# Patient Record
Sex: MALE | Race: WHITE | NOT HISPANIC OR LATINO | Employment: FULL TIME | URBAN - METROPOLITAN AREA
[De-identification: names, ages, dates, MRNs, and addresses within clinical notes are randomized per-mention and may not be internally consistent; named-entity substitution may affect disease eponyms.]

---

## 2017-01-04 ENCOUNTER — LAB VISIT (OUTPATIENT)
Dept: LAB | Facility: HOSPITAL | Age: 27
End: 2017-01-04
Attending: INTERNAL MEDICINE
Payer: COMMERCIAL

## 2017-01-04 ENCOUNTER — OFFICE VISIT (OUTPATIENT)
Dept: TRANSPLANT | Facility: CLINIC | Age: 27
End: 2017-01-04
Payer: COMMERCIAL

## 2017-01-04 VITALS
DIASTOLIC BLOOD PRESSURE: 68 MMHG | BODY MASS INDEX: 23.22 KG/M2 | SYSTOLIC BLOOD PRESSURE: 96 MMHG | HEIGHT: 68 IN | HEART RATE: 67 BPM | WEIGHT: 153.25 LBS

## 2017-01-04 DIAGNOSIS — I42.8 NONISCHEMIC CARDIOMYOPATHY: ICD-10-CM

## 2017-01-04 DIAGNOSIS — I10 ESSENTIAL HYPERTENSION: ICD-10-CM

## 2017-01-04 DIAGNOSIS — F17.210 CIGARETTE SMOKER: ICD-10-CM

## 2017-01-04 DIAGNOSIS — I50.42 CHRONIC COMBINED SYSTOLIC AND DIASTOLIC HEART FAILURE: ICD-10-CM

## 2017-01-04 DIAGNOSIS — I42.9 CARDIOMYOPATHY: Primary | ICD-10-CM

## 2017-01-04 DIAGNOSIS — I50.22 CHRONIC SYSTOLIC CONGESTIVE HEART FAILURE: Primary | ICD-10-CM

## 2017-01-04 LAB
ALBUMIN SERPL BCP-MCNC: 4 G/DL
ALP SERPL-CCNC: 70 U/L
ALT SERPL W/O P-5'-P-CCNC: 39 U/L
ANION GAP SERPL CALC-SCNC: 9 MMOL/L
AST SERPL-CCNC: 25 U/L
BILIRUB SERPL-MCNC: 1.3 MG/DL
BNP SERPL-MCNC: 809 PG/ML
BUN SERPL-MCNC: 22 MG/DL
CALCIUM SERPL-MCNC: 9 MG/DL
CHLORIDE SERPL-SCNC: 106 MMOL/L
CO2 SERPL-SCNC: 25 MMOL/L
CREAT SERPL-MCNC: 1.3 MG/DL
EST. GFR  (AFRICAN AMERICAN): >60 ML/MIN/1.73 M^2
EST. GFR  (NON AFRICAN AMERICAN): >60 ML/MIN/1.73 M^2
GLUCOSE SERPL-MCNC: 96 MG/DL
POTASSIUM SERPL-SCNC: 4 MMOL/L
PROT SERPL-MCNC: 6.5 G/DL
SODIUM SERPL-SCNC: 140 MMOL/L

## 2017-01-04 PROCEDURE — 3078F DIAST BP <80 MM HG: CPT | Mod: S$GLB,,, | Performed by: NURSE PRACTITIONER

## 2017-01-04 PROCEDURE — 1159F MED LIST DOCD IN RCRD: CPT | Mod: S$GLB,,, | Performed by: NURSE PRACTITIONER

## 2017-01-04 PROCEDURE — 99213 OFFICE O/P EST LOW 20 MIN: CPT | Mod: S$GLB,,, | Performed by: NURSE PRACTITIONER

## 2017-01-04 PROCEDURE — 99999 PR PBB SHADOW E&M-EST. PATIENT-LVL III: CPT | Mod: PBBFAC,,, | Performed by: NURSE PRACTITIONER

## 2017-01-04 PROCEDURE — 3074F SYST BP LT 130 MM HG: CPT | Mod: S$GLB,,, | Performed by: NURSE PRACTITIONER

## 2017-01-04 NOTE — PROGRESS NOTES
Subjective:    Patient ID:  Mert Samayoa is a 26 y.o. male who presents for follow-up of Congestive Heart Failure      HPI 25 yo WM with familial DCM, 2 brothers with OHTx, is here to f/u clinic visit 2 weeks ago when he describes worsening activity intolerance, was noted to have a decrease in PkVO2 from 42.0 to 23.9 (53% of predicted) and a gradually climbing BNP. TTE done in August showed decline in LVEF to 10-15% from > 30%. Labs today are pending, but on 12/27/16, his BNP was 835 and creatinine had bumped to 1.4. He experiences varying degrees of AMES, but sometimes just walking a block. Feels tired more often. Has noticed abdominal bloating, although no nausea and appetite is fine. Continues to work as a , and occasionally lifts 55#. Continues to take an occasional puff of a cigarette.    Review of Systems   Constitution: Positive for malaise/fatigue.   HENT: Negative.    Eyes: Negative.    Cardiovascular: Positive for dyspnea on exertion.   Respiratory: Negative.    Endocrine: Negative.    Hematologic/Lymphatic: Negative.    Skin: Negative.    Musculoskeletal: Negative.    Gastrointestinal: Positive for bloating.   Genitourinary: Negative.    Neurological: Negative.    Psychiatric/Behavioral: Negative.    Allergic/Immunologic: Negative.         Objective:    Physical Exam   Constitutional: He is oriented to person, place, and time. He appears well-developed and well-nourished.   HENT:   Head: Normocephalic and atraumatic.   Eyes: Conjunctivae and EOM are normal. Pupils are equal, round, and reactive to light.   Neck: Normal range of motion. Neck supple.   Cardiovascular: Normal rate, regular rhythm, normal heart sounds and intact distal pulses.    Pulmonary/Chest: Effort normal and breath sounds normal.   Abdominal: Soft. Bowel sounds are normal.   Musculoskeletal: Normal range of motion. He exhibits no edema.   Neurological: He is alert and oriented to person, place,  and time.   Skin: Skin is warm and dry.   Psychiatric: He has a normal mood and affect. His behavior is normal. Judgment and thought content normal.     Lab Results   Component Value Date     (H) 12/27/2016     12/27/2016    K 5.0 12/27/2016    MG 2.1 08/11/2016     12/27/2016    CO2 24 12/27/2016    BUN 17 12/27/2016    CREATININE 1.4 12/27/2016    GLU 95 12/27/2016    AST 20 12/20/2016    ALT 25 12/20/2016    ALBUMIN 4.2 12/20/2016    PROT 6.8 12/20/2016    BILITOT 0.7 12/20/2016    CHOL 116 (L) 05/09/2012    HDL 35 (L) 05/09/2012    LDLCALC 68.0 05/09/2012    TRIG 63 05/09/2012       Magnesium   Date Value Ref Range Status   08/11/2016 2.1 1.6 - 2.6 mg/dL Final       Lab Results   Component Value Date    WBC 8.20 12/20/2016    HGB 14.0 12/20/2016    HCT 42.4 12/20/2016    MCV 92 12/20/2016     12/20/2016       Lab Results   Component Value Date    INR 1.1 12/30/2009       BNP   Date Value Ref Range Status   12/27/2016 835 (H) 0 - 99 pg/mL Final     Comment:     Values of less than 100 pg/ml are consistent with non-CHF populations.   12/20/2016 874 (H) 0 - 99 pg/mL Final     Comment:     Values of less than 100 pg/ml are consistent with non-CHF populations.   08/18/2016 230 (H) 0 - 99 pg/mL Final     Comment:     Values of less than 100 pg/ml are consistent with non-CHF populations.       No results found for: LDH            Assessment:       1. Familial Cardiomyopathy    2. Chronic combined systolic and diastolic heart failure    3. Essential hypertension    4. Cigarette smoker         Plan:       Schedule RHC for next week to assess flows and pressures given worsening symptoms, climbing BNP and climbing creatinine.

## 2017-01-04 NOTE — MR AVS SNAPSHOT
[facility]  [address]  [facility] [address]  Phone: [phone]                  Mert Samayoa   2017 1:30 PM   Office Visit    Description:  Male : 1990   Provider:  Diane Saleh NP   Department:  [facility]           Reason for Visit     Congestive Heart Failure           Diagnoses this Visit        Comments    Cardiomyopathy    -  Primary     Chronic combined systolic and diastolic heart failure         Essential hypertension         Cigarette smoker                To Do List           Goals (5 Years of Data)     None      Follow-Up and Disposition     Return in about 1 month (around 2017).    Follow-up and Disposition History      [facility]     [facility] -  Assistance  Registered nurses in the [facility] provide clinical advisement, health education, appointment booking, and other advisory services.  Call for this free service at [phone].             Medications           Message regarding Medications     Verify the changes and/or additions to your medication regime listed below are the same as discussed with your clinician today.  If any of these changes or additions are incorrect, please notify your healthcare provider.             Verify that the below list of medications is an accurate representation of the medications you are currently taking.  If none reported, the list may be blank. If incorrect, please contact your healthcare provider. Carry this list with you in case of emergency.           Current Medications     carvedilol (COREG) 6.25 MG tablet Take 1 tablet (6.25 mg total) by mouth 2 (two) times daily with meals. Take 1 1/2 in the morning and 1 1/2 in the evening    co-enzyme Q-10 (COQ-10) 30 mg capsule Take 30 mg by mouth once daily.    lisinopril 10 MG tablet TAKE ONE TABLET BY MOUTH ONCE DAILY IN THE EVENING           Clinical Reference Information           Vital Signs - Last  "Recorded  Most recent update: 1/4/2017  1:39 PM by Minna Raymundo    BP Pulse Ht Wt BMI    96/68 67 5' 7.5" (1.715 m) 69.5 kg (153 lb 3.5 oz) 23.64 kg/m2      Blood Pressure          Most Recent Value    BP  96/68      Allergies as of 1/4/2017     No Known Allergies      Immunizations Administered on Date of Encounter - 1/4/2017     None      Orders Placed During Today's Visit     Future Labs/Procedures Expected by Expires    Brain natriuretic peptide  1/11/2017 (Approximate) 1/4/2018    Case Request-Cath Lab: HEART CATH-RIGHT  1/11/2017 (Approximate) 1/4/2018    CBC auto differential  1/11/2017 (Approximate) 1/4/2018    Comprehensive metabolic panel  1/11/2017 (Approximate) 1/4/2018    Magnesium  1/11/2017 (Approximate) 1/4/2018    Protime-INR  1/11/2017 (Approximate) 1/4/2018    Brain natriuretic peptide  2/4/2017 (Approximate) 1/4/2018    Comprehensive metabolic panel  2/4/2017 (Approximate) 1/4/2018    Magnesium  2/4/2017 (Approximate) 1/4/2018    T4, FREE  2/4/2017 (Approximate) 1/4/2018    TSH  2/4/2017 (Approximate) 1/4/2018      Smoking Cessation     If you would like to quit smoking:   You may be eligible for free services if you are a [place] resident and started smoking cigarettes before September 1, 1988.  Call the [employer]ess[employer] ([employer]) toll free at ([phone] or ([phone].   Call 6-800-QUIT-NOW if you do not meet the above criteria.            "

## 2017-01-16 ENCOUNTER — HOSPITAL ENCOUNTER (OUTPATIENT)
Facility: HOSPITAL | Age: 27
Discharge: STILL ADMITTED OR ADMINISTRATIVE | End: 2017-01-16
Attending: INTERNAL MEDICINE | Admitting: INTERNAL MEDICINE
Payer: COMMERCIAL

## 2017-01-16 ENCOUNTER — HOSPITAL ENCOUNTER (INPATIENT)
Facility: HOSPITAL | Age: 27
LOS: 7 days | Discharge: HOME | DRG: 291 | End: 2017-01-23
Attending: EMERGENCY MEDICINE | Admitting: INTERNAL MEDICINE
Payer: COMMERCIAL

## 2017-01-16 DIAGNOSIS — I50.42 CHRONIC COMBINED SYSTOLIC AND DIASTOLIC HEART FAILURE: Primary | ICD-10-CM

## 2017-01-16 DIAGNOSIS — R57.0 CARDIOGENIC SHOCK: ICD-10-CM

## 2017-01-16 DIAGNOSIS — Z76.82 ORGAN TRANSPLANT CANDIDATE: ICD-10-CM

## 2017-01-16 DIAGNOSIS — I42.0 DILATED CARDIOMYOPATHY: ICD-10-CM

## 2017-01-16 DIAGNOSIS — I42.9 CARDIOMYOPATHY: ICD-10-CM

## 2017-01-16 DIAGNOSIS — I50.9 HEART FAILURE: ICD-10-CM

## 2017-01-16 DIAGNOSIS — I50.43 ACUTE ON CHRONIC COMBINED SYSTOLIC AND DIASTOLIC HEART FAILURE: ICD-10-CM

## 2017-01-16 LAB
ALLENS TEST: ABNORMAL
DELSYS: ABNORMAL
HCO3 UR-SCNC: 28.4 MMOL/L (ref 24–28)
MODE: ABNORMAL
PCO2 BLDA: 40.9 MMHG (ref 35–45)
PH SMN: 7.45 [PH] (ref 7.35–7.45)
PO2 BLDA: 26 MMHG (ref 40–60)
POC BE: 4 MMOL/L
POC SATURATED O2: 52 % (ref 95–100)
POC TCO2: 30 MMOL/L (ref 24–29)
SAMPLE: ABNORMAL
SITE: ABNORMAL
SP02: 98

## 2017-01-16 PROCEDURE — 20000000 HC ICU ROOM

## 2017-01-16 PROCEDURE — 99223 1ST HOSP IP/OBS HIGH 75: CPT | Mod: ,,, | Performed by: INTERNAL MEDICINE

## 2017-01-16 PROCEDURE — G0379 DIRECT REFER HOSPITAL OBSERV: HCPCS

## 2017-01-16 PROCEDURE — 63600175 PHARM REV CODE 636 W HCPCS: Performed by: NURSE PRACTITIONER

## 2017-01-16 PROCEDURE — G0378 HOSPITAL OBSERVATION PER HR: HCPCS

## 2017-01-16 PROCEDURE — 99285 EMERGENCY DEPT VISIT HI MDM: CPT | Mod: 25

## 2017-01-16 PROCEDURE — 82803 BLOOD GASES ANY COMBINATION: CPT

## 2017-01-16 PROCEDURE — 96365 THER/PROPH/DIAG IV INF INIT: CPT

## 2017-01-16 PROCEDURE — 93010 ELECTROCARDIOGRAM REPORT: CPT | Mod: ,,, | Performed by: INTERNAL MEDICINE

## 2017-01-16 PROCEDURE — 93005 ELECTROCARDIOGRAM TRACING: CPT

## 2017-01-16 PROCEDURE — 96366 THER/PROPH/DIAG IV INF ADDON: CPT

## 2017-01-16 PROCEDURE — 99283 EMERGENCY DEPT VISIT LOW MDM: CPT | Mod: ,,, | Performed by: EMERGENCY MEDICINE

## 2017-01-16 PROCEDURE — 63600175 PHARM REV CODE 636 W HCPCS: Performed by: EMERGENCY MEDICINE

## 2017-01-16 PROCEDURE — 36591 DRAW BLOOD OFF VENOUS DEVICE: CPT

## 2017-01-16 PROCEDURE — 96375 TX/PRO/DX INJ NEW DRUG ADDON: CPT

## 2017-01-16 PROCEDURE — 25000003 PHARM REV CODE 250: Performed by: NURSE PRACTITIONER

## 2017-01-16 PROCEDURE — 25000003 PHARM REV CODE 250: Performed by: HOSPITALIST

## 2017-01-16 RX ORDER — FUROSEMIDE 10 MG/ML
40 INJECTION INTRAMUSCULAR; INTRAVENOUS ONCE
Status: CANCELLED | OUTPATIENT
Start: 2017-01-16 | End: 2017-01-16

## 2017-01-16 RX ORDER — FUROSEMIDE 10 MG/ML
40 INJECTION INTRAMUSCULAR; INTRAVENOUS ONCE
Status: COMPLETED | OUTPATIENT
Start: 2017-01-16 | End: 2017-01-16

## 2017-01-16 RX ORDER — DOBUTAMINE HYDROCHLORIDE 200 MG/100ML
5 INJECTION INTRAVENOUS CONTINUOUS
Status: DISCONTINUED | OUTPATIENT
Start: 2017-01-16 | End: 2017-01-17

## 2017-01-16 RX ORDER — HEPARIN SODIUM 5000 [USP'U]/ML
5000 INJECTION, SOLUTION INTRAVENOUS; SUBCUTANEOUS EVERY 8 HOURS
Status: CANCELLED | OUTPATIENT
Start: 2017-01-16

## 2017-01-16 RX ORDER — ACETAMINOPHEN 325 MG/1
650 TABLET ORAL EVERY 6 HOURS PRN
Status: DISCONTINUED | OUTPATIENT
Start: 2017-01-16 | End: 2017-01-23 | Stop reason: HOSPADM

## 2017-01-16 RX ORDER — SODIUM CHLORIDE 0.9 % (FLUSH) 0.9 %
3 SYRINGE (ML) INJECTION EVERY 8 HOURS
Status: CANCELLED | OUTPATIENT
Start: 2017-01-16

## 2017-01-16 RX ORDER — ONDANSETRON 2 MG/ML
4 INJECTION INTRAMUSCULAR; INTRAVENOUS EVERY 8 HOURS PRN
Status: CANCELLED | OUTPATIENT
Start: 2017-01-16

## 2017-01-16 RX ORDER — LISINOPRIL 10 MG/1
10 TABLET ORAL DAILY
Status: CANCELLED | OUTPATIENT
Start: 2017-01-17

## 2017-01-16 RX ORDER — DOBUTAMINE HYDROCHLORIDE 400 MG/100ML
5 INJECTION, SOLUTION INTRAVENOUS CONTINUOUS
Status: CANCELLED | OUTPATIENT
Start: 2017-01-16

## 2017-01-16 RX ORDER — LISINOPRIL 10 MG/1
10 TABLET ORAL DAILY
Status: DISCONTINUED | OUTPATIENT
Start: 2017-01-16 | End: 2017-01-23 | Stop reason: HOSPADM

## 2017-01-16 RX ADMIN — ACETAMINOPHEN 650 MG: 325 TABLET ORAL at 07:01

## 2017-01-16 RX ADMIN — LISINOPRIL 10 MG: 10 TABLET ORAL at 04:01

## 2017-01-16 RX ADMIN — FUROSEMIDE 10 MG/HR: 10 INJECTION, SOLUTION INTRAMUSCULAR; INTRAVENOUS at 03:01

## 2017-01-16 RX ADMIN — FUROSEMIDE 40 MG: 10 INJECTION, SOLUTION INTRAMUSCULAR; INTRAVENOUS at 03:01

## 2017-01-16 RX ADMIN — DOBUTAMINE IN DEXTROSE 5 MCG/KG/MIN: 200 INJECTION, SOLUTION INTRAVENOUS at 01:01

## 2017-01-16 NOTE — CONSULTS
Cardiology Consult Note:    25 yo M with familial NIDCM (LVEDD 6.9 CM, EF 10%) who presented for outpatient RHC, found to have low CI at 1 and high filling pressures with RAP 18 and PCWP 30. He was sent directly to the ED for admission to CCU for inotrope and diuresis. His main complaints are fatigue, AMES, orthopnea/PND, and nausea. Symptoms have progressed substantially over the past week. Family history notable for NIDCM in both older brothers who are both s/p OHT.    Last Echo 8/11/16:   1 - Severe left ventricular enlargement.     2 - Severely depressed left ventricular systolic function (EF 10-15%).     3 - Eccentric hypertrophy.     4 - Left ventricular diastolic dysfunction.     5 - Mild left atrial enlargement.     6 - Right ventricle is upper limit of normal in size with normal systolic function.     7 - Mild to moderate mitral regurgitation.     8 - Mild tricuspid regurgitation.     9 - Trivial pulmonic regurgitation.     10 - The estimated PA systolic pressure is 35 mmHg.     AF, 86/52, otherwise VSS. Exam remarkable for JVD to earlobe, RRR, no murmur, bibasilar rales, no edema, cool peripherally, 2+ pulses.  Labs notable for Cr 1.4, Tb 1.6, ASt 34, ALT 54, BNP 1514  CXR: pending    Assessment  ADHF/cardiogenic shock  Hepatic congestion    Recs:  Admit to CCU under HTS   at 5 mcg/kg/min  Lasix gtt at 10 mg/hr  Advanced options work-up

## 2017-01-16 NOTE — PROCEDURES
Central Line (Trialysis) Placement Procedure Note:   Number of lumens: 3  Indication: Dialysis  Post procedure diagnosis: same   Estimated blood loss: 5ml   Consent:   Prior to starting the procedure, informed consent was obtained with risks outlined including, but not limited to: pneumothorax, hemothorax, infection, bleeding, and thrombosis.   Prior to starting, all those in the room washed their hands and donned caps and masks, which were worn throughout the procedure. A time-out was performed. The insertion site and surrounding areas were generously scrubbed with chlorhexadine. Using sterile technique, the central line kit was opened and a whole body sterile drape was applied to the patient. The insertion site was anesthetized with lidocaine 1% without epinephrine. The Right Internal Jugular Vein was cannulated and dark red, non-pulsatile blood was aspirated into the syringe. Then, using sterile Seldinger technique, a J-type guidewire was advanced through the needle into the vein without difficulty or resistance. The needle was removed and a small incision was made at the insertion site to allow for advancement of a dilator. After the track was successfully dilated, the catheter was advanced over the guidewire to the skin and the guidewire was removed intact. Dark red blood was aspirated from each of the lumens and sterile saline was injected into each of the ports. The catheter was sewn into place and the site was again cleaned with chlorhexadine. A biopatch was applied to the insertion site and a sterile central line dressing was applied. A chest XRAY was done to confirm catheter placement and absense of pneumothorax/hemothorax. The patient tolerated the procedure well.     Signed:  JANUSZ Daniels MD  Advanced Heart Failure/LVAD/Transplant Cardiology Fellow  1/16/2017 5:37 PM

## 2017-01-16 NOTE — INTERVAL H&P NOTE
RHC today showed elevated right and left sided filling pressures and low cardiac output c/w cardiogenic shock. Patient transported to ED for initiation of IV  and Lasix infusion while awaiting critical care bed.            Active Hospital Problems    Diagnosis  POA    *Cardiogenic shock [R57.0]  Yes      Resolved Hospital Problems    Diagnosis Date Resolved POA   No resolved problems to display.

## 2017-01-16 NOTE — IP AVS SNAPSHOT
[facility]  [address]  [facility] [address]  Phone: [phone]           Patient Discharge Instructions     Our goal is to set you up for success. This packet includes information on your condition, medications, and your home care. It will help you to care for yourself so you don't get sicker and need to go back to the hospital.     Please ask your nurse if you have any questions.        There are many details to remember when preparing to leave the hospital. Here is what you will need to do:    1. Take your medicine. If you are prescribed medications, review your Medication List in the following pages. You may have new medications to  at the pharmacy and others that you'll need to stop taking. Review the instructions for how and when to take your medications. Talk with your doctor or nurses if you are unsure of what to do.     2. Go to your follow-up appointments. Specific follow-up information is listed in the following pages. Your may be contacted by a transition nurse or clinical provider about future appointments. Be sure we have all of the phone numbers to reach you, if needed. Please contact your provider's office if you are unable to make an appointment.     3. Watch for warning signs. Your doctor or nurse will give you detailed warning signs to watch for and when to call for assistance. These instructions may also include educational information about your condition. If you experience any of warning signs to your health, call your doctor.               [facility]  Unless otherwise directed by your provider, please contact [facility], our nurse care line that is available for 24/7 assistance.     [phone] (toll-free)    Registered nurses in the [facility] provide clinical advisement, health education, appointment booking, and other advisory services.                    ** Verify the list of medication(s) below is accurate and up  to date. Carry this with you in case of emergency. If your medications have changed, please notify your healthcare provider.             Medication List      START taking these medications        Additional Info    Begin Date AM Noon PM Bedtime    furosemide 20 MG tablet   Commonly known as:  LASIX   Quantity:  30 tablet   Refills:  11   Dose:  20 mg    Last time this was given:  20 mg on 1/23/2017  8:16 AM   Instructions:  Take 1 tablet (20 mg total) by mouth once daily.     Tomorrow 1/24                            CONTINUE taking these medications        Additional Info    Begin Date AM Noon PM Bedtime    lisinopril 10 MG tablet   Quantity:  30 tablet   Refills:  11    Last time this was given:  10 mg on 1/23/2017  8:16 AM   Instructions:  TAKE ONE TABLET BY MOUTH ONCE DAILY IN THE EVENING     Tomorrow 1/24                            STOP taking these medications     carvedilol 6.25 MG tablet   Commonly known as:  COREG       COQ-10 30 mg capsule   Generic drug:  co-enzyme Q-10            Where to Get Your Medications      These medications were sent to [id number] - [place] - [address] [facility]  [address] [facility] [place] [address]     Phone:  [phone]     furosemide 20 MG tablet                  Please bring to all follow up appointments:    1. A copy of your discharge instructions.  2. All medicines you are currently taking in their original bottles.  3. Identification and insurance card.    Please arrive 15 minutes ahead of scheduled appointment time.    Please call 24 hours in advance if you must reschedule your appointment and/or time.        Your Scheduled Appointments     Jan 27, 2017 10:05 AM CST   Non-Fasting Lab with LAB, APPOINTMENT [place] [facility] ([facility])    [address] [facility] [address]   [phone]            Jan 27, 2017  1:00 PM CST   Established Patient Visit with HAKEEM Sosa   [facility]  "([facility] )    [address]  [place] [address]   [phone]            Feb 10, 2017 12:30 PM CST   Non-Fasting Lab with LAB, APPOINTMENT [place] [facility])    [address] [place] [address]   [phone]            Feb 10, 2017  2:30 PM CST   Established Patient Visit with Lashon Hawkins MD   [facility] [address] [place] [address]   [phone]              Your Future Surgeries/Procedures     Jan 31, 2017   Surgery with Lex Macedo MD   [facility] ([facility]) [address] [place] [address]   [phone]            Feb 01, 2017   Surgery with Lex Macedo MD   [facility]) [address] [place] [id number] [phone]                  Primary Diagnosis     Your primary diagnosis was:  Heart Failure That Is Caused By Inadequate Blood Supply      Admission Information     Date & Time Provider Department CSN    1/16/2017 11:15 AM Lashon Hawkins MD [facility] [id number]      Care Providers     Provider Role Specialty Primary office phone    Lashon Hawkins MD Attending Provider Cardiology [phone]    Lashon Hawkins MD Team Attending  Cardiology [phone]    Sammi Tapia MD Consulting Physician  Cardiology [phone]    Marisel Lundberg MD Team Attending  Cardiology [phone]    Mario oCnner MD Team Attending  Cardiology [phone]    Roque Matos MD Team Attending  Cardiology [phone]      Your Vitals Were     BP Pulse Temp Resp Height Weight    108/58 (BP Location: Left arm, Patient Position: Lying, BP Method: Automatic) 93 97.4 °F (36.3 °C) (Oral) 18 5' 7" (1.702 m) 63 kg (138 lb 14.2 oz)    SpO2 BMI             96% 21.75 kg/m2         Recent Lab Values        1/17/2017                           4:19 PM           " A1C 5.4           Comment for A1C at  4:19 PM on 1/17/2017:  According to ADA guidelines, hemoglobin A1C <7.0% represents  optimal control in non-pregnant diabetic patients.  Different  metrics may apply to specific populations.   Standards of Medical Care in Diabetes - 2016.  For the purpose of screening for the presence of diabetes:  <5.7%     Consistent with the absence of diabetes  5.7-6.4%  Consistent with increasing risk for diabetes   (prediabetes)  >or=6.5%  Consistent with diabetes  Currently no consensus exists for use of hemoglobin A1C  for diagnosis of diabetes for children.        Pending Labs     Order Current Status    DRVVT In process    HLA SSO DNA Typing - Class I & II In process    Hexagonal Phospholipid Neutralization In process    Mumps, IgG Screen In process    Rubeola antibody IgG In process    Von Willebrand multimeric In process    HLA Solid Organ Recipient Workup Preliminary result      Allergies as of 1/23/2017     No Known Allergies      Advance Directives     An advance directive is a document which, in the event you are no longer able to make decisions for yourself, tells your healthcare team what kind of treatment you do or do not want to receive, or who you would like to make those decisions for you.  If you do not currently have an advance directive, [facility] encourages you to create one.  For more information call:  [phone]), [phone] ([phone]),  or log on to [email or web address].        Smoking Cessation     If you would like to quit smoking:   You may be eligible for free services if you are a [place] resident and started smoking cigarettes before September 1, 1988.  Call the Smoking Cessation Trust (SCT) toll free at (781) 149-4974 or (422) 969-8509.   Call 8-774-QUIT-NOW if you do not meet the above criteria.            Language Assistance Services     ATTENTION: Language assistance services are available, free of charge. Please call  [phone].      ATENCIÓN: Si hala keisha, tiene a hinton disposición servicios gratuitos de asistencia lingüística. Angelo al [phone].     [facility] Ý: N?u b?n nói Ti?ng Vi?t, có các d?ch v? h? tr? ngôn ng? mi?n phí dành cho b?n. G?i s? [phone].        Heart Failure Education       Heart Failure: Being Active  You have a condition called heart failure. Being active doesnt mean that you have to wear yourself out. Even a little movement each day helps to strengthen your heart. If you cant get out to exercise, you can do simple stretching and strengthening exercises at home. These are good ways to keep you well-conditioned and prevent you and your heart from becoming excessively weak.    Ideas to get you started  · Add a little movement to things you do now. Walk to mail letters. Park your car at the far end of the parking lot and walk to the store. Walk up a flight of stairs instead of taking the elevator.  · Choose activities you enjoy. You might walk, swim, or ride an exercise bike. Things like gardening and washing the car count, too. Other possibilities include: washing dishes, walking the dog, walking around the mall, and doing aerobic activities with friends.  · Join a group exercise program at a [facility] or [facility], a senior center, or a community center. Or look into a hospital cardiac rehabilitation program. Ask your doctor if you qualify.  Tips to keep you going  · Get up and get dressed each day. Go to a coffee shop and read a newspaper or go somewhere that you'll be in the presence of other active people. Youll feel more like being active.  · Make a plan. Choose one or more activities that you enjoy and that you can easily do. Then plan to do at least one each day. You might write your plan on a calendar.  · Go with a friend or a group if you like company. This can help you feel supported and stay motivated, too.  · Plan social events that you enjoy. This will keep you mentally engaged as well as  physically motivated to do things you find pleasure in.  For your safety  · Talk with your healthcare provider before starting an exercise program.  · Exercise indoors when its too hot or too cold outside, or when the air quality is poor. Try walking at a shopping mall.  · Wear socks and sturdy shoes to maintain your balance and prevent falls.  · Start slowly. Do a few minutes several times a day at first. Increase your time and speed little by little.  · Stop and rest whenever you feel tired or get short of breath.  · Dont push yourself on days when you dont feel well.  © 6020-8680 [employer]. [address] [place] [address], [place] [address]. All rights reserved. This information is not intended as a substitute for professional medical care. Always follow your healthcare professional's instructions.              Heart Failure: Evaluating Your Heart  You have a condition called heart failure. To evaluate your condition, your doctor will examine you, ask questions, and do some tests. Along with looking for signs of heart failure, the doctor looks for any other health problems that may have led to heart failure. The results of your evaluation will help your doctor form a treatment plan.  Health history and physical exam  Your visit will start with a health history. Tell the doctor about any symptoms youve noticed and about all medicines you take. Then youll have a physical exam. This includes listening to your heartbeat and breathing. Youll also be checked for swelling (edema) in your legs and neck. When you have fluid buildup or fluid in the lungs, it may be called congestive heart failure.  Diagnosing heart failure     During an echocardiogram, sound waves bounce off the heart. These are converted into a picture on the screen.   The following may be done to help your doctor form a diagnosis:  · X-rays show the size and shape of your heart. These pictures can also show fluid in your lungs.  · An  electrocardiogram (ECG or EKG) shows the pattern of your heartbeat. Small pads (electrodes) are placed on your chest, arms, and legs. Wires connect the pads to the ECG machine, which records your hearts electrical signals. This can give the doctor information about heart function.  · An echocardiogram uses ultrasound waves to show the structure and movement of your heart muscle. This shows how well the heart pumps. It also shows the thickness of the heart walls, and if the heart is enlarged. It is one of the most useful, non-invasive tests as it provides information about the heart's general function. This helps your doctor make treatment decisions.  · Lab tests evaluate small amounts of blood or urine for signs of problems. A BNP lab test can help diagnose and evaluate heart failure. BNP stands for B-type natriuretic peptide. The ventricles secrete more BNP when heart failure worsens. Lab tests can also provide information about metabolic dysfunction or heart dysfunction.  Your treatment plan  Based on the results of your evaluation and tests, your doctor will develop a treatment plan. This plan is designed to relieve some of your heart failure symptoms and help make you more comfortable. Your treatment plan may include:  · Medicine to help your heart work better and improve your quality of life  · Changes in what you eat and drink to help prevent fluid from backing up in your body  · Daily monitoring of your weight and heart failure symptoms to see how well your treatment plan is working  · Exercise to help you stay healthy  · Help with quitting smoking  · Emotional and psychological support to help adjust to the changes  · Referrals to other specialists to make sure you are being treated comprehensively  © 9187-8954 [employer]. [address] [place] [address], [place] [address]. All rights reserved. This information is not intended as a substitute for professional medical care. Always follow your  healthcare professional's instructions.              Heart Failure: Making Changes to Your Diet  You have a condition called heart failure. When you have heart failure, excess fluid is more likely to build up in your body because your heart isn't working well. This makes the heart work harder to pump blood. Fluid buildup causes symptoms such as shortness of breath and swelling (edema). This is often referred to as congestive heart failure or CHF. Controlling the amount of salt (sodium) you eat may help stop fluid from building up. Your doctor may also tell you to reduce the amount of fluid you drink.  Reading food labels    Your healthcare provider will tell you how much sodium you can eat each day. Read food labels to keep track. Keep in mind that certain foods are high in salt. These include canned, frozen, and processed foods. Check the amount of sodium in each serving. Watch out for high-sodium ingredients. These include MSG (monosodium glutamate), baking soda, and sodium phosphate.   Eating less salt  Give yourself time to get used to eating less salt. It may take a little while. Here are some tips to help:  · Take the saltshaker off the table. Replace it with salt-free herb mixes and spices.  · Eat fresh or plain frozen vegetables. These have much less salt than canned vegetables.  · Choose low-sodium snacks like sodium-free pretzels, crackers, or air-popped popcorn.  · Dont add salt to your food when youre cooking. Instead, season your foods with pepper, lemon, garlic, or onion.  · When you eat out, ask that your food be cooked without added salt.  · Avoid eating fried foods as these often have a great deal of salt.  If youre told to limit fluids  You may need to limit how much fluid you have to help prevent swelling. This includes anything that is liquid at room temperature, such as ice cream and soup. If your doctor tells you to limit fluid, try these tips:  · Measure drinks in a measuring cup before you  drink them. This will help you meet daily goals.  · Chill drinks to make them more refreshing.  · Suck on frozen lemon wedges to quench thirst.  · Only drink when youre thirsty.  · Chew sugarless gum or suck on hard candy to keep your mouth moist.  · Weigh yourself daily to know if your body's fluid content is rising.  My sodium goal  Your healthcare provider may give you a sodium goal to meet each day. This includes sodium found in food as well as salt that you add. My goal is to eat no more than ___________ mg of sodium per day.     When to call your doctor  Call your doctor right away if you have any symptoms of worsening heart failure. These can include:  · Sudden weight gain  · Increased swelling of your legs or ankles  · Trouble breathing when youre resting or at night  · Increase in the number of pillows you have to sleep on  · Chest pain, pressure, discomfort, or pain in the jaw, neck, or back   © 5529-7567 [employer]. [address] [place] [address]. All rights reserved. This information is not intended as a substitute for professional medical care. Always follow your healthcare professional's instructions.              Heart Failure: Medicines to Help Your Heart    You have a condition called heart failure (also known as congestive heart failure, or CHF). Your doctor will likely prescribe medicines for heart failure and any underlying health problems you have. Most heart failure patients take one or more types of medicinen. Your healthcare provider will work to find the combination of medicines that works best for you.  Heart failure medicines  Here are the most common heart failure medicines:  · ACE inhibitors lower blood pressure and decrease strain on the heart. This makes it easier for the heart to pump. Angiotensin receptor blockers have similar effects. These are prescribed for some patients instead of ACE inhibitors.  · Beta-blockers relieve stress on the heart. They also  improve symptoms. They may also improve the heart's pumping action over time.  · Diuretics (also called water pills) help rid your body of excess water. This can help rid your body of swelling (edema). Having less fluid to pump means your heart doesnt have to work as hard. Some diuretics make your body lose a mineral called potassium. Your doctor will tell you if you need to take supplements or eat more foods high in potassium.  · Digoxin helps your heart pump with more strength. This helps your heart pump more blood with each beat. So, more oxygen-rich blood travels to the rest of the body.  · Aldosterone antagonists help alter hormones and decrease strain on the heart.  · Hydralazine and nitrates are two separate medicines used together to treat heart failure. They may come in one combination pill. They lower blood pressure and decrease how hard the heart has to pump.  Medicines for related conditions  Controlling other heart problems helps keep heart failure under control, too. Depending on other heart problems you have, medicines may be prescribed to:  · Lower blood pressure (antihypertensives).  · Lower cholesterol levels (statins).  · Prevent blood clots (anticoagulants or aspirin).  · Keep the heartbeat steady (antiarrhythmics).  © 3947-6055 [employer]. [address] [place] [address], [place] [address]. All rights reserved. This information is not intended as a substitute for professional medical care. Always follow your healthcare professional's instructions.              Heart Failure: Procedures That May Help    The heart is a muscle that pumps oxygen-rich blood to all parts of the body. When you have heart failure, the heart is not able to pump as well as it should. Blood and fluid may back up into the lungs (congestive heart failure), and some parts of the body dont get enough oxygen-rich blood to work normally. These problems lead to the symptoms of heart failure.     Certain procedures may  help the heart pump better in some cases of heart failure. Some procedures are done to treat health problems that may have caused the heart failure such as coronary artery disease or heart rhythm problems. For more serious heart failure, other options are available.  Treating artery and valve problems  If you have coronary artery disease or valve disease, procedures may be done to improve blood flow. This helps the heart pump better, which can improve heart failure symptoms. First, your doctor may do a cardiac catheterization to help detect clogged blood vessels or valve damage. During this procedure, a  thin tube (catheter) in inserted into a blood vessel and guided to the heart. There a dye is injected and a special type of X-ray (angiogram) is taken of the blood vessels. Procedures to open a blocked artery or fix damaged valves can also be done using catheterization.  · Angioplasty uses a balloon-tipped instrument at the end of the catheter. The balloon is inflated to widen the narrowed artery. In many cases, a stent is expanded to further support the narrowed artery. A stent is a metal mesh tube.  · Valve surgery repairs or replacement of faulty valves can also be done during catheterization so blood can flow properly through the chambers of the heart.  Bypass surgery is another option to help treat blocked arteries. It uses a healthy blood vessel from elsewhere in the body. The healthy blood vessel is attached above and below the blocked area so that blood can flow around the blocked artery.  Treating heart rhythm problems  A device may be placed in the chest to help a weak heart maintain a healthy, heartbeat so the heart can pump more effectively:  · Pacemaker. A pacemaker is an implanted device that regulates your heartbeat electronically. It monitors your heart's rhythm and generates a painless electric impulse that helps the heart beat in a regular rhythm. A pacemaker is programmed to meet your specific  heart rhythm needs.  · Biventricular pacing/cardiac resynchronization therapy. A type of pacemaker that paces both pumping chambers of the heart at the same time to coordinate contractions and to improve the heart's function. Some people with heart failure are candidates for this therapy.  · Implantable cardioverter defibrillator. A device similar to a pacemaker that senses when the heart is beating too fast and delivers an electrical shock to convert the fast rhythm to a normal rhythm. This can be a life saving device.  In severe cases  In more serious cases of heart failure when other treatments no longer work, other options may include:  · Ventricular assist devices (VADs). These are mechanical devices used to take over the pumping function for one or both of the heart's ventricles, or pumping chambers. A VAD may be necessary when heart failure progresses to the point that medicines and other treatments no longer help. In some cases, a VAD may be used as a bridge to transplant.  · Heart transplant. This is replacing the diseased heart with a healthy one from a donor. This is an option for a few people who are very sick. A heart transplant is very serious and not an option for all patients. Your doctor can tell you more.  © 9508-4465 [employer]. [address] [place] [address]. All rights reserved. This information is not intended as a substitute for professional medical care. Always follow your healthcare professional's instructions.              Heart Failure: Tracking Your Weight  You have a condition called heart failure. When you have heart failure, a sudden weight gain or a steady rise in weight is a warning sign that your body is retaining too much water and salt. This could mean your heart failure is getting worse. If left untreated, it can cause problems for your lungs and result in shortness of breath. Weighing yourself each day is the best way to know if youre retaining water.  If your weight goes up quickly, call your doctor. You will be given instructions on how to get rid of the excess water. You will likely need medicines and to avoid salt. This will help your heart work better.  Call your doctor if you gain more than 2 pounds in 1 day, more than 5 pounds in 1 week, or whatever weight gain you were told to report by your doctor. This is often a sign of worsening heart failure and needs to be evaluated and treated. Your doctor will tell you what to do next.   Tips for weighing yourself    · Weigh yourself at the same time each morning, wearing the same clothes. Weigh yourself after urinating and before eating.  · Use the same scale each day. Make sure the numbers are easy to read. Put the scale on a flat, hard surface -- not on a rug or carpet.  · Do not stop weighing yourself. If you forget one day, weigh again the next morning.  How to use your weight chart  · Keep your weight chart near the scale. Write your weight on the chart as soon as you get off the scale.  · Fill in the month and the start date on the chart. Then write down your weight each day. Your chart will look like this:    · If you miss a day, leave the space blank. Weigh yourself the next day and write your weight in the next space.  · Take your weight chart with you when you go to see your doctor.  © 6123-3718 [employer]. [address] [place] [address], [place] [address]. All rights reserved. This information is not intended as a substitute for professional medical care. Always follow your healthcare professional's instructions.              Heart Failure: Warning Signs of a Flare-Up  You have a condition called heart failure. Once you have heart failure, flare-ups can happen. Below are signs that can mean your heart failure is getting worse. If you notice any of these warning signs, call your healthcare provider.  Swelling    · Your feet, ankles, or lower legs get puffier.  · You notice skin changes on your  lower legs.  · Your shoes feel too tight.  · Your clothes are tighter in the waist.  · You have trouble getting rings on or off your fingers.  Shortness of breath  · You have to breathe harder even when youre doing your normal activities or when youre resting.  · You are short of breath walking up stairs or even short distances.  · You wake up at night short of breath or coughing.  · You need to use more pillows or sit up to sleep.  · You wake up tired or restless.  Other warning signs  · You feel weaker, dizzy, or more tired.  · You have chest pain or changes in your heartbeat.  · You have a cough that wont go away.  · You cant remember things or dont feel like eating.  Tracking your weight  Gaining weight is often the first warning sign that heart failure is getting worse. Gaining even a few pounds can be a sign that your body is retaining excess water and salt. Weighing yourself each day in the morning after you urinate and before you eat, is the best way to know if you're retaining water. Get a scale that is easy to read and make sure you wear the same clothes and use the same scale every time you weigh. Your healthcare provider will show you how to track your weight. Call your doctor if you gain more than 2 pounds in 1 day, 5 pounds in 1 week, or whatever weight gain you were told to report by your doctor. This is often a sign of worsening heart failure and needs to be evaluated and treated before it compromises your breathing. Your doctor will tell you what to do next.    © 7598-4490 [employer]. [address] [place] [address], [place] [address]. All rights reserved. This information is not intended as a substitute for professional medical care. Always follow your healthcare professional's instructions.               [facility] complies with applicable Federal civil rights laws and does not discriminate on the basis of race, color, national origin, age, disability, or sex.

## 2017-01-16 NOTE — ED NOTES
Patient identifiers verified and correct for Mert Samayoa.    LOC: The patient is awake, alert and aware of environment with an appropriate affect, the patient is oriented x 3 and speaking appropriately.  APPEARANCE: Patient resting comfortably and in no acute distress, patient is clean and well groomed, patient's clothing is properly fastened.  SKIN: The skin is cool and dry, color consistent with ethnicity, patient has normal skin turgor and moist mucus membranes, skin intact, no breakdown or bruising noted.  MUSCULOSKELETAL: Patient moving all extremities spontaneously, no obvious swelling or deformities noted.  RESPIRATORY: Airway is open and patent, respirations are spontaneous, patient has a normal effort and rate, no accessory muscle use noted, clear bilateral breath sounds noted through out the chest.  CARDIAC: Patient has a normal rate and regular rhythm, no periphreal edema noted, capillary refill < 3 seconds.  ABDOMEN: Soft and non tender to palpation, no distention noted, normoactive bowel sounds present in all four quadrants.  NEUROLOGIC:  eyes open spontaneously, behavior appropriate to situation, follows commands, facial expression symmetrical, bilateral hand grasp equal and even, purposeful motor response noted, normal sensation in all extremities when touched with a finger.

## 2017-01-16 NOTE — ED PROVIDER NOTES
Encounter Date: 1/16/2017    SCRIBE #1 NOTE: I, Martita Sheffield, am scribing for, and in the presence of,  Dr. Robert. I have scribed the following portions of the note - Other sections scribed: Attending Notes .       History     Chief Complaint   Patient presents with    Shortness of Breath     sent from 3rd floor after having RHC, 'admit will be quicker via ER, needing dobutamine drip, denies chest pain and  sob sometimes     Review of patient's allergies indicates:  No Known Allergies  HPI Comments: 27 yo female with h/o hereditary cardiomyopathy presents from [facility] clinic after hypotensive event with presyncopal symptoms.  Pt had had cath procedure performed.  As he was being d/c'ed he suffered hypotensive episode with lightheadedness, diaphoresis, and nausea.      The history is provided by the patient.     Past Medical History   Diagnosis Date    Cardiogenic shock 1/16/2017    Cardiomyopathy      Familial cardiomyopathy    CHF (congestive heart failure)     Essential hypertension 12/21/2016    Familial Cardiomyopathy      Familial cardiomyopathy      Past Medical History Pertinent Negatives   Diagnosis Date Noted    Allergy 8/31/2012    Anemia 8/31/2012    Anxiety 8/31/2012    Arthritis 8/31/2012    Asthma 8/31/2012    Cancer 8/31/2012    Chronic kidney disease 8/31/2012    Depression 8/31/2012    Diabetes mellitus 8/31/2012    Heart murmur 8/31/2012    Hyperlipidemia 8/31/2012    Seizures 8/31/2012    Stroke 8/31/2012    Systemic lupus erythematosus 8/31/2012    Thyroid disease 8/31/2012    Ulcer 8/31/2012     History reviewed. No pertinent past surgical history.  Family History   Problem Relation Age of Onset    Arthritis Mother     Heart disease Brother      cardiomyopathy and heart transplant    No Known Problems Father     Heart disease Brother      cardiomyopathy and heart transplanted twice    Birth defects Paternal Uncle      Social History   Substance Use Topics     Smoking status: Current Some Day Smoker     Packs/day: 1.00    Smokeless tobacco: None    Alcohol use 2.4 oz/week     4 Shots of liquor per week     Review of Systems   Constitutional: Positive for diaphoresis. Negative for chills and fever.   HENT: Negative for sore throat and trouble swallowing.    Eyes: Negative for pain and visual disturbance.   Respiratory: Positive for shortness of breath (chronic; worse with exertion ). Negative for cough.    Cardiovascular: Negative for chest pain and leg swelling.   Gastrointestinal: Positive for nausea. Negative for abdominal pain.   Genitourinary: Negative for dysuria and flank pain.   Musculoskeletal: Negative for back pain and myalgias.   Skin: Negative for rash and wound.   Neurological: Positive for light-headedness. Negative for weakness.       Physical Exam   Initial Vitals   BP Pulse Resp Temp SpO2   01/16/17 1103 01/16/17 1103 01/16/17 1103 01/16/17 1103 01/16/17 1103   86/52 78 18 97.5 °F (36.4 °C) 99 %     Physical Exam    Nursing note and vitals reviewed.  Constitutional: He appears well-developed and well-nourished. He is not diaphoretic. No distress.   HENT:   Head: Normocephalic and atraumatic.   Mouth/Throat: Oropharynx is clear and moist.   Eyes: Conjunctivae are normal. Pupils are equal, round, and reactive to light. No scleral icterus.   Neck: Normal range of motion. Neck supple. No JVD present.   Cardiovascular: Normal rate, regular rhythm and normal heart sounds.   No murmur heard.  Pulmonary/Chest: Breath sounds normal. No respiratory distress. He has no rhonchi. He has no rales.   Abdominal: Soft. Bowel sounds are normal. There is no tenderness.   Musculoskeletal: Normal range of motion. He exhibits no edema or tenderness.   Neurological: He is alert and oriented to person, place, and time.   Skin: Skin is dry. No rash noted. No erythema.         ED Course   Procedures  Labs Reviewed                   APC / Resident Notes:   HOII MDM      A/P:  Pt  is a 25 yo male with hypotension and pre-syncopal symptoms sent from cards clinic for admit.  Pt had labs done this morning as he had a cath performed.  Currently pt is stable with SBP>90 and HR in the 80's.  Cardiology has been called and are coming to eval pt .  He will likely require admit to the ICU for dobutamine gtt.  Awaiting [facility] recs  Case Discussed with Dr. HAZEL LAW  01/16/2017 11:25 AM             Scribe Attestation:   Scribe #1: I performed the above scribed service and the documentation accurately describes the services I performed. I attest to the accuracy of the note.    Attending Attestation:           Physician Attestation for Scribe:  Physician Attestation Statement for Scribe #1: I, Dr. Robert , reviewed documentation, as scribed by Martita Sheffield in my presence, and it is both accurate and complete.         Attending ED Notes:   11:27 AM  Cardiology called and will see patient.     Cardiology evaluated the patient and feel that the patient will need a dobutamine drip and will be admitted to the CCU for further care and treatment          ED Course     Clinical Impression:   The primary encounter diagnosis was Chronic combined systolic and diastolic heart failure. Diagnoses of Cardiogenic shock, Heart failure, Organ transplant candidate, Acute on chronic combined systolic and diastolic heart failure, Cardiomyopathy, and Dilated cardiomyopathy were also pertinent to this visit.    Disposition:   Disposition: Admitted  Condition: Serious       Rosa Isela Dahl MD  Resident  01/16/17 1431       Kt Robert MD  01/31/17 1004

## 2017-01-16 NOTE — H&P (VIEW-ONLY)
Subjective:    Patient ID:  Mert Samayoa is a 26 y.o. male who presents for follow-up of Congestive Heart Failure      HPI 27 yo WM with familial DCM, 2 brothers with OHTx, is here to f/u clinic visit 2 weeks ago when he describes worsening activity intolerance, was noted to have a decrease in PkVO2 from 42.0 to 23.9 (53% of predicted) and a gradually climbing BNP. TTE done in August showed decline in LVEF to 10-15% from > 30%. Labs today are pending, but on 12/27/16, his BNP was 835 and creatinine had bumped to 1.4. He experiences varying degrees of AMES, but sometimes just walking a block. Feels tired more often. Has noticed abdominal bloating, although no nausea and appetite is fine. Continues to work as a , and occasionally lifts 55#. Continues to take an occasional puff of a cigarette.    Review of Systems   Constitution: Positive for malaise/fatigue.   HENT: Negative.    Eyes: Negative.    Cardiovascular: Positive for dyspnea on exertion.   Respiratory: Negative.    Endocrine: Negative.    Hematologic/Lymphatic: Negative.    Skin: Negative.    Musculoskeletal: Negative.    Gastrointestinal: Positive for bloating.   Genitourinary: Negative.    Neurological: Negative.    Psychiatric/Behavioral: Negative.    Allergic/Immunologic: Negative.         Objective:    Physical Exam   Constitutional: He is oriented to person, place, and time. He appears well-developed and well-nourished.   HENT:   Head: Normocephalic and atraumatic.   Eyes: Conjunctivae and EOM are normal. Pupils are equal, round, and reactive to light.   Neck: Normal range of motion. Neck supple.   Cardiovascular: Normal rate, regular rhythm, normal heart sounds and intact distal pulses.    Pulmonary/Chest: Effort normal and breath sounds normal.   Abdominal: Soft. Bowel sounds are normal.   Musculoskeletal: Normal range of motion. He exhibits no edema.   Neurological: He is alert and oriented to person, place,  and time.   Skin: Skin is warm and dry.   Psychiatric: He has a normal mood and affect. His behavior is normal. Judgment and thought content normal.     Lab Results   Component Value Date     (H) 12/27/2016     12/27/2016    K 5.0 12/27/2016    MG 2.1 08/11/2016     12/27/2016    CO2 24 12/27/2016    BUN 17 12/27/2016    CREATININE 1.4 12/27/2016    GLU 95 12/27/2016    AST 20 12/20/2016    ALT 25 12/20/2016    ALBUMIN 4.2 12/20/2016    PROT 6.8 12/20/2016    BILITOT 0.7 12/20/2016    CHOL 116 (L) 05/09/2012    HDL 35 (L) 05/09/2012    LDLCALC 68.0 05/09/2012    TRIG 63 05/09/2012       Magnesium   Date Value Ref Range Status   08/11/2016 2.1 1.6 - 2.6 mg/dL Final       Lab Results   Component Value Date    WBC 8.20 12/20/2016    HGB 14.0 12/20/2016    HCT 42.4 12/20/2016    MCV 92 12/20/2016     12/20/2016       Lab Results   Component Value Date    INR 1.1 12/30/2009       BNP   Date Value Ref Range Status   12/27/2016 835 (H) 0 - 99 pg/mL Final     Comment:     Values of less than 100 pg/ml are consistent with non-CHF populations.   12/20/2016 874 (H) 0 - 99 pg/mL Final     Comment:     Values of less than 100 pg/ml are consistent with non-CHF populations.   08/18/2016 230 (H) 0 - 99 pg/mL Final     Comment:     Values of less than 100 pg/ml are consistent with non-CHF populations.       No results found for: LDH            Assessment:       1. Familial Cardiomyopathy    2. Chronic combined systolic and diastolic heart failure    3. Essential hypertension    4. Cigarette smoker         Plan:       Schedule RHC for next week to assess flows and pressures given worsening symptoms, climbing BNP and climbing creatinine.

## 2017-01-17 ENCOUNTER — EDUCATION (OUTPATIENT)
Dept: TRANSPLANT | Facility: CLINIC | Age: 27
End: 2017-01-17

## 2017-01-17 ENCOUNTER — DOCUMENTATION ONLY (OUTPATIENT)
Dept: TRANSPLANT | Facility: CLINIC | Age: 27
End: 2017-01-17

## 2017-01-17 ENCOUNTER — TELEPHONE (OUTPATIENT)
Dept: ADMINISTRATIVE | Facility: HOSPITAL | Age: 27
End: 2017-01-17

## 2017-01-17 ENCOUNTER — SOCIAL WORK (OUTPATIENT)
Dept: TRANSPLANT | Facility: HOSPITAL | Age: 27
End: 2017-01-17

## 2017-01-17 ENCOUNTER — TELEPHONE (OUTPATIENT)
Dept: TRANSPLANT | Facility: CLINIC | Age: 27
End: 2017-01-17

## 2017-01-17 PROBLEM — R57.0 CARDIOGENIC SHOCK: Status: ACTIVE | Noted: 2017-01-17

## 2017-01-17 LAB
ABO + RH BLD: NORMAL
ALBUMIN SERPL BCP-MCNC: 5 G/DL
ALLENS TEST: ABNORMAL
ALP SERPL-CCNC: 80 U/L
ALT SERPL W/O P-5'-P-CCNC: 64 U/L
ANION GAP SERPL CALC-SCNC: 16 MMOL/L
AST SERPL-CCNC: 39 U/L
BASOPHILS # BLD AUTO: 0.01 K/UL
BASOPHILS NFR BLD: 0.1 %
BILIRUB DIRECT SERPL-MCNC: 0.5 MG/DL
BILIRUB SERPL-MCNC: 1.5 MG/DL
BILIRUB UR QL STRIP: NEGATIVE
BUN SERPL-MCNC: 19 MG/DL
CALCIUM SERPL-MCNC: 10.2 MG/DL
CHLORIDE SERPL-SCNC: 95 MMOL/L
CHOLEST/HDLC SERPL: 5.6 {RATIO}
CLARITY UR REFRACT.AUTO: ABNORMAL
CO2 SERPL-SCNC: 29 MMOL/L
COLOR UR AUTO: YELLOW
CREAT SERPL-MCNC: 1.3 MG/DL
DELSYS: ABNORMAL
DIFFERENTIAL METHOD: ABNORMAL
EOSINOPHIL # BLD AUTO: 0.2 K/UL
EOSINOPHIL NFR BLD: 1.5 %
ERYTHROCYTE [DISTWIDTH] IN BLOOD BY AUTOMATED COUNT: 13.8 %
EST. GFR  (AFRICAN AMERICAN): >60 ML/MIN/1.73 M^2
EST. GFR  (NON AFRICAN AMERICAN): >60 ML/MIN/1.73 M^2
GLUCOSE SERPL-MCNC: 89 MG/DL
GLUCOSE UR QL STRIP: NEGATIVE
HCO3 UR-SCNC: 29.7 MMOL/L (ref 24–28)
HCT VFR BLD AUTO: 48.2 %
HDL/CHOLESTEROL RATIO: 17.7 %
HDLC SERPL-MCNC: 141 MG/DL
HDLC SERPL-MCNC: 25 MG/DL
HGB BLD-MCNC: 16.6 G/DL
HGB UR QL STRIP: NEGATIVE
KETONES UR QL STRIP: NEGATIVE
LDH SERPL L TO P-CCNC: 299 U/L
LDLC SERPL CALC-MCNC: 80 MG/DL
LEUKOCYTE ESTERASE UR QL STRIP: NEGATIVE
LYMPHOCYTES # BLD AUTO: 1.5 K/UL
LYMPHOCYTES NFR BLD: 13.7 %
MAGNESIUM SERPL-MCNC: 2.4 MG/DL
MCH RBC QN AUTO: 30.1 PG
MCHC RBC AUTO-ENTMCNC: 34.4 %
MCV RBC AUTO: 88 FL
MODE: ABNORMAL
MONOCYTES # BLD AUTO: 0.9 K/UL
MONOCYTES NFR BLD: 8.5 %
NEUTROPHILS # BLD AUTO: 8.2 K/UL
NEUTROPHILS NFR BLD: 75.9 %
NITRITE UR QL STRIP: NEGATIVE
NONHDLC SERPL-MCNC: 116 MG/DL
PCO2 BLDA: 43.4 MMHG (ref 35–45)
PH SMN: 7.44 [PH] (ref 7.35–7.45)
PH UR STRIP: 5 [PH] (ref 5–8)
PHOSPHATE SERPL-MCNC: 5.4 MG/DL
PLATELET # BLD AUTO: 250 K/UL
PMV BLD AUTO: 10.5 FL
PO2 BLDA: 30 MMHG (ref 40–60)
POC BE: 6 MMOL/L
POC SATURATED O2: 59 % (ref 95–100)
POC TCO2: 31 MMOL/L (ref 24–29)
POTASSIUM SERPL-SCNC: 3.2 MMOL/L
POTASSIUM SERPL-SCNC: 4.3 MMOL/L
PREALB SERPL-MCNC: 26 MG/DL
PROT SERPL-MCNC: 8.4 G/DL
PROT UR QL STRIP: NEGATIVE
RBC # BLD AUTO: 5.51 M/UL
SAMPLE: ABNORMAL
SITE: ABNORMAL
SODIUM SERPL-SCNC: 140 MMOL/L
SP GR UR STRIP: 1.01 (ref 1–1.03)
SP02: 96
T4 FREE SERPL-MCNC: 1.34 NG/DL
TRIGL SERPL-MCNC: 180 MG/DL
TSH SERPL DL<=0.005 MIU/L-ACNC: 1.73 UIU/ML
URN SPEC COLLECT METH UR: ABNORMAL
UROBILINOGEN UR STRIP-ACNC: NEGATIVE EU/DL
WBC # BLD AUTO: 10.78 K/UL

## 2017-01-17 PROCEDURE — 80323 ALKALOIDS NOS: CPT

## 2017-01-17 PROCEDURE — 83615 LACTATE (LD) (LDH) ENZYME: CPT

## 2017-01-17 PROCEDURE — 81003 URINALYSIS AUTO W/O SCOPE: CPT

## 2017-01-17 PROCEDURE — 86706 HEP B SURFACE ANTIBODY: CPT

## 2017-01-17 PROCEDURE — 86787 VARICELLA-ZOSTER ANTIBODY: CPT

## 2017-01-17 PROCEDURE — 99255 IP/OBS CONSLTJ NEW/EST HI 80: CPT | Mod: ,,, | Performed by: THORACIC SURGERY (CARDIOTHORACIC VASCULAR SURGERY)

## 2017-01-17 PROCEDURE — 86682 HELMINTH ANTIBODY: CPT

## 2017-01-17 PROCEDURE — 86803 HEPATITIS C AB TEST: CPT

## 2017-01-17 PROCEDURE — 86901 BLOOD TYPING SEROLOGIC RH(D): CPT

## 2017-01-17 PROCEDURE — 86832 HLA CLASS I HIGH DEFIN QUAL: CPT

## 2017-01-17 PROCEDURE — 86665 EPSTEIN-BARR CAPSID VCA: CPT

## 2017-01-17 PROCEDURE — 80053 COMPREHEN METABOLIC PANEL: CPT

## 2017-01-17 PROCEDURE — 83036 HEMOGLOBIN GLYCOSYLATED A1C: CPT

## 2017-01-17 PROCEDURE — 86703 HIV-1/HIV-2 1 RESULT ANTBDY: CPT

## 2017-01-17 PROCEDURE — 84100 ASSAY OF PHOSPHORUS: CPT

## 2017-01-17 PROCEDURE — 36591 DRAW BLOOD OFF VENOUS DEVICE: CPT

## 2017-01-17 PROCEDURE — 20000000 HC ICU ROOM

## 2017-01-17 PROCEDURE — 81376 HLA II TYPING 1 LOCUS LR: CPT | Mod: 91

## 2017-01-17 PROCEDURE — 85025 COMPLETE CBC W/AUTO DIFF WBC: CPT

## 2017-01-17 PROCEDURE — 87340 HEPATITIS B SURFACE AG IA: CPT

## 2017-01-17 PROCEDURE — 84443 ASSAY THYROID STIM HORMONE: CPT

## 2017-01-17 PROCEDURE — 63600175 PHARM REV CODE 636 W HCPCS: Performed by: PHYSICIAN ASSISTANT

## 2017-01-17 PROCEDURE — 86825 HLA X-MATH NON-CYTOTOXIC: CPT | Mod: 91

## 2017-01-17 PROCEDURE — 86828 HLA CLASS I&II ANTIBODY QUAL: CPT

## 2017-01-17 PROCEDURE — 84132 ASSAY OF SERUM POTASSIUM: CPT

## 2017-01-17 PROCEDURE — 86777 TOXOPLASMA ANTIBODY: CPT

## 2017-01-17 PROCEDURE — 83735 ASSAY OF MAGNESIUM: CPT

## 2017-01-17 PROCEDURE — 63600175 PHARM REV CODE 636 W HCPCS: Performed by: INTERNAL MEDICINE

## 2017-01-17 PROCEDURE — 25000003 PHARM REV CODE 250: Performed by: HOSPITALIST

## 2017-01-17 PROCEDURE — 86977 RBC SERUM PRETX INCUBJ/INHIB: CPT | Mod: 91

## 2017-01-17 PROCEDURE — 99223 1ST HOSP IP/OBS HIGH 75: CPT | Mod: ,,, | Performed by: INTERNAL MEDICINE

## 2017-01-17 PROCEDURE — 86695 HERPES SIMPLEX TYPE 1 TEST: CPT

## 2017-01-17 PROCEDURE — 86790 VIRUS ANTIBODY NOS: CPT

## 2017-01-17 PROCEDURE — 81373 HLA I TYPING 1 LOCUS LR: CPT | Mod: 91

## 2017-01-17 PROCEDURE — 84134 ASSAY OF PREALBUMIN: CPT

## 2017-01-17 PROCEDURE — 80307 DRUG TEST PRSMV CHEM ANLYZR: CPT

## 2017-01-17 PROCEDURE — 86825 HLA X-MATH NON-CYTOTOXIC: CPT

## 2017-01-17 PROCEDURE — 82248 BILIRUBIN DIRECT: CPT

## 2017-01-17 PROCEDURE — 86833 HLA CLASS II HIGH DEFIN QUAL: CPT

## 2017-01-17 PROCEDURE — 86900 BLOOD TYPING SEROLOGIC ABO: CPT

## 2017-01-17 PROCEDURE — 82803 BLOOD GASES ANY COMBINATION: CPT

## 2017-01-17 PROCEDURE — 84439 ASSAY OF FREE THYROXINE: CPT

## 2017-01-17 PROCEDURE — 36415 COLL VENOUS BLD VENIPUNCTURE: CPT

## 2017-01-17 PROCEDURE — 80061 LIPID PANEL: CPT

## 2017-01-17 PROCEDURE — 86704 HEP B CORE ANTIBODY TOTAL: CPT

## 2017-01-17 PROCEDURE — 86644 CMV ANTIBODY: CPT

## 2017-01-17 PROCEDURE — 81376 HLA II TYPING 1 LOCUS LR: CPT

## 2017-01-17 PROCEDURE — 25000003 PHARM REV CODE 250: Performed by: NURSE PRACTITIONER

## 2017-01-17 PROCEDURE — 86829 HLA CLASS I/II ANTIBODY QUAL: CPT | Mod: 91

## 2017-01-17 PROCEDURE — 86592 SYPHILIS TEST NON-TREP QUAL: CPT

## 2017-01-17 PROCEDURE — 81373 HLA I TYPING 1 LOCUS LR: CPT

## 2017-01-17 RX ORDER — ENOXAPARIN SODIUM 100 MG/ML
40 INJECTION SUBCUTANEOUS EVERY 24 HOURS
Status: DISCONTINUED | OUTPATIENT
Start: 2017-01-17 | End: 2017-01-23 | Stop reason: HOSPADM

## 2017-01-17 RX ORDER — FUROSEMIDE 10 MG/ML
40 INJECTION INTRAMUSCULAR; INTRAVENOUS 2 TIMES DAILY
Status: DISCONTINUED | OUTPATIENT
Start: 2017-01-17 | End: 2017-01-18

## 2017-01-17 RX ORDER — POTASSIUM CHLORIDE 20 MEQ/1
60 TABLET, EXTENDED RELEASE ORAL ONCE
Status: COMPLETED | OUTPATIENT
Start: 2017-01-17 | End: 2017-01-17

## 2017-01-17 RX ORDER — DOBUTAMINE HYDROCHLORIDE 400 MG/100ML
5 INJECTION, SOLUTION INTRAVENOUS CONTINUOUS
Status: DISCONTINUED | OUTPATIENT
Start: 2017-01-17 | End: 2017-01-23 | Stop reason: HOSPADM

## 2017-01-17 RX ADMIN — POTASSIUM CHLORIDE 60 MEQ: 1500 TABLET, EXTENDED RELEASE ORAL at 06:01

## 2017-01-17 RX ADMIN — LISINOPRIL 10 MG: 10 TABLET ORAL at 08:01

## 2017-01-17 RX ADMIN — FUROSEMIDE 40 MG: 10 INJECTION, SOLUTION INTRAVENOUS at 05:01

## 2017-01-17 RX ADMIN — DOBUTAMINE IN DEXTROSE 5 MCG/KG/MIN: 200 INJECTION, SOLUTION INTRAVENOUS at 03:01

## 2017-01-17 RX ADMIN — ENOXAPARIN SODIUM 40 MG: 100 INJECTION SUBCUTANEOUS at 11:01

## 2017-01-17 NOTE — PROGRESS NOTES
PRE-EDUCATION BOOKLET NOTE:    Met with Mert Samayoa and had a brief discussion on the heart transplant evaluation process.    Heart Transplant Educational Booklet given to patient, which included the following handouts:  · Cardiomyopathy and Heart Transplantation  · Pre-transplant Evaluation Process  · Ventricular Assist Devices  · Wellness Contract  · Heart Transplant Information Outline  · Recipient Informed Consent  · Discharge Instructions for Patients with Heart Failure  · Advanced Directives  · Suggested web sites  · Multiple listing protocol and [facility] toll free numbers    Contact information provided.  All questions answered to patient's satisfaction.  Educational session to be arranged once financial clearance has been cleared to proceed.  Patient instructed awaiting financial clearance and then will complete education session and with his permission, sign consent to start the evaluation for OHT . Voiced understanding.

## 2017-01-17 NOTE — PROGRESS NOTES
EDUCATION NOTE:    Mert Samayoa was seen today for pre-heart transplant education.  Patient signed wellness contract and informed consent to undergo heart transplant evaluation work-up.  Thorough pre-transplant education conducted.      Information presented included:  · Evaluation process  · Members of the transplant team  · Selection committee members and role of the committee  · Listing process for transplant  · Different listing designations, including status 7  · 1-year graft survival statistics  · LVAD as bridge to transplant or DT  · Need to reach patient within 15 minutes of donor offer  · CDC high risk donors  · Blood transfusions  · Process for matching donor with recipient  · Need for weight loss and how it relates to the wait time  · Post-transplant immunosuppression for life with need to be able to afford post-transplant medications  · Need for a caregiver to be with them at all times beginning with discharge from ICU, through at least the first 6 weeks post-transplant  · Need to find local housing for the first 6 weeks post-transplant  · How to reach team members at any time  · [employer] website with written instructions regarding how to look up information [facility]    Patient was AAO X4, laying in bed. Wife unavailable, went back to [facility] for the night. Patient denies ETOH consumption or illicit drug use, admits to smoking but quit 2 weeks ago when he was feeing ill. States 2 brothers have received OHT - in 2005 and 2011. States he is aware of the process as he has seen his brothers go through the OHT. Patient asked pertinent questions, which were answered to his satisfaction.  Patient was also given a copy of the wellness contract and informed consent to undergo evaluation work-up. Encouraged patient to call for any questions or concerns, voiced understanding. Will continue to follow.     HAKEEM Palacios notified of completion of step 2 of advanced heart  failure pathway.

## 2017-01-17 NOTE — PLAN OF CARE
Problem: Patient Care Overview  Goal: Plan of Care Review  Outcome: Ongoing (interventions implemented as appropriate)  VSS. Central line placed for SvO2 monitoring. IV dobutamine and lasix infusing. Continuing diuresis. POC discussed with pt and family. Questions answered. Will continue to monitor

## 2017-01-17 NOTE — PROGRESS NOTES
HTS notified of pt c/o pain & cough, pt has tenderness to right TLC site. MD Floyd aware. States will review recent xray results and place further orders for pain.

## 2017-01-17 NOTE — CONSULTS
[facility]  History & Physical  General Surgery    SUBJECTIVE:     Chief Complaint/Reason for Admission: Heart failure    History of Present Illness:  Patient is a 26 y.o. male with familial dilated cardiomyopathy who presented for a right heart cath yesterday to evaluate his hemodynamics and was admitted from the ED for diuresis and inotropic support. Currently he states he feels well aside from a sinus infection.     PTA Medications   Medication Sig    carvedilol (COREG) 6.25 MG tablet Take 1 tablet (6.25 mg total) by mouth 2 (two) times daily with meals. Take 1 1/2 in the morning and 1 1/2 in the evening    co-enzyme Q-10 (COQ-10) 30 mg capsule Take 30 mg by mouth 2 (two) times daily.     lisinopril 10 MG tablet TAKE ONE TABLET BY MOUTH ONCE DAILY IN THE EVENING       Review of patient's allergies indicates:  No Known Allergies    Past Medical History   Diagnosis Date    Cardiogenic shock 1/16/2017    Cardiomyopathy      Familial cardiomyopathy    CHF (congestive heart failure)     Essential hypertension 12/21/2016    Familial Cardiomyopathy      Familial cardiomyopathy      History reviewed. No pertinent past surgical history.  Family History   Problem Relation Age of Onset    Arthritis Mother     Heart disease Brother      cardiomyopathy and heart transplant    No Known Problems Father     Heart disease Brother      cardiomyopathy and heart transplanted twice    Birth defects Paternal Uncle      Social History   Substance Use Topics    Smoking status: Current Some Day Smoker     Packs/day: 1.00    Smokeless tobacco: None    Alcohol use 2.4 oz/week     4 Shots of liquor per week        Review of Systems:  Constitutional: no fever or chills  Respiratory: no cough or shortness of breath  Cardiovascular: no chest pain or palpitations  Gastrointestinal: no nausea or vomiting, tolerating diet  Genitourinary: no hematuria or dysuria  Musculoskeletal: no arthralgias or  myalgias    OBJECTIVE:     Vital Signs (Most Recent):  Temp: 98 °F (36.7 °C) (01/17/17 1100)  Pulse: 82 (01/17/17 1400)  Resp: (!) 21 (01/17/17 1400)  BP: (!) 96/56 (01/17/17 1400)  SpO2: 98 % (01/17/17 1400)    Physical Exam:  General: well developed, well nourished, no distress  Head: normocephalic, atraumatic  Lungs:  clear to auscultation bilaterally and normal respiratory effort  Heart: regular rate and rhythm, S1, S2 normal, no murmur, rub or gallop  Abdomen: soft, non-tender non-distented; bowel sounds normal; no masses,  no organomegaly  Extremities: no cyanosis or edema, or clubbing    Laboratory:  CBC:   Recent Labs  Lab 01/17/17  0401   WBC 10.78   RBC 5.51   HGB 16.6   HCT 48.2      MCV 88   MCH 30.1   MCHC 34.4     CMP:   Recent Labs  Lab 01/17/17  0401 01/17/17  1125   GLU 89  --    CALCIUM 10.2  --    ALBUMIN 5.0  --    PROT 8.4  --      --    K 3.2* 4.3   CO2 29  --    CL 95  --    BUN 19  --    CREATININE 1.3  --    ALKPHOS 80  --    ALT 64*  --    AST 39  --    BILITOT 1.5*  --      Cardiac markers: No results for input(s): CKMB, CPKMB, TROPONINT, TROPONINI, MYOGLOBIN in the last 168 hours.    Diagnostic Results:  Labs: Reviewed    ASSESSMENT/PLAN:     25 yo male admitted with heart failure due to congenital dilated cardiomyopathy for diuresis and inotropic support. Cardiothoracic surgery consulted for advanced options.     Plan:   - Continue work up for VAD/transplant and patient will be presented at selection committee  CT scan of the chest does not preclude him from txp and VAD  Concerns:  Need 2D Echo to evaluate for RV function

## 2017-01-17 NOTE — PLAN OF CARE
At the request of Dr. Hawkins, I have been asked to meet patient and provide VAD education. Introduced self and reason for visit. Pt AAAO.  Provided written VAD education (red folder). Included in folder is the following: VAD education, wellness contract, acknowledgement of being evaluated for VAD, support group flier, ICU visitation hours, VAD newsletter, [facility] information, picture of VAD  In body, [employer] pamphlet, Living with HM II DVD, There is hope pamphlet, Tomorrow depends on the decision we make today patient education pack (HM II), [employer] information, and business cards for all VAD coordinators. Explained that we use 3 different types of pumps here and information on both pumps is in the red folder. I explained the work up process as well.     Explained to look over the entire contents and read the wellness contract, caregiver agreement and acknowledgement form.  Also explained they should bring this folder with them to all clinic visits and if they are admitted to the hospital so we can continue education as needed. Should there be any questions, please write them down and bring with you or feel free to call and we can talk on the phone. All questions answered to satisfaction as evidence by verbal acknowledgement.

## 2017-01-17 NOTE — PROGRESS NOTES
"Hospital Day: 2    Subjective:  Interval History: Feeling better this am. Able to talk without getting SOB    Scheduled Meds:   lisinopril  10 mg Oral Daily     Continuous Infusions:   DOBUTamine 5 mcg/kg/min (01/17/17 0600)    furosemide (LASIX) 1 mg/mL infusion (non-titrating) 10 mg/hr (01/17/17 0600)     PRN Meds:acetaminophen    Objective:  Vital signs in last 24 hours:   Temp:  [97.5 °F (36.4 °C)-98.6 °F (37 °C)] 98.3 °F (36.8 °C)  Pulse:  [61-88] 69  Resp:  [11-41] 16  SpO2:  [93 %-100 %] 96 %  BP: ()/(52-84) 107/69    Intake/Output last 3 shifts:  I/O last 3 completed shifts:  In: 528.5 [P.O.:220; I.V.:308.5]  Out: 6200 [Urine:6200]  Intake/Output this shift:       Physical Exam:  Visit Vitals    /64 (BP Location: Right arm, Patient Position: Lying, BP Method: Automatic)    Pulse 77    Temp 98 °F (36.7 °C) (Oral)    Resp 19    Ht 5' 7" (1.702 m)    Wt 65.7 kg (144 lb 13.5 oz)    SpO2 97%    BMI 22.69 kg/m2     General appearance: alert, appears stated age and cooperative  Neck: +JVD, supple, symmetrical, trachea midline, R IJ in plac  Lungs: clear to auscultation bilaterally  Heart: regular rate and rhythm, S1, S2 normal, no murmur, click, rub or gallop  Abdomen: soft, non-tender; bowel sounds normal; no masses,  no organomegaly  Extremities: extremities normal, atraumatic, no cyanosis or edema, LEs cool on exam  Neurologic: Grossly normal    2DE 8/11/16:  CONCLUSIONS     1 - Severe left ventricular enlargement.     2 - Severely depressed left ventricular systolic function (EF 10-15%).     3 - Eccentric hypertrophy.     4 - Left ventricular diastolic dysfunction.     5 - Mild left atrial enlargement.     6 - Right ventricle is upper limit of normal in size with normal systolic function.     7 - Mild to moderate mitral regurgitation.     8 - Mild tricuspid regurgitation.     9 - Trivial pulmonic regurgitation.     10 - The estimated PA systolic pressure is 35 mmHg.     RHC " 1/16/17:  Hemodynamic Results  AOPRES: 94/64 (74)  AOSAT: 98  FICKCI: 1.01  FICKCO: 1.81  PAPRES: 60/31 (41)  PASAT: 36  PVR: 4.99  PWPRES: 37/40 (32)  RAPRES: 22/21 (17)  RVPRES: 60/20, 20  SVR: 31.58/2528  PWSAT: 92    Lab Review  Lab Results   Component Value Date     01/17/2017    K 3.2 (L) 01/17/2017    CL 95 01/17/2017    CO2 29 01/17/2017    BUN 19 01/17/2017    CREATININE 1.3 01/17/2017    CALCIUM 10.2 01/17/2017     Lab Results   Component Value Date    WBC 10.78 01/17/2017    HGB 16.6 01/17/2017    HCT 48.2 01/17/2017    MCV 88 01/17/2017     01/17/2017            Assessment/Plan:  27 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    Cardiogenic Shock due to Acute on Chronic Systolic HF, DCM, NYHA FC IV  -RHC 1/16 showed low CO/CI and elevated filling pressures- results above. Central line placed overnight, Lasix drip, and  started. Pt net neg 5.6 overnight, CVP 8 this am, SVO2 59 this am. Pt has symptomatically improved.  -GDMT: Cont ACE. Holding BB due to starting   -Hypokalemia- due to diuresis, replace K and recheck  -Does not have ICD- will need to discuss timing of this vs Lifevest  -Last Echo 8/2016. Will get repeat Echo  -Start work up for advanced options        Active Hospital Problems    Diagnosis  POA    *Cardiogenic shock [R57.0]  Yes      Resolved Hospital Problems    Diagnosis Date Resolved POA   No resolved problems to display.

## 2017-01-18 ENCOUNTER — APPOINTMENT (OUTPATIENT)
Dept: CARDIOLOGY | Facility: CLINIC | Age: 27
DRG: 291 | End: 2017-01-18
Payer: COMMERCIAL

## 2017-01-18 PROBLEM — I50.43 ACUTE ON CHRONIC COMBINED SYSTOLIC AND DIASTOLIC HEART FAILURE: Status: ACTIVE | Noted: 2017-01-18

## 2017-01-18 LAB
ALBUMIN SERPL BCP-MCNC: 4.5 G/DL
ALLENS TEST: ABNORMAL
ALP SERPL-CCNC: 77 U/L
ALT SERPL W/O P-5'-P-CCNC: 47 U/L
ANION GAP SERPL CALC-SCNC: 15 MMOL/L
APTT BLDCRRT: 24.9 SEC
AST SERPL-CCNC: 28 U/L
BASOPHILS # BLD AUTO: 0.01 K/UL
BASOPHILS NFR BLD: 0.1 %
BILIRUB SERPL-MCNC: 1.2 MG/DL
BNP SERPL-MCNC: 101 PG/ML
BUN SERPL-MCNC: 25 MG/DL
CALCIUM SERPL-MCNC: 9.8 MG/DL
CHLORIDE SERPL-SCNC: 96 MMOL/L
CO2 SERPL-SCNC: 27 MMOL/L
CREAT SERPL-MCNC: 1.1 MG/DL
CRP SERPL-MCNC: 9 MG/L
DELSYS: ABNORMAL
DIASTOLIC DYSFUNCTION: YES
DIFFERENTIAL METHOD: ABNORMAL
EOSINOPHIL # BLD AUTO: 0.2 K/UL
EOSINOPHIL NFR BLD: 1.8 %
ERYTHROCYTE [DISTWIDTH] IN BLOOD BY AUTOMATED COUNT: 13.6 %
EST. GFR  (AFRICAN AMERICAN): >60 ML/MIN/1.73 M^2
EST. GFR  (NON AFRICAN AMERICAN): >60 ML/MIN/1.73 M^2
ESTIMATED AVG GLUCOSE: 108 MG/DL
FACT VIII ACT/NOR PPP: 163 %
FIBRINOGEN PPP-MCNC: 304 MG/DL
GLUCOSE SERPL-MCNC: 91 MG/DL
HBA1C MFR BLD HPLC: 5.4 %
HBV CORE AB SERPL QL IA: NEGATIVE
HBV SURFACE AB SER-ACNC: POSITIVE M[IU]/ML
HBV SURFACE AG SERPL QL IA: NEGATIVE
HCO3 UR-SCNC: 29.9 MMOL/L (ref 24–28)
HCT VFR BLD AUTO: 47.4 %
HCV AB SERPL QL IA: NEGATIVE
HCYS SERPL-SCNC: 11.3 UMOL/L
HEPATITIS A ANTIBODY, IGG: POSITIVE
HGB BLD-MCNC: 16.7 G/DL
INR PPP: 1.2
LYMPHOCYTES # BLD AUTO: 1.5 K/UL
LYMPHOCYTES NFR BLD: 17.1 %
MAGNESIUM SERPL-MCNC: 2.3 MG/DL
MCH RBC QN AUTO: 30.5 PG
MCHC RBC AUTO-ENTMCNC: 35.2 %
MCV RBC AUTO: 87 FL
MONOCYTES # BLD AUTO: 0.8 K/UL
MONOCYTES NFR BLD: 9 %
NEUTROPHILS # BLD AUTO: 6.5 K/UL
NEUTROPHILS NFR BLD: 71.8 %
PCO2 BLDA: 46.1 MMHG (ref 35–45)
PH SMN: 7.42 [PH] (ref 7.35–7.45)
PLATELET # BLD AUTO: 244 K/UL
PLATELET FUNCTION ASSAY - EPINEPHRINE: 78 SECS
PMV BLD AUTO: 10.4 FL
PO2 BLDA: 38 MMHG (ref 40–60)
POC BE: 5 MMOL/L
POC SATURATED O2: 73 % (ref 95–100)
POC TCO2: 31 MMOL/L (ref 24–29)
POTASSIUM SERPL-SCNC: 3.4 MMOL/L
PROT SERPL-MCNC: 7.6 G/DL
PROTHROMBIN TIME: 12.1 SEC
RBC # BLD AUTO: 5.48 M/UL
RETIRED EF AND QEF - SEE NOTES: 10 (ref 55–65)
RPR SER QL: NORMAL
SAMPLE: ABNORMAL
SITE: ABNORMAL
SODIUM SERPL-SCNC: 138 MMOL/L
T GONDII IGG SER QL IA: NORMAL
T GONDII IGG SERPL IA-ACNC: <5 IU/ML
VARICELLA INTERPRETATION: POSITIVE
VARICELLA ZOSTER IGG: 3.84 ISR
WBC # BLD AUTO: 8.99 K/UL

## 2017-01-18 PROCEDURE — 85730 THROMBOPLASTIN TIME PARTIAL: CPT

## 2017-01-18 PROCEDURE — 93306 TTE W/DOPPLER COMPLETE: CPT | Mod: 26,,, | Performed by: INTERNAL MEDICINE

## 2017-01-18 PROCEDURE — 63600175 PHARM REV CODE 636 W HCPCS: Performed by: INTERNAL MEDICINE

## 2017-01-18 PROCEDURE — 93882 EXTRACRANIAL UNI/LTD STUDY: CPT

## 2017-01-18 PROCEDURE — 25000003 PHARM REV CODE 250: Performed by: HOSPITALIST

## 2017-01-18 PROCEDURE — 86140 C-REACTIVE PROTEIN: CPT

## 2017-01-18 PROCEDURE — 25000003 PHARM REV CODE 250: Performed by: PHYSICIAN ASSISTANT

## 2017-01-18 PROCEDURE — 85397 CLOTTING FUNCT ACTIVITY: CPT

## 2017-01-18 PROCEDURE — 85306 CLOT INHIBIT PROT S FREE: CPT

## 2017-01-18 PROCEDURE — 81240 F2 GENE: CPT

## 2017-01-18 PROCEDURE — 83880 ASSAY OF NATRIURETIC PEPTIDE: CPT

## 2017-01-18 PROCEDURE — 85025 COMPLETE CBC W/AUTO DIFF WBC: CPT

## 2017-01-18 PROCEDURE — 36591 DRAW BLOOD OFF VENOUS DEVICE: CPT

## 2017-01-18 PROCEDURE — 99232 SBSQ HOSP IP/OBS MODERATE 35: CPT | Mod: ,,, | Performed by: INTERNAL MEDICINE

## 2017-01-18 PROCEDURE — 85613 RUSSELL VIPER VENOM DILUTED: CPT

## 2017-01-18 PROCEDURE — 85305 CLOT INHIBIT PROT S TOTAL: CPT

## 2017-01-18 PROCEDURE — 85384 FIBRINOGEN ACTIVITY: CPT

## 2017-01-18 PROCEDURE — 20600001 HC STEP DOWN PRIVATE ROOM

## 2017-01-18 PROCEDURE — 85247 CLOT FACTOR VIII MULTIMETRIC: CPT

## 2017-01-18 PROCEDURE — 81241 F5 GENE: CPT

## 2017-01-18 PROCEDURE — 85301 ANTITHROMBIN III ANTIGEN: CPT

## 2017-01-18 PROCEDURE — 85240 CLOT FACTOR VIII AHG 1 STAGE: CPT

## 2017-01-18 PROCEDURE — 25000003 PHARM REV CODE 250: Performed by: NURSE PRACTITIONER

## 2017-01-18 PROCEDURE — 86480 TB TEST CELL IMMUN MEASURE: CPT

## 2017-01-18 PROCEDURE — 82803 BLOOD GASES ANY COMBINATION: CPT

## 2017-01-18 PROCEDURE — 85246 CLOT FACTOR VIII VW ANTIGEN: CPT

## 2017-01-18 PROCEDURE — 93922 UPR/L XTREMITY ART 2 LEVELS: CPT | Mod: 26,,, | Performed by: INTERNAL MEDICINE

## 2017-01-18 PROCEDURE — 85576 BLOOD PLATELET AGGREGATION: CPT

## 2017-01-18 PROCEDURE — 80053 COMPREHEN METABOLIC PANEL: CPT

## 2017-01-18 PROCEDURE — 93922 UPR/L XTREMITY ART 2 LEVELS: CPT

## 2017-01-18 PROCEDURE — 97802 MEDICAL NUTRITION INDIV IN: CPT

## 2017-01-18 PROCEDURE — 93306 TTE W/DOPPLER COMPLETE: CPT

## 2017-01-18 PROCEDURE — 83090 ASSAY OF HOMOCYSTEINE: CPT

## 2017-01-18 PROCEDURE — 85598 HEXAGNAL PHOSPH PLTLT NEUTRL: CPT

## 2017-01-18 PROCEDURE — 63600175 PHARM REV CODE 636 W HCPCS: Performed by: PHYSICIAN ASSISTANT

## 2017-01-18 PROCEDURE — 86147 CARDIOLIPIN ANTIBODY EA IG: CPT | Mod: 59

## 2017-01-18 PROCEDURE — 93882 EXTRACRANIAL UNI/LTD STUDY: CPT | Mod: 26,,, | Performed by: INTERNAL MEDICINE

## 2017-01-18 PROCEDURE — 83735 ASSAY OF MAGNESIUM: CPT

## 2017-01-18 PROCEDURE — 85302 CLOT INHIBIT PROT C ANTIGEN: CPT

## 2017-01-18 PROCEDURE — 82955 ASSAY OF G6PD ENZYME: CPT

## 2017-01-18 PROCEDURE — 85610 PROTHROMBIN TIME: CPT

## 2017-01-18 RX ORDER — POTASSIUM CHLORIDE 20 MEQ/1
40 TABLET, EXTENDED RELEASE ORAL ONCE
Status: COMPLETED | OUTPATIENT
Start: 2017-01-18 | End: 2017-01-18

## 2017-01-18 RX ORDER — SODIUM CHLORIDE 0.9 % (FLUSH) 0.9 %
3 SYRINGE (ML) INJECTION EVERY 8 HOURS
Status: DISCONTINUED | OUTPATIENT
Start: 2017-01-18 | End: 2017-01-23 | Stop reason: HOSPADM

## 2017-01-18 RX ORDER — FUROSEMIDE 10 MG/ML
40 INJECTION INTRAMUSCULAR; INTRAVENOUS DAILY
Status: DISCONTINUED | OUTPATIENT
Start: 2017-01-19 | End: 2017-01-19

## 2017-01-18 RX ORDER — POTASSIUM CHLORIDE 750 MG/1
20 CAPSULE, EXTENDED RELEASE ORAL ONCE
Status: COMPLETED | OUTPATIENT
Start: 2017-01-18 | End: 2017-01-18

## 2017-01-18 RX ORDER — POTASSIUM CHLORIDE 750 MG/1
60 CAPSULE, EXTENDED RELEASE ORAL ONCE
Status: DISCONTINUED | OUTPATIENT
Start: 2017-01-18 | End: 2017-01-18

## 2017-01-18 RX ADMIN — POTASSIUM CHLORIDE 40 MEQ: 1500 TABLET, EXTENDED RELEASE ORAL at 06:01

## 2017-01-18 RX ADMIN — LISINOPRIL 10 MG: 10 TABLET ORAL at 09:01

## 2017-01-18 RX ADMIN — FUROSEMIDE 40 MG: 10 INJECTION, SOLUTION INTRAVENOUS at 09:01

## 2017-01-18 RX ADMIN — SODIUM CHLORIDE, PRESERVATIVE FREE 3 ML: 5 INJECTION INTRAVENOUS at 03:01

## 2017-01-18 RX ADMIN — ENOXAPARIN SODIUM 40 MG: 100 INJECTION SUBCUTANEOUS at 12:01

## 2017-01-18 RX ADMIN — POTASSIUM CHLORIDE 20 MEQ: 750 CAPSULE, EXTENDED RELEASE ORAL at 09:01

## 2017-01-18 NOTE — PROGRESS NOTES
Left Ventricular Assist Device (LVAD) and Transplant Recipient Adult Psychosocial Assessment    Mert Samayoa  [address] [facility] [address]    Telephone Information:   Mobile [phone]   Home  [phone] (home)  Work  There is no work phone number on file.  E-mail  [email or web address] ([email or web address]: [email or web address])    Sex: male  YOB: 1990  Age: 26 y.o.    Encounter Date: 1/17/2017  [place] Citizen: yes  Primary Language: English   Needed: no    Emergency Contact:  Name: Abigail Samayoa  Relationship: wife  Address: same as pt  Working? Yes, full time  Drives? yes  Phone Numbers:  [phone] (mobile)    Family/Social Support:   Number of dependents/: n/a  Marital history:  3 months. Pt and wife dated 8-9 years prior to marriage  Other family dynamics: Pt lives on the same block as parents and 2 brothers. Pt's 2 brothers are both heart transplant recipients.    Household Composition:  Pt lives with wife Abigail    Do you and your caregivers have access to reliable transportation? yes  PRIMARY CAREGIVER: Abigail will be primary caregiver, phone number [phone].      provided in-depth information to Patient and Caregiver regarding  regarding pre- and post-LVAD and pre- and post-transplant caregiver role.   strongly encourages Patient and Caregiver to have concrete plan regarding post-transplant care giving, including back-up caregiver(s) to ensure care giving needs are met as needed.    Patient and Caregiver states understanding all aspects of caregiver role/commitment. .      Patient and Caregiver verbalizes understanding of the education provided today.       remains available. Patient and Caregiver agree to contact  in a timely manner if concerns arise.      Able to take time off work without financial concerns: yes, pt's wife confirms she is looking into time off/FMLA options.     Additional Significant Others who will Assist with  LVAD/Transplant:  Name: Margot Samayoa  City: [place], [place]  Relationship: mother  Does person drive? yes   Phone: [phone]    Living Will: no  Healthcare Power of : no  Advance Directives on file: <<no information> per medical record.  Verbally reviewed LW/HCPA information.   provided patient with copy of LW/HCPA documents and provided education on completion of forms    Highest Education Level: High School (9-12) or GED  Reading Ability: 12th grade  Reports difficulty with: N/A  Learns Best By:  Hands on     Status: no  VA Benefits: no     Working for Income: yes  If yes, working activity level: Working Full Time  Spouse/Significant Other Employment: working full time    Disabled: no    Monthly Income:  Salary/Wages: $1800  Other household members total: $1000  Able to afford all costs now and if transplanted or receives LVAD, including medications: yes  Pt reports secure power source? yes  Pt reports ability to afford monthly electric bill? yes  Pt reports ability to afford LVAD dressing supplies? yes  Patient and Caregiver verbalizes understanding of personal responsibilities related to LVAD and transplant costs and the importance of having a financial plan to ensure that patients LVAD and transplant costs are fully covered.       provided fundraising information/education.  Patient and Caregiver verbalizes understanding.   remains available.    Insurance:   Payor/Plan Subscr  Sex Relation Sub. Ins. ID Effective Group Num   1. JIM VASQUEZ* MAMIE SAMAYOA * 1990 Male  [id number]                                    [address]   2. [facility]* MAMIE SAMAYOA * 1990 Male  [id number]                                    [address] [address]     Primary Insurance (for UNOS reporting): Private Insurance  Secondary Insurance (for UNOS reporting): None  Patient and Caregiver verbalizes clear understanding that patient may  experience difficulty obtaining and/or be denied insurance coverage post-surgery. This includes and is not limited to disability insurance, life insurance, health insurance, burial insurance, long term care insurance, and other insurances.      Patient and Caregiver also reports understanding that future health concerns   related to or unrelated to LVAD or transplantation may not be covered by patient's insurance.  Resources and information provided and reviewed.      Patient and Caregiver provides verbal permission to release any necessary information to outside resources for patient care and discharge planning.  Resources and information provided are reviewed.      Infusion Service: patient utilizing? no  Home Health: patient utilizing? no  DME: no  Pulmonary/Cardiac Rehab: no  ADLS:  Prior to admission, pt reports he was independent with ADLs and driving    Adherence:   Pt reports a high level of adherence.  Adherence education and counseling provided.     Per History Section:  Past Medical History   Diagnosis Date    Cardiogenic shock 1/16/2017    Cardiomyopathy      Familial cardiomyopathy    CHF (congestive heart failure)     Essential hypertension 12/21/2016    Familial Cardiomyopathy      Familial cardiomyopathy      Social History   Substance Use Topics    Smoking status: Current Some Day Smoker     Packs/day: 1.00    Smokeless tobacco: Not on file    Alcohol use 2.4 oz/week     4 Shots of liquor per week     History   Drug Use No     History   Sexual Activity    Sexual activity: Not on file       Per Today's Psychosocial:  Tobacco: Pt reports quit smoking end of December 2016. Pt reports highest level of use was 1 ppd.  Alcohol: Pt reports 1 or 2 beers on the weekend.  Illicit Drugs/Non-prescribed Medications: pt denies.    Patient and Caregiver states clear understanding of the potential impact of substance use as it relates to LVAD and transplant candidacy and is aware of possible random  substance screening.  Substance abstinence/cessation counseling, education and resources provided and reviewed.     Arrests/DWI/Treatment/Rehab: yes, pt reports was arrested for burglary in 2011 and spent 6 weeks in longterm. Pt reports he has no pending charges.    Psychiatric History:    Mental Health: pt denies  Psychiatrist/Counselor: pt denies  Medications:  Pt denies  Suicide/Homicide Issues: pt denies   Safety at home: pt confirms    Knowledge: Patient and Caregiver states having clear understanding and realistic expectations regarding the potential risks and potential benefits LVAD implantation and organ transplantation and organ donation and agrees to discuss with health care team members and support system members, as well as to utilize available resources and express questions and/or concerns in order to further facilitate the pt informed decision-making.  Resources and information provided and reviewed.     Understanding: Patient and Caregiver reports having a clear understanding of the many lifetime commitments involved with being an LVAD and transplant recipient, including costs, compliance, medications, lab work, procedures, appointments, concrete and financial planning, preparedness, timely and appropriate communication of concerns, abstinence (ETOH, tobacco, illicit non-prescribed drugs), adherence to all health care team recommendations, support system and caregiver involvement, appropriate and timely resource utilization and follow-through, mental health counseling as needed/recommended, and patient and caregiver responsibilities.  Social Service Handbook, resources and detailed educational information provided and reviewed.  Educational information provided.    Patient and Caregiver also reports current and expected compliance with health care regimen.       Patient and Caregiver reports a clear understanding that risks and benefits may be involved with LVAD heart failure treatment and organ  "transplantation and with organ donation.     Patient and Caregiver also reports clear understanding that psychosocial risk factors may affect patient, and include but are not limited to feelings of depression, generalized anxiety, anxiety regarding dependence on others, post traumatic stress disorder, feelings of guilt and other emotional and/or mental concerns, and/or exacerbation of existing mental health concerns.  Detailed resources provided and discussed.      Patient and Caregiver agrees to access appropriate resources in a timely manner as needed and/or as recommended, and to communicate concerns appropriately.     Patient and Caregiver also reports a clear understanding of treatment options available.      Feelings or Concerns: Pt reports he is hopeful he will not need an LVAd, but is willing to move forward with what team recommends.     Coping: Pt reports coping adequately, however feels disappointed that his heart failure has worsened to this degree. Pt states "I've been doing so well, I really thought I had it beat."    Interview Behavior: Patient presents as alert and oriented x 4, pleasant, calm and asking and answering questions appropriately.  Pt's wife at bedside and presents as engaged and supportive of pt.         Transplant Social Work - Candidacy  Assessment/Plan:     Psychosocial Suitability: Patient presents as a suitable candidate for LVAD at this time. Based on psychosocial risk factors, patient presents as low risk. Pt has been working full time and expresses some concern regarding transition from full time income to disability. SW provided education to pt and wife regarding fundraising options, as well as applying for employer disability and SSD. Pt presents as a suitable candidate for transplant, pending teams decision regarding pt's tobacco use.         Helen Villa LMSW       "

## 2017-01-18 NOTE — NURSING TRANSFER
Nursing Transfer Note      1/18/2017     Transfer From: [facility]    Transfer via wheelchair    Transfer with cardiac monitoring    Transported by RN     Medicines sent: none    Chart send with patient: Yes    Notified: mother at bedside    Patient reassessed at: 1327 1/18/2017    Upon arrival to floor: cardiac monitor applied, patient oriented to room, call bell in reach and bed in lowest position. Pt continued on dobutamine gtt, central line and PIV X 2 patient. Pt VSS, in no distress. Will continue to monitor.

## 2017-01-18 NOTE — PLAN OF CARE
Problem: Patient Care Overview  Goal: Plan of Care Review  Pt AAOx4. VSS CVP 2-5. No complaints of SOB or chest pain. Pt states he feels sore. SVO2 73. LVAD/HTS labs drawn. Quantiferon Gold TB and Plt Function Assay to be collected at 0800. Dobutamine infusing. POC updated with pt. Questions/concerns addressed.

## 2017-01-18 NOTE — PROGRESS NOTES
Pt AAAO,  No family at bedside.  He verbalized his wife will be here later today and they will go over everything.  No questions at this time.

## 2017-01-18 NOTE — PROGRESS NOTES
"Hospital Day: 3    Subjective:  Interval History: Pt feeling better this am. Up in chair    Scheduled Meds:   enoxaparin  40 mg Subcutaneous Daily    furosemide  40 mg Intravenous BID    lisinopril  10 mg Oral Daily    potassium chloride  60 mEq Oral Once     Continuous Infusions:   DOBUTamine 5 mcg/kg/min (01/18/17 0701)     PRN Meds:acetaminophen    Objective:  Vital signs in last 24 hours:   Temp:  [97.9 °F (36.6 °C)-98.3 °F (36.8 °C)] 98.3 °F (36.8 °C)  Pulse:  [77-94] 93  Resp:  [3-42] 16  SpO2:  [94 %-100 %] 96 %  BP: ()/(53-81) 103/65    Intake/Output last 3 shifts:  I/O last 3 completed shifts:  In: 2077 [P.O.:1470; I.V.:607]  Out: 4725 [Urine:4725]  Intake/Output this shift:  I/O this shift:  In: -   Out: 300 [Urine:300]    Physical Exam:  Visit Vitals    /65    Pulse 93    Temp 98.3 °F (36.8 °C) (Oral)    Resp 16    Ht 5' 7" (1.702 m)    Wt 65.7 kg (144 lb 13.5 oz)    SpO2 96%    BMI 22.69 kg/m2     General appearance: alert, appears stated age and cooperative  Neck: No JVD, supple, symmetrical, trachea midline, R IJ in place  Lungs: clear to auscultation bilaterally  Heart: regular rate and rhythm, S1, S2 normal, no murmur, click, rub or gallop  Abdomen: soft, non-tender; bowel sounds normal; no masses,  no organomegaly  Extremities: extremities normal, atraumatic, no cyanosis or edema  Neurologic: Grossly normal    2DE 8/11/16:  CONCLUSIONS     1 - Severe left ventricular enlargement.     2 - Severely depressed left ventricular systolic function (EF 10-15%).     3 - Eccentric hypertrophy.     4 - Left ventricular diastolic dysfunction.     5 - Mild left atrial enlargement.     6 - Right ventricle is upper limit of normal in size with normal systolic function.     7 - Mild to moderate mitral regurgitation.     8 - Mild tricuspid regurgitation.     9 - Trivial pulmonic regurgitation.     10 - The estimated PA systolic pressure is 35 mmHg.     [facility] 1/16/17:  Hemodynamic " Results  AOPRES: 94/64 (74)  AOSAT: 98  FICKCI: 1.01  FICKCO: 1.81  PAPRES: 60/31 (41)  PASAT: 36  PVR: 4.99  PWPRES: 37/40 (32)  RAPRES: 22/21 (17)  RVPRES: 60/20, 20  SVR: 31.58/2528  PWSAT: 92    Lab Review  Lab Results   Component Value Date     01/18/2017    K 3.4 (L) 01/18/2017    CL 96 01/18/2017    CO2 27 01/18/2017    BUN 25 (H) 01/18/2017    CREATININE 1.1 01/18/2017    CALCIUM 9.8 01/18/2017     Lab Results   Component Value Date    WBC 8.99 01/18/2017    HGB 16.7 01/18/2017    HCT 47.4 01/18/2017    MCV 87 01/18/2017     01/18/2017            Assessment/Plan:  27 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    Cardiogenic Shock due to Acute on Chronic Systolic HF, DCM, NYHA FC IV  -RHC 1/16 showed low CO/CI and elevated filling pressures- results above. Cont  5 mcg/kg/min.  SVO2 73 this am. CVP 3-5 overnight. Transitioned from Lasix drip to IVP yesterday- decrease to Lasix 40 IVP daily today  -GDMT: Cont ACE. Holding BB due to starting   -Does not have ICD- will need to discuss timing of this vs Lifevest  -Last Echo 8/2016. Will get repeat Echo  -Start work up for advanced options. May need LVAD this admission        Active Hospital Problems    Diagnosis  POA    *Cardiogenic shock [R57.0]  Yes      Resolved Hospital Problems    Diagnosis Date Resolved POA   No resolved problems to display.

## 2017-01-18 NOTE — CONSULTS
[facility]  Adult Nutrition  Consult Note    SUMMARY     Recommendations    Recommendation/Intervention: 1. Cont current diet order; pt had been on 1500ml FR (no longer part of diet order)  2. HF diet educ completed  3. Enc'd pt to focus on adequate protein intake should appetite decrease  4. RD to monitor  Goals: Maintain meal intake >/=75%  Nutrition Goal Status: new       Continuum of Care Plan    Referral to Outpatient Services: other (see comments) (Nutr D/c Plan: d/c home on 2gm Na diet; educ completed)    Reason for Assessment    Reason for Assessment: other (see comments) (PA-C consult for OHTx/LVAD w/u)  Diagnosis: cardiac disease, other (see comments) (dilated CMP)  Relevent Medical History: HTN, CHF   Interdisciplinary Rounds: did not attend     General Information Comments: Visited w/ pt who reports he is very familiar w/ Low Na diets as both his older brothers were on it; both brothers had OHTx's per pt's report. Pt denies any n/v/c/d; krystyna'ing meals well w/ good appetite    Nutrition Prescription Ordered    Current Diet Order: 2gm Na         Nutrition Risk Screen     Nutrition Risk Screen: no indicators present    Nutrition/Diet History    Patient Reported Diet/Restrictions/Preferences: general  Typical Food/Fluid Intake: adequate  Food Preferences: No Rastafari/cultural prefs reported        Factors Affecting Nutritional Intake: other (see comments) (none)        Labs/Tests/Procedures/Meds       Pertinent Labs Reviewed: reviewed  Pertinent Labs Comments: prealb 26, CRP 9.0, tbili 1.2, , K 3.4, BUN 25, A1C 5.4  Pertinent Medications Reviewed: reviewed  Pertinent Medications Comments:  drip, furosemide, KCl    Physical Findings    Overall Physical Appearance: other (see comments) (nourished)     Oral/Mouth Cavity: WDL  Skin: intact, other (see comments) (Tito 20)    Anthropometrics       Height (inches): 67.01 in  Weight Method: Bed Scale  Weight (kg): 65.7 kg  Ideal Body  Weight (IBW), Male: 148.06 lb     % Ideal Body Weight, Male (lb): 97.83 lb     BMI (kg/m2): 22.68  BMI Grade: 18.5-24.9 - normal  Usual Body Weight (UBW), k kg  % Usual Body Weight: 99.55     Estimated/Assessed Needs    Weight Used For Calorie Calculations: 65.7 kg (144 lb 13.5 oz)   Height (cm): 170.2 cm     Energy Need Method: [place]-St Jeor (x 1.3-1.4 = 7751-5850 cals daily)     RMR ([place]-St. Jeor Equation): 1597.17        Weight Used For Protein Calculations: 65.7 kg (144 lb 13.5 oz)  Protein Requirements: 66-79 gms (1-1.2 gms/kg)    Fluid Need Method: RDA Method (1ml/kcal or per MD)        Malnutrition (Undernutrition) Diagnosis    % Intake of Estimated Energy Needs: 75 - 100%  % Meal Intake: 75%  Malnutrition Reason: other (see comments) (no indicators of malnutrition present)        Nutrition Diagnosis    Nutrition Problem: Other (see comments) (No nutr dx @ this time)             Monitor and Evaluation    Food and Nutrient Intake: food and beverage intake, energy intake  Food and Nutrient Adminstration: diet order     Physical Activity and Function: nutrition-related ADLs and IADLs  Anthropometric Measurements: weight, weight change  Biochemical Data, Medical Tests and Procedures: other (specify) (all labs)  Nutrition-Focused Physical Findings: overall appearance    Nutrition Risk    Level of Risk: low    Nutrition Follow-Up    RD Follow-up?: Yes

## 2017-01-18 NOTE — PLAN OF CARE
Problem: Patient Care Overview  Goal: Plan of Care Review  Recommendations     Recommendation/Intervention: 1. Cont current diet order; pt had been on 1500ml FR (no longer part of diet order) 2. HF diet educ completed 3. Enc'd pt to focus on adequate protein intake should appetite decrease 4. RD to monitor  Goals: Maintain meal intake >/=75%  Nutrition Goal Status: new

## 2017-01-18 NOTE — PROGRESS NOTES
Patient Consulted by CTTS:     The following was discussed by the Tobacco Treatment Specialist:  ? Relevance of Quitting  ? Risk to Health  ? Long Term Risk  ? Risk for Others  ? Rewards of Quitting  ? Motivation Intervention to Quit      Pt states that he quit smoking two weeks ago and that he does not feel that he needs the nicotine patch.

## 2017-01-19 DIAGNOSIS — I50.9 HEART FAILURE, UNSPECIFIED HEART FAILURE CHRONICITY, UNSPECIFIED HEART FAILURE TYPE: Primary | ICD-10-CM

## 2017-01-19 LAB
ALBUMIN SERPL BCP-MCNC: 4.4 G/DL
ALBUMIN SERPL BCP-MCNC: 4.4 G/DL
ALP SERPL-CCNC: 72 U/L
ALP SERPL-CCNC: 72 U/L
ALT SERPL W/O P-5'-P-CCNC: 39 U/L
ALT SERPL W/O P-5'-P-CCNC: 39 U/L
AMPHETAMINES SERPL QL: NEGATIVE
ANION GAP SERPL CALC-SCNC: 11 MMOL/L
ANION GAP SERPL CALC-SCNC: 11 MMOL/L
AST SERPL-CCNC: 27 U/L
AST SERPL-CCNC: 27 U/L
AT III AG ACT/NOR PPP IA: 94 %
BARBITURATES SERPL QL SCN: NEGATIVE
BASOPHILS # BLD AUTO: 0.02 K/UL
BASOPHILS # BLD AUTO: 0.02 K/UL
BASOPHILS NFR BLD: 0.3 %
BASOPHILS NFR BLD: 0.3 %
BENZODIAZ SERPL QL: NEGATIVE
BILIRUB SERPL-MCNC: 1.3 MG/DL
BILIRUB SERPL-MCNC: 1.3 MG/DL
BUN SERPL-MCNC: 23 MG/DL
BUN SERPL-MCNC: 23 MG/DL
CALCIUM SERPL-MCNC: 9.7 MG/DL
CALCIUM SERPL-MCNC: 9.7 MG/DL
CANNABINOIDS SERPL QL: NEGATIVE
CHLORIDE SERPL-SCNC: 98 MMOL/L
CHLORIDE SERPL-SCNC: 98 MMOL/L
CLASS I ANTIBODIES - LUMINEX: NEGATIVE
CLASS II ANTIBODIES - LUMINEX: NEGATIVE
CO2 SERPL-SCNC: 27 MMOL/L
CO2 SERPL-SCNC: 27 MMOL/L
COCAINE SERPL QL: NEGATIVE
COTININE SERPL-MCNC: <3 NG/ML
CPRA %: 0
CREAT SERPL-MCNC: 1 MG/DL
CREAT SERPL-MCNC: 1 MG/DL
DIFFERENTIAL METHOD: NORMAL
DIFFERENTIAL METHOD: NORMAL
EOSINOPHIL # BLD AUTO: 0.2 K/UL
EOSINOPHIL # BLD AUTO: 0.2 K/UL
EOSINOPHIL NFR BLD: 2.5 %
EOSINOPHIL NFR BLD: 2.5 %
ERYTHROCYTE [DISTWIDTH] IN BLOOD BY AUTOMATED COUNT: 13.4 %
ERYTHROCYTE [DISTWIDTH] IN BLOOD BY AUTOMATED COUNT: 13.4 %
EST. GFR  (AFRICAN AMERICAN): >60 ML/MIN/1.73 M^2
EST. GFR  (AFRICAN AMERICAN): >60 ML/MIN/1.73 M^2
EST. GFR  (NON AFRICAN AMERICAN): >60 ML/MIN/1.73 M^2
EST. GFR  (NON AFRICAN AMERICAN): >60 ML/MIN/1.73 M^2
ETHANOL SERPL-MCNC: NEGATIVE MG/DL
GLUCOSE SERPL-MCNC: 87 MG/DL
GLUCOSE SERPL-MCNC: 87 MG/DL
HCT VFR BLD AUTO: 46.4 %
HCT VFR BLD AUTO: 46.4 %
HGB BLD-MCNC: 16.1 G/DL
HGB BLD-MCNC: 16.1 G/DL
INTERNAL CAROTID STENOSIS: NORMAL
LYMPHOCYTES # BLD AUTO: 1.7 K/UL
LYMPHOCYTES # BLD AUTO: 1.7 K/UL
LYMPHOCYTES NFR BLD: 21.7 %
LYMPHOCYTES NFR BLD: 21.7 %
MAGNESIUM SERPL-MCNC: 2.3 MG/DL
MAGNESIUM SERPL-MCNC: 2.3 MG/DL
MCH RBC QN AUTO: 30.6 PG
MCH RBC QN AUTO: 30.6 PG
MCHC RBC AUTO-ENTMCNC: 34.7 %
MCHC RBC AUTO-ENTMCNC: 34.7 %
MCV RBC AUTO: 88 FL
MCV RBC AUTO: 88 FL
METHADONE SERPL QL SCN: NEGATIVE
MITOGEN NIL: 3.42 IU/ML
MONOCYTES # BLD AUTO: 0.7 K/UL
MONOCYTES # BLD AUTO: 0.7 K/UL
MONOCYTES NFR BLD: 9.5 %
MONOCYTES NFR BLD: 9.5 %
NEUTROPHILS # BLD AUTO: 5 K/UL
NEUTROPHILS # BLD AUTO: 5 K/UL
NEUTROPHILS NFR BLD: 65.9 %
NEUTROPHILS NFR BLD: 65.9 %
NICOTINE SERPL-MCNC: <3 NG/ML
NIL: 0.02 IU/ML
OPIATES SERPL QL SCN: NEGATIVE
PCP SERPL QL SCN: NEGATIVE
PLATELET # BLD AUTO: 234 K/UL
PLATELET # BLD AUTO: 234 K/UL
PMV BLD AUTO: 10 FL
PMV BLD AUTO: 10 FL
POTASSIUM SERPL-SCNC: 4 MMOL/L
POTASSIUM SERPL-SCNC: 4 MMOL/L
PRE FEV1 FVC: 77
PRE FEV1: 3.06
PRE FVC: 3.97
PREDICTED FEV1 FVC: 85
PREDICTED FEV1: 4.05
PREDICTED FVC: 4.8
PROPOXYPH SERPL QL: NEGATIVE
PROT S AG ACT/NOR PPP IA: 105 %
PROT SERPL-MCNC: 7.2 G/DL
PROT SERPL-MCNC: 7.2 G/DL
RBC # BLD AUTO: 5.26 M/UL
RBC # BLD AUTO: 5.26 M/UL
SERUM COLLECTION DT - LUMINEX CLASS I: NORMAL
SERUM COLLECTION DT - LUMINEX CLASS II: NORMAL
SODIUM SERPL-SCNC: 136 MMOL/L
SODIUM SERPL-SCNC: 136 MMOL/L
SPCL1 TESTING DATE: NORMAL
SPCL2 TESTING DATE: NORMAL
TB ANTIGEN NIL: 0.01 IU/ML
TB ANTIGEN: 0.03 IU/ML
TB GOLD: NEGATIVE
VASCULAR ANKLE BRACHIAL INDEX (ABI) RIGHT: 0.86 (ref 0.9–1.2)
VWF AG ACT/NOR PPP IA: 132 %
VWF:AC ACT/NOR PPP IA: 106 %
WBC # BLD AUTO: 7.65 K/UL
WBC # BLD AUTO: 7.65 K/UL

## 2017-01-19 PROCEDURE — 20600001 HC STEP DOWN PRIVATE ROOM

## 2017-01-19 PROCEDURE — 25000003 PHARM REV CODE 250: Performed by: PHYSICIAN ASSISTANT

## 2017-01-19 PROCEDURE — 94729 DIFFUSING CAPACITY: CPT | Performed by: INTERNAL MEDICINE

## 2017-01-19 PROCEDURE — 85025 COMPLETE CBC W/AUTO DIFF WBC: CPT

## 2017-01-19 PROCEDURE — 25000003 PHARM REV CODE 250: Performed by: NURSE PRACTITIONER

## 2017-01-19 PROCEDURE — 63600175 PHARM REV CODE 636 W HCPCS: Performed by: PHYSICIAN ASSISTANT

## 2017-01-19 PROCEDURE — 63600175 PHARM REV CODE 636 W HCPCS: Performed by: INTERNAL MEDICINE

## 2017-01-19 PROCEDURE — 99232 SBSQ HOSP IP/OBS MODERATE 35: CPT | Mod: ,,, | Performed by: INTERNAL MEDICINE

## 2017-01-19 PROCEDURE — 83735 ASSAY OF MAGNESIUM: CPT

## 2017-01-19 PROCEDURE — 94010 BREATHING CAPACITY TEST: CPT | Performed by: INTERNAL MEDICINE

## 2017-01-19 PROCEDURE — 36415 COLL VENOUS BLD VENIPUNCTURE: CPT

## 2017-01-19 PROCEDURE — 80053 COMPREHEN METABOLIC PANEL: CPT

## 2017-01-19 RX ORDER — FUROSEMIDE 40 MG/1
40 TABLET ORAL DAILY
Status: DISCONTINUED | OUTPATIENT
Start: 2017-01-20 | End: 2017-01-20

## 2017-01-19 RX ADMIN — FUROSEMIDE 40 MG: 10 INJECTION, SOLUTION INTRAVENOUS at 08:01

## 2017-01-19 RX ADMIN — SODIUM CHLORIDE, PRESERVATIVE FREE 3 ML: 5 INJECTION INTRAVENOUS at 12:01

## 2017-01-19 RX ADMIN — SODIUM CHLORIDE, PRESERVATIVE FREE 3 ML: 5 INJECTION INTRAVENOUS at 02:01

## 2017-01-19 RX ADMIN — ENOXAPARIN SODIUM 40 MG: 100 INJECTION SUBCUTANEOUS at 02:01

## 2017-01-19 RX ADMIN — SODIUM CHLORIDE, PRESERVATIVE FREE 3 ML: 5 INJECTION INTRAVENOUS at 10:01

## 2017-01-19 RX ADMIN — DOBUTAMINE IN DEXTROSE 5 MCG/KG/MIN: 200 INJECTION, SOLUTION INTRAVENOUS at 12:01

## 2017-01-19 RX ADMIN — LISINOPRIL 10 MG: 10 TABLET ORAL at 08:01

## 2017-01-19 NOTE — PROGRESS NOTES
"Hospital Day: 4    Subjective:  Interval History:   No new complaints. Feeling OK    Scheduled Meds:   enoxaparin  40 mg Subcutaneous Daily    furosemide  40 mg Intravenous Daily    lisinopril  10 mg Oral Daily    sodium chloride 0.9%  3 mL Intravenous Q8H     Continuous Infusions:   DOBUTamine 5 mcg/kg/min (01/19/17 0032)     PRN Meds:acetaminophen    Objective:  Vital signs in last 24 hours:   Temp:  [97 °F (36.1 °C)-98.1 °F (36.7 °C)] 98.1 °F (36.7 °C)  Pulse:  [] 97  Resp:  [12-18] 18  SpO2:  [97 %-99 %] 98 %  BP: ()/(57-69) 104/59    Intake/Output last 3 shifts:  I/O last 3 completed shifts:  In: 1667.2 [P.O.:1440; I.V.:227.2]  Out: 1150 [Urine:1150]  Intake/Output this shift:       Physical Exam:  Visit Vitals    BP (!) 104/59 (BP Location: Right arm, Patient Position: Lying, BP Method: Automatic)    Pulse 97    Temp 98.1 °F (36.7 °C) (Oral)    Resp 18    Ht 5' 7" (1.702 m)    Wt 62.9 kg (138 lb 10.7 oz)    SpO2 98%    BMI 21.72 kg/m2     General appearance: alert, appears stated age and cooperative  Neck: No JVD, supple, symmetrical, trachea midline, R IJ in place  Lungs: clear to auscultation bilaterally  Heart: regular rate and rhythm, S1, S2 normal, no murmur, click, rub or gallop  Abdomen: soft, non-tender; bowel sounds normal; no masses,  no organomegaly  Extremities: extremities normal, atraumatic, no cyanosis or edema  Neurologic: Grossly normal    2DE 8/11/16:  CONCLUSIONS     1 - Severe left ventricular enlargement.     2 - Severely depressed left ventricular systolic function (EF 10-15%).     3 - Eccentric hypertrophy.     4 - Left ventricular diastolic dysfunction.     5 - Mild left atrial enlargement.     6 - Right ventricle is upper limit of normal in size with normal systolic function.     7 - Mild to moderate mitral regurgitation.     8 - Mild tricuspid regurgitation.     9 - Trivial pulmonic regurgitation.     10 - The estimated PA systolic pressure is 35 mmHg.     RHC " 1/16/17:  Hemodynamic Results  AOPRES: 94/64 (74)  AOSAT: 98  FICKCI: 1.01  FICKCO: 1.81  PAPRES: 60/31 (41)  PASAT: 36  PVR: 4.99  PWPRES: 37/40 (32)  RAPRES: 22/21 (17)  RVPRES: 60/20, 20  SVR: 31.58/2528  PWSAT: 92    Lab Review  Lab Results   Component Value Date     01/19/2017     01/19/2017    K 4.0 01/19/2017    K 4.0 01/19/2017    CL 98 01/19/2017    CL 98 01/19/2017    CO2 27 01/19/2017    CO2 27 01/19/2017    BUN 23 (H) 01/19/2017    BUN 23 (H) 01/19/2017    CREATININE 1.0 01/19/2017    CREATININE 1.0 01/19/2017    CALCIUM 9.7 01/19/2017    CALCIUM 9.7 01/19/2017     Lab Results   Component Value Date    WBC 7.65 01/19/2017    WBC 7.65 01/19/2017    HGB 16.1 01/19/2017    HGB 16.1 01/19/2017    HCT 46.4 01/19/2017    HCT 46.4 01/19/2017    MCV 88 01/19/2017    MCV 88 01/19/2017     01/19/2017     01/19/2017            Assessment/Plan:  27 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    Cardiogenic Shock due to Acute on Chronic Systolic HF, DCM, NYHA FC IV  -RHC 1/16 showed low CO/CI and elevated filling pressures- results above. Cont  5 mcg/kg/min. Transitioned from Lasix drip to IVP- change to PO Lasix today. Get CVP this am  -GDMT: Cont ACE. Holding BB due to starting   -Does not have ICD- plan for home with [employer] on . Then will get ICD after LVAD  -Last Echo 8/2016. Will get repeat Echo  -Start work up for advanced options. Plan to present for OHTx/LVAD tomorrow.         Active Hospital Problems    Diagnosis  POA    *Cardiogenic shock [R57.0]  Yes    Acute on chronic combined systolic and diastolic heart failure [I50.43]  Yes    Familial Cardiomyopathy [I42.9]  Yes     Familial  cardiomyopathy        Resolved Hospital Problems    Diagnosis Date Resolved POA   No resolved problems to display.

## 2017-01-19 NOTE — PROGRESS NOTES
Patient returned to unit. Patient AAO x 4. Denies pain. Left forearm and right IJ peripheral IV intact. Dobutamine infusing at 5 mcg/kg/min. No bleeding or hematoma noted. Telemetry monitoring continued. Bed in lowest position. Call system within reach. Side rails up x2. Will continue to monitor.

## 2017-01-19 NOTE — PLAN OF CARE
Problem: Patient Care Overview  Goal: Plan of Care Review  Outcome: Ongoing (interventions implemented as appropriate)  Patient remains free from falls and injuries through out shift. Patient AAO and VSS. Patient denies chest pain and SOB. Patient's family at bedside. Dobutamine infusion continued per MD order. Plan of care reviewed with patient. Patient verbalizes understanding of plan.  Will continue to monitor.

## 2017-01-19 NOTE — PROGRESS NOTES
Patient escorted off floor via wheelchair for PFT. Patient AAO x 4. Denies pain. Left forearm and right IJ peripheral IV intact. Dobutamine infusing at 5 mcg/kg/min. No bleeding or hematoma noted. Patient transported on telemetry. Awaiting patient's return.

## 2017-01-19 NOTE — PROGRESS NOTES
"Pt aaox3 while sitting up in bed with no family at bedside.  Spent about 45 minutes with pt talking about LVAD education and life with an LVAD.  Pt verbalizes being very emotional about "everything".  Pt verbalizes he has not completed education yet and is waiting for his wife to read it with him.  Pt verbalizes he would like to wait to do "big education meeting" until his wife is present.  PT wife to be in hospital all day tomorrow.  Pt asking a lot of question about dressing changes, so pt given "care of the driveline" and "discharge packet".  Education packets numbered according to priority to read when his wife comes tonight.  Pt had several very good questions about education.  Will follow up with pt soon.   "

## 2017-01-19 NOTE — PLAN OF CARE
Problem: Patient Care Overview  Goal: Plan of Care Review  Outcome: Ongoing (interventions implemented as appropriate)  Pt free of falls/traumas/injuries. Skin remains clean, dry, and intact. Pt continued on dobutamine gtt; pt post LVAD and HTP work up.   Pt re-educated on importance of measuring accurate intake and out put; pt verbalized and demonstrates understanding. Reviewed plan of care with pt; and pt verbalized understanding.  Pt VSS in no distress will continue to monitor.

## 2017-01-20 ENCOUNTER — DOCUMENTATION ONLY (OUTPATIENT)
Dept: TRANSPLANT | Facility: CLINIC | Age: 27
End: 2017-01-20

## 2017-01-20 ENCOUNTER — TELEPHONE (OUTPATIENT)
Dept: TRANSPLANT | Facility: CLINIC | Age: 27
End: 2017-01-20

## 2017-01-20 ENCOUNTER — COMMITTEE REVIEW (OUTPATIENT)
Dept: TRANSPLANT | Facility: CLINIC | Age: 27
End: 2017-01-20

## 2017-01-20 PROBLEM — I50.9 HEART FAILURE: Status: ACTIVE | Noted: 2017-01-20

## 2017-01-20 LAB
ABO + RH BLD: NORMAL
ALBUMIN SERPL BCP-MCNC: 4.2 G/DL
ALP SERPL-CCNC: 76 U/L
ALT SERPL W/O P-5'-P-CCNC: 39 U/L
ANION GAP SERPL CALC-SCNC: 10 MMOL/L
AST SERPL-CCNC: 28 U/L
BASOPHILS # BLD AUTO: 0.02 K/UL
BASOPHILS NFR BLD: 0.3 %
BILIRUB SERPL-MCNC: 0.8 MG/DL
BLD GP AB SCN CELLS X3 SERPL QL: NORMAL
BNP SERPL-MCNC: 101 PG/ML
BUN SERPL-MCNC: 24 MG/DL
CALCIUM SERPL-MCNC: 9.2 MG/DL
CARDIOLIPIN IGG SER IA-ACNC: <9.4 GPL
CARDIOLIPIN IGM SER IA-ACNC: <9.4 MPL
CHLORIDE SERPL-SCNC: 98 MMOL/L
CMV IGG SERPL QL IA: NORMAL
CO2 SERPL-SCNC: 25 MMOL/L
CREAT SERPL-MCNC: 1 MG/DL
DIFFERENTIAL METHOD: NORMAL
EBV VCA IGG SER QL IA: POSITIVE
EOSINOPHIL # BLD AUTO: 0.2 K/UL
EOSINOPHIL NFR BLD: 2.6 %
ERYTHROCYTE [DISTWIDTH] IN BLOOD BY AUTOMATED COUNT: 13.3 %
EST. GFR  (AFRICAN AMERICAN): >60 ML/MIN/1.73 M^2
EST. GFR  (NON AFRICAN AMERICAN): >60 ML/MIN/1.73 M^2
F2 GENE MUT ANL BLD/T: NORMAL
F5 P.R506Q BLD/T QL: NORMAL
G6PD RBC-CCNT: 13.5 U/G HGB (ref 7–20.5)
GLUCOSE SERPL-MCNC: 94 MG/DL
HCT VFR BLD AUTO: 45.3 %
HGB BLD-MCNC: 15.6 G/DL
HIV 1+2 AB+HIV1 P24 AG SERPL QL IA: NEGATIVE
HSV1 IGG SERPL QL IA: POSITIVE
HSV2 IGG SERPL QL IA: NEGATIVE
LYMPHOCYTES # BLD AUTO: 1.9 K/UL
LYMPHOCYTES NFR BLD: 26 %
MAGNESIUM SERPL-MCNC: 2.4 MG/DL
MCH RBC QN AUTO: 30.3 PG
MCHC RBC AUTO-ENTMCNC: 34.4 %
MCV RBC AUTO: 88 FL
MONOCYTES # BLD AUTO: 0.7 K/UL
MONOCYTES NFR BLD: 9.1 %
NEUTROPHILS # BLD AUTO: 4.5 K/UL
NEUTROPHILS NFR BLD: 61.7 %
PLATELET # BLD AUTO: 226 K/UL
PMV BLD AUTO: 9.8 FL
POTASSIUM SERPL-SCNC: 4.3 MMOL/L
PROT C AG ACT/NOR PPP IA: 81 % (ref 70–150)
PROT S FREE AG ACT/NOR PPP IA: 110 %
PROT SERPL-MCNC: 7.1 G/DL
RBC # BLD AUTO: 5.15 M/UL
SODIUM SERPL-SCNC: 133 MMOL/L
STRONGYLOIDES ANTIBODY IGG: NEGATIVE
WBC # BLD AUTO: 7.26 K/UL

## 2017-01-20 PROCEDURE — 20600001 HC STEP DOWN PRIVATE ROOM

## 2017-01-20 PROCEDURE — 63600175 PHARM REV CODE 636 W HCPCS: Performed by: INTERNAL MEDICINE

## 2017-01-20 PROCEDURE — 86762 RUBELLA ANTIBODY: CPT

## 2017-01-20 PROCEDURE — 93005 ELECTROCARDIOGRAM TRACING: CPT

## 2017-01-20 PROCEDURE — 3E0234Z INTRODUCTION OF SERUM, TOXOID AND VACCINE INTO MUSCLE, PERCUTANEOUS APPROACH: ICD-10-PCS | Performed by: INTERNAL MEDICINE

## 2017-01-20 PROCEDURE — 63600175 PHARM REV CODE 636 W HCPCS: Performed by: PHYSICIAN ASSISTANT

## 2017-01-20 PROCEDURE — 83735 ASSAY OF MAGNESIUM: CPT

## 2017-01-20 PROCEDURE — 86900 BLOOD TYPING SEROLOGIC ABO: CPT

## 2017-01-20 PROCEDURE — 99223 1ST HOSP IP/OBS HIGH 75: CPT | Mod: ,,, | Performed by: PHYSICIAN ASSISTANT

## 2017-01-20 PROCEDURE — 99232 SBSQ HOSP IP/OBS MODERATE 35: CPT | Mod: ,,, | Performed by: INTERNAL MEDICINE

## 2017-01-20 PROCEDURE — 36569 INSJ PICC 5 YR+ W/O IMAGING: CPT

## 2017-01-20 PROCEDURE — 80053 COMPREHEN METABOLIC PANEL: CPT

## 2017-01-20 PROCEDURE — 85025 COMPLETE CBC W/AUTO DIFF WBC: CPT

## 2017-01-20 PROCEDURE — 25000003 PHARM REV CODE 250: Performed by: PHYSICIAN ASSISTANT

## 2017-01-20 PROCEDURE — C1751 CATH, INF, PER/CENT/MIDLINE: HCPCS

## 2017-01-20 PROCEDURE — 93010 ELECTROCARDIOGRAM REPORT: CPT | Mod: ,,, | Performed by: INTERNAL MEDICINE

## 2017-01-20 PROCEDURE — 76937 US GUIDE VASCULAR ACCESS: CPT

## 2017-01-20 PROCEDURE — 86765 RUBEOLA ANTIBODY: CPT

## 2017-01-20 PROCEDURE — 86735 MUMPS ANTIBODY: CPT

## 2017-01-20 PROCEDURE — 25000003 PHARM REV CODE 250: Performed by: NURSE PRACTITIONER

## 2017-01-20 PROCEDURE — 02HV33Z INSERTION OF INFUSION DEVICE INTO SUPERIOR VENA CAVA, PERCUTANEOUS APPROACH: ICD-10-PCS | Performed by: INTERNAL MEDICINE

## 2017-01-20 PROCEDURE — 86850 RBC ANTIBODY SCREEN: CPT

## 2017-01-20 PROCEDURE — 90471 IMMUNIZATION ADMIN: CPT | Performed by: PHYSICIAN ASSISTANT

## 2017-01-20 PROCEDURE — 94620 *HC PUL STRESS; SIMPLE (6MIN WALK): CPT | Performed by: INTERNAL MEDICINE

## 2017-01-20 PROCEDURE — 90670 PCV13 VACCINE IM: CPT | Performed by: PHYSICIAN ASSISTANT

## 2017-01-20 PROCEDURE — 83880 ASSAY OF NATRIURETIC PEPTIDE: CPT

## 2017-01-20 PROCEDURE — 36415 COLL VENOUS BLD VENIPUNCTURE: CPT

## 2017-01-20 RX ORDER — FUROSEMIDE 20 MG/1
20 TABLET ORAL DAILY
Status: DISCONTINUED | OUTPATIENT
Start: 2017-01-20 | End: 2017-01-23 | Stop reason: HOSPADM

## 2017-01-20 RX ORDER — SODIUM CHLORIDE 0.9 % (FLUSH) 0.9 %
10 SYRINGE (ML) INJECTION EVERY 6 HOURS
Status: DISCONTINUED | OUTPATIENT
Start: 2017-01-20 | End: 2017-01-23 | Stop reason: HOSPADM

## 2017-01-20 RX ORDER — SODIUM CHLORIDE 0.9 % (FLUSH) 0.9 %
10 SYRINGE (ML) INJECTION
Status: DISCONTINUED | OUTPATIENT
Start: 2017-01-20 | End: 2017-01-23 | Stop reason: HOSPADM

## 2017-01-20 RX ADMIN — ENOXAPARIN SODIUM 40 MG: 100 INJECTION SUBCUTANEOUS at 12:01

## 2017-01-20 RX ADMIN — SODIUM CHLORIDE, PRESERVATIVE FREE 3 ML: 5 INJECTION INTRAVENOUS at 09:01

## 2017-01-20 RX ADMIN — SODIUM CHLORIDE, PRESERVATIVE FREE 3 ML: 5 INJECTION INTRAVENOUS at 03:01

## 2017-01-20 RX ADMIN — SODIUM CHLORIDE, PRESERVATIVE FREE 3 ML: 5 INJECTION INTRAVENOUS at 10:01

## 2017-01-20 RX ADMIN — LISINOPRIL 10 MG: 10 TABLET ORAL at 09:01

## 2017-01-20 RX ADMIN — PNEUMOCOCCAL 13-VALENT CONJUGATE VACCINE 0.5 ML: 2.2; 2.2; 2.2; 2.2; 2.2; 4.4; 2.2; 2.2; 2.2; 2.2; 2.2; 2.2; 2.2 INJECTION, SUSPENSION INTRAMUSCULAR at 11:01

## 2017-01-20 RX ADMIN — DOBUTAMINE IN DEXTROSE 5 MCG/KG/MIN: 200 INJECTION, SOLUTION INTRAVENOUS at 06:01

## 2017-01-20 RX ADMIN — FUROSEMIDE 20 MG: 20 TABLET ORAL at 09:01

## 2017-01-20 NOTE — PROGRESS NOTES
"Hospital Day: 5    Subjective:  Interval History: Pt has episodes of feeling his heart beating hard. But overall breathing is much better    Scheduled Meds:   enoxaparin  40 mg Subcutaneous Daily    furosemide  40 mg Oral Daily    lisinopril  10 mg Oral Daily    pneumoc 13-everette conj-dip cr(PF)  0.5 mL Intramuscular Once    sodium chloride 0.9%  3 mL Intravenous Q8H     Continuous Infusions:   DOBUTamine 5 mcg/kg/min (01/20/17 0610)     PRN Meds:acetaminophen, flu vac [id number] 36mos up(PF)    Objective:  Vital signs in last 24 hours:   Temp:  [97.7 °F (36.5 °C)-99.5 °F (37.5 °C)] 99.5 °F (37.5 °C)  Pulse:  [] 95  Resp:  [18] 18  SpO2:  [96 %-99 %] 96 %  BP: ()/(54-65) 101/65    Intake/Output last 3 shifts:  I/O last 3 completed shifts:  In: 1280.7 [P.O.:960; I.V.:320.7]  Out: 1151 [Urine:1150; Stool:1]  Intake/Output this shift:       Physical Exam:  Visit Vitals    /65 (BP Location: Right arm, Patient Position: Lying, BP Method: Automatic)    Pulse 95    Temp 99.5 °F (37.5 °C) (Oral)    Resp 18    Ht 5' 7" (1.702 m)    Wt 63.4 kg (139 lb 12.4 oz)    SpO2 96%    BMI 21.89 kg/m2     General appearance: alert, appears stated age and cooperative  Neck: No JVD, supple, symmetrical, trachea midline, R IJ in place  Lungs: clear to auscultation bilaterally  Heart: regular rate and rhythm, S1, S2 normal, no murmur, click, rub or gallop  Abdomen: soft, non-tender; bowel sounds normal; no masses,  no organomegaly  Extremities: extremities normal, atraumatic, no cyanosis or edema  Neurologic: Grossly normal    2DE 8/11/16:  CONCLUSIONS     1 - Severe left ventricular enlargement.     2 - Severely depressed left ventricular systolic function (EF 10-15%).     3 - Eccentric hypertrophy.     4 - Left ventricular diastolic dysfunction.     5 - Mild left atrial enlargement.     6 - Right ventricle is upper limit of normal in size with normal systolic function.     7 - Mild to moderate mitral " regurgitation.     8 - Mild tricuspid regurgitation.     9 - Trivial pulmonic regurgitation.     10 - The estimated PA systolic pressure is 35 mmHg.     RHC 1/16/17:  Hemodynamic Results  AOPRES: 94/64 (74)  AOSAT: 98  FICKCI: 1.01  FICKCO: 1.81  PAPRES: 60/31 (41)  PASAT: 36  PVR: 4.99  PWPRES: 37/40 (32)  RAPRES: 22/21 (17)  RVPRES: 60/20, 20  SVR: 31.58/2528  PWSAT: 92    Lab Review  Lab Results   Component Value Date     (L) 01/20/2017    K 4.3 01/20/2017    CL 98 01/20/2017    CO2 25 01/20/2017    BUN 24 (H) 01/20/2017    CREATININE 1.0 01/20/2017    CALCIUM 9.2 01/20/2017     Lab Results   Component Value Date    WBC 7.26 01/20/2017    HGB 15.6 01/20/2017    HCT 45.3 01/20/2017    MCV 88 01/20/2017     01/20/2017            Assessment/Plan:  27 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    Cardiogenic Shock due to Acute on Chronic Systolic HF, DCM, NYHA FC IV  -RHC 1/16 showed low CO/CI and elevated filling pressures- results above. Cont  5 mcg/kg/min. Transition PO Lasix today. CVP this am 2  -GDMT: Cont ACE. Holding BB due to starting   -Does not have ICD- plan for home with [employer] on . Then will get ICD after LVAD  -Echo 1/18/17 showed LVEF 10-15%, LVEDD 6.8, normal RV  -Start work up for advanced options. Presented for OHTx/LVAD and approved. Need to get insurance approval for listing and LVAD date.   -Plan for pt to go home Monday with [employer], on  then come back in the next 1-2 weeks for LVAD        Active Hospital Problems    Diagnosis  POA    *Cardiogenic shock [R57.0]  Yes    Heart failure [I50.9]  Unknown    Organ transplant candidate [Z76.82]  Not Applicable    Acute on chronic  combined systolic and diastolic heart failure [I50.43]  Yes    Familial Cardiomyopathy [I42.9]  Yes     Familial cardiomyopathy        Resolved Hospital Problems    Diagnosis Date Resolved POA   No resolved problems to display.

## 2017-01-20 NOTE — PLAN OF CARE
Problem: Patient Care Overview  Goal: Plan of Care Review  Outcome: Ongoing (interventions implemented as appropriate)  Patient remains free of injury. Denies pain. Denies shortness of breath. CVP 4. Patient continued on dobutamine drip. Tolerating well. PFTs done today. Plan of care reviewed with patient. Verbalizes understanding.

## 2017-01-20 NOTE — PROGRESS NOTES
Patient and wife notified of selection committee decision at bedside, approved OHT as status 7, BTT for VAD pending financial clearance. Also, notified of need to go home with life vest upon DC, rep from [facility] will likely come Monday for education and fitting. Voiced understanding.

## 2017-01-20 NOTE — PLAN OF CARE
Problem: Patient Care Overview  Goal: Plan of Care Review  Outcome: Ongoing (interventions implemented as appropriate)  Pt remained free of injuries. Denies pain. Pt transitioned to PO lasix 20 mg. CVP 2 and . Pt received Pneumonia vaccine. MIKHAIL PICC placed. Plan of care reviewed with patient and wife. Verbalizes understanding.

## 2017-01-20 NOTE — PROGRESS NOTES
Spent about 45 minutes with patient review VAD material. Did not finish full VAD education due to patient needing PICC line placed at bedside. Will follow up this afternoon.

## 2017-01-20 NOTE — TELEPHONE ENCOUNTER
Financial approval requested for OHT. Letter of medical necessity and clinicals provided to Abdoulaye, financial coord. Per WOODY Andrade, intake/referral coord.

## 2017-01-20 NOTE — COMMITTEE REVIEW
Native Organ Dx:  Familial Dilated Cardiomyopathy    Approved  Mert Samayoa's case was presented to the heart transplant selection committee on  .  Patient has been accepted as a candidate for heart transplantation due to Familial Dilated Cardiomyopathybwith an NYHA Functional Class IV  and a Karnofsky score of 20. Very sick, hospitalization necessary; active treatment necessary.  Patient to be listed through [employer] pending financial clearance as a Status 7.    Approved for VAD as bridge to transplant.     Note was written by Vee Khan.    ==========================================================    I was present at the meeting and agree to the decision of the committee.

## 2017-01-20 NOTE — PROGRESS NOTES
[employer] Medical Form FAXED, [phone], as ordered, signed per Dr Hawkins. Transmission verification report received.

## 2017-01-20 NOTE — PLAN OF CARE
Problem: Patient Care Overview  Goal: Plan of Care Review  Outcome: Ongoing (interventions implemented as appropriate)   gttx maintained throughout shift. VSS. Denies any CP, SOB, palpitations, or dizziness. Fall precautions maintained. Free of fall/trauma/injury. General skin remains intact with no breakdown. Dressing to R.IJ remains CDI, due to be changed on 01/23/17. Plan is to continue with LVAD w/o. Plan of care reviewed and updated with patient and family, verbalizes understanding. Patient in no acute distress. Will continue to monitor.

## 2017-01-20 NOTE — CONSULTS
Pre Transplant Infectious Diseases Consult  Heart Transplant Recipient Evaluation    Requesting Physician: Lashon Hawkins MD    Reason for Visit:      Chief Complaint   Patient presents with    Shortness of Breath     sent from 3rd floor after having RHC, 'admit will be quicker via ER, needing dobutamine drip, denies chest pain and  sob sometimes     History of Present Illness  Mert Samayoa is a 26 y.o. year old  male with advanced Heart disease currently being evaluated for Heart transplant.  Patient denies any recent fever, chills, or infective illnesses.      1) Do you have a history of:   YES NO   Diabetes      [] [x]     Bone/ Foot I/Bone Infection    []        [x]     Surgical Removal of Spleen   []        [x]       2) Have you had recurrent infections involving:         Organs:   YES NO  Sinus infections  []         [x]   Sore Throat   []         [x]                 Prostate Infections  []         [x]              Bladder Infections  []         [x]                     Kidney Infections  []         [x]                               Intestinal Infections  []         [x]      Skin Infections   []         [x]       Reproductive Infections []         [x]        Periodontal Disease  []         [x]        3)Have you ever had: YES     NO     Chicken Pox   [x]         []    Shingles   []         [x]    Orolabial Herpes  [x]         []    Genital Herpes  []         [x]    Cytomegalovirus  []         [x]    Dyan-Barr Virus  []         [x]    Genital Warts   []         [x]    Hepatitis A   []         [x]    Hepatitis B   []         [x]     Hepatitis C   []         [x]    Syphilis   []         [x]    Gonorrhea   []         [x]   Pelvic Inflammatory   Disease   []         [x]    Chlamydia Infection  []         [x]    Intestinal parasites/worms [x]         [] Pinworms as child, resolved, no recurrence   Fungal Infections  []         [x]    Blood Infections  []         [x]   AT age 6 yo he had bacterial  infection that was treated as inpt x 2 weeks - unkown cause    Comment:      4) Have you ever been exposed   YES NO  To someone with tuberculosis?  []   [x]   If yes, what treatment did you receive:     5) What states have you lived in? [place]    6) What countries have you visited for more than 2 weeks?  [place] x 1 wwk                       YES NO  7) Did you have any associated travel infections? []  [x]       8) Are you planning to travel outside the    []  [x]   [place] after your transplant?    9) Household                  YES NO  Do you have pets living in your house/pet contact? [x]         []   If yes, describe: Dog/CAt    Do you spend time or live on a farm or    []         [x]   have livestock or other farm animals?  If yes, which ones:    Do you have a fish tank?          [x]   []       Do you have a litter box? He will no longer handle [x]         []     Do you fish or hunt?  Fish    [x]         []     Do you clean or skin fish or animals?   [x]         []     Do you consume raw or undercooked   [x]         []   meat, fish, or shellfish? Sushi      10) What occupations have you had? 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                                             YES NO  12)Do you garden or otherwise   work in the soil?      []         [x]     13)Do you hike, camp, or spend   time in wooded areas?     []         [x]        14) The patient's immunization history was reviewed.    Have you ever received:  YES NO UNKNOWN DATES   Routine Childhood vaccines  [x]         []  []      Influenza vaccine   []  [x]  []    Pneumovax vaccine   []  [x]  []     Tetanus-diptheria vaccine  [x]         []  [] 2012/13   Hepatitis A vaccine series       [x]  []  []    Hepatitis B vaccine series         [x]  []  []    Meningitis vaccine   [x]         []  []    Varicella vaccine   []         [x]  []        Based on the patients immunization  history and serologies, immunizations were ordered:         Ordered  Not Ordered  Influenza vaccine     [x]    []   Hepatitis A vaccine series at 0 and 6 months []    [x]   Hepatitis B at 0, 1, and 6 months   []    [x]   Hepatitis B High Dose 0, 1, and 6 months  []    [x]   Prevnar vaccine     [x]    []   Pneumovax vaccine     []    [x]    TDap vaccine      []    [x]    Varicella vaccine     []    [x]   Menactra (meningitis) vaccine   []    [x]            The patient was encouraged to contact us about any problems that may develop after immunization and possible side effects were reviewed.      Previous Transplant: no    Etiology of Heart Disease: CHF    Allergies: Review of patient's allergies indicates no known allergies.    There is no immunization history on file for this patient.  Past Medical History   Diagnosis Date    Cardiogenic shock 1/16/2017    Cardiomyopathy      Familial cardiomyopathy    CHF (congestive heart failure)     Essential hypertension 12/21/2016    Familial Cardiomyopathy      Familial cardiomyopathy      History reviewed. No pertinent past surgical history.   Social History     Social History    Marital status:      Spouse name: N/A    Number of children: N/A    Years of education: N/A     Occupational History    Not on file.     Social History Main Topics    Smoking status: Current Some Day Smoker     Packs/day: 1.00    Smokeless tobacco: Not on file    Alcohol use 2.4 oz/week     4 Shots of liquor per week    Drug use: No    Sexual activity: Not on file     Other Topics Concern    Not on file     Social History Narrative    Works        Review of Systems   Constitution: Positive for decreased appetite, weakness and malaise/fatigue. Negative for chills, fever, night sweats, weight gain and weight loss.   HENT: Negative for congestion, ear pain, headaches, hearing loss, hoarse voice, sore throat and tinnitus.    Eyes: Negative for blurred  vision, redness and visual disturbance.   Cardiovascular: Positive for chest pain. Negative for leg swelling and palpitations.   Respiratory: Positive for cough, shortness of breath and sputum production. Negative for hemoptysis and wheezing.    Endocrine: Negative for cold intolerance and heat intolerance.   Hematologic/Lymphatic: Negative for adenopathy. Does not bruise/bleed easily.   Skin: Negative for dry skin, itching, rash and suspicious lesions.   Musculoskeletal: Positive for back pain. Negative for joint pain, myalgias and neck pain.   Gastrointestinal: Positive for abdominal pain. Negative for constipation, diarrhea, heartburn, nausea and vomiting.   Genitourinary: Negative for dysuria, flank pain, frequency, hematuria, hesitancy and urgency.   Neurological: Negative for dizziness, numbness and paresthesias.   Psychiatric/Behavioral: Negative for depression and memory loss. The patient is nervous/anxious. The patient does not have insomnia.    Allergic/Immunologic: Negative for environmental allergies, HIV exposure, hives and persistent infections.     Physical Exam   Constitutional: He is oriented to person, place, and time. He appears well-developed and well-nourished.   HENT:   Head: Normocephalic and atraumatic.   Mouth/Throat: Uvula is midline, oropharynx is clear and moist and mucous membranes are normal. He does not have dentures. No oral lesions. Normal dentition. Dental caries (some fillings) present. No dental abscesses or lacerations.   Eyes: Conjunctivae and lids are normal. Pupils are equal, round, and reactive to light. No scleral icterus.   Neck: Neck supple.   Cardiovascular: Normal rate and regular rhythm.  Exam reveals no gallop and no friction rub.    No murmur heard.  Pulmonary/Chest: Effort normal and breath sounds normal. No respiratory distress. He has no decreased breath sounds. He has no wheezes. He has no rhonchi. He has no rales.   Abdominal: Soft. Normal appearance, normal  aorta and bowel sounds are normal. He exhibits no distension and no mass. There is no hepatosplenomegaly. There is no tenderness. There is no rebound and no guarding.   Musculoskeletal: He exhibits no edema.   Lymphadenopathy:        Head (right side): No submental, no submandibular, no tonsillar, no preauricular, no posterior auricular and no occipital adenopathy present.        Head (left side): No submental, no submandibular, no tonsillar, no preauricular, no posterior auricular and no occipital adenopathy present.     He has no cervical adenopathy.     He has no axillary adenopathy.        Right: No inguinal, no supraclavicular and no epitrochlear adenopathy present.        Left: No inguinal, no supraclavicular and no epitrochlear adenopathy present.   Neurological: He is alert and oriented to person, place, and time. No cranial nerve deficit.   Skin: Skin is warm, dry and intact. No lesion and no rash noted. He is not diaphoretic. No erythema. No pallor.   Psychiatric: He has a normal mood and affect. His behavior is normal.     Diagnostics:      Ref. Range 1/17/2017 16:19 1/18/2017 07:53   Hep B Core Total Ab Unknown Negative    Hep B S Ab Unknown Positive (A)    Hepatitis B Surface Ag Unknown Negative    Hepatitis C Ab Unknown Negative    Mitogen - Nil Latest Ref Range: See text IU/mL  3.42   NIL Latest Ref Range: See text IU/mL  0.02   TB Antigen Latest Ref Range: See text IU/mL  0.03   TB Antigen - Nil Latest Ref Range: See text IU/mL  0.01   TB Gold Unknown  Negative   RPR Latest Ref Range: Non-reactive  Non-reactive    Varicella IgG Latest Ref Range: 0.00 - 0.90 ISR 3.84 (H)    Varicella Interpretation Latest Ref Range: Negative  Positive (A)       Ref. Range 1/17/2017 16:19   Hepatitis A Antibody IgG Unknown Positive (A)      Ref. Range 1/17/2017 16:19   Toxoplasma gondii IGG Latest Ref Range: 0.0 - 6.4 IU/mL <5.0      Ref. Range 1/17/2017 16:19   Toxoplasma IGG Interpretation Unknown Non-reactive      RPR   Date Value Ref Range Status   01/17/2017 Non-reactive Non-reactive Final     No results found for: CMVANTIBODIE  No results found for: HIV1X2  No results found for: HTLVIIIANTIB  Hepatitis B Surface Ag   Date Value Ref Range Status   01/17/2017 Negative  Final     Hep B Core Total Ab   Date Value Ref Range Status   01/17/2017 Negative  Final     Hepatitis C Ab   Date Value Ref Range Status   01/17/2017 Negative  Final     Toxoplasma gondii IGG   Date Value Ref Range Status   01/17/2017 <5.0 0.0 - 6.4 IU/mL Final     No components found for: TOXOIGGINTER  No results found for: OWN3JCK  No results found for: PIS4OLU  Varicella IgG   Date Value Ref Range Status   01/17/2017 3.84 (H) 0.00 - 0.90 ISR Final     Varicella Interpretation   Date Value Ref Range Status   01/17/2017 Positive (A) Negative Final     Comment:     <or=0.90     Negative        No detectable IgG antibody to Varicella zoster  by the HERIBERTO test. Such individuals are presumed to be   uninfected with Varicella zoster and to be susceptible to   primary infection.  0.91-1.09    Equivocal  >or=1.10     Positive        Indicates presence of detectable IgG antibody to Varicella   zoster by the HERIBERTO test. Indicative of previous or current   infection.       No results found for: STRONGANTIGG  No results found for: EPSTEINBARRV  Hep B S Ab   Date Value Ref Range Status   01/17/2017 Positive (A)  Final     No results found for: QUANTIFERON  No results found for: HEPAIGM  No results found for: PPD  No results found for this or any previous visit.    Radiology:   X-Ray Chest PA And Lateral [[id number]] Resulted: 01/18/17 1937       Order Status: Completed Updated: 01/18/17 1938       Narrative:         EXAM:  2 VIEW CHEST RADIOGRAPH.     CLINICAL INDICATION:  Heart Transplant/LVAD workup.    TECHNIQUE:  AP and lateral views of the chest was obtained.     COMPARISON: Prior from 1/16/17    FINDINGS: Right-sided central venous catheter is present with tip  projecting over the SVC/RA junction.  The mediastinal structures are midline.  The cardiac silhouette is mildly enlarged and stable.  There is no evidence of acute pulmonary disease, pleural disease, lymph node enlargement, or cardiac decompensation.    No osseous abnormalities are identified.       Impression:         Clear lungs.      Electronically signed by: COREEN FOY MD  Date: 01/18/17  Time: 19:37        X-Ray Chest AP Portable [[id number]] Resulted: 01/16/17 1830       Order Status: Completed Updated: 01/16/17 1830       Narrative:         Chest AP portable    Indication:central line.    Comparison:January 16, 2017.    Findings:     RIGHT IJ catheter tip overlies the upper RIGHT atrium. Small LEFT pleural effusion, unchanged. No pneumothorax.    Mild cardiomegaly with enlarged LEFT atrium. Heart and lungs unchanged when allowing for differences in technique and positioning.       Impression:             RIGHT IJ catheter tip overlies the upper RIGHT atrium.     Small LEFT pleural effusion, unchanged. No pneumothorax.    Mild cardiomegaly. LEFT atrial enlargement with splaying of the vasyl.        Electronically signed by: RANJANA WADSWORTH MD  Date: 01/16/17  Time: 18:30        X-Ray Chest 1 View [[id number]] Resulted: 01/16/17 1512       Order Status: Completed Updated: 01/16/17 1512       Narrative:         Mild cardiomegaly.  The lungs are clear.  No pleural effusion       Impression:          See above      Electronically signed by: Guillermo Jordan MD  Date: 01/16/17  Time: 15:12        CT Abdomen Pelvis Without Contrast [[id number]] Resulted: 01/16/17 1508       Order Status: Completed Updated: 01/16/17 1508       Narrative:         Procedure comments: The patient was surveyed from the lung apices through the pelvis without the administration of intravenous or oral contrast and data was reconstructed for coronal, sagittal, and axial images.    Comparison: None.    Findings:    Examination of the vascular  and soft tissue structures at the base of the neck is unremarkable.    A left sided aortic arch with 3 branch vessels is identified.  The thoracic aorta maintains normal caliber, contour, and course.  The heart is mildly enlarged. There is trace pericardial fluid.    The trachea is midline, the proximal airways are patent, and the lungs are symmetrically expanded.  There is interlobular septal thickening noted within the lungs bilaterally suggestive for mild pulmonary edema. There is dependent density within the lung bases. There are small bilateral pleural effusions.    There is no axillary, mediastinal, or hilar lymphadenopathy.    The esophagus maintains a normal course and caliber.     The liver is mildly enlarged without evidence of focal abnormality on this noncontrast enhanced examination.  The gallbladder shows no evidence of stones or pericholecystic fluid.  There is no intra-or extrahepatic biliary ductal dilatation.    The stomach, spleen, pancreas, and adrenal glands are unremarkable.    The kidneys are normal in size and location.  There is no evidence of hydronephrosis.  The ureters appear normal in course and caliber without evidence of ureteral dilatation. The urinary bladder demonstrates no significant abnormality. Prostate is unremarkable    The visualized loops of small and large bowel show no evidence of obstruction or inflammation.  There is no ascites, free fluid, or intraperitoneal free air. There is no evidence of lymph node enlargement in the abdomen or pelvis.    The abdominal aorta is normal in course and caliber without significant atherosclerotic calcifications.    The osseus structures demonstrate age-appropriate degenerative change without evidence of acute fracture or osseus destructive process.      The extraperitoneal soft tissues are unremarkable.       Impression:             Cardiomegaly, small bilateral pleural effusions, and mild pulmonary edema. This constellation of findings  is suggestive for decompensated heart failure.  ______________________________________     Electronically signed by resident: COREEN GONZALES MD  Date: 01/16/17  Time: 14:53            As the supervising and teaching physician, I personally reviewed the images and resident's interpretation and I agree with the findings.            Electronically signed by: Dr. Anshul Blount MD  Date: 01/16/17  Time: 15:08        CT Head Without Contrast [[id number]] Resulted: 01/16/17 1449       Order Status: Completed Updated: 01/16/17 1449       Narrative:         CT brain without contrast.    Comparison: None     Technique: Multiple 5 mm axial images of the head were obtained without intravenous contrast.    Findings: No evidence for acute intracranial hemorrhage or sulcal effacement. The ventricles are normal in size and configuration without evidence for hydrocephalus.  There is no midline shift or mass effect. The visualized paranasal sinuses and mastoid air cells are clear..       Impression:          Unremarkable noncontrast CT head specifically without evidence for acute intracranial hemorrhage. Further evaluation as warranted clinically.      Electronically signed by: DANNY ELLSWORTH DO  Date: 01/16/17  Time: 14:49        CT Chest Without Contrast [[id number]] Resulted: 01/16/17 1427       Order Status: Sent Updated: 01/16/17 1436           2D echo with color flow doppler   Order# [id number]    Ordering physician: Brenda Antonio PA-C   Study date: 1/18/17         Patient Information      Name MRN Description     Mert Samayoa [id number] 26 y.o. Male       Result Image Hyperlink      Show images for 2D echo with color flow doppler            2D echo with color flow doppler   Status:  Final result   Visible to patient:  Yes (Patient Portal) Next appt:  02/10/2017 at 12:30 PM in Lab (LAB, APPOINTMENT [facility]) Dx:  Chronic combined systolic and diastol... Order: [id number]           Ref Range & Units 2d ago   1mo  ago   5mo ago   11mo ago      EF 55 - 65 10 (A)  13 (A) 15 (A)    Diastolic Dysfunction  Yes (A) No Yes (A) No   Resulting Agency  [facility]   Narrative      Date of Procedure: 01/18/2017        TEST DESCRIPTION   Technical Quality: This is a technically adequate study.     General: A catheter is present in the right-sided cardiac chambers.     Aorta: The aortic root is normal in size, measuring 2.4 cm at sinotubular junction and 2.9 cm at Sinuses of Valsalva. The proximal ascending aorta is normal in size, measuring 2.5 cm across.     Left Atrium: The left atrial volume index is mildly enlarged, measuring 34.63 cc/m2.     Left Ventricle: The left ventricle is severely enlarged, with an end-diastolic diameter of 6.8 cm, and an end-systolic diameter of 6.5 cm. LV wall thickness is normal, with the septum measuring 0.7 cm and the posterior wall measuring 0.9 cm across.   Relative wall thickness was normal at 0.26, and the LV mass index was increased at 154.9 g/m2 consistent with severe eccentric left ventricular hypertrophy. Global left ventricular systolic function appears severely depressed. Visually estimated ejection   fraction is 10-15%. The LV Doppler derived stroke volume equals 21.0 ccs. There is global hypokinesis.   The E/e'(lat) is 13, consistent with significant diastolic dysfunction.     Right Atrium: The right atrium is normal in size, measuring 3.8 cm in length and 3.4 cm in width in the apical view.     Right Ventricle: The right ventricle is normal in size measuring 2.3 cm at the base in the apical right ventricle-focused view. Global right ventricular systolic function appears normal. Tricuspid annular plane systolic excursion (TAPSE) is 2.3 cm.     Aortic Valve:  The aortic valve is normal in structure.     Mitral Valve:  The mitral valve is normal in structure.     Tricuspid Valve:  The tricuspid valve is normal in structure.     Pulmonary Valve:  The pulmonic valve is normal in  structure.     IVC: IVC is normal in size and collapses > 50% with a sniff, suggesting normal right atrial pressure of 3 mmHg.     Intracavitary: There is no evidence of pericardial effusion, intracavity mass, thrombi, or vegetation.         CONCLUSIONS     1 - Eccentric LVH with severely depressed left ventricular systolic function (EF 10-15%).     2 - Left ventricular diastolic dysfunction.     3 - Normal right ventricular systolic function .     4 - Mild left atrial enlargement.             This document has been electronically    SIGNED BY: Clarice Shankar MD On: 01/18/2017 12:18                   Transplant Infectious Diseases - Candidacy    Assessment/Plan:     Transplant Candidacy: Based on available information, there are no absolute contraindications  To LVAD or transplantation from an infectious disease standpoint pending a negative Strongyloides IgG and HIV testing.  Reconsult if positive. 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 This is deemed a significant risk for him.  Final determination of transplant candidacy will be made once evaluation is complete and reviewed by the Transplant Selection Committee.    HAKEEM Cole  Vaccine and Serology Needs:  1. MMR titers  2. Influenza Vaccine  3. Prevnar Vaccine  4. Pneumovax -23 in 2 months       Counseling: Patient must strongly consider career position change especially after transplant as he has exposure to human waste at his job. This is deemed a significant risk for him.    I discussed with Mert Cantu the risk for increased susceptibility to infections following transplantation including increased risk for infection right after transplant and if rejection should occur.  The patients has been counseled on the importance of vaccinations including but not limited to a yearly flu vaccine.  Specific guidance has been provided to the patient regarding the patients  occupation, hobbies and activities to avoid future infectious complications including but not limited to avoiding undercooked meats and seafood, proper hygiene, and contact with animals.

## 2017-01-20 NOTE — CONSULTS
Double lumen PICC placed in right brachial  vein. 36 cm in length with 0 cm exposed. Arm Circumference 29cm.LOT#[id number]

## 2017-01-20 NOTE — NURSING
PHARM.D. PRE-TRANSPLANT NOTE:    This patient's medication therapy was evaluated and any issues identified were discussed with the Selection Committee at the time the patient was presented as a candidate for Heart transplantation.  Mr. Samayoa is currently at moderate risk for rejection based on  ethnicity, cPRA of 0% and age <40years.  Reassessment of rejection risk should occur at the time of transplant based on latest cPRA, HLA mismatch, and crossmatch results.    I am available for consultation and can be contacted, as needed by the other members of the HeartTransplant team.

## 2017-01-20 NOTE — PLAN OF CARE
Patient presented in Selection committee this am.  Patient still needs to complete a 6 min walk  Order placed and Pulmonary Lab is aware of the order and will perform the test this afternoon per [place] in Pulmonary Lab

## 2017-01-21 LAB
ALBUMIN SERPL BCP-MCNC: 4.1 G/DL
ALP SERPL-CCNC: 67 U/L
ALT SERPL W/O P-5'-P-CCNC: 41 U/L
ANION GAP SERPL CALC-SCNC: 10 MMOL/L
AST SERPL-CCNC: 34 U/L
BASOPHILS # BLD AUTO: 0.02 K/UL
BASOPHILS NFR BLD: 0.2 %
BILIRUB SERPL-MCNC: 1.2 MG/DL
BUN SERPL-MCNC: 18 MG/DL
CALCIUM SERPL-MCNC: 9.1 MG/DL
CHLORIDE SERPL-SCNC: 101 MMOL/L
CO2 SERPL-SCNC: 24 MMOL/L
CREAT SERPL-MCNC: 1 MG/DL
DIFFERENTIAL METHOD: ABNORMAL
EOSINOPHIL # BLD AUTO: 0.2 K/UL
EOSINOPHIL NFR BLD: 2.1 %
ERYTHROCYTE [DISTWIDTH] IN BLOOD BY AUTOMATED COUNT: 13.6 %
EST. GFR  (AFRICAN AMERICAN): >60 ML/MIN/1.73 M^2
EST. GFR  (NON AFRICAN AMERICAN): >60 ML/MIN/1.73 M^2
GLUCOSE SERPL-MCNC: 70 MG/DL
HCT VFR BLD AUTO: 43.5 %
HGB BLD-MCNC: 14.8 G/DL
LYMPHOCYTES # BLD AUTO: 1.4 K/UL
LYMPHOCYTES NFR BLD: 15.3 %
MAGNESIUM SERPL-MCNC: 2.1 MG/DL
MCH RBC QN AUTO: 29.8 PG
MCHC RBC AUTO-ENTMCNC: 34 %
MCV RBC AUTO: 88 FL
MONOCYTES # BLD AUTO: 0.7 K/UL
MONOCYTES NFR BLD: 8 %
NEUTROPHILS # BLD AUTO: 6.6 K/UL
NEUTROPHILS NFR BLD: 74.2 %
PLATELET # BLD AUTO: 204 K/UL
PMV BLD AUTO: 10.3 FL
POTASSIUM SERPL-SCNC: 4.2 MMOL/L
PROT SERPL-MCNC: 6.8 G/DL
RBC # BLD AUTO: 4.96 M/UL
SODIUM SERPL-SCNC: 135 MMOL/L
WBC # BLD AUTO: 8.96 K/UL

## 2017-01-21 PROCEDURE — 25000003 PHARM REV CODE 250: Performed by: NURSE PRACTITIONER

## 2017-01-21 PROCEDURE — 63600175 PHARM REV CODE 636 W HCPCS: Performed by: PHYSICIAN ASSISTANT

## 2017-01-21 PROCEDURE — 25000003 PHARM REV CODE 250: Performed by: PHYSICIAN ASSISTANT

## 2017-01-21 PROCEDURE — 99232 SBSQ HOSP IP/OBS MODERATE 35: CPT | Mod: ,,, | Performed by: INTERNAL MEDICINE

## 2017-01-21 PROCEDURE — 25000003 PHARM REV CODE 250: Performed by: HOSPITALIST

## 2017-01-21 PROCEDURE — 80053 COMPREHEN METABOLIC PANEL: CPT

## 2017-01-21 PROCEDURE — 63600175 PHARM REV CODE 636 W HCPCS: Performed by: INTERNAL MEDICINE

## 2017-01-21 PROCEDURE — 25000003 PHARM REV CODE 250: Performed by: INTERNAL MEDICINE

## 2017-01-21 PROCEDURE — 85025 COMPLETE CBC W/AUTO DIFF WBC: CPT

## 2017-01-21 PROCEDURE — 20600001 HC STEP DOWN PRIVATE ROOM

## 2017-01-21 PROCEDURE — 83735 ASSAY OF MAGNESIUM: CPT

## 2017-01-21 RX ADMIN — LISINOPRIL 10 MG: 10 TABLET ORAL at 09:01

## 2017-01-21 RX ADMIN — Medication 10 ML: at 12:01

## 2017-01-21 RX ADMIN — Medication 10 ML: at 06:01

## 2017-01-21 RX ADMIN — ACETAMINOPHEN 650 MG: 325 TABLET ORAL at 11:01

## 2017-01-21 RX ADMIN — Medication 10 ML: at 11:01

## 2017-01-21 RX ADMIN — SODIUM CHLORIDE, PRESERVATIVE FREE 3 ML: 5 INJECTION INTRAVENOUS at 10:01

## 2017-01-21 RX ADMIN — DOBUTAMINE IN DEXTROSE 5 MCG/KG/MIN: 200 INJECTION, SOLUTION INTRAVENOUS at 05:01

## 2017-01-21 RX ADMIN — FUROSEMIDE 20 MG: 20 TABLET ORAL at 09:01

## 2017-01-21 RX ADMIN — Medication 10 ML: at 01:01

## 2017-01-21 RX ADMIN — SODIUM CHLORIDE, PRESERVATIVE FREE 3 ML: 5 INJECTION INTRAVENOUS at 05:01

## 2017-01-21 RX ADMIN — ENOXAPARIN SODIUM 40 MG: 100 INJECTION SUBCUTANEOUS at 11:01

## 2017-01-21 NOTE — NURSING
EKG was WNL and patient had no other complaints of chest pain. Removed RIJ TLC without difficult per facility protocol. 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 Will continue to monitor.

## 2017-01-21 NOTE — PLAN OF CARE
Problem: Patient Care Overview  Goal: Plan of Care Review  Outcome: Ongoing (interventions implemented as appropriate)  DOBUTAMINE MAINTAINED AT 5MCG/KG/MIN, TOLERATING WELL. STATES HE HAS MORE ENERGY SINCE STARTING . NO FALL RELATED INJURY, DIURESING WELL WITH ORAL LASIX. DENIES C/O PAIN. DISCHARGE HOME WITH DOBUTAMINE AND LIFE VEST ON Monday. PROCEED WITH L-VAD AT AT APPROPRIATE TIME

## 2017-01-21 NOTE — PLAN OF CARE
Problem: Patient Care Overview  Goal: Plan of Care Review  Outcome: Ongoing (interventions implemented as appropriate)  Spoke with the patient regarding the plan of care. He is in no distress at this time. Discharge planning ongoing for PICC line care. Home dobutamine ordered. Patient will continue with an LVAD workup. Will continue to provide resources to the patient and spouse to answer questions as they arise. Will continue to monitor.

## 2017-01-21 NOTE — NURSING
Patient has complaint of left sided under nipple chest pain/tightness. Ordered stat EKG per facility protocol. Patient rates the pain as 3. He has no other signs/symptoms at this time and appears to be in no apparent distress. Will continue to monitor and inform MD when EKG is completed.

## 2017-01-21 NOTE — PROGRESS NOTES
"Hospital Day: 6    Subjective:  Interval History: Pt has episodes of feeling his heart beating hard. But overall breathing is much better    Scheduled Meds:   enoxaparin  40 mg Subcutaneous Daily    furosemide  20 mg Oral Daily    lisinopril  10 mg Oral Daily    sodium chloride 0.9%  10 mL Intravenous Q6H    sodium chloride 0.9%  3 mL Intravenous Q8H     Continuous Infusions:   DOBUTamine 5 mcg/kg/min (01/21/17 0526)     PRN Meds:acetaminophen, flu vac [id number] 36mos up(PF), Flushing PICC Protocol **AND** sodium chloride 0.9% **AND** sodium chloride 0.9%    Objective:  Vital signs in last 24 hours:   Temp:  [97.5 °F (36.4 °C)-98.2 °F (36.8 °C)] 97.9 °F (36.6 °C)  Pulse:  [] 75  Resp:  [17-20] 17  SpO2:  [96 %-99 %] 97 %  BP: ()/(56-72) 102/57    Intake/Output last 3 shifts:  I/O last 3 completed shifts:  In: 2008.8 [P.O.:1496; I.V.:512.8]  Out: 1701 [Urine:1700; Stool:1]  Intake/Output this shift:       Physical Exam:  Visit Vitals    BP (!) 102/57    Pulse 75    Temp 97.9 °F (36.6 °C) (Oral)    Resp 17    Ht 5' 7" (1.702 m)    Wt 59.5 kg (131 lb 2.8 oz)    SpO2 97%    BMI 20.54 kg/m2     General appearance: alert, appears stated age and cooperative  Neck: No JVD, supple, symmetrical, trachea midline, R IJ in place  Lungs: clear to auscultation bilaterally  Heart: regular rate and rhythm, S1, S2 normal, no murmur, click, rub or gallop  Abdomen: soft, non-tender; bowel sounds normal; no masses,  no organomegaly  Extremities: extremities normal, atraumatic, no cyanosis or edema  Neurologic: Grossly normal    2DE 8/11/16:  CONCLUSIONS     1 - Severe left ventricular enlargement.     2 - Severely depressed left ventricular systolic function (EF 10-15%).     3 - Eccentric hypertrophy.     4 - Left ventricular diastolic dysfunction.     5 - Mild left atrial enlargement.     6 - Right ventricle is upper limit of normal in size with normal systolic function.     7 - Mild to moderate mitral " regurgitation.     8 - Mild tricuspid regurgitation.     9 - Trivial pulmonic regurgitation.     10 - The estimated PA systolic pressure is 35 mmHg.     RHC 1/16/17:  Hemodynamic Results  AOPRES: 94/64 (74)  AOSAT: 98  FICKCI: 1.01  FICKCO: 1.81  PAPRES: 60/31 (41)  PASAT: 36  PVR: 4.99  PWPRES: 37/40 (32)  RAPRES: 22/21 (17)  RVPRES: 60/20, 20  SVR: 31.58/2528  PWSAT: 92    Lab Review  Lab Results   Component Value Date     (L) 01/21/2017    K 4.2 01/21/2017     01/21/2017    CO2 24 01/21/2017    BUN 18 01/21/2017    CREATININE 1.0 01/21/2017    CALCIUM 9.1 01/21/2017     Lab Results   Component Value Date    WBC 8.96 01/21/2017    HGB 14.8 01/21/2017    HCT 43.5 01/21/2017    MCV 88 01/21/2017     01/21/2017            Assessment/Plan:  27 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    Cardiogenic Shock due to Acute on Chronic Systolic HF, DCM, NYHA FC IV  -RHC 1/16 showed low CO/CI and elevated filling pressures- results above. Cont  5 mcg/kg/min. Transitioned to PO Lasix   -GDMT: Cont ACE. Holding BB due to starting   -Does not have ICD- plan for home with [employer] on . Then will get ICD after LVAD  -Echo 1/18/17 showed LVEF 10-15%, LVEDD 6.8, normal RV  -Start work up for advanced options. Presented for OHTx/LVAD and approved. Need to get insurance approval for listing and LVAD date.   -Plan for pt to go home Monday with [employer], on  then come back for LVAD        Active Hospital Problems    Diagnosis  POA    *Cardiogenic shock [R57.0]  Yes    Heart failure [I50.9]  Yes    Organ transplant candidate [Z76.82]  Not Applicable    Acute on chronic combined systolic and diastolic heart failure  [I50.43]  Yes    Familial Cardiomyopathy [I42.9]  Yes     Familial cardiomyopathy        Resolved Hospital Problems    Diagnosis Date Resolved POA   No resolved problems to display.

## 2017-01-22 LAB
ALBUMIN SERPL BCP-MCNC: 4 G/DL
ALP SERPL-CCNC: 73 U/L
ALT SERPL W/O P-5'-P-CCNC: 44 U/L
ANION GAP SERPL CALC-SCNC: 7 MMOL/L
AST SERPL-CCNC: 31 U/L
BASOPHILS # BLD AUTO: 0.02 K/UL
BASOPHILS NFR BLD: 0.3 %
BILIRUB SERPL-MCNC: 0.9 MG/DL
BUN SERPL-MCNC: 19 MG/DL
CALCIUM SERPL-MCNC: 9.3 MG/DL
CHLORIDE SERPL-SCNC: 100 MMOL/L
CO2 SERPL-SCNC: 27 MMOL/L
CREAT SERPL-MCNC: 0.9 MG/DL
DIFFERENTIAL METHOD: ABNORMAL
EOSINOPHIL # BLD AUTO: 0.2 K/UL
EOSINOPHIL NFR BLD: 2.5 %
ERYTHROCYTE [DISTWIDTH] IN BLOOD BY AUTOMATED COUNT: 13.4 %
EST. GFR  (AFRICAN AMERICAN): >60 ML/MIN/1.73 M^2
EST. GFR  (NON AFRICAN AMERICAN): >60 ML/MIN/1.73 M^2
GLUCOSE SERPL-MCNC: 99 MG/DL
HCT VFR BLD AUTO: 42 %
HGB BLD-MCNC: 14.5 G/DL
LYMPHOCYTES # BLD AUTO: 1.3 K/UL
LYMPHOCYTES NFR BLD: 17.9 %
MAGNESIUM SERPL-MCNC: 2.2 MG/DL
MCH RBC QN AUTO: 30.2 PG
MCHC RBC AUTO-ENTMCNC: 34.5 %
MCV RBC AUTO: 88 FL
MONOCYTES # BLD AUTO: 0.7 K/UL
MONOCYTES NFR BLD: 9.4 %
NEUTROPHILS # BLD AUTO: 5 K/UL
NEUTROPHILS NFR BLD: 69.8 %
PLATELET # BLD AUTO: 175 K/UL
PMV BLD AUTO: 9.8 FL
POTASSIUM SERPL-SCNC: 4.1 MMOL/L
PROT SERPL-MCNC: 6.8 G/DL
RBC # BLD AUTO: 4.8 M/UL
SODIUM SERPL-SCNC: 134 MMOL/L
WBC # BLD AUTO: 7.2 K/UL

## 2017-01-22 PROCEDURE — 25000003 PHARM REV CODE 250: Performed by: INTERNAL MEDICINE

## 2017-01-22 PROCEDURE — 99232 SBSQ HOSP IP/OBS MODERATE 35: CPT | Mod: ,,, | Performed by: INTERNAL MEDICINE

## 2017-01-22 PROCEDURE — 83735 ASSAY OF MAGNESIUM: CPT

## 2017-01-22 PROCEDURE — 63600175 PHARM REV CODE 636 W HCPCS: Performed by: PHYSICIAN ASSISTANT

## 2017-01-22 PROCEDURE — 63600175 PHARM REV CODE 636 W HCPCS: Performed by: INTERNAL MEDICINE

## 2017-01-22 PROCEDURE — 20600001 HC STEP DOWN PRIVATE ROOM

## 2017-01-22 PROCEDURE — 85025 COMPLETE CBC W/AUTO DIFF WBC: CPT

## 2017-01-22 PROCEDURE — 25000003 PHARM REV CODE 250: Performed by: PHYSICIAN ASSISTANT

## 2017-01-22 PROCEDURE — 25000003 PHARM REV CODE 250: Performed by: NURSE PRACTITIONER

## 2017-01-22 PROCEDURE — 80053 COMPREHEN METABOLIC PANEL: CPT

## 2017-01-22 RX ADMIN — SODIUM CHLORIDE, PRESERVATIVE FREE 3 ML: 5 INJECTION INTRAVENOUS at 10:01

## 2017-01-22 RX ADMIN — SODIUM CHLORIDE, PRESERVATIVE FREE 3 ML: 5 INJECTION INTRAVENOUS at 06:01

## 2017-01-22 RX ADMIN — FUROSEMIDE 20 MG: 20 TABLET ORAL at 08:01

## 2017-01-22 RX ADMIN — LISINOPRIL 10 MG: 10 TABLET ORAL at 08:01

## 2017-01-22 RX ADMIN — ENOXAPARIN SODIUM 40 MG: 100 INJECTION SUBCUTANEOUS at 12:01

## 2017-01-22 RX ADMIN — Medication 10 ML: at 12:01

## 2017-01-22 RX ADMIN — DOBUTAMINE IN DEXTROSE 5 MCG/KG/MIN: 200 INJECTION, SOLUTION INTRAVENOUS at 05:01

## 2017-01-22 RX ADMIN — Medication 10 ML: at 07:01

## 2017-01-22 NOTE — PLAN OF CARE
Problem: Patient Care Overview  Goal: Plan of Care Review  Outcome: Ongoing (interventions implemented as appropriate)  Dobutamine drip maintained at 5mcg/kg/min, tolerating well. Diuresing well with daily oral lasix. Denies complaints of fatigue, vss, life vest intact.Discharge home tomorrow with dobutamine, with f/u visit for l-vad . Plan of care discussed with pt and spouse. Both verbalize understanding.

## 2017-01-22 NOTE — PROGRESS NOTES
"Hospital Day: 7    Subjective:  Interval History: Pt has no complaints, life vest placed   Scheduled Meds:   enoxaparin  40 mg Subcutaneous Daily    furosemide  20 mg Oral Daily    lisinopril  10 mg Oral Daily    sodium chloride 0.9%  10 mL Intravenous Q6H    sodium chloride 0.9%  3 mL Intravenous Q8H     Continuous Infusions:   DOBUTamine 5 mcg/kg/min (01/22/17 0515)     PRN Meds:acetaminophen, flu vac [id number] 36mos up(PF), Flushing PICC Protocol **AND** sodium chloride 0.9% **AND** sodium chloride 0.9%    Objective:  Vital signs in last 24 hours:   Temp:  [97.7 °F (36.5 °C)-99.1 °F (37.3 °C)] 97.7 °F (36.5 °C)  Pulse:  [] 74  Resp:  [17-18] 17  SpO2:  [96 %-98 %] 96 %  BP: ()/(53-60) 103/56    Intake/Output last 3 shifts:  I/O last 3 completed shifts:  In: 1115 [P.O.:1035; I.V.:80]  Out: 1501 [Urine:1500; Stool:1]  Intake/Output this shift:  I/O this shift:  In: -   Out: 575 [Urine:575]    Physical Exam:  Visit Vitals    BP (!) 103/56 (BP Location: Left arm, Patient Position: Lying, BP Method: Automatic)    Pulse 74    Temp 97.7 °F (36.5 °C) (Oral)    Resp 17    Ht 5' 7" (1.702 m)    Wt 59.5 kg (131 lb 2.8 oz)    SpO2 96%    BMI 20.54 kg/m2     General appearance: alert, appears stated age and cooperative  Neck: No JVD, supple, symmetrical, trachea midline, R IJ in place  Lungs: clear to auscultation bilaterally  Heart: regular rate and rhythm, S1, S2 normal, no murmur, click, rub or gallop  Abdomen: soft, non-tender; bowel sounds normal; no masses,  no organomegaly  Extremities: extremities normal, atraumatic, no cyanosis or edema  Neurologic: Grossly normal    2DE 8/11/16:  CONCLUSIONS     1 - Severe left ventricular enlargement.     2 - Severely depressed left ventricular systolic function (EF 10-15%).     3 - Eccentric hypertrophy.     4 - Left ventricular diastolic dysfunction.     5 - Mild left atrial enlargement.     6 - Right ventricle is upper limit of normal in size with " normal systolic function.     7 - Mild to moderate mitral regurgitation.     8 - Mild tricuspid regurgitation.     9 - Trivial pulmonic regurgitation.     10 - The estimated PA systolic pressure is 35 mmHg.     RHC 1/16/17:  Hemodynamic Results  AOPRES: 94/64 (74)  AOSAT: 98  FICKCI: 1.01  FICKCO: 1.81  PAPRES: 60/31 (41)  PASAT: 36  PVR: 4.99  PWPRES: 37/40 (32)  RAPRES: 22/21 (17)  RVPRES: 60/20, 20  SVR: 31.58/2528  PWSAT: 92    Lab Review  Lab Results   Component Value Date     (L) 01/22/2017    K 4.1 01/22/2017     01/22/2017    CO2 27 01/22/2017    BUN 19 01/22/2017    CREATININE 0.9 01/22/2017    CALCIUM 9.3 01/22/2017     Lab Results   Component Value Date    WBC 7.20 01/22/2017    HGB 14.5 01/22/2017    HCT 42.0 01/22/2017    MCV 88 01/22/2017     01/22/2017            Assessment/Plan:  27 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    Cardiogenic Shock due to Acute on Chronic Systolic HF, DCM, NYHA FC IV  -RHC 1/16 showed low CO/CI and elevated filling pressures- results above. Cont  5 mcg/kg/min. Transitioned to PO Lasix   -GDMT: Cont ACE. Holding BB due to starting   -Does not have ICD- plan for home with [employer] on . Then will get ICD after LVAD  -Echo 1/18/17 showed LVEF 10-15%, LVEDD 6.8, normal RV  -Start work up for advanced options. Presented for OHTx/LVAD and approved. Need to get insurance approval for listing and LVAD date.   -Plan for pt to go home Monday with [employer], on  then come back for LVAD        Active Hospital Problems    Diagnosis  POA    *Cardiogenic shock [R57.0]  Yes    Heart failure [I50.9]  Yes    Organ transplant candidate [Z76.82]  Not Applicable    Acute  on chronic combined systolic and diastolic heart failure [I50.43]  Yes    Familial Cardiomyopathy [I42.9]  Yes     Familial cardiomyopathy        Resolved Hospital Problems    Diagnosis Date Resolved POA   No resolved problems to display.

## 2017-01-23 ENCOUNTER — TELEPHONE (OUTPATIENT)
Dept: TRANSPLANT | Facility: CLINIC | Age: 27
End: 2017-01-23

## 2017-01-23 ENCOUNTER — DOCUMENTATION ONLY (OUTPATIENT)
Dept: TRANSPLANT | Facility: CLINIC | Age: 27
End: 2017-01-23

## 2017-01-23 VITALS
WEIGHT: 138.88 LBS | OXYGEN SATURATION: 97 % | HEIGHT: 67 IN | TEMPERATURE: 97 F | BODY MASS INDEX: 21.8 KG/M2 | RESPIRATION RATE: 18 BRPM | HEART RATE: 90 BPM | SYSTOLIC BLOOD PRESSURE: 84 MMHG | DIASTOLIC BLOOD PRESSURE: 54 MMHG

## 2017-01-23 LAB
ALBUMIN SERPL BCP-MCNC: 4 G/DL
ALP SERPL-CCNC: 70 U/L
ALT SERPL W/O P-5'-P-CCNC: 47 U/L
ANION GAP SERPL CALC-SCNC: 6 MMOL/L
APTT HEX PL PPP: NEGATIVE S
AST SERPL-CCNC: 36 U/L
BASOPHILS # BLD AUTO: 0.04 K/UL
BASOPHILS NFR BLD: 0.6 %
BILIRUB SERPL-MCNC: 0.9 MG/DL
BUN SERPL-MCNC: 20 MG/DL
CALCIUM SERPL-MCNC: 9.2 MG/DL
CHLORIDE SERPL-SCNC: 99 MMOL/L
CO2 SERPL-SCNC: 28 MMOL/L
CREAT SERPL-MCNC: 0.9 MG/DL
DIFFERENTIAL METHOD: NORMAL
EOSINOPHIL # BLD AUTO: 0.2 K/UL
EOSINOPHIL NFR BLD: 2.6 %
ERYTHROCYTE [DISTWIDTH] IN BLOOD BY AUTOMATED COUNT: 13.2 %
EST. GFR  (AFRICAN AMERICAN): >60 ML/MIN/1.73 M^2
EST. GFR  (NON AFRICAN AMERICAN): >60 ML/MIN/1.73 M^2
GLUCOSE SERPL-MCNC: 100 MG/DL
HCT VFR BLD AUTO: 43.4 %
HGB BLD-MCNC: 14.6 G/DL
LYMPHOCYTES # BLD AUTO: 1.5 K/UL
LYMPHOCYTES NFR BLD: 22.1 %
MAGNESIUM SERPL-MCNC: 2 MG/DL
MCH RBC QN AUTO: 30 PG
MCHC RBC AUTO-ENTMCNC: 33.6 %
MCV RBC AUTO: 89 FL
MONOCYTES # BLD AUTO: 0.5 K/UL
MONOCYTES NFR BLD: 7.5 %
MUMPS IGG INTERPRETATION: POSITIVE
MUMPS IGG SCREEN: 1.96 ISR
NEUTROPHILS # BLD AUTO: 4.4 K/UL
NEUTROPHILS NFR BLD: 66.9 %
PLATELET # BLD AUTO: 173 K/UL
PMV BLD AUTO: 9.4 FL
POTASSIUM SERPL-SCNC: 4.2 MMOL/L
PROT SERPL-MCNC: 6.9 G/DL
RBC # BLD AUTO: 4.86 M/UL
RUBEOLA IGG ANTIBODY: 2.32 ISR
RUBEOLA INTERPRETATION: POSITIVE
RUBV IGG SER-ACNC: 8.4 IU/ML
RUBV IGG SER-IMP: ABNORMAL
SODIUM SERPL-SCNC: 133 MMOL/L
WBC # BLD AUTO: 6.56 K/UL

## 2017-01-23 PROCEDURE — 99238 HOSP IP/OBS DSCHRG MGMT 30/<: CPT | Mod: ,,, | Performed by: INTERNAL MEDICINE

## 2017-01-23 PROCEDURE — 85025 COMPLETE CBC W/AUTO DIFF WBC: CPT

## 2017-01-23 PROCEDURE — 83735 ASSAY OF MAGNESIUM: CPT

## 2017-01-23 PROCEDURE — 80053 COMPREHEN METABOLIC PANEL: CPT

## 2017-01-23 PROCEDURE — 97110 THERAPEUTIC EXERCISES: CPT

## 2017-01-23 PROCEDURE — 25000003 PHARM REV CODE 250: Performed by: INTERNAL MEDICINE

## 2017-01-23 PROCEDURE — 63600175 PHARM REV CODE 636 W HCPCS: Performed by: PHYSICIAN ASSISTANT

## 2017-01-23 PROCEDURE — 25000003 PHARM REV CODE 250: Performed by: HOSPITALIST

## 2017-01-23 PROCEDURE — 97161 PT EVAL LOW COMPLEX 20 MIN: CPT

## 2017-01-23 PROCEDURE — 25000003 PHARM REV CODE 250: Performed by: NURSE PRACTITIONER

## 2017-01-23 PROCEDURE — 25000003 PHARM REV CODE 250: Performed by: PHYSICIAN ASSISTANT

## 2017-01-23 RX ORDER — FUROSEMIDE 20 MG/1
20 TABLET ORAL DAILY
Qty: 30 TABLET | Refills: 11 | Status: ON HOLD | OUTPATIENT
Start: 2017-01-23 | End: 2017-02-21 | Stop reason: DRUGHIGH

## 2017-01-23 RX ADMIN — LISINOPRIL 10 MG: 10 TABLET ORAL at 08:01

## 2017-01-23 RX ADMIN — Medication 10 ML: at 12:01

## 2017-01-23 RX ADMIN — Medication 10 ML: at 05:01

## 2017-01-23 RX ADMIN — SODIUM CHLORIDE, PRESERVATIVE FREE 3 ML: 5 INJECTION INTRAVENOUS at 05:01

## 2017-01-23 RX ADMIN — ACETAMINOPHEN 650 MG: 325 TABLET ORAL at 05:01

## 2017-01-23 RX ADMIN — DOBUTAMINE IN DEXTROSE 5 MCG/KG/MIN: 200 INJECTION, SOLUTION INTRAVENOUS at 05:01

## 2017-01-23 RX ADMIN — FUROSEMIDE 20 MG: 20 TABLET ORAL at 08:01

## 2017-01-23 NOTE — PROGRESS NOTES
Met with patient and wife today for 45 minutes. Discussed all education. Will follow up with patient when he returns for Admits. All paperwork will be signed and returned.       Why MCSD is Needed  Your heart failure is defined as a condition in which your heart is unable to pump enough blood to support the basic needs of your body. This can make you feel tired, have abnormal rhythms and shortness of breath, in addition to causing your other organs to fail (e.g. liver or kidneys). You are being offered this treatment option because you have a marked increase risk of irreversible end-organ damage or death. For this reason, you are being considered for placement of a Mechanical Circulatory Support Device (MCSD) at [facility]. The heart pump is designed to take over the pumping action of your heart but before you undergo this procedure, it is important that you and your family understand the options, benefits, risks, and expectations associated with having a MCSD. It is required that you and your proposed caregiver(s) understand and agree with the treatment plan and are willing to participate in the guidelines outlined in the following pages.      ______At this time, you are being considered for a MCSD or more commonly called a Ventricular Assist Device (VAD) for Bridge to Transplantation. Bridge to transplant (BTT) is when a VAD is used to help extend the life of someone waiting for a heart transplant. This is subject to change pending the results from your evaluation and your Physicians decision. This consent pertains only to VAD therapy; you will receive information regarding heart transplantation allocation, procedures, and risks from the transplant program at a different time. Although you are being considered for MCSD implantation for Bridge to Transplantation, it is possible that you will not be a transplant candidate after you receive the MCSD if your medical condition worsens.     OR    _____At  this time, you are being considered for a Ventricular Assist Device (VAD) for Destination Therapy. Destination therapy is when a VAD is used as a long-term treatment for patients who are not candidates for transplant, such as those with end-stage congestive heart failure. In these patients, the pumps are placed permanently to help the heart work better. This is subject to change pending the results from your evaluation and your Physicians decision.      Types of Mechanical Circulatory Support Devices:  A MCSD is a pump that assists or replaces a weakened heart and carries blood to the rest of your body. There are several different types of mechanical circulatory support devices:    -Left ventricular assist devices (LVAD) help the left side of the heart pump blood to the largest artery of the body, the aorta.   -Right ventricular assist devices (RVAD) help the right side of the heart pump blood to the lungs. -Bi-ventricular assist devices (BVAD) help both sides of the heart pump.  -Heartmate II and Heartware are two types of pumps we may consider.  -Total Artificial Heart (COURTNEY) that replaces the heart and pumps blood to the body.    MCSDs can also be categorized into short and long-term therapies. Short-term devices are considered when patients are in cardiogenic shock and need help to pump blood for a few short hours to a few days/ weeks. Long-term devices are used for patients for months to years. Some patients may receive a short-term device prior to implantation of a long-term device.            When Is A VAD Used?  A VAD is used to assist the pumping action of a severely weakened heart. It works with your heart to improve and  to increase blood flow; it does not replace your own heart. When medications can no longer help, and other surgical options have been exhausted, a physician may recommend a VAD. VADs are most often used for patients experiencing New York Heart Association (NYHA) Class III-IV heart failure  symptoms.    Alternative Options:  If you are not found to be a candidate for VAD implantation or if you decide that a VAD is not the best option for you, you will continue to receive customary standard of care. You may also continue optimal medical management alone including the use of inotrope therapy. An inotrope is an IV medication that helps the strength of the hearts contraction. However, the reason that you are being considered for VAD implantation is because optimal medical management has not been adequate and without a VAD, your condition is likely to deteriorate over time. While going straight to transplant may be a possibilition, death is a possibility as well.    You May Not Be Eligible To Receive A VAD If You Are Found To Have Any Of The Following:   Any irreversible non-cardiac disease state with less than 2 year survival rate   Inadequate social support to be successful at home after surgery   Irreversible pulmonary disease or fixed pulmonary hypertension   Irreversible renal disease   Irreversible liver disease   Unresolved stroke or uncorrected cerebral vascular disease   A history of chronic noncompliance   Using illicit drugs or alcohol   Uncorrected thyroid disease   Significant right ventricular failure   Obstructive or restrictive cardiomyopathy   Amyloidosis   Active pericardial disease   Untreated aortic aneurysm   Irreversible cognitive dysfunction   History of psychiatric disease, uncontrolled affective disorder, or any cognitive dysfunction that may prevent you     from managing self-care   Diabetes with severe retinopathy or peripheral neuropathy   Obesity with BMI (body mass index) greater than 35   Severe chronic malnutrition   Uncorrected blood disorders   Active uncontrolled infection   Pregnancy (positive pregnancy test)   HIV positive or immunosuppression that could result in device infection   Muscular dystrophy, MS or similar disease states    Active  systemic infection   Cancer   Insufficient funding      Possible Benefits:  The overall goal is improved health and quality of life. In most cases, because circulation has been restored as a result of the VAD, you can expect to have more energy and also experience less heart failure symptoms. Since VADs help deliver more oxygen rich blood, you may feel well enough to resume many of the usual activities and hobbies that you enjoy. In fact, many patients return to work and are no longer disabled, depending on the type of work they do. Be aware that you may lose your disability post VAD based on review of your records and disability benefits. It is important that you speak with a  so that you may plan for this should it occur.   The improved circulation may prolong life and may improve some organ damage caused by your heart failure. This is supported by some studies that have shown that VAD patients have a longer survival than patients treated with medications alone. Although the VAD can improve your chances for survival, the type and severity of your heart disease may outweigh any benefits from the device and you may still die.    Possible Risks:  As with any surgery or procedure, there are risks and the possibility of complications. There are also risks related to the operation itself and undergoing anesthesia, and risks related to the device itself. You will discuss the risks in detail with the Cardiac Surgeon who intends to perform your surgery. Below is a list of risks related to the surgery and the VAD.                                           KAYLEENSFLAKITA                                  INTERMACS   Bleeding                              13.2%             13.5%   Device Malfunction               4.3 3.7%   Infection                               11.1% 15.2%   Neurological Dysfunction     2.3% 3.7%   Rehospitalization                  49.3% 32.2%   Renal Dysfunction                2.9% 2.4%    Right Heart Failure                3.1% 3.0%       Anesthesia Risks:   During the surgery you will be put under general anesthesia, which means you will be given medications to put you to sleep, block pain, and paralyze parts of your body. You will also be placed on a machine to help you breathe. The anesthesiologist will talk with you in more detail about the risks of anesthesia. At the time of surgery you will be required to sign a consent form for anesthesia.    Operative Procedure Risks:  There are many risks with this operation including but not limited to: death, heart attack, stroke, nerve injury, blood clots, damage to arteries needing limb amputation, bleeding and hemorrhage, hemolysis, infection, development of new antibodies in your blood, mediastinitis, arrhythmia, right heart failure, heart block, or the need for pacemaker or ICD implantation. Pump exchange due to complications with the pump is a possibility as well. In addition, the need for re-operation for any cause, renal, hepatic or pulmonary failure resulting in death or long-term need of ventilation or dialysis, and blood transfusion with its risk of HIV, and hepatitis. Studies have also shown that patients may have problems with memory, attention, and speed of processing thoughts after a cardiac surgical procedure. Any of these complications will be explained to you in more detail if you desire. In addition to these potential complications, there may be other risks that are currently unknown. The longer you are on the device, the greater the chances that complications will develop. It is also possible that you may reach a point where your quality of life is so impaired, that the decision to terminate your VAD-support will need to be addressed.     VAD Therapy Risks (After The Surgery):  Include but not limited to: death, need for re-operation, device malfunction or device infection, renal failure requiring dialysis, blood clots, stroke, pain,  or bleeding. Pump exchange due to complications is a possibility. Patients may also experience a potential decrease in their quality of life including limitations of their normal activities. In addition, 24-hour caregivers may experience increased stress in their day-to-day life as a result of caring for a loved one with a VAD.            Evaluation Process:  There will be many people involved in the evaluation process to assure that this is the best choice for you. You will receive a number of tests and consultations. Some of the people that may help evaluate you include Heart Failure Physicians, Cardiac Surgeons, Social Workers and VAD Coordinators. During this process, you will be given education about the VAD and the care that you would require. After the evaluation, the group will decide if you meet the criteria to have a VAD implanted. You may require or have already been implanted with a short-term VAD prior to surgery for a long-term VAD. Additional people you well meet include a financial counselor to discuss insurance and dressing supplies, research coordinators, anesthesiologist, psychiatrist/psychologist, physical therapist or occupational therapist to discuss rehabilitation after VAD, and dietician to improve nutrition. Some patients may be referred to other services for consultation. These may include, but are not limited to: infectious disease doctor, gastroenterologist, nephrologist (kidney doctor), pulmonologist (lung doctor), hepatologist (liver doctor), or an ophthalmologist (eye doctor). It is recommended that you see your dentist to ensure no infection is present and all needed dental work is completed before surgery. Cavities and rotten teeth can cause an infection which can lead to death.    Testing is required to determine VAD candidacy. These include but are not limited to the following:   Laboratory studies of blood and urine, chest x-ray, abdominal ultrasound, CT scan, echocardiogram,  cardiopulmonary treadmill, right heart catheterization, electrocardiogram, pulmonary function tests, colonoscopy or endoscopy, vascular studies, and pulmonary studies. Additionally, women will need to have an up-to-date mammogram and PAP test.     Device Choice:  VADs are currently approved by the Food and Drug Administration (FDA) to be used as Bridge to Transplantation (BTT) or Destination Therapy (DT). A full list will be provided for you and your family to review if desired. [facility] also participates in clinical trials with devices that are considered investigational, and are not yet FDA approved for BTT/DT. Your Surgeon and Cardiologist will discuss with you which device is the best option for you. VADs have four main parts: the implantable heart pump, a tube that passes through the skin of your abdomen (driveline), a controller (small computer) that controls the pump operation, and an external power source (batteries or power device). In addition, there are other VADs that are used temporarily when patients are in cardiogenic shock.    You have the right to refuse surgery at any time. This consent will help you to make an informed decision. If you choose that this is not the best option for you at this time, you may choose to be re-evaluated at a later time and you may choose to receive the implant at a later time if you are still a candidate.    Pre-operative Care expectations:   Informed surgical and anesthesia consents will be completed.   You will be admitted to the hospital a few days before the day of surgery for optimization before surgery (unless      already hospitalized).   Intra-aortic balloon pump or impella will be placed before surgery,    Status change if waiting for a transplant and what this means.   Your ICD will be turned off prior to surgery, and then turned back on after surgery. It will NOT be removed.   ICU (intensive care unit) visiting hours FAMILY CAN NOT STAY AT  NIGHT (pamphlet given to them)    Names, addresses and phone numbers of local MD, EMS with courtney, [place], PD, Electrical company and pharmacy           will be required to be provided prior to the LVAD surgery.     Need for Coumadin and frequent blood work    Importance of medication compliance    Go over booklet/websites available    Learning the material for the device        Surgical Procedure:  The surgical procedure to implant the VAD will require open-heart surgery and can take on average between 6-12 hours. The surgeon will need to make an incision down the front of your chest to reach your heart. You will have a breathing tube (endotracheal or ET tube) and be under general anesthesia. The VAD is placed below the heart and the surgeon will connect the pump to your heart and secure it in place with sutures. Once the pump is in place, the VAD along with your natural heart will resume pumping blood through your body. After the surgery is completed, you will return to the ICU.    Post-Operative Care Expectations:  Upon arrival to the ICU, you will receive close monitoring and support from the following medical mechanisms:   Heart monitor (telemetry) to monitor heart rate and rhythm.   A breathing tube (endotracheal tube) to assist with breathing and maintain and open airway.   An oral-gastric tube will be utilized to keep the stomach empty when connected to suction, as well as to give the    nursing staff the capability to administer oral medications directly into the stomach.   A Castillo catheter to measure urinary output.   A Wann[place]-Ilir Catheter to measure pressures within the heart and lungs.   An arterial line catheter in order to measure arterial blood pressure.   Chest tubes to collect and measure drainage from surgery.   A VAD driveline that exits the skin in the abdominal area and is connected to the VAD power source.   Temporary pacemaker wires which may aid in the event of an arrhythmia  associated with heart surgery.    Your chest may be left open for 24 to 72 hours after implantation, after which you will be taken back to the operating room to close your chest.    You will receive medications for sedation and to control your pain in order to achieve a tolerable level of comfort. You will also be on IV medications until your blood pressure and fluid status are stable. Your home medications will be resumed as soon as possible if still medically relevant. In addition to your previous taken medications, patients with VADs are commonly prescribed medications for anticoagulation/anti-platelet, antibiotics, blood pressure, and vitamin/mineral supplements. Your length of stay in the ICU will depend on how fast you recover. Once you are more stable, breathing on your own with your lines and tubes out, you will be transferred to a general care unit where you can expect to stay for another 1-3 weeks. On average, your total length of stay will be about three or four weeks after your surgery. During this time, it is expected that you and your family will begin to learn to manage the device and learn how to manage your care at home. Most patients are able to return home after VAD implantation, but this cannot be guaranteed. Complications may require a prolonged period of hospitalization, long term acute care facility, or inpatient rehabilitation stay locally.  If you are unattended and the device fails, you may not be able to perform the emergency procedures yourself, which could result in death and/or blood clots in the device.    Education:  Verbal, written, and visual educational materials are provided throughout your hospitalization and are available to anyone involved in your care at home. You and your caregiver(s) will be trained by a VAD Coordinator on how to manage your care and device. Other staff such as your bedside Nurse, the Occupational and Physical Therapists will also provide training to you.  You and your caregiver(s) must show ability to manage the device, understand how it operates, troubleshoot problems, and care for your driveline exit site. It is expected that a caregiver(s) will be present and available while you are in the hospital to learn how to manage the device and how to care for you when you are at home. The education will be an ongoing process while you are here at the hospital. Near the time of your expected discharge, your family and/or caregivers will be required to show competency in the care and management of you and your VAD. Once it has been determined that you and your caregiver(s) are competent in your VAD care, you will participate in an excursion around the hospital with your caregiver for 6 hours. In addition, a VAD Coordinator will ensure that your local fire department, emergency personnel, and any other community members will be given education materials and training as necessary. Your home must have consistent electricity and phone services; the outlets must be three pronged and grounded. Any additional safety needs are arranged during this time. You will need to have your glasses and hearing aids at the hospital in order to be educated, as soon as possible.   Caregiver Requirements:  Both before and after VAD implantation, a caregiver/support person MUST be able to attend comprehensive education sessions conducted by the VAD coordinators. You MUST come at a specified time that is agreed upon by the caregiver/support person and the VAD coordinator. We respectfully request this to be between 9am and 4pm on Monday to Friday. These are not scheduled sessions, but rather constant education to the caregiver and patient. If there are multiple caregiver/support people that need to learn the device, they need to all be present at the SAME TIME for education sessions. There will be only ONE designated caregiver to learn the dressing changes. This caregiver MUST perform at least FIVE  dressing changes before they are considered competent to change a dressing by themselves. If patient unable to be checked off before discharge, they may need 24 hour care.    Discharge Process:  Your daily progress will be followed by a team of people involved in your care including your Surgeon, Cardiologist, VAD Coordinators, Staff Nurses, Nurse Practitioners, Physician Assistants, Physical/Occupational Therapy, Social Workers, and a Discharge Planner. They will monitor your recovery and help you to adjust to life with a VAD. You must have a thermometer, weight scale, flashlight, and a blood pressure cuff at your home. Soon after your surgery, it will be very important to begin preparing for your discharge. You will have to be both physically recovered and show competence in the management of your VAD to be discharged. Most patients return home after successful outings; however, some patients choose to live with a caregiver or need a rehabilitation facility for a short period before returning home. If insurance allows, a Home Health nurse will be recommended to come to your home for two weeks and assist you in your care when you return home. The length of time that the Visiting Nurse will come to your home will depend on your overall recovery. It is recommended after you return home that you enroll in a Cardiac Rehab program to continue to improve your physical health.    Follow up care:  After you are discharged, you will follow up with your Surgeon, your Heart Failure Cardiologist and your VAD coordinator. They will collaboratively care for you and make decisions about your treatment. Typically, your first visit will be 1 week after discharge. We will see you every week for 3-4 weeks, followed by every 2 weeks for 1 month, then monthly thereafter while you have the VAD. You may be required to stay close to our medical center depending on how things are going. Once you are considered a stable established patient,  your Physicians may decide that you can follow-up every 2-3 months. Along with seeing your Cardiologist and Surgeon, you will have laboratory testing, and other physiological testing done on a regular basis in order to monitor and maintain your progress and health. The types of testing that you may need and the frequency will be decided by your Physicians but can include blood tests, EKG, Echocardiogram, Right Heart Catheterization, V02 Treadmill Stress Test, and Implantable Cardioverter Defibrillator (ICD) device check. If you have received an investigational VAD, you will have other testing that will be required for the research study. Many VADs require patients to take anticoagulation medications, also known as blood thinning medications (coumadin, warfarin). You will be required to have frequent blood draws, up to 2-3 times a week, in order to monitor your blood count and blood thinning agents. You will also be in frequent contact with a VAD Coordinator who will make phone calls to assess how you are doing at home and assist you with any problems that may arise. A VAD Coordinator and a Heart Failure Cardiologist are also available 24 hours a day in the event that you have an emergency. On average, you can expect that within 12 weeks after surgery, you will be able to return to most activities, with the permission of your VAD Team.    Lifestyle Changes:  You will have few limitations and can resume most usual activities. Certain activities are hazardous or fatal after implant. Persons with implantable VADs must not allow their controller/computer and electrical equipment to submerge in water. Showering is possible with proper protective equipment. You may only resume showering once your driveline has healed and your VAD coordinator gives their permission. Swimming and baths are prohibited. You cannot sleep on your stomach or the side the driveline is exiting your abdomen. Contact sports, repetitive jumping, or  impact with an airbag are examples of activities that may cause trauma to the pump attachments and must be avoided.         You may be sexually active but must care for your driveline. Female patients cannot get pregnant. Medical care after implant includes lifetime follow up to monitor device function and health status. You may not have a magnetic resonance imaging (MRI) test because of the magnetic fields. You may not vacuum, touch television or computer screens, or take hot clothes out of the dryer due to the static electricity. VAD therapy requires significant self-care responsibility and a willingness to participate with you VAD team. Driveline exit site dressings must be performed daily using sterile technique or as directed by the VAD team. Maintenance care of the device components, batteries, and driveline is necessary to prevent pump failure, infections, or other serious complications. You will continue to take medications for your heart failure although the dose and or medication name may change. You may continue to take your diuretic such as lasix or demedex. You may also continue to be on a fluid restriction. You may drive once approved by your VAD team. You may be able to travel with your VAD, depending on the situation.      VAD Equipment:  Along with the device that is implanted inside your body, you will have a number of other external pieces of equipment that will require care and maintenance. You will have a driveline that exits your body through your abdomen that will power a controller, which is the computer component that tells the heart pump how to perform. The controller will also tell you about alarms, sounds, and words, on how your pump is operating and if there are any problems. In order to power the device and the controller, you will have batteries and a battery charger and/or power device. The batteries allow you to be mobile and move freely without being attached to outlet power. The   or power device allows you to be connected to power for long periods of time such as when you are sleeping. Different [facility]SDs have similar pieces of equipment, but will vary depending on the device you receive. You will receive education and teaching before you leave the hospital to make sure you understand clearly how to operate the equipment and troubleshoot problems that may occur.    Confidentiality:  Hospital personnel who are involved in the course of your care may review your medical record. Without your authorization communication between you and [facility] remains confidential. If you do become a candidate for transplant, data about your case, which will include your identity, will be sent to [employer] ([employer]) and may be sent to other places involved in the transplant process as permitted by law. Data about your VAD, which will include your identity, will be shared with the  of the device. Your information may also be entered into a registry for pump implants, known as INTERMACS. Your participation in this is voluntary, and will be discussed at greater length prior to implant.    End of Life:  If you are approaching the end of life, the VAD can be turned off. You may be in a hospital, home, or hospice setting. If you become very sick and do not have a chance to survive, we may talk about stopping the pump. The doctor would talk to you or your family about what is right for you. It is helpful to talk to your family about your goals before surgery so they know what your wishes are. A member of our Goals of Care team will visit you before surgery to help you learn your goals. You have the right to have the device turned off or to decline pump exchange if your pump fails or malfunctions.     Device Return:  At the time of either transplant or death, the surgeon may need to remove the device to return to the . You or a family member may need to sign a separate  consent for removal of the device.

## 2017-01-23 NOTE — PLAN OF CARE
Problem: Patient Care Overview  Goal: Plan of Care Review  Outcome: Ongoing (interventions implemented as appropriate)  Spoke with the patient regarding the plan of care. Planning for discharge within the next few days to home then return for LVAD workup. He is able to make his needs known. He has no complaints of pain or discomfort at this time. Monitoring blood pressures. Continue on Dobutamine drip. Continued heart failure and LVAD teaching as appropriate. Will continue to monitor.

## 2017-01-23 NOTE — PROGRESS NOTES
Patient is ready for discharge. Patient stable alert and oriented. PICC left in place for home . No complaints of pain. Discussed discharge plan. Reviewed medications and side effects, appointments, and answered questions with patient and family. Waiting on [facility] to deliver  and set up on home pump.

## 2017-01-23 NOTE — PROGRESS NOTES
DISCHARGE    SW spoke with pt and wife at bedside re: plans for d/c today.  Pt and wife both present as aao x4, calm, cooperative, and asking and answering questions appropriately.  Pt and wife verbalize understanding and agreement with plan for pt to d/c home today on dobutamine and with LifeVest (already delivered and pt wearing in bed).  Pt reports he is scheduled to return to hospital on Friday 1/27 for VAD implant on Tuesday 1/31.  Pt and wife each report they are coping well at this time.  Pt and wife report they have a vehicle at the hospital and will be driving themselves home today.    SW made referral to [employer] (ph: [phone]) for home .  Per Vitaly with [employer], they will process referral and deliver equipment to pt's room today.  SW will not arrange HH at this time due to pt returning to hospital in four days for scheduled VAD implant.  Patient and wife verbalize understanding and agreement with information reviewed,  availability, and how to access available resources as needed.  Patient and wife deny additional needs or concerns at this time.  SW providing ongoing psychosocial and counseling support, education, resources, assistance, and discharge planning as indicated.  SW remains available.

## 2017-01-23 NOTE — PT/OT/SLP EVAL
Physical Therapy  Evaluation/Discharge Note    Mert Samayoa   MRN: [id number]   Admitting Diagnosis: Cardiogenic shock    PT Received On: 17  PT Start Time: 0940     PT Stop Time: 1010    PT Total Time (min): 30 min       Billable Minutes:  Evaluation 10 and Therapeutic Exercise 20    Diagnosis: Cardiogenic shock      Past Medical History   Diagnosis Date    Cardiogenic shock 2017    Cardiomyopathy      Familial cardiomyopathy    CHF (congestive heart failure)     Essential hypertension 2016    Familial Cardiomyopathy      Familial cardiomyopathy       History reviewed. No pertinent past surgical history.    Referring physician: Lashon Hawkins MD  Date referred to PT:17    General Precautions: Standard,    Orthopedic Precautions:     Braces:         Do you have any cultural, spiritual, Lutheran conflicts, given your current situation?: none    Patient History:  Lives With: alone, friend(s)  Home Accessibility: other (see comments) (no DANNY)  Home Layout: Able to live on 1st floor  Stair Railings at Home: none  Transportation Available: car  Equipment Currently Used at Home: none  DME owned (not currently used): none    Previous Level of Function:  Ambulation Skills: independent  Transfer Skills: independent  ADL Skills: independent  Work/Leisure Activity: independent    Subjective:  Communicated with nursing prior to session.    Chief Complaint: Mid - back p!, 4/10, consulted with nsg prior to tx session  Patient goals: To go home.    Pain Ratin/10 (Back p! - consult with nsg)   Location - Side: Bilateral  Location - Orientation: medial  Location: back  Pain Addressed: Nurse notified, Pre-medicate for activity  Pain Rating Post-Intervention: 0/10    Objective:         Cognitive Exam:  Oriented to: Person, Place, Time and Situation    Follows Commands/attention: Follows multistep  commands  Communication: clear/fluent  Safety awareness/insight to disability: intact    Physical  Exam:  Postural examination/scapula alignment: No postural abnormalities identified    Skin integrity: Visible skin intact  Edema: None noted     Sensation:   Intact    Upper Extremity Range of Motion:  Right Upper Extremity: WNL  Left Upper Extremity: WNL    Upper Extremity Strength:  Right Upper Extremity: WNL  Left Upper Extremity: WNL    Lower Extremity Range of Motion:  Right Lower Extremity: WNL  Left Lower Extremity: WNL    Lower Extremity Strength:  Right Lower Extremity: WNL  Left Lower Extremity: WNL     Fine motor coordination:  Intact    Gross motor coordination: WFL    Functional Mobility:  Bed Mobility:  Rolling/Turning to Left: Independent  Rolling/Turning Right: Independent  Scooting/Bridging: Independent  Supine to Sit: Independent  Sit to Supine: Independent    Transfers:  Sit <> Stand Assistance: Independent  Sit <> Stand Assistive Device: No Assistive Device  Bed <> Chair Assistance: Independent  Bed <> Chair Assistive Device: No Assistive Device    Gait:   Gait Distance: 120'  Assistance 1: Independent  Gait Assistive Device: No device  Gait Pattern: swing-through gait      Balance:   Static Sit: NORMAL: No deviations seen in posture held statically  Dynamic Sit: NORMAL: No deviations seen in posture held dynamically  Static Stand: NORMAL: No deviations seen in posture held statically  Dynamic stand: NORMAL: No deviations seen in posture held dynamically    Therapeutic Activities and Exercises:  Pt performed the following there ex: 10 reps, with use of red t-band.  Pts friend present during tx session and ed on how to facilitate there ex:  chest expansion in sit   Scapular retraction in sit   B Hip abd in stance with wall for support   B Hip ext in stance with wall for support    Squats with min v/c for tech, 10 reps, with S.  Ed on sternal precautions.  All ex performed provided as part of HEP.    AM-PAC 6 CLICK MOBILITY  How much help from another person does this patient currently need?   1 =  Unable, Total/Dependent Assistance  2 = A lot, Maximum/Moderate Assistance  3 = A little, Minimum/Contact Guard/Supervision  4 = None, Modified [place]/Independent    Turning over in bed (including adjusting bedclothes, sheets and blankets)?: 4  Sitting down on and standing up from a chair with arms (e.g., wheelchair, bedside commode, etc.): 4  Moving from lying on back to sitting on the side of the bed?: 4  Moving to and from a bed to a chair (including a wheelchair)?: 4  Need to walk in hospital room?: 4  Climbing 3-5 steps with a railing?: 4  Total Score: 24     AM-PAC Raw Score CMS G-Code Modifier Level of Impairment Assistance   6 % Total / Unable   7 - 9 CM 80 - 100% Maximal Assist   10 - 14 CL 60 - 80% Moderate Assist   15 - 19 CK 40 - 60% Moderate Assist   20 - 22 CJ 20 - 40% Minimal Assist   23 CI 1-20% SBA / CGA   24 CH 0% Independent/ Mod I     Patient left up in chair with call button in reach.    Assessment:   Mert Samayoa is a 26 y.o. male with a medical diagnosis of Cardiogenic shock.  Pt is able to perform all functional mobility and ADLs I, safely, and without complaints of dizziness, fatigue, or pain.  Pt is to be scheduled for LVAD placement in the following week, and was therefore ed on sternal precautions and provided with a HEP to be performed daily prior to sx.  Pt is to be discharged at this time sec to demonstrating I with all transfers and upright mobility.        Rehab identified problem list/impairments:      Rehab potential is excellent.    Activity tolerance: Excellent    Discharge recommendations: Discharge Facility/Level Of Care Needs: home     Barriers to discharge: Barriers to Discharge: None    Equipment recommendations: Equipment Needed After Discharge: none     GOALS:   Physical Therapy Goals     Not on file      Multidisciplinary Problems (Resolved)        Problem: Physical Therapy Goal    Goal Priority Disciplines Outcome Goal Variances Interventions    Physical Therapy Goal   (Resolved)     PT/OT, PT Outcome(s) achieved               PLAN:    Patient to be discharged from inpatient Cardiac services.    Plan of Care reviewed with: patient, friend          Shawna Velázquez, PT  01/23/2017

## 2017-01-23 NOTE — PROGRESS NOTES
Hand delivered appt. letter for FU labs and [facility] appt scheduled for 1/27/17, patient and wife voiced understanding. [facility] lifevest on, awaiting [facility] for home .

## 2017-01-23 NOTE — TELEPHONE ENCOUNTER
Patient notified lab and clinic appt cancelled 1/27/17, pt being admitted in preparation for VAD. Voiced understanding.

## 2017-01-23 NOTE — PROGRESS NOTES
"Hospital Day: 8    Subjective:  Interval History: Pt has no complaints, life vest placed       Scheduled Meds:   enoxaparin  40 mg Subcutaneous Daily    furosemide  20 mg Oral Daily    lisinopril  10 mg Oral Daily    sodium chloride 0.9%  10 mL Intravenous Q6H    sodium chloride 0.9%  3 mL Intravenous Q8H     Continuous Infusions:   DOBUTamine 5 mcg/kg/min (01/23/17 0597)     PRN Meds:acetaminophen, flu vac [id number] 36mos up(PF), Flushing PICC Protocol **AND** sodium chloride 0.9% **AND** sodium chloride 0.9%    Objective:  Vital signs in last 24 hours:   Temp:  [97.4 °F (36.3 °C)-98.5 °F (36.9 °C)] 97.4 °F (36.3 °C)  Pulse:  [66-99] 93  Resp:  [17-18] 18  SpO2:  [95 %-99 %] 96 %  BP: ()/(51-63) 108/58    Intake/Output last 3 shifts:  I/O last 3 completed shifts:  In: 1635 [P.O.:1555; I.V.:80]  Out: 2775 [Urine:2775]  Intake/Output this shift:       Physical Exam:  Visit Vitals    BP (!) 108/58 (BP Location: Left arm, Patient Position: Lying, BP Method: Automatic)    Pulse 93    Temp 97.4 °F (36.3 °C) (Oral)    Resp 18    Ht 5' 7" (1.702 m)    Wt 63 kg (138 lb 14.2 oz)    SpO2 96%    BMI 21.75 kg/m2     General appearance: alert, appears stated age and cooperative  Neck: No JVD, supple, symmetrical, trachea midline, R IJ in place  Lungs: clear to auscultation bilaterally  Heart: regular rate and rhythm, S1, S2 normal, no murmur, click, rub or gallop  Abdomen: soft, non-tender; bowel sounds normal; no masses,  no organomegaly  Extremities: extremities normal, atraumatic, no cyanosis or edema  Neurologic: Grossly normal    2DE 8/11/16:  CONCLUSIONS     1 - Severe left ventricular enlargement.     2 - Severely depressed left ventricular systolic function (EF 10-15%).     3 - Eccentric hypertrophy.     4 - Left ventricular diastolic dysfunction.     5 - Mild left atrial enlargement.     6 - Right ventricle is upper limit of normal in size with normal systolic function.     7 - Mild to moderate " mitral regurgitation.     8 - Mild tricuspid regurgitation.     9 - Trivial pulmonic regurgitation.     10 - The estimated PA systolic pressure is 35 mmHg.     RHC 1/16/17:  Hemodynamic Results  AOPRES: 94/64 (74)  AOSAT: 98  FICKCI: 1.01  FICKCO: 1.81  PAPRES: 60/31 (41)  PASAT: 36  PVR: 4.99  PWPRES: 37/40 (32)  RAPRES: 22/21 (17)  RVPRES: 60/20, 20  SVR: 31.58/2528  PWSAT: 92    Lab Review  Lab Results   Component Value Date     (L) 01/23/2017    K 4.2 01/23/2017    CL 99 01/23/2017    CO2 28 01/23/2017    BUN 20 01/23/2017    CREATININE 0.9 01/23/2017    CALCIUM 9.2 01/23/2017     Lab Results   Component Value Date    WBC 6.56 01/23/2017    HGB 14.6 01/23/2017    HCT 43.4 01/23/2017    MCV 89 01/23/2017     01/23/2017            Assessment/Plan:  25 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    Cardiogenic Shock due to Acute on Chronic Systolic HF, DCM, NYHA FC IV  -RHC 1/16 showed low CO/CI and elevated filling pressures- results above. Cont  5 mcg/kg/min. Transitioned to PO Lasix   -GDMT: Cont ACE. Holding BB due to starting   -Does not have ICD- plan for home with Lifevest on . Then will get ICD after LVAD  -Echo 1/18/17 showed LVEF 10-15%, LVEDD 6.8, normal RV  -Start work up for advanced options. Presented for OHTx/LVAD and approved. Need to get insurance approval for listing and LVAD date.   -home today for readmit Friday         Active Hospital Problems    Diagnosis  POA    *Cardiogenic shock [R57.0]  Yes    Heart failure [I50.9]  Yes    Organ transplant candidate [Z76.82]  Not Applicable    Acute on chronic combined systolic and diastolic heart failure [I50.43]  Yes    Familial  Cardiomyopathy [I42.9]  Yes     Familial cardiomyopathy        Resolved Hospital Problems    Diagnosis Date Resolved POA   No resolved problems to display.

## 2017-01-23 NOTE — PROCEDURES
Mert Samayoa is a 26 y.o.  male patient, who presents for a 6 minute walk test ordered by Lashon Hawkins MD.  The diagnosis is Pre Heart Transplant; Cardiomyopathy.  The patient's BMI is 21.8 kg/m2.  Predicted distance (lower limit of normal) is 684.26 meters.      Test Results:    The test was completed without stopping.  The total time walked was 360 seconds.  During walking, the patient reported:  No complaints. The patient used no assistive devices during testing.     01/20/2017---------Distance: 243.84 meters (800 feet)     O2 Sat % Supplemental Oxygen Heart Rate Blood Pressure Nadira Scale   Pre-exercise  (Resting) 97 % Room Air 91 bpm 97/56 mmHg 0   During Exercise 99 % Room Air 96 bpm 112/63 mmHg 0.5   Post-exercise  (Recovery) 98 % Room Air  95 bpm       Recovery Time: 41 seconds    Performing nurse/tech: PARRIS Shelby      PREVIOUS STUDY:   The patient has not had a previous study.      CLINICAL INTERPRETATION:  Six minute walk distance is 243.84 meters (800 feet) with very, very light dyspnea.  During exercise, there was no desaturation while breathing room air.  Both blood pressure and heart rate remained stable with walking.  Hypotension was present prior to exercise.  The patient did not report non-pulmonary symptoms during exercise.  Significant exercise impairment is likely due to cardiovascular causes and subjective symptoms.  No previous study performed.  Based upon age and body mass index, exercise capacity is less than predicted.

## 2017-01-23 NOTE — PLAN OF CARE
[facility]   Heart Transplant Clinic  [address] [place] [address]   [phone] after hours        HOME  HEALTH ORDERS      Admit to Home Health    Diagnosis:   Patient Active Problem List   Diagnosis    Cigarette smoker    Familial Cardiomyopathy    Chronic combined systolic and diastolic heart failure    Essential hypertension    Cardiogenic shock    Acute on chronic combined systolic and diastolic heart failure    Heart failure    Organ transplant candidate       Patient is homebound due to:  CHF    Diet: normal    Acitivities:as tolerated     Nursing:   SN to complete comprehensive assessment including routine vital signs. Instruct on disease process and s/s of complications to report to MD. Review/verify medication list sent home with the patient at time of discharge  and instruct patient/caregiver as needed. Frequency may be adjusted depending on start of care date.    Notify MD if SBP > 160 or < 90; DBP > 90 or < 50; HR > 120 or < 50; Temp > 101; Weight gain >3lbs in 1 day or 5lbs in 1 week.   Other:             HOME INFUSION THERAPY:  SN to perform Infusion Therapy/Central Line Care.  Review Central Line Care & Central Line Flush with patient.    Administer (drug and dose): dobutamine 5 mcgkgmin IV dosing weight 60 kg      **For questions or concerns, please call [phone] and ask for Pre-Heart transplant clinic, M-F 8-5. After hours, weekends, call [phone] and ask for the Heart Transplant Cardiologist on call.**    Central line care:  Scrub the Hub: Prior to accessing the line, always perform a 30 second alcohol scrub  Each lumen of the central line is to be flushed at least daily with 10 mL Normal Saline and 3 mL Heparin flush (100 units/mL)  Skilled Nurse (SN) may draw blood from IV access  Blood Draw Procedure:   - Aspirate at least 5 mL of blood   - Discard   - Obtain specimen   - Change posiflow cap   - Flush with 20 mL Normal  Saline followed by a                 3-5 mL Heparin flush (100 units/mL)  Central :   - Sterile dressing changes are done weekly and as needed.   - Use chlor-hexadine scrub to cleanse site, apply Biopatch to insertion site,       apply securement device dressing   - Posi-flow caps are changed weekly and after EVERY lab draw.   - If sterile gauze is under dressing to control oozing,                 dressing change must be performed every 24 hours until gauze is not needed.            Send initial Home Health orders to [facility] attending physician on call.  Send follow up questions to [phone] or fax:                      Pre Transplant:   [phone]        Post Transplant: [phone]        Heart Failure:      [phone]

## 2017-01-24 NOTE — DISCHARGE SUMMARY
[facility]  Discharge Summary        Admit Date: 1/16/2017    Discharge Date and Time:  1/23/2017 200    Attending Physician: No att. providers found     Discharge Provider: Alyson Barksdale    Reason for Admission: cardiogenic shock      Hospital Course 27 yo WM with familial DCM, 2 brothers with OHTx, is here to f/u clinic visit 2 weeks ago when he describes worsening activity intolerance, was noted to have a decrease in PkVO2 from 42.0 to 23.9 (53% of predicted) and a gradually climbing BNP admitted from procedure room after RHC showed low CO. He was started on  and diuresed. Decision was made to proceed with VAD. He was trnasitioned to po lasix 20 mg daily and discharged to return in 4 days for admission for IABP before VAD.         Final Diagnoses:   Principal Problem: Cardiogenic shock      Discharged Condition: good    Disposition: Home or Self Care    Follow Up/Patient Instructions:     Medications:  Reconciled Home Medications:   Discharge Medication List as of 1/23/2017 11:45 AM      START taking these medications    Details   furosemide (LASIX) 20 MG tablet Take 1 tablet (20 mg total) by mouth once daily., Starting 1/23/2017, Until Tue 1/23/18, Normal         CONTINUE these medications which have NOT CHANGED    Details   lisinopril 10 MG tablet TAKE ONE TABLET BY MOUTH ONCE DAILY IN THE EVENING, Normal         STOP taking these medications       carvedilol (COREG) 6.25 MG tablet Comments:   Reason for Stopping:         co-enzyme Q-10 (COQ-10) 30 mg capsule Comments:   Reason for Stopping:             No discharge procedures on file.   Diet normal    Provisions none

## 2017-01-25 ENCOUNTER — PATIENT OUTREACH (OUTPATIENT)
Dept: ADMINISTRATIVE | Facility: CLINIC | Age: 27
End: 2017-01-25
Payer: COMMERCIAL

## 2017-01-25 LAB — LA PPP-IMP: NEGATIVE

## 2017-01-25 NOTE — PATIENT INSTRUCTIONS

## 2017-01-26 ENCOUNTER — DOCUMENTATION ONLY (OUTPATIENT)
Dept: TRANSPLANT | Facility: CLINIC | Age: 27
End: 2017-01-26

## 2017-01-26 ENCOUNTER — TELEPHONE (OUTPATIENT)
Dept: TRANSPLANT | Facility: CLINIC | Age: 27
End: 2017-01-26

## 2017-01-26 ENCOUNTER — TELEPHONE (OUTPATIENT)
Dept: ADMINISTRATIVE | Facility: HOSPITAL | Age: 27
End: 2017-01-26

## 2017-01-26 LAB
HLA DRB4 1: NORMAL
HLA-A 1 SERO. EQUIV: 2
HLA-A 1: NORMAL
HLA-A 2 SERO. EQUIV: 32
HLA-A 2: NORMAL
HLA-B 1 SERO. EQUIV: 71
HLA-B 1: NORMAL
HLA-B 2 SERO. EQUIV: 39
HLA-B 2: NORMAL
HLA-BW 1 SERO. EQUIV: 6
HLA-BW 2 SERO. EQUIV: NORMAL
HLA-C 1: NORMAL
HLA-C 2: NORMAL
HLA-CW 1 SERO. EQUIV: 7
HLA-CW 2 SERO. EQUIV: NORMAL
HLA-DQ 1 SERO. EQUIV: 8
HLA-DQ 2 SERO. EQUIV: 4
HLA-DQB1 1: NORMAL
HLA-DQB1 2: NORMAL
HLA-DRB1 1 SERO. EQUIV: 4
HLA-DRB1 1: NORMAL
HLA-DRB1 2 SERO. EQUIV: 8
HLA-DRB1 2: NORMAL
HLA-DRB3 1: NORMAL
HLA-DRB3 2: NORMAL
HLA-DRB345 1 SERO. EQUIV: 53
HLA-DRB345 2 SERO. EQUIV: NORMAL
HLA-DRB4 2: NORMAL
HLA-DRB5 1: NORMAL
HLA-DRB5 2: NORMAL
SSDQB TESTING DATE: NORMAL
SSDRB TESTING DATE: NORMAL
SSOA TESTING DATE: NORMAL
SSOB TESTING DATE: NORMAL
SSOC TESTING DATE: NORMAL
SSODR TESTING DATE: NORMAL
TYSSO TESTING DATE: NORMAL
VWF MULTIMERS PPP QL: NORMAL

## 2017-01-26 NOTE — TELEPHONE ENCOUNTER
"LISTING NOTE:    Mert Samayoa was presented to selection committee on   and the decision was made to list the patient as a Status 7 pending financial clearance.    Spoke to Ray Dimas, , and confirmed that patient was listed today as a Status 7.       Diagnosis: Familial Cardiomyopathy  NYHA Class: IV  and a Karnofsky score of 40. Disabled; requires special care and assistance  EF: 10-15%  PKV02: 23.9    ABO: O POS   CPRA: 0%, does not need prospective crossmatch    Ht Readings from Last 1 Encounters:   01/20/17 5' 7" (1.702 m)     Wt Readings from Last 1 Encounters:   01/23/17 63 kg (138 lb 14.2 oz)     BMI: Estimated body mass index is 21.75 kg/(m^2) as calculated from the following:    Height as of 1/20/17: 5' 7" (1.702 m).    Weight as of 1/23/17: 63 kg (138 lb 14.2 oz).    Donor weight: 20% down  RHC: 1/16/17  RA: 22/21  PA: 60/31  AMINTA: 41  PCW: 32  CO: 1.81  CI: 1.01    Inotropes: Dobutamine    Patient contacted and informed that he is being listed today.  Contact information and Social Security Number verified.  Patient reminded that he should not travel further away than his home and if he does, he must call to let someone know (on-call MD or on-call transplant coordinator).  Patient also reminded that he must notify someone if he is hospitalized somewhere other than here or if he becomes ill.  Patient informed that he must be able to be reached by telephone within 15 minutes of a donor offer.  Patient instructed to be sure to keep his cell phone on and charged.  Also instructed patient to ask the same of his back-up family members and friends regarding their phones.  Patient instructed to call during regular office hours if he has non-urgent/non-symptom based questions regarding any part of being listed.     Patient verbalized understanding of all points discussed.  Patient expressed his satisfaction and relief regarding being listed.    ID acceptance criteria in [employer] is " correct and verified.

## 2017-01-27 ENCOUNTER — HOSPITAL ENCOUNTER (INPATIENT)
Facility: HOSPITAL | Age: 27
LOS: 25 days | Discharge: HOME HEALTH | DRG: 001 | End: 2017-02-21
Attending: INTERNAL MEDICINE | Admitting: INTERNAL MEDICINE
Payer: COMMERCIAL

## 2017-01-27 DIAGNOSIS — R11.0 NAUSEA: ICD-10-CM

## 2017-01-27 DIAGNOSIS — Z51.5 PALLIATIVE CARE ENCOUNTER: ICD-10-CM

## 2017-01-27 DIAGNOSIS — I50.42 CHRONIC COMBINED SYSTOLIC AND DIASTOLIC HEART FAILURE: ICD-10-CM

## 2017-01-27 DIAGNOSIS — I50.22 CHRONIC SYSTOLIC CONGESTIVE HEART FAILURE: ICD-10-CM

## 2017-01-27 DIAGNOSIS — I42.0 COCM (CONGESTIVE CARDIOMYOPATHY): ICD-10-CM

## 2017-01-27 DIAGNOSIS — F17.210 CIGARETTE NICOTINE DEPENDENCE, UNCOMPLICATED: ICD-10-CM

## 2017-01-27 DIAGNOSIS — R73.9 HYPERGLYCEMIA: ICD-10-CM

## 2017-01-27 DIAGNOSIS — Z99.11 ENCOUNTER FOR WEANING FROM VENTILATOR: ICD-10-CM

## 2017-01-27 DIAGNOSIS — I42.9 CARDIOMYOPATHY: ICD-10-CM

## 2017-01-27 DIAGNOSIS — Z76.82 ORGAN TRANSPLANT CANDIDATE: Primary | ICD-10-CM

## 2017-01-27 DIAGNOSIS — I50.43 ACUTE ON CHRONIC COMBINED SYSTOLIC AND DIASTOLIC CONGESTIVE HEART FAILURE, NYHA CLASS 4: ICD-10-CM

## 2017-01-27 DIAGNOSIS — Z95.811 LVAD (LEFT VENTRICULAR ASSIST DEVICE) PRESENT: ICD-10-CM

## 2017-01-27 DIAGNOSIS — D72.829 LEUKOCYTOSIS, UNSPECIFIED TYPE: ICD-10-CM

## 2017-01-27 DIAGNOSIS — F17.210 CIGARETTE SMOKER: ICD-10-CM

## 2017-01-27 DIAGNOSIS — Z71.89 COUNSELING REGARDING ADVANCED DIRECTIVES AND GOALS OF CARE: ICD-10-CM

## 2017-01-27 LAB
ALBUMIN SERPL BCP-MCNC: 4.7 G/DL
ALP SERPL-CCNC: 76 U/L
ALT SERPL W/O P-5'-P-CCNC: 45 U/L
ANION GAP SERPL CALC-SCNC: 9 MMOL/L
ANISOCYTOSIS BLD QL SMEAR: SLIGHT
AST SERPL-CCNC: 34 U/L
BASOPHILS # BLD AUTO: 0.04 K/UL
BASOPHILS NFR BLD: 0.5 %
BILIRUB SERPL-MCNC: 0.6 MG/DL
BUN SERPL-MCNC: 18 MG/DL
CALCIUM SERPL-MCNC: 9.7 MG/DL
CHLORIDE SERPL-SCNC: 99 MMOL/L
CO2 SERPL-SCNC: 25 MMOL/L
CREAT SERPL-MCNC: 1 MG/DL
DIFFERENTIAL METHOD: NORMAL
EOSINOPHIL # BLD AUTO: 0.2 K/UL
EOSINOPHIL NFR BLD: 2.5 %
ERYTHROCYTE [DISTWIDTH] IN BLOOD BY AUTOMATED COUNT: 12.9 %
EST. GFR  (AFRICAN AMERICAN): >60 ML/MIN/1.73 M^2
EST. GFR  (NON AFRICAN AMERICAN): >60 ML/MIN/1.73 M^2
GLUCOSE SERPL-MCNC: 86 MG/DL
HCT VFR BLD AUTO: 44 %
HGB BLD-MCNC: 15.1 G/DL
HYPOCHROMIA BLD QL SMEAR: NORMAL
INR PPP: 1.1
LYMPHOCYTES # BLD AUTO: 2.1 K/UL
LYMPHOCYTES NFR BLD: 27.5 %
MAGNESIUM SERPL-MCNC: 2.2 MG/DL
MCH RBC QN AUTO: 30.3 PG
MCHC RBC AUTO-ENTMCNC: 34.3 %
MCV RBC AUTO: 88 FL
MONOCYTES # BLD AUTO: 0.4 K/UL
MONOCYTES NFR BLD: 5.4 %
NEUTROPHILS # BLD AUTO: 4.9 K/UL
NEUTROPHILS NFR BLD: 64.1 %
OVALOCYTES BLD QL SMEAR: NORMAL
PHOSPHATE SERPL-MCNC: 3.4 MG/DL
PLATELET # BLD AUTO: 232 K/UL
PMV BLD AUTO: 9.7 FL
POIKILOCYTOSIS BLD QL SMEAR: SLIGHT
POLYCHROMASIA BLD QL SMEAR: NORMAL
POTASSIUM SERPL-SCNC: 4.3 MMOL/L
PROT SERPL-MCNC: 7.8 G/DL
PROTHROMBIN TIME: 11.9 SEC
RBC # BLD AUTO: 4.98 M/UL
SODIUM SERPL-SCNC: 133 MMOL/L
WBC # BLD AUTO: 7.66 K/UL

## 2017-01-27 PROCEDURE — 84100 ASSAY OF PHOSPHORUS: CPT

## 2017-01-27 PROCEDURE — 63600175 PHARM REV CODE 636 W HCPCS: Performed by: INTERNAL MEDICINE

## 2017-01-27 PROCEDURE — 80053 COMPREHEN METABOLIC PANEL: CPT

## 2017-01-27 PROCEDURE — 20600001 HC STEP DOWN PRIVATE ROOM

## 2017-01-27 PROCEDURE — 85610 PROTHROMBIN TIME: CPT

## 2017-01-27 PROCEDURE — 93005 ELECTROCARDIOGRAM TRACING: CPT

## 2017-01-27 PROCEDURE — 85025 COMPLETE CBC W/AUTO DIFF WBC: CPT

## 2017-01-27 PROCEDURE — 87040 BLOOD CULTURE FOR BACTERIA: CPT | Mod: 59

## 2017-01-27 PROCEDURE — 93010 ELECTROCARDIOGRAM REPORT: CPT | Mod: ,,, | Performed by: INTERNAL MEDICINE

## 2017-01-27 PROCEDURE — 83735 ASSAY OF MAGNESIUM: CPT

## 2017-01-27 PROCEDURE — 25000003 PHARM REV CODE 250: Performed by: INTERNAL MEDICINE

## 2017-01-27 RX ORDER — GLUCAGON 1 MG
1 KIT INJECTION
Status: DISCONTINUED | OUTPATIENT
Start: 2017-01-27 | End: 2017-02-21 | Stop reason: HOSPADM

## 2017-01-27 RX ORDER — LISINOPRIL 10 MG/1
10 TABLET ORAL ONCE
Status: COMPLETED | OUTPATIENT
Start: 2017-01-27 | End: 2017-01-27

## 2017-01-27 RX ORDER — DOBUTAMINE HYDROCHLORIDE 400 MG/100ML
1 INJECTION, SOLUTION INTRAVENOUS CONTINUOUS
Status: DISCONTINUED | OUTPATIENT
Start: 2017-01-27 | End: 2017-02-11

## 2017-01-27 RX ORDER — FUROSEMIDE 20 MG/1
20 TABLET ORAL DAILY
Status: DISCONTINUED | OUTPATIENT
Start: 2017-01-28 | End: 2017-02-01

## 2017-01-27 RX ORDER — HEPARIN SODIUM 5000 [USP'U]/ML
5000 INJECTION, SOLUTION INTRAVENOUS; SUBCUTANEOUS EVERY 8 HOURS
Status: DISCONTINUED | OUTPATIENT
Start: 2017-01-27 | End: 2017-01-31

## 2017-01-27 RX ORDER — IBUPROFEN 200 MG
24 TABLET ORAL
Status: DISCONTINUED | OUTPATIENT
Start: 2017-01-27 | End: 2017-02-03

## 2017-01-27 RX ORDER — IBUPROFEN 200 MG
16 TABLET ORAL
Status: DISCONTINUED | OUTPATIENT
Start: 2017-01-27 | End: 2017-02-03

## 2017-01-27 RX ADMIN — DOBUTAMINE IN DEXTROSE 5 MCG/KG/MIN: 200 INJECTION, SOLUTION INTRAVENOUS at 09:01

## 2017-01-27 RX ADMIN — HEPARIN SODIUM 5000 UNITS: 5000 INJECTION, SOLUTION INTRAVENOUS; SUBCUTANEOUS at 10:01

## 2017-01-27 RX ADMIN — LISINOPRIL 10 MG: 10 TABLET ORAL at 10:01

## 2017-01-27 NOTE — H&P
Heart Transplant History & Physical  Attending Physician: Lashon Hawkins MD  Chief Complaint: Admission for IABP prior to VAD    HPI:   This is a 25 y/o man with PMHx of DCM with 2 brothers with history of OHTx who presents for a scheduled admission prior to VAD. Patient has no complaints. States his weight has been stable.     ROS:    Constitution: Negative for fever or chills. Negative for weight loss or gain.   HENT: Negative for sore throat or headaches. Negative for rhinorrhea.  Eyes: Negative for blurred or double vision.   Cardiovascular: See above  Pulmonary: Negative for SOB. Negative for cough.   Gastrointestinal: Negative for abdominal pain. Negative for nausea/ vomiting. Negative for diarrhea.   : Negative for dysuria.   Neurological: Negative for focal weakness or sensory changes.  PMH:     Past Medical History   Diagnosis Date    Cardiogenic shock 1/16/2017    Cardiomyopathy      Familial cardiomyopathy    CHF (congestive heart failure)     Essential hypertension 12/21/2016    Familial Cardiomyopathy      Familial cardiomyopathy      History reviewed. No pertinent past surgical history.  Allergies:   Review of patient's allergies indicates:  No Known Allergies    Medications:     No current facility-administered medications on file prior to encounter.      Current Outpatient Prescriptions on File Prior to Encounter   Medication Sig    furosemide (LASIX) 20 MG tablet Take 1 tablet (20 mg total) by mouth once daily.    lisinopril 10 MG tablet TAKE ONE TABLET BY MOUTH ONCE DAILY IN THE EVENING         Inpatient Medications   Continuous Infusions:  Scheduled Meds:  PRN Meds:     Social History:     Social History   Substance Use Topics    Smoking status: Current Some Day Smoker     Packs/day: 1.00    Smokeless tobacco: Not on file    Alcohol use 2.4 oz/week     4 Shots of liquor per week     Family History:     Family History   Problem Relation Age of Onset    Arthritis Mother      Heart disease Brother      cardiomyopathy and heart transplant    No Known Problems Father     Heart disease Brother      cardiomyopathy and heart transplanted twice    Birth defects Paternal Uncle      Physical Exam:     Vitals:  Pulse:  [84]   Resp:  [16]   I/O's:  No intake or output data in the 24 hours ending 01/27/17 1812     Constitutional: NAD, conversant  HEENT: Sclera anicteric, PERRLA, EOMI  Neck: No JVD, no carotid bruits  CV: RRR, no m/r/g, normal S1/S2  Pulm: CTAB, no wheezes, rales, or ronchi  GI: Abdomen soft, NTND, +BS  Extremities: No LE edema      Labs:       Recent Labs  Lab 01/21/17  0536 01/22/17  0524 01/23/17  0537   * 134* 133*   K 4.2 4.1 4.2    100 99   CO2 24 27 28   BUN 18 19 20   CREATININE 1.0 0.9 0.9   ANIONGAP 10 7* 6*       Recent Labs  Lab 01/21/17 0536 01/22/17  0524 01/23/17  0537   AST 34 31 36   ALT 41 44 47*   ALKPHOS 67 73 70   BILITOT 1.2* 0.9 0.9   ALBUMIN 4.1 4.0 4.0       Recent Labs  Lab 01/21/17  0536 01/22/17  0524 01/23/17  0537   CALCIUM 9.1 9.3 9.2   MG 2.1 2.2 2.0     No results for input(s): TROPONINI, BNP in the last 168 hours.  No results for input(s): NITRITE, LEUKOCYTESUR, WBCUA, BACTERIA in the last 168 hours.    Invalid input(s): EPITHELIALCE, COGRCA    Estimated Creatinine Clearance: 110.8 mL/min (based on Cr of 0.9).   Recent Labs  Lab 01/21/17  0536 01/22/17  0524 01/23/17  0537   WBC 8.96 7.20 6.56   HGB 14.8 14.5 14.6   HCT 43.5 42.0 43.4    175 173   GRAN 74.2*  6.6 69.8  5.0 66.9  4.4     No results for input(s): PTT, INR in the last 168 hours.  No results for input(s): LACTATE in the last 168 hours.  Lab Results   Component Value Date    CHOL 141 01/17/2017    HDL 25 (L) 01/17/2017    LDLCALC 80.0 01/17/2017    TRIG 180 (H) 01/17/2017     Lab Results   Component Value Date    HGBA1C 5.4 01/17/2017     Lab Results   Component Value Date    TSH 1.734 01/17/2017            Imaging:     EF   Date Value Ref Range Status    01/18/2017 10 (A) 55 - 65    08/11/2016 13 (A) 55 - 65    02/22/2016 15 (A) 55 - 65    06/18/2015 30 (A) 55 - 65    09/30/2014 35         EKG: Pending    Assessment and Plan:   #End Stage Heart Failure  - EKG  - Continue dobutamine 5  - Pull PICC line  - Hold Lisinopril tomorrow  - Blood cultures  - Plan for IABP on Monday in preparation for VAD    Patient discussed with attending physician, Dr. Lundberg.      Signed:    Shai Ortega MD  Cardiology Fellow, PGY-5  Pager: [phone]  1/27/2017 5:56 PM

## 2017-01-27 NOTE — IP AVS SNAPSHOT
[facility]  [address]  [facility] [address]  Phone: [phone]           Patient Discharge Instructions     Our goal is to set you up for success. This packet includes information on your condition, medications, and your home care. It will help you to care for yourself so you don't get sicker and need to go back to the hospital.     Please ask your nurse if you have any questions.        There are many details to remember when preparing to leave the hospital. Here is what you will need to do:    1. Take your medicine. If you are prescribed medications, review your Medication List in the following pages. You may have new medications to  at the pharmacy and others that you'll need to stop taking. Review the instructions for how and when to take your medications. Talk with your doctor or nurses if you are unsure of what to do.     2. Go to your follow-up appointments. Specific follow-up information is listed in the following pages. Your may be contacted by a transition nurse or clinical provider about future appointments. Be sure we have all of the phone numbers to reach you, if needed. Please contact your provider's office if you are unable to make an appointment.     3. Watch for warning signs. Your doctor or nurse will give you detailed warning signs to watch for and when to call for assistance. These instructions may also include educational information about your condition. If you experience any of warning signs to your health, call your doctor.               [facility]  Unless otherwise directed by your provider, please contact [facility], our nurse care line that is available for 24/7 assistance.     [phone] (toll-free)    Registered nurses in the [facility] provide clinical advisement, health education, appointment booking, and other advisory services.                    ** Verify the list of medication(s) below is accurate and up  to date. Carry this with you in case of emergency. If your medications have changed, please notify your healthcare provider.             Medication List      START taking these medications        Additional Info                      amiodarone 200 MG Tab   Commonly known as:  PACERONE   Quantity:  30 tablet   Refills:  11   Dose:  200 mg    Last time this was given:  200 mg on 2/21/2017  8:16 AM   Instructions:  Take 1 tablet (200 mg total) by mouth once daily.     Begin Date    AM    Noon    PM    Bedtime       aspirin 325 MG EC tablet   Commonly known as:  ECOTRIN   Quantity:  30 tablet   Refills:  11   Dose:  325 mg    Last time this was given:  325 mg on 2/21/2017  8:16 AM   Instructions:  Take 1 tablet (325 mg total) by mouth once daily.     Begin Date    AM    Noon    PM    Bedtime       famotidine 40 MG tablet   Commonly known as:  PEPCID   Quantity:  30 tablet   Refills:  11   Dose:  40 mg    Instructions:  Take 1 tablet (40 mg total) by mouth every evening.     Begin Date    AM    Noon    PM    Bedtime       ferrous gluconate 324 MG tablet   Commonly known as:  FERGON   Quantity:  30 tablet   Refills:  11   Dose:  324 mg    Last time this was given:  324 mg on 2/21/2017  8:17 AM   Instructions:  Take 1 tablet (324 mg total) by mouth daily with breakfast.     Begin Date    AM    Noon    PM    Bedtime       furosemide 40 MG tablet   Commonly known as:  LASIX   Quantity:  30 tablet   Refills:  11   Dose:  40 mg    Last time this was given:  40 mg on 2/21/2017  8:16 AM   Instructions:  Take 1 tablet (40 mg total) by mouth once daily.     Begin Date    AM    Noon    PM    Bedtime       lisinopril 5 MG tablet   Commonly known as:  PRINIVIL,ZESTRIL   Quantity:  60 tablet   Refills:  11   Dose:  5 mg    Last time this was given:  5 mg on 2/21/2017  8:16 AM   Instructions:  Take 1 tablet (5 mg total) by mouth 2 (two) times daily.     Begin Date    AM    Noon    PM    Bedtime       magnesium oxide 400 mg tablet    Commonly known as:  MAG-OX   Quantity:  60 tablet   Refills:  11   Dose:  400 mg    Last time this was given:  400 mg on 2/21/2017  1:05 PM   Instructions:  Take 1 tablet (400 mg total) by mouth 2 (two) times daily.     Begin Date    AM    Noon    PM    Bedtime       oxycodone-acetaminophen  mg per tablet   Commonly known as:  PERCOCET   Refills:  0   Dose:  1 tablet    Last time this was given:  1 tablet on 2/21/2017  4:55 AM   Instructions:  Take 1 tablet by mouth every 6 (six) hours as needed.     Begin Date    AM    Noon    PM    Bedtime       senna-docusate 8.6-50 mg 8.6-50 mg per tablet   Commonly known as:  SENNA WITH DOCUSATE SODIUM   Quantity:  60 tablet   Refills:  5   Dose:  2 tablet    Last time this was given:  1 tablet on 2/8/2017  8:55 AM   Instructions:  Take 2 tablets by mouth daily as needed for Constipation.     Begin Date    AM    Noon    PM    Bedtime       warfarin 5 MG tablet   Commonly known as:  COUMADIN   Quantity:  30 tablet   Refills:  3   Dose:  5 mg    Last time this was given:  2.5 mg on 2/20/2017  5:46 PM   Instructions:  Take 1 tablet (5 mg total) by mouth Daily.     Begin Date    AM    Noon    PM    Bedtime            Where to Get Your Medications      These medications were sent to [id number] - [place] - [address]  [address], [place] [address]     Phone:  [phone]     amiodarone 200 MG Tab    aspirin 325 MG EC tablet    famotidine 40 MG tablet    ferrous gluconate 324 MG tablet    furosemide 40 MG tablet    lisinopril 5 MG tablet    magnesium oxide 400 mg tablet    senna-docusate 8.6-50 mg 8.6-50 mg per tablet    warfarin 5 MG tablet         Information about where to get these medications is not yet available     ! Ask your nurse or doctor about these medications     oxycodone-acetaminophen  mg per tablet                  Please bring to all follow up appointments:    1. A copy of your discharge instructions.  2. All medicines you  are currently taking in their original bottles.  3. Identification and insurance card.    Please arrive 15 minutes ahead of scheduled appointment time.    Please call 24 hours in advance if you must reschedule your appointment and/or time.        Your Scheduled Appointments     Feb 22, 2017 12:30 PM CST   Non-Fasting Lab with LAB, APPOINTMENT [place] [facility]    [address] [facility] [address]   [phone]            Feb 22, 2017  3:30 PM CST   Nurse Visit with HEARTTRANSPLANT, LVADN   [facility]    [address]  [place] [address]   [phone]            Feb 22, 2017  4:00 PM CST   VAD with Mario Conner MD   [facility]    [address]  [place] [address]   [phone]            Mar 01, 2017  2:00 PM CST   VAD with Marisel Willams MD   [facility]    [address]  [place] [address]   [phone]            Mar 01, 2017  2:00 PM CST   Nurse Visit with HEARTTRANSPLANT, ADN   [facility]    [address]  [place] [address]   [phone]              Referrals     Future Orders    Ambulatory referral to Anticoagulation Monitoring     Process Instructions:    Responsible Group - See Below:  [place]: [facility] Coumadin Monitoring Enrollment Pool [[id number]]   [place]:  [facility] Coumadin Monitoring Enrollment Pool [[id number]]      Questions:    INR Goal:  2.0-3.0    Target End Date:      Date of First INR:  2/23/2017    Weekly Max Dose:      Please enter the name of the physician responsible for the patient's long term coumadin care. Note: This physician will be required to co-sign all coumadin-related EPIC orders.:  MARISEL WILLAMS    Responsible Group ([facility] Coumadin Monitoring Enrollment ([id number]), [place]-[facility] Coumadin Monitoring Enrollment ([id number]),  "BR-select [facility] or [facility] Coumadin Monitoring depending on preferred location,[facility] select [facility] Coumadin Monitoring, etc.):  [facility] COUMADIN MONITORING ENROLLMENT        Discharge Instructions     Future Orders    Activity as tolerated     Call MD for:  difficulty breathing or increased cough     Call MD for:  increased confusion or weakness     Call MD for:  persistent dizziness, light-headedness, or visual disturbances     Call MD for:  persistent nausea and vomiting or diarrhea     Call MD for:  redness, tenderness, or signs of infection (pain, swelling, redness, odor or green/yellow discharge around incision site)     Call MD for:  severe persistent headache     Call MD for:  severe uncontrolled pain     Call MD for:  temperature >100.4     Call MD for:  worsening rash     Diet general     Questions:    Total calories:      Fat restriction, if any:      Protein restriction, if any:      Na restriction, if any:      Fluid restriction:      Additional restrictions:          Primary Diagnosis     Your primary diagnosis was:  Heart Failure      Admission Information     Date & Time Provider Department CSN    1/27/2017  5:31 PM Mario Conner MD [facility] [id number]      Care Providers     Provider Role Specialty Primary office phone    Mario Conner MD Attending Provider Cardiology [phone]    Sammi Tapia MD Consulting Physician  Cardiology [phone]    Mario Conner MD Team Attending  Cardiology [phone]      Your Vitals Were     BP Pulse Temp Resp Height Weight    74/0 (BP Location: Right arm, Patient Position: Sitting, BP Method: Doppler) 84 98.6 °F (37 °C) (Oral) 18 5' 7" (1.702 m) 62.9 kg (138 lb 10.7 oz)    SpO2 BMI             99% 21.72 kg/m2         Recent Lab Values        1/17/2017                           4:19 PM           A1C 5.4           Comment for A1C at  4:19 PM on 1/17/2017:  According to ADA guidelines, hemoglobin A1C <7.0% represents  optimal control in " non-pregnant diabetic patients.  Different  metrics may apply to specific populations.   Standards of Medical Care in Diabetes - 2016.  For the purpose of screening for the presence of diabetes:  <5.7%     Consistent with the absence of diabetes  5.7-6.4%  Consistent with increasing risk for diabetes   (prediabetes)  >or=6.5%  Consistent with diabetes  Currently no consensus exists for use of hemoglobin A1C  for diagnosis of diabetes for children.        Pending Labs     Order Current Status    APTT In process    APTT In process    APTT In process    APTT In process    APTT In process    APTT In process    Basic metabolic panel In process    Basic metabolic panel In process    CBC auto differential In process    CBC auto differential In process    CBC with Automated Differential In process    Comprehensive Metabolic Panel (CMP) In process    Lactate dehydrogenase In process    Lactate dehydrogenase In process    Lactate dehydrogenase In process    Magnesium In process    Magnesium In process    Magnesium In process    Magnesium In process    Phosphorus In process    Phosphorus In process    Potassium In process    Potassium In process    Prepare Fresh Frozen Plasma 4 Units Preliminary result      Allergies as of 2/21/2017     No Known Allergies      Advance Directives     An advance directive is a document which, in the event you are no longer able to make decisions for yourself, tells your healthcare team what kind of treatment you do or do not want to receive, or who you would like to make those decisions for you.  If you do not currently have an advance directive, [facility] encourages you to create one.  For more information call:  [phone]), [phone] ([phone]),  or log on to [email or web address].        Smoking Cessation     If you would like to quit smoking:   You may be eligible for free services if you are a [place] resident and started smoking cigarettes before September 1,  1988.  Call the Smoking Cessation Trust (SCT) toll free at (981) 469-0853 or (599) 374-3797.   Call 1-800-QUIT-NOW if you do not meet the above criteria.            Language Assistance Services     ATTENTION: Language assistance services are available, free of charge. Please call [phone].      ATENCIÓN: Si habla keisha, tiene a hinton disposición servicios gratuitos de asistencia lingüística. Llame al [phone].     CHÚ Ý: N?u b?n nói Ti?ng Vi?t, có các d?ch v? h? tr? ngôn ng? mi?n phí dành cho b?n. G?i s? [phone].        Heart Failure Education       Heart Failure: Being Active  You have a condition called heart failure. Being active doesnt mean that you have to wear yourself out. Even a little movement each day helps to strengthen your heart. If you cant get out to exercise, you can do simple stretching and strengthening exercises at home. These are good ways to keep you well-conditioned and prevent you and your heart from becoming excessively weak.    Ideas to get you started  · Add a little movement to things you do now. Walk to mail letters. Park your car at the far end of the parking lot and walk to the store. Walk up a flight of stairs instead of taking the elevator.  · Choose activities you enjoy. You might walk, swim, or ride an exercise bike. Things like gardening and washing the car count, too. Other possibilities include: washing dishes, walking the dog, walking around the mall, and doing aerobic activities with friends.  · Join a group exercise program at a [facility] or [facility], a senior center, or a community center. Or look into a hospital cardiac rehabilitation program. Ask your doctor if you qualify.  Tips to keep you going  · Get up and get dressed each day. Go to a coffee shop and read a newspaper or go somewhere that you'll be in the presence of other active people. Youll feel more like being active.  · Make a plan. Choose one or more activities that you enjoy and that you can easily  do. Then plan to do at least one each day. You might write your plan on a calendar.  · Go with a friend or a group if you like company. This can help you feel supported and stay motivated, too.  · Plan social events that you enjoy. This will keep you mentally engaged as well as physically motivated to do things you find pleasure in.  For your safety  · Talk with your healthcare provider before starting an exercise program.  · Exercise indoors when its too hot or too cold outside, or when the air quality is poor. Try walking at a shopping mall.  · Wear socks and sturdy shoes to maintain your balance and prevent falls.  · Start slowly. Do a few minutes several times a day at first. Increase your time and speed little by little.  · Stop and rest whenever you feel tired or get short of breath.  · Dont push yourself on days when you dont feel well.  Date Last Reviewed: 3/20/2016  © 9004-4533 [employer]. [address] [place] [address], [place] [address]. All rights reserved. This information is not intended as a substitute for professional medical care. Always follow your healthcare professional's instructions.              Heart Failure: Evaluating Your Heart  You have a condition called heart failure. To evaluate your condition, your doctor will examine you, ask questions, and do some tests. Along with looking for signs of heart failure, the doctor looks for any other health problems that may have led to heart failure. The results of your evaluation will help your doctor form a treatment plan.  Health history and physical exam  Your visit will start with a health history. Tell the doctor about any symptoms youve noticed and about all medicines you take. Then youll have a physical exam. This includes listening to your heartbeat and breathing. Youll also be checked for swelling (edema) in your legs and neck. When you have fluid buildup or fluid in the lungs, it may be called congestive heart  failure.  Diagnosing heart failure     During an echocardiogram, sound waves bounce off the heart. These are converted into a picture on the screen.   The following may be done to help your doctor form a diagnosis:  · X-rays show the size and shape of your heart. These pictures can also show fluid in your lungs.  · An electrocardiogram (ECG or EKG) shows the pattern of your heartbeat. Small pads (electrodes) are placed on your chest, arms, and legs. Wires connect the pads to the ECG machine, which records your hearts electrical signals. This can give the doctor information about heart function.  · An echocardiogram uses ultrasound waves to show the structure and movement of your heart muscle. This shows how well the heart pumps. It also shows the thickness of the heart walls, and if the heart is enlarged. It is one of the most useful, non-invasive tests as it provides information about the heart's general function. This helps your doctor make treatment decisions.  · Lab tests evaluate small amounts of blood or urine for signs of problems. A BNP lab test can help diagnose and evaluate heart failure. BNP stands for B-type natriuretic peptide. The ventricles secrete more BNP when heart failure worsens. Lab tests can also provide information about metabolic dysfunction or heart dysfunction.  Your treatment plan  Based on the results of your evaluation and tests, your doctor will develop a treatment plan. This plan is designed to relieve some of your heart failure symptoms and help make you more comfortable. Your treatment plan may include:  · Medicine to help your heart work better and improve your quality of life  · Changes in what you eat and drink to help prevent fluid from backing up in your body  · Daily monitoring of your weight and heart failure symptoms to see how well your treatment plan is working  · Exercise to help you stay healthy  · Help with quitting smoking  · Emotional and psychological support to help  adjust to the changes  · Referrals to other specialists to make sure you are being treated comprehensively  Date Last Reviewed: 3/21/2016  © 0855-3103 [employer]. [address] [place] [address], [place] [address]. All rights reserved. This information is not intended as a substitute for professional medical care. Always follow your healthcare professional's instructions.              Heart Failure: Making Changes to Your Diet  You have a condition called heart failure. When you have heart failure, excess fluid is more likely to build up in your body because your heart isn't working well. This makes the heart work harder to pump blood. Fluid buildup causes symptoms such as shortness of breath and swelling (edema). This is often referred to as congestive heart failure or CHF. Controlling the amount of salt (sodium) you eat may help stop fluid from building up. Your doctor may also tell you to reduce the amount of fluid you drink.  Reading food labels    Your healthcare provider will tell you how much sodium you can eat each day. Read food labels to keep track. Keep in mind that certain foods are high in salt. These include canned, frozen, and processed foods. Check the amount of sodium in each serving. Watch out for high-sodium ingredients. These include MSG (monosodium glutamate), baking soda, and sodium phosphate.   Eating less salt  Give yourself time to get used to eating less salt. It may take a little while. Here are some tips to help:  · Take the saltshaker off the table. Replace it with salt-free herb mixes and spices.  · Eat fresh or plain frozen vegetables. These have much less salt than canned vegetables.  · Choose low-sodium snacks like sodium-free pretzels, crackers, or air-popped popcorn.  · Dont add salt to your food when youre cooking. Instead, season your foods with pepper, lemon, garlic, or onion.  · When you eat out, ask that your food be cooked without added salt.  · Avoid eating fried  foods as these often have a great deal of salt.  If youre told to limit fluids  You may need to limit how much fluid you have to help prevent swelling. This includes anything that is liquid at room temperature, such as ice cream and soup. If your doctor tells you to limit fluid, try these tips:  · Measure drinks in a measuring cup before you drink them. This will help you meet daily goals.  · Chill drinks to make them more refreshing.  · Suck on frozen lemon wedges to quench thirst.  · Only drink when youre thirsty.  · Chew sugarless gum or suck on hard candy to keep your mouth moist.  · Weigh yourself daily to know if your body's fluid content is rising.  My sodium goal  Your healthcare provider may give you a sodium goal to meet each day. This includes sodium found in food as well as salt that you add. My goal is to eat no more than ___________ mg of sodium per day.     When to call your doctor  Call your doctor right away if you have any symptoms of worsening heart failure. These can include:  · Sudden weight gain  · Increased swelling of your legs or ankles  · Trouble breathing when youre resting or at night  · Increase in the number of pillows you have to sleep on  · Chest pain, pressure, discomfort, or pain in the jaw, neck, or back   Date Last Reviewed: 3/21/2016  © 8265-0686 [employer]. [address] [place] [address]. All rights reserved. This information is not intended as a substitute for professional medical care. Always follow your healthcare professional's instructions.              Heart Failure: Medicines to Help Your Heart    You have a condition called heart failure (also known as congestive heart failure, or CHF). Your doctor will likely prescribe medicines for heart failure and any underlying health problems you have. Most heart failure patients take one or more types of medicinen. Your healthcare provider will work to find the combination of medicines that works best  for you.  Heart failure medicines  Here are the most common heart failure medicines:  · ACE inhibitors lower blood pressure and decrease strain on the heart. This makes it easier for the heart to pump. Angiotensin receptor blockers have similar effects. These are prescribed for some patients instead of ACE inhibitors.  · Beta-blockers relieve stress on the heart. They also improve symptoms. They may also improve the heart's pumping action over time.  · Diuretics (also called water pills) help rid your body of excess water. This can help rid your body of swelling (edema). Having less fluid to pump means your heart doesnt have to work as hard. Some diuretics make your body lose a mineral called potassium. Your doctor will tell you if you need to take supplements or eat more foods high in potassium.  · Digoxin helps your heart pump with more strength. This helps your heart pump more blood with each beat. So, more oxygen-rich blood travels to the rest of the body.  · Aldosterone antagonists help alter hormones and decrease strain on the heart.  · Hydralazine and nitrates are two separate medicines used together to treat heart failure. They may come in one combination pill. They lower blood pressure and decrease how hard the heart has to pump.  Medicines for related conditions  Controlling other heart problems helps keep heart failure under control, too. Depending on other heart problems you have, medicines may be prescribed to:  · Lower blood pressure (antihypertensives).  · Lower cholesterol levels (statins).  · Prevent blood clots (anticoagulants or aspirin).  · Keep the heartbeat steady (antiarrhythmics).  Date Last Reviewed: 3/5/2016  © 2965-0261 [employer]. [address] [place] [address], [place] [address]. All rights reserved. This information is not intended as a substitute for professional medical care. Always follow your healthcare professional's instructions.              Heart Failure: Procedures  That May Help    The heart is a muscle that pumps oxygen-rich blood to all parts of the body. When you have heart failure, the heart is not able to pump as well as it should. Blood and fluid may back up into the lungs (congestive heart failure), and some parts of the body dont get enough oxygen-rich blood to work normally. These problems lead to the symptoms of heart failure.     Certain procedures may help the heart pump better in some cases of heart failure. Some procedures are done to treat health problems that may have caused the heart failure such as coronary artery disease or heart rhythm problems. For more serious heart failure, other options are available.  Treating artery and valve problems  If you have coronary artery disease or valve disease, procedures may be done to improve blood flow. This helps the heart pump better, which can improve heart failure symptoms. First, your doctor may do a cardiac catheterization to help detect clogged blood vessels or valve damage. During this procedure, a  thin tube (catheter) in inserted into a blood vessel and guided to the heart. There a dye is injected and a special type of X-ray (angiogram) is taken of the blood vessels. Procedures to open a blocked artery or fix damaged valves can also be done using catheterization.  · Angioplasty uses a balloon-tipped instrument at the end of the catheter. The balloon is inflated to widen the narrowed artery. In many cases, a stent is expanded to further support the narrowed artery. A stent is a metal mesh tube.  · Valve surgery repairs or replacement of faulty valves can also be done during catheterization so blood can flow properly through the chambers of the heart.  Bypass surgery is another option to help treat blocked arteries. It uses a healthy blood vessel from elsewhere in the body. The healthy blood vessel is attached above and below the blocked area so that blood can flow around the blocked artery.  Treating heart  rhythm problems  A device may be placed in the chest to help a weak heart maintain a healthy, heartbeat so the heart can pump more effectively:  · Pacemaker. A pacemaker is an implanted device that regulates your heartbeat electronically. It monitors your heart's rhythm and generates a painless electric impulse that helps the heart beat in a regular rhythm. A pacemaker is programmed to meet your specific heart rhythm needs.  · Biventricular pacing/cardiac resynchronization therapy. A type of pacemaker that paces both pumping chambers of the heart at the same time to coordinate contractions and to improve the heart's function. Some people with heart failure are candidates for this therapy.  · Implantable cardioverter defibrillator. A device similar to a pacemaker that senses when the heart is beating too fast and delivers an electrical shock to convert the fast rhythm to a normal rhythm. This can be a life saving device.  In severe cases  In more serious cases of heart failure when other treatments no longer work, other options may include:  · Ventricular assist devices (VADs). These are mechanical devices used to take over the pumping function for one or both of the heart's ventricles, or pumping chambers. A VAD may be necessary when heart failure progresses to the point that medicines and other treatments no longer help. In some cases, a VAD may be used as a bridge to transplant.  · Heart transplant. This is replacing the diseased heart with a healthy one from a donor. This is an option for a few people who are very sick. A heart transplant is very serious and not an option for all patients. Your doctor can tell you more.  Date Last Reviewed: 3/20/2016  © 9166-8643 [employer]. [address] [place] [address], [place] [address]. All rights reserved. This information is not intended as a substitute for professional medical care. Always follow your healthcare professional's instructions.              Heart  Failure: Tracking Your Weight  You have a condition called heart failure. When you have heart failure, a sudden weight gain or a steady rise in weight is a warning sign that your body is retaining too much water and salt. This could mean your heart failure is getting worse. If left untreated, it can cause problems for your lungs and result in shortness of breath. Weighing yourself each day is the best way to know if youre retaining water. If your weight goes up quickly, call your doctor. You will be given instructions on how to get rid of the excess water. You will likely need medicines and to avoid salt. This will help your heart work better.  Call your doctor if you gain more than 2 pounds in 1 day, more than 5 pounds in 1 week, or whatever weight gain you were told to report by your doctor. This is often a sign of worsening heart failure and needs to be evaluated and treated. Your doctor will tell you what to do next.   Tips for weighing yourself    · Weigh yourself at the same time each morning, wearing the same clothes. Weigh yourself after urinating and before eating.  · Use the same scale each day. Make sure the numbers are easy to read. Put the scale on a flat, hard surface -- not on a rug or carpet.  · Do not stop weighing yourself. If you forget one day, weigh again the next morning.  How to use your weight chart  · Keep your weight chart near the scale. Write your weight on the chart as soon as you get off the scale.  · Fill in the month and the start date on the chart. Then write down your weight each day. Your chart will look like this:    · If you miss a day, leave the space blank. Weigh yourself the next day and write your weight in the next space.  · Take your weight chart with you when you go to see your doctor.  Date Last Reviewed: 3/20/2016  © 6419-3778 [employer]. [address] [place] [address], [place] [address]. All rights reserved. This information is not intended as a substitute for  professional medical care. Always follow your healthcare professional's instructions.              Heart Failure: Warning Signs of a Flare-Up  You have a condition called heart failure. Once you have heart failure, flare-ups can happen. Below are signs that can mean your heart failure is getting worse. If you notice any of these warning signs, call your healthcare provider.  Swelling    · Your feet, ankles, or lower legs get puffier.  · You notice skin changes on your lower legs.  · Your shoes feel too tight.  · Your clothes are tighter in the waist.  · You have trouble getting rings on or off your fingers.  Shortness of breath  · You have to breathe harder even when youre doing your normal activities or when youre resting.  · You are short of breath walking up stairs or even short distances.  · You wake up at night short of breath or coughing.  · You need to use more pillows or sit up to sleep.  · You wake up tired or restless.  Other warning signs  · You feel weaker, dizzy, or more tired.  · You have chest pain or changes in your heartbeat.  · You have a cough that wont go away.  · You cant remember things or dont feel like eating.  Tracking your weight  Gaining weight is often the first warning sign that heart failure is getting worse. Gaining even a few pounds can be a sign that your body is retaining excess water and salt. Weighing yourself each day in the morning after you urinate and before you eat, is the best way to know if you're retaining water. Get a scale that is easy to read and make sure you wear the same clothes and use the same scale every time you weigh. Your healthcare provider will show you how to track your weight. Call your doctor if you gain more than 2 pounds in 1 day, 5 pounds in 1 week, or whatever weight gain you were told to report by your doctor. This is often a sign of worsening heart failure and needs to be evaluated and treated before it compromises your breathing. Your doctor  will tell you what to do next.    Date Last Reviewed: 3/15/2016  © 2223-0350 [employer], [employer]. [address] [place] [address], [place] [address]. All rights reserved. This information is not intended as a substitute for professional medical care. Always follow your healthcare professional's instructions.              Coumadin Discharge Instructions                          [facility] complies with applicable Federal civil rights laws and does not discriminate on the basis of race, color, national origin, age, disability, or sex.

## 2017-01-28 LAB
ALBUMIN SERPL BCP-MCNC: 4.2 G/DL
ALP SERPL-CCNC: 73 U/L
ALT SERPL W/O P-5'-P-CCNC: 40 U/L
ANION GAP SERPL CALC-SCNC: 8 MMOL/L
AST SERPL-CCNC: 28 U/L
BASOPHILS # BLD AUTO: 0.03 K/UL
BASOPHILS NFR BLD: 0.4 %
BILIRUB SERPL-MCNC: 0.5 MG/DL
BUN SERPL-MCNC: 18 MG/DL
CALCIUM SERPL-MCNC: 9.2 MG/DL
CHLORIDE SERPL-SCNC: 101 MMOL/L
CO2 SERPL-SCNC: 26 MMOL/L
CREAT SERPL-MCNC: 1 MG/DL
DIFFERENTIAL METHOD: NORMAL
EOSINOPHIL # BLD AUTO: 0.2 K/UL
EOSINOPHIL NFR BLD: 3 %
ERYTHROCYTE [DISTWIDTH] IN BLOOD BY AUTOMATED COUNT: 13 %
EST. GFR  (AFRICAN AMERICAN): >60 ML/MIN/1.73 M^2
EST. GFR  (NON AFRICAN AMERICAN): >60 ML/MIN/1.73 M^2
GLUCOSE SERPL-MCNC: 81 MG/DL
HCT VFR BLD AUTO: 41 %
HGB BLD-MCNC: 14.3 G/DL
LYMPHOCYTES # BLD AUTO: 2.4 K/UL
LYMPHOCYTES NFR BLD: 34.3 %
MAGNESIUM SERPL-MCNC: 2 MG/DL
MCH RBC QN AUTO: 30.1 PG
MCHC RBC AUTO-ENTMCNC: 34.9 %
MCV RBC AUTO: 86 FL
MONOCYTES # BLD AUTO: 0.8 K/UL
MONOCYTES NFR BLD: 11 %
NEUTROPHILS # BLD AUTO: 3.5 K/UL
NEUTROPHILS NFR BLD: 51 %
PHOSPHATE SERPL-MCNC: 3.6 MG/DL
PLATELET # BLD AUTO: 214 K/UL
PMV BLD AUTO: 9.9 FL
POTASSIUM SERPL-SCNC: 4.5 MMOL/L
PROT SERPL-MCNC: 7 G/DL
RBC # BLD AUTO: 4.75 M/UL
SODIUM SERPL-SCNC: 135 MMOL/L
WBC # BLD AUTO: 6.94 K/UL

## 2017-01-28 PROCEDURE — 99223 1ST HOSP IP/OBS HIGH 75: CPT | Mod: ,,, | Performed by: INTERNAL MEDICINE

## 2017-01-28 PROCEDURE — 80053 COMPREHEN METABOLIC PANEL: CPT

## 2017-01-28 PROCEDURE — 84100 ASSAY OF PHOSPHORUS: CPT

## 2017-01-28 PROCEDURE — 85025 COMPLETE CBC W/AUTO DIFF WBC: CPT

## 2017-01-28 PROCEDURE — 20600001 HC STEP DOWN PRIVATE ROOM

## 2017-01-28 PROCEDURE — 83735 ASSAY OF MAGNESIUM: CPT

## 2017-01-28 PROCEDURE — 63600175 PHARM REV CODE 636 W HCPCS: Performed by: INTERNAL MEDICINE

## 2017-01-28 PROCEDURE — 99900031 HC PATIENT EDUCATION (STAT)

## 2017-01-28 PROCEDURE — 25000003 PHARM REV CODE 250: Performed by: INTERNAL MEDICINE

## 2017-01-28 PROCEDURE — 36415 COLL VENOUS BLD VENIPUNCTURE: CPT

## 2017-01-28 RX ADMIN — HEPARIN SODIUM 5000 UNITS: 5000 INJECTION, SOLUTION INTRAVENOUS; SUBCUTANEOUS at 10:01

## 2017-01-28 RX ADMIN — DOBUTAMINE IN DEXTROSE 5 MCG/KG/MIN: 200 INJECTION, SOLUTION INTRAVENOUS at 07:01

## 2017-01-28 RX ADMIN — FUROSEMIDE 20 MG: 20 TABLET ORAL at 08:01

## 2017-01-28 RX ADMIN — HEPARIN SODIUM 5000 UNITS: 5000 INJECTION, SOLUTION INTRAVENOUS; SUBCUTANEOUS at 05:01

## 2017-01-28 NOTE — PROGRESS NOTES
"Hospital Day: 2    Subjective:  Interval History: Pt doing well this am. No acute events overnight    Scheduled Meds:   furosemide  20 mg Oral Daily    heparin (porcine)  5,000 Units Subcutaneous Q8H     Continuous Infusions:   DOBUTamine 5 mcg/kg/min (01/27/17 2101)     PRN Meds:dextrose 50%, dextrose 50%, glucagon (human recombinant), glucose, glucose    Objective:  Vital signs in last 24 hours:   Temp:  [97.5 °F (36.4 °C)-98.5 °F (36.9 °C)] 97.6 °F (36.4 °C)  Pulse:  [59-84] 74  Resp:  [16-18] 16  SpO2:  [97 %-99 %] 99 %  BP: ()/(50-67) 98/50    Intake/Output last 3 shifts:  I/O last 3 completed shifts:  In: 0   Out: 700 [Urine:700]  Intake/Output this shift:       Physical Exam:  Visit Vitals    BP (!) 98/50 (BP Location: Left arm, Patient Position: Lying, BP Method: Automatic)    Pulse 74    Temp 97.6 °F (36.4 °C) (Oral)    Resp 16    Ht 5' 7" (1.702 m)    Wt 61.9 kg (136 lb 7.4 oz)    SpO2 99%    BMI 21.37 kg/m2     General appearance: alert, appears stated age and cooperative  Neck: No JVD, supple, symmetrical, trachea midline, R IJ in place  Lungs: clear to auscultation bilaterally  Heart: regular rate and rhythm, S1, S2 normal, no murmur, click, rub or gallop  Abdomen: soft, non-tender; bowel sounds normal; no masses,  no organomegaly  Extremities: extremities normal, atraumatic, no cyanosis or edema  Neurologic: Grossly normal    2DE 8/11/16:  CONCLUSIONS     1 - Severe left ventricular enlargement.     2 - Severely depressed left ventricular systolic function (EF 10-15%).     3 - Eccentric hypertrophy.     4 - Left ventricular diastolic dysfunction.     5 - Mild left atrial enlargement.     6 - Right ventricle is upper limit of normal in size with normal systolic function.     7 - Mild to moderate mitral regurgitation.     8 - Mild tricuspid regurgitation.     9 - Trivial pulmonic regurgitation.     10 - The estimated PA systolic pressure is 35 mmHg.     [facility] 1/16/17:  Hemodynamic " Results  AOPRES: 94/64 (74)  AOSAT: 98  FICKCI: 1.01  FICKCO: 1.81  PAPRES: 60/31 (41)  PASAT: 36  PVR: 4.99  PWPRES: 37/40 (32)  RAPRES: 22/21 (17)  RVPRES: 60/20, 20  SVR: 31.58/2528  PWSAT: 92    Lab Review  Lab Results   Component Value Date     (L) 01/28/2017    K 4.5 01/28/2017     01/28/2017    CO2 26 01/28/2017    BUN 18 01/28/2017    CREATININE 1.0 01/28/2017    CALCIUM 9.2 01/28/2017     Lab Results   Component Value Date    WBC 6.94 01/28/2017    HGB 14.3 01/28/2017    HCT 41.0 01/28/2017    MCV 86 01/28/2017     01/28/2017            Assessment/Plan:  25 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 He was admitted for PICC removal, IABP, and planned LVAD implantation.    Cardiogenic Shock due to Acute on Chronic Systolic HF, DCM, NYHA FC IV  -RHC 1/16 showed low CO/CI and elevated filling pressures- results above. Cont  5 mcg/kg/min.  SVO2 73 this am. CVP 3-5 overnight. Transitioned from Lasix drip to IVP yesterday- decrease to Lasix 40 IVP daily today  -GDMT: holding ACE and BB due to upcoming surgery and   -Does not have ICD- will need to discuss timing of this vs Lifevest  -f/u cultures  - plan to transfer to ICU tomorrow for planned IABP on Monday morning        Active Hospital Problems    Diagnosis  POA    Chronic systolic congestive heart failure [I50.22]  Yes      Resolved Hospital Problems    Diagnosis Date Resolved POA   No resolved problems to display.

## 2017-01-28 NOTE — PLAN OF CARE
Problem: Patient Care Overview  Goal: Plan of Care Review  Outcome: Ongoing (interventions implemented as appropriate)  Patient progressing toward all goals. Plan of care discussed with patient, no questions at this time. Patient ambulating independently, fall precautions in place. VS numbers WNL. Awaiting VAD on Tuesday;  infusing; PICC D/C; Will monitor.

## 2017-01-28 NOTE — PLAN OF CARE
Problem: Patient Care Overview  Goal: Plan of Care Review  Outcome: Ongoing (interventions implemented as appropriate)  Pt free of falls and injury this shift. POC reviewed with pt and wife at bedside, verbalized understanding. MIKHAIL PICC pulled, pt tolerated well.  infusing at 5 mcg x 63 kg @ 9.45 mL/hr. VSS, NAD noted at this time. Will continue to monitor.

## 2017-01-28 NOTE — PROGRESS NOTES
Patient Consulted by CTTS:     The following was discussed by the Tobacco Treatment Specialist:  ? Relevance of Quitting  ? Risk to Health  ? Long Term Risk  ? Risk for Others  ? Rewards of Quitting  ? Motivation Intervention to Quit          Pt stated he quit smoking a month ago. Pamphlets were given on the program if needed for future.

## 2017-01-29 LAB
ALBUMIN SERPL BCP-MCNC: 4.2 G/DL
ALP SERPL-CCNC: 70 U/L
ALT SERPL W/O P-5'-P-CCNC: 36 U/L
ANION GAP SERPL CALC-SCNC: 13 MMOL/L
AST SERPL-CCNC: 26 U/L
BASOPHILS # BLD AUTO: 0.03 K/UL
BASOPHILS NFR BLD: 0.4 %
BILIRUB SERPL-MCNC: 0.6 MG/DL
BUN SERPL-MCNC: 18 MG/DL
CALCIUM SERPL-MCNC: 9.1 MG/DL
CHLORIDE SERPL-SCNC: 100 MMOL/L
CO2 SERPL-SCNC: 19 MMOL/L
CREAT SERPL-MCNC: 0.9 MG/DL
DIFFERENTIAL METHOD: NORMAL
EOSINOPHIL # BLD AUTO: 0.3 K/UL
EOSINOPHIL NFR BLD: 3.9 %
ERYTHROCYTE [DISTWIDTH] IN BLOOD BY AUTOMATED COUNT: 12.7 %
EST. GFR  (AFRICAN AMERICAN): >60 ML/MIN/1.73 M^2
EST. GFR  (NON AFRICAN AMERICAN): >60 ML/MIN/1.73 M^2
GLUCOSE SERPL-MCNC: 83 MG/DL
HCT VFR BLD AUTO: 42.6 %
HGB BLD-MCNC: 14.4 G/DL
LYMPHOCYTES # BLD AUTO: 2 K/UL
LYMPHOCYTES NFR BLD: 29.8 %
MAGNESIUM SERPL-MCNC: 2 MG/DL
MCH RBC QN AUTO: 29.7 PG
MCHC RBC AUTO-ENTMCNC: 33.8 %
MCV RBC AUTO: 88 FL
MONOCYTES # BLD AUTO: 0.4 K/UL
MONOCYTES NFR BLD: 6.1 %
NEUTROPHILS # BLD AUTO: 4 K/UL
NEUTROPHILS NFR BLD: 59.5 %
PHOSPHATE SERPL-MCNC: 3.4 MG/DL
PLATELET # BLD AUTO: 212 K/UL
PMV BLD AUTO: 9.8 FL
POTASSIUM SERPL-SCNC: 4.1 MMOL/L
PROT SERPL-MCNC: 6.9 G/DL
RBC # BLD AUTO: 4.85 M/UL
SODIUM SERPL-SCNC: 132 MMOL/L
WBC # BLD AUTO: 6.71 K/UL

## 2017-01-29 PROCEDURE — 20000000 HC ICU ROOM

## 2017-01-29 PROCEDURE — 99232 SBSQ HOSP IP/OBS MODERATE 35: CPT | Mod: ,,, | Performed by: INTERNAL MEDICINE

## 2017-01-29 PROCEDURE — 83735 ASSAY OF MAGNESIUM: CPT

## 2017-01-29 PROCEDURE — 84100 ASSAY OF PHOSPHORUS: CPT

## 2017-01-29 PROCEDURE — 80053 COMPREHEN METABOLIC PANEL: CPT

## 2017-01-29 PROCEDURE — 97161 PT EVAL LOW COMPLEX 20 MIN: CPT | Performed by: PHYSICAL THERAPIST

## 2017-01-29 PROCEDURE — 25000003 PHARM REV CODE 250: Performed by: INTERNAL MEDICINE

## 2017-01-29 PROCEDURE — 36415 COLL VENOUS BLD VENIPUNCTURE: CPT

## 2017-01-29 PROCEDURE — 97165 OT EVAL LOW COMPLEX 30 MIN: CPT

## 2017-01-29 PROCEDURE — 85025 COMPLETE CBC W/AUTO DIFF WBC: CPT

## 2017-01-29 RX ADMIN — FUROSEMIDE 20 MG: 20 TABLET ORAL at 08:01

## 2017-01-29 NOTE — PLAN OF CARE
Problem: Occupational Therapy Goal  Goal: Occupational Therapy Goal  Wm and D/C OT 1/29/17.  Outcome: Outcome(s) achieved Date Met:  01/29/17  Wm and D/C OT 1/29/17

## 2017-01-29 NOTE — PROGRESS NOTES
"Hospital Day: 3    Subjective:  Interval History: Pt doing well this am. No acute events overnight    Scheduled Meds:   furosemide  20 mg Oral Daily    heparin (porcine)  5,000 Units Subcutaneous Q8H     Continuous Infusions:   DOBUTamine 5 mcg/kg/min (01/28/17 1925)     PRN Meds:dextrose 50%, dextrose 50%, glucagon (human recombinant), glucose, glucose    Objective:  Vital signs in last 24 hours:   Temp:  [97.5 °F (36.4 °C)-97.8 °F (36.6 °C)] 97.6 °F (36.4 °C)  Pulse:  [68-99] 98  Resp:  [16-18] 18  SpO2:  [97 %-99 %] 97 %  BP: ()/(50-75) 109/66    Intake/Output last 3 shifts:  I/O last 3 completed shifts:  In: 900 [P.O.:900]  Out: 3051 [Urine:3050; Stool:1]  Intake/Output this shift:       Physical Exam:  Visit Vitals    /66 (BP Location: Left arm, Patient Position: Lying, BP Method: Automatic)    Pulse 98    Temp 97.6 °F (36.4 °C) (Oral)    Resp 18    Ht 5' 7" (1.702 m)    Wt 62.1 kg (136 lb 12.7 oz)    SpO2 97%    BMI 21.43 kg/m2     General appearance: alert, appears stated age and cooperative  Neck: No JVD, supple, symmetrical, trachea midline, R IJ in place  Lungs: clear to auscultation bilaterally  Heart: regular rate and rhythm, S1, S2 normal, no murmur, click, rub or gallop  Abdomen: soft, non-tender; bowel sounds normal; no masses,  no organomegaly  Extremities: extremities normal, atraumatic, no cyanosis or edema  Neurologic: Grossly normal    2DE 8/11/16:  CONCLUSIONS     1 - Severe left ventricular enlargement.     2 - Severely depressed left ventricular systolic function (EF 10-15%).     3 - Eccentric hypertrophy.     4 - Left ventricular diastolic dysfunction.     5 - Mild left atrial enlargement.     6 - Right ventricle is upper limit of normal in size with normal systolic function.     7 - Mild to moderate mitral regurgitation.     8 - Mild tricuspid regurgitation.     9 - Trivial pulmonic regurgitation.     10 - The estimated PA systolic pressure is 35 mmHg.     RHC " 1/16/17:  Hemodynamic Results  AOPRES: 94/64 (74)  AOSAT: 98  FICKCI: 1.01  FICKCO: 1.81  PAPRES: 60/31 (41)  PASAT: 36  PVR: 4.99  PWPRES: 37/40 (32)  RAPRES: 22/21 (17)  RVPRES: 60/20, 20  SVR: 31.58/2528  PWSAT: 92    Lab Review  Lab Results   Component Value Date     (L) 01/28/2017    K 4.5 01/28/2017     01/28/2017    CO2 26 01/28/2017    BUN 18 01/28/2017    CREATININE 1.0 01/28/2017    CALCIUM 9.2 01/28/2017     Lab Results   Component Value Date    WBC 6.71 01/29/2017    HGB 14.4 01/29/2017    HCT 42.6 01/29/2017    MCV 88 01/29/2017     01/29/2017            Assessment/Plan:  27 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 He was admitted for PICC removal, IABP, and planned LVAD implantation.    Cardiogenic Shock due to Acute on Chronic Systolic HF, DCM, NYHA FC IV  -RHC 1/16 showed low CO/CI and elevated filling pressures- results above. Cont  5 mcg/kg/min.  SVO2 73 this am. CVP 3-5 overnight. Transitioned from Lasix drip to IVP yesterday- decrease to Lasix 40 IVP daily today  -GDMT: holding ACE and BB due to upcoming surgery and   -Does not have ICD- will need to discuss timing of this vs Lifevest  -f/u cultures: NGTD  - plan to transfer to ICU today for planned IABP in am. NPO @[facility]      Active Hospital Problems    Diagnosis  POA    Chronic systolic congestive heart failure [I50.22]  Yes      Resolved Hospital Problems    Diagnosis Date Resolved POA   No resolved problems to display.

## 2017-01-29 NOTE — PLAN OF CARE
Problem: Patient Care Overview  Goal: Plan of Care Review  Outcome: Ongoing (interventions implemented as appropriate)  Patient progressing toward all goals. Plan of care discussed with patient, no questions at this time. Patient ambulating independently, fall precautions in place. VS  numbers WNL. IABP to be placed tomorrow and pt scheduled for VAD on Tuesday; Will monitor.

## 2017-01-29 NOTE — PLAN OF CARE
Problem: Physical Therapy Goal  Goal: Physical Therapy Goal  Outcome: Outcome(s) achieved Date Met:  01/29/17  Pt has no current PT needs.  He will resume PT after his LVAD.     Steven Toledo, PT  1/29/2017

## 2017-01-29 NOTE — PT/OT/SLP EVAL
Physical Therapy  Evaluation    Mert Samayoa   MRN: [id number]   Admitting Diagnosis: heart failure with plan for LVAD    PT Received On: 17  PT Start Time: 901     PT Stop Time: 910    PT Total Time (min): 9 min       Billable Minutes:  Evaluation 9    Diagnosis: heart failure plan for LVAD on Tuesday      Past Medical History   Diagnosis Date    Cardiogenic shock 2017    Cardiomyopathy      Familial cardiomyopathy    CHF (congestive heart failure)     Essential hypertension 2016    Familial Cardiomyopathy      Familial cardiomyopathy       History reviewed. No pertinent past surgical history.    Referring physician: Ross  Date referred to PT: 2017    General Precautions: Standard,    Orthopedic Precautions: N/A   Braces:         Do you have any cultural, spiritual, Sikhism conflicts, given your current situation?: no    Patient History:  Lives With: alone  Transportation Available: family or friend will provide  Living Environment Comment: Patient lives alone but has support from friends and family as needed.    Equipment Currently Used at Home: none  DME owned (not currently used): none    Previous Level of Function:  Ambulation Skills: independent  Transfer Skills: independent  ADL Skills: independent  Work/Leisure Activity: independent    Subjective:  Communicated with RN prior to session.    Pain Ratin/10               Pain Rating Post-Intervention: 0/10    Objective:   Patient found with: telemetry     Cognitive Exam:  Oriented to: Person, Place, Time and Situation    Follows Commands/attention: Follows multistep  commands  Communication: clear/fluent  Safety awareness/insight to disability: intact    Physical Exam:  Postural examination/scapula alignment: Rounded shoulder    Skin integrity: Visible skin intact  Edema: None noted     Sensation:   Intact  Lower Extremity Range of Motion:  Right Lower Extremity: WNL  Left Lower Extremity: WNL    Lower Extremity  Strength:  Right Lower Extremity: WNL  Left Lower Extremity: WNL     Fine motor coordination:  Intact    Gross motor coordination: WFL    Functional Mobility:  Bed Mobility:  Supine to Sit: Independent  Sit to Supine: Independent    Transfers:  Sit <> Stand Assistance: Independent (10x sit to stand performed withotu UE support)  Sit <> Stand Assistive Device: No Assistive Device    Gait:   Gait Distance: 400ft.    Assistance 1: Independent  Gait Assistive Device: No device  Gait Pattern: reciprocal      Balance:   Static Sit: NORMAL: No deviations seen in posture held statically  Dynamic Sit: NORMAL: No deviations seen in posture held dynamically  Static Stand: NORMAL: No deviations seen in posture held statically  Dynamic stand: NORMAL: No deviations seen in posture held dynamically    Therapeutic Activities and Exercises:  pateint educated on importance of good participation with therapy after VAD.  PT also reviewed sternal precautions to be adhered to post op.    AM-PAC 6 CLICK MOBILITY  How much help from another person does this patient currently need?   1 = Unable, Total/Dependent Assistance  2 = A lot, Maximum/Moderate Assistance  3 = A little, Minimum/Contact Guard/Supervision  4 = None, Modified Ward/Independent    Turning over in bed (including adjusting bedclothes, sheets and blankets)?: 4  Sitting down on and standing up from a chair with arms (e.g., wheelchair, bedside commode, etc.): 4  Moving from lying on back to sitting on the side of the bed?: 4  Moving to and from a bed to a chair (including a wheelchair)?: 4  Need to walk in hospital room?: 4  Climbing 3-5 steps with a railing?: 4  Total Score: 24     AM-PAC Raw Score CMS G-Code Modifier Level of Impairment Assistance   6 % Total / Unable   7 - 9 CM 80 - 100% Maximal Assist   10 - 14 CL 60 - 80% Moderate Assist   15 - 19 CK 40 - 60% Moderate Assist   20 - 22 CJ 20 - 40% Minimal Assist   23 CI 1-20% SBA / CGA   24 CH 0%  Independent/ Mod I     Patient left up in chair with all lines intact and call button in reach.    Assessment:   Mert Samayoa is a 26 y.o. male with a medical diagnosis of heart failure.  He presents with medical plan for having LVAD placed on Tuesday.  He tolerated PT eval without difficulty and is currently independent.  He is able to transfer and perform bed mobility withotu UE support and will likely progress very well after LVAD..    Rehab identified problem list/impairments:      Rehab potential is excellent.    Activity tolerance: Excellent    Discharge recommendations: Discharge Facility/Level Of Care Needs:  (TBD after VAD placement)     Barriers to discharge: Barriers to Discharge: None    Equipment recommendations: Equipment Needed After Discharge:  (TBD after VAD placement)     PLAN:    Patient to be d/c from acute PT to resume therapy after LVAD    Plan of Care reviewed with: patient          Steven Toledo, PT  01/29/2017

## 2017-01-29 NOTE — PT/OT/SLP EVAL
Occupational Therapy  Evaluation/Discharge    Mert Samayoa   MRN: [id number]   Admitting Diagnosis: heart failure with plan for LVAD    OT Date of Treatment: 17   OT Start Time: 901  OT Stop Time: 910  OT Total Time (min): 9 min    Billable Minutes:  Evaluation 9 minutes    Diagnosis: heart failure with plan for LVAD     Past Medical History   Diagnosis Date    Cardiogenic shock 2017    Cardiomyopathy      Familial cardiomyopathy    CHF (congestive heart failure)     Essential hypertension 2016    Familial Cardiomyopathy      Familial cardiomyopathy       History reviewed. No pertinent past surgical history.    Referring physician: Dr. Daniels  Date referred to OT: 17    General Precautions: Standard,    Orthopedic Precautions: N/A  Braces: N/A    Do you have any cultural, spiritual, Gnosticism conflicts, given your current situation?: None     Patient History:  Living Environment  Lives With: alone  Living Environment Comment: Pt lives alone and will have family support after surgery.  Equipment Currently Used at Home: none    Prior level of function:   Bed Mobility/Transfers: independent  Grooming: independent  Bathing: independent  Upper Body Dressing: independent  Lower Body Dressing: independent  Toileting: independent  Home Management Skills: independent      Subjective:  Communicated with RN prior to session.    Chief Complaint: None stated  Patient/Family stated goals: None stated    Pain Ratin/10  Pain Rating Post-Intervention: 0/10    Objective:  Patient found with: peripheral IV, telemetry    Cognitive Exam:  Oriented to: Person, Place, Time and Situation  Follows Commands/attention: Follows multistep commands  Communication: clear/fluent  Memory:  No Deficits noted  Safety awareness/insight to disability: intact  Coping skills/emotional control: Appropriate to situation    Visual/perceptual:  Intact    Physical Exam:  Postural examination/scapula alignment: No  "postural abnormalities identified  Skin integrity: Visible skin intact  Edema: None noted     Sensation:   Intact    Upper Extremity Range of Motion:  Right Upper Extremity: WNL  Left Upper Extremity: WNL    Upper Extremity Strength:  Right Upper Extremity: WNL  Left Upper Extremity: WNL   Strength: WNL    Fine motor coordination:   Intact    Functional Mobility:  Bed Mobility:  Supine to Sit: Independent    Transfers:  Sit <> Stand Assistance: Independent (10 x sit to stand performed from EOB (I) without use of B UE in prep for sternal prec)  Sit <> Stand Assistive Device: No Assistive Device    Functional Ambulation: ~400 ft (I) no AD    Activities of Daily Living:  LE Dressing Level of Assistance: Independent    Balance:   Static Sit: NORMAL: No deviations seen in posture held statically  Dynamic Sit: NORMAL: No deviations seen in posture held dynamically  Static Stand: NORMAL: No deviations seen in posture held statically  Dynamic stand: NORMAL: No deviations seen in posture held dynamically    Therapeutic Activities and Exercises:  OT/PT eval; pateint educated on importance of good participation with therapy after VAD; also reviewed sternal precautions to be adhered to post op.    AM-PAC 6 CLICK ADL  How much help from another person does this patient currently need?  1 = Unable, Total/Dependent Assistance  2 = A lot, Maximum/Moderate Assistance  3 = A little, Minimum/Contact Guard/Supervision  4 = None, Modified [place]/Independent    Putting on and taking off regular lower body clothing? : 4  Bathing (including washing, rinsing, drying)?: 4  Toileting, which includes using toilet, bedpan, or urinal? : 4  Putting on and taking off regular upper body clothing?: 4  Taking care of personal grooming such as brushing teeth?: 4  Eating meals?: 4  Total Score: 24    AM-PAC Raw Score CMS "G-Code Modifier Level of Impairment Assistance   6 % Total / Unable   7 - 9 CM 80 - 100% Maximal Assist   10 - 14 " CL 60 - 80% Moderate Assist   15 - 19 CK 40 - 60% Moderate Assist   20 - 22 CJ 20 - 40% Minimal Assist   23 CI 1-20% SBA / CGA   24 CH 0% Independent/ Mod I       Patient left EOB with all lines intact and call button in reach    Assessment:  Mert Samayoa is a 26 y.o. male with a medical diagnosis of heart failure with LVAD work-up and presents with WNL strength and endurance needed for ADLs and functional mobility. OT to resume therapy post VAD operation and determine DME/rehab needs.      Rehab potential is good.    Activity tolerance: Good    Discharge recommendations: Discharge Facility/Level Of Care Needs:  (TBD post VAD)     Barriers to discharge: Barriers to Discharge: None    Equipment recommendations:  (TBD post VAD)     GOALS:   Occupational Therapy Goals     Not on file      Multidisciplinary Problems (Resolved)        Problem: Occupational Therapy Goal    Goal Priority Disciplines Outcome Interventions   Occupational Therapy Goal   (Resolved)     OT, PT/OT Outcome(s) achieved    Description:  Eval and D/C OT 1/29/17.                PLAN:    (D/C OT)   Plan of Care reviewed with: patient         Shawna ALISA Paez  01/29/2017

## 2017-01-30 ENCOUNTER — RESEARCH ENCOUNTER (OUTPATIENT)
Dept: RESEARCH | Facility: HOSPITAL | Age: 27
End: 2017-01-30

## 2017-01-30 ENCOUNTER — ANESTHESIA EVENT (OUTPATIENT)
Dept: SURGERY | Facility: HOSPITAL | Age: 27
DRG: 001 | End: 2017-01-30
Payer: COMMERCIAL

## 2017-01-30 LAB
ALBUMIN SERPL BCP-MCNC: 4.2 G/DL
ALP SERPL-CCNC: 74 U/L
ALT SERPL W/O P-5'-P-CCNC: 37 U/L
ANION GAP SERPL CALC-SCNC: 14 MMOL/L
AST SERPL-CCNC: 28 U/L
BASOPHILS # BLD AUTO: 0.02 K/UL
BASOPHILS NFR BLD: 0.3 %
BILIRUB SERPL-MCNC: 0.5 MG/DL
BUN SERPL-MCNC: 18 MG/DL
CALCIUM SERPL-MCNC: 9.2 MG/DL
CHLORIDE SERPL-SCNC: 102 MMOL/L
CO2 SERPL-SCNC: 19 MMOL/L
CREAT SERPL-MCNC: 1 MG/DL
DIFFERENTIAL METHOD: NORMAL
EOSINOPHIL # BLD AUTO: 0.3 K/UL
EOSINOPHIL NFR BLD: 3.8 %
ERYTHROCYTE [DISTWIDTH] IN BLOOD BY AUTOMATED COUNT: 12.9 %
EST. GFR  (AFRICAN AMERICAN): >60 ML/MIN/1.73 M^2
EST. GFR  (NON AFRICAN AMERICAN): >60 ML/MIN/1.73 M^2
GLUCOSE SERPL-MCNC: 84 MG/DL
HCT VFR BLD AUTO: 40 %
HGB BLD-MCNC: 14 G/DL
INR PPP: 1.1
LYMPHOCYTES # BLD AUTO: 1.9 K/UL
LYMPHOCYTES NFR BLD: 26.5 %
MAGNESIUM SERPL-MCNC: 2.1 MG/DL
MCH RBC QN AUTO: 29.9 PG
MCHC RBC AUTO-ENTMCNC: 35 %
MCV RBC AUTO: 86 FL
MONOCYTES # BLD AUTO: 0.6 K/UL
MONOCYTES NFR BLD: 7.7 %
NEUTROPHILS # BLD AUTO: 4.5 K/UL
NEUTROPHILS NFR BLD: 61.4 %
PHOSPHATE SERPL-MCNC: 4.2 MG/DL
PLATELET # BLD AUTO: 215 K/UL
PMV BLD AUTO: 9.8 FL
POTASSIUM SERPL-SCNC: 3.9 MMOL/L
PROT SERPL-MCNC: 7.1 G/DL
PROTHROMBIN TIME: 11.6 SEC
RBC # BLD AUTO: 4.68 M/UL
SODIUM SERPL-SCNC: 135 MMOL/L
WBC # BLD AUTO: 7.28 K/UL

## 2017-01-30 PROCEDURE — 5A02210 ASSISTANCE WITH CARDIAC OUTPUT USING BALLOON PUMP, CONTINUOUS: ICD-10-PCS | Performed by: INTERNAL MEDICINE

## 2017-01-30 PROCEDURE — 85025 COMPLETE CBC W/AUTO DIFF WBC: CPT

## 2017-01-30 PROCEDURE — 83735 ASSAY OF MAGNESIUM: CPT

## 2017-01-30 PROCEDURE — 94760 N-INVAS EAR/PLS OXIMETRY 1: CPT

## 2017-01-30 PROCEDURE — C1894 INTRO/SHEATH, NON-LASER: HCPCS

## 2017-01-30 PROCEDURE — 25000003 PHARM REV CODE 250: Performed by: INTERNAL MEDICINE

## 2017-01-30 PROCEDURE — 20000000 HC ICU ROOM

## 2017-01-30 PROCEDURE — 80053 COMPREHEN METABOLIC PANEL: CPT

## 2017-01-30 PROCEDURE — 25000003 PHARM REV CODE 250

## 2017-01-30 PROCEDURE — 27100094 HC IABP, SET-UP

## 2017-01-30 PROCEDURE — 33967 INSERT I-AORT PERCUT DEVICE: CPT | Mod: ,,, | Performed by: INTERNAL MEDICINE

## 2017-01-30 PROCEDURE — 99233 SBSQ HOSP IP/OBS HIGH 50: CPT | Mod: ,,, | Performed by: INTERNAL MEDICINE

## 2017-01-30 PROCEDURE — 85002 BLEEDING TIME TEST: CPT

## 2017-01-30 PROCEDURE — 25000003 PHARM REV CODE 250: Performed by: HOSPITALIST

## 2017-01-30 PROCEDURE — 85610 PROTHROMBIN TIME: CPT

## 2017-01-30 PROCEDURE — 84100 ASSAY OF PHOSPHORUS: CPT

## 2017-01-30 PROCEDURE — 63600175 PHARM REV CODE 636 W HCPCS

## 2017-01-30 PROCEDURE — 99223 1ST HOSP IP/OBS HIGH 75: CPT | Mod: ,,, | Performed by: CLINICAL NURSE SPECIALIST

## 2017-01-30 RX ORDER — RAMELTEON 8 MG/1
8 TABLET ORAL ONCE
Status: COMPLETED | OUTPATIENT
Start: 2017-01-30 | End: 2017-01-30

## 2017-01-30 RX ORDER — ALPRAZOLAM 0.5 MG/1
0.5 TABLET ORAL ONCE
Status: COMPLETED | OUTPATIENT
Start: 2017-01-30 | End: 2017-01-30

## 2017-01-30 RX ORDER — OXYCODONE AND ACETAMINOPHEN 5; 325 MG/1; MG/1
1 TABLET ORAL ONCE
Status: COMPLETED | OUTPATIENT
Start: 2017-01-30 | End: 2017-01-30

## 2017-01-30 RX ADMIN — ALPRAZOLAM 0.5 MG: 0.5 TABLET ORAL at 10:01

## 2017-01-30 RX ADMIN — OXYCODONE HYDROCHLORIDE AND ACETAMINOPHEN 1 TABLET: 5; 325 TABLET ORAL at 09:01

## 2017-01-30 RX ADMIN — HEPARIN SODIUM 5000 UNITS: 5000 INJECTION, SOLUTION INTRAVENOUS; SUBCUTANEOUS at 09:01

## 2017-01-30 RX ADMIN — RAMELTEON 8 MG: 8 TABLET, FILM COATED ORAL at 10:01

## 2017-01-30 RX ADMIN — FUROSEMIDE 20 MG: 20 TABLET ORAL at 09:01

## 2017-01-30 NOTE — PROGRESS NOTES
Met pt and wife in admit office.  Both AAAO.  Reviewed VAD education forms with them, all forms signed.  All questions answered to their satisfaction as evidence by verbal acknowledgement. Continue to follow up on pt Monday

## 2017-01-30 NOTE — PROGRESS NOTES
Pt and wife CORY.  Pt asked for a [employer] DVD.  He wanted to start learning the alarms. They had a few questions about timing , ect for tomorrow which were answered.  DVD brought back to their room a short while after.

## 2017-01-30 NOTE — PROGRESS NOTES
Progress Note  Heart Transplant Service    Admit Date: 1/27/2017   LOS: 3 days     SUBJECTIVE:     Follow up for: Acute on chronic combined systolic and diastolic heart failure    Interval History: Patient had no complaints this morning.  Reports he feels well and is anxious about his LVAD placement scheduled for tomorrow     Scheduled Meds:   furosemide  20 mg Oral Daily    heparin (porcine)  5,000 Units Subcutaneous Q8H     Continuous Infusions:   DOBUTamine 5 mcg/kg/min (01/30/17 1200)     PRN Meds:dextrose 50%, dextrose 50%, glucagon (human recombinant), glucose, glucose, vancomycin (VANCOCIN) IVPB    Review of patient's allergies indicates:  No Known Allergies    OBJECTIVE:     Vital Signs (Most Recent)  Temp: 98.3 °F (36.8 °C) (01/30/17 1100)  Pulse: 80 (01/30/17 1200)  Resp: 17 (01/30/17 1200)  BP: 117/79 (01/30/17 1200)  SpO2: 99 % (01/30/17 1200)    Vital Signs Range (Last 24H):  Temp:  [98 °F (36.7 °C)-98.5 °F (36.9 °C)]   Pulse:  []   Resp:  [11-25]   BP: (100-121)/(56-79)   SpO2:  [98 %-100 %]     I & O (Last 24H):    Intake/Output Summary (Last 24 hours) at 01/30/17 1256  Last data filed at 01/30/17 1200   Gross per 24 hour   Intake          1198.34 ml   Output             1075 ml   Net           123.34 ml            Telemetry: sinus  Constitutional: laying in bed NAD  Neck: No JVD present. No thyromegaly present.   Cardiovascular: Normal rate, regular rhythm and intact distal pulses.  No murmurs heard.   Pulmonary/Chest:CTA  Abdominal: + BS, exhibits no distension. There is no tenderness. There is no rebound.   Extremities: no cyanosis, clubbing or edema      Labs:       Recent Labs  Lab 01/28/17  0344 01/29/17  0532 01/30/17  0525   WBC 6.94 6.71 7.28   HGB 14.3 14.4 14.0   HCT 41.0 42.6 40.0    212 215   LYMPH 34.3  2.4 29.8  2.0 26.5  1.9   MONO 11.0  0.8 6.1  0.4 7.7  0.6   EOSINOPHIL 3.0 3.9 3.8         Recent Labs  Lab 01/27/17  1829 01/30/17  0525   INR 1.1 1.1           Recent Labs  Lab 01/28/17  0344 01/29/17  0532 01/30/17  0525   GLU 81 83 84   CALCIUM 9.2 9.1 9.2   ALBUMIN 4.2 4.2 4.2   PROT 7.0 6.9 7.1   * 132* 135*   K 4.5 4.1 3.9   CO2 26 19* 19*    100 102   BUN 18 18 18   CREATININE 1.0 0.9 1.0   ALKPHOS 73 70 74   ALT 40 36 37   AST 28 26 28   BILITOT 0.5 0.6 0.5   MG 2.0 2.0 2.1   PHOS 3.6 3.4 4.2     Estimated Creatinine Clearance: 98.3 mL/min (based on Cr of 1).    No results for input(s): BNP, BNPTRIAGEBLO in the last 168 hours.    No results for input(s): LDH in the last 168 hours.    Microbiology Results (last 7 days)     Procedure Component Value Units Date/Time    Blood culture (site 1) [[id number]] Collected:  01/27/17 1837    Order Status:  Completed Specimen:  Blood Updated:  01/29/17 2022     Blood Culture, Routine No Growth to date     Blood Culture, Routine No Growth to date     Blood Culture, Routine No Growth to date    Narrative:       Site # 1, aerobic and anaerobic (central line, if patient has  one)    Blood culture (site 2) [[id number]] Collected:  01/27/17 1828    Order Status:  Completed Specimen:  Blood Updated:  01/29/17 2022     Blood Culture, Routine No Growth to date     Blood Culture, Routine No Growth to date     Blood Culture, Routine No Growth to date    Narrative:       Site # 2, aerobic only            ASSESSMENT:     25 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 He was admitted for PICC removal, IABP, and planned LVAD implantation.    PLAN:     Cardiogenic Shock due to Acute on Chronic Systolic HF, DCM, NYHA FC IV  -RHC 1/16 showed low CO/CI and elevated filling pressures and patient on  5 mcg/kg/min at home.   -Diuresed with Lasix  infusion and transitioned to oral.  Net negative 1.4L  -GDMT: holding ACE and BB due to upcoming surgery and   -Does not have ICD- will need to discuss timing of this vs Lifevest  -f/u cultures: NGTD  -Plan for IABP placement today (Vanc ordered on call to cath lab) and LVAD tomorrow    HTN  -BP controlled    Prophylaxis   -heparin    Tamika Cannon PA-C  HTS spectralink: [phone]

## 2017-01-30 NOTE — OP NOTE
"    Post Cath Note  Referring Physician: Lashon Hawkins MD  Procedure: INSERTION-INTRAAORTIC BALLOON PUMP (IABP) (Left)       Access: Left CFA    See full report for further details    Intervention:   IABP placed to the L CFA. No complications occurred with procedure.    Post Cath Exam:     Visit Vitals    /79    Pulse 80    Temp 98.3 °F (36.8 °C) (Oral)    Resp 17    Ht 5' 7" (1.702 m)    Wt 62.1 kg (136 lb 12.7 oz)    SpO2 99%    BMI 21.43 kg/m2     No unusual pain, hematoma, thrill or bruit at vascular access site.  Distal pulse present without signs of ischemia.    Recommendations:   - Routine post-cath care  - Continue care as per primary team    Signed:  Navid Strickland MD, MPH  PGY-IV  Cardiovascular Disease Fellow      "

## 2017-01-30 NOTE — CONSULTS
Palliative Care Acknowledgement of Consult - 1/30/17 1121    Consult received.  Will touch base with team prior to seeing patient.  Full consult to follow.    Thank you for allowing us to be a part of the care of this patient.    Griselda Jj LCSW, [facility]

## 2017-01-30 NOTE — CONSULTS
Pre LVAD Goals of Care Consult Note  Palliative Care      Consult Requested By: Dr. Lundberg     Reason for Consult:  Pre LVAD Goals of Care    SUBJECTIVE:     History of Present Illness: Mert Samayoa is a 27 yo man with familial dilating cardiomyopathy. He presented to [facility] for routine follow up with complaints of worsening activity intolerance. Decreased PkVO2 from 42.0 to 23.9 (53% of predicted) and a increased BNP. TTE done in August showed decline in LVEF to 10-15% from > 30%.  Reports AMES has increased to the point he cannot walk 4 doors down to visit with mother. He continues to work but gets more tired recently.  Also reports more  nausea and appetite is fine. Admitted to [facility]  For planned LVAD   Past Medical History   Diagnosis Date    Cardiogenic shock 1/16/2017    Cardiomyopathy      Familial cardiomyopathy    CHF (congestive heart failure)     Essential hypertension 12/21/2016    Familial Cardiomyopathy      Familial cardiomyopathy      History reviewed. No pertinent past surgical history.  Family History   Problem Relation Age of Onset    Arthritis Mother     Heart disease Brother      cardiomyopathy and heart transplant    No Known Problems Father     Heart disease Brother      cardiomyopathy and heart transplanted twice    Birth defects Paternal Uncle      Social History   Substance Use Topics    Smoking status: Current Some Day Smoker     Packs/day: 1.00    Smokeless tobacco: None    Alcohol use 2.4 oz/week     4 Shots of liquor per week       OBJECTIVE:     Symptom Assessment:   Dyspnea on Exertion: patient may benefit from continuing   Dobutamine 500 mg in 250 cc D5W non- titrating continuous infusion     No other complaints of discomfort.        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 Most of his job is sedentary with occasional assisted lifting of approximately 40 lbs.  Has two brothers who have had heart transplants in the recent past.  Has a very good social support group of friends and family.      Spiritual:  Cristina and Belief -  Advent     I - Pat has a great cristina in God.   .  C - Community His community involvement has been through his work and has not been able lately to participate more     A - Address in Care Rabbi Sanchez notified and will see the patient       ASSESSMENT/Discussion:     Discussion conducted by JEFF Interiano  with the pt and  Wife Jaimie who reported their goals for health care for getting an LVAD are bridge to heart transplant and to continue to live his life. The chief concern/fear reported pertaining to receiving an LVAD and the impact on his/her life was going through all of this and not getting better.     The need for preparedness planning was discussed with special attention and in reference to:  a) device failure b) suboptimal QOL post LVAD procedure, c) impact on co- morbid conditions, and d) catastrophic complications such as stroke, renal failure/hemodialysis or other chronic critical illness. Mr. Samayoa has received the written education and states he has not spent much time reviewing it at this time  In particular, the following points should be noted in the event of:  1) Device failure: he understands this could be fatal and his goal is to treat  Everything that is treatable and if unable he wants to focus on spending the rest of his time with family without being in discomfort.   2) a) Post LVAD QOL goals are "I want to be able to walk down the street just four houses down to see my Mom.  To spend as much time with his nieces and nephews without being short of breath.  And to have a cheese burger again.    b) Chronic poor QOL is defined as being " "confined to his house and never being able to sit around the fire pit with his friends and family. To spend all of his time trying not to be sick instead of living his life        3)   Catastrophic Complications:  "do what you can do for me but don't let me suffer if I don't have to".              4)   Co-morbid conditions: hoping none of this will happen and his goal is to do what he has to do.  He is getting LVAD to live as long as              Possible. If this were to occur he wants to be treated in order to keep him comfortable.  Would not want to be on machines.         Impression:   Clinical Impression: During the discussion Mert and wife Jaimie  Demonstrated good insight/understanding concerning the risk vs benefits of obtaining an LVAD    Plan/ Recommendations:   1.  Rapport established  2.  Palliative care readiness assessment completed.  Palliative care will sign off.     Thank you for opportunity to participate in Mr. Samayoa's care     Signature: Carrie Franco   > 50% of_70  min visit spent in chart review, face to face discussion LVAD preparedness planning/ goals of care, symptom assessment, coordination of care and emotional support    "

## 2017-01-30 NOTE — CONSULTS
Interventional Cardiology Consult           Referring Physician:  Lashon Hawkins MD  Indication: Pre LVAD IABP insertion    HPI:   This is a 25 y/o man with PMHx of DCM with 2 brothers with history of OHTx who presented for a an elective scheduled admission prior to VAD.  He had a RHC on 1/17 and found to have low CO/CI. Initiated on  home infusion.  Intervention cardiology consulted for IABP insertion.      Past Medical History   Diagnosis Date    Cardiogenic shock 1/16/2017    Cardiomyopathy      Familial cardiomyopathy    CHF (congestive heart failure)     Essential hypertension 12/21/2016    Familial Cardiomyopathy      Familial cardiomyopathy      History reviewed. No pertinent past surgical history.  Family History   Problem Relation Age of Onset    Arthritis Mother     Heart disease Brother      cardiomyopathy and heart transplant    No Known Problems Father     Heart disease Brother      cardiomyopathy and heart transplanted twice    Birth defects Paternal Uncle      Social History   Substance Use Topics    Smoking status: Current Some Day Smoker     Packs/day: 1.00    Smokeless tobacco: None    Alcohol use 2.4 oz/week     4 Shots of liquor per week     Review of patient's allergies indicates:  No Known Allergies   furosemide  20 mg Oral Daily    heparin (porcine)  5,000 Units Subcutaneous Q8H       Review of Systems - History obtained from the patient  General ROS: positive for  - fatigue  Respiratory ROS: positive for - shortness of breath  Cardiovascular ROS: positive for - shortness of breath  Gastrointestinal ROS: no abdominal pain, change in bowel habits, or black or bloody stools  Neurological ROS: no TIA or stroke symptoms    OBJECTIVE:     Vitals:    01/30/17 0400 01/30/17 0700 01/30/17 0800 01/30/17 0900   BP: 107/72 102/63 100/63 112/74   Pulse: 67 67 67 92   Resp: 14 13 11 14   Temp:  98 °F (36.7 °C)     TempSrc:  Oral     SpO2: 100% 99% 100% 100%   Weight:       Height:            General appearance: alert, appears stated age, cooperative and no distress  Head: Normocephalic, without obvious abnormality, atraumatic  Neck: no adenopathy, no carotid bruit, no JVD, supple, symmetrical, trachea midline and thyroid not enlarged, symmetric, no tenderness/mass/nodules  Lungs:  clear to auscultation bilaterally  Heart: regular rate and rhythm, S1, S2 normal, no murmur, click, rub or gallop  Abdomen: soft, non-tender; bowel sounds normal; no masses,  no organomegaly  Extremities: extremities normal, atraumatic, no cyanosis or edema  Neurologic: Grossly normal    Vascular:   LEFT RIGHT   RADIAL 2+ 2+   ULNAR 2+ 2+   BRACHIAL 2+ 2+   FEMORAL 2+ 2+   DP 2+ 2+   TP 2+ 2+     LABS:     CBC with Diff:     Recent Labs  Lab 01/28/17  0344 01/29/17  0532 01/30/17  0525   WBC 6.94 6.71 7.28   HGB 14.3 14.4 14.0   HCT 41.0 42.6 40.0    212 215   LYMPH 34.3  2.4 29.8  2.0 26.5  1.9   MONO 11.0  0.8 6.1  0.4 7.7  0.6   EOSINOPHIL 3.0 3.9 3.8       COAG:    Recent Labs  Lab 01/27/17  1829 01/30/17  0525   INR 1.1 1.1       CMP:   Recent Labs  Lab 01/28/17  0344 01/29/17  0532 01/30/17  0525   GLU 81 83 84   CALCIUM 9.2 9.1 9.2   ALBUMIN 4.2 4.2 4.2   PROT 7.0 6.9 7.1   * 132* 135*   K 4.5 4.1 3.9   CO2 26 19* 19*    100 102   BUN 18 18 18   CREATININE 1.0 0.9 1.0   ALKPHOS 73 70 74   ALT 40 36 37   AST 28 26 28   BILITOT 0.5 0.6 0.5   MG 2.0 2.0 2.1   PHOS 3.6 3.4 4.2     Estimated Creatinine Clearance: 98.3 mL/min (based on Cr of 1).    .No results for input(s): CPK, TROPONINI, MB, BNP in the last 168 hours.    IMAGING:     EKGs: NSR    CXR: NAD    TTE:   CONCLUSIONS     1 - Eccentric LVH with severely depressed left ventricular systolic function (EF 10-15%).     2 - Left ventricular diastolic dysfunction.     3 - Normal right ventricular systolic function .     4 - Mild left atrial enlargement.     Prior Ischemic Evaluation: n/a    PROCEDURAL RISK STRATIFICATION:      Sedation   Prior Sedation: yes   History of Obstructive Sleep Apnea/SDB: yes    Pertinent Allergies:   Latex: no   ASA: no   Heparin-Induced Thrombocytopenia: no    IMPRESSION / PLAN:       26 year old male patient with DCM- end stage heart failure on transplant list admitted for VAD implant on 1/31/17. Hemodynamically stable an dwell compensated from HF at this point.  Will insert IABP( pre LVAD- 1/31/17) via left CFA later today. Procedure explained to patient and his wife at bedside. Questions answered. He agrees for the procedure to be done.  Consents signed and placed in chart.    Attending addendum to follow.    Kalyan Sanchez MD  [phone]    Kalyan Sanchez MD  [phone]

## 2017-01-30 NOTE — ANESTHESIA PREPROCEDURE EVALUATION
01/30/2017    Pre-operative evaluation for Procedure(s) (LRB):  INSERTION-LEFT VENTRICULAR ASSIST DEVICE (N/A)    Mert Samayoa is a 26 y.o. male with PMH of cardiogenic shock due to acute on chronic CHF, HTN and familial cardiomyopathy who is being evaluated for the above procedure. He went for IABP placement yesterday without any complications.    LDA: LFA 20G             RFA 20G     Prev airway: none on file     Drips: dobutamine 5 mcg/kg/min     Patient Active Problem List   Diagnosis    Cigarette smoker    Familial Cardiomyopathy    Chronic combined systolic and diastolic heart failure    Organ transplant candidate    Chronic systolic congestive heart failure       Review of patient's allergies indicates:  No Known Allergies     Current Facility-Administered Medications on File Prior to Encounter   Medication Dose Route Frequency Provider Last Rate Last Dose    dextrose 50% injection 12.5 g  12.5 g Intravenous PRN Shai Ortega MD        dextrose 50% injection 25 g  25 g Intravenous PRN Shai Ortega MD        DOBUTamine 500mg in D5W 250mL infusion (premix) (NON-TITRATING)  5 mcg/kg/min Intravenous Continuous Shai Ortega MD 9.5 mL/hr at 01/30/17 1200 5 mcg/kg/min at 01/30/17 1200    furosemide tablet 20 mg  20 mg Oral Daily Shai Ortega MD   20 mg at 01/30/17 0900    glucagon (human recombinant) injection 1 mg  1 mg Intramuscular PRN Shai Ortega MD        glucose chewable tablet 16 g  16 g Oral PRN Shai Ortega MD        glucose chewable tablet 24 g  24 g Oral PRN Shai Ortega MD        heparin (porcine) injection 5,000 Units  5,000 Units Subcutaneous Q8H Shai Ortega MD   Stopped at 01/29/17 2200    vancomycin 1 g in dextrose 5 % 250 mL IVPB (ready to mix system)  1,000 mg Intravenous On Call Procedure Lashon Hawkins MD          Current Outpatient Prescriptions on File Prior to Encounter   Medication Sig Dispense Refill    DOBUTAMINE HCL (DOBUTAMINE IV) Inject into the vein.      furosemide (LASIX) 20 MG tablet Take 1 tablet (20 mg total) by mouth once daily. 30 tablet 11    lisinopril 10 MG tablet TAKE ONE TABLET BY MOUTH ONCE DAILY IN THE EVENING 30 tablet 11       No past surgical history on file.    Social History     Social History    Marital status:      Spouse name: N/A    Number of children: N/A    Years of education: N/A     Occupational History    Not on file.     Social History Main Topics    Smoking status: Current Some Day Smoker     Packs/day: 1.00    Smokeless tobacco: Not on file    Alcohol use 2.4 oz/week     4 Shots of liquor per week    Drug use: No    Sexual activity: Not on file     Other Topics Concern    Not on file     Social History Narrative    Works          Vital Signs Range (Last 24H):  Temp:  [36.7 °C (98 °F)-36.9 °C (98.5 °F)]   Pulse:  []   Resp:  [11-25]   BP: (100-121)/(56-79)   SpO2:  [98 %-100 %]       CBC:   Recent Labs      01/29/17   0532  01/30/17   0525   WBC  6.71  7.28   RBC  4.85  4.68   HGB  14.4  14.0   HCT  42.6  40.0   PLT  212  215   MCV  88  86   MCH  29.7  29.9   MCHC  33.8  35.0       CMP:   Recent Labs      01/29/17   0532  01/30/17   0525   NA  132*  135*   K  4.1  3.9   CL  100  102   CO2  19*  19*   BUN  18  18   CREATININE  0.9  1.0   GLU  83  84   MG  2.0  2.1   PHOS  3.4  4.2   CALCIUM  9.1  9.2   ALBUMIN  4.2  4.2   PROT  6.9  7.1   ALKPHOS  70  74   ALT  36  37   AST  26  28   BILITOT  0.6  0.5       INR  Recent Labs      01/27/17   1829  01/30/17   0525   INR  1.1  1.1           Diagnostic Studies:      EKG:Vent. Rate : 080 BPM     Atrial Rate : 080 BPM     P-R Int : 158 ms          QRS Dur : 094 ms      QT Int : 406 ms       P-R-T Axes : 070 -20 092 degrees     QTc Int : 468 ms    Normal sinus rhythm  Possible Left atrial  enlargement  Nonspecific T wave abnormality  Prolonged QT  Abnormal ECG  When compared with ECG of 20-JAN-2017 21:47,  No significant change was found  Confirmed by ANGELINA CURTIS, HOMEYAR ([id number]) on 1/28/2017 11:22:50 PM        2D Echo:  CONCLUSIONS     1 - Eccentric LVH with severely depressed left ventricular systolic function (EF 10-15%).     2 - Left ventricular diastolic dysfunction.     3 - Normal right ventricular systolic function .     4 - Mild left atrial enlargement.       This document has been electronically    SIGNED BY: Clarice Shankar MD On: 01/18/2017 12:18      [facility] Anesthesia Evaluation    I have reviewed the Patient Summary Reports.     I have reviewed the Medications.     Review of Systems  Anesthesia Hx:  Denies Family Hx of Anesthesia complications.   Denies Personal Hx of Anesthesia complications.   Social:  Smoker    Cardiovascular:   CHF    Pulmonary:  Pulmonary Normal    Neurological:  Neurology Normal        Physical Exam  General:  Well nourished    Airway/Jaw/Neck:  Airway Findings: Mouth Opening: Normal Tongue: Normal  General Airway Assessment: Adult  Mallampati: I  TM Distance: Normal, at least 6 cm  Jaw/Neck Findings:  Micrognathia: Negative Mandibular Fracture: Negative    Neck ROM: Normal ROM  Neck Findings: Normal    Eyes/Ears/Nose:  EYES/EARS/NOSE FINDINGS: Normal   Dental:  Dental Findings: In tact   Chest/Lungs:  Chest/Lungs Findings: Clear to auscultation, Normal Respiratory Rate     Heart/Vascular:  Heart Findings: Rate: Normal  Rhythm: Regular Rhythm  Heart Murmur        Mental Status:  Mental Status Findings: Normal        Anesthesia Plan  Type of Anesthesia, risks & benefits discussed:  Anesthesia Type:  general  Patient's Preference:   Intra-op Monitoring Plan: arterial line, central line, standard ASA monitors and Godfrey-Ilir  Intra-op Monitoring Plan Comments:   Post Op Pain Control Plan:   Post Op Pain Control Plan Comments:   Induction:   IV  Beta Blocker:  Patient is not  currently on a Beta-Blocker (No further documentation required).       Informed Consent: Patient understands risks and agrees with Anesthesia plan.  Questions answered. Anesthesia consent signed with patient.  ASA Score: 4     Day of Surgery Review of History & Physical:    H&P update referred to the surgeon.         Ready For Surgery From Anesthesia Perspective.

## 2017-01-31 ENCOUNTER — ANESTHESIA (OUTPATIENT)
Dept: SURGERY | Facility: HOSPITAL | Age: 27
DRG: 001 | End: 2017-01-31
Payer: COMMERCIAL

## 2017-01-31 LAB
ABO + RH BLD: NORMAL
ALBUMIN SERPL BCP-MCNC: 4.1 G/DL
ALP SERPL-CCNC: 70 U/L
ALT SERPL W/O P-5'-P-CCNC: 40 U/L
ANION GAP SERPL CALC-SCNC: 12 MMOL/L
AST SERPL-CCNC: 32 U/L
BASOPHILS # BLD AUTO: 0.04 K/UL
BASOPHILS NFR BLD: 0.5 %
BILIRUB SERPL-MCNC: 0.5 MG/DL
BLD GP AB SCN CELLS X3 SERPL QL: NORMAL
BUN SERPL-MCNC: 17 MG/DL
CALCIUM SERPL-MCNC: 9.3 MG/DL
CHLORIDE SERPL-SCNC: 102 MMOL/L
CO2 SERPL-SCNC: 23 MMOL/L
CREAT SERPL-MCNC: 1 MG/DL
DIFFERENTIAL METHOD: NORMAL
EOSINOPHIL # BLD AUTO: 0.2 K/UL
EOSINOPHIL NFR BLD: 3 %
ERYTHROCYTE [DISTWIDTH] IN BLOOD BY AUTOMATED COUNT: 12.7 %
EST. GFR  (AFRICAN AMERICAN): >60 ML/MIN/1.73 M^2
EST. GFR  (NON AFRICAN AMERICAN): >60 ML/MIN/1.73 M^2
GLUCOSE SERPL-MCNC: 83 MG/DL
HCT VFR BLD AUTO: 40.5 %
HGB BLD-MCNC: 14.2 G/DL
LYMPHOCYTES # BLD AUTO: 1.8 K/UL
LYMPHOCYTES NFR BLD: 23.4 %
MAGNESIUM SERPL-MCNC: 1.8 MG/DL
MCH RBC QN AUTO: 30.5 PG
MCHC RBC AUTO-ENTMCNC: 35.1 %
MCV RBC AUTO: 87 FL
MONOCYTES # BLD AUTO: 0.6 K/UL
MONOCYTES NFR BLD: 8.2 %
NEUTROPHILS # BLD AUTO: 4.9 K/UL
NEUTROPHILS NFR BLD: 64.9 %
PHOSPHATE SERPL-MCNC: 4.5 MG/DL
PLATELET # BLD AUTO: 196 K/UL
PMV BLD AUTO: 10.4 FL
POTASSIUM SERPL-SCNC: 3.8 MMOL/L
PROT SERPL-MCNC: 6.9 G/DL
RBC # BLD AUTO: 4.66 M/UL
SODIUM SERPL-SCNC: 137 MMOL/L
WBC # BLD AUTO: 7.72 K/UL

## 2017-01-31 PROCEDURE — 25000003 PHARM REV CODE 250: Performed by: HOSPITALIST

## 2017-01-31 PROCEDURE — 63600175 PHARM REV CODE 636 W HCPCS: Performed by: INTERNAL MEDICINE

## 2017-01-31 PROCEDURE — 63600175 PHARM REV CODE 636 W HCPCS: Performed by: NURSE PRACTITIONER

## 2017-01-31 PROCEDURE — 86850 RBC ANTIBODY SCREEN: CPT

## 2017-01-31 PROCEDURE — 36415 COLL VENOUS BLD VENIPUNCTURE: CPT

## 2017-01-31 PROCEDURE — 25000003 PHARM REV CODE 250: Performed by: INTERNAL MEDICINE

## 2017-01-31 PROCEDURE — 86900 BLOOD TYPING SEROLOGIC ABO: CPT

## 2017-01-31 PROCEDURE — 85025 COMPLETE CBC W/AUTO DIFF WBC: CPT

## 2017-01-31 PROCEDURE — 20000000 HC ICU ROOM

## 2017-01-31 PROCEDURE — 83735 ASSAY OF MAGNESIUM: CPT

## 2017-01-31 PROCEDURE — 94760 N-INVAS EAR/PLS OXIMETRY 1: CPT

## 2017-01-31 PROCEDURE — 63600175 PHARM REV CODE 636 W HCPCS: Performed by: PHYSICIAN ASSISTANT

## 2017-01-31 PROCEDURE — 25000003 PHARM REV CODE 250: Performed by: NURSE PRACTITIONER

## 2017-01-31 PROCEDURE — 80053 COMPREHEN METABOLIC PANEL: CPT

## 2017-01-31 PROCEDURE — 27000202 HC IABP, ADD'L DAY

## 2017-01-31 PROCEDURE — 99233 SBSQ HOSP IP/OBS HIGH 50: CPT | Mod: ,,, | Performed by: INTERNAL MEDICINE

## 2017-01-31 PROCEDURE — 84100 ASSAY OF PHOSPHORUS: CPT

## 2017-01-31 PROCEDURE — 86920 COMPATIBILITY TEST SPIN: CPT

## 2017-01-31 RX ORDER — FLUCONAZOLE 2 MG/ML
400 INJECTION, SOLUTION INTRAVENOUS
Status: DISCONTINUED | OUTPATIENT
Start: 2017-02-01 | End: 2017-02-01 | Stop reason: HOSPADM

## 2017-01-31 RX ORDER — ALPRAZOLAM 0.5 MG/1
0.5 TABLET ORAL ONCE
Status: COMPLETED | OUTPATIENT
Start: 2017-01-31 | End: 2017-01-31

## 2017-01-31 RX ORDER — CIPROFLOXACIN 2 MG/ML
400 INJECTION, SOLUTION INTRAVENOUS
Status: DISCONTINUED | OUTPATIENT
Start: 2017-02-01 | End: 2017-02-01 | Stop reason: HOSPADM

## 2017-01-31 RX ORDER — MORPHINE SULFATE 2 MG/ML
2 INJECTION, SOLUTION INTRAMUSCULAR; INTRAVENOUS EVERY 4 HOURS PRN
Status: DISCONTINUED | OUTPATIENT
Start: 2017-01-31 | End: 2017-02-01

## 2017-01-31 RX ORDER — MORPHINE SULFATE 2 MG/ML
1 INJECTION, SOLUTION INTRAMUSCULAR; INTRAVENOUS ONCE
Status: COMPLETED | OUTPATIENT
Start: 2017-01-31 | End: 2017-01-31

## 2017-01-31 RX ORDER — OXYCODONE AND ACETAMINOPHEN 5; 325 MG/1; MG/1
1 TABLET ORAL ONCE
Status: COMPLETED | OUTPATIENT
Start: 2017-01-31 | End: 2017-01-31

## 2017-01-31 RX ADMIN — MORPHINE SULFATE 2 MG: 2 INJECTION, SOLUTION INTRAMUSCULAR; INTRAVENOUS at 06:01

## 2017-01-31 RX ADMIN — OXYCODONE HYDROCHLORIDE AND ACETAMINOPHEN 1 TABLET: 5; 325 TABLET ORAL at 04:01

## 2017-01-31 RX ADMIN — PHYTONADIONE 10 MG: 10 INJECTION, EMULSION INTRAMUSCULAR; INTRAVENOUS; SUBCUTANEOUS at 08:01

## 2017-01-31 RX ADMIN — HEPARIN SODIUM 5000 UNITS: 5000 INJECTION, SOLUTION INTRAVENOUS; SUBCUTANEOUS at 02:01

## 2017-01-31 RX ADMIN — DOBUTAMINE IN DEXTROSE 5 MCG/KG/MIN: 200 INJECTION, SOLUTION INTRAVENOUS at 12:01

## 2017-01-31 RX ADMIN — ALPRAZOLAM 0.5 MG: 0.5 TABLET ORAL at 10:01

## 2017-01-31 RX ADMIN — MORPHINE SULFATE 1 MG: 2 INJECTION, SOLUTION INTRAMUSCULAR; INTRAVENOUS at 08:01

## 2017-01-31 RX ADMIN — FUROSEMIDE 20 MG: 20 TABLET ORAL at 09:01

## 2017-01-31 RX ADMIN — MORPHINE SULFATE 2 MG: 2 INJECTION, SOLUTION INTRAMUSCULAR; INTRAVENOUS at 02:01

## 2017-01-31 RX ADMIN — HEPARIN SODIUM 5000 UNITS: 5000 INJECTION, SOLUTION INTRAVENOUS; SUBCUTANEOUS at 06:01

## 2017-01-31 RX ADMIN — MORPHINE SULFATE 2 MG: 2 INJECTION, SOLUTION INTRAMUSCULAR; INTRAVENOUS at 10:01

## 2017-01-31 NOTE — PROGRESS NOTES
Update Note:    SW to pt's room for update. Pt aaox4, calm, and pleasant. Pt reports wife is at home at this time and will return to hospital today. Pt reports LVAD surgery moved to tomorrow. Pt reports coping well at this time. SW explained Jak[facility] house room reservation changed to tomorrow night. Pt report no questions or concerns for SW at this time. SW providing ongoing psychosocial counseling and support, education, assistance, resources, and discharge planning as indicated. SW continuing to follow and remains available.

## 2017-01-31 NOTE — PROGRESS NOTES
Admit Note     Met with patient, wife and brother to assess needs. Patient is a 27 y.o.  male, admitted for for LVAD .      Patient admitted from home on 1/27/2017.  At this time, patient presents as alert and oriented x 4, pleasant, good eye contact, calm, communicative, cooperative and asking and answering questions appropriately.  At this time, patients caregiver presents as alert and oriented x 4, pleasant, good eye contact, well groomed, calm, communicative, cooperative and asking and answering questions appropriately.    Household/Family Systems     Patient resides with patient's spouse, at:     [address] [facility] [address]      Pt home [phone]  Pt cell [phone]  Wife cell [phone]    Support system includes wife, parents, and brothers.  Patient does not have dependents that are need of being cared for.     Patients primary caregiver is Abigail Samayoa, patients wife, phone number [phone].  Confirmed patients contact information is [phone] (home);   Telephone Information:   Mobile [phone]     During admission, patient's caregiver plans to stay in patient's room for the most part, and may return home to work at times. [facility] room will be provided through the [facility] for the night of LVAD surgery. Confirmed patient and patients caregivers do have access to reliable transportation.    Cognitive Status/Learning     Patient reports reading ability as 12th grade and states patient does not have difficulty with reading, writing, seeing, hearing, comprehension, learning and memory.  Patient reports patient learns best by hands on.   Needed: No.   Highest education level: High School (9-12) or GED    Vocation/Disability     Working for Income: yes  If yes, working activity level: Working Full Time  Patient reports job is not physically hard. Pt reports will speak with MD's about work limitations post LVAD surgery. Pt reports employer will  work with pt to find an appropriate job role once pt is able to return to work.     Adherence     Patient reports a high level of adherence to patients health care regimen.  Adherence counseling and education provided. Patient verbalizes understanding.    Substance Use    Patient reports the following substance usage.    Tobacco: Pt reports no current use. Pt reports stopped smoking dec 2016. Pt reprots highest use was 1 ppd.  Alcohol: Pt reports was a social drinker. Pt reports no use since last hospital admission..  Illicit Drugs/Non-prescribed Medications: none, patient denies any use.  Patient states clear understanding of the potential impact of substance use.  Substance abstinence/cessation counseling, education and resources provided and reviewed.     Services Utilizing/ADLS    Infusion Service: Prior to admission, patient utilizing? yes. Pt has home Dobutamine through [facility] [employer] ([phone]).  Home Health: Prior to admission, patient utilizing? no  DME: Prior to admission, no  Pulmonary/Cardiac Rehab: Prior to admission, no  Dialysis:  Prior to admission, no  Transplant Specialty Pharmacy:  Prior to admission, no.    Prior to admission, patient reports patient was independent with ADLS and was driving.  Patient reports patient is able to care for self at this time.  Patient indicates a willingness to care for self once medically cleared to do so.    Insurance/Medications    Insured by   Payor/Plan Subscr  Sex Relation Sub. Ins. ID Effective Group Num   1. [employer]* MAMIE US * 1990 Male  [id number]                                    [address]   2. [facility]* MAMIE US * 1990 Male  [id number]                                    [address] [address]      Primary Insurance (for UNOS reporting): Private Insurance  Secondary Insurance (for UNOS reporting): None    Patient reports patient is able to obtain and afford medications at this time and at time  of discharge.    Living Will/Healthcare Power of     Patient states patient does not have a LW and/or HCPA.   provided education regarding LW and HCPA and the completion of forms.    Coping/Mental Health    Patient is coping adequately with the aid of  family members.  Patient denies mental health difficulties.     Discharge Planning    At time of discharge, patient plans to return to patient's home under the care of wife, Abigail.  Patients wife will transport patient.  Per rounds today, expected discharge date has not been medically determined at this time. Patient and patients caregiver  verbalize understanding and are involved in treatment planning and discharge process.    Additional Concerns    Patient is being followed for needs, education, resources, information, emotional support, supportive counseling, and for supportive and skilled discharge plan of care.  providing ongoing psychosocial support, education, resources and d/c planning as needed.  SW remains available. Pt and patient's caregiver verbalize understanding and agreement with information reviewed,  availability and how to access available resources as needed. Patient and caregiver deny additional needs and/or concerns at this time.

## 2017-01-31 NOTE — PROGRESS NOTES
Progress Note  Heart Transplant Service    Admit Date: 1/27/2017   LOS: 4 days     SUBJECTIVE:     Follow up for: Chronic combined systolic and diastolic heart failure    Interval History: IABP placed yesterday without complications.  He is complaining of significant back pain.  Percocet is not improving the pain just making him lethargic.  Left leg is tender at IABP site when he adjust his position    Scheduled Meds:   furosemide  20 mg Oral Daily    heparin (porcine)  5,000 Units Subcutaneous Q8H     Continuous Infusions:   DOBUTamine 5 mcg/kg/min (01/31/17 0500)     PRN Meds:dextrose 50%, dextrose 50%, glucagon (human recombinant), glucose, glucose, vancomycin (VANCOCIN) IVPB    Review of patient's allergies indicates:  No Known Allergies    OBJECTIVE:     Vital Signs (Most Recent)  Temp: 98.3 °F (36.8 °C) (01/31/17 0300)  Pulse: 63 (01/31/17 0600)  Resp: 14 (01/31/17 0600)  BP: 99/68 (01/30/17 1500)  SpO2: 97 % (01/31/17 0600)    Vital Signs Range (Last 24H):  Temp:  [97.9 °F (36.6 °C)-98.4 °F (36.9 °C)]   Pulse:  [60-92]   Resp:  [11-26]   BP: ()/(63-79)   SpO2:  [97 %-100 %]     I & O (Last 24H):    Intake/Output Summary (Last 24 hours) at 01/31/17 0739  Last data filed at 01/31/17 0500   Gross per 24 hour   Intake              289 ml   Output             1025 ml   Net             -736 ml            Telemetry: sinus  Constitutional: laying in bed NAD  Neck: No JVD present. No thyromegaly present.   Cardiovascular: Normal rate, regular rhythm and intact distal pulses.  No murmurs heard. IABP sounds appreciated  Pulmonary/Chest:CTA  Abdominal: + BS, exhibits no distension. There is no tenderness. There is no rebound.   Extremities: no cyanosis, clubbing or edema.  Left groin site intact.  No bleeding, edema, erythema or hematoma    Labs:       Recent Labs  Lab 01/28/17  0344 01/29/17  0532 01/30/17  0525 01/31/17  0415   WBC 6.94 6.71 7.28  --    HGB 14.3 14.4 14.0 14.2   HCT 41.0 42.6 40.0 40.5   PLT  214 212 215 196   LYMPH 34.3  2.4 29.8  2.0 26.5  1.9  --    MONO 11.0  0.8 6.1  0.4 7.7  0.6  --    EOSINOPHIL 3.0 3.9 3.8  --          Recent Labs  Lab 01/27/17  1829 01/30/17  0525   INR 1.1 1.1          Recent Labs  Lab 01/29/17  0532 01/30/17  0525 01/31/17  0415   GLU 83 84 83   CALCIUM 9.1 9.2 9.3   ALBUMIN 4.2 4.2 4.1   PROT 6.9 7.1 6.9   * 135* 137   K 4.1 3.9 3.8   CO2 19* 19* 23    102 102   BUN 18 18 17   CREATININE 0.9 1.0 1.0   ALKPHOS 70 74 70   ALT 36 37 40   AST 26 28 32   BILITOT 0.6 0.5 0.5   MG 2.0 2.1 1.8   PHOS 3.4 4.2 4.5     Estimated Creatinine Clearance: 97.5 mL/min (based on Cr of 1).    No results for input(s): BNP, BNPTRIAGEBLO in the last 168 hours.    No results for input(s): LDH in the last 168 hours.    Microbiology Results (last 7 days)     Procedure Component Value Units Date/Time    Blood culture (site 1) [[id number]] Collected:  01/27/17 1837    Order Status:  Completed Specimen:  Blood Updated:  01/30/17 2022     Blood Culture, Routine No Growth to date     Blood Culture, Routine No Growth to date     Blood Culture, Routine No Growth to date     Blood Culture, Routine No Growth to date    Narrative:       Site # 1, aerobic and anaerobic (central line, if patient has  one)    Blood culture (site 2) [[id number]] Collected:  01/27/17 1828    Order Status:  Completed Specimen:  Blood Updated:  01/30/17 2022     Blood Culture, Routine No Growth to date     Blood Culture, Routine No Growth to date     Blood Culture, Routine No Growth to date     Blood Culture, Routine No Growth to date    Narrative:       Site # 2, aerobic only            ASSESSMENT:     25 yo WM with familial DCM, 2 brothers with OHTx, Chronic Systolic HF, with worsening activity intolerance (NYHA FC IV), noted to have a decrease in PkVO2 from 42.0 to 23.9 (53% of predicted) and a gradually climbing BNP, recent tobacco use, admitted 1/16/16 after RHC showed severely reduced CO/CI and elevated L and R  filling pressures. He was admitted for PICC removal, IABP, and planned LVAD implantation.    PLAN:     Cardiogenic Shock due to Acute on Chronic Systolic HF, DCM, NYHA FC IV  -RHC 1/16 showed low CO/CI and elevated filling pressures and patient on  5 mcg/kg/min at home.   -Diuresed with Lasix infusion and transitioned to oral.  Net negative 2.1L throughout hospital stay and appears compensated today   -GDMT: holding ACE and BB due to upcoming surgery and   -Does not have ICD- will need to discuss timing of this vs Lifevest  -cultures: NGTD  -S/P IABP placement (Vanc ordered on call to cath lab) and LVAD tomorrow    HTN  -BP controlled    Back Pain  -Changed Percocet to Morphine for pain     Prophylaxis   -heparin    Tamika Cannon PA-C  HTS spectralink: [id number]

## 2017-02-01 ENCOUNTER — ANESTHESIA EVENT (OUTPATIENT)
Dept: SURGERY | Facility: HOSPITAL | Age: 27
DRG: 001 | End: 2017-02-01
Payer: COMMERCIAL

## 2017-02-01 PROBLEM — Z95.811 LVAD (LEFT VENTRICULAR ASSIST DEVICE) PRESENT: Status: ACTIVE | Noted: 2017-02-01

## 2017-02-01 PROBLEM — R73.9 HYPERGLYCEMIA: Status: ACTIVE | Noted: 2017-02-01

## 2017-02-01 LAB
ALBUMIN SERPL BCP-MCNC: 4.1 G/DL
ALLENS TEST: ABNORMAL
ALP SERPL-CCNC: 77 U/L
ALT SERPL W/O P-5'-P-CCNC: 38 U/L
ANION GAP SERPL CALC-SCNC: 10 MMOL/L
ANION GAP SERPL CALC-SCNC: 11 MMOL/L
ANION GAP SERPL CALC-SCNC: 12 MMOL/L
ANION GAP SERPL CALC-SCNC: 15 MMOL/L
APTT BLDCRRT: 24.9 SEC
APTT BLDCRRT: 25.9 SEC
APTT BLDCRRT: 26.7 SEC
AST SERPL-CCNC: 29 U/L
BACTERIA BLD CULT: NORMAL
BACTERIA BLD CULT: NORMAL
BASOPHILS # BLD AUTO: 0 K/UL
BASOPHILS # BLD AUTO: 0.01 K/UL
BASOPHILS # BLD AUTO: 0.01 K/UL
BASOPHILS # BLD AUTO: 0.02 K/UL
BASOPHILS NFR BLD: 0 %
BASOPHILS NFR BLD: 0.1 %
BASOPHILS NFR BLD: 0.1 %
BASOPHILS NFR BLD: 0.3 %
BILIRUB SERPL-MCNC: 0.6 MG/DL
BUN SERPL-MCNC: 17 MG/DL
CALCIUM SERPL-MCNC: 8.6 MG/DL
CALCIUM SERPL-MCNC: 8.8 MG/DL
CALCIUM SERPL-MCNC: 9.1 MG/DL
CALCIUM SERPL-MCNC: 9.5 MG/DL
CHLORIDE SERPL-SCNC: 100 MMOL/L
CHLORIDE SERPL-SCNC: 102 MMOL/L
CHLORIDE SERPL-SCNC: 103 MMOL/L
CHLORIDE SERPL-SCNC: 103 MMOL/L
CO2 SERPL-SCNC: 21 MMOL/L
CO2 SERPL-SCNC: 23 MMOL/L
CO2 SERPL-SCNC: 24 MMOL/L
CO2 SERPL-SCNC: 26 MMOL/L
CREAT SERPL-MCNC: 0.9 MG/DL
CREAT SERPL-MCNC: 1.1 MG/DL
CREAT SERPL-MCNC: 1.2 MG/DL
CREAT SERPL-MCNC: 1.2 MG/DL
DELSYS: ABNORMAL
DIFFERENTIAL METHOD: ABNORMAL
EOSINOPHIL # BLD AUTO: 0 K/UL
EOSINOPHIL # BLD AUTO: 0 K/UL
EOSINOPHIL # BLD AUTO: 0.1 K/UL
EOSINOPHIL # BLD AUTO: 0.2 K/UL
EOSINOPHIL NFR BLD: 0.3 %
EOSINOPHIL NFR BLD: 0.4 %
EOSINOPHIL NFR BLD: 0.4 %
EOSINOPHIL NFR BLD: 3 %
ERYTHROCYTE [DISTWIDTH] IN BLOOD BY AUTOMATED COUNT: 12.6 %
ERYTHROCYTE [DISTWIDTH] IN BLOOD BY AUTOMATED COUNT: 12.7 %
ERYTHROCYTE [SEDIMENTATION RATE] IN BLOOD BY WESTERGREN METHOD: 14 MM/H
EST. GFR  (AFRICAN AMERICAN): >60 ML/MIN/1.73 M^2
EST. GFR  (NON AFRICAN AMERICAN): >60 ML/MIN/1.73 M^2
FIBRINOGEN PPP-MCNC: 285 MG/DL
FIO2: 50
FIO2: 80
FLOW: 60
FLOW: 60
GLUCOSE SERPL-MCNC: 106 MG/DL (ref 70–110)
GLUCOSE SERPL-MCNC: 123 MG/DL (ref 70–110)
GLUCOSE SERPL-MCNC: 128 MG/DL (ref 70–110)
GLUCOSE SERPL-MCNC: 148 MG/DL (ref 70–110)
GLUCOSE SERPL-MCNC: 174 MG/DL (ref 70–110)
GLUCOSE SERPL-MCNC: 190 MG/DL
GLUCOSE SERPL-MCNC: 195 MG/DL
GLUCOSE SERPL-MCNC: 205 MG/DL
GLUCOSE SERPL-MCNC: 94 MG/DL
HCO3 UR-SCNC: 19.6 MMOL/L (ref 24–28)
HCO3 UR-SCNC: 22.1 MMOL/L (ref 24–28)
HCO3 UR-SCNC: 23 MMOL/L (ref 24–28)
HCO3 UR-SCNC: 23.3 MMOL/L (ref 24–28)
HCO3 UR-SCNC: 24.2 MMOL/L (ref 24–28)
HCO3 UR-SCNC: 25.1 MMOL/L (ref 24–28)
HCO3 UR-SCNC: 27.7 MMOL/L (ref 24–28)
HCO3 UR-SCNC: 28.3 MMOL/L (ref 24–28)
HCO3 UR-SCNC: 28.3 MMOL/L (ref 24–28)
HCO3 UR-SCNC: 28.6 MMOL/L (ref 24–28)
HCO3 UR-SCNC: 29.8 MMOL/L (ref 24–28)
HCO3 UR-SCNC: 31.4 MMOL/L (ref 24–28)
HCT VFR BLD AUTO: 30.8 %
HCT VFR BLD AUTO: 34 %
HCT VFR BLD AUTO: 34.9 %
HCT VFR BLD AUTO: 40.6 %
HCT VFR BLD CALC: 27 %PCV (ref 36–54)
HCT VFR BLD CALC: 30 %PCV (ref 36–54)
HCT VFR BLD CALC: 31 %PCV (ref 36–54)
HCT VFR BLD CALC: 31 %PCV (ref 36–54)
HCT VFR BLD CALC: 34 %PCV (ref 36–54)
HCT VFR BLD CALC: 35 %PCV (ref 36–54)
HGB BLD-MCNC: 10.6 G/DL
HGB BLD-MCNC: 11.8 G/DL
HGB BLD-MCNC: 11.9 G/DL
HGB BLD-MCNC: 13.9 G/DL
INR PPP: 1.1
INR PPP: 1.2
INR PPP: 1.3
LDH SERPL L TO P-CCNC: 2.98 MMOL/L (ref 0.36–1.25)
LDH SERPL L TO P-CCNC: 3.8 MMOL/L (ref 0.36–1.25)
LDH SERPL L TO P-CCNC: 377 U/L
LDH SERPL L TO P-CCNC: 4.61 MMOL/L (ref 0.36–1.25)
LDH SERPL L TO P-CCNC: 4.96 MMOL/L (ref 0.36–1.25)
LDH SERPL L TO P-CCNC: 5.18 MMOL/L (ref 0.36–1.25)
LDH SERPL L TO P-CCNC: 5.72 MMOL/L (ref 0.36–1.25)
LYMPHOCYTES # BLD AUTO: 0.4 K/UL
LYMPHOCYTES # BLD AUTO: 0.6 K/UL
LYMPHOCYTES # BLD AUTO: 1.2 K/UL
LYMPHOCYTES # BLD AUTO: 1.5 K/UL
LYMPHOCYTES NFR BLD: 24 %
LYMPHOCYTES NFR BLD: 3.8 %
LYMPHOCYTES NFR BLD: 4.3 %
LYMPHOCYTES NFR BLD: 9.6 %
MAGNESIUM SERPL-MCNC: 1.5 MG/DL
MAGNESIUM SERPL-MCNC: 1.7 MG/DL
MAGNESIUM SERPL-MCNC: 1.9 MG/DL
MAGNESIUM SERPL-MCNC: 2.6 MG/DL
MCH RBC QN AUTO: 29.7 PG
MCH RBC QN AUTO: 29.8 PG
MCH RBC QN AUTO: 30.1 PG
MCH RBC QN AUTO: 30.2 PG
MCHC RBC AUTO-ENTMCNC: 34.1 %
MCHC RBC AUTO-ENTMCNC: 34.2 %
MCHC RBC AUTO-ENTMCNC: 34.4 %
MCHC RBC AUTO-ENTMCNC: 34.7 %
MCV RBC AUTO: 87 FL
MCV RBC AUTO: 88 FL
MODE: ABNORMAL
MONOCYTES # BLD AUTO: 0.6 K/UL
MONOCYTES # BLD AUTO: 0.6 K/UL
MONOCYTES # BLD AUTO: 0.9 K/UL
MONOCYTES # BLD AUTO: 1.2 K/UL
MONOCYTES NFR BLD: 4.9 %
MONOCYTES NFR BLD: 8.1 %
MONOCYTES NFR BLD: 8.8 %
MONOCYTES NFR BLD: 9.2 %
NEUTROPHILS # BLD AUTO: 10.5 K/UL
NEUTROPHILS # BLD AUTO: 11.8 K/UL
NEUTROPHILS # BLD AUTO: 4 K/UL
NEUTROPHILS # BLD AUTO: 9.9 K/UL
NEUTROPHILS NFR BLD: 63.3 %
NEUTROPHILS NFR BLD: 84.6 %
NEUTROPHILS NFR BLD: 86.2 %
NEUTROPHILS NFR BLD: 87.4 %
PCO2 BLDA: 33.1 MMHG (ref 35–45)
PCO2 BLDA: 36 MMHG (ref 35–45)
PCO2 BLDA: 36.4 MMHG (ref 35–45)
PCO2 BLDA: 37.7 MMHG (ref 35–45)
PCO2 BLDA: 38.5 MMHG (ref 35–45)
PCO2 BLDA: 40.4 MMHG (ref 35–45)
PCO2 BLDA: 40.7 MMHG (ref 35–45)
PCO2 BLDA: 41.6 MMHG (ref 35–45)
PCO2 BLDA: 41.8 MMHG (ref 35–45)
PCO2 BLDA: 43.8 MMHG (ref 35–45)
PCO2 BLDA: 43.9 MMHG (ref 35–45)
PCO2 BLDA: 45.2 MMHG (ref 35–45)
PEEP: 5
PH SMN: 7.3 [PH] (ref 7.35–7.45)
PH SMN: 7.33 [PH] (ref 7.35–7.45)
PH SMN: 7.34 [PH] (ref 7.35–7.45)
PH SMN: 7.42 [PH] (ref 7.35–7.45)
PH SMN: 7.43 [PH] (ref 7.35–7.45)
PH SMN: 7.45 [PH] (ref 7.35–7.45)
PH SMN: 7.47 [PH] (ref 7.35–7.45)
PH SMN: 7.54 [PH] (ref 7.35–7.45)
PHOSPHATE SERPL-MCNC: 1.9 MG/DL
PHOSPHATE SERPL-MCNC: 2.9 MG/DL
PHOSPHATE SERPL-MCNC: 3.8 MG/DL
PHOSPHATE SERPL-MCNC: 4.5 MG/DL
PIP: 19
PIP: 19
PLATELET # BLD AUTO: 123 K/UL
PLATELET # BLD AUTO: 135 K/UL
PLATELET # BLD AUTO: 154 K/UL
PLATELET # BLD AUTO: 164 K/UL
PMV BLD AUTO: 10 FL
PMV BLD AUTO: 9.7 FL
PMV BLD AUTO: 9.9 FL
PMV BLD AUTO: 9.9 FL
PO2 BLDA: 208 MMHG (ref 80–100)
PO2 BLDA: 214 MMHG (ref 80–100)
PO2 BLDA: 224 MMHG (ref 80–100)
PO2 BLDA: 227 MMHG (ref 80–100)
PO2 BLDA: 231 MMHG (ref 80–100)
PO2 BLDA: 362 MMHG (ref 80–100)
PO2 BLDA: 38 MMHG (ref 40–60)
PO2 BLDA: 390 MMHG (ref 80–100)
PO2 BLDA: 392 MMHG (ref 80–100)
PO2 BLDA: 43 MMHG (ref 40–60)
PO2 BLDA: 512 MMHG (ref 80–100)
PO2 BLDA: 634 MMHG (ref 80–100)
POC BE: -1 MMOL/L
POC BE: -3 MMOL/L
POC BE: -4 MMOL/L
POC BE: -6 MMOL/L
POC BE: 0 MMOL/L
POC BE: 1 MMOL/L
POC BE: 3 MMOL/L
POC BE: 4 MMOL/L
POC BE: 4 MMOL/L
POC BE: 5 MMOL/L
POC BE: 6 MMOL/L
POC BE: 9 MMOL/L
POC IONIZED CALCIUM: 0.95 MMOL/L (ref 1.06–1.42)
POC IONIZED CALCIUM: 1.02 MMOL/L (ref 1.06–1.42)
POC IONIZED CALCIUM: 1.04 MMOL/L (ref 1.06–1.42)
POC IONIZED CALCIUM: 1.11 MMOL/L (ref 1.06–1.42)
POC IONIZED CALCIUM: 1.17 MMOL/L (ref 1.06–1.42)
POC IONIZED CALCIUM: 1.19 MMOL/L (ref 1.06–1.42)
POC SATURATED O2: 100 % (ref 95–100)
POC SATURATED O2: 73 % (ref 95–100)
POC SATURATED O2: 74 % (ref 95–100)
POC TCO2: 21 MMOL/L (ref 23–27)
POC TCO2: 23 MMOL/L (ref 24–29)
POC TCO2: 24 MMOL/L (ref 23–27)
POC TCO2: 25 MMOL/L (ref 23–27)
POC TCO2: 25 MMOL/L (ref 23–27)
POC TCO2: 26 MMOL/L (ref 23–27)
POC TCO2: 29 MMOL/L (ref 23–27)
POC TCO2: 29 MMOL/L (ref 23–27)
POC TCO2: 30 MMOL/L (ref 23–27)
POC TCO2: 30 MMOL/L (ref 24–29)
POC TCO2: 31 MMOL/L (ref 23–27)
POC TCO2: 32 MMOL/L (ref 23–27)
POCT GLUCOSE: 149 MG/DL (ref 70–110)
POCT GLUCOSE: 161 MG/DL (ref 70–110)
POCT GLUCOSE: 170 MG/DL (ref 70–110)
POCT GLUCOSE: 176 MG/DL (ref 70–110)
POCT GLUCOSE: 178 MG/DL (ref 70–110)
POCT GLUCOSE: 180 MG/DL (ref 70–110)
POCT GLUCOSE: 181 MG/DL (ref 70–110)
POCT GLUCOSE: 197 MG/DL (ref 70–110)
POCT GLUCOSE: 199 MG/DL (ref 70–110)
POTASSIUM BLD-SCNC: 3.4 MMOL/L (ref 3.5–5.1)
POTASSIUM BLD-SCNC: 3.4 MMOL/L (ref 3.5–5.1)
POTASSIUM BLD-SCNC: 3.5 MMOL/L (ref 3.5–5.1)
POTASSIUM BLD-SCNC: 3.6 MMOL/L (ref 3.5–5.1)
POTASSIUM BLD-SCNC: 3.9 MMOL/L (ref 3.5–5.1)
POTASSIUM BLD-SCNC: 3.9 MMOL/L (ref 3.5–5.1)
POTASSIUM SERPL-SCNC: 3.7 MMOL/L
POTASSIUM SERPL-SCNC: 3.9 MMOL/L
POTASSIUM SERPL-SCNC: 3.9 MMOL/L
POTASSIUM SERPL-SCNC: 4.1 MMOL/L
PROT SERPL-MCNC: 7.1 G/DL
PROTHROMBIN TIME: 11.8 SEC
PROTHROMBIN TIME: 12.2 SEC
PROTHROMBIN TIME: 13 SEC
PS: 10
RBC # BLD AUTO: 3.51 M/UL
RBC # BLD AUTO: 3.92 M/UL
RBC # BLD AUTO: 4.01 M/UL
RBC # BLD AUTO: 4.67 M/UL
SAMPLE: ABNORMAL
SITE: ABNORMAL
SODIUM BLD-SCNC: 135 MMOL/L (ref 136–145)
SODIUM BLD-SCNC: 136 MMOL/L (ref 136–145)
SODIUM BLD-SCNC: 137 MMOL/L (ref 136–145)
SODIUM BLD-SCNC: 138 MMOL/L (ref 136–145)
SODIUM SERPL-SCNC: 136 MMOL/L
SODIUM SERPL-SCNC: 137 MMOL/L
SODIUM SERPL-SCNC: 138 MMOL/L
SODIUM SERPL-SCNC: 139 MMOL/L
SP02: 100
SP02: 100
VT: 500
WBC # BLD AUTO: 11.36 K/UL
WBC # BLD AUTO: 12.37 K/UL
WBC # BLD AUTO: 13.68 K/UL
WBC # BLD AUTO: 6.38 K/UL

## 2017-02-01 PROCEDURE — 36620 INSERTION CATHETER ARTERY: CPT | Mod: 59,,, | Performed by: ANESTHESIOLOGY

## 2017-02-01 PROCEDURE — 25000003 PHARM REV CODE 250: Performed by: THORACIC SURGERY (CARDIOTHORACIC VASCULAR SURGERY)

## 2017-02-01 PROCEDURE — 94640 AIRWAY INHALATION TREATMENT: CPT

## 2017-02-01 PROCEDURE — 02HA0QZ INSERTION OF IMPLANTABLE HEART ASSIST SYSTEM INTO HEART, OPEN APPROACH: ICD-10-PCS | Performed by: THORACIC SURGERY (CARDIOTHORACIC VASCULAR SURGERY)

## 2017-02-01 PROCEDURE — 84132 ASSAY OF SERUM POTASSIUM: CPT

## 2017-02-01 PROCEDURE — 84100 ASSAY OF PHOSPHORUS: CPT | Mod: 91

## 2017-02-01 PROCEDURE — 27000191 HC C-V MONITORING

## 2017-02-01 PROCEDURE — 93010 ELECTROCARDIOGRAM REPORT: CPT | Mod: ,,, | Performed by: INTERNAL MEDICINE

## 2017-02-01 PROCEDURE — 99232 SBSQ HOSP IP/OBS MODERATE 35: CPT | Mod: ,,, | Performed by: INTERNAL MEDICINE

## 2017-02-01 PROCEDURE — 63600175 PHARM REV CODE 636 W HCPCS: Performed by: NURSE PRACTITIONER

## 2017-02-01 PROCEDURE — 63600175 PHARM REV CODE 636 W HCPCS: Performed by: THORACIC SURGERY (CARDIOTHORACIC VASCULAR SURGERY)

## 2017-02-01 PROCEDURE — 37799 UNLISTED PX VASCULAR SURGERY: CPT

## 2017-02-01 PROCEDURE — 27201015 HC HEMO-CONCENTRATOR

## 2017-02-01 PROCEDURE — 84295 ASSAY OF SERUM SODIUM: CPT

## 2017-02-01 PROCEDURE — 27000202 HC IABP, ADD'L DAY

## 2017-02-01 PROCEDURE — 82803 BLOOD GASES ANY COMBINATION: CPT

## 2017-02-01 PROCEDURE — C1729 CATH, DRAINAGE: HCPCS | Performed by: THORACIC SURGERY (CARDIOTHORACIC VASCULAR SURGERY)

## 2017-02-01 PROCEDURE — 85384 FIBRINOGEN ACTIVITY: CPT

## 2017-02-01 PROCEDURE — 99223 1ST HOSP IP/OBS HIGH 75: CPT | Mod: ,,, | Performed by: SURGERY

## 2017-02-01 PROCEDURE — D9220A PRA ANESTHESIA: Mod: ,,, | Performed by: ANESTHESIOLOGY

## 2017-02-01 PROCEDURE — 37000008 HC ANESTHESIA 1ST 15 MINUTES: Performed by: THORACIC SURGERY (CARDIOTHORACIC VASCULAR SURGERY)

## 2017-02-01 PROCEDURE — 85520 HEPARIN ASSAY: CPT

## 2017-02-01 PROCEDURE — 99900026 HC AIRWAY MAINTENANCE (STAT)

## 2017-02-01 PROCEDURE — 27400114

## 2017-02-01 PROCEDURE — P9045 ALBUMIN (HUMAN), 5%, 250 ML: HCPCS | Performed by: THORACIC SURGERY (CARDIOTHORACIC VASCULAR SURGERY)

## 2017-02-01 PROCEDURE — 63600175 PHARM REV CODE 636 W HCPCS

## 2017-02-01 PROCEDURE — 85730 THROMBOPLASTIN TIME PARTIAL: CPT | Mod: 91

## 2017-02-01 PROCEDURE — 83735 ASSAY OF MAGNESIUM: CPT | Mod: 91

## 2017-02-01 PROCEDURE — 63600175 PHARM REV CODE 636 W HCPCS: Performed by: ANESTHESIOLOGY

## 2017-02-01 PROCEDURE — 85025 COMPLETE CBC W/AUTO DIFF WBC: CPT | Mod: 91

## 2017-02-01 PROCEDURE — C1751 CATH, INF, PER/CENT/MIDLINE: HCPCS | Performed by: ANESTHESIOLOGY

## 2017-02-01 PROCEDURE — 80053 COMPREHEN METABOLIC PANEL: CPT

## 2017-02-01 PROCEDURE — 63600367 HC NITRIC OXIDE PER HOUR

## 2017-02-01 PROCEDURE — 25000003 PHARM REV CODE 250: Performed by: ANESTHESIOLOGY

## 2017-02-01 PROCEDURE — 36000712 HC OR TIME LEV V 1ST 15 MIN: Performed by: THORACIC SURGERY (CARDIOTHORACIC VASCULAR SURGERY)

## 2017-02-01 PROCEDURE — P9045 ALBUMIN (HUMAN), 5%, 250 ML: HCPCS

## 2017-02-01 PROCEDURE — 25000003 PHARM REV CODE 250: Performed by: NURSE PRACTITIONER

## 2017-02-01 PROCEDURE — 63600175 PHARM REV CODE 636 W HCPCS: Performed by: INTERNAL MEDICINE

## 2017-02-01 PROCEDURE — 27400115

## 2017-02-01 PROCEDURE — 99222 1ST HOSP IP/OBS MODERATE 55: CPT | Mod: ,,, | Performed by: NURSE PRACTITIONER

## 2017-02-01 PROCEDURE — 27000221 HC OXYGEN, UP TO 24 HOURS

## 2017-02-01 PROCEDURE — 63600175 PHARM REV CODE 636 W HCPCS: Performed by: SURGERY

## 2017-02-01 PROCEDURE — 94002 VENT MGMT INPAT INIT DAY: CPT

## 2017-02-01 PROCEDURE — 82330 ASSAY OF CALCIUM: CPT

## 2017-02-01 PROCEDURE — 63600175 PHARM REV CODE 636 W HCPCS: Performed by: PHYSICIAN ASSISTANT

## 2017-02-01 PROCEDURE — 94761 N-INVAS EAR/PLS OXIMETRY MLT: CPT

## 2017-02-01 PROCEDURE — 27000175 HC ADULT BYPASS PUMP

## 2017-02-01 PROCEDURE — 33979 INSERT INTRACORPOREAL DEVICE: CPT | Mod: ,,, | Performed by: THORACIC SURGERY (CARDIOTHORACIC VASCULAR SURGERY)

## 2017-02-01 PROCEDURE — 27201037 HC PRESSURE MONITORING SET UP

## 2017-02-01 PROCEDURE — 36592 COLLECT BLOOD FROM PICC: CPT

## 2017-02-01 PROCEDURE — 85610 PROTHROMBIN TIME: CPT | Mod: 91

## 2017-02-01 PROCEDURE — 88305 TISSUE EXAM BY PATHOLOGIST: CPT | Mod: 26,,, | Performed by: PATHOLOGY

## 2017-02-01 PROCEDURE — 85730 THROMBOPLASTIN TIME PARTIAL: CPT

## 2017-02-01 PROCEDURE — 20000000 HC ICU ROOM

## 2017-02-01 PROCEDURE — 36000713 HC OR TIME LEV V EA ADD 15 MIN: Performed by: THORACIC SURGERY (CARDIOTHORACIC VASCULAR SURGERY)

## 2017-02-01 PROCEDURE — 93503 INSERT/PLACE HEART CATHETER: CPT | Mod: 59,,, | Performed by: ANESTHESIOLOGY

## 2017-02-01 PROCEDURE — 25000003 PHARM REV CODE 250: Performed by: SURGERY

## 2017-02-01 PROCEDURE — 37000009 HC ANESTHESIA EA ADD 15 MINS: Performed by: THORACIC SURGERY (CARDIOTHORACIC VASCULAR SURGERY)

## 2017-02-01 PROCEDURE — 76937 US GUIDE VASCULAR ACCESS: CPT | Mod: 26,,, | Performed by: ANESTHESIOLOGY

## 2017-02-01 PROCEDURE — 93005 ELECTROCARDIOGRAM TRACING: CPT

## 2017-02-01 PROCEDURE — 83615 LACTATE (LD) (LDH) ENZYME: CPT

## 2017-02-01 PROCEDURE — 25000242 PHARM REV CODE 250 ALT 637 W/ HCPCS: Performed by: NURSE PRACTITIONER

## 2017-02-01 PROCEDURE — 85014 HEMATOCRIT: CPT

## 2017-02-01 PROCEDURE — 83605 ASSAY OF LACTIC ACID: CPT

## 2017-02-01 PROCEDURE — 84100 ASSAY OF PHOSPHORUS: CPT

## 2017-02-01 PROCEDURE — 27100025 HC TUBING, SET FLUID WARMER: Performed by: ANESTHESIOLOGY

## 2017-02-01 PROCEDURE — 27000248 HC VAD-ADDITIONAL DAY

## 2017-02-01 PROCEDURE — 80048 BASIC METABOLIC PNL TOTAL CA: CPT | Mod: 91

## 2017-02-01 PROCEDURE — 27201423 OPTIME MED/SURG SUP & DEVICES STERILE SUPPLY: Performed by: THORACIC SURGERY (CARDIOTHORACIC VASCULAR SURGERY)

## 2017-02-01 PROCEDURE — 83735 ASSAY OF MAGNESIUM: CPT

## 2017-02-01 PROCEDURE — 93750 INTERROGATION VAD IN PERSON: CPT | Performed by: THORACIC SURGERY (CARDIOTHORACIC VASCULAR SURGERY)

## 2017-02-01 PROCEDURE — 27100088 HC CELL SAVER

## 2017-02-01 PROCEDURE — 27200678 HC TRANSDUCER MONITOR KIT TRIPLE: Performed by: ANESTHESIOLOGY

## 2017-02-01 PROCEDURE — C9113 INJ PANTOPRAZOLE SODIUM, VIA: HCPCS | Performed by: NURSE PRACTITIONER

## 2017-02-01 PROCEDURE — C1768 GRAFT, VASCULAR: HCPCS | Performed by: THORACIC SURGERY (CARDIOTHORACIC VASCULAR SURGERY)

## 2017-02-01 PROCEDURE — 88305 TISSUE EXAM BY PATHOLOGIST: CPT | Performed by: PATHOLOGY

## 2017-02-01 PROCEDURE — 80048 BASIC METABOLIC PNL TOTAL CA: CPT

## 2017-02-01 PROCEDURE — 27800597

## 2017-02-01 PROCEDURE — 99900035 HC TECH TIME PER 15 MIN (STAT)

## 2017-02-01 PROCEDURE — 85610 PROTHROMBIN TIME: CPT

## 2017-02-01 DEVICE — IMPLANTABLE DEVICE: Type: IMPLANTABLE DEVICE | Site: CHEST | Status: FUNCTIONAL

## 2017-02-01 DEVICE — FELT TEFLON 1INX6IN: Type: IMPLANTABLE DEVICE | Site: CHEST | Status: FUNCTIONAL

## 2017-02-01 RX ORDER — BISACODYL 10 MG
10 SUPPOSITORY, RECTAL RECTAL DAILY PRN
Status: DISCONTINUED | OUTPATIENT
Start: 2017-02-01 | End: 2017-02-21 | Stop reason: HOSPADM

## 2017-02-01 RX ORDER — DOXYCYCLINE 100 MG/10ML
INJECTION, POWDER, LYOPHILIZED, FOR SOLUTION INTRAVENOUS
Status: DISCONTINUED | OUTPATIENT
Start: 2017-02-01 | End: 2017-02-01 | Stop reason: HOSPADM

## 2017-02-01 RX ORDER — DIPHENHYDRAMINE HYDROCHLORIDE 50 MG/ML
INJECTION INTRAMUSCULAR; INTRAVENOUS
Status: DISCONTINUED | OUTPATIENT
Start: 2017-02-01 | End: 2017-02-01

## 2017-02-01 RX ORDER — FUROSEMIDE 10 MG/ML
INJECTION INTRAMUSCULAR; INTRAVENOUS
Status: DISCONTINUED | OUTPATIENT
Start: 2017-02-01 | End: 2017-02-01

## 2017-02-01 RX ORDER — FERROUS GLUCONATE 324(38)MG
324 TABLET ORAL
Status: DISCONTINUED | OUTPATIENT
Start: 2017-02-02 | End: 2017-02-21 | Stop reason: HOSPADM

## 2017-02-01 RX ORDER — ALBUTEROL SULFATE 2.5 MG/.5ML
2.5 SOLUTION RESPIRATORY (INHALATION) EVERY 4 HOURS PRN
Status: DISCONTINUED | OUTPATIENT
Start: 2017-02-01 | End: 2017-02-21 | Stop reason: HOSPADM

## 2017-02-01 RX ORDER — ALBUMIN HUMAN 50 G/1000ML
25 SOLUTION INTRAVENOUS ONCE
Status: COMPLETED | OUTPATIENT
Start: 2017-02-01 | End: 2017-02-01

## 2017-02-01 RX ORDER — CEFEPIME HYDROCHLORIDE 2 G/50ML
2 INJECTION, SOLUTION INTRAVENOUS
Status: DISCONTINUED | OUTPATIENT
Start: 2017-02-01 | End: 2017-02-02

## 2017-02-01 RX ORDER — MUPIROCIN 20 MG/G
OINTMENT TOPICAL 2 TIMES DAILY
Status: COMPLETED | OUTPATIENT
Start: 2017-02-01 | End: 2017-02-06

## 2017-02-01 RX ORDER — FUROSEMIDE 10 MG/ML
20 INJECTION INTRAMUSCULAR; INTRAVENOUS ONCE
Status: COMPLETED | OUTPATIENT
Start: 2017-02-01 | End: 2017-02-01

## 2017-02-01 RX ORDER — PROTAMINE SULFATE 10 MG/ML
INJECTION, SOLUTION INTRAVENOUS
Status: DISCONTINUED | OUTPATIENT
Start: 2017-02-01 | End: 2017-02-01

## 2017-02-01 RX ORDER — POTASSIUM CHLORIDE 14.9 MG/ML
40 INJECTION INTRAVENOUS
Status: DISCONTINUED | OUTPATIENT
Start: 2017-02-01 | End: 2017-02-05

## 2017-02-01 RX ORDER — MIDAZOLAM HYDROCHLORIDE 1 MG/ML
INJECTION, SOLUTION INTRAMUSCULAR; INTRAVENOUS
Status: DISCONTINUED | OUTPATIENT
Start: 2017-02-01 | End: 2017-02-01

## 2017-02-01 RX ORDER — NICARDIPINE HYDROCHLORIDE 0.2 MG/ML
INJECTION INTRAVENOUS CONTINUOUS PRN
Status: DISCONTINUED | OUTPATIENT
Start: 2017-02-01 | End: 2017-02-01

## 2017-02-01 RX ORDER — VECURONIUM BROMIDE FOR INJECTION 1 MG/ML
INJECTION, POWDER, LYOPHILIZED, FOR SOLUTION INTRAVENOUS
Status: DISCONTINUED | OUTPATIENT
Start: 2017-02-01 | End: 2017-02-01

## 2017-02-01 RX ORDER — PROPOFOL 10 MG/ML
VIAL (ML) INTRAVENOUS CONTINUOUS PRN
Status: DISCONTINUED | OUTPATIENT
Start: 2017-02-01 | End: 2017-02-01

## 2017-02-01 RX ORDER — FUROSEMIDE 10 MG/ML
10 INJECTION INTRAMUSCULAR; INTRAVENOUS ONCE
Status: COMPLETED | OUTPATIENT
Start: 2017-02-01 | End: 2017-02-01

## 2017-02-01 RX ORDER — INDOMETHACIN 25 MG/1
50 CAPSULE ORAL ONCE
Status: COMPLETED | OUTPATIENT
Start: 2017-02-01 | End: 2017-02-01

## 2017-02-01 RX ORDER — ADHESIVE BANDAGE
30 BANDAGE TOPICAL DAILY PRN
Status: DISCONTINUED | OUTPATIENT
Start: 2017-02-01 | End: 2017-02-07

## 2017-02-01 RX ORDER — EPINEPHRINE 0.1 MG/ML
INJECTION INTRAVENOUS
Status: DISCONTINUED | OUTPATIENT
Start: 2017-02-01 | End: 2017-02-01

## 2017-02-01 RX ORDER — OXYCODONE HYDROCHLORIDE 5 MG/1
10 TABLET ORAL EVERY 4 HOURS PRN
Status: DISCONTINUED | OUTPATIENT
Start: 2017-02-01 | End: 2017-02-08

## 2017-02-01 RX ORDER — CALCIUM CHLORIDE INJECTION 100 MG/ML
INJECTION, SOLUTION INTRAVENOUS
Status: DISCONTINUED | OUTPATIENT
Start: 2017-02-01 | End: 2017-02-01

## 2017-02-01 RX ORDER — ETOMIDATE 2 MG/ML
INJECTION INTRAVENOUS
Status: DISCONTINUED | OUTPATIENT
Start: 2017-02-01 | End: 2017-02-01

## 2017-02-01 RX ORDER — FLUCONAZOLE 2 MG/ML
INJECTION, SOLUTION INTRAVENOUS
Status: DISCONTINUED | OUTPATIENT
Start: 2017-02-01 | End: 2017-02-01

## 2017-02-01 RX ORDER — FLUCONAZOLE 2 MG/ML
400 INJECTION, SOLUTION INTRAVENOUS ONCE
Status: DISCONTINUED | OUTPATIENT
Start: 2017-02-01 | End: 2017-02-02

## 2017-02-01 RX ORDER — FLUCONAZOLE 2 MG/ML
400 INJECTION, SOLUTION INTRAVENOUS
Status: DISCONTINUED | OUTPATIENT
Start: 2017-02-01 | End: 2017-02-03

## 2017-02-01 RX ORDER — AMINOCAPROIC ACID 250 MG/ML
INJECTION, SOLUTION INTRAVENOUS
Status: DISCONTINUED | OUTPATIENT
Start: 2017-02-01 | End: 2017-02-01

## 2017-02-01 RX ORDER — NOREPINEPHRINE BITARTRATE 1 MG/ML
INJECTION, SOLUTION INTRAVENOUS
Status: DISCONTINUED | OUTPATIENT
Start: 2017-02-01 | End: 2017-02-01

## 2017-02-01 RX ORDER — ALBUMIN HUMAN 50 G/1000ML
SOLUTION INTRAVENOUS
Status: COMPLETED
Start: 2017-02-01 | End: 2017-02-01

## 2017-02-01 RX ORDER — CIPROFLOXACIN 2 MG/ML
INJECTION, SOLUTION INTRAVENOUS
Status: DISCONTINUED | OUTPATIENT
Start: 2017-02-01 | End: 2017-02-01

## 2017-02-01 RX ORDER — PANTOPRAZOLE SODIUM 40 MG/1
40 TABLET, DELAYED RELEASE ORAL DAILY
Status: DISCONTINUED | OUTPATIENT
Start: 2017-02-01 | End: 2017-02-21 | Stop reason: HOSPADM

## 2017-02-01 RX ORDER — BACITRACIN 50000 [IU]/1
INJECTION, POWDER, FOR SOLUTION INTRAMUSCULAR
Status: DISCONTINUED | OUTPATIENT
Start: 2017-02-01 | End: 2017-02-01 | Stop reason: HOSPADM

## 2017-02-01 RX ORDER — PANTOPRAZOLE SODIUM 40 MG/10ML
40 INJECTION, POWDER, LYOPHILIZED, FOR SOLUTION INTRAVENOUS DAILY
Status: DISCONTINUED | OUTPATIENT
Start: 2017-02-01 | End: 2017-02-02

## 2017-02-01 RX ORDER — FLUCONAZOLE 2 MG/ML
INJECTION, SOLUTION INTRAVENOUS CONTINUOUS PRN
Status: DISCONTINUED | OUTPATIENT
Start: 2017-02-01 | End: 2017-02-01 | Stop reason: HOSPADM

## 2017-02-01 RX ORDER — NITROGLYCERIN 5 MG/ML
INJECTION, SOLUTION INTRAVENOUS
Status: DISCONTINUED | OUTPATIENT
Start: 2017-02-01 | End: 2017-02-01

## 2017-02-01 RX ORDER — FENTANYL CITRATE 50 UG/ML
INJECTION, SOLUTION INTRAMUSCULAR; INTRAVENOUS
Status: DISCONTINUED | OUTPATIENT
Start: 2017-02-01 | End: 2017-02-01

## 2017-02-01 RX ORDER — NICARDIPINE HYDROCHLORIDE 2.5 MG/ML
INJECTION INTRAVENOUS
Status: DISCONTINUED | OUTPATIENT
Start: 2017-02-01 | End: 2017-02-01

## 2017-02-01 RX ORDER — ALBUMIN HUMAN 50 G/1000ML
500 SOLUTION INTRAVENOUS
Status: DISCONTINUED | OUTPATIENT
Start: 2017-02-01 | End: 2017-02-08

## 2017-02-01 RX ORDER — POTASSIUM CHLORIDE 29.8 MG/ML
40 INJECTION INTRAVENOUS ONCE
Status: COMPLETED | OUTPATIENT
Start: 2017-02-01 | End: 2017-02-01

## 2017-02-01 RX ORDER — ALBUTEROL SULFATE 2.5 MG/.5ML
2.5 SOLUTION RESPIRATORY (INHALATION) EVERY 4 HOURS
Status: DISCONTINUED | OUTPATIENT
Start: 2017-02-01 | End: 2017-02-07

## 2017-02-01 RX ORDER — ASPIRIN 325 MG
325 TABLET ORAL DAILY
Status: DISCONTINUED | OUTPATIENT
Start: 2017-02-01 | End: 2017-02-03

## 2017-02-01 RX ORDER — POTASSIUM CHLORIDE 14.9 MG/ML
INJECTION INTRAVENOUS CONTINUOUS PRN
Status: DISCONTINUED | OUTPATIENT
Start: 2017-02-01 | End: 2017-02-01

## 2017-02-01 RX ORDER — MAGNESIUM SULFATE HEPTAHYDRATE 40 MG/ML
2 INJECTION, SOLUTION INTRAVENOUS ONCE
Status: COMPLETED | OUTPATIENT
Start: 2017-02-01 | End: 2017-02-01

## 2017-02-01 RX ORDER — SODIUM CHLORIDE 0.9 % (FLUSH) 0.9 %
3 SYRINGE (ML) INJECTION EVERY 8 HOURS
Status: DISCONTINUED | OUTPATIENT
Start: 2017-02-01 | End: 2017-02-08

## 2017-02-01 RX ORDER — OXYCODONE HYDROCHLORIDE 5 MG/1
5 TABLET ORAL EVERY 4 HOURS PRN
Status: DISCONTINUED | OUTPATIENT
Start: 2017-02-01 | End: 2017-02-08

## 2017-02-01 RX ORDER — SODIUM CHLORIDE 9 MG/ML
INJECTION, SOLUTION INTRAVENOUS CONTINUOUS
Status: DISCONTINUED | OUTPATIENT
Start: 2017-02-01 | End: 2017-02-06

## 2017-02-01 RX ORDER — ACETAMINOPHEN 10 MG/ML
INJECTION, SOLUTION INTRAVENOUS
Status: DISCONTINUED | OUTPATIENT
Start: 2017-02-01 | End: 2017-02-01

## 2017-02-01 RX ORDER — HEPARIN SODIUM 5000 [USP'U]/ML
INJECTION, SOLUTION INTRAVENOUS; SUBCUTANEOUS
Status: DISCONTINUED | OUTPATIENT
Start: 2017-02-01 | End: 2017-02-01

## 2017-02-01 RX ORDER — FENTANYL CITRATE 50 UG/ML
25 INJECTION, SOLUTION INTRAMUSCULAR; INTRAVENOUS
Status: DISCONTINUED | OUTPATIENT
Start: 2017-02-01 | End: 2017-02-02

## 2017-02-01 RX ORDER — MAGNESIUM SULFATE HEPTAHYDRATE 40 MG/ML
2 INJECTION, SOLUTION INTRAVENOUS
Status: DISCONTINUED | OUTPATIENT
Start: 2017-02-01 | End: 2017-02-08

## 2017-02-01 RX ORDER — ASPIRIN 325 MG
325 TABLET, DELAYED RELEASE (ENTERIC COATED) ORAL DAILY
Status: DISCONTINUED | OUTPATIENT
Start: 2017-02-01 | End: 2017-02-21 | Stop reason: HOSPADM

## 2017-02-01 RX ORDER — NICARDIPINE HYDROCHLORIDE 0.2 MG/ML
5 INJECTION INTRAVENOUS CONTINUOUS
Status: DISCONTINUED | OUTPATIENT
Start: 2017-02-01 | End: 2017-02-08 | Stop reason: ALTCHOICE

## 2017-02-01 RX ORDER — DEXTROSE MONOHYDRATE, SODIUM CHLORIDE, AND POTASSIUM CHLORIDE 50; 2.98; 4.5 G/1000ML; G/1000ML; G/1000ML
INJECTION, SOLUTION INTRAVENOUS CONTINUOUS
Status: DISCONTINUED | OUTPATIENT
Start: 2017-02-01 | End: 2017-02-04

## 2017-02-01 RX ADMIN — EPINEPHRINE 0.01 MG: 0.1 INJECTION, SOLUTION ENDOTRACHEAL; INTRACARDIAC; INTRAVENOUS at 11:02

## 2017-02-01 RX ADMIN — MORPHINE SULFATE 2 MG: 2 INJECTION, SOLUTION INTRAMUSCULAR; INTRAVENOUS at 02:02

## 2017-02-01 RX ADMIN — ALBUMIN (HUMAN) 25 G: 12.5 SOLUTION INTRAVENOUS at 05:02

## 2017-02-01 RX ADMIN — MIDAZOLAM HYDROCHLORIDE 2 MG: 1 INJECTION, SOLUTION INTRAMUSCULAR; INTRAVENOUS at 12:02

## 2017-02-01 RX ADMIN — ETOMIDATE 16 MG: 2 INJECTION, SOLUTION INTRAVENOUS at 08:02

## 2017-02-01 RX ADMIN — PROPOFOL 40 MCG/KG/MIN: 10 INJECTION, EMULSION INTRAVENOUS at 11:02

## 2017-02-01 RX ADMIN — EPINEPHRINE 0.07 MCG/KG/MIN: 1 INJECTION PARENTERAL at 11:02

## 2017-02-01 RX ADMIN — VANCOMYCIN HYDROCHLORIDE 750 MG: 1 INJECTION, POWDER, LYOPHILIZED, FOR SOLUTION INTRAVENOUS at 08:02

## 2017-02-01 RX ADMIN — MAGNESIUM SULFATE IN WATER 2 G: 40 INJECTION, SOLUTION INTRAVENOUS at 06:02

## 2017-02-01 RX ADMIN — MORPHINE SULFATE 2 MG: 2 INJECTION, SOLUTION INTRAMUSCULAR; INTRAVENOUS at 06:02

## 2017-02-01 RX ADMIN — SODIUM PHOSPHATE, MONOBASIC, MONOHYDRATE 30 MMOL: 276; 142 INJECTION, SOLUTION INTRAVENOUS at 10:02

## 2017-02-01 RX ADMIN — FENTANYL CITRATE 500 MCG: 50 INJECTION, SOLUTION INTRAMUSCULAR; INTRAVENOUS at 08:02

## 2017-02-01 RX ADMIN — NICARDIPINE HYDROCHLORIDE 25 MCG: 2.5 INJECTION INTRAVENOUS at 11:02

## 2017-02-01 RX ADMIN — PHYTONADIONE 10 MG: 10 INJECTION, EMULSION INTRAMUSCULAR; INTRAVENOUS; SUBCUTANEOUS at 03:02

## 2017-02-01 RX ADMIN — MIDAZOLAM HYDROCHLORIDE 6 MG: 1 INJECTION, SOLUTION INTRAMUSCULAR; INTRAVENOUS at 08:02

## 2017-02-01 RX ADMIN — MAGNESIUM SULFATE IN WATER 2 G: 40 INJECTION, SOLUTION INTRAVENOUS at 04:02

## 2017-02-01 RX ADMIN — FENTANYL CITRATE 25 MCG: 50 INJECTION INTRAMUSCULAR; INTRAVENOUS at 07:02

## 2017-02-01 RX ADMIN — SODIUM CHLORIDE, SODIUM GLUCONATE, SODIUM ACETATE, POTASSIUM CHLORIDE, MAGNESIUM CHLORIDE, SODIUM PHOSPHATE, DIBASIC, AND POTASSIUM PHOSPHATE: .53; .5; .37; .037; .03; .012; .00082 INJECTION, SOLUTION INTRAVENOUS at 08:02

## 2017-02-01 RX ADMIN — NICARDIPINE HYDROCHLORIDE 5 MG/HR: 0.2 INJECTION, SOLUTION INTRAVENOUS at 11:02

## 2017-02-01 RX ADMIN — SODIUM BICARBONATE 50 MEQ: 84 INJECTION, SOLUTION INTRAVENOUS at 06:02

## 2017-02-01 RX ADMIN — FLUCONAZOLE 400 MG: 2 INJECTION INTRAVENOUS at 08:02

## 2017-02-01 RX ADMIN — FENTANYL CITRATE 250 MCG: 50 INJECTION, SOLUTION INTRAMUSCULAR; INTRAVENOUS at 11:02

## 2017-02-01 RX ADMIN — FENTANYL CITRATE 25 MCG: 50 INJECTION INTRAMUSCULAR; INTRAVENOUS at 04:02

## 2017-02-01 RX ADMIN — CALCIUM CHLORIDE 400 MG: 100 INJECTION, SOLUTION INTRAVENOUS at 11:02

## 2017-02-01 RX ADMIN — PROTAMINE SULFATE 200 MG: 10 INJECTION, SOLUTION INTRAVENOUS at 11:02

## 2017-02-01 RX ADMIN — DOBUTAMINE IN DEXTROSE 5 MCG/KG/MIN: 200 INJECTION, SOLUTION INTRAVENOUS at 06:02

## 2017-02-01 RX ADMIN — NOREPINEPHRINE BITARTRATE 0.02 MG: 1 INJECTION, SOLUTION, CONCENTRATE INTRAVENOUS at 11:02

## 2017-02-01 RX ADMIN — HEPARIN SODIUM 22300 UNITS: 5000 INJECTION, SOLUTION INTRAVENOUS; SUBCUTANEOUS at 09:02

## 2017-02-01 RX ADMIN — VANCOMYCIN HYDROCHLORIDE 750 MG: 1 INJECTION, POWDER, LYOPHILIZED, FOR SOLUTION INTRAVENOUS at 09:02

## 2017-02-01 RX ADMIN — SODIUM CHLORIDE 1 UNITS/HR: 9 INJECTION, SOLUTION INTRAVENOUS at 05:02

## 2017-02-01 RX ADMIN — FUROSEMIDE 10 MG: 10 INJECTION, SOLUTION INTRAMUSCULAR; INTRAVENOUS at 06:02

## 2017-02-01 RX ADMIN — NITROGLYCERIN 25 MCG: 5 INJECTION, SOLUTION INTRAVENOUS at 10:02

## 2017-02-01 RX ADMIN — ALBUTEROL SULFATE 2.5 MG: 2.5 SOLUTION RESPIRATORY (INHALATION) at 08:02

## 2017-02-01 RX ADMIN — Medication 3 ML: at 10:02

## 2017-02-01 RX ADMIN — NOREPINEPHRINE BITARTRATE 0.02 MCG/KG/MIN: 1 INJECTION, SOLUTION, CONCENTRATE INTRAVENOUS at 08:02

## 2017-02-01 RX ADMIN — FUROSEMIDE 20 MG: 10 INJECTION, SOLUTION INTRAMUSCULAR; INTRAVENOUS at 11:02

## 2017-02-01 RX ADMIN — VECURONIUM BROMIDE FOR INJECTION 10 MG: 1 INJECTION, POWDER, LYOPHILIZED, FOR SOLUTION INTRAVENOUS at 08:02

## 2017-02-01 RX ADMIN — ALBUTEROL SULFATE 2.5 MG: 2.5 SOLUTION RESPIRATORY (INHALATION) at 03:02

## 2017-02-01 RX ADMIN — POTASSIUM CHLORIDE: 200 INJECTION, SOLUTION INTRAVENOUS at 11:02

## 2017-02-01 RX ADMIN — ACETAMINOPHEN 1000 MG: 10 INJECTION, SOLUTION INTRAVENOUS at 11:02

## 2017-02-01 RX ADMIN — POTASSIUM CHLORIDE 40 MEQ: 400 INJECTION, SOLUTION INTRAVENOUS at 02:02

## 2017-02-01 RX ADMIN — VECURONIUM BROMIDE FOR INJECTION 10 MG: 1 INJECTION, POWDER, LYOPHILIZED, FOR SOLUTION INTRAVENOUS at 10:02

## 2017-02-01 RX ADMIN — ASPIRIN 325 MG ORAL TABLET 325 MG: 325 PILL ORAL at 08:02

## 2017-02-01 RX ADMIN — DEXTROSE MONOHYDRATE, SODIUM CHLORIDE, AND POTASSIUM CHLORIDE: 50; 4.5; 2.98 INJECTION, SOLUTION INTRAVENOUS at 02:02

## 2017-02-01 RX ADMIN — ALBUMIN HUMAN 25 G: 50 SOLUTION INTRAVENOUS at 01:02

## 2017-02-01 RX ADMIN — NICARDIPINE HYDROCHLORIDE 50 MCG: 2.5 INJECTION INTRAVENOUS at 11:02

## 2017-02-01 RX ADMIN — FENTANYL CITRATE 150 MCG: 50 INJECTION, SOLUTION INTRAMUSCULAR; INTRAVENOUS at 09:02

## 2017-02-01 RX ADMIN — NOREPINEPHRINE BITARTRATE 0.1 MCG/KG/MIN: 1 INJECTION, SOLUTION, CONCENTRATE INTRAVENOUS at 11:02

## 2017-02-01 RX ADMIN — MUPIROCIN: 20 OINTMENT TOPICAL at 09:02

## 2017-02-01 RX ADMIN — MIDAZOLAM HYDROCHLORIDE 2 MG: 1 INJECTION, SOLUTION INTRAMUSCULAR; INTRAVENOUS at 08:02

## 2017-02-01 RX ADMIN — Medication 3 ML: at 02:02

## 2017-02-01 RX ADMIN — AMINOCAPROIC ACID 10 G: 250 INJECTION, SOLUTION INTRAVENOUS at 08:02

## 2017-02-01 RX ADMIN — CEFEPIME HYDROCHLORIDE 2 G: 2 INJECTION, SOLUTION INTRAVENOUS at 03:02

## 2017-02-01 RX ADMIN — FUROSEMIDE 20 MG: 10 INJECTION, SOLUTION INTRAMUSCULAR; INTRAVENOUS at 10:02

## 2017-02-01 RX ADMIN — POTASSIUM CHLORIDE 40 MEQ: 200 INJECTION, SOLUTION INTRAVENOUS at 09:02

## 2017-02-01 RX ADMIN — AMINOCAPROIC ACID 1 G/HR: 250 INJECTION, SOLUTION INTRAVENOUS at 08:02

## 2017-02-01 RX ADMIN — ALBUMIN (HUMAN) 25 G: 12.5 SOLUTION INTRAVENOUS at 01:02

## 2017-02-01 RX ADMIN — ALBUTEROL SULFATE 2.5 MG: 2.5 SOLUTION RESPIRATORY (INHALATION) at 11:02

## 2017-02-01 RX ADMIN — NICARDIPINE HYDROCHLORIDE 2.5 MG/HR: 0.2 INJECTION, SOLUTION INTRAVENOUS at 11:02

## 2017-02-01 RX ADMIN — FENTANYL CITRATE 100 MCG: 50 INJECTION, SOLUTION INTRAMUSCULAR; INTRAVENOUS at 11:02

## 2017-02-01 RX ADMIN — FENTANYL CITRATE 25 MCG: 50 INJECTION INTRAMUSCULAR; INTRAVENOUS at 09:02

## 2017-02-01 RX ADMIN — ALBUMIN (HUMAN) 25 G: 12.5 SOLUTION INTRAVENOUS at 08:02

## 2017-02-01 RX ADMIN — DIPHENHYDRAMINE HYDROCHLORIDE 25 MG: 50 INJECTION INTRAMUSCULAR; INTRAVENOUS at 09:02

## 2017-02-01 RX ADMIN — PROPOFOL 25 MCG/KG/MIN: 10 INJECTION, EMULSION INTRAVENOUS at 11:02

## 2017-02-01 RX ADMIN — CALCIUM CHLORIDE 600 MG: 100 INJECTION, SOLUTION INTRAVENOUS at 11:02

## 2017-02-01 RX ADMIN — HEPARIN SODIUM 5000 UNITS: 5000 INJECTION, SOLUTION INTRAVENOUS; SUBCUTANEOUS at 10:02

## 2017-02-01 RX ADMIN — PANTOPRAZOLE SODIUM 40 MG: 40 INJECTION, POWDER, FOR SOLUTION INTRAVENOUS at 03:02

## 2017-02-01 RX ADMIN — CIPROFLOXACIN 400 MG: 2 INJECTION, SOLUTION INTRAVENOUS at 08:02

## 2017-02-01 NOTE — H&P
History & Physical  Surgical Intensive Care    SUBJECTIVE:     Chief Complaint/Reason for Admission: LVAD    History of Present Illness:  Patient is a 27 y.o. male with DCM that presents to the SICU intubated and sedated s/p LVAD and IABP placement.    PTA Medications   Medication Sig    DOBUTAMINE HCL (DOBUTAMINE IV) Inject into the vein.    furosemide (LASIX) 20 MG tablet Take 1 tablet (20 mg total) by mouth once daily.    lisinopril 10 MG tablet TAKE ONE TABLET BY MOUTH ONCE DAILY IN THE EVENING       Review of patient's allergies indicates:  No Known Allergies    Past Medical History   Diagnosis Date    Cardiogenic shock 1/16/2017    Cardiomyopathy      Familial cardiomyopathy    CHF (congestive heart failure)     Essential hypertension 12/21/2016    Familial Cardiomyopathy      Familial cardiomyopathy      History reviewed. No pertinent past surgical history.  Family History   Problem Relation Age of Onset    Arthritis Mother     Heart disease Brother      cardiomyopathy and heart transplant    No Known Problems Father     Heart disease Brother      cardiomyopathy and heart transplanted twice    Birth defects Paternal Uncle      Social History   Substance Use Topics    Smoking status: Current Some Day Smoker     Packs/day: 1.00    Smokeless tobacco: None    Alcohol use 2.4 oz/week     4 Shots of liquor per week        Review of Systems:  Review of systems not obtained due to patient factors intubated and sedated.    OBJECTIVE:     Vital Signs (Most Recent)  Temp: 97.4 °F (36.3 °C) (02/01/17 1400)  Pulse: (!) 111 (02/01/17 1415)  Resp: 13 (02/01/17 1300)  BP: 99/68 (01/30/17 1500)  SpO2: 100 % (02/01/17 1415)  Ventilator Data (Last 24H):     Vent Mode: SIMV  Oxygen Concentration (%):  [] 50  Resp Rate Total:  [15 br/min-19 br/min] 18 br/min  Vt Set:  [500 mL] 500 mL  PEEP/CPAP:  [5 cmH20] 5 cmH20  Pressure Support:  [10 cmH20] 10 cmH20  Mean Airway Pressure:  [8.9 cmH20] 8.9  cmH20    Hemodynamic Parameters (Last 24H):  PAP: (24-26)/(18-19) 24/18  PAP (Mean):  [22 mmHg-23 mmHg] 22 mmHg    Physical Exam:  General: well developed, well nourished  Lungs:  mechanical breath sounds bilaterally  Cardiovascular: Heart: regular rate and rhythm. Chest Wall: chest open. Extremities: no cyanosis or edema, or clubbing. Pulses: 2+ and symmetric.  Neurologic: Mental status: sedated, follows commands    Lines/Drains:       Pulmonary Artery Catheter Assessment  02/01/17 0835 (Active)   Number of days:0            Peripheral IV - Single Lumen Right Hand (Active)   Site Assessment Clean;Dry;Intact;No redness;No swelling 2/1/2017  7:01 AM   Line Status Blood return noted;Flushed;Saline locked 2/1/2017  7:01 AM   Dressing Status Clean;Dry;Intact 2/1/2017  7:01 AM   Site Change Due 02/02/17 1/31/2017  7:30 PM   Reason Not Rotated Not due 2/1/2017  7:01 AM   Number of days:            Peripheral IV - Single Lumen 02/01/17 0702 Right Antecubital (Active)   Site Assessment Clean;Dry;Intact;No redness;No swelling 2/1/2017  6:00 AM   Line Status Blood return noted;Saline locked 2/1/2017  6:00 AM   Dressing Status Clean;Dry;Intact 2/1/2017  6:00 AM   Dressing Intervention New dressing 2/1/2017  6:00 AM   Site Change Due 02/04/17 2/1/2017  6:00 AM   Reason Not Rotated Not due 2/1/2017  6:00 AM   Number of days:0            Peripheral IV - Single Lumen 02/01/17 0830 Left Hand (Active)   Number of days:0            Arterial Line 02/01/17 0815 Left Radial (Active)   Number of days:0            IABP 01/30/17 1430 (Active)   Site Assessment Clean;Dry;Intact 2/1/2017  1:00 PM   Helium Tubing Clear 2/1/2017  1:00 PM   Arterial Line Status Arterial fluids per protocol 2/1/2017  1:00 PM   Arterial Line Interventions Leveled 2/1/2017  1:00 PM   Positioning Head of bed flat 2/1/2017  1:00 PM   Dressing Occlusive 2/1/2017  1:00 PM   Dressing Status Clean;Dry;Intact 2/1/2017  1:00 PM   Dressing Intervention New dressing  1/31/2017  3:00 AM   Dressing Change Due 02/06/17 1/31/2017  3:00 AM   Color/Movement/Sensation Capillary refill less than 3 sec 2/1/2017  1:00 PM   Number of days:2            VAD 02/01/17 1042 Heartware (Active)   Equipment inventory at  Admission 2/1/2017 10:00 AM   Pump type Heartware 2/1/2017 10:00 AM   Battery 1 [id number] 2/1/2017 10:00 AM   Battery 2 [id number] 2/1/2017 10:00 AM   Battery 3 [id number] 2/1/2017 10:00 AM   Battery 4 [id number] 2/1/2017 10:00 AM   Battery 5 [id number] 2/1/2017 10:00 AM   Battery 6 [id number] 2/1/2017 10:00 AM   AC Adapter 1 [id number] 2/1/2017 10:00 AM   AC Adapter 2 [id number] 2/1/2017 10:00 AM   Battery Charger [id number] 2/1/2017 10:00 AM   Primary Controller [id number] 2/1/2017 10:00 AM   Extra Controller [id number] 2/1/2017 10:00 AM   Number of days:0            Chest Tube 02/01/17 1129 1 Mediastinal 32 Fr. (Active)   Output (mL) 0 mL 2/1/2017  2:00 PM   Number of days:0            Chest Tube 02/01/17 1130 2 Mediastinal 32 Fr. (Active)   Output (mL) 20 mL 2/1/2017  2:00 PM   Number of days:0            Urethral Catheter 02/01/17 0840 Non-latex;Straight-tip;Temperature probe 16 Fr. (Active)   Output (mL) 500 mL 2/1/2017  2:00 PM   Number of days:0       Laboratory  CBC:   Recent Labs  Lab 02/01/17  1235 02/01/17  1235   WBC 12.37  --    RBC 4.01*  --    HGB 11.9*  --    HCT 34.9* 34*   *  --    MCV 87  --    MCH 29.7  --    MCHC 34.1  --      BMP:   Recent Labs  Lab 02/01/17  1235   *      K 3.7      CO2 24   BUN 17   CREATININE 1.1   CALCIUM 9.1   MG 1.7     Coagulation:   Recent Labs  Lab 02/01/17  1235   INR 1.2   APTT 24.9         ASSESSMENT/PLAN:     28yo M s/p LVAD and IABP for DCM.      Plan:  Neuro:   - Propofol for sedation  - Morphine for pain    Pulmonary:   - Intubated  - Daily CXR  Vent Mode: SIMV  Oxygen Concentration (%):  [] 50  Resp Rate Total:  [15 br/min-19 br/min] 18 br/min  Vt Set:  [500 mL] 500 mL  PEEP/CPAP:  [5 cmH20] 5  cmH20  Pressure Support:  [10 cmH20] 10 cmH20  Mean Airway Pressure:  [8.9 cmH20] 8.9 cmH20    Cardiac:  - LVAD and IABP 2/1  - OR tomorrow 2/2 for chest closure  - Epi, dobutamine, cardene, amicar    Renal:   - Insulin gtt  - Castillo  - Strict I/O's    Infectious Disease:   - Abx: cefepime, diflucan, vanc  - Can de-escalate once chest is closed    Hematology:  - CBC Q4hrs    Endocrine:  - Insuline gtt    Fluids/Electrolytes/Nutrition/GI:   - mIVF @ 10mL  - Dietary consulted  - GI prophylaxis  - Replace lytes PRN    Dispo:  - OR tomorrow 2/2 for chest closure    Silvestre Kearney MD  Surgery Resident, PGY-1  Pager [phone]  02/01/2017

## 2017-02-01 NOTE — PROGRESS NOTES
Met with patient today. He explains he is ready for VAD but anxious.  All question answered with verbalization of understanding. Will continue to monitor patients education status.

## 2017-02-01 NOTE — ANESTHESIA PROCEDURE NOTES
[place]    Diagnosis: dilated cardiomyopathy  Patient location during procedure: done in OR  Procedure start time: 2/1/2017 8:35 AM  Timeout: 2/1/2017 8:35 AM  Procedure end time: 2/1/2017 8:43 AM  Staffing  Anesthesiologist: KAISER HAYDEN  Resident/CRNA: DICK RODRIGUEZ  Performed by: resident/CRNA   Anesthesiologist was present at the time of the procedure.  Preanesthetic Checklist  Completed: patient identified, site marked, surgical consent, pre-op evaluation, timeout performed, IV checked, risks and benefits discussed, monitors and equipment checked and anesthesia consent given  [place]  Skin Prep: chlorhexidine gluconate  Local Infiltration: none  Location: right,  internal jugular vein  Vessel Caliber: large, patent, compressibility normal  Vascular Doppler:  not done  Introducer: 9 Fr single lumen, manometry used.  Device: CCO/Oximetric Catheter  Catheter Size: 9 Fr  Catheter placement by yes. Heme positive aspiration all ports. PAC floated with balloon up until wedgedInsertion Attempts: 1  Indication: intravenous therapy, hemodynamic monitoring  Ultrasound Guidance  Needle advanced into vessel with real time Ultrasound guidance.  Image recorded and saved.  Guidewire confirmed in vessel.  Sterile sheath used.  Assessment  Central Line Bundle Protocol followed. Hand hygiene before procedure, surgical cap worn, surgical mask worn, sterile surgical gloves worn, large sterile drape used.  Verification: blood return  Dressing: secured with tape and tegaderm and sutured in place and taped  Patient: Tolerated Well

## 2017-02-01 NOTE — PROGRESS NOTES
Pt arrived from OR and placed on vent with settings charted. Pt tolerating well and resting comfortably at this time. Will continue to monitor.

## 2017-02-01 NOTE — ANESTHESIA PROCEDURE NOTES
Arterial    Diagnosis: dilated cardiomyopathy    Patient location during procedure: done in OR  Procedure start time: 2/1/2017 8:15 AM  Timeout: 2/1/2017 8:15 AM  Procedure end time: 2/1/2017 8:19 AM  Staffing  Anesthesiologist: KAISER HAYDEN  Resident/CRNA: DICK RODRIGUEZ  Performed by: resident/CRNA   Anesthesiologist was present at the time of the procedure.  Preanesthetic Checklist  Completed: patient identified, site marked, surgical consent, pre-op evaluation, timeout performed, IV checked, risks and benefits discussed, monitors and equipment checked and anesthesia consent given  Arterial Line  Skin Prep: chlorhexidine gluconate  Local Infiltration: lidocaine  Orientation: left  Location: radial  Catheter Size: 20 G{[facility] ANESTHESIA BLOCK ART PLACEMENTInsertion Attempts: 1  Assessment  Dressing: secured with tape and tegaderm  Patient: Tolerated well

## 2017-02-01 NOTE — PLAN OF CARE
Problem: Patient Care Overview  Goal: Plan of Care Review  Pt AAOx3. IABP in place; no alarms. Dobutamine infusing. 2 hibaclense baths given and patient shaved. NPO since midnight. VSS. Afebrile. POC updated with pt and wife.

## 2017-02-01 NOTE — TRANSFER OF CARE
"Anesthesia Transfer of Care Note    Patient: Mert Samayoa    Procedure(s) Performed: Procedure(s) (LRB):  INSERTION-LEFT VENTRICULAR ASSIST DEVICE (N/A)    Patient location: ICU    Anesthesia Type: general    Transport from OR: Transported from OR intubated on 100% O2 by AMBU with adequate controlled ventilation. Continuous ECG monitoring in transport. Continuous SpO2 monitoring in transport. Continuos invasive BP monitoring in transport. Continuous CVP monitoring in transport. Continuous PA pressure monitoring in transport    Post pain: adequate analgesia    Post assessment: no apparent anesthetic complications and tolerated procedure well    Post vital signs: stable    Level of consciousness: sedated and unresponsive    Nausea/Vomiting: no nausea/vomiting    Complications: none          Last vitals:   Visit Vitals    BP 99/68 (BP Location: Other (Comment))    Pulse 96    Temp 36.6 °C (97.8 °F) (Oral)    Resp 15    Ht 5' 7" (1.702 m)    Wt 62.1 kg (136 lb 12.7 oz)    SpO2 100%    BMI 21.43 kg/m2     "

## 2017-02-01 NOTE — ASSESSMENT & PLAN NOTE
bg goal 140-180- Start intensive insulin gtt protocol with q1h bg monitoring     Anticipate fonseca resolution

## 2017-02-01 NOTE — ANESTHESIA POSTPROCEDURE EVALUATION
"Anesthesia Post Evaluation    Patient: Mert Samayoa    Procedure(s) Performed: Procedure(s) (LRB):  INSERTION-LEFT VENTRICULAR ASSIST DEVICE (N/A)    Final Anesthesia Type: general  Patient location during evaluation: ICU  Patient participation: No - Unable to Participate, Intubation  Level of consciousness: sedated  Post-procedure vital signs: reviewed and stable  Pain management: adequate  Airway patency: patent  PONV status at discharge: No PONV  Anesthetic complications: no      Cardiovascular status: hemodynamically stable  Respiratory status: ETT and ventilator  Hydration status: euvolemic  Follow-up not needed.        Visit Vitals    BP 99/68 (BP Location: Other (Comment))    Pulse (!) 111    Temp 36.3 °C (97.4 °F) (Core)    Resp 13    Ht 5' 7" (1.702 m)    Wt 62.1 kg (136 lb 12.7 oz)    SpO2 100%    BMI 21.43 kg/m2       Pain/Carlos A Score: Pain Assessment Performed: Yes (2/1/2017  7:01 AM)  Presence of Pain: denies (2/1/2017  7:01 AM)  Pain Rating Prior to Med Admin: 7 (2/1/2017  2:10 PM)  Pain Rating Post Med Admin: 0 (2/1/2017  2:25 AM)      "

## 2017-02-01 NOTE — SUBJECTIVE & OBJECTIVE
"Interval HPI:    intubated and sedated from OR, no hypoglycemia or insulin infusing,   bg 144 on admit, NPO    PMH, PSH, FH, SH updated and reviewed     ROS:  Unable to obtain due to: Sedation,Intubation,Altered mental status,Critical illness,Reviewed ROS from note dated h&p     Current Medications and/or Treatments Impacting Glycemic Control  Immunotherapy:    Immunosuppressants     None        Steroids:   Hormones     None        Pressors:    Autonomic Drugs     Start     Stop Route Frequency Ordered    02/01/17 1500  EPINEPHrine (ADRENALIN) 4 mg in sodium chloride 0.9% 250 mL infusion     Question Answer Comment   Titrate by: (in mcg/kg/min) 0.01    Titrate interval: (in minutes) 5    Titrate to maintain: (SBP or MAP or Cardiac Index) MAP    Greater than: (in mmHg) 60    Maximum dose: (in mcg/kg/min) 2        -- IV Continuous 02/01/17 1405        Hyperglycemia/Diabetes Medications:   Antihyperglycemics     Start     Stop Route Frequency Ordered    02/01/17 1500  insulin regular (Humulin R) 100 Units in sodium chloride 0.9% 100 mL infusion      -- IV Continuous 02/01/17 1354          PHYSICAL EXAMINATION:  Visit Vitals    BP 99/68 (BP Location: Other (Comment))  Comment (BP Location): IABP    Pulse 108    Temp 97.4 °F (36.3 °C) (Core)    Resp 13    Ht 5' 7" (1.702 m)    Wt 62.1 kg (136 lb 12.7 oz)    SpO2 100%    BMI 21.43 kg/m2     Constitutional:  Well developed, well nourished, NAD.  ENT: External ears no masses with nose patent    Neck:  Supple; trachea midline;   Cardiovascular: Normal heart sounds, no LE edema.     Lungs:  Normal effort; lungs anterior bilaterally clear to auscultation.  Abdomen:  Soft, no masses,  no hernias.  MS: No clubbing or cyanosis of nails noted;  unable to assess gait.  Skin: No rashes,  Ioban dressing in place- sternum open   Psychiatric: KATARZYNA  Neurological: KATARZYNA  "

## 2017-02-01 NOTE — PROGRESS NOTES
Progress Note  Heart Transplant Service    Admit Date: 1/27/2017   LOS: 5 days     SUBJECTIVE:     Follow up for: Chronic combined systolic and diastolic heart failure    Interval History: IABP in place, family at bedside.  Patient reports back pain that is better with morphine.  Anxious about LVAD placement today     Scheduled Meds:   furosemide  20 mg Oral Daily     Continuous Infusions:   DOBUTamine 5 mcg/kg/min (02/01/17 0601)     PRN Meds:vancomycin (VANCOCIN) IVPB **AND** ciprofloxacin **AND** fluconazole (DIFLUCAN) IVPB, dextrose 50%, dextrose 50%, glucagon (human recombinant), glucose, glucose, morphine, vancomycin (VANCOCIN) IVPB    Review of patient's allergies indicates:  No Known Allergies    OBJECTIVE:     Vital Signs (Most Recent)  Temp: 98.2 °F (36.8 °C) (02/01/17 0300)  Pulse: (!) 59 (02/01/17 0430)  Resp: (!) 26 (02/01/17 0430)  BP: 99/68 (01/30/17 1500)  SpO2: 98 % (02/01/17 0430)    Vital Signs Range (Last 24H):  Temp:  [98.1 °F (36.7 °C)-98.4 °F (36.9 °C)]   Pulse:  [59-96]   Resp:  [11-33]   SpO2:  [97 %-100 %]     I & O (Last 24H):    Intake/Output Summary (Last 24 hours) at 02/01/17 0711  Last data filed at 02/01/17 0600   Gross per 24 hour   Intake            337.5 ml   Output             1250 ml   Net           -912.5 ml            Telemetry: sinus  Constitutional: laying in bed NAD  Neck: No JVD present. No thyromegaly present.   Cardiovascular: Normal rate, regular rhythm and intact distal pulses.  No murmurs heard. IABP sounds appreciated  Pulmonary/Chest:CTA  Abdominal: + BS, exhibits no distension. There is no tenderness. There is no rebound.   Extremities: no cyanosis, clubbing or edema.  Left groin site intact.  No bleeding, edema, erythema or hematoma    Labs:       Recent Labs  Lab 01/30/17  0525 01/31/17  0415 02/01/17  0437   WBC 7.28 7.72 6.38   HGB 14.0 14.2 13.9*   HCT 40.0 40.5 40.6    196 164   LYMPH 26.5  1.9 23.4  1.8 24.0  1.5   MONO 7.7  0.6 8.2  0.6 9.2   0.6   EOSINOPHIL 3.8 3.0 3.0         Recent Labs  Lab 01/27/17  1829 01/30/17  0525   INR 1.1 1.1          Recent Labs  Lab 01/30/17  0525 01/31/17  0415 02/01/17  0437   GLU 84 83 94   CALCIUM 9.2 9.3 9.5   ALBUMIN 4.2 4.1 4.1   PROT 7.1 6.9 7.1   * 137 138   K 3.9 3.8 3.9   CO2 19* 23 26    102 102   BUN 18 17 17   CREATININE 1.0 1.0 0.9   ALKPHOS 74 70 77   ALT 37 40 38   AST 28 32 29   BILITOT 0.5 0.5 0.6   MG 2.1 1.8 1.9   PHOS 4.2 4.5 4.5     Estimated Creatinine Clearance: 108.3 mL/min (based on Cr of 0.9).    No results for input(s): BNP, BNPTRIAGEBLO in the last 168 hours.    No results for input(s): LDH in the last 168 hours.    Microbiology Results (last 7 days)     Procedure Component Value Units Date/Time    Blood culture (site 1) [[id number]] Collected:  01/27/17 1837    Order Status:  Completed Specimen:  Blood Updated:  01/31/17 2022     Blood Culture, Routine No Growth to date     Blood Culture, Routine No Growth to date     Blood Culture, Routine No Growth to date     Blood Culture, Routine No Growth to date     Blood Culture, Routine No Growth to date    Narrative:       Site # 1, aerobic and anaerobic (central line, if patient has  one)    Blood culture (site 2) [[id number]] Collected:  01/27/17 1828    Order Status:  Completed Specimen:  Blood Updated:  01/31/17 2022     Blood Culture, Routine No Growth to date     Blood Culture, Routine No Growth to date     Blood Culture, Routine No Growth to date     Blood Culture, Routine No Growth to date     Blood Culture, Routine No Growth to date    Narrative:       Site # 2, aerobic only            ASSESSMENT:     25 yo WM with familial DCM, 2 brothers with OHTx, Chronic Systolic HF, with worsening activity intolerance (NYHA FC IV), noted to have a decrease in PkVO2 from 42.0 to 23.9 (53% of predicted) and a gradually climbing BNP, recent tobacco use, admitted 1/16/16 after RHC showed severely reduced CO/CI and elevated L and R filling  pressures. He was admitted for PICC removal, IABP, and planned LVAD implantation.    PLAN:     Cardiogenic Shock due to Acute on Chronic Systolic HF, DCM, NYHA FC IV  -RHC 1/16 showed low CO/CI and elevated filling pressures and patient on  5 mcg/kg/min at home.   -Diuresed with Lasix infusion and transitioned to oral.  Net negative 3.0L throughout hospital stay and appears compensated today   -GDMT: holding ACE and BB due to upcoming surgery and   -Does not have ICD- will need to discuss timing of this vs Lifevest  -cultures: NGTD  -S/P IABP placement (Vanc ordered on call to cath lab) and LVAD today    HTN  -BP controlled    Back Pain  -Changed Percocet to Morphine for pain     Prophylaxis   -heparin    Tamika Cannon PA-C  HTS spectralink: [id number]

## 2017-02-01 NOTE — CONSULTS
"[facility]  Endocrinology  Diabetes Consult Note    Consult Requested by: Lashon Hawkins MD   Reason for admit: Chronic combined systolic and diastolic heart failure    HISTORY OF PRESENT ILLNESS:  Reason for Consult: Management of  Hyperglycemia     Surgical Procedure and Date:  2/1/17 s/p LVAD    HPI: 28 y/o WM with familial DCM, 2 brothers with OHTx,  Now s/p LVAD. Now with stress induced hyperglycemia. No prior diagnosis of T2DM.       Interval HPI:    intubated and sedated from OR, no hypoglycemia or insulin infusing,   bg 144 on admit, NPO    PMH, PSH, FH, SH updated and reviewed     ROS:  Unable to obtain due to: Sedation,Intubation,Altered mental status,Critical illness,Reviewed ROS from note dated h&p     Current Medications and/or Treatments Impacting Glycemic Control  Immunotherapy:    Immunosuppressants     None        Steroids:   Hormones     None        Pressors:    Autonomic Drugs     Start     Stop Route Frequency Ordered    02/01/17 1500  EPINEPHrine (ADRENALIN) 4 mg in sodium chloride 0.9% 250 mL infusion     Question Answer Comment   Titrate by: (in mcg/kg/min) 0.01    Titrate interval: (in minutes) 5    Titrate to maintain: (SBP or MAP or Cardiac Index) MAP    Greater than: (in mmHg) 60    Maximum dose: (in mcg/kg/min) 2        -- IV Continuous 02/01/17 1405        Hyperglycemia/Diabetes Medications:   Antihyperglycemics     Start     Stop Route Frequency Ordered    02/01/17 1500  insulin regular (Humulin R) 100 Units in sodium chloride 0.9% 100 mL infusion      -- IV Continuous 02/01/17 1354          PHYSICAL EXAMINATION:  Visit Vitals    BP 99/68 (BP Location: Other (Comment))  Comment (BP Location): IABP    Pulse 108    Temp 97.4 °F (36.3 °C) (Core)    Resp 13    Ht 5' 7" (1.702 m)    Wt 62.1 kg (136 lb 12.7 oz)    SpO2 100%    BMI 21.43 kg/m2     Constitutional:  Well developed, well nourished, NAD.  ENT: External ears no masses with nose patent    Neck:  Supple; " trachea midline;   Cardiovascular: Normal heart sounds, no LE edema.     Lungs:  Normal effort; lungs anterior bilaterally clear to auscultation.  Abdomen:  Soft, no masses,  no hernias.  MS: No clubbing or cyanosis of nails noted;  unable to assess gait.  Skin: No rashes,  Ioban dressing in place- sternum open   Psychiatric: KATARZYNA  Neurological: KATARZYNA      Labs Reviewed and Include     Recent Labs  Lab 02/01/17  0437 02/01/17  1235   GLU 94 195*   CALCIUM 9.5 9.1   ALBUMIN 4.1  --    PROT 7.1  --     136   K 3.9 3.7   CO2 26 24    100   BUN 17 17   CREATININE 0.9 1.1   ALKPHOS 77  --    ALT 38  --    AST 29  --    BILITOT 0.6  --      Lab Results   Component Value Date    WBC 12.37 02/01/2017    HGB 11.9 (L) 02/01/2017    HCT 34 (L) 02/01/2017    MCV 87 02/01/2017     (L) 02/01/2017     No results for input(s): TSH, FREET4 in the last 168 hours.  Lab Results   Component Value Date    HGBA1C 5.4 01/17/2017       Nutritional status:   Body mass index is 21.43 kg/(m^2).  Lab Results   Component Value Date    ALBUMIN 4.1 02/01/2017    ALBUMIN 4.1 01/31/2017    ALBUMIN 4.2 01/30/2017     Lab Results   Component Value Date    PREALBUMIN 26 01/17/2017       Estimated Creatinine Clearance: 88.6 mL/min (based on Cr of 1.1).    Accu-Checks  No results for input(s): POCTGLUCOSE in the last 72 hours.     ASSESSMENT and PLAN    Hyperglycemia  bg goal 140-180- Start intensive insulin gtt protocol with q1h bg monitoring     Anticipate fonseca resolution       * Chronic combined systolic and diastolic heart failure  S/p LVAD      LVAD (left ventricular assist device) present  managed per CTS/HTS      Plan discussed with RN at bedside.     Eboni Taylor, SUHA, NP  Endocrinology  [facility]

## 2017-02-01 NOTE — ANESTHESIA PREPROCEDURE EVALUATION
02/01/2017    Pre-operative evaluation for Procedure(s) (LRB):  CLOSURE-STERNAL WOUND (N/A)  INSERTION-RIGHT VENTRICULAR ASSIST DEVICE (N/A)    Mert Samayoa is a 27 y.o. male with PMH of cardiogenic shock due to acute on chronic CHF, HTN and familial cardiomyopathy s/p LVAD placement on 2/1 going for the above procedure.        LDA:   - 20G PIV in Right AC  - Left Radial Arterial Line  - Pulmonary Artery Catheter  - ETT 8.0  - 14G PIV in Left Hand  - Castillo  - LVAD  - Right IJ Central Line    Prev airway: Present Prior to Hospital Arrival?: No; Placement Date: 02/01/17; Placement Time: 0824; Method of Intubation: Direct laryngoscopy; Inserted by: MD; Airway Device: Endotracheal Tube; Mask Ventilation: Easy; Intubated: Postinduction; Blade: Urena #2; Airway Device Size: 9.0; Style: Cuffed; Cuff Inflation: Minimal occlusive pressure; Inflation Amount: 8; Placement Verified By: Auscultation, Capnometry, ETT Condensation; Grade: Grade I; Complicating Factors: None; Intubation Findings: Positive EtCO2, Bilateral breath sounds, Atraumatic/Condition of teeth unchanged;  Depth of Insertion: 23; Securment: Lips; Complications: None; Breath Sounds: Equal Bilateral; Insertion Attempts: 1    Drips:     Patient Active Problem List   Diagnosis    Cigarette smoker    Familial Cardiomyopathy    Chronic combined systolic and diastolic heart failure    Organ transplant candidate    Chronic systolic congestive heart failure    Palliative care encounter    Counseling regarding advanced directives and goals of care       Review of patient's allergies indicates:  No Known Allergies     Current Facility-Administered Medications on File Prior to Encounter   Medication Dose Route Frequency Provider Last Rate Last Dose    ciprofloxacin (CIPRO)400mg/200ml D5W IVPB 400 mg  400 mg Intravenous On Call Procedure Toshia WASHINGTON  BARNEY Navarro        And    fluconazole (DIFLUCAN) IVPB 400 mg 200 mL  400 mg Intravenous On Call Procedure Toshia Navarro NP        dextrose 50% injection 12.5 g  12.5 g Intravenous PRN Shai Ortega MD        dextrose 50% injection 25 g  25 g Intravenous PRN Shai Ortega MD        DOBUTamine 500mg in D5W 250mL infusion (premix) (NON-TITRATING)  5 mcg/kg/min Intravenous Continuous Shai Ortega MD 9.5 mL/hr at 02/01/17 0800 5 mcg/kg/min at 02/01/17 0800    doxycycline injection    PRN Lex Macedo MD   100 mg at 02/01/17 1006    fluconazole (DIFLUCAN) IVPB 400 mg 200 mL  400 mg Intravenous Once Lex Macedo MD        fluconazole (DIFLUCAN) IVPB    Continuous PRN Lex Maceod MD   400 mg at 02/01/17 0947    furosemide tablet 20 mg  20 mg Oral Daily Shai Ortega MD   20 mg at 01/31/17 0900    gelatin adsorbable 100cm top sponge sponge    PRN Lex Macedo MD   1 applicator at 02/01/17 0934    glucagon (human recombinant) injection 1 mg  1 mg Intramuscular PRN Shai Ortega MD        glucose chewable tablet 16 g  16 g Oral PRN Shai Ortega MD        glucose chewable tablet 24 g  24 g Oral PRN Shai Ortega MD        morphine injection 2 mg  2 mg Intravenous Q4H PRN HAKEEM Arriola   2 mg at 02/01/17 0600    vancomycin 1 g in dextrose 5 % 250 mL IVPB (ready to mix system)  1,000 mg Intravenous On Call Procedure Lashon Hawkins MD         Current Outpatient Prescriptions on File Prior to Encounter   Medication Sig Dispense Refill    furosemide (LASIX) 20 MG tablet Take 1 tablet (20 mg total) by mouth once daily. 30 tablet 11    lisinopril 10 MG tablet TAKE ONE TABLET BY MOUTH ONCE DAILY IN THE EVENING 30 tablet 11       No past surgical history on file.    Social History     Social History    Marital status:      Spouse name: N/A    Number of children: N/A    Years of education: N/A     Occupational History    Not on file.     Social  History Main Topics    Smoking status: Current Some Day Smoker     Packs/day: 1.00    Smokeless tobacco: Not on file    Alcohol use 2.4 oz/week     4 Shots of liquor per week    Drug use: No    Sexual activity: Not on file     Other Topics Concern    Not on file     Social History Narrative    Works          Vital Signs Range (Last 24H):  Temp:  [36.6 °C (97.8 °F)-36.9 °C (98.4 °F)]   Pulse:  [59-91]   Resp:  [11-33]   SpO2:  [97 %-100 %]       CBC:   Recent Labs      01/31/17 0415  02/01/17   0437   WBC  7.72  6.38   RBC  4.66  4.67   HGB  14.2  13.9*   HCT  40.5  40.6   PLT  196  164   MCV  87  87   MCH  30.5  29.8   MCHC  35.1  34.2       CMP:   Recent Labs      01/31/17 0415  02/01/17   0437   NA  137  138   K  3.8  3.9   CL  102  102   CO2  23  26   BUN  17  17   CREATININE  1.0  0.9   GLU  83  94   MG  1.8  1.9   PHOS  4.5  4.5   CALCIUM  9.3  9.5   ALBUMIN  4.1  4.1   PROT  6.9  7.1   ALKPHOS  70  77   ALT  40  38   AST  32  29   BILITOT  0.5  0.6       INR  Recent Labs      01/30/17   0525   INR  1.1           Diagnostic Studies:    EKG:Vent. Rate : 080 BPM     Atrial Rate : 080 BPM     P-R Int : 158 ms          QRS Dur : 094 ms      QT Int : 406 ms       P-R-T Axes : 070 -20 092 degrees     QTc Int : 468 ms    Normal sinus rhythm  Possible Left atrial enlargement  Nonspecific T wave abnormality  Prolonged QT  Abnormal ECG  When compared with ECG of 20-JAN-2017 21:47,  No significant change was found  Confirmed by ANGELINA CURTIS, HOMEYAR ([id number]) on 1/28/2017 11:22:50 PM         2D Echo:  CONCLUSIONS     1 - Eccentric LVH with severely depressed left ventricular systolic function (EF 10-15%).     2 - Left ventricular diastolic dysfunction.     3 - Normal right ventricular systolic function .     4 - Mild left atrial enlargement.       This document has been electronically    SIGNED BY: Clarice Shankar MD On: 01/18/2017 12:18  [facility] Anesthesia Evaluation    I have reviewed the  Patient Summary Reports.     I have reviewed the Medications.     Review of Systems  Anesthesia Hx:  Denies Family Hx of Anesthesia complications.   Denies Personal Hx of Anesthesia complications.   Social:  Smoker    Cardiovascular:   CHF    Pulmonary:  Pulmonary Normal    Neurological:  Neurology Normal        Physical Exam  General:  Well nourished    Airway/Jaw/Neck:  Airway Findings: Mouth Opening: Normal Tongue: Normal  General Airway Assessment: Adult  Mallampati: I  TM Distance: Normal, at least 6 cm  Jaw/Neck Findings:  Micrognathia: Negative Mandibular Fracture: Negative    Neck ROM: Normal ROM  Neck Findings: Normal    Eyes/Ears/Nose:  EYES/EARS/NOSE FINDINGS: Normal   Dental:  Dental Findings: In tact   Chest/Lungs:  Chest/Lungs Findings: Clear to auscultation, Normal Respiratory Rate     Heart/Vascular:  Heart Findings: Rate: Normal  Rhythm: Regular Rhythm  Heart Murmur        Mental Status:  Mental Status Findings: Normal        Anesthesia Plan  Type of Anesthesia, risks & benefits discussed:  Anesthesia Type:  general  Patient's Preference: General  Intra-op Monitoring Plan: arterial line, central line, standard ASA monitors and Dairy-Ilir  Intra-op Monitoring Plan Comments:   Post Op Pain Control Plan:   Post Op Pain Control Plan Comments:   Induction:   IV  Beta Blocker:  Patient is not currently on a Beta-Blocker (No further documentation required).       Informed Consent: Patient understands risks and agrees with Anesthesia plan.  Questions answered. Anesthesia consent signed with patient.  ASA Score: 4     Day of Surgery Review of History & Physical: I have interviewed and examined the patient. I have reviewed the patient's H&P dated:  There are no significant changes.  H&P update referred to the surgeon.     Anesthesia Plan Notes: Discussed plan for general endotracheal anesthesia, pt understands and agrees with plan        Ready For Surgery From Anesthesia Perspective.

## 2017-02-01 NOTE — ANESTHESIA PROCEDURE NOTES
RICKY    Diagnosis: cardiomyopathy  Patient location during procedure: OR  Procedure start time: 2/1/2017 9:01 AM  Timeout: 2/1/2017 9:00 AM  Procedure end time: 2/1/2017 9:20 AM  Exam type: Baseline  Staffing  Anesthesiologist: KAISER HAYDEN  Other anesthesia staff: BRUNO MORRIS  Performed by: other anesthesia staff   Preanesthetic Checklist  Completed: patient identified, surgical consent, pre-op evaluation, timeout performed, risks and benefits discussed, monitors and equipment checked, anesthesia consent given, oxygen available, suction available, hand hygiene performed and patient being monitored  Setup & Induction  Patient preparation: bite block inserted  Probe Insertion: easy  Exam: complete  Exam     Left Heart      Left Ventricle: 7.9 cm, severely abnormal (men >/= 6.8; women>/= 6.1)  LV Wall Thickness (posterior wall):0.8 cm, normal (men 0.6-1.0 cm; women 0.6-0.9 cm)    LVAD:no  Estimated Ejection Fraction: < 25% severe  Regional Wall Abnormalities: RWMA present    Regional Wall Abnormalities    Four Chamber ME  Two Chamber ME   Longitudinal ME  TG Transversal MP  Inferoseptal: 4  Inferior: 3  Inferolateral: 3  Anteroseptal: 4  Anterior: 4  Anterolateral: 3  TG Transversal Basal    Left Ventricle Diastolic Function    E/A Ratio: 2.9, restrictive (>/=2)    Right Heart  Right Ventricle: normal  Right Ventricle Function: normal    Intra Atrial Septum  PFO no shunt by color flow dopplerIAS aneurysmlipomatous hypertrophyFINDINGS - Atrial Septal Defect (Asd)    Right Ventricle  Size: normal, Free Wall Thickness: normal    Aortic Valve:  Stenosis: none  Morphology: trileaflet  Regurgitation: trace     Mitral Valve:  Morphology:normal  Jet Description: mild and centrally-directed    Tricuspid Valve:  Morphology: normal  Regurgitation: none    Pulmonic Valve:  Morphology:normal  Regurgitation(color flow): none    Great Vessels  Ascending Aorta Atherosclerosis: 1=nl-min dz  Aortic Arch Atherosclerosis:  1=nl-min dz  IABP: yes  Cm from left subclavian: 4  Descending Aorta Atherosclerosis: 1=nl-min dz  Aorta    Descending aorta IABP: yes    Effusions  Effusions: none    Summary  Findings discussed with surgeon.    Other Findings   Severe left ventricular systolic dysfunction (EF 10-15%), with dilation of LV and global hypokinesis. RV systolic function preserved. Trace AI, mild central MR, trace TR. No other valvular dysfunction. No clot seen in LV/LA. No PFO seen. IABP in situ, ~4-5 cm from left subclavian.

## 2017-02-02 ENCOUNTER — ANESTHESIA (OUTPATIENT)
Dept: SURGERY | Facility: HOSPITAL | Age: 27
DRG: 001 | End: 2017-02-02
Payer: COMMERCIAL

## 2017-02-02 LAB
ALBUMIN SERPL BCP-MCNC: 4.3 G/DL
ALLENS TEST: ABNORMAL
ALP SERPL-CCNC: 49 U/L
ALT SERPL W/O P-5'-P-CCNC: 28 U/L
ANION GAP SERPL CALC-SCNC: 10 MMOL/L
ANION GAP SERPL CALC-SCNC: 11 MMOL/L
ANION GAP SERPL CALC-SCNC: 12 MMOL/L
ANION GAP SERPL CALC-SCNC: 12 MMOL/L
ANION GAP SERPL CALC-SCNC: 16 MMOL/L
ANION GAP SERPL CALC-SCNC: 6 MMOL/L
ANION GAP SERPL CALC-SCNC: 7 MMOL/L
APTT BLDCRRT: 27.1 SEC
APTT BLDCRRT: 27.1 SEC
APTT BLDCRRT: 28.5 SEC
APTT BLDCRRT: 29.2 SEC
APTT BLDCRRT: 29.5 SEC
APTT BLDCRRT: 29.6 SEC
AST SERPL-CCNC: 96 U/L
BASOPHILS # BLD AUTO: 0 K/UL
BASOPHILS # BLD AUTO: 0.01 K/UL
BASOPHILS NFR BLD: 0 %
BASOPHILS NFR BLD: 0.1 %
BASOPHILS NFR BLD: 0.1 %
BILIRUB SERPL-MCNC: 0.7 MG/DL
BUN SERPL-MCNC: 12 MG/DL
BUN SERPL-MCNC: 13 MG/DL
BUN SERPL-MCNC: 13 MG/DL
BUN SERPL-MCNC: 14 MG/DL
BUN SERPL-MCNC: 14 MG/DL
CALCIUM SERPL-MCNC: 8.6 MG/DL
CALCIUM SERPL-MCNC: 8.9 MG/DL
CALCIUM SERPL-MCNC: 8.9 MG/DL
CALCIUM SERPL-MCNC: 9 MG/DL
CALCIUM SERPL-MCNC: 9.9 MG/DL
CHLORIDE SERPL-SCNC: 101 MMOL/L
CHLORIDE SERPL-SCNC: 102 MMOL/L
CHLORIDE SERPL-SCNC: 102 MMOL/L
CHLORIDE SERPL-SCNC: 103 MMOL/L
CHLORIDE SERPL-SCNC: 104 MMOL/L
CHLORIDE SERPL-SCNC: 104 MMOL/L
CHLORIDE SERPL-SCNC: 105 MMOL/L
CO2 SERPL-SCNC: 19 MMOL/L
CO2 SERPL-SCNC: 21 MMOL/L
CO2 SERPL-SCNC: 21 MMOL/L
CO2 SERPL-SCNC: 25 MMOL/L
CO2 SERPL-SCNC: 26 MMOL/L
CO2 SERPL-SCNC: 26 MMOL/L
CO2 SERPL-SCNC: 28 MMOL/L
CREAT SERPL-MCNC: 0.9 MG/DL
CREAT SERPL-MCNC: 0.9 MG/DL
CREAT SERPL-MCNC: 1 MG/DL
CREAT SERPL-MCNC: 1 MG/DL
CREAT SERPL-MCNC: 1.1 MG/DL
DELSYS: ABNORMAL
DIFFERENTIAL METHOD: ABNORMAL
EOSINOPHIL # BLD AUTO: 0 K/UL
EOSINOPHIL # BLD AUTO: 0.1 K/UL
EOSINOPHIL # BLD AUTO: 0.1 K/UL
EOSINOPHIL NFR BLD: 0.1 %
EOSINOPHIL NFR BLD: 0.1 %
EOSINOPHIL NFR BLD: 0.2 %
EOSINOPHIL NFR BLD: 0.5 %
EOSINOPHIL NFR BLD: 0.5 %
ERYTHROCYTE [DISTWIDTH] IN BLOOD BY AUTOMATED COUNT: 12.6 %
ERYTHROCYTE [DISTWIDTH] IN BLOOD BY AUTOMATED COUNT: 12.8 %
ERYTHROCYTE [DISTWIDTH] IN BLOOD BY AUTOMATED COUNT: 12.9 %
ERYTHROCYTE [DISTWIDTH] IN BLOOD BY AUTOMATED COUNT: 13 %
ERYTHROCYTE [DISTWIDTH] IN BLOOD BY AUTOMATED COUNT: 13.1 %
ERYTHROCYTE [SEDIMENTATION RATE] IN BLOOD BY WESTERGREN METHOD: 14 MM/H
EST. GFR  (AFRICAN AMERICAN): >60 ML/MIN/1.73 M^2
EST. GFR  (NON AFRICAN AMERICAN): >60 ML/MIN/1.73 M^2
FIO2: 50
FLOW: 60
GLUCOSE SERPL-MCNC: 141 MG/DL (ref 70–110)
GLUCOSE SERPL-MCNC: 152 MG/DL
GLUCOSE SERPL-MCNC: 152 MG/DL
GLUCOSE SERPL-MCNC: 156 MG/DL
GLUCOSE SERPL-MCNC: 156 MG/DL
GLUCOSE SERPL-MCNC: 160 MG/DL
GLUCOSE SERPL-MCNC: 161 MG/DL
GLUCOSE SERPL-MCNC: 190 MG/DL
HCO3 UR-SCNC: 18.1 MMOL/L (ref 24–28)
HCO3 UR-SCNC: 22.9 MMOL/L (ref 24–28)
HCO3 UR-SCNC: 23.6 MMOL/L (ref 24–28)
HCO3 UR-SCNC: 23.7 MMOL/L (ref 24–28)
HCO3 UR-SCNC: 23.8 MMOL/L (ref 24–28)
HCO3 UR-SCNC: 23.9 MMOL/L (ref 24–28)
HCO3 UR-SCNC: 24 MMOL/L (ref 24–28)
HCO3 UR-SCNC: 25.3 MMOL/L (ref 24–28)
HCO3 UR-SCNC: 25.6 MMOL/L (ref 24–28)
HCO3 UR-SCNC: 27.1 MMOL/L (ref 24–28)
HCO3 UR-SCNC: 27.5 MMOL/L (ref 24–28)
HCO3 UR-SCNC: 28.1 MMOL/L (ref 24–28)
HCT VFR BLD AUTO: 30.2 %
HCT VFR BLD AUTO: 30.3 %
HCT VFR BLD AUTO: 30.5 %
HCT VFR BLD AUTO: 30.5 %
HCT VFR BLD AUTO: 31.5 %
HCT VFR BLD AUTO: 32.3 %
HCT VFR BLD AUTO: 32.5 %
HCT VFR BLD CALC: 24 %PCV (ref 36–54)
HGB BLD-MCNC: 10.4 G/DL
HGB BLD-MCNC: 10.4 G/DL
HGB BLD-MCNC: 10.5 G/DL
HGB BLD-MCNC: 10.5 G/DL
HGB BLD-MCNC: 10.8 G/DL
HGB BLD-MCNC: 10.9 G/DL
HGB BLD-MCNC: 11.1 G/DL
INR PPP: 1.1
INR PPP: 1.2
INR PPP: 1.5
INR PPP: 1.6
LDH SERPL L TO P-CCNC: 0.44 MMOL/L (ref 0.36–1.25)
LDH SERPL L TO P-CCNC: 0.52 MMOL/L (ref 0.36–1.25)
LDH SERPL L TO P-CCNC: 0.55 MMOL/L (ref 0.36–1.25)
LDH SERPL L TO P-CCNC: 1.11 MMOL/L (ref 0.36–1.25)
LDH SERPL L TO P-CCNC: 1.32 MMOL/L (ref 0.36–1.25)
LDH SERPL L TO P-CCNC: 1.42 MMOL/L (ref 0.36–1.25)
LDH SERPL L TO P-CCNC: 1.99 MMOL/L (ref 0.36–1.25)
LDH SERPL L TO P-CCNC: 3.06 MMOL/L (ref 0.36–1.25)
LDH SERPL L TO P-CCNC: 4.41 MMOL/L (ref 0.36–1.25)
LDH SERPL L TO P-CCNC: 449 U/L
LYMPHOCYTES # BLD AUTO: 0.5 K/UL
LYMPHOCYTES # BLD AUTO: 0.6 K/UL
LYMPHOCYTES # BLD AUTO: 0.6 K/UL
LYMPHOCYTES # BLD AUTO: 0.7 K/UL
LYMPHOCYTES # BLD AUTO: 0.8 K/UL
LYMPHOCYTES # BLD AUTO: 0.8 K/UL
LYMPHOCYTES # BLD AUTO: 0.9 K/UL
LYMPHOCYTES NFR BLD: 3.6 %
LYMPHOCYTES NFR BLD: 3.9 %
LYMPHOCYTES NFR BLD: 4.9 %
LYMPHOCYTES NFR BLD: 5.7 %
LYMPHOCYTES NFR BLD: 6 %
LYMPHOCYTES NFR BLD: 6 %
LYMPHOCYTES NFR BLD: 6.2 %
MAGNESIUM SERPL-MCNC: 1.8 MG/DL
MAGNESIUM SERPL-MCNC: 2.1 MG/DL
MAGNESIUM SERPL-MCNC: 2.2 MG/DL
MAGNESIUM SERPL-MCNC: 2.2 MG/DL
MAGNESIUM SERPL-MCNC: 2.4 MG/DL
MAGNESIUM SERPL-MCNC: 2.4 MG/DL
MAGNESIUM SERPL-MCNC: 3.2 MG/DL
MCH RBC QN AUTO: 29.3 PG
MCH RBC QN AUTO: 29.3 PG
MCH RBC QN AUTO: 29.5 PG
MCH RBC QN AUTO: 29.8 PG
MCH RBC QN AUTO: 29.8 PG
MCH RBC QN AUTO: 29.9 PG
MCH RBC QN AUTO: 30.1 PG
MCHC RBC AUTO-ENTMCNC: 33.7 %
MCHC RBC AUTO-ENTMCNC: 34.2 %
MCHC RBC AUTO-ENTMCNC: 34.3 %
MCHC RBC AUTO-ENTMCNC: 34.3 %
MCHC RBC AUTO-ENTMCNC: 34.4 %
MCV RBC AUTO: 85 FL
MCV RBC AUTO: 85 FL
MCV RBC AUTO: 86 FL
MCV RBC AUTO: 87 FL
MCV RBC AUTO: 89 FL
MODE: ABNORMAL
MONOCYTES # BLD AUTO: 0.7 K/UL
MONOCYTES # BLD AUTO: 0.8 K/UL
MONOCYTES # BLD AUTO: 0.8 K/UL
MONOCYTES # BLD AUTO: 1 K/UL
MONOCYTES # BLD AUTO: 1 K/UL
MONOCYTES # BLD AUTO: 1.1 K/UL
MONOCYTES # BLD AUTO: 1.4 K/UL
MONOCYTES NFR BLD: 5.7 %
MONOCYTES NFR BLD: 6.3 %
MONOCYTES NFR BLD: 7.1 %
MONOCYTES NFR BLD: 7.3 %
MONOCYTES NFR BLD: 7.9 %
NEUTROPHILS # BLD AUTO: 11.1 K/UL
NEUTROPHILS # BLD AUTO: 13.2 K/UL
NEUTROPHILS # BLD AUTO: 17.3 K/UL
NEUTROPHILS # BLD AUTO: 17.8 K/UL
NEUTROPHILS # BLD AUTO: 8.4 K/UL
NEUTROPHILS # BLD AUTO: 8.4 K/UL
NEUTROPHILS # BLD AUTO: 8.9 K/UL
NEUTROPHILS NFR BLD: 85.2 %
NEUTROPHILS NFR BLD: 85.5 %
NEUTROPHILS NFR BLD: 85.5 %
NEUTROPHILS NFR BLD: 87.2 %
NEUTROPHILS NFR BLD: 87.6 %
NEUTROPHILS NFR BLD: 88.9 %
NEUTROPHILS NFR BLD: 90 %
PCO2 BLDA: 26.1 MMHG (ref 35–45)
PCO2 BLDA: 27.1 MMHG (ref 35–45)
PCO2 BLDA: 29.8 MMHG (ref 35–45)
PCO2 BLDA: 30 MMHG (ref 35–45)
PCO2 BLDA: 32.4 MMHG (ref 35–45)
PCO2 BLDA: 33.1 MMHG (ref 35–45)
PCO2 BLDA: 34.7 MMHG (ref 35–45)
PCO2 BLDA: 35.3 MMHG (ref 35–45)
PCO2 BLDA: 35.6 MMHG (ref 35–45)
PCO2 BLDA: 36.1 MMHG (ref 35–45)
PCO2 BLDA: 36.5 MMHG (ref 35–45)
PCO2 BLDA: 38.3 MMHG (ref 35–45)
PEEP: 5
PH SMN: 7.43 [PH] (ref 7.35–7.45)
PH SMN: 7.44 [PH] (ref 7.35–7.45)
PH SMN: 7.44 [PH] (ref 7.35–7.45)
PH SMN: 7.45 [PH] (ref 7.35–7.45)
PH SMN: 7.47 [PH] (ref 7.35–7.45)
PH SMN: 7.48 [PH] (ref 7.35–7.45)
PH SMN: 7.48 [PH] (ref 7.35–7.45)
PH SMN: 7.49 [PH] (ref 7.35–7.45)
PH SMN: 7.49 [PH] (ref 7.35–7.45)
PH SMN: 7.5 [PH] (ref 7.35–7.45)
PH SMN: 7.54 [PH] (ref 7.35–7.45)
PH SMN: 7.55 [PH] (ref 7.35–7.45)
PHOSPHATE SERPL-MCNC: 2.5 MG/DL
PHOSPHATE SERPL-MCNC: 3.6 MG/DL
PHOSPHATE SERPL-MCNC: 3.6 MG/DL
PHOSPHATE SERPL-MCNC: 4.7 MG/DL
PHOSPHATE SERPL-MCNC: 4.7 MG/DL
PHOSPHATE SERPL-MCNC: 4.8 MG/DL
PHOSPHATE SERPL-MCNC: 4.8 MG/DL
PIP: 16
PIP: 16
PIP: 19
PLATELET # BLD AUTO: 115 K/UL
PLATELET # BLD AUTO: 125 K/UL
PLATELET # BLD AUTO: 125 K/UL
PLATELET # BLD AUTO: 129 K/UL
PLATELET # BLD AUTO: 129 K/UL
PLATELET # BLD AUTO: 132 K/UL
PLATELET # BLD AUTO: 138 K/UL
PMV BLD AUTO: 10 FL
PMV BLD AUTO: 9.5 FL
PMV BLD AUTO: 9.5 FL
PMV BLD AUTO: 9.8 FL
PMV BLD AUTO: 9.9 FL
PO2 BLDA: 202 MMHG (ref 80–100)
PO2 BLDA: 216 MMHG (ref 80–100)
PO2 BLDA: 217 MMHG (ref 80–100)
PO2 BLDA: 221 MMHG (ref 80–100)
PO2 BLDA: 226 MMHG (ref 80–100)
PO2 BLDA: 230 MMHG (ref 80–100)
PO2 BLDA: 234 MMHG (ref 80–100)
PO2 BLDA: 237 MMHG (ref 80–100)
PO2 BLDA: 242 MMHG (ref 80–100)
PO2 BLDA: 256 MMHG (ref 80–100)
PO2 BLDA: 259 MMHG (ref 80–100)
PO2 BLDA: 40 MMHG (ref 40–60)
POC BE: -6 MMOL/L
POC BE: 0 MMOL/L
POC BE: 1 MMOL/L
POC BE: 2 MMOL/L
POC BE: 3 MMOL/L
POC BE: 4 MMOL/L
POC IONIZED CALCIUM: 0.93 MMOL/L (ref 1.06–1.42)
POC SATURATED O2: 100 % (ref 95–100)
POC SATURATED O2: 80 % (ref 95–100)
POC TCO2: 19 MMOL/L (ref 23–27)
POC TCO2: 24 MMOL/L (ref 24–29)
POC TCO2: 25 MMOL/L (ref 23–27)
POC TCO2: 26 MMOL/L (ref 23–27)
POC TCO2: 27 MMOL/L (ref 23–27)
POC TCO2: 28 MMOL/L (ref 23–27)
POC TCO2: 29 MMOL/L (ref 23–27)
POC TCO2: 29 MMOL/L (ref 23–27)
POCT GLUCOSE: 133 MG/DL (ref 70–110)
POCT GLUCOSE: 134 MG/DL (ref 70–110)
POCT GLUCOSE: 138 MG/DL (ref 70–110)
POCT GLUCOSE: 145 MG/DL (ref 70–110)
POCT GLUCOSE: 148 MG/DL (ref 70–110)
POCT GLUCOSE: 153 MG/DL (ref 70–110)
POCT GLUCOSE: 155 MG/DL (ref 70–110)
POCT GLUCOSE: 157 MG/DL (ref 70–110)
POCT GLUCOSE: 159 MG/DL (ref 70–110)
POCT GLUCOSE: 162 MG/DL (ref 70–110)
POCT GLUCOSE: 182 MG/DL (ref 70–110)
POCT GLUCOSE: 186 MG/DL (ref 70–110)
POCT GLUCOSE: 192 MG/DL (ref 70–110)
POCT GLUCOSE: 220 MG/DL (ref 70–110)
POTASSIUM BLD-SCNC: 2.7 MMOL/L (ref 3.5–5.1)
POTASSIUM SERPL-SCNC: 3.8 MMOL/L
POTASSIUM SERPL-SCNC: 4.1 MMOL/L
POTASSIUM SERPL-SCNC: 4.1 MMOL/L
POTASSIUM SERPL-SCNC: 4.3 MMOL/L
POTASSIUM SERPL-SCNC: 4.3 MMOL/L
POTASSIUM SERPL-SCNC: 4.4 MMOL/L
POTASSIUM SERPL-SCNC: 4.4 MMOL/L
PREALB SERPL-MCNC: 17 MG/DL
PROT SERPL-MCNC: 6.4 G/DL
PROTHROMBIN TIME: 11.8 SEC
PROTHROMBIN TIME: 12.2 SEC
PROTHROMBIN TIME: 12.2 SEC
PROTHROMBIN TIME: 12.4 SEC
PROTHROMBIN TIME: 15 SEC
PROTHROMBIN TIME: 15.8 SEC
PS: 10
RBC # BLD AUTO: 3.46 M/UL
RBC # BLD AUTO: 3.52 M/UL
RBC # BLD AUTO: 3.58 M/UL
RBC # BLD AUTO: 3.58 M/UL
RBC # BLD AUTO: 3.62 M/UL
RBC # BLD AUTO: 3.65 M/UL
RBC # BLD AUTO: 3.73 M/UL
SAMPLE: ABNORMAL
SITE: ABNORMAL
SODIUM BLD-SCNC: 144 MMOL/L (ref 136–145)
SODIUM SERPL-SCNC: 137 MMOL/L
SODIUM SERPL-SCNC: 138 MMOL/L
SODIUM SERPL-SCNC: 138 MMOL/L
SP02: 100
VANCOMYCIN SERPL-MCNC: 5.7 UG/ML
VANCOMYCIN TROUGH SERPL-MCNC: 5.5 UG/ML
VT: 350
VT: 400
VT: 500
WBC # BLD AUTO: 10.19 K/UL
WBC # BLD AUTO: 13.01 K/UL
WBC # BLD AUTO: 15.15 K/UL
WBC # BLD AUTO: 19.25 K/UL
WBC # BLD AUTO: 20.05 K/UL
WBC # BLD AUTO: 9.86 K/UL
WBC # BLD AUTO: 9.86 K/UL

## 2017-02-02 PROCEDURE — 27000221 HC OXYGEN, UP TO 24 HOURS

## 2017-02-02 PROCEDURE — 25000003 PHARM REV CODE 250: Performed by: ANESTHESIOLOGY

## 2017-02-02 PROCEDURE — 83050 HGB METHEMOGLOBIN QUAN: CPT

## 2017-02-02 PROCEDURE — 97163 PT EVAL HIGH COMPLEX 45 MIN: CPT

## 2017-02-02 PROCEDURE — 83735 ASSAY OF MAGNESIUM: CPT | Mod: 91

## 2017-02-02 PROCEDURE — 85610 PROTHROMBIN TIME: CPT | Mod: 91

## 2017-02-02 PROCEDURE — C9113 INJ PANTOPRAZOLE SODIUM, VIA: HCPCS | Performed by: THORACIC SURGERY (CARDIOTHORACIC VASCULAR SURGERY)

## 2017-02-02 PROCEDURE — D9220A PRA ANESTHESIA: Mod: ,,, | Performed by: ANESTHESIOLOGY

## 2017-02-02 PROCEDURE — 63600175 PHARM REV CODE 636 W HCPCS: Performed by: THORACIC SURGERY (CARDIOTHORACIC VASCULAR SURGERY)

## 2017-02-02 PROCEDURE — 85610 PROTHROMBIN TIME: CPT

## 2017-02-02 PROCEDURE — 37799 UNLISTED PX VASCULAR SURGERY: CPT

## 2017-02-02 PROCEDURE — 63600175 PHARM REV CODE 636 W HCPCS: Performed by: SURGERY

## 2017-02-02 PROCEDURE — 25000003 PHARM REV CODE 250: Performed by: THORACIC SURGERY (CARDIOTHORACIC VASCULAR SURGERY)

## 2017-02-02 PROCEDURE — 36000712 HC OR TIME LEV V 1ST 15 MIN: Performed by: THORACIC SURGERY (CARDIOTHORACIC VASCULAR SURGERY)

## 2017-02-02 PROCEDURE — 83735 ASSAY OF MAGNESIUM: CPT

## 2017-02-02 PROCEDURE — 93750 INTERROGATION VAD IN PERSON: CPT | Mod: ,,, | Performed by: INTERNAL MEDICINE

## 2017-02-02 PROCEDURE — 93750 INTERROGATION VAD IN PERSON: CPT | Performed by: THORACIC SURGERY (CARDIOTHORACIC VASCULAR SURGERY)

## 2017-02-02 PROCEDURE — 83605 ASSAY OF LACTIC ACID: CPT

## 2017-02-02 PROCEDURE — 21750 REPAIR OF STERNUM SEPARATION: CPT | Mod: ,,, | Performed by: THORACIC SURGERY (CARDIOTHORACIC VASCULAR SURGERY)

## 2017-02-02 PROCEDURE — 0WQ80ZZ REPAIR CHEST WALL, OPEN APPROACH: ICD-10-PCS | Performed by: THORACIC SURGERY (CARDIOTHORACIC VASCULAR SURGERY)

## 2017-02-02 PROCEDURE — 63600367 HC NITRIC OXIDE PER HOUR

## 2017-02-02 PROCEDURE — 94761 N-INVAS EAR/PLS OXIMETRY MLT: CPT

## 2017-02-02 PROCEDURE — 85730 THROMBOPLASTIN TIME PARTIAL: CPT | Mod: 91

## 2017-02-02 PROCEDURE — C1729 CATH, DRAINAGE: HCPCS | Performed by: THORACIC SURGERY (CARDIOTHORACIC VASCULAR SURGERY)

## 2017-02-02 PROCEDURE — 02PYXDZ REMOVAL OF INTRALUMINAL DEVICE FROM GREAT VESSEL, EXTERNAL APPROACH: ICD-10-PCS | Performed by: THORACIC SURGERY (CARDIOTHORACIC VASCULAR SURGERY)

## 2017-02-02 PROCEDURE — 94150 VITAL CAPACITY TEST: CPT

## 2017-02-02 PROCEDURE — 25000003 PHARM REV CODE 250: Performed by: NURSE PRACTITIONER

## 2017-02-02 PROCEDURE — 27800903 OPTIME MED/SURG SUP & DEVICES OTHER IMPLANTS: Performed by: THORACIC SURGERY (CARDIOTHORACIC VASCULAR SURGERY)

## 2017-02-02 PROCEDURE — 63600175 PHARM REV CODE 636 W HCPCS: Performed by: NURSE PRACTITIONER

## 2017-02-02 PROCEDURE — 80048 BASIC METABOLIC PNL TOTAL CA: CPT | Mod: 91

## 2017-02-02 PROCEDURE — 36000713 HC OR TIME LEV V EA ADD 15 MIN: Performed by: THORACIC SURGERY (CARDIOTHORACIC VASCULAR SURGERY)

## 2017-02-02 PROCEDURE — 80202 ASSAY OF VANCOMYCIN: CPT | Mod: 91

## 2017-02-02 PROCEDURE — 85025 COMPLETE CBC W/AUTO DIFF WBC: CPT | Mod: 91

## 2017-02-02 PROCEDURE — 93312 ECHO TRANSESOPHAGEAL: CPT | Mod: 26,59,, | Performed by: ANESTHESIOLOGY

## 2017-02-02 PROCEDURE — 27000248 HC VAD-ADDITIONAL DAY

## 2017-02-02 PROCEDURE — 84134 ASSAY OF PREALBUMIN: CPT

## 2017-02-02 PROCEDURE — 37000008 HC ANESTHESIA 1ST 15 MINUTES: Performed by: THORACIC SURGERY (CARDIOTHORACIC VASCULAR SURGERY)

## 2017-02-02 PROCEDURE — 27000202 HC IABP, ADD'L DAY

## 2017-02-02 PROCEDURE — 63600175 PHARM REV CODE 636 W HCPCS: Performed by: STUDENT IN AN ORGANIZED HEALTH CARE EDUCATION/TRAINING PROGRAM

## 2017-02-02 PROCEDURE — 85730 THROMBOPLASTIN TIME PARTIAL: CPT

## 2017-02-02 PROCEDURE — 83615 LACTATE (LD) (LDH) ENZYME: CPT

## 2017-02-02 PROCEDURE — 84100 ASSAY OF PHOSPHORUS: CPT | Mod: 91

## 2017-02-02 PROCEDURE — 99900035 HC TECH TIME PER 15 MIN (STAT)

## 2017-02-02 PROCEDURE — 97802 MEDICAL NUTRITION INDIV IN: CPT

## 2017-02-02 PROCEDURE — 37000009 HC ANESTHESIA EA ADD 15 MINS: Performed by: THORACIC SURGERY (CARDIOTHORACIC VASCULAR SURGERY)

## 2017-02-02 PROCEDURE — 20000000 HC ICU ROOM

## 2017-02-02 PROCEDURE — 94640 AIRWAY INHALATION TREATMENT: CPT

## 2017-02-02 PROCEDURE — 27000239 HC STAND-BY BYPASS PUMP

## 2017-02-02 PROCEDURE — 80202 ASSAY OF VANCOMYCIN: CPT

## 2017-02-02 PROCEDURE — C9113 INJ PANTOPRAZOLE SODIUM, VIA: HCPCS | Performed by: NURSE PRACTITIONER

## 2017-02-02 PROCEDURE — 36591 DRAW BLOOD OFF VENOUS DEVICE: CPT

## 2017-02-02 PROCEDURE — 84100 ASSAY OF PHOSPHORUS: CPT

## 2017-02-02 PROCEDURE — 82803 BLOOD GASES ANY COMBINATION: CPT

## 2017-02-02 PROCEDURE — 63600175 PHARM REV CODE 636 W HCPCS: Performed by: ANESTHESIOLOGY

## 2017-02-02 PROCEDURE — 99233 SBSQ HOSP IP/OBS HIGH 50: CPT | Mod: ,,, | Performed by: SURGERY

## 2017-02-02 PROCEDURE — 80053 COMPREHEN METABOLIC PANEL: CPT

## 2017-02-02 PROCEDURE — 25000242 PHARM REV CODE 250 ALT 637 W/ HCPCS: Performed by: NURSE PRACTITIONER

## 2017-02-02 PROCEDURE — 99232 SBSQ HOSP IP/OBS MODERATE 35: CPT | Mod: ,,, | Performed by: INTERNAL MEDICINE

## 2017-02-02 PROCEDURE — 02UN0JZ SUPPLEMENT PERICARDIUM WITH SYNTHETIC SUBSTITUTE, OPEN APPROACH: ICD-10-PCS | Performed by: THORACIC SURGERY (CARDIOTHORACIC VASCULAR SURGERY)

## 2017-02-02 PROCEDURE — 3E1Y38Z IRRIGATION OF PERICARDIAL CAVITY USING IRRIGATING SUBSTANCE, PERCUTANEOUS APPROACH: ICD-10-PCS | Performed by: THORACIC SURGERY (CARDIOTHORACIC VASCULAR SURGERY)

## 2017-02-02 PROCEDURE — 63600175 PHARM REV CODE 636 W HCPCS: Performed by: INTERNAL MEDICINE

## 2017-02-02 PROCEDURE — 25000003 PHARM REV CODE 250: Performed by: SURGERY

## 2017-02-02 DEVICE — MEMBRANE PERICARDIAL 15X20CM: Type: IMPLANTABLE DEVICE | Site: HEART | Status: FUNCTIONAL

## 2017-02-02 RX ORDER — POTASSIUM CHLORIDE 14.9 MG/ML
INJECTION INTRAVENOUS CONTINUOUS PRN
Status: DISCONTINUED | OUTPATIENT
Start: 2017-02-02 | End: 2017-02-02

## 2017-02-02 RX ORDER — PANTOPRAZOLE SODIUM 40 MG/10ML
40 INJECTION, POWDER, LYOPHILIZED, FOR SOLUTION INTRAVENOUS 2 TIMES DAILY
Status: DISCONTINUED | OUTPATIENT
Start: 2017-02-02 | End: 2017-02-03

## 2017-02-02 RX ORDER — SODIUM BICARBONATE 1 MEQ/ML
SYRINGE (ML) INTRAVENOUS
Status: DISCONTINUED | OUTPATIENT
Start: 2017-02-02 | End: 2017-02-02

## 2017-02-02 RX ORDER — FUROSEMIDE 10 MG/ML
10 INJECTION INTRAMUSCULAR; INTRAVENOUS ONCE
Status: COMPLETED | OUTPATIENT
Start: 2017-02-02 | End: 2017-02-02

## 2017-02-02 RX ORDER — FENTANYL CITRATE 50 UG/ML
INJECTION, SOLUTION INTRAMUSCULAR; INTRAVENOUS
Status: DISCONTINUED | OUTPATIENT
Start: 2017-02-02 | End: 2017-02-02

## 2017-02-02 RX ORDER — SODIUM BICARBONATE 42 MG/ML
INJECTION, SOLUTION INTRAVENOUS
Status: DISCONTINUED | OUTPATIENT
Start: 2017-02-02 | End: 2017-02-02

## 2017-02-02 RX ORDER — HYDROMORPHONE HYDROCHLORIDE 1 MG/ML
0.5 INJECTION, SOLUTION INTRAMUSCULAR; INTRAVENOUS; SUBCUTANEOUS ONCE
Status: COMPLETED | OUTPATIENT
Start: 2017-02-02 | End: 2017-02-02

## 2017-02-02 RX ORDER — MIDAZOLAM HYDROCHLORIDE 1 MG/ML
INJECTION INTRAMUSCULAR; INTRAVENOUS
Status: DISCONTINUED | OUTPATIENT
Start: 2017-02-02 | End: 2017-02-02

## 2017-02-02 RX ORDER — HYDROMORPHONE HYDROCHLORIDE 1 MG/ML
0.5 INJECTION, SOLUTION INTRAMUSCULAR; INTRAVENOUS; SUBCUTANEOUS
Status: DISCONTINUED | OUTPATIENT
Start: 2017-02-02 | End: 2017-02-08

## 2017-02-02 RX ORDER — HEPARIN SODIUM 10000 [USP'U]/100ML
600 INJECTION, SOLUTION INTRAVENOUS CONTINUOUS
Status: DISCONTINUED | OUTPATIENT
Start: 2017-02-03 | End: 2017-02-03

## 2017-02-02 RX ORDER — FUROSEMIDE 10 MG/ML
20 INJECTION INTRAMUSCULAR; INTRAVENOUS ONCE
Status: COMPLETED | OUTPATIENT
Start: 2017-02-02 | End: 2017-02-02

## 2017-02-02 RX ORDER — DIPHENHYDRAMINE HYDROCHLORIDE 50 MG/ML
25 INJECTION INTRAMUSCULAR; INTRAVENOUS ONCE
Status: COMPLETED | OUTPATIENT
Start: 2017-02-02 | End: 2017-02-02

## 2017-02-02 RX ORDER — PROMETHAZINE HYDROCHLORIDE 25 MG/ML
INJECTION, SOLUTION INTRAMUSCULAR; INTRAVENOUS
Status: DISPENSED
Start: 2017-02-02 | End: 2017-02-03

## 2017-02-02 RX ORDER — BACITRACIN 50000 [IU]/1
INJECTION, POWDER, FOR SOLUTION INTRAMUSCULAR
Status: DISCONTINUED | OUTPATIENT
Start: 2017-02-02 | End: 2017-02-02 | Stop reason: HOSPADM

## 2017-02-02 RX ORDER — ONDANSETRON 2 MG/ML
INJECTION INTRAMUSCULAR; INTRAVENOUS
Status: DISCONTINUED | OUTPATIENT
Start: 2017-02-02 | End: 2017-02-02

## 2017-02-02 RX ORDER — ONDANSETRON 2 MG/ML
4 INJECTION INTRAMUSCULAR; INTRAVENOUS EVERY 8 HOURS PRN
Status: DISCONTINUED | OUTPATIENT
Start: 2017-02-02 | End: 2017-02-08

## 2017-02-02 RX ORDER — ROCURONIUM BROMIDE 10 MG/ML
INJECTION, SOLUTION INTRAVENOUS
Status: DISCONTINUED | OUTPATIENT
Start: 2017-02-02 | End: 2017-02-02

## 2017-02-02 RX ADMIN — CALCIUM CHLORIDE 0.3 G: 100 INJECTION, SOLUTION INTRAVENOUS at 11:02

## 2017-02-02 RX ADMIN — DIPHENHYDRAMINE HYDROCHLORIDE 25 MG: 50 INJECTION, SOLUTION INTRAMUSCULAR; INTRAVENOUS at 11:02

## 2017-02-02 RX ADMIN — MAGNESIUM SULFATE IN WATER 4 G: 40 INJECTION, SOLUTION INTRAVENOUS at 01:02

## 2017-02-02 RX ADMIN — PROPOFOL 40 MCG/KG/MIN: 10 INJECTION, EMULSION INTRAVENOUS at 04:02

## 2017-02-02 RX ADMIN — EPINEPHRINE 0.07 MCG/KG/MIN: 1 INJECTION PARENTERAL at 01:02

## 2017-02-02 RX ADMIN — EPINEPHRINE 0.06 MCG/KG/MIN: 1 INJECTION PARENTERAL at 06:02

## 2017-02-02 RX ADMIN — FENTANYL CITRATE 25 MCG: 50 INJECTION INTRAMUSCULAR; INTRAVENOUS at 01:02

## 2017-02-02 RX ADMIN — PANTOPRAZOLE SODIUM 40 MG: 40 INJECTION, POWDER, FOR SOLUTION INTRAVENOUS at 08:02

## 2017-02-02 RX ADMIN — FENTANYL CITRATE 25 MCG: 50 INJECTION INTRAMUSCULAR; INTRAVENOUS at 07:02

## 2017-02-02 RX ADMIN — DOCUSATE SODIUM 200 MG: 50 CAPSULE, LIQUID FILLED ORAL at 08:02

## 2017-02-02 RX ADMIN — ASPIRIN 325 MG ORAL TABLET 325 MG: 325 PILL ORAL at 09:02

## 2017-02-02 RX ADMIN — OXYCODONE HYDROCHLORIDE 10 MG: 5 TABLET ORAL at 02:02

## 2017-02-02 RX ADMIN — MIDAZOLAM HYDROCHLORIDE 2 MG: 1 INJECTION, SOLUTION INTRAMUSCULAR; INTRAVENOUS at 11:02

## 2017-02-02 RX ADMIN — ALBUTEROL SULFATE 2.5 MG: 2.5 SOLUTION RESPIRATORY (INHALATION) at 03:02

## 2017-02-02 RX ADMIN — ONDANSETRON 4 MG: 2 INJECTION INTRAMUSCULAR; INTRAVENOUS at 10:02

## 2017-02-02 RX ADMIN — FLUCONAZOLE IN SODIUM CHLORIDE 400 MG: 2 INJECTION, SOLUTION INTRAVENOUS at 09:02

## 2017-02-02 RX ADMIN — PANTOPRAZOLE SODIUM 40 MG: 40 INJECTION, POWDER, FOR SOLUTION INTRAVENOUS at 09:02

## 2017-02-02 RX ADMIN — Medication 3 ML: at 09:02

## 2017-02-02 RX ADMIN — ALBUTEROL SULFATE 2.5 MG: 2.5 SOLUTION RESPIRATORY (INHALATION) at 04:02

## 2017-02-02 RX ADMIN — OXYCODONE HYDROCHLORIDE 10 MG: 5 TABLET ORAL at 05:02

## 2017-02-02 RX ADMIN — NICARDIPINE HYDROCHLORIDE 2.5 MG/HR: 0.2 INJECTION, SOLUTION INTRAVENOUS at 06:02

## 2017-02-02 RX ADMIN — MUPIROCIN: 20 OINTMENT TOPICAL at 09:02

## 2017-02-02 RX ADMIN — DOBUTAMINE IN DEXTROSE 4 MCG/KG/MIN: 200 INJECTION, SOLUTION INTRAVENOUS at 01:02

## 2017-02-02 RX ADMIN — VANCOMYCIN HYDROCHLORIDE 750 MG: 1 INJECTION, POWDER, LYOPHILIZED, FOR SOLUTION INTRAVENOUS at 08:02

## 2017-02-02 RX ADMIN — FENTANYL CITRATE 150 MCG: 50 INJECTION, SOLUTION INTRAMUSCULAR; INTRAVENOUS at 10:02

## 2017-02-02 RX ADMIN — POTASSIUM CHLORIDE: 14.9 INJECTION, SOLUTION INTRAVENOUS at 11:02

## 2017-02-02 RX ADMIN — CALCIUM CHLORIDE 0.5 G: 100 INJECTION, SOLUTION INTRAVENOUS at 10:02

## 2017-02-02 RX ADMIN — ROCURONIUM BROMIDE 50 MG: 10 INJECTION, SOLUTION INTRAVENOUS at 10:02

## 2017-02-02 RX ADMIN — SODIUM BICARBONATE 10 MEQ: 42 INJECTION, SOLUTION INTRAVENOUS at 11:02

## 2017-02-02 RX ADMIN — HYDROMORPHONE HYDROCHLORIDE 0.5 MG: 1 INJECTION, SOLUTION INTRAMUSCULAR; INTRAVENOUS; SUBCUTANEOUS at 08:02

## 2017-02-02 RX ADMIN — ALBUTEROL SULFATE 2.5 MG: 2.5 SOLUTION RESPIRATORY (INHALATION) at 12:02

## 2017-02-02 RX ADMIN — CALCIUM CHLORIDE 0.2 G: 100 INJECTION, SOLUTION INTRAVENOUS at 11:02

## 2017-02-02 RX ADMIN — ALBUTEROL SULFATE 2.5 MG: 2.5 SOLUTION RESPIRATORY (INHALATION) at 09:02

## 2017-02-02 RX ADMIN — PROMETHAZINE HYDROCHLORIDE 12.5 MG: 25 INJECTION INTRAMUSCULAR; INTRAVENOUS at 10:02

## 2017-02-02 RX ADMIN — FUROSEMIDE 10 MG: 10 INJECTION, SOLUTION INTRAMUSCULAR; INTRAVENOUS at 01:02

## 2017-02-02 RX ADMIN — Medication 3 ML: at 06:02

## 2017-02-02 RX ADMIN — CEFEPIME HYDROCHLORIDE 2 G: 2 INJECTION, SOLUTION INTRAVENOUS at 03:02

## 2017-02-02 RX ADMIN — POTASSIUM CHLORIDE 40 MEQ: 200 INJECTION, SOLUTION INTRAVENOUS at 01:02

## 2017-02-02 RX ADMIN — MUPIROCIN: 20 OINTMENT TOPICAL at 08:02

## 2017-02-02 RX ADMIN — OXYCODONE HYDROCHLORIDE 10 MG: 5 TABLET ORAL at 07:02

## 2017-02-02 RX ADMIN — MIDAZOLAM HYDROCHLORIDE 2 MG: 1 INJECTION, SOLUTION INTRAMUSCULAR; INTRAVENOUS at 09:02

## 2017-02-02 RX ADMIN — SODIUM CHLORIDE, SODIUM GLUCONATE, SODIUM ACETATE, POTASSIUM CHLORIDE, MAGNESIUM CHLORIDE, SODIUM PHOSPHATE, DIBASIC, AND POTASSIUM PHOSPHATE: .53; .5; .37; .037; .03; .012; .00082 INJECTION, SOLUTION INTRAVENOUS at 10:02

## 2017-02-02 RX ADMIN — ROCURONIUM BROMIDE 50 MG: 10 INJECTION, SOLUTION INTRAVENOUS at 09:02

## 2017-02-02 RX ADMIN — FENTANYL CITRATE 25 MCG: 50 INJECTION INTRAMUSCULAR; INTRAVENOUS at 02:02

## 2017-02-02 RX ADMIN — NICARDIPINE HYDROCHLORIDE 7.5 MG/HR: 0.2 INJECTION, SOLUTION INTRAVENOUS at 09:02

## 2017-02-02 RX ADMIN — ALBUTEROL SULFATE 2.5 MG: 2.5 SOLUTION RESPIRATORY (INHALATION) at 07:02

## 2017-02-02 RX ADMIN — EPINEPHRINE 0.07 MCG/KG/MIN: 1 INJECTION INTRAMUSCULAR; INTRAVENOUS; SUBCUTANEOUS at 09:02

## 2017-02-02 RX ADMIN — Medication 3 ML: at 02:02

## 2017-02-02 RX ADMIN — FUROSEMIDE 20 MG: 10 INJECTION, SOLUTION INTRAMUSCULAR; INTRAVENOUS at 09:02

## 2017-02-02 RX ADMIN — VANCOMYCIN HYDROCHLORIDE 750 MG: 1 INJECTION, POWDER, LYOPHILIZED, FOR SOLUTION INTRAVENOUS at 07:02

## 2017-02-02 RX ADMIN — FUROSEMIDE 2.5 MG/HR: 10 INJECTION, SOLUTION INTRAMUSCULAR; INTRAVENOUS at 02:02

## 2017-02-02 RX ADMIN — SODIUM CHLORIDE 0.8 UNITS/HR: 9 INJECTION, SOLUTION INTRAVENOUS at 04:02

## 2017-02-02 NOTE — NURSING
Dr. Macedo at beside assessing pt. Aware of all current vitals, gtt rates uop and cvp. Received order to decrease epi 0.06mcg/kg/min, dobutamine 4mcg/kg/min. See chart for all other orders received.

## 2017-02-02 NOTE — NURSING
Pt returned to ICU per anesthesia.Pt connected to ICU monitor and vent. VAD speed 2900rpm, flows 5.8-6.  at bedside aware of pts arrival to ICU. See chart for all new orders received.

## 2017-02-02 NOTE — PLAN OF CARE
Problem: Patient Care Overview  Goal: Plan of Care Review  Outcome: Ongoing (interventions implemented as appropriate)  Plan of care reviewed with pt and family, Epi 0.07mcg/kg/min dobutaminine 5mcg/kg/min. IABP 1:2 ekg VAD speed 2800rpm flows 5,8-6.8. Plan for chest closure in am. 1L 5% albumin given, good uop.

## 2017-02-02 NOTE — PLAN OF CARE
Problem: Patient Care Overview  Goal: Plan of Care Review  Dx: Heartware LVAD 2/1/17  Hx: Cardiomyopathy, Two brothers have had heart transplant, occasional smoker     1/27/17: IABP placed  2/1/17: LVAD, admitted to SICU, 1.5L albumin, chest remains open   2/2/17: IABP d/ced       Nursing:   Goal MAP 60-80  NO TURNS WHILE CHEST OPEN  accuchecks q1  CBC, PTT, BMP, Mag, Phos q4   Heartware speed at 2800  Outcome: Ongoing (interventions implemented as appropriate)  Patient on IMV 50%, 5 peep, 15 ppm of Nitric Oxide, sats= 100%. LVAD speed at 2800, flows in the mid 5's to 6's. Remains on epi, dobutamine, and cardene gtts. cardene titrated to maintain MAP <80. On propofol, follows commands. Insulin and maint gtts. Lasix push for elevated CVP of 17. CVP now 14. SVO2 calibrated = 80%. Urinary output responded to lasix but now remains approx 60cc /hr. Chest tube output minimal. IABP discontinued. Plans to have chest closed in OR this am. Patient and wife (via telephone) updated on plan of care. See chart and doc flow sheets for more details.

## 2017-02-02 NOTE — NURSING
Dr. Macedo aware of all current vitals, gtt and labs, Also aware of ABG. Received order for 500cc 5 % albumin

## 2017-02-02 NOTE — NURSING
Dr. Macedo aware of 1800 ABG. Aware of all current vitals, gtt rates and uop. Received order for 10mg lasix, 1 amp bicarb and 500cc 5% albumin now.

## 2017-02-02 NOTE — TRANSFER OF CARE
"Anesthesia Transfer of Care Note    Patient: Mert Samayoa    Procedure(s) Performed: Procedure(s) (LRB):  CLOSURE-STERNAL WOUND (N/A)  PLACEMENT-NEOPERICARDIUM (N/A)    Patient location: ICU    Anesthesia Type: general    Transport from OR: Transported from OR intubated on 100% O2 by AMBU with assisted ventilation    Post pain: adequate analgesia    Post assessment: no apparent anesthetic complications and tolerated procedure well    Post vital signs: stable    Level of consciousness: sedated    Nausea/Vomiting: no nausea/vomiting    Complications: none          Last vitals:   Visit Vitals    BP 98/69 (BP Location: Left arm, Patient Position: Lying, BP Method: Automatic)    Pulse 103    Temp 37.3 °C (99.2 °F) (Core)    Resp 17    Ht 5' 7" (1.702 m)    Wt 69 kg (152 lb 1.9 oz)    SpO2 100%    BMI 23.82 kg/m2     "

## 2017-02-02 NOTE — PROGRESS NOTES
Daily E and M and VAD Interrogation Note    Reason for Visit:  Patient is seen in follow up for management of:  [] HeartMate II  [x] Heartware [] Total artificial heart       [] ECMO           [] Other     Interval History:  [x] Interval history unobtainable due to intubation.  The [] implant/[] explant date was  Events overnight   No acute events overnight. Follows commands when sedation weaned. Balloon pump d/c'd.     Review of Systems:  Unable to obtain as patient is intubated and sedated      Medications:  Current Facility-Administered Medications   Medication Dose Route Frequency Provider Last Rate Last Dose    0.9%  NaCl infusion   Intravenous Continuous Urszula Tuttle NP   Stopped at 02/01/17 1445    albumin human 5% bottle 500 mL  500 mL Intravenous PRN Urszula Tuttle NP        albuterol sulfate nebulizer solution 2.5 mg  2.5 mg Nebulization Q4H Urszula Tuttle NP   2.5 mg at 02/02/17 0317    albuterol sulfate nebulizer solution 2.5 mg  2.5 mg Nebulization Q4H PRN Urszula Tuttle NP        aspirin EC tablet 325 mg  325 mg Oral Daily Urszula Tuttle NP   Stopped at 02/01/17 1445    aspirin tablet 325 mg  325 mg Per NG tube Daily Urszula Tuttle NP   325 mg at 02/01/17 2024    bisacodyl suppository 10 mg  10 mg Rectal Daily PRN Urszula Tuttle NP        ceFEPIme in dextrose 5% 2 gram/50 mL IVPB 2 g  2 g Intravenous Q12H Urszula Tuttle  mL/hr at 02/02/17 0323 2 g at 02/02/17 0323    dextrose 5 % and 0.45 % NaCl with KCl 40 mEq infusion   Intravenous Continuous Urszula Tuttle NP 10 mL/hr at 02/02/17 0600      dextrose 50% injection 12.5 g  12.5 g Intravenous PRN Shai Ortega MD        dextrose 50% injection 12.5 g  12.5 g Intravenous PRN Urszula Tuttle NP        dextrose 50% injection 25 g  25 g Intravenous PRN Shai Ortega MD        dextrose 50% injection 25 g  25 g Intravenous PRN Urszula Tuttle NP        DOBUTamine 500mg in  D5W 250mL infusion (premix) (NON-TITRATING)  5 mcg/kg/min Intravenous Continuous Shai Ortega MD 9.5 mL/hr at 02/02/17 0600 5 mcg/kg/min at 02/02/17 0600    docusate sodium capsule 200 mg  200 mg Oral QHS Urszula Tuttle NP   200 mg at 02/01/17 2100    EPINEPHrine (ADRENALIN) 4 mg in sodium chloride 0.9% 250 mL infusion  0.08 mcg/kg/min Intravenous Continuous Urszula Tuttle NP 16.3 mL/hr at 02/02/17 0600 0.07 mcg/kg/min at 02/02/17 0600    fentaNYL 50 mcg/mL injection 25 mcg  25 mcg Intravenous Q2H PRN Urszula Tuttle NP   25 mcg at 02/02/17 0117    ferrous gluconate tablet 324 mg  324 mg Oral Daily with breakfast Urszula Tuttle NP        fluconazole (DIFLUCAN) IVPB 400 mg 200 mL  400 mg Intravenous Once Lex Macedo MD        fluconazole (DIFLUCAN) IVPB 400 mg 200 mL  400 mg Intravenous Q24H Urszula Tuttle NP   Stopped at 02/01/17 1500    glucagon (human recombinant) injection 1 mg  1 mg Intramuscular PRN Shai Ortega MD        glucose chewable tablet 16 g  16 g Oral PRN Shai Ortega MD        glucose chewable tablet 24 g  24 g Oral PRN Shai Ortega MD        insulin regular (Humulin R) 100 Units in sodium chloride 0.9% 100 mL infusion  1 Units/hr Intravenous Continuous Urszual Tuttle NP 0.4 mL/hr at 02/02/17 0600 0.4 Units/hr at 02/02/17 0600    magnesium hydroxide 400 mg/5 ml suspension 2,400 mg  30 mL Oral Daily PRN Urszula Tuttle NP        magnesium sulfate 2g in water 50mL IVPB (premix)  2 g Intravenous PRN Urszula Tuttle NP   2 g at 02/01/17 1835    mupirocin 2 % ointment   Nasal BID Urszula Tuttle NP        niCARdipine 40 mg/200 mL infusion  5 mg/hr Intravenous Continuous Urszula Tuttle NP 25 mL/hr at 02/02/17 0625 5 mg/hr at 02/02/17 0625    nitric oxide gas Gas 10 ppm  10 ppm Inhalation Continuous Lex Macedo MD   10 ppm at 02/01/17 1526    oxycodone immediate release tablet 10 mg  10 mg Oral Q4H PRN Urszula VALENCIA  BARNEY Tuttle   10 mg at 02/02/17 0534    oxycodone immediate release tablet 5 mg  5 mg Oral Q4H PRN Urszula Tuttle NP        pantoprazole EC tablet 40 mg  40 mg Oral Daily Urszula Tuttle NP   Stopped at 02/01/17 1445    pantoprazole injection 40 mg  40 mg Intravenous Daily Urszula Tuttle NP   40 mg at 02/01/17 1520    potassium chloride 20 mEq in 100 mL IVPB (FOR CENTRAL LINE ADMINISTRATION ONLY)  40 mEq Intravenous PRN Urszula Tuttle NP 50 mL/hr at 02/02/17 0118 40 mEq at 02/02/17 0118    potassium chloride 20 mEq in 100 mL IVPB (FOR CENTRAL LINE ADMINISTRATION ONLY)  40 mEq Intravenous PRN Urszula Tuttle NP        propofol (DIPRIVAN) 10 mg/mL infusion (NON-TITRATING)  20 mcg/kg/min Intravenous Continuous Urszula Tuttle NP 16.8 mL/hr at 02/02/17 0625 45 mcg/kg/min at 02/02/17 0625    sodium chloride 0.9% flush 3 mL  3 mL Intravenous Q8H Urszula Tuttle NP   3 mL at 02/02/17 0600    vancomycin (VANCOCIN) 750 mg in dextrose 5 % 250 mL IVPB  750 mg Intravenous Q12H Urszula Tuttle .7 mL/hr at 02/01/17 2026 750 mg at 02/01/17 2026       Physical Examination:  Vital Signs:   Vitals:    02/02/17 0630   BP:    Pulse: 100   Resp:    Temp:      Cardiovascular:  [x] Regular rate and rhythm [] Irregular []  None (COURTNEY) []  Other  []  No edema []  Edema present  []  Clear to auscultation  []  Rales to []  Coarse  []  No rales but   [] Pedal Pulses absent  []  Pulses  + throughout  Skin:  Incision is []  Clean, dry and intact.  []  Other   Sternum:  []  Stable []  Unstable  Driveline(s):   []  Clean, dry and intact. []  Other       Labs:  BMP  Lab Results   Component Value Date     02/02/2017     02/02/2017    K 4.4 02/02/2017    K 4.4 02/02/2017     02/02/2017     02/02/2017    CO2 21 (L) 02/02/2017    CO2 21 (L) 02/02/2017    BUN 13 02/02/2017    BUN 13 02/02/2017    CREATININE 1.1 02/02/2017    CREATININE 1.1 02/02/2017    CALCIUM 9.0 02/02/2017     CALCIUM 9.0 02/02/2017    ANIONGAP 12 02/02/2017    ANIONGAP 12 02/02/2017    ESTGFRAFRICA >60.0 02/02/2017    ESTGFRAFRICA >60.0 02/02/2017    EGFRNONAA >60.0 02/02/2017    EGFRNONAA >60.0 02/02/2017       Magnesium   Date Value Ref Range Status   02/02/2017 2.2 1.6 - 2.6 mg/dL Final   02/02/2017 2.2 1.6 - 2.6 mg/dL Final       Lab Results   Component Value Date    WBC 9.86 02/02/2017    WBC 9.86 02/02/2017    HGB 10.5 (L) 02/02/2017    HGB 10.5 (L) 02/02/2017    HCT 30.5 (L) 02/02/2017    HCT 30.5 (L) 02/02/2017    MCV 85 02/02/2017    MCV 85 02/02/2017     (L) 02/02/2017     (L) 02/02/2017       Lab Results   Component Value Date    INR 1.5 (H) 02/02/2017    INR 1.6 (H) 02/01/2017    INR 1.3 (H) 02/01/2017       BNP   Date Value Ref Range Status   01/20/2017 101 (H) 0 - 99 pg/mL Final     Comment:     Values of less than 100 pg/ml are consistent with non-CHF populations.   01/18/2017 101 (H) 0 - 99 pg/mL Final     Comment:     Values of less than 100 pg/ml are consistent with non-CHF populations.   01/16/2017 1514 (H) 0 - 99 pg/mL Final     Comment:     Values of less than 100 pg/ml are consistent with non-CHF populations.       LD   Date Value Ref Range Status   02/02/2017 449 (H) 110 - 260 U/L Final     Comment:     Results are increased in hemolyzed samples.   02/01/2017 377 (H) 110 - 260 U/L Final     Comment:     Results are increased in hemolyzed samples.   01/17/2017 299 (H) 110 - 260 U/L Final     Comment:     Results are increased in hemolyzed samples.       X-Rays:  [x]  I reviewed today's Chest x-ray    Procedure:  Device Interrogation including analysis of device parameters.  Current Settings   [x]  Ventricular Assist Device  []  Total Artificial Heart interrogated  Review of device function is []  Stable []  Other   TXP LVAD INTERROGATIONS 2/2/2017 2/2/2017 2/2/2017 2/2/2017 2/2/2017 2/2/2017 2/2/2017   Type Heartware Heartware Heartware Heartware Heartware Heartware Heartware   Flow  5.9 5.9 5.8 5.7 5.4 5.8 5.7   Speed 2800 2800 2800 2800 2800 2800 2800   Power ([place]) 4.8 4.8 4.7 4.8 4.7 4.7 4.7   Low Flow Alarm - - - 3.5 - - -   High Power Alarm - - - 7 - - -   Pulsatility - - - - - - -       Assessment:  [x]  Primary Cardiomyopathy []  Congestive Heart Failure   []  Atrial Fibrillation []  Ventricular Tachycardia   []  Aftercare cardiac device []  Long term (current) use of anticoagulants   []  Ventilator-associated pneumonia []  Pneumonia viral, unspecified   []  Pneumonia, bacterial, unspecified []  Pneumonia, organism unspecified   []  Hemorrhage of GI tract, unspecified    []  Nosebleed []  Driveline infection   []  Infection VAD device []  New onset of    []        Plan:  [x]  Interval history obtained from ICU attending team member during rounding today  []  VAD/COURTNEY teaching performed with patient  []  Mobilization / Physical Therapy ongoing  []  Anticoagulation []  Ongoing []  Held  []  Studies ordered  []      Plan:    Neuro:  - Continue propofol    Resp:  - Continue vent until chest closed  - Wean vent as tolerated, do not wean FiO2<50%  - Daily CXR    CVS:  - Continue dobutamine, epi and cardene  - To OR today for chest closure  - Daily ASA  - Will start anticoagulation once chest closed    FENGI:  - NPO  - OG to LIWS  - MIVF   - Replace lytes PRN  - q4h lactates with gases    Renal:  - Castillo for strict I/O  - Trend BUN/Cr    Endo:  - Insulin gtt per protocol    Heme/ID:  - Continue cefepime, diflucan and vanc for open chest, will de-escalate once chest is closed    Dispo:  - Continue ICU care      Total time spent was 30 minutes.  Of which more than 50 percent of the care dominated counseling and coordinating care with different team members. The VAD was interrogated and all parameters were WNL and no significant findings were found in the history. All these findings are documented in the note above.      Date of Service: 02/02/2017

## 2017-02-02 NOTE — PROGRESS NOTES
Progress Note  Heart Transplant Service    Admit Date: 1/27/2017   LOS: 6 days     SUBJECTIVE:     Follow up for: Chronic combined systolic and diastolic heart failure    Interval History: s/p VAD yesterday 2/1 and pending chest closure today    Scheduled Meds:   albuterol sulfate  2.5 mg Nebulization Q4H    aspirin  325 mg Oral Daily    aspirin  325 mg Per NG tube Daily    docusate sodium  200 mg Oral QHS    ferrous gluconate  324 mg Oral Daily with breakfast    fluconazole (DIFLUCAN) IVPB  400 mg Intravenous Q24H    mupirocin   Nasal BID    pantoprazole  40 mg Oral Daily    pantoprazole  40 mg Intravenous BID    sodium chloride 0.9%  3 mL Intravenous Q8H    vancomycin (VANCOCIN) IVPB  750 mg Intravenous Q12H     Continuous Infusions:   sodium chloride 0.9% Stopped (02/01/17 1445)    dextrose 5 % and 0.45 % NaCl with KCl 40 mEq 10 mL/hr at 02/02/17 1400    DOBUTamine 4 mcg/kg/min (02/02/17 1400)    epinephrine infusion 0.06 mcg/kg/min (02/02/17 1400)    furosemide (LASIX) 1 mg/mL infusion (non-titrating)      insulin (HUMAN R) infusion (adults) Stopped (02/02/17 1400)    nicardipine 5 mg/hr (02/02/17 1400)    nitric oxide gas      propofol 5 mcg/kg/min (02/02/17 1400)     PRN Meds:albumin human 5%, albuterol sulfate, bisacodyl, dextrose 50%, dextrose 50%, dextrose 50%, dextrose 50%, fentaNYL, glucagon (human recombinant), glucose, glucose, magnesium hydroxide 400 mg/5 ml, magnesium sulfate IVPB, oxycodone, oxycodone, potassium chloride, potassium chloride    Review of patient's allergies indicates:  No Known Allergies    OBJECTIVE:     Vital Signs (Most Recent)  Temp: 99.1 °F (37.3 °C) (02/02/17 1400)  Pulse: 103 (02/02/17 1400)  Resp: (!) 25 (02/02/17 1201)  BP: 98/69 (02/02/17 0800)  SpO2: 100 % (02/02/17 1400)    Vital Signs Range (Last 24H):  Temp:  [97.5 °F (36.4 °C)-100.1 °F (37.8 °C)]   Pulse:  []   Resp:  [14-25]   BP: ()/(0-81)   SpO2:  [99 %-100 %]   Arterial Line BP:  ()/(63-82)     I & O (Last 24H):    Intake/Output Summary (Last 24 hours) at 02/02/17 1418  Last data filed at 02/02/17 1400   Gross per 24 hour   Intake             4453 ml   Output             3840 ml   Net              613 ml       PAP: (24-38)/(13-25) 34/22  PAP (Mean):  [22 mmHg-30 mmHg] 28 mmHg    Telemetry: sinus  Constitutional: intubated and sedated  Neck: No JVD present. No thyromegaly present.   Cardiovascular: VAD hum, open chest covered  Pulmonary/Chest:good breath sounds bilaterally  Abdominal: + BS, exhibits no distension. There is no tenderness. There is no rebound.   Extremities: no cyanosis, clubbing or edema.  Left groin site intact.  No bleeding, edema, erythema or hematoma, doppler pulses in all distals    Labs:       Recent Labs  Lab 02/02/17  0305 02/02/17  0800 02/02/17  1155   WBC 9.86  9.86 13.01* 15.15*   HGB 10.5*  10.5* 10.8* 10.4*   HCT 30.5*  30.5* 31.5* 30.2*   *  125* 132* 115*   LYMPH 6.0*  6.0*  0.6*  0.6* 6.2*  0.8* 5.7*  0.9*   MONO 7.9  7.9  0.8  0.8 7.9  1.0 6.3  1.0   EOSINOPHIL 0.2  0.2 0.5 0.5         Recent Labs  Lab 02/02/17  0304 02/02/17  0800 02/02/17  1155   APTT 27.1 29.5 29.6   INR 1.5* 1.2 1.2          Recent Labs  Lab 01/31/17  0415 02/01/17  0437  02/02/17  0305 02/02/17  0800 02/02/17  1155   GLU 83 94  < > 156*  156* 152* 152*   CALCIUM 9.3 9.5  < > 9.0  9.0 8.6* 9.9   ALBUMIN 4.1 4.1  --  4.3  --   --    PROT 6.9 7.1  --  6.4  --   --     138  < > 137  137 137 138   K 3.8 3.9  < > 4.4  4.4 4.1 4.3   CO2 23 26  < > 21*  21* 26 25    102  < > 104  104 105 103   BUN 17 17  < > 13  13 14 12   CREATININE 1.0 0.9  < > 1.1  1.1 0.9 0.9   ALKPHOS 70 77  --  49*  --   --    ALT 40 38  --  28  --   --    AST 32 29  --  96*  --   --    BILITOT 0.5 0.6  --  0.7  --   --    MG 1.8 1.9  < > 2.2  2.2 2.1 1.8   PHOS 4.5 4.5  < > 3.6  3.6 4.7* 4.7*   < > = values in this interval not displayed.  Estimated Creatinine Clearance:  115.3 mL/min (based on Cr of 0.9).    No results for input(s): BNP, BNPTRIAGEBLO in the last 168 hours.      Recent Labs  Lab 02/01/17  1235 02/02/17  0304   * 449*       Microbiology Results (last 7 days)     Procedure Component Value Units Date/Time    Blood culture (site 1) [[id number]] Collected:  01/27/17 1837    Order Status:  Completed Specimen:  Blood Updated:  02/01/17 2022     Blood Culture, Routine No growth after 5 days.    Narrative:       Site # 1, aerobic and anaerobic (central line, if patient has  one)    Blood culture (site 2) [[id number]] Collected:  01/27/17 1828    Order Status:  Completed Specimen:  Blood Updated:  02/01/17 2022     Blood Culture, Routine No growth after 5 days.    Narrative:       Site # 2, aerobic only            ASSESSMENT:     25 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 He was admitted for PICC removal, IABP, and planned LVAD implantation. He is s/p HeartWare LVAD placement 2/1 and chest closure 2/2.     PLAN:     Cardiogenic Shock due to Acute on Chronic Systolic HF, DCM, NYHA FC IV s/p HW LVAD 2/1/2017  -s/p HW LVAD 2/1 and chest closure today, intubated and sedated  -CTS primary until patient on floor with therapeutic INR  -Currently on  5, cardene, epi  -Does not have ICD- will need to discuss timing of this vs Lifevest  -cultures: NGTD  -S/P IABP placement     HTN  -BP controlled    Back Pain  -Changed Percocet to Morphine for pain     Prophylaxis   -heparin    Zhang Spears DO  Cardiology Fellow  Pager [phone]

## 2017-02-02 NOTE — SUBJECTIVE & OBJECTIVE
"Interval HPI:   Extubated. NPO. BG at goal. Insulin infusion discontinued earlier. Afebrile. (+) nausea. No hypoglycemia.     Visit Vitals    BP 98/69 (BP Location: Left arm, Patient Position: Lying, BP Method: Automatic)    Pulse 99    Temp 98.4 °F (36.9 °C) (Core)    Resp 17    Ht 5' 7" (1.702 m)    Wt 69 kg (152 lb 1.9 oz)    SpO2 100%    BMI 23.82 kg/m2       Labs Reviewed and Include      Recent Labs  Lab 02/02/17  0305   *  156*   CALCIUM 9.0  9.0   ALBUMIN 4.3   PROT 6.4     137   K 4.4  4.4   CO2 21*  21*     104   BUN 13  13   CREATININE 1.1  1.1   ALKPHOS 49*   ALT 28   AST 96*   BILITOT 0.7     Lab Results   Component Value Date    WBC 13.01 (H) 02/02/2017    HGB 10.8 (L) 02/02/2017    HCT 31.5 (L) 02/02/2017    MCV 87 02/02/2017     (L) 02/02/2017     No results for input(s): TSH, FREET4 in the last 168 hours.  Lab Results   Component Value Date    HGBA1C 5.4 01/17/2017       Nutritional status:   Body mass index is 23.82 kg/(m^2).  Lab Results   Component Value Date    ALBUMIN 4.3 02/02/2017    ALBUMIN 4.1 02/01/2017    ALBUMIN 4.1 01/31/2017     Lab Results   Component Value Date    PREALBUMIN 17 (L) 02/02/2017    PREALBUMIN 26 01/17/2017       Estimated Creatinine Clearance: 94.3 mL/min (based on Cr of 1.1).    Accu-Checks  Recent Labs      02/01/17   2110  02/01/17   2200  02/01/17   2310  02/01/17   2357  02/02/17   0112  02/02/17   0158  02/02/17   0311  02/02/17   0404  02/02/17   0455  02/02/17   0600   POCTGLUCOSE  181*  220*  182*  186*  162*  159*  133*  145*  148*  138*       Current Medications and/or Treatments Impacting Glycemic Control  Immunotherapy:  Immunosuppressants     None        Steroids:   Hormones     None        Pressors:    Autonomic Drugs     Start     Stop Route Frequency Ordered    02/01/17 1500  EPINEPHrine (ADRENALIN) 4 mg in sodium chloride 0.9% 250 mL infusion     Question Answer Comment   Titrate by: (in mcg/kg/min) 0.01  "   Titrate interval: (in minutes) 5    Titrate to maintain: (SBP or MAP or Cardiac Index) MAP    Greater than: (in mmHg) 60    Maximum dose: (in mcg/kg/min) 2        -- IV Continuous 02/01/17 1405        Hyperglycemia/Diabetes Medications: Antihyperglycemics     Start     Stop Route Frequency Ordered    02/01/17 1500  insulin regular (Humulin R) 100 Units in sodium chloride 0.9% 100 mL infusion      -- IV Continuous 02/01/17 1352

## 2017-02-02 NOTE — PLAN OF CARE
Problem: Physical Therapy Goal  Goal: Physical Therapy Goal  Goals to be met by: 3/1/17     Patient will increase functional independence with mobility by performin. Supine to sit with [place]  2. Sit to stand transfer with [place]  3. Gait x 400 feet with Supervision.   4. Ascend/descend 2 stair with bilateral Handrails Supervision.   5. Lower extremity exercise program x15 reps per handout, with assistance as needed.  6. [place] with LVAD management.   Outcome: Ongoing (interventions implemented as appropriate)  Pt evaluated and goals appropriate.      José Manuel Campbell PT, DPT  [phone]

## 2017-02-02 NOTE — PT/OT/SLP PROGRESS
Occupational Therapy      Mert Samayoa  MRN: [id number]    Patient not seen today secondary to pt remains intubated with chest open. Pt to OR today. OT to check status of patient at later date.     ALISA Mejia  2/2/2017

## 2017-02-02 NOTE — NURSING
Pt transferred to OR per anesthesia. Pt connected to portable monitor, nitric 10ppm. All current vitals stable. Will resume care when pt returns to [id number]

## 2017-02-02 NOTE — ANESTHESIA PROCEDURE NOTES
RICKY    Diagnosis: cardiomyopathy s/p LVAD  Patient location during procedure: OR  Procedure start time: 2/2/2017 10:30 AM  Timeout: 2/2/2017 10:30 AM  Procedure end time: 2/2/2017 10:48 AM  Exam type: Initial  Staffing  Anesthesiologist: CHAVA CUELLAR  Other anesthesia staff: BRUNO MORRIS  Performed by: other anesthesia staff   Preanesthetic Checklist  Completed: patient identified, surgical consent, pre-op evaluation, timeout performed, risks and benefits discussed, monitors and equipment checked, anesthesia consent given, oxygen available, suction available, hand hygiene performed and patient being monitored  Setup & Induction  Patient preparation: bite block inserted  Probe Insertion: easy  Exam: complete    Exam  Estimated Ejection Fraction: < 25% severe  Regional Wall Abnormalities: RWMA present    Regional Wall Abnormalities    Four Chamber ME  Two Chamber ME   Longitudinal ME  TG Transversal MP  Inferoseptal: 3  Inferior: 4  Inferolateral: 3  Anteroseptal: 3  Anterior: 3  Anterolateral: 3  TG Transversal Basal    Right Heart  Right Ventricle dilated: no  Right Ventricle Function: mild      Aortic Valve:  Prosthesis: none  Regurgitation(color flow): 0-tr     Mitral Valve:  Prosthesis: none  Regurgitation(color flow): 1+     Tricuspid Valve:  Prosthesis: none  Regurgitation: 0-tr    Pulmonic Valve:  Prosthesis: none  Regurgitation(color flow): 1+      Aorta  Descending Aorta dissection: no  Descending aorta IABP: no  Aortic Arch Dissection: no  Ascending Aorta Dissection: no    LVAD:yes  Inflow Cannula and Direction: midline  Outflow Cannula:seen        Effusions    Summary    Other Findings  LVAD in situ, position midline towards mitral valve. LV significantly dilated, with severe systolic dysfunction. RV mild dysfunction. Min MR, trace TR/PI. No AI seen, aortic valve opening every beat.

## 2017-02-02 NOTE — CONSULTS
[facility]  Adult Nutrition  Consult Note    SUMMARY     Recommendations    Recommendation/Intervention:   1. Once medically able, initiate clear liquids and advance as tolerated to Cardiac diet.   2. If unable to start oral diet, recommend trickle feeds of Isosource 1.5 at 10mL/hr. Will monitor. Noted Alb 4.3 and PAB 17.   Goals: Pt to receive nutrition within 48hrs.   Nutrition Goal Status: new  Communication of RD Recs: reviewed with RN    Reason for Assessment    Reason for Assessment: nurse/nurse practitioner consult  Diagnosis: cardiac disease (s/p LVAD placement)  Relevent Medical History: HF, HTN   Nutrition Discharge Planning: Will provide Coumadin education prior to d/c.     Nutrition Prescription Ordered    Current Diet Order: NPO    Evaluation of Received Nutrients/Fluid Intake    Other Calories (kcal): 444 (propofol)    Nutrition Risk Screen     Nutrition Risk Screen: no indicators present    Nutrition/Diet History    Typical Food/Fluid Intake: Pt intubated at this time. Likely going for chest closure today.   Factors Affecting Nutritional Intake: NPO, on mechanical ventilation    Labs/Tests/Procedures/Meds    Pertinent Labs Reviewed: reviewed  Pertinent Medications Reviewed: reviewed  Pertinent Medications Comments: docusate, epi, insulin, propofol    Physical Findings    Overall Physical Appearance: on ventilator support  Tubes: orogastric tube (130mL output, chest tube - 490mL output)  Oral/Mouth Cavity: WDL  Skin: other (see comments) (incision in chest)    Anthropometrics    Height (inches): 67.01 in  Weight Method: Standard Scale  Weight (kg): 69 kg  Ideal Body Weight (IBW), Male: 148.06 lb  % Ideal Body Weight, Male (lb): 102.74   BMI (kg/m2): 23.82  BMI Grade: 18.5-24.9 - normal    Estimated/Assessed Needs    Weight Used For Calorie Calculations: 69 kg (152 lb 1.9 oz)   Height (cm): 170.2 cm  Energy Need Method: Thomas-St Jeor (1.3 PAL: 2112kcal)  RMR (Thomas-St. Jeor  Equation): 1625.22  Weight Used For Protein Calculations: 69 kg (152 lb 1.9 oz)  Protein Requirements: 82-96g (1.2-1.4g/kg)  Fluid Need Method: RDA Method (or per MD)    Malnutrition (Undernutrition) Diagnosis    % Intake of Estimated Energy Needs: 0 - 25%  % Meal Intake: NPO    Nutrition Diagnosis    Nutrition Problem: Increased nutrient needs (protein)  Etiology/Related To: physiological causes  Nutrition Diagnosis Signs/Symptoms As Evidenced By: HF, s/p LVAD placement  Nutrition Diagnosis Status: New    Monitor and Evaluation    Food and Nutrient Intake: energy intake, food and beverage intake  Food and Nutrient Adminstration: diet order  Anthropometric Measurements: weight, weight change  Biochemical Data, Medical Tests and Procedures: other (specify) (All labs)  Nutrition-Focused Physical Findings: overall appearance    Nutrition Risk    Level of Risk: high    Nutrition Follow-Up    RD Follow-up?: Yes

## 2017-02-02 NOTE — PROCEDURES
Patient Sedated and intubated with family at bedside. VAD interrogation completed this AM in the event changes needed to be made. Will continue to monitor for further issues.     Explained the binder to his wife and mother.     Pulsatile: Yes   VAD Sounds: Smooth  Problems / Issues / Alarms with VAD if any: None noted  HCT: 30  Waveforms: 4-8 with no negative deflections noted.       VAD Interrogation:  TXP LVAD INTERROGATIONS 2/2/2017 2/2/2017 2/2/2017 2/2/2017 2/2/2017 2/2/2017 2/2/2017   Type Heartware Heartware Heartware Heartware Heartware Heartware Heartware   Flow 5.7 5.8 6.1 6.2 6 5.9 5.9   Speed 2900 2900 2800 2800 2800 2800 2800   Power (Goldsmith) 5.1 5.2 4.8 4.9 4.8 4.8 4.8   Low Flow Alarm - 3.5 - - 3.5 - -   High Power Alarm - 7 - - 7 - -   Pulsatility - - - - - - -   }

## 2017-02-02 NOTE — PT/OT/SLP EVAL
Physical Therapy  Evaluation    Mert Samayoa   MRN: [id number]   Admitting Diagnosis: Chronic combined systolic and diastolic heart failure    PT Received On: 02/02/17  PT Start Time: 1423     PT Stop Time: 1433    PT Total Time (min): 10 min       Billable Minutes:  Evaluation 10    Diagnosis: Chronic combined systolic and diastolic heart failure  S/p LVAD 2/1/17      Past Medical History   Diagnosis Date    Cardiogenic shock 1/16/2017    Cardiomyopathy      Familial cardiomyopathy    CHF (congestive heart failure)     Essential hypertension 12/21/2016    Familial Cardiomyopathy      Familial cardiomyopathy       History reviewed. No pertinent past surgical history.    Referring physician: BARNEY Tuttle  Date referred to PT: 2/1/17    General Precautions: Standard, fall, LVAD, [place] catheter (PA cordis), sternal  Orthopedic Precautions: N/A   Braces: N/A       Do you have any cultural, spiritual, Latter day conflicts, given your current situation?: none     Patient History:  Lives With: spouse  Living Arrangements: house  Transportation Available: family or friend will provide  Living Environment Comment: Pt spouse present and reports they live in a [facility] with [address] [facility]. Pt spouse works and will be able to assist as needed. Pt was I with ambulation and ADLs PTA.   Equipment Currently Used at Home: none  DME owned (not currently used): Evaluation 10    Previous Level of Function:  Ambulation Skills: independent  Transfer Skills: independent  ADL Skills: independent  Work/Leisure Activity: independent    Subjective:  Communicated with RN prior to session.  Pt intubated and weaning off of sedation; pt responded through nodding. Pt spouse present to answer questions regarding pts prior level of function.   Chief Complaint: KATARZYNA  Patient goals: KATARZYNA    Pain Rating:  (KATARZYNA)    Pt demo increased pain through facial grimaces with shoulder flexion to ~60 degrees on BUE.   Objective:   Patient found with: ventilator,  telemetry, pulse ox (continuous), peripheral IV, central line, chest tube, kirk catheter, arterial line, blood pressure cuff (LVAD, swan larissa)   Pt appropriate for supine PROM only today 2/2 to intubated and sedated.   Cognitive Exam:  Oriented to: Pt intubated and weaning off of sedation and unable to formally assess.     Follows Commands/attention: intubated and unable to verbally communicate.   Communication: see above   Safety awareness/insight to disability: KATARZYNA    Physical Exam:  Postural examination/scapula alignment: No postural abnormalities identified    Skin integrity: Visible skin intact  Edema: None noted in extremities     Sensation:   KATARZYNA    Upper Extremity Range of Motion:  Right Upper Extremity: WFL  Left Upper Extremity: WFL    Upper Extremity Strength:  Right Upper Extremity: NT  Left Upper Extremity: NT    Lower Extremity Range of Motion:  Right Lower Extremity: WFL  Left Lower Extremity: WFL    Lower Extremity Strength:  Right Lower Extremity: NT  Left Lower Extremity: NT     Fine motor coordination:  KATARZYNA    Gross motor coordination: KATARZYNA    Therapeutic Activities and Exercises:  PT performed PROM to pt for skin/joint integrity. PT unable to perform OOB activities 2/2 to current status. Pt and spouse educated on PT POC and role of PT. Pt and spouse demo understanding of education provided.     AM-PAC 6 CLICK MOBILITY  How much help from another person does this patient currently need?   1 = Unable, Total/Dependent Assistance  2 = A lot, Maximum/Moderate Assistance  3 = A little, Minimum/Contact Guard/Supervision  4 = None, Modified [place]/Independent    Turning over in bed (including adjusting bedclothes, sheets and blankets)?: 2  Sitting down on and standing up from a chair with arms (e.g., wheelchair, bedside commode, etc.): 1  Moving from lying on back to sitting on the side of the bed?: 1  Moving to and from a bed to a chair (including a wheelchair)?: 1  Need to walk in hospital room?:  1  Climbing 3-5 steps with a railing?: 1  Total Score: 7     AM-PAC Raw Score CMS G-Code Modifier Level of Impairment Assistance   6 % Total / Unable   7 - 9 CM 80 - 100% Maximal Assist   10 - 14 CL 60 - 80% Moderate Assist   15 - 19 CK 40 - 60% Moderate Assist   20 - 22 CJ 20 - 40% Minimal Assist   23 CI 1-20% SBA / CGA   24 CH 0% Independent/ Mod I     Patient left supine with all lines intact, call button in reach and RN and spouse present.    Assessment:   Mert Samayoa is a 27 y.o. male with a medical diagnosis of Chronic combined systolic and diastolic heart failure , s/p LVAD 17 and presents with decreased functional mobility due to impaired endurance and weakness throughout.  Patient would benefit from skilled PT in the acute care setting to improve the limitations listed below. Discharge recommendations will be to be determined with further therapy evaluation.       Rehab identified problem list/impairments: Rehab identified problem list/impairments: weakness, impaired functional mobilty, gait instability, impaired balance, decreased ROM, decreased lower extremity function    Rehab potential is good.    Activity tolerance: Poor    Discharge recommendations: Discharge Facility/Level Of Care Needs:  (TBD with further evaluation)     Barriers to discharge: Barriers to Discharge: Inaccessible home environment (2 [place])    Equipment recommendations: Equipment Needed After Discharge:  (TBD)     GOALS:   Physical Therapy Goals        Problem: Physical Therapy Goal    Goal Priority Disciplines Outcome Goal Variances Interventions   Physical Therapy Goal     PT/OT, PT Ongoing (interventions implemented as appropriate)     Description:  Goals to be met by: 3/1/17     Patient will increase functional independence with mobility by performin. Supine to sit with [place]  2. Sit to stand transfer with [place]  3. Gait  x 400 feet with Supervision.   4. Ascend/descend 2 stair with  bilateral Handrails Supervision.   5. Lower extremity exercise program x15 reps per handout, with assistance as needed.  6. [place] with LVAD management.                 PLAN:    Patient to be seen 6 x/week to address the above listed problems via gait training, therapeutic activities, therapeutic exercises, neuromuscular re-education  Plan of Care expires: 03/04/17  Plan of Care reviewed with: patient, spouse          José Manuel Campbell, PT  02/02/2017

## 2017-02-02 NOTE — PROCEDURES
0830: Patient sedated and intubated with family at bedside. VAD interrogation completed this AM in the event changes needed to be made. Will continue to monitor for further issues.     1400: Patient intubated with family at bedside working with PT. VAD interrogation completed this AM in the event changes needed to be made. Will continue to monitor for further issues.    LFA changed:3.5  HPA Changed: 7.5    Pulsatile: Yes   VAD Sounds: HM3  Smooth  Problems / Issues / Alarms with VAD if any: None noted  HCT: 30   Waveform: 4-8 with no negative deflections noted.     VAD Interrogation:  TXP LVAD INTERROGATIONS 2/2/2017 2/2/2017 2/2/2017 2/2/2017 2/2/2017 2/2/2017 2/2/2017   Type Heartware Heartware Heartware Heartware Heartware Heartware Heartware   Flow 5.7 5.8 6.1 6.2 6 5.9 5.9   Speed 2900 2900 2800 2800 2800 2800 2800   Power (Goldsmith) 5.1 5.2 4.8 4.9 4.8 4.8 4.8   Low Flow Alarm - 3.5 - - 3.5 - -   High Power Alarm - 7 - - 7 - -   Pulsatility - - - - - - -   }

## 2017-02-02 NOTE — NURSING
Pt admitted to ICU per anesthesia. Pt on 20ppm Nitric, connected to ICU monitor and vent, All vitals stable. See chart for all new orders received and implemented.     Pt has open chest, unable to turn. Heels intact with no breakdown noted

## 2017-02-02 NOTE — PROGRESS NOTES
Balloon pump DCed at the bedside.  Pressure held for 30 minutes.    Triphasic DP and biphasic PT before and after.  No palpable hematoma or ecchymosis at groin.

## 2017-02-03 PROBLEM — R11.0 NAUSEA: Status: ACTIVE | Noted: 2017-02-03

## 2017-02-03 LAB
ALBUMIN SERPL BCP-MCNC: 4 G/DL
ALBUMIN SERPL BCP-MCNC: 4 G/DL
ALLENS TEST: ABNORMAL
ALP SERPL-CCNC: 61 U/L
ALP SERPL-CCNC: 61 U/L
ALT SERPL W/O P-5'-P-CCNC: 33 U/L
ALT SERPL W/O P-5'-P-CCNC: 33 U/L
ANION GAP SERPL CALC-SCNC: 11 MMOL/L
ANION GAP SERPL CALC-SCNC: 12 MMOL/L
ANION GAP SERPL CALC-SCNC: 13 MMOL/L
ANION GAP SERPL CALC-SCNC: 14 MMOL/L
ANION GAP SERPL CALC-SCNC: 9 MMOL/L
APTT BLDCRRT: 28.3 SEC
APTT BLDCRRT: 29.4 SEC
APTT BLDCRRT: 30.2 SEC
APTT BLDCRRT: 30.5 SEC
AST SERPL-CCNC: 110 U/L
AST SERPL-CCNC: 110 U/L
BASOPHILS # BLD AUTO: 0.01 K/UL
BASOPHILS # BLD AUTO: 0.02 K/UL
BASOPHILS NFR BLD: 0 %
BASOPHILS NFR BLD: 0.1 %
BILIRUB DIRECT SERPL-MCNC: 0.5 MG/DL
BILIRUB SERPL-MCNC: 0.9 MG/DL
BILIRUB SERPL-MCNC: 0.9 MG/DL
BLD PROD TYP BPU: NORMAL
BLOOD UNIT EXPIRATION DATE: NORMAL
BLOOD UNIT TYPE CODE: 5100
BLOOD UNIT TYPE CODE: 9500
BLOOD UNIT TYPE: NORMAL
BNP SERPL-MCNC: 709 PG/ML
BUN SERPL-MCNC: 14 MG/DL
BUN SERPL-MCNC: 17 MG/DL
BUN SERPL-MCNC: 18 MG/DL
CALCIUM SERPL-MCNC: 9 MG/DL
CALCIUM SERPL-MCNC: 9.1 MG/DL
CALCIUM SERPL-MCNC: 9.2 MG/DL
CALCIUM SERPL-MCNC: 9.3 MG/DL
CALCIUM SERPL-MCNC: 9.3 MG/DL
CHLORIDE SERPL-SCNC: 92 MMOL/L
CHLORIDE SERPL-SCNC: 95 MMOL/L
CHLORIDE SERPL-SCNC: 98 MMOL/L
CHLORIDE SERPL-SCNC: 99 MMOL/L
CHLORIDE SERPL-SCNC: 99 MMOL/L
CO2 SERPL-SCNC: 23 MMOL/L
CO2 SERPL-SCNC: 24 MMOL/L
CO2 SERPL-SCNC: 27 MMOL/L
CO2 SERPL-SCNC: 30 MMOL/L
CO2 SERPL-SCNC: 32 MMOL/L
CODING SYSTEM: NORMAL
CREAT SERPL-MCNC: 1.1 MG/DL
CREAT SERPL-MCNC: 1.2 MG/DL
CREAT SERPL-MCNC: 1.2 MG/DL
CRP SERPL-MCNC: 259.2 MG/L
DELSYS: ABNORMAL
DIFFERENTIAL METHOD: ABNORMAL
DIFFERENTIAL METHOD: ABNORMAL
DISPENSE STATUS: NORMAL
EOSINOPHIL # BLD AUTO: 0 K/UL
EOSINOPHIL # BLD AUTO: 0 K/UL
EOSINOPHIL NFR BLD: 0 %
EOSINOPHIL NFR BLD: 0 %
ERYTHROCYTE [DISTWIDTH] IN BLOOD BY AUTOMATED COUNT: 13.1 %
ERYTHROCYTE [DISTWIDTH] IN BLOOD BY AUTOMATED COUNT: 13.1 %
ERYTHROCYTE [SEDIMENTATION RATE] IN BLOOD BY WESTERGREN METHOD: 26 MM/H
EST. GFR  (AFRICAN AMERICAN): >60 ML/MIN/1.73 M^2
EST. GFR  (NON AFRICAN AMERICAN): >60 ML/MIN/1.73 M^2
FIO2: 40
FLOW: 5
GLUCOSE SERPL-MCNC: 141 MG/DL
GLUCOSE SERPL-MCNC: 143 MG/DL
GLUCOSE SERPL-MCNC: 168 MG/DL
GLUCOSE SERPL-MCNC: 181 MG/DL
GLUCOSE SERPL-MCNC: 184 MG/DL
HCO3 UR-SCNC: 30.2 MMOL/L (ref 24–28)
HCO3 UR-SCNC: 32.3 MMOL/L (ref 24–28)
HCO3 UR-SCNC: 33.3 MMOL/L (ref 24–28)
HCT VFR BLD AUTO: 31.2 %
HCT VFR BLD AUTO: 31.2 %
HGB BLD-MCNC: 10.5 G/DL
HGB BLD-MCNC: 10.6 G/DL
INR PPP: 1.2
LDH SERPL L TO P-CCNC: 568 U/L
LYMPHOCYTES # BLD AUTO: 0.7 K/UL
LYMPHOCYTES # BLD AUTO: 0.7 K/UL
LYMPHOCYTES NFR BLD: 3.2 %
LYMPHOCYTES NFR BLD: 3.3 %
MAGNESIUM SERPL-MCNC: 1.8 MG/DL
MAGNESIUM SERPL-MCNC: 2.2 MG/DL
MAGNESIUM SERPL-MCNC: 2.3 MG/DL
MAGNESIUM SERPL-MCNC: 2.3 MG/DL
MAGNESIUM SERPL-MCNC: 2.4 MG/DL
MCH RBC QN AUTO: 29.8 PG
MCH RBC QN AUTO: 29.9 PG
MCHC RBC AUTO-ENTMCNC: 33.7 %
MCHC RBC AUTO-ENTMCNC: 34 %
MCV RBC AUTO: 88 FL
MCV RBC AUTO: 89 FL
METHEMOGLOBIN: 0.8 % (ref 0–3)
MODE: ABNORMAL
MONOCYTES # BLD AUTO: 1.4 K/UL
MONOCYTES # BLD AUTO: 1.6 K/UL
MONOCYTES NFR BLD: 6.9 %
MONOCYTES NFR BLD: 7.4 %
NEUTROPHILS # BLD AUTO: 18 K/UL
NEUTROPHILS # BLD AUTO: 19.2 K/UL
NEUTROPHILS NFR BLD: 88.8 %
NEUTROPHILS NFR BLD: 89.6 %
NUM UNITS TRANS FFP: NORMAL
PCO2 BLDA: 40.7 MMHG (ref 35–45)
PCO2 BLDA: 44.5 MMHG (ref 35–45)
PCO2 BLDA: 46.4 MMHG (ref 35–45)
PH SMN: 7.44 [PH] (ref 7.35–7.45)
PH SMN: 7.46 [PH] (ref 7.35–7.45)
PH SMN: 7.51 [PH] (ref 7.35–7.45)
PHOSPHATE SERPL-MCNC: 4.3 MG/DL
PHOSPHATE SERPL-MCNC: 5.1 MG/DL
PLATELET # BLD AUTO: 125 K/UL
PLATELET # BLD AUTO: 146 K/UL
PMV BLD AUTO: 10.4 FL
PMV BLD AUTO: 10.6 FL
PO2 BLDA: 162 MMHG (ref 80–100)
PO2 BLDA: 191 MMHG (ref 80–100)
PO2 BLDA: 33 MMHG (ref 40–60)
POC BE: 10 MMOL/L
POC BE: 6 MMOL/L
POC BE: 9 MMOL/L
POC SATURATED O2: 100 % (ref 95–100)
POC SATURATED O2: 100 % (ref 95–100)
POC SATURATED O2: 67 % (ref 95–100)
POC TCO2: 32 MMOL/L (ref 23–27)
POC TCO2: 33 MMOL/L (ref 23–27)
POC TCO2: 35 MMOL/L (ref 24–29)
POCT GLUCOSE: 128 MG/DL (ref 70–110)
POCT GLUCOSE: 134 MG/DL (ref 70–110)
POCT GLUCOSE: 137 MG/DL (ref 70–110)
POCT GLUCOSE: 139 MG/DL (ref 70–110)
POCT GLUCOSE: 139 MG/DL (ref 70–110)
POCT GLUCOSE: 140 MG/DL (ref 70–110)
POCT GLUCOSE: 141 MG/DL (ref 70–110)
POCT GLUCOSE: 144 MG/DL (ref 70–110)
POCT GLUCOSE: 146 MG/DL (ref 70–110)
POCT GLUCOSE: 152 MG/DL (ref 70–110)
POCT GLUCOSE: 153 MG/DL (ref 70–110)
POCT GLUCOSE: 154 MG/DL (ref 70–110)
POCT GLUCOSE: 156 MG/DL (ref 70–110)
POCT GLUCOSE: 158 MG/DL (ref 70–110)
POCT GLUCOSE: 160 MG/DL (ref 70–110)
POCT GLUCOSE: 166 MG/DL (ref 70–110)
POCT GLUCOSE: 172 MG/DL (ref 70–110)
POCT GLUCOSE: 178 MG/DL (ref 70–110)
POCT GLUCOSE: 185 MG/DL (ref 70–110)
POCT GLUCOSE: 186 MG/DL (ref 70–110)
POCT GLUCOSE: 192 MG/DL (ref 70–110)
POCT GLUCOSE: 91 MG/DL (ref 70–110)
POTASSIUM SERPL-SCNC: 3.8 MMOL/L
POTASSIUM SERPL-SCNC: 3.9 MMOL/L
POTASSIUM SERPL-SCNC: 4.1 MMOL/L
PROT SERPL-MCNC: 6.8 G/DL
PROT SERPL-MCNC: 6.8 G/DL
PROTHROMBIN TIME: 12.2 SEC
RBC # BLD AUTO: 3.52 M/UL
RBC # BLD AUTO: 3.54 M/UL
SAMPLE: ABNORMAL
SITE: ABNORMAL
SODIUM SERPL-SCNC: 134 MMOL/L
SODIUM SERPL-SCNC: 135 MMOL/L
SODIUM SERPL-SCNC: 136 MMOL/L
SODIUM SERPL-SCNC: 136 MMOL/L
SODIUM SERPL-SCNC: 137 MMOL/L
SP02: 10
WBC # BLD AUTO: 20.06 K/UL
WBC # BLD AUTO: 21.67 K/UL

## 2017-02-03 PROCEDURE — 36415 COLL VENOUS BLD VENIPUNCTURE: CPT

## 2017-02-03 PROCEDURE — 85025 COMPLETE CBC W/AUTO DIFF WBC: CPT

## 2017-02-03 PROCEDURE — 93005 ELECTROCARDIOGRAM TRACING: CPT

## 2017-02-03 PROCEDURE — 37799 UNLISTED PX VASCULAR SURGERY: CPT

## 2017-02-03 PROCEDURE — 27000221 HC OXYGEN, UP TO 24 HOURS

## 2017-02-03 PROCEDURE — 25000003 PHARM REV CODE 250: Performed by: NURSE PRACTITIONER

## 2017-02-03 PROCEDURE — 86140 C-REACTIVE PROTEIN: CPT

## 2017-02-03 PROCEDURE — 63600175 PHARM REV CODE 636 W HCPCS: Performed by: INTERNAL MEDICINE

## 2017-02-03 PROCEDURE — 99233 SBSQ HOSP IP/OBS HIGH 50: CPT | Mod: ,,, | Performed by: SURGERY

## 2017-02-03 PROCEDURE — 93750 INTERROGATION VAD IN PERSON: CPT | Mod: ,,, | Performed by: INTERNAL MEDICINE

## 2017-02-03 PROCEDURE — 63600175 PHARM REV CODE 636 W HCPCS: Performed by: THORACIC SURGERY (CARDIOTHORACIC VASCULAR SURGERY)

## 2017-02-03 PROCEDURE — 99232 SBSQ HOSP IP/OBS MODERATE 35: CPT | Mod: ,,, | Performed by: INTERNAL MEDICINE

## 2017-02-03 PROCEDURE — 99232 SBSQ HOSP IP/OBS MODERATE 35: CPT | Mod: ,,, | Performed by: NURSE PRACTITIONER

## 2017-02-03 PROCEDURE — 99900035 HC TECH TIME PER 15 MIN (STAT)

## 2017-02-03 PROCEDURE — 94761 N-INVAS EAR/PLS OXIMETRY MLT: CPT

## 2017-02-03 PROCEDURE — 25000003 PHARM REV CODE 250: Performed by: THORACIC SURGERY (CARDIOTHORACIC VASCULAR SURGERY)

## 2017-02-03 PROCEDURE — 80053 COMPREHEN METABOLIC PANEL: CPT

## 2017-02-03 PROCEDURE — 25000003 PHARM REV CODE 250: Performed by: STUDENT IN AN ORGANIZED HEALTH CARE EDUCATION/TRAINING PROGRAM

## 2017-02-03 PROCEDURE — 63600175 PHARM REV CODE 636 W HCPCS: Performed by: STUDENT IN AN ORGANIZED HEALTH CARE EDUCATION/TRAINING PROGRAM

## 2017-02-03 PROCEDURE — 82803 BLOOD GASES ANY COMBINATION: CPT

## 2017-02-03 PROCEDURE — 83615 LACTATE (LD) (LDH) ENZYME: CPT

## 2017-02-03 PROCEDURE — 97530 THERAPEUTIC ACTIVITIES: CPT

## 2017-02-03 PROCEDURE — 83735 ASSAY OF MAGNESIUM: CPT | Mod: 91

## 2017-02-03 PROCEDURE — 80076 HEPATIC FUNCTION PANEL: CPT

## 2017-02-03 PROCEDURE — 80048 BASIC METABOLIC PNL TOTAL CA: CPT | Mod: 91

## 2017-02-03 PROCEDURE — 97166 OT EVAL MOD COMPLEX 45 MIN: CPT

## 2017-02-03 PROCEDURE — 93010 ELECTROCARDIOGRAM REPORT: CPT | Mod: ,,, | Performed by: INTERNAL MEDICINE

## 2017-02-03 PROCEDURE — 83735 ASSAY OF MAGNESIUM: CPT

## 2017-02-03 PROCEDURE — 20000000 HC ICU ROOM

## 2017-02-03 PROCEDURE — 63600175 PHARM REV CODE 636 W HCPCS: Performed by: SURGERY

## 2017-02-03 PROCEDURE — 63600367 HC NITRIC OXIDE PER HOUR

## 2017-02-03 PROCEDURE — 83880 ASSAY OF NATRIURETIC PEPTIDE: CPT

## 2017-02-03 PROCEDURE — 25000242 PHARM REV CODE 250 ALT 637 W/ HCPCS: Performed by: NURSE PRACTITIONER

## 2017-02-03 PROCEDURE — 93750 INTERROGATION VAD IN PERSON: CPT | Performed by: THORACIC SURGERY (CARDIOTHORACIC VASCULAR SURGERY)

## 2017-02-03 PROCEDURE — 84100 ASSAY OF PHOSPHORUS: CPT

## 2017-02-03 PROCEDURE — 83050 HGB METHEMOGLOBIN QUAN: CPT

## 2017-02-03 PROCEDURE — 94640 AIRWAY INHALATION TREATMENT: CPT

## 2017-02-03 PROCEDURE — 63600175 PHARM REV CODE 636 W HCPCS: Performed by: NURSE PRACTITIONER

## 2017-02-03 PROCEDURE — 85730 THROMBOPLASTIN TIME PARTIAL: CPT | Mod: 91

## 2017-02-03 PROCEDURE — 25000003 PHARM REV CODE 250: Performed by: SURGERY

## 2017-02-03 PROCEDURE — 27000248 HC VAD-ADDITIONAL DAY

## 2017-02-03 RX ORDER — ONDANSETRON 2 MG/ML
8 INJECTION INTRAMUSCULAR; INTRAVENOUS EVERY 8 HOURS
Status: DISCONTINUED | OUTPATIENT
Start: 2017-02-03 | End: 2017-02-08

## 2017-02-03 RX ORDER — ACETAMINOPHEN 10 MG/ML
1000 INJECTION, SOLUTION INTRAVENOUS ONCE
Status: COMPLETED | OUTPATIENT
Start: 2017-02-03 | End: 2017-02-03

## 2017-02-03 RX ORDER — INSULIN ASPART 100 [IU]/ML
0-5 INJECTION, SOLUTION INTRAVENOUS; SUBCUTANEOUS EVERY 6 HOURS PRN
Status: DISCONTINUED | OUTPATIENT
Start: 2017-02-03 | End: 2017-02-05

## 2017-02-03 RX ORDER — POTASSIUM CHLORIDE 29.8 MG/ML
40 INJECTION INTRAVENOUS ONCE
Status: COMPLETED | OUTPATIENT
Start: 2017-02-03 | End: 2017-02-03

## 2017-02-03 RX ORDER — METOCLOPRAMIDE HYDROCHLORIDE 5 MG/ML
10 INJECTION INTRAMUSCULAR; INTRAVENOUS ONCE
Status: COMPLETED | OUTPATIENT
Start: 2017-02-03 | End: 2017-02-03

## 2017-02-03 RX ORDER — GLUCAGON 1 MG
1 KIT INJECTION
Status: DISCONTINUED | OUTPATIENT
Start: 2017-02-03 | End: 2017-02-06

## 2017-02-03 RX ORDER — FUROSEMIDE 10 MG/ML
10 INJECTION INTRAMUSCULAR; INTRAVENOUS ONCE
Status: COMPLETED | OUTPATIENT
Start: 2017-02-03 | End: 2017-02-03

## 2017-02-03 RX ORDER — BISACODYL 10 MG
10 SUPPOSITORY, RECTAL RECTAL ONCE
Status: COMPLETED | OUTPATIENT
Start: 2017-02-03 | End: 2017-02-03

## 2017-02-03 RX ORDER — METOCLOPRAMIDE HYDROCHLORIDE 5 MG/ML
10 INJECTION INTRAMUSCULAR; INTRAVENOUS ONCE
Status: DISCONTINUED | OUTPATIENT
Start: 2017-02-03 | End: 2017-02-03

## 2017-02-03 RX ORDER — ONDANSETRON 2 MG/ML
8 INJECTION INTRAMUSCULAR; INTRAVENOUS EVERY 8 HOURS
Status: DISCONTINUED | OUTPATIENT
Start: 2017-02-03 | End: 2017-02-03

## 2017-02-03 RX ORDER — METOCLOPRAMIDE HYDROCHLORIDE 5 MG/ML
10 INJECTION INTRAMUSCULAR; INTRAVENOUS EVERY 6 HOURS
Status: DISCONTINUED | OUTPATIENT
Start: 2017-02-03 | End: 2017-02-07

## 2017-02-03 RX ORDER — ONDANSETRON 2 MG/ML
4 INJECTION INTRAMUSCULAR; INTRAVENOUS ONCE
Status: COMPLETED | OUTPATIENT
Start: 2017-02-03 | End: 2017-02-03

## 2017-02-03 RX ORDER — HEPARIN SODIUM 10000 [USP'U]/100ML
700 INJECTION, SOLUTION INTRAVENOUS CONTINUOUS
Status: DISCONTINUED | OUTPATIENT
Start: 2017-02-03 | End: 2017-02-04

## 2017-02-03 RX ORDER — WARFARIN 2 MG/1
2 TABLET ORAL ONCE
Status: COMPLETED | OUTPATIENT
Start: 2017-02-03 | End: 2017-02-03

## 2017-02-03 RX ADMIN — BISACODYL 10 MG: 10 SUPPOSITORY RECTAL at 10:02

## 2017-02-03 RX ADMIN — NICARDIPINE HYDROCHLORIDE 5 MG/HR: 0.2 INJECTION, SOLUTION INTRAVENOUS at 11:02

## 2017-02-03 RX ADMIN — METOCLOPRAMIDE 10 MG: 5 INJECTION, SOLUTION INTRAMUSCULAR; INTRAVENOUS at 11:02

## 2017-02-03 RX ADMIN — ONDANSETRON 4 MG: 2 INJECTION INTRAMUSCULAR; INTRAVENOUS at 05:02

## 2017-02-03 RX ADMIN — Medication 3 ML: at 10:02

## 2017-02-03 RX ADMIN — MAGNESIUM SULFATE IN WATER 2 G: 40 INJECTION, SOLUTION INTRAVENOUS at 01:02

## 2017-02-03 RX ADMIN — HEPARIN SODIUM AND DEXTROSE 400 UNITS/HR: 10000; 5 INJECTION INTRAVENOUS at 01:02

## 2017-02-03 RX ADMIN — METOCLOPRAMIDE 10 MG: 5 INJECTION, SOLUTION INTRAMUSCULAR; INTRAVENOUS at 12:02

## 2017-02-03 RX ADMIN — ACETAMINOPHEN 1000 MG: 10 INJECTION, SOLUTION INTRAVENOUS at 08:02

## 2017-02-03 RX ADMIN — PROMETHAZINE HYDROCHLORIDE 12.5 MG: 25 INJECTION, SOLUTION INTRAMUSCULAR; INTRAVENOUS at 09:02

## 2017-02-03 RX ADMIN — DEXTROSE MONOHYDRATE, SODIUM CHLORIDE, AND POTASSIUM CHLORIDE: 50; 4.5; 2.98 INJECTION, SOLUTION INTRAVENOUS at 02:02

## 2017-02-03 RX ADMIN — PROMETHAZINE HYDROCHLORIDE 12.5 MG: 25 INJECTION, SOLUTION INTRAMUSCULAR; INTRAVENOUS at 03:02

## 2017-02-03 RX ADMIN — Medication 3 ML: at 02:02

## 2017-02-03 RX ADMIN — PROMETHAZINE HYDROCHLORIDE 12.5 MG: 25 INJECTION INTRAMUSCULAR; INTRAVENOUS at 03:02

## 2017-02-03 RX ADMIN — EPINEPHRINE 0.03 MCG/KG/MIN: 1 INJECTION PARENTERAL at 02:02

## 2017-02-03 RX ADMIN — MUPIROCIN: 20 OINTMENT TOPICAL at 08:02

## 2017-02-03 RX ADMIN — WARFARIN SODIUM 2 MG: 2 TABLET ORAL at 05:02

## 2017-02-03 RX ADMIN — FUROSEMIDE 10 MG: 10 INJECTION, SOLUTION INTRAMUSCULAR; INTRAVENOUS at 09:02

## 2017-02-03 RX ADMIN — NICARDIPINE HYDROCHLORIDE 5 MG/HR: 0.2 INJECTION, SOLUTION INTRAVENOUS at 01:02

## 2017-02-03 RX ADMIN — NICARDIPINE HYDROCHLORIDE 5 MG/HR: 0.2 INJECTION, SOLUTION INTRAVENOUS at 03:02

## 2017-02-03 RX ADMIN — POTASSIUM CHLORIDE 40 MEQ: 400 INJECTION, SOLUTION INTRAVENOUS at 08:02

## 2017-02-03 RX ADMIN — VANCOMYCIN HYDROCHLORIDE 750 MG: 1 INJECTION, POWDER, LYOPHILIZED, FOR SOLUTION INTRAVENOUS at 08:02

## 2017-02-03 RX ADMIN — METOCLOPRAMIDE 10 MG: 5 INJECTION, SOLUTION INTRAMUSCULAR; INTRAVENOUS at 06:02

## 2017-02-03 RX ADMIN — ONDANSETRON 8 MG: 2 INJECTION INTRAMUSCULAR; INTRAVENOUS at 09:02

## 2017-02-03 RX ADMIN — DEXTROSE MONOHYDRATE, SODIUM CHLORIDE, AND POTASSIUM CHLORIDE: 50; 4.5; 2.98 INJECTION, SOLUTION INTRAVENOUS at 09:02

## 2017-02-03 RX ADMIN — ASPIRIN 325 MG: 325 TABLET, DELAYED RELEASE ORAL at 08:02

## 2017-02-03 RX ADMIN — POTASSIUM CHLORIDE 40 MEQ: 200 INJECTION, SOLUTION INTRAVENOUS at 01:02

## 2017-02-03 RX ADMIN — Medication 3 ML: at 06:02

## 2017-02-03 RX ADMIN — OXYCODONE HYDROCHLORIDE 5 MG: 5 TABLET ORAL at 10:02

## 2017-02-03 RX ADMIN — METOCLOPRAMIDE 10 MG: 5 INJECTION, SOLUTION INTRAMUSCULAR; INTRAVENOUS at 05:02

## 2017-02-03 RX ADMIN — ACETAMINOPHEN 1000 MG: 10 INJECTION, SOLUTION INTRAVENOUS at 06:02

## 2017-02-03 RX ADMIN — DOBUTAMINE IN DEXTROSE 4 MCG/KG/MIN: 200 INJECTION, SOLUTION INTRAVENOUS at 07:02

## 2017-02-03 RX ADMIN — ALBUTEROL SULFATE 2.5 MG: 2.5 SOLUTION RESPIRATORY (INHALATION) at 08:02

## 2017-02-03 RX ADMIN — HEPARIN SODIUM AND DEXTROSE 650 UNITS/HR: 10000; 5 INJECTION INTRAVENOUS at 07:02

## 2017-02-03 RX ADMIN — ONDANSETRON 8 MG: 2 INJECTION INTRAMUSCULAR; INTRAVENOUS at 12:02

## 2017-02-03 RX ADMIN — FUROSEMIDE 10 MG/HR: 10 INJECTION, SOLUTION INTRAMUSCULAR; INTRAVENOUS at 03:02

## 2017-02-03 RX ADMIN — PROMETHAZINE HYDROCHLORIDE 12.5 MG: 25 INJECTION INTRAMUSCULAR; INTRAVENOUS at 06:02

## 2017-02-03 RX ADMIN — PANTOPRAZOLE SODIUM 40 MG: 40 TABLET, DELAYED RELEASE ORAL at 08:02

## 2017-02-03 NOTE — PROGRESS NOTES
Dr. Dinero at bedside. MD notified regarding patient's unresolved N&V overnight. MD updated Dr. aMcedo. MD to consult will Oncology for other anti-emetic recommendations. MD aware of CVP of 16, gtts, VS, and assessment. Will give 1G of Tylenol for pain at this time. Awaiting other orders. Will continue to monitor patient.

## 2017-02-03 NOTE — PLAN OF CARE
Problem: Patient Care Overview  Goal: Plan of Care Review  Dx: Heartware LVAD 2/1/17  Hx: Cardiomyopathy, Two brothers have had heart transplant, occasional smoker     1/27/17: IABP placed  2/1/17: LVAD, admitted to SICU, 1.5L albumin, chest remains open   2/2/17: IABP d/ced, chest closed, extubated        Nursing:   Goal MAP 60-80  accuchecks q1  CBC, PTT, BMP, Mag, Phos q4   Heartware speed at 2900     Outcome: Ongoing (interventions implemented as appropriate)  Plan of care reviewed with pt and spouse. Chest closed, pt extubated to 5L NC, 10ppm nitric. Lasix gtt at 5mg/hr, epi 0.06, dobutamine 4mcg, cardene at 2.5mg/hr for map 60-80. VAD speed 2900rpm, flow 5.6-6. K, Mg, Ptt q6. Heparin gtt to start at 0200 at 400unit/hr

## 2017-02-03 NOTE — PROGRESS NOTES
History & Physical  Surgical Intensive Care    SUBJECTIVE:     Chief Complaint/Reason for Admission: LVAD    History of Present Illness:  Patient is a 27 y.o. male with DCM that presents to the SICU intubated and sedated s/p LVAD and IABP placement.    Interval: Balloon pump discontinued + chest closure + extubated 2/2/2017. Overnight patient experienced nausea refractory to zofran, phenergan, and benadryl with small volume of clear vomitus.     PTA Medications   Medication Sig    DOBUTAMINE HCL (DOBUTAMINE IV) Inject into the vein.    furosemide (LASIX) 20 MG tablet Take 1 tablet (20 mg total) by mouth once daily.    lisinopril 10 MG tablet TAKE ONE TABLET BY MOUTH ONCE DAILY IN THE EVENING       Review of patient's allergies indicates:  No Known Allergies    Past Medical History   Diagnosis Date    Cardiogenic shock 1/16/2017    Cardiomyopathy      Familial cardiomyopathy    CHF (congestive heart failure)     Essential hypertension 12/21/2016    Familial Cardiomyopathy      Familial cardiomyopathy      History reviewed. No pertinent past surgical history.  Family History   Problem Relation Age of Onset    Arthritis Mother     Heart disease Brother      cardiomyopathy and heart transplant    No Known Problems Father     Heart disease Brother      cardiomyopathy and heart transplanted twice    Birth defects Paternal Uncle      Social History   Substance Use Topics    Smoking status: Current Some Day Smoker     Packs/day: 1.00    Smokeless tobacco: None    Alcohol use 2.4 oz/week     4 Shots of liquor per week        Review of Systems:  Unable to obtain as patient is sedated.    OBJECTIVE:     Vital Signs (Most Recent)  Temp: 99.5 °F (37.5 °C) (02/03/17 0500)  Pulse: 102 (02/03/17 0645)  Resp: (!) 42 (02/03/17 0645)  BP: (!) 86/0 (02/02/17 1900)  SpO2: 100 % (02/03/17 0645)  Ventilator Data (Last 24H):     Vent Mode: Spont  Oxygen Concentration (%):  [] 40  Resp Rate Total:  [14 br/min-32  br/min] 23 br/min  Vt Set:  [350 mL] 350 mL  PEEP/CPAP:  [5 cmH20] 5 cmH20  Pressure Support:  [5 cmH20-10 cmH20] 5 cmH20  Mean Airway Pressure:  [7.2 cmH20-9.6 cmH20] 7.2 cmH20    Hemodynamic Parameters (Last 24H):  PAP: (26-43)/(13-25) 42/23  PAP (Mean):  [20 mmHg-32 mmHg] 31 mmHg    Physical Exam:  General: well developed, well nourished, resting comfortably in bed  Neurologic: sedated  Lungs: extubated  Cardiovascular: Heart: regular rate and rhythm.  Abd: ND  Extremities: no cyanosis or edema, or clubbing.       Lines/Drains:       Introducer 02/02/17 0553 (Active)   Number of days:0            Pulmonary Artery Catheter Assessment  02/02/17 0553 (Active)   Number of days:0            Pulmonary Artery Catheter Assessment  02/01/17 0835 (Active)   Dressing biopatch in place;dressing dry and intact 2/2/2017  3:00 AM   Securement secured w/ sutures 2/2/2017  3:00 AM   Current Insertion Depth (cm) 45 cm 2/2/2017  3:00 AM   Phlebitis 0-->no symptoms 2/2/2017  3:00 AM   Infiltration 0-->no symptoms 2/2/2017  3:00 AM   Waveform normal 2/2/2017  3:00 AM   Pressure Catheter Interventions line leveled/zeroed 2/2/2017  3:00 AM   Prox Injectate Status Infusing 2/2/2017  3:00 AM   Prox Infusate Status Infusing 2/2/2017  3:00 AM   Distal Status Infusing 2/2/2017  3:00 AM   Pressure Catheter Tubing Change Due 02/05/17 2/1/2017  7:00 PM   Daily Line Review Performed 2/2/2017  3:00 AM   Number of days:0            Percutaneous Central Line Insertion/Assessment - triple lumen  02/02/17 0553 (Active)   Number of days:0            Peripheral IV - Single Lumen Right Hand (Active)   Site Assessment Clean;Intact;Dry 2/2/2017  3:00 AM   Line Status Infusing 2/2/2017  3:00 AM   Dressing Status Clean;Dry;Intact 2/2/2017  3:00 AM   Dressing Change Due 02/02/17 2/2/2017  3:00 AM   Site Change Due 02/02/17 2/1/2017  7:00 PM   Reason Not Rotated Not due 2/2/2017  3:00 AM   Number of days:            Peripheral IV - Single Lumen 02/01/17 0702  Right Antecubital (Active)   Site Assessment Clean;Dry;Intact 2/2/2017  3:00 AM   Line Status Infusing 2/2/2017  3:00 AM   Dressing Status Clean;Dry;Intact 2/2/2017  3:00 AM   Dressing Intervention New dressing 2/1/2017  6:00 AM   Dressing Change Due 02/05/17 2/2/2017  3:00 AM   Site Change Due 02/05/17 2/1/2017  7:00 PM   Reason Not Rotated Not due 2/2/2017  3:00 AM   Number of days:1            Peripheral IV - Single Lumen 02/01/17 0830 Left Hand (Active)   Site Assessment Clean;Intact;Dry 2/2/2017  3:00 AM   Line Status Infusing 2/2/2017  3:00 AM   Dressing Status Clean;Dry;Intact 2/2/2017  3:00 AM   Dressing Intervention New dressing 2/2/2017  3:00 AM   Dressing Change Due 02/05/17 2/2/2017  3:00 AM   Site Change Due 02/05/17 2/1/2017  7:00 PM   Reason Not Rotated Not due 2/2/2017  3:00 AM   Number of days:0            Arterial Line 02/01/17 0815 Left Radial (Active)   Site Assessment Clean;Dry;Intact 2/2/2017  3:00 AM   Line Status Pulsatile blood flow 2/2/2017  3:00 AM   Art Line Waveform Appropriate 2/2/2017  3:00 AM   Arterial Line Interventions Zeroed and calibrated;Leveled;Flushed per protocol;Line pulled back;Connections checked and tightened 2/2/2017  3:00 AM   Color/Movement/Sensation Capillary refill less than 3 sec 2/2/2017  3:00 AM   Dressing Type Transparent 2/2/2017  3:00 AM   Dressing Status Clean;Dry;Intact 2/2/2017  3:00 AM   Dressing Intervention New dressing 2/2/2017  3:00 AM   Dressing Change Due 02/05/17 2/2/2017  3:00 AM   Number of days:0            VAD 02/01/17 1042 Heartware (Active)   Site Location Abdomen right 2/2/2017  3:00 AM   Site Assessment Clean;Dry;Intact 2/2/2017  3:00 AM   Dressing Status Clean;Dry;Intact 2/2/2017  3:00 AM   Dressing Intervention New dressing 2/2/2017  3:00 AM   Power Source External DC 2/2/2017  3:00 AM   Equipment inventory at  Admission 2/1/2017 10:00 AM   Pump type Heartware 2/1/2017  7:00 PM   Battery 1 [id number] 2/1/2017 10:00 AM   Battery 2 [id number]  2/1/2017 10:00 AM   Battery 3 [id number] 2/1/2017 10:00 AM   Battery 4 [id number] 2/1/2017 10:00 AM   Battery 5 [id number] 2/1/2017 10:00 AM   Battery 6 [id number] 2/1/2017 10:00 AM   AC Adapter 1 [id number] 2/1/2017 10:00 AM   AC Adapter 2 [id number] 2/1/2017 10:00 AM   Battery Charger [id number] 2/1/2017 10:00 AM   Primary Controller [id number] 2/1/2017 10:00 AM   Extra Controller [id number] 2/1/2017 10:00 AM   Number of days:0            Chest Tube 02/01/17 1129 1 Mediastinal 32 Fr. (Active)   Chest Tube WDL [facility] 2/1/2017  7:00 PM   Function -20 cm H2O 2/2/2017  3:00 AM   Air Leak/Fluctuation air leak not present;fluctuation not present;connections tightened;dependent drainage cleared 2/2/2017  3:00 AM   Safety all tubing connections taped;all connections secured;suction checked 2/2/2017  3:00 AM   Securement tubing anchored to body distal to insertion site w/ tape 2/2/2017  3:00 AM   Patency Intervention Tip/tilt 2/2/2017  3:00 AM   Drainage Description Dark red 2/2/2017  3:00 AM   Dressing Appearance occlusive gauze dressing intact;clean and dry;w/ dried drainage 2/2/2017  3:00 AM   Left Subcutaneous Emphysema none present 2/2/2017  3:00 AM   Right Subcutaneous Emphysema none present 2/2/2017  3:00 AM   Surrounding Skin Unable to view 2/2/2017  3:00 AM   Output (mL) 20 mL 2/2/2017  4:00 AM   Number of days:0            Chest Tube 02/01/17 1130 2 Mediastinal 32 Fr. (Active)   Chest Tube WDL WDL 2/1/2017  7:00 PM   Function -20 cm H2O 2/2/2017  3:00 AM   Air Leak/Fluctuation air leak not present;fluctuation not present;connections tightened;dependent drainage cleared 2/2/2017  3:00 AM   Safety all tubing connections taped;all connections secured;suction checked 2/2/2017  3:00 AM   Securement tubing anchored to body distal to insertion site w/ tape 2/2/2017  3:00 AM   Patency Intervention Tip/tilt 2/2/2017  3:00 AM   Drainage Description Dark red 2/2/2017  3:00 AM   Dressing Appearance occlusive gauze dressing intact;clean  and dry;w/ dried drainage 2/2/2017  3:00 AM   Left Subcutaneous Emphysema none present 2/2/2017  3:00 AM   Right Subcutaneous Emphysema none present 2/2/2017  3:00 AM   Surrounding Skin Unable to view 2/2/2017  3:00 AM   Output (mL) 10 mL 2/2/2017  4:00 AM   Number of days:0            NG/OG Tube 02/01/17 orogastric (Active)   Placement Check placement verified by x-ray 2/1/2017  7:00 PM   Tolerance no signs/symptoms of discomfort 2/1/2017  7:00 PM   Securement taped to commercial device 2/1/2017  7:00 PM   Clamp Status/Tolerance unclamped 2/1/2017  7:00 PM   Suction Setting/Drainage Method suction at;low;intermittent setting 2/1/2017  7:00 PM   Insertion Site Appearance no redness, warmth, tenderness, skin breakdown, drainage 2/1/2017  7:00 PM   Drainage Thin;Clear;Yellow 2/1/2017  7:00 PM   Flush/Irrigation flushed w/;water;no resistance met 2/1/2017  7:00 PM   Intake (mL) 45 mL 2/2/2017  5:30 AM   Tube Output(mL)(Include Discarded Residual) 130 mL 2/2/2017  5:50 AM   Number of days:1            Urethral Catheter 02/01/17 0840 Non-latex;Straight-tip;Temperature probe 16 Fr. (Active)   Site Assessment Clean;Intact 2/2/2017  3:00 AM   Collection Container Urimeter 2/2/2017  3:00 AM   Securement Method secured to upper leg w/ adhesive device 2/2/2017  3:00 AM   Catheter Care Performed yes 2/2/2017  3:00 AM   Reason for Continuing Urinary Catheterization Post operative;Critically ill in ICU requiring intensive monitoring 2/2/2017  3:00 AM   Output (mL) 45 mL 2/2/2017  6:00 AM   Number of days:0       Laboratory    Chest X-Ray 2/2/2017:Postoperative changes from an LVAD device placement remain without significant change.  Heart is at the upper limits of normal.  Lungs are clear.  The position of the endotracheal tube and the [place] catheter remain in satisfactory position. There is mediastinal drains in unchanged position. The aortic balloon pump catheter is no longer visualized on the current study.    Diagnostic  Results:      ASSESSMENT/PLAN:     Patient is a 27 y.o. male with DCM s/p LVAD and IABP placement 2/1/2017 and planned chest closure 2/2/2017.    Interval: Balloon pump discontinued + chest closure + extubated 2/2/2017. Overnight patient experienced nausea refractory to zofran, phenergan, and benadryl with small volume of clear vomitus.        Plan:  Neuro:    - sedated  - morphine for pain  - extubated    Pulmonary:   - 5L O2 NC  - nitric oxide 10 (from 15)  - CXR: costophrenic angels slightly more blunted than on prior day's CXR    Cardiac:  - dobutamine 4 (from 5)  - epi 0.06 (from 0.07)  - cardene 5 (unchanged)  - anticoag per primary now that chest is closed  - CVP 13  - EKG today    Renal:   - BUN/Cr: 18/1.2 (from 12/0.09)  - trend BUN/Cr  - cont kirk for strict I/O's  - UOP: 2.8L    Fluids/Electrolytes/Nutrition/GI:   - lasix gtt  - MIVF  - CT mediastinum 1: 184 cc output/24 hr  - CT mediastinum 2: 160 cc output/24 hr  - replace lytes as needed  - NPO  - NGT output 50cc  - ABd XR 2/3/2017: dilated loops of bowel. Ileus vs SBO     Heme/ Infectious Disease:   - cont bactrim, diflucan, vanc (vanc trough low at 5.5 with last draw - 7PM last night)   - WBC 22 (from 10)  - H/H 10.6/  (from 10.5/30.5)  - INR 1.2 last night  - aPTT 30.5 (from 27.1)  - hep gtt at 400, cont     Endocrine:  - monitor BGL, 168 (from 156)  - insult gtt per protocol    Dispo:  - cont ICU care    Adrienne Calle MD  PGY-1  Surgical Intensive Care Unit

## 2017-02-03 NOTE — PLAN OF CARE
Problem: Occupational Therapy Goal  Goal: Occupational Therapy Goal  Goals to be met by: 2 weeks 2/17/17     Patient will increase functional independence with ADLs by performing:    UE Dressing with Supervision.  LE Dressing with Supervision.  Grooming while standing with Supervision.  Toileting from toilet with Supervision for hygiene and clothing management.   Stand pivot transfers with Supervision.  Toilet transfer to toilet with Supervision.  Pt to be independent with LVAD vipint.   Goals and POC update.

## 2017-02-03 NOTE — PROGRESS NOTES
Daily E and M and VAD Interrogation Note    Reason for Visit:  Patient is seen in follow up for management of:  [] HeartMate II  [x] Heartware [] Total artificial heart       [] ECMO           [] Other     Interval History:  [x] Interval history unobtainable due to intubation.  The [] implant/[] explant date was  Events overnight  Patient with intractable nausea overnight causing severe retching this morning. Did not respond to zofran, phenergan or benadryl. Pain not well controlled.     Review of Systems:  Positive for extreme nausea      Medications:  Current Facility-Administered Medications   Medication Dose Route Frequency Provider Last Rate Last Dose    0.9%  NaCl infusion   Intravenous Continuous Urszula Tuttle NP   Stopped at 02/01/17 1445    albumin human 5% bottle 500 mL  500 mL Intravenous PRN Urszula Tuttle NP        albuterol sulfate nebulizer solution 2.5 mg  2.5 mg Nebulization Q4H Urszula Tuttle NP   2.5 mg at 02/02/17 2122    albuterol sulfate nebulizer solution 2.5 mg  2.5 mg Nebulization Q4H PRN Urszula Tuttle NP        aspirin EC tablet 325 mg  325 mg Oral Daily Urszula Tuttle NP   Stopped at 02/01/17 1445    aspirin tablet 325 mg  325 mg Per NG tube Daily Urszula Tuttle NP   325 mg at 02/02/17 0900    bisacodyl suppository 10 mg  10 mg Rectal Daily PRN Urszula Tuttle NP        dextrose 5 % and 0.45 % NaCl with KCl 40 mEq infusion   Intravenous Continuous Urszula Tuttle NP 5 mL/hr at 02/03/17 0700      dextrose 50% injection 12.5 g  12.5 g Intravenous PRN Shai Ortega MD        dextrose 50% injection 12.5 g  12.5 g Intravenous PRN Urszula Tuttle NP        dextrose 50% injection 25 g  25 g Intravenous PRN Shai Ortega MD        dextrose 50% injection 25 g  25 g Intravenous PRN Urszula Tuttle NP        DOBUTamine 500mg in D5W 250mL infusion (premix) (NON-TITRATING)  5 mcg/kg/min Intravenous Continuous Shai Ortega  MD 7.6 mL/hr at 02/03/17 0700 4 mcg/kg/min at 02/03/17 0700    docusate sodium capsule 200 mg  200 mg Oral QHS Urszula Tuttle NP   200 mg at 02/02/17 2011    EPINEPHrine (ADRENALIN) 4 mg in sodium chloride 0.9% 250 mL infusion  0.08 mcg/kg/min Intravenous Continuous Urszula Tuttle NP 14 mL/hr at 02/03/17 0700 0.06 mcg/kg/min at 02/03/17 0700    ferrous gluconate tablet 324 mg  324 mg Oral Daily with breakfast Urszula Tuttle NP   Stopped at 02/02/17 0745    furosemide (LASIX) 250 mg in sodium chloride 0.9 % 250 mL infusion (non-titrating)  7.5 mg/hr Intravenous Continuous Lola Dinero MD 7.5 mL/hr at 02/03/17 0700 7.5 mg/hr at 02/03/17 0700    glucagon (human recombinant) injection 1 mg  1 mg Intramuscular PRN Shai Ortega MD        glucose chewable tablet 16 g  16 g Oral PRN Shai Ortega MD        glucose chewable tablet 24 g  24 g Oral PRN Shai Ortega MD        heparin 25,000 units in dextrose 5% 250 mL (100 units/mL) infusion (heparin infusion)  400 Units/hr Intravenous Continuous Lex Macedo MD 4 mL/hr at 02/03/17 0700 400 Units/hr at 02/03/17 0700    hydromorphone injection 0.5 mg  0.5 mg Intravenous Q3H PRN Dora Mitchell MD        insulin regular (Humulin R) 100 Units in sodium chloride 0.9% 100 mL infusion  1 Units/hr Intravenous Continuous Urszula Tuttle NP   Stopped at 02/02/17 1400    magnesium hydroxide 400 mg/5 ml suspension 2,400 mg  30 mL Oral Daily PRN Urszula Tuttle NP        magnesium sulfate 2g in water 50mL IVPB (premix)  2 g Intravenous PRN Urszula Tuttle NP   4 g at 02/02/17 1304    mupirocin 2 % ointment   Nasal BID Urszula Tuttle NP        niCARdipine 40 mg/200 mL infusion  5 mg/hr Intravenous Continuous Urszula Tuttle NP 25 mL/hr at 02/03/17 0700 5 mg/hr at 02/03/17 0700    nitric oxide gas Gas 10 ppm  10 ppm Inhalation Continuous Lex Macedo MD   20 ppm at 02/02/17 1029    ondansetron injection 4  mg  4 mg Intravenous Q8H PRN Dora Mitchell MD   4 mg at 02/02/17 2218    oxycodone immediate release tablet 10 mg  10 mg Oral Q4H PRN Urszula Tuttle NP   10 mg at 02/02/17 1937    oxycodone immediate release tablet 5 mg  5 mg Oral Q4H PRN Urszula Tuttle NP        pantoprazole EC tablet 40 mg  40 mg Oral Daily Urszula Tuttle NP   Stopped at 02/01/17 1445    pantoprazole injection 40 mg  40 mg Intravenous BID Lex Macedo MD   40 mg at 02/02/17 2012    potassium chloride 20 mEq in 100 mL IVPB (FOR CENTRAL LINE ADMINISTRATION ONLY)  40 mEq Intravenous PRN Urszula Tuttle NP 50 mL/hr at 02/02/17 0118 40 mEq at 02/02/17 0118    potassium chloride 20 mEq in 100 mL IVPB (FOR CENTRAL LINE ADMINISTRATION ONLY)  40 mEq Intravenous PRN Urszula Tuttle NP        promethazine (PHENERGAN) 25 mg in dextrose 5 % 50 mL IVPB  25 mg Intravenous Q4H Lola Dinero MD        promethazine (PHENERGAN) 25 mg/mL injection             propofol (DIPRIVAN) 10 mg/mL infusion (NON-TITRATING)  20 mcg/kg/min Intravenous Continuous Urszula Tuttle NP   Stopped at 02/02/17 1430    sodium chloride 0.9% flush 3 mL  3 mL Intravenous Q8H Urszula Tuttle NP   3 mL at 02/03/17 0615    vancomycin (VANCOCIN) 750 mg in dextrose 5 % 250 mL IVPB  750 mg Intravenous Q12H Urszula Tuttle .7 mL/hr at 02/02/17 1956 750 mg at 02/02/17 1956       Physical Examination:  Vital Signs:   Vitals:    02/03/17 0700   BP:    Pulse: 104   Resp: (!) 25   Temp: 99.5 °F (37.5 °C)     Cardiovascular:  [x] Regular rate and rhythm [] Irregular []  None (COURTNEY) []  Other  []  No edema []  Edema present  []  Clear to auscultation  []  Rales to []  Coarse  []  No rales but   [] Pedal Pulses absent  []  Pulses  + throughout  Skin:  Incision is []  Clean, dry and intact.  []  Other   Sternum:  []  Stable []  Unstable  Driveline(s):   []  Clean, dry and intact. []  Other       Labs:  BMP  Lab Results   Component Value Date      02/03/2017    K 4.1 02/03/2017    CL 99 02/03/2017    CO2 24 02/03/2017    BUN 18 02/03/2017    CREATININE 1.2 02/03/2017    CALCIUM 9.2 02/03/2017    ANIONGAP 13 02/03/2017    ESTGFRAFRICA >60.0 02/03/2017    EGFRNONAA >60.0 02/03/2017       Magnesium   Date Value Ref Range Status   02/03/2017 2.3 1.6 - 2.6 mg/dL Final       Lab Results   Component Value Date    WBC 21.67 (H) 02/03/2017    HGB 10.6 (L) 02/03/2017    HCT 31.2 (L) 02/03/2017    MCV 88 02/03/2017     (L) 02/03/2017       Lab Results   Component Value Date    INR 1.2 02/02/2017    INR 1.2 02/02/2017    INR 1.1 02/02/2017       BNP   Date Value Ref Range Status   01/20/2017 101 (H) 0 - 99 pg/mL Final     Comment:     Values of less than 100 pg/ml are consistent with non-CHF populations.   01/18/2017 101 (H) 0 - 99 pg/mL Final     Comment:     Values of less than 100 pg/ml are consistent with non-CHF populations.   01/16/2017 1514 (H) 0 - 99 pg/mL Final     Comment:     Values of less than 100 pg/ml are consistent with non-CHF populations.       LD   Date Value Ref Range Status   02/02/2017 449 (H) 110 - 260 U/L Final     Comment:     Results are increased in hemolyzed samples.   02/01/2017 377 (H) 110 - 260 U/L Final     Comment:     Results are increased in hemolyzed samples.   01/17/2017 299 (H) 110 - 260 U/L Final     Comment:     Results are increased in hemolyzed samples.       X-Rays:  [x]  I reviewed today's Chest x-ray    Procedure:  Device Interrogation including analysis of device parameters.  Current Settings   [x]  Ventricular Assist Device  []  Total Artificial Heart interrogated  Review of device function is []  Stable []  Other   TXP LVAD INTERROGATIONS 2/3/2017 2/3/2017 2/3/2017 2/3/2017 2/3/2017 2/3/2017 2/3/2017   Type Heartware Heartware Heartware Heartware Heartware Heartware Heartware   Flow 6.6 7 6.6 6.6 6.3 5.9 6   Speed 2900 2900 2900 2900 2900 2900 2900   Power (G[place]mith) 5.5 5.5 5.5 5.5 5.4 5.3 5.3   Low Flow Alarm -  - - - - - -   High Power Alarm - - - - - - -   Pulsatility - - - - - - -       Assessment:  [x]  Primary Cardiomyopathy []  Congestive Heart Failure   []  Atrial Fibrillation []  Ventricular Tachycardia   []  Aftercare cardiac device []  Long term (current) use of anticoagulants   []  Ventilator-associated pneumonia []  Pneumonia viral, unspecified   []  Pneumonia, bacterial, unspecified []  Pneumonia, organism unspecified   []  Hemorrhage of GI tract, unspecified    []  Nosebleed []  Driveline infection   []  Infection VAD device []  New onset of    []        Plan:  [x]  Interval history obtained from ICU attending team member during rounding today  []  VAD/COURTNEY teaching performed with patient  []  Mobilization / Physical Therapy ongoing  []  Anticoagulation []  Ongoing []  Held  []  Studies ordered  []      Plan:    Neuro:  - Continue PO pain medication  - Will schedule anti emetics  - Discussed nausea with oncology who recommended reglan as 3rd line for nausea    Resp:  - Wean nitric as tolerated  - IS/pulmonary toilet  - Daily CXR    CVS:  - Continue dobutamine, epi and cardene, will begin to wean epi today  - Continue heparin for goal aptt 30-40  - Daily ASA    FENGI:  - NPO until nausea resolves  - MIVF   - Replace lytes PRN  - q4h lactates with gases    Renal:  - Castillo for strict I/O  - Trend BUN/Cr  - Lasix at 7.5    Endo:  - Insulin gtt per protocol    Heme/ID:  - D/c'd diflucan   - Will d/c antibiotics today    Dispo:  - Continue ICU care, step down once weaned from nitric and epi      Total time spent was 30 minutes.  Of which more than 50 percent of the care dominated counseling and coordinating care with different team members. The VAD was interrogated and all parameters were WNL and no significant findings were found in the history. All these findings are documented in the note above.      Date of Service: 02/03/2017

## 2017-02-03 NOTE — PROCEDURES
Patient AAO with no family at bedside. VAD interrogation completed this AM in the event changes needed to be made. Will continue to monitor for further issues.     Patient very nauseous. Unable to participate in education at this time.      Pulsatile: Yes   VAD Sounds: Smooth  Problems / Issues / Alarms with VAD if any: None noted  HCT: 31  Waveforms: 4-9 with no negative deflections noted.      VAD Interrogation:  TXP LVAD INTERROGATIONS 2/3/2017 2/3/2017 2/3/2017 2/3/2017 2/3/2017 2/3/2017 2/3/2017   Type Heartware Heartware Heartware Heartware Heartware Heartware Heartware   Flow 7 6.7 6.8 7.1 6.9 6.6 7   Speed 08023 2900 2900 2900 2900 2900 2900   Power (Goldsmith) 5.6 5.4 5.4 5.6 5.5 5.5 5.5   Low Flow Alarm - - - - 5 - -   High Power Alarm - - - - 7.5 - -   Pulsatility Pulse - - - Pulse - -   }

## 2017-02-03 NOTE — ANESTHESIA POSTPROCEDURE EVALUATION
"Anesthesia Post Evaluation    Patient: Mert Samayoa    Procedure(s) Performed: Procedure(s) (LRB):  CLOSURE-STERNAL WOUND (N/A)  PLACEMENT-NEOPERICARDIUM (N/A)    Final Anesthesia Type: general  Patient location during evaluation: ICU  Patient participation: Yes- Able to Participate  Level of consciousness: awake and alert  Post-procedure vital signs: reviewed and stable  Pain management: adequate  Airway patency: patent  PONV status at discharge: No PONV  Anesthetic complications: no      Cardiovascular status: blood pressure returned to baseline  Respiratory status: unassisted  Hydration status: euvolemic  Follow-up not needed.        Visit Vitals    BP (!) 78/0 (BP Location: Right arm, Patient Position: Lying, BP Method: Doppler)    Pulse (!) 113    Temp 36.9 °C (98.4 °F) (Oral)    Resp (!) 27    Ht 5' 7" (1.702 m)    Wt 68 kg (149 lb 14.6 oz)    SpO2 100%    BMI 23.48 kg/m2       Pain/Carlos A Score: Pain Assessment Performed: Yes (2/3/2017  7:00 AM)  Presence of Pain: denies (2/3/2017  7:00 AM)  Pain Rating Prior to Med Admin: 5 (2/3/2017 10:48 AM)  Pain Rating Post Med Admin: 0 (not for pain) (2/3/2017  9:36 AM)      "

## 2017-02-03 NOTE — ASSESSMENT & PLAN NOTE
BG goal 140-180. Low dose correction scale, monitor Q6h while NPO.      Anticipate fonseca resolution

## 2017-02-03 NOTE — PT/OT/SLP RE-EVAL
"Occupational Therapy  Re-evaluation    Mert Samayoa   MRN: [id number]   Admitting Diagnosis: Chronic combined systolic and diastolic heart failure    OT Date of Treatment: 17   OT Start Time: 1300  OT Stop Time: 1320  OT Total Time (min): 20 min    Billable Minutes:  Re-eval 15  Therapeutic Activity 15    Diagnosis: Chronic combined systolic and diastolic heart failure   Pt is now s/p LVAD placement 17    Past Medical History   Diagnosis Date    Cardiogenic shock 2017    Cardiomyopathy      Familial cardiomyopathy    CHF (congestive heart failure)     Essential hypertension 2016    Familial Cardiomyopathy      Familial cardiomyopathy       History reviewed. No pertinent past surgical history.      General Precautions: Standard, sternal, fall, LVAD  Orthopedic Precautions: N/A    Do you have any cultural, spiritual, Yazdanism conflicts, given your current situation?: None     Patient History:  Living Environment  Lives With: spouse  Living Arrangements: house  Home Accessibility: stairs to enter home  Number of Stairs to Enter Home: 2  Living Environment Comment: Pt lives with spouse in one story home with 2 steps to ener.  Spouse reports pt's works and was driving PTA. SPouse reports she will be primary caretaker.  Equipment Currently Used at Home: none    Prior level of function:   Bed Mobility/Transfers: independent  Grooming: independent  Bathing: independent  Upper Body Dressing: independent  Lower Body Dressing: independent  Toileting: independent  Home Management Skills: independent        Subjective:  Communicated with nsg prior to session.  "Wait, give me a second" pt reports.   Pain Ratin/10     Location - Orientation:  (incisional chest pain)  Location:  (incisional chest pain)  Pain Addressed: Reposition, Distraction, Nurse notified  Pain Rating Post-Intervention: 1010    Objective:   Pt found supine in bed with spouse present. LVAD to wall power.    Cognitive " Exam:  Oriented to: Person, Place, Time and Situation  Follows Commands/attention: Follows one-step commands  Communication: clear/fluent  Pt does need cues to keep eyes open during session. Limited eye contact noted.     Physical Exam:  Pt is right hand dominant and demo WFL B UE strength/ROM within sternal precautions. Pt with good GM/FM coordination and intact light touch sensation.     Functional Mobility:  Bed Mobility:  Supine to Sit: Maximum Assistance    Transfers:  Sit <> Stand Assistance: Maximum Assistance  Sit <> Stand Assistive Device: No Assistive Device  Bed <> Chair Technique:  (stand pivot component with MIN A and cues for technque/hand placement)  Bed <> Chair Transfer Assistance:  (MIN A for stand pivot component of tranfer.)      Activities of Daily Living:     UE Dressing Level of Assistance: Maximum assistance  LE Dressing Level of Assistance: Total assistance      Pt with sternal precautions. Education provided to pt/spouse re: sternal precautions and implications on functional activity.   Pt initially with poor balance EOB. With increased time, cues and feedback, pt demo Fair to Fair+ sitting balance. Upon stand, pt with knees flexion and forward head with eyes closed. Again, with time and cues pt able to achieve full upright posture and take few small steps to allow for t/f to b/s chair.     AM-PAC 6 CLICK ADL  How much help from another person does this patient currently need?  1 = Unable, Total/Dependent Assistance  2 = A lot, Maximum/Moderate Assistance  3 = A little, Minimum/Contact Guard/Supervision  4 = None, Modified [place]/Independent    Putting on and taking off regular lower body clothing? : 1  Bathing (including washing, rinsing, drying)?: 1  Toileting, which includes using toilet, bedpan, or urinal? : 1  Putting on and taking off regular upper body clothing?: 2  Taking care of personal grooming such as brushing teeth?: 3  Eating meals?: 1  Total Score: 9    AM-PAC Raw Score  "CMS "G-Code Modifier Level of Impairment Assistance   6 % Total / Unable   7 - 9 CM 80 - 100% Maximal Assist   10 - 14 CL 60 - 80% Moderate Assist   15 - 19 CK 40 - 60% Moderate Assist   20 - 22 CJ 20 - 40% Minimal Assist   23 CI 1-20% SBA / CGA   24 CH 0% Independent/ Mod I       Patient left in b/s chair with spouse present and nsg present.     Assessment:  Mert Samayoa is a 27 y.o. male with a medical diagnosis of Chronic combined systolic and diastolic heart failure and now s/p LVAD placement. Pt tolerated session fairly well. Pt limited by c/o of pain and may be anxious re: mobility at this time. Pt to benefit from cont OT to address stated goals. OT anticipates pt will progress well and will be ready for d/c home with fly to assist when medically stable.     Rehab identified problem list/impairments: Rehab identified problem list/impairments: weakness, impaired self care skills, impaired balance, impaired functional mobilty, impaired endurance, gait instability, pain    Rehab potential is good.    Activity tolerance: Good    Discharge recommendations: Discharge Facility/Level Of Care Needs: home with home health       GOALS:   Occupational Therapy Goals        Problem: Occupational Therapy Goal    Goal Priority Disciplines Outcome Interventions   Occupational Therapy Goal     OT, PT/OT     Description:  Goals to be met by: 2 weeks 2/17/17     Patient will increase functional independence with ADLs by performing:    UE Dressing with Supervision.  LE Dressing with Supervision.  Grooming while standing with Supervision.  Toileting from toilet with Supervision for hygiene and clothing management.   Stand pivot transfers with Supervision.  Toilet transfer to toilet with Supervision.  Pt to be independent with LVAD vipin't.             Multidisciplinary Problems (Resolved)        Problem: Occupational Therapy Goal    Goal Priority Disciplines Outcome Interventions   Occupational Therapy Goal "   (Resolved)     OT, PT/OT Outcome(s) achieved    Description:  Eval and D/C OT 1/29/17.                PLAN:  Patient to be seen 6 x/week to address the above listed problems via self-care/home management, therapeutic activities, therapeutic exercises  Plan of Care expires:    Plan of Care reviewed with: patient         Breana SOOD ALISA Tuttle  02/03/2017

## 2017-02-03 NOTE — PLAN OF CARE
Problem: Physical Therapy Goal  Goal: Physical Therapy Goal  Goals to be met by: 3/1/17     Patient will increase functional independence with mobility by performin. Supine to sit with [place]  2. Sit to stand transfer with [place]  3. Gait x 400 feet with Supervision.   4. Ascend/descend 2 stair with bilateral Handrails Supervision.   5. Lower extremity exercise program x15 reps per handout, with assistance as needed.  6. [place] with LVAD management.    Outcome: Ongoing (interventions implemented as appropriate)  Pt progressing towards goals .

## 2017-02-03 NOTE — PROGRESS NOTES
"[facility]  Endocrinology  Progress Note    Admit Date: 1/27/2017     Reason for Consult: Management of  Hyperglycemia     Surgical Procedure and Date:  2/1/17 s/p LVAD    HPI: 28 y/o WM with familial DCM, 2 brothers with OHTx,  Now s/p LVAD. Now with stress induced hyperglycemia. No prior diagnosis of T2DM.       Interval HPI:   Extubated. NPO. BG at goal. Insulin infusion discontinued earlier. Afebrile. (+) nausea. No hypoglycemia.     Visit Vitals    BP 98/69 (BP Location: Left arm, Patient Position: Lying, BP Method: Automatic)    Pulse 99    Temp 98.4 °F (36.9 °C) (Core)    Resp 17    Ht 5' 7" (1.702 m)    Wt 69 kg (152 lb 1.9 oz)    SpO2 100%    BMI 23.82 kg/m2       Labs Reviewed and Include      Recent Labs  Lab 02/02/17  0305   *  156*   CALCIUM 9.0  9.0   ALBUMIN 4.3   PROT 6.4     137   K 4.4  4.4   CO2 21*  21*     104   BUN 13  13   CREATININE 1.1  1.1   ALKPHOS 49*   ALT 28   AST 96*   BILITOT 0.7     Lab Results   Component Value Date    WBC 13.01 (H) 02/02/2017    HGB 10.8 (L) 02/02/2017    HCT 31.5 (L) 02/02/2017    MCV 87 02/02/2017     (L) 02/02/2017     No results for input(s): TSH, FREET4 in the last 168 hours.  Lab Results   Component Value Date    HGBA1C 5.4 01/17/2017       Nutritional status:   Body mass index is 23.82 kg/(m^2).  Lab Results   Component Value Date    ALBUMIN 4.3 02/02/2017    ALBUMIN 4.1 02/01/2017    ALBUMIN 4.1 01/31/2017     Lab Results   Component Value Date    PREALBUMIN 17 (L) 02/02/2017    PREALBUMIN 26 01/17/2017       Estimated Creatinine Clearance: 94.3 mL/min (based on Cr of 1.1).    Accu-Checks  Recent Labs      02/01/17   2110  02/01/17   2200  02/01/17   2310  02/01/17   2357  02/02/17   0112  02/02/17   0158  02/02/17   0311  02/02/17   0404  02/02/17   0455  02/02/17   0600   POCTGLUCOSE  181*  220*  182*  186*  162*  159*  133*  145*  148*  138*       Current Medications and/or Treatments " Impacting Glycemic Control  Immunotherapy:  Immunosuppressants     None        Steroids:   Hormones     None        Pressors:    Autonomic Drugs     Start     Stop Route Frequency Ordered    02/01/17 1500  EPINEPHrine (ADRENALIN) 4 mg in sodium chloride 0.9% 250 mL infusion     Question Answer Comment   Titrate by: (in mcg/kg/min) 0.01    Titrate interval: (in minutes) 5    Titrate to maintain: (SBP or MAP or Cardiac Index) MAP    Greater than: (in mmHg) 60    Maximum dose: (in mcg/kg/min) 2        -- IV Continuous 02/01/17 1405        Hyperglycemia/Diabetes Medications: Antihyperglycemics     Start     Stop Route Frequency Ordered    02/01/17 1500  insulin regular (Humulin R) 100 Units in sodium chloride 0.9% 100 mL infusion      -- IV Continuous 02/01/17 1354          ASSESSMENT and PLAN    Hyperglycemia  BG goal 140-180. Low dose correction scale, monitor Q6h while NPO.      Anticipate fonseca resolution       * Chronic combined systolic and diastolic heart failure  S/p LVAD      LVAD (left ventricular assist device) present  managed per CTS/HTS      Nausea  Can impact appetite, BG readings. Currently NPO. Receives prn Zofran.         JEFF Bernard,ANP-C  Endocrinology  [facility]

## 2017-02-03 NOTE — OP NOTE
DATE OF PROCEDURE:  02/01/2017.    PREOPERATIVE DIAGNOSES:  1.  Combined systolic and diastolic heart failure, NYHA class IV, INTERMACS 2.  2.  Status post intraaortic balloon pump.  3.  Familial cardiomyopathy.  4.  Moderate mitral regurgitation.    POSTOPERATIVE DIAGNOSES:  1.  Combined systolic and diastolic heart failure, NYHA class IV, INTERMACS 2.  2.  Status post intraaortic balloon pump.  3.  Familial cardiomyopathy.  4.  Moderate mitral regurgitation.    OPERATIONS PERFORMED:  1.  Implantation of intracorporeal left ventricular assist device - HeartWare   placement.  2.  Temporary closure of the chest.    STAFF SURGEON:  Lex Macedo M.D.    FIRST ASSISTANT:  Scott Pedroza.    ANESTHESIA:  GETA.    KEY FINDINGS OF THE OPERATION:  1.  Moderate amount of coagulopathy.  2.  Moderate RV depression and a decision was made to leave the chest open for   24 hours.    INDICATION FOR OPERATION:  This is a 27-year-old male who was diagnosed with   familial cardiomyopathy with acute-on-chronic decompensated systolic and   diastolic heart failure.  The patient was presented at the multidisciplinary   selection meeting and was found to be a good candidate for LVAD implantation.    The patient wanted to proceed with a HeartMate III or a HeartWare implantation.    A HeartMate III was not made available by the insurance company, and as a   result, the patient wanted to proceed with a HeartWare implantation.  The   patient understood the risks, benefits, alternatives and wanted to proceed with   the operation and an informed consent was obtained.    DESCRIPTION OF OPERATION:  The patient was brought to the Operating Room and   placed in a supine position.  After induction of anesthesia, area was prepped   and draped in the usual sterile fashion.  An upper midline incision was made,   which was carried all the way down to the sternum.  A median sternotomy was then   performed.  Sternal edges were cauterized.  A chest  retractor was placed.  The   pericardium was then opened up and a pericardial well was created.  After that,   systemic heparinization was carried out.  The left hemidiaphragm was then taken   down using electrocautery.  A small amount of preperitoneal space was dissected   out for the LVAD.  Once that was achieved, an arterial cannula was placed in the   ascending aorta.  Venous cannula was placed in the right atrial appendage.  CO2   was used to flood the operative field.  The patient was placed on full   cardiopulmonary bypass and heart was brought to the field by placing multiple   lap sponges behind the heart.  LV apex was identified.  True LV apex was   identified using a RICKY examination in multiple views.  When we were satisfied   with the apical view, the apex of the heart was cored out.  Multiple 2-0   Ethibond pledgeted stitches were placed circumferentially and passed through the   sewing ring.  The noodles were then cut off and passed off the field.  The   sutures were tied down and cut.  Once that was done, the LVAD was brought to the   field.  It was lowered into the sewing ring and, using the locking mechanism,   the screw was tightened.  Flush metal to metal apposition was ensured.  No   bleeding sites were noted at the LVAD insertion site.  The heart was then   repositioned back into the chest cavity by removing all the lap sponges behind   it.  The outflow graft was then measured under distended condition, making sure   that the length was appropriate.  Side-biting clamp was placed on the ascending   aorta.  An aortotomy was made.  The graft was then beveled at 45 degrees angle   and, using a 5-0 Prolene, a continuous running anastomosis was performed.  Once   that was achieved, de-airing of the graft was then carried out.  A root vent was   placed in the ascending aorta for de-airing purposes.  A clamp was placed in   the outflow graft and the driveline was then tunneled subcutaneously and  brought   at the level of the umbilicus in the right midclavicular line.  The LVAD was   then connected to the console and started at 1800 RPMs for de-airing purposes.    The de-airing hole was made in the outflow graft.  Gradually, ventilation was   resumed.  Gradually, the heart was filled up and the patient was weaned off from   cardiopulmonary bypass on high root vent pass.  No intracardiac air was noted.    The de-airing hole on the outflow graft was then sutured with a 4-0 pledgeted   Prolene stitch.  Once satisfied with de-airing, the clamp on the outflow graft   was removed.  The patient was transitioned to the LVAD with volume loading.    Gradually the speed was increased from 1800 to 2500 and finally it was increased   to 2800 RPMs.  At that time, on RICKY examination, excellent hemodynamics was   noted.  Mitral regurgitation was minimal.  The septum appeared to be midline   with a moderately depressed RV function.  Test dose of protamine was given,   which was followed by full dose of protamine to reverse the effects of systemic   heparin.  Midway dose, all the various catheters and cannulas were removed.  Due   to diffuse coagulopathy and moderate RV dysfunction, the decision was made to   leave the chest open.  Two drainage tubes were placed in the mediastinum and   brought through separate skin incisions for drainage purposes.  After that,   temporary closure of the chest was obtained by using Esmarch and Ioban to obtain   an airtight seal.  Terminal count of needles, sponges, and instruments was   found to be correct.  The patient was taken to the Intensive Care Unit in a   stable condition.    MEDICARE ATTESTATION:  Due to unavailability of an adequately trained   Cardiothoracic Surgery resident, I performed all parts of the operation myself.      HUNTER  dd: 02/03/2017 10:30:49 (CST)  td: 02/03/2017 11:20:40 (CST)  Doc ID   #[id number]  Job ID #[id number]    CC:

## 2017-02-03 NOTE — OP NOTE
DATE OF PROCEDURE:  02/02/2017.    PREOPERATIVE DIAGNOSES:  1.  Status post intracorporeal left ventricular assist device.  2.  Open chest.  3.  Diffuse coagulopathy.  4.  Moderate RV dysfunction.    POSTOPERATIVE DIAGNOSES:  1.  Status post intracorporeal left ventricular assist device.  2.  Open chest.  3.  Diffuse coagulopathy.  4.  Moderate RV dysfunction.    OPERATIONS:  1.  Washout of the mediastinum.  2.  Placement of izaiah-pericardium with [place]inte-Ilya membrane.  3.  Sternal closure.    STAFF SURGEON:  Lex Macedo M.D.    FIRST ASSISTANT:  Harman.    ANESTHESIA:  GETA.    KEY FINDINGS OF THE OPERATION:  1.  Significant improvement in RV.  2.  No diffuse coagulopathy noted.  3.  Excellent cardiac function and the speed was increased to 2900 RPMs.    INDICATION OF OPERATION:  This is a 26-year-old gentleman who underwent left   ventricular assist device placement.  Postoperatively, the chest was left open   for diffuse coagulopathy and RV dysfunction.  Overnight, the patient did   significantly well and the decision was made to take the patient for chest   closure.  An informed consent was obtained.    DESCRIPTION OF OPERATION:  The patient was brought to the Operating Room and   placed in a supine position.  After induction of anesthesia, area was prepped   and draped in the usual sterile fashion.  Previous temporary chest closure   dressing was taken down.  A chest retractor was placed.  Copious amount of   saline containing antibiotics was used for irrigating the chest cavity.  Good   hemostasis was ensured at all cannulation sites.  The remnant portion of the   outflow graft was slit open and placed over the outflow graft as well as in the   bend relief for help at the time of transplant.  A 1 mm [place]-Ilya membrane was   brought to the field and a izaiah-pericardial creation was created by attaching it   to the cut edges of the pericardium for help in reentry at the time of   transplant.  Next, #6 stainless  steel wires were used to approximate the   sternum.  Skin and subcutaneous tissue were closed in multiple layers.  Sterile   dressing was applied.  Terminal count of needles, sponges, and instruments was   found to be correct.  RICKY revealed excellent hemodynamics and a midline septum.      HUNTER  dd: 02/03/2017 10:33:35 (CST)  td: 02/03/2017 11:43:52 (CST)  Doc ID   #[id number]  Job ID #[id number]    CC:

## 2017-02-03 NOTE — PROGRESS NOTES
Dr. Mitchell notified of patient's continued nausea/vomiting. Orders received for additional 4mg Zofran IV x1. Will implement and continue to monitor patient.

## 2017-02-03 NOTE — PROGRESS NOTES
Progress Note  Heart Transplant Service    Admit Date: 1/27/2017   LOS: 7 days     SUBJECTIVE:     Follow up for: Chronic combined systolic and diastolic heart failure    Interval History: s/p VAD yesterday 2/1 and chest closure 2/2. Extubated 2/2, currently with pain and nausea now controlled. CVP 11 this AM.    Scheduled Meds:   albuterol sulfate  2.5 mg Nebulization Q4H    aspirin  325 mg Oral Daily    docusate sodium  200 mg Oral QHS    ferrous gluconate  324 mg Oral Daily with breakfast    metoclopramide HCl  10 mg Intravenous Q6H    mupirocin   Nasal BID    ondansetron  8 mg Intravenous Q8H    pantoprazole  40 mg Oral Daily    promethazine (PHENERGAN) IVPB  12.5 mg Intravenous Q4H    sodium chloride 0.9%  3 mL Intravenous Q8H    vancomycin (VANCOCIN) IVPB  750 mg Intravenous Q12H     Continuous Infusions:   sodium chloride 0.9% Stopped (02/01/17 1445)    dextrose 5 % and 0.45 % NaCl with KCl 40 mEq 40 mL/hr at 02/03/17 0902    DOBUTamine 4 mcg/kg/min (02/03/17 1500)    epinephrine infusion 0.03 mcg/kg/min (02/03/17 1500)    furosemide (LASIX) 1 mg/mL infusion (non-titrating) 10 mg/hr (02/03/17 1520)    heparin (porcine) in 5 % dex 600 Units/hr (02/03/17 1500)    nicardipine 2.5 mg/hr (02/03/17 1500)    nitric oxide gas      propofol Stopped (02/02/17 1430)     PRN Meds:albumin human 5%, albuterol sulfate, bisacodyl, dextrose 50%, glucagon (human recombinant), glucagon (human recombinant), HYDROmorphone, insulin aspart, magnesium hydroxide 400 mg/5 ml, magnesium sulfate IVPB, ondansetron, oxycodone, oxycodone, potassium chloride, potassium chloride    Review of patient's allergies indicates:  No Known Allergies    OBJECTIVE:     Vital Signs (Most Recent)  Temp: 98.1 °F (36.7 °C) (02/03/17 1500)  Pulse: 106 (02/03/17 1600)  Resp: (!) 32 (02/03/17 1600)  BP: (!) 68/0 (02/03/17 1500)  SpO2: 100 % (02/03/17 1600)    Vital Signs Range (Last 24H):  Temp:  [98.1 °F (36.7 °C)-99.5 °F (37.5 °C)]    Pulse:  []   Resp:  [17-44]   BP: (68-86)/(0)   SpO2:  [99 %-100 %]   Arterial Line BP: (73-96)/(62-81)     I & O (Last 24H):    Intake/Output Summary (Last 24 hours) at 02/03/17 1606  Last data filed at 02/03/17 1500   Gross per 24 hour   Intake             2184 ml   Output             3434 ml   Net            -1250 ml       PAP: (26-43)/(13-25) 41/20  PAP (Mean):  [20 mmHg-32 mmHg] 29 mmHg    Telemetry: sinus  Constitutional: AOx3, NAD conversant  Neck: No JVD present. No thyromegaly present.   Cardiovascular: VAD hum  Pulmonary/Chest:good breath sounds bilaterally  Abdominal: + BS, exhibits no distension. There is no tenderness. There is no rebound.   Extremities: no cyanosis, clubbing or edema.  No bleeding, edema, erythema or hematoma, doppler pulses in all distals    Labs:       Recent Labs  Lab 02/02/17 1958 02/02/17 2338 02/03/17  0400   WBC 19.25* 20.06* 21.67*   HGB 10.9* 10.5* 10.6*   HCT 32.3* 31.2* 31.2*   * 125* 146*   LYMPH 3.9*  0.8* 3.3*  0.7* 3.2*  0.7*   MONO 5.7  1.1* 6.9  1.4* 7.4  1.6*   EOSINOPHIL 0.1 0.0 0.0         Recent Labs  Lab 02/02/17  1600 02/02/17 1958 02/02/17 2338 02/03/17  0530 02/03/17  1220   APTT 29.2 28.5 28.3 30.5 29.4   INR 1.1 1.2 1.2  --   --           Recent Labs  Lab 02/01/17  0437  02/02/17  0305  02/02/17 1958 02/02/17 2338 02/03/17  0400 02/03/17  0530 02/03/17  1220   GLU 94  < > 156*  156*  < > 160* 184* 181* 168* 143*   CALCIUM 9.5  < > 9.0  9.0  < > 9.0 9.0 9.3 9.2 9.1   ALBUMIN 4.1  --  4.3  --   --   --  4.0  4.0  --   --    PROT 7.1  --  6.4  --   --   --  6.8  6.8  --   --      < > 137  137  < > 138 137 135* 136 136   K 3.9  < > 4.4  4.4  < > 4.1 4.1 4.1 4.1 3.8   CO2 26  < > 21*  21*  < > 26 27 23 24 32*     < > 104  104  < > 101 99 98 99 95   BUN 17  < > 13  13  < > 12 14 17 18 18   CREATININE 0.9  < > 1.1  1.1  < > 1.0 1.1 1.2 1.2 1.1   ALKPHOS 77  --  49*  --   --   --  61  61  --   --    ALT 38  --  28  --    --   --  33  33  --   --    AST 29  --  96*  --   --   --  110*  110*  --   --    BILITOT 0.6  --  0.7  --   --   --  0.9  0.9  --   --    MG 1.9  < > 2.2  2.2  < > 2.4 2.3 2.2 2.3 1.8   PHOS 4.5  < > 3.6  3.6  < > 4.8* 5.1* 4.3  --   --    < > = values in this interval not displayed.  Estimated Creatinine Clearance: 94.3 mL/min (based on Cr of 1.1).      Recent Labs  Lab 02/03/17  0400   *         Recent Labs  Lab 02/01/17  1235 02/02/17  0304 02/03/17  0733   * 449* 568*       Microbiology Results (last 7 days)     Procedure Component Value Units Date/Time    Blood culture (site 1) [[id number]] Collected:  01/27/17 1837    Order Status:  Completed Specimen:  Blood Updated:  02/01/17 2022     Blood Culture, Routine No growth after 5 days.    Narrative:       Site # 1, aerobic and anaerobic (central line, if patient has  one)    Blood culture (site 2) [[id number]] Collected:  01/27/17 1828    Order Status:  Completed Specimen:  Blood Updated:  02/01/17 2022     Blood Culture, Routine No growth after 5 days.    Narrative:       Site # 2, aerobic only            ASSESSMENT:     25 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 He was admitted for PICC removal, IABP, and planned LVAD implantation. He is s/p HeartWare LVAD placement 2/1 and chest closure 2/2. Extubated 2/2 and now recovering in ICU.    PLAN:     Cardiogenic Shock due to Acute on Chronic Systolic HF, DCM, NYHA FC IV s/p HW LVAD 2/1/2017  -s/p HW LVAD 2/1 and chest closure/extubation 2/2  -CTS primary until patient on floor with therapeutic INR  -Currently on  , cardene, epi, lasix(elevated CVP)  -Does not have ICD- will need to discuss timing of  this vs Lifevest  -cultures: NGTD    HTN  -BP controlled    Back Pain  -prn meds on board    Prophylaxis   -heparin    Zhang Spears DO  Cardiology Fellow  Pager [phone]

## 2017-02-03 NOTE — PT/OT/SLP PROGRESS
Physical Therapy  Treatment    Mert Samayoa   MRN: [id number]   Admitting Diagnosis: Chronic combined systolic and diastolic heart failure    PT Received On: 17  PT Start Time: 1256     PT Stop Time: 1319    PT Total Time (min): 23 min      co treat with OT  Billable Minutes:  Therapeutic Activity 23    Treatment Type: Treatment  PT/PTA: PT             General Precautions: Standard, fall, sternal, LVAD  Orthopedic Precautions: N/A   Braces: N/A    Do you have any cultural, spiritual, Adventism conflicts, given your current situation?: none     Subjective:  Communicated with RN prior to session.  Pt agreeable to working with PT.     Pain Ratin/10     Location - Orientation: generalized  Location: chest  Pain Addressed: Distraction, Reposition, Nurse notified  Pain Rating Post-Intervention: 10/10    Objective:   Patient found with: arterial line, blood pressure cuff, central line, chest tube, kirk catheter, peripheral IV, pulse ox (continuous), oxygen (LVAD, nitric oxide)    Functional Mobility:  Bed Mobility:   Rolling/Turning to Left: Maximum assistance  Rolling/Turning Right: Maximum assistance  Scooting/Bridging: Maximum Assistance  Supine to Sit: Maximum Assistance  Pt required vc for sequencing to adhere to sternal precautions.     Transfers:  Sit <> Stand Assistance: Minimum Assistance  Sit <> Stand Assistive Device: No Assistive Device  Bed <> Chair Technique: Stand Pivot  Bed <> Chair Assistance: Minimum Assistance  Bed <> Chair Assistive Device: No Assistive Device  Pt requried vc for sternal precautions.   Gait:   Gait Distance: did not perform        Balance:   Static Sit: FAIR+: Able to take MINIMAL challenges from all directions  Dynamic Sit: FAIR+: Maintains balance through MINIMAL excursions of active trunk motion  Static Stand: POOR+: Needs MINIMAL assist to maintain  Dynamic stand: POOR: N/A     Therapeutic Activities and Exercises:  Pt educated on sternal precautions. Pt demonstrated  ability to adhere to precautions with bed mobility and transfers with requiring VC and TC. Pt sat EOB x 8 min with CGA for trunk support. Pt performed transfer from EOB to chair with min A for support. Pt demo difficulty with extension of B knees with static stand. Pt required blocking at BLE and vc for extension of BLE.        AM-PAC 6 CLICK MOBILITY  How much help from another person does this patient currently need?   1 = Unable, Total/Dependent Assistance  2 = A lot, Maximum/Moderate Assistance  3 = A little, Minimum/Contact Guard/Supervision  4 = None, Modified [place]/Independent    Turning over in bed (including adjusting bedclothes, sheets and blankets)?: 2  Sitting down on and standing up from a chair with arms (e.g., wheelchair, bedside commode, etc.): 2  Moving from lying on back to sitting on the side of the bed?: 2  Moving to and from a bed to a chair (including a wheelchair)?: 2  Need to walk in hospital room?: 1  Climbing 3-5 steps with a railing?: 1  Total Score: 10    AM-PAC Raw Score CMS G-Code Modifier Level of Impairment Assistance   6 % Total / Unable   7 - 9 CM 80 - 100% Maximal Assist   10 - 14 CL 60 - 80% Moderate Assist   15 - 19 CK 40 - 60% Moderate Assist   20 - 22 CJ 20 - 40% Minimal Assist   23 CI 1-20% SBA / CGA   24 CH 0% Independent/ Mod I     Patient left up in chair with all lines intact, call button in reach and RN and spouse present.    Assessment:  Mert Samayoa is a 27 y.o. male with a medical diagnosis of Chronic combined systolic and diastolic heart failure and presents with decreased functional mobility due to impaired endurance and weakness throughout.  Patient would benefit from skilled PT in the acute care setting to improve the limitations listed below. Patient would benefit from HH upon discharge.      Rehab identified problem list/impairments: Rehab identified problem list/impairments: weakness, impaired endurance, impaired functional mobilty, gait  instability, impaired balance, pain    Rehab potential is good.    Activity tolerance: Poor    Discharge recommendations: Discharge Facility/Level Of Care Needs: home with home health     Barriers to discharge: Barriers to Discharge: Inaccessible home environment    Equipment recommendations: Equipment Needed After Discharge: none     GOALS:   Physical Therapy Goals        Problem: Physical Therapy Goal    Goal Priority Disciplines Outcome Goal Variances Interventions   Physical Therapy Goal     PT/OT, PT Ongoing (interventions implemented as appropriate)     Description:  Goals to be met by: 3/1/17     Patient will increase functional independence with mobility by performin. Supine to sit with [place]  2. Sit to stand transfer with [place]  3. Gait  x 400 feet with Supervision.   4. Ascend/descend 2 stair with bilateral Handrails Supervision.   5. Lower extremity exercise program x15 reps per handout, with assistance as needed.  6. [place] with LVAD management.                 PLAN:    Patient to be seen 6 x/week  to address the above listed problems via gait training, therapeutic activities, therapeutic exercises, neuromuscular re-education  Plan of Care expires: 17  Plan of Care reviewed with: patient, spouse         José Manuel Campbell, PT  2017

## 2017-02-03 NOTE — PROGRESS NOTES
Patient vomiting small amount of clear liquid. Patient has been intermittently nauseous throughout the night without relief from medications. MD orders for KUB and 2nd dose of Phenergan. Will implement and continue to monitor patient.

## 2017-02-03 NOTE — PROGRESS NOTES
Dr. Mitchell notified KUB completed. Also reported results of EKG. No orders given. Will continue to monitor patient.

## 2017-02-03 NOTE — PROGRESS NOTES
Dr. Mitchell notified of continued nausea after Zofran and Phenergan. MD orders received for IV Benadryl at this time. CVP currently 16. Urine output 75 last hour and 10 cc over last 35 mins with Lasix gtt at 5mg/hr. No additional orders given. Will continue to monitor patient.

## 2017-02-03 NOTE — PLAN OF CARE
Problem: Patient Care Overview  Goal: Plan of Care Review  Dx: Heartware LVAD 2/1/17  Hx: Cardiomyopathy, Two brothers have had heart transplant, occasional smoker     1/27/17: IABP placed  2/1/17: LVAD, admitted to SICU, 1.5L albumin, chest remains open   2/2/17: IABP d/ced, chest closed, extubated  2/3/17: annette d/c; OOBTC        Nursing:   Goal MAP 60-80  pTT goal 35-45  accuchecks q6  PTT, BMP, Mag, Phos q  Heartware speed at 2900     Outcome: Ongoing (interventions implemented as appropriate)  Pt afebrile throughout shift. Pt on 5 of nitric with 4L n/c and O2 wrpd=204%. Pt states that nausea has almost subsided completely. Lasix gtt increased from 7.5mg/hr to 10mg/hr with 10mg push before increase in rate. CVP 14-15 flat. Annette d/c'ed with introducer capped. Epi weaned down to 0.03 with verbal order from Dr. Macedo to decrease epi gtt by 0.01mcg/kg/min every 6 hours as tolerated. Heparin gtt at 600units/hr with drawing pTT labs every 6 hours and pTT goal = 35-45. PT worked with pt at 1300 and pt got OOBTC; tolerating well with some nausea noted. Chest tubes minimal output and becoming more serosanguineous. Accu checks changed from every hr to every 6hrs. Pt free from fall or injury throughout shift. Pt and spouse updated on current condition and plan of care. All questions answered with verbalization of understanding.     Skin note: no skin breakdown or redness noted on back of head, elbows, sacrum, buttocks, or heels.

## 2017-02-04 LAB
ALBUMIN SERPL BCP-MCNC: 3.7 G/DL
ALP SERPL-CCNC: 64 U/L
ALT SERPL W/O P-5'-P-CCNC: 30 U/L
ANION GAP SERPL CALC-SCNC: 11 MMOL/L
ANION GAP SERPL CALC-SCNC: 12 MMOL/L
ANION GAP SERPL CALC-SCNC: 13 MMOL/L
ANION GAP SERPL CALC-SCNC: 9 MMOL/L
APTT BLDCRRT: 31.9 SEC
APTT BLDCRRT: 33 SEC
APTT BLDCRRT: 33 SEC
APTT BLDCRRT: 35.7 SEC
AST SERPL-CCNC: 80 U/L
BASOPHILS # BLD AUTO: 0.01 K/UL
BASOPHILS NFR BLD: 0.1 %
BILIRUB SERPL-MCNC: 0.7 MG/DL
BLD PROD TYP BPU: NORMAL
BLOOD UNIT EXPIRATION DATE: NORMAL
BLOOD UNIT TYPE CODE: 5100
BLOOD UNIT TYPE: NORMAL
BUN SERPL-MCNC: 17 MG/DL
BUN SERPL-MCNC: 19 MG/DL
BUN SERPL-MCNC: 21 MG/DL
BUN SERPL-MCNC: 23 MG/DL
BUN SERPL-MCNC: 23 MG/DL
BUN SERPL-MCNC: 24 MG/DL
CALCIUM SERPL-MCNC: 8.9 MG/DL
CALCIUM SERPL-MCNC: 9 MG/DL
CALCIUM SERPL-MCNC: 9.1 MG/DL
CALCIUM SERPL-MCNC: 9.2 MG/DL
CALCIUM SERPL-MCNC: 9.3 MG/DL
CALCIUM SERPL-MCNC: 9.3 MG/DL
CHLORIDE SERPL-SCNC: 89 MMOL/L
CHLORIDE SERPL-SCNC: 89 MMOL/L
CHLORIDE SERPL-SCNC: 92 MMOL/L
CHLORIDE SERPL-SCNC: 93 MMOL/L
CO2 SERPL-SCNC: 29 MMOL/L
CO2 SERPL-SCNC: 29 MMOL/L
CO2 SERPL-SCNC: 31 MMOL/L
CO2 SERPL-SCNC: 32 MMOL/L
CODING SYSTEM: NORMAL
CREAT SERPL-MCNC: 1 MG/DL
CREAT SERPL-MCNC: 1.1 MG/DL
DIFFERENTIAL METHOD: ABNORMAL
DISPENSE STATUS: NORMAL
EOSINOPHIL # BLD AUTO: 0 K/UL
EOSINOPHIL NFR BLD: 0.1 %
ERYTHROCYTE [DISTWIDTH] IN BLOOD BY AUTOMATED COUNT: 13 %
EST. GFR  (AFRICAN AMERICAN): >60 ML/MIN/1.73 M^2
EST. GFR  (NON AFRICAN AMERICAN): >60 ML/MIN/1.73 M^2
GLUCOSE SERPL-MCNC: 110 MG/DL
GLUCOSE SERPL-MCNC: 127 MG/DL
GLUCOSE SERPL-MCNC: 130 MG/DL
GLUCOSE SERPL-MCNC: 131 MG/DL
GLUCOSE SERPL-MCNC: 131 MG/DL
GLUCOSE SERPL-MCNC: 134 MG/DL
HCT VFR BLD AUTO: 27.7 %
HGB BLD-MCNC: 9.3 G/DL
INR PPP: 1.1
LDH SERPL L TO P-CCNC: 482 U/L
LYMPHOCYTES # BLD AUTO: 1 K/UL
LYMPHOCYTES NFR BLD: 5.2 %
MAGNESIUM SERPL-MCNC: 1.8 MG/DL
MAGNESIUM SERPL-MCNC: 2.1 MG/DL
MAGNESIUM SERPL-MCNC: 2.3 MG/DL
MCH RBC QN AUTO: 29.5 PG
MCHC RBC AUTO-ENTMCNC: 33.6 %
MCV RBC AUTO: 88 FL
METHEMOGLOBIN: 0.9 % (ref 0–3)
MONOCYTES # BLD AUTO: 1.5 K/UL
MONOCYTES NFR BLD: 8.3 %
NEUTROPHILS # BLD AUTO: 15.7 K/UL
NEUTROPHILS NFR BLD: 86 %
NUM UNITS TRANS PACKED RBC: NORMAL
PHOSPHATE SERPL-MCNC: 3.6 MG/DL
PHOSPHATE SERPL-MCNC: 3.6 MG/DL
PLATELET # BLD AUTO: 138 K/UL
PMV BLD AUTO: 10.1 FL
POCT GLUCOSE: 122 MG/DL (ref 70–110)
POCT GLUCOSE: 130 MG/DL (ref 70–110)
POCT GLUCOSE: 134 MG/DL (ref 70–110)
POTASSIUM SERPL-SCNC: 3.3 MMOL/L
POTASSIUM SERPL-SCNC: 3.5 MMOL/L
POTASSIUM SERPL-SCNC: 4 MMOL/L
POTASSIUM SERPL-SCNC: 4 MMOL/L
POTASSIUM SERPL-SCNC: 4.1 MMOL/L
POTASSIUM SERPL-SCNC: 4.3 MMOL/L
PROT SERPL-MCNC: 6.9 G/DL
PROTHROMBIN TIME: 11.9 SEC
RBC # BLD AUTO: 3.15 M/UL
SODIUM SERPL-SCNC: 133 MMOL/L
SODIUM SERPL-SCNC: 134 MMOL/L
WBC # BLD AUTO: 18.23 K/UL

## 2017-02-04 PROCEDURE — 63600175 PHARM REV CODE 636 W HCPCS: Performed by: STUDENT IN AN ORGANIZED HEALTH CARE EDUCATION/TRAINING PROGRAM

## 2017-02-04 PROCEDURE — 25000003 PHARM REV CODE 250

## 2017-02-04 PROCEDURE — 37799 UNLISTED PX VASCULAR SURGERY: CPT

## 2017-02-04 PROCEDURE — 27000221 HC OXYGEN, UP TO 24 HOURS

## 2017-02-04 PROCEDURE — 85025 COMPLETE CBC W/AUTO DIFF WBC: CPT

## 2017-02-04 PROCEDURE — 83735 ASSAY OF MAGNESIUM: CPT | Mod: 91

## 2017-02-04 PROCEDURE — 27000248 HC VAD-ADDITIONAL DAY

## 2017-02-04 PROCEDURE — 85730 THROMBOPLASTIN TIME PARTIAL: CPT | Mod: 91

## 2017-02-04 PROCEDURE — 20000000 HC ICU ROOM

## 2017-02-04 PROCEDURE — 80048 BASIC METABOLIC PNL TOTAL CA: CPT | Mod: 91

## 2017-02-04 PROCEDURE — 83615 LACTATE (LD) (LDH) ENZYME: CPT

## 2017-02-04 PROCEDURE — 25000003 PHARM REV CODE 250: Performed by: NURSE PRACTITIONER

## 2017-02-04 PROCEDURE — 83735 ASSAY OF MAGNESIUM: CPT

## 2017-02-04 PROCEDURE — 83050 HGB METHEMOGLOBIN QUAN: CPT

## 2017-02-04 PROCEDURE — 99232 SBSQ HOSP IP/OBS MODERATE 35: CPT | Mod: ,,, | Performed by: INTERNAL MEDICINE

## 2017-02-04 PROCEDURE — 63600367 HC NITRIC OXIDE PER HOUR

## 2017-02-04 PROCEDURE — 25000242 PHARM REV CODE 250 ALT 637 W/ HCPCS: Performed by: NURSE PRACTITIONER

## 2017-02-04 PROCEDURE — 80053 COMPREHEN METABOLIC PANEL: CPT

## 2017-02-04 PROCEDURE — 85610 PROTHROMBIN TIME: CPT

## 2017-02-04 PROCEDURE — 84100 ASSAY OF PHOSPHORUS: CPT

## 2017-02-04 PROCEDURE — 94640 AIRWAY INHALATION TREATMENT: CPT

## 2017-02-04 PROCEDURE — 99233 SBSQ HOSP IP/OBS HIGH 50: CPT | Mod: ,,, | Performed by: SURGERY

## 2017-02-04 PROCEDURE — 25000003 PHARM REV CODE 250: Performed by: STUDENT IN AN ORGANIZED HEALTH CARE EDUCATION/TRAINING PROGRAM

## 2017-02-04 PROCEDURE — 94799 UNLISTED PULMONARY SVC/PX: CPT

## 2017-02-04 PROCEDURE — 63600175 PHARM REV CODE 636 W HCPCS: Performed by: NURSE PRACTITIONER

## 2017-02-04 PROCEDURE — 63600175 PHARM REV CODE 636 W HCPCS: Performed by: THORACIC SURGERY (CARDIOTHORACIC VASCULAR SURGERY)

## 2017-02-04 RX ORDER — WARFARIN 2 MG/1
2 TABLET ORAL ONCE
Status: COMPLETED | OUTPATIENT
Start: 2017-02-04 | End: 2017-02-04

## 2017-02-04 RX ORDER — AMLODIPINE BESYLATE 5 MG/1
5 TABLET ORAL DAILY
Status: DISCONTINUED | OUTPATIENT
Start: 2017-02-04 | End: 2017-02-06

## 2017-02-04 RX ORDER — FUROSEMIDE 10 MG/ML
10 INJECTION INTRAMUSCULAR; INTRAVENOUS ONCE
Status: COMPLETED | OUTPATIENT
Start: 2017-02-04 | End: 2017-02-04

## 2017-02-04 RX ORDER — POLYETHYLENE GLYCOL 3350 17 G/17G
17 POWDER, FOR SOLUTION ORAL 2 TIMES DAILY
Status: DISCONTINUED | OUTPATIENT
Start: 2017-02-04 | End: 2017-02-11

## 2017-02-04 RX ORDER — HEPARIN SODIUM 10000 [USP'U]/100ML
1100 INJECTION, SOLUTION INTRAVENOUS CONTINUOUS
Status: DISCONTINUED | OUTPATIENT
Start: 2017-02-04 | End: 2017-02-06

## 2017-02-04 RX ORDER — BISACODYL 10 MG
10 SUPPOSITORY, RECTAL RECTAL ONCE
Status: DISCONTINUED | OUTPATIENT
Start: 2017-02-04 | End: 2017-02-13

## 2017-02-04 RX ADMIN — OXYCODONE HYDROCHLORIDE 5 MG: 5 TABLET ORAL at 09:02

## 2017-02-04 RX ADMIN — WARFARIN SODIUM 2 MG: 2 TABLET ORAL at 05:02

## 2017-02-04 RX ADMIN — ALBUTEROL SULFATE 2.5 MG: 2.5 SOLUTION RESPIRATORY (INHALATION) at 04:02

## 2017-02-04 RX ADMIN — METOCLOPRAMIDE 10 MG: 5 INJECTION, SOLUTION INTRAMUSCULAR; INTRAVENOUS at 06:02

## 2017-02-04 RX ADMIN — ALBUTEROL SULFATE 2.5 MG: 2.5 SOLUTION RESPIRATORY (INHALATION) at 08:02

## 2017-02-04 RX ADMIN — OXYCODONE HYDROCHLORIDE 5 MG: 5 TABLET ORAL at 04:02

## 2017-02-04 RX ADMIN — MAGNESIUM SULFATE IN WATER 2 G: 40 INJECTION, SOLUTION INTRAVENOUS at 02:02

## 2017-02-04 RX ADMIN — FUROSEMIDE 10 MG: 10 INJECTION, SOLUTION INTRAMUSCULAR; INTRAVENOUS at 10:02

## 2017-02-04 RX ADMIN — METOCLOPRAMIDE 10 MG: 5 INJECTION, SOLUTION INTRAMUSCULAR; INTRAVENOUS at 05:02

## 2017-02-04 RX ADMIN — POLYETHYLENE GLYCOL 3350 17 G: 17 POWDER, FOR SOLUTION ORAL at 08:02

## 2017-02-04 RX ADMIN — PROMETHAZINE HYDROCHLORIDE 12.5 MG: 25 INJECTION, SOLUTION INTRAMUSCULAR; INTRAVENOUS at 09:02

## 2017-02-04 RX ADMIN — HEPARIN SODIUM AND DEXTROSE 1000 UNITS/HR: 10000; 5 INJECTION INTRAVENOUS at 06:02

## 2017-02-04 RX ADMIN — FUROSEMIDE 15 MG/HR: 10 INJECTION, SOLUTION INTRAMUSCULAR; INTRAVENOUS at 03:02

## 2017-02-04 RX ADMIN — ALBUTEROL SULFATE 2.5 MG: 2.5 SOLUTION RESPIRATORY (INHALATION) at 12:02

## 2017-02-04 RX ADMIN — Medication 3 ML: at 02:02

## 2017-02-04 RX ADMIN — PROMETHAZINE HYDROCHLORIDE 12.5 MG: 25 INJECTION, SOLUTION INTRAMUSCULAR; INTRAVENOUS at 02:02

## 2017-02-04 RX ADMIN — VANCOMYCIN HYDROCHLORIDE 750 MG: 1 INJECTION, POWDER, LYOPHILIZED, FOR SOLUTION INTRAVENOUS at 07:02

## 2017-02-04 RX ADMIN — FUROSEMIDE 10 MG: 10 INJECTION, SOLUTION INTRAMUSCULAR; INTRAVENOUS at 09:02

## 2017-02-04 RX ADMIN — POTASSIUM CHLORIDE 40 MEQ: 200 INJECTION, SOLUTION INTRAVENOUS at 05:02

## 2017-02-04 RX ADMIN — MUPIROCIN: 20 OINTMENT TOPICAL at 08:02

## 2017-02-04 RX ADMIN — OXYCODONE HYDROCHLORIDE 5 MG: 5 TABLET ORAL at 07:02

## 2017-02-04 RX ADMIN — AMLODIPINE BESYLATE 5 MG: 5 TABLET ORAL at 11:02

## 2017-02-04 RX ADMIN — ALBUTEROL SULFATE 2.5 MG: 2.5 SOLUTION RESPIRATORY (INHALATION) at 06:02

## 2017-02-04 RX ADMIN — POTASSIUM CHLORIDE 40 MEQ: 200 INJECTION, SOLUTION INTRAVENOUS at 02:02

## 2017-02-04 RX ADMIN — ONDANSETRON 8 MG: 2 INJECTION INTRAMUSCULAR; INTRAVENOUS at 02:02

## 2017-02-04 RX ADMIN — METOCLOPRAMIDE 10 MG: 5 INJECTION, SOLUTION INTRAMUSCULAR; INTRAVENOUS at 11:02

## 2017-02-04 RX ADMIN — Medication 3 ML: at 09:02

## 2017-02-04 RX ADMIN — ONDANSETRON 8 MG: 2 INJECTION INTRAMUSCULAR; INTRAVENOUS at 06:02

## 2017-02-04 RX ADMIN — DOBUTAMINE IN DEXTROSE 4 MCG/KG/MIN: 200 INJECTION, SOLUTION INTRAVENOUS at 10:02

## 2017-02-04 RX ADMIN — PANTOPRAZOLE SODIUM 40 MG: 40 TABLET, DELAYED RELEASE ORAL at 08:02

## 2017-02-04 RX ADMIN — ASPIRIN 325 MG: 325 TABLET, DELAYED RELEASE ORAL at 08:02

## 2017-02-04 RX ADMIN — OXYCODONE HYDROCHLORIDE 5 MG: 5 TABLET ORAL at 02:02

## 2017-02-04 RX ADMIN — POLYETHYLENE GLYCOL 3350 17 G: 17 POWDER, FOR SOLUTION ORAL at 10:02

## 2017-02-04 RX ADMIN — ALBUTEROL SULFATE 2.5 MG: 2.5 SOLUTION RESPIRATORY (INHALATION) at 11:02

## 2017-02-04 RX ADMIN — DOCUSATE SODIUM 200 MG: 50 CAPSULE, LIQUID FILLED ORAL at 08:02

## 2017-02-04 RX ADMIN — HEPARIN SODIUM AND DEXTROSE 1000 UNITS/HR: 10000; 5 INJECTION INTRAVENOUS at 12:02

## 2017-02-04 RX ADMIN — ONDANSETRON 8 MG: 2 INJECTION INTRAMUSCULAR; INTRAVENOUS at 09:02

## 2017-02-04 RX ADMIN — Medication 3 ML: at 06:02

## 2017-02-04 RX ADMIN — PROMETHAZINE HYDROCHLORIDE 12.5 MG: 25 INJECTION, SOLUTION INTRAMUSCULAR; INTRAVENOUS at 06:02

## 2017-02-04 NOTE — PROGRESS NOTES
Daily E and M and VAD Interrogation Note    Reason for Visit:  Patient is seen in follow up for management of:  [] HeartMate II  [x] Heartware [] Total artificial heart       [] ECMO           [] Other     Interval History:  [] Interval history unobtainable due to intubation.  The [] implant/[] explant date was    Nausea improved overnight, still with some retching. Pain controlled. VAD flows ~7, no RI events      Review of Systems:  Positive for nausea/retching      Medications:  Current Facility-Administered Medications   Medication Dose Route Frequency Provider Last Rate Last Dose    0.9%  NaCl infusion   Intravenous Continuous Urszula Tuttle NP   Stopped at 02/01/17 1445    albumin human 5% bottle 500 mL  500 mL Intravenous PRN Urszula Tuttle NP        albuterol sulfate nebulizer solution 2.5 mg  2.5 mg Nebulization Q4H Urszula Tuttle NP   2.5 mg at 02/04/17 0406    albuterol sulfate nebulizer solution 2.5 mg  2.5 mg Nebulization Q4H PRN Urszula Tuttle NP        aspirin EC tablet 325 mg  325 mg Oral Daily Urszula Tuttle NP   325 mg at 02/03/17 0836    bisacodyl suppository 10 mg  10 mg Rectal Daily PRN Urszula Tuttle NP        dextrose 5 % and 0.45 % NaCl with KCl 40 mEq infusion   Intravenous Continuous Lex Macedo MD 40 mL/hr at 02/04/17 0710      dextrose 50% injection 12.5 g  12.5 g Intravenous PRN Roxanna Nieves APRN,ANP-C        DOBUTamine 500mg in D5W 250mL infusion (premix) (NON-TITRATING)  5 mcg/kg/min Intravenous Continuous Shai Ortega MD 7.6 mL/hr at 02/04/17 0710 4 mcg/kg/min at 02/04/17 0710    docusate sodium capsule 200 mg  200 mg Oral QHS Urszula Tuttle NP   Stopped at 02/03/17 2100    EPINEPHrine (ADRENALIN) 4 mg in sodium chloride 0.9% 250 mL infusion  0.08 mcg/kg/min Intravenous Continuous Urszula Tuttle NP 2.3 mL/hr at 02/04/17 0710 0.01 mcg/kg/min at 02/04/17 0710    ferrous gluconate tablet 324 mg  324 mg Oral Daily with  breakfast Urszula Tuttle NP   Stopped at 02/02/17 0745    furosemide (LASIX) 250 mg in sodium chloride 0.9 % 250 mL infusion (non-titrating)  10 mg/hr Intravenous Continuous Lex Macedo MD 10 mL/hr at 02/04/17 0710 10 mg/hr at 02/04/17 0710    glucagon (human recombinant) injection 1 mg  1 mg Intramuscular PRN Shai Ortega MD        glucagon (human recombinant) injection 1 mg  1 mg Intramuscular PRN JEFF Prince,ANP-C        heparin 25,000 units in dextrose 5% 250 mL (100 units/mL) infusion (heparin infusion)  700 Units/hr Intravenous Continuous Valerio Carlson MD 7 mL/hr at 02/04/17 0710 700 Units/hr at 02/04/17 0710    hydromorphone injection 0.5 mg  0.5 mg Intravenous Q3H PRN Dora Mitchell MD        insulin aspart pen 0-5 Units  0-5 Units Subcutaneous Q6H PRN JEFF Prince,ANP-C        magnesium hydroxide 400 mg/5 ml suspension 2,400 mg  30 mL Oral Daily PRN Urszula Tuttle NP        magnesium sulfate 2g in water 50mL IVPB (premix)  2 g Intravenous PRN Urszula Tuttle NP   2 g at 02/03/17 1315    metoclopramide HCl injection 10 mg  10 mg Intravenous Q6H Lola Dinero MD   10 mg at 02/04/17 0612    mupirocin 2 % ointment   Nasal BID Urszula Tuttle NP        niCARdipine 40 mg/200 mL infusion  5 mg/hr Intravenous Continuous Urszula Tuttle NP 25 mL/hr at 02/04/17 0710 5 mg/hr at 02/04/17 0710    nitric oxide gas Gas 10 ppm  10 ppm Inhalation Continuous Lex Macedo MD   20 ppm at 02/02/17 1029    ondansetron injection 4 mg  4 mg Intravenous Q8H PRN Dora Mitchell MD   4 mg at 02/02/17 2218    ondansetron injection 8 mg  8 mg Intravenous Q8H Lola Dinero MD   8 mg at 02/04/17 0612    oxycodone immediate release tablet 10 mg  10 mg Oral Q4H PRN Urszula Tuttle, BARNEY   10 mg at 02/02/17 1937    oxycodone immediate release tablet 5 mg  5 mg Oral Q4H PRN Urszula Tuttle NP   5 mg at 02/04/17 0433    pantoprazole EC tablet 40  mg  40 mg Oral Daily Urszula Tuttle NP   40 mg at 02/03/17 0836    potassium chloride 20 mEq in 100 mL IVPB (FOR CENTRAL LINE ADMINISTRATION ONLY)  40 mEq Intravenous PRN Urszula Tuttle NP 50 mL/hr at 02/03/17 1315 40 mEq at 02/03/17 1315    potassium chloride 20 mEq in 100 mL IVPB (FOR CENTRAL LINE ADMINISTRATION ONLY)  40 mEq Intravenous PRN Urszula Tuttle NP        promethazine (PHENERGAN) 12.5 mg in dextrose 5 % 50 mL IVPB  12.5 mg Intravenous Q4H Lola Dinero MD   Stopped at 02/04/17 0600    propofol (DIPRIVAN) 10 mg/mL infusion (NON-TITRATING)  20 mcg/kg/min Intravenous Continuous Urszula Tuttle NP   Stopped at 02/02/17 1430    sodium chloride 0.9% flush 3 mL  3 mL Intravenous Q8H Urszula Tuttle NP   3 mL at 02/04/17 0600    vancomycin (VANCOCIN) 750 mg in dextrose 5 % 250 mL IVPB  750 mg Intravenous Q12H Urszula Tuttle .7 mL/hr at 02/03/17 2001 750 mg at 02/03/17 2001       Physical Examination:  Vital Signs:   Vitals:    02/04/17 0710   BP:    Pulse: 104   Resp: (!) 26   Temp:      Cardiovascular:  [x] Regular rate and rhythm [] Irregular []  None (COURTNEY) []  Other  []  No edema []  Edema present  []  Clear to auscultation  []  Rales to []  Coarse  []  No rales but   [] Pedal Pulses absent  []  Pulses  + throughout  Skin:  Incision is []  Clean, dry and intact.  []  Other   Sternum:  []  Stable []  Unstable  Driveline(s):   []  Clean, dry and intact. []  Other       Labs:  BMP  Lab Results   Component Value Date     (L) 02/04/2017     (L) 02/04/2017    K 4.0 02/04/2017    K 4.0 02/04/2017    CL 92 (L) 02/04/2017    CL 92 (L) 02/04/2017    CO2 29 02/04/2017    CO2 29 02/04/2017    BUN 23 (H) 02/04/2017    BUN 23 (H) 02/04/2017    CREATININE 1.1 02/04/2017    CREATININE 1.1 02/04/2017    CALCIUM 9.3 02/04/2017    CALCIUM 9.3 02/04/2017    ANIONGAP 12 02/04/2017    ANIONGAP 12 02/04/2017    ESTGFRAFRICA >60.0 02/04/2017    ESTGFRAFRICA >60.0 02/04/2017     EGFRNONAA >60.0 02/04/2017    EGFRNONAA >60.0 02/04/2017       Magnesium   Date Value Ref Range Status   02/04/2017 2.1 1.6 - 2.6 mg/dL Final   02/04/2017 2.1 1.6 - 2.6 mg/dL Final       Lab Results   Component Value Date    WBC 18.23 (H) 02/04/2017    HGB 9.3 (L) 02/04/2017    HCT 27.7 (L) 02/04/2017    MCV 88 02/04/2017     (L) 02/04/2017       Lab Results   Component Value Date    INR 1.1 02/04/2017    INR 1.2 02/02/2017    INR 1.2 02/02/2017       BNP   Date Value Ref Range Status   02/03/2017 709 (H) 0 - 99 pg/mL Final     Comment:     Values of less than 100 pg/ml are consistent with non-CHF populations.   01/20/2017 101 (H) 0 - 99 pg/mL Final     Comment:     Values of less than 100 pg/ml are consistent with non-CHF populations.   01/18/2017 101 (H) 0 - 99 pg/mL Final     Comment:     Values of less than 100 pg/ml are consistent with non-CHF populations.       LD   Date Value Ref Range Status   02/03/2017 568 (H) 110 - 260 U/L Final     Comment:     Results are increased in hemolyzed samples.   02/02/2017 449 (H) 110 - 260 U/L Final     Comment:     Results are increased in hemolyzed samples.   02/01/2017 377 (H) 110 - 260 U/L Final     Comment:     Results are increased in hemolyzed samples.       X-Rays:  [x]  I reviewed today's Chest x-ray    Procedure:  Device Interrogation including analysis of device parameters.  Current Settings   [x]  Ventricular Assist Device  []  Total Artificial Heart interrogated  Review of device function is []  Stable []  Other   TXP LVAD INTERROGATIONS 2/4/2017 2/4/2017 2/4/2017 2/4/2017 2/4/2017 2/4/2017 2/4/2017   Type Heartware Heartware Heartware Heartware Heartware Heartware Heartware   Flow 7.6 6.9 7.2 7.2 7.1 7.1 7.1   Speed 2900 2900 2900 2900 2900 2900 2900   Power ([place]) 5.6 5.6 5.6 5.6 5.6 5.6 5.6   Low Flow Alarm - - - - 4.5 - -   High Power Alarm - - - - 7.5 - -   Pulsatility - - - - - - -       Assessment:  [x]  Primary Cardiomyopathy []  Congestive  Heart Failure   []  Atrial Fibrillation []  Ventricular Tachycardia   []  Aftercare cardiac device []  Long term (current) use of anticoagulants   []  Ventilator-associated pneumonia []  Pneumonia viral, unspecified   []  Pneumonia, bacterial, unspecified []  Pneumonia, organism unspecified   []  Hemorrhage of GI tract, unspecified    []  Nosebleed []  Driveline infection   []  Infection VAD device []  New onset of    []        Plan:  [x]  Interval history obtained from ICU attending team member during rounding today  []  VAD/COURTNEY teaching performed with patient  []  Mobilization / Physical Therapy ongoing  []  Anticoagulation []  Ongoing []  Held  []  Studies ordered  []      Plan:    Neuro:  - PO pain medication PRN, A/Ox4    Resp:  - Wean nitric off by tomorrow  - IS/pulmonary toilet  - Daily CXR  - Remove CT today, 1 remaining    CVS:  - Dobutamine 4, epi 0.05   - Cardene gtt PRN  - Continue heparin at 1,000 for goal aptt 40-50  - Coumadin 1 mg today, Daily INR  - Daily ASA      FENGI:  - Scheduled anti-emetic - Zofran; phenergan and Reglan PRN  - Advance diet slowly  - Repeat suppository, bowel regimen  - Replace lytes PRN    Renal:  - Castillo for strict I/O  - Trend BUN/Cr  - Lasix gtt 15, net negative -1L yesterday    Endo:  - SSI Insulin     Heme/ID:  - Off post op antibiotics, continue to monitor WBC and Hct    Dispo:  - Continue ICU care, wean off epi, aggressive bowel regimen, remove 1 CT      Total time spent was 30 minutes.  Of which more than 50 percent of the care dominated counseling and coordinating care with different team members. The VAD was interrogated and all parameters were WNL and no significant findings were found in the history. All these findings are documented in the note above.      Date of Service: 02/04/2017

## 2017-02-04 NOTE — PROGRESS NOTES
History & Physical  Surgical Intensive Care    SUBJECTIVE:     Chief Complaint/Reason for Admission: LVAD    History of Present Illness:  Patient is a 27 y.o. male with DCM that presents to the SICU intubated and sedated s/p LVAD and IABP placement. Balloon pump discontinued + chest closure + extubated 2/2/2017.    Interval: Overnight patient experienced nausea refractory to zofran, phenergan, and benadryl with small volume of clear vomitus.     PTA Medications   Medication Sig    DOBUTAMINE HCL (DOBUTAMINE IV) Inject into the vein.    furosemide (LASIX) 20 MG tablet Take 1 tablet (20 mg total) by mouth once daily.    lisinopril 10 MG tablet TAKE ONE TABLET BY MOUTH ONCE DAILY IN THE EVENING       Review of patient's allergies indicates:  No Known Allergies    Past Medical History   Diagnosis Date    Cardiogenic shock 1/16/2017    Cardiomyopathy      Familial cardiomyopathy    CHF (congestive heart failure)     Essential hypertension 12/21/2016    Familial Cardiomyopathy      Familial cardiomyopathy      History reviewed. No pertinent past surgical history.  Family History   Problem Relation Age of Onset    Arthritis Mother     Heart disease Brother      cardiomyopathy and heart transplant    No Known Problems Father     Heart disease Brother      cardiomyopathy and heart transplanted twice    Birth defects Paternal Uncle      Social History   Substance Use Topics    Smoking status: Current Some Day Smoker     Packs/day: 1.00    Smokeless tobacco: None    Alcohol use 2.4 oz/week     4 Shots of liquor per week        Review of Systems:  Unable to obtain as patient is sedated.    OBJECTIVE:     Vital Signs (Most Recent)  Temp: 98.3 °F (36.8 °C) (02/04/17 0300)  Pulse: 106 (02/04/17 0500)  Resp: (!) 23 (02/04/17 0500)  BP: (!) 74/0 (02/03/17 2000)  SpO2: 100 % (02/04/17 0500)  Ventilator Data (Last 24H):          Hemodynamic Parameters (Last 24H):  PAP: (38-43)/(19-23) 41/20  PAP (Mean):  [28 mmHg-32  mmHg] 29 mmHg    Physical Exam:  General: well developed, well nourished, resting comfortably in bed  Neurologic: sedated  Lungs: extubated  Cardiovascular: Heart: regular rate and rhythm.  Abd: ND  Extremities: no cyanosis or edema, or clubbing.       Lines/Drains:       Introducer 02/02/17 0553 (Active)   Number of days:0            Pulmonary Artery Catheter Assessment  02/02/17 0553 (Active)   Number of days:0            Pulmonary Artery Catheter Assessment  02/01/17 0835 (Active)   Dressing biopatch in place;dressing dry and intact 2/2/2017  3:00 AM   Securement secured w/ sutures 2/2/2017  3:00 AM   Current Insertion Depth (cm) 45 cm 2/2/2017  3:00 AM   Phlebitis 0-->no symptoms 2/2/2017  3:00 AM   Infiltration 0-->no symptoms 2/2/2017  3:00 AM   Waveform normal 2/2/2017  3:00 AM   Pressure Catheter Interventions line leveled/zeroed 2/2/2017  3:00 AM   Prox Injectate Status Infusing 2/2/2017  3:00 AM   Prox Infusate Status Infusing 2/2/2017  3:00 AM   Distal Status Infusing 2/2/2017  3:00 AM   Pressure Catheter Tubing Change Due 02/05/17 2/1/2017  7:00 PM   Daily Line Review Performed 2/2/2017  3:00 AM   Number of days:0            Percutaneous Central Line Insertion/Assessment - triple lumen  02/02/17 0553 (Active)   Number of days:0            Peripheral IV - Single Lumen Right Hand (Active)   Site Assessment Clean;Intact;Dry 2/2/2017  3:00 AM   Line Status Infusing 2/2/2017  3:00 AM   Dressing Status Clean;Dry;Intact 2/2/2017  3:00 AM   Dressing Change Due 02/02/17 2/2/2017  3:00 AM   Site Change Due 02/02/17 2/1/2017  7:00 PM   Reason Not Rotated Not due 2/2/2017  3:00 AM   Number of days:            Peripheral IV - Single Lumen 02/01/17 0702 Right Antecubital (Active)   Site Assessment Clean;Dry;Intact 2/2/2017  3:00 AM   Line Status Infusing 2/2/2017  3:00 AM   Dressing Status Clean;Dry;Intact 2/2/2017  3:00 AM   Dressing Intervention New dressing 2/1/2017  6:00 AM   Dressing Change Due 02/05/17 2/2/2017   3:00 AM   Site Change Due 02/05/17 2/1/2017  7:00 PM   Reason Not Rotated Not due 2/2/2017  3:00 AM   Number of days:1            Peripheral IV - Single Lumen 02/01/17 0830 Left Hand (Active)   Site Assessment Clean;Intact;Dry 2/2/2017  3:00 AM   Line Status Infusing 2/2/2017  3:00 AM   Dressing Status Clean;Dry;Intact 2/2/2017  3:00 AM   Dressing Intervention New dressing 2/2/2017  3:00 AM   Dressing Change Due 02/05/17 2/2/2017  3:00 AM   Site Change Due 02/05/17 2/1/2017  7:00 PM   Reason Not Rotated Not due 2/2/2017  3:00 AM   Number of days:0            Arterial Line 02/01/17 0815 Left Radial (Active)   Site Assessment Clean;Dry;Intact 2/2/2017  3:00 AM   Line Status Pulsatile blood flow 2/2/2017  3:00 AM   Art Line Waveform Appropriate 2/2/2017  3:00 AM   Arterial Line Interventions Zeroed and calibrated;Leveled;Flushed per protocol;Line pulled back;Connections checked and tightened 2/2/2017  3:00 AM   Color/Movement/Sensation Capillary refill less than 3 sec 2/2/2017  3:00 AM   Dressing Type Transparent 2/2/2017  3:00 AM   Dressing Status Clean;Dry;Intact 2/2/2017  3:00 AM   Dressing Intervention New dressing 2/2/2017  3:00 AM   Dressing Change Due 02/05/17 2/2/2017  3:00 AM   Number of days:0            VAD 02/01/17 1042 Heartware (Active)   Site Location Abdomen right 2/2/2017  3:00 AM   Site Assessment Clean;Dry;Intact 2/2/2017  3:00 AM   Dressing Status Clean;Dry;Intact 2/2/2017  3:00 AM   Dressing Intervention New dressing 2/2/2017  3:00 AM   Power Source External DC 2/2/2017  3:00 AM   Equipment inventory at  Admission 2/1/2017 10:00 AM   Pump type Heartware 2/1/2017  7:00 PM   Battery 1 [id number] 2/1/2017 10:00 AM   Battery 2 [id number] 2/1/2017 10:00 AM   Battery 3 [id number] 2/1/2017 10:00 AM   Battery 4 [id number] 2/1/2017 10:00 AM   Battery 5 [id number] 2/1/2017 10:00 AM   Battery 6 [id number] 2/1/2017 10:00 AM   AC Adapter 1 [id number] 2/1/2017 10:00 AM   AC Adapter 2 [id number] 2/1/2017 10:00 AM    Battery Charger [id number] 2/1/2017 10:00 AM   Primary Controller [id number] 2/1/2017 10:00 AM   Extra Controller [id number] 2/1/2017 10:00 AM   Number of days:0            Chest Tube 02/01/17 1129 1 Mediastinal 32 Fr. (Active)   Chest Tube [facility]DL [facility] 2/1/2017  7:00 PM   Function -20 cm H2O 2/2/2017  3:00 AM   Air Leak/Fluctuation air leak not present;fluctuation not present;connections tightened;dependent drainage cleared 2/2/2017  3:00 AM   Safety all tubing connections taped;all connections secured;suction checked 2/2/2017  3:00 AM   Securement tubing anchored to body distal to insertion site w/ tape 2/2/2017  3:00 AM   Patency Intervention Tip/tilt 2/2/2017  3:00 AM   Drainage Description Dark red 2/2/2017  3:00 AM   Dressing Appearance occlusive gauze dressing intact;clean and dry;w/ dried drainage 2/2/2017  3:00 AM   Left Subcutaneous Emphysema none present 2/2/2017  3:00 AM   Right Subcutaneous Emphysema none present 2/2/2017  3:00 AM   Surrounding Skin Unable to view 2/2/2017  3:00 AM   Output (mL) 20 mL 2/2/2017  4:00 AM   Number of days:0            Chest Tube 02/01/17 1130 2 Mediastinal 32 Fr. (Active)   Chest Tube [facility] 2/1/2017  7:00 PM   Function -20 cm H2O 2/2/2017  3:00 AM   Air Leak/Fluctuation air leak not present;fluctuation not present;connections tightened;dependent drainage cleared 2/2/2017  3:00 AM   Safety all tubing connections taped;all connections secured;suction checked 2/2/2017  3:00 AM   Securement tubing anchored to body distal to insertion site w/ tape 2/2/2017  3:00 AM   Patency Intervention Tip/tilt 2/2/2017  3:00 AM   Drainage Description Dark red 2/2/2017  3:00 AM   Dressing Appearance occlusive gauze dressing intact;clean and dry;w/ dried drainage 2/2/2017  3:00 AM   Left Subcutaneous Emphysema none present 2/2/2017  3:00 AM   Right Subcutaneous Emphysema none present 2/2/2017  3:00 AM   Surrounding Skin Unable to view 2/2/2017  3:00 AM   Output (mL) 10 mL 2/2/2017  4:00 AM    Number of days:0            NG/OG Tube 02/01/17 orogastric (Active)   Placement Check placement verified by x-ray 2/1/2017  7:00 PM   Tolerance no signs/symptoms of discomfort 2/1/2017  7:00 PM   Securement taped to commercial device 2/1/2017  7:00 PM   Clamp Status/Tolerance unclamped 2/1/2017  7:00 PM   Suction Setting/Drainage Method suction at;low;intermittent setting 2/1/2017  7:00 PM   Insertion Site Appearance no redness, warmth, tenderness, skin breakdown, drainage 2/1/2017  7:00 PM   Drainage Thin;Clear;Yellow 2/1/2017  7:00 PM   Flush/Irrigation flushed w/;water;no resistance met 2/1/2017  7:00 PM   Intake (mL) 45 mL 2/2/2017  5:30 AM   Tube Output(mL)(Include Discarded Residual) 130 mL 2/2/2017  5:50 AM   Number of days:1            Urethral Catheter 02/01/17 0840 Non-latex;Straight-tip;Temperature probe 16 Fr. (Active)   Site Assessment Clean;Intact 2/2/2017  3:00 AM   Collection Container Urimeter 2/2/2017  3:00 AM   Securement Method secured to upper leg w/ adhesive device 2/2/2017  3:00 AM   Catheter Care Performed yes 2/2/2017  3:00 AM   Reason for Continuing Urinary Catheterization Post operative;Critically ill in ICU requiring intensive monitoring 2/2/2017  3:00 AM   Output (mL) 45 mL 2/2/2017  6:00 AM   Number of days:0       Laboratory    Chest X-Ray 2/2/2017:Postoperative changes from an LVAD device placement remain without significant change.  Heart is at the upper limits of normal.  Lungs are clear.  The position of the endotracheal tube and the [place] catheter remain in satisfactory position. There is mediastinal drains in unchanged position. The aortic balloon pump catheter is no longer visualized on the current study.    Diagnostic Results:      ASSESSMENT/PLAN:     Patient is a 27 y.o. male with DCM s/p LVAD and IABP placement 2/1/2017 and planned chest closure 2/2/2017.    Interval: Balloon pump discontinued + chest closure + extubated 2/2/2017. Overnight patient experienced nausea  refractory to zofran, phenergan, and benadryl with small volume of clear vomitus.        Plan:  Neuro:    - sedated  - morphine for pain  - extubated    Pulmonary:   - 5L O2 NC  - nitric oxide 5 (from 10)  - CXR:     Cardiac:  - dobutamine 4 (unchanged)  - epi 0.01 (from 0.06)  - cardene 5 (unchanged)  - anticoag per primary now that chest is closed  - CVP 10-14    Renal:   - BUN/Cr: 23/1.1 (from 18/1.2 yesterday and 12/0.09 the day prior)  - trend BUN/Cr  - cont kirk for strict I/O's  - adequate UOP    Fluids/Electrolytes/Nutrition/GI:   - lasix gtt  - MIVF  - replace lytes as needed  - NPO  - ABd XR 2/3/2017: dilated loops of bowel. Ileus vs SBO     Heme/ Infectious Disease:   - cont bactrim, diflucan, vanc    - WBC 18 (from 22)  - stable H/H 9.3/27.7  - INR 1.1  - aPTT 31.9 (from 30.5)  - hep gtt at 700, cont     Endocrine:  - insult gtt per protocol    Dispo:  - cont ICU care     Adrienne Calle MD  PGY-1  Surgical Intensive Care Unit

## 2017-02-04 NOTE — PLAN OF CARE
Problem: Patient Care Overview  Goal: Plan of Care Review  Dx: Heartware LVAD 2/1/17  Hx: Cardiomyopathy, Two brothers have had heart transplant, occasional smoker     1/27/17: IABP placed  2/1/17: LVAD, admitted to SICU, 1.5L albumin, chest remains open   2/2/17: IABP d/ced, chest closed, extubated  2/3/17: douglas d/c; OOBTC  2/4/17: epi off; chest tube #2 removed; OOBTC      Nursing:   Goal MAP 60-80  pTT goal 40-50  accuchecks q6  PTT, BMP, Mag, Phos q6  Heartware speed at 2900     Outcome: Ongoing (interventions implemented as appropriate)  Pt afebrile throughout shift. Pt on 2L n/c with 3 of nitric stating 100%. Pt's nausea improved throughout the day with scheduled meds given. PRN 5mg oxycodone given x2 today for incisional pain. Lasix increased from 10mg/hour to 15mg/hr with a 10mg push of lasix before the increase in rate; UO increased from 100-150cc/hr to 200-300cc/hr. Pt on dobutamine at 4mcg/kg/min. Heparin gtt at 1000units/hr with pTT labs every 6hrs to achieve goal of 40-50.CMP and mag checked every 6hrs with PRN coverage given. One chest tube removed today. Pt OOBTC today tolerating well. LVAD drsg change today with skin intact and driveline site=2 (intact but reddened). LVAD speed at 2900 with no issues. Pt and family updated on current condition and plan of care. All questions answered with verbalization of understanding.     Skin note: no skin breakdown or redness noted on back of head, elbows, sacrum, buttocks, or heels.

## 2017-02-04 NOTE — PROGRESS NOTES
Dr. Carlson notified of PTT of 31.9. MD orders received for Heparin to be increased to 700 units/hr. Will implement and continue to monitor patient.

## 2017-02-04 NOTE — PLAN OF CARE
Problem: Patient Care Overview  Goal: Plan of Care Review  Dx: Heartware LVAD 2/1/17  Hx: Cardiomyopathy, Two brothers have had heart transplant, occasional smoker     1/27/17: IABP placed  2/1/17: LVAD, admitted to SICU, 1.5L albumin, chest remains open   2/2/17: IABP d/ced, chest closed, extubated  2/3/17: douglas d/c; OOBTC        Nursing:   Goal MAP 60-80  pTT goal 35-45  accuchecks q6  PTT, BMP, Mag, Phos q  Heartware speed at 2900     Outcome: Ongoing (interventions implemented as appropriate)  Patient with intermittent nausea/pain overnight. Not able to rest comfortably. Patient remains on Lasix at 10mg/hr w/ CVP 12-14 overnight and urine output >100 cc/hr. Epi currently at .01 mcg/kg/min, Dobutamine at 4 mcg/kg/min and Cardene at 5 mg/h to maintain MAP 60-80. Patient on 4L NC w/ 5PPM of Nitric. Heparin at 700 units/hr. PTT goal of 35-45 and currently sub-therapeutic. LVAD speed maintained at 2900 w/ Flows in low 7's. No acute events overnight. Will continue to monitor patient.

## 2017-02-04 NOTE — PROGRESS NOTES
Dr. Macedo at BS. Decreased nitric to 3 with verbal orders to slowly wean off by tomorrow morning.

## 2017-02-04 NOTE — PT/OT/SLP PROGRESS
Occupational Therapy  Treatment    Mert Samayoa   MRN: [id number]   Admitting Diagnosis: Chronic combined systolic and diastolic heart failure    OT Date of Treatment: 02/04/17   OT Start Time: 0906  OT Stop Time: 0933  OT Total Time (min): 27 min    Billable Minutes:  Therapeutic Activity 15 and Therapeutic Exercise 10    General Precautions: Standard, LVAD, fall, sternal  Orthopedic Precautions:    Braces:      Do you have any cultural, spiritual, Hoah[place]ism conflicts, given your current situation?: None    Subjective:  Communicated with RN prior to session.  Pt agreeable to tx and states he is no longer nauseous    Pain Rating: 3/10  Location - Side: Bilateral     Location: sternal  Pain Addressed: Distraction, Reposition  Pain Rating Post-Intervention: 2/10    Objective:  Patient found with: arterial line, blood pressure cuff, central line, chest tube, kirk catheter, pulse ox (continuous), telemetry, peripheral IV (LVAD)     Functional Mobility:  Bed Mobility:  Rolling/Turning Right: Maximum assistance  Scooting/Bridging: Maximum Assistance  Supine to Sit: Maximum Assistance    Transfers:   Sit <> Stand Assistance: Minimum Assistance  Sit <> Stand Assistive Device: No Assistive Device  Bed <> Chair Technique: Stand Pivot  Bed <> Chair Transfer Assistance: Minimum Assistance  Bed <> Chair Assistive Device: No Assistive Device    Functional Ambulation: Minimal A to chair x 3 steps    Activities of Daily Living:     Feeding adaptive equipment:   UE Dressing Level of Assistance: Maximum assistance  UE adaptive equipment:   LE Dressing Level of Assistance: Maximum assistance  LE adaptive equipment:   Grooming Position: Seated  Grooming Level of Assistance: Moderate assistance                Therapeutic Activities and Exercises:  Pt ed on importance of securing VAD to self prior to transition. Pt performed B UE/LE AROM ex's x 10 reps in all planes with appropriate rest breaks between sets    AM-PAC 6 CLICK ADL    How much help from another person does this patient currently need?   1 = Unable, Total/Dependent Assistance  2 = A lot, Maximum/Moderate Assistance  3 = A little, Minimum/Contact Guard/Supervision  4 = None, Modified [place]/Independent    Putting on and taking off regular lower body clothing? : 2  Bathing (including washing, rinsing, drying)?: 2  Toileting, which includes using toilet, bedpan, or urinal? : 2  Putting on and taking off regular upper body clothing?: 2  Taking care of personal grooming such as brushing teeth?: 3  Eating meals?: 3  Total Score: 14     AM-PAC Raw Score CMS G-Code Modifier Level of Impairment Assistance   6 % Total / Unable   7 - 9 CM 80 - 100% Maximal Assist   10 - 14 CL 60 - 80% Moderate Assist   15 - 19 CK 40 - 60% Moderate Assist   20 - 22 CJ 20 - 40% Minimal Assist   23 CI 1-20% SBA / CGA   24 CH 0% Independent/ Mod I     Patient left up in chair with all lines intact, call button in reach, RN notified and spouse present    ASSESSMENT:  Mert Samayoa is a 27 y.o. male with a medical diagnosis of Chronic combined systolic and diastolic heart failure and presents with decreased ADL (I) and endurance s/p VAD.    Rehab identified problem list/impairments: Rehab identified problem list/impairments: impaired endurance, weakness, impaired self care skills, impaired functional mobilty, gait instability, impaired balance, decreased upper extremity function, pain, impaired cardiopulmonary response to activity    Rehab potential is good.    Activity tolerance: Good    Discharge recommendations: Discharge Facility/Level Of Care Needs: home with home health     Barriers to discharge: Barriers to Discharge: Inaccessible home environment    Equipment recommendations: none     GOALS:   Occupational Therapy Goals        Problem: Occupational Therapy Goal    Goal Priority Disciplines Outcome Interventions   Occupational Therapy Goal     OT, PT/OT     Description:  Goals to be  met by: 2 weeks 2/17/17     Patient will increase functional independence with ADLs by performing:    UE Dressing with Supervision.  LE Dressing with Supervision.  Grooming while standing with Supervision.  Toileting from toilet with Supervision for hygiene and clothing management.   Stand pivot transfers with Supervision.  Toilet transfer to toilet with Supervision.  Pt to be independent with LVAD vipin't.             Multidisciplinary Problems (Resolved)        Problem: Occupational Therapy Goal    Goal Priority Disciplines Outcome Interventions   Occupational Therapy Goal   (Resolved)     OT, PT/OT Outcome(s) achieved    Description:  Eval and D/C OT 1/29/17.                Plan:  Patient to be seen 6 x/week to address the above listed problems via therapeutic activities, therapeutic exercises, self-care/home management  Plan of Care expires:    Plan of Care reviewed with: patient, spouse         ALISA Arguello  02/04/2017

## 2017-02-04 NOTE — PROGRESS NOTES
Progress Note  Heart Transplant Service    Admit Date: 1/27/2017   LOS: 8 days     SUBJECTIVE:     Follow up for: Chronic combined systolic and diastolic heart failure    Interval History: Hemodynamically stable but in severe pain and has nausea.    Scheduled Meds:   albuterol sulfate  2.5 mg Nebulization Q4H    aspirin  325 mg Oral Daily    docusate sodium  200 mg Oral QHS    ferrous gluconate  324 mg Oral Daily with breakfast    metoclopramide HCl  10 mg Intravenous Q6H    mupirocin   Nasal BID    ondansetron  8 mg Intravenous Q8H    pantoprazole  40 mg Oral Daily    promethazine (PHENERGAN) IVPB  12.5 mg Intravenous Q4H    sodium chloride 0.9%  3 mL Intravenous Q8H    vancomycin (VANCOCIN) IVPB  750 mg Intravenous Q12H     Continuous Infusions:   sodium chloride 0.9% Stopped (02/01/17 1445)    dextrose 5 % and 0.45 % NaCl with KCl 40 mEq 40 mL/hr at 02/04/17 0710    DOBUTamine 4 mcg/kg/min (02/04/17 0710)    epinephrine infusion 0.01 mcg/kg/min (02/04/17 0710)    furosemide (LASIX) 1 mg/mL infusion (non-titrating) 10 mg/hr (02/04/17 0710)    heparin (porcine) in 5 % dex 700 Units/hr (02/04/17 0710)    nicardipine 5 mg/hr (02/04/17 0710)    nitric oxide gas      propofol Stopped (02/02/17 1430)     PRN Meds:albumin human 5%, albuterol sulfate, bisacodyl, dextrose 50%, glucagon (human recombinant), glucagon (human recombinant), HYDROmorphone, insulin aspart, magnesium hydroxide 400 mg/5 ml, magnesium sulfate IVPB, ondansetron, oxycodone, oxycodone, potassium chloride, potassium chloride    Review of patient's allergies indicates:  No Known Allergies    OBJECTIVE:     Vital Signs (Most Recent)  Temp: 98.3 °F (36.8 °C) (02/04/17 0300)  Pulse: 104 (02/04/17 0710)  Resp: (!) 26 (02/04/17 0710)  BP: (!) 74/0 (02/03/17 2000)  SpO2: 100 % (02/04/17 0710)    Vital Signs Range (Last 24H):  Temp:  [97.9 °F (36.6 °C)-99.5 °F (37.5 °C)]   Pulse:  [101-115]   Resp:  [17-48]   BP: (68-78)/(0)   SpO2:  [99  %-100 %]   Arterial Line BP: (72-96)/(62-81)     I & O (Last 24H):    Intake/Output Summary (Last 24 hours) at 02/04/17 0746  Last data filed at 02/04/17 0710   Gross per 24 hour   Intake             3106 ml   Output             4198 ml   Net            -1092 ml       PAP: (41)/(20) 41/20  PAP (Mean):  [29 mmHg] 29 mmHg    Telemetry: sinus  Constitutional: AOx3, NAD conversant  Neck: No JVD present. No thyromegaly present.   Cardiovascular: VAD hum  Pulmonary/Chest:good breath sounds bilaterally  Abdominal: + BS, exhibits no distension. There is no tenderness. There is no rebound.   Extremities: no cyanosis, clubbing or edema.  No bleeding, edema, erythema or hematoma, doppler pulses in all distals    Labs:       Recent Labs  Lab 02/02/17 2338 02/03/17  0400 02/04/17  0405   WBC 20.06* 21.67* 18.23*   HGB 10.5* 10.6* 9.3*   HCT 31.2* 31.2* 27.7*   * 146* 138*   LYMPH 3.3*  0.7* 3.2*  0.7* 5.2*  1.0   MONO 6.9  1.4* 7.4  1.6* 8.3  1.5*   EOSINOPHIL 0.0 0.0 0.1         Recent Labs  Lab 02/02/17 1958 02/02/17 2338 02/03/17  1220 02/03/17  1753 02/04/17  0106 02/04/17  0405   APTT 28.5 28.3  < > 29.4 30.2 31.9  --    INR 1.2 1.2  --   --   --   --  1.1   < > = values in this interval not displayed.       Recent Labs  Lab 02/02/17  0305  02/02/17 2338 02/03/17  0400  02/03/17  1753 02/03/17  2313 02/04/17  0405   *  156*  < > 184* 181*  < > 141* 134* 131*  131*   CALCIUM 9.0  9.0  < > 9.0 9.3  < > 9.3 8.9 9.3  9.3   ALBUMIN 4.3  --   --  4.0  4.0  --   --   --  3.7   PROT 6.4  --   --  6.8  6.8  --   --   --  6.9     137  < > 137 135*  < > 134* 133* 133*  133*   K 4.4  4.4  < > 4.1 4.1  < > 3.9 4.3 4.0  4.0   CO2 21*  21*  < > 27 23  < > 30* 31* 29  29     104  < > 99 98  < > 92* 93* 92*  92*   BUN 13  13  < > 14 17  < > 18 21* 23*  23*   CREATININE 1.1  1.1  < > 1.1 1.2  < > 1.1 1.1 1.1  1.1   ALKPHOS 49*  --   --  61  61  --   --   --  64   ALT 28  --   --  33   33  --   --   --  30   AST 96*  --   --  110*  110*  --   --   --  80*   BILITOT 0.7  --   --  0.9  0.9  --   --   --  0.7   MG 2.2  2.2  < > 2.3 2.2  < > 2.4 2.1 2.1  2.1   PHOS 3.6  3.6  < > 5.1* 4.3  --   --   --  3.6  3.6   < > = values in this interval not displayed.  Estimated Creatinine Clearance: 94.3 mL/min (based on Cr of 1.1).      Recent Labs  Lab 02/03/17  0400   *         Recent Labs  Lab 02/01/17  1235 02/02/17  0304 02/03/17  0733   * 449* 568*       Microbiology Results (last 7 days)     Procedure Component Value Units Date/Time    Blood culture (site 1) [[id number]] Collected:  01/27/17 1837    Order Status:  Completed Specimen:  Blood Updated:  02/01/17 2022     Blood Culture, Routine No growth after 5 days.    Narrative:       Site # 1, aerobic and anaerobic (central line, if patient has  one)    Blood culture (site 2) [[id number]] Collected:  01/27/17 1828    Order Status:  Completed Specimen:  Blood Updated:  02/01/17 2022     Blood Culture, Routine No growth after 5 days.    Narrative:       Site # 2, aerobic only            ASSESSMENT:     27 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 He was admitted for PICC removal, IABP, and planned LVAD implantation. He is s/p [facility] LVAD placement 2/1 and chest closure 2/2. Extubated 2/2 and now recovering in ICU.    PLAN:     Cardiogenic Shock due to Acute on Chronic Systolic HF, DCM, NYHA FC IV s/p HW LVAD 2/1/2017  -s/p HW LVAD 2/1 and chest closure/extubation 2/2  -CTS primary   -Currently on  , cardene, epi, lasix  -Does not have ICD- will need to discuss timing of this vs Lifevest  - Anticoagulation per CTS: on Heparin  -cultures:  NGTD    HTN  -BP controlled    Back Pain  -prn meds on board; will adjust    Prophylaxis   -heparin

## 2017-02-05 LAB
ALBUMIN SERPL BCP-MCNC: 3.6 G/DL
ALLENS TEST: ABNORMAL
ALLENS TEST: ABNORMAL
ALP SERPL-CCNC: 89 U/L
ALT SERPL W/O P-5'-P-CCNC: 32 U/L
ANION GAP SERPL CALC-SCNC: 10 MMOL/L
ANION GAP SERPL CALC-SCNC: 12 MMOL/L
ANION GAP SERPL CALC-SCNC: 13 MMOL/L
ANION GAP SERPL CALC-SCNC: 13 MMOL/L
ANION GAP SERPL CALC-SCNC: 14 MMOL/L
AORTIC VALVE REGURGITATION: ABNORMAL
APTT BLDCRRT: 34.7 SEC
APTT BLDCRRT: 39.1 SEC
APTT BLDCRRT: 42 SEC
APTT BLDCRRT: 45.4 SEC
AST SERPL-CCNC: 63 U/L
BASOPHILS # BLD AUTO: 0.01 K/UL
BASOPHILS NFR BLD: 0.1 %
BILIRUB SERPL-MCNC: 0.6 MG/DL
BUN SERPL-MCNC: 22 MG/DL
BUN SERPL-MCNC: 22 MG/DL
BUN SERPL-MCNC: 23 MG/DL
BUN SERPL-MCNC: 23 MG/DL
BUN SERPL-MCNC: 24 MG/DL
CALCIUM SERPL-MCNC: 8.7 MG/DL
CALCIUM SERPL-MCNC: 8.7 MG/DL
CALCIUM SERPL-MCNC: 9 MG/DL
CALCIUM SERPL-MCNC: 9 MG/DL
CALCIUM SERPL-MCNC: 9.2 MG/DL
CHLORIDE SERPL-SCNC: 85 MMOL/L
CHLORIDE SERPL-SCNC: 86 MMOL/L
CHLORIDE SERPL-SCNC: 87 MMOL/L
CO2 SERPL-SCNC: 32 MMOL/L
CO2 SERPL-SCNC: 33 MMOL/L
CO2 SERPL-SCNC: 34 MMOL/L
CREAT SERPL-MCNC: 1 MG/DL
CREAT SERPL-MCNC: 1.1 MG/DL
CREAT SERPL-MCNC: 1.2 MG/DL
DELSYS: ABNORMAL
DELSYS: ABNORMAL
DIASTOLIC DYSFUNCTION: YES
DIFFERENTIAL METHOD: ABNORMAL
EOSINOPHIL # BLD AUTO: 0.1 K/UL
EOSINOPHIL NFR BLD: 0.4 %
ERYTHROCYTE [DISTWIDTH] IN BLOOD BY AUTOMATED COUNT: 12.7 %
EST. GFR  (AFRICAN AMERICAN): >60 ML/MIN/1.73 M^2
EST. GFR  (NON AFRICAN AMERICAN): >60 ML/MIN/1.73 M^2
FIO2: 30
FLOW: 2
FLOW: 2
GLUCOSE SERPL-MCNC: 104 MG/DL
GLUCOSE SERPL-MCNC: 111 MG/DL
GLUCOSE SERPL-MCNC: 115 MG/DL
GLUCOSE SERPL-MCNC: 121 MG/DL
GLUCOSE SERPL-MCNC: 130 MG/DL
HCO3 UR-SCNC: 36.6 MMOL/L (ref 24–28)
HCO3 UR-SCNC: 37.6 MMOL/L (ref 24–28)
HCT VFR BLD AUTO: 25.4 %
HGB BLD-MCNC: 8.6 G/DL
INR PPP: 1
LACTATE SERPL-SCNC: 0.6 MMOL/L
LDH SERPL L TO P-CCNC: 448 U/L
LYMPHOCYTES # BLD AUTO: 0.9 K/UL
LYMPHOCYTES NFR BLD: 7.2 %
MAGNESIUM SERPL-MCNC: 1.8 MG/DL
MAGNESIUM SERPL-MCNC: 1.9 MG/DL
MAGNESIUM SERPL-MCNC: 1.9 MG/DL
MAGNESIUM SERPL-MCNC: 2 MG/DL
MAGNESIUM SERPL-MCNC: 2.5 MG/DL
MCH RBC QN AUTO: 29.4 PG
MCHC RBC AUTO-ENTMCNC: 33.9 %
MCV RBC AUTO: 87 FL
METHEMOGLOBIN: 0.6 % (ref 0–3)
MITRAL VALVE REGURGITATION: ABNORMAL
MODE: ABNORMAL
MODE: ABNORMAL
MONOCYTES # BLD AUTO: 1 K/UL
MONOCYTES NFR BLD: 8.2 %
NEUTROPHILS # BLD AUTO: 10 K/UL
NEUTROPHILS NFR BLD: 83.8 %
PCO2 BLDA: 48.4 MMHG (ref 35–45)
PCO2 BLDA: 54.4 MMHG (ref 35–45)
PH SMN: 7.44 [PH] (ref 7.35–7.45)
PH SMN: 7.5 [PH] (ref 7.35–7.45)
PHOSPHATE SERPL-MCNC: 3.5 MG/DL
PLATELET # BLD AUTO: 146 K/UL
PMV BLD AUTO: 9.4 FL
PO2 BLDA: 33 MMHG (ref 40–60)
PO2 BLDA: 87 MMHG (ref 80–100)
POC BE: 12 MMOL/L
POC BE: 14 MMOL/L
POC SATURATED O2: 64 % (ref 95–100)
POC SATURATED O2: 97 % (ref 95–100)
POC TCO2: 38 MMOL/L (ref 24–29)
POC TCO2: 39 MMOL/L (ref 23–27)
POCT GLUCOSE: 111 MG/DL (ref 70–110)
POCT GLUCOSE: 132 MG/DL (ref 70–110)
POTASSIUM SERPL-SCNC: 3.2 MMOL/L
POTASSIUM SERPL-SCNC: 3.3 MMOL/L
POTASSIUM SERPL-SCNC: 3.4 MMOL/L
POTASSIUM SERPL-SCNC: 3.7 MMOL/L
POTASSIUM SERPL-SCNC: 3.8 MMOL/L
PROT SERPL-MCNC: 7.1 G/DL
PROTHROMBIN TIME: 11 SEC
RBC # BLD AUTO: 2.93 M/UL
SAMPLE: ABNORMAL
SAMPLE: ABNORMAL
SITE: ABNORMAL
SITE: ABNORMAL
SODIUM SERPL-SCNC: 129 MMOL/L
SODIUM SERPL-SCNC: 129 MMOL/L
SODIUM SERPL-SCNC: 130 MMOL/L
SODIUM SERPL-SCNC: 132 MMOL/L
SODIUM SERPL-SCNC: 133 MMOL/L
TRICUSPID VALVE REGURGITATION: ABNORMAL
WBC # BLD AUTO: 11.88 K/UL

## 2017-02-05 PROCEDURE — 83735 ASSAY OF MAGNESIUM: CPT

## 2017-02-05 PROCEDURE — 97110 THERAPEUTIC EXERCISES: CPT

## 2017-02-05 PROCEDURE — 99232 SBSQ HOSP IP/OBS MODERATE 35: CPT | Mod: ,,, | Performed by: INTERNAL MEDICINE

## 2017-02-05 PROCEDURE — 25000003 PHARM REV CODE 250: Performed by: STUDENT IN AN ORGANIZED HEALTH CARE EDUCATION/TRAINING PROGRAM

## 2017-02-05 PROCEDURE — 94640 AIRWAY INHALATION TREATMENT: CPT

## 2017-02-05 PROCEDURE — 94799 UNLISTED PULMONARY SVC/PX: CPT

## 2017-02-05 PROCEDURE — 93005 ELECTROCARDIOGRAM TRACING: CPT

## 2017-02-05 PROCEDURE — 83050 HGB METHEMOGLOBIN QUAN: CPT

## 2017-02-05 PROCEDURE — 85610 PROTHROMBIN TIME: CPT

## 2017-02-05 PROCEDURE — 82803 BLOOD GASES ANY COMBINATION: CPT

## 2017-02-05 PROCEDURE — 63600367 HC NITRIC OXIDE PER HOUR

## 2017-02-05 PROCEDURE — 93306 TTE W/DOPPLER COMPLETE: CPT | Mod: 26,,, | Performed by: INTERNAL MEDICINE

## 2017-02-05 PROCEDURE — 80048 BASIC METABOLIC PNL TOTAL CA: CPT | Mod: 91

## 2017-02-05 PROCEDURE — 63600175 PHARM REV CODE 636 W HCPCS: Performed by: STUDENT IN AN ORGANIZED HEALTH CARE EDUCATION/TRAINING PROGRAM

## 2017-02-05 PROCEDURE — 25000003 PHARM REV CODE 250: Performed by: NURSE PRACTITIONER

## 2017-02-05 PROCEDURE — 25000003 PHARM REV CODE 250: Performed by: THORACIC SURGERY (CARDIOTHORACIC VASCULAR SURGERY)

## 2017-02-05 PROCEDURE — 80048 BASIC METABOLIC PNL TOTAL CA: CPT

## 2017-02-05 PROCEDURE — 80053 COMPREHEN METABOLIC PANEL: CPT

## 2017-02-05 PROCEDURE — 20000000 HC ICU ROOM

## 2017-02-05 PROCEDURE — 27000248 HC VAD-ADDITIONAL DAY

## 2017-02-05 PROCEDURE — 25000242 PHARM REV CODE 250 ALT 637 W/ HCPCS: Performed by: NURSE PRACTITIONER

## 2017-02-05 PROCEDURE — 93010 ELECTROCARDIOGRAM REPORT: CPT | Mod: ,,, | Performed by: INTERNAL MEDICINE

## 2017-02-05 PROCEDURE — 63600175 PHARM REV CODE 636 W HCPCS: Performed by: SURGERY

## 2017-02-05 PROCEDURE — 97530 THERAPEUTIC ACTIVITIES: CPT

## 2017-02-05 PROCEDURE — 83605 ASSAY OF LACTIC ACID: CPT

## 2017-02-05 PROCEDURE — 83735 ASSAY OF MAGNESIUM: CPT | Mod: 91

## 2017-02-05 PROCEDURE — 84100 ASSAY OF PHOSPHORUS: CPT

## 2017-02-05 PROCEDURE — 85025 COMPLETE CBC W/AUTO DIFF WBC: CPT

## 2017-02-05 PROCEDURE — 85730 THROMBOPLASTIN TIME PARTIAL: CPT

## 2017-02-05 PROCEDURE — 27000221 HC OXYGEN, UP TO 24 HOURS

## 2017-02-05 PROCEDURE — 63600175 PHARM REV CODE 636 W HCPCS: Performed by: NURSE PRACTITIONER

## 2017-02-05 PROCEDURE — 63600175 PHARM REV CODE 636 W HCPCS

## 2017-02-05 PROCEDURE — 99233 SBSQ HOSP IP/OBS HIGH 50: CPT | Mod: ,,, | Performed by: SURGERY

## 2017-02-05 PROCEDURE — 93306 TTE W/DOPPLER COMPLETE: CPT

## 2017-02-05 PROCEDURE — 83615 LACTATE (LD) (LDH) ENZYME: CPT

## 2017-02-05 PROCEDURE — 25000003 PHARM REV CODE 250

## 2017-02-05 PROCEDURE — 37799 UNLISTED PX VASCULAR SURGERY: CPT

## 2017-02-05 PROCEDURE — 85730 THROMBOPLASTIN TIME PARTIAL: CPT | Mod: 91

## 2017-02-05 RX ORDER — IBUPROFEN 200 MG
24 TABLET ORAL
Status: DISCONTINUED | OUTPATIENT
Start: 2017-02-05 | End: 2017-02-06

## 2017-02-05 RX ORDER — IBUPROFEN 200 MG
16 TABLET ORAL
Status: DISCONTINUED | OUTPATIENT
Start: 2017-02-05 | End: 2017-02-06

## 2017-02-05 RX ORDER — WARFARIN 2 MG/1
2 TABLET ORAL ONCE
Status: COMPLETED | OUTPATIENT
Start: 2017-02-05 | End: 2017-02-05

## 2017-02-05 RX ORDER — POTASSIUM CHLORIDE 29.8 MG/ML
40 INJECTION INTRAVENOUS
Status: DISCONTINUED | OUTPATIENT
Start: 2017-02-05 | End: 2017-02-08

## 2017-02-05 RX ORDER — INSULIN ASPART 100 [IU]/ML
0-5 INJECTION, SOLUTION INTRAVENOUS; SUBCUTANEOUS
Status: DISCONTINUED | OUTPATIENT
Start: 2017-02-05 | End: 2017-02-06

## 2017-02-05 RX ADMIN — OXYCODONE HYDROCHLORIDE 5 MG: 5 TABLET ORAL at 05:02

## 2017-02-05 RX ADMIN — ACETAZOLAMIDE SODIUM 500 MG: 500 INJECTION, POWDER, LYOPHILIZED, FOR SOLUTION INTRAVENOUS at 12:02

## 2017-02-05 RX ADMIN — Medication 3 ML: at 09:02

## 2017-02-05 RX ADMIN — Medication 3 ML: at 02:02

## 2017-02-05 RX ADMIN — MAGNESIUM SULFATE IN WATER 2 G: 40 INJECTION, SOLUTION INTRAVENOUS at 05:02

## 2017-02-05 RX ADMIN — METOCLOPRAMIDE 10 MG: 5 INJECTION, SOLUTION INTRAMUSCULAR; INTRAVENOUS at 11:02

## 2017-02-05 RX ADMIN — PROMETHAZINE HYDROCHLORIDE 12.5 MG: 25 INJECTION, SOLUTION INTRAMUSCULAR; INTRAVENOUS at 02:02

## 2017-02-05 RX ADMIN — HEPARIN SODIUM AND DEXTROSE 1100 UNITS/HR: 10000; 5 INJECTION INTRAVENOUS at 01:02

## 2017-02-05 RX ADMIN — OXYCODONE HYDROCHLORIDE 10 MG: 5 TABLET ORAL at 09:02

## 2017-02-05 RX ADMIN — ALBUTEROL SULFATE 2.5 MG: 2.5 SOLUTION RESPIRATORY (INHALATION) at 07:02

## 2017-02-05 RX ADMIN — FERROUS GLUCONATE TAB 324 MG (37.5 MG ELEMENTAL IRON) 324 MG: 324 (37.5 FE) TAB at 08:02

## 2017-02-05 RX ADMIN — POLYETHYLENE GLYCOL 3350 17 G: 17 POWDER, FOR SOLUTION ORAL at 08:02

## 2017-02-05 RX ADMIN — POTASSIUM CHLORIDE 40 MEQ: 200 INJECTION, SOLUTION INTRAVENOUS at 03:02

## 2017-02-05 RX ADMIN — MUPIROCIN: 20 OINTMENT TOPICAL at 08:02

## 2017-02-05 RX ADMIN — PANTOPRAZOLE SODIUM 40 MG: 40 TABLET, DELAYED RELEASE ORAL at 08:02

## 2017-02-05 RX ADMIN — FUROSEMIDE 20 MG/HR: 10 INJECTION, SOLUTION INTRAMUSCULAR; INTRAVENOUS at 06:02

## 2017-02-05 RX ADMIN — ALBUTEROL SULFATE 2.5 MG: 2.5 SOLUTION RESPIRATORY (INHALATION) at 03:02

## 2017-02-05 RX ADMIN — OXYCODONE HYDROCHLORIDE 5 MG: 5 TABLET ORAL at 10:02

## 2017-02-05 RX ADMIN — ONDANSETRON 8 MG: 2 INJECTION INTRAMUSCULAR; INTRAVENOUS at 02:02

## 2017-02-05 RX ADMIN — OXYCODONE HYDROCHLORIDE 10 MG: 5 TABLET ORAL at 05:02

## 2017-02-05 RX ADMIN — OXYCODONE HYDROCHLORIDE 5 MG: 5 TABLET ORAL at 02:02

## 2017-02-05 RX ADMIN — ONDANSETRON 4 MG: 2 INJECTION INTRAMUSCULAR; INTRAVENOUS at 10:02

## 2017-02-05 RX ADMIN — AMLODIPINE BESYLATE 5 MG: 5 TABLET ORAL at 08:02

## 2017-02-05 RX ADMIN — PROMETHAZINE HYDROCHLORIDE 12.5 MG: 25 INJECTION, SOLUTION INTRAMUSCULAR; INTRAVENOUS at 10:02

## 2017-02-05 RX ADMIN — MAGNESIUM SULFATE IN WATER 2 G: 40 INJECTION, SOLUTION INTRAVENOUS at 03:02

## 2017-02-05 RX ADMIN — OXYCODONE HYDROCHLORIDE 5 MG: 5 TABLET ORAL at 11:02

## 2017-02-05 RX ADMIN — PROMETHAZINE HYDROCHLORIDE 12.5 MG: 25 INJECTION, SOLUTION INTRAMUSCULAR; INTRAVENOUS at 05:02

## 2017-02-05 RX ADMIN — NICARDIPINE HYDROCHLORIDE 5 MG/HR: 0.2 INJECTION, SOLUTION INTRAVENOUS at 11:02

## 2017-02-05 RX ADMIN — EPINEPHRINE 0.02 MCG/KG/MIN: 1 INJECTION PARENTERAL at 12:02

## 2017-02-05 RX ADMIN — DOCUSATE SODIUM 200 MG: 50 CAPSULE, LIQUID FILLED ORAL at 08:02

## 2017-02-05 RX ADMIN — POTASSIUM CHLORIDE 40 MEQ: 200 INJECTION, SOLUTION INTRAVENOUS at 11:02

## 2017-02-05 RX ADMIN — ONDANSETRON 8 MG: 2 INJECTION INTRAMUSCULAR; INTRAVENOUS at 09:02

## 2017-02-05 RX ADMIN — NICARDIPINE HYDROCHLORIDE 7.5 MG/HR: 0.2 INJECTION, SOLUTION INTRAVENOUS at 05:02

## 2017-02-05 RX ADMIN — NICARDIPINE HYDROCHLORIDE 7.5 MG/HR: 0.2 INJECTION, SOLUTION INTRAVENOUS at 11:02

## 2017-02-05 RX ADMIN — ALBUTEROL SULFATE 2.5 MG: 2.5 SOLUTION RESPIRATORY (INHALATION) at 08:02

## 2017-02-05 RX ADMIN — ONDANSETRON 8 MG: 2 INJECTION INTRAMUSCULAR; INTRAVENOUS at 05:02

## 2017-02-05 RX ADMIN — Medication 3 ML: at 06:02

## 2017-02-05 RX ADMIN — POTASSIUM CHLORIDE 40 MEQ: 200 INJECTION, SOLUTION INTRAVENOUS at 05:02

## 2017-02-05 RX ADMIN — PROMETHAZINE HYDROCHLORIDE 12.5 MG: 25 INJECTION, SOLUTION INTRAMUSCULAR; INTRAVENOUS at 06:02

## 2017-02-05 RX ADMIN — ASPIRIN 325 MG: 325 TABLET, DELAYED RELEASE ORAL at 08:02

## 2017-02-05 RX ADMIN — FUROSEMIDE 20 MG/HR: 10 INJECTION, SOLUTION INTRAMUSCULAR; INTRAVENOUS at 05:02

## 2017-02-05 RX ADMIN — OXYCODONE HYDROCHLORIDE 10 MG: 5 TABLET ORAL at 02:02

## 2017-02-05 RX ADMIN — PROMETHAZINE HYDROCHLORIDE 12.5 MG: 25 INJECTION, SOLUTION INTRAMUSCULAR; INTRAVENOUS at 09:02

## 2017-02-05 RX ADMIN — MUPIROCIN: 20 OINTMENT TOPICAL at 09:02

## 2017-02-05 RX ADMIN — WARFARIN SODIUM 2 MG: 2 TABLET ORAL at 05:02

## 2017-02-05 RX ADMIN — METOCLOPRAMIDE 10 MG: 5 INJECTION, SOLUTION INTRAMUSCULAR; INTRAVENOUS at 05:02

## 2017-02-05 NOTE — PLAN OF CARE
Problem: Patient Care Overview  Goal: Plan of Care Review  Dx: [facility] LVAD 2/1/17  Hx: Cardiomyopathy, Two brothers have had heart transplant, occasional smoker     1/27/17: IABP placed  2/1/17: LVAD, admitted to SICU, 1.5L albumin, chest remains open   2/2/17: IABP d/ced, chest closed, extubated  2/3/17: douglas d/c; OOBTC  2/4/17: epi off; chest tube #2 removed; OOBTC      Nursing:   Goal MAP 60-80  pTT goal 40-50  accuchecks q6  PTT, BMP, Mag q6  Heartware speed at 2900     Outcome: Ongoing (interventions implemented as appropriate)  No acute events overnight. Vital signs stable (refer to flowsheets). Pt remains awake and oriented overnight with nausea intermittently. Cardene currently at 7.5 to maintain MAP 60-80. Lasix drip increased to 20ml/hr due to elevated CVPs and drop in urine output. Dobutamine currently @ 4 and Heparin increased to 1200 with the latest aPTT of 34.7 (sub therapeutic) . Nitric remains at 2 PPM. Pain controlled with PRN Oxy. LVAD flows 2900s. Plan of care reviewed with patient and spouse. Will continue to monitor.

## 2017-02-05 NOTE — PT/OT/SLP PROGRESS
"Physical Therapy  Treatment    Mert Samayoa   MRN: [id number]   Admitting Diagnosis: Chronic combined systolic and diastolic heart failure    PT Received On: 17  PT Start Time: 1408     PT Stop Time: 1430    PT Total Time (min): 22 min       Billable Minutes:  Therapeutic Activity 12 and Therapeutic Exercise 10    Treatment Type: Treatment  PT/PTA: PT       General Precautions: Standard, LVAD, fall, sternal    Do you have any cultural, spiritual, Confucianism conflicts, given your current situation?: none     Subjective:  Communicated with RN prior to session.  Pt asked "Be honest: I am doing as good as I am supposed to be?" PT provided encouragement that pt is progress with therapy as expected.     Pain Ratin/10  Location - Side: Left  Location - Orientation:  (shoulder, sternum)  Pain Addressed: Pre-medicate for activity, Reposition  Pain Rating Post-Intervention: 7/10    Objective:   Patient found with: arterial line, blood pressure cuff, central line, chest tube, kirk catheter, oxygen, peripheral IV, pulse ox (continuous), telemetry (HW LVAD)    Functional Mobility:  Bed Mobility:   Supine to Sit: Moderate Assistance    Transfers:  Sit <> Stand Assistance: Minimum Assistance  Sit <> Stand Assistive Device: No Assistive Device  Bed <> Chair Technique: Stand Pivot  Bed <> Chair Assistance: Contact Guard Assistance  Bed <> Chair Assistive Device: No Assistive Device    Gait:   Gait Distance: 5 steps to chair  Assistance 1: Contact Guard Assistance  Gait Assistive Device: No device  Gait Pattern: swing-to gait  Gait Deviation(s): decreased zarina, increased time in double stance, decreased velocity of limb motion, decreased step length, decreased weight-shifting ability   Further gait not performed due to RN report of elevated CVP today and pt on nitric.     Balance:   Static Sit: FAIR+: Able to take MINIMAL challenges from all directions  Dynamic Sit: FAIR+: Maintains balance through MINIMAL " excursions of active trunk motion  Static Stand: FAIR: Maintains without assist but unable to take challenges  Dynamic stand: FAIR: Needs CONTACT GUARD during gait     Therapeutic Activities and Exercises:  Seated marching, LAQ and resisted hip abduction x 20 each.   Pt educated on LVAD controller bag and need to have strap attached to body for mobility.      AM-PAC 6 CLICK MOBILITY  How much help from another person does this patient currently need?   1 = Unable, Total/Dependent Assistance  2 = A lot, Maximum/Moderate Assistance  3 = A little, Minimum/Contact Guard/Supervision  4 = None, Modified [place]/Independent    Turning over in bed (including adjusting bedclothes, sheets and blankets)?: 2  Sitting down on and standing up from a chair with arms (e.g., wheelchair, bedside commode, etc.): 3  Moving from lying on back to sitting on the side of the bed?: 2  Moving to and from a bed to a chair (including a wheelchair)?: 3  Need to walk in hospital room?: 3  Climbing 3-5 steps with a railing?: 1  Total Score: 14    AM-PAC Raw Score CMS G-Code Modifier Level of Impairment Assistance   6 % Total / Unable   7 - 9 CM 80 - 100% Maximal Assist   10 - 14 CL 60 - 80% Moderate Assist   15 - 19 CK 40 - 60% Moderate Assist   20 - 22 CJ 20 - 40% Minimal Assist   23 CI 1-20% SBA / CGA   24 CH 0% Independent/ Mod I     Patient left up in chair with all lines intact, call button in reach and RN notified. Pt encouraged to perform LE and hand strengthening HEP daily.     Assessment:  Mert Samayoa is a 27 y.o. male with a medical diagnosis of Chronic combined systolic and diastolic heart failure s/p LVAD and presents with decreased endurance and limited independence with mobility. Pt is steadily improving with therapy and is motivated to participate. He would benefit from continued PT.    Rehab identified problem list/impairments: Rehab identified problem list/impairments: weakness, impaired endurance, gait  instability, impaired functional mobilty, impaired balance, pain, impaired cardiopulmonary response to activity    Rehab potential is good.    Activity tolerance: Good    Discharge recommendations:   Home with HHPT    Barriers to discharge: Barriers to Discharge: Inaccessible home environment    Equipment recommendations: Equipment Needed After Discharge: none     GOALS:   Physical Therapy Goals        Problem: Physical Therapy Goal    Goal Priority Disciplines Outcome Goal Variances Interventions   Physical Therapy Goal     PT/OT, PT Ongoing (interventions implemented as appropriate)     Description:  Goals to be met by: 3/1/17     Patient will increase functional independence with mobility by performin. Supine to sit with [place]  2. Sit to stand transfer with [place]  3. Gait  x 400 feet with Supervision.   4. Ascend/descend 2 stair with bilateral Handrails Supervision.   5. Lower extremity exercise program x15 reps per handout, with assistance as needed.  6. [place] with LVAD management.                 PLAN:    Patient to be seen 6 x/week  to address the above listed problems via gait training, therapeutic activities, therapeutic exercises  Plan of Care expires: 17  Plan of Care reviewed with: patient, spouse     Jessica LeJeune, PT, DPT  [phone]

## 2017-02-05 NOTE — PROGRESS NOTES
Dr. Limon notified of CVP=19, UO=/hr, Sodium level =129. Orders to start epi at 0.02mcg/kg/min received and initiated.

## 2017-02-05 NOTE — PROGRESS NOTES
Dr. Macedo @ bedside. Updated on pt. vitals and lab values. Ordered to increase Lasix drip to 20; will initiate at this time.

## 2017-02-05 NOTE — PROGRESS NOTES
"[facility]  Endocrinology  Progress Note    Admit Date: 1/27/2017     Reason for Consult: Management of  Hyperglycemia     Surgical Procedure and Date:  2/1/17 s/p LVAD    HPI: 28 y/o WM with familial DCM, 2 brothers with OHTx,  Now s/p LVAD. Now with stress induced hyperglycemia. No prior diagnosis of T2DM.       Interval HPI:   Overnight events: none  Eating:   <25%  Nausea: No  Hypoglycemia and intervention: No  Fever: No  TPN and/or TF: No  If yes, type of TF/TPN and rate: n/a    Visit Vitals    BP (!) 75/0 (BP Location: Left arm, BP Method: Doppler)    Pulse 99    Temp 98.8 °F (37.1 °C) (Oral)    Resp (!) 26    Ht 5' 7" (1.702 m)    Wt 69.7 kg (153 lb 10.6 oz)    SpO2 100%    BMI 24.07 kg/m2       Labs Reviewed and Include      Recent Labs  Lab 02/05/17  0400   *   CALCIUM 9.0   ALBUMIN 3.6   PROT 7.1   *   K 3.7   CO2 34*   CL 85*   BUN 22*   CREATININE 1.0   ALKPHOS 89   ALT 32   AST 63*   BILITOT 0.6     Lab Results   Component Value Date    WBC 11.88 02/05/2017    HGB 8.6 (L) 02/05/2017    HCT 25.4 (L) 02/05/2017    MCV 87 02/05/2017     (L) 02/05/2017     No results for input(s): TSH, FREET4 in the last 168 hours.  Lab Results   Component Value Date    HGBA1C 5.4 01/17/2017       Nutritional status:   Body mass index is 24.07 kg/(m^2).  Lab Results   Component Value Date    ALBUMIN 3.6 02/05/2017    ALBUMIN 3.7 02/04/2017    ALBUMIN 4.0 02/03/2017    ALBUMIN 4.0 02/03/2017     Lab Results   Component Value Date    PREALBUMIN 17 (L) 02/02/2017    PREALBUMIN 26 01/17/2017       Estimated Creatinine Clearance: 103.7 mL/min (based on Cr of 1).    Accu-Checks  Recent Labs      02/03/17   0527  02/03/17   0632  02/03/17   0730  02/03/17   0909  02/03/17   1224  02/03/17   1757  02/03/17   2320  02/04/17   0618  02/04/17   1159  02/04/17   1828   POCTGLUCOSE  156*  152*  137*  146*  139*  128*  134*  122*  134*  130*       Current Medications and/or Treatments " Impacting Glycemic Control  Immunotherapy:  Immunosuppressants     None        Steroids:   Hormones     None        Pressors:    Autonomic Drugs     Start     Stop Route Frequency Ordered    02/01/17 1500  EPINEPHrine (ADRENALIN) 4 mg in sodium chloride 0.9% 250 mL infusion     Question Answer Comment   Titrate by: (in mcg/kg/min) 0.01    Titrate interval: (in minutes) 5    Titrate to maintain: (SBP or MAP or Cardiac Index) MAP    Greater than: (in mmHg) 60    Maximum dose: (in mcg/kg/min) 2        -- IV Continuous 02/01/17 1405        Hyperglycemia/Diabetes Medications: Antihyperglycemics     None          ASSESSMENT and PLAN    Hyperglycemia  BG goal 140-180. continue poct glucose ac/hs monitoring  Low dose ssi    * Chronic combined systolic and diastolic heart failure  S/p LVAD    LVAD (left ventricular assist device) present  managed per CTS/HTS    Will sign off, please call with further questions    Brayden Pelayo MD  Endocrinology  [facility]

## 2017-02-05 NOTE — PROGRESS NOTES
Dr. Macedo @ bedside. Updated on pt current vitals/labs as well as pt refusal to get out of bed this am. Ordered to increase Heparin drip to 1400 units; will initiate at this time.

## 2017-02-05 NOTE — SUBJECTIVE & OBJECTIVE
"Interval HPI:   Overnight events: none  Eating:   <25%  Nausea: No  Hypoglycemia and intervention: No  Fever: No  TPN and/or TF: No  If yes, type of TF/TPN and rate: n/a    Visit Vitals    BP (!) 75/0 (BP Location: Left arm, BP Method: Doppler)    Pulse 99    Temp 98.8 °F (37.1 °C) (Oral)    Resp (!) 26    Ht 5' 7" (1.702 m)    Wt 69.7 kg (153 lb 10.6 oz)    SpO2 100%    BMI 24.07 kg/m2       Labs Reviewed and Include      Recent Labs  Lab 02/05/17  0400   *   CALCIUM 9.0   ALBUMIN 3.6   PROT 7.1   *   K 3.7   CO2 34*   CL 85*   BUN 22*   CREATININE 1.0   ALKPHOS 89   ALT 32   AST 63*   BILITOT 0.6     Lab Results   Component Value Date    WBC 11.88 02/05/2017    HGB 8.6 (L) 02/05/2017    HCT 25.4 (L) 02/05/2017    MCV 87 02/05/2017     (L) 02/05/2017     No results for input(s): TSH, FREET4 in the last 168 hours.  Lab Results   Component Value Date    HGBA1C 5.4 01/17/2017       Nutritional status:   Body mass index is 24.07 kg/(m^2).  Lab Results   Component Value Date    ALBUMIN 3.6 02/05/2017    ALBUMIN 3.7 02/04/2017    ALBUMIN 4.0 02/03/2017    ALBUMIN 4.0 02/03/2017     Lab Results   Component Value Date    PREALBUMIN 17 (L) 02/02/2017    PREALBUMIN 26 01/17/2017       Estimated Creatinine Clearance: 103.7 mL/min (based on Cr of 1).    Accu-Checks  Recent Labs      02/03/17   0527  02/03/17   0632  02/03/17   0730  02/03/17   0909  02/03/17   1224  02/03/17   1757  02/03/17   2320  02/04/17   0618  02/04/17   1159  02/04/17   1828   POCTGLUCOSE  156*  152*  137*  146*  139*  128*  134*  122*  134*  130*       Current Medications and/or Treatments Impacting Glycemic Control  Immunotherapy:  Immunosuppressants     None        Steroids:   Hormones     None        Pressors:    Autonomic Drugs     Start     Stop Route Frequency Ordered    02/01/17 1500  EPINEPHrine (ADRENALIN) 4 mg in sodium chloride 0.9% 250 mL infusion     Question Answer Comment   Titrate by: (in mcg/kg/min) 0.01  "   Titrate interval: (in minutes) 5    Titrate to maintain: (SBP or MAP or Cardiac Index) MAP    Greater than: (in mmHg) 60    Maximum dose: (in mcg/kg/min) 2        -- IV Continuous 02/01/17 1405        Hyperglycemia/Diabetes Medications: Antihyperglycemics     None

## 2017-02-05 NOTE — PROGRESS NOTES
Daily E and M and VAD Interrogation Note    Reason for Visit:  Patient is seen in follow up for management of:  [] HeartMate II  [x] Heartware [] Total artificial heart       [] ECMO           [] Other     Interval History:  [] Interval history unobtainable due to intubation.  The [] implant/[] explant date was    Nausea improved  Pain controlled.   VAD flows ~7, no RI events    Review of Systems:  Positive for nausea/retching      Medications:  Current Facility-Administered Medications   Medication Dose Route Frequency Provider Last Rate Last Dose    0.9%  NaCl infusion   Intravenous Continuous Urszula Tuttle NP   Stopped at 02/01/17 1445    albumin human 5% bottle 500 mL  500 mL Intravenous PRN Urszula Tuttle NP        albuterol sulfate nebulizer solution 2.5 mg  2.5 mg Nebulization Q4H Urszula Tuttle NP   2.5 mg at 02/05/17 0822    albuterol sulfate nebulizer solution 2.5 mg  2.5 mg Nebulization Q4H PRN Urszula Tuttle NP        amlodipine tablet 5 mg  5 mg Oral Daily Brayden Limon MD   5 mg at 02/05/17 0811    aspirin EC tablet 325 mg  325 mg Oral Daily Urszula Tuttle NP   325 mg at 02/05/17 0811    bisacodyl suppository 10 mg  10 mg Rectal Daily PRN Urszula Tuttle NP        bisacodyl suppository 10 mg  10 mg Rectal Once Brayden Limon MD   Stopped at 02/04/17 1015    dextrose 50% injection 12.5 g  12.5 g Intravenous PRN Roxanna Nieves, APRN,ANP-C        DOBUTamine 500mg in D5W 250mL infusion (premix) (NON-TITRATING)  4 mcg/kg/min Intravenous Continuous Jana Cain MD 7.6 mL/hr at 02/05/17 0900 4 mcg/kg/min at 02/05/17 0900    docusate sodium capsule 200 mg  200 mg Oral QHS Urszula Tuttle NP   200 mg at 02/04/17 2030    EPINEPHrine (ADRENALIN) 4 mg in sodium chloride 0.9% 250 mL infusion  0.08 mcg/kg/min Intravenous Continuous Urszula Tuttle NP   Stopped at 02/04/17 0900    ferrous gluconate tablet 324 mg  324 mg Oral Daily with breakfast  Urszula Tuttle NP   324 mg at 02/05/17 0822    furosemide (LASIX) 250 mg in sodium chloride 0.9 % 250 mL infusion (non-titrating)  20 mg/hr Intravenous Continuous Jana Cain MD 20 mL/hr at 02/05/17 0900 20 mg/hr at 02/05/17 0900    glucagon (human recombinant) injection 1 mg  1 mg Intramuscular PRN Shai Ortega MD        glucagon (human recombinant) injection 1 mg  1 mg Intramuscular PRN Roxanna Nieves APRN,ANP-C        heparin 25,000 units in dextrose 5% 250 mL (100 units/mL) infusion (heparin infusion)  1,100 Units/hr Intravenous Continuous Jana Cain MD 14 mL/hr at 02/05/17 0900 1,400 Units/hr at 02/05/17 0900    hydromorphone injection 0.5 mg  0.5 mg Intravenous Q3H PRN Dora Mitchell MD        magnesium hydroxide 400 mg/5 ml suspension 2,400 mg  30 mL Oral Daily PRN Urszula Tuttle NP        magnesium sulfate 2g in water 50mL IVPB (premix)  2 g Intravenous PRN Urszula Tuttle NP   2 g at 02/05/17 0300    metoclopramide HCl injection 10 mg  10 mg Intravenous Q6H Lola Dinero MD   10 mg at 02/05/17 0515    mupirocin 2 % ointment   Nasal BID Urszula Tuttle NP        niCARdipine 40 mg/200 mL infusion  5 mg/hr Intravenous Continuous Urszula Tuttle NP 37.5 mL/hr at 02/05/17 0800 7.5 mg/hr at 02/05/17 0800    nitric oxide gas Gas 10 ppm  10 ppm Inhalation Continuous Lex Macedo MD   20 ppm at 02/02/17 1029    ondansetron injection 4 mg  4 mg Intravenous Q8H PRN Dora Mitchell MD   4 mg at 02/02/17 2218    ondansetron injection 8 mg  8 mg Intravenous Q8H Lola Dinero MD   8 mg at 02/05/17 0515    oxycodone immediate release tablet 10 mg  10 mg Oral Q4H PRN Urszula Tuttle NP   10 mg at 02/05/17 0515    oxycodone immediate release tablet 5 mg  5 mg Oral Q4H PRN Urszula Tuttle NP   5 mg at 02/05/17 0207    pantoprazole EC tablet 40 mg  40 mg Oral Daily Urszula Tuttle NP   40 mg at 02/05/17 0811     polyethylene glycol packet 17 g  17 g Oral BID Brayden Limon MD   17 g at 02/05/17 0811    potassium chloride 20 mEq in 100 mL IVPB (FOR CENTRAL LINE ADMINISTRATION ONLY)  40 mEq Intravenous PRN Urszula Tuttle, NP 50 mL/hr at 02/05/17 0300 40 mEq at 02/05/17 0300    potassium chloride 20 mEq in 100 mL IVPB (FOR CENTRAL LINE ADMINISTRATION ONLY)  40 mEq Intravenous PRN Urszula Tuttle NP 50 mL/hr at 02/04/17 1700 40 mEq at 02/04/17 1700    promethazine (PHENERGAN) 12.5 mg in dextrose 5 % 50 mL IVPB  12.5 mg Intravenous Q4H Lola Dinero  mL/hr at 02/05/17 0930 12.5 mg at 02/05/17 0930    sodium chloride 0.9% flush 3 mL  3 mL Intravenous Q8H Urszula Tuttle, NP   3 mL at 02/05/17 0600    warfarin (COUMADIN) tablet 2 mg  2 mg Oral Once Lex Macedo MD           Physical Examination:  Vital Signs:   Vitals:    02/05/17 0900   BP:    Pulse: 100   Resp: 20   Temp:      Cardiovascular:  [x] Regular rate and rhythm [] Irregular []  None (COURTNEY) []  Other  []  No edema []  Edema present  []  Clear to auscultation  []  Rales to []  Coarse  []  No rales but   [] Pedal Pulses absent  []  Pulses  + throughout  Skin:  Incision is []  Clean, dry and intact.  []  Other   Sternum:  []  Stable []  Unstable  Driveline(s):   []  Clean, dry and intact. []  Other       Labs:  BMP  Lab Results   Component Value Date     (L) 02/05/2017    K 3.7 02/05/2017    CL 85 (L) 02/05/2017    CO2 34 (H) 02/05/2017    BUN 22 (H) 02/05/2017    CREATININE 1.0 02/05/2017    CALCIUM 9.0 02/05/2017    ANIONGAP 13 02/05/2017    ESTGFRAFRICA >60.0 02/05/2017    EGFRNONAA >60.0 02/05/2017       Magnesium   Date Value Ref Range Status   02/05/2017 1.9 1.6 - 2.6 mg/dL Final       Lab Results   Component Value Date    WBC 11.88 02/05/2017    HGB 8.6 (L) 02/05/2017    HCT 25.4 (L) 02/05/2017    MCV 87 02/05/2017     (L) 02/05/2017       Lab Results   Component Value Date    INR 1.0 02/05/2017    INR 1.1 02/04/2017     INR 1.2 02/02/2017       BNP   Date Value Ref Range Status   02/03/2017 709 (H) 0 - 99 pg/mL Final     Comment:     Values of less than 100 pg/ml are consistent with non-CHF populations.   01/20/2017 101 (H) 0 - 99 pg/mL Final     Comment:     Values of less than 100 pg/ml are consistent with non-CHF populations.   01/18/2017 101 (H) 0 - 99 pg/mL Final     Comment:     Values of less than 100 pg/ml are consistent with non-CHF populations.       LD   Date Value Ref Range Status   02/05/2017 448 (H) 110 - 260 U/L Final     Comment:     Results are increased in hemolyzed samples.   02/04/2017 482 (H) 110 - 260 U/L Final     Comment:     Results are increased in hemolyzed samples.   02/03/2017 568 (H) 110 - 260 U/L Final     Comment:     Results are increased in hemolyzed samples.       X-Rays:  [x]  I reviewed today's Chest x-ray    Procedure:  Device Interrogation including analysis of device parameters.  Current Settings   [x]  Ventricular Assist Device  []  Total Artificial Heart interrogated  Review of device function is []  Stable []  Other   TXP LVAD INTERROGATIONS 2/5/2017 2/5/2017 2/5/2017 2/5/2017 2/5/2017 2/5/2017 2/5/2017   Type Heartware Heartware Heartware Heartware Heartware Heartware Heartware   Flow 7.7 7.9 7.5 7.9 7.9 7.5 7.8   Speed 2900 2900 2900 2900 2900 2900 2900   Power ([place]) 5.5 5.5 5.4 5.7 5.6 5.5 5.6   Low Flow Alarm - - - - - - -   High Power Alarm - - - - - - -   Pulsatility - - - - - - -       Assessment:  [x]  Primary Cardiomyopathy []  Congestive Heart Failure   []  Atrial Fibrillation []  Ventricular Tachycardia   []  Aftercare cardiac device []  Long term (current) use of anticoagulants   []  Ventilator-associated pneumonia []  Pneumonia viral, unspecified   []  Pneumonia, bacterial, unspecified []  Pneumonia, organism unspecified   []  Hemorrhage of GI tract, unspecified    []  Nosebleed []  Driveline infection   []  Infection VAD device []  New onset of    []        Plan:  [x]   Interval history obtained from ICU attending team member during rounding today  []  VAD/COURTNEY teaching performed with patient  []  Mobilization / Physical Therapy ongoing  []  Anticoagulation []  Ongoing []  Held  []  Studies ordered  []      Plan:    Neuro:  - PO pain medication PRN, A/Ox4    Resp:  - Increased nitric this AM  - IS/pulmonary toilet  - Daily CXR  - 1 CT remaining    CVS:  - Dobutamine 4  - Epi off, may need to resume if RV remains stunned, CVP 18-20    - Cardene gtt PRN  - Continue heparin  goal aptt 40-50  - Coumadin 2 mg today, Daily INR  - Daily ASA      FENGI:  - Scheduled anti-emetic - Zofran; phenergan and Reglan PRN  - Advance diet slowly  - Repeat suppository PRN, bowel regimen  - Replace lytes PRN    Renal:  - Castillo for strict I/O  - Trend BUN/Cr  - Lasix gtt 20, net negative -1L yesterday    Endo:  - SSI Insulin     Heme/ID:  - Off post op antibiotics, continue to monitor WBC and Hct    Dispo:  - Continue ICU care, wean off epi, aggressive bowel regimen, Echo for CVP increase      Total time spent was 30 minutes.  Of which more than 50 percent of the care dominated counseling and coordinating care with different team members. The VAD was interrogated and all parameters were WNL and no significant findings were found in the history. All these findings are documented in the note above.      Date of Service: 02/05/2017

## 2017-02-05 NOTE — PROGRESS NOTES
History & Physical  Surgical Intensive Care    SUBJECTIVE:     Chief Complaint/Reason for Admission: LVAD    History of Present Illness:  Patient is a 27 y.o. male with DCM that presents to the SICU intubated and sedated s/p LVAD and IABP placement. Balloon pump discontinued + chest closure + extubated 2/2/2017.    Interval: Overnight patient experienced nausea refractory to zofran, phenergan, and benadryl with small volume of clear vomitus.     PTA Medications   Medication Sig    DOBUTAMINE HCL (DOBUTAMINE IV) Inject into the vein.    furosemide (LASIX) 20 MG tablet Take 1 tablet (20 mg total) by mouth once daily.    lisinopril 10 MG tablet TAKE ONE TABLET BY MOUTH ONCE DAILY IN THE EVENING       Review of patient's allergies indicates:  No Known Allergies    Past Medical History   Diagnosis Date    Cardiogenic shock 1/16/2017    Cardiomyopathy      Familial cardiomyopathy    CHF (congestive heart failure)     Essential hypertension 12/21/2016    Familial Cardiomyopathy      Familial cardiomyopathy      History reviewed. No pertinent past surgical history.  Family History   Problem Relation Age of Onset    Arthritis Mother     Heart disease Brother      cardiomyopathy and heart transplant    No Known Problems Father     Heart disease Brother      cardiomyopathy and heart transplanted twice    Birth defects Paternal Uncle      Social History   Substance Use Topics    Smoking status: Current Some Day Smoker     Packs/day: 1.00    Smokeless tobacco: None    Alcohol use 2.4 oz/week     4 Shots of liquor per week        Review of Systems:  Unable to obtain as patient is sedated.    OBJECTIVE:     Vital Signs (Most Recent)  Temp: 98.3 °F (36.8 °C) (02/05/17 0300)  Pulse: 103 (02/05/17 0530)  Resp: (!) 31 (02/05/17 0530)  BP: (!) 75/0 (02/04/17 2300)  SpO2: 97 % (02/05/17 0530)  Ventilator Data (Last 24H):          Hemodynamic Parameters (Last 24H):       Physical Exam:  General: well developed, well  nourished, resting comfortably in bed  Neurologic: sedated  Lungs: extubated  Cardiovascular: Heart: regular rate and rhythm.  Abd: ND  Extremities: no cyanosis or edema, or clubbing.       Lines/Drains:       Introducer 02/02/17 0553 (Active)   Number of days:0            Pulmonary Artery Catheter Assessment  02/02/17 0553 (Active)   Number of days:0            Pulmonary Artery Catheter Assessment  02/01/17 0835 (Active)   Dressing biopatch in place;dressing dry and intact 2/2/2017  3:00 AM   Securement secured w/ sutures 2/2/2017  3:00 AM   Current Insertion Depth (cm) 45 cm 2/2/2017  3:00 AM   Phlebitis 0-->no symptoms 2/2/2017  3:00 AM   Infiltration 0-->no symptoms 2/2/2017  3:00 AM   Waveform normal 2/2/2017  3:00 AM   Pressure Catheter Interventions line leveled/zeroed 2/2/2017  3:00 AM   Prox Injectate Status Infusing 2/2/2017  3:00 AM   Prox Infusate Status Infusing 2/2/2017  3:00 AM   Distal Status Infusing 2/2/2017  3:00 AM   Pressure Catheter Tubing Change Due 02/05/17 2/1/2017  7:00 PM   Daily Line Review Performed 2/2/2017  3:00 AM   Number of days:0            Percutaneous Central Line Insertion/Assessment - triple lumen  02/02/17 0553 (Active)   Number of days:0            Peripheral IV - Single Lumen Right Hand (Active)   Site Assessment Clean;Intact;Dry 2/2/2017  3:00 AM   Line Status Infusing 2/2/2017  3:00 AM   Dressing Status Clean;Dry;Intact 2/2/2017  3:00 AM   Dressing Change Due 02/02/17 2/2/2017  3:00 AM   Site Change Due 02/02/17 2/1/2017  7:00 PM   Reason Not Rotated Not due 2/2/2017  3:00 AM   Number of days:            Peripheral IV - Single Lumen 02/01/17 0702 Right Antecubital (Active)   Site Assessment Clean;Dry;Intact 2/2/2017  3:00 AM   Line Status Infusing 2/2/2017  3:00 AM   Dressing Status Clean;Dry;Intact 2/2/2017  3:00 AM   Dressing Intervention New dressing 2/1/2017  6:00 AM   Dressing Change Due 02/05/17 2/2/2017  3:00 AM   Site Change Due 02/05/17 2/1/2017  7:00 PM   Reason  Not Rotated Not due 2/2/2017  3:00 AM   Number of days:1            Peripheral IV - Single Lumen 02/01/17 0830 Left Hand (Active)   Site Assessment Clean;Intact;Dry 2/2/2017  3:00 AM   Line Status Infusing 2/2/2017  3:00 AM   Dressing Status Clean;Dry;Intact 2/2/2017  3:00 AM   Dressing Intervention New dressing 2/2/2017  3:00 AM   Dressing Change Due 02/05/17 2/2/2017  3:00 AM   Site Change Due 02/05/17 2/1/2017  7:00 PM   Reason Not Rotated Not due 2/2/2017  3:00 AM   Number of days:0            Arterial Line 02/01/17 0815 Left Radial (Active)   Site Assessment Clean;Dry;Intact 2/2/2017  3:00 AM   Line Status Pulsatile blood flow 2/2/2017  3:00 AM   Art Line Waveform Appropriate 2/2/2017  3:00 AM   Arterial Line Interventions Zeroed and calibrated;Leveled;Flushed per protocol;Line pulled back;Connections checked and tightened 2/2/2017  3:00 AM   Color/Movement/Sensation Capillary refill less than 3 sec 2/2/2017  3:00 AM   Dressing Type Transparent 2/2/2017  3:00 AM   Dressing Status Clean;Dry;Intact 2/2/2017  3:00 AM   Dressing Intervention New dressing 2/2/2017  3:00 AM   Dressing Change Due 02/05/17 2/2/2017  3:00 AM   Number of days:0            VAD 02/01/17 1042 Heartware (Active)   Site Location Abdomen right 2/2/2017  3:00 AM   Site Assessment Clean;Dry;Intact 2/2/2017  3:00 AM   Dressing Status Clean;Dry;Intact 2/2/2017  3:00 AM   Dressing Intervention New dressing 2/2/2017  3:00 AM   Power Source External DC 2/2/2017  3:00 AM   Equipment inventory at  Admission 2/1/2017 10:00 AM   Pump type Heartware 2/1/2017  7:00 PM   Battery 1 [id number] 2/1/2017 10:00 AM   Battery 2 [id number] 2/1/2017 10:00 AM   Battery 3 [id number] 2/1/2017 10:00 AM   Battery 4 [id number] 2/1/2017 10:00 AM   Battery 5 [id number] 2/1/2017 10:00 AM   Battery 6 [id number] 2/1/2017 10:00 AM   AC Adapter 1 [id number] 2/1/2017 10:00 AM   AC Adapter 2 [id number] 2/1/2017 10:00 AM   Battery Charger [id number] 2/1/2017 10:00 AM   Primary Controller  [id number] 2/1/2017 10:00 AM   Extra Controller [id number] 2/1/2017 10:00 AM   Number of days:0            Chest Tube 02/01/17 1129 1 Mediastinal 32 Fr. (Active)   Chest Tube WDL WDL 2/1/2017  7:00 PM   Function -20 cm H2O 2/2/2017  3:00 AM   Air Leak/Fluctuation air leak not present;fluctuation not present;connections tightened;dependent drainage cleared 2/2/2017  3:00 AM   Safety all tubing connections taped;all connections secured;suction checked 2/2/2017  3:00 AM   Securement tubing anchored to body distal to insertion site w/ tape 2/2/2017  3:00 AM   Patency Intervention Tip/tilt 2/2/2017  3:00 AM   Drainage Description Dark red 2/2/2017  3:00 AM   Dressing Appearance occlusive gauze dressing intact;clean and dry;w/ dried drainage 2/2/2017  3:00 AM   Left Subcutaneous Emphysema none present 2/2/2017  3:00 AM   Right Subcutaneous Emphysema none present 2/2/2017  3:00 AM   Surrounding Skin Unable to view 2/2/2017  3:00 AM   Output (mL) 20 mL 2/2/2017  4:00 AM   Number of days:0            Chest Tube 02/01/17 1130 2 Mediastinal 32 Fr. (Active)   Chest Tube [facility] 2/1/2017  7:00 PM   Function -20 cm H2O 2/2/2017  3:00 AM   Air Leak/Fluctuation air leak not present;fluctuation not present;connections tightened;dependent drainage cleared 2/2/2017  3:00 AM   Safety all tubing connections taped;all connections secured;suction checked 2/2/2017  3:00 AM   Securement tubing anchored to body distal to insertion site w/ tape 2/2/2017  3:00 AM   Patency Intervention Tip/tilt 2/2/2017  3:00 AM   Drainage Description Dark red 2/2/2017  3:00 AM   Dressing Appearance occlusive gauze dressing intact;clean and dry;w/ dried drainage 2/2/2017  3:00 AM   Left Subcutaneous Emphysema none present 2/2/2017  3:00 AM   Right Subcutaneous Emphysema none present 2/2/2017  3:00 AM   Surrounding Skin Unable to view 2/2/2017  3:00 AM   Output (mL) 10 mL 2/2/2017  4:00 AM   Number of days:0            NG/OG Tube 02/01/17 orogastric (Active)    Placement Check placement verified by x-ray 2/1/2017  7:00 PM   Tolerance no signs/symptoms of discomfort 2/1/2017  7:00 PM   Securement taped to commercial device 2/1/2017  7:00 PM   Clamp Status/Tolerance unclamped 2/1/2017  7:00 PM   Suction Setting/Drainage Method suction at;low;intermittent setting 2/1/2017  7:00 PM   Insertion Site Appearance no redness, warmth, tenderness, skin breakdown, drainage 2/1/2017  7:00 PM   Drainage Thin;Clear;Yellow 2/1/2017  7:00 PM   Flush/Irrigation flushed w/;water;no resistance met 2/1/2017  7:00 PM   Intake (mL) 45 mL 2/2/2017  5:30 AM   Tube Output(mL)(Include Discarded Residual) 130 mL 2/2/2017  5:50 AM   Number of days:1            Urethral Catheter 02/01/17 0840 Non-latex;Straight-tip;Temperature probe 16 Fr. (Active)   Site Assessment Clean;Intact 2/2/2017  3:00 AM   Collection Container Urimeter 2/2/2017  3:00 AM   Securement Method secured to upper leg w/ adhesive device 2/2/2017  3:00 AM   Catheter Care Performed yes 2/2/2017  3:00 AM   Reason for Continuing Urinary Catheterization Post operative;Critically ill in ICU requiring intensive monitoring 2/2/2017  3:00 AM   Output (mL) 45 mL 2/2/2017  6:00 AM   Number of days:0       Laboratory    Chest X-Ray 2/2/2017:Postoperative changes from an LVAD device placement remain without significant change.  Heart is at the upper limits of normal.  Lungs are clear.  The position of the endotracheal tube and the [place]-Ilir catheter remain in satisfactory position. There is mediastinal drains in unchanged position. The aortic balloon pump catheter is no longer visualized on the current study.    Diagnostic Results:      ASSESSMENT/PLAN:     Patient is a 27 y.o. male with DCM s/p LVAD and IABP placement 2/1/2017 and planned chest closure 2/2/2017.    Interval: Balloon pump discontinued + chest closure + extubated 2/2/2017. No issues overnight. Nausea much better.    Plan:  Neuro:    - alert  - morphine for pain  -  extubated    Pulmonary:   - 2L O2 NC  - nitric oxide 2 (from 5)     Cardiac:  - dobutamine 4 (unchanged)  - off epi  - cardene 7.5 (from 5)  - anticoag per primary now that chest is closed    Renal:   - BUN/Cr: 23/1.1 (from 18/1.2 yesterday and 12/0.09 the day prior)  - trend BUN/Cr  - cont kirk for strict I/O's  - UOP 4.7L    Fluids/Electrolytes/Nutrition/GI:   - lasix gtt  - MIVF  - replace lytes as needed  - NPO  - ABd XR 2/3/2017: dilated loops of bowel. Ileus vs SBO     Heme/ Infectious Disease:   - cont bactrim, diflucan, vanc    - WBC 11 (from 18)  - H/H drifting downward: 8.6/25.4 (from 9.3/27.7)  - INR 1.0  - aPTT 34.7 (from 31.9)  - hep gtt     Endocrine:  - insult gtt per protocol    Dispo:  - cont ICU care     Adrienne Calle MD  PGY-1  Surgical Intensive Care Unit

## 2017-02-05 NOTE — PLAN OF CARE
Problem: Physical Therapy Goal  Goal: Physical Therapy Goal  Goals to be met by: 3/1/17     Patient will increase functional independence with mobility by performin. Supine to sit with Jerome  2. Sit to stand transfer with Jerome  3. Gait x 400 feet with Supervision.   4. Ascend/descend 2 stair with bilateral Handrails Supervision.   5. Lower extremity exercise program x15 reps per handout, with assistance as needed.  6. Jerome with LVAD management.    Outcome: Ongoing (interventions implemented as appropriate)  Pt is progressing well toward goals.

## 2017-02-05 NOTE — PROGRESS NOTES
Progress Note  Heart Transplant Service    Admit Date: 1/27/2017   LOS: 9 days     SUBJECTIVE:     Follow up for: Chronic combined systolic and diastolic heart failure    Interval History: Pain and nausea a little better controlled today. Lasix gtt increased last night for elevated CVP. Still elevated this am and NO increased to 3 ppm    Scheduled Meds:   albuterol sulfate  2.5 mg Nebulization Q4H    amlodipine  5 mg Oral Daily    aspirin  325 mg Oral Daily    bisacodyl  10 mg Rectal Once    docusate sodium  200 mg Oral QHS    ferrous gluconate  324 mg Oral Daily with breakfast    metoclopramide HCl  10 mg Intravenous Q6H    mupirocin   Nasal BID    ondansetron  8 mg Intravenous Q8H    pantoprazole  40 mg Oral Daily    polyethylene glycol  17 g Oral BID    promethazine (PHENERGAN) IVPB  12.5 mg Intravenous Q4H    sodium chloride 0.9%  3 mL Intravenous Q8H    warfarin  2 mg Oral Once     Continuous Infusions:   sodium chloride 0.9% Stopped (02/01/17 1445)    DOBUTamine 4 mcg/kg/min (02/05/17 0600)    epinephrine infusion Stopped (02/04/17 0900)    furosemide (LASIX) 1 mg/mL infusion (non-titrating) 20 mg/hr (02/05/17 0600)    heparin (porcine) in 5 % dex 1,400 Units/hr (02/05/17 0600)    nicardipine 7.5 mg/hr (02/05/17 0600)    nitric oxide gas       PRN Meds:albumin human 5%, albuterol sulfate, bisacodyl, dextrose 50%, glucagon (human recombinant), glucagon (human recombinant), HYDROmorphone, insulin aspart, magnesium hydroxide 400 mg/5 ml, magnesium sulfate IVPB, ondansetron, oxycodone, oxycodone, potassium chloride, potassium chloride    Review of patient's allergies indicates:  No Known Allergies    OBJECTIVE:     Vital Signs (Most Recent)  Temp: 98.3 °F (36.8 °C) (02/05/17 0300)  Pulse: 103 (02/05/17 0530)  Resp: (!) 31 (02/05/17 0530)  BP: (!) 75/0 (02/04/17 2300)  SpO2: 97 % (02/05/17 0530)    Vital Signs Range (Last 24H):  Temp:  [97.1 °F (36.2 °C)-98.3 °F (36.8 °C)]   Pulse:  [101-110]    Resp:  [14-46]   BP: (74-84)/(0)   SpO2:  [96 %-100 %]   Arterial Line BP: (79-99)/(67-82)     I & O (Last 24H):    Intake/Output Summary (Last 24 hours) at 02/05/17 0728  Last data filed at 02/05/17 0600   Gross per 24 hour   Intake          3827.29 ml   Output             4705 ml   Net          -877.71 ml            Telemetry: sinus  Constitutional: AOx3, NAD conversant  Neck: No JVD present. No thyromegaly present.   Cardiovascular: VAD hum  Pulmonary/Chest:good breath sounds bilaterally  Abdominal: + BS, exhibits no distension. There is no tenderness. There is no rebound.   Extremities: no cyanosis, clubbing or edema.  No bleeding, edema, erythema or hematoma, doppler pulses in all distals    Labs:       Recent Labs  Lab 02/03/17 0400 02/04/17 0405 02/05/17 0400   WBC 21.67* 18.23* 11.88   HGB 10.6* 9.3* 8.6*   HCT 31.2* 27.7* 25.4*   * 138* 146*   LYMPH 3.2*  0.7* 5.2*  1.0 7.2*  0.9*   MONO 7.4  1.6* 8.3  1.5* 8.2  1.0   EOSINOPHIL 0.0 0.1 0.4         Recent Labs  Lab 02/02/17  2338  02/04/17 0405 02/04/17  0800 02/04/17  1831 02/05/17  0200 02/05/17  0400   APTT 28.3  < >  --  33.0*  33.0* 35.7* 34.7*  --    INR 1.2  --  1.1  --   --   --  1.0   < > = values in this interval not displayed.       Recent Labs  Lab 02/03/17  0400  02/04/17  0405  02/04/17  1959 02/05/17  0200 02/05/17  0400   *  < > 131*  131*  < > 130* 115* 111*   CALCIUM 9.3  < > 9.3  9.3  < > 9.1 9.0 9.0   ALBUMIN 4.0  4.0  --  3.7  --   --   --  3.6   PROT 6.8  6.8  --  6.9  --   --   --  7.1   *  < > 133*  133*  < > 133* 133* 132*   K 4.1  < > 4.0  4.0  < > 3.5 3.4* 3.7   CO2 23  < > 29  29  < > 31* 33* 34*   CL 98  < > 92*  92*  < > 89* 86* 85*   BUN 17  < > 23*  23*  < > 19 22* 22*   CREATININE 1.2  < > 1.1  1.1  < > 1.1 1.0 1.0   ALKPHOS 61  61  --  64  --   --   --  89   ALT 33  33  --  30  --   --   --  32   *  110*  --  80*  --   --   --  63*   BILITOT 0.9  0.9  --  0.7  --   --    --  0.6   MG 2.2  < > 2.1  2.1  < > 2.3 1.9 1.9   PHOS 4.3  --  3.6  3.6  --   --   --  3.5   < > = values in this interval not displayed.  Estimated Creatinine Clearance: 103.7 mL/min (based on Cr of 1).      Recent Labs  Lab 02/03/17  0400   *         Recent Labs  Lab 02/01/17  1235 02/02/17  0304 02/03/17  0733 02/04/17  0800 02/05/17  0400   * 449* 568* 482* 448*       Microbiology Results (last 7 days)     Procedure Component Value Units Date/Time    Blood culture (site 1) [[id number]] Collected:  01/27/17 1837    Order Status:  Completed Specimen:  Blood Updated:  02/01/17 2022     Blood Culture, Routine No growth after 5 days.    Narrative:       Site # 1, aerobic and anaerobic (central line, if patient has  one)    Blood culture (site 2) [[id number]] Collected:  01/27/17 1828    Order Status:  Completed Specimen:  Blood Updated:  02/01/17 2022     Blood Culture, Routine No growth after 5 days.    Narrative:       Site # 2, aerobic only            ASSESSMENT:     27 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 He was admitted for PICC removal, IABP, and planned LVAD implantation. He is s/p HeartWare LVAD placement 2/1 and chest closure 2/2. Extubated 2/2 and now recovering in ICU.    PLAN:     Cardiogenic Shock due to Acute on Chronic Systolic HF, DCM, NYHA FC IV s/p HW LVAD 2/1/2017  -s/p HW LVAD 2/1 and chest closure/extubation 2/2  -CTS primary   -Currently on  , cardene, lasix, off Epi and SVO2 60's  -Does not have ICD- will need to discuss timing of this vs Lifevest  - Anticoagulation per CTS: on Heparin  -cultures: NGTD    HTN  -BP controlled    Back Pain  -prn meds on board; will  adjust    Prophylaxis   -heparin

## 2017-02-06 LAB
ALBUMIN SERPL BCP-MCNC: 3.6 G/DL
ALLENS TEST: ABNORMAL
ALP SERPL-CCNC: 107 U/L
ALT SERPL W/O P-5'-P-CCNC: 34 U/L
ANION GAP SERPL CALC-SCNC: 12 MMOL/L
APTT BLDCRRT: 38.7 SEC
APTT BLDCRRT: 41 SEC
AST SERPL-CCNC: 51 U/L
B CELL RESULTS - XM AUTO: NEGATIVE
B MCS AVERAGE - XM AUTO: -7.5
BASOPHILS # BLD AUTO: 0.02 K/UL
BASOPHILS NFR BLD: 0.2 %
BILIRUB DIRECT SERPL-MCNC: 0.3 MG/DL
BILIRUB SERPL-MCNC: 0.6 MG/DL
BNP SERPL-MCNC: 1110 PG/ML
BUN SERPL-MCNC: 25 MG/DL
CALCIUM SERPL-MCNC: 9.1 MG/DL
CHLORIDE SERPL-SCNC: 88 MMOL/L
CO2 SERPL-SCNC: 30 MMOL/L
CREAT SERPL-MCNC: 1.3 MG/DL
CRP SERPL-MCNC: 208.5 MG/L
DELSYS: ABNORMAL
DIFFERENTIAL METHOD: ABNORMAL
EOSINOPHIL # BLD AUTO: 0.1 K/UL
EOSINOPHIL NFR BLD: 0.4 %
ERYTHROCYTE [DISTWIDTH] IN BLOOD BY AUTOMATED COUNT: 12.7 %
EST. GFR  (AFRICAN AMERICAN): >60 ML/MIN/1.73 M^2
EST. GFR  (NON AFRICAN AMERICAN): >60 ML/MIN/1.73 M^2
FLOW: 3
FXMAU TESTING DATE: NORMAL
GLUCOSE SERPL-MCNC: 116 MG/DL
HCO3 UR-SCNC: 29.7 MMOL/L (ref 24–28)
HCT VFR BLD AUTO: 23.7 %
HGB BLD-MCNC: 8 G/DL
HLA AB QL: NEGATIVE
HLA AB SERPL: NEGATIVE
HLATY INTERPRETATION: NORMAL
INR PPP: 1
LDH SERPL L TO P-CCNC: 444 U/L
LYMPHOCYTES # BLD AUTO: 1.2 K/UL
LYMPHOCYTES NFR BLD: 9.4 %
MAGNESIUM SERPL-MCNC: 2.3 MG/DL
MAGNESIUM SERPL-MCNC: 2.3 MG/DL
MAGNESIUM SERPL-MCNC: 3 MG/DL
MCH RBC QN AUTO: 29.4 PG
MCHC RBC AUTO-ENTMCNC: 33.8 %
MCV RBC AUTO: 87 FL
METHEMOGLOBIN: 0.8 % (ref 0–3)
MODE: ABNORMAL
MONOCYTES # BLD AUTO: 1.3 K/UL
MONOCYTES NFR BLD: 9.9 %
NEUTROPHILS # BLD AUTO: 10.3 K/UL
NEUTROPHILS NFR BLD: 79.8 %
PCO2 BLDA: 45 MMHG (ref 35–45)
PH SMN: 7.43 [PH] (ref 7.35–7.45)
PHOSPHATE SERPL-MCNC: 4.3 MG/DL
PLATELET # BLD AUTO: 157 K/UL
PMV BLD AUTO: 9.5 FL
PO2 BLDA: 99 MMHG (ref 80–100)
POC BE: 5 MMOL/L
POC SATURATED O2: 98 % (ref 95–100)
POC TCO2: 31 MMOL/L (ref 23–27)
POCT GLUCOSE: 113 MG/DL (ref 70–110)
POCT GLUCOSE: 158 MG/DL (ref 70–110)
POTASSIUM SERPL-SCNC: 2.8 MMOL/L
POTASSIUM SERPL-SCNC: 4.1 MMOL/L
POTASSIUM SERPL-SCNC: 4.4 MMOL/L
PROT SERPL-MCNC: 7.2 G/DL
PROTHROMBIN TIME: 10.7 SEC
RBC # BLD AUTO: 2.72 M/UL
SAMPLE: ABNORMAL
SCRFL TESTING DATE: NORMAL
SERUM COLLECTION DT - XM AUTO: NORMAL
SERUM COLLECTION DT: NORMAL
SITE: ABNORMAL
SODIUM SERPL-SCNC: 130 MMOL/L
T CELL RESULTS - XM AUTO: NEGATIVE
T MCS AVERAGE - XM AUTO: -4.75
WBC # BLD AUTO: 12.95 K/UL

## 2017-02-06 PROCEDURE — 83735 ASSAY OF MAGNESIUM: CPT | Mod: 91

## 2017-02-06 PROCEDURE — 99900035 HC TECH TIME PER 15 MIN (STAT)

## 2017-02-06 PROCEDURE — 25000003 PHARM REV CODE 250: Performed by: STUDENT IN AN ORGANIZED HEALTH CARE EDUCATION/TRAINING PROGRAM

## 2017-02-06 PROCEDURE — 63600175 PHARM REV CODE 636 W HCPCS: Performed by: STUDENT IN AN ORGANIZED HEALTH CARE EDUCATION/TRAINING PROGRAM

## 2017-02-06 PROCEDURE — 83050 HGB METHEMOGLOBIN QUAN: CPT

## 2017-02-06 PROCEDURE — 94799 UNLISTED PULMONARY SVC/PX: CPT

## 2017-02-06 PROCEDURE — 25000003 PHARM REV CODE 250: Performed by: THORACIC SURGERY (CARDIOTHORACIC VASCULAR SURGERY)

## 2017-02-06 PROCEDURE — 97116 GAIT TRAINING THERAPY: CPT

## 2017-02-06 PROCEDURE — 85025 COMPLETE CBC W/AUTO DIFF WBC: CPT

## 2017-02-06 PROCEDURE — 85730 THROMBOPLASTIN TIME PARTIAL: CPT

## 2017-02-06 PROCEDURE — 84132 ASSAY OF SERUM POTASSIUM: CPT | Mod: 91

## 2017-02-06 PROCEDURE — 97803 MED NUTRITION INDIV SUBSEQ: CPT

## 2017-02-06 PROCEDURE — 83735 ASSAY OF MAGNESIUM: CPT

## 2017-02-06 PROCEDURE — 99233 SBSQ HOSP IP/OBS HIGH 50: CPT | Mod: ,,, | Performed by: SURGERY

## 2017-02-06 PROCEDURE — 84132 ASSAY OF SERUM POTASSIUM: CPT

## 2017-02-06 PROCEDURE — 85610 PROTHROMBIN TIME: CPT

## 2017-02-06 PROCEDURE — 63600175 PHARM REV CODE 636 W HCPCS

## 2017-02-06 PROCEDURE — 85730 THROMBOPLASTIN TIME PARTIAL: CPT | Mod: 91

## 2017-02-06 PROCEDURE — 20000000 HC ICU ROOM

## 2017-02-06 PROCEDURE — 25000003 PHARM REV CODE 250

## 2017-02-06 PROCEDURE — 80048 BASIC METABOLIC PNL TOTAL CA: CPT

## 2017-02-06 PROCEDURE — 94640 AIRWAY INHALATION TREATMENT: CPT

## 2017-02-06 PROCEDURE — 82803 BLOOD GASES ANY COMBINATION: CPT

## 2017-02-06 PROCEDURE — 27000248 HC VAD-ADDITIONAL DAY

## 2017-02-06 PROCEDURE — 80076 HEPATIC FUNCTION PANEL: CPT

## 2017-02-06 PROCEDURE — 37799 UNLISTED PX VASCULAR SURGERY: CPT

## 2017-02-06 PROCEDURE — 83615 LACTATE (LD) (LDH) ENZYME: CPT

## 2017-02-06 PROCEDURE — 84100 ASSAY OF PHOSPHORUS: CPT

## 2017-02-06 PROCEDURE — 99233 SBSQ HOSP IP/OBS HIGH 50: CPT | Mod: ,,, | Performed by: INTERNAL MEDICINE

## 2017-02-06 PROCEDURE — 63600175 PHARM REV CODE 636 W HCPCS: Performed by: THORACIC SURGERY (CARDIOTHORACIC VASCULAR SURGERY)

## 2017-02-06 PROCEDURE — 97535 SELF CARE MNGMENT TRAINING: CPT

## 2017-02-06 PROCEDURE — 93750 INTERROGATION VAD IN PERSON: CPT | Mod: ,,, | Performed by: INTERNAL MEDICINE

## 2017-02-06 PROCEDURE — 86140 C-REACTIVE PROTEIN: CPT

## 2017-02-06 PROCEDURE — 25000003 PHARM REV CODE 250: Performed by: NURSE PRACTITIONER

## 2017-02-06 PROCEDURE — 27000221 HC OXYGEN, UP TO 24 HOURS

## 2017-02-06 PROCEDURE — 97530 THERAPEUTIC ACTIVITIES: CPT

## 2017-02-06 PROCEDURE — 25000242 PHARM REV CODE 250 ALT 637 W/ HCPCS: Performed by: NURSE PRACTITIONER

## 2017-02-06 PROCEDURE — 83880 ASSAY OF NATRIURETIC PEPTIDE: CPT

## 2017-02-06 PROCEDURE — 93750 INTERROGATION VAD IN PERSON: CPT | Performed by: THORACIC SURGERY (CARDIOTHORACIC VASCULAR SURGERY)

## 2017-02-06 PROCEDURE — 63600367 HC NITRIC OXIDE PER HOUR

## 2017-02-06 RX ORDER — AMLODIPINE BESYLATE 5 MG/1
5 TABLET ORAL ONCE
Status: COMPLETED | OUTPATIENT
Start: 2017-02-06 | End: 2017-02-06

## 2017-02-06 RX ORDER — WARFARIN 2 MG/1
4 TABLET ORAL ONCE
Status: COMPLETED | OUTPATIENT
Start: 2017-02-06 | End: 2017-02-06

## 2017-02-06 RX ORDER — POTASSIUM CHLORIDE 750 MG/1
20 CAPSULE, EXTENDED RELEASE ORAL ONCE
Status: COMPLETED | OUTPATIENT
Start: 2017-02-06 | End: 2017-02-06

## 2017-02-06 RX ORDER — HEPARIN SODIUM 10000 [USP'U]/100ML
1500 INJECTION, SOLUTION INTRAVENOUS CONTINUOUS
Status: DISCONTINUED | OUTPATIENT
Start: 2017-02-06 | End: 2017-02-14

## 2017-02-06 RX ORDER — AMLODIPINE BESYLATE 10 MG/1
10 TABLET ORAL DAILY
Status: DISCONTINUED | OUTPATIENT
Start: 2017-02-07 | End: 2017-02-18

## 2017-02-06 RX ADMIN — ALBUTEROL SULFATE 2.5 MG: 2.5 SOLUTION RESPIRATORY (INHALATION) at 03:02

## 2017-02-06 RX ADMIN — EPINEPHRINE 0.04 MCG/KG/MIN: 1 INJECTION PARENTERAL at 11:02

## 2017-02-06 RX ADMIN — AMLODIPINE BESYLATE 5 MG: 5 TABLET ORAL at 08:02

## 2017-02-06 RX ADMIN — FUROSEMIDE 20 MG/HR: 10 INJECTION, SOLUTION INTRAMUSCULAR; INTRAVENOUS at 11:02

## 2017-02-06 RX ADMIN — ONDANSETRON 8 MG: 2 INJECTION INTRAMUSCULAR; INTRAVENOUS at 09:02

## 2017-02-06 RX ADMIN — Medication 3 ML: at 10:02

## 2017-02-06 RX ADMIN — PANTOPRAZOLE SODIUM 40 MG: 40 TABLET, DELAYED RELEASE ORAL at 08:02

## 2017-02-06 RX ADMIN — AMLODIPINE BESYLATE 5 MG: 5 TABLET ORAL at 09:02

## 2017-02-06 RX ADMIN — FERROUS GLUCONATE TAB 324 MG (37.5 MG ELEMENTAL IRON) 324 MG: 324 (37.5 FE) TAB at 08:02

## 2017-02-06 RX ADMIN — ALBUTEROL SULFATE 2.5 MG: 2.5 SOLUTION RESPIRATORY (INHALATION) at 12:02

## 2017-02-06 RX ADMIN — DOCUSATE SODIUM 200 MG: 50 CAPSULE, LIQUID FILLED ORAL at 08:02

## 2017-02-06 RX ADMIN — ALBUTEROL SULFATE 2.5 MG: 2.5 SOLUTION RESPIRATORY (INHALATION) at 11:02

## 2017-02-06 RX ADMIN — NICARDIPINE HYDROCHLORIDE 5 MG/HR: 0.2 INJECTION, SOLUTION INTRAVENOUS at 06:02

## 2017-02-06 RX ADMIN — ONDANSETRON 8 MG: 2 INJECTION INTRAMUSCULAR; INTRAVENOUS at 01:02

## 2017-02-06 RX ADMIN — WARFARIN SODIUM 4 MG: 2 TABLET ORAL at 04:02

## 2017-02-06 RX ADMIN — METOCLOPRAMIDE 10 MG: 5 INJECTION, SOLUTION INTRAMUSCULAR; INTRAVENOUS at 11:02

## 2017-02-06 RX ADMIN — OXYCODONE HYDROCHLORIDE 5 MG: 5 TABLET ORAL at 08:02

## 2017-02-06 RX ADMIN — PROMETHAZINE HYDROCHLORIDE 12.5 MG: 25 INJECTION, SOLUTION INTRAMUSCULAR; INTRAVENOUS at 06:02

## 2017-02-06 RX ADMIN — POTASSIUM CHLORIDE 80 MEQ: 400 INJECTION, SOLUTION INTRAVENOUS at 12:02

## 2017-02-06 RX ADMIN — ASPIRIN 325 MG: 325 TABLET, DELAYED RELEASE ORAL at 08:02

## 2017-02-06 RX ADMIN — ALBUTEROL SULFATE 2.5 MG: 2.5 SOLUTION RESPIRATORY (INHALATION) at 08:02

## 2017-02-06 RX ADMIN — FUROSEMIDE 20 MG/HR: 10 INJECTION, SOLUTION INTRAMUSCULAR; INTRAVENOUS at 04:02

## 2017-02-06 RX ADMIN — NICARDIPINE HYDROCHLORIDE 5 MG/HR: 0.2 INJECTION, SOLUTION INTRAVENOUS at 11:02

## 2017-02-06 RX ADMIN — HEPARIN SODIUM AND DEXTROSE 1500 UNITS/HR: 10000; 5 INJECTION INTRAVENOUS at 08:02

## 2017-02-06 RX ADMIN — HEPARIN SODIUM AND DEXTROSE 1400 UNITS/HR: 10000; 5 INJECTION INTRAVENOUS at 04:02

## 2017-02-06 RX ADMIN — PROMETHAZINE HYDROCHLORIDE 12.5 MG: 25 INJECTION, SOLUTION INTRAMUSCULAR; INTRAVENOUS at 02:02

## 2017-02-06 RX ADMIN — ALBUTEROL SULFATE 2.5 MG: 2.5 SOLUTION RESPIRATORY (INHALATION) at 07:02

## 2017-02-06 RX ADMIN — OXYCODONE HYDROCHLORIDE 10 MG: 5 TABLET ORAL at 02:02

## 2017-02-06 RX ADMIN — OXYCODONE HYDROCHLORIDE 5 MG: 5 TABLET ORAL at 11:02

## 2017-02-06 RX ADMIN — POTASSIUM CHLORIDE 40 MEQ: 400 INJECTION, SOLUTION INTRAVENOUS at 04:02

## 2017-02-06 RX ADMIN — HEPARIN SODIUM AND DEXTROSE 1500 UNITS/HR: 10000; 5 INJECTION INTRAVENOUS at 12:02

## 2017-02-06 RX ADMIN — ONDANSETRON 8 MG: 2 INJECTION INTRAMUSCULAR; INTRAVENOUS at 06:02

## 2017-02-06 RX ADMIN — Medication 3 ML: at 06:02

## 2017-02-06 RX ADMIN — POLYETHYLENE GLYCOL 3350 17 G: 17 POWDER, FOR SOLUTION ORAL at 08:02

## 2017-02-06 RX ADMIN — METOCLOPRAMIDE 10 MG: 5 INJECTION, SOLUTION INTRAMUSCULAR; INTRAVENOUS at 06:02

## 2017-02-06 RX ADMIN — OXYCODONE HYDROCHLORIDE 10 MG: 5 TABLET ORAL at 01:02

## 2017-02-06 RX ADMIN — MUPIROCIN: 20 OINTMENT TOPICAL at 08:02

## 2017-02-06 RX ADMIN — POTASSIUM CHLORIDE 20 MEQ: 750 CAPSULE, EXTENDED RELEASE ORAL at 07:02

## 2017-02-06 RX ADMIN — ACETAZOLAMIDE SODIUM 500 MG: 500 INJECTION, POWDER, LYOPHILIZED, FOR SOLUTION INTRAVENOUS at 11:02

## 2017-02-06 RX ADMIN — DOBUTAMINE IN DEXTROSE 4 MCG/KG/MIN: 200 INJECTION, SOLUTION INTRAVENOUS at 04:02

## 2017-02-06 RX ADMIN — Medication 3 ML: at 02:02

## 2017-02-06 RX ADMIN — DOBUTAMINE IN DEXTROSE 4 MCG/KG/MIN: 200 INJECTION, SOLUTION INTRAVENOUS at 11:02

## 2017-02-06 NOTE — PT/OT/SLP PROGRESS
Physical Therapy  Treatment    Mert Samayoa   MRN: [id number]   Admitting Diagnosis: Chronic combined systolic and diastolic heart failure    PT Received On: 17  PT Start Time: 1038     PT Stop Time: 1105    PT Total Time (min): 27 min     Co treat with OT  Billable Minutes:  Gait Training8 and Therapeutic Activity 8    Treatment Type: Treatment  PT/PTA: PT             General Precautions: Standard, fall, LVAD, sternal  Orthopedic Precautions: N/A   Braces: N/A    Do you have any cultural, spiritual, Latter-day conflicts, given your current situation?: none     Subjective:  Communicated with RN prior to session.  Pt agreeable to working with PT.    Pain Ratin10        Location: sternal  Pain Addressed: Distraction, Reposition  Pain Rating Post-Intervention: 4/10    Objective:   Patient found with: arterial line, blood pressure cuff, central line, telemetry, pulse ox (continuous), chest tube, kirk catheter, oxygen (LVAD)    Functional Mobility:  Bed Mobility:    NT 2/2 to pt sitting [facility] upon arrival.     Transfers:  Sit <> Stand Assistance: Minimum Assistance  Sit <> Stand Assistive Device: No Assistive Device    Gait:   Gait Distance: x10 feet +10 feet  Assistance 1: Contact Guard Assistance  Gait Assistive Device: Hand held assist  Gait Pattern: 2-point gait  Gait Deviation(s): decreased zarina, increased time in double stance, decreased velocity of limb motion, decreased weight-shifting ability  Pt ambulated x 10 feet with a seated resting break in between 2/2 to increased fatigue in BLE. Pt required additional assistance from RN to assist with line management. Pt required chair follow 2/2 to decreased endurance.     Balance:   Static Sit: FAIR+: Able to take MINIMAL challenges from all directions  Dynamic Sit: FAIR+: Maintains balance through MINIMAL excursions of active trunk motion  Static Stand: FAIR: Maintains without assist but unable to take challenges  Dynamic stand: FAIR: Needs CONTACT  GUARD during gait     Therapeutic Activities and Exercises:  Pt performed static standing for x7 min total with CGA for stability. Pt required seated resting break during 2/2 to fatigue. Pt asymptomatic and vss throughout. Pt required vc for adhering to sternal precautions with transfers. Pt performed sit>stand transfer x 4 reps throughout session.      AM-PAC 6 CLICK MOBILITY  How much help from another person does this patient currently need?   1 = Unable, Total/Dependent Assistance  2 = A lot, Maximum/Moderate Assistance  3 = A little, Minimum/Contact Guard/Supervision  4 = None, Modified [place]/Independent    Turning over in bed (including adjusting bedclothes, sheets and blankets)?: 3  Sitting down on and standing up from a chair with arms (e.g., wheelchair, bedside commode, etc.): 3  Moving from lying on back to sitting on the side of the bed?: 3  Moving to and from a bed to a chair (including a wheelchair)?: 3  Need to walk in hospital room?: 3  Climbing 3-5 steps with a railing?: 1  Total Score: 16    AM-PAC Raw Score CMS G-Code Modifier Level of Impairment Assistance   6 % Total / Unable   7 - 9 CM 80 - 100% Maximal Assist   10 - 14 CL 60 - 80% Moderate Assist   15 - 19 CK 40 - 60% Moderate Assist   20 - 22 CJ 20 - 40% Minimal Assist   23 CI 1-20% SBA / CGA   24 CH 0% Independent/ Mod I     Patient left up in chair with all lines intact, call button in reach and RN and brother present.    Assessment:  Mert Samayoa is a 27 y.o. male with a medical diagnosis of Chronic combined systolic and diastolic heart failure, s/p LVAD and presents with decreased functional mobility due to impaired endurance and weakness throughout. Pt demo improvement through increasing distance ambulated.  Patient would benefit from skilled PT in the acute care setting to improve the limitations listed below. Patient would benefit from HH upon discharge.      Rehab identified problem list/impairments: Rehab  identified problem list/impairments: weakness, impaired endurance, impaired self care skills, impaired functional mobilty, gait instability, impaired balance    Rehab potential is good.    Activity tolerance: Fair    Discharge recommendations: Discharge Facility/Level Of Care Needs: home with home health     Barriers to discharge: Barriers to Discharge: Inaccessible home environment    Equipment recommendations: Equipment Needed After Discharge: none     GOALS:   Physical Therapy Goals        Problem: Physical Therapy Goal    Goal Priority Disciplines Outcome Goal Variances Interventions   Physical Therapy Goal     PT/OT, PT Ongoing (interventions implemented as appropriate)     Description:  Goals to be met by: 3/1/17     Patient will increase functional independence with mobility by performin. Supine to sit with Artie  2. Sit to stand transfer with Artie  3. Gait  x 400 feet with Supervision.   4. Ascend/descend 2 stair with bilateral Handrails Supervision.   5. Lower extremity exercise program x15 reps per handout, with assistance as needed.  6. Artie with LVAD management.                 PLAN:    Patient to be seen 6 x/week  to address the above listed problems via gait training, therapeutic activities, therapeutic exercises  Plan of Care expires: 17  Plan of Care reviewed with: patient, sibling         José Manuel BILLY Adrian, PT  2017

## 2017-02-06 NOTE — PROGRESS NOTES
Daily E and M and VAD Interrogation Note    Reason for Visit:  Patient is seen in follow up for management of:  [] HeartMate II  [x] Heartware [] Total artificial heart       [] ECMO           [] Other     Interval History:  [] Interval history unobtainable due to intubation.  The [x] implant/[] explant date was 2/1/17    Increased CVP to 21, pt asymptomatic    Patient without significant complaints  Nausea continues improving  Pain controlled.   VAD flows ~7, no RI events  Working well with PT    Review of Systems:  Positive for intermittent mild nausea      Medications:  Current Facility-Administered Medications   Medication Dose Route Frequency Provider Last Rate Last Dose    0.9%  NaCl infusion   Intravenous Continuous Urszula Tuttle NP   Stopped at 02/01/17 1445    albumin human 5% bottle 500 mL  500 mL Intravenous PRN Urszula Tuttle NP        albuterol sulfate nebulizer solution 2.5 mg  2.5 mg Nebulization Q4H Urszula Tuttle NP   2.5 mg at 02/06/17 0331    albuterol sulfate nebulizer solution 2.5 mg  2.5 mg Nebulization Q4H PRN Urszula Tuttle NP        amlodipine tablet 5 mg  5 mg Oral Daily Brayden Limon MD   5 mg at 02/05/17 0811    aspirin EC tablet 325 mg  325 mg Oral Daily Urszula Tuttle NP   325 mg at 02/05/17 0811    bisacodyl suppository 10 mg  10 mg Rectal Daily PRN Urszula Tuttle NP        bisacodyl suppository 10 mg  10 mg Rectal Once Brayden Limon MD   Stopped at 02/04/17 1015    dextrose 50% injection 12.5 g  12.5 g Intravenous PRN Roxanna Nieves APRN,ANP-C        DOBUTamine 500mg in D5W 250mL infusion (premix) (NON-TITRATING)  4 mcg/kg/min Intravenous Continuous Jana Cain MD 7.6 mL/hr at 02/06/17 0600 4 mcg/kg/min at 02/06/17 0600    docusate sodium capsule 200 mg  200 mg Oral QHS Urszula Tuttle NP   200 mg at 02/05/17 2025    EPINEPHrine (ADRENALIN) 4 mg in sodium chloride 0.9% 250 mL infusion  0.04 mcg/kg/min Intravenous  Continuous Brayden Limon MD 13.1 mL/hr at 02/06/17 0600 0.05 mcg/kg/min at 02/06/17 0600    ferrous gluconate tablet 324 mg  324 mg Oral Daily with breakfast Urszula Tuttle NP   324 mg at 02/05/17 0822    furosemide (LASIX) 250 mg in sodium chloride 0.9 % 250 mL infusion (non-titrating)  20 mg/hr Intravenous Continuous Jana Cain MD 20 mL/hr at 02/06/17 0600 20 mg/hr at 02/06/17 0600    glucagon (human recombinant) injection 1 mg  1 mg Intramuscular PRN Shai Ortega MD        glucagon (human recombinant) injection 1 mg  1 mg Intramuscular PRN Roxanna Nieves APRN,YANNA-BILLY        glucose chewable tablet 16 g  16 g Oral PRN Brayden Pelayo MD        glucose chewable tablet 24 g  24 g Oral PRN Brayden Pelayo MD        heparin 25,000 units in dextrose 5% 250 mL (100 units/mL) infusion (heparin infusion)  1,100 Units/hr Intravenous Continuous Jana Cain MD 14 mL/hr at 02/06/17 0600 1,400 Units/hr at 02/06/17 0600    hydromorphone injection 0.5 mg  0.5 mg Intravenous Q3H PRN Dora Mitchell MD        insulin aspart pen 0-5 Units  0-5 Units Subcutaneous QID (AC + HS) PRN Brayden Pelayo MD        magnesium hydroxide 400 mg/5 ml suspension 2,400 mg  30 mL Oral Daily PRN Urszula Tuttle NP        magnesium sulfate 2g in water 50mL IVPB (premix)  2 g Intravenous PRN Urszula Tuttle NP   2 g at 02/05/17 1751    metoclopramide HCl injection 10 mg  10 mg Intravenous Q6H Lola Dinero MD   10 mg at 02/06/17 0600    mupirocin 2 % ointment   Nasal BID Urszula Tuttle NP        niCARdipine 40 mg/200 mL infusion  5 mg/hr Intravenous Continuous Urszula Tuttle NP   Stopped at 02/06/17 0615    nitric oxide gas Gas 10 ppm  10 ppm Inhalation Continuous Lex Macedo MD   20 ppm at 02/02/17 1029    ondansetron injection 4 mg  4 mg Intravenous Q8H PRN Dora Mitchell MD   4 mg at 02/05/17 1017    ondansetron injection 8 mg  8 mg Intravenous Q8H Lola  JOHNNY Dinero MD   8 mg at 02/06/17 0600    oxycodone immediate release tablet 10 mg  10 mg Oral Q4H PRN Urszula Tuttle NP   10 mg at 02/06/17 0215    oxycodone immediate release tablet 5 mg  5 mg Oral Q4H PRN Urszula Tuttle NP   5 mg at 02/05/17 2300    pantoprazole EC tablet 40 mg  40 mg Oral Daily Urszula Tuttle NP   40 mg at 02/05/17 0811    polyethylene glycol packet 17 g  17 g Oral BID Brayden Limon MD   17 g at 02/05/17 2025    potassium chloride 40 mEq in 100 mL IVPB (FOR CENTRAL LINE ADMINISTRATION ONLY)  40 mEq Intravenous PRN Brayden Limon MD 25 mL/hr at 02/06/17 0000 80 mEq at 02/06/17 0000    potassium chloride 40 mEq in 100 mL IVPB (FOR CENTRAL LINE ADMINISTRATION ONLY)  40 mEq Intravenous PRN Brayden Limon MD        promethazine (PHENERGAN) 12.5 mg in dextrose 5 % 50 mL IVPB  12.5 mg Intravenous Q4H Lola Dinero  mL/hr at 02/06/17 0630 12.5 mg at 02/06/17 0630    sodium chloride 0.9% flush 3 mL  3 mL Intravenous Q8H Urszula Tuttle NP   3 mL at 02/06/17 0600       Physical Examination:  Vital Signs:   Vitals:    02/06/17 0600   BP:    Pulse: 92   Resp: (!) 38   Temp:      Cardiovascular:  [x] Regular rate and rhythm [] Irregular []  None (COURTNEY) []  Other  []  No edema []  Edema present  []  Clear to auscultation  []  Rales to []  Coarse  []  No rales but   [] Pedal Pulses absent  []  Pulses  + throughout  Skin:  Incision is [x]  Clean, dry and intact.  []  Other   Sternum:  [x]  Stable []  Unstable  Driveline(s):   [x]  Clean, dry and intact. []  Other       Labs:  [facility]  Lab Results   Component Value Date     (L) 02/06/2017    K 4.4 02/06/2017    CL 88 (L) 02/06/2017    CO2 30 (H) 02/06/2017    BUN 25 (H) 02/06/2017    CREATININE 1.3 02/06/2017    CALCIUM 9.1 02/06/2017    ANIONGAP 12 02/06/2017    ESTGFRAFRICA >60.0 02/06/2017    EGFRNONAA >60.0 02/06/2017       Magnesium   Date Value Ref Range Status   02/06/2017 2.3 1.6 - 2.6 mg/dL Final       Lab  Results   Component Value Date    WBC 12.95 (H) 02/06/2017    HGB 8.0 (L) 02/06/2017    HCT 23.7 (L) 02/06/2017    MCV 87 02/06/2017     02/06/2017       Lab Results   Component Value Date    INR 1.0 02/06/2017    INR 1.0 02/05/2017    INR 1.1 02/04/2017       BNP   Date Value Ref Range Status   02/06/2017 1110 (H) 0 - 99 pg/mL Final     Comment:     Values of less than 100 pg/ml are consistent with non-CHF populations.   02/03/2017 709 (H) 0 - 99 pg/mL Final     Comment:     Values of less than 100 pg/ml are consistent with non-CHF populations.   01/20/2017 101 (H) 0 - 99 pg/mL Final     Comment:     Values of less than 100 pg/ml are consistent with non-CHF populations.       LD   Date Value Ref Range Status   02/06/2017 444 (H) 110 - 260 U/L Final     Comment:     Results are increased in hemolyzed samples.   02/05/2017 448 (H) 110 - 260 U/L Final     Comment:     Results are increased in hemolyzed samples.   02/04/2017 482 (H) 110 - 260 U/L Final     Comment:     Results are increased in hemolyzed samples.       X-Rays:  [x]  I reviewed today's Chest x-ray    Procedure:  Device Interrogation including analysis of device parameters.  Current Settings   [x]  Ventricular Assist Device  []  Total Artificial Heart interrogated  Review of device function is []  Stable []  Other   TXP LVAD INTERROGATIONS 2/6/2017 2/6/2017 2/6/2017 2/6/2017 2/6/2017 2/6/2017 2/5/2017   Type Heartware Heartware Heartware Heartware Heartware Heartware Heartware   Flow 7.6 7.9 7.9 7.9 7.7 8.4 8.3   Speed 2900 2900 2900 2900 2900 2900 2900   Power ([place]) 5.5 5.5 5.6 5.5 5.6 5.6 5.7   Low Flow Alarm - - - - - - -   High Power Alarm - - - - - - -   Pulsatility - - - - - - -       Assessment:  [x]  Primary Cardiomyopathy []  Congestive Heart Failure   []  Atrial Fibrillation []  Ventricular Tachycardia   []  Aftercare cardiac device []  Long term (current) use of anticoagulants   []  Ventilator-associated pneumonia []  Pneumonia viral,  unspecified   []  Pneumonia, bacterial, unspecified []  Pneumonia, organism unspecified   []  Hemorrhage of GI tract, unspecified    []  Nosebleed []  Driveline infection   []  Infection VAD device []  New onset of    []        Plan:  [x]  Interval history obtained from ICU attending team member during rounding today  []  VAD/COURTNEY teaching performed with patient  [x]  Mobilization / Physical Therapy ongoing  [x]  Anticoagulation [x]  Ongoing []  Held  []  Studies ordered  []      Plan:    Neuro: alert and oriented  - PO pain medication PRN, A/Ox4    Resp: saturating well on 3L nc, no subjective SOB other than with exertion  - Wean nitric to 10  - IS/pulmonary toilet  - Daily CXR  - Will discontinue remaining CT    CVS:  - Dobutamine 4  - Epi at 0.04/hr  - right heart dysfunction with CVP elevated to 21 this AM    - Cardene gtt PRN, wean  - Increase amlodipine to 10 daily  - Continue heparin  goal aptt 40-50  - Coumadin 4 mg today, Daily INR  - Daily ASA      FENGI:  - Scheduled anti-emetic - Zofran; phenergan and Reglan PRN  - Advance diet slowly  - Repeat suppository PRN, bowel regimen  - Replace lytes PRN  - q6 Mg, K    Renal:  - Castillo for strict I/O  - Trend BUN/Cr  - Lasix gtt 20, net negative -1L yesterday    Endo:  - SSI Insulin     Heme/ID:  - Off post op antibiotics, continue to monitor WBC and Hct    Dispo:  - Continue ICU care, wean off epi, aggressive bowel regimen      Total time spent was 35 minutes.  Of which more than 50 percent of the care dominated counseling and coordinating care with different team members. The VAD was interrogated and all parameters were WNL and no significant findings were found in the history. All these findings are documented in the note above.      Date of Service: 02/06/2017                 Denis Aburto MD PGY II  [phone]

## 2017-02-06 NOTE — PROGRESS NOTES
Progress Note  Heart Transplant Service    Admit Date: 1/27/2017   LOS: 10 days     SUBJECTIVE:     Follow up for: Chronic combined systolic and diastolic heart failure    Interval History: Pain and nausea a little better controlled today. Lasix gtt at 20 mg / hr last night for elevated CVP. Still elevated this am and NO increased to 10 ppm    Scheduled Meds:   albuterol sulfate  2.5 mg Nebulization Q4H    [START ON 2/7/2017] amlodipine  10 mg Oral Daily    aspirin  325 mg Oral Daily    bisacodyl  10 mg Rectal Once    docusate sodium  200 mg Oral QHS    ferrous gluconate  324 mg Oral Daily with breakfast    metoclopramide HCl  10 mg Intravenous Q6H    ondansetron  8 mg Intravenous Q8H    pantoprazole  40 mg Oral Daily    polyethylene glycol  17 g Oral BID    promethazine (PHENERGAN) IVPB  12.5 mg Intravenous Q6H    sodium chloride 0.9%  3 mL Intravenous Q8H    warfarin  4 mg Oral Once     Continuous Infusions:   DOBUTamine 4 mcg/kg/min (02/06/17 1500)    epinephrine infusion (NON-TITRATING) 0.04 mcg/kg/min (02/06/17 1500)    furosemide (LASIX) 1 mg/mL infusion (non-titrating) 20 mg/hr (02/06/17 1500)    heparin (porcine) in 5 % dex Stopped (02/06/17 1500)    nicardipine Stopped (02/06/17 0615)    nitric oxide gas       PRN Meds:albumin human 5%, albuterol sulfate, bisacodyl, glucagon (human recombinant), HYDROmorphone, magnesium hydroxide 400 mg/5 ml, magnesium sulfate IVPB, ondansetron, oxycodone, oxycodone, potassium chloride, potassium chloride    Review of patient's allergies indicates:  No Known Allergies    OBJECTIVE:     Vital Signs (Most Recent)  Temp: 98.8 °F (37.1 °C) (02/06/17 1100)  Pulse: 101 (02/06/17 1500)  Resp: 16 (02/06/17 1500)  BP: (!) 66/0 (02/06/17 1100)  SpO2: 100 % (02/06/17 1500)    Vital Signs Range (Last 24H):  Temp:  [97.5 °F (36.4 °C)-98.8 °F (37.1 °C)]   Pulse:  []   Resp:  [13-96]   BP: (66-85)/(0)   SpO2:  [93 %-100 %]   Arterial Line BP: ()/(60-80)      I & O (Last 24H):    Intake/Output Summary (Last 24 hours) at 02/06/17 1517  Last data filed at 02/06/17 1500   Gross per 24 hour   Intake          3533.13 ml   Output             7255 ml   Net         -3721.87 ml            Telemetry: sinus  Constitutional: AOx3, NAD conversant  Neck: No JVD present. No thyromegaly present.   Cardiovascular: VAD hum  Pulmonary/Chest:good breath sounds bilaterally  Abdominal: + BS, exhibits no distension. There is no tenderness. There is no rebound.   Extremities: no cyanosis, clubbing or edema.  No bleeding, edema, erythema or hematoma, doppler pulses in all distals    Labs:       Recent Labs  Lab 02/04/17 0405 02/05/17 0400 02/06/17  0400   WBC 18.23* 11.88 12.95*   HGB 9.3* 8.6* 8.0*   HCT 27.7* 25.4* 23.7*   * 146* 157   LYMPH 5.2*  1.0 7.2*  0.9* 9.4*  1.2   MONO 8.3  1.5* 8.2  1.0 9.9  1.3*   EOSINOPHIL 0.1 0.4 0.4         Recent Labs  Lab 02/04/17 0405 02/05/17  0400  02/05/17 1703 02/05/17 2300 02/06/17  0400   APTT  --   < >  --   < > 42.0* 39.1* 41.0*   INR 1.1  --  1.0  --   --   --  1.0   < > = values in this interval not displayed.       Recent Labs  Lab 02/04/17 0405 02/05/17 0400 02/05/17 1703 02/05/17  2300 02/06/17  0400 02/06/17  1059   *  131*  < > 111*  < > 121* 130* 116*  --    CALCIUM 9.3  9.3  < > 9.0  < > 9.2 8.7 9.1  --    ALBUMIN 3.7  --  3.6  --   --   --  3.6  --    PROT 6.9  --  7.1  --   --   --  7.2  --    *  133*  < > 132*  < > 130* 129* 130*  --    K 4.0  4.0  < > 3.7  < > 3.3* 3.2* 4.4 4.1   CO2 29  29  < > 34*  < > 32* 32* 30*  --    CL 92*  92*  < > 85*  < > 85* 87* 88*  --    BUN 23*  23*  < > 22*  < > 23* 23* 25*  --    CREATININE 1.1  1.1  < > 1.0  < > 1.1 1.2 1.3  --    ALKPHOS 64  --  89  --   --   --  107  --    ALT 30  --  32  --   --   --  34  --    AST 80*  --  63*  --   --   --  51*  --    BILITOT 0.7  --  0.6  --   --   --  0.6  --    MG 2.1  2.1  < > 1.9  < > 1.8 2.5 2.3 2.3   PHOS 3.6   3.6  --  3.5  --   --   --  4.3  --    < > = values in this interval not displayed.  Estimated Creatinine Clearance: 79.8 mL/min (based on Cr of 1.3).      Recent Labs  Lab 02/06/17  0400   BNP 1110*         Recent Labs  Lab 02/01/17  1235 02/02/17  0304 02/03/17  0733 02/04/17  0800 02/05/17  0400 02/06/17  0400   * 449* 568* 482* 448* 444*       Microbiology Results (last 7 days)     Procedure Component Value Units Date/Time    Blood culture (site 1) [[id number]] Collected:  01/27/17 1837    Order Status:  Completed Specimen:  Blood Updated:  02/01/17 2022     Blood Culture, Routine No growth after 5 days.    Narrative:       Site # 1, aerobic and anaerobic (central line, if patient has  one)    Blood culture (site 2) [[id number]] Collected:  01/27/17 1828    Order Status:  Completed Specimen:  Blood Updated:  02/01/17 2022     Blood Culture, Routine No growth after 5 days.    Narrative:       Site # 2, aerobic only            ASSESSMENT:     27 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 He was admitted for PICC removal, IABP, and planned LVAD implantation. He is s/p [facility] LVAD placement 2/1 and chest closure 2/2. Extubated 2/2 and now recovering in ICU.    PLAN:     Cardiogenic Shock due to Acute on Chronic Systolic HF, DCM, NYHA FC IV s/p HW LVAD 2/1/2017  -s/p HW LVAD 2/1 and chest closure/extubation 2/2  -CTS primary   -Currently on  , cardene, lasix and epi  -Does not have ICD- will need to discuss timing of this vs Lifevest  - Anticoagulation per CTS: on Heparin  -cultures: NGTD    HTN  -BP controlled    Back Pain  -prn meds on board; will adjust    Prophylaxis   -heparin

## 2017-02-06 NOTE — PLAN OF CARE
Problem: Occupational Therapy Goal  Goal: Occupational Therapy Goal  Goals to be met by: 2 weeks 2/17/17     Patient will increase functional independence with ADLs by performing:    UE Dressing with Supervision.  LE Dressing with Supervision.  Grooming while standing with Supervision.  Toileting from toilet with Supervision for hygiene and clothing management.   Stand pivot transfers with Supervision.  Toilet transfer to toilet with Supervision.  Pt to be independent with LVAD vipint.    Goals remain appropriate.

## 2017-02-06 NOTE — PT/OT/SLP PROGRESS
"Occupational Therapy  Treatment    Mert Samayoa   MRN: [id number]   Admitting Diagnosis: Chronic combined systolic and diastolic heart failure    OT Date of Treatment: 17   OT Start Time: 1030  OT Stop Time: 1100  OT Total Time (min): 30 min    Billable Minutes:  Self Care/Home Management 15    General Precautions: Standard, LVAD, fall, sternal  Orthopedic Precautions: N/A  Braces:      Do you have any cultural, spiritual, Episcopal conflicts, given your current situation?: None    Subjective:  Communicated with nsg prior to session.  "I feel weak" pt reports.     Pain Ratin/10        Location:  (incisional chest pain)     Pain Rating Post-Intervention: 4/10    Objective:   Pt found seated in b/s chair with brother present.      Functional Mobility:    Transfers:   Sit <> Stand Assistance: Minimum Assistance  Sit <> Stand Assistive Device: No Assistive Device  3 trials of standing completed with good adherence to sternal precautions.    Functional Ambulation: Pt completed minimal mobility in room with seated rest breaks due to fatigue and feeling "weak". Pt requiring CGA x 1 and additional person for vipin't of extensive lines.    Activities of Daily Living:     UE Dressing Level of Assistance: Maximum assistance  Grooming Position: Standing  Grooming Level of Assistance: Stand by assistance         AM-PAC 6 CLICK ADL   How much help from another person does this patient currently need?   1 = Unable, Total/Dependent Assistance  2 = A lot, Maximum/Moderate Assistance  3 = A little, Minimum/Contact Guard/Supervision  4 = None, Modified [place]/Independent    Putting on and taking off regular lower body clothing? : 2  Bathing (including washing, rinsing, drying)?: 2  Toileting, which includes using toilet, bedpan, or urinal? : 2  Putting on and taking off regular upper body clothing?: 2  Taking care of personal grooming such as brushing teeth?: 3  Eating meals?: 3  Total Score: 14     AM-PAC Raw " Score CMS G-Code Modifier Level of Impairment Assistance   6 % Total / Unable   7 - 9 CM 80 - 100% Maximal Assist   10 - 14 CL 60 - 80% Moderate Assist   15 - 19 CK 40 - 60% Moderate Assist   20 - 22 CJ 20 - 40% Minimal Assist   23 CI 1-20% SBA / CGA   24 CH 0% Independent/ Mod I     Patient left up in chair with all lines intact, call button in reach and nsg and brother present    ASSESSMENT:  Mert Samayoa is a 27 y.o. male with a medical diagnosis of Chronic combined systolic and diastolic heart failure and s/p LVAD placement. Pt tolerated session well with functional progress noted. Pt with poor endurance for activity. Pt to benefit from cont OT to address stated goals. .    Rehab identified problem list/impairments: Rehab identified problem list/impairments: weakness, impaired self care skills, impaired balance, impaired functional mobilty, impaired endurance, gait instability    Rehab potential is good.    Activity tolerance: Good    Discharge recommendations: Discharge Facility/Level Of Care Needs: home with home health     GOALS:   Occupational Therapy Goals        Problem: Occupational Therapy Goal    Goal Priority Disciplines Outcome Interventions   Occupational Therapy Goal     OT, PT/OT     Description:  Goals to be met by: 2 weeks 2/17/17     Patient will increase functional independence with ADLs by performing:    UE Dressing with Supervision.  LE Dressing with Supervision.  Grooming while standing with Supervision.  Toileting from toilet with Supervision for hygiene and clothing management.   Stand pivot transfers with Supervision.  Toilet transfer to toilet with Supervision.  Pt to be independent with LVAD vipin't.             Multidisciplinary Problems (Resolved)        Problem: Occupational Therapy Goal    Goal Priority Disciplines Outcome Interventions   Occupational Therapy Goal   (Resolved)     OT, PT/OT Outcome(s) achieved    Description:  Wm and D/C OT 1/29/17.                 Plan:  Patient to be seen 6 x/week to address the above listed problems via self-care/home management, therapeutic activities, therapeutic exercises  Plan of Care expires:    Plan of Care reviewed with: patient, sibling         Breana SOOD ALISA Tuttle  02/06/2017

## 2017-02-06 NOTE — PROCEDURES
Patient AAO with family at bedside. VAD interrogation completed this AM in the event changes needed to be made. Will continue to monitor for further issues.     Pulsatile: Yes   VAD Sounds: Smooth  Problems / Issues / Alarms with VAD if any: None noted  HCT: 23  Waveforms: 7-9 with no negative deflections noted      VAD Interrogation:  TXP LVAD INTERROGATIONS 2/6/2017 2/6/2017 2/6/2017 2/6/2017 2/6/2017 2/6/2017 2/6/2017   Type Heartware Heartware Heartware Heartware Heartware Heartware Heartware   Flow 7.9 7.4 7.6 7.1 7.6 7.9 7.9   Speed 2900 2900 2900 2900 2900 2900 2900   Power ([place]) 5.5 5.3 5.3 5.5 5.5 5.5 5.6   Low Flow Alarm 5.0 - - 4.5 - - -   High Power Alarm 7.5 - - 7.5 - - -   Pulsatility - - - - - - -   }

## 2017-02-06 NOTE — PLAN OF CARE
Problem: Patient Care Overview  Goal: Plan of Care Review  Dx: Heartware LVAD 2/1/17  Hx: Cardiomyopathy, Two brothers have had heart transplant, occasional smoker     1/27/17: IABP placed  2/1/17: LVAD, admitted to SICU, 1.5L albumin, chest remains open   2/2/17: IABP d/ced, chest closed, extubated  2/3/17: douglas d/c; OOBTC  2/4/17: epi off; chest tube #2 removed; OOBTC      Nursing:   Goal MAP 60-80  pTT goal 40-50  accuchecks q6  PTT, BMP, Mag q6  Heartware speed at 2900     Outcome: Ongoing (interventions implemented as appropriate)  No acute events overnight. Vital signs stable (refer to flowsheets). Pt resting comfortably throughout the night. Nitric increased to 20ppm and Epi increased to 0.05 due to elevated CVP (18-19). Dobutamine, Lasix, Heparin infusing per order and Cardene titrated to maintain MAP 60-80. Pain controlled with PRN Oxy, minimal complaints of nausea, scheduled Zofran and Phenergan given per order. IS @ pt bedside, use encouraged throughout the night. LVAD speed 2900 flows in the upper 7s. Minimal output from chest tube. Plan of care reviewed with patient. Will continue to monitor.

## 2017-02-06 NOTE — PROGRESS NOTES
[facility]  Adult Nutrition  Consult Note    SUMMARY     Recommendations    Recommendation/Intervention:   1. Continue current diet order.   2. Encouraged good PO intake of meals with a high source of protein at each meal.   3. If PO intake remains <50%, recommend Boost Plus TID. Will monitor.   Goals: Pt to receive nutrition within 48hrs.   Nutrition Goal Status: goal met  Communication of RD Recs: reviewed with RN    Reason for Assessment    Reason for Assessment: RD follow-up  Diagnosis: cardiac disease (s/p LVAD placement)  Relevent Medical History: HF, HTN   Nutrition Discharge Planning: Will provide Coumadin education prior to d/c.     Nutrition Prescription Ordered    Current Diet Order: Cardiac, Fluid 1500mL     Nutrition Risk Screen     Nutrition Risk Screen: no indicators present    Nutrition/Diet History    Typical Food/Fluid Intake: Pt reports fair appetite.   Factors Affecting Nutritional Intake: decreased appetite    Labs/Tests/Procedures/Meds    Pertinent Labs Reviewed: reviewed  Pertinent Labs Comments: Na 130, .5  Pertinent Medications Reviewed: reviewed  Pertinent Medications Comments: docusate, epi, lasix    Physical Findings    Overall Physical Appearance: other (see comments) (nourished)  Tubes: other (see comments) (chest tube - 0mL output)  Oral/Mouth Cavity: WDL  Skin: other (see comments) (incision in chest)    Anthropometrics    Height (inches): 67.01 in  Weight Method: Bed Scale  Weight (kg): 69.5 kg  Ideal Body Weight (IBW), Male: 148.06 lb  % Ideal Body Weight, Male (lb): 102.74   BMI (kg/m2): 23.82  BMI Grade: 18.5-24.9 - normal    Estimated/Assessed Needs    Weight Used For Calorie Calculations: 69 kg (152 lb 1.9 oz)   Height (cm): 170.2 cm  Energy Need Method: [place]-St Jeor (1.3 PAL: 2112kcal)  RMR ([place]-St. Jeor Equation): 1625.22  Weight Used For Protein Calculations: 69 kg (152 lb 1.9 oz)  Protein Requirements: 82-96g (1.2-1.4g/kg)  Fluid Need  Method: RDA Method (or per MD)    Monitor and Evaluation    Food and Nutrient Intake: energy intake, food and beverage intake  Food and Nutrient Adminstration: diet order  Anthropometric Measurements: weight, weight change  Biochemical Data, Medical Tests and Procedures: other (specify) (All labs)  Nutrition-Focused Physical Findings: overall appearance    Nutrition Risk    Level of Risk: high    Nutrition Follow-Up    RD Follow-up?: Yes    Assessment and Plan    Increased protein needs r/t physiological need AEB s/p LVAD, HF - continues.

## 2017-02-06 NOTE — PROGRESS NOTES
Dr. Dorado notified of patient CVP of 18. Ordered to increase Epi to 0.05. Will initiate at this time.

## 2017-02-06 NOTE — PLAN OF CARE
Problem: Physical Therapy Goal  Goal: Physical Therapy Goal  Goals to be met by: 3/1/17     Patient will increase functional independence with mobility by performin. Supine to sit with [place]  2. Sit to stand transfer with [place]  3. Gait x 400 feet with Supervision.   4. Ascend/descend 2 stair with bilateral Handrails Supervision.   5. Lower extremity exercise program x15 reps per handout, with assistance as needed.  6. [place] with LVAD management.    Outcome: Ongoing (interventions implemented as appropriate)  Progressing towards goals.

## 2017-02-06 NOTE — PLAN OF CARE
"Problem: Patient Care Overview  Goal: Plan of Care Review  Dx: Heartware LVAD 2/1/17  Hx: Cardiomyopathy, Two brothers have had heart transplant, occasional smoker     1/27/17: IABP placed  2/1/17: LVAD, admitted to SICU, 1.5L albumin, chest remains open   2/2/17: IABP d/ced, chest closed, extubated  2/3/17: douglas d/c; OOBTC  2/4/17: epi off; chest tube #2 removed; OOBTC  2/5: Epi turned back on @ .04, Nitric increased to 10; OOBTC  2/6: OOBTC; chest tube removed      Nursing:   Goal MAP 60-80  pTT goal 40-50 (Dr. Macedo wants closer to "50")  K and Mag q6  Heartware speed at 2900     Outcome: Ongoing (interventions implemented as appropriate)  Pt afebrile throughout shift. Pt on 4L n/c and 10 of nitric with O2 sats=%. Pt OOBTC for 8 hours tolerated well; worked with PT and OT today. Pt received a one time dose of IVPB diuril with UO improving to 400-600cc/hr. CVP=17 at 1500. Last chest tube removed today. Dobutamine @4mcg/kg/min and epi @0.04mcg/kg/min. Pt on lasix gtt @20units/hr. Pt free from fall or injury throughout shift. Pt updated on current condition and plan of care. All questions answered with verbalization of understanding.      "

## 2017-02-07 LAB
ABO + RH BLD: NORMAL
ALBUMIN SERPL BCP-MCNC: 3.7 G/DL
ALLENS TEST: ABNORMAL
ALP SERPL-CCNC: 116 U/L
ALT SERPL W/O P-5'-P-CCNC: 29 U/L
ANION GAP SERPL CALC-SCNC: 11 MMOL/L
APTT BLDCRRT: 36.5 SEC
APTT BLDCRRT: 41.3 SEC
APTT BLDCRRT: 44.2 SEC
APTT BLDCRRT: 44.2 SEC
AST SERPL-CCNC: 37 U/L
BASOPHILS # BLD AUTO: 0.01 K/UL
BASOPHILS # BLD AUTO: 0.02 K/UL
BASOPHILS NFR BLD: 0.1 %
BASOPHILS NFR BLD: 0.2 %
BILIRUB SERPL-MCNC: 0.6 MG/DL
BLD GP AB SCN CELLS X3 SERPL QL: NORMAL
BUN SERPL-MCNC: 21 MG/DL
CALCIUM SERPL-MCNC: 9.4 MG/DL
CHLORIDE SERPL-SCNC: 89 MMOL/L
CO2 SERPL-SCNC: 31 MMOL/L
CREAT SERPL-MCNC: 1.2 MG/DL
DELSYS: ABNORMAL
DIFFERENTIAL METHOD: ABNORMAL
DIFFERENTIAL METHOD: ABNORMAL
EOSINOPHIL # BLD AUTO: 0.1 K/UL
EOSINOPHIL # BLD AUTO: 0.2 K/UL
EOSINOPHIL NFR BLD: 1.4 %
EOSINOPHIL NFR BLD: 1.5 %
ERYTHROCYTE [DISTWIDTH] IN BLOOD BY AUTOMATED COUNT: 12.5 %
ERYTHROCYTE [DISTWIDTH] IN BLOOD BY AUTOMATED COUNT: 12.6 %
EST. GFR  (AFRICAN AMERICAN): >60 ML/MIN/1.73 M^2
EST. GFR  (NON AFRICAN AMERICAN): >60 ML/MIN/1.73 M^2
FIO2: 98
FLOW: 2
GLUCOSE SERPL-MCNC: 116 MG/DL
HCO3 UR-SCNC: 29.9 MMOL/L (ref 24–28)
HCT VFR BLD AUTO: 12.4 %
HCT VFR BLD AUTO: 23.1 %
HGB BLD-MCNC: 4.1 G/DL
HGB BLD-MCNC: 8.1 G/DL
INR PPP: 1.1
LDH SERPL L TO P-CCNC: 457 U/L
LYMPHOCYTES # BLD AUTO: 1.1 K/UL
LYMPHOCYTES # BLD AUTO: 1.5 K/UL
LYMPHOCYTES NFR BLD: 11.3 %
LYMPHOCYTES NFR BLD: 12.1 %
MAGNESIUM SERPL-MCNC: 1.9 MG/DL
MAGNESIUM SERPL-MCNC: 2 MG/DL
MAGNESIUM SERPL-MCNC: 2.2 MG/DL
MCH RBC QN AUTO: 28.3 PG
MCH RBC QN AUTO: 30 PG
MCHC RBC AUTO-ENTMCNC: 33.1 %
MCHC RBC AUTO-ENTMCNC: 35.1 %
MCV RBC AUTO: 86 FL
MCV RBC AUTO: 86 FL
METHEMOGLOBIN: 0.7 % (ref 0–3)
MODE: ABNORMAL
MONOCYTES # BLD AUTO: 1.2 K/UL
MONOCYTES # BLD AUTO: 1.4 K/UL
MONOCYTES NFR BLD: 11.7 %
MONOCYTES NFR BLD: 12.7 %
NEUTROPHILS # BLD AUTO: 6.9 K/UL
NEUTROPHILS # BLD AUTO: 9.2 K/UL
NEUTROPHILS NFR BLD: 74 %
NEUTROPHILS NFR BLD: 74.3 %
PCO2 BLDA: 44.1 MMHG (ref 35–45)
PH SMN: 7.44 [PH] (ref 7.35–7.45)
PHOSPHATE SERPL-MCNC: 3.1 MG/DL
PLATELET # BLD AUTO: 161 K/UL
PLATELET # BLD AUTO: 178 K/UL
PLATELET BLD QL SMEAR: ABNORMAL
PMV BLD AUTO: 9.1 FL
PMV BLD AUTO: 9.2 FL
PO2 BLDA: 78 MMHG (ref 80–100)
POC BE: 6 MMOL/L
POC SATURATED O2: 96 % (ref 95–100)
POC TCO2: 31 MMOL/L (ref 23–27)
POTASSIUM SERPL-SCNC: 2.8 MMOL/L
POTASSIUM SERPL-SCNC: 3 MMOL/L
POTASSIUM SERPL-SCNC: 3.4 MMOL/L
POTASSIUM SERPL-SCNC: 3.4 MMOL/L
POTASSIUM SERPL-SCNC: 3.7 MMOL/L
POTASSIUM SERPL-SCNC: 3.8 MMOL/L
PROT SERPL-MCNC: 7.5 G/DL
PROTHROMBIN TIME: 11.4 SEC
RBC # BLD AUTO: 1.45 M/UL
RBC # BLD AUTO: 2.7 M/UL
SAMPLE: ABNORMAL
SITE: ABNORMAL
SODIUM SERPL-SCNC: 131 MMOL/L
SP02: 97
WBC # BLD AUTO: 12.35 K/UL
WBC # BLD AUTO: 9.29 K/UL

## 2017-02-07 PROCEDURE — 84132 ASSAY OF SERUM POTASSIUM: CPT | Mod: 91

## 2017-02-07 PROCEDURE — 82803 BLOOD GASES ANY COMBINATION: CPT

## 2017-02-07 PROCEDURE — 63600175 PHARM REV CODE 636 W HCPCS: Performed by: SURGERY

## 2017-02-07 PROCEDURE — 25000003 PHARM REV CODE 250: Performed by: THORACIC SURGERY (CARDIOTHORACIC VASCULAR SURGERY)

## 2017-02-07 PROCEDURE — 25000003 PHARM REV CODE 250: Performed by: NURSE PRACTITIONER

## 2017-02-07 PROCEDURE — 85730 THROMBOPLASTIN TIME PARTIAL: CPT | Mod: 91

## 2017-02-07 PROCEDURE — 99233 SBSQ HOSP IP/OBS HIGH 50: CPT | Mod: ,,, | Performed by: INTERNAL MEDICINE

## 2017-02-07 PROCEDURE — 27000248 HC VAD-ADDITIONAL DAY

## 2017-02-07 PROCEDURE — 86900 BLOOD TYPING SEROLOGIC ABO: CPT

## 2017-02-07 PROCEDURE — 63600175 PHARM REV CODE 636 W HCPCS: Performed by: STUDENT IN AN ORGANIZED HEALTH CARE EDUCATION/TRAINING PROGRAM

## 2017-02-07 PROCEDURE — 80053 COMPREHEN METABOLIC PANEL: CPT

## 2017-02-07 PROCEDURE — 85610 PROTHROMBIN TIME: CPT

## 2017-02-07 PROCEDURE — 84132 ASSAY OF SERUM POTASSIUM: CPT

## 2017-02-07 PROCEDURE — 97116 GAIT TRAINING THERAPY: CPT

## 2017-02-07 PROCEDURE — 83735 ASSAY OF MAGNESIUM: CPT | Mod: 91

## 2017-02-07 PROCEDURE — 83615 LACTATE (LD) (LDH) ENZYME: CPT

## 2017-02-07 PROCEDURE — 63600367 HC NITRIC OXIDE PER HOUR

## 2017-02-07 PROCEDURE — 85025 COMPLETE CBC W/AUTO DIFF WBC: CPT

## 2017-02-07 PROCEDURE — 84100 ASSAY OF PHOSPHORUS: CPT

## 2017-02-07 PROCEDURE — 94799 UNLISTED PULMONARY SVC/PX: CPT

## 2017-02-07 PROCEDURE — 63600175 PHARM REV CODE 636 W HCPCS

## 2017-02-07 PROCEDURE — 25000003 PHARM REV CODE 250

## 2017-02-07 PROCEDURE — 63600175 PHARM REV CODE 636 W HCPCS: Performed by: NURSE PRACTITIONER

## 2017-02-07 PROCEDURE — 37799 UNLISTED PX VASCULAR SURGERY: CPT

## 2017-02-07 PROCEDURE — 99900035 HC TECH TIME PER 15 MIN (STAT)

## 2017-02-07 PROCEDURE — 86850 RBC ANTIBODY SCREEN: CPT

## 2017-02-07 PROCEDURE — 97535 SELF CARE MNGMENT TRAINING: CPT

## 2017-02-07 PROCEDURE — 25000003 PHARM REV CODE 250: Performed by: STUDENT IN AN ORGANIZED HEALTH CARE EDUCATION/TRAINING PROGRAM

## 2017-02-07 PROCEDURE — 99233 SBSQ HOSP IP/OBS HIGH 50: CPT | Mod: ,,, | Performed by: SURGERY

## 2017-02-07 PROCEDURE — 20000000 HC ICU ROOM

## 2017-02-07 PROCEDURE — 83050 HGB METHEMOGLOBIN QUAN: CPT

## 2017-02-07 PROCEDURE — 25000003 PHARM REV CODE 250: Performed by: SURGERY

## 2017-02-07 PROCEDURE — 27100171 HC OXYGEN HIGH FLOW UP TO 24 HOURS

## 2017-02-07 RX ORDER — POTASSIUM CHLORIDE 20 MEQ/15ML
60 SOLUTION ORAL ONCE
Status: COMPLETED | OUTPATIENT
Start: 2017-02-07 | End: 2017-02-07

## 2017-02-07 RX ORDER — ADHESIVE BANDAGE
30 BANDAGE TOPICAL EVERY 4 HOURS
Status: COMPLETED | OUTPATIENT
Start: 2017-02-07 | End: 2017-02-07

## 2017-02-07 RX ORDER — AMOXICILLIN 250 MG
1 CAPSULE ORAL 2 TIMES DAILY
Status: DISCONTINUED | OUTPATIENT
Start: 2017-02-07 | End: 2017-02-11

## 2017-02-07 RX ORDER — LANOLIN ALCOHOL/MO/W.PET/CERES
400 CREAM (GRAM) TOPICAL 3 TIMES DAILY
Status: DISCONTINUED | OUTPATIENT
Start: 2017-02-07 | End: 2017-02-21 | Stop reason: HOSPADM

## 2017-02-07 RX ORDER — WARFARIN SODIUM 5 MG/1
5 TABLET ORAL ONCE
Status: DISCONTINUED | OUTPATIENT
Start: 2017-02-07 | End: 2017-02-07

## 2017-02-07 RX ORDER — POTASSIUM CHLORIDE 14.9 MG/ML
20 INJECTION INTRAVENOUS
Status: COMPLETED | OUTPATIENT
Start: 2017-02-07 | End: 2017-02-07

## 2017-02-07 RX ORDER — POTASSIUM CHLORIDE 20 MEQ/15ML
20 SOLUTION ORAL 2 TIMES DAILY
Status: DISCONTINUED | OUTPATIENT
Start: 2017-02-07 | End: 2017-02-08

## 2017-02-07 RX ADMIN — FUROSEMIDE 20 MG/HR: 10 INJECTION, SOLUTION INTRAMUSCULAR; INTRAVENOUS at 11:02

## 2017-02-07 RX ADMIN — FERROUS GLUCONATE TAB 324 MG (37.5 MG ELEMENTAL IRON) 324 MG: 324 (37.5 FE) TAB at 08:02

## 2017-02-07 RX ADMIN — POTASSIUM CHLORIDE 40 MEQ: 400 INJECTION, SOLUTION INTRAVENOUS at 09:02

## 2017-02-07 RX ADMIN — METOCLOPRAMIDE 10 MG: 5 INJECTION, SOLUTION INTRAMUSCULAR; INTRAVENOUS at 05:02

## 2017-02-07 RX ADMIN — POTASSIUM CHLORIDE 40 MEQ: 400 INJECTION, SOLUTION INTRAVENOUS at 02:02

## 2017-02-07 RX ADMIN — POTASSIUM CHLORIDE 20 MEQ: 200 INJECTION, SOLUTION INTRAVENOUS at 06:02

## 2017-02-07 RX ADMIN — MAGNESIUM OXIDE TAB 400 MG (241.3 MG ELEMENTAL MG) 400 MG: 400 (241.3 MG) TAB at 09:02

## 2017-02-07 RX ADMIN — STANDARDIZED SENNA CONCENTRATE AND DOCUSATE SODIUM 1 TABLET: 8.6; 5 TABLET, FILM COATED ORAL at 09:02

## 2017-02-07 RX ADMIN — STANDARDIZED SENNA CONCENTRATE AND DOCUSATE SODIUM 1 TABLET: 8.6; 5 TABLET, FILM COATED ORAL at 02:02

## 2017-02-07 RX ADMIN — POLYETHYLENE GLYCOL 3350 17 G: 17 POWDER, FOR SOLUTION ORAL at 08:02

## 2017-02-07 RX ADMIN — OXYCODONE HYDROCHLORIDE 5 MG: 5 TABLET ORAL at 10:02

## 2017-02-07 RX ADMIN — POLYETHYLENE GLYCOL 3350 17 G: 17 POWDER, FOR SOLUTION ORAL at 09:02

## 2017-02-07 RX ADMIN — POTASSIUM CHLORIDE 20 MEQ: 200 INJECTION, SOLUTION INTRAVENOUS at 08:02

## 2017-02-07 RX ADMIN — Medication 3 ML: at 02:02

## 2017-02-07 RX ADMIN — AMLODIPINE BESYLATE 10 MG: 10 TABLET ORAL at 08:02

## 2017-02-07 RX ADMIN — HEPARIN SODIUM AND DEXTROSE 1800 UNITS/HR: 10000; 5 INJECTION INTRAVENOUS at 11:02

## 2017-02-07 RX ADMIN — WARFARIN SODIUM 6 MG: 1 TABLET ORAL at 05:02

## 2017-02-07 RX ADMIN — Medication 3 ML: at 09:02

## 2017-02-07 RX ADMIN — ONDANSETRON 4 MG: 2 INJECTION INTRAMUSCULAR; INTRAVENOUS at 08:02

## 2017-02-07 RX ADMIN — PANTOPRAZOLE SODIUM 40 MG: 40 TABLET, DELAYED RELEASE ORAL at 08:02

## 2017-02-07 RX ADMIN — ONDANSETRON 8 MG: 2 INJECTION INTRAMUSCULAR; INTRAVENOUS at 03:02

## 2017-02-07 RX ADMIN — POTASSIUM CHLORIDE 20 MEQ: 20 SOLUTION ORAL at 09:02

## 2017-02-07 RX ADMIN — POTASSIUM CHLORIDE 60 MEQ: 20 SOLUTION ORAL at 06:02

## 2017-02-07 RX ADMIN — OXYCODONE HYDROCHLORIDE 5 MG: 5 TABLET ORAL at 05:02

## 2017-02-07 RX ADMIN — MAGNESIUM HYDROXIDE 2400 MG: 400 SUSPENSION ORAL at 06:02

## 2017-02-07 RX ADMIN — POTASSIUM CHLORIDE 40 MEQ: 400 INJECTION, SOLUTION INTRAVENOUS at 07:02

## 2017-02-07 RX ADMIN — OXYCODONE HYDROCHLORIDE 5 MG: 5 TABLET ORAL at 08:02

## 2017-02-07 RX ADMIN — Medication 3 ML: at 06:02

## 2017-02-07 RX ADMIN — MAGNESIUM SULFATE IN WATER 2 G: 40 INJECTION, SOLUTION INTRAVENOUS at 07:02

## 2017-02-07 RX ADMIN — POTASSIUM CHLORIDE 40 MEQ: 400 INJECTION, SOLUTION INTRAVENOUS at 12:02

## 2017-02-07 RX ADMIN — ONDANSETRON 8 MG: 2 INJECTION INTRAMUSCULAR; INTRAVENOUS at 08:02

## 2017-02-07 RX ADMIN — ONDANSETRON 8 MG: 2 INJECTION INTRAMUSCULAR; INTRAVENOUS at 05:02

## 2017-02-07 RX ADMIN — NICARDIPINE HYDROCHLORIDE 5 MG/HR: 0.2 INJECTION, SOLUTION INTRAVENOUS at 06:02

## 2017-02-07 RX ADMIN — MAGNESIUM OXIDE TAB 400 MG (241.3 MG ELEMENTAL MG) 400 MG: 400 (241.3 MG) TAB at 02:02

## 2017-02-07 RX ADMIN — ASPIRIN 325 MG: 325 TABLET, DELAYED RELEASE ORAL at 08:02

## 2017-02-07 RX ADMIN — POTASSIUM CHLORIDE 20 MEQ: 20 SOLUTION ORAL at 10:02

## 2017-02-07 RX ADMIN — MAGNESIUM HYDROXIDE 2400 MG: 400 SUSPENSION ORAL at 03:02

## 2017-02-07 NOTE — PROGRESS NOTES
Daily E and M and VAD Interrogation Note    Reason for Visit:  Patient is seen in follow up for management of:  [] HeartMate II  [x] Heartware [] Total artificial heart       [] ECMO           [] Other     Interval History:  [] Interval history unobtainable due to intubation.  The [x] implant/[] explant date was 2/1/17    CVP decreased to 13 this am down from high of 21 yesterday    Patient without significant complaints  Nausea continues improving   Pain controlled.   VAD flows ~8  Working well with PT    Review of Systems:  Positive for intermittent mild nausea      Medications:  Current Facility-Administered Medications   Medication Dose Route Frequency Provider Last Rate Last Dose    albumin human 5% bottle 500 mL  500 mL Intravenous PRN Urszula Tuttle NP        albuterol sulfate nebulizer solution 2.5 mg  2.5 mg Nebulization Q4H PRN Urszula Tuttle NP        amlodipine tablet 10 mg  10 mg Oral Daily Lex Macedo MD        aspirin EC tablet 325 mg  325 mg Oral Daily Urszula Tuttle NP   325 mg at 02/06/17 0827    bisacodyl suppository 10 mg  10 mg Rectal Daily PRN Urszula Tuttle NP        bisacodyl suppository 10 mg  10 mg Rectal Once Brayden Limon MD   Stopped at 02/04/17 1015    DOBUTamine 500mg in D5W 250mL infusion (premix) (NON-TITRATING)  4 mcg/kg/min Intravenous Continuous Jana Cain MD 7.6 mL/hr at 02/07/17 0700 4 mcg/kg/min at 02/07/17 0700    docusate sodium capsule 200 mg  200 mg Oral QHS Urszula Tuttle NP   200 mg at 02/06/17 2034    EPINEPHrine (ADRENALIN) 4 mg in sodium chloride 0.9% 250 mL infusion  0.04 mcg/kg/min Intravenous Continuous Lex Macedo MD 10.5 mL/hr at 02/07/17 0400 0.04 mcg/kg/min at 02/07/17 0400    ferrous gluconate tablet 324 mg  324 mg Oral Daily with breakfast Urszula Tuttle NP   324 mg at 02/06/17 0827    furosemide (LASIX) 250 mg in sodium chloride 0.9 % 250 mL infusion (non-titrating)  20 mg/hr Intravenous  Continuous Jana Dianelys Cain MD 20 mL/hr at 02/07/17 0700 20 mg/hr at 02/07/17 0700    glucagon (human recombinant) injection 1 mg  1 mg Intramuscular PRN Shai Ortega MD        heparin 25,000 units in dextrose 5% 250 mL (100 units/mL) infusion (heparin infusion)  1,800 Units/hr Intravenous Continuous Denis Aburto MD 18 mL/hr at 02/07/17 0700 1,800 Units/hr at 02/07/17 0700    hydromorphone injection 0.5 mg  0.5 mg Intravenous Q3H PRN Dora Mitchell MD        magnesium hydroxide 400 mg/5 ml suspension 2,400 mg  30 mL Oral Daily PRN Urszula Tuttle NP        magnesium sulfate 2g in water 50mL IVPB (premix)  2 g Intravenous PRN Urszula Tuttle NP   2 g at 02/07/17 0704    niCARdipine 40 mg/200 mL infusion  5 mg/hr Intravenous Continuous Urszula Tuttle NP 25 mL/hr at 02/07/17 0700 5 mg/hr at 02/07/17 0700    nitric oxide gas Gas 10 ppm  10 ppm Inhalation Continuous Denis Aburto MD   10 ppm at 02/06/17 1121    ondansetron injection 4 mg  4 mg Intravenous Q8H PRN Dora Mitchell MD   4 mg at 02/05/17 1017    ondansetron injection 8 mg  8 mg Intravenous Q8H Lola Dinero MD   8 mg at 02/07/17 0506    oxycodone immediate release tablet 10 mg  10 mg Oral Q4H PRN Urszula Tuttle NP   10 mg at 02/06/17 1355    oxycodone immediate release tablet 5 mg  5 mg Oral Q4H PRN Urszula Tuttle NP   5 mg at 02/07/17 0510    pantoprazole EC tablet 40 mg  40 mg Oral Daily Urszula Tuttle NP   40 mg at 02/06/17 0826    polyethylene glycol packet 17 g  17 g Oral BID Brayden Limon MD   17 g at 02/06/17 2034    potassium chloride 40 mEq in 100 mL IVPB (FOR CENTRAL LINE ADMINISTRATION ONLY)  40 mEq Intravenous PRN Brayden Limon MD 25 mL/hr at 02/06/17 0000 80 mEq at 02/06/17 0000    potassium chloride 40 mEq in 100 mL IVPB (FOR CENTRAL LINE ADMINISTRATION ONLY)  40 mEq Intravenous PRN Brayden Limon MD 25 mL/hr at 02/07/17 0704 40 mEq at 02/07/17 0704     promethazine (PHENERGAN) 12.5 mg in dextrose 5 % 50 mL IVPB  12.5 mg Intravenous Q8H PRN Denis Aburto MD        sodium chloride 0.9% flush 3 mL  3 mL Intravenous Q8H Urszula ARTHURReanna Tuttle, NP   3 mL at 02/07/17 0600    warfarin (COUMADIN) tablet 5 mg  5 mg Oral Once Denis Aburto MD           Physical Examination:  Vital Signs:   Vitals:    02/07/17 0715   BP:    Pulse: 96   Resp:    Temp:      Cardiovascular:  [x] Regular rate and rhythm [] Irregular []  None (COURTNEY) []  Other  []  No edema []  Edema present  []  Clear to auscultation  []  Rales to []  Coarse  []  No rales but   [] Pedal Pulses absent  []  Pulses  + throughout  Skin:  Incision is [x]  Clean, dry and intact.  []  Other   Sternum:  [x]  Stable []  Unstable  Driveline(s):   [x]  Clean, dry and intact. []  Other       Labs:  BMP  Lab Results   Component Value Date     (L) 02/07/2017    K 3.4 (L) 02/07/2017    K 3.4 (L) 02/07/2017    CL 89 (L) 02/07/2017    CO2 31 (H) 02/07/2017    BUN 21 (H) 02/07/2017    CREATININE 1.2 02/07/2017    CALCIUM 9.4 02/07/2017    ANIONGAP 11 02/07/2017    ESTGFRAFRICA >60.0 02/07/2017    EGFRNONAA >60.0 02/07/2017       Magnesium   Date Value Ref Range Status   02/07/2017 1.9 1.6 - 2.6 mg/dL Final       Lab Results   Component Value Date    WBC 9.29 02/07/2017    HGB 8.1 (L) 02/07/2017    HCT 23.1 (L) 02/07/2017    MCV 86 02/07/2017     02/07/2017       Lab Results   Component Value Date    INR 1.1 02/07/2017    INR 1.0 02/06/2017    INR 1.0 02/05/2017       BNP   Date Value Ref Range Status   02/06/2017 1110 (H) 0 - 99 pg/mL Final     Comment:     Values of less than 100 pg/ml are consistent with non-CHF populations.   02/03/2017 709 (H) 0 - 99 pg/mL Final     Comment:     Values of less than 100 pg/ml are consistent with non-CHF populations.   01/20/2017 101 (H) 0 - 99 pg/mL Final     Comment:     Values of less than 100 pg/ml are consistent with non-CHF populations.       LD   Date Value Ref Range  Status   02/07/2017 457 (H) 110 - 260 U/L Final     Comment:     Results are increased in hemolyzed samples.   02/06/2017 444 (H) 110 - 260 U/L Final     Comment:     Results are increased in hemolyzed samples.   02/05/2017 448 (H) 110 - 260 U/L Final     Comment:     Results are increased in hemolyzed samples.       X-Rays:  [x]  I reviewed today's Chest x-ray    Procedure:  Device Interrogation including analysis of device parameters.  Current Settings   [x]  Ventricular Assist Device  []  Total Artificial Heart interrogated  Review of device function is []  Stable []  Other   TXP LVAD INTERROGATIONS 2/7/2017 2/7/2017 2/7/2017 2/7/2017 2/7/2017 2/6/2017 2/6/2017   Type Heartware Heartware Heartware Heartware Heartware Heartware Heartware   Flow 7.9 7.7 8 8 8.1 8 8   Speed 2900 2900 2900 2900 2900 2900 2900   Power ([place]) 5.5 5.4 5.5 5.6 5.5 5.5 5.5   Low Flow Alarm - - - - - - -   High Power Alarm - - - - - - -   Pulsatility - - - - - - -       Assessment:  [x]  Primary Cardiomyopathy []  Congestive Heart Failure   []  Atrial Fibrillation []  Ventricular Tachycardia   []  Aftercare cardiac device []  Long term (current) use of anticoagulants   []  Ventilator-associated pneumonia []  Pneumonia viral, unspecified   []  Pneumonia, bacterial, unspecified []  Pneumonia, organism unspecified   []  Hemorrhage of GI tract, unspecified    []  Nosebleed []  Driveline infection   []  Infection VAD device []  New onset of    []        Plan:  [x]  Interval history obtained from ICU attending team member during rounding today  []  VAD/COURTNEY teaching performed with patient  [x]  Mobilization / Physical Therapy ongoing  [x]  Anticoagulation [x]  Ongoing []  Held  []  Studies ordered  []      Plan:    Neuro: alert and oriented  - PO pain medication PRN, A/Ox4    Resp: saturating well on 2L nc, no subjective SOB other than with exertion  - Wean nitric to off  - IS/pulmonary toilet  - Daily CXR    CVS:  - Dobutamine 4  - Epi at  0.02/hr, plan to wean 0.01 q6 hours  - Cardene gtt off  - Continue amlodipine at 10 daily  - Continue heparin  goal aptt 40-50, therapeutic at 1800  - Coumadin 6 mg today, Daily INR  - Daily ASA    FENGI:  - Scheduled anti-emetic - Zofran; phenergan and Reglan PRN  - Advance diet slowly  - Repeat suppository PRN, bowel regimen  - Replace lytes PRN  - q6 Mg, K  - schedule miralax and docusate, scheduled magnesium hydroxide 100 mg/daily    Renal:  - DC kirk  - Trend BUN/Cr  - Lasix gtt 20     Endo:  - SSI Insulin     Heme/ID:  - Off post op antibiotics, continue to monitor WBC and Hct    Dispo:  - Continue ICU care, wean off epi, aggressive bowel regimen, possible stepdown tomorrow/thursday      Total time spent was 35 minutes.  Of which more than 50 percent of the care dominated counseling and coordinating care with different team members. The VAD was interrogated and all parameters were WNL and no significant findings were found in the history. All these findings are documented in the note above.      Date of Service: 02/07/2017                 Denis Aburto MD PGY II  [phone]

## 2017-02-07 NOTE — PROGRESS NOTES
at bedside. Updated on pts status, VS, gtts, labs, nitric and CVP of 13. MD orders to begin oral potassium and magnesium regimen, D/C kirk, give 6mg coumadin at 1700, decrease Epi gtt to 0.02 then 0.01 at 1800 and turn off at midnight tonight. MD decreased nitric to 1ppm and goal is to have nitric off by this evening. MD also wants to begin magnesium hydroxide for pts constipation. All orders reviewed with pt and CTS resident also updated. Orders will be carried out and pt will be monitored closely.

## 2017-02-07 NOTE — PROGRESS NOTES
Progress Note  Heart Transplant Service    Admit Date: 1/27/2017   LOS: 11 days     SUBJECTIVE:     Follow up for: Chronic combined systolic and diastolic heart failure    Interval History: Pain and nausea a little better controlled today. Patient off Nicardipine and NO overnight. Net -3.9 L, CVP of 13 this AM.     Scheduled Meds:   amlodipine  10 mg Oral Daily    aspirin  325 mg Oral Daily    bisacodyl  10 mg Rectal Once    docusate sodium  200 mg Oral QHS    ferrous gluconate  324 mg Oral Daily with breakfast    ondansetron  8 mg Intravenous Q8H    pantoprazole  40 mg Oral Daily    polyethylene glycol  17 g Oral BID    sodium chloride 0.9%  3 mL Intravenous Q8H    warfarin  5 mg Oral Once     Continuous Infusions:   DOBUTamine 4 mcg/kg/min (02/07/17 0800)    epinephrine infusion (NON-TITRATING) 0.04 mcg/kg/min (02/07/17 0800)    furosemide (LASIX) 1 mg/mL infusion (non-titrating) 20 mg/hr (02/07/17 0800)    heparin (porcine) in 5 % dex 1,800 Units/hr (02/07/17 0800)    nicardipine 5 mg/hr (02/07/17 0800)    nitric oxide gas       PRN Meds:albumin human 5%, albuterol sulfate, bisacodyl, glucagon (human recombinant), HYDROmorphone, magnesium hydroxide 400 mg/5 ml, magnesium sulfate IVPB, ondansetron, oxycodone, oxycodone, potassium chloride, potassium chloride, promethazine (PHENERGAN) IVPB    Review of patient's allergies indicates:  No Known Allergies    OBJECTIVE:     Vital Signs (Most Recent)  Temp: 98.1 °F (36.7 °C) (02/07/17 0700)  Pulse: 93 (02/07/17 0815)  Resp: 16 (02/07/17 0300)  BP: (!) 80/0 (02/07/17 0800)  SpO2: 100 % (02/07/17 0815)    Vital Signs Range (Last 24H):  Temp:  [98.1 °F (36.7 °C)-98.8 °F (37.1 °C)]   Pulse:  []   Resp:  [13-67]   BP: (66-82)/(0)   SpO2:  [93 %-100 %]   Arterial Line BP: (81-95)/(60-83)     I & O (Last 24H):    Intake/Output Summary (Last 24 hours) at 02/07/17 0824  Last data filed at 02/07/17 0800   Gross per 24 hour   Intake             3644 ml    Output             7440 ml   Net            -3796 ml            Telemetry: sinus  Constitutional: AOx3, NAD conversant  Neck: No JVD present. No thyromegaly present.   Cardiovascular: VAD hum  Pulmonary/Chest:good breath sounds bilaterally  Abdominal: + BS, exhibits no distension. There is no tenderness. There is no rebound.   Extremities: no cyanosis, clubbing or edema.  No bleeding, edema, erythema or hematoma, doppler pulses in all distals    Labs:       Recent Labs  Lab 02/06/17 0400 02/07/17  0524 02/07/17  0635   WBC 12.95* 12.35 9.29   HGB 8.0* 4.1* 8.1*   HCT 23.7* 12.4* 23.1*    178 161   LYMPH 9.4*  1.2 12.1*  1.5 11.3*  1.1   MONO 9.9  1.3* 11.7  1.4* 12.7  1.2*   EOSINOPHIL 0.4 1.5 1.4         Recent Labs  Lab 02/05/17 0400 02/06/17  0400 02/06/17 2311 02/07/17  0524   APTT  --   < > 41.0* 38.7* 36.5*   INR 1.0  --  1.0  --  1.1   < > = values in this interval not displayed.       Recent Labs  Lab 02/05/17  0400 02/05/17  2300 02/06/17  0400 02/06/17  1441 02/06/17  2311 02/07/17  0524   *  < > 130* 116*  --   --   --  116*   CALCIUM 9.0  < > 8.7 9.1  --   --   --  9.4   ALBUMIN 3.6  --   --  3.6  --   --   --  3.7   PROT 7.1  --   --  7.2  --   --   --  7.5   *  < > 129* 130*  --   --   --  131*   K 3.7  < > 3.2* 4.4  < > 2.8* 3.0* 3.4*  3.4*   CO2 34*  < > 32* 30*  --   --   --  31*   CL 85*  < > 87* 88*  --   --   --  89*   BUN 22*  < > 23* 25*  --   --   --  21*   CREATININE 1.0  < > 1.2 1.3  --   --   --  1.2   ALKPHOS 89  --   --  107  --   --   --  116   ALT 32  --   --  34  --   --   --  29   AST 63*  --   --  51*  --   --   --  37   BILITOT 0.6  --   --  0.6  --   --   --  0.6   MG 1.9  < > 2.5 2.3  < > 3.0* 2.0 1.9   PHOS 3.5  --   --  4.3  --   --   --  3.1   < > = values in this interval not displayed.  Estimated Creatinine Clearance: 84.6 mL/min (based on Cr of 1.2).      Recent Labs  Lab 02/06/17  0400   BNP 1110*         Recent Labs  Lab 02/01/17  1232  02/02/17  0304 02/03/17  0733 02/04/17  0800 02/05/17  0400 02/06/17  0400 02/07/17  0524   * 449* 568* 482* 448* 444* 457*       Microbiology Results (last 7 days)     Procedure Component Value Units Date/Time    Blood culture (site 1) [[id number]] Collected:  01/27/17 1837    Order Status:  Completed Specimen:  Blood Updated:  02/01/17 2022     Blood Culture, Routine No growth after 5 days.    Narrative:       Site # 1, aerobic and anaerobic (central line, if patient has  one)    Blood culture (site 2) [[id number]] Collected:  01/27/17 1828    Order Status:  Completed Specimen:  Blood Updated:  02/01/17 2022     Blood Culture, Routine No growth after 5 days.    Narrative:       Site # 2, aerobic only            ASSESSMENT:     27 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 He was admitted for PICC removal, IABP, and planned LVAD implantation. He is s/p [employer] LVAD placement 2/1 and chest closure 2/2. Extubated 2/2 and now recovering in ICU.    PLAN:     Cardiogenic Shock due to Acute on Chronic Systolic HF, DCM, NYHA FC IV s/p HW LVAD 2/1/2017  -s/p HW LVAD 2/1 and chest closure/extubation 2/2  -CTS primary   -Currently on , lasix and epi  -Does not have ICD- will need to discuss timing of this vs Lifevest  - Anticoagulation per CTS: on Heparin, starting Coumadin today  -cultures: NGTD    HTN  -BP controlled    Back Pain  -prn meds on board; will adjust    Prophylaxis   -heparin

## 2017-02-07 NOTE — PROGRESS NOTES
History & Physical  Surgical Intensive Care    SUBJECTIVE:     Chief Complaint/Reason for Admission: LVAD    History of Present Illness:  Patient is a 27 y.o. male with DCM that presents to the SICU intubated and sedated s/p LVAD and IABP placement. Balloon pump discontinued + chest closure + extubated 2/2/2017.    Interval: Mediastinal chest tube d/Isabel yesterday, 2/6/2017 and patient worked with PT. No complaints this AM.    PTA Medications   Medication Sig    DOBUTAMINE HCL (DOBUTAMINE IV) Inject into the vein.    furosemide (LASIX) 20 MG tablet Take 1 tablet (20 mg total) by mouth once daily.    lisinopril 10 MG tablet TAKE ONE TABLET BY MOUTH ONCE DAILY IN THE EVENING       Review of patient's allergies indicates:  No Known Allergies    Past Medical History   Diagnosis Date    Cardiogenic shock 1/16/2017    Cardiomyopathy      Familial cardiomyopathy    CHF (congestive heart failure)     Essential hypertension 12/21/2016    Familial Cardiomyopathy      Familial cardiomyopathy      History reviewed. No pertinent past surgical history.  Family History   Problem Relation Age of Onset    Arthritis Mother     Heart disease Brother      cardiomyopathy and heart transplant    No Known Problems Father     Heart disease Brother      cardiomyopathy and heart transplanted twice    Birth defects Paternal Uncle      Social History   Substance Use Topics    Smoking status: Current Some Day Smoker     Packs/day: 1.00    Smokeless tobacco: None    Alcohol use 2.4 oz/week     4 Shots of liquor per week        Review of Systems:  Unable to obtain as patient is sedated.    OBJECTIVE:     Vital Signs (Most Recent)  Temp: 98.1 °F (36.7 °C) (02/07/17 0700)  Pulse: 93 (02/07/17 0815)  Resp: 16 (02/07/17 0300)  BP: (!) 80/0 (02/07/17 0800)  SpO2: 100 % (02/07/17 0815)  Ventilator Data (Last 24H):       Hemodynamic Parameters (Last 24H):       Physical Exam:  General: well developed, well nourished, resting comfortably  in chair  Neurologic: alert  Lungs: extubated, nitric 3.6 per NC  Cardiovascular: Heart: regular rate and rhythm of LVAD with hum.  Abd: ND, NTTP  Extremities: no cyanosis or edema, or clubbing.   Wound/Incision: sternal wound c/d/i    Lines/Drains:       Introducer 02/02/17 0553 (Active)   Number of days:0            Pulmonary Artery Catheter Assessment  02/02/17 0553 (Active)   Number of days:0            Pulmonary Artery Catheter Assessment  02/01/17 0835 (Active)   Dressing biopatch in place;dressing dry and intact 2/2/2017  3:00 AM   Securement secured w/ sutures 2/2/2017  3:00 AM   Current Insertion Depth (cm) 45 cm 2/2/2017  3:00 AM   Phlebitis 0-->no symptoms 2/2/2017  3:00 AM   Infiltration 0-->no symptoms 2/2/2017  3:00 AM   Waveform normal 2/2/2017  3:00 AM   Pressure Catheter Interventions line leveled/zeroed 2/2/2017  3:00 AM   Prox Injectate Status Infusing 2/2/2017  3:00 AM   Prox Infusate Status Infusing 2/2/2017  3:00 AM   Distal Status Infusing 2/2/2017  3:00 AM   Pressure Catheter Tubing Change Due 02/05/17 2/1/2017  7:00 PM   Daily Line Review Performed 2/2/2017  3:00 AM   Number of days:0            Percutaneous Central Line Insertion/Assessment - triple lumen  02/02/17 0553 (Active)   Number of days:0            Peripheral IV - Single Lumen Right Hand (Active)   Site Assessment Clean;Intact;Dry 2/2/2017  3:00 AM   Line Status Infusing 2/2/2017  3:00 AM   Dressing Status Clean;Dry;Intact 2/2/2017  3:00 AM   Dressing Change Due 02/02/17 2/2/2017  3:00 AM   Site Change Due 02/02/17 2/1/2017  7:00 PM   Reason Not Rotated Not due 2/2/2017  3:00 AM   Number of days:            Peripheral IV - Single Lumen 02/01/17 0702 Right Antecubital (Active)   Site Assessment Clean;Dry;Intact 2/2/2017  3:00 AM   Line Status Infusing 2/2/2017  3:00 AM   Dressing Status Clean;Dry;Intact 2/2/2017  3:00 AM   Dressing Intervention New dressing 2/1/2017  6:00 AM   Dressing Change Due 02/05/17 2/2/2017  3:00 AM   Site  Change Due 02/05/17 2/1/2017  7:00 PM   Reason Not Rotated Not due 2/2/2017  3:00 AM   Number of days:1            Peripheral IV - Single Lumen 02/01/17 0830 Left Hand (Active)   Site Assessment Clean;Intact;Dry 2/2/2017  3:00 AM   Line Status Infusing 2/2/2017  3:00 AM   Dressing Status Clean;Dry;Intact 2/2/2017  3:00 AM   Dressing Intervention New dressing 2/2/2017  3:00 AM   Dressing Change Due 02/05/17 2/2/2017  3:00 AM   Site Change Due 02/05/17 2/1/2017  7:00 PM   Reason Not Rotated Not due 2/2/2017  3:00 AM   Number of days:0            Arterial Line 02/01/17 0815 Left Radial (Active)   Site Assessment Clean;Dry;Intact 2/2/2017  3:00 AM   Line Status Pulsatile blood flow 2/2/2017  3:00 AM   Art Line Waveform Appropriate 2/2/2017  3:00 AM   Arterial Line Interventions Zeroed and calibrated;Leveled;Flushed per protocol;Line pulled back;Connections checked and tightened 2/2/2017  3:00 AM   Color/Movement/Sensation Capillary refill less than 3 sec 2/2/2017  3:00 AM   Dressing Type Transparent 2/2/2017  3:00 AM   Dressing Status Clean;Dry;Intact 2/2/2017  3:00 AM   Dressing Intervention New dressing 2/2/2017  3:00 AM   Dressing Change Due 02/05/17 2/2/2017  3:00 AM   Number of days:0            VAD 02/01/17 1042 Heartware (Active)   Site Location Abdomen right 2/2/2017  3:00 AM   Site Assessment Clean;Dry;Intact 2/2/2017  3:00 AM   Dressing Status Clean;Dry;Intact 2/2/2017  3:00 AM   Dressing Intervention New dressing 2/2/2017  3:00 AM   Power Source External DC 2/2/2017  3:00 AM   Equipment inventory at  Admission 2/1/2017 10:00 AM   Pump type Heartware 2/1/2017  7:00 PM   Battery 1 [id number] 2/1/2017 10:00 AM   Battery 2 [id number] 2/1/2017 10:00 AM   Battery 3 [id number] 2/1/2017 10:00 AM   Battery 4 [id number] 2/1/2017 10:00 AM   Battery 5 [id number] 2/1/2017 10:00 AM   Battery 6 [id number] 2/1/2017 10:00 AM   AC Adapter 1 [id number] 2/1/2017 10:00 AM   AC Adapter 2 [id number] 2/1/2017 10:00 AM   Battery Charger  [id number] 2/1/2017 10:00 AM   Primary Controller [id number] 2/1/2017 10:00 AM   Extra Controller [id number] 2/1/2017 10:00 AM   Number of days:0            Chest Tube 02/01/17 1129 1 Mediastinal 32 Fr. (Active)   Chest Tube WDL WDL 2/1/2017  7:00 PM   Function -20 cm H2O 2/2/2017  3:00 AM   Air Leak/Fluctuation air leak not present;fluctuation not present;connections tightened;dependent drainage cleared 2/2/2017  3:00 AM   Safety all tubing connections taped;all connections secured;suction checked 2/2/2017  3:00 AM   Securement tubing anchored to body distal to insertion site w/ tape 2/2/2017  3:00 AM   Patency Intervention Tip/tilt 2/2/2017  3:00 AM   Drainage Description Dark red 2/2/2017  3:00 AM   Dressing Appearance occlusive gauze dressing intact;clean and dry;w/ dried drainage 2/2/2017  3:00 AM   Left Subcutaneous Emphysema none present 2/2/2017  3:00 AM   Right Subcutaneous Emphysema none present 2/2/2017  3:00 AM   Surrounding Skin Unable to view 2/2/2017  3:00 AM   Output (mL) 20 mL 2/2/2017  4:00 AM   Number of days:0            Chest Tube 02/01/17 1130 2 Mediastinal 32 Fr. (Active)   Chest Tube WDL [facility] 2/1/2017  7:00 PM   Function -20 cm H2O 2/2/2017  3:00 AM   Air Leak/Fluctuation air leak not present;fluctuation not present;connections tightened;dependent drainage cleared 2/2/2017  3:00 AM   Safety all tubing connections taped;all connections secured;suction checked 2/2/2017  3:00 AM   Securement tubing anchored to body distal to insertion site w/ tape 2/2/2017  3:00 AM   Patency Intervention Tip/tilt 2/2/2017  3:00 AM   Drainage Description Dark red 2/2/2017  3:00 AM   Dressing Appearance occlusive gauze dressing intact;clean and dry;w/ dried drainage 2/2/2017  3:00 AM   Left Subcutaneous Emphysema none present 2/2/2017  3:00 AM   Right Subcutaneous Emphysema none present 2/2/2017  3:00 AM   Surrounding Skin Unable to view 2/2/2017  3:00 AM   Output (mL) 10 mL 2/2/2017  4:00 AM   Number of days:0             NG/OG Tube 02/01/17 orogastric (Active)   Placement Check placement verified by x-ray 2/1/2017  7:00 PM   Tolerance no signs/symptoms of discomfort 2/1/2017  7:00 PM   Securement taped to commercial device 2/1/2017  7:00 PM   Clamp Status/Tolerance unclamped 2/1/2017  7:00 PM   Suction Setting/Drainage Method suction at;low;intermittent setting 2/1/2017  7:00 PM   Insertion Site Appearance no redness, warmth, tenderness, skin breakdown, drainage 2/1/2017  7:00 PM   Drainage Thin;Clear;Yellow 2/1/2017  7:00 PM   Flush/Irrigation flushed w/;water;no resistance met 2/1/2017  7:00 PM   Intake (mL) 45 mL 2/2/2017  5:30 AM   Tube Output(mL)(Include Discarded Residual) 130 mL 2/2/2017  5:50 AM   Number of days:1            Urethral Catheter 02/01/17 0840 Non-latex;Straight-tip;Temperature probe 16 Fr. (Active)   Site Assessment Clean;Intact 2/2/2017  3:00 AM   Collection Container Urimeter 2/2/2017  3:00 AM   Securement Method secured to upper leg w/ adhesive device 2/2/2017  3:00 AM   Catheter Care Performed yes 2/2/2017  3:00 AM   Reason for Continuing Urinary Catheterization Post operative;Critically ill in ICU requiring intensive monitoring 2/2/2017  3:00 AM   Output (mL) 45 mL 2/2/2017  6:00 AM   Number of days:0       Laboratory    Chest X-Ray 2/2/2017:Postoperative changes from an LVAD device placement remain without significant change.  Heart is at the upper limits of normal.  Lungs are clear.  The position of the endotracheal tube and the [place] catheter remain in satisfactory position. There is mediastinal drains in unchanged position. The aortic balloon pump catheter is no longer visualized on the current study.    Diagnostic Results:      ASSESSMENT/PLAN:     Patient is a 27 y.o. male with DCM s/p LVAD and IABP placement 2/1/2017 and planned chest closure 2/2/2017.    Interval: Mediastinal chest tube d/Isabel yesterday, 2/6/2017 and patient worked with PT. No complaints this AM.    Plan:  Neuro:    -  alert  - morphine for pain  - extubated    Pulmonary:  - 3-4L O2 NC  - nitric oxide 3.6 (from 18)    Cardiac:  - dobutamine 4 (unchanged)  - epi 0.04 (from 0.05)   - cardene gtt 5  - anticoag per primary now that chest is closed    Renal:   - BUN/Cr: 21/1.2 (from 25/1.3, 12/0.09 3 days ago)  - trend BUN/Cr  - cont kirk for strict I/O's  - UOP 7.4L    Fluids/Electrolytes/Nutrition/GI:   - lasix gtt - 20   - MIVF  - replace lytes as needed  - NPO  - ABd XR 2/3/2017: dilated loops of bowel. Ileus vs SBO   - mediastinal drain d/c'ed 2/6/2017  - control nausea    Heme/ Infectious Disease:   - bactrim, diflucan d/c'ed 2/7/2017  - vanc d/c'ed 2/5/2017  - WBC 12 (from 13)  - H/H stable: 8.1/23.1 (from  8/23.7)  - coumadin + heparin  - INR 1.1 (from 1.0)  - aPTT 36.5 (from 41)     Endocrine:  - insult gtt per protocol    Dispo:  - nausea possibly secondary to abd venous congestion  - cont ICU care     Adrienne Calle MD  PGY-1  Surgical Intensive Care Unit

## 2017-02-07 NOTE — PT/OT/SLP PROGRESS
Physical Therapy  Treatment    Mert Samayoa   MRN: [id number]   Admitting Diagnosis: Chronic combined systolic and diastolic heart failure    PT Received On: 17  PT Start Time: 947     PT Stop Time: 5    PT Total Time (min): 28 min       Billable Minutes:  Gait [id number]    Treatment Type: Treatment  PT/PTA: PT           Co treat with OT  General Precautions: Standard, LVAD, fall, sternal  Orthopedic Precautions: N/A   Braces: N/A    Do you have any cultural, spiritual, Buddhism conflicts, given your current situation?: none     Subjective:  Communicated with RN prior to session.  Pt agreeable to working with PT.     Pain Ratin/10     Location - Orientation: generalized  Location: sternal  Pain Addressed: Distraction, Reposition  Pain Rating Post-Intervention: 10    Objective:   Patient found with: arterial line, central line, kirk catheter, peripheral IV, pulse ox (continuous), oxygen, telemetry (nitric oxide, LVAD)    Functional Mobility:  Bed Mobility:        Transfers:  Sit <> Stand Assistance: Stand By Assistance  Sit <> Stand Assistive Device: No Assistive Device    Gait:   Gait Distance: 50 feet with a seated resting break in between  Assistance 1: Contact Guard Assistance  Gait Assistive Device: Hand held assist  Gait Pattern: 2-point gait  Gait Deviation(s): decreased zarina, increased time in double stance, decreased velocity of limb motion  Pt ambulated total of 50 feet with seated resting break in between 2/2 to fatigue. Pt limited to ambulation in room 2/2 to nitric oxide. Pt demo increased forward lean and rounded shoulders with decreased step length, pt able to correct gait deviations with vc.    Balance:   Static Sit: FAIR+: Able to take MINIMAL challenges from all directions  Dynamic Sit: FAIR+: Maintains balance through MINIMAL excursions of active trunk motion  Static Stand: FAIR: Maintains without assist but unable to take challenges  Dynamic stand: FAIR: Needs CONTACT  GUARD during gait     Therapeutic Activities and Exercises:  Pt remained on LVAD wall power throughout treatment. RN present to assist with line management and pt VSS throughout. See gait train above.     AM-PAC 6 CLICK MOBILITY  How much help from another person does this patient currently need?   1 = Unable, Total/Dependent Assistance  2 = A lot, Maximum/Moderate Assistance  3 = A little, Minimum/Contact Guard/Supervision  4 = None, Modified [place]/Independent    Turning over in bed (including adjusting bedclothes, sheets and blankets)?: 4  Sitting down on and standing up from a chair with arms (e.g., wheelchair, bedside commode, etc.): 3  Moving from lying on back to sitting on the side of the bed?: 3  Moving to and from a bed to a chair (including a wheelchair)?: 3  Need to walk in hospital room?: 3  Climbing 3-5 steps with a railing?: 2  Total Score: 18    AM-PAC Raw Score CMS G-Code Modifier Level of Impairment Assistance   6 % Total / Unable   7 - 9 CM 80 - 100% Maximal Assist   10 - 14 CL 60 - 80% Moderate Assist   15 - 19 CK 40 - 60% Moderate Assist   20 - 22 CJ 20 - 40% Minimal Assist   23 CI 1-20% SBA / CGA   24 CH 0% Independent/ Mod I     Patient left up in chair with all lines intact, call button in reach and RN present.    Assessment:  Mert Samayoa is a 27 y.o. male with a medical diagnosis of Chronic combined systolic and diastolic heart failure, s/p LVAD and presents with decreased functional mobility due to impaired endurance and weakness throughout. Pt demo improvement through increasing distance ambulated today.  Patient would benefit from skilled PT in the acute care setting to improve the limitations listed below. Patient would benefit from HH upon discharge.      Rehab identified problem list/impairments: Rehab identified problem list/impairments: weakness, impaired endurance, impaired functional mobilty, gait instability, impaired balance    Rehab potential is  good.    Activity tolerance: Fair    Discharge recommendations: Discharge Facility/Level Of Care Needs: home with home health     Barriers to discharge: Barriers to Discharge: Inaccessible home environment    Equipment recommendations: Equipment Needed After Discharge: none     GOALS:   Physical Therapy Goals        Problem: Physical Therapy Goal    Goal Priority Disciplines Outcome Goal Variances Interventions   Physical Therapy Goal     PT/OT, PT Ongoing (interventions implemented as appropriate)     Description:  Goals to be met by: 3/1/17     Patient will increase functional independence with mobility by performin. Supine to sit with [place]  2. Sit to stand transfer with [place]  3. Gait  x 400 feet with Supervision.   4. Ascend/descend 2 stair with bilateral Handrails Supervision.   5. Lower extremity exercise program x15 reps per handout, with assistance as needed.  6. [place] with LVAD management.                 PLAN:    Patient to be seen 6 x/week  to address the above listed problems via gait training, therapeutic activities, therapeutic exercises  Plan of Care expires: 17  Plan of Care reviewed with: patient         José Manuel Campbell, PT  2017

## 2017-02-07 NOTE — PLAN OF CARE
Problem: Physical Therapy Goal  Goal: Physical Therapy Goal  Goals to be met by: 3/1/17     Patient will increase functional independence with mobility by performin. Supine to sit with [place]  2. Sit to stand transfer with [place]  3. Gait x 400 feet with Supervision.   4. Ascend/descend 2 stair with bilateral Handrails Supervision.   5. Lower extremity exercise program x15 reps per handout, with assistance as needed.  6. [place] with LVAD management.    Outcome: Ongoing (interventions implemented as appropriate)  Goals remain appropriate.

## 2017-02-07 NOTE — PROGRESS NOTES
History & Physical  Surgical Intensive Care    SUBJECTIVE:     Chief Complaint/Reason for Admission: LVAD    History of Present Illness:  Patient is a 27 y.o. male with DCM that presents to the SICU intubated and sedated s/p LVAD and IABP placement. Balloon pump discontinued + chest closure + extubated 2/2/2017.    Interval: increased CVP yesterday. Echo revealed diastolic dysfunction and decreased LV systolic function. Patient was placed back on nitric oxide per NC and restarted on epi. Patient c/o mild nausea this AM.    PTA Medications   Medication Sig    DOBUTAMINE HCL (DOBUTAMINE IV) Inject into the vein.    furosemide (LASIX) 20 MG tablet Take 1 tablet (20 mg total) by mouth once daily.    lisinopril 10 MG tablet TAKE ONE TABLET BY MOUTH ONCE DAILY IN THE EVENING       Review of patient's allergies indicates:  No Known Allergies    Past Medical History   Diagnosis Date    Cardiogenic shock 1/16/2017    Cardiomyopathy      Familial cardiomyopathy    CHF (congestive heart failure)     Essential hypertension 12/21/2016    Familial Cardiomyopathy      Familial cardiomyopathy      History reviewed. No pertinent past surgical history.  Family History   Problem Relation Age of Onset    Arthritis Mother     Heart disease Brother      cardiomyopathy and heart transplant    No Known Problems Father     Heart disease Brother      cardiomyopathy and heart transplanted twice    Birth defects Paternal Uncle      Social History   Substance Use Topics    Smoking status: Current Some Day Smoker     Packs/day: 1.00    Smokeless tobacco: None    Alcohol use 2.4 oz/week     4 Shots of liquor per week        Review of Systems:  Unable to obtain as patient is sedated.    OBJECTIVE:     Vital Signs (Most Recent)  Temp: 98.2 °F (36.8 °C) (02/06/17 1901)  Pulse: 101 (02/06/17 2100)  Resp: 18 (02/06/17 2100)  BP: (!) 82/0 (02/06/17 1500)  SpO2: 100 % (02/06/17 2100)  Ventilator Data (Last 24H):          Hemodynamic  Parameters (Last 24H):       Physical Exam:  General: well developed, well nourished, resting comfortably in chair  Neurologic: alert  Lungs: extubated, nitric 18 per NC  Cardiovascular: Heart: regular rate and rhythm.  Abd: ND  Extremities: no cyanosis or edema, or clubbing.   Wound/Incision: sternal wound c/d/i    Lines/Drains:       Introducer 02/02/17 0553 (Active)   Number of days:0            Pulmonary Artery Catheter Assessment  02/02/17 0553 (Active)   Number of days:0            Pulmonary Artery Catheter Assessment  02/01/17 0835 (Active)   Dressing biopatch in place;dressing dry and intact 2/2/2017  3:00 AM   Securement secured w/ sutures 2/2/2017  3:00 AM   Current Insertion Depth (cm) 45 cm 2/2/2017  3:00 AM   Phlebitis 0-->no symptoms 2/2/2017  3:00 AM   Infiltration 0-->no symptoms 2/2/2017  3:00 AM   Waveform normal 2/2/2017  3:00 AM   Pressure Catheter Interventions line leveled/zeroed 2/2/2017  3:00 AM   Prox Injectate Status Infusing 2/2/2017  3:00 AM   Prox Infusate Status Infusing 2/2/2017  3:00 AM   Distal Status Infusing 2/2/2017  3:00 AM   Pressure Catheter Tubing Change Due 02/05/17 2/1/2017  7:00 PM   Daily Line Review Performed 2/2/2017  3:00 AM   Number of days:0            Percutaneous Central Line Insertion/Assessment - triple lumen  02/02/17 0553 (Active)   Number of days:0            Peripheral IV - Single Lumen Right Hand (Active)   Site Assessment Clean;Intact;Dry 2/2/2017  3:00 AM   Line Status Infusing 2/2/2017  3:00 AM   Dressing Status Clean;Dry;Intact 2/2/2017  3:00 AM   Dressing Change Due 02/02/17 2/2/2017  3:00 AM   Site Change Due 02/02/17 2/1/2017  7:00 PM   Reason Not Rotated Not due 2/2/2017  3:00 AM   Number of days:            Peripheral IV - Single Lumen 02/01/17 0702 Right Antecubital (Active)   Site Assessment Clean;Dry;Intact 2/2/2017  3:00 AM   Line Status Infusing 2/2/2017  3:00 AM   Dressing Status Clean;Dry;Intact 2/2/2017  3:00 AM   Dressing Intervention New  dressing 2/1/2017  6:00 AM   Dressing Change Due 02/05/17 2/2/2017  3:00 AM   Site Change Due 02/05/17 2/1/2017  7:00 PM   Reason Not Rotated Not due 2/2/2017  3:00 AM   Number of days:1            Peripheral IV - Single Lumen 02/01/17 0830 Left Hand (Active)   Site Assessment Clean;Intact;Dry 2/2/2017  3:00 AM   Line Status Infusing 2/2/2017  3:00 AM   Dressing Status Clean;Dry;Intact 2/2/2017  3:00 AM   Dressing Intervention New dressing 2/2/2017  3:00 AM   Dressing Change Due 02/05/17 2/2/2017  3:00 AM   Site Change Due 02/05/17 2/1/2017  7:00 PM   Reason Not Rotated Not due 2/2/2017  3:00 AM   Number of days:0            Arterial Line 02/01/17 0815 Left Radial (Active)   Site Assessment Clean;Dry;Intact 2/2/2017  3:00 AM   Line Status Pulsatile blood flow 2/2/2017  3:00 AM   Art Line Waveform Appropriate 2/2/2017  3:00 AM   Arterial Line Interventions Zeroed and calibrated;Leveled;Flushed per protocol;Line pulled back;Connections checked and tightened 2/2/2017  3:00 AM   Color/Movement/Sensation Capillary refill less than 3 sec 2/2/2017  3:00 AM   Dressing Type Transparent 2/2/2017  3:00 AM   Dressing Status Clean;Dry;Intact 2/2/2017  3:00 AM   Dressing Intervention New dressing 2/2/2017  3:00 AM   Dressing Change Due 02/05/17 2/2/2017  3:00 AM   Number of days:0            VAD 02/01/17 1042 Heartware (Active)   Site Location Abdomen right 2/2/2017  3:00 AM   Site Assessment Clean;Dry;Intact 2/2/2017  3:00 AM   Dressing Status Clean;Dry;Intact 2/2/2017  3:00 AM   Dressing Intervention New dressing 2/2/2017  3:00 AM   Power Source External DC 2/2/2017  3:00 AM   Equipment inventory at  Admission 2/1/2017 10:00 AM   Pump type Heartware 2/1/2017  7:00 PM   Battery 1 [id number] 2/1/2017 10:00 AM   Battery 2 [id number] 2/1/2017 10:00 AM   Battery 3 [id number] 2/1/2017 10:00 AM   Battery 4 [id number] 2/1/2017 10:00 AM   Battery 5 [id number] 2/1/2017 10:00 AM   Battery 6 [id number] 2/1/2017 10:00 AM   AC Adapter 1 [id number]  2/1/2017 10:00 AM   AC Adapter 2 [id number] 2/1/2017 10:00 AM   Battery Charger [id number] 2/1/2017 10:00 AM   Primary Controller [id number] 2/1/2017 10:00 AM   Extra Controller [id number] 2/1/2017 10:00 AM   Number of days:0            Chest Tube 02/01/17 1129 1 Mediastinal 32 Fr. (Active)   Chest Tube [facility] 2/1/2017  7:00 PM   Function -20 cm H2O 2/2/2017  3:00 AM   Air Leak/Fluctuation air leak not present;fluctuation not present;connections tightened;dependent drainage cleared 2/2/2017  3:00 AM   Safety all tubing connections taped;all connections secured;suction checked 2/2/2017  3:00 AM   Securement tubing anchored to body distal to insertion site w/ tape 2/2/2017  3:00 AM   Patency Intervention Tip/tilt 2/2/2017  3:00 AM   Drainage Description Dark red 2/2/2017  3:00 AM   Dressing Appearance occlusive gauze dressing intact;clean and dry;w/ dried drainage 2/2/2017  3:00 AM   Left Subcutaneous Emphysema none present 2/2/2017  3:00 AM   Right Subcutaneous Emphysema none present 2/2/2017  3:00 AM   Surrounding Skin Unable to view 2/2/2017  3:00 AM   Output (mL) 20 mL 2/2/2017  4:00 AM   Number of days:0            Chest Tube 02/01/17 1130 2 Mediastinal 32 Fr. (Active)   Chest Tube [facility] 2/1/2017  7:00 PM   Function -20 cm H2O 2/2/2017  3:00 AM   Air Leak/Fluctuation air leak not present;fluctuation not present;connections tightened;dependent drainage cleared 2/2/2017  3:00 AM   Safety all tubing connections taped;all connections secured;suction checked 2/2/2017  3:00 AM   Securement tubing anchored to body distal to insertion site w/ tape 2/2/2017  3:00 AM   Patency Intervention Tip/tilt 2/2/2017  3:00 AM   Drainage Description Dark red 2/2/2017  3:00 AM   Dressing Appearance occlusive gauze dressing intact;clean and dry;w/ dried drainage 2/2/2017  3:00 AM   Left Subcutaneous Emphysema none present 2/2/2017  3:00 AM   Right Subcutaneous Emphysema none present 2/2/2017  3:00 AM   Surrounding Skin Unable to view  2/2/2017  3:00 AM   Output (mL) 10 mL 2/2/2017  4:00 AM   Number of days:0            NG/OG Tube 02/01/17 orogastric (Active)   Placement Check placement verified by x-ray 2/1/2017  7:00 PM   Tolerance no signs/symptoms of discomfort 2/1/2017  7:00 PM   Securement taped to commercial device 2/1/2017  7:00 PM   Clamp Status/Tolerance unclamped 2/1/2017  7:00 PM   Suction Setting/Drainage Method suction at;low;intermittent setting 2/1/2017  7:00 PM   Insertion Site Appearance no redness, warmth, tenderness, skin breakdown, drainage 2/1/2017  7:00 PM   Drainage Thin;Clear;Yellow 2/1/2017  7:00 PM   Flush/Irrigation flushed w/;water;no resistance met 2/1/2017  7:00 PM   Intake (mL) 45 mL 2/2/2017  5:30 AM   Tube Output(mL)(Include Discarded Residual) 130 mL 2/2/2017  5:50 AM   Number of days:1            Urethral Catheter 02/01/17 0840 Non-latex;Straight-tip;Temperature probe 16 Fr. (Active)   Site Assessment Clean;Intact 2/2/2017  3:00 AM   Collection Container Urimeter 2/2/2017  3:00 AM   Securement Method secured to upper leg w/ adhesive device 2/2/2017  3:00 AM   Catheter Care Performed yes 2/2/2017  3:00 AM   Reason for Continuing Urinary Catheterization Post operative;Critically ill in ICU requiring intensive monitoring 2/2/2017  3:00 AM   Output (mL) 45 mL 2/2/2017  6:00 AM   Number of days:0       Laboratory    Chest X-Ray 2/2/2017:Postoperative changes from an LVAD device placement remain without significant change.  Heart is at the upper limits of normal.  Lungs are clear.  The position of the endotracheal tube and the [place] catheter remain in satisfactory position. There is mediastinal drains in unchanged position. The aortic balloon pump catheter is no longer visualized on the current study.    Diagnostic Results:      ASSESSMENT/PLAN:     Patient is a 27 y.o. male with DCM s/p LVAD and IABP placement 2/1/2017 and planned chest closure 2/2/2017.    Interval: increased CVP yesterday. Echo revealed  diastolic dysfunction and decreased LV systolic function. Patient was placed back on nitric oxide per NC and restarted on epi. Patient c/o mild nausea this AM.    Plan:  Neuro:    - alert  - morphine for pain  - extubated    Pulmonary:  - 3-4L O2 NC  - nitric oxide 18 (from 2)     Cardiac:  - CVP 18 - 21 (rising despite nitric and epi)  - dobutamine 4 (unchanged)  - epi restarted, 0.05  - cardene gtt off (from 7.5)  - anticoag per primary now that chest is closed    Renal:   - BUN/Cr: 25/1.3 (from 22/1.0 yesterday, 18/1.2 2 days ago and 12/0.09 3 days ago)  - trend BUN/Cr  - cont kirk for strict I/O's  - UOP 4.8L (>65cc/hr)    Fluids/Electrolytes/Nutrition/GI:   - lasix gtt - 20   - MIVF  - replace lytes as needed  - NPO  - ABd XR 2/3/2017: dilated loops of bowel. Ileus vs SBO   - mediastinal drain output = 0cc  - control nausea    Heme/ Infectious Disease:   - cont bactrim, diflucan  - vanc d/c'ed yesterday  - WBC 13 (from 11)  - H/H drifting downward: 8/23.7 (from 8.6/25.4)  - coumadin + heparin  - INR 1.0  - aPTT 41 (from34.7)     Endocrine:  - insult gtt per protocol    Dispo:  - nausea possibly secondary to abd venous congestion  - cont ICU care     Adrienne Calle MD  PGY-1  Surgical Intensive Care Unit

## 2017-02-07 NOTE — PT/OT/SLP PROGRESS
"Occupational Therapy  Treatment    Mert Samayoa   MRN: [id number]   Admitting Diagnosis: Chronic combined systolic and diastolic heart failure    OT Date of Treatment: 17   OT Start Time: 945  OT Stop Time: 1020  OT Total Time (min): 35 min    Billable Minutes:  Self Care/Home Management 25    General Precautions: Standard, LVAD, fall, sternal  Orthopedic Precautions: N/A  Braces:      Do you have any cultural, spiritual, Rastafarian conflicts, given your current situation?: None    Subjective:  Communicated with nsg prior to session.  "That is the longest I have stood up" pt reports after session.     Pain Ratin/10        Location:  (incisional chest pain.)   Repositioning and distraction provided.        Objective:   Pt found seated in b/s chair with nsg present.      Functional Mobility:    Transfers:   Sit <> Stand Assistance: Stand By Assistance  Sit <> Stand Assistive Device: No Assistive Device  Toilet Transfer Technique: Stand Pivot  Toilet Transfer Assistance: Stand By Assistance  Toilet Transfer Assistive Device: bedside commode    Functional Ambulation: Pt completed functional mobility in room with CGA x 1 and assist of another for line vipin't.     Activities of Daily Living:     Feeding: Independent  UE Dressing Level of Assistance: Maximum assistance  Grooming Position: Standing  Grooming Level of Assistance: Contact guard assistance  Toileting Where Assessed: Bedside commode  Toileting Level of Assistance: Moderate assistance for clothing vipin't.     Pt tolerated stand x 3-4 min while OT placed and adjusted waist strap for LVAD. OT provided education to pt re: straps needed to support LVAD at all times and pt verb understanding. Pt then ambulated to sink to completed g/h skills for another 3-4 min in stand. Pt returned to EOB for seated rest break. Pt then completed functional mobility in room and returned to b/s chair with nsg present and needs in reach.     AM-PAC 6 CLICK ADL   How much " help from another person does this patient currently need?   1 = Unable, Total/Dependent Assistance  2 = A lot, Maximum/Moderate Assistance  3 = A little, Minimum/Contact Guard/Supervision  4 = None, Modified [place]/Independent    Putting on and taking off regular lower body clothing? : 2  Bathing (including washing, rinsing, drying)?: 2  Toileting, which includes using toilet, bedpan, or urinal? : 2  Putting on and taking off regular upper body clothing?: 2  Taking care of personal grooming such as brushing teeth?: 3  Eating meals?: 3  Total Score: 14     AM-PAC Raw Score CMS G-Code Modifier Level of Impairment Assistance   6 % Total / Unable   7 - 9 CM 80 - 100% Maximal Assist   10 - 14 CL 60 - 80% Moderate Assist   15 - 19 CK 40 - 60% Moderate Assist   20 - 22 CJ 20 - 40% Minimal Assist   23 CI 1-20% SBA / CGA   24 CH 0% Independent/ Mod I     Patient left up in chair with all lines intact, call button in reach and nsg present    ASSESSMENT:  Mert Samayoa is a 27 y.o. male with a medical diagnosis of Chronic combined systolic and diastolic heart failure and now s/p LVAD placement. Pt with good progress with functional endurance as well as mobility and ADL skills. Pt to benefit from cont OT to address stated goals. Anticipate pt will be able to d/c home with fly when medically stable. .    Rehab identified problem list/impairments: Rehab identified problem list/impairments: impaired endurance, weakness, impaired self care skills, impaired functional mobilty, gait instability, pain    Rehab potential is good.    Activity tolerance: Good    Discharge recommendations: Discharge Facility/Level Of Care Needs: home with home health       GOALS:   Occupational Therapy Goals        Problem: Occupational Therapy Goal    Goal Priority Disciplines Outcome Interventions   Occupational Therapy Goal     OT, PT/OT     Description:  Goals to be met by: 2 weeks 2/17/17     Patient will increase functional  independence with ADLs by performing:    UE Dressing with Supervision.  LE Dressing with Supervision.  Grooming while standing with Supervision.  Toileting from toilet with Supervision for hygiene and clothing management.   Stand pivot transfers with Supervision.  Toilet transfer to toilet with Supervision.  Pt to be independent with LVAD vipin't.             Multidisciplinary Problems (Resolved)        Problem: Occupational Therapy Goal    Goal Priority Disciplines Outcome Interventions   Occupational Therapy Goal   (Resolved)     OT, PT/OT Outcome(s) achieved    Description:  Eval and D/C OT 1/29/17.                Plan:  Patient to be seen 6 x/week to address the above listed problems via self-care/home management, therapeutic activities, therapeutic exercises  Plan of Care expires:    Plan of Care reviewed with: patient         ALISA Mejia  02/07/2017

## 2017-02-07 NOTE — PROGRESS NOTES
SW followed up with pt at bedside. Pt sitting up and alert/oriented x4 with pleasant affect. Pt stated doctors have told him he will move out of ICU maybe tomorrow, pt is looking forward to this. Pt stated his wife will be staying with him the next few days and asked about where she could shower. Per pt report the 3rd floor shower was broken last week. Pt's RN contacted the 3rd floor and they confirmed the shower is working.    No further concerns voiced. Pt is coping adequately. SW continuing to follow for education, support, resources and dc needs as applicable.

## 2017-02-08 LAB
ALBUMIN SERPL BCP-MCNC: 3.8 G/DL
ALP SERPL-CCNC: 150 U/L
ALT SERPL W/O P-5'-P-CCNC: 40 U/L
ANION GAP SERPL CALC-SCNC: 13 MMOL/L
APTT BLDCRRT: 39.4 SEC
APTT BLDCRRT: 44 SEC
APTT BLDCRRT: 52.9 SEC
AST SERPL-CCNC: 41 U/L
BASOPHILS # BLD AUTO: 0.01 K/UL
BASOPHILS NFR BLD: 0.1 %
BILIRUB SERPL-MCNC: 0.6 MG/DL
BNP SERPL-MCNC: 567 PG/ML
BUN SERPL-MCNC: 21 MG/DL
CALCIUM SERPL-MCNC: 9.8 MG/DL
CHLORIDE SERPL-SCNC: 88 MMOL/L
CO2 SERPL-SCNC: 30 MMOL/L
CREAT SERPL-MCNC: 1.1 MG/DL
CRP SERPL-MCNC: 162.5 MG/L
DIFFERENTIAL METHOD: ABNORMAL
EOSINOPHIL # BLD AUTO: 0.1 K/UL
EOSINOPHIL NFR BLD: 1.2 %
ERYTHROCYTE [DISTWIDTH] IN BLOOD BY AUTOMATED COUNT: 12.6 %
EST. GFR  (AFRICAN AMERICAN): >60 ML/MIN/1.73 M^2
EST. GFR  (NON AFRICAN AMERICAN): >60 ML/MIN/1.73 M^2
GLUCOSE SERPL-MCNC: 103 MG/DL
HCT VFR BLD AUTO: 25.2 %
HGB BLD-MCNC: 8.6 G/DL
INR PPP: 1
LDH SERPL L TO P-CCNC: 413 U/L
LYMPHOCYTES # BLD AUTO: 1 K/UL
LYMPHOCYTES NFR BLD: 11.9 %
MAGNESIUM SERPL-MCNC: 2.2 MG/DL
MAGNESIUM SERPL-MCNC: 2.3 MG/DL
MCH RBC QN AUTO: 29.1 PG
MCHC RBC AUTO-ENTMCNC: 34.1 %
MCV RBC AUTO: 85 FL
MONOCYTES # BLD AUTO: 1.2 K/UL
MONOCYTES NFR BLD: 14.4 %
NEUTROPHILS # BLD AUTO: 6.1 K/UL
NEUTROPHILS NFR BLD: 71.8 %
PHOSPHATE SERPL-MCNC: 3.1 MG/DL
PLATELET # BLD AUTO: 175 K/UL
PMV BLD AUTO: 9.5 FL
POTASSIUM SERPL-SCNC: 3.1 MMOL/L
POTASSIUM SERPL-SCNC: 3.1 MMOL/L
POTASSIUM SERPL-SCNC: 3.9 MMOL/L
PROT SERPL-MCNC: 7.8 G/DL
PROTHROMBIN TIME: 11 SEC
RBC # BLD AUTO: 2.96 M/UL
SODIUM SERPL-SCNC: 131 MMOL/L
WBC # BLD AUTO: 8.42 K/UL

## 2017-02-08 PROCEDURE — 25000003 PHARM REV CODE 250: Performed by: STUDENT IN AN ORGANIZED HEALTH CARE EDUCATION/TRAINING PROGRAM

## 2017-02-08 PROCEDURE — 83735 ASSAY OF MAGNESIUM: CPT | Mod: 91

## 2017-02-08 PROCEDURE — 25000003 PHARM REV CODE 250: Performed by: NURSE PRACTITIONER

## 2017-02-08 PROCEDURE — 93750 INTERROGATION VAD IN PERSON: CPT | Performed by: THORACIC SURGERY (CARDIOTHORACIC VASCULAR SURGERY)

## 2017-02-08 PROCEDURE — 63600175 PHARM REV CODE 636 W HCPCS: Performed by: STUDENT IN AN ORGANIZED HEALTH CARE EDUCATION/TRAINING PROGRAM

## 2017-02-08 PROCEDURE — 27000248 HC VAD-ADDITIONAL DAY

## 2017-02-08 PROCEDURE — 85610 PROTHROMBIN TIME: CPT

## 2017-02-08 PROCEDURE — 20600001 HC STEP DOWN PRIVATE ROOM

## 2017-02-08 PROCEDURE — 25000003 PHARM REV CODE 250: Performed by: THORACIC SURGERY (CARDIOTHORACIC VASCULAR SURGERY)

## 2017-02-08 PROCEDURE — 93750 INTERROGATION VAD IN PERSON: CPT | Mod: ,,, | Performed by: INTERNAL MEDICINE

## 2017-02-08 PROCEDURE — 85025 COMPLETE CBC W/AUTO DIFF WBC: CPT

## 2017-02-08 PROCEDURE — 25000003 PHARM REV CODE 250

## 2017-02-08 PROCEDURE — 86140 C-REACTIVE PROTEIN: CPT

## 2017-02-08 PROCEDURE — 97535 SELF CARE MNGMENT TRAINING: CPT

## 2017-02-08 PROCEDURE — 83615 LACTATE (LD) (LDH) ENZYME: CPT

## 2017-02-08 PROCEDURE — 85730 THROMBOPLASTIN TIME PARTIAL: CPT

## 2017-02-08 PROCEDURE — 84100 ASSAY OF PHOSPHORUS: CPT

## 2017-02-08 PROCEDURE — 84132 ASSAY OF SERUM POTASSIUM: CPT

## 2017-02-08 PROCEDURE — 97530 THERAPEUTIC ACTIVITIES: CPT

## 2017-02-08 PROCEDURE — 36415 COLL VENOUS BLD VENIPUNCTURE: CPT

## 2017-02-08 PROCEDURE — 97116 GAIT TRAINING THERAPY: CPT

## 2017-02-08 PROCEDURE — 99233 SBSQ HOSP IP/OBS HIGH 50: CPT | Mod: ,,, | Performed by: INTERNAL MEDICINE

## 2017-02-08 PROCEDURE — 80053 COMPREHEN METABOLIC PANEL: CPT

## 2017-02-08 PROCEDURE — 83880 ASSAY OF NATRIURETIC PEPTIDE: CPT

## 2017-02-08 PROCEDURE — 63600175 PHARM REV CODE 636 W HCPCS

## 2017-02-08 RX ORDER — POTASSIUM CHLORIDE 20 MEQ/15ML
40 SOLUTION ORAL 2 TIMES DAILY
Status: DISCONTINUED | OUTPATIENT
Start: 2017-02-08 | End: 2017-02-09

## 2017-02-08 RX ORDER — ONDANSETRON 2 MG/ML
8 INJECTION INTRAMUSCULAR; INTRAVENOUS EVERY 8 HOURS PRN
Status: DISCONTINUED | OUTPATIENT
Start: 2017-02-08 | End: 2017-02-21 | Stop reason: HOSPADM

## 2017-02-08 RX ORDER — WARFARIN 3 MG/1
6 TABLET ORAL ONCE
Status: COMPLETED | OUTPATIENT
Start: 2017-02-08 | End: 2017-02-08

## 2017-02-08 RX ORDER — ACETAMINOPHEN 325 MG/1
650 TABLET ORAL EVERY 6 HOURS PRN
Status: DISCONTINUED | OUTPATIENT
Start: 2017-02-08 | End: 2017-02-21 | Stop reason: HOSPADM

## 2017-02-08 RX ORDER — HYDROCODONE BITARTRATE AND ACETAMINOPHEN 5; 325 MG/1; MG/1
1 TABLET ORAL EVERY 6 HOURS PRN
Status: DISCONTINUED | OUTPATIENT
Start: 2017-02-08 | End: 2017-02-21 | Stop reason: HOSPADM

## 2017-02-08 RX ORDER — SYRING-NEEDL,DISP,INSUL,0.3 ML 29 G X1/2"
296 SYRINGE, EMPTY DISPOSABLE MISCELLANEOUS DAILY PRN
Status: DISCONTINUED | OUTPATIENT
Start: 2017-02-08 | End: 2017-02-15

## 2017-02-08 RX ORDER — OXYCODONE AND ACETAMINOPHEN 10; 325 MG/1; MG/1
1 TABLET ORAL EVERY 6 HOURS PRN
Status: DISCONTINUED | OUTPATIENT
Start: 2017-02-08 | End: 2017-02-21 | Stop reason: HOSPADM

## 2017-02-08 RX ORDER — HYDROCODONE BITARTRATE AND ACETAMINOPHEN 5; 325 MG/1; MG/1
1 TABLET ORAL EVERY 6 HOURS PRN
Status: DISCONTINUED | OUTPATIENT
Start: 2017-02-08 | End: 2017-02-08

## 2017-02-08 RX ORDER — POTASSIUM CHLORIDE 20 MEQ/1
20 TABLET, EXTENDED RELEASE ORAL ONCE
Status: COMPLETED | OUTPATIENT
Start: 2017-02-08 | End: 2017-02-08

## 2017-02-08 RX ADMIN — POTASSIUM CHLORIDE 40 MEQ: 400 INJECTION, SOLUTION INTRAVENOUS at 07:02

## 2017-02-08 RX ADMIN — ONDANSETRON 8 MG: 2 INJECTION INTRAMUSCULAR; INTRAVENOUS at 11:02

## 2017-02-08 RX ADMIN — FERROUS GLUCONATE TAB 324 MG (37.5 MG ELEMENTAL IRON) 324 MG: 324 (37.5 FE) TAB at 08:02

## 2017-02-08 RX ADMIN — MAGNESIUM OXIDE TAB 400 MG (241.3 MG ELEMENTAL MG) 400 MG: 400 (241.3 MG) TAB at 05:02

## 2017-02-08 RX ADMIN — ONDANSETRON 8 MG: 2 INJECTION INTRAMUSCULAR; INTRAVENOUS at 05:02

## 2017-02-08 RX ADMIN — POTASSIUM CHLORIDE 20 MEQ: 1500 TABLET, EXTENDED RELEASE ORAL at 02:02

## 2017-02-08 RX ADMIN — DOBUTAMINE IN DEXTROSE 4 MCG/KG/MIN: 200 INJECTION, SOLUTION INTRAVENOUS at 12:02

## 2017-02-08 RX ADMIN — ASPIRIN 325 MG: 325 TABLET, DELAYED RELEASE ORAL at 08:02

## 2017-02-08 RX ADMIN — Medication 3 ML: at 05:02

## 2017-02-08 RX ADMIN — HYDROCODONE BITARTRATE AND ACETAMINOPHEN 1 TABLET: 5; 325 TABLET ORAL at 08:02

## 2017-02-08 RX ADMIN — POTASSIUM CHLORIDE 40 MEQ: 400 INJECTION, SOLUTION INTRAVENOUS at 05:02

## 2017-02-08 RX ADMIN — WARFARIN SODIUM 6 MG: 3 TABLET ORAL at 04:02

## 2017-02-08 RX ADMIN — PANTOPRAZOLE SODIUM 40 MG: 40 TABLET, DELAYED RELEASE ORAL at 08:02

## 2017-02-08 RX ADMIN — MAGNESIUM OXIDE TAB 400 MG (241.3 MG ELEMENTAL MG) 400 MG: 400 (241.3 MG) TAB at 09:02

## 2017-02-08 RX ADMIN — POLYETHYLENE GLYCOL 3350 17 G: 17 POWDER, FOR SOLUTION ORAL at 08:02

## 2017-02-08 RX ADMIN — HYDROCODONE BITARTRATE AND ACETAMINOPHEN 1 TABLET: 5; 325 TABLET ORAL at 05:02

## 2017-02-08 RX ADMIN — OXYCODONE HYDROCHLORIDE 5 MG: 5 TABLET ORAL at 05:02

## 2017-02-08 RX ADMIN — POTASSIUM CHLORIDE 40 MEQ: 1.5 SOLUTION ORAL at 08:02

## 2017-02-08 RX ADMIN — POTASSIUM CHLORIDE 20 MEQ: 20 SOLUTION ORAL at 08:02

## 2017-02-08 RX ADMIN — OXYCODONE HYDROCHLORIDE 5 MG: 5 TABLET ORAL at 11:02

## 2017-02-08 RX ADMIN — STANDARDIZED SENNA CONCENTRATE AND DOCUSATE SODIUM 1 TABLET: 8.6; 5 TABLET, FILM COATED ORAL at 08:02

## 2017-02-08 RX ADMIN — ACETAMINOPHEN 650 MG: 325 TABLET ORAL at 03:02

## 2017-02-08 RX ADMIN — MAGNESIUM OXIDE TAB 400 MG (241.3 MG ELEMENTAL MG) 400 MG: 400 (241.3 MG) TAB at 02:02

## 2017-02-08 RX ADMIN — AMLODIPINE BESYLATE 10 MG: 10 TABLET ORAL at 08:02

## 2017-02-08 NOTE — PROGRESS NOTES
Daily E and M and VAD Interrogation Note    Reason for Visit:  Patient is seen in follow up for management of:  [] HeartMate II  [x] Heartware [] Total artificial heart       [] ECMO           [] Other     Interval History:  [] Interval history unobtainable due to intubation.  The [x] implant/[] explant date was 2/1/17    No new complaints or concerns this morning  Pain controlled.   Having bowel movements  VAD flows ~8  Working well with PT    Review of Systems:  Negative      Medications:  Current Facility-Administered Medications   Medication Dose Route Frequency Provider Last Rate Last Dose    albumin human 5% bottle 500 mL  500 mL Intravenous PRN Urszula Tuttle NP        albuterol sulfate nebulizer solution 2.5 mg  2.5 mg Nebulization Q4H PRN Urszula Tuttle NP        amlodipine tablet 10 mg  10 mg Oral Daily Lex Macedo MD   10 mg at 02/08/17 0855    aspirin EC tablet 325 mg  325 mg Oral Daily Urszula Tuttle NP   325 mg at 02/08/17 0855    bisacodyl suppository 10 mg  10 mg Rectal Daily PRN Urszula Tuttle NP        bisacodyl suppository 10 mg  10 mg Rectal Once Brayden Limon MD   Stopped at 02/04/17 1015    DOBUTamine 500mg in D5W 250mL infusion (premix) (NON-TITRATING)  4 mcg/kg/min Intravenous Continuous Jana Cain MD 7.6 mL/hr at 02/08/17 0900 4 mcg/kg/min at 02/08/17 0900    ferrous gluconate tablet 324 mg  324 mg Oral Daily with breakfast Urszula Tuttle NP   324 mg at 02/08/17 0845    furosemide (LASIX) 250 mg in sodium chloride 0.9 % 250 mL infusion (non-titrating)  15 mg/hr Intravenous Continuous Denis Aburto MD 15 mL/hr at 02/08/17 0900 15 mg/hr at 02/08/17 0900    glucagon (human recombinant) injection 1 mg  1 mg Intramuscular PRN Shai Ortega MD        heparin 25,000 units in dextrose 5% 250 mL (100 units/mL) infusion (heparin infusion)  1,900 Units/hr Intravenous Continuous Denis Aburto MD 21 mL/hr at 02/08/17 0900 2,100 Units/hr  at 02/08/17 0900    hydromorphone injection 0.5 mg  0.5 mg Intravenous Q3H PRN Dora Mitchell MD        magnesium oxide tablet 400 mg  400 mg Oral TID Denis Aburto MD   400 mg at 02/08/17 0545    magnesium sulfate 2g in water 50mL IVPB (premix)  2 g Intravenous PRN Urszula Tuttle NP   2 g at 02/07/17 0704    niCARdipine 40 mg/200 mL infusion  5 mg/hr Intravenous Continuous Urszula Tuttle NP   Stopped at 02/08/17 0500    ondansetron injection 8 mg  8 mg Intravenous Q8H PRN Denis Aburto MD        oxycodone immediate release tablet 10 mg  10 mg Oral Q4H PRN Urszula Tuttle NP   10 mg at 02/06/17 1355    oxycodone immediate release tablet 5 mg  5 mg Oral Q4H PRN Urszula Tuttle NP   5 mg at 02/08/17 0545    pantoprazole EC tablet 40 mg  40 mg Oral Daily Urszula Tuttle NP   40 mg at 02/08/17 0855    polyethylene glycol packet 17 g  17 g Oral BID Brayden Limon MD   17 g at 02/08/17 0855    potassium chloride 10% solution 40 mEq  40 mEq Oral BID Denis Aburto MD        potassium chloride 40 mEq in 100 mL IVPB (FOR CENTRAL LINE ADMINISTRATION ONLY)  40 mEq Intravenous PRN Brayden Limon MD 25 mL/hr at 02/06/17 0000 80 mEq at 02/06/17 0000    potassium chloride 40 mEq in 100 mL IVPB (FOR CENTRAL LINE ADMINISTRATION ONLY)  40 mEq Intravenous PRN Brayden Limon MD 25 mL/hr at 02/08/17 0715 40 mEq at 02/08/17 0715    promethazine (PHENERGAN) 12.5 mg in dextrose 5 % 50 mL IVPB  12.5 mg Intravenous Q8H PRN Denis Aburto MD        senna-docusate 8.6-50 mg per tablet 1 tablet  1 tablet Oral BID Denis Aburto MD   1 tablet at 02/08/17 0855    sodium chloride 0.9% flush 3 mL  3 mL Intravenous Q8H Urszula Tuttle NP   3 mL at 02/08/17 0546    warfarin tablet 6 mg  6 mg Oral Once Denis Aburto MD           Physical Examination:  Vital Signs:   Vitals:    02/08/17 0900   BP:    Pulse: 101   Resp:    Temp:      Cardiovascular:  [x] Regular rate and rhythm []  Irregular []  None (COURTNEY) []  Other  []  No edema []  Edema present  []  Clear to auscultation  []  Rales to []  Coarse  []  No rales but   [] Pedal Pulses absent  []  Pulses  + throughout  Skin:  Incision is [x]  Clean, dry and intact.  []  Other   Sternum:  [x]  Stable []  Unstable  Driveline(s):   [x]  Clean, dry and intact. []  Other       Labs:  BMP  Lab Results   Component Value Date     (L) 02/08/2017    K 3.1 (L) 02/08/2017    K 3.1 (L) 02/08/2017    CL 88 (L) 02/08/2017    CO2 30 (H) 02/08/2017    BUN 21 (H) 02/08/2017    CREATININE 1.1 02/08/2017    CALCIUM 9.8 02/08/2017    ANIONGAP 13 02/08/2017    ESTGFRAFRICA >60.0 02/08/2017    EGFRNONAA >60.0 02/08/2017       Magnesium   Date Value Ref Range Status   02/08/2017 2.2 1.6 - 2.6 mg/dL Final       Lab Results   Component Value Date    WBC 8.42 02/08/2017    HGB 8.6 (L) 02/08/2017    HCT 25.2 (L) 02/08/2017    MCV 85 02/08/2017     02/08/2017       Lab Results   Component Value Date    INR 1.0 02/08/2017    INR 1.1 02/07/2017    INR 1.0 02/06/2017       BNP   Date Value Ref Range Status   02/08/2017 567 (H) 0 - 99 pg/mL Final     Comment:     Values of less than 100 pg/ml are consistent with non-CHF populations.   02/06/2017 1110 (H) 0 - 99 pg/mL Final     Comment:     Values of less than 100 pg/ml are consistent with non-CHF populations.   02/03/2017 709 (H) 0 - 99 pg/mL Final     Comment:     Values of less than 100 pg/ml are consistent with non-CHF populations.       LD   Date Value Ref Range Status   02/08/2017 413 (H) 110 - 260 U/L Final     Comment:     Results are increased in hemolyzed samples.   02/07/2017 457 (H) 110 - 260 U/L Final     Comment:     Results are increased in hemolyzed samples.   02/06/2017 444 (H) 110 - 260 U/L Final     Comment:     Results are increased in hemolyzed samples.       X-Rays:  [x]  I reviewed today's Chest x-ray    Procedure:  Device Interrogation including analysis of device parameters.  Current  Settings   [x]  Ventricular Assist Device  []  Total Artificial Heart interrogated  Review of device function is []  Stable []  Other   TXP LVAD INTERROGATIONS 2/8/2017 2/8/2017 2/8/2017 2/8/2017 2/8/2017 2/8/2017 2/8/2017   Type Heartware Heartware Heartware Heartware Heartware Heartware Heartware   Flow 6.6 6.3 6.5 7.5 7.4 7.1 7.4   Speed 2900 2900 2900 2900 2900 2900 2900   Power ([place]) 5.2 5 5.1 5.4 5.4 5.3 5.4   Low Flow Alarm - - - - - - -   High Power Alarm - - - - - - -   Pulsatility - - - - - - -       Assessment:  [x]  Primary Cardiomyopathy []  Congestive Heart Failure   []  Atrial Fibrillation []  Ventricular Tachycardia   []  Aftercare cardiac device []  Long term (current) use of anticoagulants   []  Ventilator-associated pneumonia []  Pneumonia viral, unspecified   []  Pneumonia, bacterial, unspecified []  Pneumonia, organism unspecified   []  Hemorrhage of GI tract, unspecified    []  Nosebleed []  Driveline infection   []  Infection VAD device []  New onset of    []        Plan:  [x]  Interval history obtained from ICU attending team member during rounding today  []  VAD/COURTNEY teaching performed with patient  [x]  Mobilization / Physical Therapy ongoing  [x]  Anticoagulation [x]  Ongoing []  Held  []  Studies ordered  []      Plan:    Neuro: alert and oriented  - PO pain medication PRN, A/Ox4    Resp: saturating well on room air, no subjective SOB    - IS/pulmonary toilet  - Daily CXR    CVS:  - Dobutamine at 4  - Epi off  - Continue amlodipine at 10 daily  - Continue heparin  goal aptt 40-50, increased to 1900 today  - Coumadin 6 mg today, Daily INR  - Daily ASA    FENGI:  - Prn zofran  - Cardiac diet  - Continue aggressive bowel regimen  - Replace lytes PRN  - q6 Mg, K  - schedule miralax and docusate, scheduled magnesium hydroxide 100 mg/daily    Renal:  - Trend BUN/Cr  - Lasix gtt down to 15    Endo:  - SSI Insulin     Heme/ID:  - Off post op antibiotics, continue to monitor WBC and  Hct    Dispo:  - Stepdown to CTSU today      Total time spent was 35 minutes.  Of which more than 50 percent of the care dominated counseling and coordinating care with different team members. The VAD was interrogated and all parameters were WNL and no significant findings were found in the history. All these findings are documented in the note above.      Date of Service: 02/08/2017           Denis Aburto MD PGY II  [phone]

## 2017-02-08 NOTE — PROGRESS NOTES
Progress Note  Heart Transplant Service    Admit Date: 1/27/2017   LOS: 12 days     SUBJECTIVE:     Follow up for: Chronic combined systolic and diastolic heart failure    Interval History: Pain and nausea a little better controlled today. Patient off Epi, Nicardipine and NO overnight. Net -2.6 L, CVP of 7 this AM.     Scheduled Meds:   amlodipine  10 mg Oral Daily    aspirin  325 mg Oral Daily    bisacodyl  10 mg Rectal Once    ferrous gluconate  324 mg Oral Daily with breakfast    magnesium oxide  400 mg Oral TID    pantoprazole  40 mg Oral Daily    polyethylene glycol  17 g Oral BID    potassium chloride 10%  40 mEq Oral BID    potassium chloride SA  20 mEq Oral Once    senna-docusate 8.6-50 mg  1 tablet Oral BID    sodium chloride 0.9%  3 mL Intravenous Q8H    warfarin  6 mg Oral Once     Continuous Infusions:   DOBUTamine 4 mcg/kg/min (02/08/17 1100)    furosemide (LASIX) 1 mg/mL infusion (non-titrating) 15 mg/hr (02/08/17 1200)    heparin (porcine) in 5 % dex 2,100 Units/hr (02/08/17 1200)    nicardipine Stopped (02/08/17 0500)     PRN Meds:albumin human 5%, albuterol sulfate, bisacodyl, glucagon (human recombinant), HYDROmorphone, magnesium sulfate IVPB, ondansetron, oxycodone, oxycodone, potassium chloride, potassium chloride, promethazine (PHENERGAN) IVPB    Review of patient's allergies indicates:  No Known Allergies    OBJECTIVE:     Vital Signs (Most Recent)  Temp: 98.9 °F (37.2 °C) (02/08/17 1100)  Pulse: 99 (02/08/17 1300)  Resp: 20 (02/08/17 0600)  BP: (!) 85/0 (02/08/17 1100)  SpO2: 99 % (02/08/17 1300)    Vital Signs Range (Last 24H):  Temp:  [97.9 °F (36.6 °C)-98.9 °F (37.2 °C)]   Pulse:  []   Resp:  [14-26]   BP: (78-85)/(0)   SpO2:  [90 %-100 %]   Arterial Line BP: ()/(69-99)     I & O (Last 24H):    Intake/Output Summary (Last 24 hours) at 02/08/17 1318  Last data filed at 02/08/17 1100   Gross per 24 hour   Intake             3129 ml   Output             4900 ml    Net            -1771 ml            Telemetry: sinus  Constitutional: AOx3, NAD conversant  Neck: No JVD present. No thyromegaly present.   Cardiovascular: VAD hum  Pulmonary/Chest:good breath sounds bilaterally  Abdominal: + BS, exhibits no distension. There is no tenderness. There is no rebound.   Extremities: no cyanosis, clubbing or edema.  No bleeding, edema, erythema or hematoma, doppler pulses in all distals    Labs:       Recent Labs  Lab 02/07/17  0524 02/07/17  0635 02/08/17  0435   WBC 12.35 9.29 8.42   HGB 4.1* 8.1* 8.6*   HCT 12.4* 23.1* 25.2*    161 175   LYMPH 12.1*  1.5 11.3*  1.1 11.9*  1.0   MONO 11.7  1.4* 12.7  1.2* 14.4  1.2*   EOSINOPHIL 1.5 1.4 1.2         Recent Labs  Lab 02/06/17  0400  02/07/17  0524  02/07/17 2203 02/08/17  0435 02/08/17  1100   APTT 41.0*  < > 36.5*  < > 41.3* 39.4* 44.0*   INR 1.0  --  1.1  --   --  1.0  --    < > = values in this interval not displayed.       Recent Labs  Lab 02/06/17 0400 02/07/17  0524  02/07/17 2203 02/08/17  0435 02/08/17  1100   *  --  116*  --   --  103  --    CALCIUM 9.1  --  9.4  --   --  9.8  --    ALBUMIN 3.6  --  3.7  --   --  3.8  --    PROT 7.2  --  7.5  --   --  7.8  --    *  --  131*  --   --  131*  --    K 4.4  < > 3.4*  3.4*  < > 3.8 3.1*  3.1* 3.9   CO2 30*  --  31*  --   --  30*  --    CL 88*  --  89*  --   --  88*  --    BUN 25*  --  21*  --   --  21*  --    CREATININE 1.3  --  1.2  --   --  1.1  --    ALKPHOS 107  --  116  --   --  150*  --    ALT 34  --  29  --   --  40  --    AST 51*  --  37  --   --  41*  --    BILITOT 0.6  --  0.6  --   --  0.6  --    MG 2.3  < > 1.9  < > 2.2 2.2 2.3   PHOS 4.3  --  3.1  --   --  3.1  --    < > = values in this interval not displayed.  Estimated Creatinine Clearance: 92.7 mL/min (based on Cr of 1.1).      Recent Labs  Lab 02/08/17  0435   *         Recent Labs  Lab 02/02/17  0304 02/03/17  0733 02/04/17  0800 02/05/17  0400 02/06/17  0400 02/07/17  0524  02/08/17  0435   * 568* 482* 448* 444* 457* 413*       Microbiology Results (last 7 days)     Procedure Component Value Units Date/Time    Blood culture (site 1) [[id number]] Collected:  01/27/17 1837    Order Status:  Completed Specimen:  Blood Updated:  02/01/17 2022     Blood Culture, Routine No growth after 5 days.    Narrative:       Site # 1, aerobic and anaerobic (central line, if patient has  one)    Blood culture (site 2) [[id number]] Collected:  01/27/17 1828    Order Status:  Completed Specimen:  Blood Updated:  02/01/17 2022     Blood Culture, Routine No growth after 5 days.    Narrative:       Site # 2, aerobic only            ASSESSMENT:     27 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 He was admitted for PICC removal, IABP, and planned LVAD implantation. He is s/p HeartWare LVAD placement 2/1 and chest closure 2/2. Extubated 2/2 and now recovering in ICU.    PLAN:     Cardiogenic Shock due to Acute on Chronic Systolic HF, DCM, NYHA FC IV s/p HW LVAD 2/1/2017  -s/p HW LVAD 2/1 and chest closure/extubation 2/2  -CTS primary   -Currently on  and Lasix, Lasix decreased to 15 mg / hr this AM  -Does not have ICD- will need to discuss timing of this vs Lifevest  - Anticoagulation per CTS: on Heparin, started Coumadin yesterday, monitor daily INRs  -cultures: NGTD    HTN  -BP controlled    Back Pain  -prn meds on board; will adjust    Prophylaxis   -heparin

## 2017-02-08 NOTE — NURSING TRANSFER
Nursing Transfer Note      2/8/2017   Report given to SUE Hahn    Transfer To: [id number]    Transfer via wheelchair    Transfer with cardiac monitoring    Transported by RN    Medicines sent: none    Chart send with patient: Yes    Notified: spouse    Patient reassessed at: 1515, 02/08/17    Upon arrival to floor: patient oriented to room, call bell in reach and bed in lowest position

## 2017-02-08 NOTE — NURSING TRANSFER
Nursing Transfer Note      2/8/2017     Transfer from [id number] to [id number]    Transfer via wheelchair    Transfer with telemetry    Transported by Adrienne ROGERS    Medicines sent: Iv drips infusing    Chart send with patient: tubed to unit by Adrienne ROGERS    Notified: wife at bedside    Patient reassessed at: 2/8/17 1530    Upon arrival to floor: Patient assessed and oriented to unit. Bed in lowest position and call light in reach.

## 2017-02-08 NOTE — PHYSICIAN QUERY
PT Name: Mert Samayoa  MR #: [id number]     Physician Query Form - Documentation Clarification    Reviewer  Ext Elise Bee RN, CCDS  [email or web address]    This form is a permanent document in the medical record.     Query Date: February 8, 2017  By submitting this query, we are merely seeking further clarification of documentation to reflect the severity of illness of your patient. Please utilize your independent clinical judgment when addressing the question(s) below.    (The Medical record reflects the following:)      Supporting Clinical Findings Location in Medical Record     K+ 2.8-->3.1       2/7, 2/8 lab     Potassium chloride 40meq IVPB x 2 doses    Potassium chloride 60meq po x1  Potassium chloride 40 meq po 2 x daily   2/7, 2/8 mar       2/7 mar  2/8 mar                                                                            Doctor, Please specify diagnosis or diagnoses associated with above clinical findings.    Physician Use Only      (  x  )  Hypokalemia    (    )  Other, please specify______________                                                                                                                         [  ] Unable to determine

## 2017-02-08 NOTE — PROGRESS NOTES
BARNEY Seaman with CTS states its ok to pull central line now that pt has room available on 3rd floor even though PICC has not been placed. Will carry out and continue to monitor.

## 2017-02-08 NOTE — PLAN OF CARE
Problem: Patient Care Overview  Goal: Individualization & Mutuality  Outcome: Ongoing (interventions implemented as appropriate)  Pt had good PM shift,namely due to Bowel movement accomplished!  Pt voids into urinal with assist.  VSS.  LVAD at 2900 RPM all PM shift.  No complications.  Pt bathed and new linens.  Epi off at midnight and Cardene off about 30mts later pt became nauseated and flushed MAP down, restarted when MAP above 80.  CVP between 10-8 (8) this am flat.  AM weight is 65kgs.  Labs pending, WCTM.     Skin note:  Pt heels, elbows and buttocks w/o s/s of skin breakdown, and pt free from falls.  Pt does call for assist.

## 2017-02-08 NOTE — PROGRESS NOTES
Pt had a bowel movement on the bedside commode.  Ambulated back to bed states he feels much better now.  WCTM.

## 2017-02-08 NOTE — PHYSICIAN QUERY
"PT Name: Mret Samayoa  MR #: [id number]  Physician Query Form - Hematology Clarification    Reviewer  Ext Elise Bee RN, CCDS  [email or web address]    This form is a permanent document in the medical record.      Query Date: February 8, 2017    By submitting this query, we are merely seeking further clarification of documentation. Please utilize your independent clinical judgment when addressing the question(s) below.    (The Medical record reflects the following:)     Indicators    Supporting Clinical Findings Location in Medical Record    "Anemia" documented     X H & H = 13.9/40.6-->10.4/30.3-->8.1/23.1 2/1, 2/7 lab     BP =     X HR = Hr- 105 Vs 2/8    "GI bleeding" documented      Acute bleeding (Non GI site)      Transfusion(s)      Treatment:     X Other:  Implantation LVAD  Diffuse coagulopathy., Washout mediastinum  Sternal closure     Cbc daily 2/1 op note  2/2 op note      2/4 orders     Provider, please specify diagnosis or diagnoses associated with above clinical findings.    [  ] Acute blood loss anemia expected post-operatively     [ x  ] Acute blood loss anemia    [  ] Aplastic anemia    [  ] Iron deficiency anemia     [  ] Pernicious anemia     [  ] Precipitous drop in H&H    [  ] Anemia of chronic disease ( Specify chronic disease)      [  ] CKD (specify stage) ___________________________     [  ] Neoplastic disease      [  ] Due to Chemotherapy      [  ] Other (Specify) _______________________________     [  ] Clinically Undetermined     [  ] Other Hematological Diagnosis _________________________________    [  ] Clinically Undetermined       Please document in your progress notes daily for the duration of treatment, until resolved, and include in your discharge summary.                                                                                                      "

## 2017-02-08 NOTE — PLAN OF CARE
Problem: Physical Therapy Goal  Goal: Physical Therapy Goal  Goals to be met by: 3/1/17     Patient will increase functional independence with mobility by performin. Supine to sit with [place]  2. Sit to stand transfer with [place]  3. Gait x 400 feet with Supervision.   4. Ascend/descend 2 stair with bilateral Handrails Supervision.   5. Lower extremity exercise program x15 reps per handout, with assistance as needed.  6. [place] with LVAD management.    Outcome: Ongoing (interventions implemented as appropriate)  Pt progressing towards goals.

## 2017-02-08 NOTE — PT/OT/SLP PROGRESS
Physical Therapy  Treatment    Mert Samayoa   MRN: [id number]   Admitting Diagnosis: Chronic combined systolic and diastolic heart failure    PT Received On: 17  PT Start Time: 0950     PT Stop Time: 1023    PT Total Time (min): 33 min       Billable Minutes:  Gait Training8 and Therapeutic Activity 8  Co treat with OT  Treatment Type: Treatment  PT/PTA: PT       General Precautions: Standard, LVAD, fall, sternal  Orthopedic Precautions: N/A   Braces: N/A    Do you have any cultural, spiritual, Pentecostalism conflicts, given your current situation?: none     Subjective:  Communicated with RN prior to session.  Pt agreeable to working with PT.     Pain Ratin/10  Pain Rating Post-Intervention: 0/10    Objective:   Patient found with: arterial line, peripheral IV, pulse ox (continuous), telemetry, central line (LVAD)    Functional Mobility:  Bed Mobility:    NT 2/2 to pt sitting [facility] upon arrival.     Transfers:  Sit <> Stand Assistance: Stand By Assistance  Sit <> Stand Assistive Device: No Assistive Device    Gait:   Gait Distance: x160 feet with RN present and on portable monitor  Assistance 1: Contact Guard Assistance  Gait Assistive Device: No device  Gait Pattern: 2-point gait  Gait Deviation(s): decreased zarina, increased time in double stance, decreased velocity of limb motion, forward lean  Pt ambulated with RN and on portable monitor with CGA for stability. Pt required vc for postural correction with ambulation, pt able to self correct gait deviations with vc. Pt ambulated with emergency bag present and on LVAD battery.     Balance:   Static Sit: FAIR+: Able to take MINIMAL challenges from all directions  Dynamic Sit: FAIR+: Maintains balance through MINIMAL excursions of active trunk motion  Static Stand: FAIR: Maintains without assist but unable to take challenges  Dynamic stand: FAIR: Needs CONTACT GUARD during gait     Therapeutic Activities and Exercises:  OT assisted with LVAD education for  switching from PBU to battery. PT educated on contents in emergency bag and on requirements on ambulating with emergency bag present. Pt performed gait train, see above. Pt returned LVAD from battery to wall power with max vc for sequencing and understanding LVAD equipment. Pt will need reinforcement for education provided.    AM-PAC 6 CLICK MOBILITY  How much help from another person does this patient currently need?   1 = Unable, Total/Dependent Assistance  2 = A lot, Maximum/Moderate Assistance  3 = A little, Minimum/Contact Guard/Supervision  4 = None, Modified [place]/Independent    Turning over in bed (including adjusting bedclothes, sheets and blankets)?: 4  Sitting down on and standing up from a chair with arms (e.g., wheelchair, bedside commode, etc.): 3  Moving from lying on back to sitting on the side of the bed?: 3  Moving to and from a bed to a chair (including a wheelchair)?: 3  Need to walk in hospital room?: 3  Climbing 3-5 steps with a railing?: 2  Total Score: 18    AM-PAC Raw Score CMS G-Code Modifier Level of Impairment Assistance   6 % Total / Unable   7 - 9 CM 80 - 100% Maximal Assist   10 - 14 CL 60 - 80% Moderate Assist   15 - 19 CK 40 - 60% Moderate Assist   20 - 22 CJ 20 - 40% Minimal Assist   23 CI 1-20% SBA / CGA   24 CH 0% Independent/ Mod I     Patient left up in chair with all lines intact, call button in reach and RN and spouse present.    Assessment:  Mert Samayoa is a 27 y.o. male with a medical diagnosis of Chronic combined systolic and diastolic heart failure, s/p LVAD and presents with decreased functional mobility due to impaired endurance and weakness throughout. Pt demo improvement through increasing distance ambulated.  Patient would benefit from skilled PT in the acute care setting to improve the limitations listed below. Patient would benefit from HH upon discharge.      Rehab identified problem list/impairments: Rehab identified problem  list/impairments: weakness, impaired endurance, impaired functional mobilty, gait instability, impaired balance    Rehab potential is good.    Activity tolerance: Good    Discharge recommendations: Discharge Facility/Level Of Care Needs: home with home health     Barriers to discharge: Barriers to Discharge: Inaccessible home environment    Equipment recommendations: Equipment Needed After Discharge: none     GOALS:   Physical Therapy Goals        Problem: Physical Therapy Goal    Goal Priority Disciplines Outcome Goal Variances Interventions   Physical Therapy Goal     PT/OT, PT Ongoing (interventions implemented as appropriate)     Description:  Goals to be met by: 3/1/17     Patient will increase functional independence with mobility by performin. Supine to sit with Hickman  2. Sit to stand transfer with Hickman  3. Gait  x 400 feet with Supervision.   4. Ascend/descend 2 stair with bilateral Handrails Supervision.   5. Lower extremity exercise program x15 reps per handout, with assistance as needed.  6. [place]ickman with LVAD management.                 PLAN:    Patient to be seen 6 x/week  to address the above listed problems via gait training, therapeutic exercises, therapeutic activities  Plan of Care expires: 17  Plan of Care reviewed with: patient, spouse         José Manuel Campbell, PT  2017

## 2017-02-08 NOTE — PT/OT/SLP PROGRESS
"Occupational Therapy  Treatment    Mert Samayoa   MRN: [id number]   Admitting Diagnosis: Chronic combined systolic and diastolic heart failure    OT Date of Treatment: 17   OT Start Time: 945  OT Stop Time:   OT Total Time (min): 40 min    Billable Minutes:  Self Care/Home Management 25    General Precautions: Standard, fall, LVAD, sternal  Orthopedic Precautions: N/A     Do you have any cultural, spiritual, Restorationist conflicts, given your current situation?: None    Subjective:  Communicated with nsg prior to session.  "I'm feeling good" pt reports.     Pain Ratin/10       Objective:   Pt found seated in b/s chair with spouse present.      Functional Mobility:    Transfers:   Sit <> Stand Assistance: Stand By Assistance  Sit <> Stand Assistive Device: No Assistive Device    Pt completed functional mobility in room and hallway with SBA.    Activities of Daily Living:      Feeding: Independent  G/H: Standing with SBA for g/h  LE dressing: MIN A for donning shorts per pt report    LVAD vipin  Pt required cues for sequencing of changing power to 2 batteries. With slightly repeated practice, pt with good manipulation of connections. Education provided to pt/spouse re: rotating batteries, checking charges of batteries and purpose and items needed in emergency bag. Pt verb/demo good understanding. OT removed all batteries from packaging and placed in charging unit. Spouse also participated with completing connections and charging batteries.         AM-PAC 6 CLICK ADL   How much help from another person does this patient currently need?   1 = Unable, Total/Dependent Assistance  2 = A lot, Maximum/Moderate Assistance  3 = A little, Minimum/Contact Guard/Supervision  4 = None, Modified [place]/Independent    Putting on and taking off regular lower body clothing? : 3  Bathing (including washing, rinsing, drying)?: 3  Toileting, which includes using toilet, bedpan, or urinal? : 3  Putting on and taking " off regular upper body clothing?: 2  Taking care of personal grooming such as brushing teeth?: 3  Eating meals?: 4  Total Score: 18     AM-PAC Raw Score CMS G-Code Modifier Level of Impairment Assistance   6 % Total / Unable   7 - 9 CM 80 - 100% Maximal Assist   10 - 14 CL 60 - 80% Moderate Assist   15 - 19 CK 40 - 60% Moderate Assist   20 - 22 CJ 20 - 40% Minimal Assist   23 CI 1-20% SBA / CGA   24 CH 0% Independent/ Mod I     Patient left up in chair with all lines intact and call button in reach    ASSESSMENT:  Mert Samayoa is a 27 y.o. male with a medical diagnosis of Chronic combined systolic and diastolic heart failure and s/p LVAD placement. Pt tolerated session well with good effort and performance. Pt smiling more today with good affect and social interaction. Pt is progressing well functionally and to benefit from cont OT to address stated goals. OT to progress pt toward dressing/toileting goals next date.     Rehab identified problem list/impairments: Rehab identified problem list/impairments: weakness, impaired self care skills, impaired balance, impaired functional mobilty, impaired endurance, gait instability    Rehab potential is good.    Activity tolerance: Good    Discharge recommendations: Discharge Facility/Level Of Care Needs: home with home health     GOALS:   Occupational Therapy Goals        Problem: Occupational Therapy Goal    Goal Priority Disciplines Outcome Interventions   Occupational Therapy Goal     OT, PT/OT     Description:  Goals to be met by: 2 weeks 2/17/17     Patient will increase functional independence with ADLs by performing:    UE Dressing with Supervision.  LE Dressing with Supervision.  Grooming while standing with Supervision.  Toileting from toilet with Supervision for hygiene and clothing management.   Stand pivot transfers with Supervision.  Toilet transfer to toilet with Supervision.  Pt to be independent with LVAD vipin't.             Multidisciplinary  Problems (Resolved)        Problem: Occupational Therapy Goal    Goal Priority Disciplines Outcome Interventions   Occupational Therapy Goal   (Resolved)     OT, PT/OT Outcome(s) achieved    Description:  Eval and D/C OT 1/29/17.                Plan:  Patient to be seen 6 x/week to address the above listed problems via self-care/home management, therapeutic activities, therapeutic exercises  Plan of Care expires:    Plan of Care reviewed with: patient, spouse         ALISA Mejia  02/08/2017

## 2017-02-08 NOTE — PROCEDURES
Patient AAO with  family at bedside. VAD interrogation completed this AM in the event changes needed to be made. Will continue to monitor for further issues. Patient states his pain medications are too strong. Spoke with BARNEY Seaman and he is being switched to Tylenol.     Pulsatile: Yes   VAD Sounds: Smooth  Problems / Issues / Alarms with VAD if any: LFA this AM due to parameters  HCT: 25  Waveforms: 4-7 with no negative deflections noted.      VAD Interrogation:  TXP LVAD INTERROGATIONS 2/8/2017 2/8/2017 2/8/2017 2/8/2017 2/8/2017 2/8/2017 2/8/2017   Type Heartware Heartware Heartware Heartware Heartware Heartware Heartware   Flow 6.9 6.7 7.2 7.1 6.2 6.6 6.3   Speed 2900 2900 2900 2900 2900 2900 2900   Power ([facility]mith) 5.3 5.1 5.3 5.3 4.9 5.2 5   Low Flow Alarm 4.0 - - - - - -   High Power Alarm 7.5 - - - - - -   Pulsatility - - - - - - -   }

## 2017-02-09 DIAGNOSIS — Z95.811 HEART REPLACED BY HEART ASSIST DEVICE: Primary | ICD-10-CM

## 2017-02-09 DIAGNOSIS — I50.9 CONGESTIVE HEART FAILURE, UNSPECIFIED: ICD-10-CM

## 2017-02-09 LAB
ANION GAP SERPL CALC-SCNC: 13 MMOL/L
ANISOCYTOSIS BLD QL SMEAR: SLIGHT
APTT BLDCRRT: 39.6 SEC
APTT BLDCRRT: 39.9 SEC
APTT BLDCRRT: 51.7 SEC
APTT BLDCRRT: 52.9 SEC
BASOPHILS # BLD AUTO: 0.01 K/UL
BASOPHILS NFR BLD: 0.1 %
BUN SERPL-MCNC: 19 MG/DL
CALCIUM SERPL-MCNC: 10 MG/DL
CHLORIDE SERPL-SCNC: 84 MMOL/L
CO2 SERPL-SCNC: 32 MMOL/L
CREAT SERPL-MCNC: 1.1 MG/DL
DIFFERENTIAL METHOD: ABNORMAL
EOSINOPHIL # BLD AUTO: 0.1 K/UL
EOSINOPHIL NFR BLD: 0.9 %
ERYTHROCYTE [DISTWIDTH] IN BLOOD BY AUTOMATED COUNT: 12.8 %
EST. GFR  (AFRICAN AMERICAN): >60 ML/MIN/1.73 M^2
EST. GFR  (NON AFRICAN AMERICAN): >60 ML/MIN/1.73 M^2
GLUCOSE SERPL-MCNC: 110 MG/DL
HCT VFR BLD AUTO: 26.9 %
HGB BLD-MCNC: 9.5 G/DL
INR PPP: 1.2
LDH SERPL L TO P-CCNC: 427 U/L
LYMPHOCYTES # BLD AUTO: 1.1 K/UL
LYMPHOCYTES NFR BLD: 8.4 %
MCH RBC QN AUTO: 29.4 PG
MCHC RBC AUTO-ENTMCNC: 35.3 %
MCV RBC AUTO: 83 FL
MONOCYTES # BLD AUTO: 1.5 K/UL
MONOCYTES NFR BLD: 11.3 %
NEUTROPHILS # BLD AUTO: 10.1 K/UL
NEUTROPHILS NFR BLD: 79.3 %
PHOSPHATE SERPL-MCNC: 3.8 MG/DL
PLATELET # BLD AUTO: 178 K/UL
PLATELET BLD QL SMEAR: ABNORMAL
PMV BLD AUTO: 9.3 FL
POLYCHROMASIA BLD QL SMEAR: ABNORMAL
POTASSIUM SERPL-SCNC: 2.8 MMOL/L
POTASSIUM SERPL-SCNC: 3.5 MMOL/L
PREALB SERPL-MCNC: 17 MG/DL
PROTHROMBIN TIME: 12.2 SEC
RBC # BLD AUTO: 3.23 M/UL
SODIUM SERPL-SCNC: 129 MMOL/L
WBC # BLD AUTO: 12.79 K/UL

## 2017-02-09 PROCEDURE — 97535 SELF CARE MNGMENT TRAINING: CPT

## 2017-02-09 PROCEDURE — 76937 US GUIDE VASCULAR ACCESS: CPT

## 2017-02-09 PROCEDURE — 20600001 HC STEP DOWN PRIVATE ROOM

## 2017-02-09 PROCEDURE — 25000003 PHARM REV CODE 250: Performed by: THORACIC SURGERY (CARDIOTHORACIC VASCULAR SURGERY)

## 2017-02-09 PROCEDURE — 63600175 PHARM REV CODE 636 W HCPCS: Performed by: NURSE PRACTITIONER

## 2017-02-09 PROCEDURE — 97530 THERAPEUTIC ACTIVITIES: CPT

## 2017-02-09 PROCEDURE — 85025 COMPLETE CBC W/AUTO DIFF WBC: CPT

## 2017-02-09 PROCEDURE — C1751 CATH, INF, PER/CENT/MIDLINE: HCPCS

## 2017-02-09 PROCEDURE — 85610 PROTHROMBIN TIME: CPT

## 2017-02-09 PROCEDURE — 27000248 HC VAD-ADDITIONAL DAY

## 2017-02-09 PROCEDURE — 97116 GAIT TRAINING THERAPY: CPT

## 2017-02-09 PROCEDURE — 25000003 PHARM REV CODE 250: Performed by: STUDENT IN AN ORGANIZED HEALTH CARE EDUCATION/TRAINING PROGRAM

## 2017-02-09 PROCEDURE — 85730 THROMBOPLASTIN TIME PARTIAL: CPT | Mod: 91

## 2017-02-09 PROCEDURE — 36569 INSJ PICC 5 YR+ W/O IMAGING: CPT

## 2017-02-09 PROCEDURE — 84132 ASSAY OF SERUM POTASSIUM: CPT

## 2017-02-09 PROCEDURE — 93750 INTERROGATION VAD IN PERSON: CPT | Mod: ,,, | Performed by: INTERNAL MEDICINE

## 2017-02-09 PROCEDURE — 99232 SBSQ HOSP IP/OBS MODERATE 35: CPT | Mod: ,,, | Performed by: INTERNAL MEDICINE

## 2017-02-09 PROCEDURE — 80048 BASIC METABOLIC PNL TOTAL CA: CPT

## 2017-02-09 PROCEDURE — 84134 ASSAY OF PREALBUMIN: CPT

## 2017-02-09 PROCEDURE — 63600175 PHARM REV CODE 636 W HCPCS: Performed by: STUDENT IN AN ORGANIZED HEALTH CARE EDUCATION/TRAINING PROGRAM

## 2017-02-09 PROCEDURE — 93750 INTERROGATION VAD IN PERSON: CPT | Performed by: THORACIC SURGERY (CARDIOTHORACIC VASCULAR SURGERY)

## 2017-02-09 PROCEDURE — 84100 ASSAY OF PHOSPHORUS: CPT

## 2017-02-09 PROCEDURE — 83615 LACTATE (LD) (LDH) ENZYME: CPT

## 2017-02-09 PROCEDURE — 36415 COLL VENOUS BLD VENIPUNCTURE: CPT

## 2017-02-09 PROCEDURE — 25000003 PHARM REV CODE 250: Performed by: NURSE PRACTITIONER

## 2017-02-09 RX ORDER — WARFARIN 7.5 MG/1
7.5 TABLET ORAL DAILY
Status: DISCONTINUED | OUTPATIENT
Start: 2017-02-09 | End: 2017-02-11

## 2017-02-09 RX ORDER — POTASSIUM CHLORIDE 7.45 MG/ML
10 INJECTION INTRAVENOUS ONCE
Status: COMPLETED | OUTPATIENT
Start: 2017-02-09 | End: 2017-02-09

## 2017-02-09 RX ORDER — POTASSIUM CHLORIDE 20 MEQ/15ML
40 SOLUTION ORAL EVERY 6 HOURS
Status: DISCONTINUED | OUTPATIENT
Start: 2017-02-09 | End: 2017-02-10

## 2017-02-09 RX ORDER — OXYMETAZOLINE HCL 0.05 %
2 SPRAY, NON-AEROSOL (ML) NASAL 2 TIMES DAILY
Status: COMPLETED | OUTPATIENT
Start: 2017-02-09 | End: 2017-02-11

## 2017-02-09 RX ADMIN — PANTOPRAZOLE SODIUM 40 MG: 40 TABLET, DELAYED RELEASE ORAL at 08:02

## 2017-02-09 RX ADMIN — OXYMETAZOLINE HYDROCHLORIDE 2 SPRAY: 5 SPRAY NASAL at 08:02

## 2017-02-09 RX ADMIN — MAGNESIUM OXIDE TAB 400 MG (241.3 MG ELEMENTAL MG) 400 MG: 400 (241.3 MG) TAB at 01:02

## 2017-02-09 RX ADMIN — POTASSIUM CHLORIDE 10 MEQ: 7.46 INJECTION, SOLUTION INTRAVENOUS at 11:02

## 2017-02-09 RX ADMIN — HEPARIN SODIUM AND DEXTROSE 2000 UNITS/HR: 10000; 5 INJECTION INTRAVENOUS at 12:02

## 2017-02-09 RX ADMIN — HYDROCODONE BITARTRATE AND ACETAMINOPHEN 1 TABLET: 5; 325 TABLET ORAL at 11:02

## 2017-02-09 RX ADMIN — POTASSIUM CHLORIDE 40 MEQ: 20 SOLUTION ORAL at 01:02

## 2017-02-09 RX ADMIN — MAGNESIUM OXIDE TAB 400 MG (241.3 MG ELEMENTAL MG) 400 MG: 400 (241.3 MG) TAB at 09:02

## 2017-02-09 RX ADMIN — MAGNESIUM OXIDE TAB 400 MG (241.3 MG ELEMENTAL MG) 400 MG: 400 (241.3 MG) TAB at 05:02

## 2017-02-09 RX ADMIN — OXYMETAZOLINE HYDROCHLORIDE 2 SPRAY: 5 SPRAY NASAL at 11:02

## 2017-02-09 RX ADMIN — POTASSIUM CHLORIDE 10 MEQ: 7.46 INJECTION, SOLUTION INTRAVENOUS at 10:02

## 2017-02-09 RX ADMIN — WARFARIN SODIUM 7.5 MG: 7.5 TABLET ORAL at 04:02

## 2017-02-09 RX ADMIN — HYDROCODONE BITARTRATE AND ACETAMINOPHEN 1 TABLET: 5; 325 TABLET ORAL at 04:02

## 2017-02-09 RX ADMIN — POTASSIUM CHLORIDE 10 MEQ: 7.46 INJECTION, SOLUTION INTRAVENOUS at 02:02

## 2017-02-09 RX ADMIN — POTASSIUM CHLORIDE 40 MEQ: 20 SOLUTION ORAL at 06:02

## 2017-02-09 RX ADMIN — HEPARIN SODIUM AND DEXTROSE 2000 UNITS/HR: 10000; 5 INJECTION INTRAVENOUS at 02:02

## 2017-02-09 RX ADMIN — FUROSEMIDE 20 MG/HR: 10 INJECTION, SOLUTION INTRAMUSCULAR; INTRAVENOUS at 09:02

## 2017-02-09 RX ADMIN — POTASSIUM CHLORIDE 10 MEQ: 7.46 INJECTION, SOLUTION INTRAVENOUS at 04:02

## 2017-02-09 RX ADMIN — FERROUS GLUCONATE TAB 324 MG (37.5 MG ELEMENTAL IRON) 324 MG: 324 (37.5 FE) TAB at 08:02

## 2017-02-09 RX ADMIN — HEPARIN SODIUM AND DEXTROSE 2000 UNITS/HR: 10000; 5 INJECTION INTRAVENOUS at 03:02

## 2017-02-09 RX ADMIN — ONDANSETRON 8 MG: 2 INJECTION INTRAMUSCULAR; INTRAVENOUS at 04:02

## 2017-02-09 RX ADMIN — FUROSEMIDE 20 MG/HR: 10 INJECTION, SOLUTION INTRAMUSCULAR; INTRAVENOUS at 10:02

## 2017-02-09 RX ADMIN — AMLODIPINE BESYLATE 10 MG: 10 TABLET ORAL at 08:02

## 2017-02-09 RX ADMIN — ASPIRIN 325 MG: 325 TABLET, DELAYED RELEASE ORAL at 08:02

## 2017-02-09 RX ADMIN — HYDROCODONE BITARTRATE AND ACETAMINOPHEN 1 TABLET: 5; 325 TABLET ORAL at 10:02

## 2017-02-09 RX ADMIN — POTASSIUM CHLORIDE 40 MEQ: 20 SOLUTION ORAL at 11:02

## 2017-02-09 NOTE — PROGRESS NOTES
Notified BARNEY Seaman regarding patient nose bleed. Ordering afrin and spray until stops, if continues will let team know and may consult ent.

## 2017-02-09 NOTE — PROGRESS NOTES
APTT 52.9, goal 40-50. Heparin gtt currently infusing at 2000 units. Dr. Cid notified, instructed to decreases Heparin gtt to 1900 units. Also notified MD of K 2.8, no new orders/instructions given. DAWSON. Will continue to monitor.

## 2017-02-09 NOTE — PROGRESS NOTES
Update note:    SW to pt's room ofr update. Pt seated in chair, aaox4, calm, and pleasant. Pt reports still dealing with a lot of pain. Pt reports coping adequately although feeling stress over still being on IV medicines and difficulty sleeping. SW providing emotional support.  Pt reports wife at work today. Pt reports wife will only work a half day tomorrow and will stay with pt over the weekend. Pt reports no questions or concerns for SW at this time. SW providing ongoing psychosocial counseling and support, education, assistance, resources, and discharge planning as indicated. SW continuing to follow and remains available.

## 2017-02-09 NOTE — PLAN OF CARE
Problem: Patient Care Overview  Goal: Individualization & Mutuality  Plan of care discussed with patient.  Patient ambulating with assistance, fall precautions in place.Continuing to encourage sternal precautions, IS, and ambulation. LVAD DP and numbers WNL, smooth LVAD hum. Patient complains of pain, but is relieved with medication. Discussed medications and care.Lasix, heparin, and dobutamine gtt continued. Encouraged workbook, reviewed education, filled in binder. Patient has no questions at this time. Will continue to monitor.

## 2017-02-09 NOTE — PROGRESS NOTES
Daily E and M and VAD Interrogation Note      Reason for Visit:  Patient is seen in follow up for management of:  [] HeartMate II  [x] Heartware [] Total artificial heart [] ECMO [] Other      Interval History:  [] Interval history unobtainable due to intubation. The [x] implant/[] explant date was 2/1/17     No new complaints or concerns this morning  Pain controlled.   Having bowel movements  VAD flows ~8  Working well with PT    Medications:  Current Facility-Administered Medications   Medication Dose Route Frequency Provider Last Rate Last Dose    acetaminophen tablet 650 mg  650 mg Oral Q6H PRN Urszula Tuttle NP   650 mg at 02/08/17 1522    albuterol sulfate nebulizer solution 2.5 mg  2.5 mg Nebulization Q4H PRN Urszula Tuttle NP        amlodipine tablet 10 mg  10 mg Oral Daily Lex Macedo MD   10 mg at 02/09/17 0850    aspirin EC tablet 325 mg  325 mg Oral Daily Urszula Tuttle NP   325 mg at 02/09/17 0850    bisacodyl suppository 10 mg  10 mg Rectal Daily PRN Urszula Tuttle NP        bisacodyl suppository 10 mg  10 mg Rectal Once Brayden Limon MD   Stopped at 02/04/17 1015    DOBUTamine 500mg in D5W 250mL infusion (premix) (NON-TITRATING)  2.5 mcg/kg/min Intravenous Continuous Urszula Tuttle NP 4.7 mL/hr at 02/09/17 0856 2.5 mcg/kg/min at 02/09/17 0856    ferrous gluconate tablet 324 mg  324 mg Oral Daily with breakfast Urszula Tuttle NP   324 mg at 02/09/17 0859    furosemide (LASIX) 250 mg in sodium chloride 0.9 % 250 mL infusion (non-titrating)  20 mg/hr Intravenous Continuous Urszula Tuttle NP 20 mL/hr at 02/09/17 1030 20 mg/hr at 02/09/17 1030    glucagon (human recombinant) injection 1 mg  1 mg Intramuscular PRN Shai Ortega MD        heparin 25,000 units in dextrose 5% 250 mL (100 units/mL) infusion (heparin infusion)  2,000 Units/hr Intravenous Continuous Denis Aburto MD 19 mL/hr at 02/09/17 0426 1,900 Units/hr at 02/09/17 0426     hydrocodone-acetaminophen 5-325mg per tablet 1 tablet  1 tablet Oral Q6H PRN Arnie Cid MD   1 tablet at 02/09/17 0425    magnesium citrate solution 296 mL  296 mL Oral Daily PRN Urszula Tuttle NP        magnesium oxide tablet 400 mg  400 mg Oral TID Denis Aburto MD   400 mg at 02/09/17 0520    ondansetron injection 8 mg  8 mg Intravenous Q8H PRN Denis Aburto MD   8 mg at 02/09/17 0426    oxycodone-acetaminophen  mg per tablet 1 tablet  1 tablet Oral Q6H PRN Arnie Cid MD        oxymetazoline 0.05 % nasal spray 2 spray  2 spray Each Nare BID Urszula Tuttle NP        pantoprazole EC tablet 40 mg  40 mg Oral Daily Urszula Tuttle NP   40 mg at 02/09/17 0850    polyethylene glycol packet 17 g  17 g Oral BID Brayden Limon MD   Stopped at 02/08/17 2008    potassium chloride 10 mEq in 100 mL IVPB  10 mEq Intravenous Once Urszula Tuttle NP        potassium chloride 10 mEq in 100 mL IVPB  10 mEq Intravenous Once Urszula Tuttle NP        potassium chloride 10 mEq in 100 mL IVPB  10 mEq Intravenous Once Urszula Tuttle NP        potassium chloride 10 mEq in 100 mL IVPB  10 mEq Intravenous Once Urszula Tuttle NP        potassium chloride 10% solution 40 mEq  40 mEq Oral Q6H Urszula Tuttle NP        senna-docusate 8.6-50 mg per tablet 1 tablet  1 tablet Oral BID Denis Aburto MD   Stopped at 02/08/17 2008    sodium chloride 0.65 % nasal spray 1 spray  1 spray Each Nare PRN Denis Aburto MD        warfarin (COUMADIN) tablet 7.5 mg  7.5 mg Oral Daily Urszula Tuttle NP           Physical Examination:  Vital Signs:   Vitals:    02/09/17 0900   BP:    Pulse: 99   Resp:    Temp:      Cardiovascular:  [x] Regular rate and rhythm [] Irregular []  None (COURTNEY) []  Other  [x]  No edema []  Edema present  [x]  Clear to auscultation  VAD sounds with normal VAD hum  Skin:  Incision is [x]  Clean, dry and intact.    Sternum:  [x]   Stable []  Unstable  Driveline(s):   [x]  Clean, dry and intact.       Labs:  CBC, BMP, LDH, INR    Procedure:  Device Interrogation including analysis of device parameters.  Current Settings   [x]  Ventricular Assist Device    Review of device function is [x]  Stable     TXP LVAD INTERROGATIONS 2/9/2017 2/9/2017 2/9/2017 2/8/2017 2/8/2017 2/8/2017 2/8/2017   Type Heartware Heartware Heartware Heartware Heartware Heartware Heartware   Flow 5.9 6.4 7.1 7.1 6.9 6.7 7.2   Speed 2900 2900 2900 2900 2900 2900 2900   Power (G[employer]mith) 4.8 5.1 5.1 5.2 5.3 5.1 5.3   Low Flow Alarm - - - - - - -   High Power Alarm - - - - - - -   Pulsatility - - - - - - -       Assessment:  [x]  Primary Cardiomyopathy [x]  Congestive Heart Failure   []  Atrial Fibrillation []  Ventricular Tachycardia   []  Aftercare cardiac device [x]  Long term (current) use of anticoagulants   []  Ventilator-associated pneumonia []  Pneumonia viral, unspecified   []  Pneumonia, bacterial, unspecified []  Pneumonia, organism unspecified   []  Hemorrhage of GI tract, unspecified    []  Nosebleed              Plan:  [x]  Interval history obtained from ICU attending team member during rounding today  [x]  VAD/COURTNEY teaching performed with patient  [x]  Mobilization / Physical Therapy ongoing  [x]  Anticoagulation [x]  Ongoing []  Held    Increase lasix to 20 mg/hour  Coumadin 7.5 mg tonight    Total time spent was 31 minutes.  Of which more than 50 percent of the care dominated counseling and coordinating care with different team members. The VAD was interrogated and all parameters were WNL and no significant findings were found in the history. All these findings are documented in the note above.      Date of Service: 02/09/2017

## 2017-02-09 NOTE — PLAN OF CARE
"Problem: Patient Care Overview  Goal: Plan of Care Review  Dx: Heartware LVAD 2/1/17  Hx: Cardiomyopathy, Two brothers have had heart transplant, occasional smoker     1/27/17: IABP placed  2/1/17: LVAD, admitted to SICU, 1.5L albumin, chest remains open   2/2/17: IABP d/ced, chest closed, extubated  2/3/17: douglas d/c; OOBTC  2/4/17: epi off; chest tube #2 removed; OOBTC  2/5: Epi turned back on @ .04, Nitric increased to 10; OOBTC  2/6: OOBTC; chest tube removed      Nursing:   Goal MAP 60-80  pTT goal 40-50 (Dr. Macedo wants closer to "50")  K and Mag q6  Heartware speed at 2900     Outcome: Outcome(s) achieved Date Met:  02/09/17  , Heparin and Lasix gtts continued at MD ordered rates, tolerating well. Titrating Heparin gtt to maintain APPT goal of 40-50. VSS. O2 sats remains stable on RA. Afebrile. Fall precautions maintained, free of falls/trauma/injury or skin breakdown. C/o incisional CP poorly managed with PRN pain med. LVAD DPs, MAPs, and #'s all WNL. Pt encouraged to continue LVAD education and ambulation. Plan of care reviewed and updated with patient, verbalizes understanding. NADN. Will continue to monitor.       "

## 2017-02-09 NOTE — CONSULTS
"Placed [id number] midline in right basilic vein. Lot#[id number]. Advanced needle using real time ultrasound guidance. Images recorded and saved.  20g 13/4" PIV placed in LFA  "

## 2017-02-09 NOTE — CONSULTS
Consult received re: nutritional assessment. RD is following pt. Please see RD note from 2/6. Will follow-up as previously scheduled (2/10).

## 2017-02-09 NOTE — PROGRESS NOTES
Notified Urszula CORLEY regarding PTT 39.9. Goal PTT 40-50. Ordered to increase Heparin to 2000 U/hr

## 2017-02-09 NOTE — PT/OT/SLP PROGRESS
Physical Therapy  Treatment    Mert Samayoa   MRN: [id number]   Admitting Diagnosis: Chronic combined systolic and diastolic heart failure    PT Received On: 17   Pt initially began treatment from [id number]  PT Start Time: 1500     PT Stop Time: 1525      PT Total Time (min): 34 min       Billable Minutes:  Gait [id number] and Therapeutic Activity 10    Treatment Type: Treatment  PT/PTA: PT             General Precautions: Standard, LVAD, fall, sternal  Orthopedic Precautions: N/A   Braces: N/A    Do you have any cultural, spiritual, Baptist conflicts, given your current situation?: none     Subjective:  Communicated with RN prior to session.  Pt agreeable to working with PT.     Pain Ratin/10     Pain Rating Post-Intervention: 0/10    Objective:   Patient found with: telemetry, peripheral IV (LVAD)    Functional Mobility:  Bed Mobility:   Rolling/Turning to Left: Stand by assistance  Rolling/Turning Right: Stand by assistance  Scooting/Bridging: Stand by Assistance  Supine to Sit: Stand by Assistance    Transfers:  Sit <> Stand Assistance: Stand By Assistance  Sit <> Stand Assistive Device: No Assistive Device    Gait:   Gait Distance: x400 feet with one static resting break and increased RR   Assistance 1: Contact Guard Assistance  Gait Assistive Device: No device  Gait Pattern: 2-point gait  Gait Deviation(s): decreased zarina, increased time in double stance, decreased velocity of limb motion  Pt ambulated x 400 feet with CGA for stability initially and progressed to SBA. Pt required one static standing break throughout 2/2 to decreased endurance. Pt ambulated with decreased step length and rounded shoulders, pt corrected rounded shoulders with vc.     Balance:   Static Sit: GOOD-: Takes MODERATE challenges from all directions but inconsistently  Dynamic Sit: GOOD-: Maintains balance through MODERATE excursions of active trunk movement,     Static Stand: FAIR+: Takes MINIMAL challenges from all  directions  Dynamic stand: FAIR+: Needs CLOSE SUPERVISION during gait and is able to right self with minor LOB     Therapeutic Activities and Exercises:  Initial treatment: Pt found on LVAD battery. Pt educated on LVAD emergency bag and on contents required for emergency bag. PT able to recite LVAD equipment needed. PT required to return 2/2 to IV pole battery dead and required charging prior to ambulation.   Post treatment: Consisted of gait train, see details above. Pt educated on importance of ambulating 4x/day. Pt educated to ambulate with RN later today to improve endurance. Pt demo understanding of education provided.    AM-PAC 6 CLICK MOBILITY  How much help from another person does this patient currently need?   1 = Unable, Total/Dependent Assistance  2 = A lot, Maximum/Moderate Assistance  3 = A little, Minimum/Contact Guard/Supervision  4 = None, Modified [place]/Independent    Turning over in bed (including adjusting bedclothes, sheets and blankets)?: 4  Sitting down on and standing up from a chair with arms (e.g., wheelchair, bedside commode, etc.): 3  Moving from lying on back to sitting on the side of the bed?: 3  Moving to and from a bed to a chair (including a wheelchair)?: 3  Need to walk in hospital room?: 3  Climbing 3-5 steps with a railing?: 2  Total Score: 18    AM-PAC Raw Score CMS G-Code Modifier Level of Impairment Assistance   6 % Total / Unable   7 - 9 CM 80 - 100% Maximal Assist   10 - 14 CL 60 - 80% Moderate Assist   15 - 19 CK 40 - 60% Moderate Assist   20 - 22 CJ 20 - 40% Minimal Assist   23 CI 1-20% SBA / CGA   24 CH 0% Independent/ Mod I     Patient left up in chair with all lines intact, call button in reach and RN notified.    Assessment:  Mert Samayoa is a 27 y.o. male with a medical diagnosis of Chronic combined systolic and diastolic heart failure and presents with decreased functional mobility due to impaired endurance and weakness throughout. Pt demo  improvement through increasing distance ambulated with decreased assistance.  Patient would benefit from skilled PT in the acute care setting to improve the limitations listed below. Patient would benefit from HH upon discharge.      Rehab identified problem list/impairments: Rehab identified problem list/impairments: weakness, impaired endurance, gait instability, impaired functional mobilty, impaired balance    Rehab potential is good.    Activity tolerance: Good    Discharge recommendations: Discharge Facility/Level Of Care Needs: home with home health     Barriers to discharge: Barriers to Discharge: Inaccessible home environment    Equipment recommendations: Equipment Needed After Discharge: none     GOALS:   Physical Therapy Goals        Problem: Physical Therapy Goal    Goal Priority Disciplines Outcome Goal Variances Interventions   Physical Therapy Goal     PT/OT, PT Ongoing (interventions implemented as appropriate)     Description:  Goals to be met by: 3/1/17     Patient will increase functional independence with mobility by performin. Supine to sit with [place]  2. Sit to stand transfer with [place]  3. Gait  x 400 feet with Supervision.   4. Ascend/descend 2 stair with bilateral Handrails Supervision.   5. Lower extremity exercise program x15 reps per handout, with assistance as needed.  6. [place] with LVAD management.                 PLAN:    Patient to be seen 6 x/week  to address the above listed problems via gait training, therapeutic activities, therapeutic exercises  Plan of Care expires: 17  Plan of Care reviewed with: patient         José Manuel Campbell, PT  2017

## 2017-02-09 NOTE — PROGRESS NOTES
Progress Note  Heart Transplant Service    Admit Date: 1/27/2017   LOS: 13 days     SUBJECTIVE:     Follow up for: Chronic combined systolic and diastolic heart failure    Interval History: Patient stepped down overnight. Reports continued chest discomfort around sternotomy site.     Scheduled Meds:   amlodipine  10 mg Oral Daily    aspirin  325 mg Oral Daily    bisacodyl  10 mg Rectal Once    ferrous gluconate  324 mg Oral Daily with breakfast    magnesium oxide  400 mg Oral TID    oxymetazoline  2 spray Each Nare BID    pantoprazole  40 mg Oral Daily    polyethylene glycol  17 g Oral BID    potassium chloride  10 mEq Intravenous Once    potassium chloride  10 mEq Intravenous Once    potassium chloride 10%  40 mEq Oral Q6H    senna-docusate 8.6-50 mg  1 tablet Oral BID    warfarin  7.5 mg Oral Daily     Continuous Infusions:   DOBUTamine 2.5 mcg/kg/min (02/09/17 0856)    furosemide (LASIX) 1 mg/mL infusion (non-titrating) 20 mg/hr (02/09/17 1030)    heparin (porcine) in 5 % dex 2,000 Units/hr (02/09/17 1208)     PRN Meds:acetaminophen, albuterol sulfate, bisacodyl, glucagon (human recombinant), hydrocodone-acetaminophen 5-325mg, magnesium citrate, ondansetron, oxycodone-acetaminophen, sodium chloride    Review of patient's allergies indicates:  No Known Allergies    OBJECTIVE:     Vital Signs (Most Recent)  Temp: 99.2 °F (37.3 °C) (02/09/17 0730)  Pulse: 98 (02/09/17 1200)  Resp: 16 (02/09/17 1100)  BP: (!) 80/0 (02/09/17 1102)  SpO2: 97 % (02/09/17 1100)    Vital Signs Range (Last 24H):  Temp:  [98.3 °F (36.8 °C)-99.2 °F (37.3 °C)]   Pulse:  []   Resp:  [16-18]   BP: ()/(0-77)   SpO2:  [95 %-99 %]     I & O (Last 24H):    Intake/Output Summary (Last 24 hours) at 02/09/17 1333  Last data filed at 02/09/17 0841   Gross per 24 hour   Intake          2303.03 ml   Output             1850 ml   Net           453.03 ml            Telemetry: sinus  Constitutional: AOx3, NAD conversant  Neck: No  JVD present. No thyromegaly present.   Cardiovascular: VAD hum  Pulmonary/Chest:good breath sounds bilaterally  Abdominal: + BS, exhibits no distension. There is no tenderness. There is no rebound.   Extremities: no cyanosis, clubbing or edema.  No bleeding, edema, erythema or hematoma, doppler pulses in all distals    Labs:       Recent Labs  Lab 02/07/17  0635 02/08/17  0435 02/09/17  0245   WBC 9.29 8.42 12.79*   HGB 8.1* 8.6* 9.5*   HCT 23.1* 25.2* 26.9*    175 178   LYMPH 11.3*  1.1 11.9*  1.0 8.4*  1.1   MONO 12.7  1.2* 14.4  1.2* 11.3  1.5*   EOSINOPHIL 1.4 1.2 0.9         Recent Labs  Lab 02/07/17  0524  02/08/17  0435  02/08/17  1742 02/09/17  0244 02/09/17  1035   APTT 36.5*  < > 39.4*  < > 52.9* 52.9* 39.9*  39.6*   INR 1.1  --  1.0  --   --  1.2  --    < > = values in this interval not displayed.       Recent Labs  Lab 02/06/17  0400  02/07/17  0524  02/07/17  2203 02/08/17  0435 02/08/17  1100 02/09/17  0245 02/09/17  1035   *  --  116*  --   --  103  --  110  --    CALCIUM 9.1  --  9.4  --   --  9.8  --  10.0  --    ALBUMIN 3.6  --  3.7  --   --  3.8  --   --   --    PROT 7.2  --  7.5  --   --  7.8  --   --   --    *  --  131*  --   --  131*  --  129*  --    K 4.4  < > 3.4*  3.4*  < > 3.8 3.1*  3.1* 3.9 2.8* 3.5   CO2 30*  --  31*  --   --  30*  --  32*  --    CL 88*  --  89*  --   --  88*  --  84*  --    BUN 25*  --  21*  --   --  21*  --  19  --    CREATININE 1.3  --  1.2  --   --  1.1  --  1.1  --    ALKPHOS 107  --  116  --   --  150*  --   --   --    ALT 34  --  29  --   --  40  --   --   --    AST 51*  --  37  --   --  41*  --   --   --    BILITOT 0.6  --  0.6  --   --  0.6  --   --   --    MG 2.3  < > 1.9  < > 2.2 2.2 2.3  --   --    PHOS 4.3  --  3.1  --   --  3.1  --  3.8  --    < > = values in this interval not displayed.  Estimated Creatinine Clearance: 84 mL/min (based on Cr of 1.1).      Recent Labs  Lab 02/08/17  0435   *         Recent Labs  Lab  02/03/17  0733 02/04/17  0800 02/05/17  0400 02/06/17  0400 02/07/17  0524 02/08/17  0435 02/09/17  0245   * 482* 448* 444* 457* 413* 427*       Microbiology Results (last 7 days)     ** No results found for the last 168 hours. **            ASSESSMENT:     27 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 He was admitted for PICC removal, IABP, and planned LVAD implantation. He is s/p [facility] LVAD placement 2/1 and chest closure 2/2. Extubated 2/2 and now recovering in ICU.    PLAN:     Cardiogenic Shock due to Acute on Chronic Systolic HF, DCM, NYHA FC IV s/p HW LVAD 2/1/2017  -s/p HW LVAD 2/1 and chest closure/extubation 2/2  -CTS primary   -Currently on  and Lasix, Lasix increased to 20 mg / hr this AM  -Does not have ICD- will need to discuss timing of this vs Life[facility]  - Anticoagulation per CTS: on Heparin, continue Coumadin yesterday, monitor daily INRs  -cultures: NGTD    HTN  -BP controlled    Back Pain  -prn meds on board; will adjust    Prophylaxis   -heparin

## 2017-02-09 NOTE — PROCEDURES
"Patient AAO with no family at bedside. VAD interrogation completed this AM in the event changes needed to be made. Will continue to monitor for further issues.     EDUCATION PROGRESS: Patient having nose bleed at this time. Patient has not read much in his handbook/workbook yet. Went over components of VAD as well as emergency bag. Patient knows how long his batteries last as well as how long the backup battery lasts. We discussed what would happen if driveline became disconnected. Patient states, "One of these days we will get to do some education without having some nancy issues" Verbalized to patient that we will work on it as soon as he is feeling better.      Pulsatile: Yes   VAD Sounds: Smooth  Problems / Issues / Alarms with VAD if any: no alarms  HCT: 27  Waveforms: 5-9 no negative deflections noted      VAD Interrogation:  TXP LVAD INTERROGATIONS 2/9/2017 2/9/2017 2/9/2017 2/9/2017 2/8/2017 2/8/2017 2/8/2017   Type Heartware Heartware Heartware Heartware Heartware Heartware Heartware   Flow 6.2 5.9 6.4 7.1 7.1 6.9 6.7   Speed 2900 2900 2900 2900 2900 2900 2900   Power ([place]) 5.0 4.8 5.1 5.1 5.2 5.3 5.1   Low Flow Alarm 4.0 - - - - - -   High Power Alarm 7.5 - - - - - -   Pulsatility Pulse - - - - - -   }      "

## 2017-02-09 NOTE — PROGRESS NOTES
LVAD dressng changed per RN using soap and water per protocol. DLES 1-2. Sutures intact. Very minimal redness noted around DLES. No active drainage noted. Scant serosanguineous noted on drain sponge. No complicated noted. Pt tolerated procedure well. No distress. Will continue to monitor

## 2017-02-09 NOTE — NURSING
Attempted to call on call MD multiple times without answer. MD called at [phone], and [phone]. Patient's APTT is 52.9 and he is requesting additional pain medication. Will notify charge nurse Kraig and continue calling on call MD.

## 2017-02-09 NOTE — PLAN OF CARE
Problem: Occupational Therapy Goal  Goal: Occupational Therapy Goal  Goals to be met by: 2 weeks 2/17/17     Patient will increase functional independence with ADLs by performing:    UE Dressing with Supervision.   LE Dressing with Supervision.  Grooming while standing with Supervision.-MET  Toileting from toilet with Supervision for hygiene and clothing management.   Stand pivot transfers with Supervision.  Toilet transfer to toilet with Supervision.  Pt to be independent with LVAD vipint.    Outcome: Ongoing (interventions implemented as appropriate)  Continue OT plan of care.

## 2017-02-09 NOTE — PT/OT/SLP PROGRESS
"Occupational Therapy  Treatment    Mert Samayoa   MRN: [id number]   Admitting Diagnosis: Chronic combined systolic and diastolic heart failure  S/p LVAD    OT Date of Treatment: 17   OT Start Time: 1120  OT Stop Time: 1143  OT Total Time (min): 23 min    Billable Minutes:  Self Care/Home Management 10 and Therapeutic Activity 13    General Precautions: Standard, LVAD, fall, sternal  Orthopedic Precautions: N/A  Braces: N/A    Do you have any cultural, spiritual, Baptism conflicts, given your current situation?: None    Subjective:  Communicated with RN prior to session.  "It hasn't been my best day. My nose has been bleeding all morning."    Pain Ratin/10  Pain Rating Post-Intervention: 0/10    Objective:  Patient found with: peripheral IV, telemetry (LVAD )     Functional Mobility:  Bed Mobility: Pt EOB and left up in chair     Transfers:  Sit <> Stand Assistance: Stand By Assistance from EOB; able to maintain sternal precautions  Sit <> Stand Assistive Device: No Assistive Device    Functional Ambulation: Within room and hallway >200 ft with SBA no AD; no rest breaks needed, mild SOB after returning to room    Activities of Daily Living:  UE Dressing Level of Assistance: Minimum assistance to don LVAD shld bag straps around B IVs  Grooming Position: Standing at sink  Grooming Level of Assistance: Supervision for oral hygiene, to wash face and hands    Balance:   Static Sit: NORMAL: No deviations seen in posture held statically  Dynamic Sit: NORMAL: No deviations seen in posture held dynamically  Static Stand: NORMAL: No deviations seen in posture held statically  Dynamic stand: GOOD-: Needs SUPERVISION only during gait and able to self right with moderate     Therapeutic Activities and Exercises:  RNs present assisting pt with switching LVAD to battery power; pt reports he has not been able to complete as much education as he would like due to sternal pain at night and nosebleeds this morning; Min " A to don LVAD shld bag straps, performed functional mobility within room and hallway; returned and performed standing grooming at sink    AM-PAC 6 CLICK ADL   How much help from another person does this patient currently need?   1 = Unable, Total/Dependent Assistance  2 = A lot, Maximum/Moderate Assistance  3 = A little, Minimum/Contact Guard/Supervision  4 = None, Modified [place]/Independent    Putting on and taking off regular lower body clothing? : 3  Bathing (including washing, rinsing, drying)?: 3  Toileting, which includes using toilet, bedpan, or urinal? : 3  Putting on and taking off regular upper body clothing?: 3  Taking care of personal grooming such as brushing teeth?: 3  Eating meals?: 4  Total Score: 19     AM-PAC Raw Score CMS G-Code Modifier Level of Impairment Assistance   6 % Total / Unable   7 - 9 CM 80 - 100% Maximal Assist   10 - 14 CL 60 - 80% Moderate Assist   15 - 19 CK 40 - 60% Moderate Assist   20 - 22 CJ 20 - 40% Minimal Assist   23 CI 1-20% SBA / CGA   24 CH 0% Independent/ Mod I     Patient left up in chair with all lines intact and call button in reach    ASSESSMENT:  Mert Samayoa is a 27 y.o. male with a medical diagnosis of Chronic combined systolic and diastolic heart failure and presents with good effort and participation in therapy; pt progressing well with mobility and ADLs and would continue to benefit from OT to increase functional independence and LVAD management. Recommend HH upon D/C.    Rehab potential is good.    Activity tolerance: Good    Discharge recommendations: Discharge Facility/Level Of Care Needs: home with home health     Barriers to discharge: Barriers to Discharge: Inaccessible home environment    Equipment recommendations: none     GOALS:   Occupational Therapy Goals        Problem: Occupational Therapy Goal    Goal Priority Disciplines Outcome Interventions   Occupational Therapy Goal     OT, PT/OT Ongoing (interventions implemented as  appropriate)    Description:  Goals to be met by: 2 weeks 2/17/17     Patient will increase functional independence with ADLs by performing:    UE Dressing with Supervision.   LE Dressing with Supervision.  Grooming while standing with Supervision.-MET  Toileting from toilet with Supervision for hygiene and clothing management.   Stand pivot transfers with Supervision.  Toilet transfer to toilet with Supervision.  Pt to be independent with LVAD vipin't.              Multidisciplinary Problems (Resolved)        Problem: Occupational Therapy Goal    Goal Priority Disciplines Outcome Interventions   Occupational Therapy Goal   (Resolved)     OT, PT/OT Outcome(s) achieved    Description:  Eval and D/C OT 1/29/17.                Plan:  Patient to be seen 6 x/week to address the above listed problems via self-care/home management, therapeutic activities, therapeutic exercises  Plan of Care reviewed with: patient     ALISA Urena  02/09/2017

## 2017-02-09 NOTE — PLAN OF CARE
Problem: Physical Therapy Goal  Goal: Physical Therapy Goal  Goals to be met by: 3/1/17     Patient will increase functional independence with mobility by performin. Supine to sit with [place]  2. Sit to stand transfer with [place]  3. Gait x 400 feet with Supervision.   4. Ascend/descend 2 stair with bilateral Handrails Supervision.   5. Lower extremity exercise program x15 reps per handout, with assistance as needed.  6. [place] with LVAD management.    Outcome: Ongoing (interventions implemented as appropriate)  Pt progressing towards goals.

## 2017-02-10 LAB
ALBUMIN SERPL BCP-MCNC: 3.9 G/DL
ALP SERPL-CCNC: 169 U/L
ALT SERPL W/O P-5'-P-CCNC: 52 U/L
ANION GAP SERPL CALC-SCNC: 15 MMOL/L
APTT BLDCRRT: 46.6 SEC
APTT BLDCRRT: 47.9 SEC
APTT BLDCRRT: 47.9 SEC
APTT BLDCRRT: 56.1 SEC
AST SERPL-CCNC: 36 U/L
BASOPHILS # BLD AUTO: 0.02 K/UL
BASOPHILS NFR BLD: 0.2 %
BILIRUB DIRECT SERPL-MCNC: 0.3 MG/DL
BILIRUB SERPL-MCNC: 0.7 MG/DL
BNP SERPL-MCNC: 253 PG/ML
BUN SERPL-MCNC: 18 MG/DL
CALCIUM SERPL-MCNC: 10.1 MG/DL
CHLORIDE SERPL-SCNC: 85 MMOL/L
CO2 SERPL-SCNC: 32 MMOL/L
CREAT SERPL-MCNC: 1 MG/DL
DIFFERENTIAL METHOD: ABNORMAL
EOSINOPHIL # BLD AUTO: 0.1 K/UL
EOSINOPHIL NFR BLD: 0.7 %
ERYTHROCYTE [DISTWIDTH] IN BLOOD BY AUTOMATED COUNT: 12.9 %
EST. GFR  (AFRICAN AMERICAN): >60 ML/MIN/1.73 M^2
EST. GFR  (NON AFRICAN AMERICAN): >60 ML/MIN/1.73 M^2
GLUCOSE SERPL-MCNC: 95 MG/DL
HCT VFR BLD AUTO: 26.7 %
HGB BLD-MCNC: 9.1 G/DL
INR PPP: 1.3
LDH SERPL L TO P-CCNC: 377 U/L
LYMPHOCYTES # BLD AUTO: 1.7 K/UL
LYMPHOCYTES NFR BLD: 13.3 %
MCH RBC QN AUTO: 28.4 PG
MCHC RBC AUTO-ENTMCNC: 34.1 %
MCV RBC AUTO: 83 FL
MONOCYTES # BLD AUTO: 1.8 K/UL
MONOCYTES NFR BLD: 14 %
NEUTROPHILS # BLD AUTO: 8.9 K/UL
NEUTROPHILS NFR BLD: 70.9 %
PHOSPHATE SERPL-MCNC: 3.7 MG/DL
PLATELET # BLD AUTO: 213 K/UL
PMV BLD AUTO: 9.1 FL
POTASSIUM SERPL-SCNC: 3.1 MMOL/L
POTASSIUM SERPL-SCNC: 4 MMOL/L
POTASSIUM SERPL-SCNC: 4.7 MMOL/L
PROT SERPL-MCNC: 8.1 G/DL
PROTHROMBIN TIME: 13.4 SEC
RBC # BLD AUTO: 3.2 M/UL
SODIUM SERPL-SCNC: 132 MMOL/L
WBC # BLD AUTO: 12.59 K/UL

## 2017-02-10 PROCEDURE — 84100 ASSAY OF PHOSPHORUS: CPT

## 2017-02-10 PROCEDURE — 36415 COLL VENOUS BLD VENIPUNCTURE: CPT

## 2017-02-10 PROCEDURE — 85730 THROMBOPLASTIN TIME PARTIAL: CPT | Mod: 91

## 2017-02-10 PROCEDURE — 80076 HEPATIC FUNCTION PANEL: CPT

## 2017-02-10 PROCEDURE — 84132 ASSAY OF SERUM POTASSIUM: CPT

## 2017-02-10 PROCEDURE — 83880 ASSAY OF NATRIURETIC PEPTIDE: CPT

## 2017-02-10 PROCEDURE — 93750 INTERROGATION VAD IN PERSON: CPT | Mod: ,,, | Performed by: INTERNAL MEDICINE

## 2017-02-10 PROCEDURE — 80048 BASIC METABOLIC PNL TOTAL CA: CPT

## 2017-02-10 PROCEDURE — 25000003 PHARM REV CODE 250: Performed by: STUDENT IN AN ORGANIZED HEALTH CARE EDUCATION/TRAINING PROGRAM

## 2017-02-10 PROCEDURE — 63600175 PHARM REV CODE 636 W HCPCS: Performed by: NURSE PRACTITIONER

## 2017-02-10 PROCEDURE — 85025 COMPLETE CBC W/AUTO DIFF WBC: CPT

## 2017-02-10 PROCEDURE — 83615 LACTATE (LD) (LDH) ENZYME: CPT

## 2017-02-10 PROCEDURE — 85610 PROTHROMBIN TIME: CPT

## 2017-02-10 PROCEDURE — 25000003 PHARM REV CODE 250: Performed by: GENERAL PRACTICE

## 2017-02-10 PROCEDURE — 97803 MED NUTRITION INDIV SUBSEQ: CPT

## 2017-02-10 PROCEDURE — 93750 INTERROGATION VAD IN PERSON: CPT | Performed by: THORACIC SURGERY (CARDIOTHORACIC VASCULAR SURGERY)

## 2017-02-10 PROCEDURE — 25000003 PHARM REV CODE 250: Performed by: NURSE PRACTITIONER

## 2017-02-10 PROCEDURE — 25000003 PHARM REV CODE 250: Performed by: THORACIC SURGERY (CARDIOTHORACIC VASCULAR SURGERY)

## 2017-02-10 PROCEDURE — 27000248 HC VAD-ADDITIONAL DAY

## 2017-02-10 PROCEDURE — 20600001 HC STEP DOWN PRIVATE ROOM

## 2017-02-10 PROCEDURE — 97530 THERAPEUTIC ACTIVITIES: CPT

## 2017-02-10 PROCEDURE — 97535 SELF CARE MNGMENT TRAINING: CPT

## 2017-02-10 PROCEDURE — 99232 SBSQ HOSP IP/OBS MODERATE 35: CPT | Mod: ,,, | Performed by: INTERNAL MEDICINE

## 2017-02-10 PROCEDURE — 85730 THROMBOPLASTIN TIME PARTIAL: CPT

## 2017-02-10 RX ORDER — POTASSIUM CHLORIDE 20 MEQ/15ML
40 SOLUTION ORAL EVERY 6 HOURS
Status: DISCONTINUED | OUTPATIENT
Start: 2017-02-11 | End: 2017-02-11

## 2017-02-10 RX ORDER — POTASSIUM CHLORIDE 20 MEQ/15ML
60 SOLUTION ORAL EVERY 6 HOURS
Status: DISCONTINUED | OUTPATIENT
Start: 2017-02-10 | End: 2017-02-10

## 2017-02-10 RX ORDER — POTASSIUM CHLORIDE 7.45 MG/ML
10 INJECTION INTRAVENOUS ONCE
Status: DISCONTINUED | OUTPATIENT
Start: 2017-02-10 | End: 2017-02-10 | Stop reason: ALTCHOICE

## 2017-02-10 RX ORDER — POTASSIUM CHLORIDE 7.45 MG/ML
10 INJECTION INTRAVENOUS
Status: COMPLETED | OUTPATIENT
Start: 2017-02-10 | End: 2017-02-10

## 2017-02-10 RX ORDER — POTASSIUM CHLORIDE 7.45 MG/ML
10 INJECTION INTRAVENOUS ONCE
Status: COMPLETED | OUTPATIENT
Start: 2017-02-10 | End: 2017-02-10

## 2017-02-10 RX ADMIN — POTASSIUM CHLORIDE 10 MEQ: 7.46 INJECTION, SOLUTION INTRAVENOUS at 09:02

## 2017-02-10 RX ADMIN — POTASSIUM CHLORIDE 60 MEQ: 1.5 SOLUTION ORAL at 12:02

## 2017-02-10 RX ADMIN — DOBUTAMINE IN DEXTROSE 2.5 MCG/KG/MIN: 200 INJECTION, SOLUTION INTRAVENOUS at 04:02

## 2017-02-10 RX ADMIN — MAGNESIUM OXIDE TAB 400 MG (241.3 MG ELEMENTAL MG) 400 MG: 400 (241.3 MG) TAB at 06:02

## 2017-02-10 RX ADMIN — FERROUS GLUCONATE TAB 324 MG (37.5 MG ELEMENTAL IRON) 324 MG: 324 (37.5 FE) TAB at 08:02

## 2017-02-10 RX ADMIN — POTASSIUM CHLORIDE 10 MEQ: 7.46 INJECTION, SOLUTION INTRAVENOUS at 12:02

## 2017-02-10 RX ADMIN — AMLODIPINE BESYLATE 10 MG: 10 TABLET ORAL at 08:02

## 2017-02-10 RX ADMIN — HEPARIN SODIUM AND DEXTROSE 1900 UNITS/HR: 10000; 5 INJECTION INTRAVENOUS at 04:02

## 2017-02-10 RX ADMIN — POTASSIUM CHLORIDE 10 MEQ: 7.46 INJECTION, SOLUTION INTRAVENOUS at 10:02

## 2017-02-10 RX ADMIN — POTASSIUM CHLORIDE 40 MEQ: 20 SOLUTION ORAL at 06:02

## 2017-02-10 RX ADMIN — FUROSEMIDE 20 MG/HR: 10 INJECTION, SOLUTION INTRAMUSCULAR; INTRAVENOUS at 05:02

## 2017-02-10 RX ADMIN — WARFARIN SODIUM 7.5 MG: 7.5 TABLET ORAL at 05:02

## 2017-02-10 RX ADMIN — ASPIRIN 325 MG: 325 TABLET, DELAYED RELEASE ORAL at 08:02

## 2017-02-10 RX ADMIN — POTASSIUM CHLORIDE 10 MEQ: 7.46 INJECTION, SOLUTION INTRAVENOUS at 11:02

## 2017-02-10 RX ADMIN — MAGNESIUM OXIDE TAB 400 MG (241.3 MG ELEMENTAL MG) 400 MG: 400 (241.3 MG) TAB at 09:02

## 2017-02-10 RX ADMIN — PANTOPRAZOLE SODIUM 40 MG: 40 TABLET, DELAYED RELEASE ORAL at 08:02

## 2017-02-10 RX ADMIN — HYDROCODONE BITARTRATE AND ACETAMINOPHEN 1 TABLET: 5; 325 TABLET ORAL at 06:02

## 2017-02-10 RX ADMIN — OXYMETAZOLINE HYDROCHLORIDE 2 SPRAY: 5 SPRAY NASAL at 09:02

## 2017-02-10 RX ADMIN — HYDROCODONE BITARTRATE AND ACETAMINOPHEN 1 TABLET: 5; 325 TABLET ORAL at 09:02

## 2017-02-10 RX ADMIN — MAGNESIUM OXIDE TAB 400 MG (241.3 MG ELEMENTAL MG) 400 MG: 400 (241.3 MG) TAB at 01:02

## 2017-02-10 RX ADMIN — HEPARIN SODIUM AND DEXTROSE 1850 UNITS/HR: 10000; 5 INJECTION INTRAVENOUS at 06:02

## 2017-02-10 RX ADMIN — OXYMETAZOLINE HYDROCHLORIDE 2 SPRAY: 5 SPRAY NASAL at 08:02

## 2017-02-10 RX ADMIN — POTASSIUM CHLORIDE 40 MEQ: 20 SOLUTION ORAL at 11:02

## 2017-02-10 NOTE — PT/OT/SLP PROGRESS
"Occupational Therapy  Treatment    Mert Samayoa   MRN: [id number]   Admitting Diagnosis: Chronic combined systolic and diastolic heart failure  S/p LVAD     OT Date of Treatment: 02/10/17   OT Start Time:   OT Stop Time:   OT Total Time (min): 20 min    Billable Minutes:  Self Care/Home Management 10 and Therapeutic Activity 10    General Precautions: Standard, LVAD, fall, sternal  Orthopedic Precautions: N/A  Braces: N/A    Do you have any cultural, spiritual, Oriental orthodox conflicts, given your current situation?: None    Subjective:  Communicated with RN prior to session.  "I don't want to jinx it, but I'm having a really good morning and I slept great."    Pain Ratin/10  Pain Rating Post-Intervention: 0/10    Objective:  Patient found with: telemetry, PICC line, peripheral IV (LVAD to wall power)     Functional Mobility:  Bed Mobility:  Supine to Sit: Stand by Assistance  Sit to Supine: Stand by Assistance    Transfers:  Sit <> Stand Assistance: Stand By Assistance x 2 trials from EOB  Sit <> Stand Assistive Device: No Assistive Device    Functional Ambulation: ~400 ft SBA no AD; no LOB or SOB    Activities of Daily Living:  Feeding Level of Assistance: Set-up Assistance  UE Dressing Level of Assistance: Minimum assistance to don LVAD straps around multiple lines    Balance:   Static Sit: NORMAL: No deviations seen in posture held statically  Dynamic Sit: NORMAL: No deviations seen in posture held dynamically  Static Stand: GOOD: Takes MODERATE challenges from all directions  Dynamic stand: GOOD-: Needs SUPERVISION only during gait and able to self right with moderate     Therapeutic Activities and Exercises:  Pt switched LVAD from wall to battery power with Min A; cues for battery placement and correct connections for cords, and assist to don straps around multiple lines; performed functional mobility within room and hallway; requested to perform ADLs later in the day    AM-PAC 6 CLICK ADL   How " much help from another person does this patient currently need?   1 = Unable, Total/Dependent Assistance  2 = A lot, Maximum/Moderate Assistance  3 = A little, Minimum/Contact Guard/Supervision  4 = None, Modified [place]/Independent    Putting on and taking off regular lower body clothing? : 3  Bathing (including washing, rinsing, drying)?: 3  Toileting, which includes using toilet, bedpan, or urinal? : 3  Putting on and taking off regular upper body clothing?: 3  Taking care of personal grooming such as brushing teeth?: 3  Eating meals?: 4  Total Score: 19     AM-PAC Raw Score CMS G-Code Modifier Level of Impairment Assistance   6 % Total / Unable   7 - 9 CM 80 - 100% Maximal Assist   10 - 14 CL 60 - 80% Moderate Assist   15 - 19 CK 40 - 60% Moderate Assist   20 - 22 CJ 20 - 40% Minimal Assist   23 CI 1-20% SBA / CGA   24 CH 0% Independent/ Mod I     Patient left supine with all lines intact, LVAD to battery power, and call button in reach    ASSESSMENT:  Mert Samayoa is a 27 y.o. male with a medical diagnosis of Chronic combined systolic and diastolic heart failure and presents with good effort and participation in therapy; pt progressing well with mobility and ADLs and would continue to benefit from OT to increase functional independence and LVAD management. Recommend HH upon D/C.    Rehab potential is good.    Activity tolerance: Good    Discharge recommendations: Discharge Facility/Level Of Care Needs: home with home health     Barriers to discharge: Barriers to Discharge: Inaccessible home environment    Equipment recommendations: none     GOALS:   Occupational Therapy Goals        Problem: Occupational Therapy Goal    Goal Priority Disciplines Outcome Interventions   Occupational Therapy Goal     OT, PT/OT Ongoing (interventions implemented as appropriate)    Description:  Goals to be met by: 2 weeks 2/17/17     Patient will increase functional independence with ADLs by performing:    UE  Dressing with Supervision.   LE Dressing with Supervision.  Grooming while standing with Supervision.-MET  Toileting from toilet with Supervision for hygiene and clothing management.   Stand pivot transfers with Supervision.  Toilet transfer to toilet with Supervision.  Pt to be independent with LVAD vipin't.              Multidisciplinary Problems (Resolved)        Problem: Occupational Therapy Goal    Goal Priority Disciplines Outcome Interventions   Occupational Therapy Goal   (Resolved)     OT, PT/OT Outcome(s) achieved    Description:  Eval and D/C OT 1/29/17.                Plan:  Patient to be seen 6 x/week to address the above listed problems via self-care/home management, therapeutic activities, therapeutic exercises  Plan of Care reviewed with: patient    ALISA Urena  02/10/2017

## 2017-02-10 NOTE — PROGRESS NOTES
Progress Note  Heart Transplant Service    Admit Date: 1/27/2017   LOS: 14 days     SUBJECTIVE:     Follow up for: Chronic combined systolic and diastolic heart failure    Interval History: Patient seen and examined at bedside. No acute events overnight, SOB, fever, chills. Reports chest pain around sternotomy site.       Scheduled Meds:   amlodipine  10 mg Oral Daily    aspirin  325 mg Oral Daily    bisacodyl  10 mg Rectal Once    ferrous gluconate  324 mg Oral Daily with breakfast    magnesium oxide  400 mg Oral TID    oxymetazoline  2 spray Each Nare BID    pantoprazole  40 mg Oral Daily    polyethylene glycol  17 g Oral BID    potassium chloride 10%  60 mEq Oral Q6H    senna-docusate 8.6-50 mg  1 tablet Oral BID    warfarin  7.5 mg Oral Daily     Continuous Infusions:   DOBUTamine 1 mcg/kg/min (02/10/17 0833)    furosemide (LASIX) 1 mg/mL infusion (non-titrating) 20 mg/hr (02/09/17 2131)    heparin (porcine) in 5 % dex 1,850 Units/hr (02/10/17 0710)     PRN Meds:acetaminophen, albuterol sulfate, bisacodyl, glucagon (human recombinant), hydrocodone-acetaminophen 5-325mg, magnesium citrate, ondansetron, oxycodone-acetaminophen, sodium chloride    Review of patient's allergies indicates:  No Known Allergies    OBJECTIVE:     Vital Signs (Most Recent)  Temp: 97.5 °F (36.4 °C) (02/10/17 1215)  Pulse: 88 (02/10/17 1300)  Resp: 18 (02/10/17 1215)  BP: (!) 68/0 (02/10/17 1215)  SpO2: 98 % (02/10/17 1215)    Vital Signs Range (Last 24H):  Temp:  [97.5 °F (36.4 °C)-98.9 °F (37.2 °C)]   Pulse:  []   Resp:  [16-18]   BP: ()/(0-66)   SpO2:  [98 %-99 %]     I & O (Last 24H):    Intake/Output Summary (Last 24 hours) at 02/10/17 1414  Last data filed at 02/10/17 0854   Gross per 24 hour   Intake          1488.75 ml   Output             2950 ml   Net         -1461.25 ml            Telemetry: sinus  Constitutional: AOx3, NAD conversant  Neck: No JVD present. No thyromegaly present.   Cardiovascular: VAD  hum  Pulmonary/Chest:good breath sounds bilaterally  Abdominal: + BS, exhibits no distension. There is no tenderness. There is no rebound.   Extremities: no cyanosis, clubbing or edema.  No bleeding, edema, erythema or hematoma, doppler pulses in all distals    Labs:       Recent Labs  Lab 02/08/17 0435 02/09/17  0245 02/10/17  0446   WBC 8.42 12.79* 12.59   HGB 8.6* 9.5* 9.1*   HCT 25.2* 26.9* 26.7*    178 213   LYMPH 11.9*  1.0 8.4*  1.1 13.3*  1.7   MONO 14.4  1.2* 11.3  1.5* 14.0  1.8*   EOSINOPHIL 1.2 0.9 0.7         Recent Labs  Lab 02/08/17  0435  02/09/17  0244  02/10/17  0109 02/10/17  0446 02/10/17  1337   APTT 39.4*  < > 52.9*  < > 47.9* 56.1* 47.9*   INR 1.0  --  1.2  --   --  1.3*  --    < > = values in this interval not displayed.       Recent Labs  Lab 02/07/17  0524  02/07/17  2203 02/08/17 0435 02/08/17  1100 02/09/17  0245 02/09/17  1035 02/10/17  0446   *  --   --  103  --  110  --  95   CALCIUM 9.4  --   --  9.8  --  10.0  --  10.1   ALBUMIN 3.7  --   --  3.8  --   --   --  3.9   PROT 7.5  --   --  7.8  --   --   --  8.1   *  --   --  131*  --  129*  --  132*   K 3.4*  3.4*  < > 3.8 3.1*  3.1* 3.9 2.8* 3.5 3.1*   CO2 31*  --   --  30*  --  32*  --  32*   CL 89*  --   --  88*  --  84*  --  85*   BUN 21*  --   --  21*  --  19  --  18   CREATININE 1.2  --   --  1.1  --  1.1  --  1.0   ALKPHOS 116  --   --  150*  --   --   --  169*   ALT 29  --   --  40  --   --   --  52*   AST 37  --   --  41*  --   --   --  36   BILITOT 0.6  --   --  0.6  --   --   --  0.7   MG 1.9  < > 2.2 2.2 2.3  --   --   --    PHOS 3.1  --   --  3.1  --  3.8  --  3.7   < > = values in this interval not displayed.  Estimated Creatinine Clearance: 91.8 mL/min (based on Cr of 1).      Recent Labs  Lab 02/10/17  0446   *         Recent Labs  Lab 02/04/17  0800 02/05/17  0400 02/06/17  0400 02/07/17  0524 02/08/17  0435 02/09/17  0245 02/10/17  0446   * 448* 444* 457* 413* 427* 377*        Microbiology Results (last 7 days)     ** No results found for the last 168 hours. **            ASSESSMENT:     27 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 He was admitted for PICC removal, IABP, and planned LVAD implantation. He is s/p HeartWare LVAD placement 2/1 and chest closure 2/2. Extubated 2/2 and now recovering in ICU.    PLAN:     Cardiogenic Shock due to Acute on Chronic Systolic HF, DCM, NYHA FC IV s/p HW LVAD 2/1/2017  -s/p HW LVAD 2/1 and chest closure/extubation 2/2  -CTS primary   -Currently on  and Lasix, Lasix at 20 mg / hr this AM  -Does not have ICD- will need to discuss timing of this vs Lifevest  - Anticoagulation per CTS: on Heparin, continue Coumadin yesterday, monitor daily INRs  -cultures: NGTD    HTN  -BP controlled    Back Pain  -prn meds on board; will adjust    Prophylaxis   -heparin

## 2017-02-10 NOTE — PROGRESS NOTES
Daily E and M and VAD Interrogation Note    Reason for Visit:  Patient is seen in follow up for management of:  [] HeartMate II [x] Heartware [] Total artificial heart [] ECMO [] Other      Interval History:  [] Interval history unobtainable due to intubation. The [x] implant/[] explant date was 2/1/17      No new complaints or concerns this morning  Pain controlled.   Having bowel movements  VAD flows WNL  Working well with PT    Medications:  Current Facility-Administered Medications   Medication Dose Route Frequency Provider Last Rate Last Dose    acetaminophen tablet 650 mg  650 mg Oral Q6H PRN Urszula Tuttle NP   650 mg at 02/08/17 1522    albuterol sulfate nebulizer solution 2.5 mg  2.5 mg Nebulization Q4H PRN Urszula Tuttle NP        amlodipine tablet 10 mg  10 mg Oral Daily Lex Macedo MD   10 mg at 02/10/17 0855    aspirin EC tablet 325 mg  325 mg Oral Daily Urszula Tuttle NP   325 mg at 02/10/17 0855    bisacodyl suppository 10 mg  10 mg Rectal Daily PRN Urszula Tuttle NP        bisacodyl suppository 10 mg  10 mg Rectal Once Brayden Limon MD   Stopped at 02/04/17 1015    DOBUTamine 500mg in D5W 250mL infusion (premix) (NON-TITRATING)  1 mcg/kg/min Intravenous Continuous Urszula Tuttle NP 1.9 mL/hr at 02/10/17 0833 1 mcg/kg/min at 02/10/17 0833    ferrous gluconate tablet 324 mg  324 mg Oral Daily with breakfast Urszula Tuttle NP   324 mg at 02/10/17 0855    furosemide (LASIX) 250 mg in sodium chloride 0.9 % 250 mL infusion (non-titrating)  20 mg/hr Intravenous Continuous Urszula Tuttle NP 20 mL/hr at 02/09/17 2131 20 mg/hr at 02/09/17 2131    glucagon (human recombinant) injection 1 mg  1 mg Intramuscular PRN Shai Ortega MD        heparin 25,000 units in dextrose 5% 250 mL (100 units/mL) infusion (heparin infusion)  1,850 Units/hr Intravenous Continuous Nelson Montgomery MD 18.5 mL/hr at 02/10/17 0710 1,850 Units/hr at 02/10/17 0710     hydrocodone-acetaminophen 5-325mg per tablet 1 tablet  1 tablet Oral Q6H PRN Arnie Cid MD   1 tablet at 02/10/17 0911    magnesium citrate solution 296 mL  296 mL Oral Daily PRN Urszula Tuttle NP        magnesium oxide tablet 400 mg  400 mg Oral TID Denis Aburto MD   400 mg at 02/10/17 1305    ondansetron injection 8 mg  8 mg Intravenous Q8H PRN Denis Aburto MD   8 mg at 02/09/17 0426    oxycodone-acetaminophen  mg per tablet 1 tablet  1 tablet Oral Q6H PRN Arnie Cid MD        oxymetazoline 0.05 % nasal spray 2 spray  2 spray Each Nare BID Urszula Tuttle NP   2 spray at 02/10/17 0855    pantoprazole EC tablet 40 mg  40 mg Oral Daily Urszula Tuttle NP   40 mg at 02/10/17 0855    polyethylene glycol packet 17 g  17 g Oral BID Brayden Limon MD   Stopped at 02/08/17 2008    potassium chloride 10% solution 60 mEq  60 mEq Oral Q6H Urszula Tuttle NP   60 mEq at 02/10/17 1224    senna-docusate 8.6-50 mg per tablet 1 tablet  1 tablet Oral BID Denis Aburto MD   Stopped at 02/08/17 2008    sodium chloride 0.65 % nasal spray 1 spray  1 spray Each Nare PRN Denis Aburto MD        warfarin (COUMADIN) tablet 7.5 mg  7.5 mg Oral Daily Urszula Tuttle NP   7.5 mg at 02/09/17 1656       Physical Examination:  Vital Signs:   Vitals:    02/10/17 1300   BP:    Pulse: 88   Resp:    Temp:      Cardiovascular:  [x] Regular rate and rhythm [] Irregular   [x]  No edema   [x]  Clear to auscultation  VAD hum present   Skin:  Incision is [x]  Clean, dry and intact.    Sternum:  [x]  Stable   Driveline(s):   [x]  Clean, dry and intact.       Labs:  Imported from [place]..... Which labs?    X-Rays:  [x]  I reviewed today's Chest x-ray    Procedure:  Device Interrogation including analysis of device parameters.  Current Settings  [x]  Ventricular Assist Device    Review of device function is [x]  Stable   TXP LVAD INTERROGATIONS 2/10/2017 2/10/2017 2/10/2017 2/9/2017  2/9/2017 2/9/2017 2/9/2017   Type Heartware Heartware Heartware Heartware Heartware Heartware Heartware   Flow 5.8 5.7 5.9 6.8 6.2 6.2 5.9   Speed 2900 2900 2900 2900 2900 2900 2900   Power (Goldsmith) 4.9 5.1 5.1 5.0 5.0 5.0 4.8   Low Flow Alarm - - - - - - -   High Power Alarm - - - - - - -   Pulsatility - - - - - - -       Assessment:  [x]  Primary Cardiomyopathy [x]  Congestive Heart Failure   []  Atrial Fibrillation []  Ventricular Tachycardia   []  Aftercare cardiac device [x]  Long term (current) use of anticoagulants   []  Ventilator-associated pneumonia []  Pneumonia viral, unspecified   []  Pneumonia, bacterial, unspecified []  Pneumonia, organism unspecified   []  Hemorrhage of GI tract, unspecified    []  Nosebleed              Plan:  [x]  Interval history obtained from ICU attending team member during rounding today  [x]  VAD/COURTNEY teaching performed with patient  [x]  Mobilization / Physical Therapy ongoing  [x]  Anticoagulation [x]  Ongoing []  Held      Total time spent was 32 minutes.  Of which more than 50 percent of the care dominated counseling and coordinating care with different team members. The VAD was interrogated and all parameters were WNL and no significant findings were found in the history. All these findings are documented in the note above.      Date of Service: 02/10/2017

## 2017-02-10 NOTE — PLAN OF CARE
"Problem: Patient Care Overview  Goal: Plan of Care Review  Dx: Heartware LVAD 2/1/17  Hx: Cardiomyopathy, Two brothers have had heart transplant, occasional smoker     1/27/17: IABP placed  2/1/17: LVAD, admitted to SICU, 1.5L albumin, chest remains open   2/2/17: IABP d/ced, chest closed, extubated  2/3/17: douglas d/c; OOBTC  2/4/17: epi off; chest tube #2 removed; OOBTC  2/5: Epi turned back on @ .04, Nitric increased to 10; OOBTC  2/6: OOBTC; chest tube removed      Nursing:   Goal MAP 60-80  pTT goal 40-50 (Dr. Macedo wants closer to "50")  K and Mag q6  Heartware speed at 2900     Outcome: Ongoing (interventions implemented as appropriate)  Patient progressing toward all goals. Plan of care discussed with patient, no questions at this time. Patient ambulating independently, fall precautions in place. VS & LVAD numbers WNL.  decreased to 1 mcg/kg/min; Will monitor.       "

## 2017-02-10 NOTE — PROGRESS NOTES
[facility]  Adult Nutrition  Consult Note    SUMMARY     Recommendations    Recommendation/Intervention:   1. Continue current diet order.   2. Encouraged good PO intake of meals. Noted Alb 3.9 and PAB 17.   Goals: Pt to receive nutrition within 48hrs.   Nutrition Goal Status: goal met  Communication of RD Recs: reviewed with RN    Reason for Assessment    Reason for Assessment: RD follow-up  Diagnosis: cardiac disease (s/p LVAD placement)  Relevent Medical History: HF, HTN   Nutrition Discharge Planning: Will provide Coumadin education prior to d/c.     Nutrition Prescription Ordered    Current Diet Order: Cardiac, Fluid 1500mL     Nutrition Risk Screen     Nutrition Risk Screen: no indicators present    Nutrition/Diet History    Typical Food/Fluid Intake: Pt reports consuming % of meals.   Factors Affecting Nutritional Intake: other (see comments) (none reported)    Labs/Tests/Procedures/Meds    Pertinent Labs Reviewed: reviewed  Pertinent Labs Comments: Na 132, K 3.1  Pertinent Medications Reviewed: reviewed  Pertinent Medications Comments: lasix, coumadin    Physical Findings    Overall Physical Appearance: other (see comments) (nourished)  Tubes: other (see comments) (chest tube - 0mL output)  Oral/Mouth Cavity: WDL  Skin: other (see comments) (incision in chest)    Anthropometrics    Height (inches): 67.01 in  Weight Method: Standard Scale  Weight (kg): 58.5 kg  Ideal Body Weight (IBW), Male: 148.06 lb  % Ideal Body Weight, Male (lb): 102.74   BMI (kg/m2): 23.82  BMI Grade: 18.5-24.9 - normal  Weight Loss: other (see comments) (pt diuresing)    Estimated/Assessed Needs    Weight Used For Calorie Calculations: 69 kg (152 lb 1.9 oz)   Height (cm): 170.2 cm  Energy Need Method: Twiggs-St Jeor (1.3 PAL: 2112kcal)  RMR (Twiggs-St. Jeor Equation): 1625.22  Weight Used For Protein Calculations: 69 kg (152 lb 1.9 oz)  Protein Requirements: 82-96g (1.2-1.4g/kg)  Fluid Need Method: RDA Method  (or per MD)     Monitor and Evaluation    Food and Nutrient Intake: energy intake, food and beverage intake  Food and Nutrient Adminstration: diet order  Anthropometric Measurements: weight, weight change  Biochemical Data, Medical Tests and Procedures: other (specify) (All labs)  Nutrition-Focused Physical Findings: overall appearance    Nutrition Risk    Level of Risk: moderate    Nutrition Follow-Up    RD Follow-up?: Yes    Assessment and Plan    Increased protein needs r/t physiological need AEB HF, s/p LVAD placement - continues

## 2017-02-10 NOTE — PLAN OF CARE
Problem: Patient Care Overview  Goal: Individualization & Mutuality  Outcome: Ongoing (interventions implemented as appropriate)  Patient remains free from falls and injuries through out shift. Patient AAO and VSS. Patient denies chest pain and SOB. Patient continues on heparin, Lasix, and Dobutamine per MD order. VAD numbers WNL. Pain needs addressed. Plan of care reviewed with patient. Patient verbalizes understanding of plan.  Will continue to monitor.

## 2017-02-10 NOTE — PROGRESS NOTES
"   02/09/17 2315 02/09/17 2316   Vital Signs   Temp 98.9 °F (37.2 °C) --    Temp src Oral --    Pulse 88 --    Resp 18 --    SpO2 98 % --    O2 Device (Oxygen Therapy) room air --    BP 99/62 (!) 92/0   MAP (mmHg) 75 --    BP Location Left arm Left arm   BP Method Automatic Doppler   Patient Position Lying Lying   Patient c/o of "sinus headache" at night for the past three nights. MD Valentina notified. No new orders at this time. Will continue to monitor.  "

## 2017-02-11 LAB
ANION GAP SERPL CALC-SCNC: 13 MMOL/L
APTT BLDCRRT: 48.7 SEC
APTT BLDCRRT: 54.9 SEC
APTT BLDCRRT: 55.9 SEC
BASOPHILS # BLD AUTO: 0.05 K/UL
BASOPHILS NFR BLD: 0.4 %
BUN SERPL-MCNC: 19 MG/DL
CALCIUM SERPL-MCNC: 10.3 MG/DL
CHLORIDE SERPL-SCNC: 88 MMOL/L
CO2 SERPL-SCNC: 33 MMOL/L
CREAT SERPL-MCNC: 1 MG/DL
DIFFERENTIAL METHOD: ABNORMAL
EOSINOPHIL # BLD AUTO: 0.2 K/UL
EOSINOPHIL NFR BLD: 1.2 %
ERYTHROCYTE [DISTWIDTH] IN BLOOD BY AUTOMATED COUNT: 13.1 %
EST. GFR  (AFRICAN AMERICAN): >60 ML/MIN/1.73 M^2
EST. GFR  (NON AFRICAN AMERICAN): >60 ML/MIN/1.73 M^2
GLUCOSE SERPL-MCNC: 105 MG/DL
HCT VFR BLD AUTO: 28.9 %
HGB BLD-MCNC: 9.8 G/DL
INR PPP: 1.3
LDH SERPL L TO P-CCNC: 343 U/L
LYMPHOCYTES # BLD AUTO: 2.3 K/UL
LYMPHOCYTES NFR BLD: 16.8 %
MAGNESIUM SERPL-MCNC: 2 MG/DL
MCH RBC QN AUTO: 28.4 PG
MCHC RBC AUTO-ENTMCNC: 33.9 %
MCV RBC AUTO: 84 FL
MONOCYTES # BLD AUTO: 1.6 K/UL
MONOCYTES NFR BLD: 11.5 %
NEUTROPHILS # BLD AUTO: 9.6 K/UL
NEUTROPHILS NFR BLD: 68.9 %
PHOSPHATE SERPL-MCNC: 4 MG/DL
PLATELET # BLD AUTO: 250 K/UL
PMV BLD AUTO: 9.2 FL
POCT GLUCOSE: 116 MG/DL (ref 70–110)
POTASSIUM SERPL-SCNC: 3.3 MMOL/L
POTASSIUM SERPL-SCNC: 3.5 MMOL/L
POTASSIUM SERPL-SCNC: 5.1 MMOL/L
PROTHROMBIN TIME: 13.6 SEC
RBC # BLD AUTO: 3.45 M/UL
SODIUM SERPL-SCNC: 134 MMOL/L
WBC # BLD AUTO: 13.95 K/UL

## 2017-02-11 PROCEDURE — 80048 BASIC METABOLIC PNL TOTAL CA: CPT

## 2017-02-11 PROCEDURE — 25000003 PHARM REV CODE 250: Performed by: NURSE PRACTITIONER

## 2017-02-11 PROCEDURE — 63600175 PHARM REV CODE 636 W HCPCS: Performed by: NURSE PRACTITIONER

## 2017-02-11 PROCEDURE — 20600001 HC STEP DOWN PRIVATE ROOM

## 2017-02-11 PROCEDURE — 25000003 PHARM REV CODE 250: Performed by: STUDENT IN AN ORGANIZED HEALTH CARE EDUCATION/TRAINING PROGRAM

## 2017-02-11 PROCEDURE — 99232 SBSQ HOSP IP/OBS MODERATE 35: CPT | Mod: ,,, | Performed by: INTERNAL MEDICINE

## 2017-02-11 PROCEDURE — 85025 COMPLETE CBC W/AUTO DIFF WBC: CPT

## 2017-02-11 PROCEDURE — 27000248 HC VAD-ADDITIONAL DAY

## 2017-02-11 PROCEDURE — 63600175 PHARM REV CODE 636 W HCPCS: Performed by: SURGERY

## 2017-02-11 PROCEDURE — 84100 ASSAY OF PHOSPHORUS: CPT

## 2017-02-11 PROCEDURE — 25000003 PHARM REV CODE 250: Performed by: THORACIC SURGERY (CARDIOTHORACIC VASCULAR SURGERY)

## 2017-02-11 PROCEDURE — 85610 PROTHROMBIN TIME: CPT

## 2017-02-11 PROCEDURE — 83615 LACTATE (LD) (LDH) ENZYME: CPT

## 2017-02-11 PROCEDURE — 84132 ASSAY OF SERUM POTASSIUM: CPT

## 2017-02-11 PROCEDURE — 85730 THROMBOPLASTIN TIME PARTIAL: CPT | Mod: 91

## 2017-02-11 PROCEDURE — 93005 ELECTROCARDIOGRAM TRACING: CPT

## 2017-02-11 PROCEDURE — 84132 ASSAY OF SERUM POTASSIUM: CPT | Mod: 91

## 2017-02-11 PROCEDURE — 25000003 PHARM REV CODE 250: Performed by: GENERAL PRACTICE

## 2017-02-11 PROCEDURE — 93010 ELECTROCARDIOGRAM REPORT: CPT | Mod: ,,, | Performed by: INTERNAL MEDICINE

## 2017-02-11 PROCEDURE — 83735 ASSAY OF MAGNESIUM: CPT

## 2017-02-11 PROCEDURE — 36415 COLL VENOUS BLD VENIPUNCTURE: CPT

## 2017-02-11 RX ORDER — AMIODARONE HYDROCHLORIDE 200 MG/1
400 TABLET ORAL 2 TIMES DAILY
Status: DISCONTINUED | OUTPATIENT
Start: 2017-02-11 | End: 2017-02-16

## 2017-02-11 RX ORDER — HYDRALAZINE HYDROCHLORIDE 25 MG/1
25 TABLET, FILM COATED ORAL EVERY 8 HOURS
Status: DISCONTINUED | OUTPATIENT
Start: 2017-02-11 | End: 2017-02-11

## 2017-02-11 RX ORDER — POTASSIUM CHLORIDE 14.9 MG/ML
10 INJECTION INTRAVENOUS
Status: DISCONTINUED | OUTPATIENT
Start: 2017-02-11 | End: 2017-02-11

## 2017-02-11 RX ORDER — AMLODIPINE BESYLATE 10 MG/1
10 TABLET ORAL DAILY
Status: DISCONTINUED | OUTPATIENT
Start: 2017-02-11 | End: 2017-02-11

## 2017-02-11 RX ORDER — WARFARIN 7.5 MG/1
7.5 TABLET ORAL DAILY
Status: DISCONTINUED | OUTPATIENT
Start: 2017-02-11 | End: 2017-02-15

## 2017-02-11 RX ORDER — POTASSIUM CHLORIDE 7.45 MG/ML
10 INJECTION INTRAVENOUS ONCE
Status: COMPLETED | OUTPATIENT
Start: 2017-02-11 | End: 2017-02-11

## 2017-02-11 RX ORDER — POTASSIUM CHLORIDE 20 MEQ/15ML
60 SOLUTION ORAL EVERY 6 HOURS
Status: DISCONTINUED | OUTPATIENT
Start: 2017-02-11 | End: 2017-02-14

## 2017-02-11 RX ORDER — WARFARIN 10 MG/1
10 TABLET ORAL DAILY
Status: DISCONTINUED | OUTPATIENT
Start: 2017-02-11 | End: 2017-02-11

## 2017-02-11 RX ORDER — MAGNESIUM SULFATE HEPTAHYDRATE 40 MG/ML
2 INJECTION, SOLUTION INTRAVENOUS ONCE
Status: COMPLETED | OUTPATIENT
Start: 2017-02-11 | End: 2017-02-11

## 2017-02-11 RX ORDER — POTASSIUM CHLORIDE 20 MEQ/1
20 TABLET, EXTENDED RELEASE ORAL ONCE
Status: DISCONTINUED | OUTPATIENT
Start: 2017-02-11 | End: 2017-02-11

## 2017-02-11 RX ORDER — AMOXICILLIN 250 MG
1 CAPSULE ORAL 2 TIMES DAILY PRN
Status: DISCONTINUED | OUTPATIENT
Start: 2017-02-11 | End: 2017-02-21 | Stop reason: HOSPADM

## 2017-02-11 RX ORDER — HYDRALAZINE HYDROCHLORIDE 25 MG/1
25 TABLET, FILM COATED ORAL ONCE
Status: COMPLETED | OUTPATIENT
Start: 2017-02-11 | End: 2017-02-11

## 2017-02-11 RX ORDER — HYDRALAZINE HYDROCHLORIDE 25 MG/1
25 TABLET, FILM COATED ORAL EVERY 8 HOURS
Status: DISCONTINUED | OUTPATIENT
Start: 2017-02-11 | End: 2017-02-15

## 2017-02-11 RX ORDER — POTASSIUM CHLORIDE 7.45 MG/ML
10 INJECTION INTRAVENOUS
Status: DISPENSED | OUTPATIENT
Start: 2017-02-11 | End: 2017-02-11

## 2017-02-11 RX ORDER — POTASSIUM CHLORIDE 14.9 MG/ML
20 INJECTION INTRAVENOUS ONCE
Status: DISCONTINUED | OUTPATIENT
Start: 2017-02-11 | End: 2017-02-11

## 2017-02-11 RX ADMIN — POTASSIUM CHLORIDE 60 MEQ: 20 SOLUTION ORAL at 05:02

## 2017-02-11 RX ADMIN — POTASSIUM CHLORIDE 40 MEQ: 20 SOLUTION ORAL at 06:02

## 2017-02-11 RX ADMIN — FUROSEMIDE 10 MG/HR: 10 INJECTION, SOLUTION INTRAMUSCULAR; INTRAVENOUS at 09:02

## 2017-02-11 RX ADMIN — HYDRALAZINE HYDROCHLORIDE 25 MG: 25 TABLET ORAL at 09:02

## 2017-02-11 RX ADMIN — POTASSIUM CHLORIDE 10 MEQ: 7.46 INJECTION, SOLUTION INTRAVENOUS at 11:02

## 2017-02-11 RX ADMIN — POTASSIUM CHLORIDE 60 MEQ: 20 SOLUTION ORAL at 11:02

## 2017-02-11 RX ADMIN — ASPIRIN 325 MG: 325 TABLET, DELAYED RELEASE ORAL at 09:02

## 2017-02-11 RX ADMIN — MAGNESIUM SULFATE IN WATER 2 G: 40 INJECTION, SOLUTION INTRAVENOUS at 06:02

## 2017-02-11 RX ADMIN — WARFARIN SODIUM 7.5 MG: 7.5 TABLET ORAL at 05:02

## 2017-02-11 RX ADMIN — POTASSIUM CHLORIDE 10 MEQ: 7.46 INJECTION, SOLUTION INTRAVENOUS at 06:02

## 2017-02-11 RX ADMIN — POTASSIUM CHLORIDE 60 MEQ: 20 SOLUTION ORAL at 12:02

## 2017-02-11 RX ADMIN — HYDROCODONE BITARTRATE AND ACETAMINOPHEN 1 TABLET: 5; 325 TABLET ORAL at 03:02

## 2017-02-11 RX ADMIN — PANTOPRAZOLE SODIUM 40 MG: 40 TABLET, DELAYED RELEASE ORAL at 09:02

## 2017-02-11 RX ADMIN — HYDROCODONE BITARTRATE AND ACETAMINOPHEN 1 TABLET: 5; 325 TABLET ORAL at 12:02

## 2017-02-11 RX ADMIN — AMIODARONE HYDROCHLORIDE 400 MG: 200 TABLET ORAL at 12:02

## 2017-02-11 RX ADMIN — HYDRALAZINE HYDROCHLORIDE 25 MG: 25 TABLET ORAL at 03:02

## 2017-02-11 RX ADMIN — OXYMETAZOLINE HYDROCHLORIDE 2 SPRAY: 5 SPRAY NASAL at 09:02

## 2017-02-11 RX ADMIN — AMLODIPINE BESYLATE 10 MG: 10 TABLET ORAL at 09:02

## 2017-02-11 RX ADMIN — POTASSIUM CHLORIDE 10 MEQ: 7.46 INJECTION, SOLUTION INTRAVENOUS at 09:02

## 2017-02-11 RX ADMIN — MAGNESIUM OXIDE TAB 400 MG (241.3 MG ELEMENTAL MG) 400 MG: 400 (241.3 MG) TAB at 03:02

## 2017-02-11 RX ADMIN — POTASSIUM CHLORIDE 10 MEQ: 7.46 INJECTION, SOLUTION INTRAVENOUS at 03:02

## 2017-02-11 RX ADMIN — AMIODARONE HYDROCHLORIDE 150 MG: 1.5 INJECTION, SOLUTION INTRAVENOUS at 11:02

## 2017-02-11 RX ADMIN — MAGNESIUM OXIDE TAB 400 MG (241.3 MG ELEMENTAL MG) 400 MG: 400 (241.3 MG) TAB at 06:02

## 2017-02-11 RX ADMIN — AMIODARONE HYDROCHLORIDE 400 MG: 200 TABLET ORAL at 09:02

## 2017-02-11 RX ADMIN — FERROUS GLUCONATE TAB 324 MG (37.5 MG ELEMENTAL IRON) 324 MG: 324 (37.5 FE) TAB at 09:02

## 2017-02-11 RX ADMIN — FUROSEMIDE 20 MG/HR: 10 INJECTION, SOLUTION INTRAMUSCULAR; INTRAVENOUS at 02:02

## 2017-02-11 RX ADMIN — HEPARIN SODIUM AND DEXTROSE 1850 UNITS/HR: 10000; 5 INJECTION INTRAVENOUS at 09:02

## 2017-02-11 RX ADMIN — POTASSIUM CHLORIDE 10 MEQ: 7.46 INJECTION, SOLUTION INTRAVENOUS at 05:02

## 2017-02-11 RX ADMIN — OXYCODONE AND ACETAMINOPHEN 1 TABLET: 10; 325 TABLET ORAL at 09:02

## 2017-02-11 RX ADMIN — MAGNESIUM OXIDE TAB 400 MG (241.3 MG ELEMENTAL MG) 400 MG: 400 (241.3 MG) TAB at 09:02

## 2017-02-11 NOTE — PROCEDURES
Patient aao x 3 with wife at bedside. VAD interrogation completed this AM in the event changes needed to be made. Will continue to monitor for further issues.     PT and pt wife doing very well with education.  They are on page 9 of workbook.  They are planning to complete the workbook over the weekend. Spent about 1.5 hours with pt and pt wife reviewing workbook and components of system.  PT and pt wife had great questions.  Pt wife to watch dressing for first time today and will begin doing dressing tomorrow.     Pulsatile: Yes, intermittent   VAD Sounds: Smooth  Problems / Issues / Alarms with VAD if any: None noted  HCT: 26.7  Waveforms: 2-7, no deflections      VAD Interrogation:  TXP LVAD INTERROGATIONS 2/10/2017 2/10/2017 2/10/2017 2/10/2017 2/10/2017 2/9/2017 2/9/2017   Type Heartware Heartware Heartware Heartware Heartware Heartware Heartware   Flow 6.5 5.6 5.8 5.7 5.9 6.8 6.2   Speed 2900 2900 2900 2900 2900 2900 2900   Power (Goldsmith) 5.0 4.8 4.9 5.1 5.1 5.0 5.0   Low Flow Alarm - 3.5 - - - - -   High Power Alarm - 7.5 - - - - -   Pulsatility - Intermittent pulse - - - - -

## 2017-02-11 NOTE — PROGRESS NOTES
Patient c/o of incisional chest pain. Pain given pain pill. Nurse leaving from room and telemetry calls and states patient is in aflutter with runs of vtach 'S-170'S. MD Tory notified. Orders for STAT EKG and Electrolyte Panel.  Patient instructed to vagal. Patient returned to NSR. MD Tory at bedside with echo. MD Zainab called to bedside. Orders to stop dobutamine and replacement of electrolytes. Will continue to monitor.

## 2017-02-11 NOTE — PROGRESS NOTES
[facility] Night Float Note:    Called by nursing that patient having VT. Upon arrival at bedside patient in what appeared to be an atrial rhythm consistent with flutter however full evaluation limited to number of leads. He was in no acute distress and having the sensation of palpitations. Heart rate was in the 150s, patient was having intermittent bouts of NSVT while in this rhythm. Upon returning back to bedside with echo machine patient spontaneously had converted to sinus. EKG tech arrived after patient converted. Bedside echo shows LVEDD approximately 6.5cm. Telemetry shows event started around 3:56AM and ended about 10 minutes later. Patient has some midline sternotomy pain and no longer has sensation of palpitations. Discussed with on call CTS.     Recommendations:  - consider Amiodarone if patient has another episode  - consider VAD speed change as indicated  - recommend stopping dobutamine  - keep K>4, Mg>2, phos>3, electrolytes pending at this time    Discussed with [facility] Staff Dr. Willie Spears,   Cardiology Fellow  Pager [phone]

## 2017-02-11 NOTE — PT/OT/SLP PROGRESS
Physical Therapy      Mert Samayoa  MRN: [id number]    Patient not seen today secondary to change in rhythm on monitor.  RN awaiting call back from MD. Will follow-up tomorrow (Sunday) 2/12/17.    Lori Gayle, PTA

## 2017-02-11 NOTE — PROGRESS NOTES
Patient's wife observed dressing change and practiced sterile glove change with RN. Will continue to educate.

## 2017-02-11 NOTE — PROGRESS NOTES
Progress Note  Heart Transplant Service    Admit Date: 1/27/2017   LOS: 15 days     SUBJECTIVE:     Follow up for: Chronic combined systolic and diastolic heart failure    Interval History: Patient with sustained episode of A-flutter overnight that resolved spontaneously.     Scheduled Meds:   amlodipine  10 mg Oral Daily    aspirin  325 mg Oral Daily    bisacodyl  10 mg Rectal Once    ferrous gluconate  324 mg Oral Daily with breakfast    magnesium oxide  400 mg Oral TID    magnesium sulfate IVPB  2 g Intravenous Once    oxymetazoline  2 spray Each Nare BID    pantoprazole  40 mg Oral Daily    polyethylene glycol  17 g Oral BID    potassium chloride 10%  40 mEq Oral Q6H    potassium chloride  20 mEq Intravenous Once    potassium chloride  20 mEq Oral Once    senna-docusate 8.6-50 mg  1 tablet Oral BID    warfarin  7.5 mg Oral Daily     Continuous Infusions:   furosemide (LASIX) 1 mg/mL infusion (non-titrating) 20 mg/hr (02/11/17 0251)    heparin (porcine) in 5 % dex 1,850 Units/hr (02/10/17 1827)     PRN Meds:acetaminophen, albuterol sulfate, bisacodyl, glucagon (human recombinant), hydrocodone-acetaminophen 5-325mg, magnesium citrate, ondansetron, oxycodone-acetaminophen, sodium chloride    Review of patient's allergies indicates:  No Known Allergies    OBJECTIVE:     Vital Signs (Most Recent)  Temp: 99.5 °F (37.5 °C) (02/11/17 0000)  Pulse: 80 (02/11/17 0500)  Resp: 18 (02/11/17 0000)  BP: 109/76 (02/11/17 0001)  SpO2: 98 % (02/11/17 0000)    Vital Signs Range (Last 24H):  Temp:  [97.5 °F (36.4 °C)-99.5 °F (37.5 °C)]   Pulse:  []   Resp:  [16-18]   BP: ()/(0-76)   SpO2:  [97 %-99 %]     I & O (Last 24H):    Intake/Output Summary (Last 24 hours) at 02/11/17 0518  Last data filed at 02/11/17 0357   Gross per 24 hour   Intake              840 ml   Output             4575 ml   Net            -3735 ml          Telemetry: sinus  Constitutional: AOx3, NAD conversant  Neck: No JVD present. No  thyromegaly present.   Cardiovascular: VAD hum  Pulmonary/Chest:good breath sounds bilaterally  Abdominal: + BS, exhibits no distension. There is no tenderness. There is no rebound.   Extremities: no cyanosis, clubbing or edema.  No bleeding, edema, erythema or hematoma, doppler pulses in all distals    Labs:       Recent Labs  Lab 02/09/17  0245 02/10/17  0446 02/11/17 0347   WBC 12.79* 12.59 13.95*   HGB 9.5* 9.1* 9.8*   HCT 26.9* 26.7* 28.9*    213 250   LYMPH 8.4*  1.1 13.3*  1.7 16.8*  2.3   MONO 11.3  1.5* 14.0  1.8* 11.5  1.6*   EOSINOPHIL 0.9 0.7 1.2         Recent Labs  Lab 02/09/17  0244  02/10/17  0446  02/10/17  1936 02/11/17  0112 02/11/17 0347   APTT 52.9*  < > 56.1*  < > 46.6* 48.7* 54.9*   INR 1.2  --  1.3*  --   --   --  1.3*   < > = values in this interval not displayed.       Recent Labs  Lab 02/07/17  0524  02/08/17  0435 02/08/17  1100 02/09/17  0245  02/10/17  0446 02/10/17  1337 02/10/17  1936 02/11/17  0347   *  --  103  --  110  --  95  --   --  105   CALCIUM 9.4  --  9.8  --  10.0  --  10.1  --   --  10.3   ALBUMIN 3.7  --  3.8  --   --   --  3.9  --   --   --    PROT 7.5  --  7.8  --   --   --  8.1  --   --   --    *  --  131*  --  129*  --  132*  --   --  134*   K 3.4*  3.4*  < > 3.1*  3.1* 3.9 2.8*  < > 3.1* 4.7 4.0 3.3*   CO2 31*  --  30*  --  32*  --  32*  --   --  33*   CL 89*  --  88*  --  84*  --  85*  --   --  88*   BUN 21*  --  21*  --  19  --  18  --   --  19   CREATININE 1.2  --  1.1  --  1.1  --  1.0  --   --  1.0   ALKPHOS 116  --  150*  --   --   --  169*  --   --   --    ALT 29  --  40  --   --   --  52*  --   --   --    AST 37  --  41*  --   --   --  36  --   --   --    BILITOT 0.6  --  0.6  --   --   --  0.7  --   --   --    MG 1.9  < > 2.2 2.3  --   --   --   --   --  2.0   PHOS 3.1  --  3.1  --  3.8  --  3.7  --   --  4.0   < > = values in this interval not displayed.  Estimated Creatinine Clearance: 90.2 mL/min (based on Cr of  1).      Recent Labs  Lab 02/10/17  0446   *         Recent Labs  Lab 02/05/17  0400 02/06/17  0400 02/07/17  0524 02/08/17  0435 02/09/17  0245 02/10/17  0446 02/11/17  0347   * 444* 457* 413* 427* 377* 343*       Microbiology Results (last 7 days)     ** No results found for the last 168 hours. **            ASSESSMENT:     25 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 He was admitted for PICC removal, IABP, and planned LVAD implantation. He is s/p HeartWare LVAD placement 2/1 and chest closure 2/2. Extubated 2/2 and now recovering in ICU.    PLAN:     Cardiogenic Shock due to Acute on Chronic Systolic HF, DCM, NYHA FC IV s/p HW LVAD 2/1/2017  -s/p HW LVAD 2/1 and chest closure/extubation 2/2  -CTS primary   -Currently on Lasix, Lasix decreased to 10 mg / hr this AM  -Does not have ICD- will need to discuss timing of this vs Lifevest  - Anticoagulation per CTS: on Heparin, continue Coumadin yesterday, monitor daily INRs  -cultures: NGTD    Atrial flutter  - No previous hx, spnotaneously resolved after 10 mins  -  stopped this AM  - Keep Mg > 2, K > 4  - Bedside echo performed which showed LVEDD of 6.5 cm  - Loaded with amiodarone   - Continue heparin gtt    HTN  -BP controlled    Back Pain  -prn meds on board; will adjust    Prophylaxis   -heparin

## 2017-02-12 PROBLEM — I50.43 ACUTE ON CHRONIC COMBINED SYSTOLIC AND DIASTOLIC CONGESTIVE HEART FAILURE, NYHA CLASS 4: Status: ACTIVE | Noted: 2017-02-12

## 2017-02-12 LAB
ANION GAP SERPL CALC-SCNC: 14 MMOL/L
APTT BLDCRRT: 44.6 SEC
APTT BLDCRRT: 52 SEC
APTT BLDCRRT: 70.8 SEC
BASOPHILS # BLD AUTO: 0.03 K/UL
BASOPHILS NFR BLD: 0.2 %
BILIRUB UR QL STRIP: NEGATIVE
BUN SERPL-MCNC: 20 MG/DL
CALCIUM SERPL-MCNC: 10.5 MG/DL
CHLORIDE SERPL-SCNC: 87 MMOL/L
CLARITY UR REFRACT.AUTO: CLEAR
CO2 SERPL-SCNC: 30 MMOL/L
COLOR UR AUTO: YELLOW
CREAT SERPL-MCNC: 1.1 MG/DL
DIFFERENTIAL METHOD: ABNORMAL
EOSINOPHIL # BLD AUTO: 0.2 K/UL
EOSINOPHIL NFR BLD: 1.1 %
ERYTHROCYTE [DISTWIDTH] IN BLOOD BY AUTOMATED COUNT: 13.4 %
EST. GFR  (AFRICAN AMERICAN): >60 ML/MIN/1.73 M^2
EST. GFR  (NON AFRICAN AMERICAN): >60 ML/MIN/1.73 M^2
FACT X PPP CHRO-ACNC: 0.33 IU/ML
GLUCOSE SERPL-MCNC: 98 MG/DL
GLUCOSE UR QL STRIP: NEGATIVE
HCT VFR BLD AUTO: 28.8 %
HGB BLD-MCNC: 9.7 G/DL
HGB UR QL STRIP: NEGATIVE
INR PPP: 1.6
KETONES UR QL STRIP: NEGATIVE
LDH SERPL L TO P-CCNC: 344 U/L
LEUKOCYTE ESTERASE UR QL STRIP: NEGATIVE
LYMPHOCYTES # BLD AUTO: 2.9 K/UL
LYMPHOCYTES NFR BLD: 18.6 %
MAGNESIUM SERPL-MCNC: 2.1 MG/DL
MCH RBC QN AUTO: 28.6 PG
MCHC RBC AUTO-ENTMCNC: 33.7 %
MCV RBC AUTO: 85 FL
MONOCYTES # BLD AUTO: 1.3 K/UL
MONOCYTES NFR BLD: 8 %
NEUTROPHILS # BLD AUTO: 11.2 K/UL
NEUTROPHILS NFR BLD: 71 %
NITRITE UR QL STRIP: NEGATIVE
PH UR STRIP: 7 [PH] (ref 5–8)
PHOSPHATE SERPL-MCNC: 4.5 MG/DL
PLATELET # BLD AUTO: 292 K/UL
PMV BLD AUTO: 9.6 FL
POTASSIUM SERPL-SCNC: 3.9 MMOL/L
POTASSIUM SERPL-SCNC: 4 MMOL/L
POTASSIUM SERPL-SCNC: 4.1 MMOL/L
POTASSIUM SERPL-SCNC: 4.7 MMOL/L
POTASSIUM SERPL-SCNC: 4.7 MMOL/L
PROT UR QL STRIP: NEGATIVE
PROTHROMBIN TIME: 15.8 SEC
RBC # BLD AUTO: 3.39 M/UL
SODIUM SERPL-SCNC: 131 MMOL/L
SP GR UR STRIP: 1.01 (ref 1–1.03)
URN SPEC COLLECT METH UR: NORMAL
UROBILINOGEN UR STRIP-ACNC: NEGATIVE EU/DL
WBC # BLD AUTO: 15.84 K/UL

## 2017-02-12 PROCEDURE — 85730 THROMBOPLASTIN TIME PARTIAL: CPT | Mod: 91

## 2017-02-12 PROCEDURE — 20600001 HC STEP DOWN PRIVATE ROOM

## 2017-02-12 PROCEDURE — 87086 URINE CULTURE/COLONY COUNT: CPT

## 2017-02-12 PROCEDURE — 84132 ASSAY OF SERUM POTASSIUM: CPT | Mod: 91

## 2017-02-12 PROCEDURE — 85520 HEPARIN ASSAY: CPT

## 2017-02-12 PROCEDURE — 80048 BASIC METABOLIC PNL TOTAL CA: CPT

## 2017-02-12 PROCEDURE — 97530 THERAPEUTIC ACTIVITIES: CPT

## 2017-02-12 PROCEDURE — 83735 ASSAY OF MAGNESIUM: CPT

## 2017-02-12 PROCEDURE — 84100 ASSAY OF PHOSPHORUS: CPT

## 2017-02-12 PROCEDURE — 99232 SBSQ HOSP IP/OBS MODERATE 35: CPT | Mod: ,,, | Performed by: INTERNAL MEDICINE

## 2017-02-12 PROCEDURE — 63600175 PHARM REV CODE 636 W HCPCS: Performed by: NURSE PRACTITIONER

## 2017-02-12 PROCEDURE — 87040 BLOOD CULTURE FOR BACTERIA: CPT | Mod: 59

## 2017-02-12 PROCEDURE — 25000003 PHARM REV CODE 250: Performed by: NURSE PRACTITIONER

## 2017-02-12 PROCEDURE — 36415 COLL VENOUS BLD VENIPUNCTURE: CPT

## 2017-02-12 PROCEDURE — 97110 THERAPEUTIC EXERCISES: CPT

## 2017-02-12 PROCEDURE — 85025 COMPLETE CBC W/AUTO DIFF WBC: CPT

## 2017-02-12 PROCEDURE — 25000003 PHARM REV CODE 250: Performed by: STUDENT IN AN ORGANIZED HEALTH CARE EDUCATION/TRAINING PROGRAM

## 2017-02-12 PROCEDURE — 27000248 HC VAD-ADDITIONAL DAY

## 2017-02-12 PROCEDURE — 84132 ASSAY OF SERUM POTASSIUM: CPT

## 2017-02-12 PROCEDURE — 25000003 PHARM REV CODE 250: Performed by: THORACIC SURGERY (CARDIOTHORACIC VASCULAR SURGERY)

## 2017-02-12 PROCEDURE — 83615 LACTATE (LD) (LDH) ENZYME: CPT

## 2017-02-12 PROCEDURE — 81003 URINALYSIS AUTO W/O SCOPE: CPT

## 2017-02-12 PROCEDURE — 85610 PROTHROMBIN TIME: CPT

## 2017-02-12 PROCEDURE — 97116 GAIT TRAINING THERAPY: CPT

## 2017-02-12 RX ADMIN — OXYCODONE AND ACETAMINOPHEN 1 TABLET: 10; 325 TABLET ORAL at 06:02

## 2017-02-12 RX ADMIN — MAGNESIUM OXIDE TAB 400 MG (241.3 MG ELEMENTAL MG) 400 MG: 400 (241.3 MG) TAB at 02:02

## 2017-02-12 RX ADMIN — FERROUS GLUCONATE TAB 324 MG (37.5 MG ELEMENTAL IRON) 324 MG: 324 (37.5 FE) TAB at 07:02

## 2017-02-12 RX ADMIN — HYDRALAZINE HYDROCHLORIDE 25 MG: 25 TABLET ORAL at 09:02

## 2017-02-12 RX ADMIN — AMIODARONE HYDROCHLORIDE 400 MG: 200 TABLET ORAL at 09:02

## 2017-02-12 RX ADMIN — PANTOPRAZOLE SODIUM 40 MG: 40 TABLET, DELAYED RELEASE ORAL at 09:02

## 2017-02-12 RX ADMIN — OXYCODONE AND ACETAMINOPHEN 1 TABLET: 10; 325 TABLET ORAL at 11:02

## 2017-02-12 RX ADMIN — POTASSIUM CHLORIDE 60 MEQ: 20 SOLUTION ORAL at 11:02

## 2017-02-12 RX ADMIN — HEPARIN SODIUM AND DEXTROSE 1700 UNITS/HR: 10000; 5 INJECTION INTRAVENOUS at 12:02

## 2017-02-12 RX ADMIN — WARFARIN SODIUM 7.5 MG: 7.5 TABLET ORAL at 05:02

## 2017-02-12 RX ADMIN — POTASSIUM CHLORIDE 60 MEQ: 20 SOLUTION ORAL at 05:02

## 2017-02-12 RX ADMIN — HEPARIN SODIUM AND DEXTROSE 1700 UNITS/HR: 10000; 5 INJECTION INTRAVENOUS at 11:02

## 2017-02-12 RX ADMIN — MAGNESIUM OXIDE TAB 400 MG (241.3 MG ELEMENTAL MG) 400 MG: 400 (241.3 MG) TAB at 05:02

## 2017-02-12 RX ADMIN — FUROSEMIDE 10 MG/HR: 10 INJECTION, SOLUTION INTRAMUSCULAR; INTRAVENOUS at 09:02

## 2017-02-12 RX ADMIN — HYDRALAZINE HYDROCHLORIDE 25 MG: 25 TABLET ORAL at 02:02

## 2017-02-12 RX ADMIN — HYDRALAZINE HYDROCHLORIDE 25 MG: 25 TABLET ORAL at 05:02

## 2017-02-12 RX ADMIN — MAGNESIUM OXIDE TAB 400 MG (241.3 MG ELEMENTAL MG) 400 MG: 400 (241.3 MG) TAB at 09:02

## 2017-02-12 RX ADMIN — ASPIRIN 325 MG: 325 TABLET, DELAYED RELEASE ORAL at 09:02

## 2017-02-12 RX ADMIN — POTASSIUM CHLORIDE 60 MEQ: 20 SOLUTION ORAL at 12:02

## 2017-02-12 RX ADMIN — AMLODIPINE BESYLATE 10 MG: 10 TABLET ORAL at 09:02

## 2017-02-12 NOTE — PLAN OF CARE
Problem: Physical Therapy Goal  Goal: Physical Therapy Goal  Goals to be met by: 3/1/17     Patient will increase functional independence with mobility by performin. Supine to sit with [place]   2. Sit to stand transfer with [place]  3. Gait x 400 feet with Supervision.   4. Ascend/descend 2 stair with bilateral Handrails Supervision.   5. Lower extremity exercise program x15 reps per handout, with assistance as needed.- met   6. [place] with LVAD management.    Pt continues to progress towards goals  JOSEPH Dutton

## 2017-02-12 NOTE — PLAN OF CARE
Problem: Physical Therapy Goal  Goal: Physical Therapy Goal  Goals to be met by: 3/1/17     Patient will increase functional independence with mobility by performin. Supine to sit with [place] met   2. Sit to stand transfer with [place]  3. Gait x 400 feet with Supervision.   4. Ascend/descend 2 stair with bilateral Handrails Supervision.   5. Lower extremity exercise program x15 reps per handout, with assistance as needed.- met   6. [place] with LVAD management.      Pt continues to progress towards goals  JOSEPH Dutton

## 2017-02-12 NOTE — PROGRESS NOTES
Daily E and M and VAD Interrogation Note    Reason for Visit:  Patient is seen in follow up for management of:  [] HeartMate II [x] Heartware [] Total artificial heart [] ECMO [] Other      Interval History:  [] Interval history unobtainable due to intubation. The [x] implant/[] explant date was 2/1/17      No new complaints or concerns this morning  Pain controlled.   Having bowel movements  VAD flows WNL  Working well with PT    Medications:  Current Facility-Administered Medications   Medication Dose Route Frequency Provider Last Rate Last Dose    acetaminophen tablet 650 mg  650 mg Oral Q6H PRN Urszula Tuttle NP   650 mg at 02/08/17 1522    albuterol sulfate nebulizer solution 2.5 mg  2.5 mg Nebulization Q4H PRN Urszula Tuttle NP        amiodarone tablet 400 mg  400 mg Oral BID Urszula Tuttle NP   400 mg at 02/12/17 0925    amlodipine tablet 10 mg  10 mg Oral Daily Lex Macedo MD   10 mg at 02/12/17 0925    aspirin EC tablet 325 mg  325 mg Oral Daily Urszula Tuttle NP   325 mg at 02/12/17 0925    bisacodyl suppository 10 mg  10 mg Rectal Daily PRN Urszula Tuttle NP        bisacodyl suppository 10 mg  10 mg Rectal Once Brayden Limon MD   Stopped at 02/04/17 1015    ferrous gluconate tablet 324 mg  324 mg Oral Daily with breakfast Ursuzla Tuttle NP   324 mg at 02/12/17 0745    furosemide (LASIX) 250 mg in sodium chloride 0.9 % 250 mL infusion (non-titrating)  10 mg/hr Intravenous Continuous Urszula Tuttle NP 10 mL/hr at 02/11/17 2122 10 mg/hr at 02/11/17 2122    glucagon (human recombinant) injection 1 mg  1 mg Intramuscular PRN Shai Ortega MD        heparin 25,000 units in dextrose 5% 250 mL (100 units/mL) infusion (heparin infusion)  1,700 Units/hr Intravenous Continuous Denis Aburto MD 17 mL/hr at 02/12/17 1212 1,700 Units/hr at 02/12/17 1212    hydrALAZINE tablet 25 mg  25 mg Oral Q8H Urszula Tuttle NP   25 mg at 02/12/17 0558     hydrocodone-acetaminophen 5-325mg per tablet 1 tablet  1 tablet Oral Q6H PRN Arnie Cid MD   1 tablet at 02/11/17 1209    magnesium citrate solution 296 mL  296 mL Oral Daily PRN Urszula Tuttle NP        magnesium oxide tablet 400 mg  400 mg Oral TID Denis Aburto MD   400 mg at 02/12/17 0559    ondansetron injection 8 mg  8 mg Intravenous Q8H PRN Denis Aburto MD   8 mg at 02/09/17 0426    oxycodone-acetaminophen  mg per tablet 1 tablet  1 tablet Oral Q6H PRN Arnie Cid MD   1 tablet at 02/12/17 0604    pantoprazole EC tablet 40 mg  40 mg Oral Daily Urszula Tuttle NP   40 mg at 02/12/17 0925    potassium chloride 10% solution 60 mEq  60 mEq Oral Q6H Urszula Tuttle NP   60 mEq at 02/12/17 1209    senna-docusate 8.6-50 mg per tablet 1 tablet  1 tablet Oral BID PRN Urszula Tuttle NP        sodium chloride 0.65 % nasal spray 1 spray  1 spray Each Nare PRN Denis Aburto MD        warfarin (COUMADIN) tablet 7.5 mg  7.5 mg Oral Daily Urszula Tuttle NP   7.5 mg at 02/11/17 1737       Physical Examination:  Vital Signs:   Vitals:    02/12/17 1145   BP: (!) 82/0   Pulse: 72   Resp: 16   Temp: 98.3 °F (36.8 °C)     Cardiovascular:  [x] Regular rate and rhythm [] Irregular   [x]  No edema   [x]  Clear to auscultation  VAD hum present   Skin:  Incision is [x]  Clean, dry and intact.    Sternum:  [x]  Stable   Driveline(s):   [x]  Clean, dry and intact.       X-Rays:  [x]  I reviewed today's Chest x-ray    Procedure:  Device Interrogation including analysis of device parameters.  Current Settings  [x]  Ventricular Assist Device    Review of device function is [x]  Stable   TXP LVAD INTERROGATIONS 2/12/2017 2/12/2017 2/12/2017 2/12/2017 2/11/2017 2/11/2017 2/11/2017   Type Heartware Heartware Heartware HeartMate II Heartware Heartware Heartware   Flow 5.7 5.0 5.3 5.8 5.8 5.6 6.4   Speed 2900 2900 2900 2900 2900 2900 2900   Power (G[employer]mith) 4.8 4.7 4.7 4.8 4.8  4.1 5.1   Low Flow Alarm - - - - - - -   High Power Alarm - - - - - - -   Pulsatility - - - - - - -       Assessment:  [x]  Primary Cardiomyopathy [x]  Congestive Heart Failure   []  Atrial Fibrillation []  Ventricular Tachycardia   []  Aftercare cardiac device [x]  Long term (current) use of anticoagulants   []  Ventilator-associated pneumonia []  Pneumonia viral, unspecified   []  Pneumonia, bacterial, unspecified []  Pneumonia, organism unspecified   []  Hemorrhage of GI tract, unspecified    []  Nosebleed              Plan:  [x]  Interval history obtained from ICU attending team member during rounding today  [x]  VAD/COURTNEY teaching performed with patient  [x]  Mobilization / Physical Therapy ongoing  [x]  Anticoagulation [x]  Ongoing []  Held      Total time spent was 31 minutes.  Of which more than 50 percent of the care dominated counseling and coordinating care with different team members. The VAD was interrogated and all parameters were WNL and no significant findings were found in the history. All these findings are documented in the note above.      Date of Service: 02/12/2017

## 2017-02-12 NOTE — PLAN OF CARE
"Problem: Patient Care Overview  Goal: Plan of Care Review  Dx: Heartware LVAD 2/1/17  Hx: Cardiomyopathy, Two brothers have had heart transplant, occasional smoker     1/27/17: IABP placed  2/1/17: LVAD, admitted to SICU, 1.5L albumin, chest remains open   2/2/17: IABP d/ced, chest closed, extubated  2/3/17: douglas d/c; OOBTC  2/4/17: epi off; chest tube #2 removed; OOBTC  2/5: Epi turned back on @ .04, Nitric increased to 10; OOBTC  2/6: OOBTC; chest tube removed      Nursing:   Goal MAP 60-80  pTT goal 40-50 (Dr. Macedo wants closer to "50")  K and Mag q6  Heartware speed at 2900     Outcome: Ongoing (interventions implemented as appropriate)  Patient progressing toward all goals. Plan of care discussed with patient, no questions at this time. Patient ambulating independently, fall precautions in place. VS & LVAD numbers WNL. Dressing change with soap and water per sterile technique by wife; Steile technique broken once; gloves changed; wife verbalized understanding; DLES 2; Will monitor.       "

## 2017-02-12 NOTE — PROGRESS NOTES
Progress Note  Heart Transplant Service    Admit Date: 1/27/2017   LOS: 16 days     SUBJECTIVE:     Follow up for: Chronic combined systolic and diastolic heart failure    Interval History: No further episode of A-flutter overnight.     Scheduled Meds:   amiodarone  400 mg Oral BID    amlodipine  10 mg Oral Daily    aspirin  325 mg Oral Daily    bisacodyl  10 mg Rectal Once    ferrous gluconate  324 mg Oral Daily with breakfast    hydrALAZINE  25 mg Oral Q8H    magnesium oxide  400 mg Oral TID    pantoprazole  40 mg Oral Daily    potassium chloride 10%  60 mEq Oral Q6H    warfarin  7.5 mg Oral Daily     Continuous Infusions:   furosemide (LASIX) 1 mg/mL infusion (non-titrating) 10 mg/hr (02/11/17 2122)    heparin (porcine) in 5 % dex 1,850 Units/hr (02/11/17 2128)     PRN Meds:acetaminophen, albuterol sulfate, bisacodyl, glucagon (human recombinant), hydrocodone-acetaminophen 5-325mg, magnesium citrate, ondansetron, oxycodone-acetaminophen, senna-docusate 8.6-50 mg, sodium chloride    Review of patient's allergies indicates:  No Known Allergies    OBJECTIVE:     Vital Signs (Most Recent)  Temp: 97.8 °F (36.6 °C) (02/12/17 0400)  Pulse: 75 (02/12/17 0700)  Resp: 16 (02/12/17 0400)  BP: (!) 78/0 (02/12/17 0405)  SpO2: (!) 93 % (02/12/17 0400)    Vital Signs Range (Last 24H):  Temp:  [97.8 °F (36.6 °C)-98.4 °F (36.9 °C)]   Pulse:  [73-95]   Resp:  [14-18]   BP: ()/(0-79)   SpO2:  [93 %-99 %]     I & O (Last 24H):    Intake/Output Summary (Last 24 hours) at 02/12/17 0902  Last data filed at 02/12/17 0600   Gross per 24 hour   Intake              960 ml   Output             2925 ml   Net            -1965 ml          Telemetry: sinus  Constitutional: AOx3, NAD conversant  Neck: No JVD present. No thyromegaly present.   Cardiovascular: VAD hum  Pulmonary/Chest:good breath sounds bilaterally  Abdominal: + BS, exhibits no distension. There is no tenderness. There is no rebound.   Extremities: no cyanosis,  clubbing or edema.  No bleeding, edema, erythema or hematoma, doppler pulses in all distals    Labs:       Recent Labs  Lab 02/10/17  0446 02/11/17  0347 02/12/17  0526   WBC 12.59 13.95* 15.84*   HGB 9.1* 9.8* 9.7*   HCT 26.7* 28.9* 28.8*    250 292   LYMPH 13.3*  1.7 16.8*  2.3 18.6  2.9   MONO 14.0  1.8* 11.5  1.6* 8.0  1.3*   EOSINOPHIL 0.7 1.2 1.1         Recent Labs  Lab 02/10/17  0446  02/11/17  0347 02/11/17  2014 02/12/17  0526   APTT 56.1*  < > 54.9* 55.9* 70.8*   INR 1.3*  --  1.3*  --  1.6*   < > = values in this interval not displayed.       Recent Labs  Lab 02/07/17  0524  02/08/17  0435 02/08/17  1100  02/10/17  0446  02/11/17  0347  02/11/17  1922 02/12/17 0005 02/12/17 0526   *  --  103  --   < > 95  --  105  --   --   --  98   CALCIUM 9.4  --  9.8  --   < > 10.1  --  10.3  --   --   --  10.5   ALBUMIN 3.7  --  3.8  --   --  3.9  --   --   --   --   --   --    PROT 7.5  --  7.8  --   --  8.1  --   --   --   --   --   --    *  --  131*  --   < > 132*  --  134*  --   --   --  131*   K 3.4*  3.4*  < > 3.1*  3.1* 3.9  < > 3.1*  < > 3.3*  < > 5.1 4.7 3.9  4.0   CO2 31*  --  30*  --   < > 32*  --  33*  --   --   --  30*   CL 89*  --  88*  --   < > 85*  --  88*  --   --   --  87*   BUN 21*  --  21*  --   < > 18  --  19  --   --   --  20   CREATININE 1.2  --  1.1  --   < > 1.0  --  1.0  --   --   --  1.1   ALKPHOS 116  --  150*  --   --  169*  --   --   --   --   --   --    ALT 29  --  40  --   --  52*  --   --   --   --   --   --    AST 37  --  41*  --   --  36  --   --   --   --   --   --    BILITOT 0.6  --  0.6  --   --  0.7  --   --   --   --   --   --    MG 1.9  < > 2.2 2.3  --   --   --  2.0  --   --   --  2.1   PHOS 3.1  --  3.1  --   < > 3.7  --  4.0  --   --   --  4.5   < > = values in this interval not displayed.  Estimated Creatinine Clearance: 84.5 mL/min (based on Cr of 1.1).      Recent Labs  Lab 02/10/17  3686   *         Recent Labs  Lab 02/06/17  2355  02/07/17  0524 02/08/17  0435 02/09/17  0245 02/10/17  0446 02/11/17  0347 02/12/17  0526   * 457* 413* 427* 377* 343* 344*       Microbiology Results (last 7 days)     Procedure Component Value Units Date/Time    Blood culture [[id number]]     Order Status:  Sent Specimen:  Blood     Blood culture [[id number]]     Order Status:  Sent Specimen:  Blood     Urine culture [[id number]]     Order Status:  No result Specimen:  Urine             ASSESSMENT:     25 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 He was admitted for PICC removal, IABP, and planned LVAD implantation. He is s/p HeartWare LVAD placement 2/1 and chest closure 2/2. Extubated 2/2 and now recovering in ICU.    PLAN:     Cardiogenic Shock due to Acute on Chronic Systolic HF, DCM, NYHA FC IV s/p HW LVAD 2/1/2017  -s/p HW LVAD 2/1 and chest closure/extubation 2/2  -CTS primary   -Currently on Lasix, Lasix decreased to 10 mg / hr this AM  -Does not have ICD- will need to discuss timing of this vs Lifevest  - Anticoagulation per CTS: on Heparin, continue Coumadin yesterday, monitor daily INRs  -cultures: NGTD    Elevated WBC  - Infectious w/u  - BC x 2, UA, urine culture, CT chest / abdo / pelvis, CXR    Atrial flutter  - No previous hx, spnotaneously resolved after 10 mins  -  stopped this AM  - Keep Mg > 2, K > 4  - Bedside echo performed which showed LVEDD of 6.5 cm  - Loaded with IV amiodarone and started on PO amiodarone yesterday  - Continue heparin gtt    HTN  -BP controlled    Back Pain  -prn meds on board; will adjust    Prophylaxis   -heparin

## 2017-02-12 NOTE — PLAN OF CARE
"Problem: Patient Care Overview  Goal: Plan of Care Review  Dx: Heartware LVAD 2/1/17  Hx: Cardiomyopathy, Two brothers have had heart transplant, occasional smoker     1/27/17: IABP placed  2/1/17: LVAD, admitted to SICU, 1.5L albumin, chest remains open   2/2/17: IABP d/ced, chest closed, extubated  2/3/17: douglas d/c; OOBTC  2/4/17: epi off; chest tube #2 removed; OOBTC  2/5: Epi turned back on @ .04, Nitric increased to 10; OOBTC  2/6: OOBTC; chest tube removed      Nursing:   Goal MAP 60-80  pTT goal 40-50 (Dr. Macedo wants closer to "50")  K and Mag q6  Heartware speed at 2900     Outcome:  Error Date Met:  02/12/17  Patient remains free of falls or injury. Patient denies pain. Continued on heparin and lasix drips. Wife learning dressing change, but not checked off at this time. Patient and wife working on workbook and need to be checked off on alarms. Monitor potassium q6 hours. Plan of care reviewed with patient and wife.       "

## 2017-02-12 NOTE — PT/OT/SLP PROGRESS
"Physical Therapy  Treatment    Mert Samayoa   MRN: [id number]   Admitting Diagnosis: Chronic combined systolic and diastolic heart failure    PT Received On: 17  PT Start Time: 1018     PT Stop Time: 1042    PT Total Time (min): 24 min       Billable Minutes:  Gait Training8, therapeutic activity8 and Therapeutic Exercise 8    Treatment Type: Treatment  PT/PTA: PTA     PTA Visit Number: 1       General Precautions: Standard, LVAD, sternal, fall    Do you have any cultural, spiritual, Moravian conflicts, given your current situation?: none     Subjective:  Communicated with RN prior to session.  "i was just about to eat breakfast"    Pain Ratin/10     Objective:   Patient found with: telemetry, peripheral IV, central line    Functional Mobility:  Bed Mobility:   Rolling/Turning Right: Stand by assistance  Supine to Sit: Stand by Assistance    Transfers:  Sit <> Stand Assistance: Stand By Assistance  Sit <> Stand Assistive Device: No Assistive Device    Gait:   Gait Distance: 700 ft 3 LOB 2nd to slippers catching, self corrected  Assistance 1: Stand by Assistance  Gait Assistive Device: No device  Gait Pattern: 2-point gait  Gait Deviation(s): decreased zarina, decreased step length, decreased stride length    Balance:   Static Sit: FAIR+: Able to take MINIMAL challenges from all directions  Dynamic Sit: FAIR+: Maintains balance through MINIMAL excursions of active trunk motion  Static Stand: FAIR+: Takes MINIMAL challenges from all directions  Dynamic stand: FAIR+: Needs CLOSE SUPERVISION during gait and is able to right self with minor LOB     Therapeutic Activities and Exercises:  Pt performed LVAD battery replacement independently and was able to don LVAD harness with SBA.   Standing (holding bedrail) Bilateral- mini squats, Hip AB, hip Ext x15 w/ SBA  Sternal precautions were reviewed with pt and reinforced during functional mobility.    AM-PAC 6 CLICK MOBILITY  How much help from another person " does this patient currently need?   1 = Unable, Total/Dependent Assistance  2 = A lot, Maximum/Moderate Assistance  3 = A little, Minimum/Contact Guard/Supervision  4 = None, Modified [place]/Independent    Turning over in bed (including adjusting bedclothes, sheets and blankets)?: 4  Sitting down on and standing up from a chair with arms (e.g., wheelchair, bedside commode, etc.): 3  Moving from lying on back to sitting on the side of the bed?: 3  Moving to and from a bed to a chair (including a wheelchair)?: 3  Need to walk in hospital room?: 3  Climbing 3-5 steps with a railing?: 3  Total Score: 19    AM-PAC Raw Score CMS G-Code Modifier Level of Impairment Assistance   6 % Total / Unable   7 - 9 CM 80 - 100% Maximal Assist   10 - 14 CL 60 - 80% Moderate Assist   15 - 19 CK 40 - 60% Moderate Assist   20 - 22 CJ 20 - 40% Minimal Assist   23 CI 1-20% SBA / CGA   24 CH 0% Independent/ Mod I     Patient left seated EOB with call button in reach and wife present.    Assessment:  Mert Samayoa is a 27 y.o. male with a medical diagnosis of Chronic combined systolic and diastolic heart failure and presents with problems listed below. Pt continues to progress and is self motivated, stated he has personal goals and wants to achieve them. Pt endurance improving and functional mobility improving. Pt adhering to sternal precautions independently.     Rehab identified problem list/impairments: weakness, impaired endurance, gait instability, impaired functional mobilty, impaired balance    Rehab potential is good.    Activity tolerance: Good    Discharge recommendations: Discharge Facility/Level Of Care Needs: home health PT     Barriers to discharge: Barriers to Discharge: Inaccessible home environment    Equipment recommendations: Equipment Needed After Discharge: none     GOALS:   Physical Therapy Goals        Problem: Physical Therapy Goal    Goal Priority Disciplines Outcome Goal Variances Interventions    Physical Therapy Goal     PT/OT, PT Ongoing (interventions implemented as appropriate)     Description:  Goals to be met by: 3/1/17     Patient will increase functional independence with mobility by performin. Supine to sit with [place]   2. Sit to stand transfer with [place]  3. Gait  x 400 feet with Supervision.   4. Ascend/descend 2 stair with bilateral Handrails Supervision.   5. Lower extremity exercise program x15 reps per handout, with assistance as needed.- met   6. [place] with LVAD management.                   PLAN:    Patient to be seen 6 x/week  to address the above listed problems via gait training, therapeutic activities, therapeutic exercises  Plan of Care expires: 17  Plan of Care reviewed with: patient, spouse         Froy Limon, JOSEPH  2017     I certify that I was present in the room directing the student in service delivery and guiding them using my skilled judgment. As the co-signing therapist I have reviewed the students documentation and am responsible for the treatment, assessment, and plan.     Lori Gayle, PTA

## 2017-02-13 LAB
ALBUMIN SERPL BCP-MCNC: 4.1 G/DL
ALP SERPL-CCNC: 193 U/L
ALT SERPL W/O P-5'-P-CCNC: 57 U/L
ANION GAP SERPL CALC-SCNC: 17 MMOL/L
APTT BLDCRRT: 35.8 SEC
APTT BLDCRRT: 37.8 SEC
APTT BLDCRRT: 59.2 SEC
APTT BLDCRRT: 61.7 SEC
AST SERPL-CCNC: 32 U/L
BACTERIA UR CULT: NO GROWTH
BASOPHILS # BLD AUTO: 0.04 K/UL
BASOPHILS NFR BLD: 0.3 %
BILIRUB DIRECT SERPL-MCNC: 0.3 MG/DL
BILIRUB SERPL-MCNC: 0.6 MG/DL
BNP SERPL-MCNC: 178 PG/ML
BUN SERPL-MCNC: 18 MG/DL
CALCIUM SERPL-MCNC: 10.2 MG/DL
CHLORIDE SERPL-SCNC: 89 MMOL/L
CO2 SERPL-SCNC: 29 MMOL/L
CREAT SERPL-MCNC: 1 MG/DL
CRP SERPL-MCNC: 109.7 MG/L
DIFFERENTIAL METHOD: ABNORMAL
EOSINOPHIL # BLD AUTO: 0.2 K/UL
EOSINOPHIL NFR BLD: 1.5 %
ERYTHROCYTE [DISTWIDTH] IN BLOOD BY AUTOMATED COUNT: 13.5 %
EST. GFR  (AFRICAN AMERICAN): >60 ML/MIN/1.73 M^2
EST. GFR  (NON AFRICAN AMERICAN): >60 ML/MIN/1.73 M^2
GLUCOSE SERPL-MCNC: 97 MG/DL
HCT VFR BLD AUTO: 28.7 %
HGB BLD-MCNC: 9.7 G/DL
INR PPP: 1.8
LDH SERPL L TO P-CCNC: 309 U/L
LYMPHOCYTES # BLD AUTO: 2.9 K/UL
LYMPHOCYTES NFR BLD: 18.6 %
MAGNESIUM SERPL-MCNC: 2 MG/DL
MCH RBC QN AUTO: 28.4 PG
MCHC RBC AUTO-ENTMCNC: 33.8 %
MCV RBC AUTO: 84 FL
MONOCYTES # BLD AUTO: 1.5 K/UL
MONOCYTES NFR BLD: 9.9 %
NEUTROPHILS # BLD AUTO: 10.6 K/UL
NEUTROPHILS NFR BLD: 68.3 %
PHOSPHATE SERPL-MCNC: 4.8 MG/DL
PLATELET # BLD AUTO: 304 K/UL
PMV BLD AUTO: 9.1 FL
POTASSIUM SERPL-SCNC: 3.9 MMOL/L
POTASSIUM SERPL-SCNC: 3.9 MMOL/L
POTASSIUM SERPL-SCNC: 4.3 MMOL/L
POTASSIUM SERPL-SCNC: 5.2 MMOL/L
PROT SERPL-MCNC: 8.2 G/DL
PROTHROMBIN TIME: 18.4 SEC
RBC # BLD AUTO: 3.42 M/UL
SODIUM SERPL-SCNC: 135 MMOL/L
WBC # BLD AUTO: 15.5 K/UL

## 2017-02-13 PROCEDURE — 27000248 HC VAD-ADDITIONAL DAY

## 2017-02-13 PROCEDURE — 63600175 PHARM REV CODE 636 W HCPCS: Performed by: NURSE PRACTITIONER

## 2017-02-13 PROCEDURE — 25000003 PHARM REV CODE 250: Performed by: STUDENT IN AN ORGANIZED HEALTH CARE EDUCATION/TRAINING PROGRAM

## 2017-02-13 PROCEDURE — 97530 THERAPEUTIC ACTIVITIES: CPT

## 2017-02-13 PROCEDURE — 85610 PROTHROMBIN TIME: CPT

## 2017-02-13 PROCEDURE — 99232 SBSQ HOSP IP/OBS MODERATE 35: CPT | Mod: ,,, | Performed by: INTERNAL MEDICINE

## 2017-02-13 PROCEDURE — 85730 THROMBOPLASTIN TIME PARTIAL: CPT

## 2017-02-13 PROCEDURE — 80076 HEPATIC FUNCTION PANEL: CPT

## 2017-02-13 PROCEDURE — 20600001 HC STEP DOWN PRIVATE ROOM

## 2017-02-13 PROCEDURE — 97116 GAIT TRAINING THERAPY: CPT

## 2017-02-13 PROCEDURE — 85025 COMPLETE CBC W/AUTO DIFF WBC: CPT

## 2017-02-13 PROCEDURE — 84132 ASSAY OF SERUM POTASSIUM: CPT

## 2017-02-13 PROCEDURE — 84132 ASSAY OF SERUM POTASSIUM: CPT | Mod: 91

## 2017-02-13 PROCEDURE — 80048 BASIC METABOLIC PNL TOTAL CA: CPT

## 2017-02-13 PROCEDURE — 85730 THROMBOPLASTIN TIME PARTIAL: CPT | Mod: 91

## 2017-02-13 PROCEDURE — 83615 LACTATE (LD) (LDH) ENZYME: CPT

## 2017-02-13 PROCEDURE — 83735 ASSAY OF MAGNESIUM: CPT

## 2017-02-13 PROCEDURE — 86140 C-REACTIVE PROTEIN: CPT

## 2017-02-13 PROCEDURE — 25000003 PHARM REV CODE 250: Performed by: THORACIC SURGERY (CARDIOTHORACIC VASCULAR SURGERY)

## 2017-02-13 PROCEDURE — 25000003 PHARM REV CODE 250: Performed by: NURSE PRACTITIONER

## 2017-02-13 PROCEDURE — 84100 ASSAY OF PHOSPHORUS: CPT

## 2017-02-13 PROCEDURE — 83880 ASSAY OF NATRIURETIC PEPTIDE: CPT

## 2017-02-13 RX ORDER — FUROSEMIDE 10 MG/ML
40 INJECTION INTRAMUSCULAR; INTRAVENOUS 3 TIMES DAILY
Status: DISCONTINUED | OUTPATIENT
Start: 2017-02-13 | End: 2017-02-15

## 2017-02-13 RX ORDER — POLYETHYLENE GLYCOL 3350 17 G/17G
17 POWDER, FOR SOLUTION ORAL 2 TIMES DAILY
Status: DISCONTINUED | OUTPATIENT
Start: 2017-02-13 | End: 2017-02-21 | Stop reason: HOSPADM

## 2017-02-13 RX ADMIN — MAGNESIUM OXIDE TAB 400 MG (241.3 MG ELEMENTAL MG) 400 MG: 400 (241.3 MG) TAB at 09:02

## 2017-02-13 RX ADMIN — FUROSEMIDE 40 MG: 10 INJECTION, SOLUTION INTRAMUSCULAR; INTRAVENOUS at 01:02

## 2017-02-13 RX ADMIN — FUROSEMIDE 40 MG: 10 INJECTION, SOLUTION INTRAMUSCULAR; INTRAVENOUS at 09:02

## 2017-02-13 RX ADMIN — OXYCODONE AND ACETAMINOPHEN 1 TABLET: 10; 325 TABLET ORAL at 09:02

## 2017-02-13 RX ADMIN — POTASSIUM CHLORIDE 60 MEQ: 20 SOLUTION ORAL at 12:02

## 2017-02-13 RX ADMIN — POTASSIUM CHLORIDE 60 MEQ: 20 SOLUTION ORAL at 05:02

## 2017-02-13 RX ADMIN — MAGNESIUM OXIDE TAB 400 MG (241.3 MG ELEMENTAL MG) 400 MG: 400 (241.3 MG) TAB at 01:02

## 2017-02-13 RX ADMIN — HYDRALAZINE HYDROCHLORIDE 25 MG: 25 TABLET ORAL at 09:02

## 2017-02-13 RX ADMIN — FERROUS GLUCONATE TAB 324 MG (37.5 MG ELEMENTAL IRON) 324 MG: 324 (37.5 FE) TAB at 08:02

## 2017-02-13 RX ADMIN — AMLODIPINE BESYLATE 10 MG: 10 TABLET ORAL at 08:02

## 2017-02-13 RX ADMIN — MAGNESIUM OXIDE TAB 400 MG (241.3 MG ELEMENTAL MG) 400 MG: 400 (241.3 MG) TAB at 05:02

## 2017-02-13 RX ADMIN — HEPARIN SODIUM AND DEXTROSE 1500 UNITS/HR: 10000; 5 INJECTION INTRAVENOUS at 02:02

## 2017-02-13 RX ADMIN — AMIODARONE HYDROCHLORIDE 400 MG: 200 TABLET ORAL at 08:02

## 2017-02-13 RX ADMIN — WARFARIN SODIUM 7.5 MG: 7.5 TABLET ORAL at 04:02

## 2017-02-13 RX ADMIN — POTASSIUM CHLORIDE 60 MEQ: 20 SOLUTION ORAL at 04:02

## 2017-02-13 RX ADMIN — HYDRALAZINE HYDROCHLORIDE 25 MG: 25 TABLET ORAL at 01:02

## 2017-02-13 RX ADMIN — HYDRALAZINE HYDROCHLORIDE 25 MG: 25 TABLET ORAL at 05:02

## 2017-02-13 RX ADMIN — AMIODARONE HYDROCHLORIDE 400 MG: 200 TABLET ORAL at 09:02

## 2017-02-13 RX ADMIN — ASPIRIN 325 MG: 325 TABLET, DELAYED RELEASE ORAL at 08:02

## 2017-02-13 RX ADMIN — PANTOPRAZOLE SODIUM 40 MG: 40 TABLET, DELAYED RELEASE ORAL at 08:02

## 2017-02-13 NOTE — PROGRESS NOTES
Daily E and M and VAD Interrogation Note    Reason for Visit:  Patient is seen in follow up for management of:  [] HeartMate II [x] Heartware [] Total artificial heart [] ECMO [] Other      Interval History:  [] Interval history unobtainable due to intubation. The [x] implant/[] explant date was 2/1/17      No new complaints or concerns this morning  Pain controlled.   Having bowel movements  VAD flows WNL  Working well with PT    Medications:  Current Facility-Administered Medications   Medication Dose Route Frequency Provider Last Rate Last Dose    acetaminophen tablet 650 mg  650 mg Oral Q6H PRN Urszula Tuttle NP   650 mg at 02/08/17 1522    albuterol sulfate nebulizer solution 2.5 mg  2.5 mg Nebulization Q4H PRN Urszula Tuttle NP        amiodarone tablet 400 mg  400 mg Oral BID Urszula Tuttle NP   400 mg at 02/13/17 0805    amlodipine tablet 10 mg  10 mg Oral Daily Lex Macedo MD   10 mg at 02/13/17 0804    aspirin EC tablet 325 mg  325 mg Oral Daily Urszula Tuttle NP   325 mg at 02/13/17 0805    bisacodyl suppository 10 mg  10 mg Rectal Daily PRN Urszula Tuttle NP        ferrous gluconate tablet 324 mg  324 mg Oral Daily with breakfast Urszula Tuttle NP   324 mg at 02/13/17 0804    furosemide injection 40 mg  40 mg Intravenous TID Toshia Navarro NP        glucagon (human recombinant) injection 1 mg  1 mg Intramuscular PRN Shai Ortega MD        heparin 25,000 units in dextrose 5% 250 mL (100 units/mL) infusion (heparin infusion)  1,400 Units/hr Intravenous Continuous Toshia Navarro NP 14 mL/hr at 02/13/17 0811 1,400 Units/hr at 02/13/17 0811    hydrALAZINE tablet 25 mg  25 mg Oral Q8H Urszula Tuttle NP   25 mg at 02/13/17 0534    hydrocodone-acetaminophen 5-325mg per tablet 1 tablet  1 tablet Oral Q6H PRN Arnie Cid MD   1 tablet at 02/11/17 1209    magnesium citrate solution 296 mL  296 mL Oral Daily PRN Urszula Tuttle NP         magnesium oxide tablet 400 mg  400 mg Oral TID Denis Aburto MD   400 mg at 02/13/17 0534    ondansetron injection 8 mg  8 mg Intravenous Q8H PRN Denis Aburto MD   8 mg at 02/09/17 0426    oxycodone-acetaminophen  mg per tablet 1 tablet  1 tablet Oral Q6H PRN Arnie Cid MD   1 tablet at 02/12/17 2341    pantoprazole EC tablet 40 mg  40 mg Oral Daily Urszula Tuttle NP   40 mg at 02/13/17 0804    polyethylene glycol packet 17 g  17 g Oral BID Toshia Navarro NP   17 g at 02/13/17 1015    potassium chloride 10% solution 60 mEq  60 mEq Oral Q6H Urszula Tuttle NP   60 mEq at 02/13/17 0534    senna-docusate 8.6-50 mg per tablet 1 tablet  1 tablet Oral BID PRN Urszula Tuttle NP        sodium chloride 0.65 % nasal spray 1 spray  1 spray Each Nare PRN Denis Aburto MD        warfarin (COUMADIN) tablet 7.5 mg  7.5 mg Oral Daily Urszula Tuttle NP   7.5 mg at 02/12/17 1735       Physical Examination:  Vital Signs:   Vitals:    02/13/17 1100   BP:    Pulse: 81   Resp:    Temp:      Cardiovascular:  [x] Regular rate and rhythm [] Irregular   [x]  No edema   [x]  Clear to auscultation  VAD hum present   Skin:  Incision is [x]  Clean, dry and intact.    Sternum:  [x]  Stable   Driveline(s):   [x]  Clean, dry and intact.       X-Rays:  [x]  I reviewed today's Chest x-ray    Procedure:  Device Interrogation including analysis of device parameters.  Current Settings  [x]  Ventricular Assist Device    Review of device function is [x]  Stable   TXP LVAD INTERROGATIONS 2/13/2017 2/13/2017 2/13/2017 2/12/2017 2/12/2017 2/12/2017 2/12/2017   Type Heartware Heartware Heartware Heartware Heartware Heartware Heartware   Flow 5.8 5.3 6.0 6.1 5.3 5.7 5.0   Speed 2900 2900 2900 2900 2900 2900 2900   Power ([employer]mith) 4.9 4.7 5.0 5.0 4.8 4.8 4.7   Low Flow Alarm - - - - - - -   High Power Alarm - - - - - - -   Pulsatility - - - - - - -       Assessment:  [x]  Primary Cardiomyopathy [x]   Congestive Heart Failure   []  Atrial Fibrillation []  Ventricular Tachycardia   []  Aftercare cardiac device [x]  Long term (current) use of anticoagulants   []  Ventilator-associated pneumonia []  Pneumonia viral, unspecified   []  Pneumonia, bacterial, unspecified []  Pneumonia, organism unspecified   []  Hemorrhage of GI tract, unspecified    []  Nosebleed              Plan:  [x]  Interval history obtained from ICU attending team member during rounding today  [x]  VAD/COURTNEY teaching performed with patient  [x]  Mobilization / Physical Therapy ongoing  [x]  Anticoagulation [x]  Ongoing []  Held  -Reviewed CT and no surgical intervention  -Transfer care to [facility] today   -added on miralax  -D/c lasix gtt   -lasix 40 TID    Total time spent was 33 minutes.  Of which more than 50 percent of the care dominated counseling and coordinating care with different team members. The VAD was interrogated and all parameters were WNL and no significant findings were found in the history. All these findings are documented in the note above.      Date of Service: 02/13/2017

## 2017-02-13 NOTE — PROGRESS NOTES
Progress Note  Heart Transplant Service    Admit Date: 1/27/2017   LOS: 17 days     SUBJECTIVE:     Follow up for: Chronic combined systolic and diastolic heart failure    Interval History: No further episode of A-flutter overnight.     Scheduled Meds:   amiodarone  400 mg Oral BID    amlodipine  10 mg Oral Daily    aspirin  325 mg Oral Daily    ferrous gluconate  324 mg Oral Daily with breakfast    furosemide  40 mg Intravenous TID    hydrALAZINE  25 mg Oral Q8H    magnesium oxide  400 mg Oral TID    pantoprazole  40 mg Oral Daily    polyethylene glycol  17 g Oral BID    potassium chloride 10%  60 mEq Oral Q6H    warfarin  7.5 mg Oral Daily     Continuous Infusions:   heparin (porcine) in 5 % dex 1,400 Units/hr (02/13/17 0811)     PRN Meds:acetaminophen, albuterol sulfate, bisacodyl, glucagon (human recombinant), hydrocodone-acetaminophen 5-325mg, magnesium citrate, ondansetron, oxycodone-acetaminophen, senna-docusate 8.6-50 mg, sodium chloride    Review of patient's allergies indicates:  No Known Allergies    OBJECTIVE:     Vital Signs (Most Recent)  Temp: 98.2 °F (36.8 °C) (02/13/17 1150)  Pulse: 74 (02/13/17 1300)  Resp: 18 (02/13/17 1150)  BP: (!) 78/0 (02/13/17 1150)  SpO2: 99 % (02/13/17 1150)    Vital Signs Range (Last 24H):  Temp:  [97.7 °F (36.5 °C)-98.4 °F (36.9 °C)]   Pulse:  [68-93]   Resp:  [14-18]   BP: ()/(0-71)   SpO2:  [96 %-99 %]     I & O (Last 24H):    Intake/Output Summary (Last 24 hours) at 02/13/17 1403  Last data filed at 02/13/17 0500   Gross per 24 hour   Intake              300 ml   Output             1950 ml   Net            -1650 ml          Telemetry: sinus  Constitutional: AOx3, NAD conversant  Neck: No JVD present. No thyromegaly present.   Cardiovascular: VAD hum  Pulmonary/Chest:good breath sounds bilaterally  Abdominal: + BS, exhibits no distension. There is no tenderness. There is no rebound.   Extremities: no cyanosis, clubbing or edema.  No bleeding, edema,  erythema or hematoma, doppler pulses in all distals    Labs:       Recent Labs  Lab 02/11/17 0347 02/12/17  0526 02/13/17  0552   WBC 13.95* 15.84* 15.50*   HGB 9.8* 9.7* 9.7*   HCT 28.9* 28.8* 28.7*    292 304   LYMPH 16.8*  2.3 18.6  2.9 18.6  2.9   MONO 11.5  1.6* 8.0  1.3* 9.9  1.5*   EOSINOPHIL 1.2 1.1 1.5         Recent Labs  Lab 02/11/17  0347 02/12/17  0526  02/12/17  2346 02/13/17  0552 02/13/17  1339   APTT 54.9*  < > 70.8*  < > 59.2* 61.7* 35.8*   INR 1.3*  --  1.6*  --   --  1.8*  --    < > = values in this interval not displayed.       Recent Labs  Lab 02/08/17  0435  02/10/17  0446  02/11/17 0347 02/12/17  0526  02/12/17  1849 02/12/17  2346 02/13/17  0552     < > 95  --  105  --  98  --   --   --  97   CALCIUM 9.8  < > 10.1  --  10.3  --  10.5  --   --   --  10.2   ALBUMIN 3.8  --  3.9  --   --   --   --   --   --   --  4.1   PROT 7.8  --  8.1  --   --   --   --   --   --   --  8.2   *  < > 132*  --  134*  --  131*  --   --   --  135*   K 3.1*  3.1*  < > 3.1*  < > 3.3*  < > 3.9  4.0  < > 4.7 4.3 3.9  3.9   CO2 30*  < > 32*  --  33*  --  30*  --   --   --  29   CL 88*  < > 85*  --  88*  --  87*  --   --   --  89*   BUN 21*  < > 18  --  19  --  20  --   --   --  18   CREATININE 1.1  < > 1.0  --  1.0  --  1.1  --   --   --  1.0   ALKPHOS 150*  --  169*  --   --   --   --   --   --   --  193*   ALT 40  --  52*  --   --   --   --   --   --   --  57*   AST 41*  --  36  --   --   --   --   --   --   --  32   BILITOT 0.6  --  0.7  --   --   --   --   --   --   --  0.6   MG 2.2  < >  --   --  2.0  --  2.1  --   --   --  2.0   PHOS 3.1  < > 3.7  --  4.0  --  4.5  --   --   --  4.8*   < > = values in this interval not displayed.  Estimated Creatinine Clearance: 91.8 mL/min (based on Cr of 1).      Recent Labs  Lab 02/13/17  0552   *         Recent Labs  Lab 02/07/17  0524 02/08/17  0435 02/09/17  0245 02/10/17  0446 02/11/17  0347 02/12/17  0526 02/13/17  0552   *  413* 427* 377* 343* 344* 309*       Microbiology Results (last 7 days)     Procedure Component Value Units Date/Time    Blood culture [[id number]] Collected:  02/12/17 0950    Order Status:  Completed Specimen:  Blood Updated:  02/12/17 2145     Blood Culture, Routine No Growth to date    Blood culture [[id number]] Collected:  02/12/17 1311    Order Status:  Completed Specimen:  Blood Updated:  02/12/17 2115     Blood Culture, Routine No Growth to date    Narrative:       Collection has been rescheduled by SMW at 2/12/2017 09:50 Reason:   unable to collect 2nd set   Collection has been rescheduled by SMW at 2/12/2017 09:50 Reason:   unable to collect 2nd set     Urine culture [[id number]] Collected:  02/12/17 1137    Order Status:  Sent Specimen:  Urine from Urine, Clean Catch Updated:  02/12/17 1717            ASSESSMENT:     25 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 He was admitted for PICC removal, IABP, and planned LVAD implantation. He is s/p HeartWare LVAD placement 2/1 and chest closure 2/2. Extubated 2/2 and now recovering in ICU.    PLAN:     Cardiogenic Shock due to Acute on Chronic Systolic HF, DCM, NYHA FC IV s/p HW LVAD 2/1/2017  -s/p HW LVAD 2/1 and chest closure/extubation 2/2   -Currently on Lasix, Lasix decreased to 10 mg / hr this AM  -Does not have ICD- will need to discuss timing of this vs Lifevest  - Anticoagulation per CTS: on Heparin, continue Coumadin, INR remains subtherapeutic, monitor daily INRs  -cultures: NGTD    Elevated WBC  - Infectious w/u  - BC x 2, UA, urine culture, CT chest / abdo / pelvis, post operative changes noted but no clear sign of infectious process thus far    Atrial flutter  - No  previous hx, spnotaneously resolved after 10 mins  -  stopped  - Keep Mg > 2, K > 4  - Bedside echo performed 2/11 which showed LVEDD of 6.5 cm  - Loaded with IV amiodarone and started on PO amiodarone now  - Continue heparin gtt    HTN  -BP controlled    Back Pain  -prn meds on board; will adjust    Prophylaxis   -heparin    Dispo: possible discharge later this week    Zhang Spears DO  Cardiology Fellow  Pager [phone]

## 2017-02-13 NOTE — PLAN OF CARE
"Problem: Patient Care Overview  Goal: Plan of Care Review  Dx: Heartware LVAD 2/1/17  Hx: Cardiomyopathy, Two brothers have had heart transplant, occasional smoker     1/27/17: IABP placed  2/1/17: LVAD, admitted to SICU, 1.5L albumin, chest remains open   2/2/17: IABP d/ced, chest closed, extubated  2/3/17: douglas d/c; OOBTC  2/4/17: epi off; chest tube #2 removed; OOBTC  2/5: Epi turned back on @ .04, Nitric increased to 10; OOBTC  2/6: OOBTC; chest tube removed      Nursing:   Goal MAP 60-80  pTT goal 40-50 (Dr. Macedo wants closer to "50")  K and Mag q6  Heartware speed at 2900     Outcome: Ongoing (interventions implemented as appropriate)  Patient remains free of falls or injury. Patient denies pain. Continued on heparin and lasix drips. Wife learning dressing change, but not checked off at this time. Patient and wife working on workbook and need to be checked off on alarms. Monitor potassium q6 hours. Plan of care reviewed with patient and wife.       "

## 2017-02-13 NOTE — PLAN OF CARE
"Problem: Patient Care Overview  Goal: Plan of Care Review  Dx: Heartware LVAD 2/1/17  Hx: Cardiomyopathy, Two brothers have had heart transplant, occasional smoker     1/27/17: IABP placed  2/1/17: LVAD, admitted to SICU, 1.5L albumin, chest remains open   2/2/17: IABP d/ced, chest closed, extubated  2/3/17: douglas d/c; OOBTC  2/4/17: epi off; chest tube #2 removed; OOBTC  2/5: Epi turned back on @ .04, Nitric increased to 10; OOBTC  2/6: OOBTC; chest tube removed      Nursing:   Goal MAP 60-80  pTT goal 40-50 (Dr. Macedo wants closer to "50")  K and Mag q6  Heartware speed at 2900     Outcome: Ongoing (interventions implemented as appropriate)  POC reviewed with patient and spouse with both verbalizing understanding. VSS and VAD numbers WNL. VAD dressing dry, clean, intact, and due for change today with soap and water per family under RN supervision. Pain being managed with PRN medication. Lasix gtt DC'ed today and patient transitioned to IV push. Heparin gtt being titrated based off goal aPTT 45-55. Patient in no apparent sign of distress, will continue to monitor.       "

## 2017-02-13 NOTE — PT/OT/SLP PROGRESS
Occupational Therapy      Mert Pepe Samayoa  MRN: [id number]    Patient not seen today secondary to Unavailable (pt getting LVAD dressing change at time of attempt; unable to return in PM). Will follow-up tomorrow.    ALISA Urena  2/13/2017

## 2017-02-13 NOTE — PROGRESS NOTES
Room#: 312    Pump type: Heartware    System Monitor:     Battery Charger:    Reconciliation done: no    Does patient have emergency bag: yes, assisted patients care giver assemble emergency bag properly    Is patient's equipment clean and free of debris: yes    Cleaned system monitor in hospital: no    Educated patients on cleaning equipment: yes    Waist strap: no Vest Other: patient pack    Driveline: NEW KINKS or TEARS; RESCUE TAPE: none    Do patient's batteries need to be calibrated: no    Equipment on order (ECT): ordered a Waist pack

## 2017-02-13 NOTE — NURSING
LVAD dressing changed by spouse under RN supervision. Patient CLAIRE #1. No drainage, no odor, no tenderness, no redness noted, edges approximated around driveline. Spouse checked off on dressing change.

## 2017-02-13 NOTE — PLAN OF CARE
Problem: Physical Therapy Goal  Goal: Physical Therapy Goal  Goals to be met by: 3/1/17     Patient will increase functional independence with mobility by performin. Supine to sit with [place] - met   2. Sit to stand transfer with [place] met   3. Gait x 400 feet with Supervision. Met   4. Ascend/descend 2 stair with bilateral Handrails Supervision.   5. Lower extremity exercise program x15 reps per handout, with assistance as needed.- met   6. [place] with LVAD management.    Pt continues to progress towards goals  JOSEPH Dutton

## 2017-02-13 NOTE — PLAN OF CARE
"Problem: Patient Care Overview  Goal: Plan of Care Review  Dx: Heartware LVAD 2/1/17  Hx: Cardiomyopathy, Two brothers have had heart transplant, occasional smoker     1/27/17: IABP placed  2/1/17: LVAD, admitted to SICU, 1.5L albumin, chest remains open   2/2/17: IABP d/ced, chest closed, extubated  2/3/17: douglas d/c; OOBTC  2/4/17: epi off; chest tube #2 removed; OOBTC  2/5: Epi turned back on @ .04, Nitric increased to 10; OOBTC  2/6: OOBTC; chest tube removed      Nursing:   Goal MAP 60-80  pTT goal 40-50 (Dr. Macedo wants closer to "50")  K and Mag q6  Heartware speed at 2900     Outcome: Ongoing (interventions implemented as appropriate)  Patient progressing toward all goals. Plan of care discussed with patient, no questions at this time. Patient ambulating independently, fall precautions in place. VS & LVAD numbers WNL. Heparin and Lasix infusing; Dressing changed per wife using sterile technique and soap and water; Minimum coaching and no break in sterile technique; Could possibly be checked off tomorrow;  Will monitor.       "

## 2017-02-13 NOTE — PT/OT/SLP PROGRESS
Physical Therapy  Treatment    Mert Samayoa   MRN: [id number]   Admitting Diagnosis: Chronic combined systolic and diastolic heart failure    PT Received On: 17  PT Start Time: 0945     PT Stop Time: 1010    PT Total Time (min): 25 min       Billable Minutes:  Gait [id number] and Therapeutic Activity 8    Treatment Type: Treatment  PT/PTA: PTA     PTA Visit Number: 2       General Precautions: Standard, LVAD, fall, sternal  Orthopedic Precautions: N/A   Braces:      Do you have any cultural, spiritual, Yarsanism conflicts, given your current situation?: none     Subjective:  Communicated with RN prior to session.  Pt stated he was thirsty.     Pain Ratin/10      Objective:   Patient found with: telemetry, peripheral IV    Functional Mobility:  Bed Mobility:   Rolling/Turning Right: Independent  Supine to Sit: Independent  Sit to Supine: Independent    Transfers:  Sit <> Stand Assistance: Supervision  Sit <> Stand Assistive Device: No Assistive Device    Gait:   Gait Distance: 800-1000 ft (walked outside)  Assistance 1: Stand by Assistance  Gait Assistive Device: No device  Gait Pattern: 2-point gait  Gait Deviation(s): decreased zarina, decreased step length, decreased stride length    Balance:   Static Sit: FAIR+: Able to take MINIMAL challenges from all directions  Dynamic Sit: GOOD: Maintains balance through MODERATE excursions of active trunk movement  Static Stand: GOOD-: Takes MODERATE challenges from all directions inconsistently  Dynamic stand: GOOD: Needs SUPERVISION only during gait and able to self right with moderate      Therapeutic Activities and Exercises:  Sternal precautions were reviewed with pt and reinforced during functional mobility.  Pt educated on deep breathing techniques   Educated on importance of LE therEx and given HEP handout         AM-PAC 6 CLICK MOBILITY  How much help from another person does this patient currently need?   1 = Unable, Total/Dependent Assistance  2 =  A lot, Maximum/Moderate Assistance  3 = A little, Minimum/Contact Guard/Supervision  4 = None, Modified [place]/Independent    Turning over in bed (including adjusting bedclothes, sheets and blankets)?: 4  Sitting down on and standing up from a chair with arms (e.g., wheelchair, bedside commode, etc.): 3  Moving from lying on back to sitting on the side of the bed?: 4  Moving to and from a bed to a chair (including a wheelchair)?: 3  Need to walk in hospital room?: 4  Climbing 3-5 steps with a railing?: 3  Total Score: 21    AM-PAC Raw Score CMS G-Code Modifier Level of Impairment Assistance   6 % Total / Unable   7 - 9 CM 80 - 100% Maximal Assist   10 - 14 CL 60 - 80% Moderate Assist   15 - 19 CK 40 - 60% Moderate Assist   20 - 22 CJ 20 - 40% Minimal Assist   23 CI 1-20% SBA / CGA   24 CH 0% Independent/ Mod I     Patient left up in chair with call button in reach.    Assessment:  Mert Samayoa is a 27 y.o. male with a medical diagnosis of Chronic combined systolic and diastolic heart failure and presents with problems listed below. Pt continues to progress and endurance is building w/ continued gait training. Pt able to go outside today w/ therapists and spouse for first time in 3 weeks. Only required 1 seated rest. Pt Supervision assistance w/ LVAD. Agreeable to tx and wants to get better.    Rehab identified problem list/impairments: Rehab identified problem list/impairments: weakness, impaired endurance, impaired balance, gait instability    Rehab potential is good.    Activity tolerance: Good    Discharge recommendations: Discharge Facility/Level Of Care Needs: home health PT     Barriers to discharge: Barriers to Discharge: Inaccessible home environment    Equipment recommendations: Equipment Needed After Discharge: none     GOALS:   Physical Therapy Goals        Problem: Physical Therapy Goal    Goal Priority Disciplines Outcome Goal Variances Interventions   Physical Therapy Goal      PT/OT, PT Ongoing (interventions implemented as appropriate)     Description:  Goals to be met by: 3/1/17     Patient will increase functional independence with mobility by performin. Supine to sit with [place] - met   2. Sit to stand transfer with [place] met   3. Gait  x 400 feet with Supervision. Met   4. Ascend/descend 2 stair with bilateral Handrails Supervision.   5. Lower extremity exercise program x15 reps per handout, with assistance as needed.- met   6. [place] with LVAD management.                    PLAN:    Patient to be seen 6 x/week  to address the above listed problems via gait training, therapeutic activities, therapeutic exercises  Plan of Care expires: 17  Plan of Care reviewed with: patient, spouse         JOSEPH Dutton  2017

## 2017-02-14 LAB
ANION GAP SERPL CALC-SCNC: 11 MMOL/L
APTT BLDCRRT: 54.8 SEC
BASOPHILS # BLD AUTO: 0.02 K/UL
BASOPHILS NFR BLD: 0.1 %
BUN SERPL-MCNC: 19 MG/DL
CALCIUM SERPL-MCNC: 10 MG/DL
CHLORIDE SERPL-SCNC: 92 MMOL/L
CO2 SERPL-SCNC: 27 MMOL/L
CREAT SERPL-MCNC: 1 MG/DL
DIFFERENTIAL METHOD: ABNORMAL
EOSINOPHIL # BLD AUTO: 0.2 K/UL
EOSINOPHIL NFR BLD: 1.2 %
ERYTHROCYTE [DISTWIDTH] IN BLOOD BY AUTOMATED COUNT: 13.5 %
EST. GFR  (AFRICAN AMERICAN): >60 ML/MIN/1.73 M^2
EST. GFR  (NON AFRICAN AMERICAN): >60 ML/MIN/1.73 M^2
GLUCOSE SERPL-MCNC: 104 MG/DL
HCT VFR BLD AUTO: 25.2 %
HGB BLD-MCNC: 8.5 G/DL
INR PPP: 2.1
LDH SERPL L TO P-CCNC: 577 U/L
LYMPHOCYTES # BLD AUTO: 3.3 K/UL
LYMPHOCYTES NFR BLD: 18.4 %
MAGNESIUM SERPL-MCNC: 2.2 MG/DL
MCH RBC QN AUTO: 27.9 PG
MCHC RBC AUTO-ENTMCNC: 33.7 %
MCV RBC AUTO: 83 FL
MONOCYTES # BLD AUTO: 1.6 K/UL
MONOCYTES NFR BLD: 8.8 %
NEUTROPHILS # BLD AUTO: 12.6 K/UL
NEUTROPHILS NFR BLD: 70.2 %
PHOSPHATE SERPL-MCNC: 4.8 MG/DL
PLATELET # BLD AUTO: 319 K/UL
PMV BLD AUTO: 9 FL
POTASSIUM SERPL-SCNC: 4 MMOL/L
POTASSIUM SERPL-SCNC: 4.2 MMOL/L
POTASSIUM SERPL-SCNC: 4.2 MMOL/L
POTASSIUM SERPL-SCNC: 5.2 MMOL/L
PROTHROMBIN TIME: 20.8 SEC
RBC # BLD AUTO: 3.05 M/UL
SODIUM SERPL-SCNC: 130 MMOL/L
WBC # BLD AUTO: 17.91 K/UL

## 2017-02-14 PROCEDURE — 85025 COMPLETE CBC W/AUTO DIFF WBC: CPT

## 2017-02-14 PROCEDURE — 84100 ASSAY OF PHOSPHORUS: CPT

## 2017-02-14 PROCEDURE — 63600175 PHARM REV CODE 636 W HCPCS: Performed by: NURSE PRACTITIONER

## 2017-02-14 PROCEDURE — 25000003 PHARM REV CODE 250: Performed by: STUDENT IN AN ORGANIZED HEALTH CARE EDUCATION/TRAINING PROGRAM

## 2017-02-14 PROCEDURE — 85610 PROTHROMBIN TIME: CPT

## 2017-02-14 PROCEDURE — 80048 BASIC METABOLIC PNL TOTAL CA: CPT

## 2017-02-14 PROCEDURE — 27000248 HC VAD-ADDITIONAL DAY

## 2017-02-14 PROCEDURE — 25000003 PHARM REV CODE 250: Performed by: NURSE PRACTITIONER

## 2017-02-14 PROCEDURE — 97116 GAIT TRAINING THERAPY: CPT

## 2017-02-14 PROCEDURE — 85002 BLEEDING TIME TEST: CPT

## 2017-02-14 PROCEDURE — 85730 THROMBOPLASTIN TIME PARTIAL: CPT

## 2017-02-14 PROCEDURE — 97530 THERAPEUTIC ACTIVITIES: CPT

## 2017-02-14 PROCEDURE — 84132 ASSAY OF SERUM POTASSIUM: CPT | Mod: 91

## 2017-02-14 PROCEDURE — 20600001 HC STEP DOWN PRIVATE ROOM

## 2017-02-14 PROCEDURE — 83615 LACTATE (LD) (LDH) ENZYME: CPT

## 2017-02-14 PROCEDURE — 25000003 PHARM REV CODE 250: Performed by: THORACIC SURGERY (CARDIOTHORACIC VASCULAR SURGERY)

## 2017-02-14 PROCEDURE — 99232 SBSQ HOSP IP/OBS MODERATE 35: CPT | Mod: ,,, | Performed by: INTERNAL MEDICINE

## 2017-02-14 PROCEDURE — 97150 GROUP THERAPEUTIC PROCEDURES: CPT

## 2017-02-14 PROCEDURE — 83735 ASSAY OF MAGNESIUM: CPT

## 2017-02-14 RX ADMIN — FERROUS GLUCONATE TAB 324 MG (37.5 MG ELEMENTAL IRON) 324 MG: 324 (37.5 FE) TAB at 08:02

## 2017-02-14 RX ADMIN — PANTOPRAZOLE SODIUM 40 MG: 40 TABLET, DELAYED RELEASE ORAL at 08:02

## 2017-02-14 RX ADMIN — FUROSEMIDE 40 MG: 10 INJECTION, SOLUTION INTRAMUSCULAR; INTRAVENOUS at 09:02

## 2017-02-14 RX ADMIN — HYDRALAZINE HYDROCHLORIDE 25 MG: 25 TABLET ORAL at 09:02

## 2017-02-14 RX ADMIN — FUROSEMIDE 40 MG: 10 INJECTION, SOLUTION INTRAMUSCULAR; INTRAVENOUS at 01:02

## 2017-02-14 RX ADMIN — FUROSEMIDE 40 MG: 10 INJECTION, SOLUTION INTRAMUSCULAR; INTRAVENOUS at 05:02

## 2017-02-14 RX ADMIN — WARFARIN SODIUM 7.5 MG: 7.5 TABLET ORAL at 05:02

## 2017-02-14 RX ADMIN — AMLODIPINE BESYLATE 10 MG: 10 TABLET ORAL at 08:02

## 2017-02-14 RX ADMIN — MAGNESIUM OXIDE TAB 400 MG (241.3 MG ELEMENTAL MG) 400 MG: 400 (241.3 MG) TAB at 01:02

## 2017-02-14 RX ADMIN — HYDRALAZINE HYDROCHLORIDE 25 MG: 25 TABLET ORAL at 05:02

## 2017-02-14 RX ADMIN — OXYCODONE AND ACETAMINOPHEN 1 TABLET: 10; 325 TABLET ORAL at 03:02

## 2017-02-14 RX ADMIN — HYDRALAZINE HYDROCHLORIDE 25 MG: 25 TABLET ORAL at 01:02

## 2017-02-14 RX ADMIN — OXYCODONE AND ACETAMINOPHEN 1 TABLET: 10; 325 TABLET ORAL at 09:02

## 2017-02-14 RX ADMIN — ASPIRIN 325 MG: 325 TABLET, DELAYED RELEASE ORAL at 08:02

## 2017-02-14 RX ADMIN — MAGNESIUM OXIDE TAB 400 MG (241.3 MG ELEMENTAL MG) 400 MG: 400 (241.3 MG) TAB at 05:02

## 2017-02-14 RX ADMIN — MAGNESIUM OXIDE TAB 400 MG (241.3 MG ELEMENTAL MG) 400 MG: 400 (241.3 MG) TAB at 09:02

## 2017-02-14 RX ADMIN — OXYCODONE AND ACETAMINOPHEN 1 TABLET: 10; 325 TABLET ORAL at 08:02

## 2017-02-14 RX ADMIN — POTASSIUM CHLORIDE 60 MEQ: 20 SOLUTION ORAL at 12:02

## 2017-02-14 RX ADMIN — AMIODARONE HYDROCHLORIDE 400 MG: 200 TABLET ORAL at 09:02

## 2017-02-14 RX ADMIN — AMIODARONE HYDROCHLORIDE 400 MG: 200 TABLET ORAL at 08:02

## 2017-02-14 NOTE — PROGRESS NOTES
Daily E and M and VAD Interrogation Note    Reason for Visit:  Patient is seen in follow up for management of:  [] HeartMate II [x] Heartware [] Total artificial heart [] ECMO [] Other      Interval History:  [] Interval history unobtainable due to intubation. The [x] implant/[] explant date was 2/1/17      No new complaints or concerns this morning  Pain controlled  VAD flows WNL  Working well with PT    Medications:  Current Facility-Administered Medications   Medication Dose Route Frequency Provider Last Rate Last Dose    acetaminophen tablet 650 mg  650 mg Oral Q6H PRN Urszula Tuttle NP   650 mg at 02/08/17 1522    albuterol sulfate nebulizer solution 2.5 mg  2.5 mg Nebulization Q4H PRN Urszula Tuttle NP        amiodarone tablet 400 mg  400 mg Oral BID Urszula Tuttle NP   400 mg at 02/14/17 0802    amlodipine tablet 10 mg  10 mg Oral Daily Lex Macedo MD   10 mg at 02/14/17 0802    aspirin EC tablet 325 mg  325 mg Oral Daily Urszula Tuttle NP   325 mg at 02/14/17 0802    bisacodyl suppository 10 mg  10 mg Rectal Daily PRN Urszula Tuttle NP        ferrous gluconate tablet 324 mg  324 mg Oral Daily with breakfast Urszula Tuttle NP   324 mg at 02/14/17 0802    furosemide injection 40 mg  40 mg Intravenous TID Toshia Navarro NP   40 mg at 02/14/17 0524    glucagon (human recombinant) injection 1 mg  1 mg Intramuscular PRN Shai Ortega MD        hydrALAZINE tablet 25 mg  25 mg Oral Q8H Urszula Tuttle NP   25 mg at 02/14/17 0523    hydrocodone-acetaminophen 5-325mg per tablet 1 tablet  1 tablet Oral Q6H PRN Arnie Cid MD   1 tablet at 02/11/17 1209    magnesium citrate solution 296 mL  296 mL Oral Daily PRN Urszula Tuttle NP        magnesium oxide tablet 400 mg  400 mg Oral TID Denis Aburto MD   400 mg at 02/14/17 0523    ondansetron injection 8 mg  8 mg Intravenous Q8H PRN Denis Aburto MD   8 mg at 02/09/17 5981     oxycodone-acetaminophen  mg per tablet 1 tablet  1 tablet Oral Q6H PRN Arnie Cid MD   1 tablet at 02/14/17 0804    pantoprazole EC tablet 40 mg  40 mg Oral Daily Urszula Tuttle NP   40 mg at 02/14/17 0802    polyethylene glycol packet 17 g  17 g Oral BID Toshia LReanna Navarro NP   17 g at 02/13/17 1015    senna-docusate 8.6-50 mg per tablet 1 tablet  1 tablet Oral BID PRN Urszula Tuttle NP        sodium chloride 0.65 % nasal spray 1 spray  1 spray Each Nare PRN Denis Aburto MD        warfarin (COUMADIN) tablet 7.5 mg  7.5 mg Oral Daily Urszula Tuttle NP   7.5 mg at 02/13/17 1627       Physical Examination:  Vital Signs:   Vitals:    02/14/17 0900   BP:    Pulse: 80   Resp:    Temp:      Cardiovascular:  [x] Regular rate and rhythm [] Irregular   [x]  No edema   [x]  Clear to auscultation  VAD hum present   Skin:  Incision is [x]  Clean, dry and intact.    Sternum:  [x]  Stable   Driveline(s):   [x]  Clean, dry and intact.       X-Rays:  [x]  I reviewed today's Chest x-ray    Procedure:  Device Interrogation including analysis of device parameters.  Current Settings  [x]  Ventricular Assist Device    Review of device function is [x]  Stable   TXP LVAD INTERROGATIONS 2/14/2017 2/14/2017/14/2017 2/13/2017 2/13/2017 2/13/2017 2/13/2017 2/13/2017   Type Heartware Heartware Heartware Heartware Heartware Heartware Heartware   Flow 5.8 5.1 5.5 6.7 6.2 5.4 -   Speed 2900 2900 2900 2900 2900 2900 -   Power ([place]) 4.9 5.1 4.9 5.2 5.2 4.8 -   Low Flow Alarm - - - - - - -   High Power Alarm - - - - - - -   Pulsatility - - - - - - -       Assessment:  [x]  Primary Cardiomyopathy [x]  Congestive Heart Failure   []  Atrial Fibrillation []  Ventricular Tachycardia   []  Aftercare cardiac device [x]  Long term (current) use of anticoagulants   []  Ventilator-associated pneumonia []  Pneumonia viral, unspecified   []  Pneumonia, bacterial, unspecified []  Pneumonia, organism unspecified   []  Hemorrhage  of GI tract, unspecified    []  Nosebleed              Plan:  [x]  Interval history obtained from ICU attending team member during rounding today  [x]  VAD/COURTNEY teaching performed with patient  [x]  Mobilization / Physical Therapy ongoing  [x]  Anticoagulation [x]  Ongoing []  Held  -Reviewed CT chest/abd/pelvis and no surgical intervention  -Transfer care to [facility]   -Increasing leukocytosis, would consider CT of sinuses and removal of mid line    Total time spent was 33 minutes.  Of which more than 50 percent of the care dominated counseling and coordinating care with different team members. The VAD was interrogated and all parameters were WNL and no significant findings were found in the history. All these findings are documented in the note above.      Date of Service: 02/14/2017

## 2017-02-14 NOTE — PROGRESS NOTES
Pt aax3 with no family at bedside.  Spent about 1.5 hours with pt reviewing LVAD education.  Pt is officially checked off on LVAD education.  Pt LVAD workbook completed and graded and returned to pt.  PT also completed controller exchange with mock HVAD system.  Pt also successfully paged LVAD coordinator on call.  Education checklist completed and pt given copy.  Zhang Spears MD notified of pt status.  VAD interrogation completed with no alarms noted.  Will follow up with pt tomorrow.

## 2017-02-14 NOTE — PT/OT/SLP PROGRESS
"Physical Therapy  Treatment    Mert Samayoa   MRN: [id number]   Admitting Diagnosis: Chronic combined systolic and diastolic heart failure    PT Received On: 17  PT Start Time: 1110     PT Stop Time: 1215    PT Total Time (min): 65 min   (10 min individual and 55 min group)    Billable Minutes:  Gait Training 10 and Therapeutic Activity Group 55    Treatment Type: Treatment  PT/PTA: PT       General Precautions: Standard, LVAD, fall, sternal    Do you have any cultural, spiritual, Orthodox conflicts, given your current situation?: none     Subjective:  Communicated with RN prior to session.  Pt reported "I had a nose bleed last night and did not get much sleep."    Pain Ratin/10    Objective:   Patient found with: telemetry (LVAD)    Functional Mobility:  Transfers:  Sit <> Stand Assistance: Supervision  Sit <> Stand Assistive Device: No Assistive Device    Gait:   Gait Distance: 200 feet including up/down ramp  Assistance 1: Supervision  Gait Assistive Device: No device  Gait Pattern: 2-point gait  Gait Deviation(s): decreased zarina, decreased step length    Balance:   Static Sit: GOOD: Takes MODERATE challenges from all directions  Dynamic Sit: GOOD: Maintains balance through MODERATE excursions of active trunk movement  Static Stand: GOOD: Takes MODERATE challenges from all directions  Dynamic stand: GOOD: Needs SUPERVISION only during gait and able to self right with moderate      Therapeutic Activities and Exercises:  Patient participated in WiiFit group with PT and other patients who have an LVAD x 55 minutes. Activities included:  - Step on and off WiiFit platform with supervision assistance x 3  - Anterior and lateral weight shifts during game play. Supervision required for balance.      AM-PAC 6 CLICK MOBILITY  How much help from another person does this patient currently need?   1 = Unable, Total/Dependent Assistance  2 = A lot, Maximum/Moderate Assistance  3 = A little, Minimum/Contact " Guard/Supervision  4 = None, Modified [place]/Independent    Turning over in bed (including adjusting bedclothes, sheets and blankets)?: 4  Sitting down on and standing up from a chair with arms (e.g., wheelchair, bedside commode, etc.): 3  Moving from lying on back to sitting on the side of the bed?: 4  Moving to and from a bed to a chair (including a wheelchair)?: 3  Need to walk in hospital room?: 3  Climbing 3-5 steps with a railing?: 3  Total Score: 20    AM-PAC Raw Score CMS G-Code Modifier Level of Impairment Assistance   6 % Total / Unable   7 - 9 CM 80 - 100% Maximal Assist   10 - 14 CL 60 - 80% Moderate Assist   15 - 19 CK 40 - 60% Moderate Assist   20 - 22 CJ 20 - 40% Minimal Assist   23 CI 1-20% SBA / CGA   24 CH 0% Independent/ Mod I     Patient left up in chair with all lines intact and call button in reach.    Assessment:  Mert Samayoa is a 27 y.o. male with a medical diagnosis of Chronic combined systolic and diastolic heart failure s/p LVAD and presents with decreased endurance and limited ability to perform higher level activities such as stairs, which he has to get in his home. He would benefit from continued PT to progress mobility.    Rehab identified problem list/impairments: Rehab identified problem list/impairments: weakness, impaired endurance, impaired functional mobilty, gait instability, impaired cardiopulmonary response to activity    Rehab potential is good.    Activity tolerance: Good    Discharge recommendations: Discharge Facility/Level Of Care Needs: home     Barriers to discharge: Barriers to Discharge: Inaccessible home environment    Equipment recommendations: Equipment Needed After Discharge: none     GOALS:   Physical Therapy Goals        Problem: Physical Therapy Goal    Goal Priority Disciplines Outcome Goal Variances Interventions   Physical Therapy Goal     PT/OT, PT Ongoing (interventions implemented as appropriate)     Description:  Goals to be met  by: 3/1/17     Patient will increase functional independence with mobility by performin. Supine to sit with [place] - met   2. Sit to stand transfer with [place] met   3. Gait  x 400 feet with Supervision. Met   4. Ascend/descend 2 stair with bilateral Handrails Supervision.   5. Lower extremity exercise program x15 reps per handout, with assistance as needed.- met   6. [place] with LVAD management.                    PLAN:    Patient to be seen 4 x/week  to address the above listed problems via therapeutic exercises, therapeutic activities, gait training  Plan of Care expires: 17  Plan of Care reviewed with: patient     Ericka LeJeune, PT, DPT  [phone]

## 2017-02-14 NOTE — PLAN OF CARE
"Problem: Patient Care Overview  Goal: Plan of Care Review  Dx: Heartware LVAD 2/1/17  Hx: Cardiomyopathy, Two brothers have had heart transplant, occasional smoker     1/27/17: IABP placed  2/1/17: LVAD, admitted to SICU, 1.5L albumin, chest remains open   2/2/17: IABP d/ced, chest closed, extubated  2/3/17: douglas d/c; OOBTC  2/4/17: epi off; chest tube #2 removed; OOBTC  2/5: Epi turned back on @ .04, Nitric increased to 10; OOBTC  2/6: OOBTC; chest tube removed      Nursing:   Goal MAP 60-80  pTT goal 40-50 (Dr. Macedo wants closer to "50")  K and Mag q6  Heartware speed at 2900     Outcome: Ongoing (interventions implemented as appropriate)  POC reviewed with patient and he verbalized understanding. VSS and VAD numbers WNL. Pain being managed with PRN medication. Heparin gtt DC'ed today with patient having a therapeutic INR. LVAD dressing dry, clean, intact, and due for change today with soap and water per wife. PICC consult placed for PIV d/t poor venous access and possible midline related infection. Patient in no apparent sign of distress, will continue to monitor.       "

## 2017-02-14 NOTE — PT/OT/SLP PROGRESS
Occupational Therapy  Treatment    Mert Samayoa   MRN: [id number]   Admitting Diagnosis: Chronic combined systolic and diastolic heart failure  S/p LVAD    OT Date of Treatment: 17   OT Start Time: 928  OT Stop Time: 939  OT Total Time (min): 11 min    Billable Minutes:  Therapeutic Activity 11 minutes    General Precautions: Standard, LVAD, fall, sternal  Orthopedic Precautions: N/A  Braces: N/A    Do you have any cultural, spiritual, Moravian conflicts, given your current situation?: None    Subjective:  Communicated with RN prior to session.    Pain Ratin/10  Pain Rating Post-Intervention: 0/10    Objective:  Patient found with: telemetry (LVAD)     Functional Mobility:  Bed Mobility: Pt EOB and left EOB     Transfers:  Sit <> Stand Assistance: Stand By Assistance from EOB  Sit <> Stand Assistive Device: No Assistive Device    Activities of Daily Living:  Feeding Level of Assistance: Independent  UE Dressing Level of Assistance: Supervision to don LVAD shld bag    Balance:   Static Sit: NORMAL: No deviations seen in posture held statically  Dynamic Sit: NORMAL: No deviations seen in posture held dynamically  Static Stand: NORMAL: No deviations seen in posture held statically    Therapeutic Activities and Exercises:  Pt switched LVAD from wall to battery power with supervision; cues for battery rotation; sit to stand to adjust gown and waist strap; declined ADLs due to eating breakfast but planned to ambulate to sink after eating and participate in Wii with PT    AM-PAC 6 CLICK ADL   How much help from another person does this patient currently need?   1 = Unable, Total/Dependent Assistance  2 = A lot, Maximum/Moderate Assistance  3 = A little, Minimum/Contact Guard/Supervision  4 = None, Modified [place]/Independent    Putting on and taking off regular lower body clothing? : 3  Bathing (including washing, rinsing, drying)?: 3  Toileting, which includes using toilet, bedpan, or urinal? :  3  Putting on and taking off regular upper body clothing?: 3  Taking care of personal grooming such as brushing teeth?: 4  Eating meals?: 4  Total Score: 20     AM-PAC Raw Score CMS G-Code Modifier Level of Impairment Assistance   6 % Total / Unable   7 - 9 CM 80 - 100% Maximal Assist   10 - 14 CL 60 - 80% Moderate Assist   15 - 19 CK 40 - 60% Moderate Assist   20 - 22 CJ 20 - 40% Minimal Assist   23 CI 1-20% SBA / CGA   24 CH 0% Independent/ Mod I     Patient left seated EOB with all lines intact and call button in reach    ASSESSMENT:  Mert Samayoa is a 27 y.o. male with a medical diagnosis of Chronic combined systolic and diastolic heart failure s/p LVAD. Pt making steady progress toward goals, performing ADLs daily and ambulating >800 ft. Frequency decreased due to pt progressing well; plan to continue OT to increase independence with ADLs, functional mobility, and LVAD management.    Rehab potential is good.    Activity tolerance: Good    Discharge recommendations: Discharge Facility/Level Of Care Needs: home     Barriers to discharge: Barriers to Discharge: Inaccessible home environment    Equipment recommendations: none     GOALS:   Occupational Therapy Goals        Problem: Occupational Therapy Goal    Goal Priority Disciplines Outcome Interventions   Occupational Therapy Goal     OT, PT/OT Ongoing (interventions implemented as appropriate)    Description:  Goals to be met by: 2 weeks 2/17/17     Patient will increase functional independence with ADLs by performing:    UE Dressing with Supervision.   LE Dressing with Supervision.  Grooming while standing with Supervision.-MET  Toileting from toilet with Supervision for hygiene and clothing management.   Stand pivot transfers with Supervision.  Toilet transfer to toilet with Supervision.  Pt to be independent with LVAD vipin't.              Multidisciplinary Problems (Resolved)        Problem: Occupational Therapy Goal    Goal Priority  Disciplines Outcome Interventions   Occupational Therapy Goal   (Resolved)     OT, PT/OT Outcome(s) achieved    Description:  Eval and D/C OT 1/29/17.                Plan:  Patient to be seen 4 x/week to address the above listed problems via self-care/home management, therapeutic activities, therapeutic exercises  Plan of Care reviewed with: patient         Shawna ALISA Paez  02/14/2017

## 2017-02-14 NOTE — PROGRESS NOTES
Progress Note  Heart Transplant Service    Admit Date: 1/27/2017   LOS: 18 days     SUBJECTIVE:     Follow up for: Chronic combined systolic and diastolic heart failure    Interval History: Had epistaxis this morning, resolved once heparin stopped, INR also therapeutic. LD elevated to 577 from 309    Scheduled Meds:   amiodarone  400 mg Oral BID    amlodipine  10 mg Oral Daily    aspirin  325 mg Oral Daily    ferrous gluconate  324 mg Oral Daily with breakfast    furosemide  40 mg Intravenous TID    hydrALAZINE  25 mg Oral Q8H    magnesium oxide  400 mg Oral TID    pantoprazole  40 mg Oral Daily    polyethylene glycol  17 g Oral BID    warfarin  7.5 mg Oral Daily     Continuous Infusions:     PRN Meds:acetaminophen, albuterol sulfate, bisacodyl, glucagon (human recombinant), hydrocodone-acetaminophen 5-325mg, magnesium citrate, ondansetron, oxycodone-acetaminophen, senna-docusate 8.6-50 mg, sodium chloride    Review of patient's allergies indicates:  No Known Allergies    OBJECTIVE:     Vital Signs (Most Recent)  Temp: 99.6 °F (37.6 °C) (02/14/17 0730)  Pulse: 89 (02/14/17 1300)  Resp: 18 (02/14/17 0730)  BP: (!) 86/0 (02/14/17 0731)  SpO2: 99 % (02/14/17 0730)    Vital Signs Range (Last 24H):  Temp:  [98.6 °F (37 °C)-99.6 °F (37.6 °C)]   Pulse:  [63-89]   Resp:  [18]   BP: ()/(0-64)   SpO2:  [96 %-99 %]     I & O (Last 24H):    Intake/Output Summary (Last 24 hours) at 02/14/17 1353  Last data filed at 02/14/17 0600   Gross per 24 hour   Intake           881.15 ml   Output             2675 ml   Net         -1793.85 ml          Telemetry: sinus  Constitutional: AOx3, NAD conversant  Neck: No JVD present. No thyromegaly present.   Cardiovascular: VAD hum  Pulmonary/Chest:good breath sounds bilaterally  Abdominal: + BS, exhibits no distension. There is no tenderness. There is no rebound.   Extremities: no cyanosis, clubbing or edema.  No bleeding, edema, erythema or hematoma, doppler pulses in all  distals    Labs:       Recent Labs  Lab 02/12/17 0526 02/13/17  0552 02/14/17  0147   WBC 15.84* 15.50* 17.91*   HGB 9.7* 9.7* 8.5*   HCT 28.8* 28.7* 25.2*    304 319   LYMPH 18.6  2.9 18.6  2.9 18.4  3.3   MONO 8.0  1.3* 9.9  1.5* 8.8  1.6*   EOSINOPHIL 1.1 1.5 1.2         Recent Labs  Lab 02/12/17  0526 02/13/17  0552 02/13/17  1339 02/13/17  1950 02/14/17  0147   APTT 70.8*  < > 61.7* 35.8* 37.8* 54.8*   INR 1.6*  --  1.8*  --   --  2.1*   < > = values in this interval not displayed.       Recent Labs  Lab 02/08/17  0435  02/10/17  0446  02/12/17 0526 02/13/17  0552  02/14/17  0147 02/14/17  0849 02/14/17  1248     < > 95  < > 98  --  97  --  104  --   --    CALCIUM 9.8  < > 10.1  < > 10.5  --  10.2  --  10.0  --   --    ALBUMIN 3.8  --  3.9  --   --   --  4.1  --   --   --   --    PROT 7.8  --  8.1  --   --   --  8.2  --   --   --   --    *  < > 132*  < > 131*  --  135*  --  130*  --   --    K 3.1*  3.1*  < > 3.1*  < > 3.9  4.0  < > 3.9  3.9  < > 5.2* 4.2 4.2   CO2 30*  < > 32*  < > 30*  --  29  --  27  --   --    CL 88*  < > 85*  < > 87*  --  89*  --  92*  --   --    BUN 21*  < > 18  < > 20  --  18  --  19  --   --    CREATININE 1.1  < > 1.0  < > 1.1  --  1.0  --  1.0  --   --    ALKPHOS 150*  --  169*  --   --   --  193*  --   --   --   --    ALT 40  --  52*  --   --   --  57*  --   --   --   --    AST 41*  --  36  --   --   --  32  --   --   --   --    BILITOT 0.6  --  0.7  --   --   --  0.6  --   --   --   --    MG 2.2  < >  --   < > 2.1  --  2.0  --  2.2  --   --    PHOS 3.1  < > 3.7  < > 4.5  --  4.8*  --  4.8*  --   --    < > = values in this interval not displayed.  Estimated Creatinine Clearance: 92.1 mL/min (based on Cr of 1).      Recent Labs  Lab 02/13/17  0552   *         Recent Labs  Lab 02/08/17  0435 02/09/17  0245 02/10/17  0446 02/11/17  0347 02/12/17  0526 02/13/17  0552 02/14/17  0147   * 427* 377* 343* 344* 309* 577*       Microbiology Results  (last 7 days)     Procedure Component Value Units Date/Time    Urine culture [[id number]] Collected:  02/12/17 1137    Order Status:  Completed Specimen:  Urine from Urine, Clean Catch Updated:  02/13/17 2037     Urine Culture, Routine No growth    Blood culture [[id number]] Collected:  02/12/17 0950    Order Status:  Completed Specimen:  Blood Updated:  02/13/17 1612     Blood Culture, Routine No Growth to date     Blood Culture, Routine No Growth to date    Blood culture [[id number]] Collected:  02/12/17 1311    Order Status:  Completed Specimen:  Blood Updated:  02/13/17 1412     Blood Culture, Routine No Growth to date     Blood Culture, Routine No Growth to date    Narrative:       Collection has been rescheduled by SMW at 2/12/2017 09:50 Reason:   unable to collect 2nd set   Collection has been rescheduled by SMW at 2/12/2017 09:50 Reason:   unable to collect 2nd set             ASSESSMENT:     27 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 He was admitted for PICC removal, IABP, and planned LVAD implantation. He is s/p [facility] LVAD placement 2/1 and chest closure 2/2. Extubated 2/2. Now on floor.    PLAN:     Cardiogenic Shock due to Acute on Chronic Systolic HF, DCM, NYHA FC IV s/p HW LVAD 2/1/2017  -s/p HW LVAD 2/1 and chest closure/extubation 2/2   -Currently on Lasix, Lasix decreased to 40mg IV TID  -Does not have ICD, per CTS will pursue outpatient  -Anticoagulation: therapeutic on coumadin  -cultures: NGTD  -LD elevated to 577, may be related to bleeding but large delta value, will repeat today    Elevated WBC  - Infectious w/u  - BC x 2, UA, urine culture, CT chest / abdo / pelvis, post operative changes noted but no  clear sign of infectious process thus far  - midline out 2/14  - CT maxillofacial to assess for sinus disease    Atrial flutter  - No previous hx, spnotaneously resolved after 10 mins  -  stopped  - Keep Mg > 2, K > 4  - Bedside echo performed 2/11 which showed LVEDD of 6.5 cm  - Loaded with IV amiodarone and started on PO amiodarone now    HTN  -BP controlled    Back Pain  -prn meds on board; will adjust    Prophylaxis   -therapeutic coumadin    Dispo: possible discharge later this week    Zhang Spears DO  Cardiology Fellow  Pager [phone]

## 2017-02-14 NOTE — PROGRESS NOTES
Notified MD (Dr. Byers, [facility]) of patient lab ( aPTT 37.8). Patient specific goal 45-55, heparin gtt currently infusing at 1500units/hr. MD acknowledged, order given: increase by 50 units. rate changed to 1550, will recheck aPTT in 6hours. Will continue to monitor.

## 2017-02-14 NOTE — PLAN OF CARE
Problem: Physical Therapy Goal  Goal: Physical Therapy Goal  Goals to be met by: 3/1/17     Patient will increase functional independence with mobility by performin. Supine to sit with [place] - met   2. Sit to stand transfer with [place] met   3. Gait x 400 feet with Supervision. Met   4. Ascend/descend 2 stair with bilateral Handrails Supervision.   5. Lower extremity exercise program x15 reps per handout, with assistance as needed.- met   6. [place] with LVAD management.    Pt is progressing well toward goals.

## 2017-02-14 NOTE — PROGRESS NOTES
Notified on call HTS (Md. Byers) of patient status. Pt experiencing epistaxis for over 45 minutes. Nurse packed R nare with gauzed. MD acknowledged findings. Orders given:monitor for increased bleeding, draw INR, if 2.0-3.0 ok to stop heparin gtt. Will continue to monitor.

## 2017-02-15 LAB
ALBUMIN SERPL BCP-MCNC: 3.7 G/DL
ALP SERPL-CCNC: 157 U/L
ALT SERPL W/O P-5'-P-CCNC: 41 U/L
ANION GAP SERPL CALC-SCNC: 12 MMOL/L
AST SERPL-CCNC: 23 U/L
BASOPHILS # BLD AUTO: 0.02 K/UL
BASOPHILS NFR BLD: 0.1 %
BILIRUB DIRECT SERPL-MCNC: 0.2 MG/DL
BILIRUB SERPL-MCNC: 0.4 MG/DL
BNP SERPL-MCNC: 183 PG/ML
BUN SERPL-MCNC: 20 MG/DL
CALCIUM SERPL-MCNC: 9.6 MG/DL
CHLORIDE SERPL-SCNC: 90 MMOL/L
CO2 SERPL-SCNC: 30 MMOL/L
CREAT SERPL-MCNC: 1 MG/DL
CRP SERPL-MCNC: 72.2 MG/L
DIFFERENTIAL METHOD: ABNORMAL
EOSINOPHIL # BLD AUTO: 0.4 K/UL
EOSINOPHIL NFR BLD: 2.6 %
ERYTHROCYTE [DISTWIDTH] IN BLOOD BY AUTOMATED COUNT: 13.6 %
EST. GFR  (AFRICAN AMERICAN): >60 ML/MIN/1.73 M^2
EST. GFR  (NON AFRICAN AMERICAN): >60 ML/MIN/1.73 M^2
GLUCOSE SERPL-MCNC: 84 MG/DL
HCT VFR BLD AUTO: 26 %
HGB BLD-MCNC: 8.8 G/DL
INR PPP: 2.5
LDH SERPL L TO P-CCNC: 243 U/L
LYMPHOCYTES # BLD AUTO: 2.3 K/UL
LYMPHOCYTES NFR BLD: 15.3 %
MAGNESIUM SERPL-MCNC: 2 MG/DL
MCH RBC QN AUTO: 27.9 PG
MCHC RBC AUTO-ENTMCNC: 33.8 %
MCV RBC AUTO: 83 FL
MONOCYTES # BLD AUTO: 1.5 K/UL
MONOCYTES NFR BLD: 10 %
NEUTROPHILS # BLD AUTO: 10.7 K/UL
NEUTROPHILS NFR BLD: 70.9 %
PHOSPHATE SERPL-MCNC: 4.4 MG/DL
PLATELET # BLD AUTO: 407 K/UL
PMV BLD AUTO: 9.7 FL
POTASSIUM SERPL-SCNC: 3.7 MMOL/L
PROT SERPL-MCNC: 7.6 G/DL
PROTHROMBIN TIME: 25.3 SEC
RBC # BLD AUTO: 3.15 M/UL
SODIUM SERPL-SCNC: 132 MMOL/L
WBC # BLD AUTO: 15.08 K/UL

## 2017-02-15 PROCEDURE — 25000003 PHARM REV CODE 250: Performed by: STUDENT IN AN ORGANIZED HEALTH CARE EDUCATION/TRAINING PROGRAM

## 2017-02-15 PROCEDURE — 84100 ASSAY OF PHOSPHORUS: CPT

## 2017-02-15 PROCEDURE — 99232 SBSQ HOSP IP/OBS MODERATE 35: CPT | Mod: ,,, | Performed by: INTERNAL MEDICINE

## 2017-02-15 PROCEDURE — 83880 ASSAY OF NATRIURETIC PEPTIDE: CPT

## 2017-02-15 PROCEDURE — 25000003 PHARM REV CODE 250: Performed by: NURSE PRACTITIONER

## 2017-02-15 PROCEDURE — 25000003 PHARM REV CODE 250: Performed by: INTERNAL MEDICINE

## 2017-02-15 PROCEDURE — 80076 HEPATIC FUNCTION PANEL: CPT

## 2017-02-15 PROCEDURE — 83615 LACTATE (LD) (LDH) ENZYME: CPT

## 2017-02-15 PROCEDURE — 80048 BASIC METABOLIC PNL TOTAL CA: CPT

## 2017-02-15 PROCEDURE — 63600175 PHARM REV CODE 636 W HCPCS: Performed by: NURSE PRACTITIONER

## 2017-02-15 PROCEDURE — 85610 PROTHROMBIN TIME: CPT

## 2017-02-15 PROCEDURE — 20600001 HC STEP DOWN PRIVATE ROOM

## 2017-02-15 PROCEDURE — 83735 ASSAY OF MAGNESIUM: CPT

## 2017-02-15 PROCEDURE — 86140 C-REACTIVE PROTEIN: CPT

## 2017-02-15 PROCEDURE — 93750 INTERROGATION VAD IN PERSON: CPT | Performed by: STUDENT IN AN ORGANIZED HEALTH CARE EDUCATION/TRAINING PROGRAM

## 2017-02-15 PROCEDURE — 25000003 PHARM REV CODE 250: Performed by: THORACIC SURGERY (CARDIOTHORACIC VASCULAR SURGERY)

## 2017-02-15 PROCEDURE — 85025 COMPLETE CBC W/AUTO DIFF WBC: CPT

## 2017-02-15 PROCEDURE — 27000248 HC VAD-ADDITIONAL DAY

## 2017-02-15 RX ORDER — LISINOPRIL 5 MG/1
5 TABLET ORAL DAILY
Status: DISCONTINUED | OUTPATIENT
Start: 2017-02-15 | End: 2017-02-15

## 2017-02-15 RX ORDER — WARFARIN 7.5 MG/1
7.5 TABLET ORAL
Status: DISCONTINUED | OUTPATIENT
Start: 2017-02-16 | End: 2017-02-16

## 2017-02-15 RX ORDER — WARFARIN SODIUM 5 MG/1
5 TABLET ORAL
Status: DISCONTINUED | OUTPATIENT
Start: 2017-02-15 | End: 2017-02-16

## 2017-02-15 RX ORDER — LISINOPRIL 5 MG/1
5 TABLET ORAL NIGHTLY
Status: COMPLETED | OUTPATIENT
Start: 2017-02-15 | End: 2017-02-18

## 2017-02-15 RX ORDER — WARFARIN SODIUM 5 MG/1
5 TABLET ORAL
Status: DISCONTINUED | OUTPATIENT
Start: 2017-02-17 | End: 2017-02-15

## 2017-02-15 RX ORDER — POTASSIUM CHLORIDE 20 MEQ/15ML
40 SOLUTION ORAL ONCE
Status: COMPLETED | OUTPATIENT
Start: 2017-02-15 | End: 2017-02-15

## 2017-02-15 RX ORDER — FUROSEMIDE 40 MG/1
40 TABLET ORAL 2 TIMES DAILY
Status: DISCONTINUED | OUTPATIENT
Start: 2017-02-15 | End: 2017-02-16

## 2017-02-15 RX ADMIN — MAGNESIUM OXIDE TAB 400 MG (241.3 MG ELEMENTAL MG) 400 MG: 400 (241.3 MG) TAB at 01:02

## 2017-02-15 RX ADMIN — WARFARIN SODIUM 5 MG: 5 TABLET ORAL at 05:02

## 2017-02-15 RX ADMIN — HYDRALAZINE HYDROCHLORIDE 25 MG: 25 TABLET ORAL at 05:02

## 2017-02-15 RX ADMIN — ASPIRIN 325 MG: 325 TABLET, DELAYED RELEASE ORAL at 08:02

## 2017-02-15 RX ADMIN — AMLODIPINE BESYLATE 10 MG: 10 TABLET ORAL at 08:02

## 2017-02-15 RX ADMIN — POTASSIUM CHLORIDE 40 MEQ: 20 SOLUTION ORAL at 09:02

## 2017-02-15 RX ADMIN — FUROSEMIDE 40 MG: 40 TABLET ORAL at 05:02

## 2017-02-15 RX ADMIN — AMIODARONE HYDROCHLORIDE 400 MG: 200 TABLET ORAL at 09:02

## 2017-02-15 RX ADMIN — PANTOPRAZOLE SODIUM 40 MG: 40 TABLET, DELAYED RELEASE ORAL at 08:02

## 2017-02-15 RX ADMIN — FUROSEMIDE 40 MG: 40 TABLET ORAL at 01:02

## 2017-02-15 RX ADMIN — FERROUS GLUCONATE TAB 324 MG (37.5 MG ELEMENTAL IRON) 324 MG: 324 (37.5 FE) TAB at 08:02

## 2017-02-15 RX ADMIN — MAGNESIUM OXIDE TAB 400 MG (241.3 MG ELEMENTAL MG) 400 MG: 400 (241.3 MG) TAB at 09:02

## 2017-02-15 RX ADMIN — FUROSEMIDE 40 MG: 10 INJECTION, SOLUTION INTRAMUSCULAR; INTRAVENOUS at 05:02

## 2017-02-15 RX ADMIN — HYDROCODONE BITARTRATE AND ACETAMINOPHEN 1 TABLET: 5; 325 TABLET ORAL at 09:02

## 2017-02-15 RX ADMIN — MAGNESIUM OXIDE TAB 400 MG (241.3 MG ELEMENTAL MG) 400 MG: 400 (241.3 MG) TAB at 05:02

## 2017-02-15 RX ADMIN — AMIODARONE HYDROCHLORIDE 400 MG: 200 TABLET ORAL at 08:02

## 2017-02-15 RX ADMIN — LISINOPRIL 5 MG: 5 TABLET ORAL at 09:02

## 2017-02-15 RX ADMIN — HYDROCODONE BITARTRATE AND ACETAMINOPHEN 1 TABLET: 5; 325 TABLET ORAL at 08:02

## 2017-02-15 NOTE — PROGRESS NOTES
Seen pt in hospital. Inventoried patients equipment for discharge on 2/16/2017. Pt signed implant checklist.

## 2017-02-15 NOTE — PROGRESS NOTES
Progress Note  Heart Transplant Service    Admit Date: 1/27/2017   LOS: 19 days     SUBJECTIVE:     Follow up for: Chronic combined systolic and diastolic heart failure    Interval History: Feeling well, no more epistaxis, white count trending down.     Scheduled Meds:   amiodarone  400 mg Oral BID    amlodipine  10 mg Oral Daily    aspirin  325 mg Oral Daily    ferrous gluconate  324 mg Oral Daily with breakfast    furosemide  40 mg Oral BID    lisinopril  5 mg Oral QHS    magnesium oxide  400 mg Oral TID    pantoprazole  40 mg Oral Daily    polyethylene glycol  17 g Oral BID    warfarin  5 mg Oral Every Mon, Wed, Fri    [START ON 2/16/2017] warfarin  7.5 mg Oral Every Tues, Thurs, Sat, Sun     Continuous Infusions:     PRN Meds:acetaminophen, albuterol sulfate, bisacodyl, glucagon (human recombinant), hydrocodone-acetaminophen 5-325mg, ondansetron, oxycodone-acetaminophen, senna-docusate 8.6-50 mg    Review of patient's allergies indicates:  No Known Allergies    OBJECTIVE:     Vital Signs (Most Recent)  Temp: 98.3 °F (36.8 °C) (02/15/17 0730)  Pulse: 77 (02/15/17 1100)  Resp: 16 (02/15/17 0730)  BP: (!) 84/0 (02/15/17 0730)  SpO2: 99 % (02/15/17 0730)    Vital Signs Range (Last 24H):  Temp:  [97.6 °F (36.4 °C)-98.7 °F (37.1 °C)]   Pulse:  [68-78]   Resp:  [16-18]   BP: ()/(0-77)   SpO2:  [96 %-99 %]     I & O (Last 24H):    Intake/Output Summary (Last 24 hours) at 02/15/17 1319  Last data filed at 02/15/17 0518   Gross per 24 hour   Intake              360 ml   Output             2150 ml   Net            -1790 ml          Telemetry: sinus  Constitutional: AOx3, NAD conversant  Neck: No JVD present. No thyromegaly present.   Cardiovascular: VAD hum  Pulmonary/Chest:good breath sounds bilaterally  Abdominal: + BS, exhibits no distension. There is no tenderness. There is no rebound.   Extremities: no cyanosis, clubbing or edema.  No bleeding, edema, erythema or hematoma, doppler pulses in all  distals    Labs:       Recent Labs  Lab 02/13/17  0552 02/14/17  0147 02/15/17  0347   WBC 15.50* 17.91* 15.08*   HGB 9.7* 8.5* 8.8*   HCT 28.7* 25.2* 26.0*    319 407*   LYMPH 18.6  2.9 18.4  3.3 15.3*  2.3   MONO 9.9  1.5* 8.8  1.6* 10.0  1.5*   EOSINOPHIL 1.5 1.2 2.6         Recent Labs  Lab 02/13/17  0552 02/13/17  1339 02/13/17  1950 02/14/17  0147 02/15/17  0347   APTT 61.7* 35.8* 37.8* 54.8*  --    INR 1.8*  --   --  2.1* 2.5*          Recent Labs  Lab 02/10/17  0446  02/13/17  0552  02/14/17  0147 02/14/17  0849 02/14/17  1248 02/15/17  0347   GLU 95  < > 97  --  104  --   --  84   CALCIUM 10.1  < > 10.2  --  10.0  --   --  9.6   ALBUMIN 3.9  --  4.1  --   --   --   --  3.7   PROT 8.1  --  8.2  --   --   --   --  7.6   *  < > 135*  --  130*  --   --  132*   K 3.1*  < > 3.9  3.9  < > 5.2* 4.2 4.2 3.7   CO2 32*  < > 29  --  27  --   --  30*   CL 85*  < > 89*  --  92*  --   --  90*   BUN 18  < > 18  --  19  --   --  20   CREATININE 1.0  < > 1.0  --  1.0  --   --  1.0   ALKPHOS 169*  --  193*  --   --   --   --  157*   ALT 52*  --  57*  --   --   --   --  41   AST 36  --  32  --   --   --   --  23   BILITOT 0.7  --  0.6  --   --   --   --  0.4   MG  --   < > 2.0  --  2.2  --   --  2.0   PHOS 3.7  < > 4.8*  --  4.8*  --   --  4.4   < > = values in this interval not displayed.  Estimated Creatinine Clearance: 91.3 mL/min (based on Cr of 1).      Recent Labs  Lab 02/15/17  0347   *         Recent Labs  Lab 02/09/17  0245 02/10/17  0446 02/11/17  0347 02/12/17  0526 02/13/17  0552 02/14/17  0147 02/15/17  0347   * 377* 343* 344* 309* 577* 243       Microbiology Results (last 7 days)     Procedure Component Value Units Date/Time    Blood culture [[id number]] Collected:  02/12/17 0950    Order Status:  Completed Specimen:  Blood Updated:  02/14/17 1612     Blood Culture, Routine No Growth to date     Blood Culture, Routine No Growth to date     Blood Culture, Routine No Growth to date     Blood culture [[id number]] Collected:  02/12/17 1311    Order Status:  Completed Specimen:  Blood Updated:  02/14/17 1412     Blood Culture, Routine No Growth to date     Blood Culture, Routine No Growth to date     Blood Culture, Routine No Growth to date    Narrative:       Collection has been rescheduled by SMW at 2/12/2017 09:50 Reason:   unable to collect 2nd set   Collection has been rescheduled by SMW at 2/12/2017 09:50 Reason:   unable to collect 2nd set     Urine culture [[id number]] Collected:  02/12/17 1137    Order Status:  Completed Specimen:  Urine from Urine, Clean Catch Updated:  02/13/17 2037     Urine Culture, Routine No growth            ASSESSMENT:     25 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 He was admitted for PICC removal, IABP, and planned LVAD implantation. He is s/p [facility] LVAD placement 2/1 and chest closure 2/2. Extubated 2/2. Now on floor.    PLAN:     Cardiogenic Shock due to Acute on Chronic Systolic HF, DCM, NYHA FC IV s/p HW LVAD 2/1/2017  -s/p HW LVAD 2/1 and chest closure/extubation 2/2   -Currently on Lasix, Lasix changed to 40 po BID for possible d/c  -Does not have ICD, per CTS will pursue outpatient, will discuss lifevest with Dr. Macedo  -Anticoagulation: therapeutic on coumadin  -cultures: NGTD  -LD elevated to 577 2/14, repeat LD back to 200s    Elevated WBC  - Infectious w/u  - BC x 2, UA, urine culture, CT chest / abdo / pelvis, post operative changes noted but no clear sign of infectious process thus far  - midline out 2/14  - CT maxillofacial negative for sinus related process     Atrial flutter  - No previous hx, spnotaneously resolved after 10 mins  -  stopped  - Keep  Mg > 2, K > 4  - Bedside echo performed 2/11 which showed LVEDD of 6.5 cm  - Loaded with IV amiodarone and started on PO amiodarone now, will setup follow up with EP    HTN  -BP controlled    Back Pain  -prn meds on board; will adjust    Prophylaxis   -therapeutic coumadin    Dispo: possible discharge later this week    Zhang Spears DO  Cardiology Fellow  Pager [phone]

## 2017-02-15 NOTE — PLAN OF CARE
"Problem: Patient Care Overview  Goal: Plan of Care Review  Dx: Heartware LVAD 2/1/17  Hx: Cardiomyopathy, Two brothers have had heart transplant, occasional smoker     1/27/17: IABP placed  2/1/17: LVAD, admitted to SICU, 1.5L albumin, chest remains open   2/2/17: IABP d/ced, chest closed, extubated  2/3/17: douglas d/c; OOBTC  2/4/17: epi off; chest tube #2 removed; OOBTC  2/5: Epi turned back on @ .04, Nitric increased to 10; OOBTC  2/6: OOBTC; chest tube removed      Nursing:   Goal MAP 60-80  pTT goal 40-50 (Dr. Macedo wants closer to "50")  K and Mag q6  Heartware speed at 2900     Outcome: Ongoing (interventions implemented as appropriate)  Plan of care discussed with patient, no new questions at this time. VSS, denies SOB, no complaint of pain. LVAD sounds are smooth, dressing changed per RN. Safety precautions taken, no falls/trauma during the shift. Discharge planing ongoing. Will continue to monitor.       "

## 2017-02-15 NOTE — NURSING TRANSFER
Nursing Transfer Note      2/15/2017     Transfer To: CT    Transfer via wheelchair    Transfer with cardiac monitoring, LVAD EMERGENCY BAG    Transported by PATIENT ESCORT    Medicines sent: NO    Chart send with patient: No    Notified: spouse    Patient reassessed at: 2/15/17 950    Upon arrival to floor: cardiac monitor applied, patient oriented to room, call bell in reach and bed in lowest position

## 2017-02-15 NOTE — PROGRESS NOTES
Update Note:    SW to pt's room for update. Pt aaox4, calm, and pleasant. Pt reports coping well at this time. Pt reports in agreement with plan to D/C home tomorrow. Pt reports wife will likely spend tonight in the hospital to be available all day tomorrow. Pt reports in agreement with [facility]. Verbal referral made to Kristen with [facility]. Kristen will verify that [facility] can accept pt. Pt reports no other questions, concerns, or needs at this time. SW providing ongoing psychosocial counseling and support, education, assistance, resources, and discharge planning as indicated. SW continuing to follow and remains available.

## 2017-02-15 NOTE — NURSING
Notified MD Brady of patient's symptomatic low flow alarm. MD said he would be by shortly to assess patient. Patient resting in bed, in no apparent sign of distress, will continue to monitor.

## 2017-02-15 NOTE — NURSING
Patient returned to floor from CT complaining of light-headedness, cold sweats, and low gabo alarm. Patient assessed in room, /63 map 75, NSR, and hematocrit adjusted on monitor. Margarita LVAD coordinator notified. HTS team called x3 with no answer. Will continue to monitor.

## 2017-02-15 NOTE — PLAN OF CARE
"Problem: Patient Care Overview  Goal: Plan of Care Review  Dx: Heartware LVAD 2/1/17  Hx: Cardiomyopathy, Two brothers have had heart transplant, occasional smoker     1/27/17: IABP placed  2/1/17: LVAD, admitted to SICU, 1.5L albumin, chest remains open   2/2/17: IABP d/ced, chest closed, extubated  2/3/17: douglas d/c; OOBTC  2/4/17: epi off; chest tube #2 removed; OOBTC  2/5: Epi turned back on @ .04, Nitric increased to 10; OOBTC  2/6: OOBTC; chest tube removed      Nursing:   Goal MAP 60-80  pTT goal 40-50 (Dr. Macedo wants closer to "50")  K and Mag q6  Heartware speed at 2900     Outcome: Ongoing (interventions implemented as appropriate)  POC reviewed with patient and he verbalized understanding. VSS and Pain being managed with PRN medication. LVAD dressing dry, clean, intact, and due for change today with soap and water per RN. Patient had low flow alarm this morning and team was notified. Patient in no apparent sign of distress, will continue to monitor.       "

## 2017-02-16 PROBLEM — D72.829 LEUKOCYTOSIS: Status: ACTIVE | Noted: 2017-02-16

## 2017-02-16 LAB
ANION GAP SERPL CALC-SCNC: 12 MMOL/L
BASOPHILS # BLD AUTO: 0.02 K/UL
BASOPHILS NFR BLD: 0.1 %
BUN SERPL-MCNC: 16 MG/DL
CALCIUM SERPL-MCNC: 9.5 MG/DL
CHLORIDE SERPL-SCNC: 92 MMOL/L
CO2 SERPL-SCNC: 29 MMOL/L
CREAT SERPL-MCNC: 0.9 MG/DL
DIFFERENTIAL METHOD: ABNORMAL
EOSINOPHIL # BLD AUTO: 0.2 K/UL
EOSINOPHIL NFR BLD: 1.3 %
ERYTHROCYTE [DISTWIDTH] IN BLOOD BY AUTOMATED COUNT: 14.1 %
EST. GFR  (AFRICAN AMERICAN): >60 ML/MIN/1.73 M^2
EST. GFR  (NON AFRICAN AMERICAN): >60 ML/MIN/1.73 M^2
ESTIMATED PA SYSTOLIC PRESSURE: 29
GLOBAL PERICARDIAL EFFUSION: NORMAL
GLUCOSE SERPL-MCNC: 99 MG/DL
HCT VFR BLD AUTO: 26.5 %
HGB BLD-MCNC: 8.8 G/DL
INR PPP: 3.1
LDH SERPL L TO P-CCNC: 234 U/L
LYMPHOCYTES # BLD AUTO: 1.9 K/UL
LYMPHOCYTES NFR BLD: 11.7 %
MAGNESIUM SERPL-MCNC: 1.9 MG/DL
MCH RBC QN AUTO: 28 PG
MCHC RBC AUTO-ENTMCNC: 33.2 %
MCV RBC AUTO: 84 FL
MONOCYTES # BLD AUTO: 1 K/UL
MONOCYTES NFR BLD: 6.4 %
NEUTROPHILS # BLD AUTO: 12.9 K/UL
NEUTROPHILS NFR BLD: 79.7 %
PHOSPHATE SERPL-MCNC: 3.7 MG/DL
PLATELET # BLD AUTO: 389 K/UL
PMV BLD AUTO: 9.1 FL
POTASSIUM SERPL-SCNC: 3.6 MMOL/L
PREALB SERPL-MCNC: 22 MG/DL
PROCALCITONIN SERPL IA-MCNC: <0.09 NG/ML
PROTHROMBIN TIME: 30.7 SEC
RBC # BLD AUTO: 3.14 M/UL
SODIUM SERPL-SCNC: 133 MMOL/L
TRICUSPID VALVE REGURGITATION: NORMAL
WBC # BLD AUTO: 16.18 K/UL

## 2017-02-16 PROCEDURE — 85025 COMPLETE CBC W/AUTO DIFF WBC: CPT

## 2017-02-16 PROCEDURE — 99499 UNLISTED E&M SERVICE: CPT | Mod: ,,, | Performed by: PHYSICIAN ASSISTANT

## 2017-02-16 PROCEDURE — 25000003 PHARM REV CODE 250: Performed by: STUDENT IN AN ORGANIZED HEALTH CARE EDUCATION/TRAINING PROGRAM

## 2017-02-16 PROCEDURE — 84100 ASSAY OF PHOSPHORUS: CPT

## 2017-02-16 PROCEDURE — 83735 ASSAY OF MAGNESIUM: CPT

## 2017-02-16 PROCEDURE — 97110 THERAPEUTIC EXERCISES: CPT

## 2017-02-16 PROCEDURE — 93306 TTE W/DOPPLER COMPLETE: CPT

## 2017-02-16 PROCEDURE — 99232 SBSQ HOSP IP/OBS MODERATE 35: CPT | Mod: ,,, | Performed by: INTERNAL MEDICINE

## 2017-02-16 PROCEDURE — 20600001 HC STEP DOWN PRIVATE ROOM

## 2017-02-16 PROCEDURE — 93306 TTE W/DOPPLER COMPLETE: CPT | Mod: 26,,, | Performed by: INTERNAL MEDICINE

## 2017-02-16 PROCEDURE — 99223 1ST HOSP IP/OBS HIGH 75: CPT | Mod: ,,, | Performed by: INTERNAL MEDICINE

## 2017-02-16 PROCEDURE — 36415 COLL VENOUS BLD VENIPUNCTURE: CPT

## 2017-02-16 PROCEDURE — 25000003 PHARM REV CODE 250: Performed by: INTERNAL MEDICINE

## 2017-02-16 PROCEDURE — 80048 BASIC METABOLIC PNL TOTAL CA: CPT

## 2017-02-16 PROCEDURE — 84145 PROCALCITONIN (PCT): CPT

## 2017-02-16 PROCEDURE — 25000003 PHARM REV CODE 250: Performed by: THORACIC SURGERY (CARDIOTHORACIC VASCULAR SURGERY)

## 2017-02-16 PROCEDURE — 25000003 PHARM REV CODE 250: Performed by: NURSE PRACTITIONER

## 2017-02-16 PROCEDURE — 87040 BLOOD CULTURE FOR BACTERIA: CPT | Mod: 59

## 2017-02-16 PROCEDURE — 85610 PROTHROMBIN TIME: CPT

## 2017-02-16 PROCEDURE — 84134 ASSAY OF PREALBUMIN: CPT

## 2017-02-16 PROCEDURE — 27000248 HC VAD-ADDITIONAL DAY

## 2017-02-16 PROCEDURE — 94799 UNLISTED PULMONARY SVC/PX: CPT

## 2017-02-16 PROCEDURE — 83615 LACTATE (LD) (LDH) ENZYME: CPT

## 2017-02-16 RX ORDER — WARFARIN SODIUM 5 MG/1
5 TABLET ORAL DAILY
Status: DISCONTINUED | OUTPATIENT
Start: 2017-02-16 | End: 2017-02-16

## 2017-02-16 RX ORDER — POTASSIUM CHLORIDE 20 MEQ/15ML
60 SOLUTION ORAL ONCE
Status: COMPLETED | OUTPATIENT
Start: 2017-02-16 | End: 2017-02-16

## 2017-02-16 RX ORDER — AMIODARONE HYDROCHLORIDE 200 MG/1
200 TABLET ORAL DAILY
Status: DISCONTINUED | OUTPATIENT
Start: 2017-02-17 | End: 2017-02-21 | Stop reason: HOSPADM

## 2017-02-16 RX ORDER — WARFARIN 2.5 MG/1
2.5 TABLET ORAL ONCE
Status: DISCONTINUED | OUTPATIENT
Start: 2017-02-16 | End: 2017-02-16

## 2017-02-16 RX ADMIN — ASPIRIN 325 MG: 325 TABLET, DELAYED RELEASE ORAL at 10:02

## 2017-02-16 RX ADMIN — OXYCODONE AND ACETAMINOPHEN 1 TABLET: 10; 325 TABLET ORAL at 09:02

## 2017-02-16 RX ADMIN — LISINOPRIL 5 MG: 5 TABLET ORAL at 09:02

## 2017-02-16 RX ADMIN — POTASSIUM CHLORIDE 60 MEQ: 1.5 SOLUTION ORAL at 10:02

## 2017-02-16 RX ADMIN — AMLODIPINE BESYLATE 10 MG: 10 TABLET ORAL at 10:02

## 2017-02-16 RX ADMIN — PANTOPRAZOLE SODIUM 40 MG: 40 TABLET, DELAYED RELEASE ORAL at 10:02

## 2017-02-16 RX ADMIN — FERROUS GLUCONATE TAB 324 MG (37.5 MG ELEMENTAL IRON) 324 MG: 324 (37.5 FE) TAB at 10:02

## 2017-02-16 RX ADMIN — AMIODARONE HYDROCHLORIDE 400 MG: 200 TABLET ORAL at 10:02

## 2017-02-16 RX ADMIN — MAGNESIUM OXIDE TAB 400 MG (241.3 MG ELEMENTAL MG) 400 MG: 400 (241.3 MG) TAB at 02:02

## 2017-02-16 RX ADMIN — MAGNESIUM OXIDE TAB 400 MG (241.3 MG ELEMENTAL MG) 400 MG: 400 (241.3 MG) TAB at 05:02

## 2017-02-16 RX ADMIN — MAGNESIUM OXIDE TAB 400 MG (241.3 MG ELEMENTAL MG) 400 MG: 400 (241.3 MG) TAB at 09:02

## 2017-02-16 RX ADMIN — OXYCODONE AND ACETAMINOPHEN 1 TABLET: 10; 325 TABLET ORAL at 10:02

## 2017-02-16 NOTE — PROGRESS NOTES
02/16/17 0408 02/16/17 0433   Device   Type Heartware Heartware   Flow 3.4 5.1   Speed 2896 2900   Power ([place]) 4.1 4.6     Patient complaining of lightheadedness and chest discomfort when getting up to get standing weight. Patient states that at the time of the lightheadedness, he felt a flutter in his chest and his low flow alarmed 3.4. Notified Dr. Hernandez and on call LVAD coordinator Margarita. No new orders @ this time, patient states that lightheadedness has resolved, and flow is back to normal range @ 5.1. RN instructed patient to call before getting out of bed to prevent a fall, and weight was done using the bed scale. will continue to monitor.

## 2017-02-16 NOTE — PLAN OF CARE
"Problem: Patient Care Overview  Goal: Plan of Care Review  Dx: Heartware LVAD 2/1/17  Hx: Cardiomyopathy, Two brothers have had heart transplant, occasional smoker     1/27/17: IABP placed  2/1/17: LVAD, admitted to SICU, 1.5L albumin, chest remains open   2/2/17: IABP d/ced, chest closed, extubated  2/3/17: douglas d/c; OOBTC  2/4/17: epi off; chest tube #2 removed; OOBTC  2/5: Epi turned back on @ .04, Nitric increased to 10; OOBTC  2/6: OOBTC; chest tube removed      Nursing:   Goal MAP 60-80  pTT goal 40-50 (Dr. Macedo wants closer to "50")  K and Mag q6  Heartware speed at 2900     Outcome: Ongoing (interventions implemented as appropriate)  Plan of care discussed with patient.  Patient ambulating independently, fall precautions in place.Continuing to encourage sternal precautions, IS, and ambulation. LVAD DP and numbers WNL, smooth LVAD hum. Speed changed from 2900 to 2700 due to low flow alarms. Patient has no complaints of pain. Discussed medications and care. Encouraged workbook, reviewed education, filled in binder. Patient has no questions at this time. Will continue to monitor.       "

## 2017-02-16 NOTE — PROGRESS NOTES
"PT aao x3 while sitting up in bed.  Pt reports having another "episode" in which pt reports being dizzy and "clammy" at 0400 and 10am.  Pt reports he leaned to his left to reach the phone and this happened.  MD team aware.  Suspect pt is dehydrated.  Discussed with Dr. Conner, plan is for STAT echo.  Plan explained to pt wiil follow up with pt tomorrow.   "

## 2017-02-16 NOTE — CONSULTS
[facility]  Infectious Disease  Consult Note    Patient Name: Mert Samayoa  MRN: [id number]  Admission Date: 1/27/2017  Hospital Length of Stay: 20 days  Attending Physician: Mario Conner MD  Primary Care Provider: Primary Doctor No     Isolation Status: No active isolations      Inpatient consult to Infectious Diseases  Consult performed by: TRICE HILLS JR  Consult ordered by: TOMÁS QUIJANO      Consult received.  Full consult to follow.    Thank you for your consult. I will follow-up with patient. Please contact us if you have any additional questions.    HAKEEM Cole  Infectious Disease  [facility]

## 2017-02-16 NOTE — PT/OT/SLP PROGRESS
"Physical Therapy  Treatment    Mert Samayoa   MRN: [id number]   Admitting Diagnosis: Chronic combined systolic and diastolic heart failure    PT Received On: 17  PT Start Time: 1028     PT Stop Time: 1038    PT Total Time (min): 10 min       Billable Minutes:  Therapeutic Exercise 10    Treatment Type: Treatment  PT/PTA: PTA     PTA Visit Number: 1       General Precautions: Standard, LVAD, fall, sternal  Orthopedic Precautions: N/A   Braces:      Do you have any cultural, spiritual, Methodist conflicts, given your current situation?: none     Subjective:  Communicated with RN prior to session.  "I was supposed to be going home today"      Pain Ratin/10     Objective:   Patient found with: peripheral IV, telemetry (LVAD)    Functional Mobility:  Bed Mobility: Not performed 2nd to RN wanting Pt to remain supine, resting.     Transfers: Not performed 2nd to RN wanting Pt to remain supine, resting.     Gait: Not performed 2nd to RN wanting Pt to remain supine, resting.    Balance:   Static Sit: GOOD-: Takes MODERATE challenges from all directions but inconsistently  Dynamic Sit: FAIR+: Maintains balance through MINIMAL excursions of active trunk motion  Static Stand: FAIR+: Takes MINIMAL challenges from all directions  Dynamic stand: FAIR+: Needs CLOSE SUPERVISION during gait and is able to right self with minor LOB     Therapeutic Activities and Exercises:  Supine AP, Hip AB/AD w/ min resistance, SLR, HS bilateral x10     AM-PAC 6 CLICK MOBILITY  How much help from another person does this patient currently need?   1 = Unable, Total/Dependent Assistance  2 = A lot, Maximum/Moderate Assistance  3 = A little, Minimum/Contact Guard/Supervision  4 = None, Modified [place]/Independent    Turning over in bed (including adjusting bedclothes, sheets and blankets)?: 4  Sitting down on and standing up from a chair with arms (e.g., wheelchair, bedside commode, etc.): 3  Moving from lying on back to sitting on " the side of the bed?: 4  Moving to and from a bed to a chair (including a wheelchair)?: 3  Need to walk in hospital room?: 3  Climbing 3-5 steps with a railing?: 3  Total Score: 20    AM-PAC Raw Score CMS G-Code Modifier Level of Impairment Assistance   6 % Total / Unable   7 - 9 CM 80 - 100% Maximal Assist   10 - 14 CL 60 - 80% Moderate Assist   15 - 19 CK 40 - 60% Moderate Assist   20 - 22 CJ 20 - 40% Minimal Assist   23 CI 1-20% SBA / CGA   24 CH 0% Independent/ Mod I     Patient left supine with call button in reach.    Assessment:  Mert Samayoa is a 27 y.o. male with a medical diagnosis of Chronic combined systolic and diastolic heart failure and presents with problems listed below. Tx limited to supine exercises today 2/2 low flow alarms on LVAD this a.m. Pt tolerable to tx and will address stair goals when cleared by RN.    Rehab identified problem list/impairments: Rehab identified problem list/impairments: weakness, impaired endurance, decreased coordination    Rehab potential is good.    Activity tolerance: Good    Discharge recommendations: Discharge Facility/Level Of Care Needs: home health PT     Barriers to discharge: Barriers to Discharge: Inaccessible home environment    Equipment recommendations: Equipment Needed After Discharge: none     GOALS:   Physical Therapy Goals        Problem: Physical Therapy Goal    Goal Priority Disciplines Outcome Goal Variances Interventions   Physical Therapy Goal     PT/OT, PT Ongoing (interventions implemented as appropriate)     Description:  Goals to be met by: 3/1/17     Patient will increase functional independence with mobility by performin. Supine to sit with [place] - met   2. Sit to stand transfer with [place] met   3. Gait  x 400 feet with Supervision. Met   4. Ascend/descend 2 stair with bilateral Handrails Supervision.   5. Lower extremity exercise program x15 reps per handout, with assistance as needed.- met  2/12  6. [place] with LVAD management.                    PLAN:    Patient to be seen 6 x/week  to address the above listed problems via gait training, therapeutic activities, therapeutic exercises  Plan of Care expires: 03/04/17  Plan of Care reviewed with: patient         JOSEPH Dutton  02/16/2017

## 2017-02-16 NOTE — PROGRESS NOTES
Patient back in room from walk, no acute distress noted. Patient on telemetry and battery for LVAD. Will continue to monitor.

## 2017-02-16 NOTE — PLAN OF CARE
[facility]   Heart Transplant/VAD Clinic   [address] [place] [address]   [phone] after hours          [phone] fax     VAD HOME  HEALTH ORDERS      Admit to Home Health    Diagnosis:   Patient Active Problem List   Diagnosis    Cigarette smoker    COCM (congestive cardiomyopathy)    Chronic combined systolic and diastolic heart failure    Organ transplant candidate    Chronic systolic congestive heart failure    Palliative care encounter    Counseling regarding advanced directives and goals of care    Hyperglycemia    LVAD (left ventricular assist device) present    Encounter for weaning from ventilator    Nausea    Acute on chronic combined systolic and diastolic congestive heart failure, NYHA class 4       Patient is homebound due to:      Diet: Low Fat, Low cholesterol, 2Gm Na, Coumadin restrictions.    Acitivities: No Swimming, bathing, vacuuming, contact sports.    Fresh implants= Sternal Precautions    Nursing:   SN to complete comprehensive assessment including routine vital signs. Instruct on disease process and s/s of complications to report to MD. Review/verify medication list sent home with the patient at time of discharge  and instruct patient/caregiver as needed. Frequency may be adjusted depending on start of care date.    **LVAD driveline exit site dressing change is to be completed per LVAD patient/caregiver only**.    Notify MD if SBP > 160 or < 90; DBP > 90 or < 50; HR > 120 or < 50; Temp > 101; Weight gain >3lbs in 1 day or 5lbs in 1 week.      LABS:  SN to perform labs: PT/INR per Coumadin clinic [phone].   Follow up INR date: 2/20/2017  No Finger Sticks      Aide to provide assistance with personal care, ADLs, and vital signs    Send initial Home Health orders to HTS attending physician on call.  Send follow up questions to VAD clinic MD [phone] or [phone].

## 2017-02-16 NOTE — PLAN OF CARE
"Problem: Patient Care Overview  Goal: Plan of Care Review  Dx: Heartware LVAD 2/1/17  Hx: Cardiomyopathy, Two brothers have had heart transplant, occasional smoker     1/27/17: IABP placed  2/1/17: LVAD, admitted to SICU, 1.5L albumin, chest remains open   2/2/17: IABP d/ced, chest closed, extubated  2/3/17: douglas d/c; OOBTC  2/4/17: epi off; chest tube #2 removed; OOBTC  2/5: Epi turned back on @ .04, Nitric increased to 10; OOBTC  2/6: OOBTC; chest tube removed      Nursing:   Goal MAP 60-80  pTT goal 40-50 (Dr. Macedo wants closer to "50")  K and Mag q6  Heartware speed at 2900     Outcome: Ongoing (interventions implemented as appropriate)  Patient verbalizes no complaints overnight. Denies chest pain, SOB, or other pain discomfort. LVAD dressing changed per wife this shift. Patient remains free of falls or injury. No significant events. Patient verbalizes complete understanding of plan of care. Will continue to monitor.      "

## 2017-02-16 NOTE — NURSING
LVAD dressing change completed using sterile technique with soap and water. DLES is a 2 with healing scab and no drainage noted on the drain sponge. Tolerated without any complication. Sutures remain intact, no redness, or tenderness noted.

## 2017-02-16 NOTE — PT/OT/SLP PROGRESS
Occupational Therapy      Mert Samayoa  MRN: [id number]    Patient not seen today secondary to low flow alarms on VAD with nsg recommending in bed activity this date. PT to follow today and OT to check status next date.     ALISA Mejia  2/16/2017

## 2017-02-16 NOTE — NURSING
Pt ambulated 500 feet nonstop with no dizziness, lightheadedness or low flow alarms. No low flow alarms noted since speed change earlier in afternoon.

## 2017-02-16 NOTE — NURSING
Changed speed on pt's main and backup LVAD controllers per order to 2700. Speed change tolerated well, no symptoms noted, will continue to monitor.

## 2017-02-16 NOTE — PROGRESS NOTES
Patient off the floor with his wife to the 6th floor to visit. Patient on battery and has emergency equipment with him. Patient on telemetry. Patient states he will be back in about 10 min. Awaiting patient's return. Will continue to monitor.

## 2017-02-16 NOTE — PROGRESS NOTES
Progress Note  Heart Transplant Service    Admit Date: 1/27/2017   LOS: 20 days     SUBJECTIVE:     Follow up for: Chronic combined systolic and diastolic heart failure    Interval History: Feeling well, no more epistaxis, white count 16, patient with 2 low flow alarms. Echo pending.    Scheduled Meds:   [START ON 2/17/2017] amiodarone  200 mg Oral Daily    amlodipine  10 mg Oral Daily    aspirin  325 mg Oral Daily    ferrous gluconate  324 mg Oral Daily with breakfast    lisinopril  5 mg Oral QHS    magnesium oxide  400 mg Oral TID    pantoprazole  40 mg Oral Daily    polyethylene glycol  17 g Oral BID    warfarin  2.5 mg Oral Once     Continuous Infusions:     PRN Meds:acetaminophen, albuterol sulfate, bisacodyl, glucagon (human recombinant), hydrocodone-acetaminophen 5-325mg, ondansetron, oxycodone-acetaminophen, senna-docusate 8.6-50 mg    Review of patient's allergies indicates:  No Known Allergies    OBJECTIVE:     Vital Signs (Most Recent)  Temp: 98.1 °F (36.7 °C) (02/16/17 0415)  Pulse: 74 (02/16/17 1100)  Resp: 18 (02/16/17 0800)  BP: (!) 84/0 (02/16/17 0800)  SpO2: 96 % (02/16/17 0415)    Vital Signs Range (Last 24H):  Temp:  [97.4 °F (36.3 °C)-98.7 °F (37.1 °C)]   Pulse:  [68-96]   Resp:  [18-20]   BP: (78-96)/(0-73)   SpO2:  [96 %-99 %]     I & O (Last 24H):    Intake/Output Summary (Last 24 hours) at 02/16/17 1137  Last data filed at 02/16/17 0500   Gross per 24 hour   Intake              785 ml   Output              800 ml   Net              -15 ml          Telemetry: sinus  Constitutional: AOx3, NAD conversant  Neck: No JVD present. No thyromegaly present.   Cardiovascular: VAD hum  Pulmonary/Chest:good breath sounds bilaterally  Abdominal: + BS, exhibits no distension. There is no tenderness. There is no rebound.   Extremities: no cyanosis, clubbing or edema.  No bleeding, edema, erythema or hematoma, doppler pulses in all distals    Labs:       Recent Labs  Lab 02/14/17  0147 02/15/17  0347  02/16/17 0356   WBC 17.91* 15.08* 16.18*   HGB 8.5* 8.8* 8.8*   HCT 25.2* 26.0* 26.5*    407* 389*   LYMPH 18.4  3.3 15.3*  2.3 11.7*  1.9   MONO 8.8  1.6* 10.0  1.5* 6.4  1.0   EOSINOPHIL 1.2 2.6 1.3         Recent Labs  Lab 02/13/17  1339 02/13/17  1950 02/14/17  0147 02/15/17  0347 02/16/17  0356   APTT 35.8* 37.8* 54.8*  --   --    INR  --   --  2.1* 2.5* 3.1*          Recent Labs  Lab 02/10/17  0446  02/13/17  0552  02/14/17  0147 02/14/17  1248 02/15/17  0347 02/16/17  0356   GLU 95  < > 97  --  104  --   --  84 99   CALCIUM 10.1  < > 10.2  --  10.0  --   --  9.6 9.5   ALBUMIN 3.9  --  4.1  --   --   --   --  3.7  --    PROT 8.1  --  8.2  --   --   --   --  7.6  --    *  < > 135*  --  130*  --   --  132* 133*   K 3.1*  < > 3.9  3.9  < > 5.2*  < > 4.2 3.7 3.6   CO2 32*  < > 29  --  27  --   --  30* 29   CL 85*  < > 89*  --  92*  --   --  90* 92*   BUN 18  < > 18  --  19  --   --  20 16   CREATININE 1.0  < > 1.0  --  1.0  --   --  1.0 0.9   ALKPHOS 169*  --  193*  --   --   --   --  157*  --    ALT 52*  --  57*  --   --   --   --  41  --    AST 36  --  32  --   --   --   --  23  --    BILITOT 0.7  --  0.6  --   --   --   --  0.4  --    MG  --   < > 2.0  --  2.2  --   --  2.0 1.9   PHOS 3.7  < > 4.8*  --  4.8*  --   --  4.4 3.7   < > = values in this interval not displayed.  Estimated Creatinine Clearance: 103.8 mL/min (based on Cr of 0.9).      Recent Labs  Lab 02/15/17  0347   *         Recent Labs  Lab 02/10/17  0446 02/11/17  0347 02/12/17  0526 02/13/17  0552 02/14/17  0147 02/15/17  0347 02/16/17  0356   * 343* 344* 309* 577* 243 234       Microbiology Results (last 7 days)     Procedure Component Value Units Date/Time    Blood culture [[id number]] Collected:  02/16/17 0736    Order Status:  Sent Specimen:  Blood Updated:  02/16/17 1007    Blood culture [[id number]] Collected:  02/16/17 0741    Order Status:  Sent Specimen:  Blood Updated:  02/16/17 1007    Blood culture  [[id number]] Collected:  02/12/17 0950    Order Status:  Completed Specimen:  Blood Updated:  02/15/17 1612     Blood Culture, Routine No Growth to date     Blood Culture, Routine No Growth to date     Blood Culture, Routine No Growth to date     Blood Culture, Routine No Growth to date    Blood culture [[id number]] Collected:  02/12/17 1311    Order Status:  Completed Specimen:  Blood Updated:  02/15/17 1412     Blood Culture, Routine No Growth to date     Blood Culture, Routine No Growth to date     Blood Culture, Routine No Growth to date     Blood Culture, Routine No Growth to date    Narrative:       Collection has been rescheduled by SMW at 2/12/2017 09:50 Reason:   unable to collect 2nd set   Collection has been rescheduled by SMW at 2/12/2017 09:50 Reason:   unable to collect 2nd set     Urine culture [[id number]] Collected:  02/12/17 1137    Order Status:  Completed Specimen:  Urine from Urine, Clean Catch Updated:  02/13/17 2037     Urine Culture, Routine No growth            ASSESSMENT:     27 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 He was admitted for PICC removal, IABP, and planned LVAD implantation. He is s/p HeartWare LVAD placement 2/1 and chest closure 2/2. Extubated 2/2. Now on floor.    PLAN:     Cardiogenic Shock due to Acute on Chronic Systolic HF, DCM, NYHA FC IV s/p HW LVAD 2/1/2017 2900RPM  -s/p HW LVAD 2/1 and chest closure/extubation 2/2   -Lasix d/c'ed due to low flow alarms  -Echo with 3cm LVEDD, will drop speed to 2800  -Does not have ICD, per CTS will pursue outpatient, will discuss lifevest with Dr. Macedo  -Anticoagulation: therapeutic on coumadin  -cultures: NGTD  -LD elevated to 577 2/14, repeat  LD back to 200s    Elevated WBC  - Infectious w/u  - BC x 2, UA, urine culture, CT chest / abdo / pelvis, post operative changes noted but no clear sign of infectious process thus far  - midline out 2/14  - CT maxillofacial negative for sinus related process   - ID to consult    Atrial flutter  - No previous hx, spnotaneously resolved after 10 mins  -  stopped  - Keep Mg > 2, K > 4  - Bedside echo performed 2/11 which showed LVEDD of 6.5 cm  - Loaded with IV amiodarone and started on PO amiodarone now, will setup follow up with EP    HTN  -BP controlled    Back Pain  -prn meds on board; will adjust    Prophylaxis   -therapeutic coumadin    Dispo: possible discharge later this week    Zhang Spears DO  Cardiology Fellow  Pager [phone]

## 2017-02-16 NOTE — PLAN OF CARE
Problem: Physical Therapy Goal  Goal: Physical Therapy Goal  Goals to be met by: 3/1/17     Patient will increase functional independence with mobility by performin. Supine to sit with [place] - met   2. Sit to stand transfer with [place] met   3. Gait x 400 feet with Supervision. Met   4. Ascend/descend 2 stair with bilateral Handrails Supervision.   5. Lower extremity exercise program x15 reps per handout, with assistance as needed.- met   6. [place] with LVAD management.    Pt continues to progress towards goals.  JOSEPH Dutton

## 2017-02-17 LAB
ALBUMIN SERPL BCP-MCNC: 3.4 G/DL
ALP SERPL-CCNC: 132 U/L
ALT SERPL W/O P-5'-P-CCNC: 32 U/L
ANION GAP SERPL CALC-SCNC: 10 MMOL/L
AST SERPL-CCNC: 21 U/L
BACTERIA BLD CULT: NORMAL
BACTERIA BLD CULT: NORMAL
BASOPHILS # BLD AUTO: 0.01 K/UL
BASOPHILS NFR BLD: 0.1 %
BILIRUB DIRECT SERPL-MCNC: 0.2 MG/DL
BILIRUB SERPL-MCNC: 0.4 MG/DL
BNP SERPL-MCNC: 271 PG/ML
BUN SERPL-MCNC: 14 MG/DL
CALCIUM SERPL-MCNC: 9.4 MG/DL
CHLORIDE SERPL-SCNC: 94 MMOL/L
CO2 SERPL-SCNC: 28 MMOL/L
CREAT SERPL-MCNC: 0.9 MG/DL
CRP SERPL-MCNC: 49.9 MG/L
DIASTOLIC DYSFUNCTION: YES
DIFFERENTIAL METHOD: ABNORMAL
EOSINOPHIL # BLD AUTO: 0.2 K/UL
EOSINOPHIL NFR BLD: 2.1 %
ERYTHROCYTE [DISTWIDTH] IN BLOOD BY AUTOMATED COUNT: 14.2 %
EST. GFR  (AFRICAN AMERICAN): >60 ML/MIN/1.73 M^2
EST. GFR  (NON AFRICAN AMERICAN): >60 ML/MIN/1.73 M^2
GLUCOSE SERPL-MCNC: 86 MG/DL
HCT VFR BLD AUTO: 25.6 %
HGB BLD-MCNC: 8.3 G/DL
INR PPP: 2.6
LDH SERPL L TO P-CCNC: 248 U/L
LYMPHOCYTES # BLD AUTO: 1.6 K/UL
LYMPHOCYTES NFR BLD: 14.5 %
MAGNESIUM SERPL-MCNC: 1.8 MG/DL
MCH RBC QN AUTO: 27.3 PG
MCHC RBC AUTO-ENTMCNC: 32.4 %
MCV RBC AUTO: 84 FL
MONOCYTES # BLD AUTO: 1.2 K/UL
MONOCYTES NFR BLD: 10.2 %
NEUTROPHILS # BLD AUTO: 8.1 K/UL
NEUTROPHILS NFR BLD: 71.9 %
PHOSPHATE SERPL-MCNC: 3.8 MG/DL
PLATELET # BLD AUTO: 438 K/UL
PMV BLD AUTO: 8.5 FL
POTASSIUM SERPL-SCNC: 3.7 MMOL/L
PROT SERPL-MCNC: 6.8 G/DL
PROTHROMBIN TIME: 25.7 SEC
RBC # BLD AUTO: 3.04 M/UL
SODIUM SERPL-SCNC: 132 MMOL/L
WBC # BLD AUTO: 11.22 K/UL

## 2017-02-17 PROCEDURE — 83880 ASSAY OF NATRIURETIC PEPTIDE: CPT

## 2017-02-17 PROCEDURE — 25000003 PHARM REV CODE 250: Performed by: STUDENT IN AN ORGANIZED HEALTH CARE EDUCATION/TRAINING PROGRAM

## 2017-02-17 PROCEDURE — 99233 SBSQ HOSP IP/OBS HIGH 50: CPT | Mod: ,,, | Performed by: PHYSICIAN ASSISTANT

## 2017-02-17 PROCEDURE — 25000003 PHARM REV CODE 250: Performed by: NURSE PRACTITIONER

## 2017-02-17 PROCEDURE — 97803 MED NUTRITION INDIV SUBSEQ: CPT

## 2017-02-17 PROCEDURE — 25000003 PHARM REV CODE 250: Performed by: THORACIC SURGERY (CARDIOTHORACIC VASCULAR SURGERY)

## 2017-02-17 PROCEDURE — 93306 TTE W/DOPPLER COMPLETE: CPT

## 2017-02-17 PROCEDURE — 80048 BASIC METABOLIC PNL TOTAL CA: CPT

## 2017-02-17 PROCEDURE — 99232 SBSQ HOSP IP/OBS MODERATE 35: CPT | Mod: ,,, | Performed by: INTERNAL MEDICINE

## 2017-02-17 PROCEDURE — 36415 COLL VENOUS BLD VENIPUNCTURE: CPT

## 2017-02-17 PROCEDURE — 93306 TTE W/DOPPLER COMPLETE: CPT | Mod: 26,,, | Performed by: INTERNAL MEDICINE

## 2017-02-17 PROCEDURE — 20600001 HC STEP DOWN PRIVATE ROOM

## 2017-02-17 PROCEDURE — 25000003 PHARM REV CODE 250: Performed by: INTERNAL MEDICINE

## 2017-02-17 PROCEDURE — 85610 PROTHROMBIN TIME: CPT

## 2017-02-17 PROCEDURE — 86140 C-REACTIVE PROTEIN: CPT

## 2017-02-17 PROCEDURE — 83615 LACTATE (LD) (LDH) ENZYME: CPT

## 2017-02-17 PROCEDURE — 85025 COMPLETE CBC W/AUTO DIFF WBC: CPT

## 2017-02-17 PROCEDURE — 84100 ASSAY OF PHOSPHORUS: CPT

## 2017-02-17 PROCEDURE — 83735 ASSAY OF MAGNESIUM: CPT

## 2017-02-17 PROCEDURE — 97116 GAIT TRAINING THERAPY: CPT

## 2017-02-17 PROCEDURE — 27000248 HC VAD-ADDITIONAL DAY

## 2017-02-17 PROCEDURE — 80076 HEPATIC FUNCTION PANEL: CPT

## 2017-02-17 RX ORDER — LISINOPRIL 5 MG/1
5 TABLET ORAL NIGHTLY
Qty: 30 TABLET | Refills: 3 | Status: CANCELLED | OUTPATIENT
Start: 2017-02-17 | End: 2018-02-17

## 2017-02-17 RX ORDER — AMIODARONE HYDROCHLORIDE 200 MG/1
200 TABLET ORAL DAILY
Qty: 30 TABLET | Refills: 11 | Status: CANCELLED | OUTPATIENT
Start: 2017-02-17 | End: 2018-02-17

## 2017-02-17 RX ORDER — FERROUS GLUCONATE 324(38)MG
324 TABLET ORAL
Status: CANCELLED | COMMUNITY
Start: 2017-02-17

## 2017-02-17 RX ORDER — ASPIRIN 325 MG
325 TABLET, DELAYED RELEASE (ENTERIC COATED) ORAL DAILY
Refills: 0 | Status: CANCELLED | COMMUNITY
Start: 2017-02-17 | End: 2018-02-17

## 2017-02-17 RX ORDER — AMLODIPINE BESYLATE 10 MG/1
10 TABLET ORAL DAILY
Qty: 30 TABLET | Refills: 11 | Status: CANCELLED | OUTPATIENT
Start: 2017-02-17 | End: 2018-02-17

## 2017-02-17 RX ORDER — WARFARIN SODIUM 5 MG/1
5 TABLET ORAL
Status: DISCONTINUED | OUTPATIENT
Start: 2017-02-18 | End: 2017-02-20

## 2017-02-17 RX ORDER — LANOLIN ALCOHOL/MO/W.PET/CERES
400 CREAM (GRAM) TOPICAL 3 TIMES DAILY
Qty: 90 TABLET | Refills: 3 | Status: CANCELLED | OUTPATIENT
Start: 2017-02-17

## 2017-02-17 RX ORDER — HYDROCODONE BITARTRATE AND ACETAMINOPHEN 5; 325 MG/1; MG/1
1 TABLET ORAL EVERY 6 HOURS PRN
Qty: 50 TABLET | Refills: 0 | Status: CANCELLED
Start: 2017-02-17

## 2017-02-17 RX ORDER — FUROSEMIDE 40 MG/1
40 TABLET ORAL DAILY
Status: DISCONTINUED | OUTPATIENT
Start: 2017-02-17 | End: 2017-02-21 | Stop reason: HOSPADM

## 2017-02-17 RX ORDER — WARFARIN 7.5 MG/1
7.5 TABLET ORAL
Status: DISCONTINUED | OUTPATIENT
Start: 2017-02-17 | End: 2017-02-20

## 2017-02-17 RX ADMIN — MAGNESIUM OXIDE TAB 400 MG (241.3 MG ELEMENTAL MG) 400 MG: 400 (241.3 MG) TAB at 08:02

## 2017-02-17 RX ADMIN — AMIODARONE HYDROCHLORIDE 200 MG: 200 TABLET ORAL at 08:02

## 2017-02-17 RX ADMIN — FERROUS GLUCONATE TAB 324 MG (37.5 MG ELEMENTAL IRON) 324 MG: 324 (37.5 FE) TAB at 08:02

## 2017-02-17 RX ADMIN — AMLODIPINE BESYLATE 10 MG: 10 TABLET ORAL at 08:02

## 2017-02-17 RX ADMIN — LISINOPRIL 5 MG: 5 TABLET ORAL at 08:02

## 2017-02-17 RX ADMIN — ASPIRIN 325 MG: 325 TABLET, DELAYED RELEASE ORAL at 08:02

## 2017-02-17 RX ADMIN — PANTOPRAZOLE SODIUM 40 MG: 40 TABLET, DELAYED RELEASE ORAL at 08:02

## 2017-02-17 RX ADMIN — MAGNESIUM OXIDE TAB 400 MG (241.3 MG ELEMENTAL MG) 400 MG: 400 (241.3 MG) TAB at 05:02

## 2017-02-17 RX ADMIN — WARFARIN SODIUM 7.5 MG: 7.5 TABLET ORAL at 04:02

## 2017-02-17 RX ADMIN — MAGNESIUM OXIDE TAB 400 MG (241.3 MG ELEMENTAL MG) 400 MG: 400 (241.3 MG) TAB at 02:02

## 2017-02-17 RX ADMIN — FUROSEMIDE 40 MG: 40 TABLET ORAL at 04:02

## 2017-02-17 RX ADMIN — OXYCODONE AND ACETAMINOPHEN 1 TABLET: 10; 325 TABLET ORAL at 08:02

## 2017-02-17 NOTE — PLAN OF CARE
Problem: Physical Therapy Goal  Goal: Physical Therapy Goal  Goals to be met by: 3/1/17     Patient will increase functional independence with mobility by performin. Supine to sit with [place] - met   2. Sit to stand transfer with [place] met   3. Gait x 400 feet with Supervision. Met   4. Ascend/descend 2 stair with bilateral Handrails Supervision. Met   5. Lower extremity exercise program x15 reps per handout, with assistance as needed.- met   6. [place] with LVAD management. Met        Pt has met all goals.  JOSEPH Dutton

## 2017-02-17 NOTE — PROGRESS NOTES
Progress Note  Heart Transplant Service    Admit Date: 1/27/2017   LOS: 21 days     SUBJECTIVE:     Follow up for: Chronic combined systolic and diastolic heart failure    Interval History: Feeling well, white count down and no low flow alarms.    Scheduled Meds:   amiodarone  200 mg Oral Daily    amlodipine  10 mg Oral Daily    aspirin  325 mg Oral Daily    ferrous gluconate  324 mg Oral Daily with breakfast    furosemide  40 mg Oral Daily    lisinopril  5 mg Oral QHS    magnesium oxide  400 mg Oral TID    pantoprazole  40 mg Oral Daily    polyethylene glycol  17 g Oral BID    [START ON 2/18/2017] warfarin  5 mg Oral Every Tues, Thurs, Sat    warfarin  7.5 mg Oral Every Mon, Wed, Fri, Sun     Continuous Infusions:     PRN Meds:acetaminophen, albuterol sulfate, bisacodyl, glucagon (human recombinant), hydrocodone-acetaminophen 5-325mg, ondansetron, oxycodone-acetaminophen, senna-docusate 8.6-50 mg    Review of patient's allergies indicates:  No Known Allergies    OBJECTIVE:     Vital Signs (Most Recent)  Temp: 98.6 °F (37 °C) (02/17/17 1212)  Pulse: 76 (02/17/17 1500)  Resp: 18 (02/17/17 1212)  BP: (!) 74/0 (02/17/17 1215)  SpO2: 98 % (02/17/17 1212)    Vital Signs Range (Last 24H):  Temp:  [97.6 °F (36.4 °C)-98.9 °F (37.2 °C)]   Pulse:  [63-90]   Resp:  [16-20]   BP: ()/(0-73)   SpO2:  [95 %-100 %]     I & O (Last 24H):    Intake/Output Summary (Last 24 hours) at 02/17/17 1557  Last data filed at 02/17/17 0500   Gross per 24 hour   Intake              800 ml   Output              600 ml   Net              200 ml          Telemetry: sinus  Constitutional: AOx3, NAD conversant  Neck: No JVD present. No thyromegaly present.   Cardiovascular: VAD hum  Pulmonary/Chest:good breath sounds bilaterally  Abdominal: + BS, exhibits no distension. There is no tenderness. There is no rebound.   Extremities: no cyanosis, clubbing or edema.  No bleeding, edema, erythema or hematoma, doppler pulses in all  distals    Labs:       Recent Labs  Lab 02/15/17  0347 02/16/17  0356 02/17/17  0629   WBC 15.08* 16.18* 11.22   HGB 8.8* 8.8* 8.3*   HCT 26.0* 26.5* 25.6*   * 389* 438*   LYMPH 15.3*  2.3 11.7*  1.9 14.5*  1.6   MONO 10.0  1.5* 6.4  1.0 10.2  1.2*   EOSINOPHIL 2.6 1.3 2.1         Recent Labs  Lab 02/13/17  1339 02/13/17  1950 02/14/17  0147 02/15/17  0347 02/16/17  0356 02/17/17  0629   APTT 35.8* 37.8* 54.8*  --   --   --    INR  --   --  2.1* 2.5* 3.1* 2.6*          Recent Labs  Lab 02/13/17  0552  02/15/17  0347 02/16/17  0356 02/17/17  0629   GLU 97  < > 84 99 86   CALCIUM 10.2  < > 9.6 9.5 9.4   ALBUMIN 4.1  --  3.7  --  3.4*   PROT 8.2  --  7.6  --  6.8   *  < > 132* 133* 132*   K 3.9  3.9  < > 3.7 3.6 3.7   CO2 29  < > 30* 29 28   CL 89*  < > 90* 92* 94*   BUN 18  < > 20 16 14   CREATININE 1.0  < > 1.0 0.9 0.9   ALKPHOS 193*  --  157*  --  132   ALT 57*  --  41  --  32   AST 32  --  23  --  21   BILITOT 0.6  --  0.4  --  0.4   MG 2.0  < > 2.0 1.9 1.8   PHOS 4.8*  < > 4.4 3.7 3.8   < > = values in this interval not displayed.  Estimated Creatinine Clearance: 104.6 mL/min (based on Cr of 0.9).      Recent Labs  Lab 02/17/17  0629   *         Recent Labs  Lab 02/11/17 0347 02/12/17  0526 02/13/17  0552 02/14/17  0147 02/15/17  0347 02/16/17  0356 02/17/17  0629   * 344* 309* 577* 243 234 248       Microbiology Results (last 7 days)     Procedure Component Value Units Date/Time    Blood culture [[id number]] Collected:  02/16/17 0736    Order Status:  Completed Specimen:  Blood Updated:  02/17/17 1412     Blood Culture, Routine No Growth to date     Blood Culture, Routine No Growth to date    Blood culture [[id number]] Collected:  02/16/17 0741    Order Status:  Completed Specimen:  Blood Updated:  02/17/17 1412     Blood Culture, Routine No Growth to date     Blood Culture, Routine No Growth to date    Blood culture [[id number]] Collected:  02/12/17 1311    Order Status:   Completed Specimen:  Blood Updated:  02/17/17 1412     Blood Culture, Routine No growth after 5 days.    Narrative:       Collection has been rescheduled by SMW at 2/12/2017 09:50 Reason:   unable to collect 2nd set   Collection has been rescheduled by SMW at 2/12/2017 09:50 Reason:   unable to collect 2nd set     Blood culture [[id number]] Collected:  02/12/17 0950    Order Status:  Completed Specimen:  Blood Updated:  02/16/17 1612     Blood Culture, Routine No Growth to date     Blood Culture, Routine No Growth to date     Blood Culture, Routine No Growth to date     Blood Culture, Routine No Growth to date     Blood Culture, Routine No Growth to date    Urine culture [[id number]] Collected:  02/12/17 1137    Order Status:  Completed Specimen:  Urine from Urine, Clean Catch Updated:  02/13/17 2037     Urine Culture, Routine No growth            ASSESSMENT:     25 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 He was admitted for PICC removal, IABP, and planned LVAD implantation. He is s/p [facility] LVAD placement 2/1 and chest closure 2/2. Extubated 2/2. Now on floor.    PLAN:     Cardiogenic Shock due to Acute on Chronic Systolic HF, DCM, NYHA FC IV s/p HW LVAD 2/1/2017 2700RPM  -s/p HW LVAD 2/1 and chest closure/extubation 2/2   -Lasix resumed at 40mg daily  -Todays echo s/p speed change to 2700 looks improved  -Does not have ICD, per CTS will pursue outpatient  -Anticoagulation: therapeutic on coumadin  -cultures: NGTD  -LD elevated to 577 2/14, repeat LD back to 200s    Elevated WBC - improving  - Infectious w/u  - BC x 2, UA, urine culture, CT chest / abdo / pelvis, post operative changes noted but no clear sign of infectious  process thus far  - midline out 2/14  - CT maxillofacial negative for sinus related process   - appreciate ID consult reccomendations    Atrial flutter  - No previous hx, spnotaneously resolved after 10 mins  -  stopped  - Keep Mg > 2, K > 4  - Bedside echo performed 2/11 which showed LVEDD of 6.5 cm  - Loaded with IV amiodarone and started on PO amiodarone now, will setup follow up with EP    HTN  -BP controlled    Back Pain  -prn meds on board; will adjust    Prophylaxis   -therapeutic coumadin    Dispo: possible discharge early next week    Zhang Spears DO  Cardiology Fellow  Pager [phone]

## 2017-02-17 NOTE — PLAN OF CARE
"Problem: Patient Care Overview  Goal: Plan of Care Review  Dx: [facility] LVAD 2/1/17  Hx: Cardiomyopathy, Two brothers have had heart transplant, occasional smoker     1/27/17: IABP placed  2/1/17: LVAD, admitted to SICU, 1.5L albumin, chest remains open   2/2/17: IABP d/ced, chest closed, extubated  2/3/17: douglas d/c; OOBTC  2/4/17: epi off; chest tube #2 removed; OOBTC  2/5: Epi turned back on @ .04, Nitric increased to 10; OOBTC  2/6: OOBTC; chest tube removed      Nursing:   Goal MAP 60-80  pTT goal 40-50 (Dr. Macedo wants closer to "50")  K and Mag q6  [facility] speed at 2900     Outcome: Ongoing (interventions implemented as appropriate)  Pt resting in room and walking multiple times. Pt denies any pain or discomfort. No SOB or chest pain. VSS. VAD numbers WNL. Skin integrity intact and without breakdown. Call light in reach. Fall precautions in place, pt did not have any falls or injuries this shift. Plan to d/c on Monday after observation over the weekend. Plan of care discussed with patient no questions at this time. Will continue to monitor.       "

## 2017-02-17 NOTE — PROGRESS NOTES
Report received from SUE Burrell. Care assumed. VSS, per EPIC. VAD numbers WNL. No changes in assessment. Will continue to monitor.

## 2017-02-17 NOTE — PROGRESS NOTES
[facility]  Infectious Disease  Progress Note    Patient Name: Mert Samayoa  MRN: [id number]  Admission Date: 1/27/2017  Length of Stay: 21 days  Attending Physician: Mario Conner MD  Primary Care Provider: Primary Doctor No    Isolation Status: No active isolations  Assessment/Plan:      Leukocytosis  - ? Cause   - resolved  - non septic/stable  - cultures NGTD  - rec continue to monitor  - if decompensates reconsult ID  - no abx indicated, will sign off      Anticipated Disposition: TBD    Thank you for your consult. I will sign off. Please contact us if you have any additional questions.    HAKEEM Cole  Infectious Disease  [facility]    Subjective:     Principal Problem:Chronic combined systolic and diastolic heart failure    Interval History: No AEON. WBC normalized today, afebrile. Had some left inguinal discomfort when urinating that has resolved.  The patient denies any recent fever, chills, or sweats.      HPI:  28 yo male s/p LVAD 2/1 and closure on 2/2.  Patient developed slowly progressive leukocytosis over last couple of days to 16.68 today.  He reports feeling good and The patient denies any recent fever, chills, or sweats.CT chest showed moderate volume pericardial fluid w small focus of air, no fluid collections around Dl, and small volume pleural fluid with compressive atelectasis vs consolidation. Prior CXRs hve been clear.  Blood cultures 2/12 NTD and 2/16 sent  Other cultures have been non growth. The patient denies any recent fever, chills, or sweats.          Review of Systems   Constitutional: Negative for chills, diaphoresis and fever.   Respiratory: Negative for shortness of breath.    Cardiovascular: Negative for chest pain.   Gastrointestinal: Negative for abdominal pain, diarrhea, nausea and vomiting.   Genitourinary: Negative for dysuria and hematuria.     Objective:     Vital Signs (Most Recent):  Temp: 98.6 °F (37 °C) (02/17/17  1212)  Pulse: 76 (02/17/17 1500)  Resp: 18 (02/17/17 1212)  BP: (!) 74/0 (02/17/17 1215)  SpO2: 98 % (02/17/17 1212) Vital Signs (24h Range):  Temp:  [97.6 °F (36.4 °C)-98.8 °F (37.1 °C)] 98.6 °F (37 °C)  Pulse:  [63-90] 76  Resp:  [16-20] 18  SpO2:  [95 %-100 %] 98 %  BP: ()/(0-73) 74/0     Weight: 60 kg (132 lb 4.4 oz)  Body mass index is 20.72 kg/(m^2).    Estimated Creatinine Clearance: 104.6 mL/min (based on Cr of 0.9).    Physical Exam   Constitutional: He is oriented to person, place, and time. He appears well-developed and well-nourished. No distress.       HENT:   Head: Normocephalic and atraumatic.   Mouth/Throat: Uvula is midline, oropharynx is clear and moist and mucous membranes are normal. No oral lesions.   Eyes: Conjunctivae and EOM are normal. Pupils are equal, round, and reactive to light. No scleral icterus.   Neck: Normal range of motion.   Cardiovascular: Normal rate, regular rhythm and normal heart sounds.  Exam reveals no gallop and no friction rub.    No murmur heard.  Pulmonary/Chest: Effort normal and breath sounds normal. No respiratory distress. He has no wheezes. He has no rales.   Decreased BS LML and LL  Possibly but LVAD sound exam   Abdominal: Soft. Bowel sounds are normal. He exhibits no distension and no mass. There is no hepatosplenomegaly. There is no tenderness. There is no rebound and no guarding.   Musculoskeletal: He exhibits no edema.   Lymphadenopathy:        Head (right side): No submental, no submandibular, no tonsillar, no preauricular, no posterior auricular and no occipital adenopathy present.        Head (left side): No submental, no submandibular, no tonsillar, no preauricular, no posterior auricular and no occipital adenopathy present.     He has no cervical adenopathy.     He has no axillary adenopathy.        Right: No inguinal, no supraclavicular and no epitrochlear adenopathy present.        Left: No inguinal, no supraclavicular and no epitrochlear  adenopathy present.   Neurological: He is alert and oriented to person, place, and time.   Skin: Skin is warm, dry and intact. No rash noted.   Psychiatric: He has a normal mood and affect.       Significant Labs:   Blood Culture:   Recent Labs  Lab 01/27/17  1837 02/12/17  0950 02/12/17  1311 02/16/17  0736 02/16/17  0741   LABBLOO No growth after 5 days. No growth after 5 days. No growth after 5 days. No Growth to date  No Growth to date No Growth to date  No Growth to date     CBC:   Recent Labs  Lab 02/16/17  0356 02/17/17  0629   WBC 16.18* 11.22   HGB 8.8* 8.3*   HCT 26.5* 25.6*   * 438*     CMP:   Recent Labs  Lab 02/16/17  0356 02/17/17  0629   * 132*   K 3.6 3.7   CL 92* 94*   CO2 29 28   GLU 99 86   BUN 16 14   CREATININE 0.9 0.9   CALCIUM 9.5 9.4   PROT  --  6.8   ALBUMIN  --  3.4*   BILITOT  --  0.4   ALKPHOS  --  132   AST  --  21   ALT  --  32   ANIONGAP 12 10   EGFRNONAA >60.0 >60.0       Significant Imaging: I have reviewed all pertinent imaging results/findings within the past 24 hours.     X-Ray Chest PA And Lateral [[id number]] Resulted: 02/16/17 1343       Order Status: Completed Updated: 02/16/17 1343       Narrative:         Cardiac size is mildly enlarged with left ventricular assist device is in place.  lungs are clear except for some loss of volume at the left lung base.  No vascular congestion or pleural fluid is seen.  No interval change.       Impression:          See above      Electronically signed by: Jeremiah Kidd MD  Date: 02/16/17  Time: 13:43        CT Maxillofacial Without Contrast [[id number]] Resulted: 02/15/17 1112       Order Status: Completed Updated: 02/15/17 1112       Narrative:         CT sinuses without contrast    CLINICAL INDICATION: 27 year old M with clinical concern for sinus disease     TECHNIQUE: Axial CT images obtained throughout the region of the paranasal sinuses without use of intravenous contrast. Axial, sagittal and coronal reconstructions  were performed.    COMPARISON: CT without contrast 1/16/2017.    FINDINGS:    There is trace mucosal thickening in the inferior aspect of the maxillary sinuses bilaterally which is thin and smooth measuring less than 2 mm. Otherwise the the paranasal sinuses are presently essentially clear.  No air-fluid levels or aerated secretions to specifically indicate acute sinusitis.  No osseous thickening or sclerosis to specifically indicate chronic sinus disease.    Ostiomeatal units are patent.  Nasal cavity is unremarkable.    Limited intracranial evaluation is unremarkable. There is a gap in the right orbital floor which may represent prior fracture or congenital variant, there is no significant herniation of intra-orbital contents into the maxillary sinus. Otherwise the osseous structures are intact.       Impression:             Trace mucosal thickening in the bilateral maxillary sinuses.      ______________________________________     Electronically signed by resident: CINDY MITTAL  Date: 02/15/17  Time: 10:48            As the supervising and teaching physician, I personally reviewed the images and resident's interpretation and I agree with the findings.          Electronically signed by: ROXANA MCLEOD MD  Date: 02/15/17  Time: 11:12        CT Abdomen Pelvis Without Contrast [[id number]] Resulted: 02/13/17 0831       Order Status: Completed Updated: 02/13/17 0832       Narrative:         TECHNIQUE: 5 mm axial CT images of the abdomen and pelvis were obtained without the administration of IV or oral contrast.  Coronal and sagittal reformatted images were reviewed.    COMPARISON: CT chest, abdomen and pelvis 1/16/2017.    FINDINGS:    The visualized soft tissue and vascular structures of the base of the neck are unremarkable.    There is postoperative change of the anteromedial and sternotomy and left ventricular assist device placement.  Please note that significant artifact from a left ventricular assist device  limits evaluation of structures within the lower chest and upper abdomen.  There is a moderate volume of pericardial fluid in small foci of pneumopericardium.  Definitive evaluation of the position and patency of the left ventricular assist device cannulae is is limited by noncontrast technique.   No significant soft tissue stranding or fluid identified about the LVAD drive line course within the soft tissues.  There are prominent mediastinal lymph nodes present with a prevascular lymph node measuring 0.9 cm in short axis diameter.    There is a small small volume of pleural fluid present with associated atelectatic versus consolidative change in the left lower lobe.    The liver is mildly prominent in size.  Otherwise, the liver, spleen, pancreas and adrenal glands demonstrate an unremarkable noncontrast CT appearance.    The kidneys are normal in size and location.  No hydroureteronephrosis.  The urinary bladder demonstrates smooth margins.  The prostate is unremarkable.    Visualized loops of large and small bowel demonstrate no evidence of obstruction or inflammatory change.  There is a moderate volume of retained fecal material within the colon which can be seen with constipation.  Clinical correlation recommended.    Osseous structures demonstrate no acute abnormality.       Impression:           Postoperative change of median sternotomy and interval placement of left ventricular assist device with moderate volume of heterogeneous pericardial effusion with associated intermixed foci of air/pneumopericardium.    Small left pleural effusion with associated atelectatic consolidative change in the left lower lobe.    Prominent mediastinal lymph nodes.        Electronically signed by: NOHEMI ZAIDI  Date: 02/13/17  Time: 08:31        CT Chest Without Contrast [[id number]] Resulted: 02/13/17 0831       Order Status: Completed Updated: 02/13/17 0832       Narrative:         TECHNIQUE: 5 mm axial CT images of the abdomen  and pelvis were obtained without the administration of IV or oral contrast.  Coronal and sagittal reformatted images were reviewed.    COMPARISON: CT chest, abdomen and pelvis 1/16/2017.    FINDINGS:    The visualized soft tissue and vascular structures of the base of the neck are unremarkable.    There is postoperative change of the anteromedial and sternotomy and left ventricular assist device placement.  Please note that significant artifact from a left ventricular assist device limits evaluation of structures within the lower chest and upper abdomen.  There is a moderate volume of pericardial fluid in small foci of pneumopericardium.  Definitive evaluation of the position and patency of the left ventricular assist device cannulae is is limited by noncontrast technique.   No significant soft tissue stranding or fluid identified about the LVAD drive line course within the soft tissues.  There are prominent mediastinal lymph nodes present with a prevascular lymph node measuring 0.9 cm in short axis diameter.    There is a small small volume of pleural fluid present with associated atelectatic versus consolidative change in the left lower lobe.    The liver is mildly prominent in size.  Otherwise, the liver, spleen, pancreas and adrenal glands demonstrate an unremarkable noncontrast CT appearance.    The kidneys are normal in size and location.  No hydroureteronephrosis.  The urinary bladder demonstrates smooth margins.  The prostate is unremarkable.    Visualized loops of large and small bowel demonstrate no evidence of obstruction or inflammatory change.  There is a moderate volume of retained fecal material within the colon which can be seen with constipation.  Clinical correlation recommended.    Osseous structures demonstrate no acute abnormality.       Impression:           Postoperative change of median sternotomy and interval placement of left ventricular assist device with moderate volume of heterogeneous  pericardial effusion with associated intermixed foci of air/pneumopericardium.    Small left pleural effusion with associated atelectatic consolidative change in the left lower lobe.    Prominent mediastinal lymph nodes.        Electronically signed by: NOHEMI ZAIDI  Date: 02/13/17  Time: 08:31        X-Ray Chest 1 View [[id number]] Resulted: 02/12/17 1017       Order Status: Completed Updated: 02/12/17 1017       Narrative:         PORTABLE AP CHEST:      Comparison: 2/10/17    Findings:     Sternal wires.  LEFT ventricular assist device.The lungs are clear. The cardiac silhouette is normal in size. The pulmonary vasculature is unremarkable. There is no pleural effusion or pneumothorax. The hilar and mediastinal contours are unremarkable. There are no acute bony abnormalities.       Impression:             No significant change.      Electronically signed by: Dr. Anshul Blount MD  Date: 02/12/17  Time: 10:17

## 2017-02-17 NOTE — SUBJECTIVE & OBJECTIVE
Past Medical History   Diagnosis Date    Cardiogenic shock 1/16/2017    Cardiomyopathy      Familial cardiomyopathy    CHF (congestive heart failure)     Essential hypertension 12/21/2016    Familial Cardiomyopathy      Familial cardiomyopathy        History reviewed. No pertinent past surgical history.    Review of patient's allergies indicates:  No Known Allergies    Medications:  Prescriptions Prior to Admission   Medication Sig    DOBUTAMINE HCL (DOBUTAMINE IV) Inject into the vein.    furosemide (LASIX) 20 MG tablet Take 1 tablet (20 mg total) by mouth once daily.    lisinopril 10 MG tablet TAKE ONE TABLET BY MOUTH ONCE DAILY IN THE EVENING     Antibiotics     None        Antifungals     None        Antivirals     None           Immunization History   Administered Date(s) Administered    Pneumococcal Conjugate - 13 Valent 01/20/2017       Family History     Problem Relation (Age of Onset)    Arthritis Mother    Birth defects Paternal Uncle    Heart disease Brother, Brother    No Known Problems Father        Social History     Social History    Marital status:      Spouse name: N/A    Number of children: N/A    Years of education: N/A     Social History Main Topics    Smoking status: Current Some Day Smoker     Packs/day: 1.00    Smokeless tobacco: None    Alcohol use 2.4 oz/week     4 Shots of liquor per week    Drug use: No    Sexual activity: Not Asked     Other Topics Concern    None     Social History Narrative    Works        Review of Systems   Constitutional: Negative for appetite change, chills, diaphoresis, fatigue, fever and unexpected weight change.   HENT: Negative for congestion, ear pain, sore throat and tinnitus.    Eyes: Negative for pain, redness and visual disturbance.   Respiratory: Negative for cough, shortness of breath and wheezing.    Cardiovascular: Negative for chest pain, palpitations and leg swelling.        Sternal CP    Gastrointestinal: Negative for abdominal pain, constipation, diarrhea, rectal pain and vomiting.   Endocrine: Negative for cold intolerance and heat intolerance.   Genitourinary: Negative for dysuria, flank pain, frequency, hematuria and urgency.   Musculoskeletal: Negative for arthralgias, back pain, myalgias and neck pain.   Skin: Negative for rash.   Allergic/Immunologic: Negative for immunocompromised state.   Neurological: Negative for dizziness, light-headedness, numbness and headaches.   Hematological: Negative for adenopathy. Does not bruise/bleed easily.   Psychiatric/Behavioral: Negative for confusion and sleep disturbance. The patient is not nervous/anxious.      Objective:     Vital Signs (Most Recent):  Temp: 98.8 °F (37.1 °C) (02/16/17 1919)  Pulse: 68 (02/16/17 2100)  Resp: 20 (02/16/17 1919)  BP: (!) 80/0 (02/16/17 1926)  SpO2: 95 % (02/16/17 1919) Vital Signs (24h Range):  Temp:  [98.1 °F (36.7 °C)-98.9 °F (37.2 °C)] 98.8 °F (37.1 °C)  Pulse:  [66-96] 68  Resp:  [18-20] 20  SpO2:  [95 %-99 %] 95 %  BP: (74-90)/(0-67) 80/0     Weight: 59.5 kg (131 lb 2.8 oz)  Body mass index is 20.54 kg/(m^2).    Estimated Creatinine Clearance: 103.8 mL/min (based on Cr of 0.9).    Physical Exam   Constitutional: He is oriented to person, place, and time. He appears well-developed and well-nourished. No distress.       HENT:   Head: Normocephalic and atraumatic.   Mouth/Throat: Uvula is midline, oropharynx is clear and moist and mucous membranes are normal. No oral lesions.   Eyes: Conjunctivae and EOM are normal. Pupils are equal, round, and reactive to light. No scleral icterus.   Neck: Normal range of motion.   Cardiovascular: Normal rate, regular rhythm and normal heart sounds.  Exam reveals no gallop and no friction rub.    No murmur heard.  Pulmonary/Chest: Effort normal and breath sounds normal. No respiratory distress. He has no wheezes. He has no rales.   Decreased BS LML and LL  Possibly but LVAD sound  exam   Abdominal: Soft. Bowel sounds are normal. He exhibits no distension and no mass. There is no hepatosplenomegaly. There is no tenderness. There is no rebound and no guarding.   Musculoskeletal: He exhibits no edema.   Lymphadenopathy:        Head (right side): No submental, no submandibular, no tonsillar, no preauricular, no posterior auricular and no occipital adenopathy present.        Head (left side): No submental, no submandibular, no tonsillar, no preauricular, no posterior auricular and no occipital adenopathy present.     He has no cervical adenopathy.     He has no axillary adenopathy.        Right: No inguinal, no supraclavicular and no epitrochlear adenopathy present.        Left: No inguinal, no supraclavicular and no epitrochlear adenopathy present.   Neurological: He is alert and oriented to person, place, and time.   Skin: Skin is warm, dry and intact. No rash noted.   Psychiatric: He has a normal mood and affect.       Significant Labs:   Blood Culture:     Recent Labs  Lab 01/27/17  1837 02/12/17  0950 02/12/17  1311 02/16/17  0736 02/16/17  0741   LABBLOO No growth after 5 days. No Growth to date  No Growth to date  No Growth to date  No Growth to date  No Growth to date No Growth to date  No Growth to date  No Growth to date  No Growth to date  No Growth to date No Growth to date No Growth to date     CBC:     Recent Labs  Lab 02/15/17  0347 02/16/17  0356   WBC 15.08* 16.18*   HGB 8.8* 8.8*   HCT 26.0* 26.5*   * 389*     CMP:     Recent Labs  Lab 02/15/17  0347 02/16/17  0356   * 133*   K 3.7 3.6   CL 90* 92*   CO2 30* 29   GLU 84 99   BUN 20 16   CREATININE 1.0 0.9   CALCIUM 9.6 9.5   PROT 7.6  --    ALBUMIN 3.7  --    BILITOT 0.4  --    ALKPHOS 157*  --    AST 23  --    ALT 41  --    ANIONGAP 12 12   EGFRNONAA >60.0 >60.0       Significant Imaging: I have reviewed all pertinent imaging results/findings within the past 24 hours.   X-Ray Chest PA And Lateral [[id number]]  Resulted: 02/16/17 1343        Order Status: Completed Updated: 02/16/17 1343       Narrative:         Cardiac size is mildly enlarged with left ventricular assist device is in place.  lungs are clear except for some loss of volume at the left lung base.  No vascular congestion or pleural fluid is seen.  No interval change.       Impression:          See above      Electronically signed by: Jeremiah Kidd MD  Date: 02/16/17  Time: 13:43        CT Maxillofacial Without Contrast [[id number]] Resulted: 02/15/17 1112       Order Status: Completed Updated: 02/15/17 1112       Narrative:         CT sinuses without contrast    CLINICAL INDICATION: 27 year old M with clinical concern for sinus disease     TECHNIQUE: Axial CT images obtained throughout the region of the paranasal sinuses without use of intravenous contrast. Axial, sagittal and coronal reconstructions were performed.    COMPARISON: CT without contrast 1/16/2017.    FINDINGS:    There is trace mucosal thickening in the inferior aspect of the maxillary sinuses bilaterally which is thin and smooth measuring less than 2 mm. Otherwise the the paranasal sinuses are presently essentially clear.  No air-fluid levels or aerated secretions to specifically indicate acute sinusitis.  No osseous thickening or sclerosis to specifically indicate chronic sinus disease.    Ostiomeatal units are patent.  Nasal cavity is unremarkable.    Limited intracranial evaluation is unremarkable. There is a gap in the right orbital floor which may represent prior fracture or congenital variant, there is no significant herniation of intra-orbital contents into the maxillary sinus. Otherwise the osseous structures are intact.       Impression:             Trace mucosal thickening in the bilateral maxillary sinuses.      ______________________________________     Electronically signed by resident: CINDY MITTAL  Date: 02/15/17  Time: 10:48            As the supervising and teaching  physician, I personally reviewed the images and resident's interpretation and I agree with the findings.          Electronically signed by: ROXANA MCLEOD MD  Date: 02/15/17  Time: 11:12        CT Abdomen Pelvis Without Contrast [[id number]] Resulted: 02/13/17 0831       Order Status: Completed Updated: 02/13/17 0832       Narrative:         TECHNIQUE: 5 mm axial CT images of the abdomen and pelvis were obtained without the administration of IV or oral contrast.  Coronal and sagittal reformatted images were reviewed.    COMPARISON: CT chest, abdomen and pelvis 1/16/2017.    FINDINGS:    The visualized soft tissue and vascular structures of the base of the neck are unremarkable.    There is postoperative change of the anteromedial and sternotomy and left ventricular assist device placement.  Please note that significant artifact from a left ventricular assist device limits evaluation of structures within the lower chest and upper abdomen.  There is a moderate volume of pericardial fluid in small foci of pneumopericardium.  Definitive evaluation of the position and patency of the left ventricular assist device cannulae is is limited by noncontrast technique.   No significant soft tissue stranding or fluid identified about the LVAD drive line course within the soft tissues.  There are prominent mediastinal lymph nodes present with a prevascular lymph node measuring 0.9 cm in short axis diameter.    There is a small small volume of pleural fluid present with associated atelectatic versus consolidative change in the left lower lobe.    The liver is mildly prominent in size.  Otherwise, the liver, spleen, pancreas and adrenal glands demonstrate an unremarkable noncontrast CT appearance.    The kidneys are normal in size and location.  No hydroureteronephrosis.  The urinary bladder demonstrates smooth margins.  The prostate is unremarkable.    Visualized loops of large and small bowel demonstrate no evidence of obstruction  or inflammatory change.  There is a moderate volume of retained fecal material within the colon which can be seen with constipation.  Clinical correlation recommended.    Osseous structures demonstrate no acute abnormality.       Impression:           Postoperative change of median sternotomy and interval placement of left ventricular assist device with moderate volume of heterogeneous pericardial effusion with associated intermixed foci of air/pneumopericardium.    Small left pleural effusion with associated atelectatic consolidative change in the left lower lobe.    Prominent mediastinal lymph nodes.        Electronically signed by: NOHEMI ZAIDI  Date: 02/13/17  Time: 08:31        CT Chest Without Contrast [[id number]] Resulted: 02/13/17 0831       Order Status: Completed Updated: 02/13/17 0832       Narrative:         TECHNIQUE: 5 mm axial CT images of the abdomen and pelvis were obtained without the administration of IV or oral contrast.  Coronal and sagittal reformatted images were reviewed.    COMPARISON: CT chest, abdomen and pelvis 1/16/2017.    FINDINGS:    The visualized soft tissue and vascular structures of the base of the neck are unremarkable.    There is postoperative change of the anteromedial and sternotomy and left ventricular assist device placement.  Please note that significant artifact from a left ventricular assist device limits evaluation of structures within the lower chest and upper abdomen.  There is a moderate volume of pericardial fluid in small foci of pneumopericardium.  Definitive evaluation of the position and patency of the left ventricular assist device cannulae is is limited by noncontrast technique.   No significant soft tissue stranding or fluid identified about the LVAD drive line course within the soft tissues.  There are prominent mediastinal lymph nodes present with a prevascular lymph node measuring 0.9 cm in short axis diameter.    There is a small small volume of pleural  fluid present with associated atelectatic versus consolidative change in the left lower lobe.    The liver is mildly prominent in size.  Otherwise, the liver, spleen, pancreas and adrenal glands demonstrate an unremarkable noncontrast CT appearance.    The kidneys are normal in size and location.  No hydroureteronephrosis.  The urinary bladder demonstrates smooth margins.  The prostate is unremarkable.    Visualized loops of large and small bowel demonstrate no evidence of obstruction or inflammatory change.  There is a moderate volume of retained fecal material within the colon which can be seen with constipation.  Clinical correlation recommended.    Osseous structures demonstrate no acute abnormality.       Impression:           Postoperative change of median sternotomy and interval placement of left ventricular assist device with moderate volume of heterogeneous pericardial effusion with associated intermixed foci of air/pneumopericardium.    Small left pleural effusion with associated atelectatic consolidative change in the left lower lobe.    Prominent mediastinal lymph nodes.        Electronically signed by: NOHEMI ZAIDI  Date: 02/13/17  Time: 08:31        X-Ray Chest 1 View [[id number]] Resulted: 02/12/17 1017       Order Status: Completed Updated: 02/12/17 1017       Narrative:         PORTABLE AP CHEST:      Comparison: 2/10/17    Findings:     Sternal wires.  LEFT ventricular assist device.The lungs are clear. The cardiac silhouette is normal in size. The pulmonary vasculature is unremarkable. There is no pleural effusion or pneumothorax. The hilar and mediastinal contours are unremarkable. There are no acute bony abnormalities.       Impression:             No significant change.      Electronically signed by: Dr. Anshul Blount MD  Date: 02/12/17  Time: 10:17        X-Ray Chest 1 View [[id number]] Resulted: 02/10/17 1052       Order Status: Completed Updated: 02/10/17 1056       Narrative:         One  view: There is cardiomegaly and LVAD.  Lungs are clear.  There is postoperative change.       Impression:          Cardiomegaly.      Electronically signed by: SYLVESTER GUILLORY  Date: 02/10/17  Time: 10:52

## 2017-02-17 NOTE — PT/OT/SLP DISCHARGE
Physical Therapy Discharge Summary    Mert Samayoa  MRN: [id number]   Chronic combined systolic and diastolic heart failure   Patient Discharged from acute Physical Therapy on 2017.  Please refer to prior PT noted date on 2017 for functional status.     Assessment:   Patient has met all goals and is not appropriate for therapy.  GOALS:   Physical Therapy Goals        Problem: Physical Therapy Goal    Goal Priority Disciplines Outcome Goal Variances Interventions   Physical Therapy Goal     PT/OT, PT Ongoing (interventions implemented as appropriate)     Description:  Goals to be met by: 3/1/17     Patient will increase functional independence with mobility by performin. Supine to sit with [place] - met   2. Sit to stand transfer with [place] met   3. Gait  x 400 feet with Supervision. Met   4. Ascend/descend 2 stair with bilateral Handrails Supervision. Met   5. Lower extremity exercise program x15 reps per handout, with assistance as needed.- met   6. [place] with LVAD management. Met                     Reasons for Discontinuation of Therapy Services  Satisfactory goal achievement.      Plan:  Patient Discharged to: Home no PT services needed.    Jessica LeJeune, PT, DPT  [phone]

## 2017-02-17 NOTE — CONSULTS
[facility]  Infectious Disease  Consult Note    Patient Name: Mert Samayoa  MRN: [id number]  Admission Date: 1/27/2017  Hospital Length of Stay: 20 days  Attending Physician: Mario Conner MD  Primary Care Provider: Primary Doctor No     Isolation Status: No active isolations    Patient information was obtained from patient, records and ER records.      Consults  Assessment/Plan:     Leukocytosis  - ? Cause - ? LLL pna - asymptomatic  - non septic/stable  - cultures NGTD  - rec watch x 24 hrs - if decompensates then will treat otherwise watch      Thank you for your consult. I will follow-up with patient. Please contact us if you have any additional questions.    HAKEEM Cole  Infectious Disease  [facility]    Subjective:     Principal Problem: Chronic combined systolic and diastolic heart failure    HPI: 26 yo male s/p LVAD 2/1 and closure on 2/2.  Patient developed slowly progressive leukocytosis over last couple of days to 16.68 today.  He reports feeling good and The patient denies any recent fever, chills, or sweats.CT chest showed moderate volume pericardial fluid w small focus of air, no fluid collections around Dl, and small volume pleural fluid with compressive atelectasis vs consolidation. Prior CXRs hve been clear.  Blood cultures 2/12 NTD and 2/16 sent  Other cultures have been non growth. The patient denies any recent fever, chills, or sweats.          Past Medical History   Diagnosis Date    Cardiogenic shock 1/16/2017    Cardiomyopathy      Familial cardiomyopathy    CHF (congestive heart failure)     Essential hypertension 12/21/2016    Familial Cardiomyopathy      Familial cardiomyopathy        History reviewed. No pertinent past surgical history.    Review of patient's allergies indicates:  No Known Allergies    Medications:  Prescriptions Prior to Admission   Medication Sig    DOBUTAMINE HCL (DOBUTAMINE IV) Inject into the vein.     furosemide (LASIX) 20 MG tablet Take 1 tablet (20 mg total) by mouth once daily.    lisinopril 10 MG tablet TAKE ONE TABLET BY MOUTH ONCE DAILY IN THE EVENING     Antibiotics     None        Antifungals     None        Antivirals     None           Immunization History   Administered Date(s) Administered    Pneumococcal Conjugate - 13 Valent 01/20/2017       Family History     Problem Relation (Age of Onset)    Arthritis Mother    Birth defects Paternal Uncle    Heart disease Brother, Brother    No Known Problems Father        Social History     Social History    Marital status:      Spouse name: N/A    Number of children: N/A    Years of education: N/A     Social History Main Topics    Smoking status: Current Some Day Smoker     Packs/day: 1.00    Smokeless tobacco: None    Alcohol use 2.4 oz/week     4 Shots of liquor per week    Drug use: No    Sexual activity: Not Asked     Other Topics Concern    None     Social History Narrative    Works        Review of Systems   Constitutional: Negative for appetite change, chills, diaphoresis, fatigue, fever and unexpected weight change.   HENT: Negative for congestion, ear pain, sore throat and tinnitus.    Eyes: Negative for pain, redness and visual disturbance.   Respiratory: Negative for cough, shortness of breath and wheezing.    Cardiovascular: Negative for chest pain, palpitations and leg swelling.        Sternal CP   Gastrointestinal: Negative for abdominal pain, constipation, diarrhea, rectal pain and vomiting.   Endocrine: Negative for cold intolerance and heat intolerance.   Genitourinary: Negative for dysuria, flank pain, frequency, hematuria and urgency.   Musculoskeletal: Negative for arthralgias, back pain, myalgias and neck pain.   Skin: Negative for rash.   Allergic/Immunologic: Negative for immunocompromised state.   Neurological: Negative for dizziness, light-headedness, numbness and headaches.   Hematological:  Negative for adenopathy. Does not bruise/bleed easily.   Psychiatric/Behavioral: Negative for confusion and sleep disturbance. The patient is not nervous/anxious.      Objective:     Vital Signs (Most Recent):  Temp: 98.8 °F (37.1 °C) (02/16/17 1919)  Pulse: 68 (02/16/17 2100)  Resp: 20 (02/16/17 1919)  BP: (!) 80/0 (02/16/17 1926)  SpO2: 95 % (02/16/17 1919) Vital Signs (24h Range):  Temp:  [98.1 °F (36.7 °C)-98.9 °F (37.2 °C)] 98.8 °F (37.1 °C)  Pulse:  [66-96] 68  Resp:  [18-20] 20  SpO2:  [95 %-99 %] 95 %  BP: (74-90)/(0-67) 80/0     Weight: 59.5 kg (131 lb 2.8 oz)  Body mass index is 20.54 kg/(m^2).    Estimated Creatinine Clearance: 103.8 mL/min (based on Cr of 0.9).    Physical Exam   Constitutional: He is oriented to person, place, and time. He appears well-developed and well-nourished. No distress.       HENT:   Head: Normocephalic and atraumatic.   Mouth/Throat: Uvula is midline, oropharynx is clear and moist and mucous membranes are normal. No oral lesions.   Eyes: Conjunctivae and EOM are normal. Pupils are equal, round, and reactive to light. No scleral icterus.   Neck: Normal range of motion.   Cardiovascular: Normal rate, regular rhythm and normal heart sounds.  Exam reveals no gallop and no friction rub.    No murmur heard.  Pulmonary/Chest: Effort normal and breath sounds normal. No respiratory distress. He has no wheezes. He has no rales.   Decreased BS LML and LL  Possibly but LVAD sound exam   Abdominal: Soft. Bowel sounds are normal. He exhibits no distension and no mass. There is no hepatosplenomegaly. There is no tenderness. There is no rebound and no guarding.   Musculoskeletal: He exhibits no edema.   Lymphadenopathy:        Head (right side): No submental, no submandibular, no tonsillar, no preauricular, no posterior auricular and no occipital adenopathy present.        Head (left side): No submental, no submandibular, no tonsillar, no preauricular, no posterior auricular and no occipital  adenopathy present.     He has no cervical adenopathy.     He has no axillary adenopathy.        Right: No inguinal, no supraclavicular and no epitrochlear adenopathy present.        Left: No inguinal, no supraclavicular and no epitrochlear adenopathy present.   Neurological: He is alert and oriented to person, place, and time.   Skin: Skin is warm, dry and intact. No rash noted.   Psychiatric: He has a normal mood and affect.       Significant Labs:   Blood Culture:     Recent Labs  Lab 01/27/17  1837 02/12/17  0950 02/12/17  1311 02/16/17  0736 02/16/17  0741   LABBLOO No growth after 5 days. No Growth to date  No Growth to date  No Growth to date  No Growth to date  No Growth to date No Growth to date  No Growth to date  No Growth to date  No Growth to date  No Growth to date No Growth to date No Growth to date     CBC:     Recent Labs  Lab 02/15/17  0347 02/16/17  0356   WBC 15.08* 16.18*   HGB 8.8* 8.8*   HCT 26.0* 26.5*   * 389*     CMP:     Recent Labs  Lab 02/15/17  0347 02/16/17  0356   * 133*   K 3.7 3.6   CL 90* 92*   CO2 30* 29   GLU 84 99   BUN 20 16   CREATININE 1.0 0.9   CALCIUM 9.6 9.5   PROT 7.6  --    ALBUMIN 3.7  --    BILITOT 0.4  --    ALKPHOS 157*  --    AST 23  --    ALT 41  --    ANIONGAP 12 12   EGFRNONAA >60.0 >60.0       Significant Imaging: I have reviewed all pertinent imaging results/findings within the past 24 hours.   X-Ray Chest PA And Lateral [[id number]] Resulted: 02/16/17 1343        Order Status: Completed Updated: 02/16/17 1343       Narrative:         Cardiac size is mildly enlarged with left ventricular assist device is in place.  lungs are clear except for some loss of volume at the left lung base.  No vascular congestion or pleural fluid is seen.  No interval change.       Impression:          See above      Electronically signed by: Jeremiah Kidd MD  Date: 02/16/17  Time: 13:43        CT Maxillofacial Without Contrast [[id number]] Resulted: 02/15/17  1112       Order Status: Completed Updated: 02/15/17 1112       Narrative:         CT sinuses without contrast    CLINICAL INDICATION: 27 year old M with clinical concern for sinus disease     TECHNIQUE: Axial CT images obtained throughout the region of the paranasal sinuses without use of intravenous contrast. Axial, sagittal and coronal reconstructions were performed.    COMPARISON: CT without contrast 1/16/2017.    FINDINGS:    There is trace mucosal thickening in the inferior aspect of the maxillary sinuses bilaterally which is thin and smooth measuring less than 2 mm. Otherwise the the paranasal sinuses are presently essentially clear.  No air-fluid levels or aerated secretions to specifically indicate acute sinusitis.  No osseous thickening or sclerosis to specifically indicate chronic sinus disease.    Ostiomeatal units are patent.  Nasal cavity is unremarkable.    Limited intracranial evaluation is unremarkable. There is a gap in the right orbital floor which may represent prior fracture or congenital variant, there is no significant herniation of intra-orbital contents into the maxillary sinus. Otherwise the osseous structures are intact.       Impression:             Trace mucosal thickening in the bilateral maxillary sinuses.      ______________________________________     Electronically signed by resident: CINDY MITTAL  Date: 02/15/17  Time: 10:48            As the supervising and teaching physician, I personally reviewed the images and resident's interpretation and I agree with the findings.          Electronically signed by: ROXANA MCLEOD MD  Date: 02/15/17  Time: 11:12        CT Abdomen Pelvis Without Contrast [[id number]] Resulted: 02/13/17 0831       Order Status: Completed Updated: 02/13/17 0832       Narrative:         TECHNIQUE: 5 mm axial CT images of the abdomen and pelvis were obtained without the administration of IV or oral contrast.  Coronal and sagittal reformatted images were  reviewed.    COMPARISON: CT chest, abdomen and pelvis 1/16/2017.    FINDINGS:    The visualized soft tissue and vascular structures of the base of the neck are unremarkable.    There is postoperative change of the anteromedial and sternotomy and left ventricular assist device placement.  Please note that significant artifact from a left ventricular assist device limits evaluation of structures within the lower chest and upper abdomen.  There is a moderate volume of pericardial fluid in small foci of pneumopericardium.  Definitive evaluation of the position and patency of the left ventricular assist device cannulae is is limited by noncontrast technique.   No significant soft tissue stranding or fluid identified about the LVAD drive line course within the soft tissues.  There are prominent mediastinal lymph nodes present with a prevascular lymph node measuring 0.9 cm in short axis diameter.    There is a small small volume of pleural fluid present with associated atelectatic versus consolidative change in the left lower lobe.    The liver is mildly prominent in size.  Otherwise, the liver, spleen, pancreas and adrenal glands demonstrate an unremarkable noncontrast CT appearance.    The kidneys are normal in size and location.  No hydroureteronephrosis.  The urinary bladder demonstrates smooth margins.  The prostate is unremarkable.    Visualized loops of large and small bowel demonstrate no evidence of obstruction or inflammatory change.  There is a moderate volume of retained fecal material within the colon which can be seen with constipation.  Clinical correlation recommended.    Osseous structures demonstrate no acute abnormality.       Impression:           Postoperative change of median sternotomy and interval placement of left ventricular assist device with moderate volume of heterogeneous pericardial effusion with associated intermixed foci of air/pneumopericardium.    Small left pleural effusion with  associated atelectatic consolidative change in the left lower lobe.    Prominent mediastinal lymph nodes.        Electronically signed by: NOHEMI ZAIDI  Date: 02/13/17  Time: 08:31        CT Chest Without Contrast [[id number]] Resulted: 02/13/17 0831       Order Status: Completed Updated: 02/13/17 0832       Narrative:         TECHNIQUE: 5 mm axial CT images of the abdomen and pelvis were obtained without the administration of IV or oral contrast.  Coronal and sagittal reformatted images were reviewed.    COMPARISON: CT chest, abdomen and pelvis 1/16/2017.    FINDINGS:    The visualized soft tissue and vascular structures of the base of the neck are unremarkable.    There is postoperative change of the anteromedial and sternotomy and left ventricular assist device placement.  Please note that significant artifact from a left ventricular assist device limits evaluation of structures within the lower chest and upper abdomen.  There is a moderate volume of pericardial fluid in small foci of pneumopericardium.  Definitive evaluation of the position and patency of the left ventricular assist device cannulae is is limited by noncontrast technique.   No significant soft tissue stranding or fluid identified about the LVAD drive line course within the soft tissues.  There are prominent mediastinal lymph nodes present with a prevascular lymph node measuring 0.9 cm in short axis diameter.    There is a small small volume of pleural fluid present with associated atelectatic versus consolidative change in the left lower lobe.    The liver is mildly prominent in size.  Otherwise, the liver, spleen, pancreas and adrenal glands demonstrate an unremarkable noncontrast CT appearance.    The kidneys are normal in size and location.  No hydroureteronephrosis.  The urinary bladder demonstrates smooth margins.  The prostate is unremarkable.    Visualized loops of large and small bowel demonstrate no evidence of obstruction or inflammatory  change.  There is a moderate volume of retained fecal material within the colon which can be seen with constipation.  Clinical correlation recommended.    Osseous structures demonstrate no acute abnormality.       Impression:           Postoperative change of median sternotomy and interval placement of left ventricular assist device with moderate volume of heterogeneous pericardial effusion with associated intermixed foci of air/pneumopericardium.    Small left pleural effusion with associated atelectatic consolidative change in the left lower lobe.    Prominent mediastinal lymph nodes.        Electronically signed by: NOHEMI ZAIDI  Date: 02/13/17  Time: 08:31        X-Ray Chest 1 View [[id number]] Resulted: 02/12/17 1017       Order Status: Completed Updated: 02/12/17 1017       Narrative:         PORTABLE AP CHEST:      Comparison: 2/10/17    Findings:     Sternal wires.  LEFT ventricular assist device.The lungs are clear. The cardiac silhouette is normal in size. The pulmonary vasculature is unremarkable. There is no pleural effusion or pneumothorax. The hilar and mediastinal contours are unremarkable. There are no acute bony abnormalities.       Impression:             No significant change.      Electronically signed by: Dr. Anshul Blount MD  Date: 02/12/17  Time: 10:17        X-Ray Chest 1 View [[id number]] Resulted: 02/10/17 1052       Order Status: Completed Updated: 02/10/17 1056       Narrative:         One view: There is cardiomegaly and LVAD.  Lungs are clear.  There is postoperative change.       Impression:          Cardiomegaly.      Electronically signed by: SYLVESTER GUILLORY  Date: 02/10/17  Time: 10:52

## 2017-02-17 NOTE — PROGRESS NOTES
Update:    Per PADMINI Sanders, LVAD coordinator, pt will not D/C today. Precious with [facility] notified. SW providing ongoing psychosocial counseling and support, education, assistance, resources, and discharge planning as indicated. SW continuing to follow and remains available.

## 2017-02-17 NOTE — PROGRESS NOTES
"Pt aao x 3 while sitting up in bed with no family at bedside.  Spent about 1 hour with pt discussing current plan of care and frustrations about cancelled discharge today.  Also explained to pt concerns for recent changes and low volume state and concerns and wishes per VAD team to monitor pt over the weekend.  After long discussion pt verbalizes " I understand the why, it just sucks".  Verbal encouragement and support offered. Explained to pt when follow up appointments will be next week in the event pt d.c on Monday. Pt verbalizes understanding and appreciation of support.  Will follow up with pt next week.      Waveforms collected and sent to [facility] for analysis and also discussed recent h/ o low flow alarms discussed with [facility] clinic rep Dominic Conway.  Plan of care appropriate per Dominic.    "

## 2017-02-17 NOTE — SUBJECTIVE & OBJECTIVE
Interval History: No AEON. WBC normalized today, afebrile. Had some left inguinal discomfort when urinating that has resolved.  The patient denies any recent fever, chills, or sweats.      HPI:  26 yo male s/p LVAD 2/1 and closure on 2/2.  Patient developed slowly progressive leukocytosis over last couple of days to 16.68 today.  He reports feeling good and The patient denies any recent fever, chills, or sweats.CT chest showed moderate volume pericardial fluid w small focus of air, no fluid collections around Dl, and small volume pleural fluid with compressive atelectasis vs consolidation. Prior CXRs hve been clear.  Blood cultures 2/12 NTD and 2/16 sent  Other cultures have been non growth. The patient denies any recent fever, chills, or sweats.          Review of Systems   Constitutional: Negative for chills, diaphoresis and fever.   Respiratory: Negative for shortness of breath.    Cardiovascular: Negative for chest pain.   Gastrointestinal: Negative for abdominal pain, diarrhea, nausea and vomiting.   Genitourinary: Negative for dysuria and hematuria.     Objective:     Vital Signs (Most Recent):  Temp: 98.6 °F (37 °C) (02/17/17 1212)  Pulse: 76 (02/17/17 1500)  Resp: 18 (02/17/17 1212)  BP: (!) 74/0 (02/17/17 1215)  SpO2: 98 % (02/17/17 1212) Vital Signs (24h Range):  Temp:  [97.6 °F (36.4 °C)-98.8 °F (37.1 °C)] 98.6 °F (37 °C)  Pulse:  [63-90] 76  Resp:  [16-20] 18  SpO2:  [95 %-100 %] 98 %  BP: ()/(0-73) 74/0     Weight: 60 kg (132 lb 4.4 oz)  Body mass index is 20.72 kg/(m^2).    Estimated Creatinine Clearance: 104.6 mL/min (based on Cr of 0.9).    Physical Exam   Constitutional: He is oriented to person, place, and time. He appears well-developed and well-nourished. No distress.       HENT:   Head: Normocephalic and atraumatic.   Mouth/Throat: Uvula is midline, oropharynx is clear and moist and mucous membranes are normal. No oral lesions.   Eyes: Conjunctivae and EOM are normal. Pupils are equal,  round, and reactive to light. No scleral icterus.   Neck: Normal range of motion.   Cardiovascular: Normal rate, regular rhythm and normal heart sounds.  Exam reveals no gallop and no friction rub.    No murmur heard.  Pulmonary/Chest: Effort normal and breath sounds normal. No respiratory distress. He has no wheezes. He has no rales.   Decreased BS LML and LL  Possibly but LVAD sound exam   Abdominal: Soft. Bowel sounds are normal. He exhibits no distension and no mass. There is no hepatosplenomegaly. There is no tenderness. There is no rebound and no guarding.   Musculoskeletal: He exhibits no edema.   Lymphadenopathy:        Head (right side): No submental, no submandibular, no tonsillar, no preauricular, no posterior auricular and no occipital adenopathy present.        Head (left side): No submental, no submandibular, no tonsillar, no preauricular, no posterior auricular and no occipital adenopathy present.     He has no cervical adenopathy.     He has no axillary adenopathy.        Right: No inguinal, no supraclavicular and no epitrochlear adenopathy present.        Left: No inguinal, no supraclavicular and no epitrochlear adenopathy present.   Neurological: He is alert and oriented to person, place, and time.   Skin: Skin is warm, dry and intact. No rash noted.   Psychiatric: He has a normal mood and affect.       Significant Labs:   Blood Culture:   Recent Labs  Lab 01/27/17  1837 02/12/17  0950 02/12/17  1311 02/16/17  0736 02/16/17  0741   LABBLOO No growth after 5 days. No growth after 5 days. No growth after 5 days. No Growth to date  No Growth to date No Growth to date  No Growth to date     CBC:   Recent Labs  Lab 02/16/17  0356 02/17/17  0629   WBC 16.18* 11.22   HGB 8.8* 8.3*   HCT 26.5* 25.6*   * 438*     CMP:   Recent Labs  Lab 02/16/17  0356 02/17/17  0629   * 132*   K 3.6 3.7   CL 92* 94*   CO2 29 28   GLU 99 86   BUN 16 14   CREATININE 0.9 0.9   CALCIUM 9.5 9.4   PROT  --  6.8    ALBUMIN  --  3.4*   BILITOT  --  0.4   ALKPHOS  --  132   AST  --  21   ALT  --  32   ANIONGAP 12 10   EGFRNONAA >60.0 >60.0       Significant Imaging: I have reviewed all pertinent imaging results/findings within the past 24 hours.     X-Ray Chest PA And Lateral [[id number]] Resulted: 02/16/17 1343       Order Status: Completed Updated: 02/16/17 1343       Narrative:         Cardiac size is mildly enlarged with left ventricular assist device is in place.  lungs are clear except for some loss of volume at the left lung base.  No vascular congestion or pleural fluid is seen.  No interval change.       Impression:          See above      Electronically signed by: Jeremiah Kidd MD  Date: 02/16/17  Time: 13:43        CT Maxillofacial Without Contrast [[id number]] Resulted: 02/15/17 1112       Order Status: Completed Updated: 02/15/17 1112       Narrative:         CT sinuses without contrast    CLINICAL INDICATION: 27 year old M with clinical concern for sinus disease     TECHNIQUE: Axial CT images obtained throughout the region of the paranasal sinuses without use of intravenous contrast. Axial, sagittal and coronal reconstructions were performed.    COMPARISON: CT without contrast 1/16/2017.    FINDINGS:    There is trace mucosal thickening in the inferior aspect of the maxillary sinuses bilaterally which is thin and smooth measuring less than 2 mm. Otherwise the the paranasal sinuses are presently essentially clear.  No air-fluid levels or aerated secretions to specifically indicate acute sinusitis.  No osseous thickening or sclerosis to specifically indicate chronic sinus disease.    Ostiomeatal units are patent.  Nasal cavity is unremarkable.    Limited intracranial evaluation is unremarkable. There is a gap in the right orbital floor which may represent prior fracture or congenital variant, there is no significant herniation of intra-orbital contents into the maxillary sinus. Otherwise the osseous structures are  intact.       Impression:             Trace mucosal thickening in the bilateral maxillary sinuses.      ______________________________________     Electronically signed by resident: CINDY MITTAL  Date: 02/15/17  Time: 10:48            As the supervising and teaching physician, I personally reviewed the images and resident's interpretation and I agree with the findings.          Electronically signed by: ROXANA MCLEOD MD  Date: 02/15/17  Time: 11:12        CT Abdomen Pelvis Without Contrast [[id number]] Resulted: 02/13/17 0831       Order Status: Completed Updated: 02/13/17 0832       Narrative:         TECHNIQUE: 5 mm axial CT images of the abdomen and pelvis were obtained without the administration of IV or oral contrast.  Coronal and sagittal reformatted images were reviewed.    COMPARISON: CT chest, abdomen and pelvis 1/16/2017.    FINDINGS:    The visualized soft tissue and vascular structures of the base of the neck are unremarkable.    There is postoperative change of the anteromedial and sternotomy and left ventricular assist device placement.  Please note that significant artifact from a left ventricular assist device limits evaluation of structures within the lower chest and upper abdomen.  There is a moderate volume of pericardial fluid in small foci of pneumopericardium.  Definitive evaluation of the position and patency of the left ventricular assist device cannulae is is limited by noncontrast technique.   No significant soft tissue stranding or fluid identified about the LVAD drive line course within the soft tissues.  There are prominent mediastinal lymph nodes present with a prevascular lymph node measuring 0.9 cm in short axis diameter.    There is a small small volume of pleural fluid present with associated atelectatic versus consolidative change in the left lower lobe.    The liver is mildly prominent in size.  Otherwise, the liver, spleen, pancreas and adrenal glands demonstrate an unremarkable  noncontrast CT appearance.    The kidneys are normal in size and location.  No hydroureteronephrosis.  The urinary bladder demonstrates smooth margins.  The prostate is unremarkable.    Visualized loops of large and small bowel demonstrate no evidence of obstruction or inflammatory change.  There is a moderate volume of retained fecal material within the colon which can be seen with constipation.  Clinical correlation recommended.    Osseous structures demonstrate no acute abnormality.       Impression:           Postoperative change of median sternotomy and interval placement of left ventricular assist device with moderate volume of heterogeneous pericardial effusion with associated intermixed foci of air/pneumopericardium.    Small left pleural effusion with associated atelectatic consolidative change in the left lower lobe.    Prominent mediastinal lymph nodes.        Electronically signed by: NOHEMI ZAIDI  Date: 02/13/17  Time: 08:31        CT Chest Without Contrast [[id number]] Resulted: 02/13/17 0831       Order Status: Completed Updated: 02/13/17 0832       Narrative:         TECHNIQUE: 5 mm axial CT images of the abdomen and pelvis were obtained without the administration of IV or oral contrast.  Coronal and sagittal reformatted images were reviewed.    COMPARISON: CT chest, abdomen and pelvis 1/16/2017.    FINDINGS:    The visualized soft tissue and vascular structures of the base of the neck are unremarkable.    There is postoperative change of the anteromedial and sternotomy and left ventricular assist device placement.  Please note that significant artifact from a left ventricular assist device limits evaluation of structures within the lower chest and upper abdomen.  There is a moderate volume of pericardial fluid in small foci of pneumopericardium.  Definitive evaluation of the position and patency of the left ventricular assist device cannulae is is limited by noncontrast technique.   No significant  soft tissue stranding or fluid identified about the LVAD drive line course within the soft tissues.  There are prominent mediastinal lymph nodes present with a prevascular lymph node measuring 0.9 cm in short axis diameter.    There is a small small volume of pleural fluid present with associated atelectatic versus consolidative change in the left lower lobe.    The liver is mildly prominent in size.  Otherwise, the liver, spleen, pancreas and adrenal glands demonstrate an unremarkable noncontrast CT appearance.    The kidneys are normal in size and location.  No hydroureteronephrosis.  The urinary bladder demonstrates smooth margins.  The prostate is unremarkable.    Visualized loops of large and small bowel demonstrate no evidence of obstruction or inflammatory change.  There is a moderate volume of retained fecal material within the colon which can be seen with constipation.  Clinical correlation recommended.    Osseous structures demonstrate no acute abnormality.       Impression:           Postoperative change of median sternotomy and interval placement of left ventricular assist device with moderate volume of heterogeneous pericardial effusion with associated intermixed foci of air/pneumopericardium.    Small left pleural effusion with associated atelectatic consolidative change in the left lower lobe.    Prominent mediastinal lymph nodes.        Electronically signed by: NOHEMI ZAIDI  Date: 02/13/17  Time: 08:31        X-Ray Chest 1 View [[id number]] Resulted: 02/12/17 1017       Order Status: Completed Updated: 02/12/17 1017       Narrative:         PORTABLE AP CHEST:      Comparison: 2/10/17    Findings:     Sternal wires.  LEFT ventricular assist device.The lungs are clear. The cardiac silhouette is normal in size. The pulmonary vasculature is unremarkable. There is no pleural effusion or pneumothorax. The hilar and mediastinal contours are unremarkable. There are no acute bony abnormalities.        Impression:             No significant change.      Electronically signed by: Dr. Anshul Blount MD  Date: 02/12/17  Time: 10:17

## 2017-02-17 NOTE — PLAN OF CARE
"Problem: Patient Care Overview  Goal: Plan of Care Review  Dx: Heartware LVAD 2/1/17  Hx: Cardiomyopathy, Two brothers have had heart transplant, occasional smoker     1/27/17: IABP placed  2/1/17: LVAD, admitted to SICU, 1.5L albumin, chest remains open   2/2/17: IABP d/ced, chest closed, extubated  2/3/17: douglas d/c; OOBTC  2/4/17: epi off; chest tube #2 removed; OOBTC  2/5: Epi turned back on @ .04, Nitric increased to 10; OOBTC  2/6: OOBTC; chest tube removed      Nursing:   Goal MAP 60-80  pTT goal 40-50 (Dr. Macedo wants closer to "50")  K and Mag q6  Heartware speed at 2900     Outcome: Ongoing (interventions implemented as appropriate)  Patient complained of pain overnight relieved by PO PRN pain medication. Denies chest pain, SOB, or other pain discomfort. Patient ambulating independent, Fall precautions in place. Plan of care discussed with patient. Continuing to encourage sternal precautions, IS, and ambulation. LVAD dressing change done, next due @ 2/17/17. Patient remains free of falls or injury. No significant events. Patient verbalizes complete understanding of plan of care.      "

## 2017-02-17 NOTE — ASSESSMENT & PLAN NOTE
- ? Cause   - resolved  - non septic/stable  - cultures NGTD  - rec continue to monitor  - if decompensates reconsult ID  - no abx indicated, will sign off

## 2017-02-17 NOTE — PT/OT/SLP PROGRESS
Occupational Therapy      Mert Samayoa  MRN: [id number]    Patient not seen today secondary to Other (pt performing ADLs and LVAD management independently and no longer has acute OT needs). Formal D/C summary to follow.    ALISA Urena  2/17/2017

## 2017-02-17 NOTE — PROGRESS NOTES
[facility]  Adult Nutrition  Consult Note    SUMMARY     Recommendations    Recommendation/Intervention: 1. Cont current diet order; pt w/ adequate nutr intake (prealb 22, Alb 3.4, CRP 49.9)  2. Answered pt's questions re: Low Na diet & provided Vit K/coumadin educ  3. RD to monitor  Goals: Maintain meal intake >/=75%  Nutrition Goal Status: goal met  Communication of RD Recs: reviewed with RN      Reason for Assessment    Reason for Assessment: RD follow-up  Diagnosis: cardiac disease (s/p LVAD placement)  Relevent Medical History: HF, HTN         General Information Comments: Nutr D/c Plan: d/c home on Low Na diet; Vit K/coumadin educ completed.    Nutrition Prescription Ordered    Current Diet Order: Cardiac, Fluid 1500mL        Evaluation of Received Nutrients/Fluid Intake          Energy Calories Required: meeting needs                 Protein Required: meeting needs                  Fluid Required: meeting needs     Tolerance: tolerating     Nutrition Risk Screen     Nutrition Risk Screen: no indicators present    Nutrition/Diet History    Patient Reported Diet/Restrictions/Preferences: low salt  Typical Food/Fluid Intake: Pt cont's to consume % of meals.  Food Preferences: No Jainism/cultural prefs reported        Factors Affecting Nutritional Intake: other (see comments) (none reported)                Labs/Tests/Procedures/Meds       Pertinent Labs Reviewed: reviewed  Pertinent Labs Comments: Na 132 (stable), Co 94, prealb 22, CRP 49.9, Alb 3.4  Pertinent Medications Reviewed: reviewed  Pertinent Medications Comments: amiodarone, ferrous gluconate, mg oxide, pantoprazole, KCl    Physical Findings    Overall Physical Appearance: other (see comments) (nourished)  Tubes: other (see comments) (none)  Oral/Mouth Cavity: WDL  Skin: other (see comments) (incision in chest)    Anthropometrics       Height (inches): 67.01 in  Weight Method: Standard Scale  Weight (kg): 60 kg  Ideal Body  Weight (IBW), Male: 148.06 lb     % Ideal Body Weight, Male (lb): 89.34 lb     BMI (kg/m2): 20.71  BMI Grade: 18.5-24.9 - normal  Usual Body Weight (UBW), k kg  % Usual Body Weight: 90.91     Weight Loss: other (see comments) (pt dimatiing)        Estimated/Assessed Needs    Weight Used For Calorie Calculations: 69 kg (152 lb 1.9 oz)   Height (cm): 170.2 cm     Energy Need Method: Hague-St Jeor (1.3 PAL: 2112kcal)     RMR (Hague-St. Jeor Equation): 1535.31        Weight Used For Protein Calculations: 69 kg (152 lb 1.9 oz)  Protein Requirements: 82-96g (1.2-1.4g/kg)    Fluid Need Method: RDA Method (or per MD)          Monitor and Evaluation    Food and Nutrient Intake: energy intake, food and beverage intake  Food and Nutrient Adminstration: diet order     Physical Activity and Function: nutrition-related ADLs and IADLs  Anthropometric Measurements: weight, weight change  Biochemical Data, Medical Tests and Procedures: other (specify) (All labs)  Nutrition-Focused Physical Findings: overall appearance    Nutrition Risk    Level of Risk: low    Nutrition Follow-Up    RD Follow-up?: Yes    Assessment and Plan    Increased protein needs r/t physiological need AEB HF, s/p LVAD placement.  Status: continues

## 2017-02-17 NOTE — PT/OT/SLP PROGRESS
"Physical Therapy  Treatment    Mert Samayoa   MRN: [id number]   Admitting Diagnosis: Chronic combined systolic and diastolic heart failure    PT Received On: 17  PT Start Time: 1326     PT Stop Time: 1334    PT Total Time (min): 8 min       Billable Minutes:  Gait Training8    Treatment Type: Treatment  PT/PTA: PTA     PTA Visit Number: 2       General Precautions: Standard, LVAD, fall      Do you have any cultural, spiritual, Rastafarian conflicts, given your current situation?: none     Subjective:  Communicated with RN prior to session.  "theyre keeping me for the weekend"    Pain Ratin/10       Objective:   Patient found with: telemetry    Functional Mobility:  Bed Mobility: Pt in hallway upon arrival     Transfers: Pt in hallway upon arrival     Gait:   Gait Distance: 50 ft  Assistance 1: Supervision  Gait Assistive Device: No device  Gait Pattern: 2-point gait  Gait Deviation(s): decreased step length    Stairs:  Pt ascended/descend 1 flight(s) with No Assistive Device with left with Stand-by Assistance.     Balance:   Static Sit: GOOD-: Takes MODERATE challenges from all directions but inconsistently  Dynamic Sit: GOOD: Maintains balance through MODERATE excursions of active trunk movement  Static Stand: GOOD: Takes MODERATE challenges from all directions  Dynamic stand: GOOD: Needs SUPERVISION only during gait and able to self right with moderate      Therapeutic Activities and Exercises:  Pt performed stair training to meet his final PT goal.     AM-PAC 6 CLICK MOBILITY  How much help from another person does this patient currently need?   1 = Unable, Total/Dependent Assistance  2 = A lot, Maximum/Moderate Assistance  3 = A little, Minimum/Contact Guard/Supervision  4 = None, Modified [place]/Independent    Turning over in bed (including adjusting bedclothes, sheets and blankets)?: 4  Sitting down on and standing up from a chair with arms (e.g., wheelchair, bedside commode, etc.): " 4  Moving from lying on back to sitting on the side of the bed?: 4  Moving to and from a bed to a chair (including a wheelchair)?: 4  Need to walk in hospital room?: 4  Climbing 3-5 steps with a railing?: 4  Total Score: 24    AM-PAC Raw Score CMS G-Code Modifier Level of Impairment Assistance   6 % Total / Unable   7 - 9 CM 80 - 100% Maximal Assist   10 - 14 CL 60 - 80% Moderate Assist   15 - 19 CK 40 - 60% Moderate Assist   20 - 22 CJ 20 - 40% Minimal Assist   23 CI 1-20% SBA / CGA   24 CH 0% Independent/ Mod I     Patient left standing in room with call button in reach.    Assessment:  Mert Samayoa is a 27 y.o. male with a medical diagnosis of Chronic combined systolic and diastolic heart failure and presents with problems listed below. Pt continues to progress and has met all current goals.    Rehab identified problem list/impairments: Rehab identified problem list/impairments: weakness, impaired endurance    Rehab potential is good.    Activity tolerance: Good    Discharge recommendations: Discharge Facility/Level Of Care Needs: home health PT     Barriers to discharge: Barriers to Discharge: None    Equipment recommendations: Equipment Needed After Discharge: none     GOALS:   Physical Therapy Goals        Problem: Physical Therapy Goal    Goal Priority Disciplines Outcome Goal Variances Interventions   Physical Therapy Goal     PT/OT, PT Ongoing (interventions implemented as appropriate)     Description:  Goals to be met by: 3/1/17     Patient will increase functional independence with mobility by performin. Supine to sit with [place] - met   2. Sit to stand transfer with [place] met   3. Gait  x 400 feet with Supervision. Met   4. Ascend/descend 2 stair with bilateral Handrails Supervision. Met   5. Lower extremity exercise program x15 reps per handout, with assistance as needed.- met   6. [place] with LVAD management. Met                        PLAN:    Patient to be seen 6 x/week  to address the above listed problems via gait training, therapeutic activities, therapeutic exercises  Plan of Care expires: 03/04/17  Plan of Care reviewed with: patient        JOSEPH Dutton  02/17/2017

## 2017-02-17 NOTE — ASSESSMENT & PLAN NOTE
- ? Cause - ? LLL pna - asymptomatic  - non septic/stable  - cultures NGTD  - rec watch x 24 hrs - if decompensates then will treat otherwise watch

## 2017-02-17 NOTE — PROGRESS NOTES
D/C Note:    SW to pt's room for D/C. Pt aaox4, calm, and pleasant. Pt reports in agreement with plan to D/C home today with HH. Pt reports agreeable to any in-network HH. Kristen with [facility] reports unable to verify insurance benefits. SW called [facility] for a list of in-network HH companies and faxed referral to [facility] ([phone] ph, [phone] fax). Precious with [facility] reports able to accept pt. Pt reports agreeable to [facility]. SW notified VAD coordinators and coumadin clinic of HH arrangements. Pt reports coping adequately today. Pt reports wife to provide transport home. SW providing psychosocial counseling and support, education, assistance, resources, and D/C planning as indicated. SW remains available.

## 2017-02-18 LAB
ANION GAP SERPL CALC-SCNC: 9 MMOL/L
BASOPHILS # BLD AUTO: 0.01 K/UL
BASOPHILS NFR BLD: 0.1 %
BUN SERPL-MCNC: 10 MG/DL
CALCIUM SERPL-MCNC: 9.4 MG/DL
CHLORIDE SERPL-SCNC: 95 MMOL/L
CO2 SERPL-SCNC: 30 MMOL/L
CREAT SERPL-MCNC: 0.9 MG/DL
DIFFERENTIAL METHOD: ABNORMAL
EOSINOPHIL # BLD AUTO: 0.2 K/UL
EOSINOPHIL NFR BLD: 1.5 %
ERYTHROCYTE [DISTWIDTH] IN BLOOD BY AUTOMATED COUNT: 14.5 %
EST. GFR  (AFRICAN AMERICAN): >60 ML/MIN/1.73 M^2
EST. GFR  (NON AFRICAN AMERICAN): >60 ML/MIN/1.73 M^2
GLUCOSE SERPL-MCNC: 93 MG/DL
HCT VFR BLD AUTO: 25 %
HGB BLD-MCNC: 8.2 G/DL
INR PPP: 2.4
LDH SERPL L TO P-CCNC: 203 U/L
LYMPHOCYTES # BLD AUTO: 2.2 K/UL
LYMPHOCYTES NFR BLD: 19.9 %
MAGNESIUM SERPL-MCNC: 1.9 MG/DL
MCH RBC QN AUTO: 27.8 PG
MCHC RBC AUTO-ENTMCNC: 32.8 %
MCV RBC AUTO: 85 FL
MONOCYTES # BLD AUTO: 0.9 K/UL
MONOCYTES NFR BLD: 8.6 %
NEUTROPHILS # BLD AUTO: 7.4 K/UL
NEUTROPHILS NFR BLD: 68.9 %
PHOSPHATE SERPL-MCNC: 4.4 MG/DL
PLATELET # BLD AUTO: 423 K/UL
PMV BLD AUTO: 8.6 FL
POTASSIUM SERPL-SCNC: 4 MMOL/L
PROTHROMBIN TIME: 23.9 SEC
RBC # BLD AUTO: 2.95 M/UL
SODIUM SERPL-SCNC: 134 MMOL/L
WBC # BLD AUTO: 10.78 K/UL

## 2017-02-18 PROCEDURE — 84100 ASSAY OF PHOSPHORUS: CPT

## 2017-02-18 PROCEDURE — 85025 COMPLETE CBC W/AUTO DIFF WBC: CPT

## 2017-02-18 PROCEDURE — 27000248 HC VAD-ADDITIONAL DAY

## 2017-02-18 PROCEDURE — 36415 COLL VENOUS BLD VENIPUNCTURE: CPT

## 2017-02-18 PROCEDURE — 25000003 PHARM REV CODE 250: Performed by: STUDENT IN AN ORGANIZED HEALTH CARE EDUCATION/TRAINING PROGRAM

## 2017-02-18 PROCEDURE — 25000003 PHARM REV CODE 250: Performed by: INTERNAL MEDICINE

## 2017-02-18 PROCEDURE — 99232 SBSQ HOSP IP/OBS MODERATE 35: CPT | Mod: ,,, | Performed by: INTERNAL MEDICINE

## 2017-02-18 PROCEDURE — 80048 BASIC METABOLIC PNL TOTAL CA: CPT

## 2017-02-18 PROCEDURE — 83735 ASSAY OF MAGNESIUM: CPT

## 2017-02-18 PROCEDURE — 25000003 PHARM REV CODE 250: Performed by: NURSE PRACTITIONER

## 2017-02-18 PROCEDURE — 20600001 HC STEP DOWN PRIVATE ROOM

## 2017-02-18 PROCEDURE — 85610 PROTHROMBIN TIME: CPT

## 2017-02-18 PROCEDURE — 83615 LACTATE (LD) (LDH) ENZYME: CPT

## 2017-02-18 PROCEDURE — 25000003 PHARM REV CODE 250: Performed by: THORACIC SURGERY (CARDIOTHORACIC VASCULAR SURGERY)

## 2017-02-18 RX ORDER — LISINOPRIL 5 MG/1
5 TABLET ORAL 2 TIMES DAILY
Status: DISCONTINUED | OUTPATIENT
Start: 2017-02-19 | End: 2017-02-21 | Stop reason: HOSPADM

## 2017-02-18 RX ADMIN — FUROSEMIDE 40 MG: 40 TABLET ORAL at 08:02

## 2017-02-18 RX ADMIN — OXYCODONE AND ACETAMINOPHEN 1 TABLET: 10; 325 TABLET ORAL at 07:02

## 2017-02-18 RX ADMIN — AMIODARONE HYDROCHLORIDE 200 MG: 200 TABLET ORAL at 08:02

## 2017-02-18 RX ADMIN — MAGNESIUM OXIDE TAB 400 MG (241.3 MG ELEMENTAL MG) 400 MG: 400 (241.3 MG) TAB at 07:02

## 2017-02-18 RX ADMIN — WARFARIN SODIUM 5 MG: 5 TABLET ORAL at 05:02

## 2017-02-18 RX ADMIN — MAGNESIUM OXIDE TAB 400 MG (241.3 MG ELEMENTAL MG) 400 MG: 400 (241.3 MG) TAB at 05:02

## 2017-02-18 RX ADMIN — PANTOPRAZOLE SODIUM 40 MG: 40 TABLET, DELAYED RELEASE ORAL at 08:02

## 2017-02-18 RX ADMIN — LISINOPRIL 5 MG: 5 TABLET ORAL at 07:02

## 2017-02-18 RX ADMIN — MAGNESIUM OXIDE TAB 400 MG (241.3 MG ELEMENTAL MG) 400 MG: 400 (241.3 MG) TAB at 01:02

## 2017-02-18 RX ADMIN — AMLODIPINE BESYLATE 10 MG: 10 TABLET ORAL at 08:02

## 2017-02-18 RX ADMIN — ACETAMINOPHEN 650 MG: 325 TABLET ORAL at 08:02

## 2017-02-18 RX ADMIN — FERROUS GLUCONATE TAB 324 MG (37.5 MG ELEMENTAL IRON) 324 MG: 324 (37.5 FE) TAB at 08:02

## 2017-02-18 RX ADMIN — ASPIRIN 325 MG: 325 TABLET, DELAYED RELEASE ORAL at 08:02

## 2017-02-18 NOTE — PLAN OF CARE
"Problem: Patient Care Overview  Goal: Plan of Care Review  Dx: Heartware LVAD 2/1/17  Hx: Cardiomyopathy, Two brothers have had heart transplant, occasional smoker     1/27/17: IABP placed  2/1/17: LVAD, admitted to SICU, 1.5L albumin, chest remains open   2/2/17: IABP d/ced, chest closed, extubated  2/3/17: douglas d/c; OOBTC  2/4/17: epi off; chest tube #2 removed; OOBTC  2/5: Epi turned back on @ .04, Nitric increased to 10; OOBTC  2/6: OOBTC; chest tube removed      Nursing:   Goal MAP 60-80  pTT goal 40-50 (Dr. Macedo wants closer to "50")  K and Mag q6  Heartware speed at 2900     Outcome: Ongoing (interventions implemented as appropriate)  Pt free of falls/trauma/injuries this shift. LVAD working properly and sounds smooth. Dopplers and MAPs WNL. LVAD dressing remains clean, dry & intact with no drainage noted. S&W dressing change done daily per wife. INR 2.4 today. Plan for d/c home Monday. Plan of care reviewed with patient; verbalized understanding. Pt tolerating POC well. Will continue to monitor.      "

## 2017-02-18 NOTE — PROGRESS NOTES
Progress Note  Heart Transplant Service    Admit Date: 1/27/2017   LOS: 22 days     SUBJECTIVE:     Follow up for: Chronic combined systolic and diastolic heart failure    Interval History: Feeling well, white count down and no low flow alarms.    Scheduled Meds:   amiodarone  200 mg Oral Daily    amlodipine  10 mg Oral Daily    aspirin  325 mg Oral Daily    ferrous gluconate  324 mg Oral Daily with breakfast    furosemide  40 mg Oral Daily    lisinopril  5 mg Oral QHS    magnesium oxide  400 mg Oral TID    pantoprazole  40 mg Oral Daily    polyethylene glycol  17 g Oral BID    warfarin  5 mg Oral Every Tues, Thurs, Sat    warfarin  7.5 mg Oral Every Mon, Wed, Fri, Sun     Continuous Infusions:     PRN Meds:acetaminophen, albuterol sulfate, bisacodyl, glucagon (human recombinant), hydrocodone-acetaminophen 5-325mg, ondansetron, oxycodone-acetaminophen, senna-docusate 8.6-50 mg    Review of patient's allergies indicates:  No Known Allergies    OBJECTIVE:     Vital Signs (Most Recent)  Temp: 98.2 °F (36.8 °C) (02/18/17 0400)  Pulse: 69 (02/18/17 0600)  Resp: 18 (02/18/17 0400)  BP: (!) 81/51 (02/18/17 0545)  SpO2: 97 % (02/18/17 0400)    Vital Signs Range (Last 24H):  Temp:  [97.9 °F (36.6 °C)-98.6 °F (37 °C)]   Pulse:  [66-83]   Resp:  [16-18]   BP: ()/(0-73)   SpO2:  [97 %-99 %]     I & O (Last 24H):    Intake/Output Summary (Last 24 hours) at 02/18/17 0740  Last data filed at 02/18/17 0600   Gross per 24 hour   Intake             1860 ml   Output             1650 ml   Net              210 ml          Telemetry: sinus  Constitutional: AOx3, NAD conversant  Neck: No JVD present. No thyromegaly present.   Cardiovascular: VAD hum  Pulmonary/Chest:good breath sounds bilaterally  Abdominal: + BS, exhibits no distension. There is no tenderness. There is no rebound.   Extremities: no cyanosis, clubbing or edema.  No bleeding, edema, erythema or hematoma, doppler pulses in all distals    Labs:       Recent  Labs  Lab 02/16/17  0356 02/17/17  0629 02/18/17  0324   WBC 16.18* 11.22 10.78   HGB 8.8* 8.3* 8.2*   HCT 26.5* 25.6* 25.0*   * 438* 423*   LYMPH 11.7*  1.9 14.5*  1.6 19.9  2.2   MONO 6.4  1.0 10.2  1.2* 8.6  0.9   EOSINOPHIL 1.3 2.1 1.5         Recent Labs  Lab 02/13/17  1339 02/13/17  1950 02/14/17  0147  02/16/17  0356 02/17/17 0629 02/18/17  0324   APTT 35.8* 37.8* 54.8*  --   --   --   --    INR  --   --  2.1*  < > 3.1* 2.6* 2.4*   < > = values in this interval not displayed.       Recent Labs  Lab 02/13/17  0552  02/15/17  0347 02/16/17  0356 02/17/17 0629 02/18/17  0324   GLU 97  < > 84 99 86 93   CALCIUM 10.2  < > 9.6 9.5 9.4 9.4   ALBUMIN 4.1  --  3.7  --  3.4*  --    PROT 8.2  --  7.6  --  6.8  --    *  < > 132* 133* 132* 134*   K 3.9  3.9  < > 3.7 3.6 3.7 4.0   CO2 29  < > 30* 29 28 30*   CL 89*  < > 90* 92* 94* 95   BUN 18  < > 20 16 14 10   CREATININE 1.0  < > 1.0 0.9 0.9 0.9   ALKPHOS 193*  --  157*  --  132  --    ALT 57*  --  41  --  32  --    AST 32  --  23  --  21  --    BILITOT 0.6  --  0.4  --  0.4  --    MG 2.0  < > 2.0 1.9 1.8 1.9   PHOS 4.8*  < > 4.4 3.7 3.8 4.4   < > = values in this interval not displayed.  Estimated Creatinine Clearance: 103.9 mL/min (based on Cr of 0.9).      Recent Labs  Lab 02/17/17  0629   *         Recent Labs  Lab 02/12/17  0526 02/13/17  0552 02/14/17  0147 02/15/17  0347 02/16/17  0356 02/17/17  0629 02/18/17  0324   * 309* 577* 243 234 248 203       Microbiology Results (last 7 days)     Procedure Component Value Units Date/Time    Blood culture [[id number]] Collected:  02/12/17 0950    Order Status:  Completed Specimen:  Blood Updated:  02/17/17 1612     Blood Culture, Routine No growth after 5 days.    Blood culture [[id number]] Collected:  02/16/17 0736    Order Status:  Completed Specimen:  Blood Updated:  02/17/17 1412     Blood Culture, Routine No Growth to date     Blood Culture, Routine No Growth to date    Blood culture  [[id number]] Collected:  02/16/17 0741    Order Status:  Completed Specimen:  Blood Updated:  02/17/17 1412     Blood Culture, Routine No Growth to date     Blood Culture, Routine No Growth to date    Blood culture [[id number]] Collected:  02/12/17 1311    Order Status:  Completed Specimen:  Blood Updated:  02/17/17 1412     Blood Culture, Routine No growth after 5 days.    Narrative:       Collection has been rescheduled by SMW at 2/12/2017 09:50 Reason:   unable to collect 2nd set   Collection has been rescheduled by SMW at 2/12/2017 09:50 Reason:   unable to collect 2nd set     Urine culture [[id number]] Collected:  02/12/17 1137    Order Status:  Completed Specimen:  Urine from Urine, Clean Catch Updated:  02/13/17 2037     Urine Culture, Routine No growth            ASSESSMENT:     27 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 He was admitted for PICC removal, IABP, and planned LVAD implantation. He is s/p [facility] LVAD placement 2/1 and chest closure 2/2. Extubated 2/2. Now on floor.    PLAN:     Cardiogenic Shock due to Acute on Chronic Systolic HF, DCM, NYHA FC IV s/p HW LVAD 2/1/2017 2700RPM  -s/p HW LVAD 2/1 and chest closure/extubation 2/2   -Lasix resumed at 40mg daily  -Todays echo s/p speed change to 2700 looks improved  -Does not have ICD, per CTS will pursue outpatient  -Anticoagulation: therapeutic on coumadin  -cultures: NGTD  -LD elevated to 577 2/14, repeat LD back to 200s    Elevated WBC - improving  - Infectious w/u  - BC x 2, UA, urine culture, CT chest / abdo / pelvis, post operative changes noted but no clear sign of infectious process thus far  - midline out 2/14  - CT maxillofacial negative for sinus  related process   - appreciate ID consult reccomendations    Atrial flutter  - No previous hx, spnotaneously resolved after 10 mins  -  stopped  - Keep Mg > 2, K > 4  - Bedside echo performed 2/11 which showed LVEDD of 6.5 cm  - Loaded with IV amiodarone and started on PO amiodarone now, will setup follow up with EP    HTN  -BP controlled    Back Pain  -prn meds on board; will adjust    Prophylaxis   -therapeutic coumadin    Dispo: possible discharge early next week    Zhang Spears DO  Cardiology Fellow  Pager [phone]

## 2017-02-18 NOTE — PLAN OF CARE
"Problem: Patient Care Overview  Goal: Plan of Care Review  Dx: [facility] LVAD 2/1/17  Hx: Cardiomyopathy, Two brothers have had heart transplant, occasional smoker     1/27/17: IABP placed  2/1/17: LVAD, admitted to SICU, 1.5L albumin, chest remains open   2/2/17: IABP d/ced, chest closed, extubated  2/3/17: douglas d/c; OOBTC  2/4/17: epi off; chest tube #2 removed; OOBTC  2/5: Epi turned back on @ .04, Nitric increased to 10; OOBTC  2/6: OOBTC; chest tube removed      Nursing:   Goal MAP 60-80  pTT goal 40-50 (Dr. Macedo wants closer to "50")  K and Mag q6  Heartware speed at 2900     Outcome: Ongoing (interventions implemented as appropriate)  Plan of care reviewed with patient/family; all questions and concerns addressed; no distress at present.      "

## 2017-02-19 LAB
ANION GAP SERPL CALC-SCNC: 8 MMOL/L
BASOPHILS # BLD AUTO: 0.02 K/UL
BASOPHILS NFR BLD: 0.2 %
BUN SERPL-MCNC: 9 MG/DL
CALCIUM SERPL-MCNC: 9 MG/DL
CHLORIDE SERPL-SCNC: 98 MMOL/L
CO2 SERPL-SCNC: 29 MMOL/L
CREAT SERPL-MCNC: 0.8 MG/DL
DIFFERENTIAL METHOD: ABNORMAL
EOSINOPHIL # BLD AUTO: 0.2 K/UL
EOSINOPHIL NFR BLD: 2.4 %
ERYTHROCYTE [DISTWIDTH] IN BLOOD BY AUTOMATED COUNT: 14.5 %
EST. GFR  (AFRICAN AMERICAN): >60 ML/MIN/1.73 M^2
EST. GFR  (NON AFRICAN AMERICAN): >60 ML/MIN/1.73 M^2
GLUCOSE SERPL-MCNC: 89 MG/DL
HCT VFR BLD AUTO: 24.2 %
HGB BLD-MCNC: 8 G/DL
INR PPP: 2.9
LDH SERPL L TO P-CCNC: 224 U/L
LYMPHOCYTES # BLD AUTO: 1.9 K/UL
LYMPHOCYTES NFR BLD: 20 %
MAGNESIUM SERPL-MCNC: 1.9 MG/DL
MCH RBC QN AUTO: 27.5 PG
MCHC RBC AUTO-ENTMCNC: 33.1 %
MCV RBC AUTO: 83 FL
MONOCYTES # BLD AUTO: 1 K/UL
MONOCYTES NFR BLD: 10.5 %
NEUTROPHILS # BLD AUTO: 6.1 K/UL
NEUTROPHILS NFR BLD: 66.3 %
PHOSPHATE SERPL-MCNC: 3.9 MG/DL
PLATELET # BLD AUTO: 369 K/UL
PMV BLD AUTO: 8.2 FL
POTASSIUM SERPL-SCNC: 3.9 MMOL/L
PROTHROMBIN TIME: 29.4 SEC
RBC # BLD AUTO: 2.91 M/UL
SODIUM SERPL-SCNC: 135 MMOL/L
WBC # BLD AUTO: 9.24 K/UL

## 2017-02-19 PROCEDURE — 25000003 PHARM REV CODE 250: Performed by: STUDENT IN AN ORGANIZED HEALTH CARE EDUCATION/TRAINING PROGRAM

## 2017-02-19 PROCEDURE — 83615 LACTATE (LD) (LDH) ENZYME: CPT

## 2017-02-19 PROCEDURE — 85025 COMPLETE CBC W/AUTO DIFF WBC: CPT

## 2017-02-19 PROCEDURE — 83735 ASSAY OF MAGNESIUM: CPT

## 2017-02-19 PROCEDURE — 84100 ASSAY OF PHOSPHORUS: CPT

## 2017-02-19 PROCEDURE — 25000003 PHARM REV CODE 250: Performed by: NURSE PRACTITIONER

## 2017-02-19 PROCEDURE — 99232 SBSQ HOSP IP/OBS MODERATE 35: CPT | Mod: ,,, | Performed by: INTERNAL MEDICINE

## 2017-02-19 PROCEDURE — 27000248 HC VAD-ADDITIONAL DAY

## 2017-02-19 PROCEDURE — 20600001 HC STEP DOWN PRIVATE ROOM

## 2017-02-19 PROCEDURE — 80048 BASIC METABOLIC PNL TOTAL CA: CPT

## 2017-02-19 PROCEDURE — 36415 COLL VENOUS BLD VENIPUNCTURE: CPT

## 2017-02-19 PROCEDURE — 25000003 PHARM REV CODE 250: Performed by: INTERNAL MEDICINE

## 2017-02-19 PROCEDURE — 85610 PROTHROMBIN TIME: CPT

## 2017-02-19 RX ADMIN — MAGNESIUM OXIDE TAB 400 MG (241.3 MG ELEMENTAL MG) 400 MG: 400 (241.3 MG) TAB at 02:02

## 2017-02-19 RX ADMIN — FERROUS GLUCONATE TAB 324 MG (37.5 MG ELEMENTAL IRON) 324 MG: 324 (37.5 FE) TAB at 08:02

## 2017-02-19 RX ADMIN — HYDROCODONE BITARTRATE AND ACETAMINOPHEN 1 TABLET: 5; 325 TABLET ORAL at 12:02

## 2017-02-19 RX ADMIN — ASPIRIN 325 MG: 325 TABLET, DELAYED RELEASE ORAL at 08:02

## 2017-02-19 RX ADMIN — LISINOPRIL 5 MG: 5 TABLET ORAL at 08:02

## 2017-02-19 RX ADMIN — OXYCODONE AND ACETAMINOPHEN 1 TABLET: 10; 325 TABLET ORAL at 08:02

## 2017-02-19 RX ADMIN — POLYETHYLENE GLYCOL 3350 17 G: 17 POWDER, FOR SOLUTION ORAL at 08:02

## 2017-02-19 RX ADMIN — OXYCODONE AND ACETAMINOPHEN 1 TABLET: 10; 325 TABLET ORAL at 02:02

## 2017-02-19 RX ADMIN — WARFARIN SODIUM 7.5 MG: 7.5 TABLET ORAL at 04:02

## 2017-02-19 RX ADMIN — OXYCODONE AND ACETAMINOPHEN 1 TABLET: 10; 325 TABLET ORAL at 04:02

## 2017-02-19 RX ADMIN — MAGNESIUM OXIDE TAB 400 MG (241.3 MG ELEMENTAL MG) 400 MG: 400 (241.3 MG) TAB at 04:02

## 2017-02-19 RX ADMIN — PANTOPRAZOLE SODIUM 40 MG: 40 TABLET, DELAYED RELEASE ORAL at 08:02

## 2017-02-19 RX ADMIN — MAGNESIUM OXIDE TAB 400 MG (241.3 MG ELEMENTAL MG) 400 MG: 400 (241.3 MG) TAB at 08:02

## 2017-02-19 RX ADMIN — FUROSEMIDE 40 MG: 40 TABLET ORAL at 08:02

## 2017-02-19 RX ADMIN — AMIODARONE HYDROCHLORIDE 200 MG: 200 TABLET ORAL at 08:02

## 2017-02-19 NOTE — PROGRESS NOTES
Progress Note  Heart Transplant Service    Admit Date: 1/27/2017   LOS: 23 days     SUBJECTIVE:     Follow up for: Chronic combined systolic and diastolic heart failure    Interval History: Feeling well, white count down and no low flow alarms.    Scheduled Meds:   amiodarone  200 mg Oral Daily    aspirin  325 mg Oral Daily    ferrous gluconate  324 mg Oral Daily with breakfast    furosemide  40 mg Oral Daily    lisinopril  5 mg Oral BID    magnesium oxide  400 mg Oral TID    pantoprazole  40 mg Oral Daily    polyethylene glycol  17 g Oral BID    warfarin  5 mg Oral Every Tues, Thurs, Sat    warfarin  7.5 mg Oral Every Mon, Wed, Fri, Sun     Continuous Infusions:     PRN Meds:acetaminophen, albuterol sulfate, bisacodyl, glucagon (human recombinant), hydrocodone-acetaminophen 5-325mg, ondansetron, oxycodone-acetaminophen, senna-docusate 8.6-50 mg    Review of patient's allergies indicates:  No Known Allergies    OBJECTIVE:     Vital Signs (Most Recent)  Temp: 98.1 °F (36.7 °C) (02/19/17 0821)  Pulse: 71 (02/19/17 0821)  Resp: 18 (02/19/17 0821)  BP: (!) 74/0 (02/19/17 0821)  SpO2: 95 % (02/19/17 0821)    Vital Signs Range (Last 24H):  Temp:  [98.1 °F (36.7 °C)-98.4 °F (36.9 °C)]   Pulse:  [64-90]   Resp:  [16-18]   BP: (60-94)/(0-52)   SpO2:  [95 %-100 %]     I & O (Last 24H):    Intake/Output Summary (Last 24 hours) at 02/19/17 0834  Last data filed at 02/19/17 0429   Gross per 24 hour   Intake             1426 ml   Output             1775 ml   Net             -349 ml          Telemetry: sinus  Constitutional: AOx3, NAD conversant  Neck: No JVD present. No thyromegaly present.   Cardiovascular: VAD hum  Pulmonary/Chest:good breath sounds bilaterally  Abdominal: + BS, exhibits no distension. There is no tenderness. There is no rebound.   Extremities: no cyanosis, clubbing or edema.  No bleeding, edema, erythema or hematoma, doppler pulses in all distals    Labs:       Recent Labs  Lab 02/17/17  0446  02/18/17  0324 02/19/17  0433   WBC 11.22 10.78 9.24   HGB 8.3* 8.2* 8.0*   HCT 25.6* 25.0* 24.2*   * 423* 369*   LYMPH 14.5*  1.6 19.9  2.2 20.0  1.9   MONO 10.2  1.2* 8.6  0.9 10.5  1.0   EOSINOPHIL 2.1 1.5 2.4         Recent Labs  Lab 02/13/17  1339 02/13/17  1950 02/14/17  0147  02/17/17  0629 02/18/17 0324 02/19/17 0433   APTT 35.8* 37.8* 54.8*  --   --   --   --    INR  --   --  2.1*  < > 2.6* 2.4* 2.9*   < > = values in this interval not displayed.       Recent Labs  Lab 02/13/17  0552  02/15/17  0347  02/17/17  0629 02/18/17 0324 02/19/17  0432   GLU 97  < > 84  < > 86 93 89   CALCIUM 10.2  < > 9.6  < > 9.4 9.4 9.0   ALBUMIN 4.1  --  3.7  --  3.4*  --   --    PROT 8.2  --  7.6  --  6.8  --   --    *  < > 132*  < > 132* 134* 135*   K 3.9  3.9  < > 3.7  < > 3.7 4.0 3.9   CO2 29  < > 30*  < > 28 30* 29   CL 89*  < > 90*  < > 94* 95 98   BUN 18  < > 20  < > 14 10 9   CREATININE 1.0  < > 1.0  < > 0.9 0.9 0.8   ALKPHOS 193*  --  157*  --  132  --   --    ALT 57*  --  41  --  32  --   --    AST 32  --  23  --  21  --   --    BILITOT 0.6  --  0.4  --  0.4  --   --    MG 2.0  < > 2.0  < > 1.8 1.9 1.9   PHOS 4.8*  < > 4.4  < > 3.8 4.4 3.9   < > = values in this interval not displayed.  Estimated Creatinine Clearance: 118.5 mL/min (based on Cr of 0.8).      Recent Labs  Lab 02/17/17  0629   *         Recent Labs  Lab 02/13/17  0552 02/14/17  0147 02/15/17  0347 02/16/17  0356 02/17/17  0629 02/18/17  0324 02/19/17  0433   * 577* 243 234 248 203 224       Microbiology Results (last 7 days)     Procedure Component Value Units Date/Time    Blood culture [[id number]] Collected:  02/16/17 0741    Order Status:  Completed Specimen:  Blood Updated:  02/18/17 1412     Blood Culture, Routine No Growth to date     Blood Culture, Routine No Growth to date     Blood Culture, Routine No Growth to date    Blood culture [[id number]] Collected:  02/16/17 0736    Order Status:  Completed Specimen:   Blood Updated:  02/18/17 1412     Blood Culture, Routine No Growth to date     Blood Culture, Routine No Growth to date     Blood Culture, Routine No Growth to date    Blood culture [[id number]] Collected:  02/12/17 0950    Order Status:  Completed Specimen:  Blood Updated:  02/17/17 1612     Blood Culture, Routine No growth after 5 days.    Blood culture [[id number]] Collected:  02/12/17 1311    Order Status:  Completed Specimen:  Blood Updated:  02/17/17 1412     Blood Culture, Routine No growth after 5 days.    Narrative:       Collection has been rescheduled by SMW at 2/12/2017 09:50 Reason:   unable to collect 2nd set   Collection has been rescheduled by SMW at 2/12/2017 09:50 Reason:   unable to collect 2nd set     Urine culture [[id number]] Collected:  02/12/17 1137    Order Status:  Completed Specimen:  Urine from Urine, Clean Catch Updated:  02/13/17 2037     Urine Culture, Routine No growth            ASSESSMENT:     27 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 He was admitted for PICC removal, IABP, and planned LVAD implantation. He is s/p [facility] LVAD placement 2/1 and chest closure 2/2. Extubated 2/2. Now on floor.    PLAN:     Cardiogenic Shock due to Acute on Chronic Systolic HF, DCM, NYHA FC IV s/p HW LVAD 2/1/2017 2700RPM  -s/p HW LVAD 2/1 and chest closure/extubation 2/2   -Lasix resumed at 40mg daily  -Echo s/p speed change to 2700(2/16) looks improved and patient did well this weekend with no flow alarms and no symptoms as he had prior to speed change  -Does not have ICD, per CTS will pursue outpatient, EP follow up needed  -Anticoagulation: therapeutic on coumadin, 2.9 today, with amio dose reduced will have  to see if any dose change needed prior to discharge  -cultures: NGTD  -LD elevated to 577 2/14, repeat LD back to 200s    Elevated WBC - improving  - Infectious w/u  - BC x 2, UA, urine culture, CT chest / abdo / pelvis, post operative changes noted but no clear sign of infectious process thus far  - midline out 2/14  - CT maxillofacial negative for sinus related process   - appreciate ID consult reccomendations    Atrial flutter  - No previous hx, spnotaneously resolved after 10 mins  -  stopped  - Keep Mg > 2, K > 4  - Bedside echo performed 2/11 which showed LVEDD of 6.5 cm  - Loaded with IV amiodarone and started on PO amiodarone now, will setup follow up with EP    HTN  -BP controlled    Back Pain  -prn meds on board; will adjust    Prophylaxis   -therapeutic coumadin    Dispo: possible discharge early next week    Zhang Spears DO  Cardiology Fellow  Pager [phone]

## 2017-02-19 NOTE — PLAN OF CARE
"Problem: Patient Care Overview  Goal: Plan of Care Review  Dx: [facility] LVAD 2/1/17  Hx: Cardiomyopathy, Two brothers have had heart transplant, occasional smoker     1/27/17: IABP placed  2/1/17: LVAD, admitted to SICU, 1.5L albumin, chest remains open   2/2/17: IABP d/ced, chest closed, extubated  2/3/17: douglas d/c; OOBTC  2/4/17: epi off; chest tube #2 removed; OOBTC  2/5: Epi turned back on @ .04, Nitric increased to 10; OOBTC  2/6: OOBTC; chest tube removed      Nursing:   Goal MAP 60-80  pTT goal 40-50 (Dr. Macedo wants closer to "50")  K and Mag q6  Heartware speed at 2900     Outcome: Ongoing (interventions implemented as appropriate)  Plan of care reviewed with patient. Patient has no complaints at this time. Patient ambulatory and independent. Patient checked of on lvad alarms and patient's wife checked off on dressing change. Plan for patient to be discharged on tomorrow. Patient remains free of falls and injury.      "

## 2017-02-20 LAB
ALBUMIN SERPL BCP-MCNC: 3.2 G/DL
ALP SERPL-CCNC: 114 U/L
ALT SERPL W/O P-5'-P-CCNC: 30 U/L
ANION GAP SERPL CALC-SCNC: 10 MMOL/L
AST SERPL-CCNC: 18 U/L
BASOPHILS # BLD AUTO: 0.01 K/UL
BASOPHILS NFR BLD: 0.1 %
BILIRUB DIRECT SERPL-MCNC: 0.1 MG/DL
BILIRUB SERPL-MCNC: 0.3 MG/DL
BNP SERPL-MCNC: 453 PG/ML
BUN SERPL-MCNC: 5 MG/DL
CALCIUM SERPL-MCNC: 9 MG/DL
CHLORIDE SERPL-SCNC: 99 MMOL/L
CO2 SERPL-SCNC: 28 MMOL/L
CREAT SERPL-MCNC: 0.8 MG/DL
CRP SERPL-MCNC: 84.9 MG/L
DIFFERENTIAL METHOD: ABNORMAL
EOSINOPHIL # BLD AUTO: 0.2 K/UL
EOSINOPHIL NFR BLD: 2 %
ERYTHROCYTE [DISTWIDTH] IN BLOOD BY AUTOMATED COUNT: 14.8 %
EST. GFR  (AFRICAN AMERICAN): >60 ML/MIN/1.73 M^2
EST. GFR  (NON AFRICAN AMERICAN): >60 ML/MIN/1.73 M^2
GLUCOSE SERPL-MCNC: 91 MG/DL
HCT VFR BLD AUTO: 26.3 %
HGB BLD-MCNC: 8.5 G/DL
INR PPP: 3.8
LDH SERPL L TO P-CCNC: 180 U/L
LYMPHOCYTES # BLD AUTO: 1.3 K/UL
LYMPHOCYTES NFR BLD: 16 %
MAGNESIUM SERPL-MCNC: 2 MG/DL
MCH RBC QN AUTO: 27.9 PG
MCHC RBC AUTO-ENTMCNC: 32.3 %
MCV RBC AUTO: 86 FL
MONOCYTES # BLD AUTO: 0.8 K/UL
MONOCYTES NFR BLD: 9.7 %
NEUTROPHILS # BLD AUTO: 5.7 K/UL
NEUTROPHILS NFR BLD: 71.8 %
PHOSPHATE SERPL-MCNC: 3.7 MG/DL
PLATELET # BLD AUTO: 389 K/UL
PMV BLD AUTO: 8.4 FL
POTASSIUM SERPL-SCNC: 3.7 MMOL/L
PROT SERPL-MCNC: 6.4 G/DL
PROTHROMBIN TIME: 38.2 SEC
RBC # BLD AUTO: 3.05 M/UL
SODIUM SERPL-SCNC: 137 MMOL/L
WBC # BLD AUTO: 7.96 K/UL

## 2017-02-20 PROCEDURE — 25000003 PHARM REV CODE 250: Performed by: INTERNAL MEDICINE

## 2017-02-20 PROCEDURE — 93750 INTERROGATION VAD IN PERSON: CPT | Mod: ,,, | Performed by: INTERNAL MEDICINE

## 2017-02-20 PROCEDURE — 84100 ASSAY OF PHOSPHORUS: CPT

## 2017-02-20 PROCEDURE — 80076 HEPATIC FUNCTION PANEL: CPT

## 2017-02-20 PROCEDURE — 83735 ASSAY OF MAGNESIUM: CPT

## 2017-02-20 PROCEDURE — 80048 BASIC METABOLIC PNL TOTAL CA: CPT

## 2017-02-20 PROCEDURE — 83880 ASSAY OF NATRIURETIC PEPTIDE: CPT

## 2017-02-20 PROCEDURE — 25000003 PHARM REV CODE 250: Performed by: NURSE PRACTITIONER

## 2017-02-20 PROCEDURE — 27000248 HC VAD-ADDITIONAL DAY

## 2017-02-20 PROCEDURE — 85025 COMPLETE CBC W/AUTO DIFF WBC: CPT

## 2017-02-20 PROCEDURE — 25000003 PHARM REV CODE 250: Performed by: STUDENT IN AN ORGANIZED HEALTH CARE EDUCATION/TRAINING PROGRAM

## 2017-02-20 PROCEDURE — 86140 C-REACTIVE PROTEIN: CPT

## 2017-02-20 PROCEDURE — 83615 LACTATE (LD) (LDH) ENZYME: CPT

## 2017-02-20 PROCEDURE — 85610 PROTHROMBIN TIME: CPT

## 2017-02-20 PROCEDURE — 20600001 HC STEP DOWN PRIVATE ROOM

## 2017-02-20 PROCEDURE — 99232 SBSQ HOSP IP/OBS MODERATE 35: CPT | Mod: ,,, | Performed by: INTERNAL MEDICINE

## 2017-02-20 PROCEDURE — 36415 COLL VENOUS BLD VENIPUNCTURE: CPT

## 2017-02-20 RX ORDER — WARFARIN SODIUM 5 MG/1
5 TABLET ORAL ONCE
Status: DISCONTINUED | OUTPATIENT
Start: 2017-02-20 | End: 2017-02-20

## 2017-02-20 RX ORDER — WARFARIN 2.5 MG/1
2.5 TABLET ORAL ONCE
Status: COMPLETED | OUTPATIENT
Start: 2017-02-20 | End: 2017-02-20

## 2017-02-20 RX ORDER — WARFARIN 2.5 MG/1
2.5 TABLET ORAL ONCE
Status: DISCONTINUED | OUTPATIENT
Start: 2017-02-20 | End: 2017-02-20

## 2017-02-20 RX ADMIN — MAGNESIUM OXIDE TAB 400 MG (241.3 MG ELEMENTAL MG) 400 MG: 400 (241.3 MG) TAB at 06:02

## 2017-02-20 RX ADMIN — LISINOPRIL 5 MG: 5 TABLET ORAL at 08:02

## 2017-02-20 RX ADMIN — OXYCODONE AND ACETAMINOPHEN 1 TABLET: 10; 325 TABLET ORAL at 01:02

## 2017-02-20 RX ADMIN — PANTOPRAZOLE SODIUM 40 MG: 40 TABLET, DELAYED RELEASE ORAL at 08:02

## 2017-02-20 RX ADMIN — OXYCODONE AND ACETAMINOPHEN 1 TABLET: 10; 325 TABLET ORAL at 06:02

## 2017-02-20 RX ADMIN — ASPIRIN 325 MG: 325 TABLET, DELAYED RELEASE ORAL at 08:02

## 2017-02-20 RX ADMIN — MAGNESIUM OXIDE TAB 400 MG (241.3 MG ELEMENTAL MG) 400 MG: 400 (241.3 MG) TAB at 01:02

## 2017-02-20 RX ADMIN — MAGNESIUM OXIDE TAB 400 MG (241.3 MG ELEMENTAL MG) 400 MG: 400 (241.3 MG) TAB at 08:02

## 2017-02-20 RX ADMIN — WARFARIN SODIUM 2.5 MG: 2.5 TABLET ORAL at 05:02

## 2017-02-20 RX ADMIN — POLYETHYLENE GLYCOL 3350 17 G: 17 POWDER, FOR SOLUTION ORAL at 08:02

## 2017-02-20 RX ADMIN — FUROSEMIDE 40 MG: 40 TABLET ORAL at 08:02

## 2017-02-20 RX ADMIN — AMIODARONE HYDROCHLORIDE 200 MG: 200 TABLET ORAL at 08:02

## 2017-02-20 RX ADMIN — OXYCODONE AND ACETAMINOPHEN 1 TABLET: 10; 325 TABLET ORAL at 08:02

## 2017-02-20 RX ADMIN — FERROUS GLUCONATE TAB 324 MG (37.5 MG ELEMENTAL IRON) 324 MG: 324 (37.5 FE) TAB at 08:02

## 2017-02-20 NOTE — PROCEDURES
Patient AAO with no family at bedside. VAD interrogation completed this AM in the event changes needed to be made. Will continue to monitor for further issues.     Pulsatile: Yes   VAD Sounds: Smooth  Problems / Issues / Alarms with VAD if any: No further Low Flow Alarms since 2/16/17  HCT: 26  Waveforms: 4-9 with no negative deflections noted.      VAD Interrogation:  TXP LVAD INTERROGATIONS 2/20/2017 2/20/2017 2/20/2017 2/20/2017 2/20/2017 2/19/2017 2/19/2017   Type Heartware Heartware Heartware Heartware Heartware Heartware Heartware   Flow 6.8 - 5.6 6.0 6.1 6.1 6.7   Speed 2700 - 2700 2700 2700 2700 2700   PI - - - - - - -   Power ([place]) 4.3 - 4.0 4.1 4.2 4.2 4.3   Low Flow Alarm 3.5 - - - - - -   High Power Alarm 7.5 - - - - - -   Pulsatility Pulse - - Intermittent pulse Intermittent pulse Intermittent pulse -   }

## 2017-02-20 NOTE — PROGRESS NOTES
Progress Note  Heart Transplant Service    Admit Date: 1/27/2017   LOS: 24 days     SUBJECTIVE:     Follow up for: Chronic combined systolic and diastolic heart failure    Interval History: Feeling well,  White count WNL, wants to go home    Scheduled Meds:   amiodarone  200 mg Oral Daily    aspirin  325 mg Oral Daily    ferrous gluconate  324 mg Oral Daily with breakfast    furosemide  40 mg Oral Daily    lisinopril  5 mg Oral BID    magnesium oxide  400 mg Oral TID    pantoprazole  40 mg Oral Daily    polyethylene glycol  17 g Oral BID    warfarin  2.5 mg Oral Once     Continuous Infusions:     PRN Meds:acetaminophen, albuterol sulfate, bisacodyl, glucagon (human recombinant), hydrocodone-acetaminophen 5-325mg, ondansetron, oxycodone-acetaminophen, senna-docusate 8.6-50 mg    Review of patient's allergies indicates:  No Known Allergies    OBJECTIVE:     Vital Signs (Most Recent)  Temp: 98.1 °F (36.7 °C) (02/20/17 1152)  Pulse: 81 (02/20/17 1152)  Resp: 18 (02/20/17 1152)  BP: (!) 70/0 (02/20/17 1152)  SpO2: 100 % (02/20/17 0845)    Vital Signs Range (Last 24H):  Temp:  [98.1 °F (36.7 °C)-98.6 °F (37 °C)]   Pulse:  [69-84]   Resp:  [18]   BP: (70-93)/(0-54)   SpO2:  [98 %-100 %]     I & O (Last 24H):    Intake/Output Summary (Last 24 hours) at 02/20/17 1442  Last data filed at 02/20/17 1400   Gross per 24 hour   Intake             1630 ml   Output             3200 ml   Net            -1570 ml          Telemetry: sinus  Constitutional: AOx3, NAD conversant  Neck: No JVD present. No thyromegaly present.   Cardiovascular: VAD hum  Pulmonary/Chest:good breath sounds bilaterally  Abdominal: + BS, exhibits no distension. There is no tenderness. There is no rebound.   Extremities: no cyanosis, clubbing or edema.  No bleeding, edema, erythema or hematoma, doppler pulses in all distals    Labs:       Recent Labs  Lab 02/18/17  0324 02/19/17  0433 02/20/17  0630   WBC 10.78 9.24 7.96   HGB 8.2* 8.0* 8.5*   HCT 25.0*  24.2* 26.3*   * 369* 389*   LYMPH 19.9  2.2 20.0  1.9 16.0*  1.3   MONO 8.6  0.9 10.5  1.0 9.7  0.8   EOSINOPHIL 1.5 2.4 2.0         Recent Labs  Lab 02/13/17  1950 02/14/17  0147 02/18/17 0324 02/19/17 0433 02/20/17  0630   APTT 37.8* 54.8*  --   --   --   --    INR  --  2.1*  < > 2.4* 2.9* 3.8*   < > = values in this interval not displayed.       Recent Labs  Lab 02/15/17  0347 02/17/17  0629 02/18/17 0324 02/19/17 0432 02/20/17  0630 02/20/17  0645   GLU 84  < > 86 93 89 91  --    CALCIUM 9.6  < > 9.4 9.4 9.0 9.0  --    ALBUMIN 3.7  --  3.4*  --   --   --  3.2*   PROT 7.6  --  6.8  --   --   --  6.4   *  < > 132* 134* 135* 137  --    K 3.7  < > 3.7 4.0 3.9 3.7  --    CO2 30*  < > 28 30* 29 28  --    CL 90*  < > 94* 95 98 99  --    BUN 20  < > 14 10 9 5*  --    CREATININE 1.0  < > 0.9 0.9 0.8 0.8  --    ALKPHOS 157*  --  132  --   --   --  114   ALT 41  --  32  --   --   --  30   AST 23  --  21  --   --   --  18   BILITOT 0.4  --  0.4  --   --   --  0.3   MG 2.0  < > 1.8 1.9 1.9 2.0  --    PHOS 4.4  < > 3.8 4.4 3.9 3.7  --    < > = values in this interval not displayed.  Estimated Creatinine Clearance: 121.2 mL/min (based on Cr of 0.8).      Recent Labs  Lab 02/20/17  0645   *         Recent Labs  Lab 02/14/17  0147 02/15/17  0347 02/16/17  0356 02/17/17  0629 02/18/17  0324 02/19/17  0433 02/20/17  0630   * 243 234 248 203 224 180       Microbiology Results (last 7 days)     Procedure Component Value Units Date/Time    Blood culture [[id number]] Collected:  02/16/17 0741    Order Status:  Completed Specimen:  Blood Updated:  02/20/17 1412     Blood Culture, Routine No Growth to date     Blood Culture, Routine No Growth to date     Blood Culture, Routine No Growth to date     Blood Culture, Routine No Growth to date     Blood Culture, Routine No Growth to date    Blood culture [[id number]] Collected:  02/16/17 0736    Order Status:  Completed Specimen:  Blood Updated:  02/20/17  1412     Blood Culture, Routine No Growth to date     Blood Culture, Routine No Growth to date     Blood Culture, Routine No Growth to date     Blood Culture, Routine No Growth to date     Blood Culture, Routine No Growth to date    Blood culture [[id number]] Collected:  02/12/17 0950    Order Status:  Completed Specimen:  Blood Updated:  02/17/17 1612     Blood Culture, Routine No growth after 5 days.    Blood culture [[id number]] Collected:  02/12/17 1311    Order Status:  Completed Specimen:  Blood Updated:  02/17/17 1412     Blood Culture, Routine No growth after 5 days.    Narrative:       Collection has been rescheduled by SMW at 2/12/2017 09:50 Reason:   unable to collect 2nd set   Collection has been rescheduled by SMW at 2/12/2017 09:50 Reason:   unable to collect 2nd set     Urine culture [[id number]] Collected:  02/12/17 1137    Order Status:  Completed Specimen:  Urine from Urine, Clean Catch Updated:  02/13/17 2037     Urine Culture, Routine No growth            ASSESSMENT:     25 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 He was admitted for PICC removal, IABP, and planned LVAD implantation. He is s/p [facility] LVAD placement 2/1 and chest closure 2/2. Extubated 2/2. Now on floor.    PLAN:     Cardiogenic Shock due to Acute on Chronic Systolic HF, DCM, NYHA FC IV s/p HW LVAD 2/1/2017 2700RPM  -s/p HW LVAD 2/1 and chest closure/extubation 2/2   -Lasix resumed at 40mg daily  -Echo s/p speed change to 2700(2/16) looks improved and patient did well this weekend with no flow alarms and no symptoms as he had prior to speed change  -Does not have ICD, per CTS will pursue outpatient, EP follow up  needed  -Anticoagulation:supratherapeutic on coumadin, 3.8 today, reduce coumadin to 2.5 today, will monitor in AM  -cultures: NGTD  -LD elevated to 577 2/14, continues to trend down    Elevated WBC - improving  - Infectious w/u  - BC x 2, UA, urine culture, CT chest / abdo / pelvis, post operative changes noted but no clear sign of infectious process thus far  - midline out 2/14  - CT maxillofacial negative for sinus related process   - appreciate ID consult reccomendations    Atrial flutter  - No previous hx, spnotaneously resolved after 10 mins  -  stopped  - Keep Mg > 2, K > 4  - Loaded with IV amiodarone and started on PO amiodarone now, will setup follow up with EP AFL and also possible ICD placement    HTN  -BP controlled    Back Pain  -prn meds on board; will adjust    Prophylaxis   -therapeutic coumadin    Dispo: D/C tomorrow with close follow-up if coumadin trends down    Navid Strickland MD, MPH  PGY-IV  Cardiovascular Disease Fellow

## 2017-02-21 VITALS
SYSTOLIC BLOOD PRESSURE: 74 MMHG | RESPIRATION RATE: 18 BRPM | OXYGEN SATURATION: 99 % | HEIGHT: 67 IN | WEIGHT: 138.69 LBS | HEART RATE: 84 BPM | TEMPERATURE: 99 F | BODY MASS INDEX: 21.77 KG/M2

## 2017-02-21 LAB
ANION GAP SERPL CALC-SCNC: 10 MMOL/L
BACTERIA BLD CULT: NORMAL
BACTERIA BLD CULT: NORMAL
BASOPHILS # BLD AUTO: 0.01 K/UL
BASOPHILS NFR BLD: 0.1 %
BUN SERPL-MCNC: 7 MG/DL
CALCIUM SERPL-MCNC: 8.7 MG/DL
CHLORIDE SERPL-SCNC: 97 MMOL/L
CO2 SERPL-SCNC: 27 MMOL/L
CREAT SERPL-MCNC: 0.8 MG/DL
DIFFERENTIAL METHOD: ABNORMAL
EOSINOPHIL # BLD AUTO: 0.2 K/UL
EOSINOPHIL NFR BLD: 2.5 %
ERYTHROCYTE [DISTWIDTH] IN BLOOD BY AUTOMATED COUNT: 14.7 %
EST. GFR  (AFRICAN AMERICAN): >60 ML/MIN/1.73 M^2
EST. GFR  (NON AFRICAN AMERICAN): >60 ML/MIN/1.73 M^2
GLUCOSE SERPL-MCNC: 91 MG/DL
HCT VFR BLD AUTO: 24 %
HGB BLD-MCNC: 7.9 G/DL
INR PPP: 3.8
LDH SERPL L TO P-CCNC: 174 U/L
LYMPHOCYTES # BLD AUTO: 1.6 K/UL
LYMPHOCYTES NFR BLD: 21.5 %
MAGNESIUM SERPL-MCNC: 1.7 MG/DL
MCH RBC QN AUTO: 27.2 PG
MCHC RBC AUTO-ENTMCNC: 32.9 %
MCV RBC AUTO: 83 FL
MONOCYTES # BLD AUTO: 0.6 K/UL
MONOCYTES NFR BLD: 8.4 %
NEUTROPHILS # BLD AUTO: 5.1 K/UL
NEUTROPHILS NFR BLD: 67.1 %
PHOSPHATE SERPL-MCNC: 4 MG/DL
PLATELET # BLD AUTO: 372 K/UL
PMV BLD AUTO: 8.3 FL
POTASSIUM SERPL-SCNC: 3.3 MMOL/L
PROTHROMBIN TIME: 38 SEC
RBC # BLD AUTO: 2.9 M/UL
SODIUM SERPL-SCNC: 134 MMOL/L
WBC # BLD AUTO: 7.54 K/UL

## 2017-02-21 PROCEDURE — 25000003 PHARM REV CODE 250: Performed by: INTERNAL MEDICINE

## 2017-02-21 PROCEDURE — 25000003 PHARM REV CODE 250: Performed by: NURSE PRACTITIONER

## 2017-02-21 PROCEDURE — 80048 BASIC METABOLIC PNL TOTAL CA: CPT

## 2017-02-21 PROCEDURE — 27000248 HC VAD-ADDITIONAL DAY

## 2017-02-21 PROCEDURE — 25000003 PHARM REV CODE 250: Performed by: STUDENT IN AN ORGANIZED HEALTH CARE EDUCATION/TRAINING PROGRAM

## 2017-02-21 PROCEDURE — 83615 LACTATE (LD) (LDH) ENZYME: CPT

## 2017-02-21 PROCEDURE — 84100 ASSAY OF PHOSPHORUS: CPT

## 2017-02-21 PROCEDURE — 85025 COMPLETE CBC W/AUTO DIFF WBC: CPT

## 2017-02-21 PROCEDURE — 85610 PROTHROMBIN TIME: CPT

## 2017-02-21 PROCEDURE — 36415 COLL VENOUS BLD VENIPUNCTURE: CPT

## 2017-02-21 PROCEDURE — 93750 INTERROGATION VAD IN PERSON: CPT | Mod: ,,, | Performed by: INTERNAL MEDICINE

## 2017-02-21 PROCEDURE — 63600175 PHARM REV CODE 636 W HCPCS: Performed by: INTERNAL MEDICINE

## 2017-02-21 PROCEDURE — 83735 ASSAY OF MAGNESIUM: CPT

## 2017-02-21 PROCEDURE — 99238 HOSP IP/OBS DSCHRG MGMT 30/<: CPT | Mod: ,,, | Performed by: INTERNAL MEDICINE

## 2017-02-21 RX ORDER — ASPIRIN 325 MG
325 TABLET, DELAYED RELEASE (ENTERIC COATED) ORAL DAILY
Qty: 30 TABLET | Refills: 11 | Status: ON HOLD | OUTPATIENT
Start: 2017-02-21 | End: 2018-02-28 | Stop reason: HOSPADM

## 2017-02-21 RX ORDER — FAMOTIDINE 40 MG/1
40 TABLET, FILM COATED ORAL NIGHTLY
Qty: 30 TABLET | Refills: 11 | Status: ON HOLD | OUTPATIENT
Start: 2017-02-21 | End: 2018-02-17 | Stop reason: SDUPTHER

## 2017-02-21 RX ORDER — AMOXICILLIN 250 MG
2 CAPSULE ORAL DAILY PRN
Qty: 60 TABLET | Refills: 5 | Status: SHIPPED | OUTPATIENT
Start: 2017-02-21 | End: 2017-07-11

## 2017-02-21 RX ORDER — MAGNESIUM SULFATE HEPTAHYDRATE 40 MG/ML
2 INJECTION, SOLUTION INTRAVENOUS ONCE
Status: COMPLETED | OUTPATIENT
Start: 2017-02-21 | End: 2017-02-21

## 2017-02-21 RX ORDER — LACTULOSE 10 G/15ML
30 SOLUTION ORAL ONCE
Status: COMPLETED | OUTPATIENT
Start: 2017-02-21 | End: 2017-02-21

## 2017-02-21 RX ORDER — FUROSEMIDE 40 MG/1
40 TABLET ORAL DAILY
Qty: 30 TABLET | Refills: 11 | Status: SHIPPED | OUTPATIENT
Start: 2017-02-21 | End: 2017-03-01 | Stop reason: SDUPTHER

## 2017-02-21 RX ORDER — OXYCODONE AND ACETAMINOPHEN 10; 325 MG/1; MG/1
1 TABLET ORAL EVERY 6 HOURS PRN
Refills: 0
Start: 2017-02-21 | End: 2017-03-01 | Stop reason: SDUPTHER

## 2017-02-21 RX ORDER — AMIODARONE HYDROCHLORIDE 200 MG/1
200 TABLET ORAL DAILY
Qty: 30 TABLET | Refills: 11 | Status: SHIPPED | OUTPATIENT
Start: 2017-02-21 | End: 2017-07-11 | Stop reason: ALTCHOICE

## 2017-02-21 RX ORDER — LISINOPRIL 5 MG/1
5 TABLET ORAL 2 TIMES DAILY
Qty: 60 TABLET | Refills: 11 | Status: SHIPPED | OUTPATIENT
Start: 2017-02-21 | End: 2017-10-04 | Stop reason: SDUPTHER

## 2017-02-21 RX ORDER — LANOLIN ALCOHOL/MO/W.PET/CERES
400 CREAM (GRAM) TOPICAL 2 TIMES DAILY
Qty: 60 TABLET | Refills: 11 | Status: SHIPPED | OUTPATIENT
Start: 2017-02-21 | End: 2017-06-14

## 2017-02-21 RX ORDER — HYDROCODONE BITARTRATE AND ACETAMINOPHEN 5; 325 MG/1; MG/1
1 TABLET ORAL EVERY 6 HOURS PRN
Qty: 50 TABLET | Refills: 0 | Status: CANCELLED | OUTPATIENT
Start: 2017-02-21

## 2017-02-21 RX ORDER — FERROUS GLUCONATE 324(38)MG
324 TABLET ORAL
Qty: 30 TABLET | Refills: 11 | Status: SHIPPED | OUTPATIENT
Start: 2017-02-21 | End: 2017-06-14

## 2017-02-21 RX ORDER — WARFARIN 2.5 MG/1
2.5 TABLET ORAL ONCE
Status: DISCONTINUED | OUTPATIENT
Start: 2017-02-21 | End: 2017-02-21 | Stop reason: HOSPADM

## 2017-02-21 RX ORDER — WARFARIN SODIUM 5 MG/1
5 TABLET ORAL DAILY
Qty: 30 TABLET | Refills: 3 | Status: SHIPPED | OUTPATIENT
Start: 2017-02-21 | End: 2017-06-07 | Stop reason: SDUPTHER

## 2017-02-21 RX ORDER — POTASSIUM CHLORIDE 20 MEQ/1
40 TABLET, EXTENDED RELEASE ORAL ONCE
Status: COMPLETED | OUTPATIENT
Start: 2017-02-21 | End: 2017-02-21

## 2017-02-21 RX ADMIN — MAGNESIUM SULFATE IN WATER 2 G: 40 INJECTION, SOLUTION INTRAVENOUS at 08:02

## 2017-02-21 RX ADMIN — MAGNESIUM OXIDE TAB 400 MG (241.3 MG ELEMENTAL MG) 400 MG: 400 (241.3 MG) TAB at 04:02

## 2017-02-21 RX ADMIN — MAGNESIUM OXIDE TAB 400 MG (241.3 MG ELEMENTAL MG) 400 MG: 400 (241.3 MG) TAB at 01:02

## 2017-02-21 RX ADMIN — AMIODARONE HYDROCHLORIDE 200 MG: 200 TABLET ORAL at 08:02

## 2017-02-21 RX ADMIN — POTASSIUM CHLORIDE 40 MEQ: 1500 TABLET, EXTENDED RELEASE ORAL at 08:02

## 2017-02-21 RX ADMIN — LACTULOSE 30 G: 10 SOLUTION ORAL at 01:02

## 2017-02-21 RX ADMIN — FUROSEMIDE 40 MG: 40 TABLET ORAL at 08:02

## 2017-02-21 RX ADMIN — PANTOPRAZOLE SODIUM 40 MG: 40 TABLET, DELAYED RELEASE ORAL at 08:02

## 2017-02-21 RX ADMIN — FERROUS GLUCONATE TAB 324 MG (37.5 MG ELEMENTAL IRON) 324 MG: 324 (37.5 FE) TAB at 08:02

## 2017-02-21 RX ADMIN — OXYCODONE AND ACETAMINOPHEN 1 TABLET: 10; 325 TABLET ORAL at 04:02

## 2017-02-21 RX ADMIN — ASPIRIN 325 MG: 325 TABLET, DELAYED RELEASE ORAL at 08:02

## 2017-02-21 RX ADMIN — LISINOPRIL 5 MG: 5 TABLET ORAL at 08:02

## 2017-02-21 NOTE — PROGRESS NOTES
D/C note:    SW to pt's room for D/C. Pt aaox4, calm, and pleasant. Pt reports was very disappointed when D/C was held on Friday, and is trying not to get his hopes up today in case D/C is delayed again. Pt reports wife will be able to provide transport home later today. Pt reports coping adequately at this time. Pt reports still in agreement with [facility] ([phone] ph, [phone] fax). SW faxed updated information to Precious at [facility] and confirmed receipt with [facility]. Pt reports no questions or concerns for SW at this time. Pt reports no other needs at this time and none identified. SW providing psychosocial counseling and support, education, assistance, resources, and D/C planning as indicated. SW remains available.

## 2017-02-21 NOTE — PROCEDURES
Patient AAO with family at bedside. VAD interrogation completed this AM in the event changes needed to be made. Will continue to monitor for further issues. Patient doing well and ready for discharge. Alexander, , to inventory equipment. Patient given Home Health packet to give to RN when she arrives.     Pulsatile: Yes   VAD Sounds: Smooth  Problems / Issues / Alarms with VAD if any: None noted  HCT: 24  Waveforms: 5-9 with no negative deflections      VAD Interrogation:  TXP LVAD INTERROGATIONS 2/21/2017 2/21/2017 2/21/2017 2/21/2017 2/20/2017 2/20/2017 2/20/2017   Type Heartware Heartware Heartware Heartware Heartware Heartware Heartware   Flow 6.4 5.3 5.7 6.1 6.2 6.1 6.8   Speed 2700 2700 2700 2700 2700 2700 2700   PI - - - - - - -   Power (Goldsmith) 4.2 4.0 4.1 4.2 4.2 4.1 4.3   Low Flow Alarm - - - - - - -   High Power Alarm - - - - - - -   Pulsatility - Intermittent pulse - Intermittent pulse Intermittent pulse - -   }

## 2017-02-21 NOTE — PLAN OF CARE
"Problem: Patient Care Overview  Goal: Plan of Care Review  Dx: Heartware LVAD 2/1/17  Hx: Cardiomyopathy, Two brothers have had heart transplant, occasional smoker     1/27/17: IABP placed  2/1/17: LVAD, admitted to SICU, 1.5L albumin, chest remains open   2/2/17: IABP d/ced, chest closed, extubated  2/3/17: douglas d/c; OOBTC  2/4/17: epi off; chest tube #2 removed; OOBTC  2/5: Epi turned back on @ .04, Nitric increased to 10; OOBTC  2/6: OOBTC; chest tube removed      Nursing:   Goal MAP 60-80  pTT goal 40-50 (Dr. Macedo wants closer to "50")  K and Mag q6  Heartware speed at 2900     Outcome: Outcome(s) achieved Date Met:  02/21/17  Patient is ready for discharge. Patient stable alert and oriented. IVs removed. No complaints of pain. Discussed discharge plan. LVAD equipment inventoried by LVAD staff member. Purple card received from pharmacy. Reviewed medications and side effects, appointments, and answered questions with patient and family. 1 paper script given to pt, rest sent to pt pharmacy. Pt escorted out in wheelchair by transport staff.         "

## 2017-02-21 NOTE — PROGRESS NOTES
Progress Note  Heart Transplant Service    Admit Date: 1/27/2017   LOS: 25 days     SUBJECTIVE:     Follow up for: Chronic combined systolic and diastolic heart failure    Interval History: Patient seen and examined at bedside. No acute events overnight, no chest pain, SOB, fever, chills.    Scheduled Meds:   amiodarone  200 mg Oral Daily    aspirin  325 mg Oral Daily    ferrous gluconate  324 mg Oral Daily with breakfast    furosemide  40 mg Oral Daily    lactulose  30 g Oral Once    lisinopril  5 mg Oral BID    magnesium oxide  400 mg Oral TID    pantoprazole  40 mg Oral Daily    polyethylene glycol  17 g Oral BID    warfarin  2.5 mg Oral Once     Continuous Infusions:     PRN Meds:acetaminophen, albuterol sulfate, bisacodyl, glucagon (human recombinant), hydrocodone-acetaminophen 5-325mg, ondansetron, oxycodone-acetaminophen, senna-docusate 8.6-50 mg    Review of patient's allergies indicates:  No Known Allergies    OBJECTIVE:     Vital Signs (Most Recent)  Temp: 98.6 °F (37 °C) (02/21/17 0800)  Pulse: 82 (02/21/17 1100)  Resp: 18 (02/21/17 0800)  BP: (!) 70/0 (02/21/17 0800)  SpO2: 99 % (02/21/17 0800)    Vital Signs Range (Last 24H):  Temp:  [98 °F (36.7 °C)-98.7 °F (37.1 °C)]   Pulse:  [70-87]   Resp:  [17-18]   BP: (70-94)/(0-65)   SpO2:  [96 %-100 %]     I & O (Last 24H):    Intake/Output Summary (Last 24 hours) at 02/21/17 1231  Last data filed at 02/21/17 0530   Gross per 24 hour   Intake             1335 ml   Output              975 ml   Net              360 ml          Telemetry: sinus  Constitutional: AOx3, NAD conversant  Neck: No JVD present. No thyromegaly present.   Cardiovascular: VAD hum  Pulmonary/Chest:good breath sounds bilaterally  Abdominal: + BS, exhibits no distension. There is no tenderness. There is no rebound.   Extremities: no cyanosis, clubbing or edema.  No bleeding, edema, erythema or hematoma    Labs:       Recent Labs  Lab 02/19/17  0433 02/20/17  0630 02/21/17  0345    WBC 9.24 7.96 7.54   HGB 8.0* 8.5* 7.9*   HCT 24.2* 26.3* 24.0*   * 389* 372*   LYMPH 20.0  1.9 16.0*  1.3 21.5  1.6   MONO 10.5  1.0 9.7  0.8 8.4  0.6   EOSINOPHIL 2.4 2.0 2.5         Recent Labs  Lab 02/19/17  0433 02/20/17  0630 02/21/17 0345   INR 2.9* 3.8* 3.8*          Recent Labs  Lab 02/15/17  0347  02/17/17  0629  02/19/17  0432 02/20/17  0630 02/20/17  0645 02/21/17 0345   GLU 84  < > 86  < > 89 91  --  91   CALCIUM 9.6  < > 9.4  < > 9.0 9.0  --  8.7   ALBUMIN 3.7  --  3.4*  --   --   --  3.2*  --    PROT 7.6  --  6.8  --   --   --  6.4  --    *  < > 132*  < > 135* 137  --  134*   K 3.7  < > 3.7  < > 3.9 3.7  --  3.3*   CO2 30*  < > 28  < > 29 28  --  27   CL 90*  < > 94*  < > 98 99  --  97   BUN 20  < > 14  < > 9 5*  --  7   CREATININE 1.0  < > 0.9  < > 0.8 0.8  --  0.8   ALKPHOS 157*  --  132  --   --   --  114  --    ALT 41  --  32  --   --   --  30  --    AST 23  --  21  --   --   --  18  --    BILITOT 0.4  --  0.4  --   --   --  0.3  --    MG 2.0  < > 1.8  < > 1.9 2.0  --  1.7   PHOS 4.4  < > 3.8  < > 3.9 3.7  --  4.0   < > = values in this interval not displayed.  Estimated Creatinine Clearance: 123.4 mL/min (based on Cr of 0.8).      Recent Labs  Lab 02/20/17  0645   *         Recent Labs  Lab 02/15/17  0347 02/16/17  0356 02/17/17  0629 02/18/17  0324 02/19/17  0433 02/20/17  0630 02/21/17  0345    234 248 203 224 180 174       Microbiology Results (last 7 days)     Procedure Component Value Units Date/Time    Blood culture [[id number]] Collected:  02/16/17 0741    Order Status:  Completed Specimen:  Blood Updated:  02/20/17 1412     Blood Culture, Routine No Growth to date     Blood Culture, Routine No Growth to date     Blood Culture, Routine No Growth to date     Blood Culture, Routine No Growth to date     Blood Culture, Routine No Growth to date    Blood culture [[id number]] Collected:  02/16/17 0736    Order Status:  Completed Specimen:  Blood Updated:   02/20/17 1412     Blood Culture, Routine No Growth to date     Blood Culture, Routine No Growth to date     Blood Culture, Routine No Growth to date     Blood Culture, Routine No Growth to date     Blood Culture, Routine No Growth to date    Blood culture [[id number]] Collected:  02/12/17 0950    Order Status:  Completed Specimen:  Blood Updated:  02/17/17 1612     Blood Culture, Routine No growth after 5 days.    Blood culture [[id number]] Collected:  02/12/17 1311    Order Status:  Completed Specimen:  Blood Updated:  02/17/17 1412     Blood Culture, Routine No growth after 5 days.    Narrative:       Collection has been rescheduled by SMW at 2/12/2017 09:50 Reason:   unable to collect 2nd set   Collection has been rescheduled by SMW at 2/12/2017 09:50 Reason:   unable to collect 2nd set             ASSESSMENT:     25 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 He was admitted for PICC removal, IABP, and planned LVAD implantation. He is s/p HeartWare LVAD placement 2/1 and chest closure 2/2. Extubated 2/2. Now on floor.    PLAN:     Cardiogenic Shock due to Acute on Chronic Systolic HF, DCM, NYHA FC IV s/p HW LVAD 2/1/2017 2700RPM  -s/p HW LVAD 2/1 and chest closure/extubation 2/2   -Lasix 40mg daily  -Echo s/p speed change to 2700(2/16) looks improved and patient did well this weekend with no flow alarms and no symptoms as he had prior to speed change  -Does not have ICD, per CTS will pursue outpatient, EP follow up needed  -Anticoagulation:supratherapeutic on coumadin, 3.8 today, reduce coumadin to 2.5 today, will discharge on 5 mg daily and monitor on H/H labs, adjust as needed  -cultures: NGTD  -LD elevated to 577 2/14,  continues to trend down, 174 today    Elevated WBC - Resolved    Atrial flutter  - No previous hx, spnotaneously resolved after 10 mins  -  stopped  - Keep Mg > 2, K > 4  - Loaded with IV amiodarone and started on PO amiodarone now, will setup follow up with EP AFL and also possible ICD placement    HTN  -BP controlled    Back Pain  -prn meds on board; will adjust    Prophylaxis   -therapeutic coumadin    Dispo: D/C today    Navid Strickland MD, MPH  PGY-IV  Cardiovascular Disease Fellow

## 2017-02-21 NOTE — DISCHARGE SUMMARY
Discharge Summary  Heart Transplant Service      Admit Date: 1/27/2017    Discharge Date:  2/21/2017    Attending Physician: Marisel Lundberg MD    Discharge Physician: Navid Strickland MD    Principal Diagnoses: Chronic combined systolic and diastolic heart failure    Indication for Admission: VAD Placement    Discharged Condition: Good    Hospital Course:   Patient presented for IABP placement prior to VAD placement due to DCM on 1/27. Patient underwent placement of IABP on 1/30 with subsequent placement of LVAD on 2/1 and sternal closure on 2/2. Heartware VAD was placed without any immediate complications. Over the next few days, patient was extubated and , Epi, Cardene and Lasix gtts and Isiah were started and titrated off. Patient was also on heparin gtt. Patient was stepped down from the SICU to CTSU on 2/9. Patient went into atrial flutter in AM of 2/11, which he self converted out of, and started on Amiodarone by CTS following atrial flutter episode. Patient was started on Coumadin on 2/10. Patient had low flow alarms on 2/16 and HW speed was dropped from 2900 RPM to 2700 RPM after speed echo showed better filling at 2700 RPM. Patient was discharged on 2/21 on 5 mg of Coumadin daily.    Outpatient Plan:  - Follow-up appointment in 1 week with HTS, will need follow-up with EP in regards to placement of ICD    Diet: Cardiac diet    Activity: As tolerated    Disposition: Home or Self Care    Discharge Medications:      Medication List      START taking these medications          amiodarone 200 MG Tab   Commonly known as:  PACERONE   Take 1 tablet (200 mg total) by mouth once daily.       aspirin 325 MG EC tablet   Commonly known as:  ECOTRIN   Take 1 tablet (325 mg total) by mouth once daily.       famotidine 40 MG tablet   Commonly known as:  PEPCID   Take 1 tablet (40 mg total) by mouth every evening.       ferrous gluconate 324 MG tablet   Commonly known as:  FERGON   Take 1 tablet (324 mg total) by mouth  daily with breakfast.       furosemide 40 MG tablet   Commonly known as:  LASIX   Take 1 tablet (40 mg total) by mouth once daily.       lisinopril 5 MG tablet   Commonly known as:  PRINIVIL,ZESTRIL   Take 1 tablet (5 mg total) by mouth 2 (two) times daily.       magnesium oxide 400 mg tablet   Commonly known as:  MAG-OX   Take 1 tablet (400 mg total) by mouth 2 (two) times daily.       oxycodone-acetaminophen  mg per tablet   Commonly known as:  PERCOCET   Take 1 tablet by mouth every 6 (six) hours as needed.       senna-docusate 8.6-50 mg 8.6-50 mg per tablet   Commonly known as:  SENNA WITH DOCUSATE SODIUM   Take 2 tablets by mouth daily as needed for Constipation.       warfarin 5 MG tablet   Commonly known as:  COUMADIN   Take 1 tablet (5 mg total) by mouth Daily.            Where to Get Your Medications      These medications were sent to [id number] - [place] - [address]  [address], [place] [address]     Phone:  [phone]     amiodarone 200 MG Tab    aspirin 325 MG EC tablet    famotidine 40 MG tablet    ferrous gluconate 324 MG tablet    furosemide 40 MG tablet    lisinopril 5 MG tablet    magnesium oxide 400 mg tablet    senna-docusate 8.6-50 mg 8.6-50 mg per tablet    warfarin 5 MG tablet         Information about where to get these medications is not yet available     ! Ask your nurse or doctor about these medications     oxycodone-acetaminophen  mg per tablet

## 2017-02-21 NOTE — PROGRESS NOTES
Mr. Samayoa will be discharged today after HW LVAD placement 2/1/17.  Anticoagulation goals include an INR goal of 2-3, requiring a bridge, and ASA 325mg/day.  He has been educated regarding warfarin and has been enrolled into the anticoagulation clinic.  He prefers to avoid vitamin K rich foods for now, and will continue amiodarone 200mg/day for a brief episode of AFL (length of therapy to be determined in the clinic).  His INR has been supratherapeutic, so will decrease to 2.5mg tonight followed by 5mg/day starting tomorrow.  He will need an INR check on Thursday.  New medication card created and reviewed with the patient.  All questions/concerns were addressed.       Mert Samayoa   Home Medication Instructions NATALIE:[id number]    Printed on:02/21/17 1420   Medication Information                      amiodarone (PACERONE) 200 MG Tab  Take 1 tablet (200 mg total) by mouth once daily.             aspirin (ECOTRIN) 325 MG EC tablet  Take 1 tablet (325 mg total) by mouth once daily.             famotidine (PEPCID) 40 MG tablet  Take 1 tablet (40 mg total) by mouth every evening.             ferrous gluconate (FERGON) 324 MG tablet  Take 1 tablet (324 mg total) by mouth daily with breakfast.             furosemide (LASIX) 40 MG tablet  Take 1 tablet (40 mg total) by mouth once daily.             lisinopril (PRINIVIL,ZESTRIL) 5 MG tablet  Take 1 tablet (5 mg total) by mouth 2 (two) times daily.             magnesium oxide (MAG-OX) 400 mg tablet  Take 1 tablet (400 mg total) by mouth 2 (two) times daily.             oxycodone-acetaminophen (PERCOCET)  mg per tablet  Take 1 tablet by mouth every 6 (six) hours as needed.             senna-docusate 8.6-50 mg (SENNA WITH DOCUSATE SODIUM) 8.6-50 mg per tablet  Take 2 tablets by mouth daily as needed for Constipation.             warfarin (COUMADIN) 5 MG tablet  Take 1 tablet (5 mg total) by mouth Daily.

## 2017-02-22 ENCOUNTER — ANTI-COAG VISIT (OUTPATIENT)
Dept: CARDIOLOGY | Facility: CLINIC | Age: 27
End: 2017-02-22

## 2017-02-22 DIAGNOSIS — Z95.811 LVAD (LEFT VENTRICULAR ASSIST DEVICE) PRESENT: ICD-10-CM

## 2017-02-22 DIAGNOSIS — Z79.01 LONG TERM (CURRENT) USE OF ANTICOAGULANTS: ICD-10-CM

## 2017-02-22 NOTE — PROGRESS NOTES
"28 yo  WM with familial DCM, 2 brothers with OHTx, DCM, aflutter on amiodarone, HTN,  CHF with worsening activity intolerance (NYHA FC IV), a gradually climbing BNP, recent tobacco use, admitted 1/16/16 after RHC showed severely reduced CO/CI and elevated L and R filling pressures. He was admitted for PICC removal, IABP, and planned LVAD implantation. He is s/p HeartWare LVAD placement 2/1 and chest closure 2/2. Extubated 2/2. Warfarin doses updated in calendar.  Per FITO Willams, PharmD, "Mr. Samayoa will be discharged today (2/21) after HW LVAD placement 2/1/17. Anticoagulation goals include an INR goal of 2-3, requiring a bridge, and ASA 325mg/day. He has been educated regarding warfarin and has been enrolled into the anticoagulation clinic. He prefers to avoid vitamin K rich foods for now, and will continue amiodarone 200mg/day for a brief episode of AFL (length of therapy to be determined in the clinic). His INR has been supratherapeutic, so will decrease to 2.5mg tonight followed by 5mg/day starting tomorrow. He will need an INR check on Thursday. New medication card created and reviewed with the patient. All questions/concerns were addressed."  Confirmed HH enrollment and INR for 2/23; order sent to clarify that needs to be venipuncture.  Confirmed warfarin tablet strength and d/c dose with patient and provided clinic phone number.        "

## 2017-02-23 ENCOUNTER — ANTI-COAG VISIT (OUTPATIENT)
Dept: CARDIOLOGY | Facility: CLINIC | Age: 27
End: 2017-02-23

## 2017-02-23 DIAGNOSIS — Z95.811 LVAD (LEFT VENTRICULAR ASSIST DEVICE) PRESENT: ICD-10-CM

## 2017-02-23 DIAGNOSIS — Z79.01 LONG TERM (CURRENT) USE OF ANTICOAGULANTS: ICD-10-CM

## 2017-02-23 LAB — INR PPP: 2.4

## 2017-02-27 ENCOUNTER — ANTI-COAG VISIT (OUTPATIENT)
Dept: CARDIOLOGY | Facility: CLINIC | Age: 27
End: 2017-02-27

## 2017-02-27 ENCOUNTER — TELEPHONE (OUTPATIENT)
Dept: TRANSPLANT | Facility: CLINIC | Age: 27
End: 2017-02-27

## 2017-02-27 DIAGNOSIS — Z79.01 LONG TERM (CURRENT) USE OF ANTICOAGULANTS: ICD-10-CM

## 2017-02-27 DIAGNOSIS — Z95.811 LVAD (LEFT VENTRICULAR ASSIST DEVICE) PRESENT: ICD-10-CM

## 2017-02-27 LAB — INR PPP: 2.5

## 2017-02-27 NOTE — TELEPHONE ENCOUNTER
2/25/17 7348    Received page from patient regarding one LFA. Patient explains he was turning on his left side and had one LFA. He feels fine, drank some water. Flow is now 5.9. Patient given instructions to make sure he is taking in at least 1500ml per day of water. Patient verbalized he will call if he has any more LFAs.  All question answered with verbalization of understanding. Will continue to monitor patients status.

## 2017-02-27 NOTE — PROGRESS NOTES
INR remains therapeutic.  Patient reports a occasional oozing around drain, states it appears to be improving and does not have foul odor.  He was advised to continue monitoring and call VAD team with concerns.  Decrease warfarin dose based on amount patient received over past week.

## 2017-03-01 ENCOUNTER — CLINICAL SUPPORT (OUTPATIENT)
Dept: TRANSPLANT | Facility: CLINIC | Age: 27
End: 2017-03-01
Payer: COMMERCIAL

## 2017-03-01 ENCOUNTER — LAB VISIT (OUTPATIENT)
Dept: LAB | Facility: HOSPITAL | Age: 27
End: 2017-03-01
Attending: INTERNAL MEDICINE
Payer: COMMERCIAL

## 2017-03-01 ENCOUNTER — OFFICE VISIT (OUTPATIENT)
Dept: TRANSPLANT | Facility: CLINIC | Age: 27
End: 2017-03-01
Payer: COMMERCIAL

## 2017-03-01 VITALS — HEIGHT: 67 IN | WEIGHT: 139.75 LBS | BODY MASS INDEX: 21.93 KG/M2 | TEMPERATURE: 98 F | SYSTOLIC BLOOD PRESSURE: 60 MMHG

## 2017-03-01 DIAGNOSIS — I50.42 CHRONIC COMBINED SYSTOLIC AND DIASTOLIC HEART FAILURE: ICD-10-CM

## 2017-03-01 DIAGNOSIS — I50.9 CONGESTIVE HEART FAILURE, UNSPECIFIED: ICD-10-CM

## 2017-03-01 DIAGNOSIS — Z95.811 HEART REPLACED BY HEART ASSIST DEVICE: Primary | ICD-10-CM

## 2017-03-01 DIAGNOSIS — Z95.811 HEART REPLACED BY HEART ASSIST DEVICE: ICD-10-CM

## 2017-03-01 DIAGNOSIS — Z76.82 ORGAN TRANSPLANT CANDIDATE: ICD-10-CM

## 2017-03-01 DIAGNOSIS — Z95.811 LVAD (LEFT VENTRICULAR ASSIST DEVICE) PRESENT: ICD-10-CM

## 2017-03-01 DIAGNOSIS — I42.9 FAMILIAL CARDIOMYOPATHY: ICD-10-CM

## 2017-03-01 DIAGNOSIS — Z79.01 LONG TERM (CURRENT) USE OF ANTICOAGULANTS: ICD-10-CM

## 2017-03-01 PROBLEM — I50.43 ACUTE ON CHRONIC COMBINED SYSTOLIC AND DIASTOLIC CONGESTIVE HEART FAILURE, NYHA CLASS 4: Status: RESOLVED | Noted: 2017-02-12 | Resolved: 2017-03-01

## 2017-03-01 PROBLEM — R73.9 HYPERGLYCEMIA: Status: RESOLVED | Noted: 2017-02-01 | Resolved: 2017-03-01

## 2017-03-01 PROBLEM — I50.22 CHRONIC SYSTOLIC CONGESTIVE HEART FAILURE: Status: RESOLVED | Noted: 2017-01-27 | Resolved: 2017-03-01

## 2017-03-01 PROBLEM — D72.829 LEUKOCYTOSIS: Status: RESOLVED | Noted: 2017-02-16 | Resolved: 2017-03-01

## 2017-03-01 LAB
ALBUMIN SERPL BCP-MCNC: 3.9 G/DL
ALP SERPL-CCNC: 113 U/L
ALT SERPL W/O P-5'-P-CCNC: 30 U/L
ANION GAP SERPL CALC-SCNC: 9 MMOL/L
AST SERPL-CCNC: 27 U/L
BASOPHILS # BLD AUTO: 0.02 K/UL
BASOPHILS NFR BLD: 0.3 %
BILIRUB DIRECT SERPL-MCNC: 0.2 MG/DL
BILIRUB SERPL-MCNC: 0.3 MG/DL
BNP SERPL-MCNC: 233 PG/ML
BUN SERPL-MCNC: 9 MG/DL
CALCIUM SERPL-MCNC: 9.6 MG/DL
CHLORIDE SERPL-SCNC: 103 MMOL/L
CO2 SERPL-SCNC: 31 MMOL/L
CREAT SERPL-MCNC: 1.1 MG/DL
CRP SERPL-MCNC: 32.9 MG/L
DIFFERENTIAL METHOD: ABNORMAL
EOSINOPHIL # BLD AUTO: 0.1 K/UL
EOSINOPHIL NFR BLD: 2.1 %
ERYTHROCYTE [DISTWIDTH] IN BLOOD BY AUTOMATED COUNT: 15.6 %
EST. GFR  (AFRICAN AMERICAN): >60 ML/MIN/1.73 M^2
EST. GFR  (NON AFRICAN AMERICAN): >60 ML/MIN/1.73 M^2
GLUCOSE SERPL-MCNC: 63 MG/DL
HCT VFR BLD AUTO: 32.6 %
HGB BLD-MCNC: 10 G/DL
INR PPP: 2.7
LDH SERPL L TO P-CCNC: 197 U/L
LYMPHOCYTES # BLD AUTO: 1.4 K/UL
LYMPHOCYTES NFR BLD: 20.1 %
MAGNESIUM SERPL-MCNC: 2.2 MG/DL
MCH RBC QN AUTO: 25.8 PG
MCHC RBC AUTO-ENTMCNC: 30.7 %
MCV RBC AUTO: 84 FL
MONOCYTES # BLD AUTO: 0.7 K/UL
MONOCYTES NFR BLD: 9.8 %
NEUTROPHILS # BLD AUTO: 4.5 K/UL
NEUTROPHILS NFR BLD: 67.4 %
PHOSPHATE SERPL-MCNC: 4.4 MG/DL
PLATELET # BLD AUTO: 370 K/UL
PMV BLD AUTO: 8.2 FL
POTASSIUM SERPL-SCNC: 4 MMOL/L
PREALB SERPL-MCNC: 24 MG/DL
PROT SERPL-MCNC: 7.8 G/DL
PROTHROMBIN TIME: 27.1 SEC
RBC # BLD AUTO: 3.87 M/UL
SODIUM SERPL-SCNC: 143 MMOL/L
WBC # BLD AUTO: 6.73 K/UL

## 2017-03-01 PROCEDURE — 1160F RVW MEDS BY RX/DR IN RCRD: CPT | Mod: S$GLB,,, | Performed by: INTERNAL MEDICINE

## 2017-03-01 PROCEDURE — 99214 OFFICE O/P EST MOD 30 MIN: CPT | Mod: S$GLB,,, | Performed by: INTERNAL MEDICINE

## 2017-03-01 PROCEDURE — 3074F SYST BP LT 130 MM HG: CPT | Mod: S$GLB,,, | Performed by: INTERNAL MEDICINE

## 2017-03-01 PROCEDURE — 99999 PR PBB SHADOW E&M-EST. PATIENT-LVL III: CPT | Mod: PBBFAC,,, | Performed by: INTERNAL MEDICINE

## 2017-03-01 PROCEDURE — 93750 INTERROGATION VAD IN PERSON: CPT | Mod: S$GLB,,, | Performed by: INTERNAL MEDICINE

## 2017-03-01 PROCEDURE — 3078F DIAST BP <80 MM HG: CPT | Mod: S$GLB,,, | Performed by: INTERNAL MEDICINE

## 2017-03-01 RX ORDER — OXYCODONE AND ACETAMINOPHEN 10; 325 MG/1; MG/1
1 TABLET ORAL EVERY 6 HOURS PRN
Qty: 60 TABLET | Refills: 0 | Status: ON HOLD | OUTPATIENT
Start: 2017-03-01 | End: 2017-03-30

## 2017-03-01 RX ORDER — FUROSEMIDE 20 MG/1
20 TABLET ORAL DAILY
Qty: 30 TABLET | Refills: 11
Start: 2017-03-01 | End: 2017-03-07

## 2017-03-01 NOTE — LETTER
March 1, 2017        Guillermo Alejo  [address]  [place] [address]  Phone: [phone]  Fax: [phone]             [facility]  [address]  [place] [address]  Phone: [phone]   Patient: Mert Samayoa   MR Number: [id number]   YOB: 1990   Date of Visit: 3/1/2017       Dear Dr. Guillermo Alejo    Thank you for referring Mert Samayoa to me for evaluation. Attached you will find relevant portions of my assessment and plan of care.    If you have questions, please do not hesitate to call me. I look forward to following Mert Samayoa along with you.    Sincerely,    Marisel Lundberg MD    Enclosure    If you would like to receive this communication electronically, please contact [email or web address] or [phone] to request [employer] access.    [employer] is a tool which provides read-only access to select patient information with whom you have a relationship. Its easy to use and provides real time access to review your patients record including encounter summaries, notes, results, and demographic information.    If you feel you have received this communication in error or would no longer like to receive these types of communications, please e-mail [email or web address]

## 2017-03-01 NOTE — PROCEDURES
TXP DERREK INTERROGATIONS 3/1/2017 2/21/2017 2/21/2017 2/20/2017 2/20/2017 2/20/2017 2/19/2017   Type Heartware - - - - - -   Flow 5.4 - - - - - -   Speed 2700 - - - - - -   Power ([facility]oldsmith) 4.2 - - - - - -   Low Flow Alarm 3.5 - - - - - -   High Power Alarm 7.5 - - - - - -   Pulsatility No Pulse Intermittent pulse Intermittent pulse Intermittent pulse Intermittent pulse Intermittent pulse Intermittent pulse   }

## 2017-03-01 NOTE — PATIENT INSTRUCTIONS
Try melatonin 5mg 1-2 hours before bedtime    Its ok to have your teeth cleaned- we usually given amoxicillin 1 hour before    Cut lasix in half and try 20mg daily    Check your weights every morning after getting out of bed and urinating. If your weight goes up 3# overnight or 5# in one week take 40 mg lasix that day.    Call us if you find yourself getting more short of breath, have more swelling or unexpected weight changes, fever, chills, alarms, bloody or black bowel movements, or drainage from your driveline

## 2017-03-01 NOTE — MR AVS SNAPSHOT
[facility]  [address]  [facility] [address]  Phone: [phone]                  Mert Samayoa   3/1/2017 2:00 PM   Office Visit    Description:  Male : 1990   Provider:  Marisel Lundberg MD   Department:  [facility]           Reason for Visit     VAD Follow Up           Diagnoses this Visit        Comments    Heart replaced by heart assist device    -  Primary     Congestive heart failure, unspecified         Chronic combined systolic and diastolic heart failure         Organ transplant candidate         Familial cardiomyopathy         Long term (current) use of anticoagulants         LVAD (left ventricular assist device) present                To Do List           Future Appointments        Provider Department Dept Phone    3/8/2017 12:35 PM LAB, APPOINTMENT [place] [facility] [phone]    3/8/2017 4:00 PM HEARTTRANSPLANT, LVADN [facility] [phone]    3/8/2017 4:30 PM Lashon Hawkins MD [facility] [phone]    3/15/2017 12:05 PM LAB, APPOINTMENT [place] [facility] [phone]    3/15/2017 2:00 PM Mario Conner MD [facility] [phone]      Goals (5 Years of Data)     None       These Medications        Disp Refills Start End    oxycodone-acetaminophen (PERCOCET)  mg per tablet 60 tablet 0 3/1/2017     Take 1 tablet by mouth every 6 (six) hours as needed. - Oral    Pharmacy: [id number] - [place] - [address] Ph #: [phone]       furosemide (LASIX) 20 MG tablet 30 tablet 11 3/1/2017 3/1/2018    Take 1 tablet (20 mg total) by mouth once daily. - Oral    Pharmacy: [id number] - [place] - [address] Ph #: [phone]         [facility]     [facility] Care [facility] -  Assistance  Registered nurses in the [facility] provide clinical advisement, health education, appointment  booking, and other advisory services.  Call for this free service at [phone].             Medications           Message regarding Medications     Verify the changes and/or additions to your medication regime listed below are the same as discussed with your clinician today.  If any of these changes or additions are incorrect, please notify your healthcare provider.        CHANGE how you are taking these medications     Start Taking Instead of    furosemide (LASIX) 20 MG tablet furosemide (LASIX) 40 MG tablet    Dosage:  Take 1 tablet (20 mg total) by mouth once daily. Dosage:  Take 1 tablet (40 mg total) by mouth once daily.    Reason for Change:  Reorder            Verify that the below list of medications is an accurate representation of the medications you are currently taking.  If none reported, the list may be blank. If incorrect, please contact your healthcare provider. Carry this list with you in case of emergency.           Current Medications     amiodarone (PACERONE) 200 MG Tab Take 1 tablet (200 mg total) by mouth once daily.    aspirin (ECOTRIN) 325 MG EC tablet Take 1 tablet (325 mg total) by mouth once daily.    famotidine (PEPCID) 40 MG tablet Take 1 tablet (40 mg total) by mouth every evening.    ferrous gluconate (FERGON) 324 MG tablet Take 1 tablet (324 mg total) by mouth daily with breakfast.    furosemide (LASIX) 20 MG tablet Take 1 tablet (20 mg total) by mouth once daily.    lisinopril (PRINIVIL,ZESTRIL) 5 MG tablet Take 1 tablet (5 mg total) by mouth 2 (two) times daily.    magnesium oxide (MAG-OX) 400 mg tablet Take 1 tablet (400 mg total) by mouth 2 (two) times daily.    oxycodone-acetaminophen (PERCOCET)  mg per tablet Take 1 tablet by mouth every 6 (six) hours as needed.    senna-docusate 8.6-50 mg (SENNA WITH DOCUSATE SODIUM) 8.6-50 mg per tablet Take 2 tablets by mouth daily as needed for Constipation.    warfarin (COUMADIN) 5 MG tablet Take 1 tablet (5 mg total) by mouth  Daily.           Clinical Reference Information           Your Vitals Were     BP                   60/0 (BP Location: Left arm, Patient Position: Sitting, BP Method: Doppler)           Blood Pressure          Most Recent Value    BP  (!)  60/0      Allergies as of 3/1/2017     No Known Allergies      Immunizations Administered on Date of Encounter - 3/1/2017     None      Orders Placed During Today's Visit      Normal Orders This Visit    Ambulatory Referral to Electrophysiology     LVAD Interrogations Out Patient Only     LVAD Maintenance       Maintenance Dialysis History     Patient has no recorded history of maintenance dialysis.      Transplant Information        Txp Date Organ Coordinator Care Team     Heart Vee Khan RN Referring Physician:  Guillermo Alejo MD   Corresponding Physician:  Guillermo Alejo MD         Instructions    Try melatonin 5mg 1-2 hours before bedtime    Its ok to have your teeth cleaned- we usually given amoxicillin 1 hour before    Cut lasix in half and try 20mg daily    Check your weights every morning after getting out of bed and urinating. If your weight goes up 3# overnight or 5# in one week take 40 mg lasix that day.    Call us if you find yourself getting more short of breath, have more swelling or unexpected weight changes, fever, chills, alarms, bloody or black bowel movements, or drainage from your driveline           Smoking Cessation     If you would like to quit smoking:   You may be eligible for free services if you are a [place] resident and started smoking cigarettes before September 1, 1988.  Call the Smoking [employer]essation [employer] (SCT) toll free at (068) [phone] or (110) 960-7995.   Call 7-462-QUIT-NOW if you do not meet the above criteria.            Language Assistance Services     ATTENTION: Language assistance services are available, free of charge. Please call [phone].      ATENCIÓN: Si habla español, tiene a hinton disposición servicios gratuitos de  asistencia lingüística. Angelo giraldo [phone].     GAYLA Ý: N?u b?n nói Ti?ng Vi?t, có các d?ch v? h? tr? ngôn ng? mi?n phí dành cho b?n. G?i s? [phone].         [facility] complies with applicable Federal civil rights laws and does not discriminate on the basis of race, color, national origin, age, disability, or sex.

## 2017-03-01 NOTE — PROGRESS NOTES
Patient has emergency bag: yes  Condition of emergency bag: good   Contents of emergency bag: conx1, bx2    Patient has monthly checklist today: yes}  Patient correctly utilizing monthly checklist: yes  Educated patient on utilizing monthly checklist correctly and importance of this: yes    Inspected patient's equipment today: yes  Equipment clean and free of debris: yes  Cleaned equipment today and educated patient on how to do this: yes    Manual and visual inspection of driveline: YES good   Kinks, rescue tape, tears: no    Patient wearing waist strap, vest, jc vest: Shoulder Pack w/ waist strap. Fitted pt with Waist Pack today.  Condition of this: good      Batteries expiration dates: On Snapshot             Serial Number                          Expiration Date  1.    2.    3.    4.    5.    6.    7.    8.      Other equipment issues: no    Equipment given to patient today in clinic:  Issued HW Waist Pack L/N#[id number] Charged by ECT  Discussed with MCS Coordinator and physician: no    Equipment loaned to patient today: no    Waveforms sent: no

## 2017-03-01 NOTE — PROGRESS NOTES
RUFINO followed up with pt and wife, Abigail, today in LVAD clinic. Both were alert/oriented x4 with pleasant affects. Pt recently d/c from the hospital and this is his first visit to LVAD clinic.    Pt has home health and his wife is doing his dressing changes. Pt has a lot of family support and his wife is working.    Pt expressed some frustrating with adjusting to living with an LVAD. Pt stated being connected to the power source at home is frustrating as he has to disconnect and switch to battery each time he wants to go to the kitchen or bathroom. Support provided.    Pt stated 'I have my moments' and that he and his wife are more snappy with each other. SW provided support and educated that this is normal and adjusting to living with an LVAD is a process and takes time. Pt states that while he knows he needs the LVAD his quality of life was better prior to having it. Pt stated he knew he had heart trouble but that he didn't feel really bad until the 2 weeks prior to being admitted to the hospital.    Pt states he has been online at [employer] and plans to come to the next support group. Although pt is struggling he is coping adequately and able to discuss his feelings and frustrations. Pt has very good support from his wife and family. Pt's 2 older brothers received heart transplants at [facility] in the past.    SW provided much support, education, resources and encouragement. Pt educated about therapy options, pt does not feel he needs this at time time, but will let SW know if he feels he does. RUFINO remains available and continues to follow.

## 2017-03-01 NOTE — PROGRESS NOTES
Subjective:   Patient ID:  Mert Samayoa is a 27 y.o. male who presents for LVAD followup visit.    Implant date: 1/30/17    TXP [facility] INTERROGATIONS 3/1/2017   Type Heartware   Flow 5.4   Speed 2700   Power (G[employer]mith) 4.2   Low Flow Alarm 3.5   High Power Alarm 7.5   Pulsatility No Pulse       HPI  28 yo Wm with familial CMY admitted 1/27/17  for IABP placement prior to HVAD placement. Patient underwent placement of IABP on 1/30 with subsequent placement of LVAD on 2/1 and sternal closure on 2/2. Heartware VAD was placed without any immediate complications. Over the next few days, patient was extubated and , Epi, Cardene and Lasix gtts and Isiah were started and titrated off. Patient was also on heparin gtt. Patient was stepped down from the SICU to CTSU on 2/9. Patient went into atrial flutter in AM of 2/11, which he self converted out of, and started on Amiodarone by CTS following atrial flutter episode. Patient was started on Coumadin on 2/10. Patient had low flow alarms on 2/16 and HW speed was dropped from 2900 RPM to 2700 RPM after speed echo showed better filling at 2700 RPM. Patient was discharged on 2/21 on 5 mg of Coumadin daily.    Since dc home, pt had a low flow alarm this weekend at 5:30am, dropped to 3.3L/min- resolved spont and pt was asymptomatic. Has been a little emotional since getting home, not sleeping well and has had some stress/anxiety.  Bp is low today but he's not dizzy, waits a little while when he first sits up though- no fluid retention- postoperative pain comes and goes, worse when his up and around, says today he's hurting.  Keeping his salt to 300-400mg/meal, notes his UOP drops off over the course of the day, not following a fluid restriction but not overdoing it     DLES is grade 1-2 with old scab    Sutures removed from DL and CT sites today    Interrogation of device data reveals no alarms, normal flows and power, normal waveform (see VAD interrogation report for full  "details.)            Review of Systems   Constitution: Positive for malaise/fatigue. Negative for chills, fever and weight gain.   HENT: Negative.    Eyes: Negative.    Cardiovascular: Negative for chest pain, dyspnea on exertion, leg swelling, near-syncope, orthopnea, palpitations, paroxysmal nocturnal dyspnea and syncope.   Respiratory: Negative for cough and shortness of breath.    Endocrine: Negative.    Skin: Negative.    Musculoskeletal: Negative.    Gastrointestinal: Negative for bloating, abdominal pain and change in bowel habit.   Neurological: Negative for dizziness and light-headedness.   Psychiatric/Behavioral: Positive for depression. The patient has insomnia and is nervous/anxious.        Objective:   Blood pressure (!) 60/0, temperature 98.1 °F (36.7 °C), temperature source Oral, height 5' 7" (1.702 m), weight 63.4 kg (139 lb 12.4 oz).body mass index is 21.89 kg/(m^2).    Doppler: 60    Physical Exam   Constitutional: He is oriented to person, place, and time. He appears well-developed and well-nourished.   HENT:   Head: Normocephalic and atraumatic.   Eyes: Right eye exhibits no discharge. Left eye exhibits no discharge.   Neck: Neck supple. No JVD present. No thyromegaly present.   Cardiovascular: Normal rate and regular rhythm.  Exam reveals no gallop and no friction rub.    No murmur heard.  Normal VAD hum     Pulmonary/Chest: Effort normal and breath sounds normal. No respiratory distress. He has no wheezes. He has no rales.   Abdominal: Soft. Bowel sounds are normal. He exhibits no distension. There is no tenderness.   Musculoskeletal: Normal range of motion. He exhibits no edema or tenderness.   Neurological: He is alert and oriented to person, place, and time. No cranial nerve deficit. Coordination normal.   Skin: Skin is warm and dry. No rash noted.   Psychiatric: He has a normal mood and affect. Judgment and thought content normal.       Lab Results   Component Value Date    WBC 6.73 " 03/01/2017    HGB 10.0 (L) 03/01/2017    HCT 32.6 (L) 03/01/2017    MCV 84 03/01/2017     (H) 03/01/2017    CO2 31 (H) 03/01/2017    CREATININE 1.1 03/01/2017    CALCIUM 9.6 03/01/2017    ALBUMIN 3.9 03/01/2017    AST 27 03/01/2017     (H) 03/01/2017    ALT 30 03/01/2017     03/01/2017       Lab Results   Component Value Date    INR 2.7 (H) 03/01/2017    INR 2.5 02/27/2017    INR 2.4 02/23/2017       BNP   Date Value Ref Range Status   03/01/2017 233 (H) 0 - 99 pg/mL Final     Comment:     Values of less than 100 pg/ml are consistent with non-CHF populations.   02/20/2017 453 (H) 0 - 99 pg/mL Final     Comment:     Values of less than 100 pg/ml are consistent with non-CHF populations.   02/17/2017 271 (H) 0 - 99 pg/mL Final     Comment:     Values of less than 100 pg/ml are consistent with non-CHF populations.       LD   Date Value Ref Range Status   03/01/2017 197 110 - 260 U/L Final     Comment:     Results are increased in hemolyzed samples.   02/21/2017 174 110 - 260 U/L Final     Comment:     Results are increased in hemolyzed samples.   02/20/2017 180 110 - 260 U/L Final     Comment:     Results are increased in hemolyzed samples.       Labs were reviewed with the patient.    No results found for this or any previous visit.  No results found for this or any previous visit.    Assessment:      1. Heart replaced by heart assist device- doing great at his first clinic visit- bp controlled, euvolemic, LDH low, INR therapeutic   2. Congestive heart failure, unspecified    3. Chronic combined systolic and diastolic heart failure    4. Organ transplant candidate    5. Familial cardiomyopathy    6. Long term (current) use of anticoagulants    7. LVAD (left ventricular assist device) present        Plan:   Refilled percocet today for pt to fill when he runs out  Waveforms sent to [employer]  Melatonin prn sleep  Asked pt to Cut lasix in half and try 20mg daily    Due for dental cleaning, will need  prophylaxis with amox 1 hr prior    Needs an apt with EP to discuss placement of ICD- will schedule for same day as next visit if possible    Patient is now NYHA II  Recommend 2 gram sodium restriction and 1500cc fluid restriction.  Encourage physical activity with graded exercise program.  Requested patient to weigh themselves daily, and to notify us if their weight increases by more than 3 lbs in 1 day or 5 lbs in 1 week.     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

## 2017-03-02 ENCOUNTER — DOCUMENTATION ONLY (OUTPATIENT)
Dept: TRANSPLANT | Facility: CLINIC | Age: 27
End: 2017-03-02

## 2017-03-02 ENCOUNTER — ANTI-COAG VISIT (OUTPATIENT)
Dept: CARDIOLOGY | Facility: CLINIC | Age: 27
End: 2017-03-02

## 2017-03-02 DIAGNOSIS — Z79.01 LONG TERM (CURRENT) USE OF ANTICOAGULANTS: ICD-10-CM

## 2017-03-02 DIAGNOSIS — Z95.811 LVAD (LEFT VENTRICULAR ASSIST DEVICE) PRESENT: ICD-10-CM

## 2017-03-02 NOTE — PROGRESS NOTES
"Date of Implant with Heartware LVAD: 2/1/17    PATIENT ARRIVED IN CLINIC:  Ambulatory  Accompanied by:wife  Vitals  Doppler:60  Pulsatile:no  PAIN:4 on 0-10 pain scale , location of pain: chest, back, description of pain: incisional  Is patient currently on medications for pain?yes What kind? percocet  Weight (with controller and 2 batteries): refer to encounter    VAD Interrogation:  TXP [facility] INTERROGATIONS 3/1/2017   Type Heartware   Flow 5.4   Speed 2700   Power (Goldsmith) 4.2   Low Flow Alarm 3.5   High Power Alarm 7.5   Pulsatility No Pulse       HCT:32.6  WAVEFORM:4-9, no negative deflections noted  Suction alarm: off     Problems / Issues / Alarms with VAD if any:no alarms since having a low flow alarm 2/25/17   Any Equipment Issues:no issues, refer to Alexander Borrero' note  Patient states their batteries are lasting 6 hours. Rotating all batteries. Checking connections before and after changing battery.   Emergency Equipment With Patient:yes   VAD Binder With Patient: yes   Reviewed VAD Numbers In Binder:no  VAD Sounds: SMOOTH   Manual & Visual Inspection Of Driveline:  NO KINKS OR TEARS NOTED   Checked Heartware driveline connector housing for separation, none noted.  Also checked for tight connection, which they are.  Educated pt regarding this and instructed  to check on this daily. Pt verbalized understanding and agreement.   Clamshell Repair: no  Patient wearing (consolidated bag, vest, holster )with waist strap:YES , bag.     LVAD Dressing/DLES:  Appearance Of Driveline:"1-2"  Thick dried black scar noted. Incorporated, no drainage.  Suture removed without complication.  Pt tolerated procedure well.   Antibiotics:NO  Frequency of Dressing Changes:daily with soap and water   Stabilization Device In Use: YES reagan herrera    Pt In Need Of Management Kits?:yes, 2 months of soap and water dressing supplies ordered today  It is medically necessary to have VAD management kits in order to prevent infection or to " "assist in the healing of an infected DLES.    Assessment:   Complaints/reason for visit today:  Routine follow up  Complaints Of Nausea / Vomiting:denies   Appearance and Frequency Of Stools:denies diarrhea or blood in stool  Color Of Urine: clear, yellow   Coping/Depression/Anxiety:Refer to Griselda Rocha note  Sleep Habits:3-4 hrs /night  Sleep Aids:denies  Showering :NO   Activity/Exercise:"walking around" about 1-2 hours per day.    Driving:no, Reminded to pull over should there be an alarm before looking down at controller.       Labs:    Chemistry        Component Value Date/Time     03/01/2017 1255    K 4.0 03/01/2017 1255     03/01/2017 1255    CO2 31 (H) 03/01/2017 1255    BUN 9 03/01/2017 1255    CREATININE 1.1 03/01/2017 1255    GLU 63 (L) 03/01/2017 1255        Component Value Date/Time    CALCIUM 9.6 03/01/2017 1255    ALKPHOS 113 03/01/2017 1255    AST 27 03/01/2017 1255    ALT 30 03/01/2017 1255    BILITOT 0.3 03/01/2017 1255            Magnesium   Date Value Ref Range Status   03/01/2017 2.2 1.6 - 2.6 mg/dL Final       Lab Results   Component Value Date    WBC 6.73 03/01/2017    HGB 10.0 (L) 03/01/2017    HCT 32.6 (L) 03/01/2017    MCV 84 03/01/2017     (H) 03/01/2017       Lab Results   Component Value Date    INR 2.7 (H) 03/01/2017    INR 2.5 02/27/2017    INR 2.4 02/23/2017       BNP   Date Value Ref Range Status   03/01/2017 233 (H) 0 - 99 pg/mL Final     Comment:     Values of less than 100 pg/ml are consistent with non-CHF populations.   02/20/2017 453 (H) 0 - 99 pg/mL Final     Comment:     Values of less than 100 pg/ml are consistent with non-CHF populations.   02/17/2017 271 (H) 0 - 99 pg/mL Final     Comment:     Values of less than 100 pg/ml are consistent with non-CHF populations.       LD   Date Value Ref Range Status   03/01/2017 197 110 - 260 U/L Final     Comment:     Results are increased in hemolyzed samples.   02/21/2017 174 110 - 260 U/L Final     Comment:     " Results are increased in hemolyzed samples.   02/20/2017 180 110 - 260 U/L Final     Comment:     Results are increased in hemolyzed samples.       Labs reviewed with patient: YES , copy given to them      Patient Satisfaction Survey completed per Patient: no  (explained about signature and box to check)  Medication reconciliation: per MA.  Purple card updated today: yes, per Dr. Lundberg  Coumadin Managed by: [facility], 2.7    Education: Reviewed driveline care, emergency procedures, how to change the controller, alarms with patient.        Plans/Needs:Refer to MD note.

## 2017-03-02 NOTE — PROGRESS NOTES
"INR reminder sent to VAD coordinators by mistake.  We are unable to "put back". Notified Rocío Vasquez   "

## 2017-03-03 ENCOUNTER — TELEPHONE (OUTPATIENT)
Dept: INTENSIVE CARE | Facility: HOSPITAL | Age: 27
End: 2017-03-03

## 2017-03-03 NOTE — TELEPHONE ENCOUNTER
Patient paged @ 0030 with a low flow alarm, felt good.  Asked him to drink more water.  Mert verbalized understanding and agreement.    0645: Paged again with another low flow alarm.  He again had a low flow alarm and still feels ok.  I asked him not to take his lasix until I can talk with Dr. Lundberg.      0815:  Discussed with Dr. Lundberg.  She asked him to hold his lasix this weekend and if he has more low flow alarms, he needs to come in.  Called pt to discuss with him.  Pt verbalized all back to me and agreement.

## 2017-03-06 ENCOUNTER — DOCUMENTATION ONLY (OUTPATIENT)
Dept: TRANSPLANT | Facility: CLINIC | Age: 27
End: 2017-03-06

## 2017-03-06 ENCOUNTER — TELEPHONE (OUTPATIENT)
Dept: INTENSIVE CARE | Facility: HOSPITAL | Age: 27
End: 2017-03-06

## 2017-03-06 ENCOUNTER — ANTI-COAG VISIT (OUTPATIENT)
Dept: CARDIOLOGY | Facility: CLINIC | Age: 27
End: 2017-03-06

## 2017-03-06 DIAGNOSIS — Z95.811 LVAD (LEFT VENTRICULAR ASSIST DEVICE) PRESENT: ICD-10-CM

## 2017-03-06 DIAGNOSIS — Z79.01 LONG TERM (CURRENT) USE OF ANTICOAGULANTS: ICD-10-CM

## 2017-03-06 LAB
EXT BUN: 10 (ref 7–18)
EXT CALCIUM: 9.1 (ref 8.5–10.1)
EXT CHLORIDE: 103 (ref 99–107)
EXT CREATININE: 1 MG/DL (ref 0.6–1.3)
EXT GLUCOSE: 78 (ref 74–106)
EXT POTASSIUM: 4.1 (ref 3.5–5.1)
EXT SODIUM: 140 MMOL/L (ref 135–145)
INR PPP: 2.5

## 2017-03-06 NOTE — TELEPHONE ENCOUNTER
Mert called to report he had a low flow alarm just now, woke him up, he feels fine and his flows are not 4.2.  He is drinking water.  He is coming to clinic Tuesday to see EP and then us Wednesday.  I explained maybe we can see him Tuesday instead.  Also asked him to call me back if the alarms continue.     0930:  Called back to report anoth low flow alarm just now and quickly resolved.  JAISON chinchilla said he feels good, flows quickly returned to 4.0.  He had fallen asleep after talking with me so he didn't drink water.  He said he will drink water now and call again if they occur.  I explained he might need to come in so I can adjust his low flow alarm.  Pt verbalized understanding and agreement.

## 2017-03-06 NOTE — PROGRESS NOTES
Lab from 3/6:  The labs done 3/1 were very similar to the ones done 3/6 except for the bnp.    Na 143  k     4.1  chl   109  Gluc 78  Bun  10  Cr     1.0     pro b type naturetic peptide was  2157 (nl less than 125)   was 233 at [facility] 3/1

## 2017-03-07 ENCOUNTER — CLINICAL SUPPORT (OUTPATIENT)
Dept: TRANSPLANT | Facility: CLINIC | Age: 27
End: 2017-03-07
Payer: COMMERCIAL

## 2017-03-07 ENCOUNTER — HOSPITAL ENCOUNTER (OUTPATIENT)
Dept: CARDIOLOGY | Facility: CLINIC | Age: 27
Discharge: HOME | End: 2017-03-07
Payer: COMMERCIAL

## 2017-03-07 ENCOUNTER — INITIAL CONSULT (OUTPATIENT)
Dept: ELECTROPHYSIOLOGY | Facility: CLINIC | Age: 27
End: 2017-03-07
Payer: COMMERCIAL

## 2017-03-07 ENCOUNTER — OFFICE VISIT (OUTPATIENT)
Dept: TRANSPLANT | Facility: CLINIC | Age: 27
End: 2017-03-07
Attending: INTERNAL MEDICINE
Payer: COMMERCIAL

## 2017-03-07 ENCOUNTER — TELEPHONE (OUTPATIENT)
Dept: TRANSPLANT | Facility: CLINIC | Age: 27
End: 2017-03-07

## 2017-03-07 VITALS
HEIGHT: 67 IN | WEIGHT: 141.13 LBS | BODY MASS INDEX: 22.15 KG/M2 | SYSTOLIC BLOOD PRESSURE: 78 MMHG | HEART RATE: 59 BPM | HEIGHT: 67 IN | WEIGHT: 141.13 LBS | BODY MASS INDEX: 22.15 KG/M2 | TEMPERATURE: 98 F

## 2017-03-07 DIAGNOSIS — Z95.811 LVAD (LEFT VENTRICULAR ASSIST DEVICE) PRESENT: ICD-10-CM

## 2017-03-07 DIAGNOSIS — F17.210 CIGARETTE SMOKER: ICD-10-CM

## 2017-03-07 DIAGNOSIS — Z79.01 LONG TERM (CURRENT) USE OF ANTICOAGULANTS: ICD-10-CM

## 2017-03-07 DIAGNOSIS — Z95.811 HEART REPLACED BY HEART ASSIST DEVICE: Primary | ICD-10-CM

## 2017-03-07 DIAGNOSIS — I50.22 CHRONIC SYSTOLIC CONGESTIVE HEART FAILURE: ICD-10-CM

## 2017-03-07 DIAGNOSIS — I50.42 CHRONIC COMBINED SYSTOLIC AND DIASTOLIC HEART FAILURE: Primary | ICD-10-CM

## 2017-03-07 DIAGNOSIS — I50.9 CONGESTIVE HEART FAILURE, UNSPECIFIED: ICD-10-CM

## 2017-03-07 DIAGNOSIS — Z95.811 HEART REPLACED BY HEART ASSIST DEVICE: ICD-10-CM

## 2017-03-07 DIAGNOSIS — I50.42 CHRONIC COMBINED SYSTOLIC AND DIASTOLIC HEART FAILURE: ICD-10-CM

## 2017-03-07 DIAGNOSIS — I42.9 FAMILIAL CARDIOMYOPATHY: ICD-10-CM

## 2017-03-07 PROBLEM — R11.0 NAUSEA: Status: RESOLVED | Noted: 2017-02-03 | Resolved: 2017-03-07

## 2017-03-07 LAB
DIASTOLIC DYSFUNCTION: YES
ESTIMATED PA SYSTOLIC PRESSURE: 22
RETIRED EF AND QEF - SEE NOTES: 15 (ref 55–65)
TRICUSPID VALVE REGURGITATION: ABNORMAL

## 2017-03-07 PROCEDURE — 93750 INTERROGATION VAD IN PERSON: CPT | Mod: S$GLB,,, | Performed by: INTERNAL MEDICINE

## 2017-03-07 PROCEDURE — 99999 PR PBB SHADOW E&M-EST. PATIENT-LVL III: CPT | Mod: PBBFAC,,, | Performed by: INTERNAL MEDICINE

## 2017-03-07 PROCEDURE — 3074F SYST BP LT 130 MM HG: CPT | Mod: S$GLB,,, | Performed by: INTERNAL MEDICINE

## 2017-03-07 PROCEDURE — 99214 OFFICE O/P EST MOD 30 MIN: CPT | Mod: S$GLB,,, | Performed by: INTERNAL MEDICINE

## 2017-03-07 PROCEDURE — 3078F DIAST BP <80 MM HG: CPT | Mod: S$GLB,,, | Performed by: INTERNAL MEDICINE

## 2017-03-07 PROCEDURE — 93000 ELECTROCARDIOGRAM COMPLETE: CPT | Mod: S$GLB,,, | Performed by: INTERNAL MEDICINE

## 2017-03-07 PROCEDURE — 93306 TTE W/DOPPLER COMPLETE: CPT | Mod: S$GLB,,, | Performed by: INTERNAL MEDICINE

## 2017-03-07 PROCEDURE — 1160F RVW MEDS BY RX/DR IN RCRD: CPT | Mod: S$GLB,,, | Performed by: INTERNAL MEDICINE

## 2017-03-07 PROCEDURE — 99205 OFFICE O/P NEW HI 60 MIN: CPT | Mod: S$GLB,,, | Performed by: INTERNAL MEDICINE

## 2017-03-07 NOTE — PROGRESS NOTES
Subjective:    Patient ID:  Mert Samayoa is a 27 y.o. male who presents for evaluation of Congestive Heart Failure      HPI Comments: 27 yoM NICM, s/p LVAD here for ICD consideration. He has history of familial, NICM. He was on coreg and lisinopril for medical treatment. He did not tolerate coreg and was switched to metoprolol. He developed worsening HF symptoms and ultimately underwent LVAD placement 2/1/17. He has had an unremarkable recovery. He had AF and NSVT during his hospitalization leading to amiodarone initiation. He had a PICC in the right arm for a few weeks.    Echo 2/17:  CONCLUSIONS     1 - Eccentric hypertrophy.     2 - Severely depressed left ventricular systolic function.     3 - Biatrial enlargement.     4 - Left ventricular diastolic dysfunction.     5 - Moderately depressed right ventricular systolic function .     6 - Intermediate central venous pressure.     7 - HeartWare LVAD; speed 2700.     8 - Since prior study and decreased LVAD speed LV cavity is bigger.     Past Medical History:  1/16/2017: Cardiogenic shock  No date: Cardiomyopathy      Comment: Familial cardiomyopathy  No date: CHF (congestive heart failure)  12/21/2016: Essential hypertension  No date: Familial Cardiomyopathy      Comment: Familial cardiomyopathy     History reviewed. No pertinent surgical history.    Social History    Marital status:              Spouse name:                       Years of education:                 Number of children:               Occupational History    None on file    Social History Main Topics    Smoking status: Current Some Day Smoker                                                      Packs/day: 1.00      Years: 0.00           Smokeless status: Not on file                       Alcohol use: Yes           2.4 oz/week       4 Shots of liquor per week    Drug use: No              Sexual activity: Not on file          Other Topics            Concern    None on file    Social  History Narrative    Works     Review of patient's family history indicates:    Arthritis                      Mother                    Heart disease                  Brother                     Comment: cardiomyopathy and heart transplant    No Known Problems              Father                    Heart disease                  Brother                     Comment: cardiomyopathy and heart transplanted                twice    Birth defects                  Paternal Uncle              Congestive Heart Failure   Pertinent negatives include no chest pain, near-syncope, palpitations or shortness of breath.       Review of Systems   Constitution: Positive for malaise/fatigue.   HENT: Negative.    Eyes: Negative.    Cardiovascular: Negative for chest pain, dyspnea on exertion, leg swelling, near-syncope, palpitations and syncope.   Respiratory: Negative.  Negative for shortness of breath.    Endocrine: Negative.    Hematologic/Lymphatic: Negative.    Skin: Negative.    Musculoskeletal: Negative.    Gastrointestinal: Negative.    Genitourinary: Negative.    Neurological: Negative.  Negative for dizziness and light-headedness.   Psychiatric/Behavioral: Negative.    Allergic/Immunologic: Negative.         Objective:    Physical Exam   Constitutional: He is oriented to person, place, and time. He appears well-developed and well-nourished. No distress.   HENT:   Head: Normocephalic and atraumatic.   Eyes: Conjunctivae and EOM are normal. Pupils are equal, round, and reactive to light. Right eye exhibits no discharge. Left eye exhibits no discharge.   Neck: Normal range of motion. Neck supple. No JVD present. No thyromegaly present.   Cardiovascular: Normal rate, regular rhythm, S1 normal and S2 normal.  PMI is not displaced.  Exam reveals no gallop and no friction rub.    Murmur (continuous murmur) heard.  Pulmonary/Chest: Effort normal and breath sounds normal. No respiratory distress. He has no  wheezes. He has no rales.   Abdominal: Soft. Bowel sounds are normal. He exhibits no distension. There is no tenderness. There is no rebound and no guarding.   Musculoskeletal: Normal range of motion. He exhibits no edema or tenderness.   Neurological: He is alert and oriented to person, place, and time. No cranial nerve deficit.   Skin: Skin is warm and dry. No rash noted. No erythema.   Psychiatric: He has a normal mood and affect. His behavior is normal. Judgment and thought content normal.     ECG: NSR nl NV, QRS, QTc        Assessment:       1. Chronic combined systolic and diastolic heart failure    2. Familial cardiomyopathy    3. LVAD (left ventricular assist device) present         Plan:       27 yoM NICM, s/p LVAD here for ICD consideration. He has long standing NICM, on OMT. He cannot take beta blockers due to side effects (cardiogenic shock). He meets criteria for ICD implantation. I had a prolonged discussion with the patient regarding ICD implantation for primary prevention. Specifically that the device would not change heart failure symptoms or progression of disease. Rather, it would prevent potentially life threatening arrhythmias if they were to arise. Our discussion of risks included (but was not limited to) the possibility of infection, death, stroke, MI, pneumothorax, embolism, cardiac perforation, cardiac tamponade, renal injury, and bleeding.    SC ICD with atrial sensing (biotronik DX lead)  Anesthesia, MAC fine  Continue warfarin, hold for INR >3.5 or <2 & the need for heparin post implant

## 2017-03-07 NOTE — TELEPHONE ENCOUNTER
"Pt paged on call VAD coordinator.  PT verbalizes he believes he had a low flow alarm.  Pt reports he heard the alarm and when he checked the controller he accidentally cleared it but per pt "It sounded like the low flow alarm and it was gone very fast".  Pt current LVAD numbers 4.8 flow, 2700 rpms, 3.9 power.  Pt reports he is "completely fine and asymptomatic, I was asleep".  Pt reports he is still off of his diuretics and denies SOB, chest pain or dizziness.  Plan is for pt to come to clinic appoinment this afternoon for LVAD nurse and MD visit as previously scheduled.  Pt verbaliezes understanding and in agreement of plan.   Pt encouraged to call VAD coordinator with any questions problems or concerns. Pt reminded to page VAD coordinator on call for emergencies.  Pt verbalized understanding and in agreement of plan.     Dr Tapia notified and in agreement of plan.    "

## 2017-03-07 NOTE — LETTER
March 8, 2017        Guillermo Alejo  [address]  [facility] [address]  Phone: [phone]  Fax: [phone]             [facility]  [address]  [facility] [address]  Phone: [phone]   Patient: Mert Samayoa   MR Number: [id number]   YOB: 1990   Date of Visit: 3/7/2017       Dear Dr. Guillermo Alejo    Thank you for referring Mert Samayoa to me for evaluation. Attached you will find relevant portions of my assessment and plan of care.    If you have questions, please do not hesitate to call me. I look forward to following Mert Samayoa along with you.    Sincerely,    Hernán Tidwell Jr, MD    Enclosure    If you would like to receive this communication electronically, please contact [email or web address] or [phone] to request [employer] Link access.    [employer] Link is a tool which provides read-only access to select patient information with whom you have a relationship. Its easy to use and provides real time access to review your patients record including encounter summaries, notes, results, and demographic information.    If you feel you have received this communication in error or would no longer like to receive these types of communications, please e-mail [email or web address]

## 2017-03-07 NOTE — LETTER
March 7, 2017      Marisel Lundberg MD  [address]  [facility] [address]           [facility]  [address]  [facility] [address]  Phone: [phone]  Fax: [phone]          Patient: Mert Samayoa   MR Number: [id number]   YOB: 1990   Date of Visit: 3/7/2017       Dear Dr. Marisel Lundberg:    Thank you for referring Mert Samayoa to me for evaluation. Attached you will find relevant portions of my assessment and plan of care.    If you have questions, please do not hesitate to call me. I look forward to following Mert Samayoa along with you.    Sincerely,    Jeremiah Trujillo MD    Enclosure  CC:  No Recipients    If you would like to receive this communication electronically, please contact [email or web address] or [phone] to request more information on [employer] Link access.    For providers and/or their staff who would like to refer a patient to [facility], please contact us through our one-stop-shop provider referral line, [facility], at [phone].    If you feel you have received this communication in error or would no longer like to receive these types of communications, please e-mail [email or web address]

## 2017-03-07 NOTE — MR AVS SNAPSHOT
[facility]  [address]  [facility] [address]  Phone: [phone]  Fax: [phone]                  Mert Samayoa   3/7/2017 3:40 PM   Initial consult    Description:  Male : 1990   Provider:  Jeremiah Trujillo MD   Department:  [facility]           Reason for Visit     Congestive Heart Failure           Diagnoses this Visit        Comments    Chronic combined systolic and diastolic heart failure    -  Primary     Familial cardiomyopathy         LVAD (left ventricular assist device) present                To Do List           Future Appointments        Provider Department Dept Phone    3/7/2017 4:30 PM Hernán Tidwell Jr., MD [facility] [phone]    3/15/2017 12:05 PM LAB, APPOINTMENT [place] [facility] [phone]    3/15/2017 2:00 PM Mario Conner MD [facility] [phone]    3/15/2017 2:00 PM HEARTTRANSPLANT, LVADN [facility] [phone]      Goals (5 Years of Data)     None      [facility]     [facility] Care [facility] -  Assistance  Registered nurses in the [facility] provide clinical advisement, health education, appointment booking, and other advisory services.  Call for this free service at [phone].             Medications           Message regarding Medications     Verify the changes and/or additions to your medication regime listed below are the same as discussed with your clinician today.  If any of these changes or additions are incorrect, please notify your healthcare provider.        STOP taking these medications     furosemide (LASIX) 20 MG tablet Take 1 tablet (20 mg total) by mouth once daily.           Verify that the below list of medications is an accurate representation of the medications you are currently taking.  If none reported, the list may be blank. If incorrect, please contact your healthcare provider. Carry this list with you  "in case of emergency.           Current Medications     amiodarone (PACERONE) 200 MG Tab Take 1 tablet (200 mg total) by mouth once daily.    aspirin (ECOTRIN) 325 MG EC tablet Take 1 tablet (325 mg total) by mouth once daily.    famotidine (PEPCID) 40 MG tablet Take 1 tablet (40 mg total) by mouth every evening.    ferrous gluconate (FERGON) 324 MG tablet Take 1 tablet (324 mg total) by mouth daily with breakfast.    lisinopril (PRINIVIL,ZESTRIL) 5 MG tablet Take 1 tablet (5 mg total) by mouth 2 (two) times daily.    magnesium oxide (MAG-OX) 400 mg tablet Take 1 tablet (400 mg total) by mouth 2 (two) times daily.    oxycodone-acetaminophen (PERCOCET)  mg per tablet Take 1 tablet by mouth every 6 (six) hours as needed.    senna-docusate 8.6-50 mg (SENNA WITH DOCUSATE SODIUM) 8.6-50 mg per tablet Take 2 tablets by mouth daily as needed for Constipation.    warfarin (COUMADIN) 5 MG tablet Take 1 tablet (5 mg total) by mouth Daily.           Clinical Reference Information           Your Vitals Were     Pulse Height Weight BMI       59 5' 7" (1.702 m) 64 kg (141 lb 1.5 oz) 22.1 kg/m2       Allergies as of 3/7/2017     No Known Allergies      Immunizations Administered on Date of Encounter - 3/7/2017     None      Maintenance Dialysis History     Patient has no recorded history of maintenance dialysis.      Transplant Information        Txp Date Organ Coordinator Care Team     Heart Vee Khan RN Referring Physician:  Guillermo Alejo MD   Corresponding Physician:  Guillermo Alejo MD         Smoking Cessation     If you would like to quit smoking:   You may be eligible for free services if you are a [place] resident and started smoking cigarettes before September 1, 1988.  Call the Smoking Cessation Trust (SCT) toll free at (739) 204-5346 or (693) 566-9658.   Call 7-800-QUIT-NOW if you do not meet the above criteria.            Language Assistance Services     ATTENTION: Language assistance services are " available, free of charge. Please call [phone].      ATENCIÓN: Si habla español, tiene a hinton disposición servicios gratuitos de asistencia lingüística. Llame al [phone].     CHÚ Ý: N?u b?n nói Ti?ng Vi?t, có các d?ch v? h? tr? ngôn ng? mi?n phí dành cho b?n. G?i s? [phone].         [facility] complies with applicable Federal civil rights laws and does not discriminate on the basis of race, color, national origin, age, disability, or sex.

## 2017-03-07 NOTE — PROGRESS NOTES
Subjective:   Patient ID:  Mert Samayoa is a 27 y.o. male who presents for LVAD followup visit.    Implant date: 2/1/17    TXP DERREK INTERROGATIONS 3/7/2017   Type Heartware   Flow 4.2   Speed 2700   Power (Goldsmith) 3.8   Low Flow Alarm 2.5   High Power Alarm 6.0   Pulsatility No Pulse       HPI  28 yo WM familial CM admitted 1/27/17  for IABP placement prior to HVAD placement. Patient underwent placement of IABP on 1/30 with subsequent placement of LVAD on 2/1 and sternal closure on 2/2. Heartware VAD was placed without any immediate complications. Over the next few days, patient was extubated and , Epi, Cardene and Lasix gtts and Isiah were started and titrated off. Patient was also on heparin gtt. Patient was stepped down from the SICU to CTSU on 2/9. Patient went into atrial flutter in AM of 2/11, which he self converted out of, and started on Amiodarone by CTS following atrial flutter episode. Patient was started on Coumadin on 2/10/17. Patient had low flow alarms on 2/16/17 and HW speed was dropped from 2900 RPM to 2700 RPM after speed echo showed better filling at 2700 RPM.    When seen 3/1/17 noted he had a low flow alarm and week before clinic visit lasix reduced from 40 mg to 20 mg and off lasix since 2/26/17.  He still notes occasional low flow alarms all very brief and generally at night.  He reports urine is clear yellow to clear.  Wt fluctuates 135-136# now.  He feels fine otherwise.    Review of Systems   Constitution: Positive for weight gain (working on nutrition and wt now 135-136#). Negative for chills, fever, weakness, malaise/fatigue, night sweats and weight loss.   HENT: Negative for congestion, headaches, hearing loss, hoarse voice, nosebleeds and sore throat.    Eyes: Negative for double vision, pain, vision loss in left eye and vision loss in right eye.   Cardiovascular: Positive for chest pain (chest wall/incisional pain). Negative for claudication, dyspnea on exertion, irregular  "heartbeat, leg swelling, near-syncope, orthopnea, palpitations, paroxysmal nocturnal dyspnea and syncope.        Chest tube site disrupted scab with HH nurse but drainage is minimal soiling on dressing   Respiratory: Negative for cough, hemoptysis, shortness of breath, sleep disturbances due to breathing, snoring, sputum production and wheezing.    Endocrine: Negative for cold intolerance, heat intolerance, polydipsia and polyuria.   Hematologic/Lymphatic: Negative for bleeding problem. Does not bruise/bleed easily.   Musculoskeletal: Negative for falls, muscle cramps, myalgias and neck pain.   Gastrointestinal: Negative for bloating, abdominal pain, change in bowel habit, constipation, diarrhea, hematochezia, jaundice, melena and nausea.   Genitourinary: Negative for bladder incontinence, dysuria, frequency, hematuria, hesitancy, incomplete emptying and urgency.   Neurological: Negative for brief paralysis, dizziness, focal weakness, numbness, paresthesias and seizures.   Psychiatric/Behavioral: Negative for depression and memory loss. The patient is not nervous/anxious.      Objective:   Blood pressure (!) 78/0, temperature 97.9 °F (36.6 °C), temperature source Oral, height 5' 7" (1.702 m), weight 64 kg (141 lb 1.5 oz).body mass index is 22.1 kg/(m^2).  Doppler: 78 mm Hg  Physical Exam   Constitutional: He appears well-developed and well-nourished. No distress.   HENT:   Head: Normocephalic and atraumatic.   Eyes: Conjunctivae are normal. Right eye exhibits no discharge. Left eye exhibits no discharge. No scleral icterus.   Neck: No JVD present. No tracheal deviation present. No thyromegaly present.   Cardiovascular:   Normal VAD sounds  Superficial colonization left chest tube site   Pulmonary/Chest: Effort normal and breath sounds normal. No respiratory distress. He has no wheezes. He has no rales.   Abdominal: Soft. Bowel sounds are normal. He exhibits no distension and no mass. There is no tenderness. There is " no rebound and no guarding.   Musculoskeletal: He exhibits no edema or tenderness.   Neurological: He is alert.   Skin: Skin is warm and dry. He is not diaphoretic.   Psychiatric: He has a normal mood and affect. His behavior is normal. Judgment and thought content normal.     Lab Results   Component Value Date     (H) 03/07/2017     03/07/2017    K 4.9 03/07/2017    MG 2.1 03/07/2017     03/07/2017    CO2 28 03/07/2017    BUN 11 03/07/2017    CREATININE 1.0 03/07/2017    GLU 85 03/07/2017    HGBA1C 5.4 01/17/2017    AST 17 03/07/2017    ALT 21 03/07/2017    ALBUMIN 3.9 03/07/2017    PROT 7.5 03/07/2017    BILITOT 0.4 03/07/2017    WBC 5.95 03/07/2017    HGB 10.3 (L) 03/07/2017    HCT 34.3 (L) 03/07/2017    HCT 24 (L) 02/02/2017     03/07/2017    INR 2.6 (H) 03/07/2017    INR 2.5 03/06/2017     03/07/2017    TSH 1.734 01/17/2017    CHOL 141 01/17/2017    HDL 25 (L) 01/17/2017    LDLCALC 80.0 01/17/2017    TRIG 180 (H) 01/17/2017     Assessment:      1. Heart replaced by heart assist device    2. Chronic combined systolic and diastolic heart failure    3. Long term (current) use of anticoagulants    4. Familial cardiomyopathy        Plan:   Review echo when available but for now would leave off diuretics even with change in BNP noted as JVP not elevated on exam--speed reduced and may be related to increase LV wall stress  Dr. Trujillo plans SC ICD  Stop amiodarone once ICD placed and request dual lead with prior PAF-Fl (assuming no recurrent AF-Fl)  While on amiodarone:   CXR yearly   CMP, TSH 3 months after starting and every 6 months thereafter      Patient is now NYHA II  Recommend 2 gram sodium restriction and 1500cc fluid restriction.  Encourage physical activity with graded exercise program.  Requested patient to weigh themselves daily, and to notify us if their weight increases by more than 3 lbs in 1 day or 5 lbs in 1 week.     Listed for transplant: No must be 6 months tobacco  free--plan for mid June 2017--discussed with patient and wife    BECKY Patient Status  Functional Status: 100% - Normal, no complaints, no evidence of disease  Physical Capacity: No Limitations  Working for Income: No  If no, reason not working: Demands of Treatment      ADDENDUM 3/8/17:  ECHO demonstrated small IVC 1.5 cm consistent with low CVP on exam  LV dilated and aortic valve not opening  RV dilated and reduced function    Will monitor off diuretics for now but if low flow persists consider RHC and if appropriate inotrope support of RV function

## 2017-03-08 DIAGNOSIS — I50.9 CONGESTIVE HEART FAILURE, UNSPECIFIED CONGESTIVE HEART FAILURE CHRONICITY, UNSPECIFIED CONGESTIVE HEART FAILURE TYPE: Primary | ICD-10-CM

## 2017-03-08 DIAGNOSIS — I50.22 CHRONIC SYSTOLIC CONGESTIVE HEART FAILURE: ICD-10-CM

## 2017-03-08 NOTE — PROCEDURES
TXP DERREK INTERROGATIONS 3/7/2017 3/1/2017 2/21/2017 2/21/2017 2/20/2017 2/20/2017 2/20/2017   Type Heartware Heartware - - - - -   Flow 4.2 5.4 - - - - -   Speed 2700 2700 - - - - -   Power (Goldsmith) 3.8 4.2 - - - - -   Low Flow Alarm 2.5 3.5 - - - - -   High Power Alarm 6.0 7.5 - - - - -   Pulsatility No Pulse No Pulse Intermittent pulse Intermittent pulse Intermittent pulse Intermittent pulse Intermittent pulse   }

## 2017-03-09 ENCOUNTER — ANTI-COAG VISIT (OUTPATIENT)
Dept: CARDIOLOGY | Facility: CLINIC | Age: 27
End: 2017-03-09

## 2017-03-09 DIAGNOSIS — Z79.01 LONG TERM (CURRENT) USE OF ANTICOAGULANTS: ICD-10-CM

## 2017-03-09 DIAGNOSIS — Z95.811 LVAD (LEFT VENTRICULAR ASSIST DEVICE) PRESENT: ICD-10-CM

## 2017-03-09 LAB — INR PPP: 2.1

## 2017-03-09 NOTE — PROGRESS NOTES
IRB number:  [id number]       PI:  Dr. Usman Klein    01/30/2017: Met with Mert Samayoa and his wife regarding participating in the INTERM[employer] Registry for VAD patients.  The patient was given a copy of the patient information sheet to review and read.  Discussed purpose, duration, requirements of participation, risks, benefits, and confidentiality regarding the registry. Stated that the patient will not receive compensation or direct benefits from participation with the INTERMACS Registry.  The registry is voluntarily and if the patient chooses not to participate in the quality of life questionnaires, Trail Making, or Gait Speed test, this will not effect his care in any way. The patient verbalized understanding of above and was allowed time to ask questions.        The KCCQ and EQ-5D completed.  Patient with IABP in place and unable to complete the Gait Speed test.  Trail Making not done due to laying flat in bed.

## 2017-03-13 ENCOUNTER — ANTI-COAG VISIT (OUTPATIENT)
Dept: CARDIOLOGY | Facility: CLINIC | Age: 27
End: 2017-03-13

## 2017-03-13 DIAGNOSIS — Z95.811 LVAD (LEFT VENTRICULAR ASSIST DEVICE) PRESENT: ICD-10-CM

## 2017-03-13 DIAGNOSIS — Z79.01 LONG TERM (CURRENT) USE OF ANTICOAGULANTS: ICD-10-CM

## 2017-03-13 LAB — INR PPP: 2

## 2017-03-15 ENCOUNTER — OFFICE VISIT (OUTPATIENT)
Dept: TRANSPLANT | Facility: CLINIC | Age: 27
End: 2017-03-15
Payer: COMMERCIAL

## 2017-03-15 ENCOUNTER — LAB VISIT (OUTPATIENT)
Dept: LAB | Facility: HOSPITAL | Age: 27
End: 2017-03-15
Attending: INTERNAL MEDICINE
Payer: COMMERCIAL

## 2017-03-15 ENCOUNTER — ANTI-COAG VISIT (OUTPATIENT)
Dept: CARDIOLOGY | Facility: CLINIC | Age: 27
End: 2017-03-15

## 2017-03-15 ENCOUNTER — CLINICAL SUPPORT (OUTPATIENT)
Dept: TRANSPLANT | Facility: CLINIC | Age: 27
End: 2017-03-15
Payer: COMMERCIAL

## 2017-03-15 VITALS
HEIGHT: 67 IN | WEIGHT: 135 LBS | SYSTOLIC BLOOD PRESSURE: 76 MMHG | TEMPERATURE: 98 F | BODY MASS INDEX: 21.19 KG/M2 | HEART RATE: 77 BPM

## 2017-03-15 DIAGNOSIS — Z95.811 LVAD (LEFT VENTRICULAR ASSIST DEVICE) PRESENT: ICD-10-CM

## 2017-03-15 DIAGNOSIS — Z95.811 HEART REPLACED BY HEART ASSIST DEVICE: ICD-10-CM

## 2017-03-15 DIAGNOSIS — I50.9 CONGESTIVE HEART FAILURE, UNSPECIFIED: ICD-10-CM

## 2017-03-15 DIAGNOSIS — Z79.01 LONG TERM (CURRENT) USE OF ANTICOAGULANTS: ICD-10-CM

## 2017-03-15 LAB
ALBUMIN SERPL BCP-MCNC: 4.2 G/DL
ALP SERPL-CCNC: 82 U/L
ALT SERPL W/O P-5'-P-CCNC: 17 U/L
ANION GAP SERPL CALC-SCNC: 9 MMOL/L
AST SERPL-CCNC: 16 U/L
BASOPHILS # BLD AUTO: 0.02 K/UL
BASOPHILS NFR BLD: 0.2 %
BILIRUB DIRECT SERPL-MCNC: 0.2 MG/DL
BILIRUB SERPL-MCNC: 0.4 MG/DL
BNP SERPL-MCNC: 193 PG/ML
BUN SERPL-MCNC: 14 MG/DL
CALCIUM SERPL-MCNC: 9.7 MG/DL
CHLORIDE SERPL-SCNC: 107 MMOL/L
CO2 SERPL-SCNC: 27 MMOL/L
CREAT SERPL-MCNC: 1 MG/DL
CRP SERPL-MCNC: 19.6 MG/L
DIFFERENTIAL METHOD: ABNORMAL
EOSINOPHIL # BLD AUTO: 0.1 K/UL
EOSINOPHIL NFR BLD: 1.3 %
ERYTHROCYTE [DISTWIDTH] IN BLOOD BY AUTOMATED COUNT: 16 %
EST. GFR  (AFRICAN AMERICAN): >60 ML/MIN/1.73 M^2
EST. GFR  (NON AFRICAN AMERICAN): >60 ML/MIN/1.73 M^2
GLUCOSE SERPL-MCNC: 77 MG/DL
HCT VFR BLD AUTO: 38.4 %
HGB BLD-MCNC: 11.7 G/DL
INR PPP: 2.2
LDH SERPL L TO P-CCNC: 185 U/L
LYMPHOCYTES # BLD AUTO: 1.4 K/UL
LYMPHOCYTES NFR BLD: 15 %
MAGNESIUM SERPL-MCNC: 2.1 MG/DL
MCH RBC QN AUTO: 26.4 PG
MCHC RBC AUTO-ENTMCNC: 30.5 %
MCV RBC AUTO: 87 FL
MONOCYTES # BLD AUTO: 0.6 K/UL
MONOCYTES NFR BLD: 6 %
NEUTROPHILS # BLD AUTO: 7.2 K/UL
NEUTROPHILS NFR BLD: 77.2 %
PHOSPHATE SERPL-MCNC: 3.6 MG/DL
PLATELET # BLD AUTO: 268 K/UL
PMV BLD AUTO: 9.4 FL
POTASSIUM SERPL-SCNC: 4.6 MMOL/L
PREALB SERPL-MCNC: 28 MG/DL
PROT SERPL-MCNC: 7.8 G/DL
PROTHROMBIN TIME: 21.6 SEC
RBC # BLD AUTO: 4.44 M/UL
SODIUM SERPL-SCNC: 143 MMOL/L
WBC # BLD AUTO: 9.29 K/UL

## 2017-03-15 PROCEDURE — 3074F SYST BP LT 130 MM HG: CPT | Mod: S$GLB,,, | Performed by: INTERNAL MEDICINE

## 2017-03-15 PROCEDURE — 1160F RVW MEDS BY RX/DR IN RCRD: CPT | Mod: S$GLB,,, | Performed by: INTERNAL MEDICINE

## 2017-03-15 PROCEDURE — 3078F DIAST BP <80 MM HG: CPT | Mod: S$GLB,,, | Performed by: INTERNAL MEDICINE

## 2017-03-15 PROCEDURE — 99999 PR PBB SHADOW E&M-EST. PATIENT-LVL III: CPT | Mod: PBBFAC,,, | Performed by: INTERNAL MEDICINE

## 2017-03-15 PROCEDURE — 99214 OFFICE O/P EST MOD 30 MIN: CPT | Mod: S$GLB,,, | Performed by: INTERNAL MEDICINE

## 2017-03-15 NOTE — PROGRESS NOTES
Subjective:   Patient ID:  Mert Samayoa is a 27 y.o. male who presents for LVAD followup visit.    Implant date: 1/27/17    TXP DERREK INTERROGATIONS 3/15/2017   Type Heartware   Flow 5   Speed 2700   Power (Goldsmith) 4.3   Low Flow Alarm 2.5   High Power Alarm 6.0   Pulsatility No Pulse       HPI CM admitted 1/27/17 for IABP placement prior to HVAD placement. Patient underwent placement of IABP on 1/30 with subsequent placement of LVAD on 2/1 and sternal closure on 2/2. Heartware VAD was placed without any immediate complications. Over the next few days, patient was extubated and , Epi, Cardene and Lasix gtts and Isiah were started and titrated off. Patient was also on heparin gtt. Patient was stepped down from the SICU to CTSU on 2/9. Patient went into atrial flutter in AM of 2/11, which he self converted out of, and started on Amiodarone by CTS following atrial flutter episode. Patient was started on Coumadin on 2/10/17. Patient had low flow alarms on 2/16/17 and HW speed was dropped from 2900 RPM to 2700 RPM after speed echo showed better filling at 2700 RPM.  At his last visit in early March 2017, he as noted occasional low flow alarms all very brief and generally at night.Since last visit, no further low flow which maybe related to better hydration.  Able to walk five kilometer walk over weekend  Getting ICD in two weeks.  Still have some post op pain at night.      Review of Systems   Constitution: Negative for decreased appetite, weight gain and weight loss.   Cardiovascular: Negative for chest pain, dyspnea on exertion, leg swelling, near-syncope, orthopnea and palpitations.   Respiratory: Negative for cough and shortness of breath.    Musculoskeletal: Negative for myalgias.   Gastrointestinal: Negative for jaundice.       Objective:     Doppler: 76    Physical Exam   Constitutional: He is oriented to person, place, and time. He appears well-developed and well-nourished. He is active. He is not  "intubated.   BP (!) 76/0 (BP Location: Right arm, Patient Position: Sitting, BP Method: Doppler)  Pulse 77  Temp 98 °F (36.7 °C) (Oral)   Ht 5' 7" (1.702 m)  Wt 61.2 kg (135 lb)  BMI 21.14 kg/m2     HENT:   Head: Normocephalic and atraumatic. Hair is normal.   Right Ear: External ear normal.   Left Ear: External ear normal.   Nose: Nose normal. No nasal deformity. No epistaxis.  No foreign bodies.   Mouth/Throat: Mucous membranes are normal. Mucous membranes are not cyanotic. No oropharyngeal exudate.   Eyes: Conjunctivae and EOM are normal. Pupils are equal, round, and reactive to light.   Neck: Neck supple. No hepatojugular reflux and no JVD present.   Cardiovascular: Normal rate, regular rhythm, normal heart sounds and normal pulses.  Exam reveals no gallop.    Pulmonary/Chest: Effort normal and breath sounds normal. No apnea and no tachypnea. He is not intubated. No respiratory distress. He exhibits no tenderness.   Abdominal: Soft. Normal appearance and bowel sounds are normal. There is no tenderness. No hernia.   Musculoskeletal: Normal range of motion.   Neurological: He is alert and oriented to person, place, and time. He displays no seizure activity.   Skin: Skin is warm, dry and intact. No rash noted. No pallor.   Psychiatric: He has a normal mood and affect. His speech is normal and behavior is normal. Thought content normal. Cognition and memory are normal.       Lab Results   Component Value Date    WBC 9.29 03/15/2017    HGB 11.7 (L) 03/15/2017    HCT 38.4 (L) 03/15/2017    MCV 87 03/15/2017     03/15/2017    CO2 27 03/15/2017    CREATININE 1.0 03/15/2017    CALCIUM 9.7 03/15/2017    ALBUMIN 4.2 03/15/2017    AST 16 03/15/2017     (H) 03/15/2017    ALT 17 03/15/2017     03/15/2017       Lab Results   Component Value Date    INR 2.2 (H) 03/15/2017    INR 2.0 03/13/2017    INR 2.1 03/09/2017       BNP   Date Value Ref Range Status   03/15/2017 193 (H) 0 - 99 pg/mL Final     " Comment:     Values of less than 100 pg/ml are consistent with non-CHF populations.   03/07/2017 392 (H) 0 - 99 pg/mL Final     Comment:     Values of less than 100 pg/ml are consistent with non-CHF populations.   03/01/2017 233 (H) 0 - 99 pg/mL Final     Comment:     Values of less than 100 pg/ml are consistent with non-CHF populations.       LD   Date Value Ref Range Status   03/15/2017 185 110 - 260 U/L Final     Comment:     Results are increased in hemolyzed samples.   03/07/2017 204 110 - 260 U/L Final     Comment:     Results are increased in hemolyzed samples.   03/01/2017 197 110 - 260 U/L Final     Comment:     Results are increased in hemolyzed samples.           Assessment:      1. Heart replaced by heart assist device    2. Congestive heart failure, unspecified        Plan:   1. 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 Ok to drive as sternum intact  3.   Followup in three weeks after ICD  4.  Insominia - will increase melotonin to 10 qd / pain pills at night / more daytime activities (overall better sleep hygeine)   Patient is now NYHA I  Recommend 2 gram sodium restriction and 1500cc fluid restriction.  Encourage physical activity with graded exercise program.  Requested patient to weigh themselves daily, and to notify us if their weight increases by more than 3 lbs in 1 day or 5 lbs in 1 week.     Listed for transplant: No need to be smoke free for six months plan for mid June 2017

## 2017-03-15 NOTE — LETTER
March 15, 2017        Guillermo Alejo  [address]  [facility] [address]  Phone: [phone]  Fax: [phone]             [facility]  [address]  [facility] [address]  Phone: [phone]   Patient: Mert Samayoa   MR Number: [id number]   YOB: 1990   Date of Visit: 3/15/2017       Dear Dr. Guillermo Alejo    Thank you for referring Mert Samayoa to me for evaluation. Attached you will find relevant portions of my assessment and plan of care.    If you have questions, please do not hesitate to call me. I look forward to following Mert Samayoa along with you.    Sincerely,    Mario Conner MD    Enclosure    If you would like to receive this communication electronically, please contact [email or web address] or [phone] to request [employer] access.    [employer] is a tool which provides read-only access to select patient information with whom you have a relationship. Its easy to use and provides real time access to review your patients record including encounter summaries, notes, results, and demographic information.    If you feel you have received this communication in error or would no longer like to receive these types of communications, please e-mail [email or web address]

## 2017-03-15 NOTE — PROGRESS NOTES
"Date of Implant with Heartware LVAD: 2/1/17    PATIENT ARRIVED IN CLINIC:  Ambulatory  Accompanied by:Wife  Vitals  Doppler:78  Pulsatile:no  PAIN:3 on 0-10 pain scale , location of pain: chest/back, description of pain: n/a  Is patient currently on medications for pain?yes What kind? percocet  Weight (with controller and 2 batteries): refer to encounter    VAD Interrogation:  TXP DERREK INTERROGATIONS 3/7/2017   Type Heartware   Flow 4.2   Speed 2700   Power (Goldsmith) 3.8   Low Flow Alarm 2.5   High Power Alarm 6.0   Pulsatility No Pulse       HCT:34.3  WAVEFORM:2-7 with no negative deflections  Suction alarm: off     Problems / Issues / Alarms with VAD if any:low flow alarms on 3/3, 3/5, 3/7. Flows went to 3.4-3.8 (low flow setting was at 3.5)   Any Equipment Issues:bibe  Patient states their batteries are lasting 4 hours. Rotating all batteries. Checking connections before and after changing battery.   Emergency Equipment With Patient:yes   VAD Binder With Patient: yes   Reviewed VAD Numbers In Binder:yes  VAD Sounds: SMOOTH   Manual & Visual Inspection Of Driveline:  NO KINKS OR TEARS NOTED   Checked Heartware driveline connector housing for separation, none noted.  Also checked for tight connection, which they are.  Educated pt regarding this and instructed  to check on this daily. Pt verbalized understanding and agreement.   Clamshell Repair: no  Patient wearing (consolidated bag, vest, holster )with waist strap:YES     LVAD Dressing/DLES:  Appearance Of Driveline:"1"  Antibiotics:NO  Frequency of Dressing Changes:Daily with soap and water   Stabilization Device In Use: YES Castillo Dodge    Pt In Need Of Management Kits?:no   It is medically necessary to have VAD management kits in order to prevent infection or to assist in the healing of an infected DLES.    Assessment:   Complaints/reason for visit today:  Routine follow up, low flow alarms  Complaints Of Nausea / Vomiting:none   Appearance and Frequency Of " Stools:Normal and formed without blood every other day  Color Of Urine: clear and yellow  Coping/Depression/Anxiety:patient coping well, but does experience anxiety  Sleep Habits:4-5 hrs /night  Sleep Aids:melatonin  Showering :NO   Activity/Exercise:1  Hour daily   Driving:no, Reminded to pull over should there be an alarm before looking down at controller.       Labs:    Chemistry        Component Value Date/Time     03/07/2017 1454    K 4.9 03/07/2017 1454     03/07/2017 1454    CO2 28 03/07/2017 1454    BUN 11 03/07/2017 1454    CREATININE 1.0 03/07/2017 1454    GLU 85 03/07/2017 1454        Component Value Date/Time    CALCIUM 9.6 03/07/2017 1454    ALKPHOS 92 03/07/2017 1454    AST 17 03/07/2017 1454    ALT 21 03/07/2017 1454    BILITOT 0.4 03/07/2017 1454            Magnesium   Date Value Ref Range Status   03/07/2017 2.1 1.6 - 2.6 mg/dL Final       Lab Results   Component Value Date    WBC 5.95 03/07/2017    HGB 10.3 (L) 03/07/2017    HCT 34.3 (L) 03/07/2017    MCV 86 03/07/2017     03/07/2017       Lab Results   Component Value Date    INR 2.0 03/13/2017    INR 2.1 03/09/2017    INR 2.6 (H) 03/07/2017       BNP   Date Value Ref Range Status   03/07/2017 392 (H) 0 - 99 pg/mL Final     Comment:     Values of less than 100 pg/ml are consistent with non-CHF populations.   03/01/2017 233 (H) 0 - 99 pg/mL Final     Comment:     Values of less than 100 pg/ml are consistent with non-CHF populations.   02/20/2017 453 (H) 0 - 99 pg/mL Final     Comment:     Values of less than 100 pg/ml are consistent with non-CHF populations.       LD   Date Value Ref Range Status   03/07/2017 204 110 - 260 U/L Final     Comment:     Results are increased in hemolyzed samples.   03/01/2017 197 110 - 260 U/L Final     Comment:     Results are increased in hemolyzed samples.   02/21/2017 174 110 - 260 U/L Final     Comment:     Results are increased in hemolyzed samples.       Labs reviewed with patient: YES       Patient Satisfaction Survey completed per Patient: no  (explained about signature and box to check)  Medication reconciliation: per MA.  Purple card updated today: no  Coumadin Managed by: [facility],     Education: Reviewed driveline care, emergency procedures, how to change the controller, alarms with patient.        Plans/Needs:Patient presents today as he has had some low flow alarms recently. Patient's low flow alarm was set to 3.5 but flow was 4.2. Low flow alarm reset to 2.5 today which will hopefully resolve issue. Patient states that he was asymptomatic with all alarms. Patient received EKG and echo today. Clinic visit completed by Margarita Sanders and Dr. Daniels. Per Dr. Daniels, no changes today except for patient to stay off of lasix. Patient to RTC 1 week. Refer to MD note.

## 2017-03-15 NOTE — PROGRESS NOTES
Patient has emergency bag: yes  Condition of emergency bag: good  Contents of emergency bag: conx1, bx2, cacx1     Patient has monthly checklist today: yes}  Patient correctly utilizing monthly checklist: yes  Educated patient on utilizing monthly checklist correctly and importance of this: yes    Inspected patient's equipment today: yes  Equipment clean and free of debris: yes  Cleaned equipment today and educated patient on how to do this: yes    Manual and visual inspection of driveline: YES good   Kinks, rescue tape, tears: no    Patient wearing waist strap, vest, jc vest: patient pack  Condition of this: good      Batteries expiration dates: on snapshot             Serial Number                          Expiration Date  1.    2.    3.    4.    5.    6.    7.    8.      Other equipment issues: none    Equipment given to patient today in clinic: no  Discussed with MCS Coordinator and physician: no    Equipment loaned to patient today: no    Waveforms sent: no

## 2017-03-16 ENCOUNTER — TELEPHONE (OUTPATIENT)
Dept: INTENSIVE CARE | Facility: HOSPITAL | Age: 27
End: 2017-03-16

## 2017-03-16 NOTE — TELEPHONE ENCOUNTER
Received call from patient asking if it is normal to feel the pump behind his ribs when he leans over in certain positions. Verbalized that this is normal and will subside over time.

## 2017-03-16 NOTE — PROGRESS NOTES
Date of Implant with Heartware LVAD: 2/11/17    PATIENT ARRIVED IN CLINIC:  Ambulatory   Accompanied by: foreign    Vitals  Doppler: 76  Pulsatile: Yes, intermittent  PAIN: 4 on 0-10 pain scale, location of pain: back, description of pain: soreness  Is patient currently on medications for pain? yes What kind? Oxy at night, causes constipation to pt  Weight (with controller and 2 batteries): refer to encounter    VAD Interrogation:  TXP DERREK INTERROGATIONS 3/15/2017   Type Heartware   Flow 5   Speed 2700   Power (Goldsmith) 4.3   Low Flow Alarm 2.5   High Power Alarm 6.0   Pulsatility No Pulse       HCT: 38.4  WAVEFORM: 3-8 no deflections  Suction alarm: Off  Problems / Issues / Alarms with VAD if any: None noted   Any Equipment Issues: None noted  Patient states their batteries are lasting 4-5 hours. Rotating all batteries. Checking connections before and after changing battery.   Emergency Equipment With Patient: yes   VAD Binder With Patient: yes   Reviewed VAD Numbers In Binder: yes  VAD Sounds: Smooth  Manual & Visual Inspection Of Driveline: No kinks or tears noted  Checked Heartware driveline connector housing for separation, none noted.  Also checked for tight connection, which they are.  Educated pt regarding this and instructed  to check on this daily. Pt verbalized understanding and agreement.   Clamshell Repair: no  Patient wearing waist belt with waist strap:YES     LVAD Dressing/DLES:  Appearance Of Driveline: 1-2 with scab  Antibiotics: NO  Frequency of Dressing Changes: daily & soap and water dressing   Stabilization Device In Use: yes, kirk securement device    Pt In Need Of Management Kits? no   It is medically necessary to have VAD management kits in order to prevent infection or to assist in the healing of an infected DLES.    Assessment:   Complaints/reason for visit today: routine  Complaints Of Nausea / Vomiting: None noted   Appearance and Frequency Of Stools: normal and formed without blood & every  other day  Color Of Urine: clear/yellow  Patient is: coping okay   Sleep Habits: 4-5 hrs /night  Sleep Aids: yes, pt still having trouble sleeping will increase melatonin to 10 mg   Showering: no  Activity/Exercise: pt reports walking daily   Driving: yes, Reminded to pull over should there be an alarm before looking down at controller.     Labs:    Chemistry        Component Value Date/Time     03/15/2017 1225    K 4.6 03/15/2017 1225     03/15/2017 1225    CO2 27 03/15/2017 1225    BUN 14 03/15/2017 1225    CREATININE 1.0 03/15/2017 1225    GLU 77 03/15/2017 1225        Component Value Date/Time    CALCIUM 9.7 03/15/2017 1225    ALKPHOS 82 03/15/2017 1225    AST 16 03/15/2017 1225    ALT 17 03/15/2017 1225    BILITOT 0.4 03/15/2017 1225            Magnesium   Date Value Ref Range Status   03/15/2017 2.1 1.6 - 2.6 mg/dL Final       Lab Results   Component Value Date    WBC 9.29 03/15/2017    HGB 11.7 (L) 03/15/2017    HCT 38.4 (L) 03/15/2017    MCV 87 03/15/2017     03/15/2017       Lab Results   Component Value Date    INR 2.2 (H) 03/15/2017    INR 2.0 03/13/2017    INR 2.1 03/09/2017       BNP   Date Value Ref Range Status   03/15/2017 193 (H) 0 - 99 pg/mL Final     Comment:     Values of less than 100 pg/ml are consistent with non-CHF populations.   03/07/2017 392 (H) 0 - 99 pg/mL Final     Comment:     Values of less than 100 pg/ml are consistent with non-CHF populations.   03/01/2017 233 (H) 0 - 99 pg/mL Final     Comment:     Values of less than 100 pg/ml are consistent with non-CHF populations.       LD   Date Value Ref Range Status   03/15/2017 185 110 - 260 U/L Final     Comment:     Results are increased in hemolyzed samples.   03/07/2017 204 110 - 260 U/L Final     Comment:     Results are increased in hemolyzed samples.   03/01/2017 197 110 - 260 U/L Final     Comment:     Results are increased in hemolyzed samples.       Labs reviewed with patient: YES      Patient Satisfaction  Survey completed per Patient: no  (explained about signature and box to check)  Medication reconciliation: per MA.  Purple card updated today: yes  Coumadin Managed by: [facility],     Education: Reviewed driveline care, emergency procedures, how to change the controller, alarms with patient.      Plans/Needs: PT doing very well, given permission per Dr. Stanley to drive.  Pt educated on LVAD driving safety today.  Pt also asking to RT work,.  Pt instructed to go to cardiac rehab first.  Will push back disability for now and pt to call local cardiac rehab for needs to enroll. Pt to RTC in 1 week, please refer to MD note.

## 2017-03-20 ENCOUNTER — ANTI-COAG VISIT (OUTPATIENT)
Dept: CARDIOLOGY | Facility: CLINIC | Age: 27
End: 2017-03-20

## 2017-03-20 DIAGNOSIS — Z95.811 LVAD (LEFT VENTRICULAR ASSIST DEVICE) PRESENT: ICD-10-CM

## 2017-03-20 DIAGNOSIS — Z79.01 LONG TERM (CURRENT) USE OF ANTICOAGULANTS: ICD-10-CM

## 2017-03-20 LAB — INR PPP: 2.5

## 2017-03-21 RX ORDER — CEFAZOLIN SODIUM 2 G/50ML
2 SOLUTION INTRAVENOUS
Status: CANCELLED | OUTPATIENT
Start: 2017-03-21

## 2017-03-23 ENCOUNTER — PATIENT MESSAGE (OUTPATIENT)
Dept: ELECTROPHYSIOLOGY | Facility: CLINIC | Age: 27
End: 2017-03-23

## 2017-03-23 ENCOUNTER — ANTI-COAG VISIT (OUTPATIENT)
Dept: CARDIOLOGY | Facility: CLINIC | Age: 27
End: 2017-03-23

## 2017-03-23 DIAGNOSIS — I50.42 CHRONIC COMBINED SYSTOLIC AND DIASTOLIC HEART FAILURE: Primary | ICD-10-CM

## 2017-03-23 DIAGNOSIS — I42.8 NICM (NONISCHEMIC CARDIOMYOPATHY): ICD-10-CM

## 2017-03-23 DIAGNOSIS — Z95.811 LVAD (LEFT VENTRICULAR ASSIST DEVICE) PRESENT: ICD-10-CM

## 2017-03-23 DIAGNOSIS — Z79.01 LONG TERM (CURRENT) USE OF ANTICOAGULANTS: ICD-10-CM

## 2017-03-23 LAB — INR PPP: 2.3

## 2017-03-23 NOTE — PROGRESS NOTES
Post-Procedure Implantable Device Instructions  You will be scheduled for a post-op wound appointment within 2 weeks after your procedure.  This will be with someone from the Device Clinic. They will look at your incision and check your device at this appointment. You will not see the doctor at this appointment unless there is something significant to discuss. Otherwise, you normally see the doctor in the clinic approximately 3 months after your device is implanted.    Upon discharge after your procedure you will be asked to look at your incision for any signs and symptoms of infection. These would include:  · Redness  · Drainage  · Swelling at incision site  · Fever >100.5  Some swelling and bruising can happen postoperatively. These are usually worse on days 3-5.    Post-procedure limitations:  Bathing:   No tub baths at least until after your first wound check appointment   Showers are allowed, but you should shower with your incision facing away from shower stream   Do not scrub your incision--you should only pat incision with towel to dry any moisture after a shower    Arm Movement limitations if you have a new defibrillator or pacemaker with new or revised leads:   Do Not raise your arm above your shoulder on the side with device for 6 weeks   Do Not lift more than 10 pounds with either arm for 6 weeks   You are restricted on driving a vehicle for 4-6 weeks     If you have any of the above described symptoms or other concerns, don't hesitate to call:  [phone] Mon-Fri 8a-5p  OR  [phone] after hours or on weekends    A REMOTE MONITOR may be prescribed to you which allows information from your implanted cardiac device to transmit directly to the clinic.  Upon receipt of your home monitor, the first important step is to set up the monitor according to the written instructions provided.  Once you have setup the monitor, do not disconnect it for any reason.  We will provide you with more  information when you return to the clinic for your wound check appointment.  You do not need to bring the home monitor with you to your clinic appointments.

## 2017-03-23 NOTE — PROGRESS NOTES
IMPLANTABLE DEVICE EDUCATION CHECKLIST    3/29/17 @ 7:30 AM  REPORT TO DAY OF SURGERY CENTER ON 2ND FLOOR OF HOSPITAL  Directions for Reporting to Day of Surgery Center  If you park in the Parking Garage:  Take elevators to the 2nd floor  Walk up ramp and turn right by [address] Elevators  Walk long wright around to front of hospital to area with windows overlooking [facility]  Check in at Reception Desk    WASH YOUR CHEST WITH HIBICLENS OR AN ANTIBACTERIAL SOAP (SUCH AS DIAL) ON THE NIGHT BEFORE AND THE MORNING OF YOUR PROCEDURE.    DO NOT EAT OR DRINK ANYTHING AFTER: 12 MN ON THE NIGHT BEFORE YOUR PROCEDURE    MEDICATIONS  YOU MAY TAKE YOUR USUAL MORNING MEDICATIONS WITH A SIP OF WATER    YOU WILL BE SPENDING THE NIGHT AFTER YOUR PROCEDURE  YOU WILL NEED SOMEONE TO DRIVE YOU HOME THE DAY AFTER YOUR PROCEDURE    YOUR PAIN DURING YOUR PROCEDURE WILL BE MANAGED BY THE ANESTHESIA TEAM    THE ABOVE INSTRUCTIONS WERE GIVEN TO THE PATIENT VERBALLY AND THEY VERBALIZED UNDERSTANDING. THEY DO NOT REQUIRE ANY SPECIAL NEEDS AND DO NOT HAVE ANY LEARNING BARRIERS.     Any need to reschedule or cancel procedures, or any questions regarding your procedures should be addressed directly with the Arrhythmia Department Nurses at the following phone number: [phone]

## 2017-03-27 ENCOUNTER — ANTI-COAG VISIT (OUTPATIENT)
Dept: CARDIOLOGY | Facility: CLINIC | Age: 27
End: 2017-03-27

## 2017-03-27 DIAGNOSIS — Z79.01 LONG TERM (CURRENT) USE OF ANTICOAGULANTS: ICD-10-CM

## 2017-03-27 DIAGNOSIS — Z95.811 LVAD (LEFT VENTRICULAR ASSIST DEVICE) PRESENT: ICD-10-CM

## 2017-03-27 LAB — INR PPP: 2.4

## 2017-03-28 ENCOUNTER — TELEPHONE (OUTPATIENT)
Dept: TRANSPLANT | Facility: CLINIC | Age: 27
End: 2017-03-28

## 2017-03-28 NOTE — TELEPHONE ENCOUNTER
"PT paged coordinator on call .  Pt verbalizes he had a low flow to 2.0lpm while trying to have a BM.  Pt reports he was straining and got "fuzzy" feeling.  PT reports he did not pass out but when "I came to"  I noticed the low flow alarm.  Pt reports he has since gotten up, is walking around and drinking water and pt reports he "feels great".  Pt reports he is supposed come to [facility] for ICD placement tomorrow.  Spoke with Dr. Lundberg, plan is for pt to keep hydrating and we will see him tomorrow s/p ICD placement.  Called and discussed plan with pt and pt verbalizes read back and agreement.   Pt encouraged to call VAD coordinator with any questions problems or concerns. Pt reminded to page VAD coordinator on call for emergencies.  Pt verbalized understanding and in agreement of plan.       "

## 2017-03-29 ENCOUNTER — SURGERY (OUTPATIENT)
Age: 27
End: 2017-03-29

## 2017-03-29 ENCOUNTER — ANESTHESIA EVENT (OUTPATIENT)
Dept: MEDSURG UNIT | Facility: HOSPITAL | Age: 27
End: 2017-03-29
Payer: COMMERCIAL

## 2017-03-29 ENCOUNTER — HOSPITAL ENCOUNTER (OUTPATIENT)
Facility: HOSPITAL | Age: 27
Discharge: HOME HEALTH | End: 2017-03-30
Attending: INTERNAL MEDICINE | Admitting: INTERNAL MEDICINE
Payer: COMMERCIAL

## 2017-03-29 ENCOUNTER — ANTI-COAG VISIT (OUTPATIENT)
Dept: CARDIOLOGY | Facility: CLINIC | Age: 27
End: 2017-03-29

## 2017-03-29 ENCOUNTER — ANESTHESIA (OUTPATIENT)
Dept: MEDSURG UNIT | Facility: HOSPITAL | Age: 27
End: 2017-03-29
Payer: COMMERCIAL

## 2017-03-29 DIAGNOSIS — Z95.811 LVAD (LEFT VENTRICULAR ASSIST DEVICE) PRESENT: ICD-10-CM

## 2017-03-29 DIAGNOSIS — Z79.01 LONG TERM (CURRENT) USE OF ANTICOAGULANTS: ICD-10-CM

## 2017-03-29 DIAGNOSIS — F17.210 CIGARETTE NICOTINE DEPENDENCE, UNCOMPLICATED: ICD-10-CM

## 2017-03-29 DIAGNOSIS — I50.42 CHRONIC COMBINED SYSTOLIC AND DIASTOLIC HEART FAILURE: ICD-10-CM

## 2017-03-29 DIAGNOSIS — Z76.82 ORGAN TRANSPLANT CANDIDATE: ICD-10-CM

## 2017-03-29 DIAGNOSIS — I42.9 FAMILIAL CARDIOMYOPATHY: ICD-10-CM

## 2017-03-29 DIAGNOSIS — F17.210 CIGARETTE SMOKER: Primary | ICD-10-CM

## 2017-03-29 DIAGNOSIS — I42.8 NICM (NONISCHEMIC CARDIOMYOPATHY): ICD-10-CM

## 2017-03-29 LAB
INR PPP: 2.5
PROTHROMBIN TIME: 24.8 SEC

## 2017-03-29 PROCEDURE — 33249 INSJ/RPLCMT DEFIB W/LEAD(S): CPT | Mod: ,,, | Performed by: INTERNAL MEDICINE

## 2017-03-29 PROCEDURE — 37000009 HC ANESTHESIA EA ADD 15 MINS: Performed by: INTERNAL MEDICINE

## 2017-03-29 PROCEDURE — 63600175 PHARM REV CODE 636 W HCPCS: Performed by: NURSE ANESTHETIST, CERTIFIED REGISTERED

## 2017-03-29 PROCEDURE — 27100006 EP LAB PROCEDURE

## 2017-03-29 PROCEDURE — 63600175 PHARM REV CODE 636 W HCPCS

## 2017-03-29 PROCEDURE — 20600001 HC STEP DOWN PRIVATE ROOM

## 2017-03-29 PROCEDURE — 93005 ELECTROCARDIOGRAM TRACING: CPT

## 2017-03-29 PROCEDURE — 25000003 PHARM REV CODE 250: Performed by: NURSE ANESTHETIST, CERTIFIED REGISTERED

## 2017-03-29 PROCEDURE — 25000003 PHARM REV CODE 250: Performed by: INTERNAL MEDICINE

## 2017-03-29 PROCEDURE — 25000003 PHARM REV CODE 250

## 2017-03-29 PROCEDURE — D9220A PRA ANESTHESIA: Mod: ,,, | Performed by: ANESTHESIOLOGY

## 2017-03-29 PROCEDURE — 85610 PROTHROMBIN TIME: CPT

## 2017-03-29 PROCEDURE — 37000008 HC ANESTHESIA 1ST 15 MINUTES: Performed by: INTERNAL MEDICINE

## 2017-03-29 PROCEDURE — 63600175 PHARM REV CODE 636 W HCPCS: Performed by: INTERNAL MEDICINE

## 2017-03-29 PROCEDURE — 27000248 HC VAD-ADDITIONAL DAY

## 2017-03-29 PROCEDURE — 93010 ELECTROCARDIOGRAM REPORT: CPT | Mod: ,,, | Performed by: INTERNAL MEDICINE

## 2017-03-29 PROCEDURE — 99220 PR INITIAL OBSERVATION CARE,LEVL III: CPT | Mod: ,,, | Performed by: INTERNAL MEDICINE

## 2017-03-29 RX ORDER — FAMOTIDINE 20 MG/1
40 TABLET, FILM COATED ORAL NIGHTLY
Status: DISCONTINUED | OUTPATIENT
Start: 2017-03-29 | End: 2017-03-30 | Stop reason: HOSPADM

## 2017-03-29 RX ORDER — ETOMIDATE 2 MG/ML
INJECTION INTRAVENOUS
Status: DISCONTINUED | OUTPATIENT
Start: 2017-03-29 | End: 2017-03-29

## 2017-03-29 RX ORDER — OXYCODONE AND ACETAMINOPHEN 10; 325 MG/1; MG/1
1 TABLET ORAL EVERY 6 HOURS PRN
Status: DISCONTINUED | OUTPATIENT
Start: 2017-03-29 | End: 2017-03-30 | Stop reason: HOSPADM

## 2017-03-29 RX ORDER — LANOLIN ALCOHOL/MO/W.PET/CERES
400 CREAM (GRAM) TOPICAL 2 TIMES DAILY
Status: DISCONTINUED | OUTPATIENT
Start: 2017-03-29 | End: 2017-03-30 | Stop reason: HOSPADM

## 2017-03-29 RX ORDER — CEPHALEXIN 500 MG/1
500 CAPSULE ORAL EVERY 8 HOURS
Status: DISCONTINUED | OUTPATIENT
Start: 2017-03-30 | End: 2017-03-30 | Stop reason: HOSPADM

## 2017-03-29 RX ORDER — FERROUS GLUCONATE 324(38)MG
324 TABLET ORAL
Status: DISCONTINUED | OUTPATIENT
Start: 2017-03-30 | End: 2017-03-30 | Stop reason: HOSPADM

## 2017-03-29 RX ORDER — KETAMINE HYDROCHLORIDE 100 MG/ML
INJECTION, SOLUTION INTRAMUSCULAR; INTRAVENOUS
Status: DISCONTINUED | OUTPATIENT
Start: 2017-03-29 | End: 2017-03-29

## 2017-03-29 RX ORDER — WARFARIN SODIUM 5 MG/1
5 TABLET ORAL DAILY
Status: DISCONTINUED | OUTPATIENT
Start: 2017-03-29 | End: 2017-03-30 | Stop reason: HOSPADM

## 2017-03-29 RX ORDER — LISINOPRIL 5 MG/1
5 TABLET ORAL DAILY
Status: DISCONTINUED | OUTPATIENT
Start: 2017-03-30 | End: 2017-03-29

## 2017-03-29 RX ORDER — SODIUM CHLORIDE 9 MG/ML
INJECTION, SOLUTION INTRAVENOUS CONTINUOUS PRN
Status: DISCONTINUED | OUTPATIENT
Start: 2017-03-29 | End: 2017-03-29

## 2017-03-29 RX ORDER — AMIODARONE HYDROCHLORIDE 200 MG/1
200 TABLET ORAL DAILY
Status: DISCONTINUED | OUTPATIENT
Start: 2017-03-30 | End: 2017-03-30 | Stop reason: HOSPADM

## 2017-03-29 RX ORDER — LISINOPRIL 5 MG/1
5 TABLET ORAL 2 TIMES DAILY
Status: DISCONTINUED | OUTPATIENT
Start: 2017-03-29 | End: 2017-03-30 | Stop reason: HOSPADM

## 2017-03-29 RX ORDER — CEFAZOLIN SODIUM 2 G/50ML
2 SOLUTION INTRAVENOUS
Status: COMPLETED | OUTPATIENT
Start: 2017-03-29 | End: 2017-03-30

## 2017-03-29 RX ORDER — ASPIRIN 325 MG
325 TABLET, DELAYED RELEASE (ENTERIC COATED) ORAL DAILY
Status: DISCONTINUED | OUTPATIENT
Start: 2017-03-29 | End: 2017-03-30 | Stop reason: HOSPADM

## 2017-03-29 RX ORDER — FENTANYL CITRATE 50 UG/ML
INJECTION, SOLUTION INTRAMUSCULAR; INTRAVENOUS
Status: DISCONTINUED | OUTPATIENT
Start: 2017-03-29 | End: 2017-03-29

## 2017-03-29 RX ORDER — MIDAZOLAM HYDROCHLORIDE 1 MG/ML
INJECTION, SOLUTION INTRAMUSCULAR; INTRAVENOUS
Status: DISCONTINUED | OUTPATIENT
Start: 2017-03-29 | End: 2017-03-29

## 2017-03-29 RX ORDER — LIDOCAINE HCL/PF 100 MG/5ML
SYRINGE (ML) INTRAVENOUS
Status: DISCONTINUED | OUTPATIENT
Start: 2017-03-29 | End: 2017-03-29

## 2017-03-29 RX ADMIN — ETOMIDATE 4 MG: 2 INJECTION, SOLUTION INTRAVENOUS at 11:03

## 2017-03-29 RX ADMIN — KETAMINE HYDROCHLORIDE 10 MG: 100 INJECTION, SOLUTION, CONCENTRATE INTRAMUSCULAR; INTRAVENOUS at 11:03

## 2017-03-29 RX ADMIN — LIDOCAINE HYDROCHLORIDE 50 MG: 20 INJECTION, SOLUTION INTRAVENOUS at 11:03

## 2017-03-29 RX ADMIN — WARFARIN SODIUM 5 MG: 5 TABLET ORAL at 05:03

## 2017-03-29 RX ADMIN — FENTANYL CITRATE 25 MCG: 50 INJECTION, SOLUTION INTRAMUSCULAR; INTRAVENOUS at 11:03

## 2017-03-29 RX ADMIN — Medication 2 G: at 10:03

## 2017-03-29 RX ADMIN — MIDAZOLAM HYDROCHLORIDE 1 MG: 1 INJECTION INTRAMUSCULAR; INTRAVENOUS at 10:03

## 2017-03-29 RX ADMIN — OXYCODONE HYDROCHLORIDE AND ACETAMINOPHEN 1 TABLET: 10; 325 TABLET ORAL at 03:03

## 2017-03-29 RX ADMIN — MAGNESIUM OXIDE TAB 400 MG (241.3 MG ELEMENTAL MG) 400 MG: 400 (241.3 MG) TAB at 09:03

## 2017-03-29 RX ADMIN — KETAMINE HYDROCHLORIDE 20 MG: 100 INJECTION, SOLUTION, CONCENTRATE INTRAMUSCULAR; INTRAVENOUS at 11:03

## 2017-03-29 RX ADMIN — LISINOPRIL 5 MG: 5 TABLET ORAL at 09:03

## 2017-03-29 RX ADMIN — DEXMEDETOMIDINE HYDROCHLORIDE 0.5 MCG/KG/HR: 100 INJECTION, SOLUTION, CONCENTRATE INTRAVENOUS at 10:03

## 2017-03-29 RX ADMIN — FAMOTIDINE 40 MG: 20 TABLET, FILM COATED ORAL at 09:03

## 2017-03-29 RX ADMIN — OXYCODONE HYDROCHLORIDE AND ACETAMINOPHEN 1 TABLET: 10; 325 TABLET ORAL at 09:03

## 2017-03-29 RX ADMIN — SODIUM CHLORIDE: 0.9 INJECTION, SOLUTION INTRAVENOUS at 10:03

## 2017-03-29 RX ADMIN — CEFAZOLIN SODIUM 2 G: 2 SOLUTION INTRAVENOUS at 06:03

## 2017-03-29 NOTE — ANESTHESIA POSTPROCEDURE EVALUATION
"Anesthesia Post Evaluation    Patient: Mert Samayoa    Procedure(s) Performed: Procedure(s) (LRB):  INSERTION-ICD-SINGLE (N/A)    Final Anesthesia Type: general  Patient location during evaluation: PACU  Patient participation: Yes- Able to Participate  Level of consciousness: awake and alert  Post-procedure vital signs: reviewed and stable  Pain management: adequate  Airway patency: patent  PONV status at discharge: No PONV  Anesthetic complications: no      Cardiovascular status: blood pressure returned to baseline and stable  Respiratory status: unassisted  Hydration status: euvolemic  Follow-up not needed.        Visit Vitals    BP 98/73    Pulse 65    Temp 36.6 °C (97.8 °F) (Oral)    Resp 20    Ht 5' 7" (1.702 m)    Wt 59 kg (130 lb)    SpO2 99%    BMI 20.36 kg/m2       Pain/Carlos A Score: Pain Assessment Performed: Yes (3/29/2017  1:30 PM)  Presence of Pain: denies (3/29/2017  1:30 PM)  Pain Rating Prior to Med Admin: 0 (3/29/2017  9:18 AM)  Carlos A Score: 9 (3/29/2017  1:30 PM)      "

## 2017-03-29 NOTE — ANESTHESIA RELEASE NOTE
"Anesthesia Release from PACU Note    Patient: Mert Samayoa    Procedure(s) Performed: Procedure(s) (LRB):  INSERTION-ICD-SINGLE (N/A)    Anesthesia type: GEN    Post pain: Adequate analgesia reported    Post assessment: no apparent anesthetic complications, tolerated procedure well and no evidence of recall    Post vital signs: BP 98/73  Pulse 65  Temp 36.6 °C (97.8 °F) (Oral)   Resp 20  Ht 5' 7" (1.702 m)  Wt 59 kg (130 lb)  SpO2 99%  BMI 20.36 kg/m2    Level of consciousness: awake, alert and oriented    Nausea/Vomiting: no nausea/no vomiting    Complications: none    Airway Patency: patent    Respiratory: unassisted, spontaneous ventilation, room air    Cardiovascular: stable and blood pressure at baseline    Hydration: euvolemic    "

## 2017-03-29 NOTE — ANESTHESIA PREPROCEDURE EVALUATION
03/29/2017  Mert Samayoa is a 27 y.o., male.    [facility] Anesthesia Evaluation    I have reviewed the Patient Summary Reports.        Review of Systems  Anesthesia Hx:  No problems with previous Anesthesia    Social:  Non-Smoker    Hematology/Oncology:  Hematology Normal   Oncology Normal     EENT/Dental:EENT/Dental Normal   Cardiovascular:   Hypertension CHF (LVAD in place, transplant list)    Pulmonary:  Pulmonary Normal    Renal/:  Renal/ Normal     Hepatic/GI:  Hepatic/GI Normal    Musculoskeletal:  Musculoskeletal Normal    Neurological:  Neurology Normal    Endocrine:  Endocrine Normal    Dermatological:  Skin Normal    Psych:  Psychiatric Normal           Physical Exam  General:  Well nourished    Airway/Jaw/Neck:  Airway Findings: Mouth Opening: Normal Tongue: Normal  General Airway Assessment: Adult  Mallampati: II  Improves to II with phonation.  TM Distance: Normal, at least 6 cm  Jaw/Neck Findings:  Neck ROM: Normal ROM      Dental:  Dental Findings: In tact   Chest/Lungs:  Chest/Lungs Findings: Clear to auscultation, Normal Respiratory Rate     Heart/Vascular:  Heart Findings: Rate: Normal  Rhythm: Regular Rhythm  Sounds: Normal             Anesthesia Plan  Type of Anesthesia, risks & benefits discussed:  Anesthesia Type:  general  Patient's Preference: General  Intra-op Monitoring Plan:   Intra-op Monitoring Plan Comments:   Post Op Pain Control Plan:   Post Op Pain Control Plan Comments:   Induction:   IV  Beta Blocker:  Patient is not currently on a Beta-Blocker (No further documentation required).       Informed Consent: Patient understands risks and agrees with Anesthesia plan.  Questions answered. Anesthesia consent signed with patient.  ASA Score: 4     Day of Surgery Review of History & Physical: I have interviewed and examined the patient. I have reviewed the patient's H&P dated:   There are no significant changes.          Ready For Surgery From Anesthesia Perspective.

## 2017-03-29 NOTE — PLAN OF CARE
Plan of care reviewed with patient. Verbalization of understanding. LVAD emergency bag on stretcher with patient. Family at bedside. Patient and family updated on times.

## 2017-03-29 NOTE — PROGRESS NOTES
Agree with today's H & P:    S/P HW VAD @ 2700 implanted 2/1/17  - Patient will be admitted to obs  - VAD order set in place   - INR goal 2-3, home dose coumadin 5 QD   - LDH baseline 180-280   - last echo 2/16/17    S/P ICD placement for primary prevention  - single lead ICD placed without complication  - Continue abx as per EP orders  - Resume coumadin as per EP orders    Dispo  - Patient will transfer for HTS service and watch patient overnight. Plan is to d/c tomorrow with HTS follow up as scheduled in VAD clinic and Device clinic in 2 weeks/f/u with Dr Jeremiah Trujillo in 3 months in clinic.     Kimberlyn Hartley #[id number]

## 2017-03-29 NOTE — TRANSFER OF CARE
"Anesthesia Transfer of Care Note    Patient: Mert Samayoa    Procedure(s) Performed: Procedure(s) (LRB):  INSERTION-ICD-SINGLE (N/A)    Patient location: PACU    Anesthesia Type: general    Transport from OR: Transported from OR on room air with adequate spontaneous ventilation    Post pain: adequate analgesia    Post assessment: no apparent anesthetic complications    Post vital signs: stable    Level of consciousness: awake, alert and oriented    Nausea/Vomiting: no nausea/vomiting    Complications: none          Last vitals:   Visit Vitals    BP 91/75 (BP Location: Right arm, Patient Position: Lying, BP Method: Automatic)    Pulse 81    Temp 36.6 °C (97.8 °F) (Oral)    Resp 14    Ht 5' 7" (1.702 m)    Wt 59 kg (130 lb)    SpO2 100%    BMI 20.36 kg/m2     "

## 2017-03-29 NOTE — IP AVS SNAPSHOT
[facility]  [address]  [facility] [address]  Phone: [phone]           Patient Discharge Instructions   Our goal is to set you up for success. This packet includes information on your condition, medications, and your home care.  It will help you care for yourself to prevent having to return to the hospital.     Please ask your nurse if you have any questions.      There are many details to remember when preparing to leave the hospital. Here is what you will need to do:    1. Take your medicine. If you are prescribed medications, review your Medication List on the following pages. You may have new medications to  at the pharmacy and others that you'll need to stop taking. Review the instructions for how and when to take your medications. Talk with your doctor or nurses if you are unsure of what to do.     2. Go to your follow-up appointments. Specific follow-up information is listed in the following pages. Your may be contacted by a nurse or clinical provider about future appointments. Be sure we have all of the phone numbers to reach you. Please contact your provider's office if you are unable to make an appointment.     3. Watch for warning signs. Your doctor or nurse will give you detailed warning signs to watch for and when to call for assistance. These instructions may also include educational information about your condition. If you experience any of warning signs to your health, call your doctor.           [facility]  Unless otherwise directed by your provider, please   contact [facility], our nurse care line   that is available for 24/7 assistance.     [phone] (toll-free)     Registered nurses in the [facility]   provide: appointment scheduling, clinical advisement, health education, and other advisory services.                  ** Verify the list of medication(s) below is accurate and up to date. Carry this with you in case of  emergency. If your medications have changed, please notify your healthcare provider.             Medication List      START taking these medications        Additional Info                      cephALEXin 500 MG capsule   Commonly known as:  KEFLEX   Quantity:  15 capsule   Refills:  0   Dose:  500 mg   Indications:  Surgical prophylaxis    Last time this was given:  500 mg on 3/30/2017  1:24 PM   Instructions:  Take 1 capsule (500 mg total) by mouth every 8 (eight) hours.     Begin Date    AM    Noon    PM    Bedtime         CONTINUE taking these medications        Additional Info                      amiodarone 200 MG Tab   Commonly known as:  PACERONE   Quantity:  30 tablet   Refills:  11   Dose:  200 mg    Last time this was given:  200 mg on 3/30/2017  8:55 AM   Instructions:  Take 1 tablet (200 mg total) by mouth once daily.     Begin Date    AM    Noon    PM    Bedtime       aspirin 325 MG EC tablet   Commonly known as:  ECOTRIN   Quantity:  30 tablet   Refills:  11   Dose:  325 mg    Last time this was given:  325 mg on 3/30/2017  8:55 AM   Instructions:  Take 1 tablet (325 mg total) by mouth once daily.     Begin Date    AM    Noon    PM    Bedtime       famotidine 40 MG tablet   Commonly known as:  PEPCID   Quantity:  30 tablet   Refills:  11   Dose:  40 mg    Last time this was given:  40 mg on 3/29/2017  9:20 PM   Instructions:  Take 1 tablet (40 mg total) by mouth every evening.     Begin Date    AM    Noon    PM    Bedtime       ferrous gluconate 324 MG tablet   Commonly known as:  FERGON   Quantity:  30 tablet   Refills:  11   Dose:  324 mg    Last time this was given:  324 mg on 3/30/2017  8:55 AM   Instructions:  Take 1 tablet (324 mg total) by mouth daily with breakfast.     Begin Date    AM    Noon    PM    Bedtime       lisinopril 5 MG tablet   Commonly known as:  PRINIVIL,ZESTRIL   Quantity:  60 tablet   Refills:  11   Dose:  5 mg    Last time this was given:  5 mg on 3/30/2017  8:55 AM    Instructions:  Take 1 tablet (5 mg total) by mouth 2 (two) times daily.     Begin Date    AM    Noon    PM    Bedtime       magnesium oxide 400 mg tablet   Commonly known as:  MAG-OX   Quantity:  60 tablet   Refills:  11   Dose:  400 mg    Last time this was given:  400 mg on 3/30/2017  8:55 AM   Instructions:  Take 1 tablet (400 mg total) by mouth 2 (two) times daily.     Begin Date    AM    Noon    PM    Bedtime       oxycodone-acetaminophen  mg per tablet   Commonly known as:  PERCOCET   Quantity:  30 tablet   Refills:  0   Dose:  1 tablet    Last time this was given:  1 tablet on 3/30/2017  9:46 AM   Instructions:  Take 1 tablet by mouth every 6 (six) hours as needed.     Begin Date    AM    Noon    PM    Bedtime       senna-docusate 8.6-50 mg 8.6-50 mg per tablet   Commonly known as:  SENNA WITH DOCUSATE SODIUM   Quantity:  60 tablet   Refills:  5   Dose:  2 tablet    Instructions:  Take 2 tablets by mouth daily as needed for Constipation.     Begin Date    AM    Noon    PM    Bedtime       warfarin 5 MG tablet   Commonly known as:  COUMADIN   Quantity:  30 tablet   Refills:  3   Dose:  5 mg    Last time this was given:  5 mg on 3/29/2017  5:45 PM   Instructions:  Take 1 tablet (5 mg total) by mouth Daily.     Begin Date    AM    Noon    PM    Bedtime            Where to Get Your Medications      These medications were sent to [id number] - [place] - [address]  [address] [place] [address]     Phone:  [phone]     cephALEXin 500 MG capsule         You can get these medications from any pharmacy     Bring a paper prescription for each of these medications     oxycodone-acetaminophen  mg per tablet                  Please bring to all follow up appointments:    1. A copy of your discharge instructions.  2. All medicines you are currently taking in their original bottles.  3. Identification and insurance card.    Please arrive 15 minutes ahead of scheduled  appointment time.    Please call 24 hours in advance if you must reschedule your appointment and/or time.        Your Scheduled Appointments     Apr 06, 2017  8:30 AM CDT   Non-Fasting Lab with LAB, APPOINTMENT [place] [facility] ([facility])    [address] [facility] [address]   [phone]            Apr 06, 2017  9:00 AM CDT   VAD with Diane Saleh NP   [facility] ([facility] )    [address]  [place] [address]   [phone]            Apr 06, 2017  9:00 AM CDT   Nurse Visit with HEARTTRANSPLANT, LVADN   [facility] ([facility] )    [address]  [place] [address]   [phone]            Apr 06, 2017 10:20 AM CDT   Wound Check with PACEMAKER, ICD   [facility] ([facility] )    [address]  [place] [id number] [phone]              Follow-up Information     Follow up with [facility] In 1 week.    Specialty:  Cardiology    Why:  For wound re-check    Contact information:    [address] [place] [id number] [phone]    Additional information:    [facility] - 3rd Floor        Follow up with Jeremiah Trujillo MD In 3 months.    Specialties:  Electrophysiology, Cardiovascular Disease    Contact information:    [address] [place] [id number]  [phone]          Follow up with vad clinic On 4/6/2017.        Discharge Instructions     Future Orders    Activity as tolerated     Call MD for:  difficulty breathing or increased cough     Call MD for:  increased confusion or weakness     Call MD for:  persistent dizziness, light-headedness, or visual disturbances     Call MD for:  persistent nausea and vomiting or diarrhea     Call MD for:  redness, tenderness, or signs of infection (pain, swelling, redness, odor or green/yellow discharge around incision site)     Call MD for:  severe persistent  "headache     Call MD for:  severe uncontrolled pain     Call MD for:  temperature >100.4     Call MD for:  worsening rash     Diet general     Questions:    Total calories:      Fat restriction, if any:      Protein restriction, if any:      Na restriction, if any:  2gNa    Fluid restriction:      Additional restrictions:          Primary Diagnosis     Your primary diagnosis was:  Heart Muscle Disorder      Admission Information     Date & Time Provider Department CSN    3/29/2017  7:44 AM Mario Conner MD [facility] [id number]      Care Providers     Provider Role Specialty Primary office phone    Mario Conner MD Attending Provider Cardiology [phone]    Jeremiah Trujillo MD Surgeon  Electrophysiology [phone]    Roque Matos MD Team Attending  Cardiology [phone]    Mario Conner MD Consulting Physician  Cardiology [phone]    Sammi Tapia MD Consulting Physician  Cardiology [phone]    Mario Conner MD Team Attending  Cardiology [phone]      Your Vitals Were     BP Pulse Temp Resp Height Weight    78/0 (BP Location: Left arm, Patient Position: Sitting, BP Method: Doppler) 90 97.7 °F (36.5 °C) (Oral) 18 5' 7" (1.702 m) 61.3 kg (135 lb 2.3 oz)    SpO2 BMI             98% 21.17 kg/m2         Recent Lab Values        1/17/2017                           4:19 PM           A1C 5.4           Comment for A1C at  4:19 PM on 1/17/2017:  According to ADA guidelines, hemoglobin A1C <7.0% represents  optimal control in non-pregnant diabetic patients.  Different  metrics may apply to specific populations.   Standards of Medical Care in Diabetes - 2016.  For the purpose of screening for the presence of diabetes:  <5.7%     Consistent with the absence of diabetes  5.7-6.4%  Consistent with increasing risk for diabetes   (prediabetes)  >or=6.5%  Consistent with diabetes  Currently no consensus exists for use of hemoglobin A1C  for diagnosis of diabetes for " children.        Pending Labs     Order Current Status    Nicotine And Metabolites, Blood In process      Allergies as of 3/30/2017     No Known Allergies      Advance Directives     An advance directive is a document which, in the event you are no longer able to make decisions for yourself, tells your healthcare team what kind of treatment you do or do not want to receive, or who you would like to make those decisions for you.  If you do not currently have an advance directive, [facility] encourages you to create one.  For more information call:  [phone]), [phone] ([phone]),  or log on to [email or web address].        Smoking Cessation     If you would like to quit smoking:   You may be eligible for free services if you are a [place] resident and started smoking cigarettes before September 1, 1988.  Call the Smoking Cessation Trust ([facility]) toll free at (555) 668-2233 or (875) 363-9696.   Call 8-134-QUIT-NOW if you do not meet the above criteria.   Contact us via email: [email or web address]   View our website for more information: [email or web address]        Language Assistance Services     ATTENTION: Language assistance services are available, free of charge. Please call [phone].      ATENCIÓN: Si habla español, tiene a hinton disposición servicios gratuitos de asistencia lingüística. Llame al [phone].     CHÚ Ý: N?u b?n nói Ti?ng Vi?t, có các d?ch v? h? tr? ngôn ng? mi?n phí dành cho b?n. G?i s? [phone].        Heart Failure Education       Heart Failure: Being Active  You have a condition called heart failure. Being active doesnt mean that you have to wear yourself out. Even a little movement each day helps to strengthen your heart. If you cant get out to exercise, you can do simple stretching and strengthening exercises at home. These are good ways to keep you well-conditioned and prevent you and your heart from becoming excessively weak.    Ideas to  get you started  · Add a little movement to things you do now. Walk to mail letters. Park your car at the far end of the parking lot and walk to the store. Walk up a flight of stairs instead of taking the elevator.  · Choose activities you enjoy. You might walk, swim, or ride an exercise bike. Things like gardening and washing the car count, too. Other possibilities include: washing dishes, walking the dog, walking around the mall, and doing aerobic activities with friends.  · Join a group exercise program at a [facility] or [facility], a senior center, or a community center. Or look into a hospital cardiac rehabilitation program. Ask your doctor if you qualify.  Tips to keep you going  · Get up and get dressed each day. Go to a coffee shop and read a newspaper or go somewhere that you'll be in the presence of other active people. Youll feel more like being active.  · Make a plan. Choose one or more activities that you enjoy and that you can easily do. Then plan to do at least one each day. You might write your plan on a calendar.  · Go with a friend or a group if you like company. This can help you feel supported and stay motivated, too.  · Plan social events that you enjoy. This will keep you mentally engaged as well as physically motivated to do things you find pleasure in.  For your safety  · Talk with your healthcare provider before starting an exercise program.  · Exercise indoors when its too hot or too cold outside, or when the air quality is poor. Try walking at a shopping mall.  · Wear socks and sturdy shoes to maintain your balance and prevent falls.  · Start slowly. Do a few minutes several times a day at first. Increase your time and speed little by little.  · Stop and rest whenever you feel tired or get short of breath.  · Dont push yourself on days when you dont feel well.  Date Last Reviewed: 3/20/2016  © 8682-0330 [employer]. [address] [place] [address], [place] [address]. All rights reserved.  This information is not intended as a substitute for professional medical care. Always follow your healthcare professional's instructions.              Heart Failure: Evaluating Your Heart  You have a condition called heart failure. To evaluate your condition, your doctor will examine you, ask questions, and do some tests. Along with looking for signs of heart failure, the doctor looks for any other health problems that may have led to heart failure. The results of your evaluation will help your doctor form a treatment plan.  Health history and physical exam  Your visit will start with a health history. Tell the doctor about any symptoms youve noticed and about all medicines you take. Then youll have a physical exam. This includes listening to your heartbeat and breathing. Youll also be checked for swelling (edema) in your legs and neck. When you have fluid buildup or fluid in the lungs, it may be called congestive heart failure.  Diagnosing heart failure     During an echocardiogram, sound waves bounce off the heart. These are converted into a picture on the screen.   The following may be done to help your doctor form a diagnosis:  · X-rays show the size and shape of your heart. These pictures can also show fluid in your lungs.  · An electrocardiogram (ECG or EKG) shows the pattern of your heartbeat. Small pads (electrodes) are placed on your chest, arms, and legs. Wires connect the pads to the ECG machine, which records your hearts electrical signals. This can give the doctor information about heart function.  · An echocardiogram uses ultrasound waves to show the structure and movement of your heart muscle. This shows how well the heart pumps. It also shows the thickness of the heart walls, and if the heart is enlarged. It is one of the most useful, non-invasive tests as it provides information about the heart's general function. This helps your doctor make treatment decisions.  · Lab tests evaluate small  amounts of blood or urine for signs of problems. A BNP lab test can help diagnose and evaluate heart failure. BNP stands for B-type natriuretic peptide. The ventricles secrete more BNP when heart failure worsens. Lab tests can also provide information about metabolic dysfunction or heart dysfunction.  Your treatment plan  Based on the results of your evaluation and tests, your doctor will develop a treatment plan. This plan is designed to relieve some of your heart failure symptoms and help make you more comfortable. Your treatment plan may include:  · Medicine to help your heart work better and improve your quality of life  · Changes in what you eat and drink to help prevent fluid from backing up in your body  · Daily monitoring of your weight and heart failure symptoms to see how well your treatment plan is working  · Exercise to help you stay healthy  · Help with quitting smoking  · Emotional and psychological support to help adjust to the changes  · Referrals to other specialists to make sure you are being treated comprehensively  Date Last Reviewed: 3/21/2016  © 4076-3007 [employer]. [address] [place] [address]. All rights reserved. This information is not intended as a substitute for professional medical care. Always follow your healthcare professional's instructions.              Heart Failure: Making Changes to Your Diet  You have a condition called heart failure. When you have heart failure, excess fluid is more likely to build up in your body because your heart isn't working well. This makes the heart work harder to pump blood. Fluid buildup causes symptoms such as shortness of breath and swelling (edema). This is often referred to as congestive heart failure or CHF. Controlling the amount of salt (sodium) you eat may help stop fluid from building up. Your doctor may also tell you to reduce the amount of fluid you drink.  Reading food labels    Your healthcare provider will  tell you how much sodium you can eat each day. Read food labels to keep track. Keep in mind that certain foods are high in salt. These include canned, frozen, and processed foods. Check the amount of sodium in each serving. Watch out for high-sodium ingredients. These include MSG (monosodium glutamate), baking soda, and sodium phosphate.   Eating less salt  Give yourself time to get used to eating less salt. It may take a little while. Here are some tips to help:  · Take the saltshaker off the table. Replace it with salt-free herb mixes and spices.  · Eat fresh or plain frozen vegetables. These have much less salt than canned vegetables.  · Choose low-sodium snacks like sodium-free pretzels, crackers, or air-popped popcorn.  · Dont add salt to your food when youre cooking. Instead, season your foods with pepper, lemon, garlic, or onion.  · When you eat out, ask that your food be cooked without added salt.  · Avoid eating fried foods as these often have a great deal of salt.  If youre told to limit fluids  You may need to limit how much fluid you have to help prevent swelling. This includes anything that is liquid at room temperature, such as ice cream and soup. If your doctor tells you to limit fluid, try these tips:  · Measure drinks in a measuring cup before you drink them. This will help you meet daily goals.  · Chill drinks to make them more refreshing.  · Suck on frozen lemon wedges to quench thirst.  · Only drink when youre thirsty.  · Chew sugarless gum or suck on hard candy to keep your mouth moist.  · Weigh yourself daily to know if your body's fluid content is rising.  My sodium goal  Your healthcare provider may give you a sodium goal to meet each day. This includes sodium found in food as well as salt that you add. My goal is to eat no more than ___________ mg of sodium per day.     When to call your doctor  Call your doctor right away if you have any symptoms of worsening heart failure. These can  include:  · Sudden weight gain  · Increased swelling of your legs or ankles  · Trouble breathing when youre resting or at night  · Increase in the number of pillows you have to sleep on  · Chest pain, pressure, discomfort, or pain in the jaw, neck, or back   Date Last Reviewed: 3/21/2016  © 9962-5968 [employer]. [address] [place] [address], [place] [address]. All rights reserved. This information is not intended as a substitute for professional medical care. Always follow your healthcare professional's instructions.              Heart Failure: Medicines to Help Your Heart    You have a condition called heart failure (also known as congestive heart failure, or CHF). Your doctor will likely prescribe medicines for heart failure and any underlying health problems you have. Most heart failure patients take one or more types of medicinen. Your healthcare provider will work to find the combination of medicines that works best for you.  Heart failure medicines  Here are the most common heart failure medicines:  · ACE inhibitors lower blood pressure and decrease strain on the heart. This makes it easier for the heart to pump. Angiotensin receptor blockers have similar effects. These are prescribed for some patients instead of ACE inhibitors.  · Beta-blockers relieve stress on the heart. They also improve symptoms. They may also improve the heart's pumping action over time.  · Diuretics (also called water pills) help rid your body of excess water. This can help rid your body of swelling (edema). Having less fluid to pump means your heart doesnt have to work as hard. Some diuretics make your body lose a mineral called potassium. Your doctor will tell you if you need to take supplements or eat more foods high in potassium.  · Digoxin helps your heart pump with more strength. This helps your heart pump more blood with each beat. So, more oxygen-rich blood travels to the rest of the body.  · Aldosterone antagonists  help alter hormones and decrease strain on the heart.  · Hydralazine and nitrates are two separate medicines used together to treat heart failure. They may come in one combination pill. They lower blood pressure and decrease how hard the heart has to pump.  Medicines for related conditions  Controlling other heart problems helps keep heart failure under control, too. Depending on other heart problems you have, medicines may be prescribed to:  · Lower blood pressure (antihypertensives).  · Lower cholesterol levels (statins).  · Prevent blood clots (anticoagulants or aspirin).  · Keep the heartbeat steady (antiarrhythmics).  Date Last Reviewed: 3/5/2016  © 0900-6742 [employer]. [address] [place] [address], [place] [address]. All rights reserved. This information is not intended as a substitute for professional medical care. Always follow your healthcare professional's instructions.              Heart Failure: Procedures That May Help    The heart is a muscle that pumps oxygen-rich blood to all parts of the body. When you have heart failure, the heart is not able to pump as well as it should. Blood and fluid may back up into the lungs (congestive heart failure), and some parts of the body dont get enough oxygen-rich blood to work normally. These problems lead to the symptoms of heart failure.     Certain procedures may help the heart pump better in some cases of heart failure. Some procedures are done to treat health problems that may have caused the heart failure such as coronary artery disease or heart rhythm problems. For more serious heart failure, other options are available.  Treating artery and valve problems  If you have coronary artery disease or valve disease, procedures may be done to improve blood flow. This helps the heart pump better, which can improve heart failure symptoms. First, your doctor may do a cardiac catheterization to help detect clogged blood vessels or valve damage. During this  procedure, a  thin tube (catheter) in inserted into a blood vessel and guided to the heart. There a dye is injected and a special type of X-ray (angiogram) is taken of the blood vessels. Procedures to open a blocked artery or fix damaged valves can also be done using catheterization.  · Angioplasty uses a balloon-tipped instrument at the end of the catheter. The balloon is inflated to widen the narrowed artery. In many cases, a stent is expanded to further support the narrowed artery. A stent is a metal mesh tube.  · Valve surgery repairs or replacement of faulty valves can also be done during catheterization so blood can flow properly through the chambers of the heart.  Bypass surgery is another option to help treat blocked arteries. It uses a healthy blood vessel from elsewhere in the body. The healthy blood vessel is attached above and below the blocked area so that blood can flow around the blocked artery.  Treating heart rhythm problems  A device may be placed in the chest to help a weak heart maintain a healthy, heartbeat so the heart can pump more effectively:  · Pacemaker. A pacemaker is an implanted device that regulates your heartbeat electronically. It monitors your heart's rhythm and generates a painless electric impulse that helps the heart beat in a regular rhythm. A pacemaker is programmed to meet your specific heart rhythm needs.  · Biventricular pacing/cardiac resynchronization therapy. A type of pacemaker that paces both pumping chambers of the heart at the same time to coordinate contractions and to improve the heart's function. Some people with heart failure are candidates for this therapy.  · Implantable cardioverter defibrillator. A device similar to a pacemaker that senses when the heart is beating too fast and delivers an electrical shock to convert the fast rhythm to a normal rhythm. This can be a life saving device.  In severe cases  In more serious cases of heart failure when other  treatments no longer work, other options may include:  · Ventricular assist devices (VADs). These are mechanical devices used to take over the pumping function for one or both of the heart's ventricles, or pumping chambers. A VAD may be necessary when heart failure progresses to the point that medicines and other treatments no longer help. In some cases, a VAD may be used as a bridge to transplant.  · Heart transplant. This is replacing the diseased heart with a healthy one from a donor. This is an option for a few people who are very sick. A heart transplant is very serious and not an option for all patients. Your doctor can tell you more.  Date Last Reviewed: 3/20/2016  © 6892-9667 [employer]. [address] [place] [address], [place] [address]. All rights reserved. This information is not intended as a substitute for professional medical care. Always follow your healthcare professional's instructions.              Heart Failure: Tracking Your Weight  You have a condition called heart failure. When you have heart failure, a sudden weight gain or a steady rise in weight is a warning sign that your body is retaining too much water and salt. This could mean your heart failure is getting worse. If left untreated, it can cause problems for your lungs and result in shortness of breath. Weighing yourself each day is the best way to know if youre retaining water. If your weight goes up quickly, call your doctor. You will be given instructions on how to get rid of the excess water. You will likely need medicines and to avoid salt. This will help your heart work better.  Call your doctor if you gain more than 2 pounds in 1 day, more than 5 pounds in 1 week, or whatever weight gain you were told to report by your doctor. This is often a sign of worsening heart failure and needs to be evaluated and treated. Your doctor will tell you what to do next.   Tips for weighing yourself    · Weigh yourself at the same time each  morning, wearing the same clothes. Weigh yourself after urinating and before eating.  · Use the same scale each day. Make sure the numbers are easy to read. Put the scale on a flat, hard surface -- not on a rug or carpet.  · Do not stop weighing yourself. If you forget one day, weigh again the next morning.  How to use your weight chart  · Keep your weight chart near the scale. Write your weight on the chart as soon as you get off the scale.  · Fill in the month and the start date on the chart. Then write down your weight each day. Your chart will look like this:    · If you miss a day, leave the space blank. Weigh yourself the next day and write your weight in the next space.  · Take your weight chart with you when you go to see your doctor.  Date Last Reviewed: 3/20/2016  © 4617-0200 [employer]. [address] [place] [address]. All rights reserved. This information is not intended as a substitute for professional medical care. Always follow your healthcare professional's instructions.              Heart Failure: Warning Signs of a Flare-Up  You have a condition called heart failure. Once you have heart failure, flare-ups can happen. Below are signs that can mean your heart failure is getting worse. If you notice any of these warning signs, call your healthcare provider.  Swelling    · Your feet, ankles, or lower legs get puffier.  · You notice skin changes on your lower legs.  · Your shoes feel too tight.  · Your clothes are tighter in the waist.  · You have trouble getting rings on or off your fingers.  Shortness of breath  · You have to breathe harder even when youre doing your normal activities or when youre resting.  · You are short of breath walking up stairs or even short distances.  · You wake up at night short of breath or coughing.  · You need to use more pillows or sit up to sleep.  · You wake up tired or restless.  Other warning signs  · You feel weaker, dizzy, or more  tired.  · You have chest pain or changes in your heartbeat.  · You have a cough that wont go away.  · You cant remember things or dont feel like eating.  Tracking your weight  Gaining weight is often the first warning sign that heart failure is getting worse. Gaining even a few pounds can be a sign that your body is retaining excess water and salt. Weighing yourself each day in the morning after you urinate and before you eat, is the best way to know if you're retaining water. Get a scale that is easy to read and make sure you wear the same clothes and use the same scale every time you weigh. Your healthcare provider will show you how to track your weight. Call your doctor if you gain more than 2 pounds in 1 day, 5 pounds in 1 week, or whatever weight gain you were told to report by your doctor. This is often a sign of worsening heart failure and needs to be evaluated and treated before it compromises your breathing. Your doctor will tell you what to do next.    Date Last Reviewed: 3/15/2016  © 8932-1176 [employer]. [address] [place] [address], [place] [address]. All rights reserved. This information is not intended as a substitute for professional medical care. Always follow your healthcare professional's instructions.              Coumadin Discharge Instructions                          [facility] complies with applicable Federal civil rights laws and does not discriminate on the basis of race, color, national origin, age, disability, or sex.

## 2017-03-29 NOTE — PROGRESS NOTES
EP lab called, spoke with Marisel, notified her that patient was ready. Marisel stated it will be at least 30-45mins before coming to get patient. Family and patient informed of time frame. Also notified to go to 3rd floor waiting area, wait to talk to doctor after procedure then come back to 2nd floor waiting area. Also, informed of recovery room visiting hours. Verbalization of understanding.

## 2017-03-29 NOTE — PROGRESS NOTES
Pt with low flow alarm, HOB lowered. Pt requests LVAD coordinator be notified. LVAD coordinator Ana Luisa notified. Flows currently 3.5. Will continue to monitor.

## 2017-03-29 NOTE — INTERVAL H&P NOTE
The patient has been examined and the H&P has been reviewed:    No acute change since previous visit.    Risks and benefits discussed, consent previously obtained.    Sedation per anesthesia.    Christopher Palma MD

## 2017-03-29 NOTE — H&P (VIEW-ONLY)
Subjective:    Patient ID:  Mert Samayoa is a 27 y.o. male who presents for evaluation of Congestive Heart Failure      HPI Comments: 27 yoM NICM, s/p LVAD here for ICD consideration. He has history of familial, NICM. He was on coreg and lisinopril for medical treatment. He did not tolerate coreg and was switched to metoprolol. He developed worsening HF symptoms and ultimately underwent LVAD placement 2/1/17. He has had an unremarkable recovery. He had AF and NSVT during his hospitalization leading to amiodarone initiation. He had a PICC in the right arm for a few weeks.    Echo 2/17:  CONCLUSIONS     1 - Eccentric hypertrophy.     2 - Severely depressed left ventricular systolic function.     3 - Biatrial enlargement.     4 - Left ventricular diastolic dysfunction.     5 - Moderately depressed right ventricular systolic function .     6 - Intermediate central venous pressure.     7 - HeartWare LVAD; speed 2700.     8 - Since prior study and decreased LVAD speed LV cavity is bigger.     Past Medical History:  1/16/2017: Cardiogenic shock  No date: Cardiomyopathy      Comment: Familial cardiomyopathy  No date: CHF (congestive heart failure)  12/21/2016: Essential hypertension  No date: Familial Cardiomyopathy      Comment: Familial cardiomyopathy     History reviewed. No pertinent surgical history.    Social History    Marital status:              Spouse name:                       Years of education:                 Number of children:               Occupational History    None on file    Social History Main Topics    Smoking status: Current Some Day Smoker                                                      Packs/day: 1.00      Years: 0.00           Smokeless status: Not on file                       Alcohol use: Yes           2.4 oz/week       4 Shots of liquor per week    Drug use: No              Sexual activity: Not on file          Other Topics            Concern    None on file    Social  History Narrative    Works     Review of patient's family history indicates:    Arthritis                      Mother                    Heart disease                  Brother                     Comment: cardiomyopathy and heart transplant    No Known Problems              Father                    Heart disease                  Brother                     Comment: cardiomyopathy and heart transplanted                twice    Birth defects                  Paternal Uncle              Congestive Heart Failure   Pertinent negatives include no chest pain, near-syncope, palpitations or shortness of breath.       Review of Systems   Constitution: Positive for malaise/fatigue.   HENT: Negative.    Eyes: Negative.    Cardiovascular: Negative for chest pain, dyspnea on exertion, leg swelling, near-syncope, palpitations and syncope.   Respiratory: Negative.  Negative for shortness of breath.    Endocrine: Negative.    Hematologic/Lymphatic: Negative.    Skin: Negative.    Musculoskeletal: Negative.    Gastrointestinal: Negative.    Genitourinary: Negative.    Neurological: Negative.  Negative for dizziness and light-headedness.   Psychiatric/Behavioral: Negative.    Allergic/Immunologic: Negative.         Objective:    Physical Exam   Constitutional: He is oriented to person, place, and time. He appears well-developed and well-nourished. No distress.   HENT:   Head: Normocephalic and atraumatic.   Eyes: Conjunctivae and EOM are normal. Pupils are equal, round, and reactive to light. Right eye exhibits no discharge. Left eye exhibits no discharge.   Neck: Normal range of motion. Neck supple. No JVD present. No thyromegaly present.   Cardiovascular: Normal rate, regular rhythm, S1 normal and S2 normal.  PMI is not displaced.  Exam reveals no gallop and no friction rub.    Murmur (continuous murmur) heard.  Pulmonary/Chest: Effort normal and breath sounds normal. No respiratory distress. He has no  wheezes. He has no rales.   Abdominal: Soft. Bowel sounds are normal. He exhibits no distension. There is no tenderness. There is no rebound and no guarding.   Musculoskeletal: Normal range of motion. He exhibits no edema or tenderness.   Neurological: He is alert and oriented to person, place, and time. No cranial nerve deficit.   Skin: Skin is warm and dry. No rash noted. No erythema.   Psychiatric: He has a normal mood and affect. His behavior is normal. Judgment and thought content normal.     ECG: NSR nl AR, QRS, QTc        Assessment:       1. Chronic combined systolic and diastolic heart failure    2. Familial cardiomyopathy    3. LVAD (left ventricular assist device) present         Plan:       27 yoM NICM, s/p LVAD here for ICD consideration. He has long standing NICM, on OMT. He cannot take beta blockers due to side effects (cardiogenic shock). He meets criteria for ICD implantation. I had a prolonged discussion with the patient regarding ICD implantation for primary prevention. Specifically that the device would not change heart failure symptoms or progression of disease. Rather, it would prevent potentially life threatening arrhythmias if they were to arise. Our discussion of risks included (but was not limited to) the possibility of infection, death, stroke, MI, pneumothorax, embolism, cardiac perforation, cardiac tamponade, renal injury, and bleeding.    SC ICD with atrial sensing (biotronik DX lead)  Anesthesia, MAC fine  Continue warfarin, hold for INR >3.5 or <2 & the need for heparin post implant

## 2017-03-29 NOTE — PROGRESS NOTES
Pt's wife, Abigail Samayoa, notified pt in recovery room, updated on pt status & POC. Notified of visitation times/rules. Will continue to monitor

## 2017-03-29 NOTE — PROGRESS NOTES
Margarita Billingsley, LVAD coordinator, paged and notified that patient was in DOSC, emergency on stretcher with patient, monitor at bedside (brought by Alexander).

## 2017-03-29 NOTE — NURSING TRANSFER
Nursing Transfer Note      3/29/2017     Transfer To: 348    Transfer via stretcher    Transfer with cardiac monitoring    Transported by RN & PCT    Medicines sent: N/A    Chart send with patient: Yes    Notified: spouse    Patient reassessed at: 3/29/17 @ 1630    Upon arrival to floor: cardiac monitor applied, patient oriented to room, call bell in reach and bed in lowest position

## 2017-03-30 ENCOUNTER — DOCUMENTATION ONLY (OUTPATIENT)
Dept: TRANSPLANT | Facility: CLINIC | Age: 27
End: 2017-03-30

## 2017-03-30 VITALS
OXYGEN SATURATION: 98 % | SYSTOLIC BLOOD PRESSURE: 78 MMHG | HEART RATE: 90 BPM | WEIGHT: 135.13 LBS | TEMPERATURE: 98 F | BODY MASS INDEX: 21.21 KG/M2 | RESPIRATION RATE: 18 BRPM | HEIGHT: 67 IN

## 2017-03-30 LAB
ANION GAP SERPL CALC-SCNC: 10 MMOL/L
APTT BLDCRRT: 34.2 SEC
BASOPHILS # BLD AUTO: 0.04 K/UL
BASOPHILS NFR BLD: 0.4 %
BUN SERPL-MCNC: 16 MG/DL
CALCIUM SERPL-MCNC: 9.4 MG/DL
CHLORIDE SERPL-SCNC: 102 MMOL/L
CO2 SERPL-SCNC: 26 MMOL/L
CREAT SERPL-MCNC: 1.1 MG/DL
DIFFERENTIAL METHOD: ABNORMAL
EOSINOPHIL # BLD AUTO: 0.3 K/UL
EOSINOPHIL NFR BLD: 2.8 %
ERYTHROCYTE [DISTWIDTH] IN BLOOD BY AUTOMATED COUNT: 15.8 %
EST. GFR  (AFRICAN AMERICAN): >60 ML/MIN/1.73 M^2
EST. GFR  (NON AFRICAN AMERICAN): >60 ML/MIN/1.73 M^2
GLUCOSE SERPL-MCNC: 88 MG/DL
HCT VFR BLD AUTO: 40.9 %
HGB BLD-MCNC: 13 G/DL
INR PPP: 2.7
LDH SERPL L TO P-CCNC: 195 U/L
LYMPHOCYTES # BLD AUTO: 2 K/UL
LYMPHOCYTES NFR BLD: 22.1 %
MAGNESIUM SERPL-MCNC: 2.1 MG/DL
MCH RBC QN AUTO: 25.7 PG
MCHC RBC AUTO-ENTMCNC: 31.8 %
MCV RBC AUTO: 81 FL
MONOCYTES # BLD AUTO: 0.9 K/UL
MONOCYTES NFR BLD: 9.7 %
NEUTROPHILS # BLD AUTO: 5.9 K/UL
NEUTROPHILS NFR BLD: 64.7 %
PHOSPHATE SERPL-MCNC: 4.4 MG/DL
PLATELET # BLD AUTO: 187 K/UL
PMV BLD AUTO: 9.3 FL
POTASSIUM SERPL-SCNC: 4.1 MMOL/L
PROTHROMBIN TIME: 27.3 SEC
RBC # BLD AUTO: 5.05 M/UL
SODIUM SERPL-SCNC: 138 MMOL/L
WBC # BLD AUTO: 9.08 K/UL

## 2017-03-30 PROCEDURE — 93750 INTERROGATION VAD IN PERSON: CPT | Performed by: PHYSICIAN ASSISTANT

## 2017-03-30 PROCEDURE — 93750 INTERROGATION VAD IN PERSON: CPT | Mod: ,,, | Performed by: INTERNAL MEDICINE

## 2017-03-30 PROCEDURE — 83615 LACTATE (LD) (LDH) ENZYME: CPT

## 2017-03-30 PROCEDURE — 85610 PROTHROMBIN TIME: CPT

## 2017-03-30 PROCEDURE — 27000248 HC VAD-ADDITIONAL DAY

## 2017-03-30 PROCEDURE — 25000003 PHARM REV CODE 250: Performed by: INTERNAL MEDICINE

## 2017-03-30 PROCEDURE — 84100 ASSAY OF PHOSPHORUS: CPT

## 2017-03-30 PROCEDURE — 36415 COLL VENOUS BLD VENIPUNCTURE: CPT

## 2017-03-30 PROCEDURE — 83735 ASSAY OF MAGNESIUM: CPT

## 2017-03-30 PROCEDURE — 63600175 PHARM REV CODE 636 W HCPCS: Performed by: INTERNAL MEDICINE

## 2017-03-30 PROCEDURE — 80323 ALKALOIDS NOS: CPT

## 2017-03-30 PROCEDURE — 80048 BASIC METABOLIC PNL TOTAL CA: CPT

## 2017-03-30 PROCEDURE — 99238 HOSP IP/OBS DSCHRG MGMT 30/<: CPT | Mod: ,,, | Performed by: INTERNAL MEDICINE

## 2017-03-30 PROCEDURE — 85730 THROMBOPLASTIN TIME PARTIAL: CPT

## 2017-03-30 PROCEDURE — 85025 COMPLETE CBC W/AUTO DIFF WBC: CPT

## 2017-03-30 RX ORDER — OXYCODONE AND ACETAMINOPHEN 10; 325 MG/1; MG/1
1 TABLET ORAL EVERY 6 HOURS PRN
Qty: 30 TABLET | Refills: 0 | Status: SHIPPED | OUTPATIENT
Start: 2017-03-30 | End: 2017-09-13

## 2017-03-30 RX ORDER — CEPHALEXIN 500 MG/1
500 CAPSULE ORAL EVERY 8 HOURS
Qty: 15 CAPSULE | Refills: 0 | Status: SHIPPED | OUTPATIENT
Start: 2017-03-30 | End: 2017-04-06 | Stop reason: ALTCHOICE

## 2017-03-30 RX ADMIN — CEFAZOLIN SODIUM 2 G: 2 SOLUTION INTRAVENOUS at 02:03

## 2017-03-30 RX ADMIN — FERROUS GLUCONATE TAB 324 MG (37.5 MG ELEMENTAL IRON) 324 MG: 324 (37.5 FE) TAB at 08:03

## 2017-03-30 RX ADMIN — OXYCODONE HYDROCHLORIDE AND ACETAMINOPHEN 1 TABLET: 10; 325 TABLET ORAL at 09:03

## 2017-03-30 RX ADMIN — ASPIRIN 325 MG: 325 TABLET, DELAYED RELEASE ORAL at 08:03

## 2017-03-30 RX ADMIN — MAGNESIUM OXIDE TAB 400 MG (241.3 MG ELEMENTAL MG) 400 MG: 400 (241.3 MG) TAB at 08:03

## 2017-03-30 RX ADMIN — AMIODARONE HYDROCHLORIDE 200 MG: 200 TABLET ORAL at 08:03

## 2017-03-30 RX ADMIN — CEPHALEXIN 500 MG: 500 CAPSULE ORAL at 05:03

## 2017-03-30 RX ADMIN — LISINOPRIL 5 MG: 5 TABLET ORAL at 08:03

## 2017-03-30 RX ADMIN — CEPHALEXIN 500 MG: 500 CAPSULE ORAL at 01:03

## 2017-03-30 NOTE — PLAN OF CARE
[facility]   Heart Transplant/VAD Clinic   [address] [place] [address]   [phone] after hours          [phone] fax     VAD HOME  HEALTH ORDERS      Admit to Home Health    Diagnosis:   Patient Active Problem List   Diagnosis    Cigarette smoker quit mid Dec 2016    Chronic combined systolic and diastolic heart failure    Organ transplant candidate    LVAD (left ventricular assist device) present    Long term (current) use of anticoagulants    Familial cardiomyopathy    NICM (nonischemic cardiomyopathy)       Patient is homebound due to:      Diet: Low Fat, Low cholesterol, 2Gm Na, Coumadin restrictions.    Acitivities: No Swimming, bathing, vacuuming, contact sports.    Fresh implants= Sternal Precautions    Nursing:   SN to complete comprehensive assessment including routine vital signs. Instruct on disease process and s/s of complications to report to MD. Review/verify medication list sent home with the patient at time of discharge  and instruct patient/caregiver as needed. Frequency may be adjusted depending on start of care date.    **LVAD driveline exit site dressing change is to be completed per LVAD patient/caregiver only**.    Notify MD if SBP > 160 or < 90; DBP > 90 or < 50; HR > 120 or < 50; Temp > 101; Weight gain >3lbs in 1 day or 5lbs in 1 week.        LABS:  SN to perform labs: PT/INR per Coumadin clinic [phone].   Follow up INR date: Monday 4/3/17  No Finger Sticks      Send initial Home Health orders to HTS attending physician on call.  Send follow up questions to VAD clinic MD [phone] or [phone].

## 2017-03-30 NOTE — DISCHARGE SUMMARY
[facility]  Heart Transplant  Discharge Summary      Patient Name: Mert Samayoa  MRN: [id number]  Admission Date: 3/29/2017  Hospital Length of Stay: 1 days  Discharge Date and Time: 3/30/17  Attending Physician: Mario Conner MD   Discharging Provider: Diane Saleh NP  Primary Care Provider: Primary Doctor No     HPI: Needed ICD implant    Procedure(s) (LRB):  INSERTION-ICD-SINGLE (N/A)     Hospital Course: 26 yo WM s/p HW 2/1/17 for familial CMP, h/o AFL, on Amiodarone, status 7 for OHTx, quit smoking in January 2017 was admitted for ICD implant for primary prevention. He underwent implant of single lead Biotronik ICD 3/29/17 with no post-op complications. Will go home on Keflex 500 mg tid for 5 days (completed 2 doses of IV Ancef during admit). Nicotine/cotinine level was obtained prior to discharge. He will f/u in the ICD wound clinic 4/6/17. He should f/u in our clinic 4/6/17 as well.    Consults: EP    Significant Diagnostic Studies: See above    Pending Diagnostic Studies:     Procedure Component Value Units Date/Time    Nicotine And Metabolites, Blood [[id number]]     Order Status:  Sent Lab Status:  No result     Specimen:  Blood from Blood     X-Ray Chest 1 View [[id number]] Resulted:  03/29/17 1737    Order Status:  Sent Lab Status:  No result Updated:  03/29/17 1759        Final Active Diagnoses:    Diagnosis Date Noted POA    PRINCIPAL PROBLEM:  NICM (nonischemic cardiomyopathy) [I42.8] 03/29/2017 Yes      Problems Resolved During this Admission:    Diagnosis Date Noted Date Resolved POA      Discharged Condition: stable    Disposition: Home with Home health until cleared to drive by EP    Follow Up:  Follow-up Information     Follow up with Flavio Dlil - Ramy In 1 week.    Specialty:  Cardiology    Why:  For wound re-check    Contact information:    [address]  [facility] [address] [phone]    Additional information:    Clinic [place] - 3rd Floor         Follow up with Jeremiah Trujillo MD In 3 months.    Specialties:  Electrophysiology, Cardiovascular Disease    Contact information:    [address]  [facility] [address]  [phone]          Patient Instructions:   No discharge procedures on file.  Medications:  Reconciled Home Medications:   Current Discharge Medication List      START taking these medications    Details   cephALEXin (KEFLEX) 500 MG capsule Take 1 capsule (500 mg total) by mouth every 8 (eight) hours.  Qty: 15 capsule, Refills: 0         CONTINUE these medications which have CHANGED    Details   oxycodone-acetaminophen (PERCOCET)  mg per tablet Take 1 tablet by mouth every 6 (six) hours as needed.  Qty: 30 tablet, Refills: 0         CONTINUE these medications which have NOT CHANGED    Details   amiodarone (PACERONE) 200 MG Tab Take 1 tablet (200 mg total) by mouth once daily.  Qty: 30 tablet, Refills: 11      aspirin (ECOTRIN) 325 MG EC tablet Take 1 tablet (325 mg total) by mouth once daily.  Qty: 30 tablet, Refills: 11      famotidine (PEPCID) 40 MG tablet Take 1 tablet (40 mg total) by mouth every evening.  Qty: 30 tablet, Refills: 11      ferrous gluconate (FERGON) 324 MG tablet Take 1 tablet (324 mg total) by mouth daily with breakfast.  Qty: 30 tablet, Refills: 11      lisinopril (PRINIVIL,ZESTRIL) 5 MG tablet Take 1 tablet (5 mg total) by mouth 2 (two) times daily.  Qty: 60 tablet, Refills: 11      magnesium oxide (MAG-OX) 400 mg tablet Take 1 tablet (400 mg total) by mouth 2 (two) times daily.  Qty: 60 tablet, Refills: 11      senna-docusate 8.6-50 mg (SENNA WITH DOCUSATE SODIUM) 8.6-50 mg per tablet Take 2 tablets by mouth daily as needed for Constipation.  Qty: 60 tablet, Refills: 5      warfarin (COUMADIN) 5 MG tablet Take 1 tablet (5 mg total) by mouth Daily.  Qty: 30 tablet, Refills: 3             Diane Saleh NP  Heart Transplant  [facility]

## 2017-03-30 NOTE — PROCEDURES
Patient aao x 3  with wife at bedside. VAD interrogation completed in the event changes needed to be made. Pt noted to have LFA to 1.9 and 2.0 3/28 and 3/29 AM.  Dr. Arteaga aware. No orders received. Will continue to monitor for further issues.     Pulsatile: Yes, intermittent   VAD Sounds: Smooth  Problems / Issues / Alarms with VAD if any: None noted  HCT: 40.9  Waveforms: 5-9      VAD Interrogation:  TXP LVAD INTERROGATIONS 3/30/2017 3/30/2017 3/30/2017 3/30/2017 3/29/2017 3/29/2017 3/29/2017   Type Heartware Heartware Heartware Heartware Heartware Heartware Heartware   Flow 4.1 4.2 - 5.1 5.2 4.5 4.3   Speed 2700 2700 - 2700 2700 2700 2700   Power ([place]) 4.2 4.2 - 4.4 4.5 4.2 4.1   Low Flow Alarm 2.5 - - - - - -   High Power Alarm 5.0 - - - - - -   Pulsatility Intermittent pulse No Pulse - Intermittent pulse No Pulse Intermittent pulse -

## 2017-03-30 NOTE — PROGRESS NOTES
Mr. Mert Samayoa is being discharged today following ICD implantation. His PMH is significant for HW lvad, AFL and HTN.      During his stay he underwent ICD implantation. He was prescribed cephalexin for a 5 day course. His coumadin was continued at 5mg orally daily, with aspirin 325mg. His inr goal is 2-3. His inr today was 2.7. He would be considered a candidate for heparin bridging if required.     Plan to follow up inr with coumadin clinic on Monday.

## 2017-03-30 NOTE — PLAN OF CARE
Problem: Patient Care Overview  Goal: Plan of Care Review  Outcome: Ongoing (interventions implemented as appropriate)  No significant events noted throughout shift. Free of falls/trauma/inury. VSS. Denies any CP, SOB, palpitations, or dizziness. General skin remains intact with no new breakdown. Turning/repositioning independently in bed. C/o pain managed with PRN meds. Wife at the bedside. Plan for d/c home tomorrow. Plan of care reviewed and updated, verbalizes understanding. No acute distress noted. Will continue to monitor.

## 2017-03-30 NOTE — PROGRESS NOTES
Hospital Day: 2    Subjective:  Interval History: Mild soreness at ICD site    Medication side effects: none    Scheduled Meds:   amiodarone  200 mg Oral Daily    aspirin  325 mg Oral Daily    cephALEXin  500 mg Oral Q8H    famotidine  40 mg Oral QHS    ferrous gluconate  324 mg Oral Daily with breakfast    lisinopril  5 mg Oral BID    magnesium oxide  400 mg Oral BID    warfarin  5 mg Oral Daily     Continuous Infusions:   PRN Meds:oxycodone-acetaminophen    Objective:  Vital signs in last 24 hours:   Temp:  [97.4 °F (36.3 °C)-98.5 °F (36.9 °C)] 97.4 °F (36.3 °C)  Pulse:  [57-97] 65  Resp:  [10-20] 18  SpO2:  [97 %-100 %] 97 %  BP: ()/(0-80) 112/80    Intake/Output last 3 shifts:  I/O last 3 completed shifts:  In: 480 [P.O.:330; I.V.:150]  Out: 0   Intake/Output this shift:       Physical Exam:  General appearance: alert, appears stated age and cooperative  Neck: no adenopathy, no carotid bruit, no JVD, supple, symmetrical, trachea midline and thyroid not enlarged, symmetric, no tenderness/mass/nodules  Lungs: clear to auscultation bilaterally  Heart: Smooth VAD hum  Abdomen: soft, non-tender; bowel sounds normal; no masses,  no organomegaly  Extremities: extremities normal, atraumatic, no cyanosis or edema  Skin: Skin color, texture, turgor normal. No rashes or lesions  Neurologic: Grossly normal    Lab Review  Lab Results   Component Value Date     03/30/2017     03/15/2017     03/07/2017    K 4.1 03/30/2017    K 4.6 03/15/2017    K 4.9 03/07/2017    CO2 26 03/30/2017    CO2 27 03/15/2017    CO2 28 03/07/2017    BUN 16 03/30/2017    BUN 14 03/15/2017    BUN 11 03/07/2017    CREATININE 1.1 03/30/2017    CREATININE 1.0 03/15/2017    CREATININE 1.0 03/07/2017    CALCIUM 9.4 03/30/2017    CALCIUM 9.7 03/15/2017    CALCIUM 9.6 03/07/2017     Lab Results   Component Value Date    WBC 9.08 03/30/2017    WBC 9.29 03/15/2017    WBC 5.95 03/07/2017    HGB 13.0 (L) 03/30/2017    HGB 11.7 (L)  03/15/2017    HGB 10.3 (L) 03/07/2017    HCT 40.9 03/30/2017    HCT 38.4 (L) 03/15/2017    HCT 34.3 (L) 03/07/2017    HCT 24 (L) 02/02/2017    HCT 34 (L) 02/01/2017    HCT 27 (L) 02/01/2017    MCV 81 (L) 03/30/2017    MCV 87 03/15/2017    MCV 86 03/07/2017     03/30/2017     03/15/2017     03/07/2017        Assessment/Plan:      Active Hospital Problems    Diagnosis  POA    *NICM (nonischemic cardiomyopathy) [I42.8]  Yes      Resolved Hospital Problems    Diagnosis Date Resolved POA   No resolved problems to display.     26 yo WM s/p HW 2/1/17 for familial CMP, h/o AFL, on Amiodarone, status 7 for OHTx, was admitted for ICD implant for primary prevention:    S/P Biotronik ICD 3/29/17:  - EP following. Has outpatient wound check scheduled for 4/6/17    S/P HW 2/1/17:  - Speed 2700  - BP's well-controlled  - INR 2.7  -     Dispo:  - D/C home today after nicotine/cotinine obtained (quit smoking in January)

## 2017-03-30 NOTE — NURSING
Pt d/c home per MD orders. Tele removed, IV accessed removed and intact X2. VSS, NAD and no complaints at this time. Pt given and explained med list. Prescriptions sent to local pharmacy. All LVAD supplies check at discharge. Pt verbalizes complete understanding of all D/C instructions and follow up care. Pt given printed instructions AVS.

## 2017-03-30 NOTE — PROGRESS NOTES
Patient seen and examined, no acute events overnight.    Wound clean, dry, intact; no hematoma.    Plan:  Sling to left arm - wear for 48 hours, then only at night for 6 weeks.  NO HEPARIN PRODUCTS  Keflex 500 mg TID for 5 days at discharge (or after the 2 doses of IV antibiotics if still in the hospital)  No lifting left elbow above shoulder height  No lifting over 5 pounds  No driving for 1 week and for 4 weeks if patient uses left arm to make turns  Patient may shower in 48 hours, do not let beam of shower hit site directly and no scrubbing in area  Follow up in device clinic in 1 week and with Dr. Trujillo in 3 months    Christopher Palma MD

## 2017-03-30 NOTE — PROGRESS NOTES
Mr. Mert Samayoa is being discharged today following ICD implantation. His PMH is significant for HW lvad, AFL and HTN.     During his stay he underwent ICD implantation. He was prescribed cephalexin for a 5 day course. His coumadin was continued at 5mg orally daily, with aspirin 325mg. His inr goal is 2-3. His inr today was 2.7. He would be considered a candidate for heparin bridging if required.    Plan to follow up inr with coumadin clinic on Monday.     Mr. Samayoa was provided with an update medication administration card prior to discharge. He was also provided with a pain prescription for oxycodone/apap 10mg tablets. Thank you.

## 2017-03-31 ENCOUNTER — SOCIAL WORK (OUTPATIENT)
Dept: TRANSPLANT | Facility: CLINIC | Age: 27
End: 2017-03-31

## 2017-03-31 NOTE — PROGRESS NOTES
RUFINO received message from JOHNNY Vasquez in the coumadin clinic stating [facility] is requesting resumption of care orders. RUFINO called Rebecca at [facility] and explained pt was observation not inpt. Rebecca then reported new  orders are not necessary. RUFINO notified JOHNNY Vasquez. No other needs identified at this time. RUFINO remains available.

## 2017-04-01 LAB
COTININE SERPL-MCNC: <3 NG/ML
NICOTINE SERPL-MCNC: <3 NG/ML

## 2017-04-03 ENCOUNTER — ANTI-COAG VISIT (OUTPATIENT)
Dept: CARDIOLOGY | Facility: CLINIC | Age: 27
End: 2017-04-03

## 2017-04-03 ENCOUNTER — TELEPHONE (OUTPATIENT)
Dept: TRANSPLANT | Facility: CLINIC | Age: 27
End: 2017-04-03

## 2017-04-03 DIAGNOSIS — Z95.811 LVAD (LEFT VENTRICULAR ASSIST DEVICE) PRESENT: ICD-10-CM

## 2017-04-03 DIAGNOSIS — Z79.01 LONG TERM (CURRENT) USE OF ANTICOAGULANTS: ICD-10-CM

## 2017-04-03 LAB — INR PPP: 2.3

## 2017-04-03 NOTE — TELEPHONE ENCOUNTER
Patient paged this morning regarding LFA he experience this weekend. Asked patient to take his blood pressure, it was 95/72. Patient feels okay. He was sitting up in his bed when the LFA happened. Numbers have been fluctuating from 3.8-3.5. Explained to drink another bottle of water and if he has another LFA to page us back.     1100a Called patient to check on his status. Patient feeling better with no further LFA. He stated he forgot to mention that he was throwing up yesterday morning and had a slight temp  but has resolved. He has increased his fluid status and feels much better. Will follow up with patient on Thursdays clinic visit.   All question answered with verbalization of understanding. Will continue to monitor patients status.

## 2017-04-06 ENCOUNTER — DOCUMENTATION ONLY (OUTPATIENT)
Dept: ELECTROPHYSIOLOGY | Facility: CLINIC | Age: 27
End: 2017-04-06

## 2017-04-06 ENCOUNTER — ANTI-COAG VISIT (OUTPATIENT)
Dept: CARDIOLOGY | Facility: CLINIC | Age: 27
End: 2017-04-06

## 2017-04-06 ENCOUNTER — CLINICAL SUPPORT (OUTPATIENT)
Dept: TRANSPLANT | Facility: CLINIC | Age: 27
End: 2017-04-06
Payer: COMMERCIAL

## 2017-04-06 ENCOUNTER — CLINICAL SUPPORT (OUTPATIENT)
Dept: ELECTROPHYSIOLOGY | Facility: CLINIC | Age: 27
End: 2017-04-06
Payer: COMMERCIAL

## 2017-04-06 ENCOUNTER — OFFICE VISIT (OUTPATIENT)
Dept: TRANSPLANT | Facility: CLINIC | Age: 27
End: 2017-04-06
Payer: COMMERCIAL

## 2017-04-06 ENCOUNTER — LAB VISIT (OUTPATIENT)
Dept: LAB | Facility: HOSPITAL | Age: 27
End: 2017-04-06
Attending: INTERNAL MEDICINE
Payer: COMMERCIAL

## 2017-04-06 ENCOUNTER — SOCIAL WORK (OUTPATIENT)
Dept: TRANSPLANT | Facility: CLINIC | Age: 27
End: 2017-04-06

## 2017-04-06 VITALS — HEIGHT: 67 IN | WEIGHT: 139.56 LBS | TEMPERATURE: 98 F | SYSTOLIC BLOOD PRESSURE: 72 MMHG | BODY MASS INDEX: 21.9 KG/M2

## 2017-04-06 DIAGNOSIS — Z76.82 ORGAN TRANSPLANT CANDIDATE: ICD-10-CM

## 2017-04-06 DIAGNOSIS — I50.42 CHRONIC COMBINED SYSTOLIC AND DIASTOLIC HEART FAILURE: ICD-10-CM

## 2017-04-06 DIAGNOSIS — Z79.01 LONG TERM (CURRENT) USE OF ANTICOAGULANTS: ICD-10-CM

## 2017-04-06 DIAGNOSIS — I42.9 FAMILIAL CARDIOMYOPATHY: ICD-10-CM

## 2017-04-06 DIAGNOSIS — I50.9 CONGESTIVE HEART FAILURE: ICD-10-CM

## 2017-04-06 DIAGNOSIS — Z95.811 LVAD (LEFT VENTRICULAR ASSIST DEVICE) PRESENT: Primary | ICD-10-CM

## 2017-04-06 DIAGNOSIS — Z95.811 HEART REPLACED BY HEART ASSIST DEVICE: ICD-10-CM

## 2017-04-06 DIAGNOSIS — I42.8 NICM (NONISCHEMIC CARDIOMYOPATHY): ICD-10-CM

## 2017-04-06 DIAGNOSIS — Z95.811 LVAD (LEFT VENTRICULAR ASSIST DEVICE) PRESENT: ICD-10-CM

## 2017-04-06 LAB
ALBUMIN SERPL BCP-MCNC: 4.1 G/DL
ALP SERPL-CCNC: 90 U/L
ALT SERPL W/O P-5'-P-CCNC: 17 U/L
ANION GAP SERPL CALC-SCNC: 8 MMOL/L
AST SERPL-CCNC: 19 U/L
BASOPHILS # BLD AUTO: 0.03 K/UL
BASOPHILS NFR BLD: 0.3 %
BILIRUB DIRECT SERPL-MCNC: 0.3 MG/DL
BILIRUB SERPL-MCNC: 0.8 MG/DL
BNP SERPL-MCNC: 98 PG/ML
BUN SERPL-MCNC: 15 MG/DL
CALCIUM SERPL-MCNC: 9.6 MG/DL
CHLORIDE SERPL-SCNC: 103 MMOL/L
CO2 SERPL-SCNC: 29 MMOL/L
CREAT SERPL-MCNC: 1 MG/DL
CRP SERPL-MCNC: 48.9 MG/L
DIFFERENTIAL METHOD: ABNORMAL
EOSINOPHIL # BLD AUTO: 0.2 K/UL
EOSINOPHIL NFR BLD: 1.9 %
ERYTHROCYTE [DISTWIDTH] IN BLOOD BY AUTOMATED COUNT: 15.8 %
EST. GFR  (AFRICAN AMERICAN): >60 ML/MIN/1.73 M^2
EST. GFR  (NON AFRICAN AMERICAN): >60 ML/MIN/1.73 M^2
GLUCOSE SERPL-MCNC: 98 MG/DL
HCT VFR BLD AUTO: 33.8 %
HGB BLD-MCNC: 11.3 G/DL
INR PPP: 2.2
LDH SERPL L TO P-CCNC: 217 U/L
LYMPHOCYTES # BLD AUTO: 1 K/UL
LYMPHOCYTES NFR BLD: 11.2 %
MAGNESIUM SERPL-MCNC: 2.1 MG/DL
MCH RBC QN AUTO: 26.4 PG
MCHC RBC AUTO-ENTMCNC: 33.4 %
MCV RBC AUTO: 79 FL
MONOCYTES # BLD AUTO: 0.9 K/UL
MONOCYTES NFR BLD: 9.9 %
NEUTROPHILS # BLD AUTO: 6.7 K/UL
NEUTROPHILS NFR BLD: 76.5 %
PHOSPHATE SERPL-MCNC: 4.2 MG/DL
PLATELET # BLD AUTO: 209 K/UL
PMV BLD AUTO: 9.1 FL
POTASSIUM SERPL-SCNC: 4.3 MMOL/L
PREALB SERPL-MCNC: 23 MG/DL
PROT SERPL-MCNC: 7.6 G/DL
PROTHROMBIN TIME: 22.4 SEC
RBC # BLD AUTO: 4.28 M/UL
SODIUM SERPL-SCNC: 140 MMOL/L
WBC # BLD AUTO: 8.72 K/UL

## 2017-04-06 PROCEDURE — 99214 OFFICE O/P EST MOD 30 MIN: CPT | Mod: NTX,S$GLB,, | Performed by: INTERNAL MEDICINE

## 2017-04-06 PROCEDURE — 93750 INTERROGATION VAD IN PERSON: CPT | Mod: NTX,S$GLB,, | Performed by: INTERNAL MEDICINE

## 2017-04-06 PROCEDURE — 1160F RVW MEDS BY RX/DR IN RCRD: CPT | Mod: NTX,S$GLB,, | Performed by: INTERNAL MEDICINE

## 2017-04-06 PROCEDURE — 3078F DIAST BP <80 MM HG: CPT | Mod: NTX,S$GLB,, | Performed by: INTERNAL MEDICINE

## 2017-04-06 PROCEDURE — 3074F SYST BP LT 130 MM HG: CPT | Mod: NTX,S$GLB,, | Performed by: INTERNAL MEDICINE

## 2017-04-06 PROCEDURE — 99999 PR PBB SHADOW E&M-EST. PATIENT-LVL I: CPT | Mod: PBBFAC,TXP,,

## 2017-04-06 PROCEDURE — 99999 PR PBB SHADOW E&M-EST. PATIENT-LVL III: CPT | Mod: PBBFAC,TXP,, | Performed by: INTERNAL MEDICINE

## 2017-04-06 PROCEDURE — 93282 PRGRMG EVAL IMPLANTABLE DFB: CPT | Mod: NTX,S$GLB,, | Performed by: INTERNAL MEDICINE

## 2017-04-06 RX ORDER — ONDANSETRON 4 MG/1
4 TABLET, ORALLY DISINTEGRATING ORAL EVERY 6 HOURS PRN
Qty: 30 TABLET | Refills: 0 | Status: ON HOLD | OUTPATIENT
Start: 2017-04-06 | End: 2018-02-28 | Stop reason: HOSPADM

## 2017-04-06 NOTE — PROGRESS NOTES
SW followed up with pt and his wife's grandfather today in LVAD clinic. Both were alert/oriented x4 with pleasant affects. Pt's wife is at work.    Pt reports he is still adjusting but is doing much better than last time SW met with pt in clinic. Pt states he is more active and has adjusted to being attached to his power source at home.    Pt would like to return to work in the next few months. Pt is on short term disability and will shortly be switched to long term disability. Pt has been in touch with his boss.    Pt to discuss return to work and release to drive with MDs, pt is still very sore from his defibrillator placement last week.     Pt states one MD recommended cardiac rehab but that he doesn't feel he needs it at this point, pt to discuss with MD today during appointment.    Pt reports the hardest part is being dependent on other people, but states that he is adjusting and becoming more independent again.    SW provided support, education, resources and encouragement. SW remains available and continues to follow.

## 2017-04-06 NOTE — PROGRESS NOTES
Arrhythmia clinic  See picture of new ICD implant.    Healing hematoma noted, soft to touch. Pt states it has not increased in size since 2 days after procedure. Softening. Pt will call if persists or worsens.

## 2017-04-06 NOTE — LETTER
April 6, 2017        Guillermo Alejo  [address]  [facility] [address]  Phone: [phone]  Fax: [phone]             [facility]  [address]  [facility] [address]  Phone: [phone]   Patient: Mert Samayoa   MR Number: [id number]   YOB: 1990   Date of Visit: 4/6/2017       Dear Dr. Guillermo Alejo    Thank you for referring Mert Samayoa to me for evaluation. Attached you will find relevant portions of my assessment and plan of care.    If you have questions, please do not hesitate to call me. I look forward to following Mert Samayoa along with you.    Sincerely,    Mario Conner MD    Enclosure    If you would like to receive this communication electronically, please contact [email or web address] or [phone] to request [employer] Link access.    [employer] Link is a tool which provides read-only access to select patient information with whom you have a relationship. Its easy to use and provides real time access to review your patients record including encounter summaries, notes, results, and demographic information.    If you feel you have received this communication in error or would no longer like to receive these types of communications, please e-mail [email or web address]

## 2017-04-06 NOTE — PROCEDURES
TXP DERREK INTERROGATIONS 4/6/2017 3/30/2017 3/30/2017 3/30/2017 3/29/2017 3/29/2017 3/29/2017   Type Heartware - - - - - -   Flow 4.5 - - - - - -   Speed 2700 - - - - - -   Power (Goldsmith) 4.1 - - - - - -   Low Flow Alarm 2.5 - - - - - -   High Power Alarm 6.0 - - - - - -   Pulsatility Intermittent pulse Intermittent pulse No Pulse Intermittent pulse No Pulse Intermittent pulse No Pulse   }

## 2017-04-06 NOTE — PROGRESS NOTES
"Date of Implant with Heartware LVAD: 2/1/2017    PATIENT ARRIVED IN CLINIC:  Ambulatory  Accompanied by:grandfather in law  Vitals  Doppler:72  Pulsatile:yes intermittent  PAIN:4 on 0-10 pain scale , location of pain: chest, description of pain: ICD incision pain  Is patient currently on medications for pain?yes What kind? oxycodone  Weight (with controller and 2 batteries): refer to encounter    VAD Interrogation:  TXP DERREK INTERROGATIONS 4/6/2017   Type Heartware   Flow 4.5   Speed 2700   Power (Goldsmith) 4.1   Low Flow Alarm 2.5   High Power Alarm 6.0   Pulsatility Intermittent pulse       HCT:33.8  WAVEFORM:3-8, no negative deflections  Suction alarm: off     Problems / Issues / Alarms with VAD if any: low flow alarm this weekend, LVAD coordinator notified, patient apparently dehydrated from N&V, instructed to drink water, no alarms since    Any Equipment Issues:none reported  Patient states their batteries are lasting 5-6 hours. Rotating all batteries. Checking connections before and after changing battery.   Emergency Equipment With Patient:yes   VAD Binder With Patient: yes   Reviewed VAD Numbers In Binder:no  VAD Sounds: SMOOTH   Manual & Visual Inspection Of Driveline:  NO KINKS OR TEARS NOTED   Checked Heartware driveline connector housing for separation, none noted.  Also checked for tight connection, which they are.  Educated pt regarding this and instructed  to check on this daily. Pt verbalized understanding and agreement.   Clamshell Repair: no  Patient wearing (consolidated bag, vest, holster )with waist strap:YES all equipment on waistband    LVAD Dressing/DLES:  Appearance Of Driveline:"1" with slight crust  Antibiotics:NO  Frequency of Dressing Changes:daily with soap and water   Stabilization Device In Use: YES kirk anchor    Pt In Need Of Management Kits?:yes 1 box soap and water kits ordered  It is medically necessary to have VAD management kits in order to prevent infection or to assist in the " healing of an infected DLES.    Assessment:   Complaints/reason for visit today: Routine follow up  Complaints Of Nausea / Vomiting:some this weekend, but none since   Appearance and Frequency Of Stools:normal and formed without blood every other day  Color Of Urine: clear yellow  Coping/Depression/Anxiety:some anxiety, but coping ok  Sleep Habits:4-5 hrs /night  Sleep Aids:melatonin  Showering :NO   Activity/Exercise:did a 5K this weekend   Driving:no, Reminded to pull over should there be an alarm before looking down at controller.       Labs:    Chemistry        Component Value Date/Time     04/06/2017 0850    K 4.3 04/06/2017 0850     04/06/2017 0850    CO2 29 04/06/2017 0850    BUN 15 04/06/2017 0850    CREATININE 1.0 04/06/2017 0850    GLU 98 04/06/2017 0850        Component Value Date/Time    CALCIUM 9.6 04/06/2017 0850    ALKPHOS 90 04/06/2017 0850    AST 19 04/06/2017 0850    ALT 17 04/06/2017 0850    BILITOT 0.8 04/06/2017 0850            Magnesium   Date Value Ref Range Status   04/06/2017 2.1 1.6 - 2.6 mg/dL Final       Lab Results   Component Value Date    WBC 8.72 04/06/2017    HGB 11.3 (L) 04/06/2017    HCT 33.8 (L) 04/06/2017    MCV 79 (L) 04/06/2017     04/06/2017       Lab Results   Component Value Date    INR 2.2 (H) 04/06/2017    INR 2.3 04/03/2017    INR 2.7 (H) 03/30/2017       BNP   Date Value Ref Range Status   04/06/2017 98 0 - 99 pg/mL Final     Comment:     Values of less than 100 pg/ml are consistent with non-CHF populations.   03/15/2017 193 (H) 0 - 99 pg/mL Final     Comment:     Values of less than 100 pg/ml are consistent with non-CHF populations.   03/07/2017 392 (H) 0 - 99 pg/mL Final     Comment:     Values of less than 100 pg/ml are consistent with non-CHF populations.       LD   Date Value Ref Range Status   04/06/2017 217 110 - 260 U/L Final     Comment:     Results are increased in hemolyzed samples.   03/30/2017 195 110 - 260 U/L Final     Comment:      Results are increased in hemolyzed samples.   03/15/2017 185 110 - 260 U/L Final     Comment:     Results are increased in hemolyzed samples.       Labs reviewed with patient: YES      Patient Satisfaction Survey completed per Patient: no  (explained about signature and box to check)  Medication reconciliation: per MA.  Purple card updated today: no  Coumadin Managed by: [facility], INR 2.2 today    Education: Reviewed driveline care, emergency procedures, how to change the controller, alarms with patient.        Plans/Needs:Pt in for routine follow up. Pt reported a low flow alarm this weekend apparently from dehydration. No complaints of N/V or fever since. No low flow alarms since. No changes made today. Pt to RTC in 1 month, refer to MD note.

## 2017-04-06 NOTE — PROCEDURES
"Mert Samayoa is a 27 y.o. male patient.    Temp: 98.4 °F (36.9 °C) (04/06/17 0945)  BP: (!) 72/0 (04/06/17 0945)  Weight: 63.3 kg (139 lb 8.8 oz) (04/06/17 0928)  Height: 5' 7" (170.2 cm) (04/06/17 0928)    Procedures    TXP LVAD INTERROGATIONS 4/6/2017 3/30/2017 3/30/2017 3/30/2017 3/30/2017 3/29/2017 3/29/2017   Type - Heartware Heartware Heartware Heartware Heartware Heartware   Flow - 4.1 4.2 - 5.1 5.2 4.5   Speed - 2700 2700 - 2700 2700 2700   Power (Goldsmith) - 4.2 4.2 - 4.4 4.5 4.2   Low Flow Alarm - 2.5 - - - - -   High Power Alarm - 5.0 - - - - -   Pulsatility Intermittent pulse Intermittent pulse No Pulse - Intermittent pulse No Pulse Intermittent pulse       Guillermo Daniels  4/6/2017  "

## 2017-04-07 ENCOUNTER — TELEPHONE (OUTPATIENT)
Dept: ELECTROPHYSIOLOGY | Facility: CLINIC | Age: 27
End: 2017-04-07

## 2017-04-07 NOTE — TELEPHONE ENCOUNTER
----- Message from Maciej Mercedes MA sent at 4/7/2017 11:33 AM CDT -----  Contact: pt      ----- Message -----     From: Heidi Dc     Sent: 4/7/2017  11:04 AM       To: Ronnie Daniels Staff    Pt called requesting a return to work letter, please call him at the number listed.    Thanks

## 2017-04-07 NOTE — PROGRESS NOTES
I have personally taken the history and examined this patient and agree with the resident's note as stated above.

## 2017-04-12 ENCOUNTER — ANTI-COAG VISIT (OUTPATIENT)
Dept: CARDIOLOGY | Facility: CLINIC | Age: 27
End: 2017-04-12

## 2017-04-12 DIAGNOSIS — Z95.811 LVAD (LEFT VENTRICULAR ASSIST DEVICE) PRESENT: ICD-10-CM

## 2017-04-12 DIAGNOSIS — Z79.01 LONG TERM (CURRENT) USE OF ANTICOAGULANTS: ICD-10-CM

## 2017-04-12 LAB — INR PPP: 2

## 2017-04-17 ENCOUNTER — ANTI-COAG VISIT (OUTPATIENT)
Dept: CARDIOLOGY | Facility: CLINIC | Age: 27
End: 2017-04-17

## 2017-04-17 DIAGNOSIS — Z95.811 LVAD (LEFT VENTRICULAR ASSIST DEVICE) PRESENT: ICD-10-CM

## 2017-04-17 DIAGNOSIS — Z79.01 LONG TERM (CURRENT) USE OF ANTICOAGULANTS: ICD-10-CM

## 2017-04-17 LAB — INR PPP: 2.6

## 2017-04-20 ENCOUNTER — ANTI-COAG VISIT (OUTPATIENT)
Dept: CARDIOLOGY | Facility: CLINIC | Age: 27
End: 2017-04-20

## 2017-04-20 DIAGNOSIS — Z95.811 LVAD (LEFT VENTRICULAR ASSIST DEVICE) PRESENT: ICD-10-CM

## 2017-04-20 DIAGNOSIS — Z79.01 LONG TERM (CURRENT) USE OF ANTICOAGULANTS: ICD-10-CM

## 2017-04-20 LAB — INR PPP: 2.4

## 2017-04-26 ENCOUNTER — ANTI-COAG VISIT (OUTPATIENT)
Dept: CARDIOLOGY | Facility: CLINIC | Age: 27
End: 2017-04-26

## 2017-04-26 DIAGNOSIS — Z79.01 LONG TERM (CURRENT) USE OF ANTICOAGULANTS: ICD-10-CM

## 2017-04-26 DIAGNOSIS — Z95.811 LVAD (LEFT VENTRICULAR ASSIST DEVICE) PRESENT: ICD-10-CM

## 2017-04-26 LAB — INR PPP: 2.4

## 2017-05-04 ENCOUNTER — CLINICAL SUPPORT (OUTPATIENT)
Dept: TRANSPLANT | Facility: CLINIC | Age: 27
End: 2017-05-04
Payer: COMMERCIAL

## 2017-05-04 ENCOUNTER — ANTI-COAG VISIT (OUTPATIENT)
Dept: CARDIOLOGY | Facility: CLINIC | Age: 27
End: 2017-05-04

## 2017-05-04 ENCOUNTER — OFFICE VISIT (OUTPATIENT)
Dept: TRANSPLANT | Facility: CLINIC | Age: 27
End: 2017-05-04
Payer: COMMERCIAL

## 2017-05-04 ENCOUNTER — LAB VISIT (OUTPATIENT)
Dept: LAB | Facility: HOSPITAL | Age: 27
End: 2017-05-04
Attending: INTERNAL MEDICINE
Payer: COMMERCIAL

## 2017-05-04 ENCOUNTER — RESEARCH ENCOUNTER (OUTPATIENT)
Dept: RESEARCH | Facility: HOSPITAL | Age: 27
End: 2017-05-04

## 2017-05-04 ENCOUNTER — TELEPHONE (OUTPATIENT)
Dept: TRANSPLANT | Facility: CLINIC | Age: 27
End: 2017-05-04

## 2017-05-04 VITALS — BODY MASS INDEX: 22.13 KG/M2 | SYSTOLIC BLOOD PRESSURE: 68 MMHG | HEIGHT: 67 IN | WEIGHT: 141 LBS

## 2017-05-04 DIAGNOSIS — Z95.811 LVAD (LEFT VENTRICULAR ASSIST DEVICE) PRESENT: ICD-10-CM

## 2017-05-04 DIAGNOSIS — I50.22 CHRONIC SYSTOLIC CONGESTIVE HEART FAILURE: ICD-10-CM

## 2017-05-04 DIAGNOSIS — Z95.811 HEART REPLACED BY HEART ASSIST DEVICE: ICD-10-CM

## 2017-05-04 DIAGNOSIS — I50.42 CHRONIC COMBINED SYSTOLIC AND DIASTOLIC HEART FAILURE: ICD-10-CM

## 2017-05-04 DIAGNOSIS — I50.9 CONGESTIVE HEART FAILURE: ICD-10-CM

## 2017-05-04 DIAGNOSIS — Z79.01 LONG TERM (CURRENT) USE OF ANTICOAGULANTS: ICD-10-CM

## 2017-05-04 DIAGNOSIS — I42.8 NICM (NONISCHEMIC CARDIOMYOPATHY): ICD-10-CM

## 2017-05-04 DIAGNOSIS — Z95.811 HEART REPLACED BY HEART ASSIST DEVICE: Primary | ICD-10-CM

## 2017-05-04 LAB
ALBUMIN SERPL BCP-MCNC: 4.3 G/DL
ALP SERPL-CCNC: 86 U/L
ALT SERPL W/O P-5'-P-CCNC: 24 U/L
ANION GAP SERPL CALC-SCNC: 8 MMOL/L
AST SERPL-CCNC: 18 U/L
BASOPHILS # BLD AUTO: 0.04 K/UL
BASOPHILS NFR BLD: 0.5 %
BILIRUB DIRECT SERPL-MCNC: 0.1 MG/DL
BILIRUB SERPL-MCNC: 0.4 MG/DL
BNP SERPL-MCNC: 96 PG/ML
BUN SERPL-MCNC: 13 MG/DL
CALCIUM SERPL-MCNC: 9.1 MG/DL
CHLORIDE SERPL-SCNC: 105 MMOL/L
CO2 SERPL-SCNC: 28 MMOL/L
CREAT SERPL-MCNC: 1 MG/DL
CRP SERPL-MCNC: 5 MG/L
DIFFERENTIAL METHOD: ABNORMAL
EOSINOPHIL # BLD AUTO: 0.3 K/UL
EOSINOPHIL NFR BLD: 3.3 %
ERYTHROCYTE [DISTWIDTH] IN BLOOD BY AUTOMATED COUNT: 17.1 %
EST. GFR  (AFRICAN AMERICAN): >60 ML/MIN/1.73 M^2
EST. GFR  (NON AFRICAN AMERICAN): >60 ML/MIN/1.73 M^2
GLUCOSE SERPL-MCNC: 78 MG/DL
HCT VFR BLD AUTO: 43 %
HGB BLD-MCNC: 13.7 G/DL
INR PPP: 2.9
LDH SERPL L TO P-CCNC: 175 U/L
LYMPHOCYTES # BLD AUTO: 1.4 K/UL
LYMPHOCYTES NFR BLD: 16.4 %
MAGNESIUM SERPL-MCNC: 2.2 MG/DL
MCH RBC QN AUTO: 26.5 PG
MCHC RBC AUTO-ENTMCNC: 31.9 %
MCV RBC AUTO: 83 FL
MONOCYTES # BLD AUTO: 0.9 K/UL
MONOCYTES NFR BLD: 10.2 %
NEUTROPHILS # BLD AUTO: 6 K/UL
NEUTROPHILS NFR BLD: 69.5 %
PHOSPHATE SERPL-MCNC: 4 MG/DL
PLATELET # BLD AUTO: 210 K/UL
PMV BLD AUTO: 9.4 FL
POTASSIUM SERPL-SCNC: 4 MMOL/L
PREALB SERPL-MCNC: 32 MG/DL
PROT SERPL-MCNC: 7.6 G/DL
PROTHROMBIN TIME: 29.2 SEC
RBC # BLD AUTO: 5.17 M/UL
SODIUM SERPL-SCNC: 141 MMOL/L
WBC # BLD AUTO: 8.55 K/UL

## 2017-05-04 PROCEDURE — 1160F RVW MEDS BY RX/DR IN RCRD: CPT | Mod: NTX,S$GLB,, | Performed by: INTERNAL MEDICINE

## 2017-05-04 PROCEDURE — 99999 PR PBB SHADOW E&M-EST. PATIENT-LVL II: CPT | Mod: 25,PBBFAC,NTX, | Performed by: INTERNAL MEDICINE

## 2017-05-04 PROCEDURE — 3078F DIAST BP <80 MM HG: CPT | Mod: NTX,S$GLB,, | Performed by: INTERNAL MEDICINE

## 2017-05-04 PROCEDURE — 93750 INTERROGATION VAD IN PERSON: CPT | Mod: NTX,S$GLB,, | Performed by: INTERNAL MEDICINE

## 2017-05-04 PROCEDURE — 3074F SYST BP LT 130 MM HG: CPT | Mod: NTX,S$GLB,, | Performed by: INTERNAL MEDICINE

## 2017-05-04 PROCEDURE — 99214 OFFICE O/P EST MOD 30 MIN: CPT | Mod: NTX,S$GLB,, | Performed by: INTERNAL MEDICINE

## 2017-05-04 NOTE — LETTER
May 4, 2017        Guillermo Alejo  [address]  [facility] [address]  Phone: [phone]  Fax: [phone]             [facility]  [address]  [facility] [address]  Phone: [phone]   Patient: Mert Samayoa   MR Number: [id number]   YOB: 1990   Date of Visit: 5/4/2017       Dear Dr. Guillermo Alejo    Thank you for referring Mert Samayoa to me for evaluation. Attached you will find relevant portions of my assessment and plan of care.    If you have questions, please do not hesitate to call me. I look forward to following Mert Samayoa along with you.    Sincerely,    Sammi aTpia MD    Enclosure    If you would like to receive this communication electronically, please contact [email or web address] or [phone] to request [email or web address] Link access.    [email or web address] Link is a tool which provides read-only access to select patient information with whom you have a relationship. Its easy to use and provides real time access to review your patients record including encounter summaries, notes, results, and demographic information.    If you feel you have received this communication in error or would no longer like to receive these types of communications, please e-mail [email or web address]

## 2017-05-04 NOTE — PROCEDURES
TXP DERREK INTERROGATIONS 5/4/2017 4/6/2017 3/30/2017 3/30/2017 3/30/2017 3/29/2017 3/29/2017   Type Heartware Heartware - - - - -   Flow 3.9 4.5 - - - - -   Speed 2700 2700 - - - - -   Power (Goldsmith) 4.4 4.1 - - - - -   Low Flow Alarm 2.5 2.5 - - - - -   High Power Alarm 6.0 6.0 - - - - -   Pulsatility Intermittent pulse Intermittent pulse Intermittent pulse No Pulse Intermittent pulse No Pulse Intermittent pulse   }

## 2017-05-04 NOTE — PROGRESS NOTES
IRB#: [id number]  P.I.:  Usman Klein MD    05/04/2017: Spoke to Mert Samayoa today with his wife regarding 3 month follow-up and continuing to participate with the INTERMACS registry.  I explained the purpose, procedure, and risk/benefits of participation. Mert Samayoa verbalized understanding and willing to continue participation in the registry.      Patient completed the EQ-5D quality of life questionnaire, KCCQ, Gait Speed of 4.3 seconds and the Trail Making neurocognitive test in  70 seconds.

## 2017-05-04 NOTE — PROGRESS NOTES
"Date of Implant with Heartware LVAD: 2/1/17    PATIENT ARRIVED IN CLINIC:  Ambulatory   Accompanied by: wife    Vitals  Doppler: 68  Pulsatile: Yes, intermittent  PAIN: denies on 0-10 pain scale  Is patient currently on medications for pain? no What kind? n/a  Weight (with controller and 2 batteries): refer to encounter    VAD Interrogation:  TXP DERREK INTERROGATIONS 5/4/2017   Type Heartware   Flow 3.9   Speed 2700   Power (Goldsmith) 4.4   Low Flow Alarm 2.5   High Power Alarm 6.0   Pulsatility Intermittent pulse       HCT: 43  WAVEFORM: 2-7, no deflections  Suction alarm: Off  Problems / Issues / Alarms with VAD if any: LFA noted 4/27/17  Any Equipment Issues: None noted  Patient states their batteries are lasting 4-6 hours. Rotating all batteries. Checking connections before and after changing battery.   Emergency Equipment With Patient: yes   VAD Binder With Patient: yes   Reviewed VAD Numbers In Binder: yes  VAD Sounds: Smooth  Manual & Visual Inspection Of Driveline: No kinks or tears noted  Checked Heartware driveline connector housing for separation, none noted.  Also checked for tight connection, which they are.  Educated pt regarding this and instructed  to check on this daily. Pt verbalized understanding and agreement.   Clamshell Repair: no  Patient wearing Consolidated bag with waist strap:YES     LVAD Dressing/DLES:  Appearance Of Driveline: "1"  Antibiotics: NO  Frequency of Dressing Changes: daily & soap and water dressing   Stabilization Device In Use: yes, kirk securement device    Pt In Need Of Management Kits? yes 1 box soap and water   It is medically necessary to have VAD management kits in order to prevent infection or to assist in the healing of an infected DLES.    Assessment:   Complaints/reason for visit today: routine  Complaints Of Nausea / Vomiting: None noted   Appearance and Frequency Of Stools: normal and formed without blood & daily  Color Of Urine: clear/yellow  Patient is: coping okay "   Sleep Habits: 6 hrs /night  Sleep Aids: None noted   Showering: Yes, reminded to change dressing immediately after drying off  - educated to shower today, gave tegaderms to try  Activity/Exercise: yes, very active   Driving: yes, Reminded to pull over should there be an alarm before looking down at controller.     Labs:    Chemistry        Component Value Date/Time     05/04/2017 1251    K 4.0 05/04/2017 1251     05/04/2017 1251    CO2 28 05/04/2017 1251    BUN 13 05/04/2017 1251    CREATININE 1.0 05/04/2017 1251    GLU 78 05/04/2017 1251        Component Value Date/Time    CALCIUM 9.1 05/04/2017 1251    ALKPHOS 86 05/04/2017 1251    AST 18 05/04/2017 1251    ALT 24 05/04/2017 1251    BILITOT 0.4 05/04/2017 1251            Magnesium   Date Value Ref Range Status   05/04/2017 2.2 1.6 - 2.6 mg/dL Final       Lab Results   Component Value Date    WBC 8.55 05/04/2017    HGB 13.7 (L) 05/04/2017    HCT 43.0 05/04/2017    MCV 83 05/04/2017     05/04/2017       Lab Results   Component Value Date    INR 2.9 (H) 05/04/2017    INR 2.4 04/26/2017    INR 2.4 04/20/2017       BNP   Date Value Ref Range Status   05/04/2017 96 0 - 99 pg/mL Final     Comment:     Values of less than 100 pg/ml are consistent with non-CHF populations.   04/06/2017 98 0 - 99 pg/mL Final     Comment:     Values of less than 100 pg/ml are consistent with non-CHF populations.   03/15/2017 193 (H) 0 - 99 pg/mL Final     Comment:     Values of less than 100 pg/ml are consistent with non-CHF populations.       LD   Date Value Ref Range Status   05/04/2017 175 110 - 260 U/L Final     Comment:     Results are increased in hemolyzed samples.   04/06/2017 217 110 - 260 U/L Final     Comment:     Results are increased in hemolyzed samples.   03/30/2017 195 110 - 260 U/L Final     Comment:     Results are increased in hemolyzed samples.       Labs reviewed with patient: YES      Patient Satisfaction Survey completed per Patient: no  (explained  about signature and box to check)  Medication reconciliation: per MA.  Purple card updated today: yes  Coumadin Managed by: [facility],     Education: Reviewed driveline care, emergency procedures, how to change the controller, alarms with patient.      Plans/Needs:  Pt doing very well .  Pt given permission to return to work but given work environment, encouraged to change postions when available.  PT verbalized understanding.  Pt to RTC in 1 month

## 2017-05-04 NOTE — PROGRESS NOTES
"Subjective:   Patient ID:  Mert Samayoa is a 27 y.o. male who presents for LVAD followup visit.      Implant Date:17     HVAD RPM 2700     INR goal: 2-3   Bridge with Heparin   Antiplatelets:   LDH: 185/217  TE17    TXP DERREK INTERROGATIONS 2017   Type Heartware   Flow 3.9   Speed 2700   Power (Goldsmith) 4.4   Low Flow Alarm 2.5   High Power Alarm 6.0   Pulsatility Intermittent pulse       HPI   MR. Samayoa is a very pleasant 28 y/o white male with Hx of stage D hFreF, NICMP who underwent placement of LVAD on  and sternal closure on . Heartware VAD was placed without any immediate complications. Over the next few days, patient was extubated and , Epi, Cardene and Lasix gtts and Isiah were started and titrated off. Patient was also on heparin gtt. Patient was stepped down from the SICU to CTSU on . Patient went into atrial flutter in AM of , which he self converted out of, and started on Amiodarone by CTS following atrial flutter episode. Patient was started on Coumadin on 2/10/17. Patient had low flow alarms on 17 and HW speed was dropped from 2900 RPM to 2700 RPM after speed echo showed better filling at 2700 RPM. At his last visit in early 2017, he as noted occasional low flow alarms all very brief and generally at night.Since then,  he's done really well. Walked a 5K over weekend.  VAD speed is at 2700 rpm. No VAD alarms noted on interrogation occasional PI events. BP is 68. DLES is a "1". INR is therapeutic at 2.9. LDH is at baseline 175.     Review of Systems   Constitution: Negative. Negative for chills, decreased appetite, diaphoresis, fever, weakness, malaise/fatigue, night sweats, weight gain and weight loss.   Eyes: Negative.    Cardiovascular: Negative for chest pain, claudication, cyanosis, dyspnea on exertion, irregular heartbeat, leg swelling, near-syncope, orthopnea, palpitations, paroxysmal nocturnal dyspnea and syncope.   Respiratory: Negative " "for cough, hemoptysis and shortness of breath.    Endocrine: Negative.    Hematologic/Lymphatic: Negative.    Skin: Negative for color change, dry skin and nail changes.   Musculoskeletal: Negative.    Gastrointestinal: Negative.    Genitourinary: Negative.        Objective:   Blood pressure (!) 68/0, height 5' 7" (1.702 m), weight 64 kg (141 lb).body mass index is 22.08 kg/(m^2).    Doppler: 68    Physical Exam   Constitutional: He appears well-developed.   BP (!) 68/0  Ht 5' 7" (1.702 m)  Wt 64 kg (141 lb)  BMI 22.08 kg/m2     HENT:   Head: Normocephalic.   Neck: No JVD present. Carotid bruit is not present.   Cardiovascular: Regular rhythm and normal heart sounds.  PMI is not displaced.    No murmur heard.  Smooth VAD hum. DLES is "1"   Pulmonary/Chest: Effort normal and breath sounds normal. No respiratory distress. He has no wheezes. He has no rales.   Abdominal: Soft. Bowel sounds are normal. He exhibits no distension. There is no tenderness. There is no rebound.   Musculoskeletal: He exhibits no edema.   Neurological: He is alert.   Skin: Skin is warm.   Vitals reviewed.      Lab Results   Component Value Date    WBC 8.55 05/04/2017    HGB 13.7 (L) 05/04/2017    HCT 43.0 05/04/2017    MCV 83 05/04/2017     05/04/2017    CO2 28 05/04/2017    CREATININE 1.0 05/04/2017    CALCIUM 9.1 05/04/2017    ALBUMIN 4.3 05/04/2017    AST 18 05/04/2017    BNP 96 05/04/2017    ALT 24 05/04/2017     05/04/2017       Lab Results   Component Value Date    INR 2.9 (H) 05/04/2017    INR 2.4 04/26/2017    INR 2.4 04/20/2017       BNP   Date Value Ref Range Status   05/04/2017 96 0 - 99 pg/mL Final     Comment:     Values of less than 100 pg/ml are consistent with non-CHF populations.   04/06/2017 98 0 - 99 pg/mL Final     Comment:     Values of less than 100 pg/ml are consistent with non-CHF populations.   03/15/2017 193 (H) 0 - 99 pg/mL Final     Comment:     Values of less than 100 pg/ml are consistent with " non-CHF populations.       LD   Date Value Ref Range Status   05/04/2017 175 110 - 260 U/L Final     Comment:     Results are increased in hemolyzed samples.   04/06/2017 217 110 - 260 U/L Final     Comment:     Results are increased in hemolyzed samples.   03/30/2017 195 110 - 260 U/L Final     Comment:     Results are increased in hemolyzed samples.       Labs were reviewed with the patient.    No results found for this or any previous visit.  No results found for this or any previous visit.    Assessment:      1. Heart replaced by heart assist device    2. Congestive heart failure    3. LVAD (left ventricular assist device) present    4. NICM (nonischemic cardiomyopathy)    5. Chronic combined systolic and diastolic heart failure        Plan:   Patient is now NYHA class II. BP is controlled.  INR is therapeutic.  VAD interrogation was performed in clinic  Does not need VAD supplies.   May go back to work with some lifting restrictions    Recommend 2 gram sodium restriction and 1500cc fluid restriction.  Encourage physical activity with graded exercise program.  Requested patient to weigh themselves daily, and to notify us if their weight increases by more than 3 lbs in 1 day or 5 lbs in 1 week.     Listed for transplant: Listed status 7    UNOS Patient Status  Functional Status: 100% - Normal, no complaints, no evidence of disease  Physical Capacity: No Limitations  Working for Income: No  If no, reason not working: Demands of Treatment    Sammi Tapia MD

## 2017-05-04 NOTE — TELEPHONE ENCOUNTER
Inquired if patient still abstinent from smoking, stated has not smoked since 12/15/17. Nicotine level added on to labs drawn today.

## 2017-05-06 LAB
COTININE SERPL-MCNC: <3 NG/ML
NICOTINE SERPL-MCNC: <3 NG/ML

## 2017-05-08 ENCOUNTER — TELEPHONE (OUTPATIENT)
Dept: TRANSPLANT | Facility: CLINIC | Age: 27
End: 2017-05-08

## 2017-05-08 ENCOUNTER — ANTI-COAG VISIT (OUTPATIENT)
Dept: CARDIOLOGY | Facility: CLINIC | Age: 27
End: 2017-05-08

## 2017-05-08 DIAGNOSIS — Z76.82 ORGAN TRANSPLANT CANDIDATE: Primary | ICD-10-CM

## 2017-05-08 DIAGNOSIS — Z79.01 LONG TERM (CURRENT) USE OF ANTICOAGULANTS: ICD-10-CM

## 2017-05-08 DIAGNOSIS — Z95.811 LVAD (LEFT VENTRICULAR ASSIST DEVICE) PRESENT: ICD-10-CM

## 2017-05-08 DIAGNOSIS — I50.9 CONGESTIVE HEART FAILURE, UNSPECIFIED CONGESTIVE HEART FAILURE CHRONICITY, UNSPECIFIED CONGESTIVE HEART FAILURE TYPE: ICD-10-CM

## 2017-05-08 LAB — INR PPP: 2.7

## 2017-05-08 NOTE — TELEPHONE ENCOUNTER
Nicotine level came back negative, serologies to be drawn and 6 MWT at next clinic visit on 6/1/17. Plan to present on 6/7/17 for listing OHT.     Patient notified of the above, voiced understanding.

## 2017-05-09 NOTE — PROGRESS NOTES
Verbal result taken from ___HH nurse______. PT/INR __30.3/2.7_____ Date drawn__5/9/17______ Hardcopy to be faxed.

## 2017-05-10 NOTE — PROGRESS NOTES
Rasheeda peng/[facility] called to report that the Patient will be returning back to work, Patient's being discharged from HH service today since he will no longer be homebound, next INR is due 5/16, Patient needs to be scheduled as outpatient

## 2017-05-11 RX ORDER — DOXYCYCLINE HYCLATE 100 MG
100 TABLET ORAL 2 TIMES DAILY
Qty: 14 TABLET | Refills: 0 | Status: SHIPPED | OUTPATIENT
Start: 2017-05-11 | End: 2017-06-14

## 2017-05-11 NOTE — TELEPHONE ENCOUNTER
"PT called VAD coordinator and emailed pictures of his DLES.  See images below.  Pt reports site noted to have minimal "creamy looking" drainage to drain sponge.  Site is slightly tender and slightly less incorporated.  Discussed with Dr. Hawkins, plan is for pt to start doxycycline 100 mg BID x 1 week.  Will have pt RTC in 1 week for VAD f/u and ID appt.  Called and discussed plan of care with pt.  Pt verbalizes read back of plan and in agreement of plan.  Will follow up with pt next week.              "

## 2017-05-15 LAB — INR PPP: 2.4

## 2017-05-16 ENCOUNTER — ANTI-COAG VISIT (OUTPATIENT)
Dept: CARDIOLOGY | Facility: CLINIC | Age: 27
End: 2017-05-16

## 2017-05-16 DIAGNOSIS — Z95.811 LVAD (LEFT VENTRICULAR ASSIST DEVICE) PRESENT: ICD-10-CM

## 2017-05-16 DIAGNOSIS — Z79.01 LONG TERM (CURRENT) USE OF ANTICOAGULANTS: ICD-10-CM

## 2017-05-16 NOTE — PROGRESS NOTES
Verbal result taken from __Maria_______. PT/INR _2.4______ Date drawn___5/15_____ Hardcopy to be faxed.

## 2017-05-17 ENCOUNTER — HOSPITAL ENCOUNTER (OUTPATIENT)
Dept: PULMONOLOGY | Facility: CLINIC | Age: 27
Discharge: HOME | End: 2017-05-17
Payer: COMMERCIAL

## 2017-05-17 ENCOUNTER — CLINICAL SUPPORT (OUTPATIENT)
Dept: TRANSPLANT | Facility: CLINIC | Age: 27
End: 2017-05-17
Payer: COMMERCIAL

## 2017-05-17 ENCOUNTER — COMMITTEE REVIEW (OUTPATIENT)
Dept: TRANSPLANT | Facility: CLINIC | Age: 27
End: 2017-05-17

## 2017-05-17 ENCOUNTER — OFFICE VISIT (OUTPATIENT)
Dept: INFECTIOUS DISEASES | Facility: CLINIC | Age: 27
End: 2017-05-17
Payer: COMMERCIAL

## 2017-05-17 ENCOUNTER — ANTI-COAG VISIT (OUTPATIENT)
Dept: CARDIOLOGY | Facility: CLINIC | Age: 27
End: 2017-05-17

## 2017-05-17 ENCOUNTER — TELEPHONE (OUTPATIENT)
Dept: TRANSPLANT | Facility: CLINIC | Age: 27
End: 2017-05-17

## 2017-05-17 ENCOUNTER — OFFICE VISIT (OUTPATIENT)
Dept: TRANSPLANT | Facility: CLINIC | Age: 27
End: 2017-05-17
Payer: COMMERCIAL

## 2017-05-17 VITALS — WEIGHT: 134.25 LBS | HEIGHT: 67 IN | BODY MASS INDEX: 21.07 KG/M2

## 2017-05-17 VITALS
TEMPERATURE: 98 F | WEIGHT: 140 LBS | SYSTOLIC BLOOD PRESSURE: 86 MMHG | HEIGHT: 67 IN | HEART RATE: 76 BPM | BODY MASS INDEX: 21.97 KG/M2

## 2017-05-17 VITALS — BODY MASS INDEX: 23.49 KG/M2 | TEMPERATURE: 98 F | WEIGHT: 149.69 LBS | HEIGHT: 67 IN

## 2017-05-17 DIAGNOSIS — Z95.811 LVAD (LEFT VENTRICULAR ASSIST DEVICE) PRESENT: Primary | ICD-10-CM

## 2017-05-17 DIAGNOSIS — I42.8 NICM (NONISCHEMIC CARDIOMYOPATHY): ICD-10-CM

## 2017-05-17 DIAGNOSIS — I50.22 CHRONIC SYSTOLIC CONGESTIVE HEART FAILURE: ICD-10-CM

## 2017-05-17 DIAGNOSIS — Z79.01 LONG TERM (CURRENT) USE OF ANTICOAGULANTS: ICD-10-CM

## 2017-05-17 DIAGNOSIS — Z95.811 LVAD (LEFT VENTRICULAR ASSIST DEVICE) PRESENT: ICD-10-CM

## 2017-05-17 DIAGNOSIS — Z76.82 ORGAN TRANSPLANT CANDIDATE: ICD-10-CM

## 2017-05-17 DIAGNOSIS — Z95.811 HEART REPLACED BY HEART ASSIST DEVICE: Primary | ICD-10-CM

## 2017-05-17 DIAGNOSIS — Z95.811 HEART REPLACED BY HEART ASSIST DEVICE: ICD-10-CM

## 2017-05-17 DIAGNOSIS — I50.9 CONGESTIVE HEART FAILURE: ICD-10-CM

## 2017-05-17 PROCEDURE — 93750 INTERROGATION VAD IN PERSON: CPT | Mod: S$GLB,TXP,, | Performed by: INTERNAL MEDICINE

## 2017-05-17 PROCEDURE — 99999 PR PBB SHADOW E&M-EST. PATIENT-LVL III: CPT | Mod: PBBFAC,TXP,, | Performed by: INTERNAL MEDICINE

## 2017-05-17 PROCEDURE — 94620 PR PULMONARY STRESS TESTING,SIMPLE: CPT | Mod: NTX,S$GLB,, | Performed by: INTERNAL MEDICINE

## 2017-05-17 PROCEDURE — 99214 OFFICE O/P EST MOD 30 MIN: CPT | Mod: S$GLB,TXP,, | Performed by: INTERNAL MEDICINE

## 2017-05-17 PROCEDURE — 1160F RVW MEDS BY RX/DR IN RCRD: CPT | Mod: S$GLB,TXP,, | Performed by: INTERNAL MEDICINE

## 2017-05-17 PROCEDURE — 3078F DIAST BP <80 MM HG: CPT | Mod: S$GLB,TXP,, | Performed by: INTERNAL MEDICINE

## 2017-05-17 PROCEDURE — 3074F SYST BP LT 130 MM HG: CPT | Mod: S$GLB,TXP,, | Performed by: INTERNAL MEDICINE

## 2017-05-17 PROCEDURE — 99999 PR PBB SHADOW E&M-EST. PATIENT-LVL I: CPT | Mod: PBBFAC,TXP,,

## 2017-05-17 NOTE — PROGRESS NOTES
Infectious Diseases Clinic Note    Subjective:       Patient ID: Mert Samayoa is a 27 y.o. male.    Chief Complaint: puss on driveline site    HPI    28 y/o M h/o famililal CM c/b cardiogenic shock, s/p LVAD 2/2 with post op course c/b lekocytosis of unknown etiology (now completely resolved) began to have thick purulent drainage from DLES started on empiric doxycycline (no cultures taken) now completely resolved 4 days ago feeling very well        Past Medical History:   Diagnosis Date    Cardiogenic shock 1/16/2017    Cardiomyopathy     Familial cardiomyopathy    CHF (congestive heart failure)     Essential hypertension 12/21/2016    Familial Cardiomyopathy     Familial cardiomyopathy        Social History     Social History    Marital status:      Spouse name: N/A    Number of children: N/A    Years of education: N/A     Occupational History    Not on file.     Social History Main Topics    Smoking status: Former Smoker     Packs/day: 1.00     Quit date: 12/14/2016    Smokeless tobacco: Not on file    Alcohol use No    Drug use: No    Sexual activity: Not on file     Other Topics Concern    Not on file     Social History Narrative    Works          Current Outpatient Prescriptions:     amiodarone (PACERONE) 200 MG Tab, Take 1 tablet (200 mg total) by mouth once daily., Disp: 30 tablet, Rfl: 11    aspirin (ECOTRIN) 325 MG EC tablet, Take 1 tablet (325 mg total) by mouth once daily., Disp: 30 tablet, Rfl: 11    doxycycline (VIBRA-TABS) 100 MG tablet, Take 1 tablet (100 mg total) by mouth 2 (two) times daily., Disp: 14 tablet, Rfl: 0    famotidine (PEPCID) 40 MG tablet, Take 1 tablet (40 mg total) by mouth every evening., Disp: 30 tablet, Rfl: 11    ferrous gluconate (FERGON) 324 MG tablet, Take 1 tablet (324 mg total) by mouth daily with breakfast., Disp: 30 tablet, Rfl: 11    lisinopril (PRINIVIL,ZESTRIL) 5 MG tablet, Take 1 tablet (5 mg total) by mouth  2 (two) times daily., Disp: 60 tablet, Rfl: 11    magnesium oxide (MAG-OX) 400 mg tablet, Take 1 tablet (400 mg total) by mouth 2 (two) times daily., Disp: 60 tablet, Rfl: 11    warfarin (COUMADIN) 5 MG tablet, Take 1 tablet (5 mg total) by mouth Daily., Disp: 30 tablet, Rfl: 3    ondansetron (ZOFRAN-ODT) 4 MG TbDL, Take 1 tablet (4 mg total) by mouth every 6 (six) hours as needed., Disp: 30 tablet, Rfl: 0    oxycodone-acetaminophen (PERCOCET)  mg per tablet, Take 1 tablet by mouth every 6 (six) hours as needed., Disp: 30 tablet, Rfl: 0    senna-docusate 8.6-50 mg (SENNA WITH DOCUSATE SODIUM) 8.6-50 mg per tablet, Take 2 tablets by mouth daily as needed for Constipation., Disp: 60 tablet, Rfl: 5    Review of Systems   Constitutional: Negative for activity change, appetite change, chills, fatigue and fever.   HENT: Negative for congestion, dental problem, mouth sores and sinus pressure.    Eyes: Negative for pain, redness and visual disturbance.   Respiratory: Negative for cough, shortness of breath and wheezing.    Cardiovascular: Negative for chest pain and leg swelling.   Gastrointestinal: Negative for abdominal distention, abdominal pain, diarrhea, nausea and vomiting.   Endocrine: Negative for polyuria.   Genitourinary: Negative for decreased urine volume, dysuria and frequency.   Musculoskeletal: Negative for joint swelling.   Skin: Negative for color change.   Allergic/Immunologic: Negative for food allergies.   Neurological: Negative for dizziness, weakness and headaches.   Hematological: Negative for adenopathy.   Psychiatric/Behavioral: Negative for agitation and confusion. The patient is not nervous/anxious.            Objective:      Vitals:    05/17/17 1347   Temp: 97.9 °F (36.6 °C)     Physical Exam   Constitutional: He is oriented to person, place, and time. He appears well-developed and well-nourished.   HENT:   Head: Normocephalic and atraumatic.   Mouth/Throat: Oropharynx is clear and  moist.   Eyes: Conjunctivae are normal.   Neck: Neck supple.   Cardiovascular:   No murmur heard.  VAD sounds   Pulmonary/Chest: Effort normal and breath sounds normal. No respiratory distress. He has no wheezes.   Abdominal: Soft. Bowel sounds are normal. He exhibits no distension. There is no tenderness.   Musculoskeletal: Normal range of motion. He exhibits no edema or tenderness.   Lymphadenopathy:     He has no cervical adenopathy.   Neurological: He is alert and oriented to person, place, and time. Coordination normal.   Skin: Skin is warm and dry. No rash noted.   Psychiatric: He has a normal mood and affect. His behavior is normal.               Assessment/Plan:       No diagnosis found.    28 y/o M h/o famililal CM c/b cardiogenic shock, s/p LVAD 2/2 with post op course c/b lekocytosis of unknown etiology (now completely resolved) began to have thick purulent drainage from DLES started on empiric doxycycline (no cultures taken) now completely resolved 4 days ago feeling very well  - finish doxycycline course as prescribed, no further therapy needed as drainage has stopped  - f/u PRN ID issues in future

## 2017-05-17 NOTE — PROGRESS NOTES
"Subjective:   Patient ID:  Mert Samayoa is a 27 y.o. male who presents for LVAD followup visit.    Implant Date:2/1/17    HVAD RPM 2700     INR goal: 2-3   Bridge with Heparin   Antiplatelets:      TXP DERREK INTERROGATIONS 5/17/2017   Type Heartware   Flow 4   Speed 2700   Power (Goldsmith) 4.3   Low Flow Alarm 2.5   High Power Alarm 6.0   Pulsatility Intermittent pulse       HPI   MR. Samayoa is a very pleasant 28 y/o white male with Hx of stage D hFreF, NICMP who underwent placement of LVAD on 2/1 and sternal closure on 2/2. Heartware VAD was placed without any immediate complications. Over the next few days, patient was extubated and , Epi, Cardene and Lasix gtts and Isiah were started and titrated off. Patient was also on heparin gtt. Patient was stepped down from the SICU to CTSU on 2/9. Patient went into atrial flutter in AM of 2/11, which he self converted out of, and started on Amiodarone by CTS following atrial flutter episode. Patient was started on Coumadin on 2/10/17. Patient had low flow alarms on 2/16/17 and HW speed was dropped from 2900 RPM to 2700 RPM after speed echo showed better filling at 2700 RPM. Comes for his regular VAD visit. Continues to do well.  VAD speed is at 2700 rpm. No VAD alarms noted on interrogation occasional PI events . BP is 86 (Doppler). DLES is a "1". INR is therapeutic at 2.7. LDH is 197 at baseline.     Review of Systems   Constitution: Negative. Negative for chills, decreased appetite, diaphoresis, fever, weakness, malaise/fatigue, night sweats, weight gain and weight loss.   Eyes: Negative.    Cardiovascular: Negative for chest pain, claudication, cyanosis, dyspnea on exertion, irregular heartbeat, leg swelling, near-syncope, orthopnea, palpitations, paroxysmal nocturnal dyspnea and syncope.   Respiratory: Negative for cough, hemoptysis and shortness of breath.    Endocrine: Negative.    Hematologic/Lymphatic: Negative.    Skin: Negative for color change, " "dry skin and nail changes.   Musculoskeletal: Negative.    Gastrointestinal: Negative.    Genitourinary: Negative.        Objective:   Blood pressure (!) 86/0, pulse 76, temperature 97.9 °F (36.6 °C), height 5' 7" (1.702 m), weight 63.5 kg (140 lb).body mass index is 21.93 kg/(m^2).    Doppler: 86    Physical Exam   Constitutional: He appears well-developed.   BP (!) 86/0 (BP Method: Doppler)  Pulse 76  Temp 97.9 °F (36.6 °C)  Ht 5' 7" (1.702 m)  Wt 63.5 kg (140 lb)  BMI 21.93 kg/m2     HENT:   Head: Normocephalic.   Neck: No JVD present. Carotid bruit is not present.   Cardiovascular: Regular rhythm and normal heart sounds.    No murmur heard.  Smooth VAD hum. DLES is "1"   Pulmonary/Chest: Effort normal and breath sounds normal. No respiratory distress. He has no wheezes. He has no rales.   Abdominal: Soft. Bowel sounds are normal. He exhibits no distension. There is no tenderness. There is no rebound.   Musculoskeletal: He exhibits no edema.   Neurological: He is alert.   Skin: Skin is warm.   Vitals reviewed.      Lab Results   Component Value Date    WBC 8.89 05/17/2017    HGB 14.3 05/17/2017    HCT 42.6 05/17/2017    MCV 82 05/17/2017     05/17/2017    CO2 27 05/17/2017    CREATININE 1.1 05/17/2017    CALCIUM 9.5 05/17/2017    ALBUMIN 4.3 05/17/2017    AST 17 05/17/2017    BNP 77 05/17/2017    ALT 16 05/17/2017     05/17/2017       Lab Results   Component Value Date    INR 2.2 (H) 05/17/2017    INR 2.4 05/15/2017    INR 2.7 05/08/2017       BNP   Date Value Ref Range Status   05/17/2017 77 0 - 99 pg/mL Final     Comment:     Values of less than 100 pg/ml are consistent with non-CHF populations.   05/04/2017 96 0 - 99 pg/mL Final     Comment:     Values of less than 100 pg/ml are consistent with non-CHF populations.   04/06/2017 98 0 - 99 pg/mL Final     Comment:     Values of less than 100 pg/ml are consistent with non-CHF populations.       LD   Date Value Ref Range Status   05/17/2017 197 " 110 - 260 U/L Final     Comment:     Results are increased in hemolyzed samples.   05/04/2017 175 110 - 260 U/L Final     Comment:     Results are increased in hemolyzed samples.   04/06/2017 217 110 - 260 U/L Final     Comment:     Results are increased in hemolyzed samples.       Labs were reviewed with the patient.    No results found for this or any previous visit.  No results found for this or any previous visit.    Assessment:      1. Heart replaced by heart assist device    2. Chronic systolic congestive heart failure    3. NICM (nonischemic cardiomyopathy)    4. Organ transplant candidate    5. Long term (current) use of anticoagulants        Plan:   Patient is now NYHA class II. BP is controlled.  INR is therapeutic.  VAD interrogation was performed in clinic  Does not need VAD supplies.   Recommend 2 gram sodium restriction and 1500cc fluid restriction.  Encourage physical activity with graded exercise program.  Requested patient to weigh themselves daily, and to notify us if their weight increases by more than 3 lbs in 1 day or 5 lbs in 1 week.     Listed for transplant: Listed status 7 but will be rectivated to 1B status (discussed this am)    UNOS Patient Status  Functional Status: 100% - Normal, no complaints, no evidence of disease  Physical Capacity: No Limitations  Working for Income: Unknown    Sammi Tapia MD

## 2017-05-17 NOTE — PROCEDURES
TXP DERREK INTERROGATIONS 5/17/2017 5/4/2017 4/6/2017 3/30/2017 3/30/2017 3/30/2017 3/29/2017   Type Heartware Heartware Heartware - - - -   Flow 4 3.9 4.5 - - - -   Speed 2700 2700 2700 - - - -   Power (Goldsmith) 4.3 4.4 4.1 - - - -   Low Flow Alarm 2.5 2.5 2.5 - - - -   High Power Alarm 6.0 6.0 6.0 - - - -   Pulsatility Intermittent pulse Intermittent pulse Intermittent pulse Intermittent pulse No Pulse Intermittent pulse No Pulse   }

## 2017-05-17 NOTE — LETTER
May 17, 2017      Sammi Tapia MD  [address]  [facility] [address]           [facility]  [address]  [facility] [address]  Phone: [phone]  Fax: [phone]          Patient: Mert Samayoa   MR Number: [id number]   YOB: 1990   Date of Visit: 5/17/2017       Dear Dr. Sammi Tapia:    Thank you for referring Mert Samayoa to me for evaluation. Attached you will find relevant portions of my assessment and plan of care.    If you have questions, please do not hesitate to call me. I look forward to following Mert Samayoa along with you.    Sincerely,    Elijah Hsu MD    Enclosure  CC:  No Recipients    If you would like to receive this communication electronically, please contact [email or web address] or [phone] to request more information on [facility] access.    For providers and/or their staff who would like to refer a patient to [facility], please contact us through our one-stop-shop provider referral line, [facility], at [phone].    If you feel you have received this communication in error or would no longer like to receive these types of communications, please e-mail [email or web address]

## 2017-05-17 NOTE — TELEPHONE ENCOUNTER
"LISTING NOTE:    Mert Samayoa was presented to selection committee on 5/17/17  and the decision was made to list the patient as a Status 1B .    Spoke to Ramone Lara, , and confirmed that patient was listed today as a Status 1B.       Diagnosis: Familial Cardiomyopathy  EF: 15%    ABO: O POS   CPRA: pending, needs prospective crossmatch    Ht Readings from Last 1 Encounters:   05/17/17 5' 7" (1.702 m)     Wt Readings from Last 1 Encounters:   05/17/17 63.5 kg (140 lb)     BMI: Estimated body mass index is 21.03 kg/(m^2) as calculated from the following:    Height as of an earlier encounter on 5/17/17: 5' 7" (1.702 m).    Weight as of an earlier encounter on 5/17/17: 60.9 kg (134 lb 4.2 oz).    Donor weight: 20% down      Patient contacted and informed that he is being listed today.  Contact information and Social Security Number verified.  Patient reminded that he should not travel further away than his home and if he does, he must call to let someone know (on-call MD or on-call transplant coordinator).  Patient also reminded that he must notify someone if he is hospitalized somewhere other than here or if he becomes ill.  Patient informed that he must be able to be reached by telephone within 15 minutes of a donor offer.  Patient instructed to be sure to keep his cell phone on and charged.  Also instructed patient to ask the same of his back-up family members and friends regarding their phones.  Patient instructed to call during regular office hours if he has non-urgent/non-symptom based questions regarding any part of being listed.     Patient verbalized understanding of all points discussed.  Patient expressed his satisfaction and relief regarding being listed.    Discussed high risk donor acceptance/nonacceptance, pt would like to talk to family and call coord back tomorrow with decision. Ageed with plan.       "

## 2017-05-17 NOTE — LETTER
May 17, 2017        Guillermo Alejo  [address]  [facility] [address]  Phone: [phone]  Fax: [phone]             [facility]  [address]  [facility] [address]  Phone: [phone]   Patient: Mert Samayoa   MR Number: [id number]   YOB: 1990   Date of Visit: 5/17/2017       Dear Dr. Guillermo Alejo    Thank you for referring Mert Samayoa to me for evaluation. Attached you will find relevant portions of my assessment and plan of care.    If you have questions, please do not hesitate to call me. I look forward to following Mert Samayoa along with you.    Sincerely,    Sammi Tapia MD    Enclosure    If you would like to receive this communication electronically, please contact [email or web address] or [phone] to request [facility] access.    [facility] is a tool which provides read-only access to select patient information with whom you have a relationship. Its easy to use and provides real time access to review your patients record including encounter summaries, notes, results, and demographic information.    If you feel you have received this communication in error or would no longer like to receive these types of communications, please e-mail [email or web address]

## 2017-05-17 NOTE — COMMITTEE REVIEW
Native Organ Dx: Dilated Myopathy: Familial    Approved to activate: Mert Samayoa's case was discussed to the heart transplant selection committee on 5/17/17.  Patient has been approved to activate to status 1B with MCS device, patient has proven to be tobacco free for 6 months.    Note was written by Vee Khan.    ==========================================================    I was present at the meeting and attest to the decision of the committee  Referring notfiied by mail

## 2017-05-18 ENCOUNTER — TELEPHONE (OUTPATIENT)
Dept: TRANSPLANT | Facility: CLINIC | Age: 27
End: 2017-05-18

## 2017-05-18 NOTE — PROGRESS NOTES
"Date of Implant with Heartware LVAD: 2/1/17    PATIENT ARRIVED IN CLINIC:  Ambulatory   Accompanied by: n/a    Vitals  Doppler: 86, MAP 89  Pulsatile: Yes  PAIN: 3 on 0-10 pain scale, location of pain: mid back, description of pain: sore  Is patient currently on medications for pain? yes What kind? Tylenol percocet  Weight (with controller and 2 batteries): refer to encounter    VAD Interrogation:  TXP DERREK INTERROGATIONS 5/17/2017   Type Heartware   Flow 4   Speed 2700   Power (Goldsmith) 4.3   Low Flow Alarm 2.5   High Power Alarm 6.0   Pulsatility Intermittent pulse       HCT: 42.6  WAVEFORM: 2-8, no delflections  Suction alarm: Off  Problems / Issues / Alarms with VAD if any: None noted   Any Equipment Issues: None noted  Patient states their batteries are lasting 4-6 hours. Rotating all batteries. Checking connections before and after changing battery.   Emergency Equipment With Patient: yes   VAD Binder With Patient: yes   Reviewed VAD Numbers In Binder: yes  VAD Sounds: Smooth  Manual & Visual Inspection Of Driveline: No kinks or tears noted  Checked Heartware driveline connector housing for separation, none noted.  Also checked for tight connection, which they are.  Educated pt regarding this and instructed  to check on this daily. Pt verbalized understanding and agreement.   Clamshell Repair: no  Patient wearing waist belt with waist strap:YES     LVAD Dressing/DLES:  Appearance Of Driveline: "1"  Antibiotics: YES currently on doxy  - saw ID and will complete ABX, no f/u needed  Frequency of Dressing Changes: daily & soap and water dressing   Stabilization Device In Use: yes, kirk securement device    Pt In Need Of Management Kits? no   It is medically necessary to have VAD management kits in order to prevent infection or to assist in the healing of an infected DLES.    Assessment:   Complaints/reason for visit today: routine  Complaints Of Nausea / Vomiting: None noted   Appearance and Frequency Of Stools: " normal and formed without blood & daily  Color Of Urine: clear/yellow  Patient is: coping okay   Sleep Habits: 7 hrs /night  Sleep Aids: None noted   Showering: yes  Activity/Exercise: working daily   Driving: yes, Reminded to pull over should there be an alarm before looking down at controller.     Labs:    Chemistry        Component Value Date/Time     05/17/2017 1040    K 4.5 05/17/2017 1040     05/17/2017 1040    CO2 27 05/17/2017 1040    BUN 14 05/17/2017 1040    CREATININE 1.1 05/17/2017 1040    GLU 80 05/17/2017 1040        Component Value Date/Time    CALCIUM 9.5 05/17/2017 1040    ALKPHOS 90 05/17/2017 1040    AST 17 05/17/2017 1040    ALT 16 05/17/2017 1040    BILITOT 0.4 05/17/2017 1040            Magnesium   Date Value Ref Range Status   05/17/2017 2.1 1.6 - 2.6 mg/dL Final       Lab Results   Component Value Date    WBC 8.89 05/17/2017    HGB 14.3 05/17/2017    HCT 42.6 05/17/2017    MCV 82 05/17/2017     05/17/2017       Lab Results   Component Value Date    INR 2.2 (H) 05/17/2017    INR 2.4 05/15/2017    INR 2.7 05/08/2017       BNP   Date Value Ref Range Status   05/17/2017 77 0 - 99 pg/mL Final     Comment:     Values of less than 100 pg/ml are consistent with non-CHF populations.   05/04/2017 96 0 - 99 pg/mL Final     Comment:     Values of less than 100 pg/ml are consistent with non-CHF populations.   04/06/2017 98 0 - 99 pg/mL Final     Comment:     Values of less than 100 pg/ml are consistent with non-CHF populations.       LD   Date Value Ref Range Status   05/17/2017 197 110 - 260 U/L Final     Comment:     Results are increased in hemolyzed samples.   05/04/2017 175 110 - 260 U/L Final     Comment:     Results are increased in hemolyzed samples.   04/06/2017 217 110 - 260 U/L Final     Comment:     Results are increased in hemolyzed samples.       Labs reviewed with patient: YES      Patient Satisfaction Survey completed per Patient: no  (explained about signature and box  to check)  Medication reconciliation: per MA.  Purple card updated today: yes  [facility],     Education: Reviewed driveline care, emergency procedures, how to change the controller, alarms with patient.      Plans/Needs:  Pt doing very well.  S/s of DLES infection resolved.  ID today instructed pt to complete 7 day doxy then d/c.  Pt will RTC in 1 month.  Please refer to MD note.

## 2017-05-18 NOTE — TELEPHONE ENCOUNTER
Patient called, stated after consideration agreeable to accepting high risk donors. Documentation completed in Pre-transplant tab in [place]. Encouraged patient to call for any questions or concerns, voiced understanding.

## 2017-05-18 NOTE — PROCEDURES
Mert Samayoa is a 27 y.o.  male patient, who presents for a 6 minute walk test ordered by Sammi Tapia MD.  The diagnosis is Congestive Heart Failure; LVAD.  The patient's BMI is 21.1 kg/m2.  Predicted distance (lower limit of normal) is 681.25 meters.      Test Results:    The test was completed without stopping.  The total time walked was 360 seconds.  During walking, the patient reported:  No complaints. The patient used no assistive devices during testing.     05/17/2017---------Distance: 396.24 meters (1300 feet)     O2 Sat % Supplemental Oxygen Heart Rate Blood Pressure Nadira Scale   Pre-exercise  (Resting) 99 % Room Air 83 bpm 89/63 mmHg 0   During Exercise 98 % Room Air 99 bpm 103/60 mmHg 1   Post-exercise  (Recovery) 98 % Room Air  86 bpm       Recovery Time: 70 seconds    Performing nurse/tech: PARRIS Colin      PREVIOUS STUDY:   01/20/2017---------Distance: 243.84 meters (800 feet)       O2 Sat % Supplemental Oxygen Heart Rate Blood Pressure Nadira Scale   Pre-exercise  (Resting) 97 % Room Air 91 bpm 97/56 mmHg 0   During Exercise 99 % Room Air 96 bpm 112/63 mmHg 0.5   Post-exercise  (Recovery) 98 % Room Air  95 bpm           CLINICAL INTERPRETATION:  Six minute walk distance is 396.24 meters (1300 feet) with very light dyspnea.  During exercise, there was no significant desaturation while breathing room air.  Both blood pressure and heart rate remained stable with walking.  Hypotension was present prior to exercise.  The patient did not report non-pulmonary symptoms during exercise.  Since the previous study in January 2017, exercise capacity is significantly improved.  Based upon age and body mass index, exercise capacity is less than predicted.

## 2017-05-22 LAB — INR PPP: 2.4

## 2017-05-23 ENCOUNTER — ANTI-COAG VISIT (OUTPATIENT)
Dept: CARDIOLOGY | Facility: CLINIC | Age: 27
End: 2017-05-23

## 2017-05-23 ENCOUNTER — DOCUMENTATION ONLY (OUTPATIENT)
Dept: TRANSPLANT | Facility: CLINIC | Age: 27
End: 2017-05-23

## 2017-05-23 ENCOUNTER — DOCUMENTATION ONLY (OUTPATIENT)
Dept: TRANSFUSION MEDICINE | Facility: HOSPITAL | Age: 27
End: 2017-05-23

## 2017-05-23 DIAGNOSIS — Z95.811 LVAD (LEFT VENTRICULAR ASSIST DEVICE) PRESENT: ICD-10-CM

## 2017-05-23 DIAGNOSIS — Z79.01 LONG TERM (CURRENT) USE OF ANTICOAGULANTS: ICD-10-CM

## 2017-05-23 NOTE — PROGRESS NOTES
Verbal result taken from ___Alyson______. PT/INR _2.4______ Date drawn___5/23_____ Hardcopy to be faxed.

## 2017-05-23 NOTE — CONSULTS
[facility] TRANSFUSION MEDICINE  Section of Transfusion Medicine and Histocompatibility  HLA Note    Case Details   Diagnosis:  No primary diagnosis found.  Blood Type: O POS  HLA Type:   Lab Results   Component Value Date    TMLK7UQ 2 01/17/2017    LSMJ7TT 32 01/17/2017    NARK2AF 71 01/17/2017    XBNL1VE 39 01/17/2017    GMPJF5TP 7 01/17/2017    ZIAGM5YU XX 01/17/2017     Lab Results   Component Value Date    CXDGXM73HT 4 01/17/2017    OMNCEQ10XZ 8 01/17/2017    DAHPMF896XM 53 01/17/2017    ZEUZEE0802 XX 01/17/2017     Lab Results   Component Value Date    CZAEU7OQ 8 01/17/2017    GNEHR5RQ 4 01/17/2017     Lab Results   Component Value Date    AWTWT5DH 6 01/17/2017    GTHMQ6ZQ XX 01/17/2017     Recent Antibody Screen/ID Results:   Lab Results   Component Value Date    UB4CNCE Negative 05/17/2017    CIIAB Negative 05/17/2017     Auto T Cell Crossmatch Results:  Lab Results   Component Value Date    XMTCELLRES Negative 01/17/2017     Auto B Cell Crossmatch Results:  Lab Results   Component Value Date    BCELLRES Negative 01/17/2017     Assessment     Interpretation: As below.     Strongly Recommended Unacceptable Antigens: None    Crossmatch Expectations: A virtual crossmatch is recommended, which will be negative.    Please call the HLA Lab [phone] with any concerns or questions.    Yaquelin Amezcua MD , PhD  NATE Kim MD, NISA  Section of Transfusion Medicine & Histocompatibility  Department of Pathology and Laboratory Medicine  [facility]  05/23/2017

## 2017-05-23 NOTE — PROGRESS NOTES
Verified prospective crossmatch removed in [facility] and removed in [place]. Virtual crossmatch recommended per Yaquelin Amezcua MD , PhD.

## 2017-05-29 ENCOUNTER — ANTI-COAG VISIT (OUTPATIENT)
Dept: CARDIOLOGY | Facility: CLINIC | Age: 27
End: 2017-05-29

## 2017-05-29 DIAGNOSIS — Z79.01 LONG TERM (CURRENT) USE OF ANTICOAGULANTS: ICD-10-CM

## 2017-05-29 DIAGNOSIS — Z95.811 LVAD (LEFT VENTRICULAR ASSIST DEVICE) PRESENT: ICD-10-CM

## 2017-05-29 LAB — INR PPP: 2.9

## 2017-05-31 ENCOUNTER — TELEPHONE (OUTPATIENT)
Dept: TRANSPLANT | Facility: CLINIC | Age: 27
End: 2017-05-31

## 2017-05-31 NOTE — TELEPHONE ENCOUNTER
"5/30/17 @ 517 am - PT paged on call VAD coordinator to report low flow alarm.  Pt reports current LVAD parameters WNL, current flow 4.1.  Pt reports he was stretching as he was getting out of bed.  Pt reports he "feels great with no other problems".  Pt reports he has not "been drinking as much water and been working every day".  Pt reports he will drink more fluids today.   Pt encouraged to call VAD coordinator with any questions problems or concerns. Pt reminded to page VAD coordinator on call for emergencies.  Pt verbalized understanding and in agreement of plan.     Will call and check on pt later today.    "

## 2017-06-01 LAB — INR PPP: 2.3

## 2017-06-02 ENCOUNTER — ANTI-COAG VISIT (OUTPATIENT)
Dept: CARDIOLOGY | Facility: CLINIC | Age: 27
End: 2017-06-02

## 2017-06-02 DIAGNOSIS — Z95.811 LVAD (LEFT VENTRICULAR ASSIST DEVICE) PRESENT: ICD-10-CM

## 2017-06-02 DIAGNOSIS — Z79.01 LONG TERM (CURRENT) USE OF ANTICOAGULANTS: ICD-10-CM

## 2017-06-02 NOTE — PROGRESS NOTES
Verbal result taken from ___Griselda______. PT/[place] ____2.3___ Date drawn_6/1_______ Hardcopy to be faxed.

## 2017-06-05 ENCOUNTER — ANTI-COAG VISIT (OUTPATIENT)
Dept: CARDIOLOGY | Facility: CLINIC | Age: 27
End: 2017-06-05

## 2017-06-05 DIAGNOSIS — Z95.811 LVAD (LEFT VENTRICULAR ASSIST DEVICE) PRESENT: ICD-10-CM

## 2017-06-05 DIAGNOSIS — Z79.01 LONG TERM (CURRENT) USE OF ANTICOAGULANTS: ICD-10-CM

## 2017-06-05 LAB — INR PPP: 2.7

## 2017-06-07 RX ORDER — WARFARIN SODIUM 5 MG/1
TABLET ORAL
Qty: 30 TABLET | Refills: 0 | Status: SHIPPED | OUTPATIENT
Start: 2017-06-07 | End: 2017-07-07 | Stop reason: SDUPTHER

## 2017-06-08 ENCOUNTER — ANTI-COAG VISIT (OUTPATIENT)
Dept: CARDIOLOGY | Facility: CLINIC | Age: 27
End: 2017-06-08

## 2017-06-08 DIAGNOSIS — Z95.811 LVAD (LEFT VENTRICULAR ASSIST DEVICE) PRESENT: ICD-10-CM

## 2017-06-08 DIAGNOSIS — Z79.01 LONG TERM (CURRENT) USE OF ANTICOAGULANTS: ICD-10-CM

## 2017-06-08 LAB — INR PPP: 2.6

## 2017-06-09 DIAGNOSIS — Z76.82 ORGAN TRANSPLANT CANDIDATE: Primary | ICD-10-CM

## 2017-06-09 DIAGNOSIS — I50.9 CONGESTIVE HEART FAILURE, UNSPECIFIED CONGESTIVE HEART FAILURE CHRONICITY, UNSPECIFIED CONGESTIVE HEART FAILURE TYPE: ICD-10-CM

## 2017-06-14 ENCOUNTER — LAB VISIT (OUTPATIENT)
Dept: LAB | Facility: HOSPITAL | Age: 27
End: 2017-06-14
Attending: INTERNAL MEDICINE
Payer: COMMERCIAL

## 2017-06-14 ENCOUNTER — ANTI-COAG VISIT (OUTPATIENT)
Dept: CARDIOLOGY | Facility: CLINIC | Age: 27
End: 2017-06-14

## 2017-06-14 ENCOUNTER — OFFICE VISIT (OUTPATIENT)
Dept: TRANSPLANT | Facility: CLINIC | Age: 27
End: 2017-06-14
Payer: COMMERCIAL

## 2017-06-14 ENCOUNTER — CLINICAL SUPPORT (OUTPATIENT)
Dept: TRANSPLANT | Facility: CLINIC | Age: 27
End: 2017-06-14
Payer: COMMERCIAL

## 2017-06-14 VITALS
HEIGHT: 66 IN | TEMPERATURE: 98 F | WEIGHT: 146 LBS | BODY MASS INDEX: 23.46 KG/M2 | SYSTOLIC BLOOD PRESSURE: 84 MMHG | HEART RATE: 67 BPM

## 2017-06-14 DIAGNOSIS — I42.8 NICM (NONISCHEMIC CARDIOMYOPATHY): ICD-10-CM

## 2017-06-14 DIAGNOSIS — Z95.811 HEART REPLACED BY HEART ASSIST DEVICE: ICD-10-CM

## 2017-06-14 DIAGNOSIS — Z95.811 LVAD (LEFT VENTRICULAR ASSIST DEVICE) PRESENT: ICD-10-CM

## 2017-06-14 DIAGNOSIS — I50.9 CONGESTIVE HEART FAILURE: ICD-10-CM

## 2017-06-14 DIAGNOSIS — I50.9 CONGESTIVE HEART FAILURE, UNSPECIFIED CONGESTIVE HEART FAILURE CHRONICITY, UNSPECIFIED CONGESTIVE HEART FAILURE TYPE: ICD-10-CM

## 2017-06-14 DIAGNOSIS — Z76.82 ORGAN TRANSPLANT CANDIDATE: ICD-10-CM

## 2017-06-14 DIAGNOSIS — Z79.01 LONG TERM (CURRENT) USE OF ANTICOAGULANTS: ICD-10-CM

## 2017-06-14 DIAGNOSIS — I50.22 CHRONIC SYSTOLIC CONGESTIVE HEART FAILURE: ICD-10-CM

## 2017-06-14 DIAGNOSIS — Z95.811 HEART REPLACED BY HEART ASSIST DEVICE: Primary | ICD-10-CM

## 2017-06-14 LAB
ALBUMIN SERPL BCP-MCNC: 4.4 G/DL
ALP SERPL-CCNC: 88 U/L
ALT SERPL W/O P-5'-P-CCNC: 18 U/L
ANION GAP SERPL CALC-SCNC: 9 MMOL/L
AST SERPL-CCNC: 20 U/L
BASOPHILS # BLD AUTO: 0.03 K/UL
BASOPHILS NFR BLD: 0.3 %
BILIRUB DIRECT SERPL-MCNC: 0.2 MG/DL
BILIRUB SERPL-MCNC: 0.5 MG/DL
BNP SERPL-MCNC: 67 PG/ML
BUN SERPL-MCNC: 15 MG/DL
CALCIUM SERPL-MCNC: 9.7 MG/DL
CHLORIDE SERPL-SCNC: 101 MMOL/L
CO2 SERPL-SCNC: 30 MMOL/L
CREAT SERPL-MCNC: 1.1 MG/DL
CRP SERPL-MCNC: 7.3 MG/L
DIFFERENTIAL METHOD: ABNORMAL
EOSINOPHIL # BLD AUTO: 0.4 K/UL
EOSINOPHIL NFR BLD: 4.2 %
ERYTHROCYTE [DISTWIDTH] IN BLOOD BY AUTOMATED COUNT: 17.8 %
EST. GFR  (AFRICAN AMERICAN): >60 ML/MIN/1.73 M^2
EST. GFR  (NON AFRICAN AMERICAN): >60 ML/MIN/1.73 M^2
GLUCOSE SERPL-MCNC: 92 MG/DL
HCT VFR BLD AUTO: 42.7 %
HGB BLD-MCNC: 14.2 G/DL
INR PPP: 2.6
LDH SERPL L TO P-CCNC: 195 U/L
LYMPHOCYTES # BLD AUTO: 1.7 K/UL
LYMPHOCYTES NFR BLD: 19.8 %
MAGNESIUM SERPL-MCNC: 2 MG/DL
MCH RBC QN AUTO: 27.8 PG
MCHC RBC AUTO-ENTMCNC: 33.3 %
MCV RBC AUTO: 84 FL
MONOCYTES # BLD AUTO: 0.7 K/UL
MONOCYTES NFR BLD: 7.6 %
NEUTROPHILS # BLD AUTO: 6 K/UL
NEUTROPHILS NFR BLD: 67.6 %
PHOSPHATE SERPL-MCNC: 3.9 MG/DL
PLATELET # BLD AUTO: 179 K/UL
PMV BLD AUTO: 9 FL
POTASSIUM SERPL-SCNC: 4.3 MMOL/L
PREALB SERPL-MCNC: 31 MG/DL
PROT SERPL-MCNC: 7.9 G/DL
PROTHROMBIN TIME: 25.9 SEC
RBC # BLD AUTO: 5.11 M/UL
SODIUM SERPL-SCNC: 140 MMOL/L
WBC # BLD AUTO: 8.81 K/UL

## 2017-06-14 PROCEDURE — 99999 PR PBB SHADOW E&M-EST. PATIENT-LVL III: CPT | Mod: PBBFAC,TXP,, | Performed by: INTERNAL MEDICINE

## 2017-06-14 PROCEDURE — 99214 OFFICE O/P EST MOD 30 MIN: CPT | Mod: S$GLB,TXP,, | Performed by: INTERNAL MEDICINE

## 2017-06-14 PROCEDURE — 93750 INTERROGATION VAD IN PERSON: CPT | Mod: S$GLB,TXP,, | Performed by: INTERNAL MEDICINE

## 2017-06-14 NOTE — PROGRESS NOTES
SW followed up pt and wife today in LVAD clinic. Both were alert/oriented x4 with pleasant affect.    Pt stated he has returned to work full-time and has resumed all previous duties. Pt states he feels great emotionally since returning to work.    Pt and wife state they are in a groove now and feel like they are living a normal life.    Pt very upbeat and jovial today. Pt also hopeful to be transplanted in the near future. Pt had questions regarding his blood type and average wait times for transplant. SW notified coordinator.    No concerns voiced, pt coping adequately.    SW provided support and education. SW continues to follow and remains available.

## 2017-06-14 NOTE — LETTER
June 14, 2017        Guillermo Alejo  [address]  [facility] [address]  Phone: [phone]  Fax: [phone]             [facility]  [address]  [facility] [address]  Phone: [phone]   Patient: Mert Samayoa   MR Number: [id number]   YOB: 1990   Date of Visit: 6/14/2017       Dear Dr. Guillermo Alejo    Thank you for referring Mert Samayoa to me for evaluation. Attached you will find relevant portions of my assessment and plan of care.    If you have questions, please do not hesitate to call me. I look forward to following Mert Samayoa along with you.    Sincerely,    Sammi Tapia MD    Enclosure    If you would like to receive this communication electronically, please contact [email or web address] or [phone] to request Pro V[employer]V Link access.    Pro V[employer] Link is a tool which provides read-only access to select patient information with whom you have a relationship. Its easy to use and provides real time access to review your patients record including encounter summaries, notes, results, and demographic information.    If you feel you have received this communication in error or would no longer like to receive these types of communications, please e-mail [email or web address]

## 2017-06-14 NOTE — PROGRESS NOTES
"Date of Implant with Heartware LVAD: 2/1/17    PATIENT ARRIVED IN CLINIC:  Ambulatory   Accompanied by: Wife    Vitals  Doppler: 84/0  Pulsatile: Yes  PAIN: 0 on 0-10 pain scale, location of pain: 0, description of pain:  Is patient currently on medications for pain? no What kind?  Weight (with controller and 2 batteries): refer to encounter    VAD Interrogation:  TXP DERREK INTERROGATIONS 6/14/2017   Type Heartware   Flow 4.0   Speed 2700   Power (Goldsmith) 4.6   Low Flow Alarm 2.5   High Power Alarm 6.0   Pulsatility Intermittent pulse       HCT: 43  WAVEFORM: 2-7 with no negative deflections noted  Suction alarm: Off  Problems / Issues / Alarms with VAD if any: LFA   Any Equipment Issues: None noted  Patient states their batteries are lasting 7 hours. Rotating all batteries. Checking connections before and after changing battery.   Emergency Equipment With Patient: yes   VAD Binder With Patient: yes   Reviewed VAD Numbers In Binder: yes  VAD Sounds: Smooth  Manual & Visual Inspection Of Driveline: No kinks or tears noted  Checked Heartware driveline connector housing for separation, none noted.  Also checked for tight connection, which they are.  Educated pt regarding this and instructed  to check on this daily. Pt verbalized understanding and agreement.   Clamshell Repair: no  Patient wearing patient belt  with waist strap:YES    LVAD Dressing/DLES:  Appearance Of Driveline:  "1"  Antibiotics: NO  Frequency of Dressing Changes: daily & soap and water dressing   Stabilization Device In Use: yes, kirk securement device    Pt In Need Of Management Kits? yes - 2 boxes soap and water  It is medically necessary to have VAD management kits in order to prevent infection or to assist in the healing of an infected DLES.    Assessment:   Complaints/reason for visit today: routine  Complaints Of Nausea / Vomiting: None noted   Appearance and Frequency Of Stools: normal and formed without blood & daily  Color Of Urine: " clear/yellow  Patient is: coping okay   Sleep Habits: 8 hrs /night  Sleep Aids: None noted   Showering: Yes, reminded to change dressing immediately after drying off  Activity/Exercise: Walking   Driving: yes, Reminded to pull over should there be an alarm before looking down at controller.     Labs:    Chemistry        Component Value Date/Time     06/14/2017 1324    K 4.3 06/14/2017 1324     06/14/2017 1324    CO2 30 (H) 06/14/2017 1324    BUN 15 06/14/2017 1324    CREATININE 1.1 06/14/2017 1324    GLU 92 06/14/2017 1324        Component Value Date/Time    CALCIUM 9.7 06/14/2017 1324    ALKPHOS 88 06/14/2017 1324    AST 20 06/14/2017 1324    ALT 18 06/14/2017 1324    BILITOT 0.5 06/14/2017 1324            Magnesium   Date Value Ref Range Status   06/14/2017 2.0 1.6 - 2.6 mg/dL Final       Lab Results   Component Value Date    WBC 8.81 06/14/2017    HGB 14.2 06/14/2017    HCT 42.7 06/14/2017    MCV 84 06/14/2017     06/14/2017       Lab Results   Component Value Date    INR 2.6 (H) 06/14/2017    INR 2.6 06/08/2017    INR 2.7 06/05/2017       BNP   Date Value Ref Range Status   06/14/2017 67 0 - 99 pg/mL Final     Comment:     Values of less than 100 pg/ml are consistent with non-CHF populations.   05/17/2017 77 0 - 99 pg/mL Final     Comment:     Values of less than 100 pg/ml are consistent with non-CHF populations.   05/04/2017 96 0 - 99 pg/mL Final     Comment:     Values of less than 100 pg/ml are consistent with non-CHF populations.       LD   Date Value Ref Range Status   06/14/2017 195 110 - 260 U/L Final     Comment:     Results are increased in hemolyzed samples.   05/17/2017 197 110 - 260 U/L Final     Comment:     Results are increased in hemolyzed samples.   05/04/2017 175 110 - 260 U/L Final     Comment:     Results are increased in hemolyzed samples.       Labs reviewed with patient: YES     Patient Satisfaction Survey completed per Patient: yes  (explained about signature and box to  check)  Medication reconciliation: per MA.  Purple card updated today: yes  Coumadin Managed by: [facility]    Education: Reviewed driveline care, emergency procedures, how to change the controller, alarms with patient.      Plans/Needs:  RTC in one month. Patient is doing well.

## 2017-06-14 NOTE — PROGRESS NOTES
"Subjective:   Patient ID:  Mert Samayoa is a 27 y.o. male who presents for LVAD followup visit.    Implant Date:2/1/17     HVAD RPM 2700     INR goal: 2-3   Bridge with Heparin   Antiplatelets:     TXP DERREK INTERROGATIONS 6/14/2017   Type Heartware   Flow 4.0   Speed 2700   Power (Goldsmith) 4.6   Low Flow Alarm 2.5   High Power Alarm 6.0   Pulsatility Intermittent pulse       HPI  MR. Samayoa is a very pleasant 26 y/o white male with Hx of stage D hFreF, NICMP who underwent placement of LVAD on 2/1 and sternal closure on 2/2. Heartware VAD was placed without any immediate complications. Over the next few days, patient was extubated and , Epi, Cardene and Lasix gtts and Isiah were started and titrated off. Patient was also on heparin gtt. Patient was stepped down from the SICU to CTSU on 2/9. Patient went into atrial flutter in AM of 2/11, which he self converted out of, and started on Amiodarone by CTS following atrial flutter episode. Patient was started on Coumadin on 2/10/17. Patient had low flow alarms on 2/16/17 and HW speed was dropped from 2900 RPM to 2700 RPM after speed echo showed better filling at 2700 RPM. Comes for his regular VAD visit. Continues to do well.  VAD speed is at 2700 rpm. No VAD alarms noted on interrogation occasional PI events . BP is 84 (Doppler). DLES is a "1". INR is therapeutic at 2.6. LDH is 195 at baseline.    Review of Systems   Constitution: Negative. Negative for chills, decreased appetite, diaphoresis, fever, weakness, malaise/fatigue, night sweats, weight gain and weight loss.   Eyes: Negative.    Cardiovascular: Negative for chest pain, claudication, cyanosis, dyspnea on exertion, irregular heartbeat, leg swelling, near-syncope, orthopnea, palpitations, paroxysmal nocturnal dyspnea and syncope.   Respiratory: Negative for cough, hemoptysis and shortness of breath.    Endocrine: Negative.    Hematologic/Lymphatic: Negative.    Skin: Negative for color change, " "dry skin and nail changes.   Musculoskeletal: Negative.    Gastrointestinal: Negative.    Genitourinary: Negative.        Objective:   Blood pressure (!) 84/0, pulse 67, temperature 98 °F (36.7 °C), height 5' 6" (1.676 m), weight 66.2 kg (146 lb).body mass index is 23.57 kg/m².    Doppler: 84    Physical Exam   Constitutional: He appears well-developed.   BP (!) 84/0 (BP Method: Doppler)   Pulse 67   Temp 98 °F (36.7 °C)   Ht 5' 6" (1.676 m)   Wt 66.2 kg (146 lb)   BMI 23.57 kg/m²      HENT:   Head: Normocephalic.   Neck: No JVD present. Carotid bruit is not present.   Cardiovascular: Regular rhythm and normal heart sounds.    No murmur heard.  Smooth VAD hum. DLES is "1"   Pulmonary/Chest: Effort normal and breath sounds normal. No respiratory distress. He has no wheezes. He has no rales.   Abdominal: Soft. Bowel sounds are normal. He exhibits no distension. There is no tenderness. There is no rebound.   Musculoskeletal: He exhibits no edema.   Neurological: He is alert.   Skin: Skin is warm.   Vitals reviewed.      Lab Results   Component Value Date    WBC 8.81 06/14/2017    HGB 14.2 06/14/2017    HCT 42.7 06/14/2017    MCV 84 06/14/2017     06/14/2017    CO2 30 (H) 06/14/2017    CREATININE 1.1 06/14/2017    CALCIUM 9.7 06/14/2017    ALBUMIN 4.4 06/14/2017    AST 20 06/14/2017    BNP 67 06/14/2017    ALT 18 06/14/2017     06/14/2017       Lab Results   Component Value Date    INR 2.6 (H) 06/14/2017    INR 2.6 06/08/2017    INR 2.7 06/05/2017       BNP   Date Value Ref Range Status   06/14/2017 67 0 - 99 pg/mL Final     Comment:     Values of less than 100 pg/ml are consistent with non-CHF populations.   05/17/2017 77 0 - 99 pg/mL Final     Comment:     Values of less than 100 pg/ml are consistent with non-CHF populations.   05/04/2017 96 0 - 99 pg/mL Final     Comment:     Values of less than 100 pg/ml are consistent with non-CHF populations.       LD   Date Value Ref Range Status   06/14/2017 " 195 110 - 260 U/L Final     Comment:     Results are increased in hemolyzed samples.   05/17/2017 197 110 - 260 U/L Final     Comment:     Results are increased in hemolyzed samples.   05/04/2017 175 110 - 260 U/L Final     Comment:     Results are increased in hemolyzed samples.         Assessment:      1. Heart replaced by heart assist device    2. Congestive heart failure    3. NICM (nonischemic cardiomyopathy)    4. Organ transplant candidate        Plan:   Patient is now NYHA class II. BP is controlled.  INR is therapeutic.  VAD interrogation was performed in clinic  Does need VAD supplies.   Recommend 2 gram sodium restriction and 1500cc fluid restriction.  Encourage physical activity with graded exercise program.  Requested patient to weigh themselves daily, and to notify us if their weight increases by more than 3 lbs in 1 day or 5 lbs in 1 week.      Listed for transplant: Listed status 1B status      [facility] Patient Status  Functional Status: 100% - Normal, no complaints, no evidence of disease  Physical Capacity: No Limitations  Working for Income: Unknown     Sammi Tapia MD

## 2017-06-15 LAB — HPRA INTERPRETATION: NORMAL

## 2017-06-16 LAB
CLASS I ANTIBODIES - LUMINEX: NEGATIVE
CLASS II ANTIBODIES - LUMINEX: NEGATIVE
CPRA %: 0
SERUM COLLECTION DT - LUMINEX CLASS I: NORMAL
SERUM COLLECTION DT - LUMINEX CLASS II: NORMAL
SPCL1 TESTING DATE: NORMAL
SPCL2 TESTING DATE: NORMAL

## 2017-06-21 ENCOUNTER — ANTI-COAG VISIT (OUTPATIENT)
Dept: CARDIOLOGY | Facility: CLINIC | Age: 27
End: 2017-06-21

## 2017-06-21 DIAGNOSIS — Z95.811 LVAD (LEFT VENTRICULAR ASSIST DEVICE) PRESENT: ICD-10-CM

## 2017-06-21 DIAGNOSIS — Z79.01 LONG TERM (CURRENT) USE OF ANTICOAGULANTS: ICD-10-CM

## 2017-06-21 LAB — INR PPP: 2.2

## 2017-06-23 ENCOUNTER — TELEPHONE (OUTPATIENT)
Dept: TRANSPLANT | Facility: CLINIC | Age: 27
End: 2017-06-23

## 2017-06-23 NOTE — TELEPHONE ENCOUNTER
Pt paged on call VAD coordinator to report LFA overnight.  Pt reports his VAD numbers have normalized now.  Pt denies any s/s and reports he was laying on his side at the time. Pt encouraged to let us know if there are any other alarms or symptoms.   Pt encouraged to call VAD coordinator with any questions problems or concerns. Pt reminded to page VAD coordinator on call for emergencies.  Pt verbalized understanding and in agreement of plan.

## 2017-06-27 ENCOUNTER — ANTI-COAG VISIT (OUTPATIENT)
Dept: CARDIOLOGY | Facility: CLINIC | Age: 27
End: 2017-06-27

## 2017-06-27 DIAGNOSIS — Z79.01 LONG TERM (CURRENT) USE OF ANTICOAGULANTS: ICD-10-CM

## 2017-06-27 DIAGNOSIS — Z95.811 LVAD (LEFT VENTRICULAR ASSIST DEVICE) PRESENT: ICD-10-CM

## 2017-06-27 LAB — INR PPP: 2.6

## 2017-06-29 DIAGNOSIS — Z95.811 LVAD (LEFT VENTRICULAR ASSIST DEVICE) PRESENT: Primary | ICD-10-CM

## 2017-07-03 ENCOUNTER — TELEPHONE (OUTPATIENT)
Dept: TRANSPLANT | Facility: CLINIC | Age: 27
End: 2017-07-03

## 2017-07-03 NOTE — TELEPHONE ENCOUNTER
Patient paged and had one LFA last night when stretching. Patient feels great and at work currently. Will continue to monitor for any further issues and will Page if gets an additional alarm.

## 2017-07-05 LAB — INR PPP: 2.3

## 2017-07-06 ENCOUNTER — ANTI-COAG VISIT (OUTPATIENT)
Dept: CARDIOLOGY | Facility: CLINIC | Age: 27
End: 2017-07-06

## 2017-07-06 DIAGNOSIS — Z79.01 LONG TERM (CURRENT) USE OF ANTICOAGULANTS: ICD-10-CM

## 2017-07-06 DIAGNOSIS — Z95.811 LVAD (LEFT VENTRICULAR ASSIST DEVICE) PRESENT: ICD-10-CM

## 2017-07-07 RX ORDER — WARFARIN SODIUM 5 MG/1
TABLET ORAL
Qty: 30 TABLET | Refills: 0 | Status: SHIPPED | OUTPATIENT
Start: 2017-07-07 | End: 2017-08-10 | Stop reason: SDUPTHER

## 2017-07-11 ENCOUNTER — RESEARCH ENCOUNTER (OUTPATIENT)
Dept: RESEARCH | Facility: HOSPITAL | Age: 27
End: 2017-07-11

## 2017-07-11 ENCOUNTER — ANTI-COAG VISIT (OUTPATIENT)
Dept: CARDIOLOGY | Facility: CLINIC | Age: 27
End: 2017-07-11

## 2017-07-11 ENCOUNTER — OFFICE VISIT (OUTPATIENT)
Dept: TRANSPLANT | Facility: CLINIC | Age: 27
End: 2017-07-11
Payer: COMMERCIAL

## 2017-07-11 ENCOUNTER — CLINICAL SUPPORT (OUTPATIENT)
Dept: TRANSPLANT | Facility: CLINIC | Age: 27
End: 2017-07-11
Payer: COMMERCIAL

## 2017-07-11 ENCOUNTER — CLINICAL SUPPORT (OUTPATIENT)
Dept: ELECTROPHYSIOLOGY | Facility: CLINIC | Age: 27
End: 2017-07-11
Payer: COMMERCIAL

## 2017-07-11 ENCOUNTER — OFFICE VISIT (OUTPATIENT)
Dept: ELECTROPHYSIOLOGY | Facility: CLINIC | Age: 27
End: 2017-07-11
Payer: COMMERCIAL

## 2017-07-11 ENCOUNTER — HOSPITAL ENCOUNTER (OUTPATIENT)
Dept: CARDIOLOGY | Facility: CLINIC | Age: 27
Discharge: HOME | End: 2017-07-11
Payer: COMMERCIAL

## 2017-07-11 VITALS — WEIGHT: 152.13 LBS | BODY MASS INDEX: 23.88 KG/M2 | HEART RATE: 57 BPM | HEIGHT: 67 IN

## 2017-07-11 VITALS — TEMPERATURE: 98 F | WEIGHT: 152.13 LBS | HEIGHT: 67 IN | SYSTOLIC BLOOD PRESSURE: 86 MMHG | BODY MASS INDEX: 23.88 KG/M2

## 2017-07-11 DIAGNOSIS — Z95.810 ICD (IMPLANTABLE CARDIOVERTER-DEFIBRILLATOR) IN PLACE: ICD-10-CM

## 2017-07-11 DIAGNOSIS — Z95.811 HEART REPLACED BY HEART ASSIST DEVICE: ICD-10-CM

## 2017-07-11 DIAGNOSIS — Z79.01 LONG TERM (CURRENT) USE OF ANTICOAGULANTS: ICD-10-CM

## 2017-07-11 DIAGNOSIS — I50.22 CHRONIC SYSTOLIC CONGESTIVE HEART FAILURE: ICD-10-CM

## 2017-07-11 DIAGNOSIS — Z95.811 LVAD (LEFT VENTRICULAR ASSIST DEVICE) PRESENT: ICD-10-CM

## 2017-07-11 DIAGNOSIS — I50.9 CONGESTIVE HEART FAILURE: ICD-10-CM

## 2017-07-11 DIAGNOSIS — I42.9 FAMILIAL CARDIOMYOPATHY: Primary | ICD-10-CM

## 2017-07-11 DIAGNOSIS — I50.42 CHRONIC COMBINED SYSTOLIC AND DIASTOLIC HEART FAILURE: ICD-10-CM

## 2017-07-11 DIAGNOSIS — I42.8 NICM (NONISCHEMIC CARDIOMYOPATHY): ICD-10-CM

## 2017-07-11 PROCEDURE — 99214 OFFICE O/P EST MOD 30 MIN: CPT | Mod: NTX,S$GLB,, | Performed by: INTERNAL MEDICINE

## 2017-07-11 PROCEDURE — 99999 PR PBB SHADOW E&M-EST. PATIENT-LVL II: CPT | Mod: PBBFAC,TXP,, | Performed by: INTERNAL MEDICINE

## 2017-07-11 PROCEDURE — 93282 PRGRMG EVAL IMPLANTABLE DFB: CPT | Mod: NTX,S$GLB,, | Performed by: INTERNAL MEDICINE

## 2017-07-11 PROCEDURE — 93750 INTERROGATION VAD IN PERSON: CPT | Mod: NTX,S$GLB,, | Performed by: INTERNAL MEDICINE

## 2017-07-11 PROCEDURE — 93000 ELECTROCARDIOGRAM COMPLETE: CPT | Mod: NTX,S$GLB,, | Performed by: INTERNAL MEDICINE

## 2017-07-11 NOTE — PROCEDURES
TXP DERREK INTERROGATIONS 6/14/2017 5/17/2017 5/4/2017 4/6/2017 3/30/2017 3/30/2017 3/30/2017   Type Heartware Heartware Heartware Heartware - - -   Flow 4.0 4 3.9 4.5 - - -   Speed 2700 2700 2700 2700 - - -   Power (Goldsmith) 4.6 4.3 4.4 4.1 - - -   Low Flow Alarm 2.5 2.5 2.5 2.5 - - -   High Power Alarm 6.0 6.0 6.0 6.0 - - -   Pulsatility Intermittent pulse Intermittent pulse Intermittent pulse Intermittent pulse Intermittent pulse No Pulse Intermittent pulse   }

## 2017-07-11 NOTE — LETTER
July 11, 2017        Guillermo Alejo  [address]  [facility] [address]  Phone: [phone]  Fax: [phone]             [facility]  [address]  [facility] [address]  Phone: [phone]   Patient: Mert Samayoa   MR Number: [id number]   YOB: 1990   Date of Visit: 7/11/2017       Dear Dr. Guillermo Alejo    Thank you for referring Mert Samayoa to me for evaluation. Attached you will find relevant portions of my assessment and plan of care.    If you have questions, please do not hesitate to call me. I look forward to following Mert Samayoa along with you.    Sincerely,    Mario Conner MD    Enclosure    If you would like to receive this communication electronically, please contact [email or web address] or [phone] to request [employer] Link access.    [employer] Link is a tool which provides read-only access to select patient information with whom you have a relationship. Its easy to use and provides real time access to review your patients record including encounter summaries, notes, results, and demographic information.    If you feel you have received this communication in error or would no longer like to receive these types of communications, please e-mail [email or web address]

## 2017-07-11 NOTE — PROGRESS NOTES
Seen pt in clinic. Pt states he is working. I advised patient to take all his batteries with him and always to keep an eye out for his driveline. Also, cleaned patients controller and inspected his Driveline. No issues noted, I will follow up with pt at next clinic visit.

## 2017-07-11 NOTE — PROGRESS NOTES
"Subjective:   Patient ID:  Mert Samayoa is a 27 y.o. male who presents for LVAD followup visit.    Implant date: 2/1/17    TXP DERREK INTERROGATIONS 6/14/2017   Type Heartware   Flow 4.0   Speed 2700   Power (Goldsmith) 4.6   Low Flow Alarm 2.5   High Power Alarm 6.0   Pulsatility Intermittent pulse        HPI  Hx of stage D hFreF, NICMP who underwent placement of LVAD on 2/1 and sternal closure on 2/2. Heartware VAD was placed without any immediate complications.9. Patient went into atrial flutter in AM of 2/11, which he self converted out of, and started on Amiodarone by CTS following atrial flutter episode (which he continues on currently).   Today, doing well. DLES number one despite dropping controller.  Has low flow alarms when he turns on his side.    ROS    Objective:     Doppler: 86    Physical Exam   Constitutional: He is oriented to person, place, and time. He appears well-developed and well-nourished. He is active. He is not intubated.   BP (!) 86/0 (BP Location: Right arm, Patient Position: Sitting, BP Method: Doppler)   Temp 98 °F (36.7 °C) (Oral)   Ht 5' 7" (1.702 m)   Wt 69 kg (152 lb 1.9 oz)   BMI 23.82 kg/m²      HENT:   Head: Normocephalic and atraumatic. Hair is normal.   Right Ear: External ear normal.   Left Ear: External ear normal.   Nose: Nose normal. No nasal deformity. No epistaxis.  No foreign bodies.   Mouth/Throat: Mucous membranes are normal. Mucous membranes are not cyanotic. No oropharyngeal exudate.   Eyes: Conjunctivae and EOM are normal. Pupils are equal, round, and reactive to light.   Neck: Neck supple. No hepatojugular reflux and no JVD present.   Cardiovascular: Normal rate, regular rhythm, normal heart sounds and normal pulses.  Exam reveals no gallop.    Pulmonary/Chest: Effort normal and breath sounds normal. No apnea and no tachypnea. He is not intubated. No respiratory distress. He exhibits no tenderness.   Abdominal: Soft. Normal appearance and bowel sounds are " normal. There is no tenderness. No hernia.   Musculoskeletal: Normal range of motion.   Neurological: He is alert and oriented to person, place, and time. He displays no seizure activity.   Skin: Skin is warm, dry and intact. No rash noted. No pallor.   Psychiatric: He has a normal mood and affect. His speech is normal and behavior is normal. Thought content normal. Cognition and memory are normal.       Lab Results   Component Value Date    WBC 6.78 07/11/2017    HGB 14.6 07/11/2017    HCT 42.0 07/11/2017    MCV 85 07/11/2017     07/11/2017    CO2 30 (H) 06/14/2017    CREATININE 1.1 06/14/2017    CALCIUM 9.7 06/14/2017    ALBUMIN 4.4 06/14/2017    AST 20 06/14/2017    BNP 67 06/14/2017    ALT 18 06/14/2017     06/14/2017       Lab Results   Component Value Date    INR 2.4 (H) 07/11/2017    INR 2.3 07/05/2017    INR 2.6 06/27/2017       BNP   Date Value Ref Range Status   06/14/2017 67 0 - 99 pg/mL Final     Comment:     Values of less than 100 pg/ml are consistent with non-CHF populations.   05/17/2017 77 0 - 99 pg/mL Final     Comment:     Values of less than 100 pg/ml are consistent with non-CHF populations.   05/04/2017 96 0 - 99 pg/mL Final     Comment:     Values of less than 100 pg/ml are consistent with non-CHF populations.       LD   Date Value Ref Range Status   06/14/2017 195 110 - 260 U/L Final     Comment:     Results are increased in hemolyzed samples.   05/17/2017 197 110 - 260 U/L Final     Comment:     Results are increased in hemolyzed samples.   05/04/2017 175 110 - 260 U/L Final     Comment:     Results are increased in hemolyzed samples.           Assessment:      1. Heart replaced by heart assist device    2. Congestive heart failure        Plan:   1. LVAD working well no changes  2.  Consider using 1AE time before end of year  Patient is now NYHA II  Recommend 2 gram sodium restriction and 1500cc fluid restriction.  Encourage physical activity with graded exercise  program.  Requested patient to weigh themselves daily, and to notify us if their weight increases by more than 3 lbs in 1 day or 5 lbs in 1 week.     Listed for transplant: Yes, all appropriate transplant related labs (including HLA) performed. RHC requested per listing protocol. 1B    UNOS Patient Status  Functional Status: 60% - Requires occasional assistance but is able to care for needs  Physical Capacity: No Limitations  Working for Income: yes  If yes, working activity level: Working Part Time Due to Demands of Treatment

## 2017-07-11 NOTE — PROGRESS NOTES
"Date of Implant with Heartware LVAD: 2-1-17    PATIENT ARRIVED IN CLINIC:  Ambulatory   Accompanied by:  Wife    Vitals  Doppler: 86  Pulsatile: Yes  PAIN: 0 on 0-10 pain scale, location of pain: 0, description of pain: 0  Is patient currently on medications for pain? no What kind?  Weight (with controller and 2 batteries): refer to encounter    VAD Interrogation:  TXP [facility] INTERROGATIONS 7/13/2017   Type Heartware   Flow 4.1   Speed 2700   Power (Goldsmith) 4.4   Low Flow Alarm 2.0   High Power Alarm 6.5   Pulsatility Intermittent pulse       HCT: 42  WAVEFORM: 2-7 with no negative deflections noted.   Suction alarm: Off  Problems / Issues / Alarms with VAD if any: None noted   Any Equipment Issues: Noted that he dropped his controller and thought the DL was wiggling. Assess Driveline and looks like a stable connection  Patient states their batteries are lasting 6-7 hours. Rotating all batteries. Checking connections before and after changing battery.   Emergency Equipment With Patient: yes   VAD Binder With Patient: yes   Reviewed VAD Numbers In Binder: yes  VAD Sounds: Smooth  Manual & Visual Inspection Of Driveline: No kinks or tears noted  Checked Heartware driveline connector housing for separation, none noted.  Also checked for tight connection, which they are.  Educated pt regarding this and instructed  to check on this daily. Pt verbalized understanding and agreement.   Clamshell Repair: no  Patient wearing Consolidated bag with waist strap:YES    LVAD Dressing/DLES:  Appearance Of Driveline:  "1"  Antibiotics: NO  Frequency of Dressing Changes: daily & daily kit and soap and water dressing   Stabilization Device In Use: yes, kirk securement device    Pt In Need Of Management Kits? yes Ordered 2 boxes; one soap and water/one daily kits  It is medically necessary to have VAD management kits in order to prevent infection or to assist in the healing of an infected DLES.    Assessment:   Complaints/reason for visit " today: routine  Complaints Of Nausea / Vomiting: None noted   Appearance and Frequency Of Stools: normal and formed without blood & daily  Color Of Urine: clear/yellow  Patient is: coping okay   Sleep Habits: 6-7 hrs /night  Sleep Aids: None noted   Showering: Yes, reminded to change dressing immediately after drying off  Activity/Exercise: Coping okay   Driving: yes, Reminded to pull over should there be an alarm before looking down at controller.     Labs:    Chemistry        Component Value Date/Time     07/11/2017 0903    K 4.0 07/11/2017 0903     07/11/2017 0903    CO2 26 07/11/2017 0903    BUN 16 07/11/2017 0903    CREATININE 1.1 07/11/2017 0903    GLU 89 07/11/2017 0903        Component Value Date/Time    CALCIUM 9.2 07/11/2017 0903    ALKPHOS 88 07/11/2017 0903    AST 17 07/11/2017 0903    ALT 18 07/11/2017 0903    BILITOT 1.5 (H) 07/11/2017 0903    ESTGFRAFRICA >60.0 07/11/2017 0903    EGFRNONAA >60.0 07/11/2017 0903            Magnesium   Date Value Ref Range Status   07/11/2017 2.0 1.6 - 2.6 mg/dL Final       Lab Results   Component Value Date    WBC 6.78 07/11/2017    HGB 14.6 07/11/2017    HCT 42.0 07/11/2017    MCV 85 07/11/2017     07/11/2017       Lab Results   Component Value Date    INR 2.4 (H) 07/11/2017    INR 2.3 07/05/2017    INR 2.6 06/27/2017       BNP   Date Value Ref Range Status   07/11/2017 55 0 - 99 pg/mL Final     Comment:     Values of less than 100 pg/ml are consistent with non-CHF populations.   06/14/2017 67 0 - 99 pg/mL Final     Comment:     Values of less than 100 pg/ml are consistent with non-CHF populations.   05/17/2017 77 0 - 99 pg/mL Final     Comment:     Values of less than 100 pg/ml are consistent with non-CHF populations.       LD   Date Value Ref Range Status   07/11/2017 159 110 - 260 U/L Final     Comment:     Results are increased in hemolyzed samples.   06/14/2017 195 110 - 260 U/L Final     Comment:     Results are increased in hemolyzed samples.    05/17/2017 197 110 - 260 U/L Final     Comment:     Results are increased in hemolyzed samples.       Labs reviewed with patient: YES     Patient Satisfaction Survey completed per Patient: yes  (explained about signature and box to check)  Medication reconciliation: per MA.  Purple card updated today: no  Coumadin Managed by: [facility]    Education: Reviewed driveline care, emergency procedures, how to change the controller, alarms with patient.      Plans/Needs:  RTC in 2 months. Patient doing well. Will follow up.

## 2017-07-11 NOTE — PROGRESS NOTES
IRB#: [id number]  P.I.:  Usman Klein MD    07/11/2017: Spoke to Mert Samayoa and his wife today regarding 6 month follow-up and continuing to participate with the INTERMACS registry.  I explained the purpose, procedure, and risk/benefits of participation. Mert Cantu Danita verbalized understanding and willing to continue participation in the registry.      Patient completed the EQ-5D quality of life questionnaire, KCCQ, and the Trail Making neurocognitive test in 60 seconds.

## 2017-07-11 NOTE — PROGRESS NOTES
Subjective:    Patient ID:  Mert Samayoa is a 27 y.o. male who presents for follow-up of Congestive Heart Failure      27 yoM NICM, s/p LVAD here for ICD consideration. He has history of familial, NICM. He was on coreg and lisinopril for medical treatment. He did not tolerate coreg and was switched to metoprolol. He developed worsening HF symptoms and ultimately underwent LVAD placement 2/1/17. He has had an unremarkable recovery. He had AF and NSVT during his hospitalization leading to amiodarone initiation. He had a PICC in the right arm for a few weeks.    Interval history: SC ICD implanted 3/29/17 without complication. No arrhythmias on interrogation. Currently 1B on the list    Echo 2/17:  CONCLUSIONS     1 - Eccentric hypertrophy.     2 - Severely depressed left ventricular systolic function.     3 - Biatrial enlargement.     4 - Left ventricular diastolic dysfunction.     5 - Moderately depressed right ventricular systolic function .     6 - Intermediate central venous pressure.     7 - HeartWare LVAD; speed 2700.     8 - Since prior study and decreased LVAD speed LV cavity is bigger.     Past Medical History:  1/16/2017: Cardiogenic shock  No date: Cardiomyopathy      Comment: Familial cardiomyopathy  No date: CHF (congestive heart failure)  12/21/2016: Essential hypertension  No date: Familial Cardiomyopathy      Comment: Familial cardiomyopathy     Past Surgical History:  No date: LEFT VENTRICULAR ASSIST DEVICE    Social History    Marital status:              Spouse name:                       Years of education:                 Number of children:               Occupational History    None on file    Social History Main Topics    Smoking status: Former Smoker                                                                Packs/day: 1.00      Years: 0.00           Quit date: 12/14/2016    Smokeless tobacco: Never Used                        Alcohol use: No              Drug use: No               Sexual activity: Not on file          Other Topics            Concern    None on file    Social History Narrative    Works     Review of patient's family history indicates:    Arthritis                      Mother                    Heart disease                  Brother                     Comment: cardiomyopathy and heart transplant    No Known Problems              Father                    Heart disease                  Brother                     Comment: cardiomyopathy and heart transplanted                twice    Birth defects                  Paternal Uncle                  Review of Systems   Constitution: Positive for malaise/fatigue.   HENT: Negative.    Eyes: Negative.    Cardiovascular: Negative for chest pain, dyspnea on exertion, leg swelling, near-syncope, palpitations and syncope.   Respiratory: Negative.  Negative for shortness of breath.    Endocrine: Negative.    Hematologic/Lymphatic: Negative.    Skin: Negative.    Musculoskeletal: Negative.    Gastrointestinal: Negative.    Genitourinary: Negative.    Neurological: Negative.  Negative for dizziness and light-headedness.   Psychiatric/Behavioral: Negative.    Allergic/Immunologic: Negative.         Objective:    Physical Exam   Constitutional: He is oriented to person, place, and time. He appears well-developed and well-nourished. No distress.   HENT:   Head: Normocephalic and atraumatic.   Eyes: Conjunctivae and EOM are normal. Pupils are equal, round, and reactive to light. Right eye exhibits no discharge. Left eye exhibits no discharge.   Neck: Normal range of motion. Neck supple. No JVD present. No thyromegaly present.   Cardiovascular: Normal rate, regular rhythm, S1 normal and S2 normal.  PMI is not displaced.  Exam reveals no gallop and no friction rub.    Murmur (continuous murmur) heard.  Pulmonary/Chest: Effort normal and breath sounds normal. No respiratory distress. He has no wheezes. He has no rales.    L upper chest wound win underlying generator   Abdominal: Soft. Bowel sounds are normal. He exhibits no distension. There is no tenderness. There is no rebound and no guarding.   Musculoskeletal: Normal range of motion. He exhibits no edema or tenderness.   Neurological: He is alert and oriented to person, place, and time. No cranial nerve deficit.   Skin: Skin is warm and dry. No rash noted. No erythema.   Psychiatric: He has a normal mood and affect. His behavior is normal. Judgment and thought content normal.   Vitals reviewed.    ECG: NSR nl DE, QRS, QTc        Assessment:       1. Familial cardiomyopathy    2. LVAD (left ventricular assist device) present    3. NICM (nonischemic cardiomyopathy)    4. ICD (implantable cardioverter-defibrillator) in place         Plan:       27 yoM NICM here for post implant visit. Normal SC ICD function with no arrhythmias. I discussed routine device follow up including quarterly to bi-annual device checks for device function as well as yearly follow up in the EP clinic. The patient  was advised to call with any concerns regarding their device. Device clinic follow up as scheduled. Can stop amiodarone- no arrhythmias see on recent interrogation. RTC 1y

## 2017-07-14 DIAGNOSIS — Z95.811 HEART REPLACED BY HEART ASSIST DEVICE: Primary | ICD-10-CM

## 2017-07-18 ENCOUNTER — ANTI-COAG VISIT (OUTPATIENT)
Dept: CARDIOLOGY | Facility: CLINIC | Age: 27
End: 2017-07-18

## 2017-07-18 DIAGNOSIS — Z79.01 LONG TERM (CURRENT) USE OF ANTICOAGULANTS: ICD-10-CM

## 2017-07-18 DIAGNOSIS — Z95.811 LVAD (LEFT VENTRICULAR ASSIST DEVICE) PRESENT: ICD-10-CM

## 2017-07-18 LAB — INR PPP: 2.5

## 2017-07-25 ENCOUNTER — ANTI-COAG VISIT (OUTPATIENT)
Dept: CARDIOLOGY | Facility: CLINIC | Age: 27
End: 2017-07-25

## 2017-07-25 DIAGNOSIS — Z95.811 LVAD (LEFT VENTRICULAR ASSIST DEVICE) PRESENT: ICD-10-CM

## 2017-07-25 DIAGNOSIS — Z79.01 LONG TERM (CURRENT) USE OF ANTICOAGULANTS: ICD-10-CM

## 2017-07-25 LAB — INR PPP: 2.6

## 2017-08-01 ENCOUNTER — ANTI-COAG VISIT (OUTPATIENT)
Dept: CARDIOLOGY | Facility: CLINIC | Age: 27
End: 2017-08-01

## 2017-08-01 DIAGNOSIS — Z79.01 LONG TERM (CURRENT) USE OF ANTICOAGULANTS: ICD-10-CM

## 2017-08-01 DIAGNOSIS — Z95.811 LVAD (LEFT VENTRICULAR ASSIST DEVICE) PRESENT: ICD-10-CM

## 2017-08-01 LAB — INR PPP: 3.1

## 2017-08-01 NOTE — PROGRESS NOTES
Patient questioned and confirmed correct dose .  Reports having three beers last night and eating pickled okra three daily.  Patient stated he also cooks with okra .  Advised patient to eat 1/2 cup of broccoli today and to have labs drawn 8/3.

## 2017-08-02 NOTE — PROGRESS NOTES
Patient advised on EtOH intake, he reports this does not usually happen. He did have greens 8/1. INR as planned.

## 2017-08-03 ENCOUNTER — ANTI-COAG VISIT (OUTPATIENT)
Dept: CARDIOLOGY | Facility: CLINIC | Age: 27
End: 2017-08-03

## 2017-08-03 DIAGNOSIS — Z95.811 LVAD (LEFT VENTRICULAR ASSIST DEVICE) PRESENT: ICD-10-CM

## 2017-08-03 DIAGNOSIS — Z79.01 LONG TERM (CURRENT) USE OF ANTICOAGULANTS: ICD-10-CM

## 2017-08-03 LAB — INR PPP: 2.8

## 2017-08-04 ENCOUNTER — TELEPHONE (OUTPATIENT)
Dept: TRANSPLANT | Facility: CLINIC | Age: 27
End: 2017-08-04

## 2017-08-09 ENCOUNTER — ANTI-COAG VISIT (OUTPATIENT)
Dept: CARDIOLOGY | Facility: CLINIC | Age: 27
End: 2017-08-09

## 2017-08-09 DIAGNOSIS — Z95.811 LVAD (LEFT VENTRICULAR ASSIST DEVICE) PRESENT: ICD-10-CM

## 2017-08-09 DIAGNOSIS — Z79.01 LONG TERM (CURRENT) USE OF ANTICOAGULANTS: ICD-10-CM

## 2017-08-09 LAB — INR PPP: 2.8

## 2017-08-11 ENCOUNTER — LAB VISIT (OUTPATIENT)
Dept: LAB | Facility: HOSPITAL | Age: 27
End: 2017-08-11
Attending: INTERNAL MEDICINE
Payer: COMMERCIAL

## 2017-08-11 ENCOUNTER — ANTI-COAG VISIT (OUTPATIENT)
Dept: CARDIOLOGY | Facility: CLINIC | Age: 27
End: 2017-08-11

## 2017-08-11 DIAGNOSIS — Z95.811 LVAD (LEFT VENTRICULAR ASSIST DEVICE) PRESENT: ICD-10-CM

## 2017-08-11 DIAGNOSIS — I50.9 CONGESTIVE HEART FAILURE: ICD-10-CM

## 2017-08-11 DIAGNOSIS — Z95.811 HEART REPLACED BY HEART ASSIST DEVICE: ICD-10-CM

## 2017-08-11 DIAGNOSIS — I50.9 CONGESTIVE HEART FAILURE, UNSPECIFIED CONGESTIVE HEART FAILURE CHRONICITY, UNSPECIFIED CONGESTIVE HEART FAILURE TYPE: ICD-10-CM

## 2017-08-11 DIAGNOSIS — Z79.01 LONG TERM (CURRENT) USE OF ANTICOAGULANTS: ICD-10-CM

## 2017-08-11 DIAGNOSIS — Z76.82 ORGAN TRANSPLANT CANDIDATE: ICD-10-CM

## 2017-08-11 LAB
INR PPP: 2.9
PROTHROMBIN TIME: 29 SEC

## 2017-08-11 PROCEDURE — 85610 PROTHROMBIN TIME: CPT | Mod: TXP

## 2017-08-11 PROCEDURE — 86832 HLA CLASS I HIGH DEFIN QUAL: CPT | Mod: TXP

## 2017-08-11 PROCEDURE — 36415 COLL VENOUS BLD VENIPUNCTURE: CPT | Mod: TXP

## 2017-08-11 PROCEDURE — 86977 RBC SERUM PRETX INCUBJ/INHIB: CPT | Mod: 91,TXP

## 2017-08-11 PROCEDURE — 86833 HLA CLASS II HIGH DEFIN QUAL: CPT | Mod: TXP

## 2017-08-11 RX ORDER — WARFARIN SODIUM 5 MG/1
TABLET ORAL
Qty: 30 TABLET | Refills: 0 | Status: SHIPPED | OUTPATIENT
Start: 2017-08-11 | End: 2017-09-06 | Stop reason: SDUPTHER

## 2017-08-11 NOTE — PROGRESS NOTES
INR therapeutic but on higher side. Patient denies any changes. Advised ok to have 1/4 cup of coleslaw today.

## 2017-08-13 LAB
CLASS I ANTIBODIES - LUMINEX: NEGATIVE
CLASS II ANTIBODIES - LUMINEX: NEGATIVE
CPRA %: 0
HPRA INTERPRETATION: NORMAL
SERUM COLLECTION DT - LUMINEX CLASS I: NORMAL
SERUM COLLECTION DT - LUMINEX CLASS II: NORMAL
SPCL1 TESTING DATE: NORMAL
SPCL2 TESTING DATE: NORMAL
SPLUA TESTING DATE: NORMAL

## 2017-08-15 ENCOUNTER — TELEPHONE (OUTPATIENT)
Dept: TRANSPLANT | Facility: CLINIC | Age: 27
End: 2017-08-15

## 2017-08-15 NOTE — TELEPHONE ENCOUNTER
Patient called and explained that he wanted to order more soap and water kits.It was explained to the patient that if he wanted us to order more kits that he would have to move his clinic appt up sooner than the one he has set for 9/13/17. He explained that he would give me a call back tomorrow once he makes it home to count how many more kits he has left.

## 2017-08-16 LAB — INR PPP: 2.1

## 2017-08-17 ENCOUNTER — ANTI-COAG VISIT (OUTPATIENT)
Dept: CARDIOLOGY | Facility: CLINIC | Age: 27
End: 2017-08-17

## 2017-08-17 DIAGNOSIS — Z79.01 LONG TERM (CURRENT) USE OF ANTICOAGULANTS: ICD-10-CM

## 2017-08-17 DIAGNOSIS — Z95.811 LVAD (LEFT VENTRICULAR ASSIST DEVICE) PRESENT: ICD-10-CM

## 2017-08-21 LAB — INR PPP: 2.8

## 2017-08-22 ENCOUNTER — ANTI-COAG VISIT (OUTPATIENT)
Dept: CARDIOLOGY | Facility: CLINIC | Age: 27
End: 2017-08-22

## 2017-08-22 DIAGNOSIS — Z79.01 LONG TERM (CURRENT) USE OF ANTICOAGULANTS: ICD-10-CM

## 2017-08-22 DIAGNOSIS — Z95.811 LVAD (LEFT VENTRICULAR ASSIST DEVICE) PRESENT: ICD-10-CM

## 2017-08-24 ENCOUNTER — ANTI-COAG VISIT (OUTPATIENT)
Dept: CARDIOLOGY | Facility: CLINIC | Age: 27
End: 2017-08-24

## 2017-08-24 DIAGNOSIS — Z95.811 LVAD (LEFT VENTRICULAR ASSIST DEVICE) PRESENT: ICD-10-CM

## 2017-08-24 DIAGNOSIS — Z79.01 LONG TERM (CURRENT) USE OF ANTICOAGULANTS: ICD-10-CM

## 2017-08-24 LAB — INR PPP: 2.3

## 2017-08-30 ENCOUNTER — ANTI-COAG VISIT (OUTPATIENT)
Dept: CARDIOLOGY | Facility: CLINIC | Age: 27
End: 2017-08-30

## 2017-08-30 DIAGNOSIS — Z79.01 LONG TERM (CURRENT) USE OF ANTICOAGULANTS: ICD-10-CM

## 2017-08-30 DIAGNOSIS — Z95.811 LVAD (LEFT VENTRICULAR ASSIST DEVICE) PRESENT: ICD-10-CM

## 2017-08-30 LAB — INR PPP: 2

## 2017-09-01 ENCOUNTER — ANTI-COAG VISIT (OUTPATIENT)
Dept: CARDIOLOGY | Facility: CLINIC | Age: 27
End: 2017-09-01

## 2017-09-01 DIAGNOSIS — Z79.01 LONG TERM (CURRENT) USE OF ANTICOAGULANTS: ICD-10-CM

## 2017-09-01 DIAGNOSIS — Z95.811 LVAD (LEFT VENTRICULAR ASSIST DEVICE) PRESENT: ICD-10-CM

## 2017-09-01 LAB — INR PPP: 2

## 2017-09-01 NOTE — PROGRESS NOTES
Patient reports he has been eating more tosha salads this week (one per day) and will not be continuing this routine. INR on lower end likely due to increase dietary vitamin K. Since he will not continue these salads, I will not make any coumadin dose adjustments at this time.

## 2017-09-02 RX ORDER — CEPHALEXIN 500 MG/1
CAPSULE ORAL
Qty: 15 CAPSULE | Refills: 0 | OUTPATIENT
Start: 2017-09-02

## 2017-09-05 ENCOUNTER — DOCUMENTATION ONLY (OUTPATIENT)
Dept: TRANSFUSION MEDICINE | Facility: HOSPITAL | Age: 27
End: 2017-09-05

## 2017-09-05 NOTE — PROGRESS NOTES
[facility] TRANSFUSION MEDICINE  Section of Transfusion Medicine and Histocompatibility  HLA Note    Virtual Crossmatch Result Report     Patient has no Donor Specific Antibody (DSA) to Donor [id number]. They are 1/6 matched.  Virtual crossmatch results reported by [facility] at 09/05/2017    Please call the HLA Lab [phone] with any concerns or questions.    NATE Kmi MD, NISA  Section of Transfusion Medicine & Histocompatibility  Department of Pathology and Laboratory Medicine  [facility]  09/05/2017

## 2017-09-06 LAB — INR PPP: 2.5

## 2017-09-07 ENCOUNTER — ANTI-COAG VISIT (OUTPATIENT)
Dept: CARDIOLOGY | Facility: CLINIC | Age: 27
End: 2017-09-07

## 2017-09-07 DIAGNOSIS — Z95.811 LVAD (LEFT VENTRICULAR ASSIST DEVICE) PRESENT: ICD-10-CM

## 2017-09-07 DIAGNOSIS — Z79.01 LONG TERM (CURRENT) USE OF ANTICOAGULANTS: ICD-10-CM

## 2017-09-07 RX ORDER — WARFARIN SODIUM 5 MG/1
TABLET ORAL
Qty: 30 TABLET | Refills: 0 | Status: SHIPPED | OUTPATIENT
Start: 2017-09-07 | End: 2017-10-04 | Stop reason: SDUPTHER

## 2017-09-13 ENCOUNTER — ANTI-COAG VISIT (OUTPATIENT)
Dept: CARDIOLOGY | Facility: CLINIC | Age: 27
End: 2017-09-13

## 2017-09-13 ENCOUNTER — HOSPITAL ENCOUNTER (OUTPATIENT)
Dept: CARDIOLOGY | Facility: CLINIC | Age: 27
Discharge: HOME | End: 2017-09-13
Payer: COMMERCIAL

## 2017-09-13 ENCOUNTER — OFFICE VISIT (OUTPATIENT)
Dept: TRANSPLANT | Facility: CLINIC | Age: 27
End: 2017-09-13
Payer: COMMERCIAL

## 2017-09-13 ENCOUNTER — LAB VISIT (OUTPATIENT)
Dept: LAB | Facility: HOSPITAL | Age: 27
End: 2017-09-13
Attending: INTERNAL MEDICINE
Payer: COMMERCIAL

## 2017-09-13 ENCOUNTER — CLINICAL SUPPORT (OUTPATIENT)
Dept: TRANSPLANT | Facility: CLINIC | Age: 27
End: 2017-09-13
Payer: COMMERCIAL

## 2017-09-13 VITALS — HEIGHT: 67 IN | WEIGHT: 159 LBS | TEMPERATURE: 98 F | BODY MASS INDEX: 24.96 KG/M2 | SYSTOLIC BLOOD PRESSURE: 72 MMHG

## 2017-09-13 DIAGNOSIS — I42.9 FAMILIAL CARDIOMYOPATHY: ICD-10-CM

## 2017-09-13 DIAGNOSIS — Z76.82 ORGAN TRANSPLANT CANDIDATE: ICD-10-CM

## 2017-09-13 DIAGNOSIS — Z79.01 LONG TERM (CURRENT) USE OF ANTICOAGULANTS: ICD-10-CM

## 2017-09-13 DIAGNOSIS — Z95.811 HEART REPLACED BY HEART ASSIST DEVICE: ICD-10-CM

## 2017-09-13 DIAGNOSIS — Z95.811 LVAD (LEFT VENTRICULAR ASSIST DEVICE) PRESENT: ICD-10-CM

## 2017-09-13 DIAGNOSIS — I50.42 CHRONIC COMBINED SYSTOLIC AND DIASTOLIC HEART FAILURE: ICD-10-CM

## 2017-09-13 DIAGNOSIS — I42.8 NICM (NONISCHEMIC CARDIOMYOPATHY): Primary | ICD-10-CM

## 2017-09-13 DIAGNOSIS — I50.9 CONGESTIVE HEART FAILURE: ICD-10-CM

## 2017-09-13 DIAGNOSIS — Z95.810 ICD (IMPLANTABLE CARDIOVERTER-DEFIBRILLATOR) IN PLACE: ICD-10-CM

## 2017-09-13 DIAGNOSIS — I50.9 CONGESTIVE HEART FAILURE, UNSPECIFIED CONGESTIVE HEART FAILURE CHRONICITY, UNSPECIFIED CONGESTIVE HEART FAILURE TYPE: ICD-10-CM

## 2017-09-13 LAB
ALBUMIN SERPL BCP-MCNC: 4.3 G/DL
ALP SERPL-CCNC: 80 U/L
ALT SERPL W/O P-5'-P-CCNC: 19 U/L
ANION GAP SERPL CALC-SCNC: 13 MMOL/L
AST SERPL-CCNC: 19 U/L
BASOPHILS # BLD AUTO: 0.03 K/UL
BASOPHILS NFR BLD: 0.4 %
BILIRUB DIRECT SERPL-MCNC: 0.3 MG/DL
BILIRUB SERPL-MCNC: 0.9 MG/DL
BNP SERPL-MCNC: 56 PG/ML
BUN SERPL-MCNC: 11 MG/DL
CALCIUM SERPL-MCNC: 9.2 MG/DL
CHLORIDE SERPL-SCNC: 100 MMOL/L
CO2 SERPL-SCNC: 26 MMOL/L
CREAT SERPL-MCNC: 1.2 MG/DL
CRP SERPL-MCNC: 9.6 MG/L
DIASTOLIC DYSFUNCTION: YES
DIFFERENTIAL METHOD: ABNORMAL
EOSINOPHIL # BLD AUTO: 0.2 K/UL
EOSINOPHIL NFR BLD: 2.6 %
ERYTHROCYTE [DISTWIDTH] IN BLOOD BY AUTOMATED COUNT: 14.9 %
EST. GFR  (AFRICAN AMERICAN): >60 ML/MIN/1.73 M^2
EST. GFR  (NON AFRICAN AMERICAN): >60 ML/MIN/1.73 M^2
ESTIMATED PA SYSTOLIC PRESSURE: 21.15
GLUCOSE SERPL-MCNC: 94 MG/DL
HCT VFR BLD AUTO: 41.5 %
HGB BLD-MCNC: 14.7 G/DL
INR PPP: 2
LDH SERPL L TO P-CCNC: 192 U/L
LYMPHOCYTES # BLD AUTO: 1.6 K/UL
LYMPHOCYTES NFR BLD: 21.1 %
MAGNESIUM SERPL-MCNC: 2.1 MG/DL
MCH RBC QN AUTO: 31 PG
MCHC RBC AUTO-ENTMCNC: 35.4 G/DL
MCV RBC AUTO: 88 FL
MONOCYTES # BLD AUTO: 0.6 K/UL
MONOCYTES NFR BLD: 8.3 %
NEUTROPHILS # BLD AUTO: 5 K/UL
NEUTROPHILS NFR BLD: 67.5 %
PHOSPHATE SERPL-MCNC: 3.3 MG/DL
PLATELET # BLD AUTO: 190 K/UL
PMV BLD AUTO: 9.5 FL
POTASSIUM SERPL-SCNC: 3.9 MMOL/L
PREALB SERPL-MCNC: 27 MG/DL
PROT SERPL-MCNC: 7.7 G/DL
PROTHROMBIN TIME: 19.7 SEC
RBC # BLD AUTO: 4.74 M/UL
RETIRED EF AND QEF - SEE NOTES: 10 (ref 55–65)
SODIUM SERPL-SCNC: 139 MMOL/L
T4 FREE SERPL-MCNC: 1.21 NG/DL
TRICUSPID VALVE REGURGITATION: ABNORMAL
TSH SERPL DL<=0.005 MIU/L-ACNC: 1.02 UIU/ML
WBC # BLD AUTO: 7.35 K/UL

## 2017-09-13 PROCEDURE — 93750 INTERROGATION VAD IN PERSON: CPT | Mod: S$GLB,TXP,, | Performed by: INTERNAL MEDICINE

## 2017-09-13 PROCEDURE — 86832 HLA CLASS I HIGH DEFIN QUAL: CPT | Mod: PO,TXP

## 2017-09-13 PROCEDURE — 84439 ASSAY OF FREE THYROXINE: CPT | Mod: TXP

## 2017-09-13 PROCEDURE — 93306 TTE W/DOPPLER COMPLETE: CPT | Mod: PBBFAC,TXP | Performed by: INTERNAL MEDICINE

## 2017-09-13 PROCEDURE — 85610 PROTHROMBIN TIME: CPT | Mod: TXP

## 2017-09-13 PROCEDURE — 84100 ASSAY OF PHOSPHORUS: CPT | Mod: TXP

## 2017-09-13 PROCEDURE — 86977 RBC SERUM PRETX INCUBJ/INHIB: CPT | Mod: 91,PO,TXP

## 2017-09-13 PROCEDURE — 80053 COMPREHEN METABOLIC PANEL: CPT | Mod: TXP

## 2017-09-13 PROCEDURE — 3078F DIAST BP <80 MM HG: CPT | Mod: S$GLB,TXP,, | Performed by: INTERNAL MEDICINE

## 2017-09-13 PROCEDURE — 83880 ASSAY OF NATRIURETIC PEPTIDE: CPT | Mod: TXP

## 2017-09-13 PROCEDURE — 83735 ASSAY OF MAGNESIUM: CPT | Mod: TXP

## 2017-09-13 PROCEDURE — 85025 COMPLETE CBC W/AUTO DIFF WBC: CPT | Mod: TXP

## 2017-09-13 PROCEDURE — 84443 ASSAY THYROID STIM HORMONE: CPT | Mod: TXP

## 2017-09-13 PROCEDURE — 86833 HLA CLASS II HIGH DEFIN QUAL: CPT | Mod: PO,TXP

## 2017-09-13 PROCEDURE — 99999 PR PBB SHADOW E&M-EST. PATIENT-LVL III: CPT | Mod: PBBFAC,TXP,, | Performed by: INTERNAL MEDICINE

## 2017-09-13 PROCEDURE — 3074F SYST BP LT 130 MM HG: CPT | Mod: S$GLB,TXP,, | Performed by: INTERNAL MEDICINE

## 2017-09-13 PROCEDURE — 82248 BILIRUBIN DIRECT: CPT | Mod: TXP

## 2017-09-13 PROCEDURE — 86140 C-REACTIVE PROTEIN: CPT | Mod: TXP

## 2017-09-13 PROCEDURE — 99214 OFFICE O/P EST MOD 30 MIN: CPT | Mod: S$GLB,TXP,, | Performed by: INTERNAL MEDICINE

## 2017-09-13 PROCEDURE — 3008F BODY MASS INDEX DOCD: CPT | Mod: S$GLB,TXP,, | Performed by: INTERNAL MEDICINE

## 2017-09-13 PROCEDURE — 83615 LACTATE (LD) (LDH) ENZYME: CPT | Mod: TXP

## 2017-09-13 PROCEDURE — 84134 ASSAY OF PREALBUMIN: CPT | Mod: TXP

## 2017-09-13 PROCEDURE — 36415 COLL VENOUS BLD VENIPUNCTURE: CPT | Mod: TXP

## 2017-09-13 RX ORDER — FUROSEMIDE 40 MG/1
40 TABLET ORAL
Status: ON HOLD | COMMUNITY
End: 2018-02-28 | Stop reason: HOSPADM

## 2017-09-13 NOTE — PROGRESS NOTES
Pt here today for routine follow up visit. Educated patient on the new controllers 2.0 and new DC adapters. New DVD and patient handbook given to patient today.  Pt INR today is 2.0. Pt  verbalized understanding and agreement to proceed with changing the controllers. Pt  successfully changed controllers.  Pt tolerated procedure well without complications.     Primary controller now CON [id number], back up CON [id number] and new DC adapter [facility] [id number]. Controllers removed from patient: [id number] and [id number] and DC adapter [id number].  These 3 items will be returned to [employer] per Alexander Borrero.    Once we switched patient over to new controllers, HCT changed to 41.5. LFA programmed to  2.0 and HPA programmed to 6.5.   Back up controller programmed to 2700.  Waveform after speed change 2-7,  no negative deflections noted.     Reviewed how to view the new parameters with patient. Discussed in detail how the batteries will drain power and to use batteries in pairs now.  Reviewed that the alarms, warnings and precautions are the same as with the old controller. Reviewed how the connections are different.     Patient verbalized understanding to all above.        [employer], now a part of [employer], has developed an updated HeartWare® HVAD  as part of a continuous improvement initiatives following two previously communicated Urgent Field Safety Notices that occurred in 2015 and 2016.     As described in the previous Urgent Field Safety Notices from 2015 and 2016, there was the potential for the following safety issues associated with the current HeartWare HVAD , includin. Worn alignment guides, which could allow connectors to rotate or move, potentially resulting in damaged connector pins.   2. Internal double disconnect (no power) alarm battery failure, which could prevent the controller from sounding an alarm in the event of a complete interruption of power.   3. Loose  power and data connectors, which could allow the ingress of fluid, resulting in controller malfunction.     The new HVAD Controller includes enhancements to address these potential safety issues, includin. Strengthened power and serial port alignment guides to reduce the incidence of wear that could lead to damaged connector pins.   2. Functionality that monitors internal battery performance and sounds an alert when the internal battery is nearing its end of life.   3. Redesigned connectors and housing to prevent the risk of connectors loosening and moisture ingress.    In addition, the new HVAD Controller introduces upgraded internal circuitry designed to improve overall device reliability.     Due to the FDA recall and for patient safety, it is medically necessary to provide the patient with new upgraded primary and backup controllers.  Along with these we have provided the patient with a new DC adapter to be compatible with the new controllers.

## 2017-09-13 NOTE — PROGRESS NOTES
"Date of Implant with Heartware LVAD: 2/1/17    PATIENT ARRIVED IN CLINIC:  Ambulatory  Accompanied by:Wife  Vitals  Doppler:72  Pulsatile:no  PAIN:0 on 0-10 pain scale , location of pain: na, description of pain: na  Is patient currently on medications for pain?no What kind? na    VAD Interrogation:  TXP DERREK INTERROGATIONS 9/13/2017   Type Heartware   Flow 4.6   Speed 2700   Power (G[employer]mith) 4.5   Low Flow Alarm 2.0   High Power Alarm 6.5   Pulsatility Intermittent pulse       HCT:41.5  WAVEFORM:2-8 no negative deflections  Suction alarm: off     Problems / Issues / Alarms with VAD if any:none   Any Equipment Issues? (Refer to equipment coordinators detailed note):none  Patient states their batteries are lasting 6-7 hours. Rotating all batteries. Checking connections before and after changing battery.   Emergency Equipment With Patient:yes   VAD Binder With Patient: yes   Reviewed VAD Numbers In Binder:yes  VAD Sounds: SMOOTH   Manual & Visual Inspection Of Driveline:  NO KINKS OR TEARS NOTED   Checked Heartware driveline connector housing for separation, none noted.  Also checked for tight connection, which they are.  Educated pt regarding this and instructed  to check on this daily. Pt verbalized understanding and agreement.     LVAD Dressing/DLES:  Appearance Of Driveline:"1"  Antibiotics:NO  Frequency of Dressing Changes:Daily with soap and water   Stabilization Device In Use: YES Castillo anchor    Pt In Need Of Management Kits?:yes 2 boxes of soap and water  It is medically necessary to have VAD management kits in order to prevent infection or to assist in the healing of an infected DLES.    Assessment:   Complaints/reason for visit today: Routine follow up  Complaints Of Nausea / Vomiting:none   Appearance and Frequency Of Stools:normal and formed without blood daily  Patient reports urine is clear and yellow:  YES    Coping/Depression/Anxiety:patient coping well  Sleep Habits:7-8 hrs /night  Sleep " Aids:none  Showering :YES , Reminded patient to change dressing immediately after shower and drying off.   Activity/Exercise:yes   Driving:yes, Reminded to pull over should there be an alarm before looking down at controller.       Labs:    Chemistry        Component Value Date/Time     09/13/2017 1520    K 3.9 09/13/2017 1520     09/13/2017 1520    CO2 26 09/13/2017 1520    BUN 11 09/13/2017 1520    CREATININE 1.2 09/13/2017 1520    GLU 94 09/13/2017 1520        Component Value Date/Time    CALCIUM 9.2 09/13/2017 1520    ALKPHOS 80 09/13/2017 1520    AST 19 09/13/2017 1520    ALT 19 09/13/2017 1520    BILITOT 0.9 09/13/2017 1520    ESTGFRAFRICA >60.0 09/13/2017 1520    EGFRNONAA >60.0 09/13/2017 1520            Magnesium   Date Value Ref Range Status   09/13/2017 2.1 1.6 - 2.6 mg/dL Final       Lab Results   Component Value Date    WBC 7.35 09/13/2017    HGB 14.7 09/13/2017    HCT 41.5 09/13/2017    MCV 88 09/13/2017     09/13/2017       Lab Results   Component Value Date    INR 2.0 (H) 09/13/2017    INR 2.5 09/06/2017    INR 2.0 09/01/2017       BNP   Date Value Ref Range Status   09/13/2017 56 0 - 99 pg/mL Final     Comment:     Values of less than 100 pg/ml are consistent with non-CHF populations.   07/11/2017 55 0 - 99 pg/mL Final     Comment:     Values of less than 100 pg/ml are consistent with non-CHF populations.   06/14/2017 67 0 - 99 pg/mL Final     Comment:     Values of less than 100 pg/ml are consistent with non-CHF populations.       LD   Date Value Ref Range Status   09/13/2017 192 110 - 260 U/L Final     Comment:     Results are increased in hemolyzed samples.   07/11/2017 159 110 - 260 U/L Final     Comment:     Results are increased in hemolyzed samples.   06/14/2017 195 110 - 260 U/L Final     Comment:     Results are increased in hemolyzed samples.       Labs reviewed with patient: YES      Patient Satisfaction Survey completed per Patient: no  (explained about signature and  box to check)  Medication reconciliation: per MA.  Purple card updated today: yes  Coumadin Managed by: [facility],     Education: Reviewed driveline care, emergency procedures, how to change the controller, alarms with patient.  Also reviewed how to get in touch with a VAD coordinator in the event of an emergency.       Plans/Needs:Patient presents today for routine followup. Patient doing well with no issues. No changes today per Dr. Hawkins. Patient to RTC 1 month. Controller changed today in clinic. Refer to LVAD Coordinator note. Refer to MD note.

## 2017-09-13 NOTE — LETTER
September 16, 2017        Guillermo Alejo  [address]  [facility] [address]  Phone: [phone]  Fax: [phone]             [facility]  [address]  [facility] [address]  Phone: [phone]   Patient: Mert Samayoa   MR Number: [id number]   YOB: 1990   Date of Visit: 9/13/2017       Dear Dr. Guillermo Alejo    Thank you for referring Mert Samayoa to me for evaluation. Attached you will find relevant portions of my assessment and plan of care.    If you have questions, please do not hesitate to call me. I look forward to following Mert Samayoa along with you.    Sincerely,    Lashon Hawkins MD    Enclosure    If you would like to receive this communication electronically, please contact [email or web address] or [phone] to request [employer] Link access.    [employer] Link is a tool which provides read-only access to select patient information with whom you have a relationship. Its easy to use and provides real time access to review your patients record including encounter summaries, notes, results, and demographic information.    If you feel you have received this communication in error or would no longer like to receive these types of communications, please e-mail [email or web address]

## 2017-09-14 LAB — HPRA INTERPRETATION: NORMAL

## 2017-09-15 LAB
CLASS I ANTIBODIES - LUMINEX: NEGATIVE
CLASS II ANTIBODIES - LUMINEX: NEGATIVE
CPRA %: 0
SERUM COLLECTION DT - LUMINEX CLASS I: NORMAL
SERUM COLLECTION DT - LUMINEX CLASS II: NORMAL
SPCL1 TESTING DATE: NORMAL
SPCL2 TESTING DATE: NORMAL
SPLUA TESTING DATE: NORMAL

## 2017-09-19 ENCOUNTER — ANTI-COAG VISIT (OUTPATIENT)
Dept: CARDIOLOGY | Facility: CLINIC | Age: 27
End: 2017-09-19

## 2017-09-19 DIAGNOSIS — Z95.811 LVAD (LEFT VENTRICULAR ASSIST DEVICE) PRESENT: ICD-10-CM

## 2017-09-19 DIAGNOSIS — Z79.01 LONG TERM (CURRENT) USE OF ANTICOAGULANTS: ICD-10-CM

## 2017-09-19 LAB — INR PPP: 2

## 2017-09-19 NOTE — PROGRESS NOTES
Subjective:   Patient ID:  Mert Samayoa is a 27 y.o. male who presents for LVAD followup visit.    Implant date: 2/1/17    TXP DERREK INTERROGATIONS 9/13/2017   Type Heartware   Flow 4.6   Speed 2700   Power (Goldsmith) 4.5   Low Flow Alarm 2.0   High Power Alarm 6.5   Pulsatility Intermittent pulse        HPI  Mr. Samayoa is a 27 year old WM with DCM (familial, both brothers with heart transplants), who underwent Heartware placement 02/01 and sternal closure 02/02. Postoperative course did not have many complications at all, except for atrial flutter 02/11 from which he converted out of with amiodarone. doing well overall, denies any cardiopulmonary complaints. He is back at work, feels great, not doing any heavy lifting but otherwise doing all of his old things he used to do at his job. He and his wife appear very happy with how he has done. Denies cardiopulmonary complaints. DLES 1.     Review of Systems   Constitution: Negative for chills, decreased appetite, diaphoresis, fever, weakness, malaise/fatigue, weight gain and weight loss.   Eyes: Negative for visual disturbance.   Cardiovascular: Negative for chest pain, claudication, cyanosis, dyspnea on exertion, irregular heartbeat, leg swelling, near-syncope, orthopnea, palpitations, paroxysmal nocturnal dyspnea and syncope.   Respiratory: Negative for cough, hemoptysis, shortness of breath, sleep disturbances due to breathing, snoring, sputum production and wheezing.    Hematologic/Lymphatic: Negative for adenopathy and bleeding problem. Does not bruise/bleed easily.   Skin: Negative for color change, poor wound healing, rash, skin cancer and suspicious lesions.   Musculoskeletal: Negative for back pain, falls, gout, joint pain and muscle weakness.   Gastrointestinal: Negative for bloating, abdominal pain, anorexia, constipation, diarrhea, heartburn, hematemesis, hematochezia, hemorrhoids, melena, nausea and vomiting.   Genitourinary: Negative for nocturia  "and urgency.   Neurological: Negative for excessive daytime sleepiness, dizziness, focal weakness, headaches, light-headedness, paresthesias and tremors.   Psychiatric/Behavioral: Negative for depression and memory loss. The patient does not have insomnia and is not nervous/anxious.        Objective:     Doppler: 72    Physical Exam   Constitutional: He is oriented to person, place, and time. He appears well-developed and well-nourished. He is active. He is not intubated.   BP (!) 72/0 (BP Location: Left arm, Patient Position: Sitting)   Temp 98 °F (36.7 °C) (Oral)   Ht 5' 7" (1.702 m)   Wt 72.1 kg (159 lb)   BMI 24.90 kg/m²      HENT:   Head: Normocephalic and atraumatic. Hair is normal.   Right Ear: External ear normal.   Left Ear: External ear normal.   Nose: Nose normal. No nasal deformity. No epistaxis.  No foreign bodies.   Mouth/Throat: Mucous membranes are normal. Mucous membranes are not cyanotic. No oropharyngeal exudate.   Eyes: Conjunctivae and EOM are normal. Pupils are equal, round, and reactive to light.   Neck: Neck supple. No hepatojugular reflux and no JVD (not visible at 90 degree angle) present.   Cardiovascular: Normal rate, regular rhythm, normal heart sounds and normal pulses.  Exam reveals no gallop.    Pulmonary/Chest: Effort normal and breath sounds normal. No apnea and no tachypnea. He is not intubated. No respiratory distress. He exhibits no tenderness.   Abdominal: Soft. Normal appearance and bowel sounds are normal. There is no tenderness. No hernia.   Musculoskeletal: Normal range of motion.   Neurological: He is alert and oriented to person, place, and time. He displays no seizure activity.   Skin: Skin is warm, dry and intact. No rash noted. No pallor.   Psychiatric: He has a normal mood and affect. His speech is normal and behavior is normal. Thought content normal. Cognition and memory are normal.   Nursing note and vitals reviewed.      Lab Results   Component Value Date    " WBC 7.35 09/13/2017    HGB 14.7 09/13/2017    HCT 41.5 09/13/2017    MCV 88 09/13/2017     09/13/2017    CO2 26 09/13/2017    CREATININE 1.2 09/13/2017    CALCIUM 9.2 09/13/2017    ALBUMIN 4.3 09/13/2017    AST 19 09/13/2017    BNP 56 09/13/2017    ALT 19 09/13/2017     09/13/2017       Lab Results   Component Value Date    INR 2.0 (H) 09/13/2017    INR 2.5 09/06/2017    INR 2.0 09/01/2017       BNP   Date Value Ref Range Status   09/13/2017 56 0 - 99 pg/mL Final     Comment:     Values of less than 100 pg/ml are consistent with non-CHF populations.   07/11/2017 55 0 - 99 pg/mL Final     Comment:     Values of less than 100 pg/ml are consistent with non-CHF populations.   06/14/2017 67 0 - 99 pg/mL Final     Comment:     Values of less than 100 pg/ml are consistent with non-CHF populations.       LD   Date Value Ref Range Status   09/13/2017 192 110 - 260 U/L Final     Comment:     Results are increased in hemolyzed samples.   07/11/2017 159 110 - 260 U/L Final     Comment:     Results are increased in hemolyzed samples.   06/14/2017 195 110 - 260 U/L Final     Comment:     Results are increased in hemolyzed samples.     Assessment:      1. NICM (nonischemic cardiomyopathy)    2. Heart replaced by heart assist device    3. Congestive heart failure    4. ICD (implantable cardioverter-defibrillator) in place    5. Familial cardiomyopathy        Plan:   Patient has been doing great, blood pressure well controlled. No changes to medications at this time.   Patient is now NYHA II  Recommend 2 gram sodium restriction and 1500cc fluid restriction.  Encourage physical activity with graded exercise program.  Requested patient to weigh themselves daily, and to notify us if their weight increases by more than 3 lbs in 1 day or 5 lbs in 1 week.     Listed for transplant: Yes, all appropriate transplant related labs (including HLA) performed. RHC requested per listing protocol. 1B    UNOS Patient Status  Functional  Status: 60% - Requires occasional assistance but is able to care for needs  Physical Capacity: No Limitations  Working for Income: yes  If yes, working activity level: Working Part Time Due to Demands of Treatment

## 2017-09-26 ENCOUNTER — ANTI-COAG VISIT (OUTPATIENT)
Dept: CARDIOLOGY | Facility: CLINIC | Age: 27
End: 2017-09-26

## 2017-09-26 DIAGNOSIS — Z95.811 LVAD (LEFT VENTRICULAR ASSIST DEVICE) PRESENT: ICD-10-CM

## 2017-09-26 DIAGNOSIS — Z79.01 LONG TERM (CURRENT) USE OF ANTICOAGULANTS: ICD-10-CM

## 2017-09-26 LAB — INR PPP: 1.9

## 2017-10-02 ENCOUNTER — ANTI-COAG VISIT (OUTPATIENT)
Dept: CARDIOLOGY | Facility: CLINIC | Age: 27
End: 2017-10-02

## 2017-10-02 DIAGNOSIS — Z95.811 LVAD (LEFT VENTRICULAR ASSIST DEVICE) PRESENT: ICD-10-CM

## 2017-10-02 LAB — INR PPP: 2.1

## 2017-10-04 ENCOUNTER — CLINICAL SUPPORT (OUTPATIENT)
Dept: TRANSPLANT | Facility: CLINIC | Age: 27
End: 2017-10-04
Payer: COMMERCIAL

## 2017-10-04 ENCOUNTER — LAB VISIT (OUTPATIENT)
Dept: LAB | Facility: HOSPITAL | Age: 27
End: 2017-10-04
Attending: INTERNAL MEDICINE
Payer: COMMERCIAL

## 2017-10-04 ENCOUNTER — OFFICE VISIT (OUTPATIENT)
Dept: TRANSPLANT | Facility: CLINIC | Age: 27
End: 2017-10-04
Payer: COMMERCIAL

## 2017-10-04 ENCOUNTER — ANTI-COAG VISIT (OUTPATIENT)
Dept: CARDIOLOGY | Facility: CLINIC | Age: 27
End: 2017-10-04

## 2017-10-04 VITALS
HEART RATE: 80 BPM | HEIGHT: 67 IN | BODY MASS INDEX: 25.61 KG/M2 | WEIGHT: 163.19 LBS | SYSTOLIC BLOOD PRESSURE: 84 MMHG | TEMPERATURE: 98 F

## 2017-10-04 DIAGNOSIS — I42.8 NICM (NONISCHEMIC CARDIOMYOPATHY): Primary | ICD-10-CM

## 2017-10-04 DIAGNOSIS — F17.210 CIGARETTE SMOKER: ICD-10-CM

## 2017-10-04 DIAGNOSIS — I42.9 FAMILIAL CARDIOMYOPATHY: ICD-10-CM

## 2017-10-04 DIAGNOSIS — Z95.811 HEART REPLACED BY HEART ASSIST DEVICE: ICD-10-CM

## 2017-10-04 DIAGNOSIS — Z79.01 ANTICOAGULATION MONITORING, INR RANGE 2-3: ICD-10-CM

## 2017-10-04 DIAGNOSIS — I50.9 CONGESTIVE HEART FAILURE: ICD-10-CM

## 2017-10-04 DIAGNOSIS — I50.9 CONGESTIVE HEART FAILURE, UNSPECIFIED CONGESTIVE HEART FAILURE CHRONICITY, UNSPECIFIED CONGESTIVE HEART FAILURE TYPE: ICD-10-CM

## 2017-10-04 DIAGNOSIS — I50.42 CHRONIC COMBINED SYSTOLIC AND DIASTOLIC HEART FAILURE: ICD-10-CM

## 2017-10-04 DIAGNOSIS — Z76.82 ORGAN TRANSPLANT CANDIDATE: ICD-10-CM

## 2017-10-04 DIAGNOSIS — Z95.810 ICD (IMPLANTABLE CARDIOVERTER-DEFIBRILLATOR) IN PLACE: ICD-10-CM

## 2017-10-04 DIAGNOSIS — Z95.811 LVAD (LEFT VENTRICULAR ASSIST DEVICE) PRESENT: ICD-10-CM

## 2017-10-04 LAB
ALBUMIN SERPL BCP-MCNC: 4.5 G/DL
ALP SERPL-CCNC: 87 U/L
ALT SERPL W/O P-5'-P-CCNC: 21 U/L
ANION GAP SERPL CALC-SCNC: 11 MMOL/L
AST SERPL-CCNC: 21 U/L
BASOPHILS # BLD AUTO: 0.03 K/UL
BASOPHILS NFR BLD: 0.4 %
BILIRUB DIRECT SERPL-MCNC: 0.2 MG/DL
BILIRUB SERPL-MCNC: 0.7 MG/DL
BNP SERPL-MCNC: 92 PG/ML
BUN SERPL-MCNC: 12 MG/DL
CALCIUM SERPL-MCNC: 9.5 MG/DL
CHLORIDE SERPL-SCNC: 104 MMOL/L
CO2 SERPL-SCNC: 26 MMOL/L
CREAT SERPL-MCNC: 1.3 MG/DL
CRP SERPL-MCNC: 5.8 MG/L
DIFFERENTIAL METHOD: NORMAL
EOSINOPHIL # BLD AUTO: 0.2 K/UL
EOSINOPHIL NFR BLD: 2.5 %
ERYTHROCYTE [DISTWIDTH] IN BLOOD BY AUTOMATED COUNT: 14.2 %
EST. GFR  (AFRICAN AMERICAN): >60 ML/MIN/1.73 M^2
EST. GFR  (NON AFRICAN AMERICAN): >60 ML/MIN/1.73 M^2
GLUCOSE SERPL-MCNC: 91 MG/DL
HCT VFR BLD AUTO: 43.8 %
HGB BLD-MCNC: 15.1 G/DL
INR PPP: 2
LDH SERPL L TO P-CCNC: 263 U/L
LYMPHOCYTES # BLD AUTO: 1.6 K/UL
LYMPHOCYTES NFR BLD: 21.8 %
MAGNESIUM SERPL-MCNC: 2.3 MG/DL
MCH RBC QN AUTO: 30.8 PG
MCHC RBC AUTO-ENTMCNC: 34.5 G/DL
MCV RBC AUTO: 89 FL
MONOCYTES # BLD AUTO: 0.7 K/UL
MONOCYTES NFR BLD: 10.3 %
NEUTROPHILS # BLD AUTO: 4.7 K/UL
NEUTROPHILS NFR BLD: 64.9 %
PHOSPHATE SERPL-MCNC: 3.5 MG/DL
PLATELET # BLD AUTO: 193 K/UL
PMV BLD AUTO: 9.5 FL
POTASSIUM SERPL-SCNC: 4 MMOL/L
PREALB SERPL-MCNC: 30 MG/DL
PROT SERPL-MCNC: 8.1 G/DL
PROTHROMBIN TIME: 20.6 SEC
RBC # BLD AUTO: 4.9 M/UL
SODIUM SERPL-SCNC: 141 MMOL/L
WBC # BLD AUTO: 7.19 K/UL

## 2017-10-04 PROCEDURE — 86977 RBC SERUM PRETX INCUBJ/INHIB: CPT | Mod: 91,PO,TXP

## 2017-10-04 PROCEDURE — 86833 HLA CLASS II HIGH DEFIN QUAL: CPT | Mod: PO,TXP

## 2017-10-04 PROCEDURE — 85025 COMPLETE CBC W/AUTO DIFF WBC: CPT | Mod: TXP

## 2017-10-04 PROCEDURE — 85610 PROTHROMBIN TIME: CPT | Mod: TXP

## 2017-10-04 PROCEDURE — 86832 HLA CLASS I HIGH DEFIN QUAL: CPT | Mod: PO,TXP

## 2017-10-04 PROCEDURE — 84100 ASSAY OF PHOSPHORUS: CPT | Mod: TXP

## 2017-10-04 PROCEDURE — 83615 LACTATE (LD) (LDH) ENZYME: CPT | Mod: TXP

## 2017-10-04 PROCEDURE — 36415 COLL VENOUS BLD VENIPUNCTURE: CPT | Mod: TXP

## 2017-10-04 PROCEDURE — 86140 C-REACTIVE PROTEIN: CPT | Mod: TXP

## 2017-10-04 PROCEDURE — 84134 ASSAY OF PREALBUMIN: CPT | Mod: TXP

## 2017-10-04 PROCEDURE — 83735 ASSAY OF MAGNESIUM: CPT | Mod: TXP

## 2017-10-04 PROCEDURE — 93750 INTERROGATION VAD IN PERSON: CPT | Mod: S$GLB,TXP,, | Performed by: INTERNAL MEDICINE

## 2017-10-04 PROCEDURE — 80053 COMPREHEN METABOLIC PANEL: CPT | Mod: TXP

## 2017-10-04 PROCEDURE — 82248 BILIRUBIN DIRECT: CPT | Mod: TXP

## 2017-10-04 PROCEDURE — 99999 PR PBB SHADOW E&M-EST. PATIENT-LVL IV: CPT | Mod: PBBFAC,TXP,, | Performed by: INTERNAL MEDICINE

## 2017-10-04 PROCEDURE — 83880 ASSAY OF NATRIURETIC PEPTIDE: CPT | Mod: TXP

## 2017-10-04 PROCEDURE — 99214 OFFICE O/P EST MOD 30 MIN: CPT | Mod: S$PBB,TXP,, | Performed by: INTERNAL MEDICINE

## 2017-10-04 RX ORDER — LISINOPRIL 10 MG/1
10 TABLET ORAL 2 TIMES DAILY
Qty: 60 TABLET | Refills: 11 | Status: ON HOLD | OUTPATIENT
Start: 2017-10-04 | End: 2018-02-28 | Stop reason: HOSPADM

## 2017-10-04 RX ORDER — WARFARIN SODIUM 5 MG/1
5-7.5 TABLET ORAL DAILY
Qty: 50 TABLET | Refills: 5 | Status: ON HOLD | OUTPATIENT
Start: 2017-10-04 | End: 2018-02-28 | Stop reason: HOSPADM

## 2017-10-04 NOTE — PATIENT INSTRUCTIONS
Increase lisinopril to 10mg twice a day    Labs on Monday to recheck your potassium and kidney function on the higher dose    Keep salt intake to under 2000 mg sodium, fluids to under 2 L (64 oz)    Flu shot    Call us if you find yourself getting more short of breath, have more swelling or unexpected weight changes, fever, chills, alarms, bloody or black bowel movements, or drainage from your driveline

## 2017-10-04 NOTE — LETTER
October 4, 2017        Guillermo Alejo  [address]  [facility] [address]  Phone: [phone]  Fax: [phone]             [facility]  [address]  [facility] [address]  Phone: [phone]   Patient: Mert Samayoa   MR Number: [id number]   YOB: 1990   Date of Visit: 10/4/2017       Dear Dr. Guillermo Alejo    Thank you for referring Mert Samayoa to me for evaluation. Attached you will find relevant portions of my assessment and plan of care.    If you have questions, please do not hesitate to call me. I look forward to following Mert Samayoa along with you.    Sincerely,    Marisel Lundberg MD    Enclosure    If you would like to receive this communication electronically, please contact [email or web address] or [phone] to request [employer] Link access.    [employer] Link is a tool which provides read-only access to select patient information with whom you have a relationship. Its easy to use and provides real time access to review your patients record including encounter summaries, notes, results, and demographic information.    If you feel you have received this communication in error or would no longer like to receive these types of communications, please e-mail [email or web address]

## 2017-10-04 NOTE — PROCEDURES
TXP DERREK INTERROGATIONS 10/4/2017 9/13/2017 7/13/2017 6/14/2017 5/17/2017 5/4/2017 4/6/2017   Type Heartware Heartware Heartware Heartware Heartware Heartware Heartware   Flow 4.1 4.6 4.1 4.0 4 3.9 4.5   Speed 2700 2700 2700 2700 2700 2700 2700   Power (Goldsmith) 4.2 4.5 4.4 4.6 4.3 4.4 4.1   Low Flow Alarm 2 2.0 2.0 2.5 2.5 2.5 2.5   High Power Alarm 6 6.5 6.5 6.0 6.0 6.0 6.0   Pulsatility Pulse Intermittent pulse Intermittent pulse Intermittent pulse Intermittent pulse Intermittent pulse Intermittent pulse   }

## 2017-10-04 NOTE — PROGRESS NOTES
Subjective:   Patient ID:  Mert Samayoa is a 27 y.o. male who presents for LVAD followup visit.    Implant date: 2/1/17    TXP DERREK INTERROGATIONS 10/4/2017   Type Heartware   Flow 4.1   Speed 2700   Power (Goldsmith) 4.2   Low Flow Alarm 2   High Power Alarm 6   Pulsatility Pulse        HPI  Mr. Samayoa is a 27 year old WM with DCM (familial, both brothers with heart transplants), who underwent Heartware placement 02/01 and sternal closure 02/02. Postoperative course did not have many complications at all, except for atrial flutter 02/11 from which he converted out of with amiodarone.     Since last visit, pt has been doing very well- has gained a little weight but says its not fluid- no swelling, but does not getting slightly winded at the top of flight of stairs. No alarms, normal BM    DLES is grade 1    Interrogation of device data reveals no alarms, normal flows and power (see VAD interrogation report for full details.)            Review of Systems   Constitution: Negative for chills, decreased appetite, diaphoresis, fever, weakness, malaise/fatigue, weight gain and weight loss.   Eyes: Negative for visual disturbance.   Cardiovascular: Negative for chest pain, claudication, cyanosis, dyspnea on exertion, irregular heartbeat, leg swelling, near-syncope, orthopnea, palpitations, paroxysmal nocturnal dyspnea and syncope.   Respiratory: Negative for cough, hemoptysis, shortness of breath, sleep disturbances due to breathing, snoring, sputum production and wheezing.    Hematologic/Lymphatic: Negative for adenopathy and bleeding problem. Does not bruise/bleed easily.   Skin: Negative for color change, poor wound healing, rash, skin cancer and suspicious lesions.   Musculoskeletal: Negative for back pain, falls, gout, joint pain and muscle weakness.   Gastrointestinal: Negative for bloating, abdominal pain, anorexia, constipation, diarrhea, heartburn, hematemesis, hematochezia, hemorrhoids, melena, nausea and  "vomiting.   Genitourinary: Negative for nocturia and urgency.   Neurological: Negative for excessive daytime sleepiness, dizziness, focal weakness, headaches, light-headedness, paresthesias and tremors.   Psychiatric/Behavioral: Negative for depression and memory loss. The patient does not have insomnia and is not nervous/anxious.        Objective: BP (!) 84/0   Pulse 80   Temp 98 °F (36.7 °C)   Ht 5' 7" (1.702 m)   Wt 74 kg (163 lb 3.1 oz)   BMI 25.56 kg/m²        Doppler: 84 MAP 92 ( I checked this doppler myself)  No pulse    Physical Exam   Constitutional: He is oriented to person, place, and time. He appears well-developed and well-nourished. He is active. He is not intubated.        HENT:   Head: Normocephalic and atraumatic. Hair is normal.   Right Ear: External ear normal.   Left Ear: External ear normal.   Nose: Nose normal. No nasal deformity. No epistaxis.  No foreign bodies.   Mouth/Throat: Mucous membranes are normal. Mucous membranes are not cyanotic. No oropharyngeal exudate.   Eyes: Conjunctivae and EOM are normal. Pupils are equal, round, and reactive to light.   Neck: Neck supple. No hepatojugular reflux and no JVD (not visible at 90 degree angle) present.   Cardiovascular: Normal rate, regular rhythm, normal heart sounds and normal pulses.  Exam reveals no gallop.    Normal VAD hum   Pulmonary/Chest: Effort normal and breath sounds normal. No apnea and no tachypnea. He is not intubated. No respiratory distress. He exhibits no tenderness.   Abdominal: Soft. Normal appearance and bowel sounds are normal. There is no tenderness. No hernia.   Musculoskeletal: Normal range of motion.   Neurological: He is alert and oriented to person, place, and time. He displays no seizure activity.   Skin: Skin is warm, dry and intact. No rash noted. No pallor.   Psychiatric: He has a normal mood and affect. His speech is normal and behavior is normal. Thought content normal. Cognition and memory are normal. "   Nursing note and vitals reviewed.      Lab Results   Component Value Date    WBC 7.19 10/04/2017    HGB 15.1 10/04/2017    HCT 43.8 10/04/2017    MCV 89 10/04/2017     10/04/2017    CO2 26 10/04/2017    CREATININE 1.3 10/04/2017    CALCIUM 9.5 10/04/2017    ALBUMIN 4.5 10/04/2017    AST 21 10/04/2017    BNP 92 10/04/2017    ALT 21 10/04/2017     (H) 10/04/2017       Lab Results   Component Value Date    INR 2.0 (H) 10/04/2017    INR 2.1 10/02/2017    INR 1.9 09/26/2017       BNP   Date Value Ref Range Status   10/04/2017 92 0 - 99 pg/mL Final     Comment:     Values of less than 100 pg/ml are consistent with non-CHF populations.   09/13/2017 56 0 - 99 pg/mL Final     Comment:     Values of less than 100 pg/ml are consistent with non-CHF populations.   07/11/2017 55 0 - 99 pg/mL Final     Comment:     Values of less than 100 pg/ml are consistent with non-CHF populations.       LD   Date Value Ref Range Status   10/04/2017 263 (H) 110 - 260 U/L Final     Comment:     Results are increased in hemolyzed samples.   09/13/2017 192 110 - 260 U/L Final     Comment:     Results are increased in hemolyzed samples.   07/11/2017 159 110 - 260 U/L Final     Comment:     Results are increased in hemolyzed samples.     Assessment:      1. NICM (nonischemic cardiomyopathy)- FC I-II, euvolemic on exam with low BNP, did have a small increase in LD today and bp has been a little higher for the last couple visits   2. Heart replaced by heart assist device    3. Congestive heart failure    4. Chronic combined systolic and diastolic heart failure    5. Cigarette smoker quit mid Dec 2016    6. Familial cardiomyopathy    7. ICD (implantable cardioverter-defibrillator) in place    8. Anticoagulation monitoring, INR range 2-3    9. Organ transplant candidate        Plan:   Increase lisinopril 10mg bid  BMP 1 week with next INR check  Coumadin clinic adjusted dose today  No dressing supplies today  Patient is now NYHA II   Flu  shot    Recommend 2 gram sodium restriction and 1500cc fluid restriction.  Encourage physical activity with graded exercise program.  Requested patient to weigh themselves daily, and to notify us if their weight increases by more than 3 lbs in 1 day or 5 lbs in 1 week.     Listed for transplant: Yes, all appropriate transplant related labs (including HLA) performed. RHC requested per listing protocol. 1B    UNOS Patient Status  Functional Status: 60% - Requires occasional assistance but is able to care for needs  Physical Capacity: No Limitations  Working for Income: yes  If yes, working activity level: Working Part Time Due to Demands of Treatment    Discussed possibly using his 1A time over holidays if no one sicker competing with him    F/u 1 mo with labs and interrogation

## 2017-10-06 NOTE — PROGRESS NOTES
Date of Implant with Heartware LVAD: 2/1/17    PATIENT ARRIVED IN CLINIC:  Ambulatory   Accompanied by: n/a    Vitals  Doppler: 84  PAIN: denies on 0-10 pain scale  Is patient currently on medications for pain? no What kind? n/a    VAD Interrogation:  TXP DERREK INTERROGATIONS 10/4/2017   Type Heartware   Flow 4.1   Speed 2700   Power (Goldsmith) 4.2   Low Flow Alarm 2   High Power Alarm 6   Pulsatility Pulse       HCT:   Lab Results   Component Value Date    HCT 43.8 10/04/2017    HCT 24 (L) 02/02/2017     WAVEFORM: 2-7, no deflections  Suction alarm: Off  Problems / Issues / Alarms with VAD if any: None noted   Any Equipment Issues: (Refer to  note for complete details)   Patient states their batteries are lasting 12 hours. Rotating all batteries. Checking connections before and after changing battery.   Emergency Equipment With Patient: yes   VAD Binder With Patient: yes   Reviewed VAD Numbers In Binder: yes  VAD Sounds: Smooth  Manual & Visual Inspection Of Driveline: No kinks or tears noted  Checked Heartware driveline connector housing for separation, none noted.  Also checked for tight connection, which they are.  Educated pt regarding this and instructed  to check on this daily. Pt verbalized understanding and agreement.     LVAD Dressing/DLES:  Appearance Of Driveline: 1, some irritation noted around border  Antibiotics: NO  Frequency of Dressing Changes: daily & soap and water dressing   Stabilization Device In Use: yes, kirk securement device    Pt In Need Of Management Kits? No  It is medically necessary to have VAD management kits in order to prevent infection or to assist in the healing of an infected DLES.    Assessment:   Complaints/reason for visit today: routine  Complaints Of Nausea / Vomiting: None noted   Appearance and Frequency Of Stools: normal and formed without blood & daily  Color Of Urine: clear/yellow  Coping/Depression/Anxiety: coping okay  Sleep Habits: 7 hrs  /night  Sleep Aids: None noted   Showering: Yes, reminded to change dressing immediately after drying off  Activity/Exercise: pt reports working a full time job daily.    Driving: Yes. Reminded to pull over should there be an alarm before looking down at controller.    Labs:    Chemistry        Component Value Date/Time     10/04/2017 1213    K 4.0 10/04/2017 1213     10/04/2017 1213    CO2 26 10/04/2017 1213    BUN 12 10/04/2017 1213    CREATININE 1.3 10/04/2017 1213    GLU 91 10/04/2017 1213        Component Value Date/Time    CALCIUM 9.5 10/04/2017 1213    ALKPHOS 87 10/04/2017 1213    AST 21 10/04/2017 1213    ALT 21 10/04/2017 1213    BILITOT 0.7 10/04/2017 1213    ESTGFRAFRICA >60.0 10/04/2017 1213    EGFRNONAA >60.0 10/04/2017 1213            Magnesium   Date Value Ref Range Status   10/04/2017 2.3 1.6 - 2.6 mg/dL Final       Lab Results   Component Value Date    WBC 7.19 10/04/2017    HGB 15.1 10/04/2017    HCT 43.8 10/04/2017    MCV 89 10/04/2017     10/04/2017       Lab Results   Component Value Date    INR 2.0 (H) 10/04/2017    INR 2.1 10/02/2017    INR 1.9 09/26/2017       BNP   Date Value Ref Range Status   10/04/2017 92 0 - 99 pg/mL Final     Comment:     Values of less than 100 pg/ml are consistent with non-CHF populations.   09/13/2017 56 0 - 99 pg/mL Final     Comment:     Values of less than 100 pg/ml are consistent with non-CHF populations.   07/11/2017 55 0 - 99 pg/mL Final     Comment:     Values of less than 100 pg/ml are consistent with non-CHF populations.       LD   Date Value Ref Range Status   10/04/2017 263 (H) 110 - 260 U/L Final     Comment:     Results are increased in hemolyzed samples.   09/13/2017 192 110 - 260 U/L Final     Comment:     Results are increased in hemolyzed samples.   07/11/2017 159 110 - 260 U/L Final     Comment:     Results are increased in hemolyzed samples.       Labs reviewed with patient: YES      Patient Satisfaction Survey completed per  Patient: no  (explained about signature and box to check)  Medication reconciliation: per MA.  Purple card updated today: yes  Coumadin Managed by: [facility],     Education: Reviewed driveline care, emergency procedures, how to change the controller, alarms with patient, as well as discussed how to page the VAD coordinator in case of an emergency.      Plans/Needs:  Pt doing very well with no major issues. Pt doppler slightly elevated, so Dr. Lundberg increase pt lisinopril to 10 mg daily.  Pt to have CMP next Monday.  Pt otherwise to RTC in 1 month.  Please refer to MD note.      Hurricane Season: No

## 2017-10-09 ENCOUNTER — TELEPHONE (OUTPATIENT)
Dept: TRANSPLANT | Facility: CLINIC | Age: 27
End: 2017-10-09

## 2017-10-09 ENCOUNTER — ANTI-COAG VISIT (OUTPATIENT)
Dept: CARDIOLOGY | Facility: CLINIC | Age: 27
End: 2017-10-09

## 2017-10-09 DIAGNOSIS — Z95.811 LVAD (LEFT VENTRICULAR ASSIST DEVICE) PRESENT: ICD-10-CM

## 2017-10-09 DIAGNOSIS — Z79.01 ANTICOAGULATION MONITORING, INR RANGE 2-3: ICD-10-CM

## 2017-10-09 LAB — INR PPP: 2.1

## 2017-10-09 NOTE — TELEPHONE ENCOUNTER
Lab results.  The labs are similar.  The lisinopril was increased to 10 mg bid.  There is no change in the bmp or potassium.  His next appt is 11/1.  The patient has not gained any weight, has no sob or edema.        10/4  10/9  Na/k  141/4.0 138.3/4.1  Bun/cr  12/1.3  12/1.2

## 2017-10-12 ENCOUNTER — CLINICAL SUPPORT (OUTPATIENT)
Dept: ELECTROPHYSIOLOGY | Facility: CLINIC | Age: 27
End: 2017-10-12
Payer: COMMERCIAL

## 2017-10-12 DIAGNOSIS — I50.42 CHRONIC COMBINED SYSTOLIC AND DIASTOLIC HEART FAILURE: ICD-10-CM

## 2017-10-12 PROCEDURE — 93296 REM INTERROG EVL PM/IDS: CPT | Mod: S$GLB,TXP,, | Performed by: INTERNAL MEDICINE

## 2017-10-12 PROCEDURE — 93295 DEV INTERROG REMOTE 1/2/MLT: CPT | Mod: S$GLB,TXP,, | Performed by: INTERNAL MEDICINE

## 2017-10-16 ENCOUNTER — ANTI-COAG VISIT (OUTPATIENT)
Dept: CARDIOLOGY | Facility: CLINIC | Age: 27
End: 2017-10-16

## 2017-10-16 DIAGNOSIS — Z79.01 ANTICOAGULATION MONITORING, INR RANGE 2-3: ICD-10-CM

## 2017-10-16 DIAGNOSIS — Z95.811 LVAD (LEFT VENTRICULAR ASSIST DEVICE) PRESENT: ICD-10-CM

## 2017-10-16 LAB — INR PPP: 2.2

## 2017-10-16 NOTE — PROGRESS NOTES
Patient was called and given lab result, verified correct dose of coumadin, reports no changes, no bleeding/bruising/swelling, Patient was advised of coumadin instructions and redraw date

## 2017-10-18 ENCOUNTER — TELEPHONE (OUTPATIENT)
Dept: TRANSPLANT | Facility: CLINIC | Age: 27
End: 2017-10-18

## 2017-10-18 NOTE — TELEPHONE ENCOUNTER
Lab results:  The chemistry is similar to 10/4.  The H&H has minimal change.  The next appt is 11/1.  The problem that he had this am has resolved.  He feels like himself now.  He is aware that his appt is 11/1.      10/4  10/18  Na/k  141/4.0 139/3.9  Bun/cr  12/1.3  13/1.2  Mag  2.3  2.3    Wbc  7.2  7.2  H&H  15.1/43.8 14.5/40.4  plt  193  215.

## 2017-10-19 NOTE — TELEPHONE ENCOUNTER
Pt paged to report he had a low flow alarm this morning.  His flows were 2.3 and now up to 4s, but he was dizzy for approx. 30 minutes which is unusual for him. Asked him to have labs drawn and to call ICD clinic for an interpretation of ICD.  Pt said he was at work right now and will have labs done at lunch.  He will also have ICD interrogated.  Dr. Tapia notified.  No other orders received.

## 2017-10-20 ENCOUNTER — TELEPHONE (OUTPATIENT)
Dept: ELECTROPHYSIOLOGY | Facility: CLINIC | Age: 27
End: 2017-10-20

## 2017-10-20 NOTE — TELEPHONE ENCOUNTER
Notified pt not adverse events found on remote device monitoring. Pt reports he is thinking symptoms are r/t recent increase in lisinopril dosing.

## 2017-10-20 NOTE — TELEPHONE ENCOUNTER
----- Message from Chica Flower sent at 10/19/2017  3:54 PM CDT -----  Contact: Pt called  Every thing from his remote monitor looks to be WNL.  ----- Message -----  From: Chetna Leon RN  Sent: 10/19/2017  11:58 AM  To: Chica Flower    June, pt was instructed by LVAD coordinator to call to see if any abnormal activity seen on ICD since last transmission. Pt was feeling very lightheaded yesterday AM. Please check on this.  ----- Message -----  From: Brenna Ta  Sent: 10/18/2017   8:33 AM  To: Chetna Leon RN    Pt called, states he will like to discuss his defibrillator. Ph for pt is [phone]. Thank you

## 2017-10-23 ENCOUNTER — ANTI-COAG VISIT (OUTPATIENT)
Dept: CARDIOLOGY | Facility: CLINIC | Age: 27
End: 2017-10-23

## 2017-10-23 DIAGNOSIS — Z95.811 LVAD (LEFT VENTRICULAR ASSIST DEVICE) PRESENT: ICD-10-CM

## 2017-10-23 DIAGNOSIS — Z79.01 ANTICOAGULATION MONITORING, INR RANGE 2-3: ICD-10-CM

## 2017-10-23 LAB — INR PPP: 2.1

## 2017-10-30 ENCOUNTER — ANTI-COAG VISIT (OUTPATIENT)
Dept: CARDIOLOGY | Facility: CLINIC | Age: 27
End: 2017-10-30

## 2017-10-30 DIAGNOSIS — Z95.811 LVAD (LEFT VENTRICULAR ASSIST DEVICE) PRESENT: ICD-10-CM

## 2017-10-30 DIAGNOSIS — Z79.01 ANTICOAGULATION MONITORING, INR RANGE 2-3: ICD-10-CM

## 2017-10-30 LAB — INR PPP: 2

## 2017-11-01 ENCOUNTER — LAB VISIT (OUTPATIENT)
Dept: LAB | Facility: HOSPITAL | Age: 27
End: 2017-11-01
Payer: COMMERCIAL

## 2017-11-01 ENCOUNTER — CLINICAL SUPPORT (OUTPATIENT)
Dept: TRANSPLANT | Facility: CLINIC | Age: 27
End: 2017-11-01
Payer: COMMERCIAL

## 2017-11-01 ENCOUNTER — OFFICE VISIT (OUTPATIENT)
Dept: TRANSPLANT | Facility: CLINIC | Age: 27
End: 2017-11-01
Payer: COMMERCIAL

## 2017-11-01 ENCOUNTER — ANTI-COAG VISIT (OUTPATIENT)
Dept: CARDIOLOGY | Facility: CLINIC | Age: 27
End: 2017-11-01

## 2017-11-01 VITALS
HEIGHT: 67 IN | HEART RATE: 84 BPM | BODY MASS INDEX: 25.9 KG/M2 | TEMPERATURE: 98 F | WEIGHT: 165 LBS | SYSTOLIC BLOOD PRESSURE: 78 MMHG

## 2017-11-01 DIAGNOSIS — I42.8 NICM (NONISCHEMIC CARDIOMYOPATHY): Primary | ICD-10-CM

## 2017-11-01 DIAGNOSIS — I50.9 CONGESTIVE HEART FAILURE, UNSPECIFIED CONGESTIVE HEART FAILURE CHRONICITY, UNSPECIFIED CONGESTIVE HEART FAILURE TYPE: ICD-10-CM

## 2017-11-01 DIAGNOSIS — Z76.82 ORGAN TRANSPLANT CANDIDATE: ICD-10-CM

## 2017-11-01 DIAGNOSIS — Z95.811 HEART REPLACED BY HEART ASSIST DEVICE: ICD-10-CM

## 2017-11-01 DIAGNOSIS — I50.9 CONGESTIVE HEART FAILURE: ICD-10-CM

## 2017-11-01 DIAGNOSIS — Z79.01 ANTICOAGULATION MONITORING, INR RANGE 2-3: ICD-10-CM

## 2017-11-01 DIAGNOSIS — Z95.811 LVAD (LEFT VENTRICULAR ASSIST DEVICE) PRESENT: ICD-10-CM

## 2017-11-01 LAB
ALBUMIN SERPL BCP-MCNC: 4.6 G/DL
ALP SERPL-CCNC: 85 U/L
ALT SERPL W/O P-5'-P-CCNC: 27 U/L
ANION GAP SERPL CALC-SCNC: 10 MMOL/L
AST SERPL-CCNC: 24 U/L
BASOPHILS # BLD AUTO: 0.03 K/UL
BASOPHILS NFR BLD: 0.4 %
BILIRUB DIRECT SERPL-MCNC: 0.3 MG/DL
BILIRUB SERPL-MCNC: 1 MG/DL
BNP SERPL-MCNC: 39 PG/ML
BUN SERPL-MCNC: 15 MG/DL
CALCIUM SERPL-MCNC: 10.1 MG/DL
CHLORIDE SERPL-SCNC: 102 MMOL/L
CO2 SERPL-SCNC: 25 MMOL/L
CREAT SERPL-MCNC: 1.1 MG/DL
CRP SERPL-MCNC: 5.8 MG/L
DIFFERENTIAL METHOD: NORMAL
EOSINOPHIL # BLD AUTO: 0.1 K/UL
EOSINOPHIL NFR BLD: 1.6 %
ERYTHROCYTE [DISTWIDTH] IN BLOOD BY AUTOMATED COUNT: 14.1 %
EST. GFR  (AFRICAN AMERICAN): >60 ML/MIN/1.73 M^2
EST. GFR  (NON AFRICAN AMERICAN): >60 ML/MIN/1.73 M^2
GLUCOSE SERPL-MCNC: 77 MG/DL
HCT VFR BLD AUTO: 45 %
HGB BLD-MCNC: 15.7 G/DL
IMM GRANULOCYTES # BLD AUTO: 0.02 K/UL
IMM GRANULOCYTES NFR BLD AUTO: 0.3 %
INR PPP: 1.7
LDH SERPL L TO P-CCNC: 233 U/L
LYMPHOCYTES # BLD AUTO: 1.7 K/UL
LYMPHOCYTES NFR BLD: 22.2 %
MAGNESIUM SERPL-MCNC: 2.3 MG/DL
MCH RBC QN AUTO: 30.8 PG
MCHC RBC AUTO-ENTMCNC: 34.9 G/DL
MCV RBC AUTO: 88 FL
MONOCYTES # BLD AUTO: 0.7 K/UL
MONOCYTES NFR BLD: 9.5 %
NEUTROPHILS # BLD AUTO: 5 K/UL
NEUTROPHILS NFR BLD: 66 %
NRBC BLD-RTO: 0 /100 WBC
PHOSPHATE SERPL-MCNC: 2.9 MG/DL
PLATELET # BLD AUTO: 222 K/UL
PMV BLD AUTO: 9.8 FL
POTASSIUM SERPL-SCNC: 3.8 MMOL/L
PREALB SERPL-MCNC: 29 MG/DL
PROT SERPL-MCNC: 8.5 G/DL
PROTHROMBIN TIME: 17.3 SEC
RBC # BLD AUTO: 5.1 M/UL
SODIUM SERPL-SCNC: 137 MMOL/L
WBC # BLD AUTO: 7.57 K/UL

## 2017-11-01 PROCEDURE — 36415 COLL VENOUS BLD VENIPUNCTURE: CPT | Mod: TXP

## 2017-11-01 PROCEDURE — 99213 OFFICE O/P EST LOW 20 MIN: CPT | Mod: 25,NTX,S$GLB, | Performed by: INTERNAL MEDICINE

## 2017-11-01 PROCEDURE — 93750 INTERROGATION VAD IN PERSON: CPT | Mod: NTX,S$GLB,, | Performed by: INTERNAL MEDICINE

## 2017-11-01 PROCEDURE — 85610 PROTHROMBIN TIME: CPT | Mod: TXP

## 2017-11-01 PROCEDURE — 83880 ASSAY OF NATRIURETIC PEPTIDE: CPT | Mod: TXP

## 2017-11-01 PROCEDURE — 83615 LACTATE (LD) (LDH) ENZYME: CPT | Mod: TXP

## 2017-11-01 PROCEDURE — 86833 HLA CLASS II HIGH DEFIN QUAL: CPT | Mod: PO,TXP

## 2017-11-01 PROCEDURE — 84100 ASSAY OF PHOSPHORUS: CPT | Mod: TXP

## 2017-11-01 PROCEDURE — 86977 RBC SERUM PRETX INCUBJ/INHIB: CPT | Mod: 91,PO,TXP

## 2017-11-01 PROCEDURE — 80053 COMPREHEN METABOLIC PANEL: CPT | Mod: TXP

## 2017-11-01 PROCEDURE — 85025 COMPLETE CBC W/AUTO DIFF WBC: CPT | Mod: TXP

## 2017-11-01 PROCEDURE — 82248 BILIRUBIN DIRECT: CPT | Mod: TXP

## 2017-11-01 PROCEDURE — 83735 ASSAY OF MAGNESIUM: CPT | Mod: TXP

## 2017-11-01 PROCEDURE — 99999 PR PBB SHADOW E&M-EST. PATIENT-LVL III: CPT | Mod: PBBFAC,TXP,, | Performed by: INTERNAL MEDICINE

## 2017-11-01 PROCEDURE — 84134 ASSAY OF PREALBUMIN: CPT | Mod: TXP

## 2017-11-01 PROCEDURE — 86140 C-REACTIVE PROTEIN: CPT | Mod: TXP

## 2017-11-01 PROCEDURE — 86832 HLA CLASS I HIGH DEFIN QUAL: CPT | Mod: PO,TXP

## 2017-11-01 NOTE — PROGRESS NOTES
"Subjective:   Patient ID:  Mert Samayoa is a 27 y.o. male who presents for LVAD followup visit.    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 Postoperative course did not have many complications at all, except for atrial flutter 02/11 from which he converted out of with amiodarone.   Since his last visit, continues to do well Works fulltime.  No LVAD alarms expect for low flow alarm on Oct 18 when he slept on his left side. No edema.  DLES number one.     Review of Systems   Constitution: Negative for decreased appetite, weight gain and weight loss.   Cardiovascular: Negative for chest pain, dyspnea on exertion, leg swelling, near-syncope, orthopnea and palpitations.   Respiratory: Negative for cough and shortness of breath.    Musculoskeletal: Negative for myalgias.   Gastrointestinal: Negative for jaundice.       Objective:     Doppler: 78    Physical Exam   Constitutional: He is oriented to person, place, and time. He appears well-developed and well-nourished. He is active. He is not intubated.   BP (!) 78/0   Pulse 84   Temp 97.9 °F (36.6 °C)   Ht 5' 7" (1.702 m)   Wt 74.8 kg (165 lb)   BMI 25.84 kg/m²      HENT:   Head: Normocephalic and atraumatic. Hair is normal.   Right Ear: External ear normal.   Left Ear: External ear normal.   Nose: Nose normal. No nasal deformity. No epistaxis.  No foreign bodies.   Mouth/Throat: Mucous membranes are normal. Mucous membranes are not cyanotic. No oropharyngeal exudate.   Eyes: Conjunctivae and EOM are normal. Pupils are equal, round, and reactive to light.   Neck: Neck supple. No hepatojugular reflux and no JVD present.   Cardiovascular: Normal rate, regular " rhythm, normal heart sounds and normal pulses.  Exam reveals no gallop.    Pulmonary/Chest: Effort normal and breath sounds normal. No apnea and no tachypnea. He is not intubated. No respiratory distress. He exhibits no tenderness.   Abdominal: Soft. Normal appearance and bowel sounds are normal. There is no tenderness. No hernia.   Musculoskeletal: Normal range of motion.   Neurological: He is alert and oriented to person, place, and time. He displays no seizure activity.   Skin: Skin is warm, dry and intact. No rash noted. No pallor.   Psychiatric: He has a normal mood and affect. His speech is normal and behavior is normal. Thought content normal. Cognition and memory are normal.       Lab Results   Component Value Date    WBC 7.57 11/01/2017    HGB 15.7 11/01/2017    HCT 45.0 11/01/2017    MCV 88 11/01/2017     11/01/2017    CO2 25 11/01/2017    CREATININE 1.1 11/01/2017    CALCIUM 10.1 11/01/2017    ALBUMIN 4.6 11/01/2017    AST 24 11/01/2017    BNP 39 11/01/2017    ALT 27 11/01/2017     11/01/2017       Lab Results   Component Value Date    INR 1.7 (H) 11/01/2017    INR 2.0 10/30/2017    INR 2.1 10/23/2017       BNP   Date Value Ref Range Status   11/01/2017 39 0 - 99 pg/mL Final     Comment:     Values of less than 100 pg/ml are consistent with non-CHF populations.   10/04/2017 92 0 - 99 pg/mL Final     Comment:     Values of less than 100 pg/ml are consistent with non-CHF populations.   09/13/2017 56 0 - 99 pg/mL Final     Comment:     Values of less than 100 pg/ml are consistent with non-CHF populations.       LD   Date Value Ref Range Status   11/01/2017 233 110 - 260 U/L Final     Comment:     Results are increased in hemolyzed samples.   10/04/2017 263 (H) 110 - 260 U/L Final     Comment:     Results are increased in hemolyzed samples.   09/13/2017 192 110 - 260 U/L Final     Comment:     Results are increased in hemolyzed samples.       Assessment:      1. Heart replaced by heart assist  device    2. Congestive heart failure        Plan:   1. LVAD Doing well Will have coumadin clinic adjust dose for INr < 2  2.  Would like to use 1A time in Dec if list is favorable for small blood type O  3.. LVAD supplies dispensed today medically necessary    Mario Conner        Patient is now NYHA I  Recommend 2 gram sodium restriction and 1500cc fluid restriction.  Encourage physical activity with graded exercise program.  Requested patient to weigh themselves daily, and to notify us if their weight increases by more than 3 lbs in 1 day or 5 lbs in 1 week.     Listed for transplant: Yes, all appropriate transplant related labs (including HLA) performed. RHC requested per listing protocol. 1B    UNOS Patient Status  Functional Status: 90% - Able to carry on normal activity: minor symptoms of disease  Physical Capacity: No Limitations  Working for Income: yes  If yes, working activity level: Working Full Time

## 2017-11-01 NOTE — LETTER
November 1, 2017        Guillermo Alejo  [address]  [facility] [address]  Phone: [phone]  Fax: [phone]             [facility]  [address]  [facility] [address]  Phone: [phone]   Patient: Mert Samayoa   MR Number: [id number]   YOB: 1990   Date of Visit: 11/1/2017       Dear Dr. Guillermo Alejo    Thank you for referring Mert Samayoa to me for evaluation. Attached you will find relevant portions of my assessment and plan of care.    If you have questions, please do not hesitate to call me. I look forward to following Mert Samayoa along with you.    Sincerely,    Mario Conner MD    Enclosure    If you would like to receive this communication electronically, please contact [email or web address] or [phone] to request [employer] Link access.    [employer] Link is a tool which provides read-only access to select patient information with whom you have a relationship. Its easy to use and provides real time access to review your patients record including encounter summaries, notes, results, and demographic information.    If you feel you have received this communication in error or would no longer like to receive these types of communications, please e-mail [email or web address]

## 2017-11-01 NOTE — PROCEDURES
TXP DERREK INTERROGATIONS 11/1/2017 10/4/2017 9/13/2017 7/13/2017 6/14/2017 5/17/2017 5/4/2017   Type Heartware Heartware Heartware Heartware Heartware Heartware Heartware   Flow 4.2 4.1 4.6 4.1 4.0 4 3.9   Speed 2700 2700 2700 2700 2700 2700 2700   Power ([employer]) 4.7 4.2 4.5 4.4 4.6 4.3 4.4   Low Flow Alarm 2.0 2 2.0 2.0 2.5 2.5 2.5   High Power Alarm 6.5 6 6.5 6.5 6.0 6.0 6.0   Pulsatility Intermittent pulse Pulse Intermittent pulse Intermittent pulse Intermittent pulse Intermittent pulse Intermittent pulse   }

## 2017-11-01 NOTE — PROGRESS NOTES
Questioned and confirmed correct dose .  Reports he has a salad once a day , also stated the salad he eaten 10/31 was mixed with cabbage.  No other changes reported.

## 2017-11-01 NOTE — PROGRESS NOTES
Date of Implant with Heartware LVAD: 2-17-17    PATIENT ARRIVED IN CLINIC:  Ambulatory   Accompanied by: self    Vitals  Doppler: 78  PAIN: 0 on 0-10 pain scale, location of pain: na, description of pain: na  Is patient currently on medications for pain? no What kind? na    VAD Interrogation:  TXP DERREK INTERROGATIONS 11/1/2017   Type Heartware   Flow 4.2   Speed 2700   Power ([employer]) 4.7   Low Flow Alarm 2.0   High Power Alarm 6.5   Pulsatility Intermittent pulse       HCT:   Lab Results   Component Value Date    HCT 45.0 11/01/2017    HCT 24 (L) 02/02/2017     WAVEFORM: 2.5-7  Suction alarm: Off  Problems / Issues / Alarms with VAD if any: Low flo10/13 pt lying on side that caused it.   Any Equipment Issues: (Refer to  note for complete details)   Patient states their batteries are lasting 10-12 hours. Rotating all batteries. Checking connections before and after changing battery.   Emergency Equipment With Patient: yes   VAD Binder With Patient: yes   Reviewed VAD Numbers In Binder: yes  VAD Sounds: Smooth  Manual & Visual Inspection Of Driveline: No kinks or tears noted  Checked Heartware driveline connector housing for separation, none noted.  Also checked for tight connection, which they are.  Educated pt regarding this and instructed  to check on this daily. Pt verbalized understanding and agreement.     LVAD Dressing/DLES:  Appearance Of Driveline: 1  Antibiotics: NO  Frequency of Dressing Changes: daily & soap and water dressing   Stabilization Device In Use yes- tape   Pt In Need Of Management Kits? Yes - 2 boxes soap and water  It is medically necessary to have VAD management kits in order to prevent infection or to assist in the healing of an infected DLES.    Assessment:   Complaints/reason for visit today: routine  Complaints Of Nausea / Vomiting: None noted   Appearance and Frequency Of Stools: normal and formed without blood & daily  Color Of Urine:  clear/yellow  Coping/Depression/Anxiety: coping okay  Sleep Habits: 6-7 hrs /night  Sleep Aids: None noted   Showering: Yes, reminded to change dressing immediately after drying off  Activity/Exercise: works   Driving: Yes. Reminded to pull over should there be an alarm before looking down at controller.    Labs:    Chemistry        Component Value Date/Time     11/01/2017 1143    K 3.8 11/01/2017 1143     11/01/2017 1143    CO2 25 11/01/2017 1143    BUN 15 11/01/2017 1143    CREATININE 1.1 11/01/2017 1143    GLU 77 11/01/2017 1143        Component Value Date/Time    CALCIUM 10.1 11/01/2017 1143    ALKPHOS 85 11/01/2017 1143    AST 24 11/01/2017 1143    ALT 27 11/01/2017 1143    BILITOT 1.0 11/01/2017 1143    ESTGFRAFRICA >60.0 11/01/2017 1143    EGFRNONAA >60.0 11/01/2017 1143            Magnesium   Date Value Ref Range Status   11/01/2017 2.3 1.6 - 2.6 mg/dL Final       Lab Results   Component Value Date    WBC 7.57 11/01/2017    HGB 15.7 11/01/2017    HCT 45.0 11/01/2017    MCV 88 11/01/2017     11/01/2017       Lab Results   Component Value Date    INR 1.7 (H) 11/01/2017    INR 2.0 10/30/2017    INR 2.1 10/23/2017       BNP   Date Value Ref Range Status   11/01/2017 39 0 - 99 pg/mL Final     Comment:     Values of less than 100 pg/ml are consistent with non-CHF populations.   10/04/2017 92 0 - 99 pg/mL Final     Comment:     Values of less than 100 pg/ml are consistent with non-CHF populations.   09/13/2017 56 0 - 99 pg/mL Final     Comment:     Values of less than 100 pg/ml are consistent with non-CHF populations.       LD   Date Value Ref Range Status   11/01/2017 233 110 - 260 U/L Final     Comment:     Results are increased in hemolyzed samples.   10/04/2017 263 (H) 110 - 260 U/L Final     Comment:     Results are increased in hemolyzed samples.   09/13/2017 192 110 - 260 U/L Final     Comment:     Results are increased in hemolyzed samples.       Labs reviewed with patient: YES       Patient Satisfaction Survey completed per Patient: no  (explained about signature and box to check)  Medication reconciliation: per MA.  Purple card updated today: no changes made.  Coumadin Managed by: [facility], 1.7    Education: Reviewed driveline care, emergency procedures, how to change the controller, alarms with patient, as well as discussed how to page the VAD coordinator in case of an emergency.      Plans/Needs: Pt looks very good.   Orders per Dr Culp. No med changes. RTC 1 month  Pt inquired about flu shot. Per Dr culp it is ok. I told him he can go to the pharmacy downstairs. He stated he'll wait til he gets back home and take care of it.    Hurricane Season: Yes, discussed with patient: With hurricane season approaching, we want to make sure you are fully prepared for any emergency.  Should the National Weather Service or your local authorities recommend a voluntary or mandatory evacuation of your area, The VAD team requires you to evacuate to a safe place.  Remember, when it is a mandatory evacuation, traffic will become an issue for your limited battery power.  Therefore, we strongly urge you to evacuate early.    The VAD team advises you to have the following in place before hurricane season:  Have an evacuation plan in place including places to evacuate, names and phone numbers.  This information is required to be given to the VAD coordinator.   1. Have your VAD emergency contact numbers with you.   2. Make sure your prescriptions will not run out by the end of September.   3. Make sure you have enough medications, including pills, inhalers, patches,   etc. to take, should you be gone for more than 2 weeks.   4. Make sure ALL of your batteries are fully charged.   5. Bring enough dressing change supplies to last for at least 2 weeks.   6. Bring your VAD binder with you.  Make sure your binder is updated and complete with alarms reference card, patient hand book, emergency  contact numbers, daily log sheets, etc.  If you do not have family or friends as an evacuation destination, we recommend evacuating to a safe area.   Do NOT evacuate to [facility].    The VAD team wants to stress the importance of planning for your evacuation in the event of a hurricane.  If you have any LVAD questions or issues, please contact the LVAD coordinator.

## 2017-11-06 ENCOUNTER — ANTI-COAG VISIT (OUTPATIENT)
Dept: CARDIOLOGY | Facility: CLINIC | Age: 27
End: 2017-11-06

## 2017-11-06 DIAGNOSIS — Z79.01 ANTICOAGULATION MONITORING, INR RANGE 2-3: ICD-10-CM

## 2017-11-06 DIAGNOSIS — Z95.811 LVAD (LEFT VENTRICULAR ASSIST DEVICE) PRESENT: ICD-10-CM

## 2017-11-06 LAB — INR PPP: 1.9

## 2017-11-07 NOTE — PROGRESS NOTES
Patient will take a boosted dose for two days and begin an increased weekly dose. Dr. Conner is aware of this plan and no admission since INR 1.9 and going up.

## 2017-11-07 NOTE — PROGRESS NOTES
Questioned and confirmed  Correct dose .  10 mg 11/1.  Reports eating regular lettuce .  No other changes reported.

## 2017-11-09 ENCOUNTER — ANTI-COAG VISIT (OUTPATIENT)
Dept: CARDIOLOGY | Facility: CLINIC | Age: 27
End: 2017-11-09

## 2017-11-09 DIAGNOSIS — Z79.01 ANTICOAGULATION MONITORING, INR RANGE 2-3: ICD-10-CM

## 2017-11-09 DIAGNOSIS — Z95.811 LVAD (LEFT VENTRICULAR ASSIST DEVICE) PRESENT: ICD-10-CM

## 2017-11-09 LAB — INR PPP: 1.9

## 2017-11-09 NOTE — PROGRESS NOTES
Patient confirms dose. States no changes in diet. Denies any increase in working out. No further changes. Will increase. Dr Conner aware of plan.

## 2017-11-12 ENCOUNTER — LAB VISIT (OUTPATIENT)
Dept: LAB | Facility: HOSPITAL | Age: 27
End: 2017-11-12
Payer: COMMERCIAL

## 2017-11-12 ENCOUNTER — TELEPHONE (OUTPATIENT)
Dept: TRANSPLANT | Facility: CLINIC | Age: 27
End: 2017-11-12

## 2017-11-12 DIAGNOSIS — Z76.82 AWAITING ORGAN TRANSPLANT: ICD-10-CM

## 2017-11-12 DIAGNOSIS — Z76.82 AWAITING ORGAN TRANSPLANT: Primary | ICD-10-CM

## 2017-11-12 LAB
B CELL RESULTS - XM ALLO: NEGATIVE
B CELL RESULTS - XM ALLO: NEGATIVE
B MCS AVERAGE - XM ALLO: -16.25
B MCS AVERAGE - XM ALLO: -44.5
DONOR MRN: NORMAL
DONOR MRN: NORMAL
FXMAL TESTING DATE: NORMAL
FXMAL TESTING DATE: NORMAL
HLA FLOW CROSSMATCH (ALLO) INTERPRETATION: NORMAL
SERUM COLLECTION DT - XM ALLO: NORMAL
SERUM COLLECTION DT - XM ALLO: NORMAL
T CELL RESULTS - XM ALLO: NEGATIVE
T CELL RESULTS - XM ALLO: NEGATIVE
T MCS AVERAGE - XM ALLO: -18
T MCS AVERAGE - XM ALLO: -33.5

## 2017-11-12 PROCEDURE — 86826 HLA X-MATCH NONCYTOTOXC ADDL: CPT | Mod: 91,PO,TXP

## 2017-11-12 PROCEDURE — 86826 HLA X-MATCH NONCYTOTOXC ADDL: CPT | Mod: PO,TXP

## 2017-11-12 PROCEDURE — 86825 HLA X-MATH NON-CYTOTOXIC: CPT | Mod: PO,TXP

## 2017-11-12 PROCEDURE — 86825 HLA X-MATH NON-CYTOTOXIC: CPT | Mod: 91,PO,TXP

## 2017-11-13 ENCOUNTER — ANTI-COAG VISIT (OUTPATIENT)
Dept: CARDIOLOGY | Facility: CLINIC | Age: 27
End: 2017-11-13

## 2017-11-13 DIAGNOSIS — Z79.01 ANTICOAGULATION MONITORING, INR RANGE 2-3: ICD-10-CM

## 2017-11-13 DIAGNOSIS — Z95.811 LVAD (LEFT VENTRICULAR ASSIST DEVICE) PRESENT: ICD-10-CM

## 2017-11-13 LAB
HPRA INTERPRETATION: NORMAL
INR PPP: 2.2

## 2017-11-13 NOTE — TELEPHONE ENCOUNTER
Notified pt of potential high risk donor, pt accepted, pt was a back up with pending results of a crossmatch, pt was not called into the Hospital . Once the Cross match was competed, primary person crossmatch was negative. Pt was of not getting the potential heart transplant.    Pt stated understanding.

## 2017-11-16 ENCOUNTER — ANTI-COAG VISIT (OUTPATIENT)
Dept: CARDIOLOGY | Facility: CLINIC | Age: 27
End: 2017-11-16

## 2017-11-16 DIAGNOSIS — Z79.01 ANTICOAGULATION MONITORING, INR RANGE 2-3: ICD-10-CM

## 2017-11-16 DIAGNOSIS — Z95.811 LVAD (LEFT VENTRICULAR ASSIST DEVICE) PRESENT: ICD-10-CM

## 2017-11-16 LAB
CLASS I ANTIBODIES - LUMINEX: NEGATIVE
CLASS II ANTIBODIES - LUMINEX: NEGATIVE
CPRA %: 0
INR PPP: 2.3
SERUM COLLECTION DT - LUMINEX CLASS I: NORMAL
SERUM COLLECTION DT - LUMINEX CLASS II: NORMAL
SPCL1 TESTING DATE: NORMAL
SPCL2 TESTING DATE: NORMAL
SPLUA TESTING DATE: NORMAL

## 2017-11-21 ENCOUNTER — ANTI-COAG VISIT (OUTPATIENT)
Dept: CARDIOLOGY | Facility: CLINIC | Age: 27
End: 2017-11-21

## 2017-11-21 DIAGNOSIS — Z95.811 LVAD (LEFT VENTRICULAR ASSIST DEVICE) PRESENT: ICD-10-CM

## 2017-11-21 DIAGNOSIS — Z79.01 ANTICOAGULATION MONITORING, INR RANGE 2-3: ICD-10-CM

## 2017-11-21 LAB — INR PPP: 2.4

## 2017-11-29 ENCOUNTER — OFFICE VISIT (OUTPATIENT)
Dept: TRANSPLANT | Facility: CLINIC | Age: 27
End: 2017-11-29
Payer: COMMERCIAL

## 2017-11-29 ENCOUNTER — CLINICAL SUPPORT (OUTPATIENT)
Dept: TRANSPLANT | Facility: CLINIC | Age: 27
End: 2017-11-29
Payer: COMMERCIAL

## 2017-11-29 ENCOUNTER — ANTI-COAG VISIT (OUTPATIENT)
Dept: CARDIOLOGY | Facility: CLINIC | Age: 27
End: 2017-11-29

## 2017-11-29 ENCOUNTER — LAB VISIT (OUTPATIENT)
Dept: LAB | Facility: HOSPITAL | Age: 27
End: 2017-11-29
Attending: INTERNAL MEDICINE
Payer: COMMERCIAL

## 2017-11-29 VITALS
HEIGHT: 67 IN | TEMPERATURE: 99 F | BODY MASS INDEX: 23.39 KG/M2 | WEIGHT: 149 LBS | DIASTOLIC BLOOD PRESSURE: 60 MMHG | HEART RATE: 48 BPM | SYSTOLIC BLOOD PRESSURE: 111 MMHG

## 2017-11-29 DIAGNOSIS — Z79.01 ANTICOAGULATION MONITORING, INR RANGE 2-3: ICD-10-CM

## 2017-11-29 DIAGNOSIS — I50.9 CONGESTIVE HEART FAILURE, UNSPECIFIED CONGESTIVE HEART FAILURE CHRONICITY, UNSPECIFIED CONGESTIVE HEART FAILURE TYPE: ICD-10-CM

## 2017-11-29 DIAGNOSIS — Z95.810 ICD (IMPLANTABLE CARDIOVERTER-DEFIBRILLATOR) IN PLACE: ICD-10-CM

## 2017-11-29 DIAGNOSIS — Z95.811 HEART REPLACED BY HEART ASSIST DEVICE: ICD-10-CM

## 2017-11-29 DIAGNOSIS — Z76.82 ORGAN TRANSPLANT CANDIDATE: ICD-10-CM

## 2017-11-29 DIAGNOSIS — I50.9 CONGESTIVE HEART FAILURE: ICD-10-CM

## 2017-11-29 DIAGNOSIS — I42.9 FAMILIAL CARDIOMYOPATHY: ICD-10-CM

## 2017-11-29 DIAGNOSIS — Z95.811 LVAD (LEFT VENTRICULAR ASSIST DEVICE) PRESENT: ICD-10-CM

## 2017-11-29 DIAGNOSIS — I50.42 CHRONIC COMBINED SYSTOLIC AND DIASTOLIC HEART FAILURE: Primary | ICD-10-CM

## 2017-11-29 LAB
ALBUMIN SERPL BCP-MCNC: 4.5 G/DL
ALP SERPL-CCNC: 76 U/L
ALT SERPL W/O P-5'-P-CCNC: 23 U/L
ANION GAP SERPL CALC-SCNC: 8 MMOL/L
AST SERPL-CCNC: 26 U/L
BASOPHILS # BLD AUTO: 0.02 K/UL
BASOPHILS NFR BLD: 0.3 %
BILIRUB DIRECT SERPL-MCNC: 0.3 MG/DL
BILIRUB SERPL-MCNC: 0.8 MG/DL
BNP SERPL-MCNC: 45 PG/ML
BUN SERPL-MCNC: 13 MG/DL
CALCIUM SERPL-MCNC: 9.9 MG/DL
CHLORIDE SERPL-SCNC: 102 MMOL/L
CO2 SERPL-SCNC: 29 MMOL/L
CREAT SERPL-MCNC: 1.1 MG/DL
CRP SERPL-MCNC: 5.8 MG/L
DIFFERENTIAL METHOD: NORMAL
EOSINOPHIL # BLD AUTO: 0.1 K/UL
EOSINOPHIL NFR BLD: 1.3 %
ERYTHROCYTE [DISTWIDTH] IN BLOOD BY AUTOMATED COUNT: 14.1 %
EST. GFR  (AFRICAN AMERICAN): >60 ML/MIN/1.73 M^2
EST. GFR  (NON AFRICAN AMERICAN): >60 ML/MIN/1.73 M^2
GLUCOSE SERPL-MCNC: 81 MG/DL
HCT VFR BLD AUTO: 43.6 %
HGB BLD-MCNC: 15.2 G/DL
IMM GRANULOCYTES # BLD AUTO: 0.02 K/UL
IMM GRANULOCYTES NFR BLD AUTO: 0.3 %
INR PPP: 2.1
LDH SERPL L TO P-CCNC: 207 U/L
LYMPHOCYTES # BLD AUTO: 1.5 K/UL
LYMPHOCYTES NFR BLD: 20.8 %
MAGNESIUM SERPL-MCNC: 2 MG/DL
MCH RBC QN AUTO: 30.9 PG
MCHC RBC AUTO-ENTMCNC: 34.9 G/DL
MCV RBC AUTO: 89 FL
MONOCYTES # BLD AUTO: 0.5 K/UL
MONOCYTES NFR BLD: 7.8 %
NEUTROPHILS # BLD AUTO: 4.8 K/UL
NEUTROPHILS NFR BLD: 69.5 %
NRBC BLD-RTO: 0 /100 WBC
PHOSPHATE SERPL-MCNC: 3.2 MG/DL
PLATELET # BLD AUTO: 207 K/UL
PMV BLD AUTO: 9.9 FL
POTASSIUM SERPL-SCNC: 4.4 MMOL/L
PREALB SERPL-MCNC: 27 MG/DL
PROT SERPL-MCNC: 8 G/DL
PROTHROMBIN TIME: 20.7 SEC
RBC # BLD AUTO: 4.92 M/UL
SODIUM SERPL-SCNC: 139 MMOL/L
WBC # BLD AUTO: 6.96 K/UL

## 2017-11-29 PROCEDURE — 86977 RBC SERUM PRETX INCUBJ/INHIB: CPT | Mod: 91,PO,TXP

## 2017-11-29 PROCEDURE — 82248 BILIRUBIN DIRECT: CPT | Mod: TXP

## 2017-11-29 PROCEDURE — 83615 LACTATE (LD) (LDH) ENZYME: CPT | Mod: TXP

## 2017-11-29 PROCEDURE — 84134 ASSAY OF PREALBUMIN: CPT | Mod: TXP

## 2017-11-29 PROCEDURE — 85610 PROTHROMBIN TIME: CPT | Mod: TXP

## 2017-11-29 PROCEDURE — 83735 ASSAY OF MAGNESIUM: CPT | Mod: TXP

## 2017-11-29 PROCEDURE — 99999 PR PBB SHADOW E&M-EST. PATIENT-LVL III: CPT | Mod: PBBFAC,TXP,, | Performed by: INTERNAL MEDICINE

## 2017-11-29 PROCEDURE — 85025 COMPLETE CBC W/AUTO DIFF WBC: CPT | Mod: TXP

## 2017-11-29 PROCEDURE — 86140 C-REACTIVE PROTEIN: CPT | Mod: TXP

## 2017-11-29 PROCEDURE — 86832 HLA CLASS I HIGH DEFIN QUAL: CPT | Mod: PO,TXP

## 2017-11-29 PROCEDURE — 36415 COLL VENOUS BLD VENIPUNCTURE: CPT | Mod: TXP

## 2017-11-29 PROCEDURE — 83880 ASSAY OF NATRIURETIC PEPTIDE: CPT | Mod: TXP

## 2017-11-29 PROCEDURE — 84100 ASSAY OF PHOSPHORUS: CPT | Mod: TXP

## 2017-11-29 PROCEDURE — 99214 OFFICE O/P EST MOD 30 MIN: CPT | Mod: S$GLB,TXP,, | Performed by: INTERNAL MEDICINE

## 2017-11-29 PROCEDURE — 86833 HLA CLASS II HIGH DEFIN QUAL: CPT | Mod: PO,TXP

## 2017-11-29 PROCEDURE — 93750 INTERROGATION VAD IN PERSON: CPT | Mod: S$GLB,TXP,, | Performed by: INTERNAL MEDICINE

## 2017-11-29 PROCEDURE — 80053 COMPREHEN METABOLIC PANEL: CPT | Mod: TXP

## 2017-11-29 NOTE — PATIENT INSTRUCTIONS
Call us if you find yourself getting more short of breath, have more swelling or unexpected weight changes, fever, chills, alarms, bloody or black bowel movements, or drainage from your driveline      Flu shot

## 2017-11-29 NOTE — PROGRESS NOTES
"Subjective:   Patient ID:  Mert Samayoa is a 27 y.o. male who presents for LVAD followup visit.    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 Postoperative course did not have many complications at all, except for atrial flutter 02/11 from which he converted out of with amiodarone.  Here for routine VAD f/u      Since last visit, pt has been doing well- remains active without much limitation- does have some discomfort at his pump ocket when he lays on his left side at night- otherwise, no HF sx, normal BM, no alarms. Interested in talking about CMV today, and using his 1A time    DLES is grade 1    Interrogation of device data reveals no alarms, normal flows and power (see VAD interrogation report for full details.)            Review of Systems   Constitution: Negative for chills, decreased appetite, fever, malaise/fatigue, weight gain and weight loss.   HENT: Negative.    Eyes: Negative.    Cardiovascular: Negative for chest pain, dyspnea on exertion, leg swelling, near-syncope, orthopnea, palpitations, paroxysmal nocturnal dyspnea and syncope.   Respiratory: Negative for cough and shortness of breath.    Endocrine: Negative.    Skin: Negative.    Musculoskeletal: Negative.  Negative for myalgias.   Gastrointestinal: Negative for bloating, abdominal pain, change in bowel habit and jaundice.   Neurological: Negative for dizziness and light-headedness.   Psychiatric/Behavioral: Negative for depression.       Objective: /60   Pulse (!) 48   Temp 98.5 °F (36.9 °C)   Ht 5' 7" (1.702 m)   Wt 67.6 kg (149 lb)   BMI 23.34 kg/m²        Doppler: 76      111/60 nonpulsatile    Physical Exam   Constitutional: He is " oriented to person, place, and time. He appears well-developed and well-nourished. He is active. He is not intubated.   HENT:   Head: Normocephalic and atraumatic. Hair is normal.   Right Ear: External ear normal.   Left Ear: External ear normal.   Nose: Nose normal. No nasal deformity. No epistaxis.  No foreign bodies.   Mouth/Throat: Mucous membranes are normal. Mucous membranes are not cyanotic. No oropharyngeal exudate.   Eyes: Conjunctivae and EOM are normal. Pupils are equal, round, and reactive to light. Right eye exhibits no discharge. Left eye exhibits no discharge.   Neck: Neck supple. No hepatojugular reflux and no JVD present. No thyromegaly present.   Cardiovascular: Normal rate, regular rhythm, normal heart sounds and normal pulses.  Exam reveals no gallop and no friction rub.    No murmur heard.  Normal Vad hum   Pulmonary/Chest: Effort normal and breath sounds normal. No apnea and no tachypnea. He is not intubated. No respiratory distress. He has no wheezes. He has no rales. He exhibits no tenderness.   Abdominal: Soft. Normal appearance and bowel sounds are normal. He exhibits no distension. There is no tenderness. No hernia.   Musculoskeletal: Normal range of motion. He exhibits no edema or tenderness.   Neurological: He is alert and oriented to person, place, and time. No cranial nerve deficit. He displays no seizure activity. Coordination normal.   Skin: Skin is warm, dry and intact. No rash noted. No pallor.   Psychiatric: He has a normal mood and affect. His speech is normal and behavior is normal. Judgment and thought content normal. Cognition and memory are normal.       Lab Results   Component Value Date    WBC 6.96 11/29/2017    HGB 15.2 11/29/2017    HCT 43.6 11/29/2017    MCV 89 11/29/2017     11/29/2017    CO2 29 11/29/2017    CREATININE 1.1 11/29/2017    CALCIUM 9.9 11/29/2017    ALBUMIN 4.5 11/29/2017    AST 26 11/29/2017    BNP 45 11/29/2017    ALT 23 11/29/2017      11/29/2017       Lab Results   Component Value Date    INR 2.1 (H) 11/29/2017    INR 2.4 11/21/2017    INR 2.3 11/16/2017       BNP   Date Value Ref Range Status   11/29/2017 45 0 - 99 pg/mL Final     Comment:     Values of less than 100 pg/ml are consistent with non-CHF populations.   11/01/2017 39 0 - 99 pg/mL Final     Comment:     Values of less than 100 pg/ml are consistent with non-CHF populations.   10/04/2017 92 0 - 99 pg/mL Final     Comment:     Values of less than 100 pg/ml are consistent with non-CHF populations.       LD   Date Value Ref Range Status   11/29/2017 207 110 - 260 U/L Final     Comment:     Results are increased in hemolyzed samples.   11/01/2017 233 110 - 260 U/L Final     Comment:     Results are increased in hemolyzed samples.   10/04/2017 263 (H) 110 - 260 U/L Final     Comment:     Results are increased in hemolyzed samples.       Assessment:      1. Chronic combined systolic and diastolic heart failure- doing great- BNP low, euvolemic, BP controlled, LD low, INR at goal   2. Heart replaced by heart assist device    3. Congestive heart failure    4. Anticoagulation monitoring, INR range 2-3    5. Familial cardiomyopathy    6. ICD (implantable cardioverter-defibrillator) in place    7. Organ transplant candidate        Plan:   1. LVAD Doing well   2.   1 mo soap and water dispensed today medically necessary  3. No med changes today- needs flu shot    Marisel A Mandras        Patient is now NYHA I  Recommend 2 gram sodium restriction and 1500cc fluid restriction.  Encourage physical activity with graded exercise program.  Requested patient to weigh themselves daily, and to notify us if their weight increases by more than 3 lbs in 1 day or 5 lbs in 1 week.     Listed for transplant: Yes, all appropriate transplant related labs (including HLA) performed. RHC requested per listing protocol. 1B    UNOS Patient Status  Functional Status: 90% - Able to carry on normal activity: minor symptoms of  disease  Physical Capacity: No Limitations  Working for Income: yes  If yes, working activity level: Working Full Time       Would like to discuss upgrading pt using his 1A status next week    F/u 4 wk with labs/interrogation

## 2017-11-29 NOTE — PROCEDURES
TXP DERREK INTERROGATIONS 11/29/2017 11/1/2017 10/4/2017 9/13/2017 7/13/2017 6/14/2017 5/17/2017   Type Heartware Heartware Heartware Heartware Heartware Heartware Heartware   Flow 4.2 4.2 4.1 4.6 4.1 4.0 4   Speed 2700 2700 2700 2700 2700 2700 2700   Power ([employer]) 4.8 4.7 4.2 4.5 4.4 4.6 4.3   Low Flow Alarm 2.0 2.0 2 2.0 2.0 2.5 2.5   High Power Alarm 6.5 6.5 6 6.5 6.5 6.0 6.0   Pulsatility No Pulse Intermittent pulse Pulse Intermittent pulse Intermittent pulse Intermittent pulse Intermittent pulse   }

## 2017-11-29 NOTE — LETTER
November 29, 2017        Guillermo Alejo  [address]  [facility] [address]  Phone: [phone]  Fax: [phone]             [facility]  [address]  [facility] [address]  Phone: [phone]   Patient: Mert Samayoa   MR Number: [id number]   YOB: 1990   Date of Visit: 11/29/2017       Dear Dr. Guillermo Alejo    Thank you for referring Mert Samayoa to me for evaluation. Attached you will find relevant portions of my assessment and plan of care.    If you have questions, please do not hesitate to call me. I look forward to following Mert Samayoa along with you.    Sincerely,    Marisel Lundberg MD    Enclosure    If you would like to receive this communication electronically, please contact [email or web address] or [phone] to request [employer] Link access.    [employer] is a tool which provides read-only access to select patient information with whom you have a relationship. Its easy to use and provides real time access to review your patients record including encounter summaries, notes, results, and demographic information.    If you feel you have received this communication in error or would no longer like to receive these types of communications, please e-mail [email or web address]

## 2017-11-30 LAB — HPRA INTERPRETATION: NORMAL

## 2017-12-01 NOTE — PROGRESS NOTES
"Date of Implant with Heartware LVAD: 2/1/17    PATIENT ARRIVED IN CLINIC:  Ambulatory  Accompanied by:self  Vitals  Doppler:76  Pulsatile:no  PAIN:6 on 0-10 pain scale , location of pain: ?, description of pain: ? Hurts at night  Is patient currently on medications for pain?yes What kind? tylenol    VAD Interrogation:  TXP DERREK INTERROGATIONS 11/29/2017   Type Heartware   Flow 4.2   Speed 2700   Power (G[employer]mith) 4.8   Low Flow Alarm 2.0   High Power Alarm 6.5   Pulsatility No Pulse       HCT:43.6  WAVEFORM:2-6, no negative deflections noted  Suction alarm: off     Problems / Issues / Alarms with VAD if any:no alarms noted in history   Any Equipment Issues? (Refer to equipment coordinators detailed note):no issues with equipment today  Patient states their batteries are lasting 14 hours. Rotating all batteries. Checking connections before and after changing battery.   Emergency Equipment With Patient:yes   VAD Binder With Patient: yes   Reviewed VAD Numbers In Binder:yes  VAD Sounds: SMOOTH   Manual & Visual Inspection Of Driveline:  NO KINKS OR TEARS NOTED   Checked Heartware driveline connector housing for separation, none noted.  Also checked for tight connection, which they are.  Educated pt regarding this and instructed  to check on this daily. Pt verbalized understanding and agreement.     LVAD Dressing/DLES:  Appearance Of Driveline:"1"  Antibiotics:NO  Frequency of Dressing Changes:daily with soap and water   Stabilization Device In Use: YES kirk herrera    Pt In Need Of Management Kits?:yes   It is medically necessary to have VAD management kits in order to prevent infection or to assist in the healing of an infected DLES.    Assessment:   Complaints/reason for visit today: Routine follow up  Complaints Of Nausea / Vomiting:denies   Appearance and Frequency Of Stools:denies blood in stool or diarrhea  Patient reports urine is clear and yellow:  YES    Coping/Depression/Anxiety:denies depression, coping ok.  " Sometimes anxious  Sleep Habits:6-7 hrs /night  Sleep Aids:denies  Showering :YES, Reminded patient to change dressing immediately after shower and drying off.   Activity/Exercise:working   Driving:yes, Reminded to pull over should there be an alarm before looking down at controller.       Labs:    Chemistry        Component Value Date/Time     11/29/2017 1305    K 4.4 11/29/2017 1305     11/29/2017 1305    CO2 29 11/29/2017 1305    BUN 13 11/29/2017 1305    CREATININE 1.1 11/29/2017 1305    GLU 81 11/29/2017 1305        Component Value Date/Time    CALCIUM 9.9 11/29/2017 1305    ALKPHOS 76 11/29/2017 1305    AST 26 11/29/2017 1305    ALT 23 11/29/2017 1305    BILITOT 0.8 11/29/2017 1305    ESTGFRAFRICA >60.0 11/29/2017 1305    EGFRNONAA >60.0 11/29/2017 1305            Magnesium   Date Value Ref Range Status   11/29/2017 2.0 1.6 - 2.6 mg/dL Final       Lab Results   Component Value Date    WBC 6.96 11/29/2017    HGB 15.2 11/29/2017    HCT 43.6 11/29/2017    MCV 89 11/29/2017     11/29/2017       Lab Results   Component Value Date    INR 2.1 (H) 11/29/2017    INR 2.4 11/21/2017    INR 2.3 11/16/2017       BNP   Date Value Ref Range Status   11/29/2017 45 0 - 99 pg/mL Final     Comment:     Values of less than 100 pg/ml are consistent with non-CHF populations.   11/01/2017 39 0 - 99 pg/mL Final     Comment:     Values of less than 100 pg/ml are consistent with non-CHF populations.   10/04/2017 92 0 - 99 pg/mL Final     Comment:     Values of less than 100 pg/ml are consistent with non-CHF populations.       LD   Date Value Ref Range Status   11/29/2017 207 110 - 260 U/L Final     Comment:     Results are increased in hemolyzed samples.   11/01/2017 233 110 - 260 U/L Final     Comment:     Results are increased in hemolyzed samples.   10/04/2017 263 (H) 110 - 260 U/L Final     Comment:     Results are increased in hemolyzed samples.       Labs reviewed with patient: YES      Patient Satisfaction  Survey completed per Patient: yes  (explained about signature and box to check)  Medication reconciliation: per MA.  Purple card updated today: no  Coumadin Managed by: [facility], 201 today    Education: Reviewed driveline care, emergency procedures, how to change the controller, alarms with patient.  Also reviewed how to get in touch with a VAD coordinator in the event of an emergency. Reviewed electrical fault alarm with pt today.       Plans/Needs:Refer to MD note.

## 2017-12-05 ENCOUNTER — ANTI-COAG VISIT (OUTPATIENT)
Dept: CARDIOLOGY | Facility: CLINIC | Age: 27
End: 2017-12-05

## 2017-12-05 DIAGNOSIS — Z79.01 ANTICOAGULATION MONITORING, INR RANGE 2-3: ICD-10-CM

## 2017-12-05 DIAGNOSIS — Z95.811 LVAD (LEFT VENTRICULAR ASSIST DEVICE) PRESENT: ICD-10-CM

## 2017-12-05 LAB — INR PPP: 2.3

## 2017-12-06 ENCOUNTER — COMMITTEE REVIEW (OUTPATIENT)
Dept: TRANSPLANT | Facility: CLINIC | Age: 27
End: 2017-12-06

## 2017-12-06 ENCOUNTER — TELEPHONE (OUTPATIENT)
Dept: TRANSPLANT | Facility: CLINIC | Age: 27
End: 2017-12-06

## 2017-12-06 NOTE — COMMITTEE REVIEW
Native Organ Dx: Dilated Myopathy: Familial    Discussion to use 1A VAD time, will rediscuss in 2 weeks at 12/20/17 Committee Review meeting.     Note was written by Vee Khan.    ==========================================================    I was present at the meeting and attest to the decision of the committee  Referring notfiied by mail

## 2017-12-08 ENCOUNTER — TELEPHONE (OUTPATIENT)
Dept: TRANSPLANT | Facility: CLINIC | Age: 27
End: 2017-12-08

## 2017-12-08 NOTE — TELEPHONE ENCOUNTER
Pt paged on call VAD coordinator to report 1 low flow alarm overnight.  Pt denies any s/s .  Current LVAD parameters WNL.  Pt also asking questions about transplant, encouraged him to contact his VAD coordinator.   Pt encouraged to call VAD coordinator with any questions problems or concerns. Pt reminded to page VAD coordinator on call for emergencies.  Pt verbalized understanding and in agreement of plan.

## 2017-12-12 ENCOUNTER — TELEPHONE (OUTPATIENT)
Dept: TRANSPLANT | Facility: CLINIC | Age: 27
End: 2017-12-12

## 2017-12-12 ENCOUNTER — ANTI-COAG VISIT (OUTPATIENT)
Dept: CARDIOLOGY | Facility: CLINIC | Age: 27
End: 2017-12-12

## 2017-12-12 DIAGNOSIS — Z95.811 LVAD (LEFT VENTRICULAR ASSIST DEVICE) PRESENT: ICD-10-CM

## 2017-12-12 DIAGNOSIS — Z79.01 ANTICOAGULATION MONITORING, INR RANGE 2-3: ICD-10-CM

## 2017-12-12 LAB — INR PPP: 2.3

## 2017-12-12 NOTE — TELEPHONE ENCOUNTER
Patient notified if used 1A time at present may be in competition of sicker 1A patients, will rediscuss at selection in 2 weeks (12/20/17).  Choice is not to list as 1A at present, Voiced understanding.

## 2017-12-19 ENCOUNTER — ANTI-COAG VISIT (OUTPATIENT)
Dept: CARDIOLOGY | Facility: CLINIC | Age: 27
End: 2017-12-19

## 2017-12-19 DIAGNOSIS — Z79.01 ANTICOAGULATION MONITORING, INR RANGE 2-3: ICD-10-CM

## 2017-12-19 DIAGNOSIS — Z95.811 LVAD (LEFT VENTRICULAR ASSIST DEVICE) PRESENT: ICD-10-CM

## 2017-12-19 LAB — INR PPP: 1.8

## 2017-12-19 NOTE — PROGRESS NOTES
Patient confirms dose. States he has started eating an iceberg salad daily with contains a small amount of cabbage. He will continue. Will boost today. INR 12/22 to adjust.

## 2017-12-22 ENCOUNTER — ANTI-COAG VISIT (OUTPATIENT)
Dept: CARDIOLOGY | Facility: CLINIC | Age: 27
End: 2017-12-22

## 2017-12-22 DIAGNOSIS — Z95.811 LVAD (LEFT VENTRICULAR ASSIST DEVICE) PRESENT: ICD-10-CM

## 2017-12-22 DIAGNOSIS — Z79.01 ANTICOAGULATION MONITORING, INR RANGE 2-3: ICD-10-CM

## 2017-12-22 LAB — INR PPP: 2

## 2017-12-26 ENCOUNTER — ANTI-COAG VISIT (OUTPATIENT)
Dept: CARDIOLOGY | Facility: CLINIC | Age: 27
End: 2017-12-26

## 2017-12-26 DIAGNOSIS — Z79.01 ANTICOAGULATION MONITORING, INR RANGE 2-3: ICD-10-CM

## 2017-12-26 DIAGNOSIS — Z95.811 LVAD (LEFT VENTRICULAR ASSIST DEVICE) PRESENT: ICD-10-CM

## 2017-12-26 LAB — INR PPP: 2.2

## 2017-12-28 ENCOUNTER — CLINICAL SUPPORT (OUTPATIENT)
Dept: TRANSPLANT | Facility: CLINIC | Age: 27
End: 2017-12-28
Payer: COMMERCIAL

## 2017-12-28 ENCOUNTER — ANTI-COAG VISIT (OUTPATIENT)
Dept: CARDIOLOGY | Facility: CLINIC | Age: 27
End: 2017-12-28

## 2017-12-28 ENCOUNTER — LAB VISIT (OUTPATIENT)
Dept: LAB | Facility: HOSPITAL | Age: 27
End: 2017-12-28
Attending: INTERNAL MEDICINE
Payer: COMMERCIAL

## 2017-12-28 ENCOUNTER — OFFICE VISIT (OUTPATIENT)
Dept: TRANSPLANT | Facility: CLINIC | Age: 27
End: 2017-12-28
Attending: INTERNAL MEDICINE
Payer: COMMERCIAL

## 2017-12-28 VITALS
TEMPERATURE: 98 F | HEIGHT: 67 IN | BODY MASS INDEX: 26.21 KG/M2 | WEIGHT: 167 LBS | HEART RATE: 77 BPM | SYSTOLIC BLOOD PRESSURE: 82 MMHG

## 2017-12-28 VITALS — SYSTOLIC BLOOD PRESSURE: 76 MMHG | BODY MASS INDEX: 27.28 KG/M2 | WEIGHT: 169.75 LBS | HEIGHT: 66 IN

## 2017-12-28 DIAGNOSIS — Z95.811 LVAD (LEFT VENTRICULAR ASSIST DEVICE) PRESENT: ICD-10-CM

## 2017-12-28 DIAGNOSIS — Z79.01 ANTICOAGULATION MONITORING, INR RANGE 2-3: ICD-10-CM

## 2017-12-28 DIAGNOSIS — Z95.811 HEART REPLACED BY HEART ASSIST DEVICE: ICD-10-CM

## 2017-12-28 DIAGNOSIS — I50.9 CONGESTIVE HEART FAILURE, UNSPECIFIED CONGESTIVE HEART FAILURE CHRONICITY, UNSPECIFIED CONGESTIVE HEART FAILURE TYPE: ICD-10-CM

## 2017-12-28 DIAGNOSIS — I50.9 CONGESTIVE HEART FAILURE: ICD-10-CM

## 2017-12-28 DIAGNOSIS — I50.42 CHRONIC COMBINED SYSTOLIC AND DIASTOLIC HEART FAILURE: ICD-10-CM

## 2017-12-28 DIAGNOSIS — Z95.811 HEART REPLACED BY HEART ASSIST DEVICE: Primary | ICD-10-CM

## 2017-12-28 DIAGNOSIS — Z76.82 ORGAN TRANSPLANT CANDIDATE: ICD-10-CM

## 2017-12-28 DIAGNOSIS — I42.9 FAMILIAL CARDIOMYOPATHY: ICD-10-CM

## 2017-12-28 DIAGNOSIS — Z95.810 ICD (IMPLANTABLE CARDIOVERTER-DEFIBRILLATOR) IN PLACE: ICD-10-CM

## 2017-12-28 PROBLEM — I48.3 TYPICAL ATRIAL FLUTTER: Status: ACTIVE | Noted: 2017-12-28

## 2017-12-28 LAB
ALBUMIN SERPL BCP-MCNC: 4.6 G/DL
ALP SERPL-CCNC: 77 U/L
ALT SERPL W/O P-5'-P-CCNC: 31 U/L
ANION GAP SERPL CALC-SCNC: 9 MMOL/L
AST SERPL-CCNC: 23 U/L
BASOPHILS # BLD AUTO: 0.03 K/UL
BASOPHILS NFR BLD: 0.4 %
BILIRUB DIRECT SERPL-MCNC: 0.4 MG/DL
BILIRUB SERPL-MCNC: 0.9 MG/DL
BNP SERPL-MCNC: 61 PG/ML
BUN SERPL-MCNC: 14 MG/DL
CALCIUM SERPL-MCNC: 9.8 MG/DL
CHLORIDE SERPL-SCNC: 104 MMOL/L
CO2 SERPL-SCNC: 27 MMOL/L
CREAT SERPL-MCNC: 1.1 MG/DL
CRP SERPL-MCNC: 7.6 MG/L
DIFFERENTIAL METHOD: NORMAL
EOSINOPHIL # BLD AUTO: 0.1 K/UL
EOSINOPHIL NFR BLD: 1.5 %
ERYTHROCYTE [DISTWIDTH] IN BLOOD BY AUTOMATED COUNT: 14.4 %
EST. GFR  (AFRICAN AMERICAN): >60 ML/MIN/1.73 M^2
EST. GFR  (NON AFRICAN AMERICAN): >60 ML/MIN/1.73 M^2
GLUCOSE SERPL-MCNC: 83 MG/DL
HCT VFR BLD AUTO: 42.8 %
HGB BLD-MCNC: 14.7 G/DL
IMM GRANULOCYTES # BLD AUTO: 0.03 K/UL
IMM GRANULOCYTES NFR BLD AUTO: 0.4 %
INR PPP: 2
LDH SERPL L TO P-CCNC: 215 U/L
LYMPHOCYTES # BLD AUTO: 1.6 K/UL
LYMPHOCYTES NFR BLD: 20.7 %
MAGNESIUM SERPL-MCNC: 2.3 MG/DL
MCH RBC QN AUTO: 29.8 PG
MCHC RBC AUTO-ENTMCNC: 34.3 G/DL
MCV RBC AUTO: 87 FL
MONOCYTES # BLD AUTO: 0.6 K/UL
MONOCYTES NFR BLD: 8.2 %
NEUTROPHILS # BLD AUTO: 5.2 K/UL
NEUTROPHILS NFR BLD: 68.8 %
NRBC BLD-RTO: 0 /100 WBC
PHOSPHATE SERPL-MCNC: 2.7 MG/DL
PLATELET # BLD AUTO: 198 K/UL
PMV BLD AUTO: 9.8 FL
POTASSIUM SERPL-SCNC: 4.3 MMOL/L
PREALB SERPL-MCNC: 28 MG/DL
PROT SERPL-MCNC: 8.3 G/DL
PROTHROMBIN TIME: 19.9 SEC
RBC # BLD AUTO: 4.94 M/UL
SODIUM SERPL-SCNC: 140 MMOL/L
WBC # BLD AUTO: 7.57 K/UL

## 2017-12-28 PROCEDURE — 84100 ASSAY OF PHOSPHORUS: CPT | Mod: TXP

## 2017-12-28 PROCEDURE — 83880 ASSAY OF NATRIURETIC PEPTIDE: CPT | Mod: TXP

## 2017-12-28 PROCEDURE — 99999 PR PBB SHADOW E&M-EST. PATIENT-LVL III: CPT | Mod: PBBFAC,TXP,,

## 2017-12-28 PROCEDURE — 99214 OFFICE O/P EST MOD 30 MIN: CPT | Mod: S$GLB,TXP,, | Performed by: INTERNAL MEDICINE

## 2017-12-28 PROCEDURE — 85025 COMPLETE CBC W/AUTO DIFF WBC: CPT | Mod: TXP

## 2017-12-28 PROCEDURE — 36415 COLL VENOUS BLD VENIPUNCTURE: CPT | Mod: TXP

## 2017-12-28 PROCEDURE — 86977 RBC SERUM PRETX INCUBJ/INHIB: CPT | Mod: 91,PO,TXP

## 2017-12-28 PROCEDURE — 82248 BILIRUBIN DIRECT: CPT | Mod: TXP

## 2017-12-28 PROCEDURE — 83735 ASSAY OF MAGNESIUM: CPT | Mod: TXP

## 2017-12-28 PROCEDURE — 86140 C-REACTIVE PROTEIN: CPT | Mod: TXP

## 2017-12-28 PROCEDURE — 83615 LACTATE (LD) (LDH) ENZYME: CPT | Mod: TXP

## 2017-12-28 PROCEDURE — 80053 COMPREHEN METABOLIC PANEL: CPT | Mod: TXP

## 2017-12-28 PROCEDURE — 86832 HLA CLASS I HIGH DEFIN QUAL: CPT | Mod: PO,TXP

## 2017-12-28 PROCEDURE — 84134 ASSAY OF PREALBUMIN: CPT | Mod: TXP

## 2017-12-28 PROCEDURE — 86833 HLA CLASS II HIGH DEFIN QUAL: CPT | Mod: PO,TXP

## 2017-12-28 PROCEDURE — 85610 PROTHROMBIN TIME: CPT | Mod: TXP

## 2017-12-28 PROCEDURE — 99999 PR PBB SHADOW E&M-EST. PATIENT-LVL I: CPT | Mod: PBBFAC,TXP,, | Performed by: INTERNAL MEDICINE

## 2017-12-28 NOTE — PROGRESS NOTES
Verbal result taken from ___Brown______. PT/INR _23.6 2.2______ Date drawn_12/26/17_______ Hardcopy to be faxed.  Patient notified .  Will be having vad labs drawn today 12/28

## 2017-12-28 NOTE — PROGRESS NOTES
Date of Implant with Heartware LVAD: 2/4/17    PATIENT ARRIVED IN CLINIC:  Ambulatory   Accompanied by: n/a    Vitals  Doppler: 76, MAP   PAIN: denies on 0-10 pain scale  Is patient currently on medications for pain? no     VAD Interrogation:  TXP DERREK INTERROGATIONS 12/28/2017   Type Heartware   Flow 4.3   Speed 2700   Power ([employer]mith) 4.8   Low Flow Alarm 2.0   High Power Alarm 6.5   Pulsatility Intermittent pulse       HCT:   Lab Results   Component Value Date    HCT 42.8 12/28/2017    HCT 24 (L) 02/02/2017     WAVEFORM: 3-7, no deflections  Suction alarm: Off  Problems / Issues / Alarms with VAD if any: LFA noted 12/20/17 at 0400.   Any Equipment Issues: (Refer to  note for complete details)   Patient states their batteries are lasting 14 hours. Rotating all batteries. Checking connections before and after changing battery.   Emergency Equipment With Patient: yes   VAD Binder With Patient: yes   Reviewed VAD Numbers In Binder: yes  VAD Sounds: Smooth  Manual & Visual Inspection Of Driveline: No kinks or tears noted  Checked Heartware driveline connector housing for separation, none noted.  Also checked for tight connection, which they are.  Educated pt regarding this and instructed  to check on this daily. Pt verbalized understanding and agreement.     LVAD Dressing/DLES:  Appearance Of Driveline: 1  Antibiotics: NO  Frequency of Dressing Changes: daily & soap and water dressing , given permission to go to QOD  Stabilization Device In Use: yes, kirk securement device    Pt In Need Of Management Kits? Yes - 1 month soap and water ordered today  It is medically necessary to have VAD management kits in order to prevent infection or to assist in the healing of an infected DLES.    Assessment:   Complaints/reason for visit today: routine  Complaints Of Nausea / Vomiting: None noted   Appearance and Frequency Of Stools: normal and formed without blood & daily  Color Of Urine:  clear/yellow  Coping/Depression/Anxiety: coping okay  Sleep Habits: 6-7 hrs /night  Sleep Aids: None noted   Showering: Yes, reminded to change dressing immediately after drying off  Activity/Exercise: pt reports being very active, walking daily, working   Driving: Yes. Reminded to pull over should there be an alarm before looking down at controller.    Labs:    Chemistry        Component Value Date/Time     12/28/2017 1303    K 4.3 12/28/2017 1303     12/28/2017 1303    CO2 27 12/28/2017 1303    BUN 14 12/28/2017 1303    CREATININE 1.1 12/28/2017 1303    GLU 83 12/28/2017 1303        Component Value Date/Time    CALCIUM 9.8 12/28/2017 1303    ALKPHOS 77 12/28/2017 1303    AST 23 12/28/2017 1303    ALT 31 12/28/2017 1303    BILITOT 0.9 12/28/2017 1303    ESTGFRAFRICA >60.0 12/28/2017 1303    EGFRNONAA >60.0 12/28/2017 1303            Magnesium   Date Value Ref Range Status   12/28/2017 2.3 1.6 - 2.6 mg/dL Final       Lab Results   Component Value Date    WBC 7.57 12/28/2017    HGB 14.7 12/28/2017    HCT 42.8 12/28/2017    MCV 87 12/28/2017     12/28/2017       Lab Results   Component Value Date    INR 2.0 (H) 12/28/2017    INR 2.2 12/26/2017    INR 2.0 12/22/2017       BNP   Date Value Ref Range Status   12/28/2017 61 0 - 99 pg/mL Final     Comment:     Values of less than 100 pg/ml are consistent with non-CHF populations.   11/29/2017 45 0 - 99 pg/mL Final     Comment:     Values of less than 100 pg/ml are consistent with non-CHF populations.   11/01/2017 39 0 - 99 pg/mL Final     Comment:     Values of less than 100 pg/ml are consistent with non-CHF populations.       LD   Date Value Ref Range Status   12/28/2017 215 110 - 260 U/L Final     Comment:     Results are increased in hemolyzed samples.   11/29/2017 207 110 - 260 U/L Final     Comment:     Results are increased in hemolyzed samples.   11/01/2017 233 110 - 260 U/L Final     Comment:     Results are increased in hemolyzed samples.        Labs reviewed with patient: YES      Patient Satisfaction Survey completed per Patient: no  (explained about signature and box to check)  Medication reconciliation: per MA.  Purple card updated today: yes  Coumadin Managed by: [facility],     Education: Reviewed driveline care, emergency procedures, how to change the controller, alarms with patient, as well as discussed how to page the VAD coordinator in case of an emergency.      Plans/Needs:  Pt doing very well with no issues.  No changes today.  Pt to RTC in 1 month, please refer to MD note.     Hurricane Season: No

## 2017-12-28 NOTE — PROGRESS NOTES
Subjective:   Patient ID:  Mert Samayoa is a 27 y.o. male who presents for LVAD followup visit.    Implant Date:2/1/17  Initials:TAW     HVAD RPM 2700     INR goal: 2-3   Bridge with Heparin   Antiplatelets:     TXP DERREK INTERROGATIONS 12/28/2017   Type Heartware   Flow 4.3   Speed 2700   Power (G[employer]mith) 4.8   Low Flow Alarm 2.0   High Power Alarm 6.5   Pulsatility Intermittent pulse       HPI  27 year old WM with DCM (familial, both brothers with heart transplants) with stage D CHFrEF underwent Heartware placement 02/01/17 had post-op atrial flutter 02/11/17 treated with amiodarone though not maintained on this treatment.  Here for routine VAD f/u and denies any active problems.  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  He is not exposed to sewage and no manual labor or soiling occurs with this job.    Review of Systems   Constitution: Negative for chills, fever, weakness, malaise/fatigue, night sweats, weight gain and weight loss.   HENT: Negative for congestion, hearing loss, hoarse voice, nosebleeds and sore throat.    Eyes: Negative for double vision, pain, vision loss in left eye and vision loss in right eye.   Cardiovascular: Negative for chest pain, claudication, dyspnea on exertion, irregular heartbeat, leg swelling, near-syncope, orthopnea, palpitations, paroxysmal nocturnal dyspnea and syncope.   Respiratory: Negative for cough, hemoptysis, shortness of breath, sleep disturbances due to breathing, snoring, sputum production and wheezing.    Endocrine: Negative for cold intolerance, heat intolerance, polydipsia and polyuria.   Hematologic/Lymphatic: Negative for bleeding problem. Does not bruise/bleed easily.   Musculoskeletal: Negative for falls, muscle cramps, myalgias and neck pain.   Gastrointestinal: Negative for bloating, abdominal pain, change in bowel habit, constipation, diarrhea, hematochezia, jaundice, melena and nausea.   Genitourinary:  "Negative for bladder incontinence, dysuria, frequency, hematuria, hesitancy, incomplete emptying and urgency.   Neurological: Negative for brief paralysis, dizziness, focal weakness, headaches, numbness, paresthesias and seizures.   Psychiatric/Behavioral: Negative for depression and memory loss. The patient is not nervous/anxious.      Objective:   Blood pressure (!) 76/0, height 5' 6" (1.676 m), weight 77 kg (169 lb 12.1 oz).body mass index is 27.4 kg/m².  BP 83/55, calc MAP 64  Doppler: 76 mm Hg  Physical Exam   Constitutional: He is oriented to person, place, and time. He appears well-developed and well-nourished. No distress.   HENT:   Head: Normocephalic and atraumatic.   Eyes: Conjunctivae are normal. No scleral icterus.   Neck: No JVD present. No tracheal deviation present. No thyromegaly present.   Cardiovascular:   Normal VAD sounds; DL 1   Pulmonary/Chest: Effort normal and breath sounds normal. No respiratory distress. He has no wheezes. He has no rales.   Abdominal: Soft. Bowel sounds are normal. He exhibits no distension and no mass. There is no tenderness. There is no rebound and no guarding.   Musculoskeletal: He exhibits no edema or tenderness.   Neurological: He is alert and oriented to person, place, and time.   Skin: Skin is warm and dry. He is not diaphoretic.   Psychiatric: He has a normal mood and affect. His behavior is normal. Judgment and thought content normal.     Lab Results   Component Value Date    BNP 61 12/28/2017     12/28/2017    K 4.3 12/28/2017    MG 2.3 12/28/2017     12/28/2017    CO2 27 12/28/2017    BUN 14 12/28/2017    CREATININE 1.1 12/28/2017    GLU 83 12/28/2017    AST 23 12/28/2017    ALT 31 12/28/2017    ALBUMIN 4.6 12/28/2017    PROT 8.3 12/28/2017    BILITOT 0.9 12/28/2017    WBC 7.57 12/28/2017    HGB 14.7 12/28/2017    HCT 42.8 12/28/2017     12/28/2017    INR 2.0 (H) 12/28/2017    INR 2.2 12/26/2017     12/28/2017    TSH 1.025 09/13/2017 "    CHOL 123 07/11/2017    HDL 37 (L) 07/11/2017    LDLCALC 62.2 (L) 07/11/2017    TRIG 119 07/11/2017    FREET4 1.21 09/13/2017     Assessment:      1. Heart replaced by heart assist device    2. Chronic combined systolic and diastolic heart failure    3. Familial cardiomyopathy    4. ICD (implantable cardioverter-defibrillator) in place      Plan:   Diuresis, wt loss, low sodium diet and fluid restriction reviewed along with daily weights and how to respond to 3# weight gain with prn diuretics.  If this fails to restore dry weight call us within 2-3 days.     Same treatment    Patient is now NYHA I    Listed for transplant: Yes, all appropriate transplant related labs (including HLA) performed. RHC requested per listing protocol. 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  He is not exposed to sewage and no manual labor or soiling occurs with this job.  If yes, working activity level: Working Full Time

## 2017-12-28 NOTE — PROCEDURES
TXP DERREK INTERROGATIONS 12/28/2017 11/29/2017 11/1/2017 10/4/2017 9/13/2017 7/13/2017 6/14/2017   Type Heartware Heartware Heartware Heartware Heartware Heartware Heartware   Flow 4.3 4.2 4.2 4.1 4.6 4.1 4.0   Speed 2700 2700 2700 2700 2700 2700 2700   Power ([employer]mith) 4.8 4.8 4.7 4.2 4.5 4.4 4.6   Low Flow Alarm 2.0 2.0 2.0 2 2.0 2.0 2.5   High Power Alarm 6.5 6.5 6.5 6 6.5 6.5 6.0   Pulsatility Intermittent pulse No Pulse Intermittent pulse Pulse Intermittent pulse Intermittent pulse Intermittent pulse   }

## 2018-01-02 ENCOUNTER — TELEPHONE (OUTPATIENT)
Dept: TRANSPLANT | Facility: CLINIC | Age: 28
End: 2018-01-02

## 2018-01-02 NOTE — TELEPHONE ENCOUNTER
12/31/17 @ 1125am Pt paged on call VAD coordinator to report blood and minor tenderness to LVAD DLES.  Pt reports sight is incorporated and denies redness or induration. Pt asked if it got pulled or could have slept in it, pt reports likely got pulled while sleeping.  Pt instructed to return to daily LVAD dressing changes until bleeding and tenderness resolves.  Pt verbalized read back of plan.   Pt encouraged to call VAD coordinator with any questions problems or concerns. Pt reminded to page VAD coordinator on call for emergencies.  Pt verbalized understanding and in agreement of plan.     1/2/18 - Called to check on pt, pt reports sight looking better and is less tender.   Pt encouraged to call VAD coordinator with any questions problems or concerns. Pt reminded to page VAD coordinator on call for emergencies.  Pt verbalized understanding and in agreement of plan.

## 2018-01-03 LAB — INR PPP: 2.6

## 2018-01-04 ENCOUNTER — ANTI-COAG VISIT (OUTPATIENT)
Dept: CARDIOLOGY | Facility: CLINIC | Age: 28
End: 2018-01-04

## 2018-01-04 DIAGNOSIS — Z95.811 LVAD (LEFT VENTRICULAR ASSIST DEVICE) PRESENT: ICD-10-CM

## 2018-01-04 DIAGNOSIS — Z79.01 ANTICOAGULATION MONITORING, INR RANGE 2-3: ICD-10-CM

## 2018-01-05 ENCOUNTER — TELEPHONE (OUTPATIENT)
Dept: ADMINISTRATIVE | Facility: HOSPITAL | Age: 28
End: 2018-01-05

## 2018-01-05 ENCOUNTER — DOCUMENTATION ONLY (OUTPATIENT)
Dept: TRANSPLANT | Facility: CLINIC | Age: 28
End: 2018-01-05

## 2018-01-05 NOTE — PROGRESS NOTES
Mr. Samayoa  is currently on the [employer] Heart Transplant Wait list.  His Status is 1B using criteria of Mechanical Circulatory Support implant 2/1/2017 with HVAD.  Advised by finance that he has electived a change in his health care insurance and he is not finacially approved for trasnplant with this new coverage.  I have electronically communicated with procurement for a Status change to 7.  Letter of medical necessity and clinicals provided to finance for financial approval for his new insurance.

## 2018-01-08 ENCOUNTER — DOCUMENTATION ONLY (OUTPATIENT)
Dept: TRANSPLANT | Facility: CLINIC | Age: 28
End: 2018-01-08

## 2018-01-10 ENCOUNTER — COMMITTEE REVIEW (OUTPATIENT)
Dept: TRANSPLANT | Facility: CLINIC | Age: 28
End: 2018-01-10

## 2018-01-10 ENCOUNTER — ANTI-COAG VISIT (OUTPATIENT)
Dept: CARDIOLOGY | Facility: CLINIC | Age: 28
End: 2018-01-10

## 2018-01-10 DIAGNOSIS — Z79.01 ANTICOAGULATION MONITORING, INR RANGE 2-3: ICD-10-CM

## 2018-01-10 DIAGNOSIS — Z95.811 LVAD (LEFT VENTRICULAR ASSIST DEVICE) PRESENT: ICD-10-CM

## 2018-01-10 LAB — INR PPP: 2

## 2018-01-10 NOTE — COMMITTEE REVIEW
Native Organ Dx: Dilated Myopathy: Familial    Status 7 was discussed due to change in insurance, letter of medical necessity and clinicals provided to insurance company per financial coord. - awaiting approval.    Note was written by Vee Khan.    ==========================================================    I was present at the meeting and attest to the decision of the committee

## 2018-01-15 ENCOUNTER — CLINICAL SUPPORT (OUTPATIENT)
Dept: ELECTROPHYSIOLOGY | Facility: CLINIC | Age: 28
End: 2018-01-15
Payer: COMMERCIAL

## 2018-01-15 DIAGNOSIS — I48.92 ATRIAL FLUTTER: ICD-10-CM

## 2018-01-15 DIAGNOSIS — Z95.810 AICD (AUTOMATIC CARDIOVERTER/DEFIBRILLATOR) PRESENT: Primary | ICD-10-CM

## 2018-01-15 DIAGNOSIS — I42.8 NON-ISCHEMIC CARDIOMYOPATHY: ICD-10-CM

## 2018-01-15 DIAGNOSIS — I50.42 CHRONIC COMBINED SYSTOLIC AND DIASTOLIC HEART FAILURE: ICD-10-CM

## 2018-01-15 PROCEDURE — 93296 REM INTERROG EVL PM/IDS: CPT | Mod: NTX,S$GLB,, | Performed by: INTERNAL MEDICINE

## 2018-01-15 PROCEDURE — 93295 DEV INTERROG REMOTE 1/2/MLT: CPT | Mod: NTX,S$GLB,, | Performed by: INTERNAL MEDICINE

## 2018-01-16 ENCOUNTER — ANTI-COAG VISIT (OUTPATIENT)
Dept: CARDIOLOGY | Facility: CLINIC | Age: 28
End: 2018-01-16

## 2018-01-16 DIAGNOSIS — Z95.811 LVAD (LEFT VENTRICULAR ASSIST DEVICE) PRESENT: ICD-10-CM

## 2018-01-16 DIAGNOSIS — Z79.01 ANTICOAGULATION MONITORING, INR RANGE 2-3: ICD-10-CM

## 2018-01-16 LAB — INR PPP: 2.1

## 2018-01-19 ENCOUNTER — TELEPHONE (OUTPATIENT)
Dept: TRANSPLANT | Facility: CLINIC | Age: 28
End: 2018-01-19

## 2018-01-19 NOTE — TELEPHONE ENCOUNTER
Waitlist status re-activated to 1B from 7, updated by procurement in [employer], verified and correct. Patient notified of reactivation to 1B, voiced understanding

## 2018-01-19 NOTE — TELEPHONE ENCOUNTER
Notified per Abdoulaye, insurance approval to relist as staus 1B, pt notified and request to update listing in [employer] faxed to procurement. Patient also notified of additional appts with nutrition and SW, voiced understanding.

## 2018-01-22 ENCOUNTER — TELEPHONE (OUTPATIENT)
Dept: TRANSPLANT | Facility: CLINIC | Age: 28
End: 2018-01-22

## 2018-01-25 ENCOUNTER — OFFICE VISIT (OUTPATIENT)
Dept: TRANSPLANT | Facility: CLINIC | Age: 28
End: 2018-01-25
Payer: COMMERCIAL

## 2018-01-25 ENCOUNTER — ANTI-COAG VISIT (OUTPATIENT)
Dept: CARDIOLOGY | Facility: CLINIC | Age: 28
End: 2018-01-25

## 2018-01-25 ENCOUNTER — CLINICAL SUPPORT (OUTPATIENT)
Dept: TRANSPLANT | Facility: CLINIC | Age: 28
End: 2018-01-25
Payer: COMMERCIAL

## 2018-01-25 ENCOUNTER — NUTRITION (OUTPATIENT)
Dept: NUTRITION | Facility: CLINIC | Age: 28
End: 2018-01-25
Payer: COMMERCIAL

## 2018-01-25 ENCOUNTER — SOCIAL WORK (OUTPATIENT)
Dept: TRANSPLANT | Facility: CLINIC | Age: 28
End: 2018-01-25

## 2018-01-25 VITALS — WEIGHT: 167 LBS | HEIGHT: 67 IN | BODY MASS INDEX: 26.21 KG/M2 | TEMPERATURE: 98 F | SYSTOLIC BLOOD PRESSURE: 78 MMHG

## 2018-01-25 VITALS — WEIGHT: 163 LBS | BODY MASS INDEX: 26.2 KG/M2 | HEIGHT: 66 IN

## 2018-01-25 DIAGNOSIS — Z95.811 LVAD (LEFT VENTRICULAR ASSIST DEVICE) PRESENT: ICD-10-CM

## 2018-01-25 DIAGNOSIS — Z76.82 ORGAN TRANSPLANT CANDIDATE: ICD-10-CM

## 2018-01-25 DIAGNOSIS — Z95.811 HEART REPLACED BY HEART ASSIST DEVICE: ICD-10-CM

## 2018-01-25 DIAGNOSIS — E78.2 MIXED HYPERLIPIDEMIA: ICD-10-CM

## 2018-01-25 DIAGNOSIS — Z79.01 ANTICOAGULATION MONITORING, INR RANGE 2-3: ICD-10-CM

## 2018-01-25 DIAGNOSIS — I50.42 CHRONIC COMBINED SYSTOLIC AND DIASTOLIC HEART FAILURE: Primary | ICD-10-CM

## 2018-01-25 DIAGNOSIS — I50.22 CHRONIC SYSTOLIC CONGESTIVE HEART FAILURE: ICD-10-CM

## 2018-01-25 DIAGNOSIS — Z95.811 HEART REPLACED BY HEART ASSIST DEVICE: Primary | ICD-10-CM

## 2018-01-25 DIAGNOSIS — I42.8 NICM (NONISCHEMIC CARDIOMYOPATHY): ICD-10-CM

## 2018-01-25 DIAGNOSIS — Z00.8 NUTRITIONAL ASSESSMENT: ICD-10-CM

## 2018-01-25 DIAGNOSIS — Z95.810 ICD (IMPLANTABLE CARDIOVERTER-DEFIBRILLATOR) IN PLACE: ICD-10-CM

## 2018-01-25 PROCEDURE — 97802 MEDICAL NUTRITION INDIV IN: CPT | Mod: S$GLB,,, | Performed by: DIETITIAN, REGISTERED

## 2018-01-25 PROCEDURE — 99214 OFFICE O/P EST MOD 30 MIN: CPT | Mod: S$GLB,,, | Performed by: INTERNAL MEDICINE

## 2018-01-25 PROCEDURE — 99999 PR PBB SHADOW E&M-EST. PATIENT-LVL III: CPT | Mod: PBBFAC,TXP,,

## 2018-01-25 PROCEDURE — 99999 PR PBB SHADOW E&M-EST. PATIENT-LVL III: CPT | Mod: PBBFAC,TXP,, | Performed by: INTERNAL MEDICINE

## 2018-01-25 PROCEDURE — 93750 INTERROGATION VAD IN PERSON: CPT | Mod: S$GLB,TXP,, | Performed by: INTERNAL MEDICINE

## 2018-01-25 NOTE — LETTER
January 25, 2018        Guillermo Alejo  [address]  [facility] [address]  Phone: [phone]  Fax: [phone]             [facility]  [address]  [facility] [address]  Phone: [phone]   Patient: Mert Samayoa   MR Number: [id number]   YOB: 1990   Date of Visit: 1/25/2018       Dear Dr. Guillermo Alejo    Thank you for referring Mert Samayoa to me for evaluation. Attached you will find relevant portions of my assessment and plan of care.    If you have questions, please do not hesitate to call me. I look forward to following Mert Samayoa along with you.    Sincerely,    Sammi Tapia MD    Enclosure    If you would like to receive this communication electronically, please contact [email or web address] or [phone] to request [facility] access.    [facility] is a tool which provides read-only access to select patient information with whom you have a relationship. Its easy to use and provides real time access to review your patients record including encounter summaries, notes, results, and demographic information.    If you feel you have received this communication in error or would no longer like to receive these types of communications, please e-mail [email or web address]

## 2018-01-25 NOTE — PROCEDURES
TXP DERREK INTERROGATIONS 1/25/2018 1/25/2018 12/28/2017 11/29/2017 11/1/2017 10/4/2017 9/13/2017   Type Heartware Heartware Heartware Heartware Heartware Heartware Heartware   Flow 4.2 4.6 4.3 4.2 4.2 4.1 4.6   Speed 2700 2700 2700 2700 2700 2700 2700   Power ([employer]) 4.6 4.6 4.8 4.8 4.7 4.2 4.5   Low Flow Alarm 2.0 2.0 2.0 2.0 2.0 2 2.0   High Power Alarm 6.5 6.5 6.5 6.5 6.5 6 6.5   Pulsatility Intermittent pulse Intermittent pulse Intermittent pulse No Pulse Intermittent pulse Pulse Intermittent pulse   }

## 2018-01-25 NOTE — PROGRESS NOTES
Transplant Recipient Adult Psychosocial Assessment    Encounter Date: 1/25/2018    Mert Samayoa  [address] [facility] [address]    Telephone Information:   Mobile [phone]   Work  There is no work phone number on file.  E-mail  [email or web address]    Sex: male  YOB: 1990  Age: 27 y.o.    [place] Citizen: yes  Primary Language: English   Needed: no    Emergency Contact:  Name: Abigail Samayoa  Relationship: wife  Address: same as pt  Phone Numbers: [phone]    Family/Social Support:   Number of dependents/: n/a  Marital history:  to Abigail 1 year and 3 months. They dated 8-9 years prior to marriage.  Other family dynamics: Pt lives on same block as his parents and 2 brothers, Carolann and Charly, who both have had heart transplants as well..    Household Composition: pt lives with wife Abigail, they both drive and have 1 cat and 1 dog.    Do you and your caregivers have access to reliable transportation? yes     PRIMARY CAREGIVER: Abigail Samayoa, pt's wife, age 25, drives, will be primary caregiver, phone number [phone].      provided in-depth information to patient and caregiver regarding pre- and post-transplant caregiver role.   strongly encourages patient and caregiver to have concrete plan regarding post-transplant care giving, including back-up caregiver(s) to ensure care giving needs are met as needed.    Patient states understanding all aspects of caregiver role/commitment.    Patient verbalizes understanding of the education provided today and caregiver responsibilities.         remains available. Patient agree to contact  in a timely manner if concerns arise.      Able to take time off work without financial concerns: yes.     Additional Significant Others who will Assist with Transplant:  Name: Margot Samayoa  Age: 60  City/State: [place], [place]  Relationship: mother  Does person drive? yes    Work: yes, dental assistant  Phone: [phone]    Living Will: no  Healthcare Power of : no  Advance Directives on file: <<no information> per medical record.    Pt reports is working on a LW/HCPA with an  and wife Abigail will be HCPA.    Verbally reviewed LW/HCPA information.  provided patient with copy of LW/HCPA documents and provided education on completion of forms.    Highest Education Level: High School (9-12) or GED  Reading Ability: 12th grade  Reports difficulty with: N/A  Learns Best By:  Hands-on     Status: no  VA Benefits: no     Working for Income: yes  If yes, working activity level: Working Full Time  Patient is employed as  for [employer].    Spouse/Significant Other Employment: Full-time  at a women's [employer].    Disabled: no    Monthly Income:  Salary/Wages: $1800  Other household members total: $1000     Able to afford all costs now and if transplanted, including medications: yes     Patient verbalizes understanding of personal responsibilities related to transplant costs and the importance of having a financial plan to ensure that patients transplant costs are fully covered.      provided fundraising information/education.  Patient verbalizes understanding.   remains available.    Insurance:   Payor/Plan Subscr  Sex Relation Sub. Ins. ID Effective Group Num   1. [employer]* ABEBAMAMIE * 1990 Male  [id number]                                    [address] [address]   2. [employer]* ABEBA,MAMIE * 1990 Male  [id number]                                   [address] [address]     Primary Insurance (for UNOS reporting): Private Insurance  Secondary Insurance (for UNOS reporting): None     Patient verbalizes clear understanding that patient may experience difficulty obtaining and/or be denied insurance coverage post-surgery. This includes and is not limited to  disability insurance, life insurance, health insurance, burial insurance, long term care insurance, and other insurances.    Patient also reports understanding that future health concerns related to or unrelated to transplantation may not be covered by patient's insurance.  Resources and information provided and reviewed.      Patient provides verbal permission to release any necessary information to outside resources for patient care and discharge planning.  Resources and information provided are reviewed.      Infusion Service: patient utilizing? no  Home Health: patient utilizing? no  DME: no  Pulmonary/Cardiac Rehab: no  ADLS:  Pt reports independence and does drive.    Adherence:  Pt reports medium level of adherence. Pt reports he eats salty foods and does weigh himself daily. Pt reports he rarely has to take his water pills.  Adherence education and counseling provided.     Per History Section:  Past Medical History:   Diagnosis Date    Cardiogenic shock 1/16/2017    Cardiomyopathy     Familial cardiomyopathy    CHF (congestive heart failure)     Essential hypertension 12/21/2016    Familial Cardiomyopathy     Familial cardiomyopathy      Social History   Substance Use Topics    Smoking status: Former Smoker     Packs/day: 1.00     Quit date: 12/14/2016    Smokeless tobacco: Never Used    Alcohol use No     History   Drug Use No     History   Sexual Activity    Sexual activity: Not on file       Per Today's Psychosocial:  Tobacco: none, patient denies any use.  Alcohol: none, patient denies any use.  Illicit Drugs/Non-prescribed Medications: none, patient denies any use.    Patient states clear understanding of the potential impact of substance use as it relates to transplant candidacy and is aware of possible random substance screening.  Substance abstinence/cessation counseling, education and resources provided and reviewed.     Arrests/DWI/Treatment/Rehab: yes    Psychiatric History:    Mental  Health: anxiety-pt reports is brief and related to concerns about health  Psychiatrist/Counselor: denies  Medications:  denies  Suicide/Homicide Issues: pt denies past or current SI/HI  Safety at home: pt denies concerns    Knowledge: Patient states having clear understanding and realistic expectations regarding the potential risks and potential benefits of organ transplantation and organ donation, agrees to discuss with health care team members and support system members and to utilize available resources and express questions and/or concerns in order to further facilitate the pt informed decision-making.  Resources and information provided and reviewed.     Patient is aware of [facility]'s affiliation and/or partnership with agencies in home health care, LTAC, SNF, [facility], and other hospitals and clinics.    Understanding: Patient reports having a clear understanding of the many lifetime commitments involved with being a transplant recipient, including costs, compliance, medications, lab work, procedures, appointments, concrete and financial planning, preparedness, timely and appropriate communication of concerns, abstinence (ETOH, tobacco, illicit non-prescribed drugs), adherence to all health care team recommendations, support system and caregiver involvement, appropriate and timely resource utilization and follow-through, mental health counseling as needed/recommended, and patient and caregiver responsibilities.  Social Service Handbook, resources and detailed educational information provided and reviewed.  Educational information provided.    Patient also reports current and expected compliance with health care regime and states having a clear understanding of the importance of compliance.      Patient reports a clear understanding that risks and benefits may be involved with organ transplantation and with organ donation.      Patient also reports clear understanding that psychosocial risk factors may affect  "patient, and include but are not limited to feelings of depression, generalized anxiety, anxiety regarding dependence on others, post traumatic stress disorder, feelings of guilt and other emotional and/or mental concerns, and/or exacerbation of existing mental health concerns.  Detailed resources provided and discussed.     Patient agrees to access appropriate resources in a timely manner as needed and/or as recommended, and to communicate concerns appropriately.  Patient also reports a clear understanding of treatment options available.      reviewed education, provided additional information, and answered questions.    Feelings or Concerns: Pt reports occasional, brief anxiety. Pt reports he believes in "playing the hand" one is dealt.    Coping: pt reports pushes through when he needs to and doesn't dwell on difficult issues.    Goals: pt reports goal is to keep working and return to work after transplant.    Interview Behavior: Patient presents as alert and oriented x 4, pleasant, good eye contact, calm, communicative, cooperative and asking and answering questions appropriately. Pt's caregiver did not attend pt's appointment due to work.       Transplant Social Work - Candidacy  Assessment/Plan:     Psychosocial Suitability: Patient presents as a suitable candidate for transplant at this time. Based on psychosocial risk factors, patient presents as low risk, due to pt demonstrates coping, insight. has good caregiver plan and supportive family, including 2 brothers who have had transplants.    Recommendations/Additional Comments: None at this time.    Leslie Kellogg LCSW         "

## 2018-01-25 NOTE — PROGRESS NOTES
"Subjective:   Patient ID:  Mert Samayoa is a 27 y.o. male who presents for LVAD followup visit.    Implant Date:2/1/17  Initials:TAW     HVAD RPM 2700     INR goal: 2-3   Bridge with Heparin   Antiplatelets:     TXP DERREK INTERROGATIONS 1/25/2018   Type Heartware   Flow 4.6   Speed 2700   Power (Goldsmith) 4.6   Low Flow Alarm 2.0   High Power Alarm 6.5   Pulsatility Intermittent pulse       HPI   Mr. Samayoa is a very pleasant 27 year old WM with DCM (familial, both brothers with heart transplants) with stage D CHFrEF underwent Heartware placement 02/01/17 had post-op atrial flutter 02/11/17 treated with amiodarone though not maintained on this treatment.  Here for routine VAD f/u and denies any active problems. VAD speed is at 2700 rpm. Had critical battery VAD alarms noted on interrogation and occasional PI events. BP is 78 mm of Hg. DLES is a "1". INR is therapeutic at 2.0. LDH is 218 at baseline.     Review of Systems   Constitution: Negative. Negative for chills, decreased appetite, diaphoresis, fever, weakness, malaise/fatigue, night sweats, weight gain and weight loss.   Eyes: Negative.    Cardiovascular: Negative for chest pain, claudication, cyanosis, dyspnea on exertion, irregular heartbeat, leg swelling, near-syncope, orthopnea, palpitations, paroxysmal nocturnal dyspnea and syncope.   Respiratory: Negative for cough, hemoptysis and shortness of breath.    Endocrine: Negative.    Hematologic/Lymphatic: Negative.    Skin: Negative for color change, dry skin and nail changes.   Musculoskeletal: Negative.    Gastrointestinal: Negative.    Genitourinary: Negative.        Objective:   Blood pressure (!) 78/0, temperature 98 °F (36.7 °C), temperature source Oral, height 5' 7" (1.702 m), weight 75.8 kg (167 lb).body mass index is 26.16 kg/m².    Doppler: 78 mm of Hg    Physical Exam   Constitutional: He appears well-developed.   BP (!) 78/0 (BP Location: Right arm, Patient Position: Sitting, BP " "Method: Medium (Manual)) Comment: doppler  Temp 98 °F (36.7 °C) (Oral)   Ht 5' 7" (1.702 m)   Wt 75.8 kg (167 lb)   BMI 26.16 kg/m²       HENT:   Head: Normocephalic.   Neck: No JVD present. Carotid bruit is not present.   Cardiovascular: Regular rhythm and normal heart sounds.    No murmur heard.  Smooth VAD hum. DLES is "1"   Pulmonary/Chest: Effort normal and breath sounds normal. No respiratory distress. He has no wheezes. He has no rales.   Abdominal: Soft. Bowel sounds are normal. He exhibits no distension. There is no tenderness. There is no rebound.   Musculoskeletal: He exhibits no edema.   Neurological: He is alert.   Skin: Skin is warm.   Vitals reviewed.      Lab Results   Component Value Date    WBC 6.47 01/25/2018    HGB 14.1 01/25/2018    HCT 41.1 01/25/2018    MCV 88 01/25/2018     01/25/2018    CO2 27 01/25/2018    CREATININE 1.1 01/25/2018    CALCIUM 9.5 01/25/2018    ALBUMIN 4.4 01/25/2018    AST 21 01/25/2018    BNP 67 01/25/2018    ALT 22 01/25/2018     01/25/2018       Lab Results   Component Value Date    INR 2.0 (H) 01/25/2018    INR 2.1 01/16/2018    INR 2.0 01/10/2018       BNP   Date Value Ref Range Status   01/25/2018 67 0 - 99 pg/mL Final     Comment:     Values of less than 100 pg/ml are consistent with non-CHF populations.   12/28/2017 61 0 - 99 pg/mL Final     Comment:     Values of less than 100 pg/ml are consistent with non-CHF populations.   11/29/2017 45 0 - 99 pg/mL Final     Comment:     Values of less than 100 pg/ml are consistent with non-CHF populations.       LD   Date Value Ref Range Status   01/25/2018 218 110 - 260 U/L Final     Comment:     Results are increased in hemolyzed samples.   12/28/2017 215 110 - 260 U/L Final     Comment:     Results are increased in hemolyzed samples.   11/29/2017 207 110 - 260 U/L Final     Comment:     Results are increased in hemolyzed samples.       Assessment:      1. Heart replaced by heart assist device    2. Chronic " systolic congestive heart failure    3. NICM (nonischemic cardiomyopathy)    4. ICD (implantable cardioverter-defibrillator) in place        Plan:   Patient is now NYHA class II. BP is controlled.  INR is therapeutic.  VAD interrogation was performed in clinic  VAD supplies provided.   Battery alarms noted.. Controller changed to day in clinic.   Recommend 2 gram sodium restriction and 1500cc fluid restriction.  Encourage physical activity with graded exercise program.  Requested patient to weigh themselves daily, and to notify us if their weight increases by more than 3 lbs in 1 day or 5 lbs in 1 week.   RTC in 1 month with 2D echo and CXR and lipids    Listed for transplant:Listed status 1B    UNOS Patient Status  Functional Status: 100% - Normal, no complaints, no evidence of disease  Physical Capacity: No Limitations  Working for Income: Yes    Sammi Tapia MD

## 2018-01-26 NOTE — PROGRESS NOTES
Date of Implant with Heartware LVAD: 2/1/17    PATIENT ARRIVED IN CLINIC:  Ambulatory   Accompanied by: n/a    Vitals  Doppler: 78  PAIN: denies on 0-10 pain scale  Is patient currently on medications for pain? no What kind? n/a    VAD Interrogation:  TXP DERREK INTERROGATIONS 1/25/2018   Type Heartware   Flow 4.2   Speed 2700   Power (Goldsmith) 4.6   Low Flow Alarm 2.0   High Power Alarm 6.5   Pulsatility Intermittent pulse       HCT:   Lab Results   Component Value Date    HCT 41.1 01/25/2018    HCT 24 (L) 02/02/2017     WAVEFORM: 3-6, no deflections  Suction alarm: Off  Problems / Issues / Alarms with VAD if any: LFA 1/24, criticial battery alarms noted 1/23, 1/22 & 1/21   Any Equipment Issues: (Refer to  note for complete details) Pt controller exchanged today per Heartware recommendation.  New Primary controller [id number] and old primary to be sent back [id number]. Pt was able to complete controller exchange himself without difficulty.  Pt noted to have critical battery alarms as well.  Pt HVAD logs to be sent per Alexander Borrero to check if batteries require replacement.  Refer to his encounter for more details.       Patient states their batteries are lasting 10-12 hours. Rotating all batteries. Checking connections before and after changing battery.   Emergency Equipment With Patient: yes   VAD Binder With Patient: yes   Reviewed VAD Numbers In Binder: no  VAD Sounds: Smooth  Manual & Visual Inspection Of Driveline: No kinks or tears noted  Checked Heartware driveline connector housing for separation, none noted.  Also checked for tight connection, which they are.  Educated pt regarding this and instructed  to check on this daily. Pt verbalized understanding and agreement.     LVAD Dressing/DLES:  Appearance Of Driveline: 1  Antibiotics: NO  Frequency of Dressing Changes: daily & soap and water dressing   Stabilization Device In Use: yes, kirk securement device    Pt In Need Of Management Kits?  Yes - 1 box soap and water ordered today.   It is medically necessary to have VAD management kits in order to prevent infection or to assist in the healing of an infected DLES.    Assessment:   Complaints/reason for visit today: routine  Complaints Of Nausea / Vomiting: None noted   Appearance and Frequency Of Stools: normal and formed without blood & daily  Color Of Urine: clear/yellow  Coping/Depression/Anxiety: coping okay  Sleep Habits: 6-7 hrs /night  Sleep Aids: None noted   Showering: Yes, reminded to change dressing immediately after drying off  Activity/Exercise: pt reports working full time   Driving: Yes. Reminded to pull over should there be an alarm before looking down at controller.    Labs:    Chemistry        Component Value Date/Time     01/25/2018 1312    K 3.6 01/25/2018 1312     01/25/2018 1312    CO2 27 01/25/2018 1312    BUN 13 01/25/2018 1312    CREATININE 1.1 01/25/2018 1312     (H) 01/25/2018 1312        Component Value Date/Time    CALCIUM 9.5 01/25/2018 1312    ALKPHOS 76 01/25/2018 1312    AST 21 01/25/2018 1312    ALT 22 01/25/2018 1312    BILITOT 0.8 01/25/2018 1312    ESTGFRAFRICA >60.0 01/25/2018 1312    EGFRNONAA >60.0 01/25/2018 1312            Magnesium   Date Value Ref Range Status   01/25/2018 2.2 1.6 - 2.6 mg/dL Final       Lab Results   Component Value Date    WBC 6.47 01/25/2018    HGB 14.1 01/25/2018    HCT 41.1 01/25/2018    MCV 88 01/25/2018     01/25/2018       Lab Results   Component Value Date    INR 2.0 (H) 01/25/2018    INR 2.1 01/16/2018    INR 2.0 01/10/2018       BNP   Date Value Ref Range Status   01/25/2018 67 0 - 99 pg/mL Final     Comment:     Values of less than 100 pg/ml are consistent with non-CHF populations.   12/28/2017 61 0 - 99 pg/mL Final     Comment:     Values of less than 100 pg/ml are consistent with non-CHF populations.   11/29/2017 45 0 - 99 pg/mL Final     Comment:     Values of less than 100 pg/ml are consistent with  non-CHF populations.       LD   Date Value Ref Range Status   01/25/2018 218 110 - 260 U/L Final     Comment:     Results are increased in hemolyzed samples.   12/28/2017 215 110 - 260 U/L Final     Comment:     Results are increased in hemolyzed samples.   11/29/2017 207 110 - 260 U/L Final     Comment:     Results are increased in hemolyzed samples.       Labs reviewed with patient: YES      Patient Satisfaction Survey completed per Patient: no  (explained about signature and box to check)  Medication reconciliation: per MA.  Purple card updated today: yes  Coumadin Managed by: [facility],     Education: Reviewed driveline care, emergency procedures, how to change the controller, alarms with patient, as well as discussed how to page the VAD coordinator in case of an emergency.      Plans/Needs:  PT doing very well with no major issues. See above for equipment issues.  Otherwise no changes.  Pt to RTC in 1 month with echo, lipids and chest xray. Please refer to MD note.     Hurricane Season: No

## 2018-01-26 NOTE — PROGRESS NOTES
Seen pt in clinic. Pt was having critical battery alarms on 6 seperate occasions since 1/21/2018. Pt states when he would move the cable it would stop alarming.  Emailed Heartware reps and emailed waveforms from DEC 28th visit. Waveforms came back normal. Margarita LVAD Coordinator assisted pt switch out old [id number] with new [id number]. Sent out old controller to [employer]. Sent waveforms from 1/25/2018, showed critical battery alarms. Will continue to follow up and send waveforms at next clinic visit.

## 2018-01-30 ENCOUNTER — ANTI-COAG VISIT (OUTPATIENT)
Dept: CARDIOLOGY | Facility: CLINIC | Age: 28
End: 2018-01-30

## 2018-01-30 DIAGNOSIS — Z79.01 ANTICOAGULATION MONITORING, INR RANGE 2-3: ICD-10-CM

## 2018-01-30 DIAGNOSIS — Z95.811 LVAD (LEFT VENTRICULAR ASSIST DEVICE) PRESENT: ICD-10-CM

## 2018-01-30 LAB — INR PPP: 2.2

## 2018-02-01 DIAGNOSIS — Z76.82 ORGAN TRANSPLANT CANDIDATE: ICD-10-CM

## 2018-02-06 ENCOUNTER — ANTI-COAG VISIT (OUTPATIENT)
Dept: CARDIOLOGY | Facility: CLINIC | Age: 28
End: 2018-02-06

## 2018-02-06 DIAGNOSIS — Z95.811 LVAD (LEFT VENTRICULAR ASSIST DEVICE) PRESENT: ICD-10-CM

## 2018-02-06 DIAGNOSIS — Z79.01 ANTICOAGULATION MONITORING, INR RANGE 2-3: ICD-10-CM

## 2018-02-06 LAB — INR PPP: 2.3

## 2018-02-14 LAB — INR PPP: 2.9

## 2018-02-15 ENCOUNTER — ANTI-COAG VISIT (OUTPATIENT)
Dept: CARDIOLOGY | Facility: CLINIC | Age: 28
End: 2018-02-15

## 2018-02-15 DIAGNOSIS — Z79.01 ANTICOAGULATION MONITORING, INR RANGE 2-3: ICD-10-CM

## 2018-02-15 DIAGNOSIS — Z95.811 LVAD (LEFT VENTRICULAR ASSIST DEVICE) PRESENT: ICD-10-CM

## 2018-02-17 ENCOUNTER — HOSPITAL ENCOUNTER (INPATIENT)
Facility: HOSPITAL | Age: 28
LOS: 16 days | Discharge: HOME | DRG: 002 | End: 2018-03-05
Attending: INTERNAL MEDICINE | Admitting: INTERNAL MEDICINE
Payer: COMMERCIAL

## 2018-02-17 ENCOUNTER — TELEPHONE (OUTPATIENT)
Dept: TRANSPLANT | Facility: CLINIC | Age: 28
End: 2018-02-17

## 2018-02-17 DIAGNOSIS — T38.0X5A ADVERSE EFFECT OF GLUCOCORTICOIDS AND SYNTHETIC ANALOGUES, INITIAL ENCOUNTER: ICD-10-CM

## 2018-02-17 DIAGNOSIS — I42.9 FAMILIAL CARDIOMYOPATHY: ICD-10-CM

## 2018-02-17 DIAGNOSIS — Z76.82 ORGAN TRANSPLANT CANDIDATE: Primary | ICD-10-CM

## 2018-02-17 DIAGNOSIS — R73.9 ACUTE HYPERGLYCEMIA: ICD-10-CM

## 2018-02-17 DIAGNOSIS — Z98.890 HISTORY OF OPEN HEART SURGERY: ICD-10-CM

## 2018-02-17 DIAGNOSIS — I48.0 PAROXYSMAL ATRIAL FIBRILLATION: ICD-10-CM

## 2018-02-17 DIAGNOSIS — D84.9 IMMUNOSUPPRESSION: ICD-10-CM

## 2018-02-17 DIAGNOSIS — R73.9 HYPERGLYCEMIA: ICD-10-CM

## 2018-02-17 DIAGNOSIS — I50.9 CHF (CONGESTIVE HEART FAILURE): ICD-10-CM

## 2018-02-17 DIAGNOSIS — Z94.1 HEART TRANSPLANTED: ICD-10-CM

## 2018-02-17 DIAGNOSIS — I49.9 ARRHYTHMIA: ICD-10-CM

## 2018-02-17 DIAGNOSIS — I50.42 CHRONIC COMBINED SYSTOLIC AND DIASTOLIC HEART FAILURE: ICD-10-CM

## 2018-02-17 DIAGNOSIS — Z95.810 ICD (IMPLANTABLE CARDIOVERTER-DEFIBRILLATOR) IN PLACE: ICD-10-CM

## 2018-02-17 DIAGNOSIS — R11.0 NAUSEA: ICD-10-CM

## 2018-02-17 DIAGNOSIS — Z76.82 AWAITING ORGAN TRANSPLANT: ICD-10-CM

## 2018-02-17 DIAGNOSIS — Z95.811 LVAD (LEFT VENTRICULAR ASSIST DEVICE) PRESENT: ICD-10-CM

## 2018-02-17 LAB
ABO + RH BLD: NORMAL
ALBUMIN SERPL BCP-MCNC: 4.2 G/DL
ALP SERPL-CCNC: 76 U/L
ALT SERPL W/O P-5'-P-CCNC: 19 U/L
ANION GAP SERPL CALC-SCNC: 10 MMOL/L
APTT BLDCRRT: 35.2 SEC
AST SERPL-CCNC: 20 U/L
BASOPHILS # BLD AUTO: 0.02 K/UL
BASOPHILS NFR BLD: 0.3 %
BILIRUB SERPL-MCNC: 0.7 MG/DL
BILIRUB UR QL STRIP: NEGATIVE
BLD GP AB SCN CELLS X3 SERPL QL: NORMAL
BUN SERPL-MCNC: 14 MG/DL
CALCIUM SERPL-MCNC: 9.6 MG/DL
CHLORIDE SERPL-SCNC: 104 MMOL/L
CLARITY UR REFRACT.AUTO: CLEAR
CO2 SERPL-SCNC: 25 MMOL/L
COLOR UR AUTO: NORMAL
CREAT SERPL-MCNC: 1 MG/DL
DIFFERENTIAL METHOD: ABNORMAL
EOSINOPHIL # BLD AUTO: 0.1 K/UL
EOSINOPHIL NFR BLD: 1.6 %
ERYTHROCYTE [DISTWIDTH] IN BLOOD BY AUTOMATED COUNT: 14.1 %
EST. GFR  (AFRICAN AMERICAN): >60 ML/MIN/1.73 M^2
EST. GFR  (NON AFRICAN AMERICAN): >60 ML/MIN/1.73 M^2
GLUCOSE SERPL-MCNC: 80 MG/DL
GLUCOSE UR QL STRIP: NEGATIVE
HCT VFR BLD AUTO: 38.7 %
HGB BLD-MCNC: 13.3 G/DL
HGB UR QL STRIP: NEGATIVE
IMM GRANULOCYTES # BLD AUTO: 0.01 K/UL
IMM GRANULOCYTES NFR BLD AUTO: 0.1 %
INR PPP: 2.4
KETONES UR QL STRIP: NEGATIVE
LEUKOCYTE ESTERASE UR QL STRIP: NEGATIVE
LYMPHOCYTES # BLD AUTO: 1.7 K/UL
LYMPHOCYTES NFR BLD: 22.7 %
MCH RBC QN AUTO: 30.1 PG
MCHC RBC AUTO-ENTMCNC: 34.4 G/DL
MCV RBC AUTO: 88 FL
MONOCYTES # BLD AUTO: 0.6 K/UL
MONOCYTES NFR BLD: 8.5 %
NEUTROPHILS # BLD AUTO: 5 K/UL
NEUTROPHILS NFR BLD: 66.8 %
NITRITE UR QL STRIP: NEGATIVE
NRBC BLD-RTO: 0 /100 WBC
PH UR STRIP: 5 [PH] (ref 5–8)
PLATELET # BLD AUTO: 196 K/UL
PMV BLD AUTO: 10 FL
POTASSIUM SERPL-SCNC: 3.7 MMOL/L
PROT SERPL-MCNC: 7.4 G/DL
PROT UR QL STRIP: NEGATIVE
PROTHROMBIN TIME: 23.4 SEC
RBC # BLD AUTO: 4.42 M/UL
SODIUM SERPL-SCNC: 139 MMOL/L
SP GR UR STRIP: 1.01 (ref 1–1.03)
URN SPEC COLLECT METH UR: NORMAL
UROBILINOGEN UR STRIP-ACNC: NEGATIVE EU/DL
WBC # BLD AUTO: 7.52 K/UL

## 2018-02-17 PROCEDURE — 86833 HLA CLASS II HIGH DEFIN QUAL: CPT | Mod: TXP

## 2018-02-17 PROCEDURE — 87536 HIV-1 QUANT&REVRSE TRNSCRPJ: CPT | Mod: NTX

## 2018-02-17 PROCEDURE — 86826 HLA X-MATCH NONCYTOTOXC ADDL: CPT | Mod: 91,TXP

## 2018-02-17 PROCEDURE — 86825 HLA X-MATH NON-CYTOTOXIC: CPT | Mod: 91,TXP

## 2018-02-17 PROCEDURE — 86832 HLA CLASS I HIGH DEFIN QUAL: CPT | Mod: TXP

## 2018-02-17 PROCEDURE — 87340 HEPATITIS B SURFACE AG IA: CPT | Mod: NTX

## 2018-02-17 PROCEDURE — 86920 COMPATIBILITY TEST SPIN: CPT

## 2018-02-17 PROCEDURE — 86826 HLA X-MATCH NONCYTOTOXC ADDL: CPT | Mod: TXP

## 2018-02-17 PROCEDURE — 27000248 HC VAD-ADDITIONAL DAY: Mod: NTX

## 2018-02-17 PROCEDURE — 85025 COMPLETE CBC W/AUTO DIFF WBC: CPT | Mod: NTX

## 2018-02-17 PROCEDURE — 80053 COMPREHEN METABOLIC PANEL: CPT | Mod: NTX

## 2018-02-17 PROCEDURE — 85730 THROMBOPLASTIN TIME PARTIAL: CPT | Mod: NTX

## 2018-02-17 PROCEDURE — 86901 BLOOD TYPING SEROLOGIC RH(D): CPT | Mod: NTX

## 2018-02-17 PROCEDURE — 20600001 HC STEP DOWN PRIVATE ROOM: Mod: NTX

## 2018-02-17 PROCEDURE — 36415 COLL VENOUS BLD VENIPUNCTURE: CPT | Mod: NTX

## 2018-02-17 PROCEDURE — 87086 URINE CULTURE/COLONY COUNT: CPT | Mod: NTX

## 2018-02-17 PROCEDURE — 81003 URINALYSIS AUTO W/O SCOPE: CPT | Mod: NTX

## 2018-02-17 PROCEDURE — 87517 HEPATITIS B DNA QUANT: CPT | Mod: NTX

## 2018-02-17 PROCEDURE — 86825 HLA X-MATH NON-CYTOTOXIC: CPT | Mod: TXP

## 2018-02-17 PROCEDURE — 27201040 HC RC 50 FILTER: Mod: NTX

## 2018-02-17 PROCEDURE — 85610 PROTHROMBIN TIME: CPT | Mod: NTX

## 2018-02-17 PROCEDURE — 86977 RBC SERUM PRETX INCUBJ/INHIB: CPT | Mod: 91,TXP

## 2018-02-17 PROCEDURE — 87522 HEPATITIS C REVRS TRNSCRPJ: CPT | Mod: NTX

## 2018-02-17 PROCEDURE — 99223 1ST HOSP IP/OBS HIGH 75: CPT | Mod: NTX,,, | Performed by: INTERNAL MEDICINE

## 2018-02-17 RX ORDER — METHYLPREDNISOLONE SODIUM SUCCINATE 500 MG/8ML
500 INJECTION INTRAMUSCULAR; INTRAVENOUS ONCE
Status: DISCONTINUED | OUTPATIENT
Start: 2018-02-17 | End: 2018-02-18

## 2018-02-17 RX ORDER — MUPIROCIN 20 MG/G
OINTMENT TOPICAL
Status: DISCONTINUED | OUTPATIENT
Start: 2018-02-17 | End: 2018-02-18

## 2018-02-17 RX ORDER — FAMOTIDINE 40 MG/1
TABLET, FILM COATED ORAL
Qty: 30 TABLET | Refills: 11 | Status: SHIPPED | OUTPATIENT
Start: 2018-02-17 | End: 2018-02-28

## 2018-02-17 RX ORDER — HYDROCODONE BITARTRATE AND ACETAMINOPHEN 500; 5 MG/1; MG/1
TABLET ORAL
Status: DISCONTINUED | OUTPATIENT
Start: 2018-02-17 | End: 2018-02-18

## 2018-02-17 RX ORDER — METHYLPREDNISOLONE SODIUM SUCCINATE 500 MG/8ML
500 INJECTION INTRAMUSCULAR; INTRAVENOUS ONCE
Status: COMPLETED | OUTPATIENT
Start: 2018-02-17 | End: 2018-02-18

## 2018-02-17 NOTE — NURSING
Call placed to pt. Informed him that an OR time has still not been set.  Will alert pt to any updates as they are available as he would like his family to drive in prior to surgery time. They have approx 90 min drive.

## 2018-02-17 NOTE — SIGNIFICANT EVENT
Refer to H&P written this morning by Dr. Cole.  Patient with impending transplant likely.  Plan on 2/17/18  Hold all home medications including antiplt/anticoag agents  Eat breakfast and lunch but NPO afterward  Coordinate with CTS

## 2018-02-17 NOTE — H&P
[facility]  Heart Transplant  H&P    Patient Name: Mert Samayoa  MRN: [id number]  Admission Date: 2/17/2018  Attending Physician: Guillermo Daniels MD  Primary Care Provider: Primary Doctor No  Principal Problem:Awaiting organ transplant    Subjective:     History of Present Illness:  27 year old WM with DCM (familial, both brothers with heart transplants) with stage D CHFrEF underwent Heartware placement 02/01/17 had post-op atrial flutter 02/11/17 treated with amiodarone presents for OHTx.  0.  VAD implanted on 2/1/17 HVAD RPM 2700.         Past Medical History:   Diagnosis Date    Cardiogenic shock 1/16/2017    Cardiomyopathy     Familial cardiomyopathy    CHF (congestive heart failure)     Essential hypertension 12/21/2016    Familial Cardiomyopathy     Familial cardiomyopathy        Past Surgical History:   Procedure Laterality Date    LEFT VENTRICULAR ASSIST DEVICE         Review of patient's allergies indicates:  No Known Allergies    Current Facility-Administered Medications   Medication    0.9%  NaCl infusion (for blood administration)    methylPREDNISolone sodium succinate injection 500 mg    methylPREDNISolone sodium succinate injection 500 mg    mupirocin 2 % ointment     Family History     Problem Relation (Age of Onset)    Arthritis Mother    Birth defects Paternal Uncle    Heart disease Brother, Brother    No Known Problems Father        Social History Main Topics    Smoking status: Former Smoker     Packs/day: 1.00     Quit date: 12/14/2016    Smokeless tobacco: Never Used    Alcohol use No    Drug use: No    Sexual activity: Not on file     Review of Systems   Constitutional: Negative for activity change, appetite change and unexpected weight change.   Respiratory: Negative for apnea, cough, choking, chest tightness and shortness of breath.    Cardiovascular: Negative for chest pain, palpitations and leg swelling.   Neurological: Negative for dizziness,  syncope and weakness.     Objective:     Vital Signs (Most Recent):  Temp: 97.7 °F (36.5 °C) (02/17/18 0300)  Pulse: 80 (Simultaneous filing. User may not have seen previous data.) (02/17/18 0300)  Resp: 18 (02/17/18 0300)  BP: (!) 92/0 (02/17/18 0300)  SpO2: 98 % (02/17/18 0300) Vital Signs (24h Range):  Temp:  [97.7 °F (36.5 °C)] 97.7 °F (36.5 °C)  Pulse:  [80] 80  Resp:  [18] 18  SpO2:  [98 %] 98 %  BP: (92)/(0) 92/0     Patient Vitals for the past 72 hrs (Last 3 readings):   Weight   02/17/18 0300 75.8 kg (167 lb 1.7 oz)     Body mass index is 26.17 kg/m².    No intake or output data in the 24 hours ending 02/17/18 0439    Physical Exam     General Appearance:    Alert, cooperative, no distress, talking   Lungs:     Clear to auscultation bilaterally, respirations unlabored    Heart:    No JVP, Regular rate and rhythm, normal hum of LVAD   Abdomen:     Soft, non-tender, bowel sounds active, no masses, no organomegaly   Extremities:   no edema b/l, pulses +1 b/l     Significant Labs:  CBC:  No results for input(s): WBC, RBC, HGB, HCT, PLT, MCV, MCH, MCHC in the last 168 hours.  BNP:  No results for input(s): BNP in the last 168 hours.    Invalid input(s): BNPTRIAGELBLO  CMP:  No results for input(s): GLU, CALCIUM, ALBUMIN, PROT, NA, K, CO2, CL, BUN, CREATININE, ALKPHOS, ALT, AST, BILITOT in the last 168 hours.   Coagulation:     Recent Labs  Lab 02/14/18   INR 2.9     LDH:  No results for input(s): LDH in the last 72 hours.  Microbiology:  Microbiology Results (last 7 days)     Procedure Component Value Units Date/Time    Urine culture [[id number]] Collected:  02/17/18 0425    Order Status:  Sent Specimen:  Urine from Urine, Clean Catch Updated:  02/17/18 0425        Assessment/Plan:     * Awaiting organ transplant    -INR drawn and FFP ordered by surgery team  -CTS to see pt  -all meds held for surgery and orders placed by coordinator        Heart replaced by heart assist device    S/P LVAD Heartware  - Speed  2700  - Last echo;  IP ECHO [facility]: 2D echo with color flow doppler:   Results for orders placed or performed during the hospital encounter of 09/13/17   2D echo with color flow doppler   Result Value Ref Range    EF 10 (A) 55 - 65    Diastolic Dysfunction Yes (A)     Est. PA Systolic Pressure 21.15     Tricuspid Valve Regurgitation TRIVIAL TO MILD        Assessment of anticoagulation (LVAD assoc w/ pump thrombosis)  - Continue Coumadin, Goal INR 2.0-3.0. Coumadin held for surgery  - Antiplatelets  mg/day, held for surgery    Assessment of BP control (LVAD assoc w risk of stroke)  - controlled    Assessment of rhythm issues ( can be assoc w pump placement)  No events on telemetry so far  -continue to monitor  -amiodarone held for surgery                Griselda Cole MD  Heart Transplant  [facility]

## 2018-02-17 NOTE — HPI
27 year old WM with DCM (familial, both brothers with heart transplants) with stage D CHFrEF underwent Heartware placement 02/01/17 had post-op atrial flutter 02/11/17 treated with amiodarone presents for OHTx.  0.  VAD implanted on 2/1/17 HVAD RPM 2700.

## 2018-02-17 NOTE — NURSING
LVAD pt  admitted for heart transplant. RN released held orders. MD on call for HTS notified (Dr. Cole). MD stated she will come to bedside to see pt. Pt remains on Heartware LVAD. Pt has wife and family at beside. Pt placed on telemetry. Care plan and plan of care initiated. VSS. Bed locked and in lowest position, call bell placed within reach. Pt denies pain/ discomfort. Pt advised that belongings/ valuables must remain with family, as pt will not be returning to [facility] postprocedure. Will continue to monitor.

## 2018-02-17 NOTE — ASSESSMENT & PLAN NOTE
S/P LVAD Heartware  - Speed 2700  - Last echo;  IP ECHO [facility]: 2D echo with color flow doppler:   Results for orders placed or performed during the hospital encounter of 09/13/17   2D echo with color flow doppler   Result Value Ref Range    EF 10 (A) 55 - 65    Diastolic Dysfunction Yes (A)     Est. PA Systolic Pressure 21.15     Tricuspid Valve Regurgitation TRIVIAL TO MILD        Assessment of anticoagulation (LVAD assoc w/ pump thrombosis)  - Continue Coumadin, Goal INR 2.0-3.0. Coumadin held for surgery  - Antiplatelets  mg/day, held for surgery    Assessment of BP control (LVAD assoc w risk of stroke)  - controlled    Assessment of rhythm issues ( can be assoc w pump placement)  No events on telemetry so far  -continue to monitor  -amiodarone held for surgery

## 2018-02-17 NOTE — TELEPHONE ENCOUNTER
Called pt after being notified there was a donor for him. Told him to remain NPO and to come to ER for direct admit this morning. It will take pt 1-2 hours. Called in Admit and spoke to Ai, House Supervisor; MELINA Romo; Fellow HTS; CTS resident and HLA. No prospective crossmatch needed.     The donor is High Risk and will need labs drawn before heart transplant.   HCV DNA, HB PCR and HIV

## 2018-02-17 NOTE — ASSESSMENT & PLAN NOTE
-INR drawn and FFP ordered by surgery team  -CTS to see pt  -all meds held for surgery and orders placed by coordinator

## 2018-02-17 NOTE — SUBJECTIVE & OBJECTIVE
Past Medical History:   Diagnosis Date    Cardiogenic shock 1/16/2017    Cardiomyopathy     Familial cardiomyopathy    CHF (congestive heart failure)     Essential hypertension 12/21/2016    Familial Cardiomyopathy     Familial cardiomyopathy        Past Surgical History:   Procedure Laterality Date    LEFT VENTRICULAR ASSIST DEVICE         Review of patient's allergies indicates:  No Known Allergies    Current Facility-Administered Medications   Medication    0.9%  NaCl infusion (for blood administration)    methylPREDNISolone sodium succinate injection 500 mg    methylPREDNISolone sodium succinate injection 500 mg    mupirocin 2 % ointment     Family History     Problem Relation (Age of Onset)    Arthritis Mother    Birth defects Paternal Uncle    Heart disease Brother, Brother    No Known Problems Father        Social History Main Topics    Smoking status: Former Smoker     Packs/day: 1.00     Quit date: 12/14/2016    Smokeless tobacco: Never Used    Alcohol use No    Drug use: No    Sexual activity: Not on file     Review of Systems   Constitutional: Negative for activity change, appetite change and unexpected weight change.   Respiratory: Negative for apnea, cough, choking, chest tightness and shortness of breath.    Cardiovascular: Negative for chest pain, palpitations and leg swelling.   Neurological: Negative for dizziness, syncope and weakness.     Objective:     Vital Signs (Most Recent):  Temp: 97.7 °F (36.5 °C) (02/17/18 0300)  Pulse: 80 (Simultaneous filing. User may not have seen previous data.) (02/17/18 0300)  Resp: 18 (02/17/18 0300)  BP: (!) 92/0 (02/17/18 0300)  SpO2: 98 % (02/17/18 0300) Vital Signs (24h Range):  Temp:  [97.7 °F (36.5 °C)] 97.7 °F (36.5 °C)  Pulse:  [80] 80  Resp:  [18] 18  SpO2:  [98 %] 98 %  BP: (92)/(0) 92/0     Patient Vitals for the past 72 hrs (Last 3 readings):   Weight   02/17/18 0300 75.8 kg (167 lb 1.7 oz)     Body mass index is 26.17 kg/m².    No intake  or output data in the 24 hours ending 02/17/18 0439    Physical Exam     General Appearance:    Alert, cooperative, no distress, talking   Lungs:     Clear to auscultation bilaterally, respirations unlabored    Heart:    No JVP, Regular rate and rhythm, normal hum of LVAD   Abdomen:     Soft, non-tender, bowel sounds active, no masses, no organomegaly   Extremities:   no edema b/l, pulses +1 b/l     Significant Labs:  CBC:  No results for input(s): WBC, RBC, HGB, HCT, PLT, MCV, MCH, MCHC in the last 168 hours.  BNP:  No results for input(s): BNP in the last 168 hours.    Invalid input(s): BNPTRIAGELBLO  CMP:  No results for input(s): GLU, CALCIUM, ALBUMIN, PROT, NA, K, CO2, CL, BUN, CREATININE, ALKPHOS, ALT, AST, BILITOT in the last 168 hours.   Coagulation:     Recent Labs  Lab 02/14/18   INR 2.9     LDH:  No results for input(s): LDH in the last 72 hours.  Microbiology:  Microbiology Results (last 7 days)     Procedure Component Value Units Date/Time    Urine culture [[id number]] Collected:  02/17/18 0425    Order Status:  Sent Specimen:  Urine from Urine, Clean Catch Updated:  02/17/18 0425

## 2018-02-17 NOTE — PLAN OF CARE
Problem: Patient Care Overview  Goal: Plan of Care Review  Plan of care discussed with patient. Patient is free of fall/trauma/injury. Denies CP, SOB, or pain/discomfort. Pt is preparing for heart transplant. Scheduled for later in the day. All questions addressed. Will continue to monitor

## 2018-02-18 ENCOUNTER — ANESTHESIA (OUTPATIENT)
Dept: SURGERY | Facility: HOSPITAL | Age: 28
DRG: 002 | End: 2018-02-18
Payer: COMMERCIAL

## 2018-02-18 ENCOUNTER — ANESTHESIA EVENT (OUTPATIENT)
Dept: SURGERY | Facility: HOSPITAL | Age: 28
DRG: 002 | End: 2018-02-18
Payer: COMMERCIAL

## 2018-02-18 PROBLEM — I48.3 TYPICAL ATRIAL FLUTTER: Status: RESOLVED | Noted: 2017-12-28 | Resolved: 2018-02-18

## 2018-02-18 PROBLEM — I42.8 NICM (NONISCHEMIC CARDIOMYOPATHY): Status: RESOLVED | Noted: 2017-03-29 | Resolved: 2018-02-18

## 2018-02-18 PROBLEM — Z95.811 LVAD (LEFT VENTRICULAR ASSIST DEVICE) PRESENT: Status: RESOLVED | Noted: 2017-02-01 | Resolved: 2018-02-18

## 2018-02-18 PROBLEM — Z94.1 HEART TRANSPLANTED: Status: ACTIVE | Noted: 2018-02-18

## 2018-02-18 PROBLEM — Z95.810 ICD (IMPLANTABLE CARDIOVERTER-DEFIBRILLATOR) IN PLACE: Status: RESOLVED | Noted: 2017-07-11 | Resolved: 2018-02-18

## 2018-02-18 PROBLEM — Z79.01 ANTICOAGULATION MONITORING, INR RANGE 2-3: Status: RESOLVED | Noted: 2017-02-22 | Resolved: 2018-02-18

## 2018-02-18 PROBLEM — I42.9 FAMILIAL CARDIOMYOPATHY: Status: RESOLVED | Noted: 2017-03-01 | Resolved: 2018-02-18

## 2018-02-18 PROBLEM — Z76.82 AWAITING ORGAN TRANSPLANT: Status: RESOLVED | Noted: 2018-02-17 | Resolved: 2018-02-18

## 2018-02-18 LAB
ALLENS TEST: ABNORMAL
ANION GAP SERPL CALC-SCNC: 20 MMOL/L
ANION GAP SERPL CALC-SCNC: 24 MMOL/L
APTT BLDCRRT: 27.6 SEC
BACTERIA UR CULT: NO GROWTH
BASOPHILS # BLD AUTO: 0.01 K/UL
BASOPHILS # BLD AUTO: 0.02 K/UL
BASOPHILS NFR BLD: 0.1 %
BASOPHILS NFR BLD: 0.1 %
BLD PROD TYP BPU: NORMAL
BLOOD UNIT EXPIRATION DATE: NORMAL
BLOOD UNIT TYPE CODE: 5100
BLOOD UNIT TYPE CODE: 6200
BLOOD UNIT TYPE: NORMAL
BUN SERPL-MCNC: 16 MG/DL
BUN SERPL-MCNC: 17 MG/DL
CALCIUM SERPL-MCNC: 10.2 MG/DL
CALCIUM SERPL-MCNC: 9.2 MG/DL
CHLORIDE SERPL-SCNC: 104 MMOL/L
CHLORIDE SERPL-SCNC: 110 MMOL/L
CO2 SERPL-SCNC: 15 MMOL/L
CO2 SERPL-SCNC: 15 MMOL/L
CODING SYSTEM: NORMAL
CREAT SERPL-MCNC: 1.6 MG/DL
CREAT SERPL-MCNC: 1.7 MG/DL
DELSYS: ABNORMAL
DIFFERENTIAL METHOD: ABNORMAL
DIFFERENTIAL METHOD: ABNORMAL
DISPENSE STATUS: NORMAL
EOSINOPHIL # BLD AUTO: 0 K/UL
EOSINOPHIL # BLD AUTO: 0 K/UL
EOSINOPHIL NFR BLD: 0 %
EOSINOPHIL NFR BLD: 0.1 %
ERYTHROCYTE [DISTWIDTH] IN BLOOD BY AUTOMATED COUNT: 13.9 %
ERYTHROCYTE [DISTWIDTH] IN BLOOD BY AUTOMATED COUNT: 14 %
ERYTHROCYTE [SEDIMENTATION RATE] IN BLOOD BY WESTERGREN METHOD: 12 MM/H
EST. GFR  (AFRICAN AMERICAN): >60 ML/MIN/1.73 M^2
EST. GFR  (AFRICAN AMERICAN): >60 ML/MIN/1.73 M^2
EST. GFR  (NON AFRICAN AMERICAN): 53.7 ML/MIN/1.73 M^2
EST. GFR  (NON AFRICAN AMERICAN): 57.8 ML/MIN/1.73 M^2
FIBRINOGEN PPP-MCNC: 243 MG/DL
FIO2: 100
FIO2: 100
FIO2: 65
FIO2: 80
FLOW: 60
GLUCOSE SERPL-MCNC: 112 MG/DL (ref 70–110)
GLUCOSE SERPL-MCNC: 174 MG/DL (ref 70–110)
GLUCOSE SERPL-MCNC: 178 MG/DL (ref 70–110)
GLUCOSE SERPL-MCNC: 238 MG/DL
GLUCOSE SERPL-MCNC: 241 MG/DL (ref 70–110)
GLUCOSE SERPL-MCNC: 263 MG/DL (ref 70–110)
GLUCOSE SERPL-MCNC: 268 MG/DL (ref 70–110)
GLUCOSE SERPL-MCNC: 269 MG/DL (ref 70–110)
GLUCOSE SERPL-MCNC: 271 MG/DL
HCO3 UR-SCNC: 18.2 MMOL/L (ref 24–28)
HCO3 UR-SCNC: 22.4 MMOL/L (ref 24–28)
HCO3 UR-SCNC: 24.9 MMOL/L (ref 24–28)
HCO3 UR-SCNC: 26.1 MMOL/L (ref 24–28)
HCO3 UR-SCNC: 27.3 MMOL/L (ref 24–28)
HCO3 UR-SCNC: 29.9 MMOL/L (ref 24–28)
HCO3 UR-SCNC: 30 MMOL/L (ref 24–28)
HCO3 UR-SCNC: 32.7 MMOL/L (ref 24–28)
HCT VFR BLD AUTO: 22.3 %
HCT VFR BLD AUTO: 27.4 %
HCT VFR BLD CALC: 15 %PCV (ref 36–54)
HCT VFR BLD CALC: 19 %PCV (ref 36–54)
HCT VFR BLD CALC: 24 %PCV (ref 36–54)
HCT VFR BLD CALC: 24 %PCV (ref 36–54)
HCT VFR BLD CALC: 27 %PCV (ref 36–54)
HCT VFR BLD CALC: 29 %PCV (ref 36–54)
HCT VFR BLD CALC: 30 %PCV (ref 36–54)
HCT VFR BLD CALC: 30 %PCV (ref 36–54)
HGB BLD-MCNC: 7.6 G/DL
HGB BLD-MCNC: 9.5 G/DL
IMM GRANULOCYTES # BLD AUTO: 0.06 K/UL
IMM GRANULOCYTES # BLD AUTO: 0.1 K/UL
IMM GRANULOCYTES NFR BLD AUTO: 0.4 %
IMM GRANULOCYTES NFR BLD AUTO: 0.6 %
INR PPP: 1.2
LYMPHOCYTES # BLD AUTO: 0.8 K/UL
LYMPHOCYTES # BLD AUTO: 1.2 K/UL
LYMPHOCYTES NFR BLD: 5 %
LYMPHOCYTES NFR BLD: 7.3 %
MAGNESIUM SERPL-MCNC: 1.9 MG/DL
MAGNESIUM SERPL-MCNC: 2.3 MG/DL
MCH RBC QN AUTO: 30.4 PG
MCH RBC QN AUTO: 30.5 PG
MCHC RBC AUTO-ENTMCNC: 34.1 G/DL
MCHC RBC AUTO-ENTMCNC: 34.7 G/DL
MCV RBC AUTO: 88 FL
MCV RBC AUTO: 90 FL
MODE: ABNORMAL
MONOCYTES # BLD AUTO: 0.7 K/UL
MONOCYTES # BLD AUTO: 1.3 K/UL
MONOCYTES NFR BLD: 4.6 %
MONOCYTES NFR BLD: 7.9 %
NEUTROPHILS # BLD AUTO: 13.5 K/UL
NEUTROPHILS # BLD AUTO: 14.1 K/UL
NEUTROPHILS NFR BLD: 84.2 %
NEUTROPHILS NFR BLD: 89.7 %
NRBC BLD-RTO: 0 /100 WBC
NRBC BLD-RTO: 0 /100 WBC
NUM UNITS TRANS FFP: NORMAL
PCO2 BLDA: 39.6 MMHG (ref 35–45)
PCO2 BLDA: 40.8 MMHG (ref 35–45)
PCO2 BLDA: 41.7 MMHG (ref 35–45)
PCO2 BLDA: 42.1 MMHG (ref 35–45)
PCO2 BLDA: 44.7 MMHG (ref 35–45)
PCO2 BLDA: 46.2 MMHG (ref 35–45)
PCO2 BLDA: 55.5 MMHG (ref 35–45)
PCO2 BLDA: 61.2 MMHG (ref 35–45)
PEEP: 5
PH SMN: 7.26 [PH] (ref 7.35–7.45)
PH SMN: 7.3 [PH] (ref 7.35–7.45)
PH SMN: 7.34 [PH] (ref 7.35–7.45)
PH SMN: 7.34 [PH] (ref 7.35–7.45)
PH SMN: 7.36 [PH] (ref 7.35–7.45)
PH SMN: 7.37 [PH] (ref 7.35–7.45)
PH SMN: 7.42 [PH] (ref 7.35–7.45)
PH SMN: 7.5 [PH] (ref 7.35–7.45)
PHOSPHATE SERPL-MCNC: 3.3 MG/DL
PIP: 21
PLATELET # BLD AUTO: 117 K/UL
PLATELET # BLD AUTO: 140 K/UL
PMV BLD AUTO: 10.4 FL
PMV BLD AUTO: 9.5 FL
PO2 BLDA: 278 MMHG (ref 80–100)
PO2 BLDA: 35 MMHG (ref 40–60)
PO2 BLDA: 361 MMHG (ref 80–100)
PO2 BLDA: 394 MMHG (ref 80–100)
PO2 BLDA: 419 MMHG (ref 80–100)
PO2 BLDA: 46 MMHG (ref 40–60)
PO2 BLDA: 477 MMHG (ref 80–100)
PO2 BLDA: 509 MMHG (ref 80–100)
POC BE: -1 MMOL/L
POC BE: -3 MMOL/L
POC BE: -9 MMOL/L
POC BE: 1 MMOL/L
POC BE: 10 MMOL/L
POC BE: 3 MMOL/L
POC BE: 4 MMOL/L
POC BE: 4 MMOL/L
POC IONIZED CALCIUM: 0.61 MMOL/L (ref 1.06–1.42)
POC IONIZED CALCIUM: 0.9 MMOL/L (ref 1.06–1.42)
POC IONIZED CALCIUM: 0.93 MMOL/L (ref 1.06–1.42)
POC IONIZED CALCIUM: 0.97 MMOL/L (ref 1.06–1.42)
POC IONIZED CALCIUM: 1.03 MMOL/L (ref 1.06–1.42)
POC IONIZED CALCIUM: 1.24 MMOL/L (ref 1.06–1.42)
POC IONIZED CALCIUM: 1.33 MMOL/L (ref 1.06–1.42)
POC IONIZED CALCIUM: 1.48 MMOL/L (ref 1.06–1.42)
POC SATURATED O2: 100 % (ref 95–100)
POC SATURATED O2: 63 % (ref 95–100)
POC SATURATED O2: 76 % (ref 95–100)
POC TCO2: 19 MMOL/L (ref 23–27)
POC TCO2: 24 MMOL/L (ref 23–27)
POC TCO2: 26 MMOL/L (ref 24–29)
POC TCO2: 27 MMOL/L (ref 23–27)
POC TCO2: 29 MMOL/L (ref 23–27)
POC TCO2: 32 MMOL/L (ref 23–27)
POC TCO2: 32 MMOL/L (ref 24–29)
POC TCO2: 34 MMOL/L (ref 23–27)
POCT GLUCOSE: 215 MG/DL (ref 70–110)
POCT GLUCOSE: 226 MG/DL (ref 70–110)
POCT GLUCOSE: 228 MG/DL (ref 70–110)
POCT GLUCOSE: 259 MG/DL (ref 70–110)
POCT GLUCOSE: 260 MG/DL (ref 70–110)
POTASSIUM BLD-SCNC: 3 MMOL/L (ref 3.5–5.1)
POTASSIUM BLD-SCNC: 3.2 MMOL/L (ref 3.5–5.1)
POTASSIUM BLD-SCNC: 3.4 MMOL/L (ref 3.5–5.1)
POTASSIUM BLD-SCNC: 3.4 MMOL/L (ref 3.5–5.1)
POTASSIUM BLD-SCNC: 3.5 MMOL/L (ref 3.5–5.1)
POTASSIUM BLD-SCNC: 3.7 MMOL/L (ref 3.5–5.1)
POTASSIUM BLD-SCNC: 3.8 MMOL/L (ref 3.5–5.1)
POTASSIUM BLD-SCNC: 4 MMOL/L (ref 3.5–5.1)
POTASSIUM SERPL-SCNC: 3.5 MMOL/L
POTASSIUM SERPL-SCNC: 3.5 MMOL/L
POTASSIUM SERPL-SCNC: 3.6 MMOL/L
PROTHROMBIN TIME: 12.7 SEC
PS: 10
RBC # BLD AUTO: 2.49 M/UL
RBC # BLD AUTO: 3.12 M/UL
SAMPLE: ABNORMAL
SITE: ABNORMAL
SODIUM BLD-SCNC: 137 MMOL/L (ref 136–145)
SODIUM BLD-SCNC: 139 MMOL/L (ref 136–145)
SODIUM BLD-SCNC: 139 MMOL/L (ref 136–145)
SODIUM BLD-SCNC: 140 MMOL/L (ref 136–145)
SODIUM BLD-SCNC: 142 MMOL/L (ref 136–145)
SODIUM SERPL-SCNC: 143 MMOL/L
SODIUM SERPL-SCNC: 145 MMOL/L
SP02: 100
TRANS ERYTHROCYTES VOL PATIENT: NORMAL ML
TRANS PLATPHERESIS VOL PATIENT: NORMAL ML
VT: 500
WBC # BLD AUTO: 15.73 K/UL
WBC # BLD AUTO: 16 K/UL

## 2018-02-18 PROCEDURE — 83735 ASSAY OF MAGNESIUM: CPT | Mod: 91

## 2018-02-18 PROCEDURE — S0028 INJECTION, FAMOTIDINE, 20 MG: HCPCS | Performed by: THORACIC SURGERY (CARDIOTHORACIC VASCULAR SURGERY)

## 2018-02-18 PROCEDURE — 27000175 HC ADULT BYPASS PUMP

## 2018-02-18 PROCEDURE — 76937 US GUIDE VASCULAR ACCESS: CPT | Mod: 26,,, | Performed by: ANESTHESIOLOGY

## 2018-02-18 PROCEDURE — 85384 FIBRINOGEN ACTIVITY: CPT

## 2018-02-18 PROCEDURE — 25000003 PHARM REV CODE 250: Performed by: THORACIC SURGERY (CARDIOTHORACIC VASCULAR SURGERY)

## 2018-02-18 PROCEDURE — 84100 ASSAY OF PHOSPHORUS: CPT

## 2018-02-18 PROCEDURE — 37000009 HC ANESTHESIA EA ADD 15 MINS: Performed by: THORACIC SURGERY (CARDIOTHORACIC VASCULAR SURGERY)

## 2018-02-18 PROCEDURE — C1729 CATH, DRAINAGE: HCPCS | Performed by: THORACIC SURGERY (CARDIOTHORACIC VASCULAR SURGERY)

## 2018-02-18 PROCEDURE — P9035 PLATELET PHERES LEUKOREDUCED: HCPCS

## 2018-02-18 PROCEDURE — 85610 PROTHROMBIN TIME: CPT

## 2018-02-18 PROCEDURE — 82803 BLOOD GASES ANY COMBINATION: CPT

## 2018-02-18 PROCEDURE — 27201423 OPTIME MED/SURG SUP & DEVICES STERILE SUPPLY: Performed by: THORACIC SURGERY (CARDIOTHORACIC VASCULAR SURGERY)

## 2018-02-18 PROCEDURE — P9017 PLASMA 1 DONOR FRZ W/IN 8 HR: HCPCS

## 2018-02-18 PROCEDURE — 85014 HEMATOCRIT: CPT

## 2018-02-18 PROCEDURE — 88307 TISSUE EXAM BY PATHOLOGIST: CPT | Mod: 26,,, | Performed by: PATHOLOGY

## 2018-02-18 PROCEDURE — 36000931 HC OR TIME LEV VII EA ADD 15 MIN: Performed by: THORACIC SURGERY (CARDIOTHORACIC VASCULAR SURGERY)

## 2018-02-18 PROCEDURE — 27000191 HC C-V MONITORING

## 2018-02-18 PROCEDURE — 36556 INSERT NON-TUNNEL CV CATH: CPT | Mod: 59,,, | Performed by: ANESTHESIOLOGY

## 2018-02-18 PROCEDURE — 02PA0QZ REMOVAL OF IMPLANTABLE HEART ASSIST SYSTEM FROM HEART, OPEN APPROACH: ICD-10-PCS | Performed by: THORACIC SURGERY (CARDIOTHORACIC VASCULAR SURGERY)

## 2018-02-18 PROCEDURE — 85384 FIBRINOGEN ACTIVITY: CPT | Mod: 91

## 2018-02-18 PROCEDURE — 25000003 PHARM REV CODE 250: Performed by: ANESTHESIOLOGY

## 2018-02-18 PROCEDURE — 33244 REMOVE ELCTRD TRANSVENOUSLY: CPT | Mod: 51,,, | Performed by: THORACIC SURGERY (CARDIOTHORACIC VASCULAR SURGERY)

## 2018-02-18 PROCEDURE — 94002 VENT MGMT INPAT INIT DAY: CPT

## 2018-02-18 PROCEDURE — P9045 ALBUMIN (HUMAN), 5%, 250 ML: HCPCS | Mod: JG | Performed by: THORACIC SURGERY (CARDIOTHORACIC VASCULAR SURGERY)

## 2018-02-18 PROCEDURE — 99900026 HC AIRWAY MAINTENANCE (STAT)

## 2018-02-18 PROCEDURE — 84295 ASSAY OF SERUM SODIUM: CPT

## 2018-02-18 PROCEDURE — 81200000 HC HEART ACQUISITION CHARGE

## 2018-02-18 PROCEDURE — 85520 HEPARIN ASSAY: CPT

## 2018-02-18 PROCEDURE — P9021 RED BLOOD CELLS UNIT: HCPCS

## 2018-02-18 PROCEDURE — 93312 ECHO TRANSESOPHAGEAL: CPT | Mod: 26,59,, | Performed by: ANESTHESIOLOGY

## 2018-02-18 PROCEDURE — 36592 COLLECT BLOOD FROM PICC: CPT

## 2018-02-18 PROCEDURE — 80048 BASIC METABOLIC PNL TOTAL CA: CPT

## 2018-02-18 PROCEDURE — 27000248 HC VAD-ADDITIONAL DAY: Mod: NTX

## 2018-02-18 PROCEDURE — 27201037 HC PRESSURE MONITORING SET UP

## 2018-02-18 PROCEDURE — 36000930 HC OR TIME LEV VII 1ST 15 MIN: Performed by: THORACIC SURGERY (CARDIOTHORACIC VASCULAR SURGERY)

## 2018-02-18 PROCEDURE — 85610 PROTHROMBIN TIME: CPT | Mod: 91

## 2018-02-18 PROCEDURE — 33980 REMOVE INTRACORPOREAL DEVICE: CPT | Mod: 51,,, | Performed by: THORACIC SURGERY (CARDIOTHORACIC VASCULAR SURGERY)

## 2018-02-18 PROCEDURE — 27201021 HC LEUKOCYTE FILTER

## 2018-02-18 PROCEDURE — 63600175 PHARM REV CODE 636 W HCPCS: Performed by: ANESTHESIOLOGY

## 2018-02-18 PROCEDURE — 33945 TRANSPLANTATION OF HEART: CPT | Mod: 22,,, | Performed by: THORACIC SURGERY (CARDIOTHORACIC VASCULAR SURGERY)

## 2018-02-18 PROCEDURE — 93005 ELECTROCARDIOGRAM TRACING: CPT

## 2018-02-18 PROCEDURE — 85730 THROMBOPLASTIN TIME PARTIAL: CPT

## 2018-02-18 PROCEDURE — 20000000 HC ICU ROOM

## 2018-02-18 PROCEDURE — 5A1221Z PERFORMANCE OF CARDIAC OUTPUT, CONTINUOUS: ICD-10-PCS | Performed by: THORACIC SURGERY (CARDIOTHORACIC VASCULAR SURGERY)

## 2018-02-18 PROCEDURE — 25000242 PHARM REV CODE 250 ALT 637 W/ HCPCS: Performed by: THORACIC SURGERY (CARDIOTHORACIC VASCULAR SURGERY)

## 2018-02-18 PROCEDURE — 94640 AIRWAY INHALATION TREATMENT: CPT

## 2018-02-18 PROCEDURE — 88307 TISSUE EXAM BY PATHOLOGIST: CPT | Performed by: PATHOLOGY

## 2018-02-18 PROCEDURE — 27200953 HC CARDIOPLEGIA SYSTEM

## 2018-02-18 PROCEDURE — A4216 STERILE WATER/SALINE, 10 ML: HCPCS | Performed by: THORACIC SURGERY (CARDIOTHORACIC VASCULAR SURGERY)

## 2018-02-18 PROCEDURE — 93010 ELECTROCARDIOGRAM REPORT: CPT | Mod: ,,, | Performed by: INTERNAL MEDICINE

## 2018-02-18 PROCEDURE — 80048 BASIC METABOLIC PNL TOTAL CA: CPT | Mod: 91

## 2018-02-18 PROCEDURE — 25000003 PHARM REV CODE 250: Performed by: STUDENT IN AN ORGANIZED HEALTH CARE EDUCATION/TRAINING PROGRAM

## 2018-02-18 PROCEDURE — 37000008 HC ANESTHESIA 1ST 15 MINUTES: Performed by: THORACIC SURGERY (CARDIOTHORACIC VASCULAR SURGERY)

## 2018-02-18 PROCEDURE — D9220A PRA ANESTHESIA: Mod: ,,, | Performed by: ANESTHESIOLOGY

## 2018-02-18 PROCEDURE — 99233 SBSQ HOSP IP/OBS HIGH 50: CPT | Mod: NTX,,, | Performed by: INTERNAL MEDICINE

## 2018-02-18 PROCEDURE — 02Q50ZZ REPAIR ATRIAL SEPTUM, OPEN APPROACH: ICD-10-PCS | Performed by: THORACIC SURGERY (CARDIOTHORACIC VASCULAR SURGERY)

## 2018-02-18 PROCEDURE — 37799 UNLISTED PX VASCULAR SURGERY: CPT

## 2018-02-18 PROCEDURE — 85025 COMPLETE CBC W/AUTO DIFF WBC: CPT

## 2018-02-18 PROCEDURE — 84132 ASSAY OF SERUM POTASSIUM: CPT

## 2018-02-18 PROCEDURE — 36620 INSERTION CATHETER ARTERY: CPT | Mod: 59,,, | Performed by: ANESTHESIOLOGY

## 2018-02-18 PROCEDURE — 27000445 HC TEMPORARY PACEMAKER LEADS

## 2018-02-18 PROCEDURE — 02YA0Z0 TRANSPLANTATION OF HEART, ALLOGENEIC, OPEN APPROACH: ICD-10-PCS | Performed by: THORACIC SURGERY (CARDIOTHORACIC VASCULAR SURGERY)

## 2018-02-18 PROCEDURE — 33241 REMOVE PULSE GENERATOR: CPT | Mod: 51,,, | Performed by: THORACIC SURGERY (CARDIOTHORACIC VASCULAR SURGERY)

## 2018-02-18 PROCEDURE — 82330 ASSAY OF CALCIUM: CPT

## 2018-02-18 PROCEDURE — 63600175 PHARM REV CODE 636 W HCPCS: Performed by: THORACIC SURGERY (CARDIOTHORACIC VASCULAR SURGERY)

## 2018-02-18 PROCEDURE — 0JPT0PZ REMOVAL OF CARDIAC RHYTHM RELATED DEVICE FROM TRUNK SUBCUTANEOUS TISSUE AND FASCIA, OPEN APPROACH: ICD-10-PCS | Performed by: THORACIC SURGERY (CARDIOTHORACIC VASCULAR SURGERY)

## 2018-02-18 PROCEDURE — 63600175 PHARM REV CODE 636 W HCPCS: Mod: NTX | Performed by: INTERNAL MEDICINE

## 2018-02-18 PROCEDURE — 02PA0MZ REMOVAL OF CARDIAC LEAD FROM HEART, OPEN APPROACH: ICD-10-PCS | Performed by: THORACIC SURGERY (CARDIOTHORACIC VASCULAR SURGERY)

## 2018-02-18 PROCEDURE — 83735 ASSAY OF MAGNESIUM: CPT

## 2018-02-18 PROCEDURE — 81300006 HC HEART TRANSPORT, GROUND 4-5 HOURS

## 2018-02-18 PROCEDURE — 27100088 HC CELL SAVER

## 2018-02-18 RX ORDER — MAGNESIUM SULFATE HEPTAHYDRATE 40 MG/ML
2 INJECTION, SOLUTION INTRAVENOUS
Status: DISCONTINUED | OUTPATIENT
Start: 2018-02-18 | End: 2018-02-25

## 2018-02-18 RX ORDER — NOREPINEPHRINE BITARTRATE/D5W 4MG/250ML
0.02 PLASTIC BAG, INJECTION (ML) INTRAVENOUS CONTINUOUS
Status: DISCONTINUED | OUTPATIENT
Start: 2018-02-18 | End: 2018-02-19

## 2018-02-18 RX ORDER — IPRATROPIUM BROMIDE AND ALBUTEROL SULFATE 2.5; .5 MG/3ML; MG/3ML
3 SOLUTION RESPIRATORY (INHALATION) EVERY 4 HOURS
Status: DISCONTINUED | OUTPATIENT
Start: 2018-02-18 | End: 2018-02-19

## 2018-02-18 RX ORDER — POLYETHYLENE GLYCOL 3350 17 G/17G
17 POWDER, FOR SOLUTION ORAL DAILY
Status: DISCONTINUED | OUTPATIENT
Start: 2018-02-19 | End: 2018-02-21

## 2018-02-18 RX ORDER — SODIUM CHLORIDE, SODIUM LACTATE, POTASSIUM CHLORIDE, CALCIUM CHLORIDE 600; 310; 30; 20 MG/100ML; MG/100ML; MG/100ML; MG/100ML
1000 INJECTION, SOLUTION INTRAVENOUS
Status: DISCONTINUED | OUTPATIENT
Start: 2018-02-18 | End: 2018-02-21

## 2018-02-18 RX ORDER — ETOMIDATE 2 MG/ML
INJECTION INTRAVENOUS
Status: DISCONTINUED | OUTPATIENT
Start: 2018-02-18 | End: 2018-02-18

## 2018-02-18 RX ORDER — POTASSIUM CHLORIDE 29.8 MG/ML
40 INJECTION INTRAVENOUS
Status: DISCONTINUED | OUTPATIENT
Start: 2018-02-18 | End: 2018-03-05

## 2018-02-18 RX ORDER — METOCLOPRAMIDE HYDROCHLORIDE 5 MG/ML
5 INJECTION INTRAMUSCULAR; INTRAVENOUS EVERY 6 HOURS PRN
Status: DISCONTINUED | OUTPATIENT
Start: 2018-02-18 | End: 2018-02-24

## 2018-02-18 RX ORDER — PHENYLEPHRINE HYDROCHLORIDE 10 MG/ML
INJECTION INTRAVENOUS
Status: DISCONTINUED | OUTPATIENT
Start: 2018-02-18 | End: 2018-02-18

## 2018-02-18 RX ORDER — OXYCODONE HYDROCHLORIDE 5 MG/1
10 TABLET ORAL
Status: DISCONTINUED | OUTPATIENT
Start: 2018-02-18 | End: 2018-02-20

## 2018-02-18 RX ORDER — HEPARIN SODIUM 1000 [USP'U]/ML
INJECTION, SOLUTION INTRAVENOUS; SUBCUTANEOUS
Status: DISCONTINUED | OUTPATIENT
Start: 2018-02-18 | End: 2018-02-18

## 2018-02-18 RX ORDER — MIDAZOLAM HYDROCHLORIDE 1 MG/ML
INJECTION, SOLUTION INTRAMUSCULAR; INTRAVENOUS
Status: DISCONTINUED | OUTPATIENT
Start: 2018-02-18 | End: 2018-02-18

## 2018-02-18 RX ORDER — FENTANYL CITRATE 50 UG/ML
25 INJECTION, SOLUTION INTRAMUSCULAR; INTRAVENOUS
Status: DISCONTINUED | OUTPATIENT
Start: 2018-02-18 | End: 2018-02-20

## 2018-02-18 RX ORDER — LIDOCAINE HCL/PF 100 MG/5ML
SYRINGE (ML) INTRAVENOUS
Status: DISCONTINUED | OUTPATIENT
Start: 2018-02-18 | End: 2018-02-18

## 2018-02-18 RX ORDER — CEFAZOLIN SODIUM 1 G/3ML
2 INJECTION, POWDER, FOR SOLUTION INTRAMUSCULAR; INTRAVENOUS
Status: COMPLETED | OUTPATIENT
Start: 2018-02-19 | End: 2018-02-20

## 2018-02-18 RX ORDER — ALBUMIN HUMAN 50 G/1000ML
500 SOLUTION INTRAVENOUS
Status: COMPLETED | OUTPATIENT
Start: 2018-02-18 | End: 2018-02-19

## 2018-02-18 RX ORDER — FENTANYL CITRATE 50 UG/ML
INJECTION, SOLUTION INTRAMUSCULAR; INTRAVENOUS
Status: DISCONTINUED | OUTPATIENT
Start: 2018-02-18 | End: 2018-02-18

## 2018-02-18 RX ORDER — ASPIRIN 325 MG
325 TABLET ORAL ONCE
Status: COMPLETED | OUTPATIENT
Start: 2018-02-18 | End: 2018-02-18

## 2018-02-18 RX ORDER — BACITRACIN 50000 [IU]/1
INJECTION, POWDER, FOR SOLUTION INTRAMUSCULAR
Status: DISCONTINUED | OUTPATIENT
Start: 2018-02-18 | End: 2018-02-18 | Stop reason: HOSPADM

## 2018-02-18 RX ORDER — ROCURONIUM BROMIDE 10 MG/ML
INJECTION, SOLUTION INTRAVENOUS
Status: DISCONTINUED | OUTPATIENT
Start: 2018-02-18 | End: 2018-02-18

## 2018-02-18 RX ORDER — OXYCODONE HYDROCHLORIDE 5 MG/1
5 TABLET ORAL
Status: DISCONTINUED | OUTPATIENT
Start: 2018-02-18 | End: 2018-02-20

## 2018-02-18 RX ORDER — MUPIROCIN 20 MG/G
1 OINTMENT TOPICAL 2 TIMES DAILY
Status: COMPLETED | OUTPATIENT
Start: 2018-02-18 | End: 2018-02-23

## 2018-02-18 RX ORDER — SUCCINYLCHOLINE CHLORIDE 20 MG/ML
INJECTION INTRAMUSCULAR; INTRAVENOUS
Status: DISCONTINUED | OUTPATIENT
Start: 2018-02-18 | End: 2018-02-18

## 2018-02-18 RX ORDER — TRANEXAMIC ACID 100 MG/ML
INJECTION, SOLUTION INTRAVENOUS CONTINUOUS PRN
Status: DISCONTINUED | OUTPATIENT
Start: 2018-02-18 | End: 2018-02-18

## 2018-02-18 RX ORDER — POTASSIUM CHLORIDE 14.9 MG/ML
20 INJECTION INTRAVENOUS
Status: DISCONTINUED | OUTPATIENT
Start: 2018-02-18 | End: 2018-03-05

## 2018-02-18 RX ORDER — INDOMETHACIN 25 MG/1
50 CAPSULE ORAL ONCE
Status: COMPLETED | OUTPATIENT
Start: 2018-02-18 | End: 2018-02-18

## 2018-02-18 RX ORDER — POTASSIUM CHLORIDE 14.9 MG/ML
INJECTION INTRAVENOUS CONTINUOUS PRN
Status: DISCONTINUED | OUTPATIENT
Start: 2018-02-18 | End: 2018-02-18

## 2018-02-18 RX ORDER — NICARDIPINE HYDROCHLORIDE 0.2 MG/ML
5 INJECTION INTRAVENOUS CONTINUOUS
Status: DISCONTINUED | OUTPATIENT
Start: 2018-02-18 | End: 2018-02-19

## 2018-02-18 RX ORDER — VASOPRESSIN 20 [USP'U]/ML
INJECTION, SOLUTION INTRAMUSCULAR; SUBCUTANEOUS
Status: DISCONTINUED | OUTPATIENT
Start: 2018-02-18 | End: 2018-02-18

## 2018-02-18 RX ORDER — MORPHINE SULFATE ORAL SOLUTION 10 MG/5ML
10 SOLUTION ORAL
Status: DISCONTINUED | OUTPATIENT
Start: 2018-02-18 | End: 2018-02-20

## 2018-02-18 RX ORDER — POTASSIUM CHLORIDE 7.45 MG/ML
20 INJECTION INTRAVENOUS ONCE
Status: DISCONTINUED | OUTPATIENT
Start: 2018-02-18 | End: 2018-02-20

## 2018-02-18 RX ORDER — ASPIRIN 300 MG/1
300 SUPPOSITORY RECTAL ONCE
Status: COMPLETED | OUTPATIENT
Start: 2018-02-18 | End: 2018-02-18

## 2018-02-18 RX ORDER — ONDANSETRON 2 MG/ML
4 INJECTION INTRAMUSCULAR; INTRAVENOUS EVERY 12 HOURS PRN
Status: DISCONTINUED | OUTPATIENT
Start: 2018-02-18 | End: 2018-02-20

## 2018-02-18 RX ORDER — LACTULOSE 10 G/15ML
20 SOLUTION ORAL EVERY 6 HOURS PRN
Status: DISCONTINUED | OUTPATIENT
Start: 2018-02-18 | End: 2018-03-05

## 2018-02-18 RX ORDER — SODIUM CHLORIDE 0.9 % (FLUSH) 0.9 %
3 SYRINGE (ML) INJECTION EVERY 8 HOURS
Status: DISCONTINUED | OUTPATIENT
Start: 2018-02-18 | End: 2018-03-05 | Stop reason: HOSPADM

## 2018-02-18 RX ORDER — PROPOFOL 10 MG/ML
5 INJECTION, EMULSION INTRAVENOUS CONTINUOUS
Status: DISCONTINUED | OUTPATIENT
Start: 2018-02-18 | End: 2018-02-19

## 2018-02-18 RX ORDER — FENTANYL CITRATE 50 UG/ML
50 INJECTION, SOLUTION INTRAMUSCULAR; INTRAVENOUS
Status: DISCONTINUED | OUTPATIENT
Start: 2018-02-18 | End: 2018-02-20

## 2018-02-18 RX ORDER — FAMOTIDINE 10 MG/ML
20 INJECTION INTRAVENOUS 2 TIMES DAILY
Status: DISCONTINUED | OUTPATIENT
Start: 2018-02-18 | End: 2018-02-20

## 2018-02-18 RX ORDER — TRANEXAMIC ACID 100 MG/ML
INJECTION, SOLUTION INTRAVENOUS
Status: DISCONTINUED | OUTPATIENT
Start: 2018-02-18 | End: 2018-02-18

## 2018-02-18 RX ORDER — PROTAMINE SULFATE 10 MG/ML
INJECTION, SOLUTION INTRAVENOUS
Status: DISCONTINUED | OUTPATIENT
Start: 2018-02-18 | End: 2018-02-18

## 2018-02-18 RX ORDER — PROPOFOL 10 MG/ML
VIAL (ML) INTRAVENOUS CONTINUOUS PRN
Status: DISCONTINUED | OUTPATIENT
Start: 2018-02-18 | End: 2018-02-18

## 2018-02-18 RX ORDER — ONDANSETRON 2 MG/ML
INJECTION INTRAMUSCULAR; INTRAVENOUS
Status: DISCONTINUED | OUTPATIENT
Start: 2018-02-18 | End: 2018-02-18

## 2018-02-18 RX ORDER — ASPIRIN 325 MG
325 TABLET ORAL DAILY
Status: DISCONTINUED | OUTPATIENT
Start: 2018-02-19 | End: 2018-02-20

## 2018-02-18 RX ORDER — POTASSIUM CHLORIDE 14.9 MG/ML
60 INJECTION INTRAVENOUS
Status: DISCONTINUED | OUTPATIENT
Start: 2018-02-18 | End: 2018-03-05

## 2018-02-18 RX ORDER — ACETAMINOPHEN 10 MG/ML
INJECTION, SOLUTION INTRAVENOUS
Status: DISCONTINUED | OUTPATIENT
Start: 2018-02-18 | End: 2018-02-18

## 2018-02-18 RX ORDER — NOREPINEPHRINE BITARTRATE 1 MG/ML
INJECTION, SOLUTION INTRAVENOUS
Status: DISCONTINUED | OUTPATIENT
Start: 2018-02-18 | End: 2018-02-18

## 2018-02-18 RX ORDER — CEFAZOLIN SODIUM 1 G/3ML
INJECTION, POWDER, FOR SOLUTION INTRAMUSCULAR; INTRAVENOUS
Status: DISCONTINUED | OUTPATIENT
Start: 2018-02-18 | End: 2018-02-18

## 2018-02-18 RX ADMIN — METHYLPREDNISOLONE SODIUM SUCCINATE 500 MG: 500 INJECTION, POWDER, FOR SOLUTION INTRAMUSCULAR; INTRAVENOUS at 04:02

## 2018-02-18 RX ADMIN — FENTANYL CITRATE 50 MCG: 50 INJECTION, SOLUTION INTRAMUSCULAR; INTRAVENOUS at 08:02

## 2018-02-18 RX ADMIN — FAMOTIDINE 20 MG: 10 INJECTION, SOLUTION INTRAVENOUS at 08:02

## 2018-02-18 RX ADMIN — EPINEPHRINE 0.05 MCG/KG/MIN: 1 INJECTION, SOLUTION INTRAMUSCULAR; SUBCUTANEOUS at 09:02

## 2018-02-18 RX ADMIN — TRANEXAMIC ACID 1 MG/KG/HR: 100 INJECTION, SOLUTION INTRAVENOUS at 10:02

## 2018-02-18 RX ADMIN — SODIUM CHLORIDE, SODIUM GLUCONATE, SODIUM ACETATE, POTASSIUM CHLORIDE, MAGNESIUM CHLORIDE, SODIUM PHOSPHATE, DIBASIC, AND POTASSIUM PHOSPHATE: .53; .5; .37; .037; .03; .012; .00082 INJECTION, SOLUTION INTRAVENOUS at 04:02

## 2018-02-18 RX ADMIN — Medication 3 ML: at 09:02

## 2018-02-18 RX ADMIN — MUPIROCIN 1 G: 20 OINTMENT TOPICAL at 08:02

## 2018-02-18 RX ADMIN — EPINEPHRINE 0.06 MCG/KG/MIN: 1 INJECTION INTRAMUSCULAR; INTRAVENOUS; SUBCUTANEOUS at 04:02

## 2018-02-18 RX ADMIN — PROPOFOL 50 MCG/KG/MIN: 10 INJECTION, EMULSION INTRAVENOUS at 07:02

## 2018-02-18 RX ADMIN — POTASSIUM CHLORIDE 20 MEQ: 200 INJECTION, SOLUTION INTRAVENOUS at 08:02

## 2018-02-18 RX ADMIN — CEFAZOLIN 2 G: 330 INJECTION, POWDER, FOR SOLUTION INTRAMUSCULAR; INTRAVENOUS at 04:02

## 2018-02-18 RX ADMIN — CEFAZOLIN 2 G: 330 INJECTION, POWDER, FOR SOLUTION INTRAMUSCULAR; INTRAVENOUS at 12:02

## 2018-02-18 RX ADMIN — FENTANYL CITRATE 25 MCG: 50 INJECTION INTRAMUSCULAR; INTRAVENOUS at 11:02

## 2018-02-18 RX ADMIN — HEPARIN SODIUM 25000 UNITS: 1000 INJECTION, SOLUTION INTRAVENOUS; SUBCUTANEOUS at 04:02

## 2018-02-18 RX ADMIN — PHENYLEPHRINE HYDROCHLORIDE 100 MCG: 10 INJECTION INTRAVENOUS at 04:02

## 2018-02-18 RX ADMIN — NOREPINEPHRINE BITARTRATE 0.02 MG: 1 INJECTION, SOLUTION, CONCENTRATE INTRAVENOUS at 04:02

## 2018-02-18 RX ADMIN — ETOMIDATE 20 MG: 2 INJECTION, SOLUTION INTRAVENOUS at 10:02

## 2018-02-18 RX ADMIN — ASPIRIN 300 MG: 300 SUPPOSITORY RECTAL at 08:02

## 2018-02-18 RX ADMIN — PROTAMINE SULFATE 250 MG: 10 INJECTION, SOLUTION INTRAVENOUS at 06:02

## 2018-02-18 RX ADMIN — SODIUM CHLORIDE, SODIUM GLUCONATE, SODIUM ACETATE, POTASSIUM CHLORIDE, MAGNESIUM CHLORIDE, SODIUM PHOSPHATE, DIBASIC, AND POTASSIUM PHOSPHATE: .53; .5; .37; .037; .03; .012; .00082 INJECTION, SOLUTION INTRAVENOUS at 10:02

## 2018-02-18 RX ADMIN — SODIUM BICARBONATE 50 MEQ: 84 INJECTION, SOLUTION INTRAVENOUS at 08:02

## 2018-02-18 RX ADMIN — HEPARIN SODIUM 20000 UNITS: 1000 INJECTION, SOLUTION INTRAVENOUS; SUBCUTANEOUS at 01:02

## 2018-02-18 RX ADMIN — FENTANYL CITRATE 250 MCG: 50 INJECTION, SOLUTION INTRAMUSCULAR; INTRAVENOUS at 02:02

## 2018-02-18 RX ADMIN — PROPOFOL 40 MCG/KG/MIN: 10 INJECTION, EMULSION INTRAVENOUS at 08:02

## 2018-02-18 RX ADMIN — SUCCINYLCHOLINE CHLORIDE 200 MG: 20 INJECTION, SOLUTION INTRAMUSCULAR; INTRAVENOUS at 10:02

## 2018-02-18 RX ADMIN — FENTANYL CITRATE 25 MCG: 50 INJECTION INTRAMUSCULAR; INTRAVENOUS at 10:02

## 2018-02-18 RX ADMIN — MIDAZOLAM HYDROCHLORIDE 2 MG: 1 INJECTION, SOLUTION INTRAMUSCULAR; INTRAVENOUS at 10:02

## 2018-02-18 RX ADMIN — FENTANYL CITRATE 250 MCG: 50 INJECTION, SOLUTION INTRAMUSCULAR; INTRAVENOUS at 10:02

## 2018-02-18 RX ADMIN — ROCURONIUM BROMIDE 100 MG: 10 INJECTION, SOLUTION INTRAVENOUS at 10:02

## 2018-02-18 RX ADMIN — TRANEXAMIC ACID 730 MG: 100 INJECTION, SOLUTION INTRAVENOUS at 10:02

## 2018-02-18 RX ADMIN — POTASSIUM CHLORIDE: 14.9 INJECTION, SOLUTION INTRAVENOUS at 06:02

## 2018-02-18 RX ADMIN — CALCIUM CHLORIDE 1 G: 100 INJECTION, SOLUTION INTRAVENOUS at 07:02

## 2018-02-18 RX ADMIN — MAGNESIUM SULFATE HEPTAHYDRATE 2 G: 40 INJECTION, SOLUTION INTRAVENOUS at 09:02

## 2018-02-18 RX ADMIN — LIDOCAINE HYDROCHLORIDE 100 MG: 20 INJECTION, SOLUTION INTRAVENOUS at 12:02

## 2018-02-18 RX ADMIN — ACETAMINOPHEN 1000 MG: 10 INJECTION, SOLUTION INTRAVENOUS at 06:02

## 2018-02-18 RX ADMIN — CALCIUM CHLORIDE 1 G: 100 INJECTION, SOLUTION INTRAVENOUS at 04:02

## 2018-02-18 RX ADMIN — CALCIUM CHLORIDE 1 G: 100 INJECTION, SOLUTION INTRAVENOUS at 03:02

## 2018-02-18 RX ADMIN — ASPIRIN 325 MG ORAL TABLET 325 MG: 325 PILL ORAL at 08:02

## 2018-02-18 RX ADMIN — ROCURONIUM BROMIDE 30 MG: 10 INJECTION, SOLUTION INTRAVENOUS at 01:02

## 2018-02-18 RX ADMIN — ROCURONIUM BROMIDE 50 MG: 10 INJECTION, SOLUTION INTRAVENOUS at 11:02

## 2018-02-18 RX ADMIN — SODIUM CHLORIDE 2 UNITS/HR: 9 INJECTION, SOLUTION INTRAVENOUS at 04:02

## 2018-02-18 RX ADMIN — POTASSIUM CHLORIDE: 14.9 INJECTION, SOLUTION INTRAVENOUS at 07:02

## 2018-02-18 RX ADMIN — FENTANYL CITRATE 250 MCG: 50 INJECTION, SOLUTION INTRAMUSCULAR; INTRAVENOUS at 12:02

## 2018-02-18 RX ADMIN — FENTANYL CITRATE 50 MCG: 50 INJECTION, SOLUTION INTRAMUSCULAR; INTRAVENOUS at 09:02

## 2018-02-18 RX ADMIN — MIDAZOLAM HYDROCHLORIDE 2 MG: 1 INJECTION, SOLUTION INTRAMUSCULAR; INTRAVENOUS at 09:02

## 2018-02-18 RX ADMIN — ROCURONIUM BROMIDE 50 MG: 10 INJECTION, SOLUTION INTRAVENOUS at 02:02

## 2018-02-18 RX ADMIN — ALBUMIN (HUMAN) 500 ML: 12.5 SOLUTION INTRAVENOUS at 11:02

## 2018-02-18 RX ADMIN — VASOPRESSIN 2 UNITS: 20 INJECTION INTRAVENOUS at 04:02

## 2018-02-18 RX ADMIN — SODIUM CHLORIDE, SODIUM GLUCONATE, SODIUM ACETATE, POTASSIUM CHLORIDE, MAGNESIUM CHLORIDE, SODIUM PHOSPHATE, DIBASIC, AND POTASSIUM PHOSPHATE: .53; .5; .37; .037; .03; .012; .00082 INJECTION, SOLUTION INTRAVENOUS at 09:02

## 2018-02-18 RX ADMIN — PROTAMINE SULFATE 50 MG: 10 INJECTION, SOLUTION INTRAVENOUS at 06:02

## 2018-02-18 RX ADMIN — POTASSIUM CHLORIDE 20 MEQ: 200 INJECTION, SOLUTION INTRAVENOUS at 09:02

## 2018-02-18 RX ADMIN — NOREPINEPHRINE BITARTRATE 0.02 MCG/KG/MIN: 1 INJECTION, SOLUTION, CONCENTRATE INTRAVENOUS at 04:02

## 2018-02-18 RX ADMIN — CEFAZOLIN 2 G: 330 INJECTION, POWDER, FOR SOLUTION INTRAMUSCULAR; INTRAVENOUS at 11:02

## 2018-02-18 RX ADMIN — IPRATROPIUM BROMIDE AND ALBUTEROL SULFATE 3 ML: .5; 3 SOLUTION RESPIRATORY (INHALATION) at 08:02

## 2018-02-18 RX ADMIN — ALBUMIN (HUMAN) 500 ML: 12.5 SOLUTION INTRAVENOUS at 10:02

## 2018-02-18 NOTE — SUBJECTIVE & OBJECTIVE
Interval History: No CP or SOB overnight.    Continuous Infusions:  Scheduled Meds:   methylPREDNISolone sodium succinate  500 mg Intravenous Once    methylPREDNISolone sodium succinate  500 mg Intravenous Once     PRN Meds:sodium chloride, gelatin adsorbable 100cm top sponge, microplegia arrest solution (KCL 80mEq/40 mL), microplegia protection soln (LIDOCAINE 100mg/MAGNESIUM 4g/qs NS 40mL), mupirocin    Review of patient's allergies indicates:  No Known Allergies  Objective:     Vital Signs (Most Recent):  Temp: 97.2 °F (36.2 °C) (02/18/18 0815)  Pulse: 79 (02/18/18 0815)  Resp: 16 (02/18/18 0815)  BP: (!) 90/0 (02/18/18 0831)  SpO2: 99 % (02/18/18 0815) Vital Signs (24h Range):  Temp:  [96.3 °F (35.7 °C)-98 °F (36.7 °C)] 97.2 °F (36.2 °C)  Pulse:  [61-81] 79  Resp:  [16-18] 16  SpO2:  [96 %-99 %] 99 %  BP: ()/(0-71) 90/0     Patient Vitals for the past 72 hrs (Last 3 readings):   Weight   02/18/18 0700 73.1 kg (161 lb 2.5 oz)   02/17/18 0300 75.8 kg (167 lb 1.7 oz)     Body mass index is 25.24 kg/m².      Intake/Output Summary (Last 24 hours) at 02/18/18 1449  Last data filed at 02/18/18 1253   Gross per 24 hour   Intake              240 ml   Output              850 ml   Net             -610 ml       Hemodynamic Parameters:         Physical Exam   Constitutional: He is oriented to person, place, and time. He appears well-developed and well-nourished.   HENT:   Head: Normocephalic and atraumatic.   Eyes: Right eye exhibits no discharge. Left eye exhibits no discharge.   Neck: No tracheal deviation present.   Cardiovascular: Exam reveals no gallop and no friction rub.    +hum of the VAD   Pulmonary/Chest: Effort normal and breath sounds normal.   Abdominal: Soft. Bowel sounds are normal.   Musculoskeletal: He exhibits no edema.   Neurological: He is alert and oriented to person, place, and time.   Skin: Skin is warm and dry.   Psychiatric: He has a normal mood and affect.       Significant  Labs:  CBC:    Recent Labs  Lab 02/17/18  0354 02/18/18  1424   WBC 7.52  --    RBC 4.42*  --    HGB 13.3*  --    HCT 38.7* 29*     --    MCV 88  --    MCH 30.1  --    MCHC 34.4  --      BNP:  No results for input(s): BNP in the last 168 hours.    Invalid input(s): BNPTRIAGELBLO  CMP:    Recent Labs  Lab 02/17/18  0354   GLU 80   CALCIUM 9.6   ALBUMIN 4.2   PROT 7.4      K 3.7   CO2 25      BUN 14   CREATININE 1.0   ALKPHOS 76   ALT 19   AST 20   BILITOT 0.7      Coagulation:     Recent Labs  Lab 02/14/18 02/17/18  0354   INR 2.9 2.4*   APTT  --  35.2*     LDH:  No results for input(s): LDH in the last 72 hours.  Microbiology:  Microbiology Results (last 7 days)     Procedure Component Value Units Date/Time    Urine culture [[id number]] Collected:  02/17/18 0425    Order Status:  Completed Specimen:  Urine from Urine, Clean Catch Updated:  02/18/18 1119     Urine Culture, Routine No growth    Narrative:       1 cup of urine          I have reviewed all pertinent labs within the past 24 hours.    Estimated Creatinine Clearance: 102.8 mL/min (based on SCr of 1 mg/dL).    Diagnostic Results:  I have reviewed all pertinent imaging results/findings within the past 24 hours.

## 2018-02-18 NOTE — NURSING
Per YUMIKO Lara, , donor is no longer considered [facility] Increased Risk.  Pt notified by Dr Daniels.    OR still set for 10am.  Per Toshia ROGERS, floor nurse, pt is ready to go.

## 2018-02-18 NOTE — PROGRESS NOTES
Pt transported off floor to CT and then operating room. Family was told by surgical resident that vad emergency bag did not have to go with patient. Confirmed by Dr. Quintana. Later informed by vad coordinator that it was needed. Brought vad emergency bag to OR desk.

## 2018-02-18 NOTE — ANESTHESIA PROCEDURE NOTES
RICKY    Diagnosis:  chf  Patient location during procedure: OR  Procedure start time: 2/18/2018 11:23 AM  Timeout: 2/18/2018 11:23 AM  Exam type: Baseline  Staffing  Anesthesiologist: CARLOS MANUEL HUERTA  Performed: anesthesiologist   Preanesthetic Checklist  Completed: patient identified, surgical consent, pre-op evaluation, timeout performed, risks and benefits discussed, monitors and equipment checked, anesthesia consent given, oxygen available, suction available, hand hygiene performed and patient being monitored  Setup & Induction  Patient preparation: bite block inserted  Probe Insertion: easy  Exam: complete  Exam         LVAD:yes  Inflow Cannula and Direction: midline  Estimated Ejection Fraction: < 25% severe            Right Heart  Right Ventricle: normal  Right Ventricle Function: normal    Intra Atrial Septum  PFO: no shunt by color flow doppler  no IAS aneurysm  no lipomatous hypertrophy  no Atrial Septal Defect (Asd)    Right Ventricle  Size: normal, Free Wall Thickness: normal    Aortic Valve:  Stenosis: none (not opening with VAD)  Morphology: trileaflet  Regurgitation: no aortic valve regurgitation     Mitral Valve:  Morphology:normal  Jet Description: mild-to-moderate    Tricuspid Valve:  Morphology: normal  Regurgitation: mild    Pulmonic Valve:  Morphology:normal  Regurgitation(color flow): none    Great Vessels  Ascending Aorta Atherosclerosis: 2=mild dz (<3mm)  Aortic Arch Atherosclerosis: 2=mild dz (<3mm)  IABP: no  Descending Aorta Atherosclerosis: 2=mild dz (<3mm)  Aorta    Descending aorta IABP: no    Effusions  Effusions: none    Summary  Findings discussed with surgeon.    Other Findings

## 2018-02-18 NOTE — ANESTHESIA PREPROCEDURE EVALUATION
02/18/2018  Mert Samayoa is a 28 y.o., male.    Anesthesia Evaluation    I have reviewed the Patient Summary Reports.     I have reviewed the Medications.     Review of Systems  Anesthesia Hx:  History of prior surgery of interest to airway management or planning:  Denies Personal Hx of Anesthesia complications.   Social:  Former Smoker    Cardiovascular:   Pacemaker Hypertension Dysrhythmias CHF LVAD in place       Physical Exam  General:  Well nourished    Airway/Jaw/Neck:  Airway Findings: Mouth Opening: Small, but > 3cm Tongue: Normal  General Airway Assessment: Adult  Mallampati: III  Improves to II with phonation.  TM Distance: 4 - 6 cm  Jaw/Neck Findings:  Neck ROM: Normal ROM       Chest/Lungs:  Chest/Lungs Findings: Normal Respiratory Rate     Heart/Vascular:  Heart Findings: Rate: Normal        Mental Status:  Mental Status Findings:  Alert and Oriented         Anesthesia Plan  Type of Anesthesia, risks & benefits discussed:  Anesthesia Type:  general  Patient's Preference: General   Intra-op Monitoring Plan: standard ASA monitors, central line and arterial line  Intra-op Monitoring Plan Comments:   Post Op Pain Control Plan: IV/PO Opioids PRN  Post Op Pain Control Plan Comments:   Induction:   IV  Beta Blocker:  Patient is not currently on a Beta-Blocker (No further documentation required).       Informed Consent: Patient understands risks and agrees with Anesthesia plan.  Questions answered. Anesthesia consent signed with patient.  ASA Score: 4  emergent   Day of Surgery Review of History & Physical:    H&P update referred to the surgeon.     Anesthesia Plan Notes: NPO confirmed.  Plan intraop RICKY  Discussed shaving beard with patient he is fine with that.          Ready For Surgery From Anesthesia Perspective.

## 2018-02-18 NOTE — ASSESSMENT & PLAN NOTE
- LVAD in place; awaiting organ transplant today  - CT non-contrast completed this morning for CTS review  - ICD tachytherapies turned off this morning  - NPO for surgery; hold all medications

## 2018-02-18 NOTE — ANESTHESIA PROCEDURE NOTES
Arterial    Diagnosis: cardiomyopathy    Patient location during procedure: done in OR  Procedure start time: 2/18/2018 9:55 AM  Timeout: 2/18/2018 9:55 AM  Procedure end time: 2/18/2018 9:58 AM  Staffing  Anesthesiologist: CARLOS MANUEL HUERTA  Resident/CRNA: DICK RODRIGUEZ  Performed: resident/CRNA   Anesthesiologist was present at the time of the procedure.  Preanesthetic Checklist  Completed: patient identified, site marked, surgical consent, pre-op evaluation, timeout performed, IV checked, risks and benefits discussed, monitors and equipment checked and anesthesia consent givenArterial  Skin Prep: chlorhexidine gluconate  Local Infiltration: lidocaine  Orientation: left  Location: radial  Catheter Size: 20 G  Catheter placement by Ultrasound guidance. Heme positive aspiration all ports.  Vessel Caliber: small, patent, compressibility normal  Vascular Doppler:  not done  Needle advanced into vessel with real time Ultrasound guidance.  Guidewire confirmed in vessel.  Sterile sheath used.  Image recorded and saved.Insertion Attempts: 2  Assessment  Dressing: secured with tape and tegaderm  Patient: Tolerated well

## 2018-02-18 NOTE — PLAN OF CARE
Problem: Patient Care Overview  Goal: Plan of Care Review  Outcome: Ongoing (interventions implemented as appropriate)  Pt a Heartware LVAD  Pt admitted to hospital for primary heart transplant. Planned cut time is 1000 on 2/18. Pt NPO since 2200 on 2/17. Pt completed Chlorhexidine bath in pm and am. Dopplers 80 -82. MD orders to withhold meds. Will continue to monitor.

## 2018-02-18 NOTE — ANESTHESIA PROCEDURE NOTES
Central Line    Diagnosis: cardiomyopathy  Patient location during procedure: done in OR  Procedure start time: 2/18/2018 10:15 AM  Timeout: 2/18/2018 10:15 AM  Procedure end time: 2/18/2018 10:25 AM  Staffing  Anesthesiologist: CARLOS MANUEL HUERTA  Resident/CRNA: DICK RODRIGUEZ  Performed: resident/CRNA   Anesthesiologist was present at the time of the procedure.  Preanesthetic Checklist  Completed: patient identified, site marked, surgical consent, pre-op evaluation, timeout performed, IV checked, risks and benefits discussed, monitors and equipment checked and anesthesia consent given  Indication  Indication: hemodynamic monitoring, vascular access, med administration     Anesthesia   general anesthesia    Central Line  Skin Prep: skin prepped with ChloraPrep, skin prep agent completely dried prior to procedure  maximum sterile barriers used during central venous catheter insertion  hand hygiene performed prior to central venous catheter insertion  Location: right internal jugular,   Catheter type: double lumen  Catheter Size: 9 Fr  Ultrasound: vascular probe with ultrasound  Vessel Caliber: medium, patent  Vascular Doppler:  not done, compressibility normal  Needle advanced into vessel with real time Ultrasound guidance.  Guidewire confirmed in vessel.  Sterile sheath used.  Image recorded and saved.   Manometry: Venous cannualation confirmed by visual estimation of blood vessel pressure using manometry.  Insertion Attempts: 1   Securement:line sutured, chlorhexidine patch, sterile dressing applied and blood return through all ports     Post-Procedure  Adverse Events:none

## 2018-02-18 NOTE — PROGRESS NOTES
[facility]  Heart Transplant  Progress Note    Patient Name: Mert Samayoa  MRN: [id number]  Admission Date: 2/17/2018  Hospital Length of Stay: 1 days  Attending Physician: Guillermo Daniels MD  Primary Care Provider: Primary Doctor No  Principal Problem:Awaiting organ transplant    Subjective:     Interval History: No CP or SOB overnight.    Continuous Infusions:  Scheduled Meds:   methylPREDNISolone sodium succinate  500 mg Intravenous Once    methylPREDNISolone sodium succinate  500 mg Intravenous Once     PRN Meds:sodium chloride, gelatin adsorbable 100cm top sponge, microplegia arrest solution (KCL 80mEq/40 mL), microplegia protection soln (LIDOCAINE 100mg/MAGNESIUM 4g/qs NS 40mL), mupirocin    Review of patient's allergies indicates:  No Known Allergies  Objective:     Vital Signs (Most Recent):  Temp: 97.2 °F (36.2 °C) (02/18/18 0815)  Pulse: 79 (02/18/18 0815)  Resp: 16 (02/18/18 0815)  BP: (!) 90/0 (02/18/18 0831)  SpO2: 99 % (02/18/18 0815) Vital Signs (24h Range):  Temp:  [96.3 °F (35.7 °C)-98 °F (36.7 °C)] 97.2 °F (36.2 °C)  Pulse:  [61-81] 79  Resp:  [16-18] 16  SpO2:  [96 %-99 %] 99 %  BP: ()/(0-71) 90/0     Patient Vitals for the past 72 hrs (Last 3 readings):   Weight   02/18/18 0700 73.1 kg (161 lb 2.5 oz)   02/17/18 0300 75.8 kg (167 lb 1.7 oz)     Body mass index is 25.24 kg/m².      Intake/Output Summary (Last 24 hours) at 02/18/18 1449  Last data filed at 02/18/18 1253   Gross per 24 hour   Intake              240 ml   Output              850 ml   Net             -610 ml       Hemodynamic Parameters:         Physical Exam   Constitutional: He is oriented to person, place, and time. He appears well-developed and well-nourished.   HENT:   Head: Normocephalic and atraumatic.   Eyes: Right eye exhibits no discharge. Left eye exhibits no discharge.   Neck: No tracheal deviation present.   Cardiovascular: Exam reveals no gallop and no friction rub.    +hum of the VAD    Pulmonary/Chest: Effort normal and breath sounds normal.   Abdominal: Soft. Bowel sounds are normal.   Musculoskeletal: He exhibits no edema.   Neurological: He is alert and oriented to person, place, and time.   Skin: Skin is warm and dry.   Psychiatric: He has a normal mood and affect.       Significant Labs:  CBC:    Recent Labs  Lab 02/17/18  0354 02/18/18  1424   WBC 7.52  --    RBC 4.42*  --    HGB 13.3*  --    HCT 38.7* 29*     --    MCV 88  --    MCH 30.1  --    MCHC 34.4  --      BNP:  No results for input(s): BNP in the last 168 hours.    Invalid input(s): BNPTRIAGELBLO  CMP:    Recent Labs  Lab 02/17/18  0354   GLU 80   CALCIUM 9.6   ALBUMIN 4.2   PROT 7.4      K 3.7   CO2 25      BUN 14   CREATININE 1.0   ALKPHOS 76   ALT 19   AST 20   BILITOT 0.7      Coagulation:     Recent Labs  Lab 02/14/18 02/17/18  0354   INR 2.9 2.4*   APTT  --  35.2*     LDH:  No results for input(s): LDH in the last 72 hours.  Microbiology:  Microbiology Results (last 7 days)     Procedure Component Value Units Date/Time    Urine culture [[id number]] Collected:  02/17/18 0425    Order Status:  Completed Specimen:  Urine from Urine, Clean Catch Updated:  02/18/18 1119     Urine Culture, Routine No growth    Narrative:       1 cup of urine          I have reviewed all pertinent labs within the past 24 hours.    Estimated Creatinine Clearance: 102.8 mL/min (based on SCr of 1 mg/dL).    Diagnostic Results:  I have reviewed all pertinent imaging results/findings within the past 24 hours.    Assessment and Plan:     * Awaiting organ transplant    - LVAD in place; awaiting organ transplant today  - CT non-contrast completed this morning for CTS review  - ICD tachytherapies turned off this morning  - NPO for surgery; hold all medications            Bright Flor MD  Heart Transplant  [facility]

## 2018-02-19 ENCOUNTER — TELEPHONE (OUTPATIENT)
Dept: ADMINISTRATIVE | Facility: HOSPITAL | Age: 28
End: 2018-02-19

## 2018-02-19 ENCOUNTER — ANESTHESIA (OUTPATIENT)
Dept: SURGERY | Facility: HOSPITAL | Age: 28
DRG: 002 | End: 2018-02-19
Payer: COMMERCIAL

## 2018-02-19 ENCOUNTER — ANESTHESIA EVENT (OUTPATIENT)
Dept: SURGERY | Facility: HOSPITAL | Age: 28
DRG: 002 | End: 2018-02-19
Payer: COMMERCIAL

## 2018-02-19 ENCOUNTER — ANTI-COAG VISIT (OUTPATIENT)
Dept: CARDIOLOGY | Facility: CLINIC | Age: 28
End: 2018-02-19

## 2018-02-19 PROBLEM — T38.0X5A ADVERSE EFFECT OF GLUCOCORTICOIDS AND SYNTHETIC ANALOGUES, INITIAL ENCOUNTER: Status: ACTIVE | Noted: 2018-02-19

## 2018-02-19 LAB
ALBUMIN SERPL BCP-MCNC: 3.6 G/DL
ALLENS TEST: ABNORMAL
ALP SERPL-CCNC: 41 U/L
ALT SERPL W/O P-5'-P-CCNC: 16 U/L
ANION GAP SERPL CALC-SCNC: 14 MMOL/L
ANION GAP SERPL CALC-SCNC: 15 MMOL/L
ANION GAP SERPL CALC-SCNC: 4 MMOL/L
ANION GAP SERPL CALC-SCNC: 7 MMOL/L
ANION GAP SERPL CALC-SCNC: 9 MMOL/L
ANISOCYTOSIS BLD QL SMEAR: SLIGHT
APTT BLDCRRT: 36.2 SEC
AST SERPL-CCNC: 55 U/L
BASOPHILS # BLD AUTO: 0 K/UL
BASOPHILS # BLD AUTO: 0.01 K/UL
BASOPHILS # BLD AUTO: 0.03 K/UL
BASOPHILS NFR BLD: 0 %
BASOPHILS NFR BLD: 0.1 %
BILIRUB DIRECT SERPL-MCNC: 1 MG/DL
BILIRUB SERPL-MCNC: 1.5 MG/DL
BLD PROD TYP BPU: NORMAL
BLOOD UNIT EXPIRATION DATE: NORMAL
BLOOD UNIT TYPE CODE: 5100
BLOOD UNIT TYPE: NORMAL
BUN SERPL-MCNC: 17 MG/DL
BUN SERPL-MCNC: 17 MG/DL
BUN SERPL-MCNC: 19 MG/DL
BUN SERPL-MCNC: 19 MG/DL
BUN SERPL-MCNC: 20 MG/DL
CALCIUM SERPL-MCNC: 7.5 MG/DL
CALCIUM SERPL-MCNC: 7.9 MG/DL
CALCIUM SERPL-MCNC: 8 MG/DL
CALCIUM SERPL-MCNC: 8.2 MG/DL
CALCIUM SERPL-MCNC: 8.6 MG/DL
CHLORIDE SERPL-SCNC: 109 MMOL/L
CHLORIDE SERPL-SCNC: 111 MMOL/L
CHLORIDE SERPL-SCNC: 112 MMOL/L
CHLORIDE SERPL-SCNC: 112 MMOL/L
CHLORIDE SERPL-SCNC: 113 MMOL/L
CO2 SERPL-SCNC: 17 MMOL/L
CO2 SERPL-SCNC: 18 MMOL/L
CO2 SERPL-SCNC: 22 MMOL/L
CO2 SERPL-SCNC: 22 MMOL/L
CO2 SERPL-SCNC: 24 MMOL/L
CODING SYSTEM: NORMAL
CREAT SERPL-MCNC: 1 MG/DL
CREAT SERPL-MCNC: 1.1 MG/DL
CREAT SERPL-MCNC: 1.2 MG/DL
CREAT SERPL-MCNC: 1.3 MG/DL
CREAT SERPL-MCNC: 1.5 MG/DL
DELSYS: ABNORMAL
DIFFERENTIAL METHOD: ABNORMAL
DISPENSE STATUS: NORMAL
EOSINOPHIL # BLD AUTO: 0 K/UL
EOSINOPHIL NFR BLD: 0 %
ERYTHROCYTE [DISTWIDTH] IN BLOOD BY AUTOMATED COUNT: 13.7 %
ERYTHROCYTE [DISTWIDTH] IN BLOOD BY AUTOMATED COUNT: 13.9 %
ERYTHROCYTE [DISTWIDTH] IN BLOOD BY AUTOMATED COUNT: 14 %
ERYTHROCYTE [DISTWIDTH] IN BLOOD BY AUTOMATED COUNT: 14.5 %
ERYTHROCYTE [DISTWIDTH] IN BLOOD BY AUTOMATED COUNT: 14.5 %
ERYTHROCYTE [SEDIMENTATION RATE] IN BLOOD BY WESTERGREN METHOD: 15 MM/H
EST. GFR  (AFRICAN AMERICAN): >60 ML/MIN/1.73 M^2
EST. GFR  (NON AFRICAN AMERICAN): >60 ML/MIN/1.73 M^2
FIBRINOGEN PPP-MCNC: 119 MG/DL
FIBRINOGEN PPP-MCNC: 215 MG/DL
FIO2: 40
FIO2: 50
GLUCOSE SERPL-MCNC: 131 MG/DL
GLUCOSE SERPL-MCNC: 138 MG/DL
GLUCOSE SERPL-MCNC: 142 MG/DL
GLUCOSE SERPL-MCNC: 179 MG/DL
GLUCOSE SERPL-MCNC: 202 MG/DL (ref 70–110)
GLUCOSE SERPL-MCNC: 234 MG/DL (ref 70–110)
GLUCOSE SERPL-MCNC: 247 MG/DL
GLUCOSE SERPL-MCNC: 250 MG/DL (ref 70–110)
GLUCOSE SERPL-MCNC: 92 MG/DL (ref 70–110)
HBV SURFACE AG SERPL QL IA: NEGATIVE
HCO3 UR-SCNC: 14.8 MMOL/L (ref 24–28)
HCO3 UR-SCNC: 17.4 MMOL/L (ref 24–28)
HCO3 UR-SCNC: 18.2 MMOL/L (ref 24–28)
HCO3 UR-SCNC: 18.4 MMOL/L (ref 24–28)
HCO3 UR-SCNC: 20.3 MMOL/L (ref 24–28)
HCO3 UR-SCNC: 20.5 MMOL/L (ref 24–28)
HCO3 UR-SCNC: 21.1 MMOL/L (ref 24–28)
HCO3 UR-SCNC: 27.8 MMOL/L (ref 24–28)
HCT VFR BLD AUTO: 21 %
HCT VFR BLD AUTO: 22.5 %
HCT VFR BLD AUTO: 23.6 %
HCT VFR BLD AUTO: 23.6 %
HCT VFR BLD AUTO: 24.3 %
HCT VFR BLD CALC: 22 %PCV (ref 36–54)
HCT VFR BLD CALC: 23 %PCV (ref 36–54)
HCT VFR BLD CALC: 24 %PCV (ref 36–54)
HCT VFR BLD CALC: 26 %PCV (ref 36–54)
HCT VFR BLD CALC: 36 %PCV (ref 36–54)
HGB BLD-MCNC: 7.5 G/DL
HGB BLD-MCNC: 8.2 G/DL
HGB BLD-MCNC: 8.3 G/DL
HGB BLD-MCNC: 8.4 G/DL
HGB BLD-MCNC: 8.6 G/DL
HIV UQ DATE RECEIVED: NORMAL
HIV UQ DATE REPORTED: NORMAL
HIV1 RNA # SERPL NAA+PROBE: <40 COPIES/ML
HIV1 RNA SERPL NAA+PROBE-LOG#: <1.6 LOG (10) COPIES/ML
HIV1 RNA SERPL QL NAA+PROBE: NOT DETECTED
HYPOCHROMIA BLD QL SMEAR: ABNORMAL
IMM GRANULOCYTES # BLD AUTO: 0.02 K/UL
IMM GRANULOCYTES # BLD AUTO: 0.03 K/UL
IMM GRANULOCYTES # BLD AUTO: 0.03 K/UL
IMM GRANULOCYTES # BLD AUTO: 0.06 K/UL
IMM GRANULOCYTES # BLD AUTO: 0.09 K/UL
IMM GRANULOCYTES NFR BLD AUTO: 0.2 %
IMM GRANULOCYTES NFR BLD AUTO: 0.2 %
IMM GRANULOCYTES NFR BLD AUTO: 0.3 %
IMM GRANULOCYTES NFR BLD AUTO: 0.3 %
IMM GRANULOCYTES NFR BLD AUTO: 0.4 %
INR PPP: 1.5
INR PPP: 1.7
LACTATE SERPL-SCNC: 1.1 MMOL/L
LACTATE SERPL-SCNC: 1.2 MMOL/L
LACTATE SERPL-SCNC: 5.8 MMOL/L
LACTATE SERPL-SCNC: 9.5 MMOL/L
LACTATE SERPL-SCNC: >12 MMOL/L
LYMPHOCYTES # BLD AUTO: 0.7 K/UL
LYMPHOCYTES # BLD AUTO: 1.2 K/UL
LYMPHOCYTES # BLD AUTO: 1.3 K/UL
LYMPHOCYTES NFR BLD: 5.3 %
LYMPHOCYTES NFR BLD: 5.5 %
LYMPHOCYTES NFR BLD: 7.2 %
LYMPHOCYTES NFR BLD: 7.4 %
LYMPHOCYTES NFR BLD: 7.6 %
MAGNESIUM SERPL-MCNC: 1.9 MG/DL
MAGNESIUM SERPL-MCNC: 2 MG/DL
MAGNESIUM SERPL-MCNC: 2.2 MG/DL
MCH RBC QN AUTO: 30.6 PG
MCH RBC QN AUTO: 30.6 PG
MCH RBC QN AUTO: 30.8 PG
MCH RBC QN AUTO: 30.9 PG
MCH RBC QN AUTO: 31.1 PG
MCHC RBC AUTO-ENTMCNC: 35.2 G/DL
MCHC RBC AUTO-ENTMCNC: 35.4 G/DL
MCHC RBC AUTO-ENTMCNC: 35.6 G/DL
MCHC RBC AUTO-ENTMCNC: 35.7 G/DL
MCHC RBC AUTO-ENTMCNC: 36.4 G/DL
MCV RBC AUTO: 85 FL
MCV RBC AUTO: 86 FL
MCV RBC AUTO: 87 FL
MIN VOL: 11.7
MIN VOL: 12
MODE: ABNORMAL
MONOCYTES # BLD AUTO: 0.6 K/UL
MONOCYTES # BLD AUTO: 0.6 K/UL
MONOCYTES # BLD AUTO: 0.9 K/UL
MONOCYTES # BLD AUTO: 1.3 K/UL
MONOCYTES # BLD AUTO: 1.5 K/UL
MONOCYTES NFR BLD: 5.5 %
MONOCYTES NFR BLD: 5.6 %
MONOCYTES NFR BLD: 5.8 %
MONOCYTES NFR BLD: 6.7 %
MONOCYTES NFR BLD: 8.2 %
NEUTROPHILS # BLD AUTO: 11.2 K/UL
NEUTROPHILS # BLD AUTO: 14.9 K/UL
NEUTROPHILS # BLD AUTO: 19.6 K/UL
NEUTROPHILS # BLD AUTO: 8.7 K/UL
NEUTROPHILS # BLD AUTO: 8.9 K/UL
NEUTROPHILS NFR BLD: 83.8 %
NEUTROPHILS NFR BLD: 86.8 %
NEUTROPHILS NFR BLD: 86.9 %
NEUTROPHILS NFR BLD: 87.5 %
NEUTROPHILS NFR BLD: 88.4 %
NRBC BLD-RTO: 0 /100 WBC
NUM UNITS TRANS PACKED RBC: NORMAL
OVALOCYTES BLD QL SMEAR: ABNORMAL
PCO2 BLDA: 33.2 MMHG (ref 35–45)
PCO2 BLDA: 33.5 MMHG (ref 35–45)
PCO2 BLDA: 34.3 MMHG (ref 35–45)
PCO2 BLDA: 35 MMHG (ref 35–45)
PCO2 BLDA: 35.2 MMHG (ref 35–45)
PCO2 BLDA: 36.4 MMHG (ref 35–45)
PCO2 BLDA: 42.2 MMHG (ref 35–45)
PCO2 BLDA: 45 MMHG (ref 35–45)
PEEP: 5
PH SMN: 7.25 [PH] (ref 7.35–7.45)
PH SMN: 7.27 [PH] (ref 7.35–7.45)
PH SMN: 7.31 [PH] (ref 7.35–7.45)
PH SMN: 7.31 [PH] (ref 7.35–7.45)
PH SMN: 7.32 [PH] (ref 7.35–7.45)
PH SMN: 7.33 [PH] (ref 7.35–7.45)
PH SMN: 7.39 [PH] (ref 7.35–7.45)
PH SMN: 7.49 [PH] (ref 7.35–7.45)
PIP: 24
PLATELET # BLD AUTO: 49 K/UL
PLATELET # BLD AUTO: 55 K/UL
PLATELET # BLD AUTO: 63 K/UL
PLATELET # BLD AUTO: 74 K/UL
PLATELET # BLD AUTO: 95 K/UL
PLATELET BLD QL SMEAR: ABNORMAL
PMV BLD AUTO: 10.5 FL
PMV BLD AUTO: 10.6 FL
PMV BLD AUTO: 10.9 FL
PMV BLD AUTO: 11.1 FL
PMV BLD AUTO: 11.3 FL
PO2 BLDA: 136 MMHG (ref 80–100)
PO2 BLDA: 159 MMHG (ref 80–100)
PO2 BLDA: 170 MMHG (ref 80–100)
PO2 BLDA: 210 MMHG (ref 80–100)
PO2 BLDA: 219 MMHG (ref 80–100)
PO2 BLDA: 413 MMHG (ref 80–100)
PO2 BLDA: 541 MMHG (ref 80–100)
PO2 BLDA: 79 MMHG (ref 80–100)
POC BE: -12 MMOL/L
POC BE: -5 MMOL/L
POC BE: -5 MMOL/L
POC BE: -6 MMOL/L
POC BE: -8 MMOL/L
POC BE: -8 MMOL/L
POC BE: -9 MMOL/L
POC BE: 4 MMOL/L
POC IONIZED CALCIUM: 0.97 MMOL/L (ref 1.06–1.42)
POC IONIZED CALCIUM: 1.06 MMOL/L (ref 1.06–1.42)
POC IONIZED CALCIUM: 1.1 MMOL/L (ref 1.06–1.42)
POC IONIZED CALCIUM: 1.13 MMOL/L (ref 1.06–1.42)
POC IONIZED CALCIUM: 1.23 MMOL/L (ref 1.06–1.42)
POC SATURATED O2: 100 % (ref 95–100)
POC SATURATED O2: 96 % (ref 95–100)
POC SATURATED O2: 99 % (ref 95–100)
POC TCO2: 16 MMOL/L (ref 23–27)
POC TCO2: 18 MMOL/L (ref 23–27)
POC TCO2: 19 MMOL/L (ref 23–27)
POC TCO2: 19 MMOL/L (ref 23–27)
POC TCO2: 21 MMOL/L (ref 23–27)
POC TCO2: 22 MMOL/L (ref 23–27)
POC TCO2: 22 MMOL/L (ref 23–27)
POC TCO2: 29 MMOL/L (ref 23–27)
POCT GLUCOSE: 107 MG/DL (ref 70–110)
POCT GLUCOSE: 110 MG/DL (ref 70–110)
POCT GLUCOSE: 119 MG/DL (ref 70–110)
POCT GLUCOSE: 120 MG/DL (ref 70–110)
POCT GLUCOSE: 126 MG/DL (ref 70–110)
POCT GLUCOSE: 130 MG/DL (ref 70–110)
POCT GLUCOSE: 131 MG/DL (ref 70–110)
POCT GLUCOSE: 149 MG/DL (ref 70–110)
POCT GLUCOSE: 154 MG/DL (ref 70–110)
POCT GLUCOSE: 159 MG/DL (ref 70–110)
POCT GLUCOSE: 159 MG/DL (ref 70–110)
POCT GLUCOSE: 167 MG/DL (ref 70–110)
POCT GLUCOSE: 169 MG/DL (ref 70–110)
POCT GLUCOSE: 174 MG/DL (ref 70–110)
POCT GLUCOSE: 177 MG/DL (ref 70–110)
POCT GLUCOSE: 185 MG/DL (ref 70–110)
POCT GLUCOSE: 202 MG/DL (ref 70–110)
POCT GLUCOSE: 229 MG/DL (ref 70–110)
POCT GLUCOSE: 249 MG/DL (ref 70–110)
POCT GLUCOSE: 264 MG/DL (ref 70–110)
POCT GLUCOSE: 273 MG/DL (ref 70–110)
POCT GLUCOSE: 298 MG/DL (ref 70–110)
POCT GLUCOSE: 358 MG/DL (ref 70–110)
POCT GLUCOSE: 91 MG/DL (ref 70–110)
POIKILOCYTOSIS BLD QL SMEAR: SLIGHT
POTASSIUM BLD-SCNC: 3.1 MMOL/L (ref 3.5–5.1)
POTASSIUM BLD-SCNC: 3.1 MMOL/L (ref 3.5–5.1)
POTASSIUM BLD-SCNC: 3.6 MMOL/L (ref 3.5–5.1)
POTASSIUM BLD-SCNC: 3.8 MMOL/L (ref 3.5–5.1)
POTASSIUM BLD-SCNC: 4.1 MMOL/L (ref 3.5–5.1)
POTASSIUM SERPL-SCNC: 3.7 MMOL/L
POTASSIUM SERPL-SCNC: 3.8 MMOL/L
POTASSIUM SERPL-SCNC: 3.9 MMOL/L
POTASSIUM SERPL-SCNC: 5.3 MMOL/L
POTASSIUM SERPL-SCNC: 6.1 MMOL/L
POTASSIUM SERPL-SCNC: 7.5 MMOL/L
PROT SERPL-MCNC: 5.3 G/DL
PROTHROMBIN TIME: 14.5 SEC
PROTHROMBIN TIME: 16.3 SEC
PROTHROMBIN TIME: 16.6 SEC
PROTHROMBIN TIME: 16.7 SEC
PROTHROMBIN TIME: 16.7 SEC
PROTHROMBIN TIME: 16.9 SEC
PS: 10
RBC # BLD AUTO: 2.41 M/UL
RBC # BLD AUTO: 2.65 M/UL
RBC # BLD AUTO: 2.71 M/UL
RBC # BLD AUTO: 2.73 M/UL
RBC # BLD AUTO: 2.81 M/UL
SAMPLE: ABNORMAL
SITE: ABNORMAL
SODIUM BLD-SCNC: 136 MMOL/L (ref 136–145)
SODIUM BLD-SCNC: 143 MMOL/L (ref 136–145)
SODIUM BLD-SCNC: 144 MMOL/L (ref 136–145)
SODIUM BLD-SCNC: 145 MMOL/L (ref 136–145)
SODIUM BLD-SCNC: 147 MMOL/L (ref 136–145)
SODIUM SERPL-SCNC: 137 MMOL/L
SODIUM SERPL-SCNC: 140 MMOL/L
SODIUM SERPL-SCNC: 144 MMOL/L
SP02: 100
SPONT RATE: 30
VT: 500
WBC # BLD AUTO: 10.02 K/UL
WBC # BLD AUTO: 10.27 K/UL
WBC # BLD AUTO: 12.8 K/UL
WBC # BLD AUTO: 17.73 K/UL
WBC # BLD AUTO: 22.15 K/UL

## 2018-02-19 PROCEDURE — 37000009 HC ANESTHESIA EA ADD 15 MINS: Performed by: THORACIC SURGERY (CARDIOTHORACIC VASCULAR SURGERY)

## 2018-02-19 PROCEDURE — 25000003 PHARM REV CODE 250: Performed by: ANESTHESIOLOGY

## 2018-02-19 PROCEDURE — 85730 THROMBOPLASTIN TIME PARTIAL: CPT

## 2018-02-19 PROCEDURE — 99900035 HC TECH TIME PER 15 MIN (STAT)

## 2018-02-19 PROCEDURE — 27100171 HC OXYGEN HIGH FLOW UP TO 24 HOURS

## 2018-02-19 PROCEDURE — 85520 HEPARIN ASSAY: CPT

## 2018-02-19 PROCEDURE — 0WJC0ZZ INSPECTION OF MEDIASTINUM, OPEN APPROACH: ICD-10-PCS | Performed by: THORACIC SURGERY (CARDIOTHORACIC VASCULAR SURGERY)

## 2018-02-19 PROCEDURE — 25000003 PHARM REV CODE 250: Performed by: STUDENT IN AN ORGANIZED HEALTH CARE EDUCATION/TRAINING PROGRAM

## 2018-02-19 PROCEDURE — 99233 SBSQ HOSP IP/OBS HIGH 50: CPT | Mod: ,,, | Performed by: INTERNAL MEDICINE

## 2018-02-19 PROCEDURE — 94799 UNLISTED PULMONARY SVC/PX: CPT

## 2018-02-19 PROCEDURE — 63600175 PHARM REV CODE 636 W HCPCS: Mod: JG | Performed by: STUDENT IN AN ORGANIZED HEALTH CARE EDUCATION/TRAINING PROGRAM

## 2018-02-19 PROCEDURE — 83735 ASSAY OF MAGNESIUM: CPT

## 2018-02-19 PROCEDURE — 63600175 PHARM REV CODE 636 W HCPCS: Performed by: THORACIC SURGERY (CARDIOTHORACIC VASCULAR SURGERY)

## 2018-02-19 PROCEDURE — 82330 ASSAY OF CALCIUM: CPT

## 2018-02-19 PROCEDURE — 99222 1ST HOSP IP/OBS MODERATE 55: CPT | Mod: ,,, | Performed by: NURSE PRACTITIONER

## 2018-02-19 PROCEDURE — 20000000 HC ICU ROOM

## 2018-02-19 PROCEDURE — 25000242 PHARM REV CODE 250 ALT 637 W/ HCPCS: Performed by: STUDENT IN AN ORGANIZED HEALTH CARE EDUCATION/TRAINING PROGRAM

## 2018-02-19 PROCEDURE — 97802 MEDICAL NUTRITION INDIV IN: CPT

## 2018-02-19 PROCEDURE — 82803 BLOOD GASES ANY COMBINATION: CPT

## 2018-02-19 PROCEDURE — 27100088 HC CELL SAVER

## 2018-02-19 PROCEDURE — 63600175 PHARM REV CODE 636 W HCPCS: Performed by: ANESTHESIOLOGY

## 2018-02-19 PROCEDURE — 99900017 HC EXTUBATION W/PARAMETERS (STAT)

## 2018-02-19 PROCEDURE — 99900026 HC AIRWAY MAINTENANCE (STAT)

## 2018-02-19 PROCEDURE — P9016 RBC LEUKOCYTES REDUCED: HCPCS

## 2018-02-19 PROCEDURE — P9045 ALBUMIN (HUMAN), 5%, 250 ML: HCPCS | Mod: JG | Performed by: STUDENT IN AN ORGANIZED HEALTH CARE EDUCATION/TRAINING PROGRAM

## 2018-02-19 PROCEDURE — 36000708 HC OR TIME LEV III 1ST 15 MIN: Performed by: THORACIC SURGERY (CARDIOTHORACIC VASCULAR SURGERY)

## 2018-02-19 PROCEDURE — 0W3C0ZZ CONTROL BLEEDING IN MEDIASTINUM, OPEN APPROACH: ICD-10-PCS | Performed by: THORACIC SURGERY (CARDIOTHORACIC VASCULAR SURGERY)

## 2018-02-19 PROCEDURE — 63600175 PHARM REV CODE 636 W HCPCS: Performed by: INTERNAL MEDICINE

## 2018-02-19 PROCEDURE — 94761 N-INVAS EAR/PLS OXIMETRY MLT: CPT

## 2018-02-19 PROCEDURE — 84132 ASSAY OF SERUM POTASSIUM: CPT

## 2018-02-19 PROCEDURE — 25000003 PHARM REV CODE 250: Performed by: INTERNAL MEDICINE

## 2018-02-19 PROCEDURE — 85384 FIBRINOGEN ACTIVITY: CPT

## 2018-02-19 PROCEDURE — 27000221 HC OXYGEN, UP TO 24 HOURS

## 2018-02-19 PROCEDURE — 35820 EXPLORE CHEST VESSELS: CPT | Mod: 78,,, | Performed by: THORACIC SURGERY (CARDIOTHORACIC VASCULAR SURGERY)

## 2018-02-19 PROCEDURE — 83735 ASSAY OF MAGNESIUM: CPT | Mod: 91

## 2018-02-19 PROCEDURE — 94003 VENT MGMT INPAT SUBQ DAY: CPT

## 2018-02-19 PROCEDURE — 25000242 PHARM REV CODE 250 ALT 637 W/ HCPCS: Performed by: THORACIC SURGERY (CARDIOTHORACIC VASCULAR SURGERY)

## 2018-02-19 PROCEDURE — 80048 BASIC METABOLIC PNL TOTAL CA: CPT

## 2018-02-19 PROCEDURE — 85610 PROTHROMBIN TIME: CPT | Mod: 91

## 2018-02-19 PROCEDURE — D9220A PRA ANESTHESIA: Mod: ,,, | Performed by: ANESTHESIOLOGY

## 2018-02-19 PROCEDURE — 94640 AIRWAY INHALATION TREATMENT: CPT

## 2018-02-19 PROCEDURE — P9045 ALBUMIN (HUMAN), 5%, 250 ML: HCPCS | Mod: JG | Performed by: THORACIC SURGERY (CARDIOTHORACIC VASCULAR SURGERY)

## 2018-02-19 PROCEDURE — 80076 HEPATIC FUNCTION PANEL: CPT

## 2018-02-19 PROCEDURE — 37799 UNLISTED PX VASCULAR SURGERY: CPT

## 2018-02-19 PROCEDURE — 83605 ASSAY OF LACTIC ACID: CPT | Mod: 91

## 2018-02-19 PROCEDURE — 80048 BASIC METABOLIC PNL TOTAL CA: CPT | Mod: 91

## 2018-02-19 PROCEDURE — 27100092 HC HIGH FLOW DELIVERY CANNULA

## 2018-02-19 PROCEDURE — 25000003 PHARM REV CODE 250: Performed by: THORACIC SURGERY (CARDIOTHORACIC VASCULAR SURGERY)

## 2018-02-19 PROCEDURE — 36000709 HC OR TIME LEV III EA ADD 15 MIN: Performed by: THORACIC SURGERY (CARDIOTHORACIC VASCULAR SURGERY)

## 2018-02-19 PROCEDURE — 85025 COMPLETE CBC W/AUTO DIFF WBC: CPT | Mod: 91

## 2018-02-19 PROCEDURE — 63600175 PHARM REV CODE 636 W HCPCS: Performed by: STUDENT IN AN ORGANIZED HEALTH CARE EDUCATION/TRAINING PROGRAM

## 2018-02-19 PROCEDURE — 63600175 PHARM REV CODE 636 W HCPCS: Performed by: PHYSICIAN ASSISTANT

## 2018-02-19 PROCEDURE — 27000239 HC STAND-BY BYPASS PUMP

## 2018-02-19 PROCEDURE — 37000008 HC ANESTHESIA 1ST 15 MINUTES: Performed by: THORACIC SURGERY (CARDIOTHORACIC VASCULAR SURGERY)

## 2018-02-19 PROCEDURE — 84295 ASSAY OF SERUM SODIUM: CPT

## 2018-02-19 PROCEDURE — A4216 STERILE WATER/SALINE, 10 ML: HCPCS | Performed by: THORACIC SURGERY (CARDIOTHORACIC VASCULAR SURGERY)

## 2018-02-19 PROCEDURE — S0028 INJECTION, FAMOTIDINE, 20 MG: HCPCS | Performed by: THORACIC SURGERY (CARDIOTHORACIC VASCULAR SURGERY)

## 2018-02-19 RX ORDER — ALBUTEROL SULFATE 0.83 MG/ML
2.5 SOLUTION RESPIRATORY (INHALATION) EVERY 4 HOURS PRN
Status: DISCONTINUED | OUTPATIENT
Start: 2018-02-19 | End: 2018-03-05

## 2018-02-19 RX ORDER — TACROLIMUS 1 MG/1
1 CAPSULE ORAL 2 TIMES DAILY
Status: DISCONTINUED | OUTPATIENT
Start: 2018-02-19 | End: 2018-02-23

## 2018-02-19 RX ORDER — GLYCOPYRROLATE 0.2 MG/ML
INJECTION INTRAMUSCULAR; INTRAVENOUS
Status: DISCONTINUED | OUTPATIENT
Start: 2018-02-19 | End: 2018-02-19

## 2018-02-19 RX ORDER — HYDROCODONE BITARTRATE AND ACETAMINOPHEN 500; 5 MG/1; MG/1
TABLET ORAL
Status: DISCONTINUED | OUTPATIENT
Start: 2018-02-19 | End: 2018-03-05

## 2018-02-19 RX ORDER — FENTANYL CITRATE 50 UG/ML
INJECTION, SOLUTION INTRAMUSCULAR; INTRAVENOUS
Status: DISCONTINUED | OUTPATIENT
Start: 2018-02-19 | End: 2018-02-19

## 2018-02-19 RX ORDER — CEFAZOLIN SODIUM 1 G/3ML
INJECTION, POWDER, FOR SOLUTION INTRAMUSCULAR; INTRAVENOUS
Status: DISCONTINUED | OUTPATIENT
Start: 2018-02-19 | End: 2018-02-19

## 2018-02-19 RX ORDER — ACETAMINOPHEN 10 MG/ML
1000 INJECTION, SOLUTION INTRAVENOUS EVERY 8 HOURS
Status: COMPLETED | OUTPATIENT
Start: 2018-02-19 | End: 2018-02-20

## 2018-02-19 RX ORDER — ALBUMIN HUMAN 50 G/1000ML
25 SOLUTION INTRAVENOUS ONCE
Status: COMPLETED | OUTPATIENT
Start: 2018-02-19 | End: 2018-02-19

## 2018-02-19 RX ORDER — NYSTATIN 100000 [USP'U]/ML
500000 SUSPENSION ORAL
Status: DISCONTINUED | OUTPATIENT
Start: 2018-02-19 | End: 2018-03-05 | Stop reason: HOSPADM

## 2018-02-19 RX ORDER — BACITRACIN 50000 [IU]/1
INJECTION, POWDER, FOR SOLUTION INTRAMUSCULAR
Status: DISCONTINUED | OUTPATIENT
Start: 2018-02-19 | End: 2018-02-19 | Stop reason: HOSPADM

## 2018-02-19 RX ORDER — ACETAMINOPHEN 10 MG/ML
1000 INJECTION, SOLUTION INTRAVENOUS EVERY 8 HOURS
Status: DISCONTINUED | OUTPATIENT
Start: 2018-02-19 | End: 2018-02-19

## 2018-02-19 RX ORDER — INDOMETHACIN 25 MG/1
50 CAPSULE ORAL ONCE
Status: COMPLETED | OUTPATIENT
Start: 2018-02-20 | End: 2018-02-19

## 2018-02-19 RX ORDER — INDOMETHACIN 25 MG/1
50 CAPSULE ORAL ONCE
Status: COMPLETED | OUTPATIENT
Start: 2018-02-19 | End: 2018-02-19

## 2018-02-19 RX ORDER — FUROSEMIDE 10 MG/ML
20 INJECTION INTRAMUSCULAR; INTRAVENOUS ONCE
Status: COMPLETED | OUTPATIENT
Start: 2018-02-19 | End: 2018-02-19

## 2018-02-19 RX ORDER — NEOSTIGMINE METHYLSULFATE 1 MG/ML
INJECTION, SOLUTION INTRAVENOUS
Status: DISCONTINUED | OUTPATIENT
Start: 2018-02-19 | End: 2018-02-19

## 2018-02-19 RX ORDER — DOBUTAMINE HYDROCHLORIDE 200 MG/100ML
2.5 INJECTION INTRAVENOUS CONTINUOUS
Status: DISCONTINUED | OUTPATIENT
Start: 2018-02-19 | End: 2018-02-23

## 2018-02-19 RX ORDER — OXYCODONE HYDROCHLORIDE 5 MG/1
5 TABLET ORAL ONCE
Status: COMPLETED | OUTPATIENT
Start: 2018-02-19 | End: 2018-02-19

## 2018-02-19 RX ORDER — MYCOPHENOLATE MOFETIL 250 MG/1
1000 CAPSULE ORAL 2 TIMES DAILY
Status: DISCONTINUED | OUTPATIENT
Start: 2018-02-19 | End: 2018-02-19

## 2018-02-19 RX ORDER — METHYLPREDNISOLONE SOD SUCC 125 MG
125 VIAL (EA) INJECTION EVERY 8 HOURS
Status: COMPLETED | OUTPATIENT
Start: 2018-02-19 | End: 2018-02-19

## 2018-02-19 RX ORDER — MIDAZOLAM HYDROCHLORIDE 1 MG/ML
INJECTION, SOLUTION INTRAMUSCULAR; INTRAVENOUS
Status: DISCONTINUED | OUTPATIENT
Start: 2018-02-19 | End: 2018-02-19

## 2018-02-19 RX ORDER — ROCURONIUM BROMIDE 10 MG/ML
INJECTION, SOLUTION INTRAVENOUS
Status: DISCONTINUED | OUTPATIENT
Start: 2018-02-19 | End: 2018-02-19

## 2018-02-19 RX ORDER — ALBUMIN HUMAN 50 G/1000ML
SOLUTION INTRAVENOUS
Status: DISPENSED
Start: 2018-02-19 | End: 2018-02-19

## 2018-02-19 RX ORDER — IPRATROPIUM BROMIDE 0.5 MG/2.5ML
0.5 SOLUTION RESPIRATORY (INHALATION) EVERY 4 HOURS
Status: DISCONTINUED | OUTPATIENT
Start: 2018-02-19 | End: 2018-02-20

## 2018-02-19 RX ADMIN — POTASSIUM CHLORIDE 20 MEQ: 200 INJECTION, SOLUTION INTRAVENOUS at 12:02

## 2018-02-19 RX ADMIN — MUPIROCIN 1 G: 20 OINTMENT TOPICAL at 08:02

## 2018-02-19 RX ADMIN — METHYLPREDNISOLONE SODIUM SUCCINATE 125 MG: 125 INJECTION, POWDER, FOR SOLUTION INTRAMUSCULAR; INTRAVENOUS at 09:02

## 2018-02-19 RX ADMIN — IPRATROPIUM BROMIDE 0.5 MG: 0.5 SOLUTION RESPIRATORY (INHALATION) at 11:02

## 2018-02-19 RX ADMIN — METHYLPREDNISOLONE SODIUM SUCCINATE 125 MG: 125 INJECTION, POWDER, FOR SOLUTION INTRAMUSCULAR; INTRAVENOUS at 03:02

## 2018-02-19 RX ADMIN — SODIUM CHLORIDE, SODIUM GLUCONATE, SODIUM ACETATE, POTASSIUM CHLORIDE, MAGNESIUM CHLORIDE, SODIUM PHOSPHATE, DIBASIC, AND POTASSIUM PHOSPHATE: .53; .5; .37; .037; .03; .012; .00082 INJECTION, SOLUTION INTRAVENOUS at 05:02

## 2018-02-19 RX ADMIN — FENTANYL CITRATE: 50 INJECTION, SOLUTION INTRAMUSCULAR; INTRAVENOUS at 10:02

## 2018-02-19 RX ADMIN — FENTANYL CITRATE 250 MCG: 50 INJECTION, SOLUTION INTRAMUSCULAR; INTRAVENOUS at 06:02

## 2018-02-19 RX ADMIN — ACETAMINOPHEN 1000 MG: 10 INJECTION, SOLUTION INTRAVENOUS at 09:02

## 2018-02-19 RX ADMIN — ROCURONIUM BROMIDE 50 MG: 10 INJECTION, SOLUTION INTRAVENOUS at 06:02

## 2018-02-19 RX ADMIN — ROCURONIUM BROMIDE 50 MG: 10 INJECTION, SOLUTION INTRAVENOUS at 05:02

## 2018-02-19 RX ADMIN — POTASSIUM CHLORIDE 20 MEQ: 200 INJECTION, SOLUTION INTRAVENOUS at 01:02

## 2018-02-19 RX ADMIN — NYSTATIN 500000 UNITS: 500000 SUSPENSION ORAL at 08:02

## 2018-02-19 RX ADMIN — SODIUM CHLORIDE 33.2 UNITS/HR: 9 INJECTION, SOLUTION INTRAVENOUS at 01:02

## 2018-02-19 RX ADMIN — TACROLIMUS 1 MG: 1 CAPSULE ORAL at 06:02

## 2018-02-19 RX ADMIN — IPRATROPIUM BROMIDE 0.5 MG: 0.5 SOLUTION RESPIRATORY (INHALATION) at 08:02

## 2018-02-19 RX ADMIN — OXYCODONE HYDROCHLORIDE 5 MG: 5 TABLET ORAL at 03:02

## 2018-02-19 RX ADMIN — FENTANYL CITRATE 50 MCG: 50 INJECTION, SOLUTION INTRAMUSCULAR; INTRAVENOUS at 01:02

## 2018-02-19 RX ADMIN — FENTANYL CITRATE 25 MCG: 50 INJECTION INTRAMUSCULAR; INTRAVENOUS at 01:02

## 2018-02-19 RX ADMIN — ALBUTEROL SULFATE 2.5 MG: 2.5 SOLUTION RESPIRATORY (INHALATION) at 11:02

## 2018-02-19 RX ADMIN — CALCIUM GLUCONATE 1 G: 94 INJECTION, SOLUTION INTRAVENOUS at 10:02

## 2018-02-19 RX ADMIN — MIDAZOLAM HYDROCHLORIDE 1 MG: 1 INJECTION, SOLUTION INTRAMUSCULAR; INTRAVENOUS at 05:02

## 2018-02-19 RX ADMIN — FENTANYL CITRATE 50 MCG: 50 INJECTION, SOLUTION INTRAMUSCULAR; INTRAVENOUS at 10:02

## 2018-02-19 RX ADMIN — ASPIRIN 325 MG ORAL TABLET 325 MG: 325 PILL ORAL at 09:02

## 2018-02-19 RX ADMIN — DEXTROSE MONOHYDRATE 25 G: 25 INJECTION, SOLUTION INTRAVENOUS at 09:02

## 2018-02-19 RX ADMIN — SODIUM BICARBONATE 50 MEQ: 84 INJECTION, SOLUTION INTRAVENOUS at 01:02

## 2018-02-19 RX ADMIN — MYCOPHENOLATE MOFETIL 1000 MG: 500 INJECTION, POWDER, LYOPHILIZED, FOR SOLUTION INTRAVENOUS at 08:02

## 2018-02-19 RX ADMIN — FAMOTIDINE 20 MG: 10 INJECTION, SOLUTION INTRAVENOUS at 08:02

## 2018-02-19 RX ADMIN — FENTANYL CITRATE 25 MCG: 50 INJECTION INTRAMUSCULAR; INTRAVENOUS at 07:02

## 2018-02-19 RX ADMIN — IPRATROPIUM BROMIDE AND ALBUTEROL SULFATE 3 ML: .5; 3 SOLUTION RESPIRATORY (INHALATION) at 03:02

## 2018-02-19 RX ADMIN — ALBUMIN (HUMAN) 25 G: 12.5 SOLUTION INTRAVENOUS at 11:02

## 2018-02-19 RX ADMIN — ALBUMIN (HUMAN) 500 ML: 12.5 SOLUTION INTRAVENOUS at 12:02

## 2018-02-19 RX ADMIN — ACETAMINOPHEN 1000 MG: 10 INJECTION, SOLUTION INTRAVENOUS at 01:02

## 2018-02-19 RX ADMIN — CEFAZOLIN 2 G: 330 INJECTION, POWDER, FOR SOLUTION INTRAMUSCULAR; INTRAVENOUS at 09:02

## 2018-02-19 RX ADMIN — NYSTATIN 500000 UNITS: 500000 SUSPENSION ORAL at 06:02

## 2018-02-19 RX ADMIN — SODIUM CHLORIDE 48.2 UNITS/HR: 9 INJECTION, SOLUTION INTRAVENOUS at 04:02

## 2018-02-19 RX ADMIN — IPRATROPIUM BROMIDE AND ALBUTEROL SULFATE 3 ML: .5; 3 SOLUTION RESPIRATORY (INHALATION) at 12:02

## 2018-02-19 RX ADMIN — OXYCODONE HYDROCHLORIDE 10 MG: 5 TABLET ORAL at 09:02

## 2018-02-19 RX ADMIN — ALBUTEROL SULFATE 2.5 MG: 2.5 SOLUTION RESPIRATORY (INHALATION) at 08:02

## 2018-02-19 RX ADMIN — FAMOTIDINE 20 MG: 10 INJECTION, SOLUTION INTRAVENOUS at 09:02

## 2018-02-19 RX ADMIN — MUPIROCIN 1 G: 20 OINTMENT TOPICAL at 09:02

## 2018-02-19 RX ADMIN — OXYCODONE HYDROCHLORIDE 10 MG: 5 TABLET ORAL at 06:02

## 2018-02-19 RX ADMIN — CEFAZOLIN 2 G: 330 INJECTION, POWDER, FOR SOLUTION INTRAMUSCULAR; INTRAVENOUS at 06:02

## 2018-02-19 RX ADMIN — FENTANYL CITRATE 25 MCG: 50 INJECTION INTRAMUSCULAR; INTRAVENOUS at 05:02

## 2018-02-19 RX ADMIN — OXYCODONE HYDROCHLORIDE 5 MG: 5 TABLET ORAL at 01:02

## 2018-02-19 RX ADMIN — OXYCODONE HYDROCHLORIDE 5 MG: 5 TABLET ORAL at 09:02

## 2018-02-19 RX ADMIN — DOBUTAMINE IN DEXTROSE 5 MCG/KG/MIN: 200 INJECTION, SOLUTION INTRAVENOUS at 06:02

## 2018-02-19 RX ADMIN — IPRATROPIUM BROMIDE AND ALBUTEROL SULFATE 3 ML: .5; 3 SOLUTION RESPIRATORY (INHALATION) at 04:02

## 2018-02-19 RX ADMIN — PROPOFOL 10 MCG/KG/MIN: 10 INJECTION, EMULSION INTRAVENOUS at 03:02

## 2018-02-19 RX ADMIN — FENTANYL CITRATE 250 MCG: 50 INJECTION, SOLUTION INTRAMUSCULAR; INTRAVENOUS at 07:02

## 2018-02-19 RX ADMIN — SODIUM BICARBONATE 50 MEQ: 84 INJECTION, SOLUTION INTRAVENOUS at 11:02

## 2018-02-19 RX ADMIN — FENTANYL CITRATE 50 MCG: 50 INJECTION, SOLUTION INTRAMUSCULAR; INTRAVENOUS at 12:02

## 2018-02-19 RX ADMIN — IPRATROPIUM BROMIDE AND ALBUTEROL SULFATE 3 ML: .5; 3 SOLUTION RESPIRATORY (INHALATION) at 08:02

## 2018-02-19 RX ADMIN — INSULIN HUMAN 10 UNITS: 100 INJECTION, SOLUTION PARENTERAL at 09:02

## 2018-02-19 RX ADMIN — FENTANYL CITRATE 50 MCG: 50 INJECTION, SOLUTION INTRAMUSCULAR; INTRAVENOUS at 02:02

## 2018-02-19 RX ADMIN — Medication 3 ML: at 09:02

## 2018-02-19 RX ADMIN — MYCOPHENOLATE MOFETIL 1000 MG: 500 INJECTION, POWDER, LYOPHILIZED, FOR SOLUTION INTRAVENOUS at 01:02

## 2018-02-19 RX ADMIN — IPRATROPIUM BROMIDE AND ALBUTEROL SULFATE 3 ML: .5; 3 SOLUTION RESPIRATORY (INHALATION) at 11:02

## 2018-02-19 RX ADMIN — SODIUM CHLORIDE, SODIUM GLUCONATE, SODIUM ACETATE, POTASSIUM CHLORIDE, MAGNESIUM CHLORIDE, SODIUM PHOSPHATE, DIBASIC, AND POTASSIUM PHOSPHATE: .53; .5; .37; .037; .03; .012; .00082 INJECTION, SOLUTION INTRAVENOUS at 07:02

## 2018-02-19 RX ADMIN — FUROSEMIDE 20 MG: 10 INJECTION, SOLUTION INTRAMUSCULAR; INTRAVENOUS at 09:02

## 2018-02-19 RX ADMIN — GLYCOPYRROLATE 0.6 MG: 0.2 INJECTION, SOLUTION INTRAMUSCULAR; INTRAVENOUS at 08:02

## 2018-02-19 RX ADMIN — CEFAZOLIN 2 G: 330 INJECTION, POWDER, FOR SOLUTION INTRAMUSCULAR; INTRAVENOUS at 05:02

## 2018-02-19 RX ADMIN — CEFAZOLIN 2 G: 330 INJECTION, POWDER, FOR SOLUTION INTRAMUSCULAR; INTRAVENOUS at 11:02

## 2018-02-19 RX ADMIN — NEOSTIGMINE METHYLSULFATE 5 MG: 1 INJECTION INTRAVENOUS at 08:02

## 2018-02-19 NOTE — BRIEF OP NOTE
[facility]  Cardiothoracic Surgery  Operative Note    SUMMARY     Date of Procedure: 2/18/2018     Procedure: Procedure(s) (LRB):  1)  TRANSPLANT-HEART using bicaval technique, ischemic time 3h 51m 94019-63  2)  REMOVAL IMPLANTABLE INTRACORPOREAL LEFT VENTRICULAR ASSIST DEVICE 68167  3)  REMOVAL AICD  77341, 44998  4)  CLOSURE PFO 67356  5)  BACKBENCH PREPARATION OF DONOR HEART FOR IMPLANT 03995    Surgeon(s) and Role:     * Raj Klein MD - Primary     * Ernesto King MD - Resident - Assisting        Pre-Operative Diagnosis: Organ transplant candidate [Z76.82]    Post-Operative Diagnosis: Post-Op Diagnosis Codes:     * Organ transplant candidate [Z76.82]    Anesthesia: General        Description of the Findings of the Procedure: Good initial graft function.         Complications: None    Estimated Blood Loss (EBL): 100 mL             Specimens:   Specimen (12h ago through future)    None                  Attestation:  I was present and scrubbed for the procedure.

## 2018-02-19 NOTE — ASSESSMENT & PLAN NOTE
-Transplant Date 2/18/18   -CMV Status:D+/R-   -Rejection status: Moderate Risk  -Rejection episodes: none to date  -Induction: No   -hemodynamically stable on Epi; pressor per CTS   -Current immunosuppression: Mehtylpred, Prograf 1mg BID, Cellcept 1000 mg BID,   -Opportunistic PPx with:Nystatin

## 2018-02-19 NOTE — TRANSFER OF CARE
"Anesthesia Transfer of Care Note    Patient: Mert Samayoa    Procedure(s) Performed: Procedure(s) (LRB):  EXPLORATION-BLEEDING (RE-EXPLORATION) (Bilateral)    Patient location: ICU    Anesthesia Type: general    Transport from OR: Transported from OR intubated on 100% O2 by AMBU with assisted ventilation. Continuous ECG monitoring in transport. Continuous SpO2 monitoring in transport. Continuos invasive BP monitoring in transport    Post pain: adequate analgesia    Post assessment: no apparent anesthetic complications and tolerated procedure well    Post vital signs: stable    Level of consciousness: sedated    Nausea/Vomiting: no nausea/vomiting    Complications: none    Transfer of care protocol was followed      Last vitals:   Visit Vitals  BP (!) 105/55   Pulse 91   Temp 37.7 °C (99.9 °F)   Resp (!) 23   Ht 5' 7" (1.702 m)   Wt 73.1 kg (161 lb 2.5 oz)   SpO2 100%   BMI 25.24 kg/m²     "

## 2018-02-19 NOTE — ASSESSMENT & PLAN NOTE
27 y/o man w/ hx of DCM s/p OHT with ICD and VAD removal.    Neuro:  -Propofol gtt for sedation  -fentanyl for pain control    Cardiac: VSS stable and wnl  -Per CTS:  --cardene gtt for elevated BP  --epi gtt as needed for hypotenstion  -    Pulm: Intubated, sedated  -wean FiO2, minimal vent settings  -duonebs    Renal:  -kirk in place  -maintain adequate UOP    Heme/ Id: Hgb at 7.9, leukocytosis  -rec'd 2g Ancef  -trend cbc    Gi:  -miralax  -Pepcid ppx    Endo:  -insulin gtt for glucose control  -monitor accuchecks    FEN:  -NPO while intubated.  -replete lytes prn  -daily CMP

## 2018-02-19 NOTE — CONSULTS
[facility]  Endocrinology  Diabetes Consult Note    Consult Requested by: Raj Klein MD   Reason for admit: Heart transplanted    HISTORY OF PRESENT ILLNESS:  Reason for Consult: Management of hyperglycemia     Surgical Procedure and Date: s/p OHT 2/18/18; s/p mediastinal exploration 2/19/18    HPI: Patient is a 28 y.o. male with a diagnosis of familial cardiomyopathy. Underwent heart transplant, received IV Solumedrol in OR. Return to OR for possible bleed. Initially high IV insulin requirements up to 48 u/hr. No personal or family hx of DM.   Lab Results   Component Value Date    HGBA1C 5.4 01/17/2017     Endocrine consulted for BG mgmt.             PMH, PSH, FH, SH reviewed     Return to OR this AM, back in ICU, intubated, on pressor support. Solumedrol 125mg IV q8hr today.   BG control much improved since returned to ICU, BG in 100s range, on significantly lower rates of IV insulin.     Review of Systems  Unable to obtain due to: Sedation,Intubation,Altered mental status,Critical illness,Reviewed ROS from note dated 2/17/18 per Dr. JANUSZ Cole    Current Medications and/or Treatments Impacting Glycemic Control  Immunotherapy:    Immunosuppressants     None        Steroids:   Hormones     Start     Stop Route Frequency Ordered    02/19/18 0745  methylPREDNISolone sodium succinate injection 125 mg      02/20 0559 IV Every 8 hours 02/19/18 0740        Pressors:    Autonomic Drugs     Start     Stop Route Frequency Ordered    02/18/18 2100  epinephrine 4 mg in sodium chloride 0.9% 250 mL infusion     Question Answer Comment   Titrate by: (in mcg/kg/min) 0.05    Titrate interval: (in minutes) 5    Titrate to maintain: (SBP or MAP or Cardiac Index) CARDIAC INDEX    Cardiac index greater than: (in L/min) 2.2    Maximum dose: (in mcg/kg/min) 2        -- IV Continuous 02/18/18 1957 02/18/18 2100  norepinephrine 4 mg in dextrose 5% 250 mL infusion (premix) (titrating)     Question Answer  Comment   Titrate by: (in mcg/kg/min) 0.05    Titrate interval: (in minutes) 5    Titrate to maintain: (MAP or SBP) MAP    Greater than: (in mmHg) 60    Maximum dose: (in mcg/kg/min) 3        -- IV Continuous 02/18/18 1957        Hyperglycemia/Diabetes Medications:   Antihyperglycemics     Start     Stop Route Frequency Ordered    02/18/18 2100  insulin regular (Humulin R) 100 Units in sodium chloride 0.9% 100 mL infusion      -- IV Continuous 02/18/18 1957             PHYSICAL EXAMINATION:  Vitals:    02/19/18 0915   BP:    Pulse: 110   Resp: 18   Temp:      Body mass index is 25.24 kg/m².    Physical Exam   Constitutional:  Well developed, well nourished, NAD.  ENT: External ears no masses with nose patent.   Neck:  Supple; trachea midline   Cardiovascular: Normal heart sounds, no LE edema.     Lungs:  Normal effort; lungs anterior bilaterally clear to auscultation.  Abdomen:  Soft, no masses,  no hernias. Hypoactive BS x 4.   MS: No clubbing or cyanosis of nails noted; unable to assess gait.  Skin: Midsternal dsg C/D/I, CT in place. No rashes, lesions, or ulcers; no nodules.          Labs Reviewed and Include     Recent Labs  Lab 02/19/18  0120  02/19/18  0816   GLU  --   < > 142*   CALCIUM  --   < > 7.5*   ALBUMIN 3.6  --   --    PROT 5.3*  --   --    NA  --   < > 144   K  --   < > 3.7   CO2  --   < > 18*   CL  --   < > 112*   BUN  --   < > 17   CREATININE  --   < > 1.3   ALKPHOS 41*  --   --    ALT 16  --   --    AST 55*  --   --    BILITOT 1.5*  --   --    < > = values in this interval not displayed.  Lab Results   Component Value Date    WBC 12.80 (H) 02/19/2018    HGB 8.6 (L) 02/19/2018    HCT 24.3 (L) 02/19/2018    MCV 87 02/19/2018    PLT 55 (L) 02/19/2018     No results for input(s): TSH, FREET4 in the last 168 hours.  Lab Results   Component Value Date    HGBA1C 5.4 01/17/2017       Nutritional status:   Body mass index is 25.24 kg/m².  Lab Results   Component Value Date    ALBUMIN 3.6 02/19/2018     ALBUMIN 4.2 02/17/2018    ALBUMIN 4.4 01/25/2018     Lab Results   Component Value Date    PREALBUMIN 23 01/25/2018    PREALBUMIN 28 12/28/2017    PREALBUMIN 27 11/29/2017       Estimated Creatinine Clearance: 79.1 mL/min (based on SCr of 1.3 mg/dL).    Accu-Checks  Recent Labs      02/18/18   2305  02/18/18   2351  02/19/18   0101  02/19/18   0206  02/19/18   0301  02/19/18   0401  02/19/18   0515  02/19/18   0808  02/19/18   0912  02/19/18   0948   POCTGLUCOSE  259*  260*  298*  273*  229*  264*  202*  159*  107  110        ASSESSMENT and PLAN    * Heart transplanted    Per HTS  avoid hypoglycemia            Acute hyperglycemia    BG goal 140-180; Lower IV rate to 6 u/hr then follow IV insulin infusion protocol, requiring intensive BG monitoring q1hr.           Familial cardiomyopathy    S/p OHT this admission, per HTS          Adverse effect of glucocorticoids and synthetic analogues, initial encounter    May elevate BG readings; currently on Solumedrol IV              Plan discussed with patient, family, and RN at bedside.     Mercy Osuna APRN,ANP-C  Endocrinology  [facility]

## 2018-02-19 NOTE — NURSING
Dr. Mccarty updated on patient care, labs, and chest tube drainage. New orders given. Will continue to monitor

## 2018-02-19 NOTE — NURSING
Dr. Mccarty called to bedside STAT. Pleural chest tube put out 600ml sanguinous bloody drainage for the hour. Pt vomit small amount of coffee ground emesis.Right NGT placed. VSS as this time. Will continue to monitor

## 2018-02-19 NOTE — PROGRESS NOTES
Admit Note(per caregiver)      Met with spouse to assess needs. Pt is oriented, but not able to speak at this time.  Patient is a 28 y.o.  male, admitted for for heart transplant.  Pt has a PMH of LVAD.      Patient admitted from home on 2/17/2018 .  At this time, patient presents as oriented (using thumbs up at times)   At this time, patients caregiver presents as alert and oriented x 4, pleasant and good eye contact.    Household/Family Systems(per caregiver)      Patient resides with patient's spouse, at     [address] [place] [address].      Support system includes spouse, parents and brothers.    Patient does not have dependents that are need of being cared for.   Pt and spouse do not have any children.      Patients primary caregiver is Abigail Danita, patients spouse, phone number [phone].    Home:  [phone]  Pt's cell:  [phone]    During admission, patient's caregiver plans to stay in patient's room.  Confirmed patient and patients caregivers do have access to reliable transportation.    Cognitive Status/Learning(per caregiver)      Patient's spouse reports reading ability as 12th grade and states patient does not have difficulty with reading, writing, seeing, hearing, comprehension, learning and memory.  Patient's spouse reports patient learns best by hands on .   Needed: No.   Highest education level: High School (9-12) or GED    Vocation/Disability(per caregiver)    .  Working for Income: yes  If yes, working activity level: Working Full Time  Patient's spouse reports need for a work excuse prior to the filling out of FMLA paperwork for pt's job as well as her job.    Pt works for [employer] fax [phone]    Adherence(per caregiver)      Patient's spouse reports a high level of adherence to patients health care regimen.  Adherence counseling and education provided.    Substance Use(per caregiver)     Patient's spouse reports the  following substance usage.    Tobacco: none. Pt stopped smoking 2016. Pt's highest use was 1ppd.   Alcohol: none  Illicit Drugs/Non-prescribed Medications: none.  Patient states clear understanding of the potential impact of substance use.  Substance abstinence/cessation counseling, education and resources provided and reviewed.     Services Utilizing/ADLS(per caregiver)     Infusion Service: Prior to admission, patient utilizing? Yes, [facility] for dobutamine. [phone].  Pt should not have this need at discharge.   Home Health: Prior to admission, patient utilizing? yes [facility] [phone], [phone] fax. If HH needed family would like to use same  DME: Prior to admission, yes LVAD.  Removed at surgery   Pulmonary/Cardiac Rehab: Prior to admission, no  Dialysis:  Prior to admission, no  Transplant Specialty Pharmacy:  Prior to admission, no.    Prior to admission, patient's spuse  reports patient was independent with ADLS and was driving.  Patient's spouse reports patient is not able to care for self at this time due to compromised medical condition (as documented in medical record) and physical weakness..  Patient's spouse reports pt  indicates a willingness to care for self once medically cleared to do so.    Insurance/Medications(per caregiver)     Insured by   Payor/Plan Subscr  Sex Relation Sub. Ins. ID Effective Group Num   1. [employer]* MAMIE [place] * 1990 Male  [id number]                                    [address] [address]   2. [employer]* MAMIE [place] * 1990 Male  [id number]                                   [address] [address]      Primary Insurance (for UNOS reporting): Private Insurance  Secondary Insurance (for UNOS reporting): Private Insurance    Patient's spouse reports patient is able to obtain and afford medications at this time and at time of discharge.    Living Will/Healthcare Power of (per caregiver)     Patient  states patient does not have a LW and/or HCPA.   provided education regarding LW and HCPA and the completion of forms.    Coping/Mental Health( per caregiver)     Per spouse Patient is coping adequately with the aid of  family members.   Patient's spouse  denies pt has mental health difficulties.   Worker provided support given pt's heart transplant.     Discharge Planning(per caregiver)     At time of discharge, patient plans to return to [facility] apartments under the care of spouse and other family members.  Patients spouse will transport patient.  Per rounds today, expected discharge date has not been medically determined at this time. Patient and patients caregiver  verbalize understanding and are involved in treatment planning and discharge process.    Additional Concerns    Patient is being followed for needs, education, resources, information, emotional support, supportive counseling, and for supportive and skilled discharge plan of care.  providing ongoing psychosocial support, education, resources and d/c planning as needed.  SW remains available. Patient's caregiver verbalizes understanding and agreement with information reviewed,  availability and how to access available resources as needed.

## 2018-02-19 NOTE — SUBJECTIVE & OBJECTIVE
Subjective:     Post-Op Info:  Procedure(s) (LRB):  EXPLORATION-BLEEDING (RE-EXPLORATION) (Bilateral)   Day of Surgery       Interval History:   Taken back for mediastinal exploration due to bleeding. Now hemodynamically stable.      Medications:  Continuous Infusions:   epinephrine 0.05 mcg/kg/min (02/19/18 0535)    insulin (HUMAN R) infusion (adults) 24.1 Units/hr (02/19/18 0535)    nicardipine      norepinephrine bitartrate-D5W Stopped (02/18/18 2100)    propofol 35 mcg/kg/min (02/19/18 0800)     Scheduled Meds:   albumin human 5%        albuterol-ipratropium 2.5mg-0.5mg/3mL  3 mL Nebulization Q4H    aspirin  325 mg Oral Daily    ceFAZolin (ANCEF) IVPB  2 g Intravenous Q8H    famotidine (PF)  20 mg Intravenous BID    methylPREDNISolone sodium succinate  125 mg Intravenous Q8H    [START ON 2/20/2018] methylPREDNISolone sodium succinate  40 mg Intravenous Q12H    mupirocin  1 g Nasal BID    mycophenolate  1,000 mg Oral BID    nystatin  500,000 Units Mouth/Throat QID (WM & HS)    polyethylene glycol  17 g Oral Daily    potassium chloride  20 mEq Intravenous Once    sodium chloride 0.9%  3 mL Intravenous Q8H    tacrolimus  1 mg Oral BID     PRN Meds:sodium chloride, dextrose 50%, dextrose 50%, fentaNYL, fentaNYL, lactated ringers, lactulose, magnesium sulfate IVPB, metoclopramide HCl, morphine, ondansetron, oxyCODONE, oxyCODONE, potassium chloride **AND** potassium chloride **AND** potassium chloride     Objective:     Vital Signs (Most Recent):  Temp: 98.3 °F (36.8 °C) (02/19/18 0812)  Pulse: 110 (02/19/18 1015)  Resp: (!) 22 (02/19/18 1015)  BP: (!) 105/55 (02/19/18 0501)  SpO2: 100 % (02/19/18 1015) Vital Signs (24h Range):  Temp:  [97 °F (36.1 °C)-100.8 °F (38.2 °C)] 98.3 °F (36.8 °C)  Pulse:  [] 110  Resp:  [0-30] 22  SpO2:  [100 %] 100 %  BP: ()/(50-59) 105/55  Arterial Line BP: ()/(43-65) 128/56       Intake/Output Summary (Last 24 hours) at 02/19/18 1048  Last data filed  at 02/19/18 1000   Gross per 24 hour   Intake             8588 ml   Output             4360 ml   Net             4228 ml       Physical Exam   Constitutional: He appears well-developed and well-nourished.   Cardiovascular: Normal rate and regular rhythm.    Pulmonary/Chest: Effort normal. No respiratory distress.   ETT in place  Chest tube with bloody output   Abdominal: Soft. He exhibits no distension. There is no tenderness.   Neurological: He is alert.       Significant Labs:  BMP:   Recent Labs  Lab 02/19/18  0816   *      K 3.7   *   CO2 18*   BUN 17   CREATININE 1.3   CALCIUM 7.5*   MG 1.9     CBC:   Recent Labs  Lab 02/19/18  0840   WBC 12.80*   RBC 2.81*   HGB 8.6*   HCT 24.3*   PLT 55*   MCV 87   MCH 30.6   MCHC 35.4       Significant Diagnostics:  CXR: I have reviewed all pertinent results/findings within the past 24 hours

## 2018-02-19 NOTE — PLAN OF CARE
Problem: Patient Care Overview  Goal: Plan of Care Review  Recommendations  1. When able to extubate, ADAT to Regular with texture per SLP.   2. If unable to extubate, RD to provide nutrition support recs.   3. TSU RD to follow-up with post-transplant diet education.   RD to monitor.    Goals: Patient to receive nutrition by RD follow-up  Nutrition Goal Status: new    Full assessment completed, see RD Consult Note 2/19/18.

## 2018-02-19 NOTE — ASSESSMENT & PLAN NOTE
Neuro:   - Pain mgmt: oxycodone PRN, fentanyl  - Sedation: propofol while intubated       Pulmonary:   Wean to extubate   Maintain chest tubes     Cardiac:   - Drips:       Renal:    - UOP  - BUN/Cr     Hepatic:       ID:   -  leukocytosis     Hem/Onc:   - Monitor hgb; stable  - product received:             Endocrine:    - Glucose      Fluids/Electrolytes/Nutrition/GI:   - Replace electrolytes PRN per protocol     DISPO:  - Continue SICU care

## 2018-02-19 NOTE — PROGRESS NOTES
Pt placed back on a rate, due to increased respiratory rate, and low tidal volumes. Will try later and will continue to monitor

## 2018-02-19 NOTE — PLAN OF CARE
Problem: Spiritual Distress, Risk/Actual (Adult,Obstetrics,Pediatric)  Intervention: Facilitate Personal Exploration/Expression of Spirituality  F - Cristina and Belief: Yes    I - Importance: Family requested prayers for donor's family.    C - Community:  Pt's wife, mom, and dad bedside. Pt's sibling has gone through 2 transplant surgeries as well.     A - Address in Care: Introduced and offered pastoral support with reflective listening and prayer upon request.  Pt and family bedside and made aware of 's presence as needed.

## 2018-02-19 NOTE — HPI
29 y/o male with PMH significant for DCM. History of AICD placement and VAD. Underwent heart transplantation on 2/18 with ICD and VAD removal.

## 2018-02-19 NOTE — OP NOTE
DATE OF PROCEDURE:  02/19/2018.    ATTENDING SURGEON:  Raj Klein M.D.    ASSISTANT:  Ernesto King M.D. (RES), Cardiothoracic resident.    PREOPERATIVE DIAGNOSES:  Postoperative bleeding, status post removal of left   ventricular assist device and orthotopic heart transplant.    POSTOPERATIVE DIAGNOSES:  Postoperative bleeding, status post removal of left   ventricular assist device and orthotopic heart transplant.    OPERATION PERFORMED:  Mediastinal exploration.    ANESTHESIA:  General endotracheal.    ESTIMATED BLOOD LOSS:  100 mL.    BRIEF HISTORY:  Mr. Samayoa is a 28-year-old gentleman with familial   cardiomyopathy who has had an LVAD placed in 2017.  On 02/18/2018, he underwent   orthotopic heart transplant with removal of left ventricular assist device and   his defibrillator.  Over the course of the night, he has remained   hemodynamically stable.  He had relatively minimal chest tube drainage until   earlier this morning when he had a sudden increase in his mediastinal tube   drainage.  He remained hemodynamically stable, but the amount of drain which was   concerning.  He now presents for mediastinal reexploration.    PROCEDURE IN DETAIL:  After obtaining consent from the patient's wife, the   patient was brought to the Operating Room, placed on the operating table in   supine position.  After induction of adequate general endotracheal anesthesia,   the patient's chest was prepped and draped in the usual sterile fashion.  The   incision was opened and the wires were removed.  There was minimal intrathoracic   blood.  All four drains were encased in clot, and they were irrigated.  No   significant intrathoracic bleeding could be identified.  There was a small   amount of bleeding from the superior vena cava, which was addressed with a   suture.  There was some diffuse oozing from the torn edges of pericardium where   the LVAD had been densely adherent and had been dissected away.  This  was   controlled with electrocautery.  This was in the region of the phrenic nerve,   and maximal care was taken to avoid injury to the nerve.  Otherwise, there was   good hemostasis.  There was no bleeding from the pulmonary arterial anastomosis.    After having irrigated the drains, they were replaced.  The patient's chest   was then closed using #6 stainless steel wires to reapproximate the sternum.    The overlying soft tissues were reapproximated using absorbable suture material.    The patient's chest was washed and dried, and a dry dressing was applied.  The   patient tolerated the procedure well, there were no complications.      PP/HN  dd: 02/19/2018 07:13:56 (CST)  td: 02/19/2018 07:46:43 (CST)  Doc ID   #[id number]  Job ID #[id number]    CC:

## 2018-02-19 NOTE — NURSING
Rounds Report: Attended interdisciplinary rounds this morning with the transplant team including SW, physicians, fellows,  mid-level providers, and transplant coordinators.  Discussed plan of care,pt back to OR this morning for bleeding, unable to extubate. Plan to start tacro, cellcept today. 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 DC 7-10 days.

## 2018-02-19 NOTE — PROGRESS NOTES
Pt arrived from the OR intubated with a  8.0 ETT secured at 23 at the lip on. Pt is on documented settings, ambu bag and mask at bedside will continue to  moniotr.

## 2018-02-19 NOTE — PLAN OF CARE
Called to see pt due to increased chest tube drainage. Hemodynamically stable, but with sig output. Plan re-exploration. I spoke with his wife regarding the plan, and she agreed.

## 2018-02-19 NOTE — PROGRESS NOTES
Mr. Mert Samayoa is a 27y/o WM year old s/p OHT from 2/18/18.     He is considered moderate risk for rejection with cPRA of 0%. He received the standard heart transplant immunosuppression which included methylprednisolone 1 gram in the operating room followed by methylprednisolone 125 mg IV x 3 doses followed by 1mg /kg BID (40mg).  Tacrolimus and mycophenolate mofetil have been started at 1000 mg and 1 mg respectively.      There are no pertinent home medications to restart post-transplant.      He is R-/D+ cmv serostatus, high risk. Opportunistic infection prophylaxis includes nystatin. Plan to start valganciclovir 900 mg daily and bactrim SS daily as tolerable.     Medication education will begin as soon as the patient has been transferred to the transplant step down unit and home supply of medications will be ordered for discharge.  Will continue to follow with team.    Thank you.

## 2018-02-19 NOTE — SUBJECTIVE & OBJECTIVE
Interval History: Patient is s/p OHTx 2/18/18.  Had increase chest tube bleeding overnight and was taken back to the OR for re exploration.  He remains intubated and on Epi. Extubation has been difficult secondary to shallow breathing due to pain.  Most recent CVP is 11     Continuous Infusions:   epinephrine 0.05 mcg/kg/min (02/19/18 1200)    insulin (HUMAN R) infusion (adults) Stopped (02/19/18 1100)    nicardipine      norepinephrine bitartrate-D5W Stopped (02/18/18 2100)    propofol 35 mcg/kg/min (02/19/18 0800)     Scheduled Meds:   acetaminophen  1,000 mg Intravenous Q8H    albumin human 5%        albuterol-ipratropium 2.5mg-0.5mg/3mL  3 mL Nebulization Q4H    aspirin  325 mg Oral Daily    ceFAZolin (ANCEF) IVPB  2 g Intravenous Q8H    famotidine (PF)  20 mg Intravenous BID    methylPREDNISolone sodium succinate  125 mg Intravenous Q8H    [START ON 2/20/2018] methylPREDNISolone sodium succinate  40 mg Intravenous Q12H    mupirocin  1 g Nasal BID    mycophenolate (CELLCEPT) IVPB  1,000 mg Intravenous Q12H    nystatin  500,000 Units Mouth/Throat QID (WM & HS)    polyethylene glycol  17 g Oral Daily    potassium chloride  20 mEq Intravenous Once    sodium chloride 0.9%  3 mL Intravenous Q8H    tacrolimus  1 mg Oral BID     PRN Meds:sodium chloride, dextrose 50%, dextrose 50%, fentaNYL, fentaNYL, lactated ringers, lactulose, magnesium sulfate IVPB, metoclopramide HCl, morphine, ondansetron, oxyCODONE, oxyCODONE, potassium chloride **AND** potassium chloride **AND** potassium chloride    Review of patient's allergies indicates:  No Known Allergies  Objective:     Vital Signs (Most Recent):  Temp: 98.3 °F (36.8 °C) (02/19/18 0812)  Pulse: 109 (02/19/18 1400)  Resp: (!) 28 (02/19/18 1400)  BP: (!) 105/55 (02/19/18 0501)  SpO2: 98 % (02/19/18 1400) Vital Signs (24h Range):  Temp:  [97 °F (36.1 °C)-100.8 °F (38.2 °C)] 98.3 °F (36.8 °C)  Pulse:  [] 109  Resp:  [0-31] 28  SpO2:  [98 %-100 %] 98  %  BP: ()/(50-59) 105/55  Arterial Line BP: ()/(43-65) 132/55     Patient Vitals for the past 72 hrs (Last 3 readings):   Weight   02/18/18 0700 73.1 kg (161 lb 2.5 oz)   02/17/18 0300 75.8 kg (167 lb 1.7 oz)     Body mass index is 25.24 kg/m².      Intake/Output Summary (Last 24 hours) at 02/19/18 1513  Last data filed at 02/19/18 1400   Gross per 24 hour   Intake             8388 ml   Output             4295 ml   Net             4093 ml     Physical Exam  Constitutional: Intubated and sedated   Neck: No tracheal deviation present.   Cardiovascular: Tachycardic rate regular rhythm; S1, S2 no murmer appreciated   Pulmonary/Chest: CTA anteriorly; chest tube in place   Abdominal: Soft. Bowel sounds are normal.   Musculoskeletal: He exhibits no edema.   Neurological: He is alert and oriented to person, place, and time.   Skin: Skin is warm and dry.   Psychiatric: He has a normal mood and affect    Significant Labs:  CBC:    Recent Labs  Lab 02/19/18  0403 02/19/18  0810 02/19/18  0840 02/19/18  1207   WBC 10.02  --  12.80* 17.73*   RBC 2.41*  --  2.81* 2.65*   HGB 7.5*  --  8.6* 8.2*   HCT 21.0* 22* 24.3* 22.5*   PLT 95*  --  55* 63*   MCV 87  --  87 85   MCH 31.1*  --  30.6 30.9   MCHC 35.7  --  35.4 36.4*     BNP:  No results for input(s): BNP in the last 168 hours.    Invalid input(s): BNPTRIAGELBLO  CMP:    Recent Labs  Lab 02/17/18  0354  02/19/18  0120 02/19/18  0403 02/19/18  0816 02/19/18  1207   GLU 80  < >  --  247* 142* 138*   CALCIUM 9.6  < >  --  8.6* 7.5* 8.0*   ALBUMIN 4.2  --  3.6  --   --   --    PROT 7.4  --  5.3*  --   --   --      < >  --  144 144 144   K 3.7  < >  --  3.8 3.7 3.9   CO2 25  < >  --  17* 18* 22*     < >  --  112* 112* 113*   BUN 14  < >  --  17 17 19   CREATININE 1.0  < >  --  1.5* 1.3 1.1   ALKPHOS 76  --  41*  --   --   --    ALT 19  --  16  --   --   --    AST 20  --  55*  --   --   --    BILITOT 0.7  --  1.5*  --   --   --    < > = values in this interval  not displayed.   Coagulation:     Recent Labs  Lab 02/17/18  0354 02/18/18  1959  02/19/18  0403 02/19/18  0816 02/19/18  0839 02/19/18  1207   INR 2.4* 1.2  < > 1.7* 1.7*  --  1.7*   APTT 35.2* 27.6  --   --   --  36.2*  --    < > = values in this interval not displayed.  LDH:  No results for input(s): LDH in the last 72 hours.  Microbiology:  Microbiology Results (last 7 days)     Procedure Component Value Units Date/Time    Urine culture [[id number]] Collected:  02/17/18 0425    Order Status:  Completed Specimen:  Urine from Urine, Clean Catch Updated:  02/18/18 1119     Urine Culture, Routine No growth    Narrative:       1 cup of urine          I have reviewed all pertinent labs within the past 24 hours.    Estimated Creatinine Clearance: 93.5 mL/min (based on SCr of 1.1 mg/dL).    Diagnostic Results:  I have reviewed all pertinent imaging results/findings within the past 24 hours.

## 2018-02-19 NOTE — ANESTHESIA PROCEDURE NOTES
RICKY    Diagnosis: CHF  Patient location during procedure: OR  Procedure start time: 2/18/2018 6:38 PM  Timeout: 2/18/2018 6:38 PM  Exam type: Final  Staffing  Anesthesiologist: CARLOS MANUEL HUERTA  Preanesthetic Checklist  Completed: patient identified, surgical consent, pre-op evaluation, timeout performed, risks and benefits discussed, monitors and equipment checked, anesthesia consent given, oxygen available, suction available, hand hygiene performed and patient being monitored  Setup & Induction  Patient preparation: bite block inserted  Probe Insertion: easy  Exam: complete  Exam         LVAD:no  Estimated Ejection Fraction: > 55% normal  Regional Wall Abnormalities: no RWMA            Right Heart  Right Ventricle: normal  Right Ventricle Function: normal    Intra Atrial Septum  PFO: no shunt by color flow doppler  no IAS aneurysm  no lipomatous hypertrophy  no Atrial Septal Defect (Asd)    Right Ventricle  Size: normal, Free Wall Thickness: normal    Aortic Valve:  Stenosis: none  Morphology: trileaflet  Regurgitation: no aortic valve regurgitation     Mitral Valve:  Morphology:normal  Jet Description: mild        Pulmonic Valve:  Morphology:normal  Regurgitation(color flow): none    Great Vessels  Ascending Aorta Atherosclerosis: 2=mild dz (<3mm)  Aortic Arch Atherosclerosis: 2=mild dz (<3mm)  IABP: no  Descending Aorta Atherosclerosis: 2=mild dz (<3mm)  Aorta    Descending aorta IABP: no    Effusions  Effusions: none    Summary    Other Findings

## 2018-02-19 NOTE — SUBJECTIVE & OBJECTIVE
Interval History: Pt arrived from OR s/p OHT with ICD removal and VAD removal. Pt sedated, intubated. VSS and wnl.     Continuous Infusions:   epinephrine 0.06 mcg/kg/min (02/19/18 0008)    insulin (HUMAN R) infusion (adults) 16.6 Units/hr (02/19/18 0008)    nicardipine      norepinephrine bitartrate-D5W Stopped (02/18/18 2100)    propofol 20 mcg/kg/min (02/19/18 0008)     Scheduled Meds:   albuterol-ipratropium 2.5mg-0.5mg/3mL  3 mL Nebulization Q4H    aspirin  325 mg Oral Daily    ceFAZolin (ANCEF) IVPB  2 g Intravenous Q8H    famotidine (PF)  20 mg Intravenous BID    mupirocin  1 g Nasal BID    polyethylene glycol  17 g Oral Daily    potassium chloride  20 mEq Intravenous Once    sodium chloride 0.9%  3 mL Intravenous Q8H     PRN Meds:albumin human 5%, dextrose 50%, dextrose 50%, fentaNYL, fentaNYL, lactated ringers, lactulose, magnesium sulfate IVPB, metoclopramide HCl, morphine, ondansetron, oxyCODONE, oxyCODONE, potassium chloride **AND** potassium chloride **AND** potassium chloride    Review of patient's allergies indicates:  No Known Allergies  Objective:     Vital Signs (Most Recent):  Temp: 99.7 °F (37.6 °C) (02/19/18 0015)  Pulse: 89 (02/19/18 0015)  Resp: 19 (02/19/18 0015)  BP: (!) 102/52 (02/19/18 0000)  SpO2: 100 % (02/19/18 0015) Vital Signs (24h Range):  Temp:  [96.6 °F (35.9 °C)-100.8 °F (38.2 °C)] 99.7 °F (37.6 °C)  Pulse:  [62-97] 89  Resp:  [9-27] 19  SpO2:  [97 %-100 %] 100 %  BP: ()/(0-71) 102/52  Arterial Line BP: ()/(44-59) 111/47     Patient Vitals for the past 72 hrs (Last 3 readings):   Weight   02/18/18 0700 73.1 kg (161 lb 2.5 oz)   02/17/18 0300 75.8 kg (167 lb 1.7 oz)     Body mass index is 25.24 kg/m².      Intake/Output Summary (Last 24 hours) at 02/19/18 0023  Last data filed at 02/19/18 0008   Gross per 24 hour   Intake             4658 ml   Output             1940 ml   Net             2718 ml       Hemodynamic Parameters:         Physical Exam   Neck: No  tracheal deviation present.   Cardiovascular: Normal rate and regular rhythm.  Exam reveals no gallop and no friction rub.    Pulmonary/Chest: Breath sounds normal. He has no wheezes. He has no rales.   Abdominal: Soft. Bowel sounds are normal.   Musculoskeletal: He exhibits no edema.   Neurological:   Intubated, sedated.   Skin: Skin is warm and dry.       Significant Labs:  CBC:    Recent Labs  Lab 02/17/18 0354 02/18/18 1959 02/18/18 2002 02/18/18  2312   WBC 7.52  --  16.00*  --  15.73*   RBC 4.42*  --  3.12*  --  2.49*   HGB 13.3*  --  9.5*  --  7.6*   HCT 38.7*  < > 27.4* 27* 22.3*     --  117*  --  140*   MCV 88  --  88  --  90   MCH 30.1  --  30.4  --  30.5   MCHC 34.4  --  34.7  --  34.1   < > = values in this interval not displayed.  BNP:  No results for input(s): BNP in the last 168 hours.    Invalid input(s): BNPTRIAGELBLO  CMP:    Recent Labs  Lab 02/17/18 0354 02/18/18 1959 02/18/18  2312   GLU 80 238* 271*   CALCIUM 9.6 10.2 9.2   ALBUMIN 4.2  --   --    PROT 7.4  --   --     143 145   K 3.7 3.5  3.5 3.6   CO2 25 15* 15*    104 110   BUN 14 16 17   CREATININE 1.0 1.7* 1.6*   ALKPHOS 76  --   --    ALT 19  --   --    AST 20  --   --    BILITOT 0.7  --   --       Coagulation:     Recent Labs  Lab 02/17/18 0354 02/18/18 1959 02/18/18  2348   INR 2.4* 1.2 1.5*   APTT 35.2* 27.6  --      LDH:  No results for input(s): LDH in the last 72 hours.  Microbiology:  Microbiology Results (last 7 days)     Procedure Component Value Units Date/Time    Urine culture [[id number]] Collected:  02/17/18 0425    Order Status:  Completed Specimen:  Urine from Urine, Clean Catch Updated:  02/18/18 1119     Urine Culture, Routine No growth    Narrative:       1 cup of urine          I have reviewed all pertinent labs within the past 24 hours.    Estimated Creatinine Clearance: 64.3 mL/min (A) (based on SCr of 1.6 mg/dL (H)).    Diagnostic Results:  I have reviewed all pertinent imaging  results/findings within the past 24 hours.

## 2018-02-19 NOTE — ASSESSMENT & PLAN NOTE
Nutrition Problem  Increased nutrient needs    Related to (etiology):   S/p OHTx    Signs and Symptoms (as evidenced by):   EPN     Nutrition Diagnosis Status:   New

## 2018-02-19 NOTE — ASSESSMENT & PLAN NOTE
BG goal 140-180; Lower IV rate to 6 u/hr then follow IV insulin infusion protocol, requiring intensive BG monitoring q1hr.

## 2018-02-19 NOTE — PROGRESS NOTES
RUFINO faxed letter verifying pt's hospital admission to [facility], attn: Vitaly Gillis (f: [phone]), per request from pt's wife this morning.  RUFINO notified wife via phone that letter was sent.

## 2018-02-19 NOTE — PROGRESS NOTES
[facility]  Cardiothoracic Surgery  Progress Note    Patient Name: Mert Samayoa  MRN: [id number]  Admission Date: 2/17/2018  Hospital Length of Stay: 2 days  Code Status: Prior   Attending Physician: Raj lKein MD   Referring Provider: Guillermo Daniels MD  Principal Problem:Heart transplanted    Subjective:     Post-Op Info:  Procedure(s) (LRB):  EXPLORATION-BLEEDING (RE-EXPLORATION) (Bilateral)   Day of Surgery     Subjective:     Post-Op Info:  Procedure(s) (LRB):  EXPLORATION-BLEEDING (RE-EXPLORATION) (Bilateral)   Day of Surgery       Interval History:   Taken back for mediastinal exploration due to bleeding. Now hemodynamically stable.      Medications:  Continuous Infusions:   epinephrine 0.05 mcg/kg/min (02/19/18 0535)    insulin (HUMAN R) infusion (adults) 24.1 Units/hr (02/19/18 0535)    nicardipine      norepinephrine bitartrate-D5W Stopped (02/18/18 2100)    propofol 35 mcg/kg/min (02/19/18 0800)     Scheduled Meds:   albumin human 5%        albuterol-ipratropium 2.5mg-0.5mg/3mL  3 mL Nebulization Q4H    aspirin  325 mg Oral Daily    ceFAZolin (ANCEF) IVPB  2 g Intravenous Q8H    famotidine (PF)  20 mg Intravenous BID    methylPREDNISolone sodium succinate  125 mg Intravenous Q8H    [START ON 2/20/2018] methylPREDNISolone sodium succinate  40 mg Intravenous Q12H    mupirocin  1 g Nasal BID    mycophenolate  1,000 mg Oral BID    nystatin  500,000 Units Mouth/Throat QID (WM & HS)    polyethylene glycol  17 g Oral Daily    potassium chloride  20 mEq Intravenous Once    sodium chloride 0.9%  3 mL Intravenous Q8H    tacrolimus  1 mg Oral BID     PRN Meds:sodium chloride, dextrose 50%, dextrose 50%, fentaNYL, fentaNYL, lactated ringers, lactulose, magnesium sulfate IVPB, metoclopramide HCl, morphine, ondansetron, oxyCODONE, oxyCODONE, potassium chloride **AND** potassium chloride **AND** potassium chloride     Objective:     Vital Signs (Most  Recent):  Temp: 98.3 °F (36.8 °C) (02/19/18 0812)  Pulse: 110 (02/19/18 1015)  Resp: (!) 22 (02/19/18 1015)  BP: (!) 105/55 (02/19/18 0501)  SpO2: 100 % (02/19/18 1015) Vital Signs (24h Range):  Temp:  [97 °F (36.1 °C)-100.8 °F (38.2 °C)] 98.3 °F (36.8 °C)  Pulse:  [] 110  Resp:  [0-30] 22  SpO2:  [100 %] 100 %  BP: ()/(50-59) 105/55  Arterial Line BP: ()/(43-65) 128/56       Intake/Output Summary (Last 24 hours) at 02/19/18 1048  Last data filed at 02/19/18 1000   Gross per 24 hour   Intake             8588 ml   Output             4360 ml   Net             4228 ml       Physical Exam   Constitutional: He appears well-developed and well-nourished.   Cardiovascular: Normal rate and regular rhythm.    Pulmonary/Chest: Effort normal. No respiratory distress.   ETT in place  Chest tube with bloody output   Abdominal: Soft. He exhibits no distension. There is no tenderness.   Neurological: He is alert.       Significant Labs:  BMP:   Recent Labs  Lab 02/19/18  0816   *      K 3.7   *   CO2 18*   BUN 17   CREATININE 1.3   CALCIUM 7.5*   MG 1.9     CBC:   Recent Labs  Lab 02/19/18  0840   WBC 12.80*   RBC 2.81*   HGB 8.6*   HCT 24.3*   PLT 55*   MCV 87   MCH 30.6   MCHC 35.4       Significant Diagnostics:  CXR: I have reviewed all pertinent results/findings within the past 24 hours        Assessment/Plan:     * Heart transplanted    Neuro:   - Pain mgmt: oxycodone PRN, fentanyl  - Sedation: propofol while intubated    Pulmonary:   Wean to extubate   Maintain chest tubes  Duonebs q4h     Cardiac:   - Drips:epi 0.05  - ASA 325mg  - Immunosuppression per HTS     Renal:    - UOP 30/hr  - BUN/Cr stable  - Maintain kirk     ID:   -  Ancef ppx     Hem/Onc:   - Monitor hgb; q4 hour labs           Endocrine:    - insulin gtt  - Endocrine following     Fluids/Electrolytes/Nutrition/GI:   - Replace electrolytes PRN per protocol     DISPO:  - Continue SICU care              Fina Tolliver,  MD  Cardiothoracic Surgery  [facility]

## 2018-02-19 NOTE — BRIEF OP NOTE
[facility]  Cardiothoracic Surgery  Operative Note    SUMMARY     Date of Procedure: 2/19/2018     Procedure: Procedure(s) (LRB):  EXPLORATION-MEDIASTINAL     Surgeon(s) and Role:     * Raj Klein MD - Primary     * Ernesto King MD - Fellow    Assisting Surgeon: None    Pre-Operative Diagnosis: Heart transplanted [Z94.1]    Post-Operative Diagnosis: Post-Op Diagnosis Codes:     * Heart transplanted [Z94.1]    Anesthesia: General        Description of the Findings of the Procedure: minimal intrathoracic blood. Clot on drains. Minimal bleeding from SVG, edges of pericardium (at VAD explant site).        Complications: None    Estimated Blood Loss (EBL): 100 mL             Specimens:   Specimen (12h ago through future)    Start     Ordered    02/18/18 1935  Specimen to Pathology - Surgery  Once     Comments:  1) Heart, LVAD, drive line and ICD- gross      02/18/18 1948                  Attestation:  I was present and scrubbed for the procedure.

## 2018-02-19 NOTE — ANESTHESIA PREPROCEDURE EVALUATION
[facility]  Anesthesia Pre-Operative Evaluation         Patient Name: Mert Samayoa  YOB: 1990  MRN: [id number]    SUBJECTIVE:     Pre-operative evaluation for Procedure(s) (LRB):  EXPLORATION-MEDIASTINAL (N/A)  Scheduled for 2/19/2018    HPI 02/19/2018:  Mert Samayoa is a 28 y.o. male with hx of DCM (familial, both brothers with heart transplants) with stage D CHFrEF underwent Heartware placement 02/01/17 had post-op atrial flutter 02/11/17 treated with amiodarone.  VAD implanted on 2/1/17 HVAD RPM 2700.      OHTx on 2/18/2018.  After surgery patient had active bleeding, hgb drop from 9.5 to 7.6, 2U PRBC administered, no improvement in hgb.  1U more of PRBC being administered.    Patient presents for the above procedure(s).    Prev airway:   LVAD Insertion 2/1/2017  Present Prior to Hospital Arrival?: No; Placement Date: 02/01/17; Placement Time: 0824; Method of Intubation: Direct laryngoscopy; Inserted by: MD; Airway Device: Endotracheal Tube; Mask Ventilation: Easy; Intubated: Postinduction; Blade: Urena #2; Airway Device Size: 9.0; Style: Cuffed; Cuff Inflation: Minimal occlusive pressure; Inflation Amount: 8; Placement Verified By: Auscultation, Capnometry, ETT Condensation; Grade: Grade I; Complicating Factors: None; Intubation Findings: Positive EtCO2, Bilateral breath sounds, Atraumatic/Condition of teeth unchanged;  Depth of Insertion: 23; Securment: Lips; Complications: None; Breath Sounds: Equal Bilateral; Insertion Attempts: 1; Removal Date: 02/02/17;  Removal Time: 1630    Oxygen/Ventilation Requirements:  Vent Mode: A/C  Oxygen Concentration (%):  [] 50  Resp Rate Total:  [15 br/min-28 br/min] 22 br/min  Vt Set:  [500 mL] 500 mL  PEEP/CPAP:  [5 cmH20] 5 cmH20  Pressure Support:  [0 cmH20] 0 cmH20  Mean Airway Pressure:  [9.4 vqK42-82 cmH20] 12 cmH20    Current LDA:        Introducer 02/18/18 (Active)   Specific Qualities Infusing 2/19/2018  3:00  AM   Dressing Status Clean;Dry;Intact;Biopatch in place 2/19/2018  3:00 AM   Dressing Change Due 02/25/18 2/19/2018  3:00 AM   Daily Line Review Performed 2/19/2018  3:00 AM   Number of days: 1            Percutaneous Central Line Insertion/Assessment - double lumen  02/18/18 1015 right internal jugular (Active)   Dressing biopatch in place;dressing dry and intact 2/19/2018  3:00 AM   Securement secured w/ sutures 2/19/2018  3:00 AM   Distal Patency/Care infusing 2/19/2018  3:00 AM   Proximal Patency/Care infusing 2/19/2018  3:00 AM   Waveform normal 2/19/2018  3:00 AM   Line Interventions line leveled/zeroed 2/19/2018  3:00 AM   Dressing Change Due 02/25/18 2/19/2018  3:00 AM   Daily Line Review Performed 2/19/2018  3:00 AM   Number of days: 0            Peripheral IV - Single Lumen 02/18/18 1020 Right Forearm (Active)   Site Assessment Clean;Dry;No redness;Intact;No swelling 2/19/2018  3:00 AM   Line Status Flushed;Saline locked 2/19/2018  3:00 AM   Dressing Status Clean;Dry;Intact 2/19/2018  3:00 AM   Site Change Due 02/22/18 2/18/2018  8:15 PM   Reason Not Rotated Not due 2/19/2018  3:00 AM   Number of days: 0            Peripheral IV - Single Lumen 02/18/18 Left Forearm (Active)   Site Assessment Clean;Dry;Intact;No redness;No swelling 2/19/2018  3:00 AM   Line Status Flushed;Saline locked 2/19/2018  3:00 AM   Dressing Status Clean;Dry;Intact 2/19/2018  3:00 AM   Site Change Due 02/22/18 2/18/2018  8:15 PM   Reason Not Rotated Not due 2/19/2018  3:00 AM   Number of days: 1            Arterial Line 02/18/18 0955 Left Radial (Active)   Site Assessment Clean;Dry;Intact;No redness;No swelling 2/19/2018  3:00 AM   Line Status Pulsatile blood flow 2/19/2018  3:00 AM   Art Line Waveform Appropriate 2/19/2018  3:00 AM   Arterial Line Interventions Zeroed and calibrated;Leveled;Connections checked and tightened;Flushed per protocol 2/19/2018  3:00 AM   Color/Movement/Sensation Capillary refill less than 3 sec 2/19/2018   "3:00 AM   Dressing Type Transparent 2/19/2018  3:00 AM   Dressing Status Biopatch in place;Clean;Dry;Intact 2/19/2018  3:00 AM   Number of days: 0            Pacer Wires 02/18/18 1824 (Active)   Pacer Wire Status Ventricular wires disconnected from pacer;Atrial wires connected to pacer 2/19/2018  3:00 AM   Site Assessment Other (Comment) 2/19/2018  3:00 AM   Dressing Status Clean;Dry;Intact 2/19/2018  3:00 AM   Dressing Change Due 02/20/18 2/19/2018  3:00 AM   Number of days: 0            Urethral Catheter 02/18/18 1125 Latex;Straight-tip;Temperature probe (Active)   Site Assessment Clean;Intact 2/19/2018  3:00 AM   Collection Container Urimeter 2/19/2018  3:00 AM   Securement Method secured to top of thigh w/ adhesive device 2/19/2018  3:00 AM   Catheter Care Performed yes 2/19/2018  3:00 AM   Reason for Continuing Urinary Catheterization Critically ill in ICU requiring intensive monitoring 2/19/2018  3:00 AM   CAUTI Prevention Bundle StatLock in place w 1" slack;Intact seal between catheter & drainage tubing;Drainage bag off the floor;Green sheeting clip in use;Drainage bag not overfilled (<2/3 full);No dependent loops or kinks;Drainage bag below bladder 2/19/2018  3:00 AM   Output (mL) 100 mL 2/19/2018  3:03 AM   Number of days: 0            Y Chest Tube 1 and 2 02/18/18 1821 1 Right Mediastinal 19 Fr. 2 Right Mediastinal 19 Fr. (Active)   Function -20 cm H2O 2/19/2018  3:00 AM   Air Leak/Fluctuation air leak not present;fluctuation not present 2/19/2018  3:00 AM   Safety all tubing connections taped;2 rubber-tipped hemostats w/ patient;all connections secured;suction checked 2/19/2018  3:00 AM   Securement tubing anchored to body distal to insertion site w/ tape 2/19/2018  3:00 AM   Left Subcutaneous Emphysema none present 2/19/2018  3:00 AM   Right Subcutaneous Emphysema none present 2/19/2018  3:00 AM   Patency Intervention Milked;Stripped;Tip/tilt 2/19/2018  3:00 AM   Drainage Description 1 Sanguineous " 2/19/2018  3:00 AM   Tube 1 Dressing Appearance occlusive gauze dressing intact;clean and dry;w/ moist drainage 2/19/2018  3:00 AM   Site Assessment 1 Not assessed 2/19/2018  3:00 AM   Surrounding Skin 1 Unable to view 2/19/2018  3:00 AM   Drainage Description 2 Sanguineous 2/19/2018  3:00 AM   Tube 2 Dressing Appearance occlusive gauze dressing intact;clean and dry 2/19/2018  3:00 AM   Site Assessment 2 Not assessed 2/19/2018  3:00 AM   Surrounding Skin Unable to view 2/19/2018  3:00 AM   Output (mL) 20 mL 2/19/2018  3:03 AM   Number of days: 0            Y Chest Tube 3 and 4 02/18/18 1822 3 Left Pericardial;Pleural 19 Fr. 4 Left Pleural 19 Fr. (Active)   Output (mL) 50 mL 2/19/2018  3:03 AM   Number of days: 0       Current Drips:   epinephrine 0.05 mcg/kg/min (02/19/18 0438)    insulin (HUMAN R) infusion (adults) 48.2 Units/hr (02/19/18 0438)    nicardipine      norepinephrine bitartrate-D5W Stopped (02/18/18 2100)    propofol 10 mcg/kg/min (02/19/18 0438)       Patient Active Problem List   Diagnosis    Heart transplanted       Review of patient's allergies indicates:  No Known Allergies    Outpatient Medications:  No current facility-administered medications on file prior to encounter.      Current Outpatient Prescriptions on File Prior to Encounter   Medication Sig Dispense Refill    aspirin (ECOTRIN) 325 MG EC tablet Take 1 tablet (325 mg total) by mouth once daily. 30 tablet 11    lisinopril 10 MG tablet Take 1 tablet (10 mg total) by mouth 2 (two) times daily. 60 tablet 11    warfarin (COUMADIN) 5 MG tablet Take 1-1.5 tablets (5-7.5 mg total) by mouth Daily. 50 tablet 5    famotidine (PEPCID) 40 MG tablet TAKE ONE TABLET BY MOUTH ONCE DAILY IN THE EVENING 30 tablet 11    furosemide (LASIX) 40 MG tablet Take 40 mg by mouth as needed. Pt reports taking 20-40 mg daily as needed for weight gain       ondansetron (ZOFRAN-ODT) 4 MG TbDL Take 1 tablet (4 mg total) by mouth every 6 (six) hours as  needed. 30 tablet 0        Current Inpatient Medications:   albumin human 5%        albuterol-ipratropium 2.5mg-0.5mg/3mL  3 mL Nebulization Q4H    aspirin  325 mg Oral Daily    ceFAZolin (ANCEF) IVPB  2 g Intravenous Q8H    famotidine (PF)  20 mg Intravenous BID    mupirocin  1 g Nasal BID    polyethylene glycol  17 g Oral Daily    potassium chloride  20 mEq Intravenous Once    sodium chloride 0.9%  3 mL Intravenous Q8H       Past Surgical History:   Procedure Laterality Date    LEFT VENTRICULAR ASSIST DEVICE         Social History     Social History    Marital status:      Spouse name: N/A    Number of children: N/A    Years of education: N/A     Occupational History    Not on file.     Social History Main Topics    Smoking status: Former Smoker     Packs/day: 1.00     Quit date: 12/14/2016    Smokeless tobacco: Never Used    Alcohol use No    Drug use: No    Sexual activity: Not on file     Other Topics Concern    Not on file     Social History Narrative    Works        OBJECTIVE:   Weight:  Wt Readings from Last 4 Encounters:   02/18/18 73.1 kg (161 lb 2.5 oz)   01/25/18 75.8 kg (167 lb)   01/25/18 73.9 kg (163 lb)   12/28/17 75.8 kg (167 lb)       Vital Signs Range (Last 24H):  Temp:  [36.2 °C (97.2 °F)-38.2 °C (100.8 °F)]   Pulse:  [62-97]   Resp:  [9-30]   BP: ()/(0-71)   SpO2:  [99 %-100 %]   Arterial Line BP: ()/(44-59)       CBC:   Recent Labs      02/18/18 2312 02/19/18   0403   WBC  15.73*  10.02   RBC  2.49*  2.41*   HGB  7.6*  7.5*   HCT  22.3*  21.0*   PLT  140*  95*   MCV  90  87   MCH  30.5  31.1*   MCHC  34.1  35.7       CMP:   Recent Labs      02/17/18   0354   02/18/18 1959 02/18/18 2312 02/19/18   0120  02/19/18   0403   NA  139   --   143  145   --   144   K  3.7   --   3.5  3.5  3.6   --   3.8   CL  104   --   104  110   --   112*   CO2  25   --   15*  15*   --   17*   BUN  14   --   16  17   --   17   CREATININE  1.0    --   1.7*  1.6*   --   1.5*   GLU  80   --   238*  271*   --   247*   MG   --    < >  1.9  2.3   --   2.2   PHOS   --    --   3.3   --    --    --    CALCIUM  9.6   --   10.2  9.2   --   8.6*   ALBUMIN  4.2   --    --    --   3.6   --    PROT  7.4   --    --    --   5.3*   --    ALKPHOS  76   --    --    --   41*   --    ALT  19   --    --    --   16   --    AST  20   --    --    --   55*   --    BILITOT  0.7   --    --    --   1.5*   --     < > = values in this interval not displayed.       INR:  Recent Labs      18   0354  18   1959  18   2348  18   0403   INR  2.4*  1.2  1.5*  1.7*   APTT  35.2*  27.6   --    --        Diagnostic Studies:  CXR 2018  In this patient status post orthotopic heart transplant, multiple surgical drains are in place without radiographic evidence of complication.    An endotracheal tube is in appropriate position lying 1.5 cm above the vasyl.  A right internal jugular central venous catheter overlies the SVC.    Increased opacity over the right middle lung zone is suspected correlate with atelectasis.  Aspiration or infection not excluded.    Likely small left pleural effusion.    EK2018 2019  Sinus tachy 101bpm    2D Echo:  Results for orders placed or performed during the hospital encounter of 17   2D echo with color flow doppler   Result Value Ref Range    EF 10 (A) 55 - 65    Diastolic Dysfunction Yes (A)     Est. PA Systolic Pressure 21.15     Tricuspid Valve Regurgitation TRIVIAL TO MILD          ASSESSMENT/PLAN:         Anesthesia Evaluation    I have reviewed the Patient Summary Reports.     I have reviewed the Medications.     Review of Systems  Anesthesia Hx:  History of prior surgery of interest to airway management or planning:  Denies Personal Hx of Anesthesia complications.   Social:  Former Smoker    Cardiovascular:   Pacemaker Hypertension Dysrhythmias CHF S/p OHTx 2018       Physical Exam  General:  Well nourished     Airway/Jaw/Neck:  Airway Findings: Mouth Opening: Small, but > 3cm Tongue: Normal  Pre-Existing Airway Tube(s): Oral Endotracheal tube  General Airway Assessment: Adult  Mallampati: III  Improves to II with phonation.  TM Distance: 4 - 6 cm  Jaw/Neck Findings:  Neck ROM: Normal ROM       Chest/Lungs:  Chest/Lungs Findings: Normal Respiratory Rate, Clear to auscultation     Heart/Vascular:  Heart Findings: Rate: Normal  Rhythm: Regular Rhythm  Sounds: Normal        Mental Status:  Mental Status Findings: (sedated, intubated)  Unconscious         Anesthesia Plan  Type of Anesthesia, risks & benefits discussed:  Anesthesia Type:  general  Patient's Preference:   Intra-op Monitoring Plan: arterial line, central line, standard ASA monitors, [place] and cardiac output  Intra-op Monitoring Plan Comments:   Post Op Pain Control Plan: multimodal analgesia, IV/PO Opioids PRN and per primary service following discharge from PACU  Post Op Pain Control Plan Comments:   Induction:   IV  Beta Blocker:  Patient is not currently on a Beta-Blocker (No further documentation required).       Informed Consent: Patient representative understands risks and agrees with Anesthesia plan.  Questions answered. Anesthesia consent signed with patient representative.  ASA Score: 5  emergent   Day of Surgery Review of History & Physical: I have interviewed and examined the patient. I have reviewed the patient's H&P dated:  There are no significant changes.  H&P update referred to the surgeon.         Ready For Surgery From Anesthesia Perspective.

## 2018-02-19 NOTE — TRANSFER OF CARE
"Anesthesia Transfer of Care Note    Patient: Mert Samayoa    Procedure(s) Performed: Procedure(s) (LRB):  TRANSPLANT-HEART-with standard backbench preparation. (N/A)  Remove LVAD (N/A)  REMOVAL-AICD (Right)    Patient location: ICU    Anesthesia Type: general    Transport from OR: Transported from OR intubated on 100% O2 by AMBU with adequate controlled ventilation. Continuous ECG monitoring in transport. Continuous SpO2 monitoring in transport. Continuos invasive BP monitoring in transport    Post pain: adequate analgesia    Post assessment: no apparent anesthetic complications and tolerated procedure well    Post vital signs: stable    Level of consciousness: sedated    Nausea/Vomiting: no nausea/vomiting    Complications: none    Transfer of care protocol was followed      Last vitals:   Visit Vitals  BP (!) 90/0 (BP Location: Left arm)   Pulse 94   Temp 36.2 °C (97.2 °F) (Oral)   Resp (!) 23   Ht 5' 7" (1.702 m)   Wt 73.1 kg (161 lb 2.5 oz)   SpO2 100%   BMI 25.24 kg/m²     "

## 2018-02-19 NOTE — NURSING
Pt admitted to SICU. Report received from anesthesia. Vs & assessment as documented. Will continue to monitor

## 2018-02-19 NOTE — CONSULTS
"  [facility]  Adult Nutrition  Consult Note    SUMMARY     Recommendations  Recommendation/Intervention:   1. When able to extubate, ADAT to Regular with texture per SLP.   2. If unable to extubate, RD to provide nutrition support recs.   3. TSU RD to follow-up with post-transplant diet education.   RD to monitor.    Goals: Patient to receive nutrition by RD follow-up  Nutrition Goal Status: new  Communication of RD Recs:  (POC)    Reason for Assessment  Reason for Assessment: physician consult  Diagnosis:  (s/p OHTxx 2/18)  Relevent Medical History: familian cardiomyopathy, CHF, HTN   General Information Comments: POD#1 s/p heart transplant w.ICD & VAD removal. OR this morning for reexploration.  Nutrition Discharge Planning: TSU RD to follow-up with post-transplant diet education.    Nutrition Prescription Ordered  Current Diet Order: NPO    Evaluation of Received Nutrients/Fluid Intake  I/O: +3.3L x 24hrs, +2.7L since admit  Comments: LBM 2/18, good UOP  % Intake of Estimated Energy Needs: 0 - 25 %  % Meal Intake: NPO     Nutrition Risk Screen   Nutrition Risk Screen: no indicators present    Nutrition/Diet History  Food Preferences: KATARZYNA  Factors Affecting Nutritional Intake: NPO, on mechanical ventilation    Labs/Tests/Procedures/Meds   Pertinent Labs Reviewed: reviewed  Pertinent Labs Comments: Cl 112, Glu 142, POCT Glu 107-202, Ca 7.5, T. bili 1.5, AST 55  Pertinent Medications Reviewed: reviewed  Pertinent Medications Comments: famotidine, methylprednisolone, epinephrine, insulin drip, cardene, norepinephrine    Physical Findings  Overall Physical Appearance: on ventilator support  Tubes:  (chest tube)  Oral/Mouth Cavity: WDL  Skin: incision    Anthropometrics  Height: 5' 7" (170.2 cm)  Weight Method: Standard Scale  Weight: 73.1 kg (161 lb 2.5 oz)  Ideal Body Weight (IBW), Male: 148 lb  % Ideal Body Weight, Male (lb): 112.91 lb  BMI (Calculated): 26.2  BMI Grade: 25 - 29.9 - " overweight    Estimated/Assessed Needs  Weight Used For Calorie Calculations: 73.1 kg (161 lb 2.5 oz)   Energy Calorie Requirements (kcal): 2053 kcal/day  Energy Need Method: [place]  RMR (Troup-St. Jeor Equation): 1659.62  RMR ([place] Equation): 2052.65  RMR (Modified [place] Equation): 1937.42  Weight Used For Protein Calculations: 73.1 kg (161 lb 2.5 oz)  Protein Requirements:  g/day (1.2-1.3 g/kg)  Fluid Requirements (mL): per MD  RDA Method (mL): 2053    Assessment and Plan  * Heart transplanted    Nutrition Problem  Increased nutrient needs    Related to (etiology):   S/p OHTx    Signs and Symptoms (as evidenced by):   EPN     Nutrition Diagnosis Status:   New          Monitor and Evaluation  Food and Nutrient Intake: energy intake  Food and Nutrient Adminstration: diet order  Anthropometric Measurements: weight, weight change  Biochemical Data, Medical Tests and Procedures: electrolyte and renal panel, gastrointestinal profile, inflammatory profile  Nutrition-Focused Physical Findings: overall appearance    Nutrition Risk  Level of Risk:  (2x/week)    Nutrition Follow-Up  RD Follow-up?: Yes

## 2018-02-19 NOTE — PT/OT/SLP PROGRESS
Occupational Therapy      Patient Name:  Mert Samayoa   MRN:  [id number]    Patient not seen today for new evaluation following heart tx 2/2 back to sx on 02-19.  Will await new orders  ALISA Osorio  2/19/2018

## 2018-02-19 NOTE — SUBJECTIVE & OBJECTIVE
PMH, PSH, FH, SH reviewed     Return to OR this AM, back in ICU, intubated, on pressor support. Solumedrol 125mg IV q8hr today.   BG control much improved since returned to ICU, BG in 100s range, on significantly lower rates of IV insulin.     Review of Systems  Unable to obtain due to: Sedation,Intubation,Altered mental status,Critical illness,Reviewed ROS from note dated 2/17/18 per Dr. JANUSZ Cole    Current Medications and/or Treatments Impacting Glycemic Control  Immunotherapy:    Immunosuppressants     None        Steroids:   Hormones     Start     Stop Route Frequency Ordered    02/19/18 0745  methylPREDNISolone sodium succinate injection 125 mg      02/20 0559 IV Every 8 hours 02/19/18 0740        Pressors:    Autonomic Drugs     Start     Stop Route Frequency Ordered    02/18/18 2100  epinephrine 4 mg in sodium chloride 0.9% 250 mL infusion     Question Answer Comment   Titrate by: (in mcg/kg/min) 0.05    Titrate interval: (in minutes) 5    Titrate to maintain: (SBP or MAP or Cardiac Index) CARDIAC INDEX    Cardiac index greater than: (in L/min) 2.2    Maximum dose: (in mcg/kg/min) 2        -- IV Continuous 02/18/18 1957 02/18/18 2100  norepinephrine 4 mg in dextrose 5% 250 mL infusion (premix) (titrating)     Question Answer Comment   Titrate by: (in mcg/kg/min) 0.05    Titrate interval: (in minutes) 5    Titrate to maintain: (MAP or SBP) MAP    Greater than: (in mmHg) 60    Maximum dose: (in mcg/kg/min) 3        -- IV Continuous 02/18/18 1957        Hyperglycemia/Diabetes Medications:   Antihyperglycemics     Start     Stop Route Frequency Ordered    02/18/18 2100  insulin regular (Humulin R) 100 Units in sodium chloride 0.9% 100 mL infusion      -- IV Continuous 02/18/18 1957             PHYSICAL EXAMINATION:  Vitals:    02/19/18 0915   BP:    Pulse: 110   Resp: 18   Temp:      Body mass index is 25.24 kg/m².    Physical Exam   Constitutional:  Well developed, well nourished, NAD.  ENT: External ears no  masses with nose patent.   Neck:  Supple; trachea midline   Cardiovascular: Normal heart sounds, no LE edema.     Lungs:  Normal effort; lungs anterior bilaterally clear to auscultation.  Abdomen:  Soft, no masses,  no hernias. Hypoactive BS x 4.   MS: No clubbing or cyanosis of nails noted; unable to assess gait.  Skin: Midsternal dsg C/D/I, CT in place. No rashes, lesions, or ulcers; no nodules.

## 2018-02-19 NOTE — PROGRESS NOTES
[facility]  Heart Transplant  Progress Note    Patient Name: Mert Samayoa  MRN: [id number]  Admission Date: 2/17/2018  Hospital Length of Stay: 2 days  Attending Physician: Raj Klein MD  Primary Care Provider: Primary Doctor No  Principal Problem:Heart transplanted    Subjective:     Interval History: Patient is s/p OHTx 2/18/18.  Had increase chest tube bleeding overnight and was taken back to the OR for re exploration.  He remains intubated and on Epi. Extubation has been difficult secondary to shallow breathing due to pain.  Most recent CVP is 11     Continuous Infusions:   epinephrine 0.05 mcg/kg/min (02/19/18 1200)    insulin (HUMAN R) infusion (adults) Stopped (02/19/18 1100)    nicardipine      norepinephrine bitartrate-D5W Stopped (02/18/18 2100)    propofol 35 mcg/kg/min (02/19/18 0800)     Scheduled Meds:   acetaminophen  1,000 mg Intravenous Q8H    albumin human 5%        albuterol-ipratropium 2.5mg-0.5mg/3mL  3 mL Nebulization Q4H    aspirin  325 mg Oral Daily    ceFAZolin (ANCEF) IVPB  2 g Intravenous Q8H    famotidine (PF)  20 mg Intravenous BID    methylPREDNISolone sodium succinate  125 mg Intravenous Q8H    [START ON 2/20/2018] methylPREDNISolone sodium succinate  40 mg Intravenous Q12H    mupirocin  1 g Nasal BID    mycophenolate (CELLCEPT) IVPB  1,000 mg Intravenous Q12H    nystatin  500,000 Units Mouth/Throat QID (WM & HS)    polyethylene glycol  17 g Oral Daily    potassium chloride  20 mEq Intravenous Once    sodium chloride 0.9%  3 mL Intravenous Q8H    tacrolimus  1 mg Oral BID     PRN Meds:sodium chloride, dextrose 50%, dextrose 50%, fentaNYL, fentaNYL, lactated ringers, lactulose, magnesium sulfate IVPB, metoclopramide HCl, morphine, ondansetron, oxyCODONE, oxyCODONE, potassium chloride **AND** potassium chloride **AND** potassium chloride    Review of patient's allergies indicates:  No Known Allergies  Objective:     Vital Signs (Most  Recent):  Temp: 98.3 °F (36.8 °C) (02/19/18 0812)  Pulse: 109 (02/19/18 1400)  Resp: (!) 28 (02/19/18 1400)  BP: (!) 105/55 (02/19/18 0501)  SpO2: 98 % (02/19/18 1400) Vital Signs (24h Range):  Temp:  [97 °F (36.1 °C)-100.8 °F (38.2 °C)] 98.3 °F (36.8 °C)  Pulse:  [] 109  Resp:  [0-31] 28  SpO2:  [98 %-100 %] 98 %  BP: ()/(50-59) 105/55  Arterial Line BP: ()/(43-65) 132/55     Patient Vitals for the past 72 hrs (Last 3 readings):   Weight   02/18/18 0700 73.1 kg (161 lb 2.5 oz)   02/17/18 0300 75.8 kg (167 lb 1.7 oz)     Body mass index is 25.24 kg/m².      Intake/Output Summary (Last 24 hours) at 02/19/18 1513  Last data filed at 02/19/18 1400   Gross per 24 hour   Intake             8388 ml   Output             4295 ml   Net             4093 ml     Physical Exam  Constitutional: Intubated and sedated   Neck: No tracheal deviation present.   Cardiovascular: Tachycardic rate regular rhythm; S1, S2 no murmer appreciated   Pulmonary/Chest: CTA anteriorly; chest tube in place   Abdominal: Soft. Bowel sounds are normal.   Musculoskeletal: He exhibits no edema.   Neurological: He is alert and oriented to person, place, and time.   Skin: Skin is warm and dry.   Psychiatric: He has a normal mood and affect    Significant Labs:  CBC:    Recent Labs  Lab 02/19/18  0403 02/19/18  0810 02/19/18  0840 02/19/18  1207   WBC 10.02  --  12.80* 17.73*   RBC 2.41*  --  2.81* 2.65*   HGB 7.5*  --  8.6* 8.2*   HCT 21.0* 22* 24.3* 22.5*   PLT 95*  --  55* 63*   MCV 87  --  87 85   MCH 31.1*  --  30.6 30.9   MCHC 35.7  --  35.4 36.4*     BNP:  No results for input(s): BNP in the last 168 hours.    Invalid input(s): BNPTRIANILAYO  CMP:    Recent Labs  Lab 02/17/18  0354  02/19/18  0120 02/19/18  0403 02/19/18  0816 02/19/18  1207   GLU 80  < >  --  247* 142* 138*   CALCIUM 9.6  < >  --  8.6* 7.5* 8.0*   ALBUMIN 4.2  --  3.6  --   --   --    PROT 7.4  --  5.3*  --   --   --      < >  --  144 144 144   K 3.7  < >  --   3.8 3.7 3.9   CO2 25  < >  --  17* 18* 22*     < >  --  112* 112* 113*   BUN 14  < >  --  17 17 19   CREATININE 1.0  < >  --  1.5* 1.3 1.1   ALKPHOS 76  --  41*  --   --   --    ALT 19  --  16  --   --   --    AST 20  --  55*  --   --   --    BILITOT 0.7  --  1.5*  --   --   --    < > = values in this interval not displayed.   Coagulation:     Recent Labs  Lab 02/17/18  0354 02/18/18  1959  02/19/18  0403 02/19/18  0816 02/19/18  0839 02/19/18  1207   INR 2.4* 1.2  < > 1.7* 1.7*  --  1.7*   APTT 35.2* 27.6  --   --   --  36.2*  --    < > = values in this interval not displayed.  LDH:  No results for input(s): LDH in the last 72 hours.  Microbiology:  Microbiology Results (last 7 days)     Procedure Component Value Units Date/Time    Urine culture [[id number]] Collected:  02/17/18 0425    Order Status:  Completed Specimen:  Urine from Urine, Clean Catch Updated:  02/18/18 1119     Urine Culture, Routine No growth    Narrative:       1 cup of urine          I have reviewed all pertinent labs within the past 24 hours.    Estimated Creatinine Clearance: 93.5 mL/min (based on SCr of 1.1 mg/dL).    Diagnostic Results:  I have reviewed all pertinent imaging results/findings within the past 24 hours.    Assessment and Plan:     27 year old WM with DCM (familial, both brothers with heart transplants) with stage D CHFrEF underwent Heartware placement 02/01/17 had post-op atrial flutter 02/11/17 treated with amiodarone presents for OHTx.  0.  VAD implanted on 2/1/17 HVAD RPM 2700.         * Heart transplanted    -Transplant Date 2/18/18   -CMV Status:D+/R-   -Rejection status: Moderate Risk  -Rejection episodes: none to date  -Induction: No   -hemodynamically stable on Epi; pressor per CTS   -Current immunosuppression: Mehtylpred, Prograf 1mg BID, Cellcept 1000 mg BID,   -Opportunistic PPx with:Nystatin                 HAKEEM Arriola  Heart Transplant  [facility]

## 2018-02-19 NOTE — HPI
Reason for Consult: Management of hyperglycemia     Surgical Procedure and Date: s/p OHT 2/18/18; s/p mediastinal exploration 2/19/18    HPI: Patient is a 28 y.o. male with a diagnosis of familial cardiomyopathy. Underwent heart transplant, received IV Solumedrol in OR. Return to OR for possible bleed. Initially high IV insulin requirements up to 48 u/hr. No personal or family hx of DM.   Lab Results   Component Value Date    HGBA1C 5.4 01/17/2017     Endocrine consulted for BG mgmt.

## 2018-02-19 NOTE — PROGRESS NOTES
Pt extubated per MD order. Pt tolerated extubation well, and placed on 3L NC. Will continue to monitor.

## 2018-02-20 ENCOUNTER — TELEPHONE (OUTPATIENT)
Dept: ELECTROPHYSIOLOGY | Facility: CLINIC | Age: 28
End: 2018-02-20

## 2018-02-20 PROBLEM — D84.9 IMMUNOSUPPRESSION: Status: ACTIVE | Noted: 2018-02-20

## 2018-02-20 LAB
ANION GAP SERPL CALC-SCNC: 7 MMOL/L
ANION GAP SERPL CALC-SCNC: 8 MMOL/L
ANISOCYTOSIS BLD QL SMEAR: SLIGHT
B CELL RESULTS - XM ALLO: NEGATIVE
B CELL RESULTS - XM ALLO: NEGATIVE
B CELL RESULTS - XM AUTO: NEGATIVE
B MCS AVERAGE - XM ALLO: -42
B MCS AVERAGE - XM ALLO: -58
B MCS AVERAGE - XM AUTO: -56
BASOPHILS # BLD AUTO: 0 K/UL
BASOPHILS # BLD AUTO: 0.01 K/UL
BASOPHILS NFR BLD: 0 %
BASOPHILS NFR BLD: 0.1 %
BLD PROD TYP BPU: NORMAL
BLOOD UNIT EXPIRATION DATE: NORMAL
BLOOD UNIT TYPE CODE: 5100
BLOOD UNIT TYPE CODE: 9500
BLOOD UNIT TYPE: NORMAL
BUN SERPL-MCNC: 19 MG/DL
BUN SERPL-MCNC: 19 MG/DL
CALCIUM SERPL-MCNC: 8.2 MG/DL
CALCIUM SERPL-MCNC: 8.2 MG/DL
CHLORIDE SERPL-SCNC: 105 MMOL/L
CHLORIDE SERPL-SCNC: 105 MMOL/L
CLASS I ANTIBODIES - LUMINEX: NEGATIVE
CLASS II ANTIBODIES - LUMINEX: NEGATIVE
CO2 SERPL-SCNC: 24 MMOL/L
CO2 SERPL-SCNC: 27 MMOL/L
CODING SYSTEM: NORMAL
CPRA %: 0
CREAT SERPL-MCNC: 1.1 MG/DL
CREAT SERPL-MCNC: 1.1 MG/DL
DIFFERENTIAL METHOD: ABNORMAL
DIFFERENTIAL METHOD: ABNORMAL
DISPENSE STATUS: NORMAL
DONOR MRN: NORMAL
DONOR MRN: NORMAL
EOSINOPHIL # BLD AUTO: 0 K/UL
EOSINOPHIL # BLD AUTO: 0 K/UL
EOSINOPHIL NFR BLD: 0 %
EOSINOPHIL NFR BLD: 0 %
ERYTHROCYTE [DISTWIDTH] IN BLOOD BY AUTOMATED COUNT: 14.6 %
ERYTHROCYTE [DISTWIDTH] IN BLOOD BY AUTOMATED COUNT: 14.9 %
EST. GFR  (AFRICAN AMERICAN): >60 ML/MIN/1.73 M^2
EST. GFR  (AFRICAN AMERICAN): >60 ML/MIN/1.73 M^2
EST. GFR  (NON AFRICAN AMERICAN): >60 ML/MIN/1.73 M^2
EST. GFR  (NON AFRICAN AMERICAN): >60 ML/MIN/1.73 M^2
FXMAL TESTING DATE: NORMAL
FXMAL TESTING DATE: NORMAL
FXMAU TESTING DATE: NORMAL
GLUCOSE SERPL-MCNC: 140 MG/DL
GLUCOSE SERPL-MCNC: 162 MG/DL
HCT VFR BLD AUTO: 21.3 %
HCT VFR BLD AUTO: 23.5 %
HCV LOG: <1.08 LOG (10) IU/ML
HCV RNA QUANT PCR: <12 IU/ML
HCV, QUALITATIVE: NOT DETECTED IU/ML
HEPATITIS B VIRAL DNA - QUANTITATIVE: <10 IU/ML
HEPATITIS B VIRUS DNA: NOT DETECTED
HGB BLD-MCNC: 7.4 G/DL
HGB BLD-MCNC: 8.1 G/DL
HLA FLOW CROSSMATCH (ALLO) INTERPRETATION: NORMAL
HPRA INTERPRETATION: NORMAL
IMM GRANULOCYTES # BLD AUTO: 0.07 K/UL
IMM GRANULOCYTES # BLD AUTO: 0.51 K/UL
IMM GRANULOCYTES NFR BLD AUTO: 0.6 %
IMM GRANULOCYTES NFR BLD AUTO: 2.7 %
INR PPP: 1.6
LOG HBV IU/ML: <1 LOG (10) IU/ML
LYMPHOCYTES # BLD AUTO: 0.6 K/UL
LYMPHOCYTES # BLD AUTO: 0.8 K/UL
LYMPHOCYTES NFR BLD: 3 %
LYMPHOCYTES NFR BLD: 6.9 %
MAGNESIUM SERPL-MCNC: 2 MG/DL
MAGNESIUM SERPL-MCNC: 2.1 MG/DL
MAGNESIUM SERPL-MCNC: 2.1 MG/DL
MCH RBC QN AUTO: 30.6 PG
MCH RBC QN AUTO: 30.6 PG
MCHC RBC AUTO-ENTMCNC: 34.5 G/DL
MCHC RBC AUTO-ENTMCNC: 34.7 G/DL
MCV RBC AUTO: 88 FL
MCV RBC AUTO: 89 FL
MONOCYTES # BLD AUTO: 0.6 K/UL
MONOCYTES # BLD AUTO: 0.9 K/UL
MONOCYTES NFR BLD: 4.8 %
MONOCYTES NFR BLD: 4.8 %
NEUTROPHILS # BLD AUTO: 10.2 K/UL
NEUTROPHILS # BLD AUTO: 16.7 K/UL
NEUTROPHILS NFR BLD: 87.7 %
NEUTROPHILS NFR BLD: 89.4 %
NRBC BLD-RTO: 0 /100 WBC
NRBC BLD-RTO: 0 /100 WBC
NUM UNITS TRANS FFP: NORMAL
OVALOCYTES BLD QL SMEAR: ABNORMAL
PHOSPHATE SERPL-MCNC: 2.8 MG/DL
PLATELET # BLD AUTO: 58 K/UL
PLATELET # BLD AUTO: 89 K/UL
PLATELET BLD QL SMEAR: ABNORMAL
PMV BLD AUTO: 11.2 FL
PMV BLD AUTO: 11.2 FL
POCT GLUCOSE: 114 MG/DL (ref 70–110)
POCT GLUCOSE: 116 MG/DL (ref 70–110)
POCT GLUCOSE: 117 MG/DL (ref 70–110)
POCT GLUCOSE: 120 MG/DL (ref 70–110)
POCT GLUCOSE: 121 MG/DL (ref 70–110)
POCT GLUCOSE: 122 MG/DL (ref 70–110)
POCT GLUCOSE: 122 MG/DL (ref 70–110)
POCT GLUCOSE: 125 MG/DL (ref 70–110)
POCT GLUCOSE: 127 MG/DL (ref 70–110)
POCT GLUCOSE: 129 MG/DL (ref 70–110)
POCT GLUCOSE: 131 MG/DL (ref 70–110)
POCT GLUCOSE: 138 MG/DL (ref 70–110)
POCT GLUCOSE: 139 MG/DL (ref 70–110)
POCT GLUCOSE: 155 MG/DL (ref 70–110)
POCT GLUCOSE: 163 MG/DL (ref 70–110)
POCT GLUCOSE: 167 MG/DL (ref 70–110)
POCT GLUCOSE: 199 MG/DL (ref 70–110)
POIKILOCYTOSIS BLD QL SMEAR: SLIGHT
POTASSIUM SERPL-SCNC: 4.6 MMOL/L
POTASSIUM SERPL-SCNC: 4.9 MMOL/L
POTASSIUM SERPL-SCNC: 5 MMOL/L
POTASSIUM SERPL-SCNC: 5.4 MMOL/L
POTASSIUM SERPL-SCNC: 5.4 MMOL/L
POTASSIUM SERPL-SCNC: 6 MMOL/L
POTASSIUM SERPL-SCNC: 6 MMOL/L
PROTHROMBIN TIME: 15.6 SEC
RBC # BLD AUTO: 2.42 M/UL
RBC # BLD AUTO: 2.65 M/UL
SERUM COLLECTION DT - LUMINEX CLASS I: NORMAL
SERUM COLLECTION DT - LUMINEX CLASS II: NORMAL
SERUM COLLECTION DT - XM ALLO: NORMAL
SERUM COLLECTION DT - XM ALLO: NORMAL
SERUM COLLECTION DT - XM AUTO: NORMAL
SODIUM SERPL-SCNC: 137 MMOL/L
SODIUM SERPL-SCNC: 139 MMOL/L
SPCL1 TESTING DATE: NORMAL
SPCL2 TESTING DATE: NORMAL
SPLUA TESTING DATE: NORMAL
T CELL RESULTS - XM ALLO: NEGATIVE
T CELL RESULTS - XM ALLO: NEGATIVE
T CELL RESULTS - XM AUTO: NEGATIVE
T MCS AVERAGE - XM ALLO: -50
T MCS AVERAGE - XM ALLO: -51.5
T MCS AVERAGE - XM AUTO: -8
TACROLIMUS BLD-MCNC: 1.7 NG/ML
WBC # BLD AUTO: 11.61 K/UL
WBC # BLD AUTO: 18.69 K/UL

## 2018-02-20 PROCEDURE — 25000003 PHARM REV CODE 250: Performed by: THORACIC SURGERY (CARDIOTHORACIC VASCULAR SURGERY)

## 2018-02-20 PROCEDURE — S0028 INJECTION, FAMOTIDINE, 20 MG: HCPCS | Performed by: THORACIC SURGERY (CARDIOTHORACIC VASCULAR SURGERY)

## 2018-02-20 PROCEDURE — 84132 ASSAY OF SERUM POTASSIUM: CPT | Mod: 91

## 2018-02-20 PROCEDURE — 63600175 PHARM REV CODE 636 W HCPCS: Performed by: THORACIC SURGERY (CARDIOTHORACIC VASCULAR SURGERY)

## 2018-02-20 PROCEDURE — 25000003 PHARM REV CODE 250: Performed by: STUDENT IN AN ORGANIZED HEALTH CARE EDUCATION/TRAINING PROGRAM

## 2018-02-20 PROCEDURE — 63600175 PHARM REV CODE 636 W HCPCS: Performed by: NURSE PRACTITIONER

## 2018-02-20 PROCEDURE — 85610 PROTHROMBIN TIME: CPT

## 2018-02-20 PROCEDURE — 86850 RBC ANTIBODY SCREEN: CPT

## 2018-02-20 PROCEDURE — 85025 COMPLETE CBC W/AUTO DIFF WBC: CPT | Mod: 91

## 2018-02-20 PROCEDURE — 20000000 HC ICU ROOM

## 2018-02-20 PROCEDURE — 25000003 PHARM REV CODE 250: Performed by: NURSE PRACTITIONER

## 2018-02-20 PROCEDURE — 99233 SBSQ HOSP IP/OBS HIGH 50: CPT | Mod: ,,, | Performed by: NURSE PRACTITIONER

## 2018-02-20 PROCEDURE — 63600175 PHARM REV CODE 636 W HCPCS: Performed by: INTERNAL MEDICINE

## 2018-02-20 PROCEDURE — 25000242 PHARM REV CODE 250 ALT 637 W/ HCPCS: Performed by: STUDENT IN AN ORGANIZED HEALTH CARE EDUCATION/TRAINING PROGRAM

## 2018-02-20 PROCEDURE — 25000003 PHARM REV CODE 250: Performed by: INTERNAL MEDICINE

## 2018-02-20 PROCEDURE — 63600175 PHARM REV CODE 636 W HCPCS

## 2018-02-20 PROCEDURE — 99233 SBSQ HOSP IP/OBS HIGH 50: CPT | Mod: ,,, | Performed by: INTERNAL MEDICINE

## 2018-02-20 PROCEDURE — 80048 BASIC METABOLIC PNL TOTAL CA: CPT | Mod: 91

## 2018-02-20 PROCEDURE — 63600175 PHARM REV CODE 636 W HCPCS: Performed by: STUDENT IN AN ORGANIZED HEALTH CARE EDUCATION/TRAINING PROGRAM

## 2018-02-20 PROCEDURE — 97161 PT EVAL LOW COMPLEX 20 MIN: CPT

## 2018-02-20 PROCEDURE — 84100 ASSAY OF PHOSPHORUS: CPT

## 2018-02-20 PROCEDURE — 94640 AIRWAY INHALATION TREATMENT: CPT

## 2018-02-20 PROCEDURE — 80048 BASIC METABOLIC PNL TOTAL CA: CPT

## 2018-02-20 PROCEDURE — A4216 STERILE WATER/SALINE, 10 ML: HCPCS | Performed by: THORACIC SURGERY (CARDIOTHORACIC VASCULAR SURGERY)

## 2018-02-20 PROCEDURE — 97165 OT EVAL LOW COMPLEX 30 MIN: CPT

## 2018-02-20 PROCEDURE — 80197 ASSAY OF TACROLIMUS: CPT

## 2018-02-20 PROCEDURE — 83735 ASSAY OF MAGNESIUM: CPT | Mod: 91

## 2018-02-20 RX ORDER — OXYCODONE HYDROCHLORIDE 5 MG/1
10 TABLET ORAL
Status: DISCONTINUED | OUTPATIENT
Start: 2018-02-20 | End: 2018-02-26

## 2018-02-20 RX ORDER — NALOXONE HCL 0.4 MG/ML
0.02 VIAL (ML) INJECTION
Status: DISCONTINUED | OUTPATIENT
Start: 2018-02-20 | End: 2018-02-20

## 2018-02-20 RX ORDER — IBUPROFEN 200 MG
24 TABLET ORAL
Status: DISCONTINUED | OUTPATIENT
Start: 2018-02-20 | End: 2018-02-22

## 2018-02-20 RX ORDER — IBUPROFEN 200 MG
16 TABLET ORAL
Status: DISCONTINUED | OUTPATIENT
Start: 2018-02-20 | End: 2018-02-22

## 2018-02-20 RX ORDER — ACETAMINOPHEN 10 MG/ML
1000 INJECTION, SOLUTION INTRAVENOUS EVERY 8 HOURS
Status: COMPLETED | OUTPATIENT
Start: 2018-02-20 | End: 2018-02-21

## 2018-02-20 RX ORDER — OXYCODONE HYDROCHLORIDE 5 MG/1
15 TABLET ORAL
Status: DISCONTINUED | OUTPATIENT
Start: 2018-02-20 | End: 2018-02-26

## 2018-02-20 RX ORDER — OXYCODONE HYDROCHLORIDE 5 MG/1
10 TABLET ORAL
Status: DISCONTINUED | OUTPATIENT
Start: 2018-02-20 | End: 2018-02-20

## 2018-02-20 RX ORDER — FUROSEMIDE 10 MG/ML
40 INJECTION INTRAMUSCULAR; INTRAVENOUS ONCE
Status: COMPLETED | OUTPATIENT
Start: 2018-02-20 | End: 2018-02-20

## 2018-02-20 RX ORDER — AMLODIPINE BESYLATE 5 MG/1
5 TABLET ORAL DAILY
Status: DISCONTINUED | OUTPATIENT
Start: 2018-02-20 | End: 2018-02-22

## 2018-02-20 RX ORDER — PROCHLORPERAZINE EDISYLATE 5 MG/ML
5 INJECTION INTRAMUSCULAR; INTRAVENOUS EVERY 6 HOURS PRN
Status: DISCONTINUED | OUTPATIENT
Start: 2018-02-20 | End: 2018-02-20

## 2018-02-20 RX ORDER — GLUCAGON 1 MG
1 KIT INJECTION
Status: DISCONTINUED | OUTPATIENT
Start: 2018-02-20 | End: 2018-02-22

## 2018-02-20 RX ORDER — PROCHLORPERAZINE EDISYLATE 5 MG/ML
10 INJECTION INTRAMUSCULAR; INTRAVENOUS EVERY 6 HOURS PRN
Status: DISCONTINUED | OUTPATIENT
Start: 2018-02-20 | End: 2018-02-24

## 2018-02-20 RX ORDER — HYDRALAZINE HYDROCHLORIDE 20 MG/ML
INJECTION INTRAMUSCULAR; INTRAVENOUS
Status: COMPLETED
Start: 2018-02-20 | End: 2018-02-20

## 2018-02-20 RX ORDER — HYDRALAZINE HYDROCHLORIDE 20 MG/ML
10 INJECTION INTRAMUSCULAR; INTRAVENOUS ONCE
Status: COMPLETED | OUTPATIENT
Start: 2018-02-20 | End: 2018-02-20

## 2018-02-20 RX ORDER — OXYCODONE HYDROCHLORIDE 5 MG/1
15 TABLET ORAL
Status: DISCONTINUED | OUTPATIENT
Start: 2018-02-20 | End: 2018-02-20

## 2018-02-20 RX ORDER — ONDANSETRON 2 MG/ML
4 INJECTION INTRAMUSCULAR; INTRAVENOUS EVERY 6 HOURS PRN
Status: DISCONTINUED | OUTPATIENT
Start: 2018-02-20 | End: 2018-03-05

## 2018-02-20 RX ORDER — ALBUTEROL SULFATE 0.83 MG/ML
SOLUTION RESPIRATORY (INHALATION)
Status: DISPENSED
Start: 2018-02-20 | End: 2018-02-20

## 2018-02-20 RX ORDER — ALBUTEROL SULFATE 0.83 MG/ML
10 SOLUTION RESPIRATORY (INHALATION) ONCE
Status: COMPLETED | OUTPATIENT
Start: 2018-02-20 | End: 2018-02-20

## 2018-02-20 RX ORDER — MYCOPHENOLATE MOFETIL 250 MG/1
1000 CAPSULE ORAL 2 TIMES DAILY
Status: DISCONTINUED | OUTPATIENT
Start: 2018-02-20 | End: 2018-02-24

## 2018-02-20 RX ORDER — MORPHINE SULFATE 1 MG/ML
INJECTION INTRAVENOUS CONTINUOUS
Status: DISCONTINUED | OUTPATIENT
Start: 2018-02-20 | End: 2018-02-20

## 2018-02-20 RX ORDER — POLYETHYLENE GLYCOL 3350 17 G/17G
17 POWDER, FOR SOLUTION ORAL 2 TIMES DAILY
Status: DISCONTINUED | OUTPATIENT
Start: 2018-02-20 | End: 2018-03-05 | Stop reason: HOSPADM

## 2018-02-20 RX ORDER — INSULIN ASPART 100 [IU]/ML
0-10 INJECTION, SOLUTION INTRAVENOUS; SUBCUTANEOUS
Status: DISCONTINUED | OUTPATIENT
Start: 2018-02-20 | End: 2018-02-22

## 2018-02-20 RX ORDER — FENTANYL CITRATE 50 UG/ML
50 INJECTION, SOLUTION INTRAMUSCULAR; INTRAVENOUS
Status: DISCONTINUED | OUTPATIENT
Start: 2018-02-20 | End: 2018-02-24

## 2018-02-20 RX ORDER — ASPIRIN 81 MG/1
81 TABLET ORAL DAILY
Status: DISCONTINUED | OUTPATIENT
Start: 2018-02-21 | End: 2018-03-05 | Stop reason: HOSPADM

## 2018-02-20 RX ORDER — FAMOTIDINE 20 MG/1
20 TABLET, FILM COATED ORAL 2 TIMES DAILY
Status: DISCONTINUED | OUTPATIENT
Start: 2018-02-20 | End: 2018-03-05 | Stop reason: HOSPADM

## 2018-02-20 RX ADMIN — Medication 3 ML: at 09:02

## 2018-02-20 RX ADMIN — DEXTROSE MONOHYDRATE 25 G: 25 INJECTION, SOLUTION INTRAVENOUS at 02:02

## 2018-02-20 RX ADMIN — POLYETHYLENE GLYCOL 3350 17 G: 17 POWDER, FOR SOLUTION ORAL at 09:02

## 2018-02-20 RX ADMIN — Medication 3 ML: at 02:02

## 2018-02-20 RX ADMIN — TACROLIMUS 1 MG: 1 CAPSULE ORAL at 05:02

## 2018-02-20 RX ADMIN — ONDANSETRON 4 MG: 2 INJECTION INTRAMUSCULAR; INTRAVENOUS at 11:02

## 2018-02-20 RX ADMIN — FAMOTIDINE 20 MG: 20 TABLET, FILM COATED ORAL at 08:02

## 2018-02-20 RX ADMIN — NYSTATIN 500000 UNITS: 500000 SUSPENSION ORAL at 08:02

## 2018-02-20 RX ADMIN — FUROSEMIDE 30 MG/HR: 10 INJECTION, SOLUTION INTRAVENOUS at 12:02

## 2018-02-20 RX ADMIN — OXYCODONE HYDROCHLORIDE 15 MG: 5 TABLET ORAL at 04:02

## 2018-02-20 RX ADMIN — ACETAMINOPHEN 1000 MG: 10 INJECTION, SOLUTION INTRAVENOUS at 10:02

## 2018-02-20 RX ADMIN — FAMOTIDINE 20 MG: 10 INJECTION, SOLUTION INTRAVENOUS at 08:02

## 2018-02-20 RX ADMIN — OXYCODONE HYDROCHLORIDE 10 MG: 5 TABLET ORAL at 12:02

## 2018-02-20 RX ADMIN — METHYLPREDNISOLONE SODIUM SUCCINATE 40 MG: 40 INJECTION, POWDER, FOR SOLUTION INTRAMUSCULAR; INTRAVENOUS at 08:02

## 2018-02-20 RX ADMIN — Medication 3 ML: at 05:02

## 2018-02-20 RX ADMIN — AMLODIPINE BESYLATE 5 MG: 5 TABLET ORAL at 04:02

## 2018-02-20 RX ADMIN — OXYCODONE HYDROCHLORIDE 10 MG: 5 TABLET ORAL at 08:02

## 2018-02-20 RX ADMIN — ALBUTEROL SULFATE 10 MG: 2.5 SOLUTION RESPIRATORY (INHALATION) at 04:02

## 2018-02-20 RX ADMIN — ASPIRIN 325 MG ORAL TABLET 325 MG: 325 PILL ORAL at 08:02

## 2018-02-20 RX ADMIN — PROMETHAZINE HYDROCHLORIDE 12.5 MG: 25 INJECTION INTRAMUSCULAR; INTRAVENOUS at 11:02

## 2018-02-20 RX ADMIN — ACETAMINOPHEN 1000 MG: 10 INJECTION, SOLUTION INTRAVENOUS at 05:02

## 2018-02-20 RX ADMIN — IPRATROPIUM BROMIDE 0.5 MG: 0.5 SOLUTION RESPIRATORY (INHALATION) at 04:02

## 2018-02-20 RX ADMIN — HYDRALAZINE HYDROCHLORIDE 10 MG: 20 INJECTION INTRAMUSCULAR; INTRAVENOUS at 05:02

## 2018-02-20 RX ADMIN — INSULIN HUMAN 10 UNITS: 100 INJECTION, SOLUTION PARENTERAL at 02:02

## 2018-02-20 RX ADMIN — IPRATROPIUM BROMIDE 0.5 MG: 0.5 SOLUTION RESPIRATORY (INHALATION) at 08:02

## 2018-02-20 RX ADMIN — ONDANSETRON 4 MG: 2 INJECTION INTRAMUSCULAR; INTRAVENOUS at 06:02

## 2018-02-20 RX ADMIN — OXYCODONE HYDROCHLORIDE 15 MG: 5 TABLET ORAL at 09:02

## 2018-02-20 RX ADMIN — FENTANYL CITRATE 50 MCG: 50 INJECTION, SOLUTION INTRAMUSCULAR; INTRAVENOUS at 02:02

## 2018-02-20 RX ADMIN — NYSTATIN 500000 UNITS: 500000 SUSPENSION ORAL at 04:02

## 2018-02-20 RX ADMIN — OXYCODONE HYDROCHLORIDE 15 MG: 5 TABLET ORAL at 06:02

## 2018-02-20 RX ADMIN — MYCOPHENOLATE MOFETIL 1000 MG: 500 INJECTION, POWDER, LYOPHILIZED, FOR SOLUTION INTRAVENOUS at 11:02

## 2018-02-20 RX ADMIN — PROCHLORPERAZINE EDISYLATE 10 MG: 5 INJECTION INTRAMUSCULAR; INTRAVENOUS at 01:02

## 2018-02-20 RX ADMIN — TACROLIMUS 1 MG: 1 CAPSULE ORAL at 08:02

## 2018-02-20 RX ADMIN — FENTANYL CITRATE 50 MCG: 50 INJECTION, SOLUTION INTRAMUSCULAR; INTRAVENOUS at 08:02

## 2018-02-20 RX ADMIN — MUPIROCIN 1 G: 20 OINTMENT TOPICAL at 08:02

## 2018-02-20 RX ADMIN — SODIUM CHLORIDE 1.2 UNITS/HR: 9 INJECTION, SOLUTION INTRAVENOUS at 06:02

## 2018-02-20 RX ADMIN — POLYETHYLENE GLYCOL 3350 17 G: 17 POWDER, FOR SOLUTION ORAL at 08:02

## 2018-02-20 RX ADMIN — CEFAZOLIN 2 G: 330 INJECTION, POWDER, FOR SOLUTION INTRAMUSCULAR; INTRAVENOUS at 08:02

## 2018-02-20 RX ADMIN — DOBUTAMINE IN DEXTROSE 5 MCG/KG/MIN: 200 INJECTION, SOLUTION INTRAVENOUS at 06:02

## 2018-02-20 RX ADMIN — ACETAMINOPHEN 1000 MG: 10 INJECTION, SOLUTION INTRAVENOUS at 04:02

## 2018-02-20 RX ADMIN — OXYCODONE HYDROCHLORIDE 10 MG: 5 TABLET ORAL at 03:02

## 2018-02-20 RX ADMIN — FUROSEMIDE 40 MG: 10 INJECTION, SOLUTION INTRAMUSCULAR; INTRAVENOUS at 11:02

## 2018-02-20 RX ADMIN — FUROSEMIDE 30 MG/HR: 10 INJECTION, SOLUTION INTRAVENOUS at 09:02

## 2018-02-20 RX ADMIN — MYCOPHENOLATE MOFETIL 1000 MG: 250 CAPSULE ORAL at 08:02

## 2018-02-20 NOTE — PROGRESS NOTES
LYRICI Tuesday Note:    Patient free from injury and redness on pressure areas throughout. Patient OOB to chair with wheelchair cushion in place. Turn q2h while in bed as well as patient repositioning with family assistance or independently.

## 2018-02-20 NOTE — PT/OT/SLP EVAL
Occupational Therapy   Evaluation    Name: Mert Samayoa  MRN: [id number]  Admitting Diagnosis:  Heart transplanted 1 Day Post-Op    Recommendations:     Discharge Recommendations: home (no OT)  Discharge Equipment Recommendations:  none  Barriers to discharge:  None    History:     Occupational Profile:  Living Environment: Pt lives with spouse in [facility] with no DANNY  Previous level of function: (I) with ADL and ambulation  Roles and Routines:  sewerage ; ; drives  Equipment Owned:  shower chair  Assistance upon Discharge: wife can assist    Past Medical History:   Diagnosis Date    Cardiogenic shock 1/16/2017    Cardiomyopathy     Familial cardiomyopathy    CHF (congestive heart failure)     Essential hypertension 12/21/2016    Familial Cardiomyopathy     Familial cardiomyopathy        Past Surgical History:   Procedure Laterality Date    LEFT VENTRICULAR ASSIST DEVICE         Subjective     Chief Complaint: pain  Patient/Family stated goals: to go home  Communicated with: RN prior to session.  Pain/Comfort:  · Pain Rating 1: 8/10  · Location - Side 1: Bilateral  · Location - Orientation 1: midline  · Location 1: sternal  · Pain Addressed 1: Pre-medicate for activity, Distraction, Cessation of Activity  · Pain Rating Post-Intervention 1: 8/10    Patients cultural, spiritual, Shinto conflicts given the current situation: none reported    Objective:     Patient found with: blood pressure cuff, pulse ox (continuous), telemetry, central line, kirk catheter, chest tube, arterial line (external pacer)    General Precautions: Standard, fall, sternal (mask when out of room)   Orthopedic Precautions:    Braces:       Occupational Performance:    Bed Mobility:    · Patient completed Scooting/Bridging with contact guard assistance  · Patient completed Supine to Sit with contact guard assistance    Functional Mobility/Transfers:  · Patient completed Sit <> Stand Transfer with contact  guard assistance  with  no assistive device   · Patient completed Bed <> Chair Transfer using Step Transfer technique with contact guard assistance with no assistive device  · Functional Mobility: CGA in room    Activities of Daily Living:  · Feeding:  stand by assistance    · Grooming: stand by assistance seated  · UB Dressing: maximal assistance    · LB Dressing: maximal assistance      Cognitive/Visual Perceptual:  Oriented to: Person, Place, Time and Situation  Follows Commands/attention: Follows multistep  commands  Communication: clear/fluent  Memory:  No Deficits noted  Safety awareness/insight to disability: intact  Coping skills/emotional control: Appropriate to situation    Physical Exam:  Postural examination/scapula alignment:    -       No postural abnormalities identified  Sensation:    -       Intact  Upper Extremity Range of Motion:     -       Right Upper Extremity: WFL  -       Left Upper Extremity: WFL  Upper Extremity Strength:    -       Right Upper Extremity: WFL  -       Left Upper Extremity: WFL   Strength:    -       Right Upper Extremity: WFL  -       Left Upper Extremity: WFL  Fine Motor Coordination:    -       Intact  Gross motor coordination:   WFL    Patient left up in chair with all lines intact, call button in reach, Rn notified and spouse present    [facility] 6 Click:  [facility] Total Score: 15    Treatment & Education:  Pt ed on OT POC  Pt re-ed on sternal precautions and need for wearing mask when out of room  Education:    Assessment:     Mert Samayoa is a 28 y.o. male with a medical diagnosis of Heart transplanted.   Performance deficits affecting function are weakness, impaired functional mobilty, gait instability, impaired endurance, impaired balance, impaired self care skills, decreased upper extremity function, pain, impaired cardiopulmonary response to activity.      Rehab Prognosis:  Good; patient would benefit from acute skilled OT services to address these deficits  "and reach maximum level of function.         Clinical Decision Makin.  OT Low:  "Pt evaluation falls under low complexity for evaluation coding due to performance deficits noted in 1-3 areas as stated above and 0 co-morbities affecting current functional status. Data obtained from problem focused assessments. No modifications or assistance was required for completion of evaluation. Only brief occupational profile and history review completed."     Plan:     Patient to be seen 5 x/week to address the above listed problems via self-care/home management, therapeutic activities, therapeutic exercises  · Plan of Care Expires: 18  · Plan of Care Reviewed with: patient, spouse    This Plan of care has been discussed with the patient who was involved in its development and understands and is in agreement with the identified goals and treatment plan    GOALS:    Occupational Therapy Goals        Problem: Occupational Therapy Goal    Goal Priority Disciplines Outcome Interventions   Occupational Therapy Goal     OT, PT/OT Ongoing (interventions implemented as appropriate)    Description:  Goals to be met by: 3/2/18     Patient will increase functional independence with ADLs by performing:    Feeding with [place].  UE Dressing with Supervision.  LE Dressing with Supervision.  Grooming while standing at sink with Supervision.  Toileting from toilet with Supervision for hygiene and clothing management.   Toilet transfer to toilet with Supervision.                      Time Tracking:     OT Date of Treatment: 18  OT Start Time: 821  OT Stop Time: 838  OT Total Time (min): 17 min    Billable Minutes:Evaluation 17    ALISA Arguello  2018    "

## 2018-02-20 NOTE — NURSING
Post Heart Transplant Education    Met with pt and his wife in SICU. Pt just extubated, in pain. Gave Education Binder to pt's wife. Informed them I would be back tomorrow to go over the binder to get him ready for discharge.

## 2018-02-20 NOTE — PLAN OF CARE
Problem: Physical Therapy Goal  Goal: Physical Therapy Goal  Goals to be met by: 3/6/18    Patient will increase functional independence with mobility by performin. Supine to sit with Stand-by Assistance-not met  2. Sit to stand transfer with Supervision-not met  3. Gait  x 220 feet with Supervision -not met  4. Ascend/descend 3 stair with right Handrails Supervision . -not met    eval completed and goals appropriate. Jenn Redd, PT 2018

## 2018-02-20 NOTE — PLAN OF CARE
Problem: Patient Care Overview  Goal: Plan of Care Review  Outcome: Ongoing (interventions implemented as appropriate)  Patient and family updated on plan of care including vasoactive drip weaning, blood pressure control, pain control, nausea and vomiting treatment, and strict hand hygiene and infection prevention. Patient also thoroughly educated on coughing and deep breathing, splinting, and the importance of early mobilization after heart transplant. Wife present for education and updates. Questions answered.

## 2018-02-20 NOTE — PROGRESS NOTES
[facility]  Heart Transplant  Progress Note    Patient Name: Mert Samayoa  MRN: [id number]  Admission Date: 2/17/2018  Hospital Length of Stay: 3 days  Attending Physician: Raj Klein MD  Primary Care Provider: Primary Doctor No  Principal Problem:Heart transplanted    Subjective:     Interval History: Extuabted yesterday afternoon. Only complaint this morning is some incisional pain.     Continuous Infusions:   DOBUTamine 5 mcg/kg/min (02/20/18 0600)    epinephrine 0.05 mcg/kg/min (02/19/18 1700)    furosemide (LASIX) 5 mg/mL infusion (non-titrating)      insulin (HUMAN R) infusion (adults) 1 Units/hr (02/20/18 0600)     Scheduled Meds:   acetaminophen  1,000 mg Intravenous Q8H    albuterol        aspirin  325 mg Oral Daily    ceFAZolin (ANCEF) IVPB  2 g Intravenous Q8H    famotidine (PF)  20 mg Intravenous BID    ipratropium  0.5 mg Nebulization Q4H    methylPREDNISolone sodium succinate  40 mg Intravenous Q12H    mupirocin  1 g Nasal BID    mycophenolate (CELLCEPT) IVPB  1,000 mg Intravenous Q12H    nystatin  500,000 Units Mouth/Throat QID (WM & HS)    polyethylene glycol  17 g Oral Daily    potassium chloride  20 mEq Intravenous Once    sodium chloride 0.9%  3 mL Intravenous Q8H    tacrolimus  1 mg Oral BID     PRN Meds:sodium chloride, albuterol sulfate, [COMPLETED] calcium gluconate IVPB **AND** calcium gluconate IVPB, dextrose 50%, dextrose 50%, [COMPLETED] dextrose 50% **AND** dextrose 50% **AND** [COMPLETED] insulin regular, fentaNYL, fentaNYL, lactated ringers, lactulose, magnesium sulfate IVPB, metoclopramide HCl, ondansetron, oxyCODONE, oxyCODONE, potassium chloride **AND** potassium chloride **AND** potassium chloride    Review of patient's allergies indicates:  No Known Allergies  Objective:     Vital Signs (Most Recent):  Temp: 98.5 °F (36.9 °C) (02/20/18 0300)  Pulse: 109 (02/20/18 0600)  Resp: 16 (02/20/18 0600)  BP: 125/61 (02/20/18 0600)  SpO2:  100 % (02/20/18 0600) Vital Signs (24h Range):  Temp:  [97 °F (36.1 °C)-98.5 °F (36.9 °C)] 98.5 °F (36.9 °C)  Pulse:  [109-110] 109  Resp:  [0-42] 16  SpO2:  [98 %-100 %] 100 %  BP: (114-147)/(61-72) 125/61  Arterial Line BP: (102-156)/(43-73) 147/57     Patient Vitals for the past 72 hrs (Last 3 readings):   Weight   02/20/18 0500 84.5 kg (186 lb 4.6 oz)   02/18/18 0700 73.1 kg (161 lb 2.5 oz)     Body mass index is 29.18 kg/m².      Intake/Output Summary (Last 24 hours) at 02/20/18 0755  Last data filed at 02/20/18 0701   Gross per 24 hour   Intake             3249 ml   Output             3310 ml   Net              -61 ml     Physical Exam   Constitutional: He is oriented to person, place, and time. He appears well-developed and well-nourished.   HENT:   Head: Normocephalic.   Eyes: Pupils are equal, round, and reactive to light.   Neck: Normal range of motion. Neck supple.   RIJ line in place.    Cardiovascular: Normal rate and regular rhythm.    Pulmonary/Chest: Effort normal and breath sounds normal.   Abdominal: Soft. Bowel sounds are normal.   Musculoskeletal: Normal range of motion.   Neurological: He is alert and oriented to person, place, and time.   Skin: Skin is warm and dry.   Psychiatric: He has a normal mood and affect. His behavior is normal.   Nursing note and vitals reviewed.    Significant Labs:  CBC:    Recent Labs  Lab 02/19/18  1629 02/19/18 2000 02/20/18  0400   WBC 22.15* 10.27 11.61   RBC 2.73* 2.71* 2.42*   HGB 8.4* 8.3* 7.4*   HCT 23.6* 23.6* 21.3*   PLT 74* 49* 58*   MCV 86 87 88   MCH 30.8 30.6 30.6   MCHC 35.6 35.2 34.7     BNP:  No results for input(s): BNP in the last 168 hours.    Invalid input(s): BNPTRIAGELBLO  CMP:    Recent Labs  Lab 02/17/18  0354  02/19/18  0120  02/19/18  2000  02/20/18  0000 02/20/18  0200 02/20/18  0400   GLU 80  < >  --   < > 131*  --  140*  --  162*   CALCIUM 9.6  < >  --   < > 8.2*  --  8.2*  --  8.2*   ALBUMIN 4.2  --  3.6  --   --   --   --   --   --     PROT 7.4  --  5.3*  --   --   --   --   --   --      < >  --   < > 137  --  137  --  139   K 3.7  < >  --   < > 7.5*  < > 6.0*  6.0* 5.0 4.6   CO2 25  < >  --   < > 24  --  24  --  27     < >  --   < > 109  --  105  --  105   BUN 14  < >  --   < > 19  --  19  --  19   CREATININE 1.0  < >  --   < > 1.0  --  1.1  --  1.1   ALKPHOS 76  --  41*  --   --   --   --   --   --    ALT 19  --  16  --   --   --   --   --   --    AST 20  --  55*  --   --   --   --   --   --    BILITOT 0.7  --  1.5*  --   --   --   --   --   --    < > = values in this interval not displayed.   Coagulation:     Recent Labs  Lab 02/17/18  0354 02/18/18  1959 02/19/18  0839  02/19/18  1629 02/19/18 2000 02/20/18  0400   INR 2.4* 1.2  < >  --   < > 1.7* 1.7* 1.6*   APTT 35.2* 27.6  --  36.2*  --   --   --   --    < > = values in this interval not displayed.  LDH:  No results for input(s): LDH in the last 72 hours.  Microbiology:  Microbiology Results (last 7 days)     Procedure Component Value Units Date/Time    Urine culture [[id number]] Collected:  02/17/18 0425    Order Status:  Completed Specimen:  Urine from Urine, Clean Catch Updated:  02/18/18 1119     Urine Culture, Routine No growth    Narrative:       1 cup of urine          I have reviewed all pertinent labs within the past 24 hours.    Estimated Creatinine Clearance: 103.9 mL/min (based on SCr of 1.1 mg/dL).    Diagnostic Results:  I have reviewed all pertinent imaging results/findings within the past 24 hours.    Assessment and Plan:     27 year old WM with DCM (familial, both brothers with heart transplants) with stage D CHFrEF underwent Heartware placement 02/01/17 had post-op atrial flutter 02/11/17 treated with amiodarone presents for OHTx.  0.  VAD implanted on 2/1/17 HVAD RPM 2700.         * Heart transplanted    -Transplant Date 2/18/18. S/P washout 2/19.   -CMV Status:D+/R-   -Rejection status: Moderate Risk  -Rejection episodes: none to date  -Induction: No    -hemodynamically stable on  5.   -Current immunosuppression: Mehtylpred 40IV BID, Prograf 1mg BID, Cellcept 1000 mg BID.  -Opportunistic PPx with:Wanda Goldberg NP  Heart Transplant  [facility]

## 2018-02-20 NOTE — ASSESSMENT & PLAN NOTE
Neuro:   - Pain mgmt: Scheduled tylenol, morphine PCA       Pulmonary:   Frequent IS, nebs  Maintain chest tubes     Cardiac:   - Drips: dobutamine 5  - ASA 325mg  - Immunosuppression per HTS     Renal:    - UOP 2.3L (increased output with lasix during K shift)  - BUN/Cr stable  - Lasix gtt       ID:   -  Ancef post op     Hem/Onc:   - Monitor hgb; down to 7.4 and 21  - Repeat CBC at 4pm           Endocrine:    - Glucose management per endocrine     Fluids/Electrolytes/Nutrition/GI:   - Replace electrolytes PRN per protocol  - Advance diet as tolerated     DISPO:  - Transfer to [facility]

## 2018-02-20 NOTE — TELEPHONE ENCOUNTER
Returned the call to patient's wife, Abigail on this afternoon and informed her due to she will be coming back and forth to the hospital she can just bring patient's Biotronik ICD home monitor to the clinic.  Abigail verbalized understanding.

## 2018-02-20 NOTE — PROGRESS NOTES
[facility]  Critical Care - Surgery  Progress Note    Patient Name: Mert Samayoa  MRN: [id number]  Admission Date: 2/17/2018  Hospital Length of Stay: 3 days  Code Status: Prior  Attending Provider: Raj Klein MD  Primary Care Provider: Primary Doctor No   Principal Problem: Heart transplanted    Subjective:   Interval History/Significant Events: NAEON. Dobutamine gtt @ 5. OOB this morning with PT.    Follow-up For: Procedure(s) (LRB):  EXPLORATION-BLEEDING (RE-EXPLORATION) (Bilateral)    Post-Operative Day: 1 Day Post-Op    Objective:     Vital Signs (Most Recent):  Temp: 98.4 °F (36.9 °C) (02/20/18 0701)  Pulse: 110 (02/20/18 0945)  Resp: (!) 27 (02/20/18 0945)  BP: (!) 150/74 (02/20/18 0900)  SpO2: 100 % (02/20/18 0945) Vital Signs (24h Range):  Temp:  [98.3 °F (36.8 °C)-98.5 °F (36.9 °C)] 98.4 °F (36.9 °C)  Pulse:  [109-111] 110  Resp:  [11-42] 27  SpO2:  [85 %-100 %] 100 %  BP: (114-150)/(61-86) 150/74  Arterial Line BP: (109-165)/(44-73) 124/55     Weight: 84.5 kg (186 lb 4.6 oz)  Body mass index is 29.18 kg/m².      Intake/Output Summary (Last 24 hours) at 02/20/18 1001  Last data filed at 02/20/18 0900   Gross per 24 hour   Intake             2749 ml   Output             3130 ml   Net             -381 ml       Physical Exam   Constitutional: He is oriented to person, place, and time. He appears well-developed and well-nourished. No distress.   HENT:   Head: Atraumatic.   Cardiovascular: Normal rate and regular rhythm.    Pulmonary/Chest: Effort normal and breath sounds normal.   Abdominal: Soft. Bowel sounds are normal.   Musculoskeletal: He exhibits no edema.   Neurological: He is alert and oriented to person, place, and time.   Skin: Skin is warm and dry.   Nursing note and vitals reviewed.        Lines/Drains/Airways     Central Venous Catheter Line                 Introducer 02/18/18 2 days         Percutaneous Central Line Insertion/Assessment - double lumen  02/18/18  1015 right internal jugular 1 day          Drain                 Urethral Catheter 02/18/18 1125 Latex;Straight-tip;Temperature probe 1 day         Y Chest Tube 1 and 2 02/18/18 1821 1 Right Mediastinal 19 Fr. 2 Right Mediastinal 19 Fr. 1 day         Y Chest Tube 3 and 4 02/18/18 1822 3 Left Pericardial;Pleural 19 Fr. 4 Left Pleural 19 Fr. 1 day          Arterial Line                 Arterial Line 02/18/18 0955 Left Radial 2 days          Line                 Pacer Wires 02/18/18 1824 1 day          Peripheral Intravenous Line                 Peripheral IV - Single Lumen 02/18/18 Left Forearm 2 days         Peripheral IV - Single Lumen 02/18/18 1020 Right Forearm 1 day                Significant Labs:    CBC/Anemia Profile:    Recent Labs  Lab 02/19/18  1629 02/19/18 2000 02/20/18  0400   WBC 22.15* 10.27 11.61   HGB 8.4* 8.3* 7.4*   HCT 23.6* 23.6* 21.3*   PLT 74* 49* 58*   MCV 86 87 88   RDW 14.5 14.5 14.6*        Chemistries:    Recent Labs  Lab 02/18/18  1959 02/19/18  0120  02/19/18  1629 02/19/18 2000 02/20/18  0000 02/20/18  0200 02/20/18  0400 02/20/18  0722     < >  --   < > 140 137  --  137  --  139  --    K 3.5  3.5  < >  --   < > 5.3* 7.5*  < > 6.0*  6.0* 5.0 4.6 5.4*     < >  --   < > 111* 109  --  105  --  105  --    CO2 15*  < >  --   < > 22* 24  --  24  --  27  --    BUN 16  < >  --   < > 20 19  --  19  --  19  --    CREATININE 1.7*  < >  --   < > 1.2 1.0  --  1.1  --  1.1  --    CALCIUM 10.2  < >  --   < > 7.9* 8.2*  --  8.2*  --  8.2*  --    ALBUMIN  --   --  3.6  --   --   --   --   --   --   --   --    PROT  --   --  5.3*  --   --   --   --   --   --   --   --    BILITOT  --   --  1.5*  --   --   --   --   --   --   --   --    ALKPHOS  --   --  41*  --   --   --   --   --   --   --   --    ALT  --   --  16  --   --   --   --   --   --   --   --    AST  --   --  55*  --   --   --   --   --   --   --   --    MG 1.9  < >  --   < > 2.0 2.0  --   --   --  2.1  --    PHOS 3.3  --   --    --   --   --   --   --   --  2.8  --    < > = values in this interval not displayed.    All pertinent labs within the past 24 hours have been reviewed.    Significant Imaging:  I have reviewed all pertinent imaging results/findings within the past 24 hours.    Assessment/Plan:     Active Diagnoses:    Diagnosis Date Noted POA    PRINCIPAL PROBLEM:  Heart transplanted [Z94.1] 02/18/2018 Not Applicable    Adverse effect of glucocorticoids and synthetic analogues, initial encounter [T38.0X5A] 02/19/2018 No    Familial cardiomyopathy [I42.9] 03/01/2017 Yes    Acute hyperglycemia [R73.9] 02/01/2017 Yes      Problems Resolved During this Admission:    Diagnosis Date Noted Date Resolved POA    Awaiting organ transplant [Z76.82] 02/17/2018 02/18/2018 Not Applicable    Heart replaced by heart assist device [Z95.811]  02/18/2018 Not Applicable     Heart transplanted     27 y/o man w/ hx of DCM s/p OHT with ICD and VAD removal.     Neuro:  -roxicodone prn for pain  -IV APAP 1g q8h     Cardiac: VSS stable and wnl  -Mgmt Per CTS:  --now off epi  -- @ 5  -     Pulm: Extubated 2/19, on NC  -wean FiO2, minimal vent settings  -duonebs     Renal:  -kirk in place  -maintain adequate UOP     Heme/ Id: Hgb at 7.4, WBC wnl  -ancef ppx  -Immunosuppression per CTS   -trend cbc     Gi:  -miralax  -Pepcid ppx     Endo:  -insulin gtt for glucose control  -monitor accuchecks     FEN:  -clear liquid diet  -replete lytes prn  -daily CMP    DISPO: To heart failure     Aneudy Díaz MD  Critical Care - Surgery  [facility]

## 2018-02-20 NOTE — NURSING
Post Heart Transplant Coordinator Education    Spent time with pt and wife going over Education Binder. Pt and wife asked appropriate questions, regarding immunosuppression, rejection, complications. Pt's wife asked if pt was considered high risk, I informed her from a donor aspect he was not considered high risk. Pt asked about going back to work. Explained that we would have to see how his course of recovery is over the next few months. Will evaluate at around 6 months if he can go back to work as a  for [employer]. Pt's wife asked how long he will have to be on immunosuppression and I informed her for life. Went over the schedule of lab and tests along with clinic. What to expect at each clinic visit. Stressed the importance of calling for fever, chills, N/V diarrhea. Went over BP parameters and when to call.

## 2018-02-20 NOTE — PT/OT/SLP EVAL
Physical Therapy Evaluation    Patient Name:  Mert Samayoa   MRN:  [id number]    Recommendations:     Discharge Recommendations:   (no further PT will be needed.)   Discharge Equipment Recommendations: none   Barriers to discharge: None    Assessment:     Mert Samayoa is a 28 y.o. male admitted with a medical diagnosis of Heart transplanted.  He presents with the following impairments/functional limitations:  weakness, impaired functional mobilty, gait instability, impaired endurance, impaired balance pt is s/p heart transplant with PFO closure, removal of LVAD 2/18/18, s/p re-exploration of chest 2/19/18. Pt krystyna treatment well and would benefit from skilled PT 5x/wk to return pt to Independent status. Pt will be able to discharge home with no PT or DME needs when medically stable. .    Rehab Prognosis:  good; patient would benefit from acute skilled PT services to address these deficits and reach maximum level of function.      Recent Surgery: Procedure(s) (LRB):  EXPLORATION-BLEEDING (RE-EXPLORATION) (Bilateral) 1 Day Post-Op    Plan:     During this hospitalization, patient to be seen 5 x/week to address the above listed problems via gait training, therapeutic activities  · Plan of Care Expires:  03/19/18   Plan of Care Reviewed with: patient, spouse    Subjective     Communicated with nurse prior to session.  Patient found supine upon PT entry to room, agreeable to evaluation.      Chief Complaint: pt c/o pain during treatment.   Patient comments/goals:  To get better and go home.   Pain/Comfort:  · Pain Rating 1: 8/10  · Location 1:  (chest)  · Pain Rating Post-Intervention 1: 8/10    Patients cultural, spiritual, Sikh conflicts given the current situation: none    Living Environment:  Pt works full-time as sewage treatment . Pt wife works but will take time off if needed.   Prior to admission, patients level of function was Independent .  Patient has the following equipment: none.   DME owned (not currently used): none.  Upon discharge, patient will have assistance from wife.    Objective:     Patient found with: telemetry, arterial line, blood pressure cuff, pulse ox (continuous), central line, oxygen, chest tube, kirk catheter (CVP, external pacemaker)     General Precautions: Standard, fall, sternal (out of room with mask.)   Orthopedic Precautions:    Braces:       Exams:  · Cognitive Exam:  Patient is oriented to Person, Place, Time and Situation   ·   · RLE ROM: WFL  · RLE Strength: WFL  · LLE ROM: WFL  · LLE Strength: WFL    Functional Mobility:  · Bed Mobility:   Pt needed verbal cues for functional mobility with sternal precautions.   · Rolling Right: contact guard assistance  · Supine to Sit: contact guard assistance  ·   · Transfers:     · Sit to Stand:  contact guard assistance with no AD  · Bed to Chair: contact guard assistance with  no AD  using  Stand Pivot  ·   · Gait: 2 side steps to bedside chair with CGA    AM-PAC 6 CLICK MOBILITY  Total Score:17       Therapeutic Activities and Exercises:   pt and wife received verbal and written instructions for sternal precautions.     Patient left up in chair with all lines intact, call button in reach and wife present.    GOALS:    Physical Therapy Goals        Problem: Physical Therapy Goal    Goal Priority Disciplines Outcome Goal Variances Interventions   Physical Therapy Goal     PT/OT, PT      Description:  Goals to be met by: 3/6/18    Patient will increase functional independence with mobility by performin. Supine to sit with Stand-by Assistance-not met  2. Sit to stand transfer with Supervision-not met  3. Gait  x 220 feet with Supervision -not met  4. Ascend/descend 3 stair with right Handrails Supervision . -not met                      History:     Past Medical History:   Diagnosis Date    Cardiogenic shock 2017    Cardiomyopathy     Familial cardiomyopathy    CHF (congestive heart failure)     Essential  hypertension 12/21/2016    Familial Cardiomyopathy     Familial cardiomyopathy        Past Surgical History:   Procedure Laterality Date    LEFT VENTRICULAR ASSIST DEVICE         Clinical Decision Making:     History  Co-morbidities and personal factors that may impact the plan of care Examination  Body Structures and Functions, activity limitations and participation restrictions that may impact the plan of care Clinical Presentation   Decision Making/ Complexity Score   Co-morbidities:   [] Time since onset of injury / illness / exacerbation  [] Status of current condition  []Patient's cognitive status and safety concerns    [] Multiple Medical Problems (see med hx)  Personal Factors:   [] Patient's age  [] Prior Level of function   [] Patient's home situation (environment and family support)  [] Patient's level of motivation  [] Expected progression of patient      HISTORY:(criteria)    [] 95929 - no personal factors/history    [] 05573 - has 1-2 personal factor/comorbidity     [] 10623 - has >3 personal factor/comorbidity     Body Regions:  [] Objective examination findings  [] Head     []  Neck  [] Trunk   [] Upper Extremity  [] Lower Extremity    Body Systems:  [] For communication ability, affect, cognition, language, and learning style: the assessment of the ability to make needs known, consciousness, orientation (person, place, and time), expected emotional /behavioral responses, and learning preferences (eg, learning barriers, education  needs)  [] For the neuromuscular system: a general assessment of gross coordinated movement (eg, balance, gait, locomotion, transfers, and transitions) and motor function  (motor control and motor learning)  [] For the musculoskeletal system: the assessment of gross symmetry, gross range of motion, gross strength, height, and weight  [] For the integumentary system: the assessment of pliability(texture), presence of scar formation, skin color, and skin integrity  [] For  cardiovascular/pulmonary system: the assessment of heart rate, respiratory rate, blood pressure, and edema     Activity limitations:    [] Patient's cognitive status and saf ety concerns          [] Status of current condition      [] Weight bearing restriction  [] Cardiopulmunary Restriction    Participation Restrictions:   [] Goals and goal agreement with the patient     [] Rehab potential (prognosis) and probable outcome      Examination of Body System: (criteria)    [] 07305 - addressing 1-2 elements    [] 33182 - addressing a total of 3 or more elements     [] 15403 -  Addressing a total of 4 or more elements         Clinical Presentation: (criteria)  Choose one     On examination of body system using standardized tests and measures patient presents with (CHOOSE ONE) elements from any of the following: body structures and functions, activity limitations, and/or participation restrictions.  Leading to a clinical presentation that is considered (CHOOSE ONE)                              Clinical Decision Making  (Eval Complexity):  Choose One     Time Tracking:     PT Received On: 02/20/18  PT Start Time: 0828     PT Stop Time: 0838  PT Total Time (min): 10 min     Billable Minutes: Evaluation 10 min      Jenn Redd, PT  02/20/2018

## 2018-02-20 NOTE — PLAN OF CARE
Problem: Patient Care Overview  Goal: Plan of Care Review  Outcome: Ongoing (interventions implemented as appropriate)  Dr. Tolliver at bedside throughout the shift and aware of vital signs, gtts, and assessments.  Orders received and implemented.  Hard to manage patient's pain and new orders placed.  Questions and concerns addressed with patient and his wife.    Problem: Ventilation, Mechanical Invasive (Adult)  Goal: Signs and Symptoms of Listed Potential Problems Will be Absent, Minimized or Managed (Ventilation, Mechanical Invasive)  Signs and symptoms of listed potential problems will be absent, minimized or managed by discharge/transition of care (reference Ventilation, Mechanical Invasive (Adult) CPG).   Outcome: Outcome(s) achieved Date Met: 02/19/18  Patient extubated per MD order    Problem: Airway, Artificial (Adult)  Goal: Signs and Symptoms of Listed Potential Problems Will be Absent, Minimized or Managed (Airway, Artificial)  Signs and symptoms of listed potential problems will be absent, minimized or managed by discharge/transition of care (reference Airway, Artificial (Adult) CPG).   Outcome: Outcome(s) achieved Date Met: 02/19/18  Patient extubated per MD order.

## 2018-02-20 NOTE — ANESTHESIA POSTPROCEDURE EVALUATION
"Anesthesia Post Evaluation    Patient: Mert Samayoa    Procedure(s) Performed: Procedure(s) (LRB):  TRANSPLANT-HEART-with standard backbench preparation. (N/A)  Remove LVAD (N/A)  REMOVAL-AICD (Right)    Final Anesthesia Type: general  Patient location during evaluation: ICU  Patient participation: Yes- Able to Participate  Level of consciousness: awake and alert  Post-procedure vital signs: reviewed and stable  Pain management: adequate  Airway patency: patent  PONV status at discharge: No PONV  Anesthetic complications: no      Cardiovascular status: blood pressure returned to baseline  Respiratory status: nasal cannula  Hydration status: euvolemic  Follow-up not needed.  Comments: Sitting up in bed, extubated. Smiling, no recall.         Visit Vitals  /61 (BP Location: Left arm, Patient Position: Lying)   Pulse 109   Temp 36.9 °C (98.5 °F) (Oral)   Resp 16   Ht 5' 7" (1.702 m)   Wt 84.5 kg (186 lb 4.6 oz)   SpO2 100%   BMI 29.18 kg/m²       Pain/Carlos A Score: Pain Assessment Performed: Yes (2/20/2018  3:00 AM)  Presence of Pain: complains of pain/discomfort (2/20/2018  5:00 AM)  Pain Rating Prior to Med Admin: 8 (2/20/2018  6:00 AM)  Pain Rating Post Med Admin: 7 (2/19/2018 10:05 PM)      "

## 2018-02-20 NOTE — PROGRESS NOTES
Attempted to get pt oob to chair, pt began complaining of nausea. IV zofran given. Will continue to monitor closely

## 2018-02-20 NOTE — PLAN OF CARE
Problem: Occupational Therapy Goal  Goal: Occupational Therapy Goal  Goals to be met by: 3/2/18     Patient will increase functional independence with ADLs by performing:    Feeding with Luce.  UE Dressing with Supervision.  LE Dressing with Supervision.  Grooming while standing at sink with Supervision.  Toileting from toilet with Supervision for hygiene and clothing management.   Toilet transfer to toilet with Supervision.    Outcome: Ongoing (interventions implemented as appropriate)  OT eval completed, and above goals established. ALISA Arguello  2/20/2018

## 2018-02-20 NOTE — PROGRESS NOTES
Dr Flor called and notified of patient MAP 85-87 sustained, despite pain control and lack of N/V. New order for antihypertensive received and implemented. Will continue to monitor closely.

## 2018-02-20 NOTE — ANESTHESIA POSTPROCEDURE EVALUATION
"Anesthesia Post Evaluation    Patient: Mert Samayoa    Procedure(s) Performed: Procedure(s) (LRB):  EXPLORATION-BLEEDING (RE-EXPLORATION) (Bilateral)    Final Anesthesia Type: general  Patient location during evaluation: ICU  Patient participation: Yes- Able to Participate  Level of consciousness: awake and alert  Post-procedure vital signs: reviewed and stable  Pain management: adequate  Airway patency: patent  PONV status at discharge: No PONV  Anesthetic complications: no      Cardiovascular status: blood pressure returned to baseline  Respiratory status: nasal cannula  Hydration status: euvolemic  Follow-up not needed.        Visit Vitals  /61 (BP Location: Left arm, Patient Position: Lying)   Pulse 109   Temp 36.9 °C (98.5 °F) (Oral)   Resp 16   Ht 5' 7" (1.702 m)   Wt 84.5 kg (186 lb 4.6 oz)   SpO2 100%   BMI 29.18 kg/m²       Pain/Carlos A Score: Pain Assessment Performed: Yes (2/20/2018  3:00 AM)  Presence of Pain: complains of pain/discomfort (2/20/2018  5:00 AM)  Pain Rating Prior to Med Admin: 8 (2/20/2018  6:00 AM)  Pain Rating Post Med Admin: 7 (2/19/2018 10:05 PM)      "

## 2018-02-20 NOTE — SUBJECTIVE & OBJECTIVE
Interval History: Extuabted yesterday afternoon. Only complaint this morning is some incisional pain.     Continuous Infusions:   DOBUTamine 5 mcg/kg/min (02/20/18 0600)    epinephrine 0.05 mcg/kg/min (02/19/18 1700)    furosemide (LASIX) 5 mg/mL infusion (non-titrating)      insulin (HUMAN R) infusion (adults) 1 Units/hr (02/20/18 0600)     Scheduled Meds:   acetaminophen  1,000 mg Intravenous Q8H    albuterol        aspirin  325 mg Oral Daily    ceFAZolin (ANCEF) IVPB  2 g Intravenous Q8H    famotidine (PF)  20 mg Intravenous BID    ipratropium  0.5 mg Nebulization Q4H    methylPREDNISolone sodium succinate  40 mg Intravenous Q12H    mupirocin  1 g Nasal BID    mycophenolate (CELLCEPT) IVPB  1,000 mg Intravenous Q12H    nystatin  500,000 Units Mouth/Throat QID (WM & HS)    polyethylene glycol  17 g Oral Daily    potassium chloride  20 mEq Intravenous Once    sodium chloride 0.9%  3 mL Intravenous Q8H    tacrolimus  1 mg Oral BID     PRN Meds:sodium chloride, albuterol sulfate, [COMPLETED] calcium gluconate IVPB **AND** calcium gluconate IVPB, dextrose 50%, dextrose 50%, [COMPLETED] dextrose 50% **AND** dextrose 50% **AND** [COMPLETED] insulin regular, fentaNYL, fentaNYL, lactated ringers, lactulose, magnesium sulfate IVPB, metoclopramide HCl, ondansetron, oxyCODONE, oxyCODONE, potassium chloride **AND** potassium chloride **AND** potassium chloride    Review of patient's allergies indicates:  No Known Allergies  Objective:     Vital Signs (Most Recent):  Temp: 98.5 °F (36.9 °C) (02/20/18 0300)  Pulse: 109 (02/20/18 0600)  Resp: 16 (02/20/18 0600)  BP: 125/61 (02/20/18 0600)  SpO2: 100 % (02/20/18 0600) Vital Signs (24h Range):  Temp:  [97 °F (36.1 °C)-98.5 °F (36.9 °C)] 98.5 °F (36.9 °C)  Pulse:  [109-110] 109  Resp:  [0-42] 16  SpO2:  [98 %-100 %] 100 %  BP: (114-147)/(61-72) 125/61  Arterial Line BP: (102-156)/(43-73) 147/57     Patient Vitals for the past 72 hrs (Last 3 readings):   Weight    02/20/18 0500 84.5 kg (186 lb 4.6 oz)   02/18/18 0700 73.1 kg (161 lb 2.5 oz)     Body mass index is 29.18 kg/m².      Intake/Output Summary (Last 24 hours) at 02/20/18 0755  Last data filed at 02/20/18 0701   Gross per 24 hour   Intake             3249 ml   Output             3310 ml   Net              -61 ml     Physical Exam   Constitutional: He is oriented to person, place, and time. He appears well-developed and well-nourished.   HENT:   Head: Normocephalic.   Eyes: Pupils are equal, round, and reactive to light.   Neck: Normal range of motion. Neck supple.   RIJ line in place.    Cardiovascular: Normal rate and regular rhythm.    Pulmonary/Chest: Effort normal and breath sounds normal.   Abdominal: Soft. Bowel sounds are normal.   Musculoskeletal: Normal range of motion.   Neurological: He is alert and oriented to person, place, and time.   Skin: Skin is warm and dry.   Psychiatric: He has a normal mood and affect. His behavior is normal.   Nursing note and vitals reviewed.    Significant Labs:  CBC:    Recent Labs  Lab 02/19/18  1629 02/19/18 2000 02/20/18  0400   WBC 22.15* 10.27 11.61   RBC 2.73* 2.71* 2.42*   HGB 8.4* 8.3* 7.4*   HCT 23.6* 23.6* 21.3*   PLT 74* 49* 58*   MCV 86 87 88   MCH 30.8 30.6 30.6   MCHC 35.6 35.2 34.7     BNP:  No results for input(s): BNP in the last 168 hours.    Invalid input(s): BNPTRIAGELBLO  CMP:    Recent Labs  Lab 02/17/18  0354  02/19/18  0120  02/19/18 2000 02/20/18  0000 02/20/18  0200 02/20/18  0400   GLU 80  < >  --   < > 131*  --  140*  --  162*   CALCIUM 9.6  < >  --   < > 8.2*  --  8.2*  --  8.2*   ALBUMIN 4.2  --  3.6  --   --   --   --   --   --    PROT 7.4  --  5.3*  --   --   --   --   --   --      < >  --   < > 137  --  137  --  139   K 3.7  < >  --   < > 7.5*  < > 6.0*  6.0* 5.0 4.6   CO2 25  < >  --   < > 24  --  24  --  27     < >  --   < > 109  --  105  --  105   BUN 14  < >  --   < > 19  --  19  --  19   CREATININE 1.0  < >  --   < > 1.0   --  1.1  --  1.1   ALKPHOS 76  --  41*  --   --   --   --   --   --    ALT 19  --  16  --   --   --   --   --   --    AST 20  --  55*  --   --   --   --   --   --    BILITOT 0.7  --  1.5*  --   --   --   --   --   --    < > = values in this interval not displayed.   Coagulation:     Recent Labs  Lab 02/17/18  0354 02/18/18  1959 02/19/18  0839  02/19/18  1629 02/19/18 2000 02/20/18  0400   INR 2.4* 1.2  < >  --   < > 1.7* 1.7* 1.6*   APTT 35.2* 27.6  --  36.2*  --   --   --   --    < > = values in this interval not displayed.  LDH:  No results for input(s): LDH in the last 72 hours.  Microbiology:  Microbiology Results (last 7 days)     Procedure Component Value Units Date/Time    Urine culture [[id number]] Collected:  02/17/18 0425    Order Status:  Completed Specimen:  Urine from Urine, Clean Catch Updated:  02/18/18 1119     Urine Culture, Routine No growth    Narrative:       1 cup of urine          I have reviewed all pertinent labs within the past 24 hours.    Estimated Creatinine Clearance: 103.9 mL/min (based on SCr of 1.1 mg/dL).    Diagnostic Results:  I have reviewed all pertinent imaging results/findings within the past 24 hours.

## 2018-02-20 NOTE — TELEPHONE ENCOUNTER
----- Message from Susan Carmen sent at 2/20/2018 10:31 AM CST -----  Contact: pt wife  Please call pt wife at [phone]. Patient no longer has a defibrillator and need to know where to return the cell phone linked to the device?    Thank you

## 2018-02-20 NOTE — NURSING
Rounds Report: Attended interdisciplinary rounds this morning with the transplant team including SW, physicians, fellows,  mid-level providers, and transplant coordinators.  Discussed plan of care, including DC date, current medications and current plan to proceed with to move pt to TSU on 2/21/18.

## 2018-02-20 NOTE — SUBJECTIVE & OBJECTIVE
Subjective:     Post-Op Info:  Procedure(s) (LRB):  EXPLORATION-BLEEDING (RE-EXPLORATION) (Bilateral)   1 Day Post-Op        Interval History:   Taken back yesterday for bleeding. Has been stable since. Extubated yesterday morning. Issues with pain. Hyperkalemic, shifted overnight.       Medications:  Continuous Infusions:   DOBUTamine 5 mcg/kg/min (02/20/18 0800)    epinephrine 0.05 mcg/kg/min (02/19/18 1700)    furosemide (LASIX) 5 mg/mL infusion (non-titrating)      insulin (HUMAN R) infusion (adults) 1.4 Units/hr (02/20/18 0800)    morphine       Scheduled Meds:   acetaminophen  1,000 mg Intravenous Q8H    albuterol        aspirin  325 mg Oral Daily    famotidine (PF)  20 mg Intravenous BID    ipratropium  0.5 mg Nebulization Q4H    methylPREDNISolone sodium succinate  40 mg Intravenous Q12H    mupirocin  1 g Nasal BID    mycophenolate (CELLCEPT) IVPB  1,000 mg Intravenous Q12H    nystatin  500,000 Units Mouth/Throat QID (WM & HS)    polyethylene glycol  17 g Oral Daily    polyethylene glycol  17 g Oral BID    potassium chloride  20 mEq Intravenous Once    sodium chloride 0.9%  3 mL Intravenous Q8H    tacrolimus  1 mg Oral BID     PRN Meds:sodium chloride, albuterol sulfate, [COMPLETED] calcium gluconate IVPB **AND** calcium gluconate IVPB, dextrose 50%, dextrose 50%, [COMPLETED] dextrose 50% **AND** dextrose 50% **AND** [COMPLETED] insulin regular, lactated ringers, lactulose, magnesium sulfate IVPB, metoclopramide HCl, naloxone, ondansetron, potassium chloride **AND** potassium chloride **AND** potassium chloride     Objective:     Vital Signs (Most Recent):  Temp: 98.4 °F (36.9 °C) (02/20/18 0701)  Pulse: 110 (02/20/18 0845)  Resp: 20 (02/20/18 0845)  BP: 139/86 (02/20/18 0800)  SpO2: 100 % (02/20/18 0845) Vital Signs (24h Range):  Temp:  [98.3 °F (36.8 °C)-98.5 °F (36.9 °C)] 98.4 °F (36.9 °C)  Pulse:  [109-111] 110  Resp:  [11-42] 20  SpO2:  [85 %-100 %] 100 %  BP: (114-147)/(61-86)  139/86  Arterial Line BP: (104-165)/(44-73) 139/55       Intake/Output Summary (Last 24 hours) at 02/20/18 0904  Last data filed at 02/20/18 0701   Gross per 24 hour   Intake             2749 ml   Output             2975 ml   Net             -226 ml       Physical Exam   Constitutional: He is oriented to person, place, and time. He appears well-developed and well-nourished. No distress.   Cardiovascular:   Paced at 110   Pulmonary/Chest: Effort normal and breath sounds normal. No respiratory distress.   Abdominal: Soft. He exhibits no distension.   Neurological: He is alert and oriented to person, place, and time.   Skin: Skin is warm and dry.   Psychiatric: He has a normal mood and affect.       Significant Labs:  BMP:   Recent Labs  Lab 02/20/18  0400 02/20/18  0722   *  --      --    K 4.6 5.4*     --    CO2 27  --    BUN 19  --    CREATININE 1.1  --    CALCIUM 8.2*  --    MG 2.1  --      CBC:   Recent Labs  Lab 02/20/18  0400   WBC 11.61   RBC 2.42*   HGB 7.4*   HCT 21.3*   PLT 58*   MCV 88   MCH 30.6   MCHC 34.7       Significant Diagnostics:  CXR: I have reviewed all pertinent results/findings within the past 24 hours

## 2018-02-20 NOTE — SUBJECTIVE & OBJECTIVE
"Interval HPI:   Overnight events: returned to OR yesterday AM. Remains in ICU, Extubated yesterday afternoon. Solumedrol tapered to 40mg BID today.   BG reasonably controlled, on significantly lower rates of IV insulin requiring 1 - 1.4u/hr  Eating:   <25% sugar free CL diet  Nausea: Yes, no vomit, Zofran prn  Hypoglycemia and intervention: No  Fever: No  TPN and/or TF: No    /61 (BP Location: Left arm, Patient Position: Lying)   Pulse 109   Temp 98.5 °F (36.9 °C) (Oral)   Resp 16   Ht 5' 7" (1.702 m)   Wt 84.5 kg (186 lb 4.6 oz)   SpO2 100%   BMI 29.18 kg/m²     Labs Reviewed and Include      Recent Labs  Lab 02/20/18  0400   *   CALCIUM 8.2*      K 4.6   CO2 27      BUN 19   CREATININE 1.1     Lab Results   Component Value Date    WBC 11.61 02/20/2018    HGB 7.4 (L) 02/20/2018    HCT 21.3 (L) 02/20/2018    MCV 88 02/20/2018    PLT 58 (L) 02/20/2018     No results for input(s): TSH, FREET4 in the last 168 hours.  Lab Results   Component Value Date    HGBA1C 5.4 01/17/2017     Nutritional status:   Body mass index is 29.18 kg/m².  Lab Results   Component Value Date    ALBUMIN 3.6 02/19/2018    ALBUMIN 4.2 02/17/2018    ALBUMIN 4.4 01/25/2018     Lab Results   Component Value Date    PREALBUMIN 23 01/25/2018    PREALBUMIN 28 12/28/2017    PREALBUMIN 27 11/29/2017       Estimated Creatinine Clearance: 103.9 mL/min (based on SCr of 1.1 mg/dL).    Accu-Checks  Recent Labs      02/20/18   0104  02/20/18   0130  02/20/18   0202  02/20/18   0232  02/20/18   0308  02/20/18   0336  02/20/18   0401  02/20/18   0459  02/20/18   0604  02/20/18   0721   POCTGLUCOSE  127*  114*  122*  121*  199*  163*  167*  139*  122*  117*       Current Medications and/or Treatments Impacting Glycemic Control  Immunotherapy:  Immunosuppressants         Stop Route Frequency     tacrolimus capsule 1 mg      -- Oral 2 times daily     mycophenolate (CELLCEPT) 1,000 mg in dextrose 5 % 250 mL IVPB      -- IV Every 12 " hours        Steroids:   Hormones     Start     Stop Route Frequency Ordered    02/20/18 0900  methylPREDNISolone sodium succinate injection 40 mg      -- IV Every 12 hours 02/19/18 1041        Pressors:    Autonomic Drugs     Start     Stop Route Frequency Ordered    02/18/18 2100  epinephrine 4 mg in sodium chloride 0.9% 250 mL infusion     Question Answer Comment   Titrate by: (in mcg/kg/min) 0.05    Titrate interval: (in minutes) 5    Titrate to maintain: (SBP or MAP or Cardiac Index) CARDIAC INDEX    Cardiac index greater than: (in L/min) 2.2    Maximum dose: (in mcg/kg/min) 2        -- IV Continuous 02/18/18 1957        Hyperglycemia/Diabetes Medications: Antihyperglycemics     Start     Stop Route Frequency Ordered    02/18/18 2100  insulin regular (Humulin R) 100 Units in sodium chloride 0.9% 100 mL infusion      -- IV Continuous 02/18/18 1957

## 2018-02-20 NOTE — PROGRESS NOTES
"[facility]  Endocrinology  Progress Note    Admit Date: 2/17/2018     Reason for Consult: Management of hyperglycemia     Surgical Procedure and Date: s/p OHT 2/18/18; s/p mediastinal exploration 2/19/18    HPI: Patient is a 28 y.o. male with a diagnosis of familial cardiomyopathy. Underwent heart transplant, received IV Solumedrol in OR. Return to OR for possible bleed. Initially high IV insulin requirements up to 48 u/hr. No personal or family hx of DM.   Lab Results   Component Value Date    HGBA1C 5.4 01/17/2017     Endocrine consulted for BG mgmt.             Interval HPI:   Overnight events: returned to OR yesterday AM. Remains in ICU, Extubated yesterday afternoon. Solumedrol tapered to 40mg BID today.   BG reasonably controlled, on significantly lower rates of IV insulin requiring 1 - 1.4u/hr  Eating:   <25% sugar free CL diet  Nausea: Yes, no vomit, Zofran prn  Hypoglycemia and intervention: No  Fever: No  TPN and/or TF: No    /61 (BP Location: Left arm, Patient Position: Lying)   Pulse 109   Temp 98.5 °F (36.9 °C) (Oral)   Resp 16   Ht 5' 7" (1.702 m)   Wt 84.5 kg (186 lb 4.6 oz)   SpO2 100%   BMI 29.18 kg/m²       Labs Reviewed and Include      Recent Labs  Lab 02/20/18  0400   *   CALCIUM 8.2*      K 4.6   CO2 27      BUN 19   CREATININE 1.1     Lab Results   Component Value Date    WBC 11.61 02/20/2018    HGB 7.4 (L) 02/20/2018    HCT 21.3 (L) 02/20/2018    MCV 88 02/20/2018    PLT 58 (L) 02/20/2018     No results for input(s): TSH, FREET4 in the last 168 hours.  Lab Results   Component Value Date    HGBA1C 5.4 01/17/2017     Nutritional status:   Body mass index is 29.18 kg/m².  Lab Results   Component Value Date    ALBUMIN 3.6 02/19/2018    ALBUMIN 4.2 02/17/2018    ALBUMIN 4.4 01/25/2018     Lab Results   Component Value Date    PREALBUMIN 23 01/25/2018    PREALBUMIN 28 12/28/2017    PREALBUMIN 27 11/29/2017       Estimated Creatinine Clearance: " 103.9 mL/min (based on SCr of 1.1 mg/dL).    Accu-Checks  Recent Labs      02/20/18   0104  02/20/18   0130  02/20/18   0202  02/20/18   0232  02/20/18   0308  02/20/18   0336  02/20/18   0401  02/20/18   0459  02/20/18   0604  02/20/18   0721   POCTGLUCOSE  127*  114*  122*  121*  199*  163*  167*  139*  122*  117*       Current Medications and/or Treatments Impacting Glycemic Control  Immunotherapy:  Immunosuppressants         Stop Route Frequency     tacrolimus capsule 1 mg      -- Oral 2 times daily     mycophenolate (CELLCEPT) 1,000 mg in dextrose 5 % 250 mL IVPB      -- IV Every 12 hours        Steroids:   Hormones     Start     Stop Route Frequency Ordered    02/20/18 0900  methylPREDNISolone sodium succinate injection 40 mg      -- IV Every 12 hours 02/19/18 1041        Pressors:    Autonomic Drugs     Start     Stop Route Frequency Ordered    02/18/18 2100  epinephrine 4 mg in sodium chloride 0.9% 250 mL infusion     Question Answer Comment   Titrate by: (in mcg/kg/min) 0.05    Titrate interval: (in minutes) 5    Titrate to maintain: (SBP or MAP or Cardiac Index) CARDIAC INDEX    Cardiac index greater than: (in L/min) 2.2    Maximum dose: (in mcg/kg/min) 2        -- IV Continuous 02/18/18 1957        Hyperglycemia/Diabetes Medications: Antihyperglycemics     Start     Stop Route Frequency Ordered    02/18/18 2100  insulin regular (Humulin R) 100 Units in sodium chloride 0.9% 100 mL infusion      -- IV Continuous 02/18/18 1957          ASSESSMENT and PLAN    * Heart transplanted    Per HTS  avoid hypoglycemia            Acute hyperglycemia    BG goal 140-180; Continue IV insulin infusion protocol, requiring intensive BG monitoring q1hr.     ADDENDUM: nausea resolved, would like to advance diet to cardiac diet. Convert to IV transition infusion rate at 1.2u/hr with BG ac/hs/0200. Will assess for prandial elevations given steroid dosing.           Familial cardiomyopathy    S/p OHT this admission, per HTS           Adverse effect of glucocorticoids and synthetic analogues, initial encounter    May elevate BG readings; currently on Solumedrol IV          Immunosuppression    may contribute to insulin resistance                JEFF Salinas,ANP-C  Endocrinology  [facility]

## 2018-02-20 NOTE — SUBJECTIVE & OBJECTIVE
Interval History: NAEON. Now extubated. OOB with PT this AM .     Continuous Infusions:   DOBUTamine 5 mcg/kg/min (02/20/18 0800)    epinephrine 0.05 mcg/kg/min (02/19/18 1700)    furosemide (LASIX) 5 mg/mL infusion (non-titrating)      insulin (HUMAN R) infusion (adults) 1.4 Units/hr (02/20/18 0800)     Scheduled Meds:   acetaminophen  1,000 mg Intravenous Q8H    albuterol        [START ON 2/21/2018] aspirin  81 mg Oral Daily    famotidine  20 mg Oral BID    ipratropium  0.5 mg Nebulization Q4H    methylPREDNISolone sodium succinate  40 mg Intravenous Q12H    mupirocin  1 g Nasal BID    mycophenolate (CELLCEPT) IVPB  1,000 mg Intravenous Q12H    mycophenolate  1,000 mg Oral BID    nystatin  500,000 Units Mouth/Throat QID (WM & HS)    polyethylene glycol  17 g Oral Daily    polyethylene glycol  17 g Oral BID    sodium chloride 0.9%  3 mL Intravenous Q8H    tacrolimus  1 mg Oral BID     PRN Meds:sodium chloride, albuterol sulfate, [COMPLETED] calcium gluconate IVPB **AND** calcium gluconate IVPB, dextrose 50%, dextrose 50%, [COMPLETED] dextrose 50% **AND** dextrose 50% **AND** [COMPLETED] insulin regular, fentaNYL, lactated ringers, lactulose, magnesium sulfate IVPB, metoclopramide HCl, ondansetron, oxyCODONE, oxyCODONE, potassium chloride **AND** potassium chloride **AND** potassium chloride    Review of patient's allergies indicates:  No Known Allergies  Objective:     Vital Signs (Most Recent):  Temp: 98.4 °F (36.9 °C) (02/20/18 0701)  Pulse: 110 (02/20/18 0845)  Resp: 20 (02/20/18 0845)  BP: 139/86 (02/20/18 0800)  SpO2: 100 % (02/20/18 0845) Vital Signs (24h Range):  Temp:  [98.3 °F (36.8 °C)-98.5 °F (36.9 °C)] 98.4 °F (36.9 °C)  Pulse:  [109-111] 110  Resp:  [11-42] 20  SpO2:  [85 %-100 %] 100 %  BP: (114-147)/(61-86) 139/86  Arterial Line BP: (109-165)/(44-73) 139/55     Patient Vitals for the past 72 hrs (Last 3 readings):   Weight   02/20/18 0500 84.5 kg (186 lb 4.6 oz)   02/18/18 0700 73.1  kg (161 lb 2.5 oz)     Body mass index is 29.18 kg/m².      Intake/Output Summary (Last 24 hours) at 02/20/18 0951  Last data filed at 02/20/18 0701   Gross per 24 hour   Intake             2749 ml   Output             2975 ml   Net             -226 ml       Hemodynamic Parameters:         Physical Exam   Constitutional: No distress.   Neck: No tracheal deviation present.   Cardiovascular: Normal rate and regular rhythm.  Exam reveals no gallop and no friction rub.    Pulmonary/Chest: Breath sounds normal. He has no wheezes. He has no rales.   Abdominal: Soft. Bowel sounds are normal.   Musculoskeletal: He exhibits no edema.   Neurological:   Intubated, sedated.   Skin: Skin is warm and dry.   Nursing note and vitals reviewed.      Significant Labs:  CBC:    Recent Labs  Lab 02/19/18  1629 02/19/18 2000 02/20/18  0400   WBC 22.15* 10.27 11.61   RBC 2.73* 2.71* 2.42*   HGB 8.4* 8.3* 7.4*   HCT 23.6* 23.6* 21.3*   PLT 74* 49* 58*   MCV 86 87 88   MCH 30.8 30.6 30.6   MCHC 35.6 35.2 34.7     BNP:  No results for input(s): BNP in the last 168 hours.    Invalid input(s): BNPTRIAGELBLO  CMP:    Recent Labs  Lab 02/17/18  0354  02/19/18  0120  02/19/18 2000 02/20/18  0000 02/20/18  0200 02/20/18  0400 02/20/18  0722   GLU 80  < >  --   < > 131*  --  140*  --  162*  --    CALCIUM 9.6  < >  --   < > 8.2*  --  8.2*  --  8.2*  --    ALBUMIN 4.2  --  3.6  --   --   --   --   --   --   --    PROT 7.4  --  5.3*  --   --   --   --   --   --   --      < >  --   < > 137  --  137  --  139  --    K 3.7  < >  --   < > 7.5*  < > 6.0*  6.0* 5.0 4.6 5.4*   CO2 25  < >  --   < > 24  --  24  --  27  --      < >  --   < > 109  --  105  --  105  --    BUN 14  < >  --   < > 19  --  19  --  19  --    CREATININE 1.0  < >  --   < > 1.0  --  1.1  --  1.1  --    ALKPHOS 76  --  41*  --   --   --   --   --   --   --    ALT 19  --  16  --   --   --   --   --   --   --    AST 20  --  55*  --   --   --   --   --   --   --    BILITOT 0.7   --  1.5*  --   --   --   --   --   --   --    < > = values in this interval not displayed.   Coagulation:     Recent Labs  Lab 02/17/18  0354 02/18/18  1959  02/19/18  0839  02/19/18  1629 02/19/18 2000 02/20/18  0400   INR 2.4* 1.2  < >  --   < > 1.7* 1.7* 1.6*   APTT 35.2* 27.6  --  36.2*  --   --   --   --    < > = values in this interval not displayed.  LDH:  No results for input(s): LDH in the last 72 hours.  Microbiology:  Microbiology Results (last 7 days)     Procedure Component Value Units Date/Time    Urine culture [[id number]] Collected:  02/17/18 0425    Order Status:  Completed Specimen:  Urine from Urine, Clean Catch Updated:  02/18/18 1119     Urine Culture, Routine No growth    Narrative:       1 cup of urine          I have reviewed all pertinent labs within the past 24 hours.    Estimated Creatinine Clearance: 103.9 mL/min (based on SCr of 1.1 mg/dL).    Diagnostic Results:  I have reviewed all pertinent imaging results/findings within the past 24 hours.

## 2018-02-20 NOTE — ASSESSMENT & PLAN NOTE
-Transplant Date 2/18/18. S/P washout 2/19.   -CMV Status:D+/R-   -Rejection status: Moderate Risk  -Rejection episodes: none to date  -Induction: No   -hemodynamically stable on  5.   -Current immunosuppression: Mehtylpred 40IV BID, Prograf 1mg BID, Cellcept 1000 mg BID.  -Opportunistic PPx with:Nystatin

## 2018-02-20 NOTE — PLAN OF CARE
Problem: Patient Care Overview  Goal: Plan of Care Review  Outcome: Ongoing (interventions implemented as appropriate)  Pt vitals stable overnight. Dobutamine gtt infusing per order. Pt shifted twice for hyperkalemia. Q1h accuchecks, insulin gtt titrated per nomogram. 20mg lasix given, urine output responded. Chest tube output appropriate. Pain control remains an issue. Scheduled tylenol and prn fentanyl and oxy given, but pain still remains above a 7. MD aware of all current labs, vitals, and concerns. POC reviewed with pt and spouse. RN at bs to monitor and answer all questions. Emotional support given as needed. See flowsheets for full assessment details.

## 2018-02-21 LAB
ABO + RH BLD: NORMAL
ALBUMIN SERPL BCP-MCNC: 3.8 G/DL
ALLENS TEST: ABNORMAL
ALP SERPL-CCNC: 53 U/L
ALT SERPL W/O P-5'-P-CCNC: 22 U/L
ANION GAP SERPL CALC-SCNC: 15 MMOL/L
ANISOCYTOSIS BLD QL SMEAR: SLIGHT
APTT BLDCRRT: 27 SEC
AST SERPL-CCNC: 46 U/L
BASO STIPL BLD QL SMEAR: ABNORMAL
BASOPHILS # BLD AUTO: 0.03 K/UL
BASOPHILS NFR BLD: 0.1 %
BILIRUB SERPL-MCNC: 0.8 MG/DL
BILIRUB UR QL STRIP: NEGATIVE
BLD GP AB SCN CELLS X3 SERPL QL: NORMAL
BLD PROD TYP BPU: NORMAL
BLOOD UNIT EXPIRATION DATE: NORMAL
BLOOD UNIT TYPE CODE: 5100
BLOOD UNIT TYPE CODE: 9500
BLOOD UNIT TYPE: NORMAL
BUN SERPL-MCNC: 37 MG/DL
CALCIUM SERPL-MCNC: 8.7 MG/DL
CHLORIDE SERPL-SCNC: 92 MMOL/L
CLARITY UR REFRACT.AUTO: CLEAR
CO2 SERPL-SCNC: 29 MMOL/L
CODING SYSTEM: NORMAL
COLOR UR AUTO: YELLOW
CREAT SERPL-MCNC: 1.8 MG/DL
DIASTOLIC DYSFUNCTION: NO
DIFFERENTIAL METHOD: ABNORMAL
DISPENSE STATUS: NORMAL
EOSINOPHIL # BLD AUTO: 0 K/UL
EOSINOPHIL NFR BLD: 0 %
ERYTHROCYTE [DISTWIDTH] IN BLOOD BY AUTOMATED COUNT: 14.6 %
EST. GFR  (AFRICAN AMERICAN): 57.9 ML/MIN/1.73 M^2
EST. GFR  (NON AFRICAN AMERICAN): 50.1 ML/MIN/1.73 M^2
GLUCOSE SERPL-MCNC: 139 MG/DL
GLUCOSE UR QL STRIP: NEGATIVE
HCO3 UR-SCNC: 36.8 MMOL/L (ref 24–28)
HCT VFR BLD AUTO: 25 %
HCT VFR BLD CALC: 23 %PCV (ref 36–54)
HGB BLD-MCNC: 8.5 G/DL
HGB UR QL STRIP: NEGATIVE
HYPOCHROMIA BLD QL SMEAR: ABNORMAL
IMM GRANULOCYTES # BLD AUTO: 0.87 K/UL
IMM GRANULOCYTES NFR BLD AUTO: 3.2 %
INR PPP: 1.2
KETONES UR QL STRIP: NEGATIVE
LEUKOCYTE ESTERASE UR QL STRIP: NEGATIVE
LYMPHOCYTES # BLD AUTO: 0.7 K/UL
LYMPHOCYTES NFR BLD: 2.7 %
MAGNESIUM SERPL-MCNC: 1.9 MG/DL
MAGNESIUM SERPL-MCNC: 2 MG/DL
MAGNESIUM SERPL-MCNC: 2 MG/DL
MAGNESIUM SERPL-MCNC: 2.1 MG/DL
MAGNESIUM SERPL-MCNC: 2.2 MG/DL
MCH RBC QN AUTO: 30.5 PG
MCHC RBC AUTO-ENTMCNC: 34 G/DL
MCV RBC AUTO: 90 FL
MONOCYTES # BLD AUTO: 1.3 K/UL
MONOCYTES NFR BLD: 4.7 %
NEUTROPHILS # BLD AUTO: 24.6 K/UL
NEUTROPHILS NFR BLD: 89.3 %
NITRITE UR QL STRIP: NEGATIVE
NRBC BLD-RTO: 0 /100 WBC
NUM UNITS TRANS FFP: NORMAL
NUM UNITS TRANS PACKED RBC: NORMAL
PCO2 BLDA: 47.3 MMHG (ref 35–45)
PH SMN: 7.5 [PH] (ref 7.35–7.45)
PH UR STRIP: 5 [PH] (ref 5–8)
PHOSPHATE SERPL-MCNC: 4.8 MG/DL
PLATELET # BLD AUTO: 131 K/UL
PLATELET BLD QL SMEAR: ABNORMAL
PMV BLD AUTO: 10.5 FL
PO2 BLDA: 59 MMHG (ref 80–100)
POC BE: 14 MMOL/L
POC IONIZED CALCIUM: 1.01 MMOL/L (ref 1.06–1.42)
POC SATURATED O2: 92 % (ref 95–100)
POC TCO2: 38 MMOL/L (ref 23–27)
POCT GLUCOSE: 118 MG/DL (ref 70–110)
POCT GLUCOSE: 122 MG/DL (ref 70–110)
POCT GLUCOSE: 130 MG/DL (ref 70–110)
POCT GLUCOSE: 133 MG/DL (ref 70–110)
POCT GLUCOSE: 133 MG/DL (ref 70–110)
POCT GLUCOSE: 146 MG/DL (ref 70–110)
POLYCHROMASIA BLD QL SMEAR: ABNORMAL
POTASSIUM BLD-SCNC: 3.8 MMOL/L (ref 3.5–5.1)
POTASSIUM SERPL-SCNC: 4.2 MMOL/L
POTASSIUM SERPL-SCNC: 4.4 MMOL/L
POTASSIUM SERPL-SCNC: 4.6 MMOL/L
POTASSIUM SERPL-SCNC: 4.7 MMOL/L
POTASSIUM SERPL-SCNC: 4.8 MMOL/L
PROT SERPL-MCNC: 6.3 G/DL
PROT UR QL STRIP: NEGATIVE
PROTHROMBIN TIME: 12.3 SEC
RBC # BLD AUTO: 2.79 M/UL
RETIRED EF AND QEF - SEE NOTES: 65 (ref 55–65)
SAMPLE: ABNORMAL
SITE: ABNORMAL
SODIUM BLD-SCNC: 130 MMOL/L (ref 136–145)
SODIUM SERPL-SCNC: 136 MMOL/L
SP GR UR STRIP: 1.01 (ref 1–1.03)
TACROLIMUS BLD-MCNC: 4.4 NG/ML
URN SPEC COLLECT METH UR: NORMAL
UROBILINOGEN UR STRIP-ACNC: NEGATIVE EU/DL
WBC # BLD AUTO: 27.51 K/UL

## 2018-02-21 PROCEDURE — 84132 ASSAY OF SERUM POTASSIUM: CPT | Mod: 91

## 2018-02-21 PROCEDURE — 85025 COMPLETE CBC W/AUTO DIFF WBC: CPT

## 2018-02-21 PROCEDURE — 37799 UNLISTED PX VASCULAR SURGERY: CPT

## 2018-02-21 PROCEDURE — G8987 SELF CARE CURRENT STATUS: HCPCS | Mod: CK

## 2018-02-21 PROCEDURE — 85014 HEMATOCRIT: CPT

## 2018-02-21 PROCEDURE — 93010 ELECTROCARDIOGRAM REPORT: CPT | Mod: ,,, | Performed by: INTERNAL MEDICINE

## 2018-02-21 PROCEDURE — 85730 THROMBOPLASTIN TIME PARTIAL: CPT

## 2018-02-21 PROCEDURE — 63600175 PHARM REV CODE 636 W HCPCS: Performed by: STUDENT IN AN ORGANIZED HEALTH CARE EDUCATION/TRAINING PROGRAM

## 2018-02-21 PROCEDURE — 25000003 PHARM REV CODE 250: Performed by: THORACIC SURGERY (CARDIOTHORACIC VASCULAR SURGERY)

## 2018-02-21 PROCEDURE — 99232 SBSQ HOSP IP/OBS MODERATE 35: CPT | Mod: ,,, | Performed by: NURSE PRACTITIONER

## 2018-02-21 PROCEDURE — 80048 BASIC METABOLIC PNL TOTAL CA: CPT

## 2018-02-21 PROCEDURE — 82955 ASSAY OF G6PD ENZYME: CPT

## 2018-02-21 PROCEDURE — 20000000 HC ICU ROOM

## 2018-02-21 PROCEDURE — 63600175 PHARM REV CODE 636 W HCPCS: Performed by: INTERNAL MEDICINE

## 2018-02-21 PROCEDURE — 63600175 PHARM REV CODE 636 W HCPCS: Performed by: THORACIC SURGERY (CARDIOTHORACIC VASCULAR SURGERY)

## 2018-02-21 PROCEDURE — 85610 PROTHROMBIN TIME: CPT

## 2018-02-21 PROCEDURE — 83735 ASSAY OF MAGNESIUM: CPT

## 2018-02-21 PROCEDURE — 97116 GAIT TRAINING THERAPY: CPT

## 2018-02-21 PROCEDURE — 80197 ASSAY OF TACROLIMUS: CPT

## 2018-02-21 PROCEDURE — 82330 ASSAY OF CALCIUM: CPT

## 2018-02-21 PROCEDURE — 27000221 HC OXYGEN, UP TO 24 HOURS

## 2018-02-21 PROCEDURE — 84295 ASSAY OF SERUM SODIUM: CPT

## 2018-02-21 PROCEDURE — 93306 TTE W/DOPPLER COMPLETE: CPT | Mod: 26,,, | Performed by: INTERNAL MEDICINE

## 2018-02-21 PROCEDURE — 97535 SELF CARE MNGMENT TRAINING: CPT

## 2018-02-21 PROCEDURE — 80053 COMPREHEN METABOLIC PANEL: CPT

## 2018-02-21 PROCEDURE — 81003 URINALYSIS AUTO W/O SCOPE: CPT

## 2018-02-21 PROCEDURE — 63600175 PHARM REV CODE 636 W HCPCS: Performed by: NURSE PRACTITIONER

## 2018-02-21 PROCEDURE — 25000003 PHARM REV CODE 250: Performed by: INTERNAL MEDICINE

## 2018-02-21 PROCEDURE — 94761 N-INVAS EAR/PLS OXIMETRY MLT: CPT

## 2018-02-21 PROCEDURE — 99233 SBSQ HOSP IP/OBS HIGH 50: CPT | Mod: ,,, | Performed by: INTERNAL MEDICINE

## 2018-02-21 PROCEDURE — 99900035 HC TECH TIME PER 15 MIN (STAT)

## 2018-02-21 PROCEDURE — A4216 STERILE WATER/SALINE, 10 ML: HCPCS | Performed by: THORACIC SURGERY (CARDIOTHORACIC VASCULAR SURGERY)

## 2018-02-21 PROCEDURE — 25000003 PHARM REV CODE 250: Performed by: NURSE PRACTITIONER

## 2018-02-21 PROCEDURE — 84132 ASSAY OF SERUM POTASSIUM: CPT

## 2018-02-21 PROCEDURE — 93306 TTE W/DOPPLER COMPLETE: CPT

## 2018-02-21 PROCEDURE — 82803 BLOOD GASES ANY COMBINATION: CPT

## 2018-02-21 PROCEDURE — 25000003 PHARM REV CODE 250: Performed by: STUDENT IN AN ORGANIZED HEALTH CARE EDUCATION/TRAINING PROGRAM

## 2018-02-21 PROCEDURE — 84100 ASSAY OF PHOSPHORUS: CPT

## 2018-02-21 RX ORDER — AMOXICILLIN 250 MG
2 CAPSULE ORAL 2 TIMES DAILY
Status: DISCONTINUED | OUTPATIENT
Start: 2018-02-21 | End: 2018-03-05 | Stop reason: HOSPADM

## 2018-02-21 RX ORDER — DILTIAZEM HYDROCHLORIDE 5 MG/ML
20 INJECTION INTRAVENOUS ONCE
Status: DISCONTINUED | OUTPATIENT
Start: 2018-02-22 | End: 2018-02-22

## 2018-02-21 RX ORDER — HYDRALAZINE HYDROCHLORIDE 20 MG/ML
5 INJECTION INTRAMUSCULAR; INTRAVENOUS ONCE
Status: COMPLETED | OUTPATIENT
Start: 2018-02-21 | End: 2018-02-21

## 2018-02-21 RX ORDER — HYDRALAZINE HYDROCHLORIDE 20 MG/ML
10 INJECTION INTRAMUSCULAR; INTRAVENOUS EVERY 6 HOURS PRN
Status: DISPENSED | OUTPATIENT
Start: 2018-02-21 | End: 2018-02-22

## 2018-02-21 RX ADMIN — OXYCODONE HYDROCHLORIDE 10 MG: 5 TABLET ORAL at 05:02

## 2018-02-21 RX ADMIN — METOCLOPRAMIDE 5 MG: 5 INJECTION, SOLUTION INTRAMUSCULAR; INTRAVENOUS at 08:02

## 2018-02-21 RX ADMIN — MUPIROCIN 1 G: 20 OINTMENT TOPICAL at 08:02

## 2018-02-21 RX ADMIN — NYSTATIN 500000 UNITS: 500000 SUSPENSION ORAL at 08:02

## 2018-02-21 RX ADMIN — ACETAMINOPHEN 1000 MG: 10 INJECTION, SOLUTION INTRAVENOUS at 06:02

## 2018-02-21 RX ADMIN — FAMOTIDINE 20 MG: 20 TABLET, FILM COATED ORAL at 08:02

## 2018-02-21 RX ADMIN — Medication 3 ML: at 06:02

## 2018-02-21 RX ADMIN — POTASSIUM CHLORIDE 20 MEQ: 200 INJECTION, SOLUTION INTRAVENOUS at 09:02

## 2018-02-21 RX ADMIN — PROMETHAZINE HYDROCHLORIDE 12.5 MG: 25 INJECTION INTRAMUSCULAR; INTRAVENOUS at 09:02

## 2018-02-21 RX ADMIN — FUROSEMIDE 20 MG/HR: 10 INJECTION, SOLUTION INTRAVENOUS at 02:02

## 2018-02-21 RX ADMIN — CALCIUM GLUCONATE 1000 MG: 94 INJECTION, SOLUTION INTRAVENOUS at 11:02

## 2018-02-21 RX ADMIN — NYSTATIN 500000 UNITS: 500000 SUSPENSION ORAL at 12:02

## 2018-02-21 RX ADMIN — HYDRALAZINE HYDROCHLORIDE 10 MG: 20 INJECTION INTRAMUSCULAR; INTRAVENOUS at 06:02

## 2018-02-21 RX ADMIN — TACROLIMUS 1 MG: 1 CAPSULE ORAL at 08:02

## 2018-02-21 RX ADMIN — CHLOROTHIAZIDE SODIUM 250 MG: 500 INJECTION, POWDER, LYOPHILIZED, FOR SOLUTION INTRAVENOUS at 10:02

## 2018-02-21 RX ADMIN — AMLODIPINE BESYLATE 5 MG: 5 TABLET ORAL at 08:02

## 2018-02-21 RX ADMIN — FENTANYL CITRATE 50 MCG: 50 INJECTION INTRAMUSCULAR; INTRAVENOUS at 06:02

## 2018-02-21 RX ADMIN — MYCOPHENOLATE MOFETIL 1000 MG: 250 CAPSULE ORAL at 08:02

## 2018-02-21 RX ADMIN — NYSTATIN 500000 UNITS: 500000 SUSPENSION ORAL at 07:02

## 2018-02-21 RX ADMIN — OXYCODONE HYDROCHLORIDE 10 MG: 5 TABLET ORAL at 06:02

## 2018-02-21 RX ADMIN — ONDANSETRON 4 MG: 2 INJECTION INTRAMUSCULAR; INTRAVENOUS at 06:02

## 2018-02-21 RX ADMIN — ONDANSETRON 4 MG: 2 INJECTION INTRAMUSCULAR; INTRAVENOUS at 03:02

## 2018-02-21 RX ADMIN — PROMETHAZINE HYDROCHLORIDE 12.5 MG: 25 INJECTION INTRAMUSCULAR; INTRAVENOUS at 10:02

## 2018-02-21 RX ADMIN — DOBUTAMINE IN DEXTROSE 5 MCG/KG/MIN: 200 INJECTION, SOLUTION INTRAVENOUS at 06:02

## 2018-02-21 RX ADMIN — HYDRALAZINE HYDROCHLORIDE 10 MG: 20 INJECTION INTRAMUSCULAR; INTRAVENOUS at 01:02

## 2018-02-21 RX ADMIN — ASPIRIN 81 MG: 81 TABLET, COATED ORAL at 08:02

## 2018-02-21 RX ADMIN — PROCHLORPERAZINE EDISYLATE 10 MG: 5 INJECTION INTRAMUSCULAR; INTRAVENOUS at 07:02

## 2018-02-21 RX ADMIN — METHYLPREDNISOLONE SODIUM SUCCINATE 40 MG: 40 INJECTION, POWDER, FOR SOLUTION INTRAMUSCULAR; INTRAVENOUS at 08:02

## 2018-02-21 RX ADMIN — STANDARDIZED SENNA CONCENTRATE AND DOCUSATE SODIUM 2 TABLET: 8.6; 5 TABLET, FILM COATED ORAL at 08:02

## 2018-02-21 RX ADMIN — NYSTATIN 500000 UNITS: 500000 SUSPENSION ORAL at 05:02

## 2018-02-21 RX ADMIN — TACROLIMUS 1 MG: 1 CAPSULE ORAL at 05:02

## 2018-02-21 RX ADMIN — POLYETHYLENE GLYCOL 3350 17 G: 17 POWDER, FOR SOLUTION ORAL at 08:02

## 2018-02-21 RX ADMIN — Medication 3 ML: at 02:02

## 2018-02-21 RX ADMIN — OXYCODONE HYDROCHLORIDE 10 MG: 5 TABLET ORAL at 12:02

## 2018-02-21 RX ADMIN — Medication 3 ML: at 10:02

## 2018-02-21 RX ADMIN — HYDRALAZINE HYDROCHLORIDE 5 MG: 20 INJECTION INTRAMUSCULAR; INTRAVENOUS at 06:02

## 2018-02-21 RX ADMIN — FENTANYL CITRATE 50 MCG: 50 INJECTION INTRAMUSCULAR; INTRAVENOUS at 10:02

## 2018-02-21 RX ADMIN — OXYCODONE HYDROCHLORIDE 15 MG: 5 TABLET ORAL at 03:02

## 2018-02-21 RX ADMIN — ONDANSETRON 4 MG: 2 INJECTION INTRAMUSCULAR; INTRAVENOUS at 12:02

## 2018-02-21 NOTE — ASSESSMENT & PLAN NOTE
-Transplant Date 2/18/18. S/P washout 2/19.  HTS primary.   -CMV Status:D+/R-   -Rejection status: Moderate Risk  -Rejection episodes: none to date  -Induction: No   -Current immunosuppression: Mehtylpred 40IV BID, Prograf 1mg BID, Cellcept 1000 mg BID.  -Opportunistic PPx with:Nystatin   -Hemodynamically stable on  5.   -CVP 15 today lying flat in bed. Continue Lasix 20mg/hr. Diuresing well.   -CTs in place and AV wires connected to pacer.  -Echo

## 2018-02-21 NOTE — PT/OT/SLP PROGRESS
Occupational Therapy   Treatment  (seen with PT)  Name: Mert Samayoa  MRN: [id number]  Admitting Diagnosis:  Heart transplanted  2 Days Post-Op    Recommendations:     Discharge Recommendations: home  Discharge Equipment Recommendations:  none  Barriers to discharge:  None    Subjective     Communicated with: nurse prior to session.  Pain/Comfort:  Pain Rating 1: 0/10  Location 1: chest (sx site)  Pain Addressed 1: Reposition, Distraction  Pain Rating Post-Intervention 1: 4/10 (after ambulation)    Patients cultural, spiritual, Islam conflicts given the current situation: none noted    Objective:     Patient found with: arterial line, blood pressure cuff, oxygen, kirk catheter, central line, pulse ox (continuous), telemetry, chest tube (seated in chair with family present/external pacemaker)    General Precautions: Standard, fall, sternal (mask when out of room) , mask when out of room  Orthopedic Precautions:N/A   Braces: N/A     Occupational Performance:    Bed Mobility:    · Not performed     Functional Mobility/Transfers:  · Patient completed Sit <> Stand Transfer with minimum assistance  with  no assistive device   · Functional Mobility: Min A with other staff assist to manage lines and follow with chair.  · Pt. Ambulated 14 feet , 22 feet and on third trial 22 feet with Min A (and staff assist with lines as well as chair follow) Pt. Required a short seated rest break between each ambulation trial     Activities of Daily Living:  Grooming: Min A seated in chair to brush teeth on this date  UBD:  Max A to don gown 2/2 multiple lines   Patient left up in chair with all lines intact, call button in reach, nurse notified and family present    [facility] 6 Click:  [facility] Total Score: 16    Treatment & Education:  Pt. Educated on taking rest breaks as needed during ambulation  Pt. Performed ankle pumps x 20 reps to assist with circulation  Pt. Educated on sternal precautions  Education:    Assessment:     Mert  Pepe Samayoa is a 28 y.o. male with a medical diagnosis of Heart transplanted.  He presents with deficits with overall level of endurance, self-care skills as well as functional mobility and would benefit from continued OT services.   Performance deficits affecting function are impaired endurance, impaired functional mobilty, impaired self care skills, impaired cardiopulmonary response to activity, pain, weakness, gait instability.      Rehab Prognosis:  Good; patient would benefit from acute skilled OT services to address these deficits and reach maximum level of function.       Plan:     Patient to be seen 5 x/week to address the above listed problems via self-care/home management, therapeutic activities, therapeutic exercises  · Plan of Care Expires: 03/22/18  · Plan of Care Reviewed with: patient    This Plan of care has been discussed with the patient who was involved in its development and understands and is in agreement with the identified goals and treatment plan    GOALS:    Occupational Therapy Goals        Problem: Occupational Therapy Goal    Goal Priority Disciplines Outcome Interventions   Occupational Therapy Goal     OT, PT/OT Ongoing (interventions implemented as appropriate)    Description:  Goals to be met by: 3/2/18     Patient will increase functional independence with ADLs by performing:    Feeding with Angie.  UE Dressing with Supervision.  LE Dressing with Supervision.  Grooming while standing at sink with Supervision.  Toileting from toilet with Supervision for hygiene and clothing management.   Toilet transfer to toilet with Supervision.                      Time Tracking:     OT Date of Treatment: 02/21/18  OT Start Time: 1040  OT Stop Time: 1103  OT Total Time (min): 23 min    Billable Minutes:Self Care/Home Management 10    ALISA Osorio  2/21/2018

## 2018-02-21 NOTE — PROGRESS NOTES
[facility]  Heart Transplant  Progress Note    Patient Name: Mert Samayoa  MRN: [id number]  Admission Date: 2/17/2018  Hospital Length of Stay: 4 days  Attending Physician: Sammi Tapia MD  Primary Care Provider: Primary Doctor No  Principal Problem:Heart transplanted    Subjective:     Interval History: Nauseous overnight. Still no BM/gas since surgery.     Continuous Infusions:   DOBUTamine 5 mcg/kg/min (02/21/18 0900)    furosemide (LASIX) 5 mg/mL infusion (non-titrating) 20 mg/hr (02/21/18 0900)    insulin (HUMAN R) infusion (adults) 0.7 Units/hr (02/21/18 0900)     Scheduled Meds:   amLODIPine  5 mg Oral Daily    aspirin  81 mg Oral Daily    famotidine  20 mg Oral BID    methylPREDNISolone sodium succinate  40 mg Intravenous Q12H    mupirocin  1 g Nasal BID    mycophenolate  1,000 mg Oral BID    nystatin  500,000 Units Mouth/Throat QID (WM & HS)    polyethylene glycol  17 g Oral BID    senna-docusate 8.6-50 mg  2 tablet Oral BID    sodium chloride 0.9%  3 mL Intravenous Q8H    tacrolimus  1 mg Oral BID     PRN Meds:sodium chloride, albuterol sulfate, [COMPLETED] calcium gluconate IVPB **AND** calcium gluconate IVPB, dextrose 50%, dextrose 50%, fentaNYL, glucagon (human recombinant), glucose, glucose, hydrALAZINE, insulin aspart U-100, lactated ringers, lactulose, magnesium sulfate IVPB, metoclopramide HCl, ondansetron, oxyCODONE, oxyCODONE, potassium chloride **AND** potassium chloride **AND** potassium chloride, prochlorperazine, promethazine (PHENERGAN) IVPB    Review of patient's allergies indicates:  No Known Allergies  Objective:     Vital Signs (Most Recent):  Temp: 98.7 °F (37.1 °C) (02/21/18 0701)  Pulse: 109 (02/21/18 0945)  Resp: (!) 23 (02/21/18 0945)  BP: (!) 145/70 (02/20/18 1300)  SpO2: 100 % (02/21/18 0945) Vital Signs (24h Range):  Temp:  [97.6 °F (36.4 °C)-98.7 °F (37.1 °C)] 98.7 °F (37.1 °C)  Pulse:  [109-116] 109  Resp:  [10-34] 23  SpO2:  [94 %-100 %]  100 %  BP: (145-165)/(67-73) 145/70  Arterial Line BP: (114-165)/(45-75) 120/49     Patient Vitals for the past 72 hrs (Last 3 readings):   Weight   02/21/18 0400 80.1 kg (176 lb 9.4 oz)   02/20/18 0500 84.5 kg (186 lb 4.6 oz)     Body mass index is 27.66 kg/m².      Intake/Output Summary (Last 24 hours) at 02/21/18 1035  Last data filed at 02/21/18 0900   Gross per 24 hour   Intake             1725 ml   Output             5635 ml   Net            -3910 ml     Physical Exam   Constitutional: He is oriented to person, place, and time. He appears well-developed and well-nourished.   HENT:   Head: Normocephalic.   Eyes: Pupils are equal, round, and reactive to light.   Neck: Normal range of motion. Neck supple.   RIJ line in place.    Cardiovascular: Normal rate and regular rhythm.    Pulmonary/Chest: Effort normal and breath sounds normal.   Abdominal: Soft. Bowel sounds are normal.   Musculoskeletal: Normal range of motion.   Neurological: He is alert and oriented to person, place, and time.   Skin: Skin is warm and dry.   Psychiatric: He has a normal mood and affect. His behavior is normal.   Nursing note and vitals reviewed.    Significant Labs:  CBC:    Recent Labs  Lab 02/20/18  0400 02/20/18  1736 02/21/18  0300   WBC 11.61 18.69* 27.51*   RBC 2.42* 2.65* 2.79*   HGB 7.4* 8.1* 8.5*   HCT 21.3* 23.5* 25.0*   PLT 58* 89* 131*   MCV 88 89 90   MCH 30.6 30.6 30.5   MCHC 34.7 34.5 34.0     BNP:  No results for input(s): BNP in the last 168 hours.    Invalid input(s): BNPTRIAGELBLO  CMP:    Recent Labs  Lab 02/17/18  0354  02/19/18  0120  02/20/18  0000  02/20/18  0400  02/20/18  2355 02/21/18  0300 02/21/18  0600   GLU 80  < >  --   < > 140*  --  162*  --   --  139*  --    CALCIUM 9.6  < >  --   < > 8.2*  --  8.2*  --   --  8.7  --    ALBUMIN 4.2  --  3.6  --   --   --   --   --   --  3.8  --    PROT 7.4  --  5.3*  --   --   --   --   --   --  6.3  --      < >  --   < > 137  --  139  --   --  136  --    K 3.7   < >  --   < > 6.0*  6.0*  < > 4.6  < > 4.7 4.8 4.6   CO2 25  < >  --   < > 24  --  27  --   --  29  --      < >  --   < > 105  --  105  --   --  92*  --    BUN 14  < >  --   < > 19  --  19  --   --  37*  --    CREATININE 1.0  < >  --   < > 1.1  --  1.1  --   --  1.8*  --    ALKPHOS 76  --  41*  --   --   --   --   --   --  53*  --    ALT 19  --  16  --   --   --   --   --   --  22  --    AST 20  --  55*  --   --   --   --   --   --  46*  --    BILITOT 0.7  --  1.5*  --   --   --   --   --   --  0.8  --    < > = values in this interval not displayed.   Coagulation:     Recent Labs  Lab 02/18/18 1959 02/19/18  0839  02/19/18 2000 02/20/18  0400 02/21/18  0330   INR 1.2  < >  --   < > 1.7* 1.6* 1.2   APTT 27.6  --  36.2*  --   --   --  27.0   < > = values in this interval not displayed.  LDH:  No results for input(s): LDH in the last 72 hours.  Microbiology:  Microbiology Results (last 7 days)     Procedure Component Value Units Date/Time    Urine culture [[id number]] Collected:  02/17/18 0425    Order Status:  Completed Specimen:  Urine from Urine, Clean Catch Updated:  02/18/18 1119     Urine Culture, Routine No growth    Narrative:       1 cup of urine          I have reviewed all pertinent labs within the past 24 hours.    Estimated Creatinine Clearance: 62 mL/min (A) (based on SCr of 1.8 mg/dL (H)).    Diagnostic Results:  I have reviewed all pertinent imaging results/findings within the past 24 hours.    Assessment and Plan:     27 year old WM with DCM (familial, both brothers with heart transplants) with stage D CHFrEF underwent Heartware placement 02/01/17 had post-op atrial flutter 02/11/17 treated with amiodarone presents for OHTx.  0.  VAD implanted on 2/1/17 HVAD RPM 2700.         * Heart transplanted    -Transplant Date 2/18/18. S/P washout 2/19.  HTS primary.   -CMV Status:D+/R-   -Rejection status: Moderate Risk  -Rejection episodes: none to date  -Induction: No   -Current immunosuppression:  Mehtylpred 40IV BID, Prograf 1mg BID, Cellcept 1000 mg BID.  -Opportunistic PPx with:Nystatin   -Hemodynamically stable on  5.   -CVP 15 today lying flat in bed. Continue Lasix 20mg/hr. Diuresing well.   -CTs in place and AV wires connected to pacer.  -Echo        Nausea    -Continue antiemetics. KUB.   -Increase BM regimen.             Augusto Goldberg NP  Heart Transplant  [facility]

## 2018-02-21 NOTE — SUBJECTIVE & OBJECTIVE
"Interval HPI:   Overnight events: remains in ICU, reports bad night, pain uncontrolled  Today: OOB Chair, Solumedrol 40mg BID IV. Converted to IV insulin transition 1.2 u/hr rate yesterday, rate lowered to 0.8u/hr  Eating:   <25%  Nausea: Yes, reports vomit last night  Hypoglycemia and intervention: No  Fever: No  TPN and/or TF: No    /65 (BP Location: Left arm, Patient Position: Lying)   Pulse 109   Temp 98.3 °F (36.8 °C) (Oral)   Resp 12   Ht 5' 7" (1.702 m)   Wt 80.1 kg (176 lb 9.4 oz)   SpO2 98%   BMI 27.66 kg/m²     Labs Reviewed and Include      Recent Labs  Lab 02/21/18  0300  02/21/18  1221   *  --   --    CALCIUM 8.7  --   --    ALBUMIN 3.8  --   --    PROT 6.3  --   --      --   --    K 4.8  < > 4.4   CO2 29  --   --    CL 92*  --   --    BUN 37*  --   --    CREATININE 1.8*  --   --    ALKPHOS 53*  --   --    ALT 22  --   --    AST 46*  --   --    BILITOT 0.8  --   --    < > = values in this interval not displayed.  Lab Results   Component Value Date    WBC 27.51 (H) 02/21/2018    HGB 8.5 (L) 02/21/2018    HCT 25.0 (L) 02/21/2018    MCV 90 02/21/2018     (L) 02/21/2018     No results for input(s): TSH, FREET4 in the last 168 hours.  Lab Results   Component Value Date    HGBA1C 5.4 01/17/2017       Nutritional status:   Body mass index is 27.66 kg/m².  Lab Results   Component Value Date    ALBUMIN 3.8 02/21/2018    ALBUMIN 3.6 02/19/2018    ALBUMIN 4.2 02/17/2018     Lab Results   Component Value Date    PREALBUMIN 23 01/25/2018    PREALBUMIN 28 12/28/2017    PREALBUMIN 27 11/29/2017       Estimated Creatinine Clearance: 62 mL/min (A) (based on SCr of 1.8 mg/dL (H)).    Accu-Checks  Recent Labs      02/20/18   0945  02/20/18   1036  02/20/18   1119  02/20/18   1352  02/20/18   1734  02/20/18   2144  02/21/18   0257  02/21/18   0604  02/21/18   0842  02/21/18   1224   POCTGLUCOSE  131*  120*  116*  125*  129*  125*  133*  133*  118*  130*       Current Medications and/or " Treatments Impacting Glycemic Control  Immunotherapy:  Immunosuppressants         Stop Route Frequency     mycophenolate capsule 1,000 mg      -- Oral 2 times daily     tacrolimus capsule 1 mg      -- Oral 2 times daily        Steroids:   Hormones     Start     Stop Route Frequency Ordered    02/20/18 0900  methylPREDNISolone sodium succinate injection 40 mg      -- IV Every 12 hours 02/19/18 1041        Pressors:    Autonomic Drugs     None        Hyperglycemia/Diabetes Medications: Antihyperglycemics     Start     Stop Route Frequency Ordered    02/20/18 1230  insulin aspart U-100 pen 0-10 Units      -- SubQ As needed (PRN) 02/20/18 1130    02/20/18 1130  insulin regular (Humulin R) 100 Units in sodium chloride 0.9% 100 mL infusion      -- IV Continuous 02/20/18 1130

## 2018-02-21 NOTE — ASSESSMENT & PLAN NOTE
BG goal 140-180; rewrite IV transition infusion rate at 0.7 u/hr with BG ac/hs/0200. Will assess for prandial elevations given steroid dosing.       ADDENDUM: midday  on 0.7u/hr, will lower IV insulin rate to 0.6u/hr.

## 2018-02-21 NOTE — PLAN OF CARE
Problem: Physical Therapy Goal  Goal: Physical Therapy Goal  Goals to be met by: 3/6/18    Patient will increase functional independence with mobility by performin. Supine to sit with Stand-by Assistance-not met  2. Sit to stand transfer with Supervision-not met  3. Gait  x 220 feet with Supervision -not met  4. Ascend/descend 3 stair with right Handrails Supervision . -not met     Goals remain appropriate. Jenn Redd, PT 2018

## 2018-02-21 NOTE — PT/OT/SLP PROGRESS
Physical Therapy Treatment    Patient Name:  Mert Samayoa   MRN:  [id number]    Recommendations:     Discharge Recommendations:  home   Discharge Equipment Recommendations: none   Barriers to discharge: None    Assessment:     Mert Samayoa is a 28 y.o. male admitted with a medical diagnosis of Heart transplanted.  He presents with the following impairments/functional limitations:  weakness, impaired functional mobilty, gait instability, impaired balance, impaired endurance, decreased lower extremity function, impaired cognition pt krystyna treatment better being able to gait train farther. Pt would benefit from skilled PT 5x/wk to progress physically and return pt to Independent status. Pt is s/p heart transplant with PFO closure, removal of LVAD 2/18/18, s/p re-exploration of chest 2/19/18..    Rehab Prognosis:  good; patient would benefit from acute skilled PT services to address these deficits and reach maximum level of function.      Recent Surgery: Procedure(s) (LRB):  EXPLORATION-BLEEDING (RE-EXPLORATION) (Bilateral) 2 Days Post-Op    Plan:     During this hospitalization, patient to be seen 5 x/week to address the above listed problems via gait training, therapeutic activities, therapeutic exercises  · Plan of Care Expires:  03/19/18   Plan of Care Reviewed with: patient, mother    Subjective     Communicated with nurse prior to session.  Patient found sitting in bedside chair  upon PT entry to room, agreeable to treatment.      Chief Complaint: pt c/o pain in chest.  Patient comments/goals: to get better and go home.   Pain/Comfort:  · Pain Rating 1: 4/10  · Location 1:  (chest)  · Pain Rating Post-Intervention 1: 4/10    Patients cultural, spiritual, Quaker conflicts given the current situation: none    Objective:     Patient found with: telemetry, arterial line, blood pressure cuff, pulse ox (continuous), chest tube, kirk catheter, central line, oxygen (CVP, external pacemaker)     General  Precautions: Standard, fall, sternal (out of room with mask)   Orthopedic Precautions:    Braces:       Functional Mobility:  · Transfers:   Pt needed verbal cues for functional mobility with sternal precautions.   · Sit to Stand:  minimum assistance with no AD. Pt stood x 3 reps.   ·   · Gait: pt received gait training 14 ft, 22 ft and 22 ft (58 ft total) with CGA. pt had sitting rest periods between distance ambulated, RN with portable monitor and additional assist for equipment.  pt received gait training with Mask on.       AM-PAC 6 CLICK MOBILITY  Turning over in bed (including adjusting bedclothes, sheets and blankets)?: 2  Sitting down on and standing up from a chair with arms (e.g., wheelchair, bedside commode, etc.): 3  Moving from lying on back to sitting on the side of the bed?: 3  Moving to and from a bed to a chair (including a wheelchair)?: 3  Need to walk in hospital room?: 2  Climbing 3-5 steps with a railing?: 1  Total Score: 14         Patient left up in chair with all lines intact, call button in reach and mother present..    GOALS:    Physical Therapy Goals        Problem: Physical Therapy Goal    Goal Priority Disciplines Outcome Goal Variances Interventions   Physical Therapy Goal     PT/OT, PT      Description:  Goals to be met by: 3/6/18    Patient will increase functional independence with mobility by performin. Supine to sit with Stand-by Assistance-not met  2. Sit to stand transfer with Supervision-not met  3. Gait  x 220 feet with Supervision -not met  4. Ascend/descend 3 stair with right Handrails Supervision . -not met                      Time Tracking:     PT Received On: 18  PT Start Time: 1043     PT Stop Time: 1056  PT Total Time (min): 13 min     Billable Minutes: Gait Training 13 min    Treatment Type: Treatment  PT/PTA: PT     PTA Visit Number: 0     Jenn Redd, PT  2018

## 2018-02-21 NOTE — PROGRESS NOTES
"[facility]  Endocrinology  Progress Note    Admit Date: 2/17/2018     Reason for Consult: Management of hyperglycemia     Surgical Procedure and Date: s/p OHT 2/18/18; s/p mediastinal exploration 2/19/18    HPI: Patient is a 28 y.o. male with a diagnosis of familial cardiomyopathy. Underwent heart transplant, received IV Solumedrol in OR. Return to OR for possible bleed. Initially high IV insulin requirements up to 48 u/hr. No personal or family hx of DM.   Lab Results   Component Value Date    HGBA1C 5.4 01/17/2017     Endocrine consulted for BG mgmt.             Interval HPI:   Overnight events: remains in ICU, reports bad night, pain uncontrolled  Today: OOB Chair, Solumedrol 40mg BID IV. Converted to IV insulin transition 1.2 u/hr rate yesterday, rate lowered to 0.8u/hr  Eating:   <25%  Nausea: Yes, reports vomit last night  Hypoglycemia and intervention: No  Fever: No  TPN and/or TF: No    /65 (BP Location: Left arm, Patient Position: Lying)   Pulse 109   Temp 98.3 °F (36.8 °C) (Oral)   Resp 12   Ht 5' 7" (1.702 m)   Wt 80.1 kg (176 lb 9.4 oz)   SpO2 98%   BMI 27.66 kg/m²       Labs Reviewed and Include      Recent Labs  Lab 02/21/18  0300  02/21/18  1221   *  --   --    CALCIUM 8.7  --   --    ALBUMIN 3.8  --   --    PROT 6.3  --   --      --   --    K 4.8  < > 4.4   CO2 29  --   --    CL 92*  --   --    BUN 37*  --   --    CREATININE 1.8*  --   --    ALKPHOS 53*  --   --    ALT 22  --   --    AST 46*  --   --    BILITOT 0.8  --   --    < > = values in this interval not displayed.  Lab Results   Component Value Date    WBC 27.51 (H) 02/21/2018    HGB 8.5 (L) 02/21/2018    HCT 25.0 (L) 02/21/2018    MCV 90 02/21/2018     (L) 02/21/2018     No results for input(s): TSH, FREET4 in the last 168 hours.  Lab Results   Component Value Date    HGBA1C 5.4 01/17/2017       Nutritional status:   Body mass index is 27.66 kg/m².  Lab Results   Component Value Date    " ALBUMIN 3.8 02/21/2018    ALBUMIN 3.6 02/19/2018    ALBUMIN 4.2 02/17/2018     Lab Results   Component Value Date    PREALBUMIN 23 01/25/2018    PREALBUMIN 28 12/28/2017    PREALBUMIN 27 11/29/2017       Estimated Creatinine Clearance: 62 mL/min (A) (based on SCr of 1.8 mg/dL (H)).    Accu-Checks  Recent Labs      02/20/18   0945  02/20/18   1036  02/20/18   1119  02/20/18   1352  02/20/18   1734  02/20/18   2144  02/21/18   0257  02/21/18   0604  02/21/18   0842  02/21/18   1224   POCTGLUCOSE  131*  120*  116*  125*  129*  125*  133*  133*  118*  130*       Current Medications and/or Treatments Impacting Glycemic Control  Immunotherapy:  Immunosuppressants         Stop Route Frequency     mycophenolate capsule 1,000 mg      -- Oral 2 times daily     tacrolimus capsule 1 mg      -- Oral 2 times daily        Steroids:   Hormones     Start     Stop Route Frequency Ordered    02/20/18 0900  methylPREDNISolone sodium succinate injection 40 mg      -- IV Every 12 hours 02/19/18 1041        Pressors:    Autonomic Drugs     None        Hyperglycemia/Diabetes Medications: Antihyperglycemics     Start     Stop Route Frequency Ordered    02/20/18 1230  insulin aspart U-100 pen 0-10 Units      -- SubQ As needed (PRN) 02/20/18 1130    02/20/18 1130  insulin regular (Humulin R) 100 Units in sodium chloride 0.9% 100 mL infusion      -- IV Continuous 02/20/18 1130          ASSESSMENT and PLAN    * Heart transplanted    Per HTS  avoid hypoglycemia            Acute hyperglycemia    BG goal 140-180; rewrite IV transition infusion rate at 0.7 u/hr with BG ac/hs/0200. Will assess for prandial elevations given steroid dosing.       ADDENDUM: midday  on 0.7u/hr, will lower IV insulin rate to 0.6u/hr.           Familial cardiomyopathy    S/p OHT this admission, per HTS          Adverse effect of glucocorticoids and synthetic analogues, initial encounter    May elevate BG readings; currently on Solumedrol IV           Immunosuppression    may contribute to insulin resistance                JEFF Salinas,ANP-C  Endocrinology  [facility]

## 2018-02-21 NOTE — PLAN OF CARE
Problem: Occupational Therapy Goal  Goal: Occupational Therapy Goal  Goals to be met by: 3/2/18     Patient will increase functional independence with ADLs by performing:    Feeding with [place].  UE Dressing with Supervision.  LE Dressing with Supervision.  Grooming while standing at sink with Supervision.  Toileting from toilet with Supervision for hygiene and clothing management.   Toilet transfer to toilet with Supervision.     Pt. Tolerated ambulation well on this date

## 2018-02-21 NOTE — PLAN OF CARE
Problem: Patient Care Overview  Goal: Plan of Care Review  Outcome: Ongoing (interventions implemented as appropriate)  Patient and mother updated on plan of care including strict hand hygeine, coughing and deep breathing, anti-rejection medications, mobilization, and vasoactive drips. Questions answered.

## 2018-02-21 NOTE — PLAN OF CARE
Problem: Patient Care Overview  Goal: Plan of Care Review  Outcome: Ongoing (interventions implemented as appropriate)  Plan of care reviewed with pt. Pt on 2L O2 per nasal cannula with humidification. Pt is A paced at 110 bpm. Lasix gtt @ 20 mh/hr, Insulin gtt @ 1 unit/hr, and Dobutamine gtt @ 5 mcg/kg/min. MAP goal 60-80. PRN order of 10mg Hydralazine IVP given overnight for MAP > 80. Pt's pain somewhat controlled by PRN Oxycodone, lowest pain rating = 4. Pt c/o nausea almost entire night, PRN N/V medications not helping much. Q6 K and Mg, no electrolyte replacements needed. Will continue to monitor closely.

## 2018-02-21 NOTE — SUBJECTIVE & OBJECTIVE
Interval History: Nauseous overnight. Still no BM/gas since surgery.     Continuous Infusions:   DOBUTamine 5 mcg/kg/min (02/21/18 0900)    furosemide (LASIX) 5 mg/mL infusion (non-titrating) 20 mg/hr (02/21/18 0900)    insulin (HUMAN R) infusion (adults) 0.7 Units/hr (02/21/18 0900)     Scheduled Meds:   amLODIPine  5 mg Oral Daily    aspirin  81 mg Oral Daily    famotidine  20 mg Oral BID    methylPREDNISolone sodium succinate  40 mg Intravenous Q12H    mupirocin  1 g Nasal BID    mycophenolate  1,000 mg Oral BID    nystatin  500,000 Units Mouth/Throat QID (WM & HS)    polyethylene glycol  17 g Oral BID    senna-docusate 8.6-50 mg  2 tablet Oral BID    sodium chloride 0.9%  3 mL Intravenous Q8H    tacrolimus  1 mg Oral BID     PRN Meds:sodium chloride, albuterol sulfate, [COMPLETED] calcium gluconate IVPB **AND** calcium gluconate IVPB, dextrose 50%, dextrose 50%, fentaNYL, glucagon (human recombinant), glucose, glucose, hydrALAZINE, insulin aspart U-100, lactated ringers, lactulose, magnesium sulfate IVPB, metoclopramide HCl, ondansetron, oxyCODONE, oxyCODONE, potassium chloride **AND** potassium chloride **AND** potassium chloride, prochlorperazine, promethazine (PHENERGAN) IVPB    Review of patient's allergies indicates:  No Known Allergies  Objective:     Vital Signs (Most Recent):  Temp: 98.7 °F (37.1 °C) (02/21/18 0701)  Pulse: 109 (02/21/18 0945)  Resp: (!) 23 (02/21/18 0945)  BP: (!) 145/70 (02/20/18 1300)  SpO2: 100 % (02/21/18 0945) Vital Signs (24h Range):  Temp:  [97.6 °F (36.4 °C)-98.7 °F (37.1 °C)] 98.7 °F (37.1 °C)  Pulse:  [109-116] 109  Resp:  [10-34] 23  SpO2:  [94 %-100 %] 100 %  BP: (145-165)/(67-73) 145/70  Arterial Line BP: (114-165)/(45-75) 120/49     Patient Vitals for the past 72 hrs (Last 3 readings):   Weight   02/21/18 0400 80.1 kg (176 lb 9.4 oz)   02/20/18 0500 84.5 kg (186 lb 4.6 oz)     Body mass index is 27.66 kg/m².      Intake/Output Summary (Last 24 hours) at  02/21/18 1035  Last data filed at 02/21/18 0900   Gross per 24 hour   Intake             1725 ml   Output             5635 ml   Net            -3910 ml     Physical Exam   Constitutional: He is oriented to person, place, and time. He appears well-developed and well-nourished.   HENT:   Head: Normocephalic.   Eyes: Pupils are equal, round, and reactive to light.   Neck: Normal range of motion. Neck supple.   RIJ line in place.    Cardiovascular: Normal rate and regular rhythm.    Pulmonary/Chest: Effort normal and breath sounds normal.   Abdominal: Soft. Bowel sounds are normal.   Musculoskeletal: Normal range of motion.   Neurological: He is alert and oriented to person, place, and time.   Skin: Skin is warm and dry.   Psychiatric: He has a normal mood and affect. His behavior is normal.   Nursing note and vitals reviewed.    Significant Labs:  CBC:    Recent Labs  Lab 02/20/18  0400 02/20/18  1736 02/21/18  0300   WBC 11.61 18.69* 27.51*   RBC 2.42* 2.65* 2.79*   HGB 7.4* 8.1* 8.5*   HCT 21.3* 23.5* 25.0*   PLT 58* 89* 131*   MCV 88 89 90   MCH 30.6 30.6 30.5   MCHC 34.7 34.5 34.0     BNP:  No results for input(s): BNP in the last 168 hours.    Invalid input(s): BNPTRIAGELBLO  CMP:    Recent Labs  Lab 02/17/18  0354  02/19/18  0120  02/20/18  0000  02/20/18  0400  02/20/18  2355 02/21/18  0300 02/21/18  0600   GLU 80  < >  --   < > 140*  --  162*  --   --  139*  --    CALCIUM 9.6  < >  --   < > 8.2*  --  8.2*  --   --  8.7  --    ALBUMIN 4.2  --  3.6  --   --   --   --   --   --  3.8  --    PROT 7.4  --  5.3*  --   --   --   --   --   --  6.3  --      < >  --   < > 137  --  139  --   --  136  --    K 3.7  < >  --   < > 6.0*  6.0*  < > 4.6  < > 4.7 4.8 4.6   CO2 25  < >  --   < > 24  --  27  --   --  29  --      < >  --   < > 105  --  105  --   --  92*  --    BUN 14  < >  --   < > 19  --  19  --   --  37*  --    CREATININE 1.0  < >  --   < > 1.1  --  1.1  --   --  1.8*  --    ALKPHOS 76  --  41*  --    --   --   --   --   --  53*  --    ALT 19  --  16  --   --   --   --   --   --  22  --    AST 20  --  55*  --   --   --   --   --   --  46*  --    BILITOT 0.7  --  1.5*  --   --   --   --   --   --  0.8  --    < > = values in this interval not displayed.   Coagulation:     Recent Labs  Lab 02/18/18 1959 02/19/18  0839  02/19/18 2000 02/20/18  0400 02/21/18  0330   INR 1.2  < >  --   < > 1.7* 1.6* 1.2   APTT 27.6  --  36.2*  --   --   --  27.0   < > = values in this interval not displayed.  LDH:  No results for input(s): LDH in the last 72 hours.  Microbiology:  Microbiology Results (last 7 days)     Procedure Component Value Units Date/Time    Urine culture [[id number]] Collected:  02/17/18 0425    Order Status:  Completed Specimen:  Urine from Urine, Clean Catch Updated:  02/18/18 1119     Urine Culture, Routine No growth    Narrative:       1 cup of urine          I have reviewed all pertinent labs within the past 24 hours.    Estimated Creatinine Clearance: 62 mL/min (A) (based on SCr of 1.8 mg/dL (H)).    Diagnostic Results:  I have reviewed all pertinent imaging results/findings within the past 24 hours.

## 2018-02-22 ENCOUNTER — RESEARCH ENCOUNTER (OUTPATIENT)
Dept: RESEARCH | Facility: HOSPITAL | Age: 28
End: 2018-02-22

## 2018-02-22 PROBLEM — I48.0 PAROXYSMAL ATRIAL FIBRILLATION: Status: ACTIVE | Noted: 2018-02-22

## 2018-02-22 LAB
ALBUMIN SERPL BCP-MCNC: 4 G/DL
ALP SERPL-CCNC: 65 U/L
ALT SERPL W/O P-5'-P-CCNC: 25 U/L
ANION GAP SERPL CALC-SCNC: 13 MMOL/L
ANION GAP SERPL CALC-SCNC: 16 MMOL/L
ANISOCYTOSIS BLD QL SMEAR: SLIGHT
AST SERPL-CCNC: 44 U/L
BASOPHILS # BLD AUTO: 0.02 K/UL
BASOPHILS NFR BLD: 0.1 %
BILIRUB SERPL-MCNC: 0.7 MG/DL
BUN SERPL-MCNC: 46 MG/DL
BUN SERPL-MCNC: 46 MG/DL
CALCIUM SERPL-MCNC: 8.8 MG/DL
CALCIUM SERPL-MCNC: 9 MG/DL
CHLORIDE SERPL-SCNC: 82 MMOL/L
CHLORIDE SERPL-SCNC: 84 MMOL/L
CO2 SERPL-SCNC: 32 MMOL/L
CO2 SERPL-SCNC: 32 MMOL/L
CREAT SERPL-MCNC: 1.8 MG/DL
CREAT SERPL-MCNC: 1.8 MG/DL
DIFFERENTIAL METHOD: ABNORMAL
EOSINOPHIL # BLD AUTO: 0 K/UL
EOSINOPHIL NFR BLD: 0 %
ERYTHROCYTE [DISTWIDTH] IN BLOOD BY AUTOMATED COUNT: 13.9 %
EST. GFR  (AFRICAN AMERICAN): 57.9 ML/MIN/1.73 M^2
EST. GFR  (AFRICAN AMERICAN): 57.9 ML/MIN/1.73 M^2
EST. GFR  (NON AFRICAN AMERICAN): 50.1 ML/MIN/1.73 M^2
EST. GFR  (NON AFRICAN AMERICAN): 50.1 ML/MIN/1.73 M^2
GLUCOSE SERPL-MCNC: 134 MG/DL
GLUCOSE SERPL-MCNC: 152 MG/DL
HCT VFR BLD AUTO: 23.7 %
HGB BLD-MCNC: 8.2 G/DL
HYPOCHROMIA BLD QL SMEAR: ABNORMAL
IMM GRANULOCYTES # BLD AUTO: 1 K/UL
IMM GRANULOCYTES NFR BLD AUTO: 4.3 %
INR PPP: 1
LYMPHOCYTES # BLD AUTO: 0.8 K/UL
LYMPHOCYTES NFR BLD: 3.4 %
MAGNESIUM SERPL-MCNC: 2.3 MG/DL
MAGNESIUM SERPL-MCNC: 2.4 MG/DL
MAGNESIUM SERPL-MCNC: 2.6 MG/DL
MAGNESIUM SERPL-MCNC: 2.7 MG/DL
MAGNESIUM SERPL-MCNC: 2.9 MG/DL
MCH RBC QN AUTO: 30.5 PG
MCHC RBC AUTO-ENTMCNC: 34.6 G/DL
MCV RBC AUTO: 88 FL
MONOCYTES # BLD AUTO: 1.1 K/UL
MONOCYTES NFR BLD: 4.5 %
NEUTROPHILS # BLD AUTO: 20.6 K/UL
NEUTROPHILS NFR BLD: 87.7 %
NRBC BLD-RTO: 0 /100 WBC
OVALOCYTES BLD QL SMEAR: ABNORMAL
PLATELET # BLD AUTO: 149 K/UL
PMV BLD AUTO: 10.9 FL
POCT GLUCOSE: 131 MG/DL (ref 70–110)
POCT GLUCOSE: 136 MG/DL (ref 70–110)
POCT GLUCOSE: 138 MG/DL (ref 70–110)
POCT GLUCOSE: 151 MG/DL (ref 70–110)
POIKILOCYTOSIS BLD QL SMEAR: SLIGHT
POLYCHROMASIA BLD QL SMEAR: ABNORMAL
POTASSIUM SERPL-SCNC: 3.8 MMOL/L
POTASSIUM SERPL-SCNC: 3.9 MMOL/L
POTASSIUM SERPL-SCNC: 4 MMOL/L
POTASSIUM SERPL-SCNC: 4.2 MMOL/L
POTASSIUM SERPL-SCNC: 4.2 MMOL/L
PROT SERPL-MCNC: 6.7 G/DL
PROTHROMBIN TIME: 10.9 SEC
RBC # BLD AUTO: 2.69 M/UL
SODIUM SERPL-SCNC: 129 MMOL/L
SODIUM SERPL-SCNC: 130 MMOL/L
TACROLIMUS BLD-MCNC: 4.9 NG/ML
WBC # BLD AUTO: 23.46 K/UL

## 2018-02-22 PROCEDURE — 97116 GAIT TRAINING THERAPY: CPT

## 2018-02-22 PROCEDURE — 99900035 HC TECH TIME PER 15 MIN (STAT)

## 2018-02-22 PROCEDURE — 84132 ASSAY OF SERUM POTASSIUM: CPT

## 2018-02-22 PROCEDURE — 25000003 PHARM REV CODE 250: Performed by: INTERNAL MEDICINE

## 2018-02-22 PROCEDURE — 63600175 PHARM REV CODE 636 W HCPCS

## 2018-02-22 PROCEDURE — 63600175 PHARM REV CODE 636 W HCPCS: Performed by: INTERNAL MEDICINE

## 2018-02-22 PROCEDURE — 99232 SBSQ HOSP IP/OBS MODERATE 35: CPT | Mod: ,,, | Performed by: NURSE PRACTITIONER

## 2018-02-22 PROCEDURE — 94799 UNLISTED PULMONARY SVC/PX: CPT

## 2018-02-22 PROCEDURE — 93010 ELECTROCARDIOGRAM REPORT: CPT | Mod: ,,, | Performed by: INTERNAL MEDICINE

## 2018-02-22 PROCEDURE — 84132 ASSAY OF SERUM POTASSIUM: CPT | Mod: 91

## 2018-02-22 PROCEDURE — 83735 ASSAY OF MAGNESIUM: CPT

## 2018-02-22 PROCEDURE — 25000003 PHARM REV CODE 250: Performed by: STUDENT IN AN ORGANIZED HEALTH CARE EDUCATION/TRAINING PROGRAM

## 2018-02-22 PROCEDURE — 80053 COMPREHEN METABOLIC PANEL: CPT

## 2018-02-22 PROCEDURE — 85610 PROTHROMBIN TIME: CPT

## 2018-02-22 PROCEDURE — 63600175 PHARM REV CODE 636 W HCPCS: Performed by: STUDENT IN AN ORGANIZED HEALTH CARE EDUCATION/TRAINING PROGRAM

## 2018-02-22 PROCEDURE — 94761 N-INVAS EAR/PLS OXIMETRY MLT: CPT

## 2018-02-22 PROCEDURE — 97530 THERAPEUTIC ACTIVITIES: CPT

## 2018-02-22 PROCEDURE — 85025 COMPLETE CBC W/AUTO DIFF WBC: CPT

## 2018-02-22 PROCEDURE — 25000003 PHARM REV CODE 250: Performed by: THORACIC SURGERY (CARDIOTHORACIC VASCULAR SURGERY)

## 2018-02-22 PROCEDURE — 25000003 PHARM REV CODE 250: Performed by: NURSE PRACTITIONER

## 2018-02-22 PROCEDURE — 93005 ELECTROCARDIOGRAM TRACING: CPT

## 2018-02-22 PROCEDURE — 83735 ASSAY OF MAGNESIUM: CPT | Mod: 91

## 2018-02-22 PROCEDURE — 02HV33Z INSERTION OF INFUSION DEVICE INTO SUPERIOR VENA CAVA, PERCUTANEOUS APPROACH: ICD-10-PCS | Performed by: INTERNAL MEDICINE

## 2018-02-22 PROCEDURE — A4216 STERILE WATER/SALINE, 10 ML: HCPCS | Performed by: THORACIC SURGERY (CARDIOTHORACIC VASCULAR SURGERY)

## 2018-02-22 PROCEDURE — 20000000 HC ICU ROOM

## 2018-02-22 PROCEDURE — 99233 SBSQ HOSP IP/OBS HIGH 50: CPT | Mod: ,,, | Performed by: INTERNAL MEDICINE

## 2018-02-22 PROCEDURE — 63600175 PHARM REV CODE 636 W HCPCS: Performed by: NURSE PRACTITIONER

## 2018-02-22 PROCEDURE — 80197 ASSAY OF TACROLIMUS: CPT

## 2018-02-22 PROCEDURE — 63600175 PHARM REV CODE 636 W HCPCS: Performed by: THORACIC SURGERY (CARDIOTHORACIC VASCULAR SURGERY)

## 2018-02-22 RX ORDER — INSULIN ASPART 100 [IU]/ML
1-10 INJECTION, SOLUTION INTRAVENOUS; SUBCUTANEOUS
Status: DISCONTINUED | OUTPATIENT
Start: 2018-02-22 | End: 2018-03-05 | Stop reason: HOSPADM

## 2018-02-22 RX ORDER — GLUCAGON 1 MG
1 KIT INJECTION
Status: DISCONTINUED | OUTPATIENT
Start: 2018-02-22 | End: 2018-03-05 | Stop reason: HOSPADM

## 2018-02-22 RX ORDER — IBUPROFEN 200 MG
16 TABLET ORAL
Status: DISCONTINUED | OUTPATIENT
Start: 2018-02-22 | End: 2018-03-05 | Stop reason: HOSPADM

## 2018-02-22 RX ORDER — IBUPROFEN 200 MG
24 TABLET ORAL
Status: DISCONTINUED | OUTPATIENT
Start: 2018-02-22 | End: 2018-03-05 | Stop reason: HOSPADM

## 2018-02-22 RX ORDER — DILTIAZEM HYDROCHLORIDE 5 MG/ML
20 INJECTION INTRAVENOUS ONCE
Status: DISCONTINUED | OUTPATIENT
Start: 2018-02-22 | End: 2018-02-22

## 2018-02-22 RX ORDER — GLYCERIN 1 G/1
1 SUPPOSITORY RECTAL 2 TIMES DAILY PRN
Status: DISCONTINUED | OUTPATIENT
Start: 2018-02-22 | End: 2018-03-05

## 2018-02-22 RX ORDER — AMLODIPINE BESYLATE 10 MG/1
10 TABLET ORAL DAILY
Status: DISCONTINUED | OUTPATIENT
Start: 2018-02-22 | End: 2018-02-27

## 2018-02-22 RX ADMIN — ASPIRIN 81 MG: 81 TABLET, COATED ORAL at 08:02

## 2018-02-22 RX ADMIN — ONDANSETRON 4 MG: 2 INJECTION INTRAMUSCULAR; INTRAVENOUS at 06:02

## 2018-02-22 RX ADMIN — POTASSIUM CHLORIDE 20 MEQ: 200 INJECTION, SOLUTION INTRAVENOUS at 05:02

## 2018-02-22 RX ADMIN — AMIODARONE HYDROCHLORIDE 1 MG/MIN: 1.8 INJECTION, SOLUTION INTRAVENOUS at 12:02

## 2018-02-22 RX ADMIN — FUROSEMIDE 20 MG/HR: 10 INJECTION, SOLUTION INTRAVENOUS at 04:02

## 2018-02-22 RX ADMIN — MYCOPHENOLATE MOFETIL 1000 MG: 250 CAPSULE ORAL at 08:02

## 2018-02-22 RX ADMIN — GLYCERIN 1 SUPPOSITORY: 2 SUPPOSITORY RECTAL at 08:02

## 2018-02-22 RX ADMIN — FENTANYL CITRATE 50 MCG: 50 INJECTION INTRAMUSCULAR; INTRAVENOUS at 02:02

## 2018-02-22 RX ADMIN — METOCLOPRAMIDE 5 MG: 5 INJECTION, SOLUTION INTRAMUSCULAR; INTRAVENOUS at 06:02

## 2018-02-22 RX ADMIN — AMIODARONE HYDROCHLORIDE 0.5 MG/MIN: 1.8 INJECTION, SOLUTION INTRAVENOUS at 06:02

## 2018-02-22 RX ADMIN — NYSTATIN 500000 UNITS: 500000 SUSPENSION ORAL at 09:02

## 2018-02-22 RX ADMIN — ONDANSETRON 4 MG: 2 INJECTION INTRAMUSCULAR; INTRAVENOUS at 12:02

## 2018-02-22 RX ADMIN — Medication 3 ML: at 06:02

## 2018-02-22 RX ADMIN — METOCLOPRAMIDE 5 MG: 5 INJECTION, SOLUTION INTRAMUSCULAR; INTRAVENOUS at 02:02

## 2018-02-22 RX ADMIN — PROCHLORPERAZINE EDISYLATE 10 MG: 5 INJECTION INTRAMUSCULAR; INTRAVENOUS at 07:02

## 2018-02-22 RX ADMIN — MYCOPHENOLATE MOFETIL 1000 MG: 250 CAPSULE ORAL at 09:02

## 2018-02-22 RX ADMIN — METOCLOPRAMIDE 5 MG: 5 INJECTION, SOLUTION INTRAMUSCULAR; INTRAVENOUS at 12:02

## 2018-02-22 RX ADMIN — AMIODARONE HYDROCHLORIDE 0.5 MG/MIN: 1.8 INJECTION, SOLUTION INTRAVENOUS at 05:02

## 2018-02-22 RX ADMIN — NYSTATIN 500000 UNITS: 500000 SUSPENSION ORAL at 12:02

## 2018-02-22 RX ADMIN — NYSTATIN 500000 UNITS: 500000 SUSPENSION ORAL at 05:02

## 2018-02-22 RX ADMIN — TACROLIMUS 1 MG: 1 CAPSULE ORAL at 07:02

## 2018-02-22 RX ADMIN — PROMETHAZINE HYDROCHLORIDE 12.5 MG: 25 INJECTION INTRAMUSCULAR; INTRAVENOUS at 07:02

## 2018-02-22 RX ADMIN — OXYCODONE HYDROCHLORIDE 10 MG: 5 TABLET ORAL at 07:02

## 2018-02-22 RX ADMIN — AMLODIPINE BESYLATE 10 MG: 10 TABLET ORAL at 08:02

## 2018-02-22 RX ADMIN — POLYETHYLENE GLYCOL 3350 17 G: 17 POWDER, FOR SOLUTION ORAL at 09:02

## 2018-02-22 RX ADMIN — OXYCODONE HYDROCHLORIDE 10 MG: 5 TABLET ORAL at 09:02

## 2018-02-22 RX ADMIN — FENTANYL CITRATE 50 MCG: 50 INJECTION INTRAMUSCULAR; INTRAVENOUS at 12:02

## 2018-02-22 RX ADMIN — LACTULOSE 20 G: 20 SOLUTION ORAL at 12:02

## 2018-02-22 RX ADMIN — POTASSIUM CHLORIDE 20 MEQ: 200 INJECTION, SOLUTION INTRAVENOUS at 07:02

## 2018-02-22 RX ADMIN — TACROLIMUS 1 MG: 1 CAPSULE ORAL at 05:02

## 2018-02-22 RX ADMIN — STANDARDIZED SENNA CONCENTRATE AND DOCUSATE SODIUM 2 TABLET: 8.6; 5 TABLET, FILM COATED ORAL at 09:02

## 2018-02-22 RX ADMIN — NYSTATIN 500000 UNITS: 500000 SUSPENSION ORAL at 07:02

## 2018-02-22 RX ADMIN — FAMOTIDINE 20 MG: 20 TABLET, FILM COATED ORAL at 09:02

## 2018-02-22 RX ADMIN — PROCHLORPERAZINE EDISYLATE 10 MG: 5 INJECTION INTRAMUSCULAR; INTRAVENOUS at 12:02

## 2018-02-22 RX ADMIN — POLYETHYLENE GLYCOL 3350 17 G: 17 POWDER, FOR SOLUTION ORAL at 08:02

## 2018-02-22 RX ADMIN — AMIODARONE HYDROCHLORIDE 150 MG: 1.5 INJECTION, SOLUTION INTRAVENOUS at 12:02

## 2018-02-22 RX ADMIN — Medication 3 ML: at 10:02

## 2018-02-22 RX ADMIN — METHYLPREDNISOLONE SODIUM SUCCINATE 40 MG: 40 INJECTION, POWDER, FOR SOLUTION INTRAMUSCULAR; INTRAVENOUS at 09:02

## 2018-02-22 RX ADMIN — MUPIROCIN 1 G: 20 OINTMENT TOPICAL at 09:02

## 2018-02-22 RX ADMIN — FENTANYL CITRATE 50 MCG: 50 INJECTION INTRAMUSCULAR; INTRAVENOUS at 05:02

## 2018-02-22 RX ADMIN — MUPIROCIN 1 G: 20 OINTMENT TOPICAL at 08:02

## 2018-02-22 RX ADMIN — FAMOTIDINE 20 MG: 20 TABLET, FILM COATED ORAL at 08:02

## 2018-02-22 RX ADMIN — METHYLPREDNISOLONE SODIUM SUCCINATE 40 MG: 40 INJECTION, POWDER, FOR SOLUTION INTRAMUSCULAR; INTRAVENOUS at 08:02

## 2018-02-22 RX ADMIN — STANDARDIZED SENNA CONCENTRATE AND DOCUSATE SODIUM 2 TABLET: 8.6; 5 TABLET, FILM COATED ORAL at 08:02

## 2018-02-22 RX ADMIN — PROMETHAZINE HYDROCHLORIDE 12.5 MG: 25 INJECTION INTRAMUSCULAR; INTRAVENOUS at 09:02

## 2018-02-22 NOTE — SUBJECTIVE & OBJECTIVE
"Interval HPI:   Overnight events: remains in ICU, c/o N/V overnight; episode of afib overnight - Amio started, still on lasix and  infusion.   BG well controlled, requiring less IV insulin, IV transition rate lowered to 0.5u/hr overnight.  Eating:   NPO  Nausea: Yes  Hypoglycemia and intervention: No  Fever: No  TPN and/or TF: No  BP (!) 119/55 (BP Location: Left arm, Patient Position: Lying)   Pulse 96   Temp 98.4 °F (36.9 °C) (Oral)   Resp 20   Ht 5' 7" (1.702 m)   Wt 76.1 kg (167 lb 12.3 oz)   SpO2 (!) 94%   BMI 26.28 kg/m²     Labs Reviewed and Include      Recent Labs  Lab 02/22/18  0335 02/22/18  0617   *  --    CALCIUM 9.0  --    ALBUMIN 4.0  --    PROT 6.7  --    *  --    K 4.0 3.9   CO2 32*  --    CL 82*  --    BUN 46*  --    CREATININE 1.8*  --    ALKPHOS 65  --    ALT 25  --    AST 44*  --    BILITOT 0.7  --      Lab Results   Component Value Date    WBC 23.46 (H) 02/22/2018    HGB 8.2 (L) 02/22/2018    HCT 23.7 (L) 02/22/2018    MCV 88 02/22/2018     (L) 02/22/2018     No results for input(s): TSH, FREET4 in the last 168 hours.  Lab Results   Component Value Date    HGBA1C 5.4 01/17/2017       Nutritional status:   Body mass index is 26.28 kg/m².  Lab Results   Component Value Date    ALBUMIN 4.0 02/22/2018    ALBUMIN 3.8 02/21/2018    ALBUMIN 3.6 02/19/2018     Lab Results   Component Value Date    PREALBUMIN 23 01/25/2018    PREALBUMIN 28 12/28/2017    PREALBUMIN 27 11/29/2017       Estimated Creatinine Clearance: 57.1 mL/min (A) (based on SCr of 1.8 mg/dL (H)).    Accu-Checks  Recent Labs      02/20/18   1734  02/20/18   2144  02/21/18   0257  02/21/18   0604  02/21/18   0842  02/21/18   1224  02/21/18   1801  02/21/18   2331  02/22/18   0334  02/22/18   0724   POCTGLUCOSE  129*  125*  133*  133*  118*  130*  122*  146*  131*  151*       Current Medications and/or Treatments Impacting Glycemic Control  Immunotherapy:  Immunosuppressants         Stop Route Frequency     " mycophenolate capsule 1,000 mg      -- Oral 2 times daily     tacrolimus capsule 1 mg      -- Oral 2 times daily        Steroids:   Hormones     Start     Stop Route Frequency Ordered    02/20/18 0900  methylPREDNISolone sodium succinate injection 40 mg      -- IV Every 12 hours 02/19/18 1041          Hyperglycemia/Diabetes Medications: Antihyperglycemics     Start     Stop Route Frequency Ordered    02/20/18 1230  insulin aspart U-100 pen 0-10 Units      -- SubQ As needed (PRN) 02/20/18 1130    02/20/18 1130  insulin regular (Humulin R) 100 Units in sodium chloride 0.9% 100 mL infusion      -- IV Continuous 02/20/18 1130

## 2018-02-22 NOTE — PROGRESS NOTES
"[facility]  Endocrinology  Progress Note    Admit Date: 2/17/2018     Reason for Consult: Management of hyperglycemia     Surgical Procedure and Date: s/p OHT 2/18/18; s/p mediastinal exploration 2/19/18    HPI: Patient is a 28 y.o. male with a diagnosis of familial cardiomyopathy. Underwent heart transplant, received IV Solumedrol in OR. Return to OR for possible bleed. Initially high IV insulin requirements up to 48 u/hr. No personal or family hx of DM.   Lab Results   Component Value Date    HGBA1C 5.4 01/17/2017     Endocrine consulted for BG mgmt.             Interval HPI:   Overnight events: remains in ICU, c/o N/V overnight; episode of afib overnight - Amio started, still on lasix and  infusion.   BG well controlled, requiring less IV insulin, IV transition rate lowered to 0.5u/hr overnight.  Eating:   NPO  Nausea: Yes  Hypoglycemia and intervention: No  Fever: No  TPN and/or TF: No  BP (!) 119/55 (BP Location: Left arm, Patient Position: Lying)   Pulse 96   Temp 98.4 °F (36.9 °C) (Oral)   Resp 20   Ht 5' 7" (1.702 m)   Wt 76.1 kg (167 lb 12.3 oz)   SpO2 (!) 94%   BMI 26.28 kg/m²       Labs Reviewed and Include      Recent Labs  Lab 02/22/18  0335 02/22/18  0617   *  --    CALCIUM 9.0  --    ALBUMIN 4.0  --    PROT 6.7  --    *  --    K 4.0 3.9   CO2 32*  --    CL 82*  --    BUN 46*  --    CREATININE 1.8*  --    ALKPHOS 65  --    ALT 25  --    AST 44*  --    BILITOT 0.7  --      Lab Results   Component Value Date    WBC 23.46 (H) 02/22/2018    HGB 8.2 (L) 02/22/2018    HCT 23.7 (L) 02/22/2018    MCV 88 02/22/2018     (L) 02/22/2018     No results for input(s): TSH, FREET4 in the last 168 hours.  Lab Results   Component Value Date    HGBA1C 5.4 01/17/2017       Nutritional status:   Body mass index is 26.28 kg/m².  Lab Results   Component Value Date    ALBUMIN 4.0 02/22/2018    ALBUMIN 3.8 02/21/2018    ALBUMIN 3.6 02/19/2018     Lab Results   Component Value " Date    PREALBUMIN 23 01/25/2018    PREALBUMIN 28 12/28/2017    PREALBUMIN 27 11/29/2017       Estimated Creatinine Clearance: 57.1 mL/min (A) (based on SCr of 1.8 mg/dL (H)).    Accu-Checks  Recent Labs      02/20/18   1734  02/20/18   2144  02/21/18   0257  02/21/18   0604  02/21/18   0842  02/21/18   1224  02/21/18   1801  02/21/18   2331  02/22/18   0334  02/22/18   0724   POCTGLUCOSE  129*  125*  133*  133*  118*  130*  122*  146*  131*  151*       Current Medications and/or Treatments Impacting Glycemic Control  Immunotherapy:  Immunosuppressants         Stop Route Frequency     mycophenolate capsule 1,000 mg      -- Oral 2 times daily     tacrolimus capsule 1 mg      -- Oral 2 times daily        Steroids:   Hormones     Start     Stop Route Frequency Ordered    02/20/18 0900  methylPREDNISolone sodium succinate injection 40 mg      -- IV Every 12 hours 02/19/18 1041          Hyperglycemia/Diabetes Medications: Antihyperglycemics     Start     Stop Route Frequency Ordered    02/20/18 1230  insulin aspart U-100 pen 0-10 Units      -- SubQ As needed (PRN) 02/20/18 1130    02/20/18 1130  insulin regular (Humulin R) 100 Units in sodium chloride 0.9% 100 mL infusion      -- IV Continuous 02/20/18 1130          ASSESSMENT and PLAN    * Heart transplanted    Per HTS  avoid hypoglycemia            Acute hyperglycemia    BG goal 140-180; rewrite IV transition infusion rate at 0.5 u/hr; change BG to q6hr while NPO.     ADDENDUM at 1300: , diet advanced to regular diet. IV insulin rate lowered to 0.4u/hr. Will d/c IV Insulin infusion. Convert to BG ac/hs with Novolog correction scale.   Will assess for prandial elevations given steroid dosing.             Familial cardiomyopathy    S/p OHT this admission, per HTS          Adverse effect of glucocorticoids and synthetic analogues, initial encounter    May elevate BG readings; currently on Solumedrol IV          Immunosuppression    may contribute to insulin  resistance            Nausea    Post op N/V, now NPO        Paroxysmal atrial fibrillation    Since 2/22/18; amio infusing  If uncontrolled, may contribute to insulin resistance              JEFF Salinas,ANP-C  Endocrinology  [facility]

## 2018-02-22 NOTE — PT/OT/SLP PROGRESS
Physical Therapy Treatment    Patient Name:  Mert Samayoa   MRN:  [id number]    Recommendations:     Discharge Recommendations:   (home)   Discharge Equipment Recommendations: none   Barriers to discharge: None    Assessment:     Mert Samayoa is a 28 y.o. male admitted with a medical diagnosis of Heart transplanted.  He presents with the following impairments/functional limitations:  impaired endurance, weakness, impaired functional mobilty, decreased safety awareness, gait instability.    Rehab Prognosis:  good; patient would benefit from acute skilled PT services to address these deficits and reach maximum level of function.      Recent Surgery: Procedure(s) (LRB):  EXPLORATION-BLEEDING (RE-EXPLORATION) (Bilateral) 3 Days Post-Op    Plan:     During this hospitalization, patient to be seen 5 x/week to address the above listed problems via gait training, therapeutic activities, therapeutic exercises  · Plan of Care Expires:  03/19/18   Plan of Care Reviewed with: patient    Subjective     Communicated with RN prior to session.  Patient found seated in bedside chair upon PT entry to room, agreeable to treatment.      Chief Complaint: Pt c/o nausea.  RN aware.  Pain/Comfort:  · Pain Rating 1: 0/10    Patients cultural, spiritual, Confucianist conflicts given the current situation: none    Objective:     Patient found with:  (pulse ox, tele, Bp cuff, CT, oxygen, IV)     General Precautions: Standard,  (fall, sternal, immunosuppression, mask when OOB)   Orthopedic Precautions:    Braces:       Functional Mobility:  · Bed Mobility:     · Transfers:     · Sit to Stand:  contact guard assistance with no AD  · Gait: 180 feet with HHA and CGA  · Balance: fair  Bed mobility NT as pt found seated in bedside chair    AM-PAC 6 CLICK MOBILITY  Turning over in bed (including adjusting bedclothes, sheets and blankets)?: 3  Sitting down on and standing up from a chair with arms (e.g., wheelchair, bedside commode, etc.):  3  Moving from lying on back to sitting on the side of the bed?: 3  Moving to and from a bed to a chair (including a wheelchair)?: 3  Need to walk in hospital room?: 3  Climbing 3-5 steps with a railing?: 2  Total Score: 17       Therapeutic Activities and Exercises:   Pt educated on importance of donning mask when out of room.    Patient left up in chair with all lines intact and call button in reach..    GOALS:    Physical Therapy Goals        Problem: Physical Therapy Goal    Goal Priority Disciplines Outcome Goal Variances Interventions   Physical Therapy Goal     PT/OT, PT Ongoing (interventions implemented as appropriate)     Description:  Goals to be met by: 3/6/18    Patient will increase functional independence with mobility by performin. Supine to sit with Stand-by Assistance-not met  2. Sit to stand transfer with Supervision-not met  3. Gait  x 220 feet with Supervision -not met  4. Ascend/descend 3 stair with right Handrails Supervision . -not met                      Time Tracking:     PT Received On: 18  PT Start Time: 1411     PT Stop Time: 1430  PT Total Time (min): 19 min     Billable Minutes: Gait Training 15    Treatment Type: Treatment  PT/PTA: PT     PTA Visit Number: 0     Micheline Shankar, PT  2018

## 2018-02-22 NOTE — SIGNIFICANT EVENT
Patient acutely developed stable AF with RVR with HR 170s. CVP and UOP improved with Diuril dosing. sCr stable for the day at 1.8. Discussed with Dr. Tapia. Amiodarone 150 mg bolus given with conversion to ST HR 100s. Plan on continuing amiodarone drip (1 mg/minute for 6 hours, then 0.5 mg/minute for 18 hours) and decreasing  to 2.5 mcg/kg/min. Additionally, spoke with SICU house-staff regarding hiccups and absence of BMs and passing gas since surgery. KUB with unremarkable bowel gas pattern from earlier in the day. Hiccups persistent despite medications. Surgical housestaff noted possibility of phrenic nerve irritation in the setting of recent cardiac surgery. He recommends NPO for now and consider suppository in the AM. Aspiration precautions as well as NPO status ordered.

## 2018-02-22 NOTE — PLAN OF CARE
Problem: Occupational Therapy Goal  Goal: Occupational Therapy Goal  Goals to be met by: 3/2/18     Patient will increase functional independence with ADLs by performing:    Feeding with [place]. MET  UE Dressing with Supervision.  LE Dressing with Supervision.  Grooming while standing at sink with Supervision.  Toileting from toilet with Supervision for hygiene and clothing management.   Toilet transfer to toilet with Supervision.     Outcome: Ongoing (interventions implemented as appropriate)  Goals reviewed and continue to remain appropriate at this time.

## 2018-02-22 NOTE — PLAN OF CARE
Problem: Patient Care Overview  Goal: Plan of Care Review  Outcome: Ongoing (interventions implemented as appropriate)  Pt rhythm changed to AFib with RVR (rate 180s-190s) around 0000.  Amio bolus administered and amio gtt at 1 mg/min administered for 6 hours.  Currently amio gtt at 0.5 mg/min.  Dobutamine gtt decreased to 2.5 mcg/kg/min. VSS currently. Afebrile. Last CVP 12.  NSR with rate in 90s. Maintaining O2 sats 93% RA while up in chair.  Lasix gtt remains at 20 mg/hr.  Diuril administered once overnight.  UO adequate.  Insulin gtt remains at 0.5 units/hr (POCT glucose 146 and 131).  Pt complains of nausea frequently with two episodes of vomiting.  PRN medications administered with mild relief obtained (5mg reglan x2, 4mg zofran x2, 10 mg compazine x2, 12.5 mg phenergan x2).  Pt also has hiccups frequently.  Pain currently rated 4/10.  PRN 50 mcg fentanyl administered x3 overnight.  Currently NPO with aspiration precautions.  Electrolytes monitored and replaced as ordered.  Plan of care reviewed with patient and spouse.  Questions and concerns addressed.

## 2018-02-22 NOTE — PT/OT/SLP PROGRESS
"Occupational Therapy   Treatment    Name: Mert Samayoa  MRN: [id number]  Admitting Diagnosis:  Heart transplanted  3 Days Post-Op    Recommendations:     Discharge Recommendations: home  Discharge Equipment Recommendations:  none  Barriers to discharge:  None    Subjective     Communicated with: RN prior to session. Pt agreeable to OT treatment.   Pain/Comfort:  · Pain Rating 1:  (pt reporting "some chest" pain, however not rating the pain numerically)  · Pain Addressed 1: Reposition, Distraction, Cessation of Activity  · Pain Rating Post-Intervention 1:  (pt reporting "not much' pain at end of session)    Patients cultural, spiritual, Yazdanism conflicts given the current situation: none reported     Objective:     Patient found with: blood pressure cuff, central line, kirk catheter, oxygen, peripheral IV, pulse ox (continuous), telemetry, arterial line (chest tube x2, external pacemaker, 4L)    General Precautions: Standard, fall, sternal, NPO, aspiration   Orthopedic Precautions:N/A   Braces: N/A     Occupational Performance:    Bed Mobility:    · Pt found UIC at start of session     Functional Mobility/Transfers:  · Patient completed Sit <> Stand Transfer with contact guard assistance  with  no assistive device from bedside chair x2 trails   · Functional Mobility: Pt engaging in functional mobility to simulate household distances approx 60 ft. With CGA and not noted LOB. Pt with intermittent eyes closed with min vc's required to keep eyes open in order to maximize functional activity tolerance required for engagement in occupations of choice.     Activities of Daily Living:  · Grooming: setup assistance for brushing teeth; pt unable to stand to complete task d/t increased fatigue and need to sit after functional mobility      Patient left up in chair with all lines intact, call button in reach, RN present/ notified.     [facility] 6 Click:  AMPA Total Score: 18    Treatment & Education:  -Pt educated on " importance of maintaining sternal precautions and way to maintain during functional transfers   -Functional mobility in the hallway with portable monitor in tow and mask on patient    Additional:  Communicated role of OT/POC  Updated communication board  RN notified post-OT session  Education:    Assessment:     Mert Samayoa is a 28 y.o. male with a medical diagnosis of Heart transplanted.  He presents with decreased functional activity tolerance limiting functional mobility distance and standing tasks at this time.  Performance deficits affecting function are impaired endurance, impaired self care skills, impaired functional mobilty, impaired balance, pain.  Pt requiring CGA for functional mobility and t/f's at this time.     Rehab Prognosis:  good; patient would benefit from acute skilled OT services to address these deficits and reach maximum level of function.       Plan:     Patient to be seen 5 x/week to address the above listed problems via self-care/home management, therapeutic activities, therapeutic exercises  · Plan of Care Expires: 03/20/18  · Plan of Care Reviewed with: patient    This Plan of care has been discussed with the patient who was involved in its development and understands and is in agreement with the identified goals and treatment plan    GOALS:    Occupational Therapy Goals        Problem: Occupational Therapy Goal    Goal Priority Disciplines Outcome Interventions   Occupational Therapy Goal     OT, PT/OT Ongoing (interventions implemented as appropriate)    Description:  Goals to be met by: 3/2/18     Patient will increase functional independence with ADLs by performing:    Feeding with [place]. MET  UE Dressing with Supervision.  LE Dressing with Supervision.  Grooming while standing at sink with Supervision.  Toileting from toilet with Supervision for hygiene and clothing management.   Toilet transfer to toilet with Supervision.                       Time Tracking:     OT  Date of Treatment: 02/22/18  OT Start Time: 1040  OT Stop Time: 1106  OT Total Time (min): 26 min    Billable Minutes:Therapeutic Activity 26 minutes    Vee Copeland OT  2/22/2018

## 2018-02-22 NOTE — SIGNIFICANT EVENT
Called nurse for update on patient. UOP 75 cc/h (down from 200s). CVP 16. He had 30s-60s run of tachycardia (appears to be SVT) with pacer spikes. Discussed with Dr. Tapia. Will give Diuril 250 mg x1 now. Goal K>4 and Mg>2 (Mg 1.9 at 6P -- being replaced currently); will get ABG for potassium level now. Will get BMP and Mg in 3h. Will re-assess CVP, UOP and labs again in 3h.

## 2018-02-22 NOTE — SIGNIFICANT EVENT
Arterial stick for potassium level resulted with blood gas analysis as well. Primary metabolic alkalosis present with respiratory compensation. The metabolic alkalosis is in the setting of nausea and vomiting x1 as well as diuretic use (though patient with e/o hypervolemia with elevated CVP). K 3.8 (replacement ordered). iCa low (replacement ordered). Bedside ECHO completed: no pericardial effusion noted.

## 2018-02-22 NOTE — PROGRESS NOTES
[facility]  Heart Transplant  Progress Note    Patient Name: Mert Samayoa  MRN: [id number]  Admission Date: 2/17/2018  Hospital Length of Stay: 5 days  Attending Physician: Sammi Tapia MD  Primary Care Provider: Primary Doctor No  Principal Problem:Heart transplanted    Subjective:     Interval History: Continues with nausea. Still no BM or gas since surgery. Abdomen full and experiencing hiccups. Went into afib last night and started on IV amio. Since converted to NSR.    Continuous Infusions:   amiodarone in dextrose 5% 0.5 mg/min (02/22/18 0800)    DOBUTamine 2.5 mcg/kg/min (02/22/18 0800)    furosemide (LASIX) 5 mg/mL infusion (non-titrating) 20 mg/hr (02/22/18 0800)    insulin (HUMAN R) infusion (adults) 0.5 Units/hr (02/22/18 0800)     Scheduled Meds:   amLODIPine  10 mg Oral Daily    aspirin  81 mg Oral Daily    famotidine  20 mg Oral BID    methylPREDNISolone sodium succinate  40 mg Intravenous Q12H    mupirocin  1 g Nasal BID    mycophenolate  1,000 mg Oral BID    nystatin  500,000 Units Mouth/Throat QID (WM & HS)    polyethylene glycol  17 g Oral BID    senna-docusate 8.6-50 mg  2 tablet Oral BID    sodium chloride 0.9%  3 mL Intravenous Q8H    tacrolimus  1 mg Oral BID     PRN Meds:sodium chloride, albuterol sulfate, [COMPLETED] calcium gluconate IVPB **AND** calcium gluconate IVPB, dextrose 50%, dextrose 50%, fentaNYL, glucagon (human recombinant), glucose, glucose, glycerin adult, insulin aspart U-100, lactulose, magnesium sulfate IVPB, metoclopramide HCl, ondansetron, oxyCODONE, oxyCODONE, potassium chloride **AND** potassium chloride **AND** potassium chloride, prochlorperazine, promethazine (PHENERGAN) IVPB    Review of patient's allergies indicates:  No Known Allergies  Objective:     Vital Signs (Most Recent):  Temp: 98.4 °F (36.9 °C) (02/22/18 0700)  Pulse: 97 (02/22/18 0800)  Resp: 17 (02/22/18 0800)  BP: (!) 119/55 (02/22/18 0100)  SpO2: (!) 94 %  (02/22/18 0800) Vital Signs (24h Range):  Temp:  [98.3 °F (36.8 °C)-98.7 °F (37.1 °C)] 98.4 °F (36.9 °C)  Pulse:  [] 97  Resp:  [10-36] 17  SpO2:  [90 %-100 %] 94 %  BP: (113-119)/(55-65) 119/55  Arterial Line BP: (119-175)/(42-65) 139/52     Patient Vitals for the past 72 hrs (Last 3 readings):   Weight   02/22/18 0500 76.1 kg (167 lb 12.3 oz)   02/21/18 0400 80.1 kg (176 lb 9.4 oz)   02/20/18 0500 84.5 kg (186 lb 4.6 oz)     Body mass index is 26.28 kg/m².      Intake/Output Summary (Last 24 hours) at 02/22/18 0915  Last data filed at 02/22/18 0800   Gross per 24 hour   Intake             2281 ml   Output             5840 ml   Net            -3559 ml     Physical Exam   Constitutional: He is oriented to person, place, and time. He appears well-developed and well-nourished.   HENT:   Head: Normocephalic.   Eyes: Pupils are equal, round, and reactive to light.   Neck: Normal range of motion. Neck supple.   RIJ line in place.    Cardiovascular: Normal rate and regular rhythm.    Pulmonary/Chest: Effort normal and breath sounds normal.   Abdominal: Soft. Bowel sounds are normal. He exhibits distension.   Musculoskeletal: Normal range of motion.   Neurological: He is alert and oriented to person, place, and time.   Skin: Skin is warm and dry.   Psychiatric: He has a normal mood and affect. His behavior is normal.   Nursing note and vitals reviewed.    Significant Labs:  CBC:    Recent Labs  Lab 02/20/18  1736 02/21/18  0300 02/21/18  2143 02/22/18  0335   WBC 18.69* 27.51*  --  23.46*   RBC 2.65* 2.79*  --  2.69*   HGB 8.1* 8.5*  --  8.2*   HCT 23.5* 25.0* 23* 23.7*   PLT 89* 131*  --  149*   MCV 89 90  --  88   MCH 30.6 30.5  --  30.5   MCHC 34.5 34.0  --  34.6     BNP:  No results for input(s): BNP in the last 168 hours.    Invalid input(s): BNPTRIAGELBLO  CMP:    Recent Labs  Lab 02/19/18  0120  02/21/18  0300  02/21/18  2334 02/22/18  0335 02/22/18  0617   GLU  --   < > 139*  --  152* 134*  --    CALCIUM  --    < > 8.7  --  8.8 9.0  --    ALBUMIN 3.6  --  3.8  --   --  4.0  --    PROT 5.3*  --  6.3  --   --  6.7  --    NA  --   < > 136  --  129* 130*  --    K  --   < > 4.8  < > 4.2 4.0 3.9   CO2  --   < > 29  --  32* 32*  --    CL  --   < > 92*  --  84* 82*  --    BUN  --   < > 37*  --  46* 46*  --    CREATININE  --   < > 1.8*  --  1.8* 1.8*  --    ALKPHOS 41*  --  53*  --   --  65  --    ALT 16  --  22  --   --  25  --    AST 55*  --  46*  --   --  44*  --    BILITOT 1.5*  --  0.8  --   --  0.7  --    < > = values in this interval not displayed.   Coagulation:     Recent Labs  Lab 02/18/18  1959  02/19/18  0839  02/20/18  0400 02/21/18  0330 02/22/18  0335   INR 1.2  < >  --   < > 1.6* 1.2 1.0   APTT 27.6  --  36.2*  --   --  27.0  --    < > = values in this interval not displayed.  LDH:  No results for input(s): LDH in the last 72 hours.  Microbiology:  Microbiology Results (last 7 days)     Procedure Component Value Units Date/Time    Urine culture [[id number]] Collected:  02/17/18 0425    Order Status:  Completed Specimen:  Urine from Urine, Clean Catch Updated:  02/18/18 1119     Urine Culture, Routine No growth    Narrative:       1 cup of urine          I have reviewed all pertinent labs within the past 24 hours.    Estimated Creatinine Clearance: 57.1 mL/min (A) (based on SCr of 1.8 mg/dL (H)).    Diagnostic Results:  I have reviewed all pertinent imaging results/findings within the past 24 hours.    Assessment and Plan:     27 year old WM with DCM (familial, both brothers with heart transplants) with stage D CHFrEF underwent Heartware placement 02/01/17 had post-op atrial flutter 02/11/17 treated with amiodarone presents for OHTx.  0.  VAD implanted on 2/1/17 HVAD RPM 2700.         * Heart transplanted    -Transplant Date 2/18/18. S/P washout 2/19.  HTS primary.   -CMV Status:D+/R-   -Rejection status: Moderate Risk  -Rejection episodes: none to date  -Induction: No   -Current immunosuppression: Mehtylpred 40IV  BID, Prograf 1mg BID, Cellcept 1000 mg BID.  -Opportunistic PPx with:Nystatin   -Hemodynamically stable on  2.5.   -Continue Lasix 20mg/hr. Diuresing well. Received dose of diuril last night with good results.   -CTs in place and AV wires connected to pacer(backup rate 80).  -Echo 2/21 EF 60%, low nl RV fxn.        Nausea    -Likely related to constipation. Had similar issues after VAD.   -Continue antiemetics.  -Increase BM regimen. Will add suppository/lactulose today.  -May need enema.         Paroxysmal atrial fibrillation    -~30mn episode of afib last night terminated with amio. Likely exacerbated by pain, n/v, volume.   - Currently ST ~110.  - Continue amio load for now.  - Echo as above.              Augusto Goldberg, NP  Heart Transplant  [facility]

## 2018-02-22 NOTE — ASSESSMENT & PLAN NOTE
-~30mn episode of afib last night terminated with amio. Likely exacerbated by pain, n/v, volume.   - Currently ST ~110.  - Continue amio load for now.  - Echo as above.

## 2018-02-22 NOTE — PHYSICIAN QUERY
"PT Name: Mert Samayoa  MR #: [id number]    Physician Query Form - Hematology Clarification      CDS/: Kala Bee RN, CCDS             Contact information: [email or web address]    This form is a permanent document in the medical record.      Query Date: February 22, 2018    By submitting this query, we are merely seeking further clarification of documentation. Please utilize your independent clinical judgment when addressing the question(s) below.    The Medical record contains the following:   Indicators  Supporting Clinical Findings Location in Medical Record    "Anemia" documented     X H & H = 13.3/38.7-->7.6/22.3--7.5/21.0 2/17, 2/18, 2/19     BP =                     HR=      "GI bleeding" documented     X Acute bleeding (Non GI site) Postoperative bleeding   Taken back for mediastinal exploration due to bleeding 2/19 op note  2/19 prog note   X Transfusion(s) OHTx on 2/18/2018.  After surgery patient had active bleeding, hgb drop from 9.5 to 7.6, 2U PRBC administered, no improvement in hgb.  1U more of PRBC being administered 2/19 anesthesia pre-op    Treatment:     X Other:  Monitor hgb; q4 hour labs     2/19 prog note     Provider, please specify diagnosis or diagnoses associated with above clinical findings.      [  ] Acute blood loss anemia  [ X ] Other Hematological Diagnosis (please specify): ___acute postoperative blood loss anemia (______________________________  [  ] Clinically Undetermined       Please document in your progress notes daily for the duration of treatment, until resolved, and include in your discharge summary.                                                                                                      "

## 2018-02-22 NOTE — ASSESSMENT & PLAN NOTE
-Likely related to constipation. Had similar issues after VAD.   -Continue antiemetics.  -Increase BM regimen. Will add suppository/lactulose today.  -May need enema.

## 2018-02-22 NOTE — PHYSICIAN QUERY
PT Name: Mert Samayoa  MR #: [id number]  Physician Query Form - Hematology Clarification     CDS/: Kala Bee RN, CCDS              Contact information: [email or web address]    This form is a permanent document in the medical record.     Query Date: February 22, 2018    By submitting this query, we are merely seeking further clarification of documentation. Please utilize your independent clinical judgment when addressing the question(s) below.    The medical record contains the following:    Indicators Supporting Clinical Findings Location in Medical Record   X Surgery or Procedure performed:   Estimated Blood Loss (EBL): 1.  Orthotopic heart transplant using bicaval technique, ischemic time 3 hours   and 51 minutes.  2.  Removal of implantable intracorporeal left ventricular assist device.  3.  Removal of AICD and lead.  4.  Closure of patent foramen ovale in donor heart.  5.  Backbench preparation of donor heart for implant. 2/18 op note   X Bleeding documented Postoperative bleeding, 2/19 op note    Hemorrhage documented      Hematoma documented      Evacuation of hematoma documented      Anticoagulants:      X Transfusions / Blood Products: OHTx on 2/18/2018.  After surgery patient had active bleeding, hgb drop from 9.5 to 7.6, 2U PRBC administered, no improvement in hgb.  1U more of PRBC being administered 2/19 anesthesia pre-op     Treatments:     X Other: Pleural chest tube put out 600ml sanguinous bloody drainage for the hour  Mediastinal exploration. 2/19 nurse note      2/19 op note     Please clarify if the __Postoperative bleeding_____ is                 [   ]   Inherent to the procedure               [ X  ]   Routinely expected                [   ]   Complication of procedure               [   ]   Due to medication (specify):_________________________________               [   ]   Other: ____________________________               [   ]   Clinically insignificant                                           Please document in your progress notes daily for the duration of treatment, until resolved, and include in your discharge summary.

## 2018-02-22 NOTE — PROGRESS NOTES
Date Consent signed: 2/22/2018    Sponsor: [employer].    Study Title/IRB Number: [id number]    Principle Investigator: Sammi Tapia MD    Present for Discussion: Patient & CRC     Is LAR Consenting for Subject: No    Prior to the Informed Consent (IC) being signed, or any study protocol required data collection, testing, procedure, or intervention being performed, the following was done and/or discussed:   Patient was given a copy of the IC for review    Purpose of the study and qualifications to participate    Study design, Follow up schedule, and tests or procedures done at each visit   Confidentiality and HIPAA Authorization for Release of Medical Records for the research trial/ subject's rights/research related injury   Risk, Benefits, Alternative Treatments, Compensation and Costs   Participation in the research trial is voluntary and patient may withdraw at anytime   Contact information for study related questions    Patient verbalizes understanding of the above: Yes  Contact information for CRC and PI given to patient: Yes  Patient able to adequately summarize: the purpose of the study, the risks associated with the study, and all procedures, testing, and follow-ups associated with the study: Yes    He signed the informed consent form for the OAR research study with an IRB approval date of 08/03/2017.  Each page of the consent form was reviewed with him and all questions answered satisfactorily. He signed the consent form and received a copy of same. The original consent was scanned into electronic medical records (EPIC) and filed into the subject's research study binder.

## 2018-02-22 NOTE — PLAN OF CARE
Problem: Physical Therapy Goal  Goal: Physical Therapy Goal  Goals to be met by: 3/6/18    Patient will increase functional independence with mobility by performin. Supine to sit with Stand-by Assistance-not met  2. Sit to stand transfer with Supervision-not met  3. Gait  x 220 feet with Supervision -not met  4. Ascend/descend 3 stair with right Handrails Supervision . -not met     Outcome: Ongoing (interventions implemented as appropriate)  Goals remain appropriate.  Micheline Shankar, PT  2018

## 2018-02-22 NOTE — ASSESSMENT & PLAN NOTE
BG goal 140-180; rewrite IV transition infusion rate at 0.5 u/hr; change BG to q6hr while NPO.     ADDENDUM at 1300: , diet advanced to regular diet. IV insulin rate lowered to 0.4u/hr. Will d/c IV Insulin infusion. Convert to BG ac/hs with Novolog correction scale.   Will assess for prandial elevations given steroid dosing.

## 2018-02-22 NOTE — SUBJECTIVE & OBJECTIVE
Interval History: Continues with nausea. Still no BM or gas since surgery. Abdomen full and experiencing hiccups. Went into afib last night and started on IV amio. Since converted to NSR.    Continuous Infusions:   amiodarone in dextrose 5% 0.5 mg/min (02/22/18 0800)    DOBUTamine 2.5 mcg/kg/min (02/22/18 0800)    furosemide (LASIX) 5 mg/mL infusion (non-titrating) 20 mg/hr (02/22/18 0800)    insulin (HUMAN R) infusion (adults) 0.5 Units/hr (02/22/18 0800)     Scheduled Meds:   amLODIPine  10 mg Oral Daily    aspirin  81 mg Oral Daily    famotidine  20 mg Oral BID    methylPREDNISolone sodium succinate  40 mg Intravenous Q12H    mupirocin  1 g Nasal BID    mycophenolate  1,000 mg Oral BID    nystatin  500,000 Units Mouth/Throat QID (WM & HS)    polyethylene glycol  17 g Oral BID    senna-docusate 8.6-50 mg  2 tablet Oral BID    sodium chloride 0.9%  3 mL Intravenous Q8H    tacrolimus  1 mg Oral BID     PRN Meds:sodium chloride, albuterol sulfate, [COMPLETED] calcium gluconate IVPB **AND** calcium gluconate IVPB, dextrose 50%, dextrose 50%, fentaNYL, glucagon (human recombinant), glucose, glucose, glycerin adult, insulin aspart U-100, lactulose, magnesium sulfate IVPB, metoclopramide HCl, ondansetron, oxyCODONE, oxyCODONE, potassium chloride **AND** potassium chloride **AND** potassium chloride, prochlorperazine, promethazine (PHENERGAN) IVPB    Review of patient's allergies indicates:  No Known Allergies  Objective:     Vital Signs (Most Recent):  Temp: 98.4 °F (36.9 °C) (02/22/18 0700)  Pulse: 97 (02/22/18 0800)  Resp: 17 (02/22/18 0800)  BP: (!) 119/55 (02/22/18 0100)  SpO2: (!) 94 % (02/22/18 0800) Vital Signs (24h Range):  Temp:  [98.3 °F (36.8 °C)-98.7 °F (37.1 °C)] 98.4 °F (36.9 °C)  Pulse:  [] 97  Resp:  [10-36] 17  SpO2:  [90 %-100 %] 94 %  BP: (113-119)/(55-65) 119/55  Arterial Line BP: (119-175)/(42-65) 139/52     Patient Vitals for the past 72 hrs (Last 3 readings):   Weight    02/22/18 0500 76.1 kg (167 lb 12.3 oz)   02/21/18 0400 80.1 kg (176 lb 9.4 oz)   02/20/18 0500 84.5 kg (186 lb 4.6 oz)     Body mass index is 26.28 kg/m².      Intake/Output Summary (Last 24 hours) at 02/22/18 0915  Last data filed at 02/22/18 0800   Gross per 24 hour   Intake             2281 ml   Output             5840 ml   Net            -3559 ml     Physical Exam   Constitutional: He is oriented to person, place, and time. He appears well-developed and well-nourished.   HENT:   Head: Normocephalic.   Eyes: Pupils are equal, round, and reactive to light.   Neck: Normal range of motion. Neck supple.   RIJ line in place.    Cardiovascular: Normal rate and regular rhythm.    Pulmonary/Chest: Effort normal and breath sounds normal.   Abdominal: Soft. Bowel sounds are normal. He exhibits distension.   Musculoskeletal: Normal range of motion.   Neurological: He is alert and oriented to person, place, and time.   Skin: Skin is warm and dry.   Psychiatric: He has a normal mood and affect. His behavior is normal.   Nursing note and vitals reviewed.    Significant Labs:  CBC:    Recent Labs  Lab 02/20/18  1736 02/21/18  0300 02/21/18  2143 02/22/18  0335   WBC 18.69* 27.51*  --  23.46*   RBC 2.65* 2.79*  --  2.69*   HGB 8.1* 8.5*  --  8.2*   HCT 23.5* 25.0* 23* 23.7*   PLT 89* 131*  --  149*   MCV 89 90  --  88   MCH 30.6 30.5  --  30.5   MCHC 34.5 34.0  --  34.6     BNP:  No results for input(s): BNP in the last 168 hours.    Invalid input(s): BNPTRIAGELBLO  CMP:    Recent Labs  Lab 02/19/18  0120  02/21/18  0300  02/21/18  2334 02/22/18  0335 02/22/18  0617   GLU  --   < > 139*  --  152* 134*  --    CALCIUM  --   < > 8.7  --  8.8 9.0  --    ALBUMIN 3.6  --  3.8  --   --  4.0  --    PROT 5.3*  --  6.3  --   --  6.7  --    NA  --   < > 136  --  129* 130*  --    K  --   < > 4.8  < > 4.2 4.0 3.9   CO2  --   < > 29  --  32* 32*  --    CL  --   < > 92*  --  84* 82*  --    BUN  --   < > 37*  --  46* 46*  --    CREATININE  --    < > 1.8*  --  1.8* 1.8*  --    ALKPHOS 41*  --  53*  --   --  65  --    ALT 16  --  22  --   --  25  --    AST 55*  --  46*  --   --  44*  --    BILITOT 1.5*  --  0.8  --   --  0.7  --    < > = values in this interval not displayed.   Coagulation:     Recent Labs  Lab 02/18/18  1959  02/19/18  0839  02/20/18  0400 02/21/18  0330 02/22/18  0335   INR 1.2  < >  --   < > 1.6* 1.2 1.0   APTT 27.6  --  36.2*  --   --  27.0  --    < > = values in this interval not displayed.  LDH:  No results for input(s): LDH in the last 72 hours.  Microbiology:  Microbiology Results (last 7 days)     Procedure Component Value Units Date/Time    Urine culture [[id number]] Collected:  02/17/18 0425    Order Status:  Completed Specimen:  Urine from Urine, Clean Catch Updated:  02/18/18 1119     Urine Culture, Routine No growth    Narrative:       1 cup of urine          I have reviewed all pertinent labs within the past 24 hours.    Estimated Creatinine Clearance: 57.1 mL/min (A) (based on SCr of 1.8 mg/dL (H)).    Diagnostic Results:  I have reviewed all pertinent imaging results/findings within the past 24 hours.

## 2018-02-23 PROBLEM — R06.6 HICCUPS: Status: ACTIVE | Noted: 2018-02-23

## 2018-02-23 PROBLEM — I82.90 VTE (VENOUS THROMBOEMBOLISM): Status: ACTIVE | Noted: 2018-02-23

## 2018-02-23 LAB
ALBUMIN SERPL BCP-MCNC: 3.7 G/DL
ALLENS TEST: ABNORMAL
ALP SERPL-CCNC: 65 U/L
ALT SERPL W/O P-5'-P-CCNC: 28 U/L
ANION GAP SERPL CALC-SCNC: 13 MMOL/L
AST SERPL-CCNC: 32 U/L
BASOPHILS # BLD AUTO: 0.04 K/UL
BASOPHILS NFR BLD: 0.1 %
BILIRUB SERPL-MCNC: 0.8 MG/DL
BUN SERPL-MCNC: 53 MG/DL
CALCIUM SERPL-MCNC: 8.5 MG/DL
CHLORIDE SERPL-SCNC: 84 MMOL/L
CO2 SERPL-SCNC: 34 MMOL/L
CREAT SERPL-MCNC: 1.6 MG/DL
DIFFERENTIAL METHOD: ABNORMAL
EOSINOPHIL # BLD AUTO: 9.1 K/UL
EOSINOPHIL NFR BLD: 33.3 %
ERYTHROCYTE [DISTWIDTH] IN BLOOD BY AUTOMATED COUNT: 14 %
EST. GFR  (AFRICAN AMERICAN): >60 ML/MIN/1.73 M^2
EST. GFR  (NON AFRICAN AMERICAN): 57.8 ML/MIN/1.73 M^2
G6PD RBC-CCNT: 16.2 U/G HGB (ref 7–20.5)
GLUCOSE SERPL-MCNC: 138 MG/DL
HCO3 UR-SCNC: 38.1 MMOL/L (ref 24–28)
HCT VFR BLD AUTO: 24 %
HGB BLD-MCNC: 8.3 G/DL
IMM GRANULOCYTES # BLD AUTO: 0.68 K/UL
IMM GRANULOCYTES NFR BLD AUTO: 2.5 %
INR PPP: 1.1
LYMPHOCYTES # BLD AUTO: 1.2 K/UL
LYMPHOCYTES NFR BLD: 4.4 %
MAGNESIUM SERPL-MCNC: 2.1 MG/DL
MAGNESIUM SERPL-MCNC: 2.2 MG/DL
MAGNESIUM SERPL-MCNC: 2.2 MG/DL
MAGNESIUM SERPL-MCNC: 2.4 MG/DL
MCH RBC QN AUTO: 30.2 PG
MCHC RBC AUTO-ENTMCNC: 34.6 G/DL
MCV RBC AUTO: 87 FL
MONOCYTES # BLD AUTO: 2 K/UL
MONOCYTES NFR BLD: 7.1 %
NEUTROPHILS # BLD AUTO: 14.4 K/UL
NEUTROPHILS NFR BLD: 52.6 %
NRBC BLD-RTO: 0 /100 WBC
PCO2 BLDA: 48.7 MMHG (ref 35–45)
PH SMN: 7.5 [PH] (ref 7.35–7.45)
PLATELET # BLD AUTO: 239 K/UL
PMV BLD AUTO: 9.9 FL
PO2 BLDA: 33 MMHG (ref 40–60)
POC BE: 15 MMOL/L
POC SATURATED O2: 68 % (ref 95–100)
POC TCO2: 40 MMOL/L (ref 24–29)
POCT GLUCOSE: 124 MG/DL (ref 70–110)
POCT GLUCOSE: 134 MG/DL (ref 70–110)
POCT GLUCOSE: 149 MG/DL (ref 70–110)
POCT GLUCOSE: 164 MG/DL (ref 70–110)
POTASSIUM SERPL-SCNC: 3.6 MMOL/L
POTASSIUM SERPL-SCNC: 3.7 MMOL/L
POTASSIUM SERPL-SCNC: 3.9 MMOL/L
POTASSIUM SERPL-SCNC: 4 MMOL/L
PROT SERPL-MCNC: 6.4 G/DL
PROTHROMBIN TIME: 11.5 SEC
RBC # BLD AUTO: 2.75 M/UL
SAMPLE: ABNORMAL
SITE: ABNORMAL
SODIUM SERPL-SCNC: 131 MMOL/L
TACROLIMUS BLD-MCNC: 4.2 NG/ML
WBC # BLD AUTO: 27.4 K/UL

## 2018-02-23 PROCEDURE — 83735 ASSAY OF MAGNESIUM: CPT | Mod: 91

## 2018-02-23 PROCEDURE — 86850 RBC ANTIBODY SCREEN: CPT

## 2018-02-23 PROCEDURE — 82803 BLOOD GASES ANY COMBINATION: CPT

## 2018-02-23 PROCEDURE — C1769 GUIDE WIRE: HCPCS

## 2018-02-23 PROCEDURE — 25000003 PHARM REV CODE 250: Performed by: INTERNAL MEDICINE

## 2018-02-23 PROCEDURE — A4216 STERILE WATER/SALINE, 10 ML: HCPCS | Performed by: THORACIC SURGERY (CARDIOTHORACIC VASCULAR SURGERY)

## 2018-02-23 PROCEDURE — 97803 MED NUTRITION INDIV SUBSEQ: CPT

## 2018-02-23 PROCEDURE — 63600175 PHARM REV CODE 636 W HCPCS: Performed by: INTERNAL MEDICINE

## 2018-02-23 PROCEDURE — 80053 COMPREHEN METABOLIC PANEL: CPT

## 2018-02-23 PROCEDURE — 25000003 PHARM REV CODE 250: Performed by: STUDENT IN AN ORGANIZED HEALTH CARE EDUCATION/TRAINING PROGRAM

## 2018-02-23 PROCEDURE — 25000003 PHARM REV CODE 250: Performed by: NURSE PRACTITIONER

## 2018-02-23 PROCEDURE — 97116 GAIT TRAINING THERAPY: CPT

## 2018-02-23 PROCEDURE — 94761 N-INVAS EAR/PLS OXIMETRY MLT: CPT

## 2018-02-23 PROCEDURE — 20000000 HC ICU ROOM

## 2018-02-23 PROCEDURE — 63600175 PHARM REV CODE 636 W HCPCS: Performed by: STUDENT IN AN ORGANIZED HEALTH CARE EDUCATION/TRAINING PROGRAM

## 2018-02-23 PROCEDURE — 97535 SELF CARE MNGMENT TRAINING: CPT

## 2018-02-23 PROCEDURE — 85025 COMPLETE CBC W/AUTO DIFF WBC: CPT

## 2018-02-23 PROCEDURE — 27200188 HC TRANSDUCER, ART ADULT/PEDS

## 2018-02-23 PROCEDURE — 63600175 PHARM REV CODE 636 W HCPCS: Performed by: NURSE PRACTITIONER

## 2018-02-23 PROCEDURE — 99233 SBSQ HOSP IP/OBS HIGH 50: CPT | Mod: ,,, | Performed by: INTERNAL MEDICINE

## 2018-02-23 PROCEDURE — 27000221 HC OXYGEN, UP TO 24 HOURS

## 2018-02-23 PROCEDURE — 80197 ASSAY OF TACROLIMUS: CPT

## 2018-02-23 PROCEDURE — 99900035 HC TECH TIME PER 15 MIN (STAT)

## 2018-02-23 PROCEDURE — C1751 CATH, INF, PER/CENT/MIDLINE: HCPCS

## 2018-02-23 PROCEDURE — 99232 SBSQ HOSP IP/OBS MODERATE 35: CPT | Mod: ,,, | Performed by: NURSE PRACTITIONER

## 2018-02-23 PROCEDURE — 84132 ASSAY OF SERUM POTASSIUM: CPT | Mod: 91

## 2018-02-23 PROCEDURE — 36556 INSERT NON-TUNNEL CV CATH: CPT

## 2018-02-23 PROCEDURE — 63600175 PHARM REV CODE 636 W HCPCS: Performed by: THORACIC SURGERY (CARDIOTHORACIC VASCULAR SURGERY)

## 2018-02-23 PROCEDURE — 85610 PROTHROMBIN TIME: CPT

## 2018-02-23 PROCEDURE — 25000003 PHARM REV CODE 250: Performed by: THORACIC SURGERY (CARDIOTHORACIC VASCULAR SURGERY)

## 2018-02-23 RX ORDER — HYDRALAZINE HYDROCHLORIDE 20 MG/ML
10 INJECTION INTRAMUSCULAR; INTRAVENOUS ONCE
Status: COMPLETED | OUTPATIENT
Start: 2018-02-23 | End: 2018-02-23

## 2018-02-23 RX ORDER — HEPARIN SODIUM 5000 [USP'U]/ML
5000 INJECTION, SOLUTION INTRAVENOUS; SUBCUTANEOUS EVERY 8 HOURS
Status: COMPLETED | OUTPATIENT
Start: 2018-02-23 | End: 2018-02-26

## 2018-02-23 RX ORDER — TACROLIMUS 1 MG/1
2 CAPSULE ORAL 2 TIMES DAILY
Status: DISCONTINUED | OUTPATIENT
Start: 2018-02-23 | End: 2018-02-25

## 2018-02-23 RX ORDER — MECLIZINE HYDROCHLORIDE 25 MG/1
25 TABLET ORAL 2 TIMES DAILY
Status: DISCONTINUED | OUTPATIENT
Start: 2018-02-23 | End: 2018-02-24

## 2018-02-23 RX ADMIN — FAMOTIDINE 20 MG: 20 TABLET, FILM COATED ORAL at 08:02

## 2018-02-23 RX ADMIN — MYCOPHENOLATE MOFETIL 1000 MG: 250 CAPSULE ORAL at 08:02

## 2018-02-23 RX ADMIN — OXYCODONE HYDROCHLORIDE 10 MG: 5 TABLET ORAL at 01:02

## 2018-02-23 RX ADMIN — BACLOFEN 5 MG: 10 TABLET ORAL at 08:02

## 2018-02-23 RX ADMIN — METOCLOPRAMIDE 5 MG: 5 INJECTION, SOLUTION INTRAMUSCULAR; INTRAVENOUS at 12:02

## 2018-02-23 RX ADMIN — POLYETHYLENE GLYCOL 3350 17 G: 17 POWDER, FOR SOLUTION ORAL at 08:02

## 2018-02-23 RX ADMIN — METHYLPREDNISOLONE SODIUM SUCCINATE 40 MG: 40 INJECTION, POWDER, FOR SOLUTION INTRAMUSCULAR; INTRAVENOUS at 08:02

## 2018-02-23 RX ADMIN — POTASSIUM CHLORIDE 20 MEQ: 200 INJECTION, SOLUTION INTRAVENOUS at 03:02

## 2018-02-23 RX ADMIN — HYDRALAZINE HYDROCHLORIDE 10 MG: 20 INJECTION INTRAMUSCULAR; INTRAVENOUS at 05:02

## 2018-02-23 RX ADMIN — NYSTATIN 500000 UNITS: 500000 SUSPENSION ORAL at 11:02

## 2018-02-23 RX ADMIN — METOCLOPRAMIDE 5 MG: 5 INJECTION, SOLUTION INTRAMUSCULAR; INTRAVENOUS at 07:02

## 2018-02-23 RX ADMIN — MUPIROCIN 1 G: 20 OINTMENT TOPICAL at 08:02

## 2018-02-23 RX ADMIN — Medication 3 ML: at 10:02

## 2018-02-23 RX ADMIN — NYSTATIN 500000 UNITS: 500000 SUSPENSION ORAL at 08:02

## 2018-02-23 RX ADMIN — AMLODIPINE BESYLATE 10 MG: 10 TABLET ORAL at 08:02

## 2018-02-23 RX ADMIN — TACROLIMUS 2 MG: 1 CAPSULE ORAL at 05:02

## 2018-02-23 RX ADMIN — Medication 3 ML: at 06:02

## 2018-02-23 RX ADMIN — PROMETHAZINE HYDROCHLORIDE 12.5 MG: 25 INJECTION INTRAMUSCULAR; INTRAVENOUS at 05:02

## 2018-02-23 RX ADMIN — TACROLIMUS 1 MG: 1 CAPSULE ORAL at 08:02

## 2018-02-23 RX ADMIN — MECLIZINE HYDROCHLORIDE 25 MG: 25 TABLET ORAL at 10:02

## 2018-02-23 RX ADMIN — HEPARIN SODIUM 5000 UNITS: 5000 INJECTION, SOLUTION INTRAVENOUS; SUBCUTANEOUS at 02:02

## 2018-02-23 RX ADMIN — FENTANYL CITRATE 50 MCG: 50 INJECTION INTRAMUSCULAR; INTRAVENOUS at 07:02

## 2018-02-23 RX ADMIN — POTASSIUM CHLORIDE 20 MEQ: 200 INJECTION, SOLUTION INTRAVENOUS at 01:02

## 2018-02-23 RX ADMIN — POTASSIUM CHLORIDE 20 MEQ: 200 INJECTION, SOLUTION INTRAVENOUS at 06:02

## 2018-02-23 RX ADMIN — HEPARIN SODIUM 5000 UNITS: 5000 INJECTION, SOLUTION INTRAVENOUS; SUBCUTANEOUS at 12:02

## 2018-02-23 RX ADMIN — ONDANSETRON 4 MG: 2 INJECTION INTRAMUSCULAR; INTRAVENOUS at 06:02

## 2018-02-23 RX ADMIN — OXYCODONE HYDROCHLORIDE 10 MG: 5 TABLET ORAL at 09:02

## 2018-02-23 RX ADMIN — HEPARIN SODIUM 5000 UNITS: 5000 INJECTION, SOLUTION INTRAVENOUS; SUBCUTANEOUS at 05:02

## 2018-02-23 RX ADMIN — AMIODARONE HYDROCHLORIDE 0.5 MG/MIN: 1.8 INJECTION, SOLUTION INTRAVENOUS at 02:02

## 2018-02-23 RX ADMIN — ASPIRIN 81 MG: 81 TABLET, COATED ORAL at 08:02

## 2018-02-23 RX ADMIN — NYSTATIN 500000 UNITS: 500000 SUSPENSION ORAL at 05:02

## 2018-02-23 RX ADMIN — STANDARDIZED SENNA CONCENTRATE AND DOCUSATE SODIUM 2 TABLET: 8.6; 5 TABLET, FILM COATED ORAL at 08:02

## 2018-02-23 RX ADMIN — INSULIN ASPART 2 UNITS: 100 INJECTION, SOLUTION INTRAVENOUS; SUBCUTANEOUS at 05:02

## 2018-02-23 RX ADMIN — PROCHLORPERAZINE EDISYLATE 10 MG: 5 INJECTION INTRAMUSCULAR; INTRAVENOUS at 03:02

## 2018-02-23 RX ADMIN — HEPARIN SODIUM 5000 UNITS: 5000 INJECTION, SOLUTION INTRAVENOUS; SUBCUTANEOUS at 09:02

## 2018-02-23 RX ADMIN — MECLIZINE HYDROCHLORIDE 25 MG: 25 TABLET ORAL at 08:02

## 2018-02-23 RX ADMIN — Medication 3 ML: at 02:02

## 2018-02-23 RX ADMIN — FENTANYL CITRATE 50 MCG: 50 INJECTION INTRAMUSCULAR; INTRAVENOUS at 01:02

## 2018-02-23 NOTE — PROGRESS NOTES
"[facility]  Adult Nutrition  Progress Note    SUMMARY     Recommendations  Recommendation/Intervention:   1. Encourage increased PO intake as tolerated.   2. Recommend adding Boost Plus OS to all meals to increase calorie and protein intake.   3. TSU RD to follow-up with post-transplant diet education.   RD to monitor.    Goals: Patient to receive nutrition by RD follow-up  Nutrition Goal Status: goal met  Communication of RD Recs:  (POC)    Reason for Assessment  Reason for Assessment: RD follow-up  Diagnosis: transplant/postoperative complications (s/p OHTx 2/18)  Relevent Medical History: familial cardiomyopathy, CHF, HTN   General Information Comments: S/p heart transplant 2/18. w/ICD & VAD removal. Reexploration 2/19. Started on PO diet Wednesday but having issues with N/V.  Nutrition Discharge Planning: TSU RD to follow-up with post-transplant diet education.    Nutrition Prescription Ordered  Current Diet Order: Regular    Evaluation of Received Nutrients/Fluid Intake  I/O: -852mL x 24hrs, -4.6L since admit  Comments: LBM 2/23, good UOP  % Intake of Estimated Energy Needs: 0 - 25 %  % Meal Intake: 25%     Nutrition Risk Screen   Nutrition Risk Screen: no indicators present    Nutrition/Diet History  Food Preferences: No Mormonism/cultural needs identified at this time.  Factors Affecting Nutritional Intake: nausea/vomiting    Labs/Tests/Procedures/Meds   Pertinent Labs Reviewed: reviewed  Pertinent Labs Comments: Na 131, Cl 84, BUN 53, Creat 1.6, Glu 138, POCT Glu 124-149, Ca 8.5  Pertinent Medications Reviewed: reviewed  Pertinent Medications Comments: famotidine, senna-docusate, prograf, lasix    Physical Findings  Overall Physical Appearance: on oxygen therapy  Tubes:  (chest tube)  Oral/Mouth Cavity: WDL  Skin: edema, incision    Anthropometrics  Height: 5' 7" (170.2 cm)  Weight Method: Bed Scale  Weight: 77.5 kg (170 lb 13.7 oz)  Ideal Body Weight (IBW), Male: 148 lb  % Ideal Body " Weight, Male (lb): 112.91 lb  BMI (Calculated): 26.2  BMI Grade: 25 - 29.9 - overweight    Estimated/Assessed Needs  Weight Used For Calorie Calculations: 77.5 kg (170 lb 13.7 oz)   Energy Calorie Requirements (kcal): 2130 kcal/day  Energy Need Method: [place]-St Jeor (x 1.25)  RMR ([place]-St. Jeor Equation): 1703.62  RMR ([place] State Equation): 2052.65  RMR (Modified [place] Equation): 1937.42  Weight Used For Protein Calculations: 77.5 kg (170 lb 13.7 oz)  Protein Requirements:  g/day (1.2-1.4 g/kg)  Fluid Requirements (mL): per MD  RDA Method (mL): 2130    Assessment and Plan  * Heart transplanted    Nutrition Problem  Increased nutrient needs    Related to (etiology):   S/p OHTx    Signs and Symptoms (as evidenced by):   EPN     Nutrition Diagnosis Status:   Continues          Monitor and Evaluation  Food and Nutrient Intake: energy intake, food and beverage intake  Food and Nutrient Adminstration: diet order  Anthropometric Measurements: weight, weight change  Biochemical Data, Medical Tests and Procedures: electrolyte and renal panel, gastrointestinal profile, inflammatory profile  Nutrition-Focused Physical Findings: overall appearance    Nutrition Risk  Level of Risk:  (2x/week)    Nutrition Follow-Up  RD Follow-up?: Yes

## 2018-02-23 NOTE — ASSESSMENT & PLAN NOTE
BG goal 140-180; Continue BG monitoring ac/hs with Novolog correction scale.   Will assess for prandial elevations as N/V resolves and po intake increases given steroid dosing.

## 2018-02-23 NOTE — PLAN OF CARE
Problem: Patient Care Overview  Goal: Plan of Care Review  Outcome: Ongoing (interventions implemented as appropriate)  NSR with HR in 90s.  MAP maintained 60-80.  10 mg hydralazine administered once.  Maintaining sats > 92% with venti mask 3L, 28%.  Lasix gtt @ 20 mg/hr and amio gtt @ 0.5 mg/hr.  CVP 11.  -200/hr.  Potassium and Mag labs Q6h.  PRN replacements administered as ordered.  Hiccups and nausea frequently throughout the night.  PRN medications administered with minimum relief.  Chest tubes remain to wall suction with serosanguinous drainage.  Mediastinal chest tubes with 45cc output and pleural chest tubes with 100cc output during shift.  L IJ inserted and confirmed via xray.  US to R IJ and R arm.  Plan of care reviewed with patient and spouse.  Questions and concerns addressed.  Will continue to monitor.

## 2018-02-23 NOTE — SIGNIFICANT EVENT
Right IJ and subclavian has non-occlusive DVT per radiology. Discussed with Dr. Tapia: continue SC UFH as DVT ppx. Dr. Tapia will discuss therapeutic anticoagulation with the surgical team this morning with concern that he is high risk of bleeding since he was a heart transplant in the setting of LVAD.

## 2018-02-23 NOTE — ASSESSMENT & PLAN NOTE
-~30mn episode of afib 2/21 terminated with amio. Likely exacerbated by pain, n/v, volume.   - Currently ST ~100.  - Stop amio.  - Echo as above.

## 2018-02-23 NOTE — PROGRESS NOTES
Update:    SW to pt's room for update. Pt asleep. Pt's wife aaox4, calm, and pleasant. Pt's wife with questions about [place], SW answered all questions. Pt's wife reports she and pt's mother will alternate staying at hospital with pt so that she can continue to work part time. Pt's wife reports pt's mother and brother will assist with care at [facility].  Pt's wife reports no other questions or concerns at this time. SW providing ongoing psychosocial and counseling support, education, assistance, resources, and discharge planning as indicated. SW continuing to follow and remains available.

## 2018-02-23 NOTE — ASSESSMENT & PLAN NOTE
-U/S 2/22 revealed Nonocclusive deep vein thrombosis of the right internal jugular vein with slight extension into the proximal subclavian vein.  -Continue SC Heparin.

## 2018-02-23 NOTE — PT/OT/SLP PROGRESS
Physical Therapy Treatment    Patient Name:  Mert Samayoa   MRN:  [id number]    Recommendations:     Discharge Recommendations:  home   Discharge Equipment Recommendations: none   Barriers to discharge: None    Assessment:     Mert Samayoa is a 28 y.o. male admitted with a medical diagnosis of Heart transplanted.  He presents with the following impairments/functional limitations:  impaired endurance, impaired functional mobilty, gait instability pt is s/p heart transplant, removal of LVAD, PFO repair 2/18/18. Pt had re-exploration of chest 2/19/18. Pt krystyna treatment well being able to gait train farther. Pt would benefit from cont skilled PT to address deficits and will be able to discharge home with no therapy or DME needs. .    Rehab Prognosis:  good; patient would benefit from acute skilled PT services to address these deficits and reach maximum level of function.      Recent Surgery: Procedure(s) (LRB):  EXPLORATION-BLEEDING (RE-EXPLORATION) (Bilateral) 4 Days Post-Op    Plan:     During this hospitalization, patient to be seen 5 x/week to address the above listed problems via therapeutic activities, therapeutic exercises, gait training  · Plan of Care Expires:  03/19/18   Plan of Care Reviewed with: patient, spouse    Subjective     Communicated with nurse prior to session.  Patient found sitting in chair  upon PT entry to room, agreeable to treatment.      Chief Complaint: pt c/o pain in his chest.   Patient comments/goals: to get better and go home.   Pain/Comfort:  · Pain Rating 1: 3/10  · Location 1:  (chest)  · Pain Rating Post-Intervention 1: 3/10    Patients cultural, spiritual, Hoahaoism conflicts given the current situation: none    Objective:     Patient found with: blood pressure cuff, telemetry, arterial line, chest tube (external pacemaker, CVP, )     General Precautions: Standard, fall, sternal (out of room with mask.)   Orthopedic Precautions:    Braces:       Functional  Mobility:  · Transfers:     · Sit to Stand:  contact guard assistance with no AD  ·   · Gait: 190 ft with O2 per mask, CGA and RN with portable monitor.       AM-PAC 6 CLICK MOBILITY  Turning over in bed (including adjusting bedclothes, sheets and blankets)?: 3  Sitting down on and standing up from a chair with arms (e.g., wheelchair, bedside commode, etc.): 3  Moving from lying on back to sitting on the side of the bed?: 3  Moving to and from a bed to a chair (including a wheelchair)?: 3  Need to walk in hospital room?: 3  Climbing 3-5 steps with a railing?: 2  Total Score: 17         Patient left up in chair with all lines intact, call button in reach and wife present..    GOALS:    Physical Therapy Goals        Problem: Physical Therapy Goal    Goal Priority Disciplines Outcome Goal Variances Interventions   Physical Therapy Goal     PT/OT, PT Ongoing (interventions implemented as appropriate)     Description:  Goals to be met by: 3/6/18    Patient will increase functional independence with mobility by performin. Supine to sit with Stand-by Assistance-not met  2. Sit to stand transfer with Supervision-not met  3. Gait  x 220 feet with Supervision -not met  4. Ascend/descend 3 stair with right Handrails Supervision . -not met                      Time Tracking:     PT Received On: 18  PT Start Time: 817     PT Stop Time: 833  PT Total Time (min): 16 min     Billable Minutes: Gait Training 16 min    Treatment Type: Treatment  PT/PTA: PT     PTA Visit Number: 0     Jenn Redd, PT  2018

## 2018-02-23 NOTE — NURSING
Plan of care reviewed with pt and pt's spouse.  Remained on room air throughout the day, current 02 sats 94%.  Dobutamine, amio and lasix gtt continued.  Insulin gtt d/c'd this afternoon.  UOP remains adequate, approx 100-150 cc/hr.  OOB to chair all day, ambulated in hallway with PT/OT, tolerated well.  Nausea well controlled with PRN meds.  BM x 2 today.  Accidental removal of central line this evening, pressure held, PIVs obtained, MD to bedside  and plan is to place new central line shortly. VSS throughout the day, see flowsheet for full assessment.

## 2018-02-23 NOTE — PT/OT/SLP PROGRESS
Occupational Therapy   Treatment    Name: Mert Samayoa  MRN: [id number]  Admitting Diagnosis:  Heart transplanted  4 Days Post-Op    Recommendations:     Discharge Recommendations: home  Discharge Equipment Recommendations:  none  Barriers to discharge:  None    Subjective     Communicated with: RN prior to session.  Pain/Comfort:  · Pain Rating 1: 3/10  · Location - Side 1: Bilateral  · Location - Orientation 1: midline  · Location 1: sternal  · Pain Addressed 1: Pre-medicate for activity, Reposition, Distraction  · Pain Rating Post-Intervention 1: 3/10    Patients cultural, spiritual, Hindu conflicts given the current situation: none reported    Objective:     Patient found with: blood pressure cuff, telemetry, pulse ox (continuous), chest tube, central line, oxygen (external pacer)    General Precautions: Standard, fall, sternal (must wear mask when out of room)   Orthopedic Precautions:N/A   Braces:       Occupational Performance:    Bed Mobility:    · NT     Functional Mobility/Transfers:  · Patient completed Sit <> Stand Transfer with contact guard assistance  with  no assistive device from b/s chair    · Functional Mobility: CGA to/from sink and in hallway    Activities of Daily Living:  · Feeding:  independence    · Grooming: modified independence standing at sink  · UB Dressing: minimum assistance    · LB Dressing: minimum assistance    · Toileting: minimum assistance      Patient left up in chair with all lines intact, call button in reach, RN notified and spouse present    [facility] 6 Click:  [facility] Total Score: 20    Treatment & Education:  Pt ed on OT POC  Pt with mild SOB during activity, and educated on pursed lip breathing  Education:    Assessment:     Mert Samayoa is a 28 y.o. male with a medical diagnosis of Heart transplanted.   Performance deficits affecting function are weakness, impaired functional mobilty, gait instability, impaired self care skills, impaired endurance, decreased  upper extremity function, impaired cardiopulmonary response to activity.      Rehab Prognosis:  Good; patient would benefit from acute skilled OT services to address these deficits and reach maximum level of function.       Plan:     Patient to be seen 5 x/week to address the above listed problems via therapeutic activities, therapeutic exercises, self-care/home management  · Plan of Care Expires: 03/20/18  · Plan of Care Reviewed with: spouse    This Plan of care has been discussed with the patient who was involved in its development and understands and is in agreement with the identified goals and treatment plan    GOALS:    Occupational Therapy Goals        Problem: Occupational Therapy Goal    Goal Priority Disciplines Outcome Interventions   Occupational Therapy Goal     OT, PT/OT Ongoing (interventions implemented as appropriate)    Description:  Goals to be met by: 3/2/18     Patient will increase functional independence with ADLs by performing:    Feeding with [place]. MET  UE Dressing with Supervision.  LE Dressing with Supervision.  Grooming while standing at sink with Supervision.  Toileting from toilet with Supervision for hygiene and clothing management.   Toilet transfer to toilet with Supervision.                       Time Tracking:     OT Date of Treatment: 02/23/18  OT Start Time: 0815  OT Stop Time: 0834  OT Total Time (min): 19 min    Billable Minutes:Self Care/Home Management 15    ALISA Arguello  2/23/2018

## 2018-02-23 NOTE — PLAN OF CARE
Problem: Occupational Therapy Goal  Goal: Occupational Therapy Goal  Goals to be met by: 3/2/18     Patient will increase functional independence with ADLs by performing:    Feeding with [place]. MET  UE Dressing with Supervision.  LE Dressing with Supervision.  Grooming while standing at sink with Supervision.  Toileting from toilet with Supervision for hygiene and clothing management.   Toilet transfer to toilet with Supervision.      Outcome: Ongoing (interventions implemented as appropriate)  The above goals remain appropriate. ALISA Arguello  2/23/2018

## 2018-02-23 NOTE — ASSESSMENT & PLAN NOTE
- UA looks clean.  - No s/s of infection at this point. TLC changed out 2/22.  - Continue to monitor.

## 2018-02-23 NOTE — SUBJECTIVE & OBJECTIVE
Interval History: Continues with nausea. States that he feels a bit better this morning. Had 2 small BMs yesterday after 2 suppositories. RIJ TLC got puled out overnight and TLC was replaced in [facility]. He was found to have RIJ thrombus on u/s. And started on SQ hep.    Continuous Infusions:   furosemide (LASIX) 5 mg/mL infusion (non-titrating) 20 mg/hr (02/23/18 1000)     Scheduled Meds:   amLODIPine  10 mg Oral Daily    aspirin  81 mg Oral Daily    famotidine  20 mg Oral BID    heparin (porcine)  5,000 Units Subcutaneous Q8H    meclizine  25 mg Oral BID    methylPREDNISolone sodium succinate  40 mg Intravenous Q12H    mycophenolate  1,000 mg Oral BID    nystatin  500,000 Units Mouth/Throat QID (WM & HS)    polyethylene glycol  17 g Oral BID    senna-docusate 8.6-50 mg  2 tablet Oral BID    sodium chloride 0.9%  3 mL Intravenous Q8H    tacrolimus  2 mg Oral BID     PRN Meds:sodium chloride, albuterol sulfate, [COMPLETED] calcium gluconate IVPB **AND** calcium gluconate IVPB, dextrose 50%, dextrose 50%, fentaNYL, glucagon (human recombinant), glucose, glucose, glycerin adult, insulin aspart U-100, lactulose, magnesium sulfate IVPB, metoclopramide HCl, ondansetron, oxyCODONE, oxyCODONE, potassium chloride **AND** potassium chloride **AND** potassium chloride, prochlorperazine, promethazine (PHENERGAN) IVPB    Review of patient's allergies indicates:  No Known Allergies  Objective:     Vital Signs (Most Recent):  Temp: 98.6 °F (37 °C) (02/23/18 0700)  Pulse: 100 (02/23/18 1000)  Resp: 20 (02/23/18 0600)  BP: 125/67 (02/22/18 1100)  SpO2: (!) 94 % (02/23/18 1000) Vital Signs (24h Range):  Temp:  [97.4 °F (36.3 °C)-98.6 °F (37 °C)] 98.6 °F (37 °C)  Pulse:  [] 100  Resp:  [11-46] 20  SpO2:  [89 %-100 %] 94 %  BP: (125)/(67) 125/67  Arterial Line BP: (128-175)/(44-91) 141/59     Patient Vitals for the past 72 hrs (Last 3 readings):   Weight   02/23/18 0300 77.5 kg (170 lb 13.7 oz)   02/22/18 0500 76.1 kg  (167 lb 12.3 oz)   02/21/18 0400 80.1 kg (176 lb 9.4 oz)     Body mass index is 26.76 kg/m².      Intake/Output Summary (Last 24 hours) at 02/23/18 1029  Last data filed at 02/23/18 1000   Gross per 24 hour   Intake             2483 ml   Output             3495 ml   Net            -1012 ml     Physical Exam   Constitutional: He is oriented to person, place, and time. He appears well-developed and well-nourished.   HENT:   Head: Normocephalic.   Eyes: Pupils are equal, round, and reactive to light.   Neck: Normal range of motion. Neck supple.   LIJ line in place.    Cardiovascular: Normal rate and regular rhythm.    Pulmonary/Chest: Effort normal and breath sounds normal.   Abdominal: Soft. Bowel sounds are normal. He exhibits distension.   Musculoskeletal: Normal range of motion.   Neurological: He is alert and oriented to person, place, and time.   Skin: Skin is warm and dry.   Psychiatric: He has a normal mood and affect. His behavior is normal.   Nursing note and vitals reviewed.    Significant Labs:  CBC:    Recent Labs  Lab 02/21/18  0300 02/21/18  2143 02/22/18  0335 02/23/18  0458   WBC 27.51*  --  23.46* 27.40*   RBC 2.79*  --  2.69* 2.75*   HGB 8.5*  --  8.2* 8.3*   HCT 25.0* 23* 23.7* 24.0*   *  --  149* 239   MCV 90  --  88 87   MCH 30.5  --  30.5 30.2   MCHC 34.0  --  34.6 34.6     BNP:  No results for input(s): BNP in the last 168 hours.    Invalid input(s): BNPTRIAGELBLO  CMP:    Recent Labs  Lab 02/21/18  0300  02/21/18  2334 02/22/18  0335  02/22/18  1759 02/22/18  2331 02/23/18  0458   *  --  152* 134*  --   --   --  138*   CALCIUM 8.7  --  8.8 9.0  --   --   --  8.5*   ALBUMIN 3.8  --   --  4.0  --   --   --  3.7   PROT 6.3  --   --  6.7  --   --   --  6.4     --  129* 130*  --   --   --  131*   K 4.8  < > 4.2 4.0  < > 4.2 3.9 4.0   CO2 29  --  32* 32*  --   --   --  34*   CL 92*  --  84* 82*  --   --   --  84*   BUN 37*  --  46* 46*  --   --   --  53*   CREATININE 1.8*  --   1.8* 1.8*  --   --   --  1.6*   ALKPHOS 53*  --   --  65  --   --   --  65   ALT 22  --   --  25  --   --   --  28   AST 46*  --   --  44*  --   --   --  32   BILITOT 0.8  --   --  0.7  --   --   --  0.8   < > = values in this interval not displayed.   Coagulation:     Recent Labs  Lab 02/18/18  1959 02/19/18  0839  02/21/18  0330 02/22/18  0335 02/23/18  0458   INR 1.2  < >  --   < > 1.2 1.0 1.1   APTT 27.6  --  36.2*  --  27.0  --   --    < > = values in this interval not displayed.  LDH:  No results for input(s): LDH in the last 72 hours.  Microbiology:  Microbiology Results (last 7 days)     Procedure Component Value Units Date/Time    Urine culture [[id number]] Collected:  02/17/18 0425    Order Status:  Completed Specimen:  Urine from Urine, Clean Catch Updated:  02/18/18 1119     Urine Culture, Routine No growth    Narrative:       1 cup of urine          I have reviewed all pertinent labs within the past 24 hours.    Estimated Creatinine Clearance: 64.3 mL/min (A) (based on SCr of 1.6 mg/dL (H)).    Diagnostic Results:  I have reviewed all pertinent imaging results/findings within the past 24 hours.

## 2018-02-23 NOTE — SIGNIFICANT EVENT
Bedside US with echogenicity of the right IJ vein. Discussed with Dr. Tapia. Will start SC UFH for DVT ppx. Will also obtain formal US of the neck veins on the right to r/o DVT. Of note, R IJ line was inadvertently removed earlier in the night.

## 2018-02-23 NOTE — ASSESSMENT & PLAN NOTE
-Likely related to constipation. Had similar issues after VAD.   -Continue antiemetics.  -Increase BM regimen including prnsuppository/lactulose.

## 2018-02-23 NOTE — ASSESSMENT & PLAN NOTE
Nutrition Problem  Increased nutrient needs    Related to (etiology):   S/p OHTx    Signs and Symptoms (as evidenced by):   EPN     Nutrition Diagnosis Status:   Continues

## 2018-02-23 NOTE — ASSESSMENT & PLAN NOTE
-Transplant Date 2/18/18. S/P washout 2/19.  HTS primary.   -CMV Status:D+/R-   -Rejection status: Moderate Risk  -Rejection episodes: none to date  -Induction: No   -Current immunosuppression: Mehtylpred 40 IV BID, Prograf 1mg BID, Cellcept 1000 mg BID. Will increase tacro today.   -Opportunistic PPx with:Nystatin   -Hemodynamically stable. Weaned off  overnight. ScVO2 68 this am.    -Continue Lasix 20mg/hr. Diuresing well. CVP trending down.   -CTs in place and AV wires connected to pacer(backup rate 80). Mediastinal CT likely to be removed today.   -Echo 2/21 EF 60%, low nl RV fxn.

## 2018-02-23 NOTE — PROGRESS NOTES
[facility]  Heart Transplant  Progress Note    Patient Name: Mert Samayoa  MRN: [id number]  Admission Date: 2/17/2018  Hospital Length of Stay: 6 days  Attending Physician: Sammi Tapia MD  Primary Care Provider: Primary Doctor No  Principal Problem:Heart transplanted    Subjective:     Interval History: Continues with nausea. States that he feels a bit better this morning. Had 2 small BMs yesterday after 2 suppositories. RIJ TLC got puled out overnight and TLC was replaced in [facility]. He was found to have RIJ thrombus on u/s. And started on SQ hep.    Continuous Infusions:   furosemide (LASIX) 5 mg/mL infusion (non-titrating) 20 mg/hr (02/23/18 1000)     Scheduled Meds:   amLODIPine  10 mg Oral Daily    aspirin  81 mg Oral Daily    famotidine  20 mg Oral BID    heparin (porcine)  5,000 Units Subcutaneous Q8H    meclizine  25 mg Oral BID    methylPREDNISolone sodium succinate  40 mg Intravenous Q12H    mycophenolate  1,000 mg Oral BID    nystatin  500,000 Units Mouth/Throat QID (WM & HS)    polyethylene glycol  17 g Oral BID    senna-docusate 8.6-50 mg  2 tablet Oral BID    sodium chloride 0.9%  3 mL Intravenous Q8H    tacrolimus  2 mg Oral BID     PRN Meds:sodium chloride, albuterol sulfate, [COMPLETED] calcium gluconate IVPB **AND** calcium gluconate IVPB, dextrose 50%, dextrose 50%, fentaNYL, glucagon (human recombinant), glucose, glucose, glycerin adult, insulin aspart U-100, lactulose, magnesium sulfate IVPB, metoclopramide HCl, ondansetron, oxyCODONE, oxyCODONE, potassium chloride **AND** potassium chloride **AND** potassium chloride, prochlorperazine, promethazine (PHENERGAN) IVPB    Review of patient's allergies indicates:  No Known Allergies  Objective:     Vital Signs (Most Recent):  Temp: 98.6 °F (37 °C) (02/23/18 0700)  Pulse: 100 (02/23/18 1000)  Resp: 20 (02/23/18 0600)  BP: 125/67 (02/22/18 1100)  SpO2: (!) 94 % (02/23/18 1000) Vital Signs (24h Range):  Temp:  [97.4  °F (36.3 °C)-98.6 °F (37 °C)] 98.6 °F (37 °C)  Pulse:  [] 100  Resp:  [11-46] 20  SpO2:  [89 %-100 %] 94 %  BP: (125)/(67) 125/67  Arterial Line BP: (128-175)/(44-91) 141/59     Patient Vitals for the past 72 hrs (Last 3 readings):   Weight   02/23/18 0300 77.5 kg (170 lb 13.7 oz)   02/22/18 0500 76.1 kg (167 lb 12.3 oz)   02/21/18 0400 80.1 kg (176 lb 9.4 oz)     Body mass index is 26.76 kg/m².      Intake/Output Summary (Last 24 hours) at 02/23/18 1029  Last data filed at 02/23/18 1000   Gross per 24 hour   Intake             2483 ml   Output             3495 ml   Net            -1012 ml     Physical Exam   Constitutional: He is oriented to person, place, and time. He appears well-developed and well-nourished.   HENT:   Head: Normocephalic.   Eyes: Pupils are equal, round, and reactive to light.   Neck: Normal range of motion. Neck supple.   LIJ line in place.    Cardiovascular: Normal rate and regular rhythm.    Pulmonary/Chest: Effort normal and breath sounds normal.   Abdominal: Soft. Bowel sounds are normal. He exhibits distension.   Musculoskeletal: Normal range of motion.   Neurological: He is alert and oriented to person, place, and time.   Skin: Skin is warm and dry.   Psychiatric: He has a normal mood and affect. His behavior is normal.   Nursing note and vitals reviewed.    Significant Labs:  CBC:    Recent Labs  Lab 02/21/18  0300 02/21/18  2143 02/22/18  0335 02/23/18  0458   WBC 27.51*  --  23.46* 27.40*   RBC 2.79*  --  2.69* 2.75*   HGB 8.5*  --  8.2* 8.3*   HCT 25.0* 23* 23.7* 24.0*   *  --  149* 239   MCV 90  --  88 87   MCH 30.5  --  30.5 30.2   MCHC 34.0  --  34.6 34.6     BNP:  No results for input(s): BNP in the last 168 hours.    Invalid input(s): BNPTRIAGELBLO  CMP:    Recent Labs  Lab 02/21/18  0300  02/21/18  2334 02/22/18  0335  02/22/18  1759 02/22/18  2331 02/23/18  0458   *  --  152* 134*  --   --   --  138*   CALCIUM 8.7  --  8.8 9.0  --   --   --  8.5*   ALBUMIN  3.8  --   --  4.0  --   --   --  3.7   PROT 6.3  --   --  6.7  --   --   --  6.4     --  129* 130*  --   --   --  131*   K 4.8  < > 4.2 4.0  < > 4.2 3.9 4.0   CO2 29  --  32* 32*  --   --   --  34*   CL 92*  --  84* 82*  --   --   --  84*   BUN 37*  --  46* 46*  --   --   --  53*   CREATININE 1.8*  --  1.8* 1.8*  --   --   --  1.6*   ALKPHOS 53*  --   --  65  --   --   --  65   ALT 22  --   --  25  --   --   --  28   AST 46*  --   --  44*  --   --   --  32   BILITOT 0.8  --   --  0.7  --   --   --  0.8   < > = values in this interval not displayed.   Coagulation:     Recent Labs  Lab 02/18/18  1959  02/19/18  0839  02/21/18  0330 02/22/18  0335 02/23/18  0458   INR 1.2  < >  --   < > 1.2 1.0 1.1   APTT 27.6  --  36.2*  --  27.0  --   --    < > = values in this interval not displayed.  LDH:  No results for input(s): LDH in the last 72 hours.  Microbiology:  Microbiology Results (last 7 days)     Procedure Component Value Units Date/Time    Urine culture [[id number]] Collected:  02/17/18 0425    Order Status:  Completed Specimen:  Urine from Urine, Clean Catch Updated:  02/18/18 1119     Urine Culture, Routine No growth    Narrative:       1 cup of urine          I have reviewed all pertinent labs within the past 24 hours.    Estimated Creatinine Clearance: 64.3 mL/min (A) (based on SCr of 1.6 mg/dL (H)).    Diagnostic Results:  I have reviewed all pertinent imaging results/findings within the past 24 hours.    Assessment and Plan:     27 year old WM with DCM (familial, both brothers with heart transplants) with stage D CHFrEF underwent Heartware placement 02/01/17 had post-op atrial flutter 02/11/17 treated with amiodarone presents for OHTx.  0.  VAD implanted on 2/1/17 HVAD RPM 2700.         * Heart transplanted    -Transplant Date 2/18/18. S/P washout 2/19.  HTS primary.   -CMV Status:D+/R-   -Rejection status: Moderate Risk  -Rejection episodes: none to date  -Induction: No   -Current immunosuppression:  Mehtylpred 40 IV BID, Prograf 1mg BID, Cellcept 1000 mg BID. Will increase tacro today.   -Opportunistic PPx with:Nystatin   -Hemodynamically stable. Weaned off  overnight. ScVO2 68 this am.    -Continue Lasix 20mg/hr. Diuresing well. CVP trending down.   -CTs in place and AV wires connected to pacer(backup rate 80). Mediastinal CT likely to be removed today.   -Echo 2/21 EF 60%, low nl RV fxn.        Nausea    -Likely related to constipation. Had similar issues after VAD.   -Continue antiemetics.  -Increase BM regimen including prnsuppository/lactulose.        Leukocytosis    - UA looks clean.  - No s/s of infection at this point. TLC changed out 2/22.  - Continue to monitor.         Paroxysmal atrial fibrillation    -~30mn episode of afib 2/21 terminated with amio. Likely exacerbated by pain, n/v, volume.   - Currently ST ~100.  - Stop amio.  - Echo as above.              Augusto Goldberg NP  Heart Transplant  [facility]

## 2018-02-23 NOTE — SUBJECTIVE & OBJECTIVE
"Interval HPI:   Overnight events: remains in ICU, now in NSR. Lasix infusing. Solumedrol 40mg BID IV. BG well controlled as weaning IV insulin until d/c IV insulin yesterday around 1400. Converted to SQ Novolog correction scale but has not required any coverage.  without insulin.   Eating:   <25% advanced to regular diet yesterday at lunch  Nausea: YES  Hypoglycemia and intervention: No  Fever: No  TPN and/or TF: No    /67 (BP Location: Left arm, Patient Position: Sitting)   Pulse 101   Temp 98.6 °F (37 °C) (Oral)   Resp 20   Ht 5' 7" (1.702 m)   Wt 77.5 kg (170 lb 13.7 oz)   SpO2 (!) 92%   BMI 26.76 kg/m²     Labs Reviewed and Include      Recent Labs  Lab 02/23/18  0458   *   CALCIUM 8.5*   ALBUMIN 3.7   PROT 6.4   *   K 4.0   CO2 34*   CL 84*   BUN 53*   CREATININE 1.6*   ALKPHOS 65   ALT 28   AST 32   BILITOT 0.8     Lab Results   Component Value Date    WBC 27.40 (H) 02/23/2018    HGB 8.3 (L) 02/23/2018    HCT 24.0 (L) 02/23/2018    MCV 87 02/23/2018     02/23/2018     No results for input(s): TSH, FREET4 in the last 168 hours.  Lab Results   Component Value Date    HGBA1C 5.4 01/17/2017       Nutritional status:   Body mass index is 26.76 kg/m².  Lab Results   Component Value Date    ALBUMIN 3.7 02/23/2018    ALBUMIN 4.0 02/22/2018    ALBUMIN 3.8 02/21/2018     Lab Results   Component Value Date    PREALBUMIN 23 01/25/2018    PREALBUMIN 28 12/28/2017    PREALBUMIN 27 11/29/2017       Estimated Creatinine Clearance: 64.3 mL/min (A) (based on SCr of 1.6 mg/dL (H)).    Accu-Checks  Recent Labs      02/21/18   0842  02/21/18   1224  02/21/18   1801  02/21/18   2331  02/22/18   0334  02/22/18   0724  02/22/18   1231  02/22/18   1755  02/23/18   0214  02/23/18   0749   POCTGLUCOSE  118*  130*  122*  146*  131*  151*  136*  138*  124*  149*       Current Medications and/or Treatments Impacting Glycemic Control  Immunotherapy:  Immunosuppressants         Stop Route Frequency     " tacrolimus capsule 2 mg      -- Oral 2 times daily     mycophenolate capsule 1,000 mg      -- Oral 2 times daily        Steroids:   Hormones     Start     Stop Route Frequency Ordered    02/20/18 0900  methylPREDNISolone sodium succinate injection 40 mg      -- IV Every 12 hours 02/19/18 1041        Pressors:    Autonomic Drugs     None        Hyperglycemia/Diabetes Medications: Antihyperglycemics     Start     Stop Route Frequency Ordered    02/22/18 1409  insulin aspart U-100 pen 1-10 Units      -- SubQ Before meals & nightly PRN 02/22/18 1303

## 2018-02-23 NOTE — PROCEDURES
Patient's R IJ CVC inadvertently removed while nursing moved the patient. A bedside US of the R IJ is compressible along it's course; however, there appears to be an increased echogenicity region in the lumen. The lumen of the L IJ is clear and compressible. Discussed with Dr. Tapia. He would like a L IJ triple-lumen CVC placed for hemodynamic monitoring.     A 16 cm L IJ central line was placed in sterile fashion using US-guidance w/o immediate complication. Manometry confirmed venous placement. However, the follow-up CXR confirmed that the tip terminated in the right innominate vein. Thus, the catheter was withdrawn under sterile technique using a guidewire such that the tip now terminates at the junction of the left innominate vein and SVC (discussed with on call radiologist). The line was re-dressed in sterile fashion.

## 2018-02-23 NOTE — PROGRESS NOTES
"[facility]  Endocrinology  Progress Note    Admit Date: 2/17/2018     Reason for Consult: Management of hyperglycemia     Surgical Procedure and Date: s/p OHT 2/18/18; s/p mediastinal exploration 2/19/18    HPI: Patient is a 28 y.o. male with a diagnosis of familial cardiomyopathy. Underwent heart transplant, received IV Solumedrol in OR. Return to OR for possible bleed. Initially high IV insulin requirements up to 48 u/hr. No personal or family hx of DM.   Lab Results   Component Value Date    HGBA1C 5.4 01/17/2017     Endocrine consulted for BG mgmt.             Interval HPI:   Overnight events: remains in ICU, now in NSR. Lasix infusing. Solumedrol 40mg BID IV. BG well controlled as weaning IV insulin until d/c IV insulin yesterday around 1400. Converted to SQ Novolog correction scale but has not required any coverage.  without insulin.   Eating:   <25% advanced to regular diet yesterday at lunch  Nausea: YES  Hypoglycemia and intervention: No  Fever: No  TPN and/or TF: No    /67 (BP Location: Left arm, Patient Position: Sitting)   Pulse 101   Temp 98.6 °F (37 °C) (Oral)   Resp 20   Ht 5' 7" (1.702 m)   Wt 77.5 kg (170 lb 13.7 oz)   SpO2 (!) 92%   BMI 26.76 kg/m²       Labs Reviewed and Include      Recent Labs  Lab 02/23/18  0458   *   CALCIUM 8.5*   ALBUMIN 3.7   PROT 6.4   *   K 4.0   CO2 34*   CL 84*   BUN 53*   CREATININE 1.6*   ALKPHOS 65   ALT 28   AST 32   BILITOT 0.8     Lab Results   Component Value Date    WBC 27.40 (H) 02/23/2018    HGB 8.3 (L) 02/23/2018    HCT 24.0 (L) 02/23/2018    MCV 87 02/23/2018     02/23/2018     No results for input(s): TSH, FREET4 in the last 168 hours.  Lab Results   Component Value Date    HGBA1C 5.4 01/17/2017       Nutritional status:   Body mass index is 26.76 kg/m².  Lab Results   Component Value Date    ALBUMIN 3.7 02/23/2018    ALBUMIN 4.0 02/22/2018    ALBUMIN 3.8 02/21/2018     Lab Results   Component " Value Date    PREALBUMIN 23 01/25/2018    PREALBUMIN 28 12/28/2017    PREALBUMIN 27 11/29/2017       Estimated Creatinine Clearance: 64.3 mL/min (A) (based on SCr of 1.6 mg/dL (H)).    Accu-Checks  Recent Labs      02/21/18   0842  02/21/18   1224  02/21/18   1801  02/21/18   2331  02/22/18   0334  02/22/18   0724  02/22/18   1231  02/22/18   1755  02/23/18   0214  02/23/18   0749   POCTGLUCOSE  118*  130*  122*  146*  131*  151*  136*  138*  124*  149*       Current Medications and/or Treatments Impacting Glycemic Control  Immunotherapy:  Immunosuppressants         Stop Route Frequency     tacrolimus capsule 2 mg      -- Oral 2 times daily     mycophenolate capsule 1,000 mg      -- Oral 2 times daily        Steroids:   Hormones     Start     Stop Route Frequency Ordered    02/20/18 0900  methylPREDNISolone sodium succinate injection 40 mg      -- IV Every 12 hours 02/19/18 1041        Pressors:    Autonomic Drugs     None        Hyperglycemia/Diabetes Medications: Antihyperglycemics     Start     Stop Route Frequency Ordered    02/22/18 1409  insulin aspart U-100 pen 1-10 Units      -- SubQ Before meals & nightly PRN 02/22/18 1309          ASSESSMENT and PLAN    * Heart transplanted    Per HTS  avoid hypoglycemia            Acute hyperglycemia    BG goal 140-180; Continue BG monitoring ac/hs with Novolog correction scale.   Will assess for prandial elevations as N/V resolves and po intake increases given steroid dosing.             Familial cardiomyopathy    S/p OHT this admission, per HTS          Adverse effect of glucocorticoids and synthetic analogues, initial encounter    May elevate BG readings; currently on Solumedrol IV          Immunosuppression    may contribute to insulin resistance            Nausea    Post op N/V, advanced to regular diet 2/22/18 lunch        Paroxysmal atrial fibrillation    Episode on 2/22/18; converted with amio  If uncontrolled, may contribute to insulin resistance               Mercy Osuna, APRN,ANP-C  Endocrinology  [facility]

## 2018-02-24 LAB
ABO + RH BLD: NORMAL
ALBUMIN SERPL BCP-MCNC: 3.7 G/DL
ALP SERPL-CCNC: 71 U/L
ALT SERPL W/O P-5'-P-CCNC: 40 U/L
ANION GAP SERPL CALC-SCNC: 15 MMOL/L
ANISOCYTOSIS BLD QL SMEAR: ABNORMAL
AST SERPL-CCNC: 31 U/L
BASO STIPL BLD QL SMEAR: ABNORMAL
BASOPHILS # BLD AUTO: 0.06 K/UL
BASOPHILS NFR BLD: 0.2 %
BILIRUB SERPL-MCNC: 1 MG/DL
BLD GP AB SCN CELLS X3 SERPL QL: NORMAL
BUN SERPL-MCNC: 48 MG/DL
CALCIUM SERPL-MCNC: 8.6 MG/DL
CHLORIDE SERPL-SCNC: 86 MMOL/L
CO2 SERPL-SCNC: 32 MMOL/L
CREAT SERPL-MCNC: 1.4 MG/DL
DIFFERENTIAL METHOD: ABNORMAL
EOSINOPHIL # BLD AUTO: 0 K/UL
EOSINOPHIL NFR BLD: 0 %
ERYTHROCYTE [DISTWIDTH] IN BLOOD BY AUTOMATED COUNT: 14.1 %
EST. GFR  (AFRICAN AMERICAN): >60 ML/MIN/1.73 M^2
EST. GFR  (NON AFRICAN AMERICAN): >60 ML/MIN/1.73 M^2
GLUCOSE SERPL-MCNC: 134 MG/DL
HCT VFR BLD AUTO: 25.2 %
HGB BLD-MCNC: 8.8 G/DL
HYPOCHROMIA BLD QL SMEAR: ABNORMAL
IMM GRANULOCYTES # BLD AUTO: 1.12 K/UL
IMM GRANULOCYTES NFR BLD AUTO: 4.3 %
INR PPP: 1.1
LYMPHOCYTES # BLD AUTO: 1.3 K/UL
LYMPHOCYTES NFR BLD: 5 %
MAGNESIUM SERPL-MCNC: 2 MG/DL
MAGNESIUM SERPL-MCNC: 2 MG/DL
MAGNESIUM SERPL-MCNC: 2.1 MG/DL
MAGNESIUM SERPL-MCNC: 2.2 MG/DL
MCH RBC QN AUTO: 30.7 PG
MCHC RBC AUTO-ENTMCNC: 34.9 G/DL
MCV RBC AUTO: 88 FL
MONOCYTES # BLD AUTO: 2.1 K/UL
MONOCYTES NFR BLD: 7.9 %
NEUTROPHILS # BLD AUTO: 21.6 K/UL
NEUTROPHILS NFR BLD: 82.6 %
NRBC BLD-RTO: 0 /100 WBC
PLATELET # BLD AUTO: 267 K/UL
PLATELET BLD QL SMEAR: ABNORMAL
PMV BLD AUTO: 10 FL
POCT GLUCOSE: 114 MG/DL (ref 70–110)
POCT GLUCOSE: 129 MG/DL (ref 70–110)
POCT GLUCOSE: 130 MG/DL (ref 70–110)
POCT GLUCOSE: 139 MG/DL (ref 70–110)
POCT GLUCOSE: 153 MG/DL (ref 70–110)
POLYCHROMASIA BLD QL SMEAR: ABNORMAL
POTASSIUM SERPL-SCNC: 3.4 MMOL/L
POTASSIUM SERPL-SCNC: 3.8 MMOL/L
POTASSIUM SERPL-SCNC: 3.9 MMOL/L
POTASSIUM SERPL-SCNC: 4 MMOL/L
POTASSIUM SERPL-SCNC: 4.2 MMOL/L
PROT SERPL-MCNC: 6.5 G/DL
PROTHROMBIN TIME: 11.4 SEC
RBC # BLD AUTO: 2.87 M/UL
SODIUM SERPL-SCNC: 133 MMOL/L
TACROLIMUS BLD-MCNC: 6 NG/ML
WBC # BLD AUTO: 26.15 K/UL

## 2018-02-24 PROCEDURE — 25000003 PHARM REV CODE 250: Performed by: INTERNAL MEDICINE

## 2018-02-24 PROCEDURE — A4216 STERILE WATER/SALINE, 10 ML: HCPCS | Performed by: THORACIC SURGERY (CARDIOTHORACIC VASCULAR SURGERY)

## 2018-02-24 PROCEDURE — 25000003 PHARM REV CODE 250: Performed by: THORACIC SURGERY (CARDIOTHORACIC VASCULAR SURGERY)

## 2018-02-24 PROCEDURE — 83735 ASSAY OF MAGNESIUM: CPT | Mod: 91

## 2018-02-24 PROCEDURE — 63600175 PHARM REV CODE 636 W HCPCS: Performed by: INTERNAL MEDICINE

## 2018-02-24 PROCEDURE — 20000000 HC ICU ROOM

## 2018-02-24 PROCEDURE — 25000003 PHARM REV CODE 250: Performed by: NURSE PRACTITIONER

## 2018-02-24 PROCEDURE — 84132 ASSAY OF SERUM POTASSIUM: CPT | Mod: 91

## 2018-02-24 PROCEDURE — 25000003 PHARM REV CODE 250: Performed by: STUDENT IN AN ORGANIZED HEALTH CARE EDUCATION/TRAINING PROGRAM

## 2018-02-24 PROCEDURE — 84132 ASSAY OF SERUM POTASSIUM: CPT

## 2018-02-24 PROCEDURE — 63600175 PHARM REV CODE 636 W HCPCS: Performed by: THORACIC SURGERY (CARDIOTHORACIC VASCULAR SURGERY)

## 2018-02-24 PROCEDURE — 99233 SBSQ HOSP IP/OBS HIGH 50: CPT | Mod: ,,, | Performed by: INTERNAL MEDICINE

## 2018-02-24 PROCEDURE — 83735 ASSAY OF MAGNESIUM: CPT

## 2018-02-24 PROCEDURE — 80197 ASSAY OF TACROLIMUS: CPT

## 2018-02-24 PROCEDURE — 25000003 PHARM REV CODE 250: Performed by: PHYSICIAN ASSISTANT

## 2018-02-24 PROCEDURE — 85025 COMPLETE CBC W/AUTO DIFF WBC: CPT

## 2018-02-24 PROCEDURE — 94761 N-INVAS EAR/PLS OXIMETRY MLT: CPT

## 2018-02-24 PROCEDURE — 80053 COMPREHEN METABOLIC PANEL: CPT

## 2018-02-24 PROCEDURE — 63600175 PHARM REV CODE 636 W HCPCS: Performed by: NURSE PRACTITIONER

## 2018-02-24 PROCEDURE — 63600175 PHARM REV CODE 636 W HCPCS: Performed by: PHYSICIAN ASSISTANT

## 2018-02-24 PROCEDURE — 85610 PROTHROMBIN TIME: CPT

## 2018-02-24 RX ORDER — MYCOPHENOLATE MOFETIL 250 MG/1
1500 CAPSULE ORAL 2 TIMES DAILY
Status: DISCONTINUED | OUTPATIENT
Start: 2018-02-24 | End: 2018-03-05 | Stop reason: HOSPADM

## 2018-02-24 RX ORDER — MECLIZINE HYDROCHLORIDE 25 MG/1
25 TABLET ORAL 2 TIMES DAILY PRN
Status: DISCONTINUED | OUTPATIENT
Start: 2018-02-24 | End: 2018-03-05

## 2018-02-24 RX ADMIN — INSULIN ASPART 2 UNITS: 100 INJECTION, SOLUTION INTRAVENOUS; SUBCUTANEOUS at 05:02

## 2018-02-24 RX ADMIN — Medication 3 ML: at 10:02

## 2018-02-24 RX ADMIN — STANDARDIZED SENNA CONCENTRATE AND DOCUSATE SODIUM 2 TABLET: 8.6; 5 TABLET, FILM COATED ORAL at 08:02

## 2018-02-24 RX ADMIN — TACROLIMUS 2 MG: 1 CAPSULE ORAL at 08:02

## 2018-02-24 RX ADMIN — POTASSIUM CHLORIDE 20 MEQ: 200 INJECTION, SOLUTION INTRAVENOUS at 02:02

## 2018-02-24 RX ADMIN — MYCOPHENOLATE MOFETIL 1500 MG: 250 CAPSULE ORAL at 08:02

## 2018-02-24 RX ADMIN — MECLIZINE HYDROCHLORIDE 25 MG: 25 TABLET ORAL at 08:02

## 2018-02-24 RX ADMIN — METHYLPREDNISOLONE SODIUM SUCCINATE 40 MG: 40 INJECTION, POWDER, FOR SOLUTION INTRAMUSCULAR; INTRAVENOUS at 08:02

## 2018-02-24 RX ADMIN — POLYETHYLENE GLYCOL 3350 17 G: 17 POWDER, FOR SOLUTION ORAL at 08:02

## 2018-02-24 RX ADMIN — AMLODIPINE BESYLATE 10 MG: 10 TABLET ORAL at 08:02

## 2018-02-24 RX ADMIN — NYSTATIN 500000 UNITS: 500000 SUSPENSION ORAL at 07:02

## 2018-02-24 RX ADMIN — NYSTATIN 500000 UNITS: 500000 SUSPENSION ORAL at 05:02

## 2018-02-24 RX ADMIN — NYSTATIN 500000 UNITS: 500000 SUSPENSION ORAL at 11:02

## 2018-02-24 RX ADMIN — FUROSEMIDE 20 MG/HR: 10 INJECTION, SOLUTION INTRAVENOUS at 11:02

## 2018-02-24 RX ADMIN — ASPIRIN 81 MG: 81 TABLET, COATED ORAL at 08:02

## 2018-02-24 RX ADMIN — NYSTATIN 500000 UNITS: 500000 SUSPENSION ORAL at 08:02

## 2018-02-24 RX ADMIN — POTASSIUM CHLORIDE 20 MEQ: 200 INJECTION, SOLUTION INTRAVENOUS at 06:02

## 2018-02-24 RX ADMIN — POTASSIUM CHLORIDE 40 MEQ: 400 INJECTION, SOLUTION INTRAVENOUS at 06:02

## 2018-02-24 RX ADMIN — FUROSEMIDE 20 MG/HR: 10 INJECTION, SOLUTION INTRAVENOUS at 05:02

## 2018-02-24 RX ADMIN — FAMOTIDINE 20 MG: 20 TABLET, FILM COATED ORAL at 08:02

## 2018-02-24 RX ADMIN — HEPARIN SODIUM 5000 UNITS: 5000 INJECTION, SOLUTION INTRAVENOUS; SUBCUTANEOUS at 01:02

## 2018-02-24 RX ADMIN — HEPARIN SODIUM 5000 UNITS: 5000 INJECTION, SOLUTION INTRAVENOUS; SUBCUTANEOUS at 09:02

## 2018-02-24 RX ADMIN — TACROLIMUS 2 MG: 1 CAPSULE ORAL at 06:02

## 2018-02-24 RX ADMIN — Medication 3 ML: at 06:02

## 2018-02-24 RX ADMIN — HEPARIN SODIUM 5000 UNITS: 5000 INJECTION, SOLUTION INTRAVENOUS; SUBCUTANEOUS at 05:02

## 2018-02-24 RX ADMIN — Medication 3 ML: at 01:02

## 2018-02-24 NOTE — PLAN OF CARE
Problem: Patient Care Overview  Goal: Individualization & Mutuality  HX: DCM (familial, both brothers with heart transplants) with stage D CHFrEF underwent Heartware placement 02/01/17 2/18: S/p LVAD removal and heart transplant  2/19: Back to OR for bleeding/washout, 4 units PRBC; 1500ml Albumin  2/20: Shifted x2 for elevated K, OOB, Lasix gtt  2/22: Amio bolus and gtt started for AFib with RVR  2/23: Amio gtt stopped; OOB; Meds chest tube removed per MD          Nursing:  Q6 hr  K, Mg  MAP 60-80  Accuchecks ACHS         ** Please do not administer IV Dilaudid. Patient becomes extremely nauseated. **       Outcome: Ongoing (interventions implemented as appropriate)  POC reviewed with pt and spouse. Afebrile. VSS. PRN oxycodone given to adequately control pain. Sats 93% on room air. Sinus tach. Maintaining MAP 60-80. Lasix gtt remains infusing @ 20mg/hr. Trending mag and K q6h. 140cc output from chest tube. 2 BM overnight. Pt OOBTC. Remained free from falls. All questions and concerns addressed. Will continue to monitor closely.

## 2018-02-24 NOTE — PROGRESS NOTES
[facility]  Heart Transplant  Progress Note    Patient Name: Mert Samayoa  MRN: [id number]  Admission Date: 2/17/2018  Hospital Length of Stay: 7 days  Attending Physician: Sammi Tapia MD  Primary Care Provider: Primary Doctor No  Principal Problem:Heart transplanted    Subjective:     Interval History: Nausea/abdominal discomfort resolved. Had 3 BMs and is about to have another. Ambulated around the whole unit without any difficulty. Agreeable to kirk removal today.     Continuous Infusions:   furosemide (LASIX) 5 mg/mL infusion (non-titrating) 20 mg/hr (02/24/18 1000)     Scheduled Meds:   amLODIPine  10 mg Oral Daily    aspirin  81 mg Oral Daily    famotidine  20 mg Oral BID    heparin (porcine)  5,000 Units Subcutaneous Q8H    meclizine  25 mg Oral BID    methylPREDNISolone sodium succinate  40 mg Intravenous Q12H    mycophenolate  1,500 mg Oral BID    nystatin  500,000 Units Mouth/Throat QID (WM & HS)    polyethylene glycol  17 g Oral BID    senna-docusate 8.6-50 mg  2 tablet Oral BID    sodium chloride 0.9%  3 mL Intravenous Q8H    tacrolimus  2 mg Oral BID     PRN Meds:sodium chloride, albuterol sulfate, [COMPLETED] calcium gluconate IVPB **AND** calcium gluconate IVPB, dextrose 50%, dextrose 50%, fentaNYL, glucagon (human recombinant), glucose, glucose, glycerin adult, insulin aspart U-100, lactulose, magnesium sulfate IVPB, metoclopramide HCl, ondansetron, oxyCODONE, oxyCODONE, potassium chloride **AND** potassium chloride **AND** potassium chloride, prochlorperazine, promethazine (PHENERGAN) IVPB    Review of patient's allergies indicates:  No Known Allergies  Objective:     Vital Signs (Most Recent):  Temp: 98.6 °F (37 °C) (02/24/18 0700)  Pulse: 109 (02/24/18 1015)  Resp: 17 (02/24/18 1015)  BP: (!) 117/58 (02/24/18 1000)  SpO2: 98 % (02/24/18 1000) Vital Signs (24h Range):  Temp:  [97 °F (36.1 °C)-98.6 °F (37 °C)] 98.6 °F (37 °C)  Pulse:  [100-116] 109  Resp:   [13-60] 17  SpO2:  [89 %-98 %] 98 %  BP: (109-118)/(56-58) 117/58  Arterial Line BP: (123-168)/(44-71) 135/45     Patient Vitals for the past 72 hrs (Last 3 readings):   Weight   02/23/18 0300 77.5 kg (170 lb 13.7 oz)   02/22/18 0500 76.1 kg (167 lb 12.3 oz)     Body mass index is 26.76 kg/m².      Intake/Output Summary (Last 24 hours) at 02/24/18 1031  Last data filed at 02/24/18 0900   Gross per 24 hour   Intake              294 ml   Output             4795 ml   Net            -4501 ml     Physical Exam   Constitutional: He is oriented to person, place, and time. He appears well-developed and well-nourished.   HENT:   Head: Normocephalic.   Eyes: Pupils are equal, round, and reactive to light.   Neck: Normal range of motion. Neck supple. No JVD present.   LIJ line in place.    Cardiovascular: Regular rhythm.  Tachycardia present.    Pulmonary/Chest: Effort normal and breath sounds normal.   Abdominal: Soft. Bowel sounds are normal. He exhibits no distension. There is no tenderness.   Musculoskeletal: Normal range of motion.   Neurological: He is alert and oriented to person, place, and time.   Skin: Skin is warm and dry.   Psychiatric: He has a normal mood and affect. His behavior is normal.   Nursing note and vitals reviewed.    Significant Labs:  CBC:    Recent Labs  Lab 02/22/18  0335 02/23/18  0458 02/24/18  0324   WBC 23.46* 27.40* 26.15*   RBC 2.69* 2.75* 2.87*   HGB 8.2* 8.3* 8.8*   HCT 23.7* 24.0* 25.2*   * 239 267   MCV 88 87 88   MCH 30.5 30.2 30.7   MCHC 34.6 34.6 34.9     BNP:  No results for input(s): BNP in the last 168 hours.    Invalid input(s): BNPTRIAGELBLO  CMP:    Recent Labs  Lab 02/22/18  0335  02/23/18  0458  02/23/18  2356 02/24/18  0324 02/24/18  0546   *  --  138*  --   --  134*  --    CALCIUM 9.0  --  8.5*  --   --  8.6*  --    ALBUMIN 4.0  --  3.7  --   --  3.7  --    PROT 6.7  --  6.4  --   --  6.5  --    *  --  131*  --   --  133*  --    K 4.0  < > 4.0  < > 3.9 4.2  3.8   CO2 32*  --  34*  --   --  32*  --    CL 82*  --  84*  --   --  86*  --    BUN 46*  --  53*  --   --  48*  --    CREATININE 1.8*  --  1.6*  --   --  1.4  --    ALKPHOS 65  --  65  --   --  71  --    ALT 25  --  28  --   --  40  --    AST 44*  --  32  --   --  31  --    BILITOT 0.7  --  0.8  --   --  1.0  --    < > = values in this interval not displayed.   Coagulation:     Recent Labs  Lab 02/18/18 1959 02/19/18  0839  02/21/18  0330 02/22/18  0335 02/23/18  0458 02/24/18  0324   INR 1.2  < >  --   < > 1.2 1.0 1.1 1.1   APTT 27.6  --  36.2*  --  27.0  --   --   --    < > = values in this interval not displayed.  LDH:  No results for input(s): LDH in the last 72 hours.  Microbiology:  Microbiology Results (last 7 days)     Procedure Component Value Units Date/Time    Urine culture [[id number]] Collected:  02/17/18 0425    Order Status:  Completed Specimen:  Urine from Urine, Clean Catch Updated:  02/18/18 1119     Urine Culture, Routine No growth    Narrative:       1 cup of urine          I have reviewed all pertinent labs within the past 24 hours.    Estimated Creatinine Clearance: 73.4 mL/min (based on SCr of 1.4 mg/dL).    Diagnostic Results:  I have reviewed all pertinent imaging results/findings within the past 24 hours.    Assessment and Plan:     27 year old WM with DCM (familial, both brothers with heart transplants) with stage D CHFrEF underwent Heartware placement 02/01/17 had post-op atrial flutter 02/11/17 treated with amiodarone presents for OHTx.  0.  VAD implanted on 2/1/17 HVAD RPM 2700.         * Heart transplanted    -Transplant Date 2/18/18. S/P washout 2/19.  HTS primary.   -CMV Status:D+/R-   -Rejection status: Moderate Risk  -Rejection episodes: none to date  -Induction: No   -Current immunosuppression: Mehtylpred 40 IV BID, Prograf 2 mg BID, Cellcept 1000 mg BID.  Tacro level increased today. Monitor one more day before dose adjustment. Increase cellcept today.  -Opportunistic PPx  with:Nystatin   -Hemodynamically stable. Weaned off   -Net neg 4.4 L over last 24 hours on 20 mg/hr lasix.   -CTs in place and AV wires connected to pacer(backup rate 80).   -Echo 2/21 EF 60%, low nl RV fxn.  -Need to add valcyte soon        Hiccups    - Likely related to phrenic nerve irritation.  - Resolved          VTE (venous thromboembolism)    -U/S 2/22 revealed Nonocclusive deep vein thrombosis of the right internal jugular vein with slight extension into the proximal subclavian vein.  -Continue SC Heparin.         Paroxysmal atrial fibrillation    -~30mn episode of afib 2/21 terminated with amio. Likely exacerbated by pain, n/v, volume.   - Currently ST ~100.  - Stop amio.  - Echo as above.          Leukocytosis    - Remove kirk catheter and arterial line today.  - TLC changed out 2/22.  - Continue to monitor. Down to 26k today.         Nausea    -Improved with bowel movements.         Acute hyperglycemia    -insulin per endocrine          Step down to TSU likely tomorrow    Анна Beltran PA-C  Heart Transplant  [facility]

## 2018-02-24 NOTE — PLAN OF CARE
Problem: Patient Care Overview  Goal: Plan of Care Review  Outcome: Ongoing (interventions implemented as appropriate)  All VSS. Pt remains on 4L venti mask with O2 sats >92%. Gtts: Lasix @ 20. UOP adequate. Meds chest tube removed per MD. Pleural chest tube with minimal serosanguinous output throughout shift. Pt OOBTC during shift; tolerated well. Pt did not have any complaints of nausea or vomiting during shift. PRN pain medication given;moderate relief obtained. Labs drawn as ordered. Incentive spirometry use encouraged. Plan of care reviewed with pt and pt spouse. All questions answered. See flowsheet for details.

## 2018-02-24 NOTE — PLAN OF CARE
Problem: Patient Care Overview  Goal: Plan of Care Review  Outcome: Ongoing (interventions implemented as appropriate)  All VSS. Pt on room air with O2 sats >94%. Acstillo and arterial line removed per order. Pt voids per urinal; UOP adequate. Pt had 3 loose bowel movements during shift. Midsternal telfa dressing removed; site cleansed-steri strips still intact. Chest tube dressing changed. Pt has been OOBTC. Gtts: Lasix @ 20. Labs drawn as ordered. Plan of care reviewed with pt and pt's mother; all questions answered. See flowsheet for details.

## 2018-02-24 NOTE — ASSESSMENT & PLAN NOTE
- Remove kirk catheter and arterial line today.  - TLC changed out 2/22.  - Continue to monitor. Down to 26k today.

## 2018-02-24 NOTE — SUBJECTIVE & OBJECTIVE
Interval History: Nausea/abdominal discomfort resolved. Had 3 BMs and is about to have another. Ambulated around the whole unit without any difficulty. Agreeable to kirk removal today.     Continuous Infusions:   furosemide (LASIX) 5 mg/mL infusion (non-titrating) 20 mg/hr (02/24/18 1000)     Scheduled Meds:   amLODIPine  10 mg Oral Daily    aspirin  81 mg Oral Daily    famotidine  20 mg Oral BID    heparin (porcine)  5,000 Units Subcutaneous Q8H    meclizine  25 mg Oral BID    methylPREDNISolone sodium succinate  40 mg Intravenous Q12H    mycophenolate  1,500 mg Oral BID    nystatin  500,000 Units Mouth/Throat QID (WM & HS)    polyethylene glycol  17 g Oral BID    senna-docusate 8.6-50 mg  2 tablet Oral BID    sodium chloride 0.9%  3 mL Intravenous Q8H    tacrolimus  2 mg Oral BID     PRN Meds:sodium chloride, albuterol sulfate, [COMPLETED] calcium gluconate IVPB **AND** calcium gluconate IVPB, dextrose 50%, dextrose 50%, fentaNYL, glucagon (human recombinant), glucose, glucose, glycerin adult, insulin aspart U-100, lactulose, magnesium sulfate IVPB, metoclopramide HCl, ondansetron, oxyCODONE, oxyCODONE, potassium chloride **AND** potassium chloride **AND** potassium chloride, prochlorperazine, promethazine (PHENERGAN) IVPB    Review of patient's allergies indicates:  No Known Allergies  Objective:     Vital Signs (Most Recent):  Temp: 98.6 °F (37 °C) (02/24/18 0700)  Pulse: 109 (02/24/18 1015)  Resp: 17 (02/24/18 1015)  BP: (!) 117/58 (02/24/18 1000)  SpO2: 98 % (02/24/18 1000) Vital Signs (24h Range):  Temp:  [97 °F (36.1 °C)-98.6 °F (37 °C)] 98.6 °F (37 °C)  Pulse:  [100-116] 109  Resp:  [13-60] 17  SpO2:  [89 %-98 %] 98 %  BP: (109-118)/(56-58) 117/58  Arterial Line BP: (123-168)/(44-71) 135/45     Patient Vitals for the past 72 hrs (Last 3 readings):   Weight   02/23/18 0300 77.5 kg (170 lb 13.7 oz)   02/22/18 0500 76.1 kg (167 lb 12.3 oz)     Body mass index is 26.76 kg/m².      Intake/Output  Summary (Last 24 hours) at 02/24/18 1031  Last data filed at 02/24/18 0900   Gross per 24 hour   Intake              294 ml   Output             4795 ml   Net            -4501 ml     Physical Exam   Constitutional: He is oriented to person, place, and time. He appears well-developed and well-nourished.   HENT:   Head: Normocephalic.   Eyes: Pupils are equal, round, and reactive to light.   Neck: Normal range of motion. Neck supple. No JVD present.   LIJ line in place.    Cardiovascular: Regular rhythm.  Tachycardia present.    Pulmonary/Chest: Effort normal and breath sounds normal.   Abdominal: Soft. Bowel sounds are normal. He exhibits no distension. There is no tenderness.   Musculoskeletal: Normal range of motion.   Neurological: He is alert and oriented to person, place, and time.   Skin: Skin is warm and dry.   Psychiatric: He has a normal mood and affect. His behavior is normal.   Nursing note and vitals reviewed.    Significant Labs:  CBC:    Recent Labs  Lab 02/22/18 0335 02/23/18 0458 02/24/18 0324   WBC 23.46* 27.40* 26.15*   RBC 2.69* 2.75* 2.87*   HGB 8.2* 8.3* 8.8*   HCT 23.7* 24.0* 25.2*   * 239 267   MCV 88 87 88   MCH 30.5 30.2 30.7   MCHC 34.6 34.6 34.9     BNP:  No results for input(s): BNP in the last 168 hours.    Invalid input(s): BNPTRIAGELBLO  CMP:    Recent Labs  Lab 02/22/18  0335  02/23/18 0458  02/23/18  2356 02/24/18  0324 02/24/18  0546   *  --  138*  --   --  134*  --    CALCIUM 9.0  --  8.5*  --   --  8.6*  --    ALBUMIN 4.0  --  3.7  --   --  3.7  --    PROT 6.7  --  6.4  --   --  6.5  --    *  --  131*  --   --  133*  --    K 4.0  < > 4.0  < > 3.9 4.2 3.8   CO2 32*  --  34*  --   --  32*  --    CL 82*  --  84*  --   --  86*  --    BUN 46*  --  53*  --   --  48*  --    CREATININE 1.8*  --  1.6*  --   --  1.4  --    ALKPHOS 65  --  65  --   --  71  --    ALT 25  --  28  --   --  40  --    AST 44*  --  32  --   --  31  --    BILITOT 0.7  --  0.8  --   --  1.0   --    < > = values in this interval not displayed.   Coagulation:     Recent Labs  Lab 02/18/18  1959  02/19/18  0839  02/21/18  0330 02/22/18  0335 02/23/18  0458 02/24/18  0324   INR 1.2  < >  --   < > 1.2 1.0 1.1 1.1   APTT 27.6  --  36.2*  --  27.0  --   --   --    < > = values in this interval not displayed.  LDH:  No results for input(s): LDH in the last 72 hours.  Microbiology:  Microbiology Results (last 7 days)     Procedure Component Value Units Date/Time    Urine culture [[id number]] Collected:  02/17/18 0425    Order Status:  Completed Specimen:  Urine from Urine, Clean Catch Updated:  02/18/18 1119     Urine Culture, Routine No growth    Narrative:       1 cup of urine          I have reviewed all pertinent labs within the past 24 hours.    Estimated Creatinine Clearance: 73.4 mL/min (based on SCr of 1.4 mg/dL).    Diagnostic Results:  I have reviewed all pertinent imaging results/findings within the past 24 hours.

## 2018-02-24 NOTE — ASSESSMENT & PLAN NOTE
-Transplant Date 2/18/18. S/P washout 2/19.  HTS primary.   -CMV Status:D+/R-   -Rejection status: Moderate Risk  -Rejection episodes: none to date  -Induction: No   -Current immunosuppression: Mehtylpred 40 IV BID, Prograf 2 mg BID, Cellcept 1000 mg BID.  Tacro level increased today. Monitor one more day before dose adjustment. Increase cellcept today.  -Opportunistic PPx with:Nystatin   -Hemodynamically stable. Weaned off   -Net neg 4.4 L over last 24 hours on 20 mg/hr lasix.   -CTs in place and AV wires connected to pacer(backup rate 80).   -Echo 2/21 EF 60%, low nl RV fxn.  -Need to add valcyte soon

## 2018-02-25 PROBLEM — R11.0 NAUSEA: Status: RESOLVED | Noted: 2017-02-03 | Resolved: 2018-02-25

## 2018-02-25 PROBLEM — R06.6 HICCUPS: Status: RESOLVED | Noted: 2018-02-23 | Resolved: 2018-02-25

## 2018-02-25 PROBLEM — Z76.82 AWAITING ORGAN TRANSPLANT: Status: ACTIVE | Noted: 2018-02-25

## 2018-02-25 LAB
ALBUMIN SERPL BCP-MCNC: 3.6 G/DL
ALLENS TEST: ABNORMAL
ALP SERPL-CCNC: 89 U/L
ALT SERPL W/O P-5'-P-CCNC: 104 U/L
ANION GAP SERPL CALC-SCNC: 13 MMOL/L
ANISOCYTOSIS BLD QL SMEAR: SLIGHT
AST SERPL-CCNC: 50 U/L
BASOPHILS NFR BLD: 0 %
BILIRUB SERPL-MCNC: 0.8 MG/DL
BILIRUB UR QL STRIP: NEGATIVE
BUN SERPL-MCNC: 45 MG/DL
CALCIUM SERPL-MCNC: 8.6 MG/DL
CHLORIDE SERPL-SCNC: 86 MMOL/L
CLARITY UR REFRACT.AUTO: CLEAR
CO2 SERPL-SCNC: 34 MMOL/L
COLOR UR AUTO: NORMAL
CREAT SERPL-MCNC: 1.3 MG/DL
DELSYS: ABNORMAL
DIFFERENTIAL METHOD: ABNORMAL
EOSINOPHIL NFR BLD: 0 %
ERYTHROCYTE [DISTWIDTH] IN BLOOD BY AUTOMATED COUNT: 14.6 %
EST. GFR  (AFRICAN AMERICAN): >60 ML/MIN/1.73 M^2
EST. GFR  (NON AFRICAN AMERICAN): >60 ML/MIN/1.73 M^2
FIO2: 21
GLUCOSE SERPL-MCNC: 126 MG/DL
GLUCOSE UR QL STRIP: NEGATIVE
HCO3 UR-SCNC: 33 MMOL/L (ref 24–28)
HCT VFR BLD AUTO: 26.6 %
HGB BLD-MCNC: 9 G/DL
HGB UR QL STRIP: NEGATIVE
HYPOCHROMIA BLD QL SMEAR: ABNORMAL
IMM GRANULOCYTES # BLD AUTO: ABNORMAL K/UL
IMM GRANULOCYTES NFR BLD AUTO: ABNORMAL %
INR PPP: 1.1
KETONES UR QL STRIP: NEGATIVE
LEUKOCYTE ESTERASE UR QL STRIP: NEGATIVE
LYMPHOCYTES NFR BLD: 3 %
MAGNESIUM SERPL-MCNC: 1.7 MG/DL
MAGNESIUM SERPL-MCNC: 2 MG/DL
MCH RBC QN AUTO: 29.6 PG
MCHC RBC AUTO-ENTMCNC: 33.8 G/DL
MCV RBC AUTO: 88 FL
MODE: ABNORMAL
MONOCYTES NFR BLD: 8 %
NEUTROPHILS NFR BLD: 87 %
NEUTS BAND NFR BLD MANUAL: 2 %
NITRITE UR QL STRIP: NEGATIVE
NRBC BLD-RTO: 0 /100 WBC
OVALOCYTES BLD QL SMEAR: ABNORMAL
PCO2 BLDA: 43.2 MMHG (ref 35–45)
PH SMN: 7.49 [PH] (ref 7.35–7.45)
PH UR STRIP: 5 [PH] (ref 5–8)
PLATELET # BLD AUTO: 300 K/UL
PLATELET BLD QL SMEAR: ABNORMAL
PMV BLD AUTO: 9.6 FL
PO2 BLDA: 48 MMHG (ref 40–60)
POC BE: 10 MMOL/L
POC SATURATED O2: 86 % (ref 95–100)
POC TCO2: 34 MMOL/L (ref 24–29)
POCT GLUCOSE: 121 MG/DL (ref 70–110)
POCT GLUCOSE: 123 MG/DL (ref 70–110)
POCT GLUCOSE: 150 MG/DL (ref 70–110)
POCT GLUCOSE: 233 MG/DL (ref 70–110)
POIKILOCYTOSIS BLD QL SMEAR: SLIGHT
POLYCHROMASIA BLD QL SMEAR: ABNORMAL
POTASSIUM SERPL-SCNC: 3.6 MMOL/L
POTASSIUM SERPL-SCNC: 3.7 MMOL/L
POTASSIUM SERPL-SCNC: 3.8 MMOL/L
PROT SERPL-MCNC: 6.4 G/DL
PROT UR QL STRIP: NEGATIVE
PROTHROMBIN TIME: 11.6 SEC
RBC # BLD AUTO: 3.04 M/UL
SAMPLE: ABNORMAL
SITE: ABNORMAL
SODIUM SERPL-SCNC: 133 MMOL/L
SP GR UR STRIP: 1.01 (ref 1–1.03)
SP02: 97
TACROLIMUS BLD-MCNC: 5.6 NG/ML
URN SPEC COLLECT METH UR: NORMAL
UROBILINOGEN UR STRIP-ACNC: NEGATIVE EU/DL
WBC # BLD AUTO: 27.21 K/UL

## 2018-02-25 PROCEDURE — 80053 COMPREHEN METABOLIC PANEL: CPT

## 2018-02-25 PROCEDURE — 25000003 PHARM REV CODE 250: Performed by: THORACIC SURGERY (CARDIOTHORACIC VASCULAR SURGERY)

## 2018-02-25 PROCEDURE — A4216 STERILE WATER/SALINE, 10 ML: HCPCS | Performed by: THORACIC SURGERY (CARDIOTHORACIC VASCULAR SURGERY)

## 2018-02-25 PROCEDURE — 84132 ASSAY OF SERUM POTASSIUM: CPT

## 2018-02-25 PROCEDURE — 63600175 PHARM REV CODE 636 W HCPCS: Performed by: PHYSICIAN ASSISTANT

## 2018-02-25 PROCEDURE — 80197 ASSAY OF TACROLIMUS: CPT

## 2018-02-25 PROCEDURE — 81003 URINALYSIS AUTO W/O SCOPE: CPT

## 2018-02-25 PROCEDURE — 63600175 PHARM REV CODE 636 W HCPCS: Performed by: INTERNAL MEDICINE

## 2018-02-25 PROCEDURE — 94761 N-INVAS EAR/PLS OXIMETRY MLT: CPT

## 2018-02-25 PROCEDURE — 82803 BLOOD GASES ANY COMBINATION: CPT

## 2018-02-25 PROCEDURE — 25000003 PHARM REV CODE 250: Performed by: NURSE PRACTITIONER

## 2018-02-25 PROCEDURE — 99233 SBSQ HOSP IP/OBS HIGH 50: CPT | Mod: ,,, | Performed by: INTERNAL MEDICINE

## 2018-02-25 PROCEDURE — 85027 COMPLETE CBC AUTOMATED: CPT

## 2018-02-25 PROCEDURE — 36415 COLL VENOUS BLD VENIPUNCTURE: CPT

## 2018-02-25 PROCEDURE — 25000003 PHARM REV CODE 250: Performed by: INTERNAL MEDICINE

## 2018-02-25 PROCEDURE — 85007 BL SMEAR W/DIFF WBC COUNT: CPT

## 2018-02-25 PROCEDURE — 83735 ASSAY OF MAGNESIUM: CPT

## 2018-02-25 PROCEDURE — 85610 PROTHROMBIN TIME: CPT

## 2018-02-25 PROCEDURE — 25000003 PHARM REV CODE 250: Performed by: PHYSICIAN ASSISTANT

## 2018-02-25 PROCEDURE — 25000003 PHARM REV CODE 250: Performed by: STUDENT IN AN ORGANIZED HEALTH CARE EDUCATION/TRAINING PROGRAM

## 2018-02-25 PROCEDURE — 20600001 HC STEP DOWN PRIVATE ROOM

## 2018-02-25 PROCEDURE — 63600175 PHARM REV CODE 636 W HCPCS: Performed by: NURSE PRACTITIONER

## 2018-02-25 PROCEDURE — 63600175 PHARM REV CODE 636 W HCPCS: Performed by: THORACIC SURGERY (CARDIOTHORACIC VASCULAR SURGERY)

## 2018-02-25 PROCEDURE — 87040 BLOOD CULTURE FOR BACTERIA: CPT

## 2018-02-25 RX ORDER — MAGNESIUM SULFATE/D5W 2 G/50 ML
2 INTRAVENOUS SOLUTION, PIGGYBACK (ML) INTRAVENOUS
Status: DISCONTINUED | OUTPATIENT
Start: 2018-02-25 | End: 2018-03-05

## 2018-02-25 RX ORDER — TACROLIMUS 1 MG/1
3 CAPSULE ORAL EVERY EVENING
Status: DISCONTINUED | OUTPATIENT
Start: 2018-02-25 | End: 2018-02-28

## 2018-02-25 RX ORDER — VALGANCICLOVIR 450 MG/1
900 TABLET, FILM COATED ORAL DAILY
Status: DISCONTINUED | OUTPATIENT
Start: 2018-02-25 | End: 2018-03-05 | Stop reason: HOSPADM

## 2018-02-25 RX ORDER — TACROLIMUS 1 MG/1
2 CAPSULE ORAL EVERY MORNING
Status: DISCONTINUED | OUTPATIENT
Start: 2018-02-26 | End: 2018-02-27

## 2018-02-25 RX ADMIN — Medication 3 ML: at 06:02

## 2018-02-25 RX ADMIN — POTASSIUM CHLORIDE 20 MEQ: 200 INJECTION, SOLUTION INTRAVENOUS at 03:02

## 2018-02-25 RX ADMIN — STANDARDIZED SENNA CONCENTRATE AND DOCUSATE SODIUM 2 TABLET: 8.6; 5 TABLET, FILM COATED ORAL at 08:02

## 2018-02-25 RX ADMIN — OXYCODONE HYDROCHLORIDE 5 MG: 5 TABLET ORAL at 09:02

## 2018-02-25 RX ADMIN — VALGANCICLOVIR 900 MG: 450 TABLET, FILM COATED ORAL at 08:02

## 2018-02-25 RX ADMIN — HEPARIN SODIUM 5000 UNITS: 5000 INJECTION, SOLUTION INTRAVENOUS; SUBCUTANEOUS at 09:02

## 2018-02-25 RX ADMIN — Medication 2 G: at 07:02

## 2018-02-25 RX ADMIN — NYSTATIN 500000 UNITS: 500000 SUSPENSION ORAL at 05:02

## 2018-02-25 RX ADMIN — POTASSIUM CHLORIDE 20 MEQ: 200 INJECTION, SOLUTION INTRAVENOUS at 06:02

## 2018-02-25 RX ADMIN — FAMOTIDINE 20 MG: 20 TABLET, FILM COATED ORAL at 08:02

## 2018-02-25 RX ADMIN — METHYLPREDNISOLONE SODIUM SUCCINATE 40 MG: 40 INJECTION, POWDER, FOR SOLUTION INTRAMUSCULAR; INTRAVENOUS at 08:02

## 2018-02-25 RX ADMIN — AMLODIPINE BESYLATE 10 MG: 10 TABLET ORAL at 08:02

## 2018-02-25 RX ADMIN — FUROSEMIDE 15 MG/HR: 10 INJECTION, SOLUTION INTRAVENOUS at 11:02

## 2018-02-25 RX ADMIN — INSULIN ASPART 4 UNITS: 100 INJECTION, SOLUTION INTRAVENOUS; SUBCUTANEOUS at 01:02

## 2018-02-25 RX ADMIN — FAMOTIDINE 20 MG: 20 TABLET, FILM COATED ORAL at 09:02

## 2018-02-25 RX ADMIN — NYSTATIN 500000 UNITS: 500000 SUSPENSION ORAL at 09:02

## 2018-02-25 RX ADMIN — Medication 3 ML: at 01:02

## 2018-02-25 RX ADMIN — POLYETHYLENE GLYCOL 3350 17 G: 17 POWDER, FOR SOLUTION ORAL at 08:02

## 2018-02-25 RX ADMIN — MYCOPHENOLATE MOFETIL 1500 MG: 250 CAPSULE ORAL at 08:02

## 2018-02-25 RX ADMIN — OXYCODONE HYDROCHLORIDE 10 MG: 5 TABLET ORAL at 10:02

## 2018-02-25 RX ADMIN — METHYLPREDNISOLONE SODIUM SUCCINATE 40 MG: 40 INJECTION, POWDER, FOR SOLUTION INTRAMUSCULAR; INTRAVENOUS at 09:02

## 2018-02-25 RX ADMIN — STANDARDIZED SENNA CONCENTRATE AND DOCUSATE SODIUM 2 TABLET: 8.6; 5 TABLET, FILM COATED ORAL at 09:02

## 2018-02-25 RX ADMIN — HEPARIN SODIUM 5000 UNITS: 5000 INJECTION, SOLUTION INTRAVENOUS; SUBCUTANEOUS at 05:02

## 2018-02-25 RX ADMIN — ASPIRIN 81 MG: 81 TABLET, COATED ORAL at 08:02

## 2018-02-25 RX ADMIN — NYSTATIN 500000 UNITS: 500000 SUSPENSION ORAL at 01:02

## 2018-02-25 RX ADMIN — HEPARIN SODIUM 5000 UNITS: 5000 INJECTION, SOLUTION INTRAVENOUS; SUBCUTANEOUS at 01:02

## 2018-02-25 RX ADMIN — TACROLIMUS 3 MG: 1 CAPSULE ORAL at 05:02

## 2018-02-25 RX ADMIN — TACROLIMUS 2 MG: 1 CAPSULE ORAL at 08:02

## 2018-02-25 RX ADMIN — MYCOPHENOLATE MOFETIL 1500 MG: 250 CAPSULE ORAL at 09:02

## 2018-02-25 RX ADMIN — Medication 3 ML: at 10:02

## 2018-02-25 RX ADMIN — NYSTATIN 500000 UNITS: 500000 SUSPENSION ORAL at 07:02

## 2018-02-25 NOTE — NURSING TRANSFER
Nursing Transfer Note      2/25/2018     Transfer To: [facility] [address]    Transfer via wheelchair    Transfer with cardiac monitoring    Transported by DEJUAN Giang RN    Medicines sent: N/A    Chart send with patient: Yes    Notified: spouse    Patient reassessed at: 2/25/18 @ 1100      Upon arrival to floor: cardiac monitor applied, patient oriented to room, call bell in reach and bed in lowest position

## 2018-02-25 NOTE — SUBJECTIVE & OBJECTIVE
Interval History: Ambulated around the room yesterday but not in the wright. No complaints. A-line and kirk discontinued yesterday.     Continuous Infusions:   furosemide (LASIX) 5 mg/mL infusion (non-titrating) 15 mg/hr (02/25/18 1000)     Scheduled Meds:   amLODIPine  10 mg Oral Daily    aspirin  81 mg Oral Daily    famotidine  20 mg Oral BID    heparin (porcine)  5,000 Units Subcutaneous Q8H    methylPREDNISolone sodium succinate  40 mg Intravenous Q12H    mycophenolate  1,500 mg Oral BID    nystatin  500,000 Units Mouth/Throat QID (WM & HS)    polyethylene glycol  17 g Oral BID    senna-docusate 8.6-50 mg  2 tablet Oral BID    sodium chloride 0.9%  3 mL Intravenous Q8H    tacrolimus  2 mg Oral BID    valGANciclovir  900 mg Oral Daily     PRN Meds:sodium chloride, albuterol sulfate, [COMPLETED] calcium gluconate IVPB **AND** calcium gluconate IVPB, dextrose 50%, dextrose 50%, glucagon (human recombinant), glucose, glucose, glycerin adult, insulin aspart U-100, lactulose, magnesium sulfate IVPB, meclizine, ondansetron, oxyCODONE, oxyCODONE, potassium chloride **AND** potassium chloride **AND** potassium chloride, promethazine (PHENERGAN) IVPB    Review of patient's allergies indicates:  No Known Allergies  Objective:     Vital Signs (Most Recent):  Temp: 98 °F (36.7 °C) (02/25/18 1115)  Pulse: (!) 111 (02/25/18 1115)  Resp: 18 (02/25/18 1115)  BP: 128/69 (02/25/18 1115)  SpO2: 95 % (02/25/18 1115) Vital Signs (24h Range):  Temp:  [97.7 °F (36.5 °C)-98.7 °F (37.1 °C)] 98 °F (36.7 °C)  Pulse:  [105-120] 111  Resp:  [16-38] 18  SpO2:  [94 %-98 %] 95 %  BP: (110-153)/(59-73) 128/69     Patient Vitals for the past 72 hrs (Last 3 readings):   Weight   02/23/18 0300 77.5 kg (170 lb 13.7 oz)     Body mass index is 26.76 kg/m².      Intake/Output Summary (Last 24 hours) at 02/25/18 1154  Last data filed at 02/25/18 1000   Gross per 24 hour   Intake              495 ml   Output             4821 ml   Net             -4326 ml     Physical Exam   Constitutional: He is oriented to person, place, and time. He appears well-developed and well-nourished.   HENT:   Head: Normocephalic.   Eyes: Pupils are equal, round, and reactive to light.   Neck: Normal range of motion. Neck supple. No JVD present.   LIJ line in place.    Cardiovascular: Regular rhythm.  Tachycardia present.    Pulmonary/Chest: Effort normal and breath sounds normal.   Abdominal: Soft. Bowel sounds are normal. He exhibits no distension. There is no tenderness.   Musculoskeletal: Normal range of motion.   Neurological: He is alert and oriented to person, place, and time.   Skin: Skin is warm and dry.   Psychiatric: He has a normal mood and affect. His behavior is normal.   Nursing note and vitals reviewed.    Significant Labs:  CBC:    Recent Labs  Lab 02/23/18 0458 02/24/18 0324 02/25/18  0250   WBC 27.40* 26.15* 27.21*   RBC 2.75* 2.87* 3.04*   HGB 8.3* 8.8* 9.0*   HCT 24.0* 25.2* 26.6*    267 300   MCV 87 88 88   MCH 30.2 30.7 29.6   MCHC 34.6 34.9 33.8     BNP:  No results for input(s): BNP in the last 168 hours.    Invalid input(s): BNPTRIAGELBLO  CMP:    Recent Labs  Lab 02/23/18 0458 02/24/18 0324  02/24/18  2349 02/25/18  0250 02/25/18  0504   *  --  134*  --   --  126*  --    CALCIUM 8.5*  --  8.6*  --   --  8.6*  --    ALBUMIN 3.7  --  3.7  --   --  3.6  --    PROT 6.4  --  6.5  --   --  6.4  --    *  --  133*  --   --  133*  --    K 4.0  < > 4.2  < > 3.8 3.6 3.7   CO2 34*  --  32*  --   --  34*  --    CL 84*  --  86*  --   --  86*  --    BUN 53*  --  48*  --   --  45*  --    CREATININE 1.6*  --  1.4  --   --  1.3  --    ALKPHOS 65  --  71  --   --  89  --    ALT 28  --  40  --   --  104*  --    AST 32  --  31  --   --  50*  --    BILITOT 0.8  --  1.0  --   --  0.8  --    < > = values in this interval not displayed.   Coagulation:     Recent Labs  Lab 02/18/18  1959  02/19/18  0839  02/21/18  0330  02/23/18  0458 02/24/18  0324  02/25/18  0250   INR 1.2  < >  --   < > 1.2  < > 1.1 1.1 1.1   APTT 27.6  --  36.2*  --  27.0  --   --   --   --    < > = values in this interval not displayed.  LDH:  No results for input(s): LDH in the last 72 hours.  Microbiology:  Microbiology Results (last 7 days)     ** No results found for the last 168 hours. **          I have reviewed all pertinent labs within the past 24 hours.    Estimated Creatinine Clearance: 79.1 mL/min (based on SCr of 1.3 mg/dL).    Diagnostic Results:  I have reviewed all pertinent imaging results/findings within the past 24 hours.

## 2018-02-25 NOTE — ASSESSMENT & PLAN NOTE
- TLC changed out 2/22.  - Continue to monitor. Remains at 27k today  - Add urinalysis and culture

## 2018-02-25 NOTE — ASSESSMENT & PLAN NOTE
-Transplant Date 2/18/18. S/P washout 2/19.  HTS primary.   -CMV Status:D+/R-   -Rejection status: Moderate Risk  -Rejection episodes: none to date  -Induction: No   -Current immunosuppression: Mehtylpred 40 IV BID, Prograf 2 mg BID (will increase to 2/3), Cellcept 1500 mg BID.  -Opportunistic PPx with:Nystatin. Start valcyte today.  -Hemodynamically stable. Weaned off   -Net neg 3.4 L over last 24 hours on 20 mg/hr lasix. Decrease lasix today  -CTs in place and AV wires connected to pacer(backup rate 80).   -Echo 2/21 EF 60%, low nl RV fxn.

## 2018-02-25 NOTE — PROGRESS NOTES
Patient transferred via wheelchair from ICU per RN. Patient up to chair. Wife at bedside. Lasix gtt infusing at 15mg/hr. Telemetry monitoring initiated - ST 110s, VSS. Pleural CT to -20cm suction with SS drainage, dressing CDI. Atrial and ventricular pacer wires grounded and secured. Call light and belongings within reach.

## 2018-02-25 NOTE — PROGRESS NOTES
DANETTE d/c'd per PA order. Catheter tip visualized intact, patient tolerated well. Pressure applied to site for 5 minutes - vaseline gauze and 4x4 applied, covered with Tegaderm. Patient instructed to lie flat for 30 minutes following catheter removal and to leave dressing in place for 24 hours. PIV in right and left AC flushed and patent.     Chest tube dressing changed. Pacer wires secured to dressing.

## 2018-02-25 NOTE — PROGRESS NOTES
[facility]  Heart Transplant  Progress Note    Patient Name: Mert Samayoa  MRN: [id number]  Admission Date: 2/17/2018  Hospital Length of Stay: 8 days  Attending Physician: Sammi Tapia MD  Primary Care Provider: Primary Doctor No  Principal Problem:Heart transplanted    Subjective:     Interval History: Ambulated around the room yesterday but not in the wright. No complaints. A-line and kirk discontinued yesterday.     Continuous Infusions:   furosemide (LASIX) 5 mg/mL infusion (non-titrating) 15 mg/hr (02/25/18 1000)     Scheduled Meds:   amLODIPine  10 mg Oral Daily    aspirin  81 mg Oral Daily    famotidine  20 mg Oral BID    heparin (porcine)  5,000 Units Subcutaneous Q8H    methylPREDNISolone sodium succinate  40 mg Intravenous Q12H    mycophenolate  1,500 mg Oral BID    nystatin  500,000 Units Mouth/Throat QID (WM & HS)    polyethylene glycol  17 g Oral BID    senna-docusate 8.6-50 mg  2 tablet Oral BID    sodium chloride 0.9%  3 mL Intravenous Q8H    tacrolimus  2 mg Oral BID    valGANciclovir  900 mg Oral Daily     PRN Meds:sodium chloride, albuterol sulfate, [COMPLETED] calcium gluconate IVPB **AND** calcium gluconate IVPB, dextrose 50%, dextrose 50%, glucagon (human recombinant), glucose, glucose, glycerin adult, insulin aspart U-100, lactulose, magnesium sulfate IVPB, meclizine, ondansetron, oxyCODONE, oxyCODONE, potassium chloride **AND** potassium chloride **AND** potassium chloride, promethazine (PHENERGAN) IVPB    Review of patient's allergies indicates:  No Known Allergies  Objective:     Vital Signs (Most Recent):  Temp: 98 °F (36.7 °C) (02/25/18 1115)  Pulse: (!) 111 (02/25/18 1115)  Resp: 18 (02/25/18 1115)  BP: 128/69 (02/25/18 1115)  SpO2: 95 % (02/25/18 1115) Vital Signs (24h Range):  Temp:  [97.7 °F (36.5 °C)-98.7 °F (37.1 °C)] 98 °F (36.7 °C)  Pulse:  [105-120] 111  Resp:  [16-38] 18  SpO2:  [94 %-98 %] 95 %  BP: (110-153)/(59-73) 128/69     Patient  Vitals for the past 72 hrs (Last 3 readings):   Weight   02/23/18 0300 77.5 kg (170 lb 13.7 oz)     Body mass index is 26.76 kg/m².      Intake/Output Summary (Last 24 hours) at 02/25/18 1154  Last data filed at 02/25/18 1000   Gross per 24 hour   Intake              495 ml   Output             4821 ml   Net            -4326 ml     Physical Exam   Constitutional: He is oriented to person, place, and time. He appears well-developed and well-nourished.   HENT:   Head: Normocephalic.   Eyes: Pupils are equal, round, and reactive to light.   Neck: Normal range of motion. Neck supple. No JVD present.   LIJ line in place.    Cardiovascular: Regular rhythm.  Tachycardia present.    Pulmonary/Chest: Effort normal and breath sounds normal.   Abdominal: Soft. Bowel sounds are normal. He exhibits no distension. There is no tenderness.   Musculoskeletal: Normal range of motion.   Neurological: He is alert and oriented to person, place, and time.   Skin: Skin is warm and dry.   Psychiatric: He has a normal mood and affect. His behavior is normal.   Nursing note and vitals reviewed.    Significant Labs:  CBC:    Recent Labs  Lab 02/23/18  0458 02/24/18  0324 02/25/18  0250   WBC 27.40* 26.15* 27.21*   RBC 2.75* 2.87* 3.04*   HGB 8.3* 8.8* 9.0*   HCT 24.0* 25.2* 26.6*    267 300   MCV 87 88 88   MCH 30.2 30.7 29.6   MCHC 34.6 34.9 33.8     BNP:  No results for input(s): BNP in the last 168 hours.    Invalid input(s): BNPTRIAGELBLO  CMP:    Recent Labs  Lab 02/23/18  0458  02/24/18  0324  02/24/18  2349 02/25/18  0250 02/25/18  0504   *  --  134*  --   --  126*  --    CALCIUM 8.5*  --  8.6*  --   --  8.6*  --    ALBUMIN 3.7  --  3.7  --   --  3.6  --    PROT 6.4  --  6.5  --   --  6.4  --    *  --  133*  --   --  133*  --    K 4.0  < > 4.2  < > 3.8 3.6 3.7   CO2 34*  --  32*  --   --  34*  --    CL 84*  --  86*  --   --  86*  --    BUN 53*  --  48*  --   --  45*  --    CREATININE 1.6*  --  1.4  --   --  1.3  --     ALKPHOS 65  --  71  --   --  89  --    ALT 28  --  40  --   --  104*  --    AST 32  --  31  --   --  50*  --    BILITOT 0.8  --  1.0  --   --  0.8  --    < > = values in this interval not displayed.   Coagulation:     Recent Labs  Lab 02/18/18  1959  02/19/18  0839  02/21/18  0330  02/23/18  0458 02/24/18  0324 02/25/18  0250   INR 1.2  < >  --   < > 1.2  < > 1.1 1.1 1.1   APTT 27.6  --  36.2*  --  27.0  --   --   --   --    < > = values in this interval not displayed.  LDH:  No results for input(s): LDH in the last 72 hours.  Microbiology:  Microbiology Results (last 7 days)     ** No results found for the last 168 hours. **          I have reviewed all pertinent labs within the past 24 hours.    Estimated Creatinine Clearance: 79.1 mL/min (based on SCr of 1.3 mg/dL).    Diagnostic Results:  I have reviewed all pertinent imaging results/findings within the past 24 hours.    Assessment and Plan:     27 year old WM with DCM (familial, both brothers with heart transplants) with stage D CHFrEF underwent Heartware placement 02/01/17 had post-op atrial flutter 02/11/17 treated with amiodarone presents for OHTx.  0.  VAD implanted on 2/1/17 HVAD RPM 2700.         * Heart transplanted    -Transplant Date 2/18/18. S/P washout 2/19.  HTS primary.   -CMV Status:D+/R-   -Rejection status: Moderate Risk  -Rejection episodes: none to date  -Induction: No   -Current immunosuppression: Mehtylpred 40 IV BID, Prograf 2 mg BID (will increase to 2/3), Cellcept 1500 mg BID.  -Opportunistic PPx with:Nystatin. Start valcyte today.  -Hemodynamically stable. Weaned off   -Net neg 3.4 L over last 24 hours on 20 mg/hr lasix. Decrease lasix today  -CTs in place and AV wires connected to pacer(backup rate 80).   -Echo 2/21 EF 60%, low nl RV fxn.        VTE (venous thromboembolism)    -U/S 2/22 revealed Nonocclusive deep vein thrombosis of the right internal jugular vein with slight extension into the proximal subclavian vein.  -Continue  SC Heparin.         Paroxysmal atrial fibrillation    -~30mn episode of afib 2/21 terminated with amio. Likely exacerbated by pain, n/v, volume.   - Currently ST ~100.  - Stop amio.  - Echo as above.          Leukocytosis    - TLC changed out 2/22.  - Continue to monitor. Remains at 27k today  - Add urinalysis and culture        Acute hyperglycemia    -insulin per endocrine          Step down to TSU today    Анна Beltran PA-C  Heart Transplant  [facility]

## 2018-02-25 NOTE — PLAN OF CARE
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 Patient becomes extremely nauseated. **        Outcome: Ongoing (interventions implemented as appropriate)  POC reviewed with pt and mother. Afebrile. VSS. Oxygenating well on room air. Denies pain. OOBTC. Remained free from falls. Lasix gtt remains infusing. Trending mag and k q6h. Replacing as needed. Adequate UOP; voids per urinal. 130 cc output from chest tube. 3 BM throughout the night. All questions and concerns addressed. Will continue to monitor closely.

## 2018-02-26 ENCOUNTER — ANESTHESIA EVENT (OUTPATIENT)
Dept: MEDSURG UNIT | Facility: HOSPITAL | Age: 28
End: 2018-02-26

## 2018-02-26 PROBLEM — Z76.82 AWAITING ORGAN TRANSPLANT: Status: RESOLVED | Noted: 2018-02-25 | Resolved: 2018-02-26

## 2018-02-26 LAB
ALBUMIN SERPL BCP-MCNC: 3.7 G/DL
ALP SERPL-CCNC: 79 U/L
ALT SERPL W/O P-5'-P-CCNC: 73 U/L
ANION GAP SERPL CALC-SCNC: 17 MMOL/L
ANISOCYTOSIS BLD QL SMEAR: SLIGHT
AST SERPL-CCNC: 24 U/L
BASOPHILS # BLD AUTO: ABNORMAL K/UL
BASOPHILS NFR BLD: 0 %
BILIRUB SERPL-MCNC: 1 MG/DL
BUN SERPL-MCNC: 47 MG/DL
CALCIUM SERPL-MCNC: 9.1 MG/DL
CHLORIDE SERPL-SCNC: 85 MMOL/L
CO2 SERPL-SCNC: 32 MMOL/L
CREAT SERPL-MCNC: 1.3 MG/DL
DIFFERENTIAL METHOD: ABNORMAL
EOSINOPHIL # BLD AUTO: ABNORMAL K/UL
EOSINOPHIL NFR BLD: 0 %
ERYTHROCYTE [DISTWIDTH] IN BLOOD BY AUTOMATED COUNT: 14.1 %
EST. GFR  (AFRICAN AMERICAN): >60 ML/MIN/1.73 M^2
EST. GFR  (NON AFRICAN AMERICAN): >60 ML/MIN/1.73 M^2
GLUCOSE SERPL-MCNC: 116 MG/DL
HCT VFR BLD AUTO: 28.6 %
HGB BLD-MCNC: 10 G/DL
HYPOCHROMIA BLD QL SMEAR: ABNORMAL
IMM GRANULOCYTES # BLD AUTO: ABNORMAL K/UL
IMM GRANULOCYTES NFR BLD AUTO: ABNORMAL %
INR PPP: 1.2
LYMPHOCYTES # BLD AUTO: ABNORMAL K/UL
LYMPHOCYTES NFR BLD: 8 %
MAGNESIUM SERPL-MCNC: 1.8 MG/DL
MCH RBC QN AUTO: 30.7 PG
MCHC RBC AUTO-ENTMCNC: 35 G/DL
MCV RBC AUTO: 88 FL
MONOCYTES # BLD AUTO: ABNORMAL K/UL
MONOCYTES NFR BLD: 4 %
MYELOCYTES NFR BLD MANUAL: 4 %
NEUTROPHILS NFR BLD: 82 %
NEUTS BAND NFR BLD MANUAL: 2 %
NRBC BLD-RTO: 0 /100 WBC
OVALOCYTES BLD QL SMEAR: ABNORMAL
PLATELET # BLD AUTO: 319 K/UL
PMV BLD AUTO: 9.7 FL
POCT GLUCOSE: 104 MG/DL (ref 70–110)
POCT GLUCOSE: 125 MG/DL (ref 70–110)
POCT GLUCOSE: 161 MG/DL (ref 70–110)
POIKILOCYTOSIS BLD QL SMEAR: SLIGHT
POLYCHROMASIA BLD QL SMEAR: ABNORMAL
POTASSIUM SERPL-SCNC: 3.4 MMOL/L
PROT SERPL-MCNC: 6.4 G/DL
PROTHROMBIN TIME: 11.9 SEC
RBC # BLD AUTO: 3.26 M/UL
SODIUM SERPL-SCNC: 134 MMOL/L
TACROLIMUS BLD-MCNC: 6.2 NG/ML
WBC # BLD AUTO: 24.72 K/UL

## 2018-02-26 PROCEDURE — 63600175 PHARM REV CODE 636 W HCPCS: Performed by: PHYSICIAN ASSISTANT

## 2018-02-26 PROCEDURE — 20600001 HC STEP DOWN PRIVATE ROOM

## 2018-02-26 PROCEDURE — 63600175 PHARM REV CODE 636 W HCPCS: Performed by: NURSE PRACTITIONER

## 2018-02-26 PROCEDURE — 63600175 PHARM REV CODE 636 W HCPCS: Performed by: INTERNAL MEDICINE

## 2018-02-26 PROCEDURE — 85610 PROTHROMBIN TIME: CPT

## 2018-02-26 PROCEDURE — 97116 GAIT TRAINING THERAPY: CPT

## 2018-02-26 PROCEDURE — 83735 ASSAY OF MAGNESIUM: CPT

## 2018-02-26 PROCEDURE — 80053 COMPREHEN METABOLIC PANEL: CPT

## 2018-02-26 PROCEDURE — 97110 THERAPEUTIC EXERCISES: CPT

## 2018-02-26 PROCEDURE — 85027 COMPLETE CBC AUTOMATED: CPT

## 2018-02-26 PROCEDURE — 25000003 PHARM REV CODE 250: Performed by: NURSE PRACTITIONER

## 2018-02-26 PROCEDURE — 25000003 PHARM REV CODE 250: Performed by: THORACIC SURGERY (CARDIOTHORACIC VASCULAR SURGERY)

## 2018-02-26 PROCEDURE — 85007 BL SMEAR W/DIFF WBC COUNT: CPT

## 2018-02-26 PROCEDURE — 99232 SBSQ HOSP IP/OBS MODERATE 35: CPT | Mod: ,,, | Performed by: INTERNAL MEDICINE

## 2018-02-26 PROCEDURE — 25000003 PHARM REV CODE 250: Performed by: STUDENT IN AN ORGANIZED HEALTH CARE EDUCATION/TRAINING PROGRAM

## 2018-02-26 PROCEDURE — 80197 ASSAY OF TACROLIMUS: CPT

## 2018-02-26 PROCEDURE — 25000003 PHARM REV CODE 250: Performed by: INTERNAL MEDICINE

## 2018-02-26 PROCEDURE — A4216 STERILE WATER/SALINE, 10 ML: HCPCS | Performed by: THORACIC SURGERY (CARDIOTHORACIC VASCULAR SURGERY)

## 2018-02-26 PROCEDURE — 25000003 PHARM REV CODE 250: Performed by: PHYSICIAN ASSISTANT

## 2018-02-26 RX ORDER — OXYCODONE AND ACETAMINOPHEN 7.5; 325 MG/1; MG/1
1 TABLET ORAL EVERY 4 HOURS PRN
Status: DISCONTINUED | OUTPATIENT
Start: 2018-02-26 | End: 2018-03-05 | Stop reason: HOSPADM

## 2018-02-26 RX ORDER — SULFAMETHOXAZOLE AND TRIMETHOPRIM 400; 80 MG/1; MG/1
1 TABLET ORAL DAILY
Status: DISCONTINUED | OUTPATIENT
Start: 2018-02-26 | End: 2018-03-05 | Stop reason: HOSPADM

## 2018-02-26 RX ORDER — POTASSIUM CHLORIDE 20 MEQ/1
40 TABLET, EXTENDED RELEASE ORAL ONCE
Status: COMPLETED | OUTPATIENT
Start: 2018-02-26 | End: 2018-02-26

## 2018-02-26 RX ORDER — FUROSEMIDE 10 MG/ML
80 INJECTION INTRAMUSCULAR; INTRAVENOUS 2 TIMES DAILY
Status: DISCONTINUED | OUTPATIENT
Start: 2018-02-26 | End: 2018-02-27

## 2018-02-26 RX ORDER — PREDNISONE 10 MG/1
30 TABLET ORAL 2 TIMES DAILY
Status: DISCONTINUED | OUTPATIENT
Start: 2018-02-26 | End: 2018-03-01

## 2018-02-26 RX ORDER — OXYCODONE HYDROCHLORIDE 5 MG/1
5 TABLET ORAL
Status: DISCONTINUED | OUTPATIENT
Start: 2018-02-26 | End: 2018-03-05 | Stop reason: HOSPADM

## 2018-02-26 RX ORDER — LANOLIN ALCOHOL/MO/W.PET/CERES
400 CREAM (GRAM) TOPICAL 2 TIMES DAILY
Status: DISCONTINUED | OUTPATIENT
Start: 2018-02-26 | End: 2018-03-05 | Stop reason: HOSPADM

## 2018-02-26 RX ADMIN — NYSTATIN 500000 UNITS: 500000 SUSPENSION ORAL at 08:02

## 2018-02-26 RX ADMIN — OXYCODONE HYDROCHLORIDE 5 MG: 5 TABLET ORAL at 08:02

## 2018-02-26 RX ADMIN — POLYETHYLENE GLYCOL 3350 17 G: 17 POWDER, FOR SOLUTION ORAL at 09:02

## 2018-02-26 RX ADMIN — NYSTATIN 500000 UNITS: 500000 SUSPENSION ORAL at 01:02

## 2018-02-26 RX ADMIN — STANDARDIZED SENNA CONCENTRATE AND DOCUSATE SODIUM 2 TABLET: 8.6; 5 TABLET, FILM COATED ORAL at 09:02

## 2018-02-26 RX ADMIN — PREDNISONE 30 MG: 10 TABLET ORAL at 09:02

## 2018-02-26 RX ADMIN — STANDARDIZED SENNA CONCENTRATE AND DOCUSATE SODIUM 2 TABLET: 8.6; 5 TABLET, FILM COATED ORAL at 08:02

## 2018-02-26 RX ADMIN — OXYCODONE HYDROCHLORIDE 5 MG: 5 TABLET ORAL at 12:02

## 2018-02-26 RX ADMIN — ASPIRIN 81 MG: 81 TABLET, COATED ORAL at 08:02

## 2018-02-26 RX ADMIN — FUROSEMIDE 80 MG: 10 INJECTION, SOLUTION INTRAMUSCULAR; INTRAVENOUS at 08:02

## 2018-02-26 RX ADMIN — HEPARIN SODIUM 5000 UNITS: 5000 INJECTION, SOLUTION INTRAVENOUS; SUBCUTANEOUS at 04:02

## 2018-02-26 RX ADMIN — OXYCODONE HYDROCHLORIDE 5 MG: 5 TABLET ORAL at 01:02

## 2018-02-26 RX ADMIN — SULFAMETHOXAZOLE AND TRIMETHOPRIM 1 TABLET: 400; 80 TABLET ORAL at 01:02

## 2018-02-26 RX ADMIN — FAMOTIDINE 20 MG: 20 TABLET, FILM COATED ORAL at 09:02

## 2018-02-26 RX ADMIN — TACROLIMUS 2 MG: 1 CAPSULE ORAL at 08:02

## 2018-02-26 RX ADMIN — FUROSEMIDE 80 MG: 10 INJECTION, SOLUTION INTRAMUSCULAR; INTRAVENOUS at 05:02

## 2018-02-26 RX ADMIN — METHYLPREDNISOLONE SODIUM SUCCINATE 40 MG: 40 INJECTION, POWDER, FOR SOLUTION INTRAMUSCULAR; INTRAVENOUS at 08:02

## 2018-02-26 RX ADMIN — MYCOPHENOLATE MOFETIL 1500 MG: 250 CAPSULE ORAL at 09:02

## 2018-02-26 RX ADMIN — NYSTATIN 500000 UNITS: 500000 SUSPENSION ORAL at 05:02

## 2018-02-26 RX ADMIN — HEPARIN SODIUM 5000 UNITS: 5000 INJECTION, SOLUTION INTRAVENOUS; SUBCUTANEOUS at 02:02

## 2018-02-26 RX ADMIN — HEPARIN SODIUM 5000 UNITS: 5000 INJECTION, SOLUTION INTRAVENOUS; SUBCUTANEOUS at 09:02

## 2018-02-26 RX ADMIN — POTASSIUM CHLORIDE 40 MEQ: 1500 TABLET, EXTENDED RELEASE ORAL at 08:02

## 2018-02-26 RX ADMIN — Medication 3 ML: at 01:02

## 2018-02-26 RX ADMIN — POLYETHYLENE GLYCOL 3350 17 G: 17 POWDER, FOR SOLUTION ORAL at 08:02

## 2018-02-26 RX ADMIN — AMLODIPINE BESYLATE 10 MG: 10 TABLET ORAL at 08:02

## 2018-02-26 RX ADMIN — VALGANCICLOVIR 900 MG: 450 TABLET, FILM COATED ORAL at 08:02

## 2018-02-26 RX ADMIN — NYSTATIN 500000 UNITS: 500000 SUSPENSION ORAL at 09:02

## 2018-02-26 RX ADMIN — MAGNESIUM OXIDE TAB 400 MG (241.3 MG ELEMENTAL MG) 400 MG: 400 (241.3 MG) TAB at 08:02

## 2018-02-26 RX ADMIN — MYCOPHENOLATE MOFETIL 1500 MG: 250 CAPSULE ORAL at 08:02

## 2018-02-26 RX ADMIN — MAGNESIUM OXIDE TAB 400 MG (241.3 MG ELEMENTAL MG) 400 MG: 400 (241.3 MG) TAB at 09:02

## 2018-02-26 RX ADMIN — OXYCODONE HYDROCHLORIDE 5 MG: 5 TABLET ORAL at 04:02

## 2018-02-26 RX ADMIN — FAMOTIDINE 20 MG: 20 TABLET, FILM COATED ORAL at 08:02

## 2018-02-26 RX ADMIN — TACROLIMUS 3 MG: 1 CAPSULE ORAL at 05:02

## 2018-02-26 NOTE — PLAN OF CARE
Problem: Patient Care Overview  Goal: Plan of Care Review  Outcome: Ongoing (interventions implemented as appropriate)  Pt aao x3. Vss. No acute distress. Tele reveals ST. Blood sugar being monitored ac and hs with correction dose available.  Pt steady on his feet. Pt's mom at bedside. Blue card introduced and self meds reviewed with pt and mom. See assessment for full chart details. Will continue to monitor, assess and adjust care as needed.

## 2018-02-26 NOTE — CARE UPDATE
Computer glance, bg mostly 104-150, 1 reading above goal. FBG this am, 104. Will sign off today.  Pt is on regular diet, if any unusual excursions occur. ** Please call Endocrine for any BG related issues **     Anita Atkinson, NP  [phone]

## 2018-02-26 NOTE — ASSESSMENT & PLAN NOTE
-~30mn episode of afib 2/21 terminated with amio. Likely exacerbated by pain, n/v, volume.   - Currently ST ~100.  - Amio stopped.

## 2018-02-26 NOTE — PROGRESS NOTES
HAKEEM Wolf notified of patient's CVP 8 to 9. Also notified that VBG was done. Orders received to place CT to water seal. Will continue to monitor.

## 2018-02-26 NOTE — PT/OT/SLP PROGRESS
Occupational Therapy      Patient Name:  Mert Samayoa   MRN:  [id number]    Patient attempted to be seen in PM but PT working with pt. OT unable to return in PM for 2nd attempt. Will follow-up at next scheduled OT session.    Rochelle Harding OT  2/26/2018

## 2018-02-26 NOTE — PLAN OF CARE
Problem: Patient Care Overview  Goal: Plan of Care Review  Outcome: Ongoing (interventions implemented as appropriate)  * AAO x 4  * Abdomen rounded and non-tender   * Blood glucose monitoring AC & HS. Blood glucose reading 121. See chart for all blood glucose readings.  * Bowel sound noted as hyperactive, patient reports loose stools. miralax held per patient request.  * Plural chest tube x 2 (2/25/18), -20 cm water seal maintained. serosanguinous drainage noted see flow sheet for output totals. Dressing clean dry and intact. Due to be changed on 2/26/18  * Regular diet patient tolerating well. See chart for % eaten.   * No edema noted.  * Left chest wall incision with dermabond/steri strips site clean dry and intact no signs or symptoms of infection noted.   * Mid-sternal incision with dermabond/steri strips site clean dry and intact no signs or symptoms of infection noted.  * Incision to the right site (old drive line site) clean dry and intact no signs or symptoms of infection noted.   * Dressing noted to the left neck from the removal of the CVC on 2/25/18. Dressing clean dry and intact no signs or symptoms of infection noted.   * Right AC 18 G  PIV (2/22/18) patent, dressing clean dry and intact no signs or symptoms of infection noted.  * Left AC  PIV 20 G (02/22/18) patent, dressing clean dry and intact no signs or symptoms of infection noted.  * Lasix 15 mg/hr infusing at 3 cc/hr with a concentration of 500 mg/100 cc verified correct by this RN and JOHNNY Simms RN.  * Oxygen saturation 97-96 % on room air.  Respirations even and unlabored no signs or symptoms of distress noted.  * Patient has complaints of incisional pain PRN pain medication administered. Moderate relief noted.  * Self Medications ready and placed in room. Teaching to begin in the AM.    * Skin assessment preformed no breakdown noted.  * Standard precautions maintained.  * Continuous telemetry monitoring continued -110's.  * Patient  able to turn self no assistance needed.  * Patient voiding per urinal clear yellow urine noted see flow sheet for intake and output totals.

## 2018-02-26 NOTE — PROGRESS NOTES
[facility]  Heart Transplant  Progress Note    Patient Name: Mert Samayoa  MRN: [id number]  Admission Date: 2/17/2018  Hospital Length of Stay: 9 days  Attending Physician: Marisel Lundberg MD  Primary Care Provider: Primary Doctor No  Principal Problem:Heart transplanted    Subjective:     Interval History: Feeling much better. Having BMs almost daily. Walking around unit often. Still in lots of pain though(midsternal)    Continuous Infusions:   furosemide (LASIX) 5 mg/mL infusion (non-titrating) 15 mg/hr (02/25/18 2350)     Scheduled Meds:   amLODIPine  10 mg Oral Daily    aspirin  81 mg Oral Daily    famotidine  20 mg Oral BID    heparin (porcine)  5,000 Units Subcutaneous Q8H    magnesium oxide  400 mg Oral BID    methylPREDNISolone sodium succinate  40 mg Intravenous Q12H    mycophenolate  1,500 mg Oral BID    nystatin  500,000 Units Mouth/Throat QID (WM & HS)    polyethylene glycol  17 g Oral BID    potassium chloride  40 mEq Oral Once    senna-docusate 8.6-50 mg  2 tablet Oral BID    sodium chloride 0.9%  3 mL Intravenous Q8H    tacrolimus  2 mg Oral Daily    tacrolimus  3 mg Oral Daily    valGANciclovir  900 mg Oral Daily     PRN Meds:sodium chloride, albuterol sulfate, [COMPLETED] calcium gluconate IVPB **AND** calcium gluconate IVPB, dextrose 50%, dextrose 50%, glucagon (human recombinant), glucose, glucose, glycerin adult, insulin aspart U-100, lactulose, magnesium sulfate IVPB, meclizine, ondansetron, oxyCODONE, oxyCODONE, potassium chloride **AND** potassium chloride **AND** potassium chloride, promethazine (PHENERGAN) IVPB    Review of patient's allergies indicates:  No Known Allergies  Objective:     Vital Signs (Most Recent):  Temp: 97.5 °F (36.4 °C) (02/26/18 0725)  Pulse: 107 (02/26/18 0725)  Resp: 16 (02/26/18 0725)  BP: 122/77 (02/26/18 0725)  SpO2: 98 % (02/26/18 0725) Vital Signs (24h Range):  Temp:  [97.3 °F (36.3 °C)-98.6 °F (37 °C)] 97.5 °F (36.4  °C)  Pulse:  [102-114] 107  Resp:  [16-33] 16  SpO2:  [94 %-98 %] 98 %  BP: (115-135)/(61-77) 122/77     No data found.    Body mass index is 26.76 kg/m².      Intake/Output Summary (Last 24 hours) at 02/26/18 0743  Last data filed at 02/26/18 0700   Gross per 24 hour   Intake              479 ml   Output             2950 ml   Net            -2471 ml     Physical Exam   Constitutional: He is oriented to person, place, and time. He appears well-developed and well-nourished.   HENT:   Head: Normocephalic.   Eyes: Pupils are equal, round, and reactive to light.   Neck: Normal range of motion. Neck supple. No JVD present.   Cardiovascular: Regular rhythm.  Tachycardia present.    Pulmonary/Chest: Effort normal and breath sounds normal.   Abdominal: Soft. Bowel sounds are normal. He exhibits no distension. There is no tenderness.   Musculoskeletal: Normal range of motion.   Neurological: He is alert and oriented to person, place, and time.   Skin: Skin is warm and dry.   Psychiatric: He has a normal mood and affect. His behavior is normal.   Nursing note and vitals reviewed.    Significant Labs:  CBC:    Recent Labs  Lab 02/24/18  0324 02/25/18  0250 02/26/18  0600   WBC 26.15* 27.21* 24.72*   RBC 2.87* 3.04* 3.26*   HGB 8.8* 9.0* 10.0*   HCT 25.2* 26.6* 28.6*    300 319   MCV 88 88 88   MCH 30.7 29.6 30.7   MCHC 34.9 33.8 35.0     BNP:  No results for input(s): BNP in the last 168 hours.    Invalid input(s): BNPTRIAGELBLO  CMP:    Recent Labs  Lab 02/24/18  0324  02/25/18  0250 02/25/18  0504 02/26/18  0600   *  --  126*  --  116*   CALCIUM 8.6*  --  8.6*  --  9.1   ALBUMIN 3.7  --  3.6  --  3.7   PROT 6.5  --  6.4  --  6.4   *  --  133*  --  134*   K 4.2  < > 3.6 3.7 3.4*   CO2 32*  --  34*  --  32*   CL 86*  --  86*  --  85*   BUN 48*  --  45*  --  47*   CREATININE 1.4  --  1.3  --  1.3   ALKPHOS 71  --  89  --  79   ALT 40  --  104*  --  73*   AST 31  --  50*  --  24   BILITOT 1.0  --  0.8  --   1.0   < > = values in this interval not displayed.   Coagulation:     Recent Labs  Lab 02/19/18  0839  02/21/18  0330  02/24/18  0324 02/25/18  0250 02/26/18  0600   INR  --   < > 1.2  < > 1.1 1.1 1.2   APTT 36.2*  --  27.0  --   --   --   --    < > = values in this interval not displayed.  LDH:  No results for input(s): LDH in the last 72 hours.  Microbiology:  Microbiology Results (last 7 days)     Procedure Component Value Units Date/Time    Blood culture [[id number]] Collected:  02/25/18 1256    Order Status:  Sent Specimen:  Blood Updated:  02/25/18 1321    Blood culture [[id number]]     Order Status:  Canceled Specimen:  Blood           I have reviewed all pertinent labs within the past 24 hours.    Estimated Creatinine Clearance: 79.1 mL/min (based on SCr of 1.3 mg/dL).    Diagnostic Results:  I have reviewed all pertinent imaging results/findings within the past 24 hours.    Assessment and Plan:     27 year old WM with DCM (familial, both brothers with heart transplants) with stage D CHFrEF underwent Heartware placement 02/01/17 had post-op atrial flutter 02/11/17 treated with amiodarone presents for OHTx.  0.  VAD implanted on 2/1/17 HVAD RPM 2700.         * Heart transplanted    -Transplant Date 2/18/18. S/P washout 2/19.  HTS primary.   -CMV Status:D+/R-   -Rejection status: Moderate Risk  -Rejection episodes: none to date  -Induction: No   -Current immunosuppression: Mehtylpred 40 IV BID, Prograf 2/3 mg BID, Cellcept 1500 mg BID.  -Opportunistic PPx with:Nystatin. Start valcyte today.  -Hemodynamically stable. Weaned off   -Net neg over last 24 hours. Will transition to IVP Lasix.   -CTs in place and AV wires in place. CTs with decreased drainage. Will touch base with CTS about removing.   -Echo 2/21 EF 60%, low nl RV fxn.  -Plan for EMBx #1 tomorrow.         Leukocytosis    - Continue to monitor. Trending down today        VTE (venous thromboembolism)    -U/S 2/22 revealed Nonocclusive deep vein  thrombosis of the right internal jugular vein with slight extension into the proximal subclavian vein.  -Continue SC Heparin.         Paroxysmal atrial fibrillation    -~30mn episode of afib 2/21 terminated with amio. Likely exacerbated by pain, n/v, volume.   - Currently ST ~100.  - Amio stopped.         Acute hyperglycemia    -insulin per endocrine            Augusto Goldberg NP  Heart Transplant  [facility]

## 2018-02-26 NOTE — ASSESSMENT & PLAN NOTE
-Transplant Date 2/18/18. S/P washout 2/19.  HTS primary.   -CMV Status:D+/R-   -Rejection status: Moderate Risk  -Rejection episodes: none to date  -Induction: No   -Current immunosuppression: Mehtylpred 40 IV BID, Prograf 2/3 mg BID, Cellcept 1500 mg BID.  -Opportunistic PPx with:Nystatin. Start valcyte today.  -Hemodynamically stable. Weaned off   -Net neg over last 24 hours. Will transition to IVP Lasix.   -CTs in place and AV wires in place. CTs with decreased drainage. Will touch base with CTS about removing.   -Echo 2/21 EF 60%, low nl RV fxn.  -Plan for EMBx #1 tomorrow.

## 2018-02-26 NOTE — NURSING
Discussed with patient and wife starting self medications (what that means and why we have patients preform pulling self mediations) patient and wife stated understanding no questions at this time. Patient and wife had many questions regarding shower/baths. Explained to them its ok for them to wash up but when the CT and pacer wires are removed he will be able to get into the shower. Educated them on using soap and water to wash multiple incision sites. This RN assisted the wife and patient on washing his abdomen to demonstrate no getting the CT dressing or pacer wires wet. patient and wife stated understanding. Wife assisted patient in washing the rest of him. Wife inquired about being able to go to work in the AM, I told her if she wanted that would be ok and he (the patient) could have the MD, RN, or  call wife and form a plan for her to be here for teaching. No further questions at this time will monitor.

## 2018-02-26 NOTE — SUBJECTIVE & OBJECTIVE
Interval History: Feeling much better. Having BMs almost daily. Walking around unit often. Still in lots of pain though(midsternal)    Continuous Infusions:   furosemide (LASIX) 5 mg/mL infusion (non-titrating) 15 mg/hr (02/25/18 2350)     Scheduled Meds:   amLODIPine  10 mg Oral Daily    aspirin  81 mg Oral Daily    famotidine  20 mg Oral BID    heparin (porcine)  5,000 Units Subcutaneous Q8H    magnesium oxide  400 mg Oral BID    methylPREDNISolone sodium succinate  40 mg Intravenous Q12H    mycophenolate  1,500 mg Oral BID    nystatin  500,000 Units Mouth/Throat QID (WM & HS)    polyethylene glycol  17 g Oral BID    potassium chloride  40 mEq Oral Once    senna-docusate 8.6-50 mg  2 tablet Oral BID    sodium chloride 0.9%  3 mL Intravenous Q8H    tacrolimus  2 mg Oral Daily    tacrolimus  3 mg Oral Daily    valGANciclovir  900 mg Oral Daily     PRN Meds:sodium chloride, albuterol sulfate, [COMPLETED] calcium gluconate IVPB **AND** calcium gluconate IVPB, dextrose 50%, dextrose 50%, glucagon (human recombinant), glucose, glucose, glycerin adult, insulin aspart U-100, lactulose, magnesium sulfate IVPB, meclizine, ondansetron, oxyCODONE, oxyCODONE, potassium chloride **AND** potassium chloride **AND** potassium chloride, promethazine (PHENERGAN) IVPB    Review of patient's allergies indicates:  No Known Allergies  Objective:     Vital Signs (Most Recent):  Temp: 97.5 °F (36.4 °C) (02/26/18 0725)  Pulse: 107 (02/26/18 0725)  Resp: 16 (02/26/18 0725)  BP: 122/77 (02/26/18 0725)  SpO2: 98 % (02/26/18 0725) Vital Signs (24h Range):  Temp:  [97.3 °F (36.3 °C)-98.6 °F (37 °C)] 97.5 °F (36.4 °C)  Pulse:  [102-114] 107  Resp:  [16-33] 16  SpO2:  [94 %-98 %] 98 %  BP: (115-135)/(61-77) 122/77     No data found.    Body mass index is 26.76 kg/m².      Intake/Output Summary (Last 24 hours) at 02/26/18 0743  Last data filed at 02/26/18 0700   Gross per 24 hour   Intake              479 ml   Output              2950 ml   Net            -2471 ml     Physical Exam   Constitutional: He is oriented to person, place, and time. He appears well-developed and well-nourished.   HENT:   Head: Normocephalic.   Eyes: Pupils are equal, round, and reactive to light.   Neck: Normal range of motion. Neck supple. No JVD present.   Cardiovascular: Regular rhythm.  Tachycardia present.    Pulmonary/Chest: Effort normal and breath sounds normal.   Abdominal: Soft. Bowel sounds are normal. He exhibits no distension. There is no tenderness.   Musculoskeletal: Normal range of motion.   Neurological: He is alert and oriented to person, place, and time.   Skin: Skin is warm and dry.   Psychiatric: He has a normal mood and affect. His behavior is normal.   Nursing note and vitals reviewed.    Significant Labs:  CBC:    Recent Labs  Lab 02/24/18 0324 02/25/18  0250 02/26/18  0600   WBC 26.15* 27.21* 24.72*   RBC 2.87* 3.04* 3.26*   HGB 8.8* 9.0* 10.0*   HCT 25.2* 26.6* 28.6*    300 319   MCV 88 88 88   MCH 30.7 29.6 30.7   MCHC 34.9 33.8 35.0     BNP:  No results for input(s): BNP in the last 168 hours.    Invalid input(s): BNPTRIAGELBLO  CMP:    Recent Labs  Lab 02/24/18  0324  02/25/18  0250 02/25/18  0504 02/26/18  0600   *  --  126*  --  116*   CALCIUM 8.6*  --  8.6*  --  9.1   ALBUMIN 3.7  --  3.6  --  3.7   PROT 6.5  --  6.4  --  6.4   *  --  133*  --  134*   K 4.2  < > 3.6 3.7 3.4*   CO2 32*  --  34*  --  32*   CL 86*  --  86*  --  85*   BUN 48*  --  45*  --  47*   CREATININE 1.4  --  1.3  --  1.3   ALKPHOS 71  --  89  --  79   ALT 40  --  104*  --  73*   AST 31  --  50*  --  24   BILITOT 1.0  --  0.8  --  1.0   < > = values in this interval not displayed.   Coagulation:     Recent Labs  Lab 02/19/18  0839  02/21/18  0330  02/24/18  0324 02/25/18  0250 02/26/18  0600   INR  --   < > 1.2  < > 1.1 1.1 1.2   APTT 36.2*  --  27.0  --   --   --   --    < > = values in this interval not displayed.  LDH:  No results for  input(s): LDH in the last 72 hours.  Microbiology:  Microbiology Results (last 7 days)     Procedure Component Value Units Date/Time    Blood culture [[id number]] Collected:  02/25/18 1256    Order Status:  Sent Specimen:  Blood Updated:  02/25/18 1321    Blood culture [[id number]]     Order Status:  Canceled Specimen:  Blood           I have reviewed all pertinent labs within the past 24 hours.    Estimated Creatinine Clearance: 79.1 mL/min (based on SCr of 1.3 mg/dL).    Diagnostic Results:  I have reviewed all pertinent imaging results/findings within the past 24 hours.

## 2018-02-26 NOTE — NURSING
Rounds Report: Attended interdisciplinary rounds this morning with the transplant team including SW, physicians, fellows,  mid-level providers, and transplant coordinators.  Discussed plan of care, including DC date- end of the week, current medications including current immunosuppression and current plan to proceed with 1st biopsy tomorrow.   Reported off to post heart transplant coordinators.

## 2018-02-27 LAB
25(OH)D3+25(OH)D2 SERPL-MCNC: 16 NG/ML
ALBUMIN SERPL BCP-MCNC: 3.6 G/DL
ALP SERPL-CCNC: 72 U/L
ALT SERPL W/O P-5'-P-CCNC: 49 U/L
ANION GAP SERPL CALC-SCNC: 13 MMOL/L
ANISOCYTOSIS BLD QL SMEAR: SLIGHT
AST SERPL-CCNC: 21 U/L
BASOPHILS # BLD AUTO: ABNORMAL K/UL
BASOPHILS NFR BLD: 0 %
BILIRUB SERPL-MCNC: 0.8 MG/DL
BUN SERPL-MCNC: 50 MG/DL
CALCIUM SERPL-MCNC: 9.3 MG/DL
CHLORIDE SERPL-SCNC: 85 MMOL/L
CHOLEST SERPL-MCNC: 135 MG/DL
CHOLEST/HDLC SERPL: 3.5 {RATIO}
CO2 SERPL-SCNC: 33 MMOL/L
CREAT SERPL-MCNC: 1.3 MG/DL
DIASTOLIC DYSFUNCTION: NO
DIFFERENTIAL METHOD: ABNORMAL
EOSINOPHIL # BLD AUTO: ABNORMAL K/UL
EOSINOPHIL NFR BLD: 0 %
ERYTHROCYTE [DISTWIDTH] IN BLOOD BY AUTOMATED COUNT: 14 %
EST. GFR  (AFRICAN AMERICAN): >60 ML/MIN/1.73 M^2
EST. GFR  (NON AFRICAN AMERICAN): >60 ML/MIN/1.73 M^2
FERRITIN SERPL-MCNC: 81 NG/ML
GLUCOSE SERPL-MCNC: 115 MG/DL
HCT VFR BLD AUTO: 28.5 %
HDLC SERPL-MCNC: 39 MG/DL
HDLC SERPL: 28.9 %
HGB BLD-MCNC: 9.7 G/DL
IMM GRANULOCYTES # BLD AUTO: ABNORMAL K/UL
IMM GRANULOCYTES NFR BLD AUTO: ABNORMAL %
INR PPP: 1.1
IRON SERPL-MCNC: 49 UG/DL
LDLC SERPL CALC-MCNC: 60 MG/DL
LYMPHOCYTES # BLD AUTO: ABNORMAL K/UL
LYMPHOCYTES NFR BLD: 7 %
MAGNESIUM SERPL-MCNC: 2.3 MG/DL
MCH RBC QN AUTO: 30.3 PG
MCHC RBC AUTO-ENTMCNC: 34 G/DL
MCV RBC AUTO: 89 FL
METAMYELOCYTES NFR BLD MANUAL: 3 %
MITRAL VALVE MOBILITY: NORMAL
MONOCYTES # BLD AUTO: ABNORMAL K/UL
MONOCYTES NFR BLD: 7 %
MYELOCYTES NFR BLD MANUAL: 2 %
NEUTROPHILS NFR BLD: 80 %
NEUTS BAND NFR BLD MANUAL: 1 %
NONHDLC SERPL-MCNC: 96 MG/DL
NRBC BLD-RTO: 0 /100 WBC
PLATELET # BLD AUTO: 356 K/UL
PLATELET BLD QL SMEAR: ABNORMAL
PMV BLD AUTO: 10.5 FL
POCT GLUCOSE: 110 MG/DL (ref 70–110)
POCT GLUCOSE: 113 MG/DL (ref 70–110)
POCT GLUCOSE: 122 MG/DL (ref 70–110)
POCT GLUCOSE: 148 MG/DL (ref 70–110)
POCT GLUCOSE: 154 MG/DL (ref 70–110)
POLYCHROMASIA BLD QL SMEAR: ABNORMAL
POTASSIUM SERPL-SCNC: 3.5 MMOL/L
PROT SERPL-MCNC: 6.4 G/DL
PROTHROMBIN TIME: 11.5 SEC
RBC # BLD AUTO: 3.2 M/UL
RETIRED EF AND QEF - SEE NOTES: 55 (ref 55–65)
SATURATED IRON: 12 %
SODIUM SERPL-SCNC: 131 MMOL/L
TACROLIMUS BLD-MCNC: 7.1 NG/ML
TOTAL IRON BINDING CAPACITY: 406 UG/DL
TRANSFERRIN SERPL-MCNC: 274 MG/DL
TRICUSPID VALVE REGURGITATION: NORMAL
TRIGL SERPL-MCNC: 180 MG/DL
WBC # BLD AUTO: 31.09 K/UL

## 2018-02-27 PROCEDURE — B2141ZZ FLUOROSCOPY OF RIGHT HEART USING LOW OSMOLAR CONTRAST: ICD-10-PCS | Performed by: INTERNAL MEDICINE

## 2018-02-27 PROCEDURE — 93505 ENDOMYOCARDIAL BIOPSY: CPT

## 2018-02-27 PROCEDURE — 82306 VITAMIN D 25 HYDROXY: CPT

## 2018-02-27 PROCEDURE — 88342 IMHCHEM/IMCYTCHM 1ST ANTB: CPT | Performed by: PATHOLOGY

## 2018-02-27 PROCEDURE — 88307 TISSUE EXAM BY PATHOLOGIST: CPT | Performed by: PATHOLOGY

## 2018-02-27 PROCEDURE — 83540 ASSAY OF IRON: CPT

## 2018-02-27 PROCEDURE — 80197 ASSAY OF TACROLIMUS: CPT

## 2018-02-27 PROCEDURE — 93306 TTE W/DOPPLER COMPLETE: CPT

## 2018-02-27 PROCEDURE — 85610 PROTHROMBIN TIME: CPT

## 2018-02-27 PROCEDURE — 02BK3ZX EXCISION OF RIGHT VENTRICLE, PERCUTANEOUS APPROACH, DIAGNOSTIC: ICD-10-PCS | Performed by: INTERNAL MEDICINE

## 2018-02-27 PROCEDURE — 25000003 PHARM REV CODE 250

## 2018-02-27 PROCEDURE — 88342 IMHCHEM/IMCYTCHM 1ST ANTB: CPT | Mod: 26,,, | Performed by: PATHOLOGY

## 2018-02-27 PROCEDURE — 93306 TTE W/DOPPLER COMPLETE: CPT | Mod: 26,,, | Performed by: INTERNAL MEDICINE

## 2018-02-27 PROCEDURE — 85027 COMPLETE CBC AUTOMATED: CPT

## 2018-02-27 PROCEDURE — 80053 COMPREHEN METABOLIC PANEL: CPT

## 2018-02-27 PROCEDURE — 4A023N6 MEASUREMENT OF CARDIAC SAMPLING AND PRESSURE, RIGHT HEART, PERCUTANEOUS APPROACH: ICD-10-PCS | Performed by: INTERNAL MEDICINE

## 2018-02-27 PROCEDURE — 25000003 PHARM REV CODE 250: Performed by: PHYSICIAN ASSISTANT

## 2018-02-27 PROCEDURE — 80061 LIPID PANEL: CPT

## 2018-02-27 PROCEDURE — 20600001 HC STEP DOWN PRIVATE ROOM

## 2018-02-27 PROCEDURE — 97803 MED NUTRITION INDIV SUBSEQ: CPT | Performed by: DIETITIAN, REGISTERED

## 2018-02-27 PROCEDURE — 93505 ENDOMYOCARDIAL BIOPSY: CPT | Mod: 26,,, | Performed by: INTERNAL MEDICINE

## 2018-02-27 PROCEDURE — 36415 COLL VENOUS BLD VENIPUNCTURE: CPT

## 2018-02-27 PROCEDURE — 25000003 PHARM REV CODE 250: Performed by: STUDENT IN AN ORGANIZED HEALTH CARE EDUCATION/TRAINING PROGRAM

## 2018-02-27 PROCEDURE — 63600175 PHARM REV CODE 636 W HCPCS: Performed by: PHYSICIAN ASSISTANT

## 2018-02-27 PROCEDURE — 63600175 PHARM REV CODE 636 W HCPCS: Performed by: INTERNAL MEDICINE

## 2018-02-27 PROCEDURE — 25000003 PHARM REV CODE 250: Performed by: INTERNAL MEDICINE

## 2018-02-27 PROCEDURE — 82728 ASSAY OF FERRITIN: CPT

## 2018-02-27 PROCEDURE — 02HP32Z INSERTION OF MONITORING DEVICE INTO PULMONARY TRUNK, PERCUTANEOUS APPROACH: ICD-10-PCS | Performed by: INTERNAL MEDICINE

## 2018-02-27 PROCEDURE — 88307 TISSUE EXAM BY PATHOLOGIST: CPT | Mod: 26,,, | Performed by: PATHOLOGY

## 2018-02-27 PROCEDURE — 85007 BL SMEAR W/DIFF WBC COUNT: CPT

## 2018-02-27 PROCEDURE — 83735 ASSAY OF MAGNESIUM: CPT

## 2018-02-27 PROCEDURE — 25000003 PHARM REV CODE 250: Performed by: NURSE PRACTITIONER

## 2018-02-27 PROCEDURE — 63600175 PHARM REV CODE 636 W HCPCS: Performed by: NURSE PRACTITIONER

## 2018-02-27 PROCEDURE — 99232 SBSQ HOSP IP/OBS MODERATE 35: CPT | Mod: 25,,, | Performed by: INTERNAL MEDICINE

## 2018-02-27 PROCEDURE — 93451 RIGHT HEART CATH: CPT | Mod: 26,59,, | Performed by: INTERNAL MEDICINE

## 2018-02-27 PROCEDURE — 4A133B3 MONITORING OF ARTERIAL PRESSURE, PULMONARY, PERCUTANEOUS APPROACH: ICD-10-PCS | Performed by: INTERNAL MEDICINE

## 2018-02-27 PROCEDURE — 4A1239Z MONITORING OF CARDIAC OUTPUT, PERCUTANEOUS APPROACH: ICD-10-PCS | Performed by: INTERNAL MEDICINE

## 2018-02-27 RX ORDER — TACROLIMUS 1 MG/1
1 CAPSULE ORAL ONCE
Status: COMPLETED | OUTPATIENT
Start: 2018-02-27 | End: 2018-02-27

## 2018-02-27 RX ORDER — LISINOPRIL 5 MG/1
5 TABLET ORAL DAILY
Status: DISCONTINUED | OUTPATIENT
Start: 2018-02-28 | End: 2018-02-28

## 2018-02-27 RX ORDER — TACROLIMUS 1 MG/1
3 CAPSULE ORAL 2 TIMES DAILY
Status: DISCONTINUED | OUTPATIENT
Start: 2018-02-27 | End: 2018-02-28

## 2018-02-27 RX ADMIN — OXYCODONE HYDROCHLORIDE 5 MG: 5 TABLET ORAL at 06:02

## 2018-02-27 RX ADMIN — PREDNISONE 30 MG: 10 TABLET ORAL at 09:02

## 2018-02-27 RX ADMIN — TACROLIMUS 1 MG: 1 CAPSULE ORAL at 11:02

## 2018-02-27 RX ADMIN — POLYETHYLENE GLYCOL 3350 17 G: 17 POWDER, FOR SOLUTION ORAL at 08:02

## 2018-02-27 RX ADMIN — ASPIRIN 81 MG: 81 TABLET, COATED ORAL at 09:02

## 2018-02-27 RX ADMIN — SULFAMETHOXAZOLE AND TRIMETHOPRIM 1 TABLET: 400; 80 TABLET ORAL at 09:02

## 2018-02-27 RX ADMIN — MAGNESIUM OXIDE TAB 400 MG (241.3 MG ELEMENTAL MG) 400 MG: 400 (241.3 MG) TAB at 08:02

## 2018-02-27 RX ADMIN — TACROLIMUS 3 MG: 1 CAPSULE ORAL at 05:02

## 2018-02-27 RX ADMIN — MYCOPHENOLATE MOFETIL 1500 MG: 250 CAPSULE ORAL at 08:02

## 2018-02-27 RX ADMIN — TACROLIMUS 2 MG: 1 CAPSULE ORAL at 09:02

## 2018-02-27 RX ADMIN — VALGANCICLOVIR 900 MG: 450 TABLET, FILM COATED ORAL at 09:02

## 2018-02-27 RX ADMIN — NYSTATIN 500000 UNITS: 500000 SUSPENSION ORAL at 05:02

## 2018-02-27 RX ADMIN — STANDARDIZED SENNA CONCENTRATE AND DOCUSATE SODIUM 2 TABLET: 8.6; 5 TABLET, FILM COATED ORAL at 09:02

## 2018-02-27 RX ADMIN — FUROSEMIDE 80 MG: 10 INJECTION, SOLUTION INTRAMUSCULAR; INTRAVENOUS at 09:02

## 2018-02-27 RX ADMIN — STANDARDIZED SENNA CONCENTRATE AND DOCUSATE SODIUM 2 TABLET: 8.6; 5 TABLET, FILM COATED ORAL at 08:02

## 2018-02-27 RX ADMIN — FAMOTIDINE 20 MG: 20 TABLET, FILM COATED ORAL at 09:02

## 2018-02-27 RX ADMIN — POLYETHYLENE GLYCOL 3350 17 G: 17 POWDER, FOR SOLUTION ORAL at 09:02

## 2018-02-27 RX ADMIN — OXYCODONE HYDROCHLORIDE 5 MG: 5 TABLET ORAL at 02:02

## 2018-02-27 RX ADMIN — MAGNESIUM OXIDE TAB 400 MG (241.3 MG ELEMENTAL MG) 400 MG: 400 (241.3 MG) TAB at 09:02

## 2018-02-27 RX ADMIN — PREDNISONE 30 MG: 10 TABLET ORAL at 08:02

## 2018-02-27 RX ADMIN — NYSTATIN 500000 UNITS: 500000 SUSPENSION ORAL at 11:02

## 2018-02-27 RX ADMIN — NYSTATIN 500000 UNITS: 500000 SUSPENSION ORAL at 08:02

## 2018-02-27 RX ADMIN — NYSTATIN 500000 UNITS: 500000 SUSPENSION ORAL at 09:02

## 2018-02-27 RX ADMIN — MYCOPHENOLATE MOFETIL 1500 MG: 250 CAPSULE ORAL at 09:02

## 2018-02-27 RX ADMIN — OXYCODONE HYDROCHLORIDE 5 MG: 5 TABLET ORAL at 07:02

## 2018-02-27 RX ADMIN — OXYCODONE HYDROCHLORIDE 5 MG: 5 TABLET ORAL at 11:02

## 2018-02-27 RX ADMIN — AMLODIPINE BESYLATE 10 MG: 10 TABLET ORAL at 09:02

## 2018-02-27 RX ADMIN — FAMOTIDINE 20 MG: 20 TABLET, FILM COATED ORAL at 08:02

## 2018-02-27 NOTE — PROGRESS NOTES
[facility]  Adult Nutrition  Progress Note    SUMMARY     Recommendations    Recommendation/Intervention:   -Continue current diet, encouraging intake as tolerated.   -If PO intake does not improve or drops, send Boost Plus TID.   -Post transplant nutrition education provided.   -RD following.     Goals: Consume/tolerate > 75% EEN and EPN  Nutrition Goal Status: new  Communication of RD Recs:  (POC)    Reason for Assessment    Reason for Assessment: RD follow-up  Diagnosis: transplant/postoperative complications (s/p OHTx 2/18)  Relevent Medical History: familial cardiomyopathy, CHF, HTN         General Information Comments: POD#9 s/p OHTx, pt now on TSU reports appetite and intake are slowly improving, wife at bedside, post transplant education provided, low sodium diet recommended    Nutrition Discharge Planning: Post transplant nutrition education provided. Food safety/drug interactions emphasized. General healthy diet recommended. RD name/contact information, education material left. No other needs identified. Caregiver present.    Nutrition Prescription Ordered    Current Diet Order: Regular        Evaluation of Received Nutrients/Fluid Intake      I/O: -16.6L since admit       Comments: no BM since tx per pt, pre chart LBM 2.25  Tolerance: tolerating  % Intake of Estimated Energy Needs: 50 - 75 %  % Meal Intake: 50%     Nutrition Risk Screen     Nutrition Risk Screen: no indicators present    Nutrition/Diet History          Food Preferences: No Hinduism/cultural needs identified at this time.        Factors Affecting Nutritional Intake: nausea/vomiting                Labs/Tests/Procedures/Meds       Pertinent Labs Reviewed: reviewed, pertinent  Pertinent Labs Comments: Na 131, BUN 50, glu 115, ALT 49  Pertinent Medications Reviewed: reviewed, pertinent  Pertinent Medications Comments: lasix, heparin, prednisone, senna, tacrolimus    Physical Findings    Overall Physical Appearance: on  "oxygen therapy  Tubes:  (chest tube)  Oral/Mouth Cavity: WDL  Skin: edema, incision    Anthropometrics    Temp: 96.4 °F (35.8 °C)     Height: 5' 7" (170.2 cm)  Weight Method: Bed Scale  Weight: 77.5 kg (170 lb 13.7 oz)  Ideal Body Weight (IBW), Male: 148 lb     % Ideal Body Weight, Male (lb): 112.91 lb     BMI (Calculated): 26.2  BMI Grade: 25 - 29.9 - overweight                            Estimated/Assessed Needs    Weight Used For Calorie Calculations: 77.5 kg (170 lb 13.7 oz)      Energy Calorie Requirements (kcal): 2130 kcal/day  Energy Need Method: Dubois-St Jeor (x 1.25)        RMR (Dubois-St. Jeor Equation): 1703.62  RMR (Bartolo State Equation): 2052.65  RMR (Modified [place] State Equation): 1937.42  Weight Used For Protein Calculations: 77.5 kg (170 lb 13.7 oz)  Protein Requirements:  g/day (1.2-1.4 g/kg)    Fluid Requirements (mL): per MD         RDA Method (mL): 2130             Assessment and Plan    * Heart transplanted    Nutrition Problem  Increased nutrient needs    Related to (etiology):   S/p OHTx    Signs and Symptoms (as evidenced by):   EPN     Nutrition Diagnosis Status:   Continues              Monitor and Evaluation    Food and Nutrient Intake: energy intake, food and beverage intake  Food and Nutrient Adminstration: diet order        Anthropometric Measurements: weight, weight change  Biochemical Data, Medical Tests and Procedures: electrolyte and renal panel, gastrointestinal profile, inflammatory profile  Nutrition-Focused Physical Findings: overall appearance    Nutrition Risk    Level of Risk:  (1x/week)    Nutrition Follow-Up    RD Follow-up?: Yes    "

## 2018-02-27 NOTE — NURSING
Pt . Pt eating his lunch tray upon RN checking sugar. Pt has required no SSI up to this point. Will monitor.

## 2018-02-27 NOTE — ANESTHESIA PREPROCEDURE EVALUATION
[facility]  Anesthesia Pre-Operative Evaluation         Patient Name: Mert Samayoa  YOB: 1990  MRN: [id number]    SUBJECTIVE:     Right Heart Cath With Biopsy    HPI 02/26/2018:  Mert Samayoa is a 28 y.o. male with hx of DCM (familial, both brothers with heart transplants) with stage D CHFrEF underwent Heartware placement 02/01/17 had post-op atrial flutter 02/11/17 treated with amiodarone. Now s/p heart transplant and exploration for bleeding.    Patient presents for the above procedure(s).    Prev airway:   Present Prior to Hospital Arrival?: No; Placement Date: 02/01/17; Placement Time: 0824; Method of Intubation: Direct laryngoscopy; Inserted by: MD; Airway Device: Endotracheal Tube; Mask Ventilation: Easy; Intubated: Postinduction; Blade: Urena #2; Airway Device Size: 9.0; Style: Cuffed; Cuff Inflation: Minimal occlusive pressure; Inflation Amount: 8; Placement Verified By: Auscultation, Capnometry, ETT Condensation; Grade: Grade I; Complicating Factors: None; Intubation Findings: Positive EtCO2, Bilateral breath sounds, Atraumatic/Condition of teeth unchanged;  Depth of Insertion: 23; Securment: Lips; Complications: None; Breath Sounds: Equal Bilateral; Insertion Attempts: 1; Removal Date: 02/02/17;  Removal Time: 1630      Current LDA:   Left forearm 22g PIV  Pacer wires  Chest tube    Current Drips:      Patient Active Problem List   Diagnosis    Acute hyperglycemia    Leukocytosis    Familial cardiomyopathy    Heart transplanted    Adverse effect of glucocorticoids and synthetic analogues, initial encounter    Immunosuppression    Paroxysmal atrial fibrillation    VTE (venous thromboembolism)       Review of patient's allergies indicates:  No Known Allergies    Outpatient Medications:  Current Facility-Administered Medications on File Prior to Visit   Medication Dose Route Frequency Provider Last Rate Last Dose    0.9%  NaCl infusion (for blood  administration)   Intravenous Q24H PRN Pacheco Mccarty MD        albuterol nebulizer solution 2.5 mg  2.5 mg Nebulization Q4H PRN Fina Tolliver MD   2.5 mg at 02/19/18 2358    amLODIPine tablet 10 mg  10 mg Oral Daily Augusto Goldberg NP   10 mg at 02/26/18 0828    aspirin EC tablet 81 mg  81 mg Oral Daily Sammi Tapia MD   81 mg at 02/26/18 0828    calcium gluconate 1g in dextrose 5% 100mL (ready to mix system)  1 g Intravenous Q10 Min PRN Fina Tolliver MD        dextrose 50% injection 12.5 g  12.5 g Intravenous PRN JEFF Salinas,ANP-C        dextrose 50% injection 25 g  25 g Intravenous PRN JEFF Salinas,ANP-C        famotidine tablet 20 mg  20 mg Oral BID Sammi Tapia MD   20 mg at 02/26/18 0828    furosemide injection 80 mg  80 mg Intravenous BID Augusto Goldberg NP   80 mg at 02/26/18 1711    glucagon (human recombinant) injection 1 mg  1 mg Intramuscular PRN JEFF Salinas,ANP-C        glucose chewable tablet 16 g  16 g Oral PRN JEFF Salinas,ANP-C        glucose chewable tablet 24 g  24 g Oral PRN EJFF Salinas,ANP-C        glycerin adult suppository 1 suppository  1 suppository Rectal BID PRN Augusto Goldberg NP   1 suppository at 02/22/18 0834    heparin (porcine) injection 5,000 Units  5,000 Units Subcutaneous Q8H Augusto Goldberg NP   5,000 Units at 02/26/18 1400    insulin aspart U-100 pen 1-10 Units  1-10 Units Subcutaneous QID (AC + HS) PRN JEFF Salinas,ANP-C   4 Units at 02/25/18 1319    lactulose 20 gram/30 mL solution Soln 20 g  20 g Oral Q6H PRN Ernesto King MD   20 g at 02/22/18 1246    magnesium oxide tablet 400 mg  400 mg Oral BID Augusto Goldberg NP   400 mg at 02/26/18 0833    magnesium sulfate 2 g in dextrose 5% 50 ml IVPB  2 g Intravenous PRN Ernesto King MD 25 mL/hr at 02/25/18 0715 2 g at 02/25/18 0715    meclizine tablet 25 mg  25 mg Oral BID PRN Анна Beltran PA-C   25 mg at  02/24/18 2022    mycophenolate capsule 1,500 mg  1,500 mg Oral BID Анна Beltran PA-C   1,500 mg at 02/26/18 0828    nystatin 100,000 unit/mL suspension 500,000 Units  500,000 Units Mouth/Throat QID (WM & HS) Sammi Tapia MD   500,000 Units at 02/26/18 1720    ondansetron injection 4 mg  4 mg Intravenous Q6H PRN Fina Tolliver MD   4 mg at 02/23/18 0645    oxyCODONE immediate release tablet 15 mg  15 mg Oral Q3H PRN Fina Tolliver MD   5 mg at 02/26/18 1326    oxyCODONE-acetaminophen 7.5-325 mg per tablet 1 tablet  1 tablet Oral Q4H PRN Augusto Goldberg NP        polyethylene glycol packet 17 g  17 g Oral BID Fina Tolliver MD   17 g at 02/26/18 0827    potassium chloride 20 mEq in 100 mL IVPB (FOR CENTRAL LINE ADMINISTRATION ONLY)  20 mEq Intravenous PRN Ernesto King MD 50 mL/hr at 02/25/18 0600 20 mEq at 02/25/18 0600    And    potassium chloride 40 mEq in 100 mL IVPB (FOR CENTRAL LINE ADMINISTRATION ONLY)  40 mEq Intravenous PRN Ernesto King MD 25 mL/hr at 02/24/18 1825 40 mEq at 02/24/18 1825    And    potassium chloride 20 mEq in 100 mL IVPB (FOR CENTRAL LINE ADMINISTRATION ONLY)  60 mEq Intravenous PRN Ernesto King MD        predniSONE tablet 30 mg  30 mg Oral BID Marisel Lundebrg MD        promethazine (PHENERGAN) 12.5 mg in dextrose 5 % 50 mL IVPB  12.5 mg Intravenous Q6H PRN Bright Flor  mL/hr at 02/23/18 0500 12.5 mg at 02/23/18 0500    senna-docusate 8.6-50 mg per tablet 2 tablet  2 tablet Oral BID Augusto Goldberg NP   2 tablet at 02/26/18 0828    sodium chloride 0.9% flush 3 mL  3 mL Intravenous Q8H Ernesto King MD   3 mL at 02/26/18 1347    sulfamethoxazole-trimethoprim 400-80mg per tablet 1 tablet  1 tablet Oral Daily Marisel Lundberg MD   1 tablet at 02/26/18 1322    tacrolimus capsule 2 mg  2 mg Oral Daily Анна Beltran PA-C   2 mg at 02/26/18 0828    tacrolimus capsule 3 mg  3 mg Oral Daily  Анна Beltran PA-C   3 mg at 02/26/18 1720    valGANciclovir tablet 900 mg  900 mg Oral Daily Анна Beltran PA-C   900 mg at 02/26/18 0828     Current Outpatient Prescriptions on File Prior to Visit   Medication Sig Dispense Refill    aspirin (ECOTRIN) 325 MG EC tablet Take 1 tablet (325 mg total) by mouth once daily. 30 tablet 11    famotidine (PEPCID) 40 MG tablet TAKE ONE TABLET BY MOUTH ONCE DAILY IN THE EVENING 30 tablet 11    furosemide (LASIX) 40 MG tablet Take 40 mg by mouth as needed. Pt reports taking 20-40 mg daily as needed for weight gain       lisinopril 10 MG tablet Take 1 tablet (10 mg total) by mouth 2 (two) times daily. 60 tablet 11    ondansetron (ZOFRAN-ODT) 4 MG TbDL Take 1 tablet (4 mg total) by mouth every 6 (six) hours as needed. 30 tablet 0    warfarin (COUMADIN) 5 MG tablet Take 1-1.5 tablets (5-7.5 mg total) by mouth Daily. 50 tablet 5        Current Inpatient Medications:      Past Surgical History:   Procedure Laterality Date    LEFT VENTRICULAR ASSIST DEVICE         Social History     Social History    Marital status:      Spouse name: N/A    Number of children: N/A    Years of education: N/A     Occupational History    Not on file.     Social History Main Topics    Smoking status: Former Smoker     Packs/day: 1.00     Quit date: 12/14/2016    Smokeless tobacco: Never Used    Alcohol use No    Drug use: No    Sexual activity: Not on file     Other Topics Concern    Not on file     Social History Narrative    Works        OBJECTIVE:   Weight:  Wt Readings from Last 4 Encounters:   02/23/18 77.5 kg (170 lb 13.7 oz)   01/25/18 75.8 kg (167 lb)   01/25/18 73.9 kg (163 lb)   12/28/17 75.8 kg (167 lb)       Vital Signs Range (Last 24H):  Temp:  [36.3 °C (97.3 °F)-36.8 °C (98.3 °F)]   Pulse:  [102-111]   Resp:  [16-18]   BP: (115-133)/(58-77)   SpO2:  [95 %-98 %]       CBC:   Recent Labs      02/25/18   0250  02/26/18   0600   WBC   27.21*  24.72*   RBC  3.04*  3.26*   HGB  9.0*  10.0*   HCT  26.6*  28.6*   PLT  300  319   MCV  88  88   MCH  29.6  30.7   MCHC  33.8  35.0       CMP:   Recent Labs      02/25/18   0250  02/25/18   0504  02/26/18   0600   NA  133*   --   134*   K  3.6  3.7  3.4*   CL  86*   --   85*   CO2  34*   --   32*   BUN  45*   --   47*   CREATININE  1.3   --   1.3   GLU  126*   --   116*   MG   --   1.7  1.8   CALCIUM  8.6*   --   9.1   ALBUMIN  3.6   --   3.7   PROT  6.4   --   6.4   ALKPHOS  89   --   79   ALT  104*   --   73*   AST  50*   --   24   BILITOT  0.8   --   1.0       INR:  Recent Labs      02/24/18   0324  02/25/18   0250  02/26/18   0600   INR  1.1  1.1  1.2       Diagnostic Studies:    EKG:  Sinus tachycardia  Otherwise normal ECG  When compared with ECG of 21-FEB-2018 23:58,  Sinus rhythm has replaced Atrial fibrillation  Vent. rate has decreased BY  72 BPM  Confirmed by Liza CURTIS, Tori ([id number]) on 2/22/2018 10:18:03 AM    2D Echo:  Results for orders placed or performed during the hospital encounter of 02/17/18   2D echo with color flow doppler   Result Value Ref Range    EF 65 55 - 65    Diastolic Dysfunction No          ASSESSMENT/PLAN:         Pre-op Assessment    I have reviewed the Patient Summary Reports.      I have reviewed the Medications.     Review of Systems  Anesthesia Hx:  History of prior surgery of interest to airway management or planning:  Denies Personal Hx of Anesthesia complications.   Social:  Former Smoker    Cardiovascular:   Pacemaker Hypertension Dysrhythmias CHF S/p OHTx 2/18/2018       Physical Exam  General:  Well nourished    Airway/Jaw/Neck:  Airway Findings: Mouth Opening: Small, but > 3cm Tongue: Normal  General Airway Assessment: Adult  Mallampati: II  Improves to II, I with phonation.  TM Distance: Normal, at least 6 cm  Jaw/Neck Findings:  Neck ROM: Normal ROM       Chest/Lungs:  Chest/Lungs Findings: Normal Respiratory Rate, Clear to auscultation     Heart/Vascular:  Heart  Findings: Rate: Normal  Rhythm: Regular Rhythm  Sounds: Normal        Mental Status:  Mental Status Findings: (sedated, intubated)  Unconscious         Anesthesia Plan  Type of Anesthesia, risks & benefits discussed:  Anesthesia Type:  general  Patient's Preference:   Intra-op Monitoring Plan: arterial line, central line, standard ASA monitors, [place] and cardiac output  Intra-op Monitoring Plan Comments:   Post Op Pain Control Plan: multimodal analgesia, IV/PO Opioids PRN and per primary service following discharge from PACU  Post Op Pain Control Plan Comments:   Induction:   IV  Beta Blocker:  Patient is not currently on a Beta-Blocker (No further documentation required).       Informed Consent: Patient representative understands risks and agrees with Anesthesia plan.  Questions answered. Anesthesia consent signed with patient representative.  ASA Score: 4     Day of Surgery Review of History & Physical: I have interviewed and examined the patient. I have reviewed the patient's H&P dated:  There are no significant changes.  H&P update referred to the surgeon.         Ready For Surgery From Anesthesia Perspective.

## 2018-02-27 NOTE — NURSING
Post Heart Transplant Coordinator Education    Met with pt in his TSU room, mother present. Pt in bed, awake, watching TV. Went over medications and activities in Education Binder. Pt asked to come back to [place] to go over again with wife. I assured I will be back. Educated pt on Tacrolimus and Cellcept, timed lab with tacro. Pt asked about biopsy tomorrow. I informed him it was similar to RHC. Pt had no other questions. I will revisit tomorrow.

## 2018-02-27 NOTE — PROGRESS NOTES
[facility]  Heart Transplant  Progress Note    Patient Name: Mert Samayoa  MRN: [id number]  Admission Date: 2/17/2018  Hospital Length of Stay: 10 days  Attending Physician: Marisel Lundberg MD  Primary Care Provider: Primary Doctor No  Principal Problem:Heart transplanted    Subjective:     Interval History: Feeling much better today. Walking the unit with PT. Oxy 5mg seems to help the pain.       Scheduled Meds:   amLODIPine  10 mg Oral Daily    aspirin  81 mg Oral Daily    famotidine  20 mg Oral BID    furosemide  80 mg Intravenous BID    magnesium oxide  400 mg Oral BID    mycophenolate  1,500 mg Oral BID    nystatin  500,000 Units Mouth/Throat QID (WM & HS)    polyethylene glycol  17 g Oral BID    predniSONE  30 mg Oral BID    senna-docusate 8.6-50 mg  2 tablet Oral BID    sodium chloride 0.9%  3 mL Intravenous Q8H    sulfamethoxazole-trimethoprim 400-80mg  1 tablet Oral Daily    tacrolimus  2 mg Oral Daily    tacrolimus  3 mg Oral Daily    valGANciclovir  900 mg Oral Daily     PRN Meds:sodium chloride, albuterol sulfate, [COMPLETED] calcium gluconate IVPB **AND** calcium gluconate IVPB, dextrose 50%, dextrose 50%, glucagon (human recombinant), glucose, glucose, glycerin adult, insulin aspart U-100, lactulose, magnesium sulfate IVPB, meclizine, ondansetron, oxyCODONE, oxyCODONE-acetaminophen, potassium chloride **AND** potassium chloride **AND** potassium chloride, promethazine (PHENERGAN) IVPB    Review of patient's allergies indicates:  No Known Allergies  Objective:     Vital Signs (Most Recent):  Temp: 97.5 °F (36.4 °C) (02/27/18 0747)  Pulse: 98 (02/27/18 0747)  Resp: 16 (02/27/18 0747)  BP: 130/68 (02/27/18 0747)  SpO2: 98 % (02/27/18 0747) Vital Signs (24h Range):  Temp:  [97.5 °F (36.4 °C)-98.7 °F (37.1 °C)] 97.5 °F (36.4 °C)  Pulse:  [] 98  Resp:  [16-20] 16  SpO2:  [95 %-99 %] 98 %  BP: (118-130)/(58-68) 130/68     No data found.    Body mass index is  26.76 kg/m².      Intake/Output Summary (Last 24 hours) at 02/27/18 0849  Last data filed at 02/27/18 0546   Gross per 24 hour   Intake                0 ml   Output              975 ml   Net             -975 ml     Physical Exam   Constitutional: He is oriented to person, place, and time. He appears well-developed and well-nourished.   HENT:   Head: Normocephalic.   Eyes: Pupils are equal, round, and reactive to light.   Neck: Normal range of motion. Neck supple. No JVD present.   Cardiovascular: Regular rhythm.  Tachycardia present.    Pulmonary/Chest: Effort normal and breath sounds normal.   Abdominal: Soft. Bowel sounds are normal. He exhibits no distension. There is no tenderness.   Musculoskeletal: Normal range of motion.   Neurological: He is alert and oriented to person, place, and time.   Skin: Skin is warm and dry.   Psychiatric: He has a normal mood and affect. His behavior is normal.   Nursing note and vitals reviewed.    Significant Labs:  CBC:    Recent Labs  Lab 02/25/18  0250 02/26/18  0600 02/27/18  0528   WBC 27.21* 24.72* 31.09*   RBC 3.04* 3.26* 3.20*   HGB 9.0* 10.0* 9.7*   HCT 26.6* 28.6* 28.5*    319 356*   MCV 88 88 89   MCH 29.6 30.7 30.3   MCHC 33.8 35.0 34.0     BNP:  No results for input(s): BNP in the last 168 hours.    Invalid input(s): BNPTRIAGELBLO  CMP:    Recent Labs  Lab 02/25/18  0250 02/25/18  0504 02/26/18  0600 02/27/18  0527   *  --  116* 115*   CALCIUM 8.6*  --  9.1 9.3   ALBUMIN 3.6  --  3.7 3.6   PROT 6.4  --  6.4 6.4   *  --  134* 131*   K 3.6 3.7 3.4* 3.5   CO2 34*  --  32* 33*   CL 86*  --  85* 85*   BUN 45*  --  47* 50*   CREATININE 1.3  --  1.3 1.3   ALKPHOS 89  --  79 72   *  --  73* 49*   AST 50*  --  24 21   BILITOT 0.8  --  1.0 0.8      Coagulation:     Recent Labs  Lab 02/21/18  0330  02/25/18  0250 02/26/18  0600 02/27/18  0527   INR 1.2  < > 1.1 1.2 1.1   APTT 27.0  --   --   --   --    < > = values in this interval not  displayed.  LDH:  No results for input(s): LDH in the last 72 hours.  Microbiology:  Microbiology Results (last 7 days)     Procedure Component Value Units Date/Time    Blood culture [[id number]] Collected:  02/25/18 1256    Order Status:  Completed Specimen:  Blood Updated:  02/26/18 1612     Blood Culture, Routine No Growth to date     Blood Culture, Routine No Growth to date    Blood culture [[id number]]     Order Status:  Canceled Specimen:  Blood           I have reviewed all pertinent labs within the past 24 hours.    Estimated Creatinine Clearance: 79.1 mL/min (based on SCr of 1.3 mg/dL).    Diagnostic Results:  I have reviewed all pertinent imaging results/findings within the past 24 hours.    Assessment and Plan:     27 year old WM with DCM (familial, both brothers with heart transplants) with stage D CHFrEF underwent Heartware placement 02/01/17 had post-op atrial flutter 02/11/17 treated with amiodarone presents for OHTx.  0.  VAD implanted on 2/1/17 HVAD RPM 2700.         * Heart transplanted    -Transplant Date 2/18/18. S/P washout 2/19.  HTS primary.   -CMV Status:D+/R-   -Rejection status: Moderate Risk  -Rejection episodes: none to date  -Induction: No   -Current immunosuppression: Pred 30 BID, Prograf 2/3 mg BID, Cellcept 1500 mg BID.  -Opportunistic PPx with:Nystatin, valcyte, bactrim.  -Hemodynamically stable. Weaned off   -Net neg over last 24 hours. Continue IVP Lasix.   -CTs in place and AV wires in place. CTs with decreased drainage. Will touch base with CTS about removing.   -Echo 2/21 EF 60%, low nl RV fxn.  -Plan for EMBx #1 today.         Leukocytosis    - Continue to monitor. Trending up today.  - No signs of infection. Old driveline site and Groin CDI.  - Just switched to Pred yesterday,         VTE (venous thromboembolism)    -U/S 2/22 revealed Nonocclusive deep vein thrombosis of the right internal jugular vein with slight extension into the proximal subclavian vein.  -Continue SC  Heparin.         Paroxysmal atrial fibrillation    -~30mn episode of afib 2/21 terminated with amio. Likely exacerbated by pain, n/v, volume.   - Currently ST ~100.  - Amio stopped.         Acute hyperglycemia    -insulin per endocrine            Augusto Goldberg, NP  Heart Transplant  [facility]

## 2018-02-27 NOTE — SUBJECTIVE & OBJECTIVE
Interval History: Feeling much better today. Walking the unit with PT. Oxy 5mg seems to help the pain.       Scheduled Meds:   amLODIPine  10 mg Oral Daily    aspirin  81 mg Oral Daily    famotidine  20 mg Oral BID    furosemide  80 mg Intravenous BID    magnesium oxide  400 mg Oral BID    mycophenolate  1,500 mg Oral BID    nystatin  500,000 Units Mouth/Throat QID (WM & HS)    polyethylene glycol  17 g Oral BID    predniSONE  30 mg Oral BID    senna-docusate 8.6-50 mg  2 tablet Oral BID    sodium chloride 0.9%  3 mL Intravenous Q8H    sulfamethoxazole-trimethoprim 400-80mg  1 tablet Oral Daily    tacrolimus  2 mg Oral Daily    tacrolimus  3 mg Oral Daily    valGANciclovir  900 mg Oral Daily     PRN Meds:sodium chloride, albuterol sulfate, [COMPLETED] calcium gluconate IVPB **AND** calcium gluconate IVPB, dextrose 50%, dextrose 50%, glucagon (human recombinant), glucose, glucose, glycerin adult, insulin aspart U-100, lactulose, magnesium sulfate IVPB, meclizine, ondansetron, oxyCODONE, oxyCODONE-acetaminophen, potassium chloride **AND** potassium chloride **AND** potassium chloride, promethazine (PHENERGAN) IVPB    Review of patient's allergies indicates:  No Known Allergies  Objective:     Vital Signs (Most Recent):  Temp: 97.5 °F (36.4 °C) (02/27/18 0747)  Pulse: 98 (02/27/18 0747)  Resp: 16 (02/27/18 0747)  BP: 130/68 (02/27/18 0747)  SpO2: 98 % (02/27/18 0747) Vital Signs (24h Range):  Temp:  [97.5 °F (36.4 °C)-98.7 °F (37.1 °C)] 97.5 °F (36.4 °C)  Pulse:  [] 98  Resp:  [16-20] 16  SpO2:  [95 %-99 %] 98 %  BP: (118-130)/(58-68) 130/68     No data found.    Body mass index is 26.76 kg/m².      Intake/Output Summary (Last 24 hours) at 02/27/18 0849  Last data filed at 02/27/18 0527   Gross per 24 hour   Intake                0 ml   Output              975 ml   Net             -975 ml     Physical Exam   Constitutional: He is oriented to person, place, and time. He appears well-developed and  well-nourished.   HENT:   Head: Normocephalic.   Eyes: Pupils are equal, round, and reactive to light.   Neck: Normal range of motion. Neck supple. No JVD present.   Cardiovascular: Regular rhythm.  Tachycardia present.    Pulmonary/Chest: Effort normal and breath sounds normal.   Abdominal: Soft. Bowel sounds are normal. He exhibits no distension. There is no tenderness.   Musculoskeletal: Normal range of motion.   Neurological: He is alert and oriented to person, place, and time.   Skin: Skin is warm and dry.   Psychiatric: He has a normal mood and affect. His behavior is normal.   Nursing note and vitals reviewed.    Significant Labs:  CBC:    Recent Labs  Lab 02/25/18  0250 02/26/18  0600 02/27/18  0528   WBC 27.21* 24.72* 31.09*   RBC 3.04* 3.26* 3.20*   HGB 9.0* 10.0* 9.7*   HCT 26.6* 28.6* 28.5*    319 356*   MCV 88 88 89   MCH 29.6 30.7 30.3   MCHC 33.8 35.0 34.0     BNP:  No results for input(s): BNP in the last 168 hours.    Invalid input(s): BNPTRIAGELBLO  CMP:    Recent Labs  Lab 02/25/18  0250 02/25/18  0504 02/26/18 0600 02/27/18  0527   *  --  116* 115*   CALCIUM 8.6*  --  9.1 9.3   ALBUMIN 3.6  --  3.7 3.6   PROT 6.4  --  6.4 6.4   *  --  134* 131*   K 3.6 3.7 3.4* 3.5   CO2 34*  --  32* 33*   CL 86*  --  85* 85*   BUN 45*  --  47* 50*   CREATININE 1.3  --  1.3 1.3   ALKPHOS 89  --  79 72   *  --  73* 49*   AST 50*  --  24 21   BILITOT 0.8  --  1.0 0.8      Coagulation:     Recent Labs  Lab 02/21/18  0330  02/25/18  0250 02/26/18  0600 02/27/18  0527   INR 1.2  < > 1.1 1.2 1.1   APTT 27.0  --   --   --   --    < > = values in this interval not displayed.  LDH:  No results for input(s): LDH in the last 72 hours.  Microbiology:  Microbiology Results (last 7 days)     Procedure Component Value Units Date/Time    Blood culture [[id number]] Collected:  02/25/18 1256    Order Status:  Completed Specimen:  Blood Updated:  02/26/18 1612     Blood Culture, Routine No Growth to date      Blood Culture, Routine No Growth to date    Blood culture [[id number]]     Order Status:  Canceled Specimen:  Blood           I have reviewed all pertinent labs within the past 24 hours.    Estimated Creatinine Clearance: 79.1 mL/min (based on SCr of 1.3 mg/dL).    Diagnostic Results:  I have reviewed all pertinent imaging results/findings within the past 24 hours.

## 2018-02-27 NOTE — H&P
HTS Pre-Procedure H&P   HTS Fellow         Subjective:      Mert Samayoa is 28  year old WM with DCM (familial, both brothers with heart transplants) with stage D CHFrEF underwent Heartware placement 02/01/17 had post-op atrial flutter 02/11/17 treated with amiodarone for routine rhc and biopsy   Past Medical History:   Diagnosis Date    Cardiogenic shock 1/16/2017    Cardiomyopathy     Familial cardiomyopathy    CHF (congestive heart failure)     Essential hypertension 12/21/2016    Familial Cardiomyopathy     Familial cardiomyopathy      Past Surgical History:   Procedure Laterality Date    LEFT VENTRICULAR ASSIST DEVICE       Social History     Social History    Marital status:      Spouse name: N/A    Number of children: N/A    Years of education: N/A     Occupational History    Not on file.     Social History Main Topics    Smoking status: Former Smoker     Packs/day: 1.00     Quit date: 12/14/2016    Smokeless tobacco: Never Used    Alcohol use No    Drug use: No    Sexual activity: Not on file     Other Topics Concern    Not on file     Social History Narrative    Works      Family History   Problem Relation Age of Onset    Arthritis Mother     Heart disease Brother      cardiomyopathy and heart transplant    No Known Problems Father     Heart disease Brother      cardiomyopathy and heart transplanted twice    Birth defects Paternal Uncle            Other significant clinical information:  Review of patient's allergies indicates:  No Known Allergies  No current facility-administered medications on file prior to encounter.      Current Outpatient Prescriptions on File Prior to Encounter   Medication Sig    aspirin (ECOTRIN) 325 MG EC tablet Take 1 tablet (325 mg total) by mouth once daily.    lisinopril 10 MG tablet Take 1 tablet (10 mg total) by mouth 2 (two) times daily.    warfarin (COUMADIN) 5 MG tablet Take 1-1.5 tablets (5-7.5 mg total) by  mouth Daily.    famotidine (PEPCID) 40 MG tablet TAKE ONE TABLET BY MOUTH ONCE DAILY IN THE EVENING    furosemide (LASIX) 40 MG tablet Take 40 mg by mouth as needed. Pt reports taking 20-40 mg daily as needed for weight gain     ondansetron (ZOFRAN-ODT) 4 MG TbDL Take 1 tablet (4 mg total) by mouth every 6 (six) hours as needed.            Objective:      Physical Exam  General : NAD   Chest : CTAB no w/r/r  Cardiac : RRR normal S1 and S2 no S3 or S4   Ext : warm and well perfused.       Lab Review   Lab Results   Component Value Date    WBC 31.09 (H) 02/27/2018    HGB 9.7 (L) 02/27/2018    HCT 28.5 (L) 02/27/2018    MCV 89 02/27/2018     (H) 02/27/2018     Lab Results   Component Value Date    INR 1.1 02/27/2018    INR 1.2 02/26/2018    INR 1.1 02/25/2018           Assessment:      Mert Samayoa is 28  year old WM with DCM (familial, both brothers with heart transplants) with stage D CHFrEF underwent Heartware placement 02/01/17 had post-op atrial flutter 02/11/17 treated with amiodarone for routine rhc and biopsy   Plan:       I have explained the risks, benefits, and alternatives of the procedure in detail.  The patient expresses understanding and all questions have been answered.  The patient agrees to the proceed as planned.  RHC / echo biopsy via RIJ   Micropuncture access needle will be used to minimize bleeding risk.      Aneudy Young DO   HTS Fellow

## 2018-02-27 NOTE — PT/OT/SLP PROGRESS
Occupational Therapy      Patient Name:  Mert Samayoa   MRN:  [id number]    Patient not seen today secondary to patient at cath lab on initial attempt at 1049, returned for f/u at 1400 and c/o increased  Patient fatigue after procedure and wife and pt report that pt ambulated earlier today around the unit. Pt/wife report ambulating 3x/d and will ambulate again this evening after pt rests. Educated on con't with ADL's as tolerated and OOB activity. Will follow-up again as schedule allows. Lana Wick, CONCEPCIÓNR

## 2018-02-27 NOTE — PLAN OF CARE
Problem: Physical Therapy Goal  Goal: Physical Therapy Goal  Goals to be met by: 3/6/18    Patient will increase functional independence with mobility by performin. Supine to sit with Stand-by Assistance. - GOAL MET  2. Sit to stand transfer with Supervision. - GOAL MET  3. Gait  x 220 feet with Supervision. - GOAL MET    4. Ascend/descend 3 stair with right Handrails Supervision.  5. Pt to perf 10x sit<>stand: 28 sec, to demonstrate improvements in B LE mm strength.  6. Pt to perf 6MWT: amb 626', to demonstrate a clinically significant improvement in aerobic capacity.       Outcome: Ongoing (interventions implemented as appropriate)  Pt achieved 3 goals.  New goals added to more accurately reflect CLOF.

## 2018-02-27 NOTE — PT/OT/SLP PROGRESS
"Physical Therapy Treatment    Patient Name:  Mert Samayoa   MRN:  [id number]    Recommendations:     Discharge Recommendations:  outpatient PT   Discharge Equipment Recommendations: none   Barriers to discharge: None    Assessment:     Mert Samayoa is a 28 y.o. male admitted with a medical diagnosis of Heart transplanted.  He presents with the following impairments/functional limitations:  weakness, impaired endurance, impaired functional mobilty, gait instability, impaired balance, impaired cardiopulmonary response to activity, pain, orthopedic precautions.  Pt made great improvements today, amb further than previously before since heart transplant.  Able to complete 6MWT, though testing indicates deficits in aerobic capacity.  HEP established and handouts provided.  Able to perf most functional mobility assessed independently or with supervision.  Reduction in POC to 3xs/wk.  Bayron recommendation for pt to receive skilled PT services in the outpatient setting upon discharge.  Pt will benefit from acute skilled PT services to address these deficits and reach maximum level of function.      Recent Surgery: Procedure(s) (LRB):  EXPLORATION-BLEEDING (RE-EXPLORATION) (Bilateral) 7 Days Post-Op    Plan:     During this hospitalization, patient to be seen 3 x/week to address the above listed problems via gait training, therapeutic activities, therapeutic exercises, neuromuscular re-education  · Plan of Care Expires:  03/19/18   Plan of Care Reviewed with: patient, mother    Subjective     Communicated with nursing prior to session.  Patient found in supine upon PT entry to room, agreeable to treatment.      "Today is the most I've eaten."    Chief Complaint: Chest pain  Patient comments/goals: To improve aerobic capacity  Pain/Comfort:  · Pain Rating 1: 5/10  · Location 1: chest  · Pain Addressed 1: Nurse notified, Pre-medicate for activity, Cessation of Activity    Patients cultural, spiritual, " Evangelical conflicts given the current situation: no    Objective:     Patient found with: peripheral IV, chest tube (Pacer wires)     General Precautions: Standard, fall, sternal   Orthopedic Precautions:N/A   Braces: N/A     Functional Mobility:    · Bed Mobility:     · Scooting: independence  · Supine to Sit: independence, time taken for ed to pt and mother to facilitate transfers    · Transfers:     · Sit to Stand:  independence with no AD  · Bed to Chair: independence with  no AD  using  Stand Pivot    · Gait: Pt amb 630', S, without AD, with 3 standing rest breaks, min SOB noted, with mask donned    · Balance: S: dynamic standing balance without AD      AM-PAC 6 CLICK MOBILITY  Turning over in bed (including adjusting bedclothes, sheets and blankets)?: 4  Sitting down on and standing up from a chair with arms (e.g., wheelchair, bedside commode, etc.): 4  Moving from lying on back to sitting on the side of the bed?: 4  Moving to and from a bed to a chair (including a wheelchair)?: 4  Need to walk in hospital room?: 3  Climbing 3-5 steps with a railing?: 3  Total Score: 22       Therapeutic Activities and Exercises:   Whiteboard updated  Ankle pumps: 20 reps, in sit   LAQs: 20 reps each LE perf individually, in sit  Hip abd/add: 20 reps each LE perf individually, in sit  Hip flex: 40 reps, alt LEs, in sit  10x sit<>stand: 29.99 sec, 8/10 RPE following  6MWT: Pt amb 526' = 160.33m, S, without AD  Gait speed: 0.45 m/s  Pt verbally stated all sternal precautions    Patient left sitting EOB with all lines intact, call button in reach, nursing notified and mother present.    GOALS:    Physical Therapy Goals        Problem: Physical Therapy Goal    Goal Priority Disciplines Outcome Goal Variances Interventions   Physical Therapy Goal     PT/OT, PT Ongoing (interventions implemented as appropriate)     Description:  Goals to be met by: 3/6/18    Patient will increase functional independence with mobility by  performin. Supine to sit with Stand-by Assistance. - GOAL MET  2. Sit to stand transfer with Supervision. - GOAL MET  3. Gait  x 220 feet with Supervision. - GOAL MET    4. Ascend/descend 3 stair with right Handrails Supervision.  5. Pt to perf 10x sit<>stand: 28 sec, to demonstrate improvements in B LE mm strength.  6. Pt to perf 6MWT: amb 626', to demonstrate a clinically significant improvement in aerobic capacity.                         Time Tracking:     PT Received On: 18  PT Start Time: 1348     PT Stop Time: 1415  PT Total Time (min): 27 min     Billable Minutes: Gait Training 13 and Therapeutic Exercise 14    Treatment Type: Treatment  PT/PTA: PT     PTA Visit Number: 0     Shawna Velázquez, PT  2018

## 2018-02-27 NOTE — PLAN OF CARE
Problem: Patient Care Overview  Goal: Plan of Care Review  Outcome: Ongoing (interventions implemented as appropriate)  Pt POD 9 heart transplant. Pleural chest tube x 2 still in place with serosanguinous drainage. Pacer wires isolated and grounded. Pt had R heart cath and biopsy today. Self meds pulled 100% with encouragement. Pt up ambulating in hallway with wife. WBC elevated at 31.09. BG monitored AC/HS with no SSI needed. Incisions CDI. Steri-strips intact.  Pt free from falls and injury.

## 2018-02-27 NOTE — PLAN OF CARE
Problem: Patient Care Overview  Goal: Plan of Care Review  Outcome: Ongoing (interventions implemented as appropriate)  Pt AAOx4, VSS, in bed with upper siderails raised x2, bed in lowest/locked position, call light/personal belongings within reach. Pt instructed to call for assistance. Pt verbalizes understanding. Pt afebrile at this time.  Proper hand hygiene performed before and after pt care.  Pt complained of prescribed PRN q3h Oxycodone PO 7.5 mg and 15mg caused too much drowsiness. Order was changed to PRN q3h Oxycodone PO 5mg.  Pt states that this relieves the pain and does not cause drowsiness. Pt's chest tube put out 40cc so far this shift. Pt NSR, tachy low 100's. Will continue to monitor pt.

## 2018-02-27 NOTE — PROGRESS NOTES
Pt stated that the PRN Oxycodone 7.5 and 15 caused too much drowsiness, requested a lower dose of pain medication. Called Aminata Madsen MD and asked if PRN order of Oxycodone could be dec to 5mg. MD Cale agreed to change dose. Pt now receiving Oxycodone 5mg PRN q 3h.

## 2018-02-27 NOTE — ASSESSMENT & PLAN NOTE
- Continue to monitor. Trending up today.  - No signs of infection. Old driveline site and Groin CDI.  - Just switched to Pred yesterday,

## 2018-02-27 NOTE — ASSESSMENT & PLAN NOTE
-Transplant Date 2/18/18. S/P washout 2/19.  HTS primary.   -CMV Status:D+/R-   -Rejection status: Moderate Risk  -Rejection episodes: none to date  -Induction: No   -Current immunosuppression: Pred 30 BID, Prograf 2/3 mg BID, Cellcept 1500 mg BID.  -Opportunistic PPx with:Nystatin, valcyte, bactrim.  -Hemodynamically stable. Weaned off   -Net neg over last 24 hours. Continue IVP Lasix.   -CTs in place and AV wires in place. CTs with decreased drainage. Will touch base with CTS about removing.   -Echo 2/21 EF 60%, low nl RV fxn.  -Plan for EMBx #1 today.

## 2018-02-28 ENCOUNTER — TELEPHONE (OUTPATIENT)
Dept: TRANSPLANT | Facility: CLINIC | Age: 28
End: 2018-02-28

## 2018-02-28 DIAGNOSIS — Z79.899 ENCOUNTER FOR LONG-TERM (CURRENT) USE OF MEDICATIONS: ICD-10-CM

## 2018-02-28 DIAGNOSIS — Z79.52 LONG TERM CURRENT USE OF SYSTEMIC STEROIDS: ICD-10-CM

## 2018-02-28 DIAGNOSIS — Z94.1 STATUS POST HEART TRANSPLANT: ICD-10-CM

## 2018-02-28 DIAGNOSIS — E78.2 MIXED HYPERLIPIDEMIA: ICD-10-CM

## 2018-02-28 LAB
ALBUMIN SERPL BCP-MCNC: 3.3 G/DL
ALP SERPL-CCNC: 68 U/L
ALT SERPL W/O P-5'-P-CCNC: 37 U/L
ANION GAP SERPL CALC-SCNC: 14 MMOL/L
ANISOCYTOSIS BLD QL SMEAR: SLIGHT
AST SERPL-CCNC: 20 U/L
BASOPHILS NFR BLD: 0 %
BILIRUB SERPL-MCNC: 0.8 MG/DL
BNP SERPL-MCNC: 1036 PG/ML
BUN SERPL-MCNC: 48 MG/DL
CALCIUM SERPL-MCNC: 9.1 MG/DL
CHLORIDE SERPL-SCNC: 84 MMOL/L
CO2 SERPL-SCNC: 32 MMOL/L
CREAT SERPL-MCNC: 1.2 MG/DL
DIFFERENTIAL METHOD: ABNORMAL
EOSINOPHIL NFR BLD: 0 %
ERYTHROCYTE [DISTWIDTH] IN BLOOD BY AUTOMATED COUNT: 13.9 %
EST. GFR  (AFRICAN AMERICAN): >60 ML/MIN/1.73 M^2
EST. GFR  (NON AFRICAN AMERICAN): >60 ML/MIN/1.73 M^2
GLUCOSE SERPL-MCNC: 113 MG/DL
HCT VFR BLD AUTO: 26.1 %
HGB BLD-MCNC: 9 G/DL
HYPOCHROMIA BLD QL SMEAR: ABNORMAL
IMM GRANULOCYTES # BLD AUTO: ABNORMAL K/UL
IMM GRANULOCYTES NFR BLD AUTO: ABNORMAL %
INR PPP: 1.2
LYMPHOCYTES NFR BLD: 4 %
MAGNESIUM SERPL-MCNC: 2.1 MG/DL
MCH RBC QN AUTO: 29.5 PG
MCHC RBC AUTO-ENTMCNC: 34.5 G/DL
MCV RBC AUTO: 86 FL
MONOCYTES NFR BLD: 9 %
NEUTROPHILS NFR BLD: 87 %
NRBC BLD-RTO: 0 /100 WBC
OVALOCYTES BLD QL SMEAR: ABNORMAL
PLATELET # BLD AUTO: 357 K/UL
PLATELET BLD QL SMEAR: ABNORMAL
PMV BLD AUTO: 10 FL
POIKILOCYTOSIS BLD QL SMEAR: SLIGHT
POLYCHROMASIA BLD QL SMEAR: ABNORMAL
POTASSIUM SERPL-SCNC: 4.2 MMOL/L
PROT SERPL-MCNC: 6 G/DL
PROTHROMBIN TIME: 12 SEC
RBC # BLD AUTO: 3.05 M/UL
SODIUM SERPL-SCNC: 130 MMOL/L
TACROLIMUS BLD-MCNC: 6.1 NG/ML
WBC # BLD AUTO: 32.8 K/UL

## 2018-02-28 PROCEDURE — 25000003 PHARM REV CODE 250: Performed by: INTERNAL MEDICINE

## 2018-02-28 PROCEDURE — 97530 THERAPEUTIC ACTIVITIES: CPT

## 2018-02-28 PROCEDURE — 87040 BLOOD CULTURE FOR BACTERIA: CPT

## 2018-02-28 PROCEDURE — 25000003 PHARM REV CODE 250: Performed by: STUDENT IN AN ORGANIZED HEALTH CARE EDUCATION/TRAINING PROGRAM

## 2018-02-28 PROCEDURE — 85610 PROTHROMBIN TIME: CPT

## 2018-02-28 PROCEDURE — 80197 ASSAY OF TACROLIMUS: CPT

## 2018-02-28 PROCEDURE — 83735 ASSAY OF MAGNESIUM: CPT

## 2018-02-28 PROCEDURE — 63600175 PHARM REV CODE 636 W HCPCS: Performed by: INTERNAL MEDICINE

## 2018-02-28 PROCEDURE — 25000003 PHARM REV CODE 250: Performed by: THORACIC SURGERY (CARDIOTHORACIC VASCULAR SURGERY)

## 2018-02-28 PROCEDURE — 36415 COLL VENOUS BLD VENIPUNCTURE: CPT

## 2018-02-28 PROCEDURE — 20600001 HC STEP DOWN PRIVATE ROOM

## 2018-02-28 PROCEDURE — 63600175 PHARM REV CODE 636 W HCPCS: Performed by: PHYSICIAN ASSISTANT

## 2018-02-28 PROCEDURE — A4216 STERILE WATER/SALINE, 10 ML: HCPCS | Performed by: THORACIC SURGERY (CARDIOTHORACIC VASCULAR SURGERY)

## 2018-02-28 PROCEDURE — 25000003 PHARM REV CODE 250: Performed by: PHYSICIAN ASSISTANT

## 2018-02-28 PROCEDURE — 83880 ASSAY OF NATRIURETIC PEPTIDE: CPT

## 2018-02-28 PROCEDURE — 80053 COMPREHEN METABOLIC PANEL: CPT

## 2018-02-28 PROCEDURE — 99232 SBSQ HOSP IP/OBS MODERATE 35: CPT | Mod: ,,, | Performed by: INTERNAL MEDICINE

## 2018-02-28 PROCEDURE — 63600175 PHARM REV CODE 636 W HCPCS: Performed by: NURSE PRACTITIONER

## 2018-02-28 PROCEDURE — 25000003 PHARM REV CODE 250: Performed by: NURSE PRACTITIONER

## 2018-02-28 RX ORDER — ATORVASTATIN CALCIUM 20 MG/1
20 TABLET, FILM COATED ORAL DAILY
Status: DISCONTINUED | OUTPATIENT
Start: 2018-02-28 | End: 2018-03-05 | Stop reason: HOSPADM

## 2018-02-28 RX ORDER — MYCOPHENOLATE MOFETIL 250 MG/1
1500 CAPSULE ORAL 2 TIMES DAILY
Qty: 360 CAPSULE | Refills: 11 | Status: SHIPPED | OUTPATIENT
Start: 2018-02-28 | End: 2018-05-21

## 2018-02-28 RX ORDER — FERROUS SULFATE 325(65) MG
325 TABLET, DELAYED RELEASE (ENTERIC COATED) ORAL 2 TIMES DAILY
Qty: 60 TABLET | Refills: 11 | Status: SHIPPED | OUTPATIENT
Start: 2018-02-28 | End: 2018-05-23 | Stop reason: ALTCHOICE

## 2018-02-28 RX ORDER — TACROLIMUS 1 MG/1
4 CAPSULE ORAL 2 TIMES DAILY
Status: DISCONTINUED | OUTPATIENT
Start: 2018-02-28 | End: 2018-03-01

## 2018-02-28 RX ORDER — OXYCODONE HYDROCHLORIDE 5 MG/1
5 TABLET ORAL EVERY 6 HOURS PRN
Qty: 28 TABLET | Refills: 0 | Status: SHIPPED | OUTPATIENT
Start: 2018-02-28 | End: 2018-03-20 | Stop reason: SDUPTHER

## 2018-02-28 RX ORDER — AMOXICILLIN 250 MG
2 CAPSULE ORAL DAILY PRN
Qty: 60 TABLET | Refills: 3 | Status: SHIPPED | OUTPATIENT
Start: 2018-02-28 | End: 2018-05-23

## 2018-02-28 RX ORDER — LANOLIN ALCOHOL/MO/W.PET/CERES
400 CREAM (GRAM) TOPICAL 2 TIMES DAILY
Qty: 60 TABLET | Refills: 3 | COMMUNITY
Start: 2018-02-28 | End: 2018-05-23 | Stop reason: SDUPTHER

## 2018-02-28 RX ORDER — SULFAMETHOXAZOLE AND TRIMETHOPRIM 400; 80 MG/1; MG/1
1 TABLET ORAL DAILY
Qty: 30 TABLET | Refills: 11 | Status: SHIPPED | OUTPATIENT
Start: 2018-03-01 | End: 2018-05-21

## 2018-02-28 RX ORDER — VALGANCICLOVIR 450 MG/1
900 TABLET, FILM COATED ORAL DAILY
Qty: 60 TABLET | Refills: 11 | Status: SHIPPED | OUTPATIENT
Start: 2018-03-01 | End: 2018-08-22 | Stop reason: ALTCHOICE

## 2018-02-28 RX ORDER — ATORVASTATIN CALCIUM 20 MG/1
20 TABLET, FILM COATED ORAL DAILY
Qty: 90 TABLET | Refills: 3 | Status: SHIPPED | OUTPATIENT
Start: 2018-02-28 | End: 2019-01-17 | Stop reason: SDUPTHER

## 2018-02-28 RX ORDER — TACROLIMUS 1 MG/1
6 CAPSULE ORAL EVERY 12 HOURS
Qty: 360 CAPSULE | Refills: 11 | Status: SHIPPED | OUTPATIENT
Start: 2018-02-28 | End: 2018-03-02

## 2018-02-28 RX ORDER — ACETAMINOPHEN 500 MG
1 TABLET ORAL DAILY
Qty: 30 TABLET | Refills: 11 | Status: SHIPPED | OUTPATIENT
Start: 2018-02-28 | End: 2019-02-01

## 2018-02-28 RX ORDER — FERROUS SULFATE 325(65) MG
325 TABLET, DELAYED RELEASE (ENTERIC COATED) ORAL 2 TIMES DAILY
Status: DISCONTINUED | OUTPATIENT
Start: 2018-02-28 | End: 2018-03-05 | Stop reason: HOSPADM

## 2018-02-28 RX ORDER — ERGOCALCIFEROL 1.25 MG/1
50000 CAPSULE ORAL DAILY
Status: COMPLETED | OUTPATIENT
Start: 2018-02-28 | End: 2018-03-04

## 2018-02-28 RX ORDER — NYSTATIN 100000 [USP'U]/ML
500000 SUSPENSION ORAL
Qty: 473 ML | Refills: 3 | Status: SHIPPED | OUTPATIENT
Start: 2018-02-28 | End: 2018-05-21 | Stop reason: ALTCHOICE

## 2018-02-28 RX ORDER — ASPIRIN 81 MG/1
81 TABLET ORAL DAILY
Qty: 30 TABLET | Refills: 11 | Status: SHIPPED | OUTPATIENT
Start: 2018-03-01 | End: 2024-02-20

## 2018-02-28 RX ORDER — PREDNISONE 10 MG/1
20 TABLET ORAL 2 TIMES DAILY
Qty: 120 TABLET | Refills: 3 | Status: SHIPPED | OUTPATIENT
Start: 2018-02-28 | End: 2018-03-05

## 2018-02-28 RX ORDER — HEPARIN SODIUM 5000 [USP'U]/ML
5000 INJECTION, SOLUTION INTRAVENOUS; SUBCUTANEOUS EVERY 8 HOURS
Status: DISCONTINUED | OUTPATIENT
Start: 2018-02-28 | End: 2018-03-03

## 2018-02-28 RX ORDER — FAMOTIDINE 40 MG/1
40 TABLET, FILM COATED ORAL NIGHTLY
Qty: 30 TABLET | Refills: 11 | Status: SHIPPED | OUTPATIENT
Start: 2018-02-28 | End: 2018-05-21

## 2018-02-28 RX ADMIN — FERROUS SULFATE TAB EC 325 MG (65 MG FE EQUIVALENT) 325 MG: 325 (65 FE) TABLET DELAYED RESPONSE at 09:02

## 2018-02-28 RX ADMIN — MAGNESIUM OXIDE TAB 400 MG (241.3 MG ELEMENTAL MG) 400 MG: 400 (241.3 MG) TAB at 09:02

## 2018-02-28 RX ADMIN — PREDNISONE 30 MG: 10 TABLET ORAL at 09:02

## 2018-02-28 RX ADMIN — FAMOTIDINE 20 MG: 20 TABLET, FILM COATED ORAL at 09:02

## 2018-02-28 RX ADMIN — ATORVASTATIN CALCIUM 20 MG: 20 TABLET, FILM COATED ORAL at 02:02

## 2018-02-28 RX ADMIN — HEPARIN SODIUM 5000 UNITS: 5000 INJECTION, SOLUTION INTRAVENOUS; SUBCUTANEOUS at 02:02

## 2018-02-28 RX ADMIN — ERGOCALCIFEROL 50000 UNITS: 1.25 CAPSULE ORAL at 02:02

## 2018-02-28 RX ADMIN — MYCOPHENOLATE MOFETIL 1500 MG: 250 CAPSULE ORAL at 09:02

## 2018-02-28 RX ADMIN — NYSTATIN 500000 UNITS: 500000 SUSPENSION ORAL at 12:02

## 2018-02-28 RX ADMIN — SULFAMETHOXAZOLE AND TRIMETHOPRIM 1 TABLET: 400; 80 TABLET ORAL at 09:02

## 2018-02-28 RX ADMIN — POLYETHYLENE GLYCOL 3350 17 G: 17 POWDER, FOR SOLUTION ORAL at 09:02

## 2018-02-28 RX ADMIN — HEPARIN SODIUM 5000 UNITS: 5000 INJECTION, SOLUTION INTRAVENOUS; SUBCUTANEOUS at 09:02

## 2018-02-28 RX ADMIN — NYSTATIN 500000 UNITS: 500000 SUSPENSION ORAL at 09:02

## 2018-02-28 RX ADMIN — Medication 3 ML: at 10:02

## 2018-02-28 RX ADMIN — VALGANCICLOVIR 900 MG: 450 TABLET, FILM COATED ORAL at 09:02

## 2018-02-28 RX ADMIN — STANDARDIZED SENNA CONCENTRATE AND DOCUSATE SODIUM 2 TABLET: 8.6; 5 TABLET, FILM COATED ORAL at 09:02

## 2018-02-28 RX ADMIN — ASPIRIN 81 MG: 81 TABLET, COATED ORAL at 09:02

## 2018-02-28 RX ADMIN — NYSTATIN 500000 UNITS: 500000 SUSPENSION ORAL at 05:02

## 2018-02-28 RX ADMIN — TACROLIMUS 4 MG: 1 CAPSULE ORAL at 05:02

## 2018-02-28 RX ADMIN — TACROLIMUS 3 MG: 1 CAPSULE ORAL at 09:02

## 2018-02-28 RX ADMIN — OXYCODONE HYDROCHLORIDE 5 MG: 5 TABLET ORAL at 06:02

## 2018-02-28 NOTE — NURSING
Rounds Report: Attended interdisciplinary rounds this morning with the transplant team including SW, physicians, fellows,  mid-level providers, and transplant coordinators.  Discussed plan of care, increase tacro dose, tacro level still low. Continues to have rising WBC. Possible discharge Friday.

## 2018-02-28 NOTE — PLAN OF CARE
Problem: Occupational Therapy Goal  Goal: Occupational Therapy Goal  Goals to be met by: 3/2/18     Patient will increase functional independence with ADLs by performing:    Feeding with Crisp. MET  UE Dressing with Supervision.--met  LE Dressing with Supervision.--met  Grooming while standing at sink with Supervision.-met  Toileting from toilet with Supervision for hygiene and clothing management. --met  Toilet transfer to toilet with Supervision.--met      Outcome: Outcome(s) achieved Date Met: 02/28/18  Goals met. ALISA Yip  2/28/2018

## 2018-02-28 NOTE — ASSESSMENT & PLAN NOTE
-Transplant Date 2/18/18. S/P washout 2/19.  HTS primary.   -CMV Status:D+/R-   -Rejection status: Moderate Risk  -Rejection episodes: none to date  -Induction: No   -Current immunosuppression: Pred 30 BID, Prograf 3/3 mg BID, Cellcept 1500 mg BID.  -Opportunistic PPx with:Nystatin, valcyte, bactrim.  -Hemodynamically stable. Weaned off   -Net neg over last 24 hours. Continue PO Lasix.   -CTs in place and AV wires in place. CTs with decreased drainage. Will touch base with CTS about removing.   -Echo 2/21 EF 60%, low nl RV fxn.  -S/P EMBx #1 2/27. Results pending.

## 2018-02-28 NOTE — ASSESSMENT & PLAN NOTE
- Continue to monitor. Trending up today.  - No signs of infection. Old driveline site, sternal incision, and Groin CDI.  - No n/v/d/fevers/dysuria. UA clean.

## 2018-02-28 NOTE — NURSING
Post Heart Transplant Education    Visited with pt and wife for heart transplant education. Spent 1 hour going through post heart transplant binder. Pt and wife asked appropriate questions. Pt's wife concerned with CMV and risk to her. Educated on tacrolimus, cellcept and prednisone. Side effects with each and the need for taking them for life. Stressed the importance of communicating with transplant coordinators when pt develops fever, chills, diarrhea, vomiting, nausea. Stressed the importance of hand hygiene, since pt is immunocompromised.   Advised pt I will set up his appts for the next 2 months and give them to him on Friday before discharge, if he is still going to be discharge on Friday.

## 2018-02-28 NOTE — PLAN OF CARE
Problem: Patient Care Overview  Goal: Plan of Care Review  Pt AAOx4, VSS, in bed with upper siderails raised x2, bed in lowest/locked position, call light/personal belongings within reach. Pt instructed to call for assistance. Pt verbalizes understanding. Pt afebrile at this time.  Proper hand hygiene performed before and after pt care. Chest tube dressing changed 2/27, CDI.  Chest tube put out 30cc so far this shift. RLQ and L chest wall sutures CDI. WBC inc 31.09, no orders relating to the issue at this time. Pt on telemetry, Tachy low 100's. Self-meds pulled with encouragement from nurse. Last . Will continue to monitor pt.

## 2018-02-28 NOTE — PROGRESS NOTES
[facility]  Heart Transplant  Progress Note    Patient Name: Mert Samayoa  MRN: [id number]  Admission Date: 2/17/2018  Hospital Length of Stay: 11 days  Attending Physician: Marisel Lundberg MD  Primary Care Provider: Primary Doctor No  Principal Problem:Heart transplanted    Subjective:     Interval History: Feeling much better today. Walking the unit often. Pain much better.      Scheduled Meds:   aspirin  81 mg Oral Daily    famotidine  20 mg Oral BID    heparin (porcine)  5,000 Units Subcutaneous Q8H    magnesium oxide  400 mg Oral BID    mycophenolate  1,500 mg Oral BID    nystatin  500,000 Units Mouth/Throat QID (WM & HS)    polyethylene glycol  17 g Oral BID    predniSONE  30 mg Oral BID    senna-docusate 8.6-50 mg  2 tablet Oral BID    sodium chloride 0.9%  3 mL Intravenous Q8H    sulfamethoxazole-trimethoprim 400-80mg  1 tablet Oral Daily    tacrolimus  3 mg Oral Daily    tacrolimus  3 mg Oral BID    valGANciclovir  900 mg Oral Daily     PRN Meds:sodium chloride, albuterol sulfate, [COMPLETED] calcium gluconate IVPB **AND** calcium gluconate IVPB, dextrose 50%, dextrose 50%, glucagon (human recombinant), glucose, glucose, glycerin adult, insulin aspart U-100, lactulose, magnesium sulfate IVPB, meclizine, ondansetron, oxyCODONE, oxyCODONE-acetaminophen, potassium chloride **AND** potassium chloride **AND** potassium chloride, promethazine (PHENERGAN) IVPB    Review of patient's allergies indicates:  No Known Allergies  Objective:     Vital Signs (Most Recent):  Temp: 98 °F (36.7 °C) (02/28/18 0335)  Pulse: 92 (02/28/18 0700)  Resp: 20 (02/28/18 0335)  BP: 100/63 (02/28/18 0335)  SpO2: 98 % (02/28/18 0335) Vital Signs (24h Range):  Temp:  [96.4 °F (35.8 °C)-98.6 °F (37 °C)] 98 °F (36.7 °C)  Pulse:  [] 92  Resp:  [16-20] 20  SpO2:  [98 %-99 %] 98 %  BP: (100-131)/(62-69) 100/63     No data found.    Body mass index is 26.76 kg/m².      Intake/Output Summary (Last  24 hours) at 02/28/18 0839  Last data filed at 02/28/18 0000   Gross per 24 hour   Intake              480 ml   Output             1980 ml   Net            -1500 ml     Physical Exam   Constitutional: He is oriented to person, place, and time. He appears well-developed and well-nourished.   HENT:   Head: Normocephalic.   Eyes: Pupils are equal, round, and reactive to light.   Neck: Normal range of motion. Neck supple. No JVD present.   Cardiovascular: Regular rhythm.  Tachycardia present.    Pulmonary/Chest: Effort normal and breath sounds normal.   Abdominal: Soft. Bowel sounds are normal. He exhibits no distension. There is no tenderness.   Musculoskeletal: Normal range of motion.   Neurological: He is alert and oriented to person, place, and time.   Skin: Skin is warm and dry.   Psychiatric: He has a normal mood and affect. His behavior is normal.   Nursing note and vitals reviewed.    Significant Labs:  CBC:    Recent Labs  Lab 02/26/18  0600 02/27/18  0528 02/28/18  0536   WBC 24.72* 31.09* 32.80*   RBC 3.26* 3.20* 3.05*   HGB 10.0* 9.7* 9.0*   HCT 28.6* 28.5* 26.1*    356* 357*   MCV 88 89 86   MCH 30.7 30.3 29.5   MCHC 35.0 34.0 34.5     BNP:    Recent Labs  Lab 02/28/18  0536   BNP 1,036*     CMP:    Recent Labs  Lab 02/26/18  0600 02/27/18  0527 02/28/18  0536   * 115* 113*   CALCIUM 9.1 9.3 9.1   ALBUMIN 3.7 3.6 3.3*   PROT 6.4 6.4 6.0   * 131* 130*   K 3.4* 3.5 4.2   CO2 32* 33* 32*   CL 85* 85* 84*   BUN 47* 50* 48*   CREATININE 1.3 1.3 1.2   ALKPHOS 79 72 68   ALT 73* 49* 37   AST 24 21 20   BILITOT 1.0 0.8 0.8      Coagulation:     Recent Labs  Lab 02/26/18  0600 02/27/18  0527 02/28/18  0536   INR 1.2 1.1 1.2     LDH:  No results for input(s): LDH in the last 72 hours.  Microbiology:  Microbiology Results (last 7 days)     Procedure Component Value Units Date/Time    Blood culture [[id number]] Collected:  02/25/18 1256    Order Status:  Completed Specimen:  Blood Updated:  02/27/18  1612     Blood Culture, Routine No Growth to date     Blood Culture, Routine No Growth to date     Blood Culture, Routine No Growth to date    Blood culture [[id number]]     Order Status:  Canceled Specimen:  Blood           I have reviewed all pertinent labs within the past 24 hours.    Estimated Creatinine Clearance: 85.7 mL/min (based on SCr of 1.2 mg/dL).    Diagnostic Results:  I have reviewed all pertinent imaging results/findings within the past 24 hours.    Assessment and Plan:     27 year old WM with DCM (familial, both brothers with heart transplants) with stage D CHFrEF underwent Heartware placement 02/01/17 had post-op atrial flutter 02/11/17 treated with amiodarone presents for OHTx.  0.  VAD implanted on 2/1/17 HVAD RPM 2700.         * Heart transplanted    -Transplant Date 2/18/18. S/P washout 2/19.  HTS primary.   -CMV Status:D+/R-   -Rejection status: Moderate Risk  -Rejection episodes: none to date  -Induction: No   -Current immunosuppression: Pred 30 BID, Prograf 3/3 mg BID, Cellcept 1500 mg BID.  -Opportunistic PPx with:Nystatin, valcyte, bactrim.  -Hemodynamically stable. Weaned off   -Net neg over last 24 hours. Continue PO Lasix.   -CTs in place and AV wires in place. CTs with decreased drainage. Will touch base with CTS about removing.   -Echo 2/21 EF 60%, low nl RV fxn.  -S/P EMBx #1 2/27. Results pending.          Leukocytosis    - Continue to monitor. Trending up today.  - No signs of infection. Old driveline site, sternal incision, and Groin CDI.  - No n/v/d/fevers/dysuria. UA clean.        VTE (venous thromboembolism)    -U/S 2/22 revealed Nonocclusive deep vein thrombosis of the right internal jugular vein with slight extension into the proximal subclavian vein.  -Continue SC Heparin.         Paroxysmal atrial fibrillation    -~30mn episode of afib 2/21 terminated with amio. Likely exacerbated by pain, n/v, volume.   - Currently ST ~100.  - Amio stopped.         Acute hyperglycemia     -insulin per endocrine            Augusto Goldberg NP  Heart Transplant  [facility]

## 2018-02-28 NOTE — SUBJECTIVE & OBJECTIVE
Interval History: Feeling much better today. Walking the unit often. Pain much better.      Scheduled Meds:   aspirin  81 mg Oral Daily    famotidine  20 mg Oral BID    heparin (porcine)  5,000 Units Subcutaneous Q8H    magnesium oxide  400 mg Oral BID    mycophenolate  1,500 mg Oral BID    nystatin  500,000 Units Mouth/Throat QID (WM & HS)    polyethylene glycol  17 g Oral BID    predniSONE  30 mg Oral BID    senna-docusate 8.6-50 mg  2 tablet Oral BID    sodium chloride 0.9%  3 mL Intravenous Q8H    sulfamethoxazole-trimethoprim 400-80mg  1 tablet Oral Daily    tacrolimus  3 mg Oral Daily    tacrolimus  3 mg Oral BID    valGANciclovir  900 mg Oral Daily     PRN Meds:sodium chloride, albuterol sulfate, [COMPLETED] calcium gluconate IVPB **AND** calcium gluconate IVPB, dextrose 50%, dextrose 50%, glucagon (human recombinant), glucose, glucose, glycerin adult, insulin aspart U-100, lactulose, magnesium sulfate IVPB, meclizine, ondansetron, oxyCODONE, oxyCODONE-acetaminophen, potassium chloride **AND** potassium chloride **AND** potassium chloride, promethazine (PHENERGAN) IVPB    Review of patient's allergies indicates:  No Known Allergies  Objective:     Vital Signs (Most Recent):  Temp: 98 °F (36.7 °C) (02/28/18 0335)  Pulse: 92 (02/28/18 0700)  Resp: 20 (02/28/18 0335)  BP: 100/63 (02/28/18 0335)  SpO2: 98 % (02/28/18 0335) Vital Signs (24h Range):  Temp:  [96.4 °F (35.8 °C)-98.6 °F (37 °C)] 98 °F (36.7 °C)  Pulse:  [] 92  Resp:  [16-20] 20  SpO2:  [98 %-99 %] 98 %  BP: (100-131)/(62-69) 100/63     No data found.    Body mass index is 26.76 kg/m².      Intake/Output Summary (Last 24 hours) at 02/28/18 0839  Last data filed at 02/28/18 0000   Gross per 24 hour   Intake              480 ml   Output             1980 ml   Net            -1500 ml     Physical Exam   Constitutional: He is oriented to person, place, and time. He appears well-developed and well-nourished.   HENT:   Head: Normocephalic.    Eyes: Pupils are equal, round, and reactive to light.   Neck: Normal range of motion. Neck supple. No JVD present.   Cardiovascular: Regular rhythm.  Tachycardia present.    Pulmonary/Chest: Effort normal and breath sounds normal.   Abdominal: Soft. Bowel sounds are normal. He exhibits no distension. There is no tenderness.   Musculoskeletal: Normal range of motion.   Neurological: He is alert and oriented to person, place, and time.   Skin: Skin is warm and dry.   Psychiatric: He has a normal mood and affect. His behavior is normal.   Nursing note and vitals reviewed.    Significant Labs:  CBC:    Recent Labs  Lab 02/26/18  0600 02/27/18  0528 02/28/18  0536   WBC 24.72* 31.09* 32.80*   RBC 3.26* 3.20* 3.05*   HGB 10.0* 9.7* 9.0*   HCT 28.6* 28.5* 26.1*    356* 357*   MCV 88 89 86   MCH 30.7 30.3 29.5   MCHC 35.0 34.0 34.5     BNP:    Recent Labs  Lab 02/28/18  0536   BNP 1,036*     CMP:    Recent Labs  Lab 02/26/18  0600 02/27/18  0527 02/28/18  0536   * 115* 113*   CALCIUM 9.1 9.3 9.1   ALBUMIN 3.7 3.6 3.3*   PROT 6.4 6.4 6.0   * 131* 130*   K 3.4* 3.5 4.2   CO2 32* 33* 32*   CL 85* 85* 84*   BUN 47* 50* 48*   CREATININE 1.3 1.3 1.2   ALKPHOS 79 72 68   ALT 73* 49* 37   AST 24 21 20   BILITOT 1.0 0.8 0.8      Coagulation:     Recent Labs  Lab 02/26/18  0600 02/27/18  0527 02/28/18  0536   INR 1.2 1.1 1.2     LDH:  No results for input(s): LDH in the last 72 hours.  Microbiology:  Microbiology Results (last 7 days)     Procedure Component Value Units Date/Time    Blood culture [[id number]] Collected:  02/25/18 1256    Order Status:  Completed Specimen:  Blood Updated:  02/27/18 1612     Blood Culture, Routine No Growth to date     Blood Culture, Routine No Growth to date     Blood Culture, Routine No Growth to date    Blood culture [[id number]]     Order Status:  Canceled Specimen:  Blood           I have reviewed all pertinent labs within the past 24 hours.    Estimated Creatinine Clearance:  85.7 mL/min (based on SCr of 1.2 mg/dL).    Diagnostic Results:  I have reviewed all pertinent imaging results/findings within the past 24 hours.

## 2018-02-28 NOTE — PROGRESS NOTES
SW to pt's room for update. Pt aaox4, calm, and pleasant. Pt reports wife is working today, and will return to hospital on Friday for anticipated D/C. SW informed [facility] staff of plan for d/c on Friday. Pt assigned to [address]. Pt's wife can check in between 10 and 10:30 on Friday. SW completed financial profile with pt, and pt will have a $0 nightly rate. Pt reports no other questions at this time. SW providing ongoing psychosocial and counseling support, education, assistance, resources, and discharge planning as indicated. SW continuing to follow and remains available.   ]

## 2018-02-28 NOTE — PT/OT/SLP PROGRESS
Occupational Therapy   Treatment/Discharge Summary    Name: Mert Samayoa  MRN: [id number]  Admitting Diagnosis:  Heart transplanted  9 Days Post-Op    Recommendations:     Discharge Recommendations: home  Discharge Equipment Recommendations:  none  Barriers to discharge:  None    Subjective     Communicated with: nurse prior to session.  Pain/Comfort:  · Pain Rating 1: 3/10  · Location 1: chest  · Pain Addressed 1: Pre-medicate for activity, Reposition  · Pain Rating Post-Intervention 1: 3/10    Patients cultural, spiritual, Advent conflicts given the current situation: none    Objective:     Patient found with: chest tube (supine in bed)    General Precautions: Standard, fall, sternal   Orthopedic Precautions:N/A   Braces: N/A     Occupational Performance:    Bed Mobility:    · Patient completed Supine to Sit with independence     Functional Mobility/Transfers:  · Patient completed Sit <> Stand Transfer with independence  with  no assistive device   · Functional Mobility: pt ambulated in room with chest tube no AD indep    Activities of Daily Living:  · LB Dressing: modified independence bk socks and shorts    Patient left EOB with all lines intact, call button in reach and nurse present    [facility] 6 Click:  [facility] Total Score: 24    Treatment & Education:  Pt recalled sternal precautions   Pt indep with energy conservation and mobility as tolerated  Education:    Assessment:     Pt has met all OT goals and will not benefit from cont OT at this time.      Plan:     ·   (D/C pt from inpt OT)   · Plan of Care Reviewed with: patient    This Plan of care has been discussed with the patient who was involved in its development and understands and is in agreement with the identified goals and treatment plan    GOALS:    Occupational Therapy Goals     Not on file          Multidisciplinary Problems (Resolved)        Problem: Occupational Therapy Goal    Goal Priority Disciplines Outcome Interventions   Occupational  Therapy Goal   (Resolved)     OT, PT/OT Outcome(s) achieved    Description:  Goals to be met by: 3/2/18     Patient will increase functional independence with ADLs by performing:    Feeding with [place]. MET  UE Dressing with Supervision.--met  LE Dressing with Supervision.--met  Grooming while standing at sink with Supervision.-met  Toileting from toilet with Supervision for hygiene and clothing management. --met  Toilet transfer to toilet with Supervision.--met                        Time Tracking:     OT Date of Treatment: 02/28/18  OT Start Time: 1154  OT Stop Time: 1202  OT Total Time (min): 8 min    Billable Minutes:Therapeutic Activity 8    ALISA Yip  2/28/2018

## 2018-03-01 LAB
ALBUMIN SERPL BCP-MCNC: 3.3 G/DL
ALP SERPL-CCNC: 61 U/L
ALT SERPL W/O P-5'-P-CCNC: 28 U/L
ANION GAP SERPL CALC-SCNC: 12 MMOL/L
ANISOCYTOSIS BLD QL SMEAR: SLIGHT
AST SERPL-CCNC: 16 U/L
BASOPHILS # BLD AUTO: ABNORMAL K/UL
BASOPHILS NFR BLD: 0 %
BILIRUB SERPL-MCNC: 0.6 MG/DL
BUN SERPL-MCNC: 44 MG/DL
CALCIUM SERPL-MCNC: 8.9 MG/DL
CHLORIDE SERPL-SCNC: 89 MMOL/L
CO2 SERPL-SCNC: 31 MMOL/L
CREAT SERPL-MCNC: 1.2 MG/DL
DIFFERENTIAL METHOD: ABNORMAL
EOSINOPHIL # BLD AUTO: ABNORMAL K/UL
EOSINOPHIL NFR BLD: 0 %
ERYTHROCYTE [DISTWIDTH] IN BLOOD BY AUTOMATED COUNT: 13.9 %
EST. GFR  (AFRICAN AMERICAN): >60 ML/MIN/1.73 M^2
EST. GFR  (NON AFRICAN AMERICAN): >60 ML/MIN/1.73 M^2
GLUCOSE SERPL-MCNC: 112 MG/DL
HCT VFR BLD AUTO: 24.4 %
HGB BLD-MCNC: 8.4 G/DL
IMM GRANULOCYTES # BLD AUTO: ABNORMAL K/UL
IMM GRANULOCYTES NFR BLD AUTO: ABNORMAL %
INR PPP: 1.1
LYMPHOCYTES # BLD AUTO: ABNORMAL K/UL
LYMPHOCYTES NFR BLD: 0 %
MAGNESIUM SERPL-MCNC: 1.9 MG/DL
MCH RBC QN AUTO: 30.5 PG
MCHC RBC AUTO-ENTMCNC: 34.4 G/DL
MCV RBC AUTO: 89 FL
MONOCYTES # BLD AUTO: ABNORMAL K/UL
MONOCYTES NFR BLD: 4 %
NEUTROPHILS NFR BLD: 96 %
NRBC BLD-RTO: 0 /100 WBC
PLATELET # BLD AUTO: 332 K/UL
PLATELET BLD QL SMEAR: ABNORMAL
PMV BLD AUTO: 10 FL
POTASSIUM SERPL-SCNC: 4.1 MMOL/L
PROT SERPL-MCNC: 5.7 G/DL
PROTHROMBIN TIME: 11.6 SEC
RBC # BLD AUTO: 2.75 M/UL
SODIUM SERPL-SCNC: 132 MMOL/L
TACROLIMUS BLD-MCNC: 5.4 NG/ML
WBC # BLD AUTO: 27.64 K/UL

## 2018-03-01 PROCEDURE — A4216 STERILE WATER/SALINE, 10 ML: HCPCS | Performed by: THORACIC SURGERY (CARDIOTHORACIC VASCULAR SURGERY)

## 2018-03-01 PROCEDURE — 25000003 PHARM REV CODE 250: Performed by: NURSE PRACTITIONER

## 2018-03-01 PROCEDURE — 85610 PROTHROMBIN TIME: CPT

## 2018-03-01 PROCEDURE — 97116 GAIT TRAINING THERAPY: CPT

## 2018-03-01 PROCEDURE — 85027 COMPLETE CBC AUTOMATED: CPT

## 2018-03-01 PROCEDURE — 83735 ASSAY OF MAGNESIUM: CPT

## 2018-03-01 PROCEDURE — 63600175 PHARM REV CODE 636 W HCPCS: Performed by: NURSE PRACTITIONER

## 2018-03-01 PROCEDURE — 25000003 PHARM REV CODE 250: Performed by: INTERNAL MEDICINE

## 2018-03-01 PROCEDURE — 63600175 PHARM REV CODE 636 W HCPCS: Performed by: PHYSICIAN ASSISTANT

## 2018-03-01 PROCEDURE — 80197 ASSAY OF TACROLIMUS: CPT

## 2018-03-01 PROCEDURE — 80053 COMPREHEN METABOLIC PANEL: CPT

## 2018-03-01 PROCEDURE — 25000003 PHARM REV CODE 250: Performed by: PHYSICIAN ASSISTANT

## 2018-03-01 PROCEDURE — 20600001 HC STEP DOWN PRIVATE ROOM

## 2018-03-01 PROCEDURE — 99232 SBSQ HOSP IP/OBS MODERATE 35: CPT | Mod: ,,, | Performed by: INTERNAL MEDICINE

## 2018-03-01 PROCEDURE — 25000003 PHARM REV CODE 250: Performed by: STUDENT IN AN ORGANIZED HEALTH CARE EDUCATION/TRAINING PROGRAM

## 2018-03-01 PROCEDURE — 85007 BL SMEAR W/DIFF WBC COUNT: CPT

## 2018-03-01 PROCEDURE — 25000003 PHARM REV CODE 250: Performed by: THORACIC SURGERY (CARDIOTHORACIC VASCULAR SURGERY)

## 2018-03-01 PROCEDURE — 63600175 PHARM REV CODE 636 W HCPCS: Performed by: INTERNAL MEDICINE

## 2018-03-01 RX ORDER — TACROLIMUS 1 MG/1
1 CAPSULE ORAL ONCE
Status: COMPLETED | OUTPATIENT
Start: 2018-03-01 | End: 2018-03-01

## 2018-03-01 RX ORDER — TACROLIMUS 1 MG/1
5 CAPSULE ORAL 2 TIMES DAILY
Status: DISCONTINUED | OUTPATIENT
Start: 2018-03-01 | End: 2018-03-05 | Stop reason: HOSPADM

## 2018-03-01 RX ADMIN — ATORVASTATIN CALCIUM 20 MG: 20 TABLET, FILM COATED ORAL at 09:03

## 2018-03-01 RX ADMIN — NYSTATIN 500000 UNITS: 500000 SUSPENSION ORAL at 06:03

## 2018-03-01 RX ADMIN — MYCOPHENOLATE MOFETIL 1500 MG: 250 CAPSULE ORAL at 09:03

## 2018-03-01 RX ADMIN — TACROLIMUS 4 MG: 1 CAPSULE ORAL at 09:03

## 2018-03-01 RX ADMIN — MAGNESIUM OXIDE TAB 400 MG (241.3 MG ELEMENTAL MG) 400 MG: 400 (241.3 MG) TAB at 09:03

## 2018-03-01 RX ADMIN — Medication 3 ML: at 10:03

## 2018-03-01 RX ADMIN — PREDNISONE 30 MG: 10 TABLET ORAL at 09:03

## 2018-03-01 RX ADMIN — PREDNISONE 25 MG: 5 TABLET ORAL at 09:03

## 2018-03-01 RX ADMIN — NYSTATIN 500000 UNITS: 500000 SUSPENSION ORAL at 12:03

## 2018-03-01 RX ADMIN — FAMOTIDINE 20 MG: 20 TABLET, FILM COATED ORAL at 09:03

## 2018-03-01 RX ADMIN — HEPARIN SODIUM 5000 UNITS: 5000 INJECTION, SOLUTION INTRAVENOUS; SUBCUTANEOUS at 09:03

## 2018-03-01 RX ADMIN — ERGOCALCIFEROL 50000 UNITS: 1.25 CAPSULE ORAL at 09:03

## 2018-03-01 RX ADMIN — FERROUS SULFATE TAB EC 325 MG (65 MG FE EQUIVALENT) 325 MG: 325 (65 FE) TABLET DELAYED RESPONSE at 09:03

## 2018-03-01 RX ADMIN — VALGANCICLOVIR 900 MG: 450 TABLET, FILM COATED ORAL at 09:03

## 2018-03-01 RX ADMIN — POLYETHYLENE GLYCOL 3350 17 G: 17 POWDER, FOR SOLUTION ORAL at 09:03

## 2018-03-01 RX ADMIN — HEPARIN SODIUM 5000 UNITS: 5000 INJECTION, SOLUTION INTRAVENOUS; SUBCUTANEOUS at 02:03

## 2018-03-01 RX ADMIN — TACROLIMUS 1 MG: 1 CAPSULE ORAL at 12:03

## 2018-03-01 RX ADMIN — NYSTATIN 500000 UNITS: 500000 SUSPENSION ORAL at 09:03

## 2018-03-01 RX ADMIN — SULFAMETHOXAZOLE AND TRIMETHOPRIM 1 TABLET: 400; 80 TABLET ORAL at 09:03

## 2018-03-01 RX ADMIN — ASPIRIN 81 MG: 81 TABLET, COATED ORAL at 09:03

## 2018-03-01 RX ADMIN — TACROLIMUS 5 MG: 1 CAPSULE ORAL at 06:03

## 2018-03-01 RX ADMIN — Medication 3 ML: at 02:03

## 2018-03-01 NOTE — ASSESSMENT & PLAN NOTE
-Transplant Date 2/18/18. S/P washout 2/19.  HTS primary.   -CMV Status:D+/R-   -Rejection status: Moderate Risk  -Rejection episodes: none to date  -Induction: No   -Current immunosuppression: Pred 30 BID, Prograf 4/4 mg BID, Cellcept 1500 mg BID.  -Opportunistic PPx with:Nystatin, valcyte, bactrim.  -CTs and AV wires in place removed yesterday  -Echo 2/21 EF 60%, low nl RV fxn.  -S/P EMBx #1 2/27. PAMR0, ISHLT 0.  -Likely DC home tomorrow if WBC/Tacro level acceptable.

## 2018-03-01 NOTE — PLAN OF CARE
Problem: Physical Therapy Goal  Goal: Physical Therapy Goal  Goals to be met by: 3/6/18    Patient will increase functional independence with mobility by performin. Supine to sit with Stand-by Assistance. - GOAL MET  2. Sit to stand transfer with Supervision. - GOAL MET  3. Gait  x 220 feet with Supervision. - GOAL MET    4. Ascend/descend 3 stair with right Handrails Supervision. GOAL MET  5. Pt to perf 10x sit<>stand: 28 sec, to demonstrate improvements in B LE mm strength. GOAL MET  6. Pt to perf 6MWT: amb 626', to demonstrate a clinically significant improvement in aerobic capacity.  GOAL MET      Outcome: Outcome(s) achieved Date Met: 18  All goals met

## 2018-03-01 NOTE — ASSESSMENT & PLAN NOTE
- Continue to monitor. Trending down today.  - No signs of infection. Old driveline site, sternal incision, and Groin CDI.  - No n/v/d/fevers/dysuria. UA clean. CT and wires removed.

## 2018-03-01 NOTE — PROGRESS NOTES
[facility]  Heart Transplant  Progress Note    Patient Name: Mert Samayoa  MRN: [id number]  Admission Date: 2/17/2018  Hospital Length of Stay: 12 days  Attending Physician: Marisel Lundberg MD  Primary Care Provider: Primary Doctor No  Principal Problem:Heart transplanted    Subjective:     Interval History: Feeling great today. Walking the unit often.       Scheduled Meds:   aspirin  81 mg Oral Daily    atorvastatin  20 mg Oral Daily    ergocalciferol  50,000 Units Oral Daily    famotidine  20 mg Oral BID    ferrous sulfate  325 mg Oral BID    heparin (porcine)  5,000 Units Subcutaneous Q8H    magnesium oxide  400 mg Oral BID    mycophenolate  1,500 mg Oral BID    nystatin  500,000 Units Mouth/Throat QID (WM & HS)    polyethylene glycol  17 g Oral BID    predniSONE  30 mg Oral BID    senna-docusate 8.6-50 mg  2 tablet Oral BID    sodium chloride 0.9%  3 mL Intravenous Q8H    sulfamethoxazole-trimethoprim 400-80mg  1 tablet Oral Daily    tacrolimus  4 mg Oral BID    valGANciclovir  900 mg Oral Daily     PRN Meds:sodium chloride, albuterol sulfate, [COMPLETED] calcium gluconate IVPB **AND** calcium gluconate IVPB, dextrose 50%, dextrose 50%, glucagon (human recombinant), glucose, glucose, glycerin adult, insulin aspart U-100, lactulose, magnesium sulfate IVPB, meclizine, ondansetron, oxyCODONE, oxyCODONE-acetaminophen, potassium chloride **AND** potassium chloride **AND** potassium chloride, promethazine (PHENERGAN) IVPB    Review of patient's allergies indicates:  No Known Allergies  Objective:     Vital Signs (Most Recent):  Temp: 98 °F (36.7 °C) (03/01/18 0342)  Pulse: 88 (03/01/18 0746)  Resp: 20 (03/01/18 0342)  BP: 115/66 (03/01/18 0342)  SpO2: 97 % (03/01/18 0342) Vital Signs (24h Range):  Temp:  [98 °F (36.7 °C)-99.1 °F (37.3 °C)] 98 °F (36.7 °C)  Pulse:  [] 88  Resp:  [17-20] 20  SpO2:  [96 %-98 %] 97 %  BP: (103-138)/(58-76) 115/66     No data found.    Body  mass index is 26.76 kg/m².      Intake/Output Summary (Last 24 hours) at 03/01/18 0843  Last data filed at 03/01/18 0800   Gross per 24 hour   Intake             1200 ml   Output             2100 ml   Net             -900 ml     Physical Exam   Constitutional: He is oriented to person, place, and time. He appears well-developed and well-nourished.   HENT:   Head: Normocephalic.   Eyes: Pupils are equal, round, and reactive to light.   Neck: Normal range of motion. Neck supple. No JVD present.   Cardiovascular: Regular rhythm.  Tachycardia present.  Exam reveals friction rub.    Pulmonary/Chest: Effort normal and breath sounds normal.   Abdominal: Soft. Bowel sounds are normal. He exhibits no distension. There is no tenderness.   Musculoskeletal: Normal range of motion.   Neurological: He is alert and oriented to person, place, and time.   Skin: Skin is warm and dry.   Psychiatric: He has a normal mood and affect. His behavior is normal.   Nursing note and vitals reviewed.    Significant Labs:  CBC:    Recent Labs  Lab 02/27/18 0528 02/28/18 0536 03/01/18  0519   WBC 31.09* 32.80* 27.64*   RBC 3.20* 3.05* 2.75*   HGB 9.7* 9.0* 8.4*   HCT 28.5* 26.1* 24.4*   * 357* 332   MCV 89 86 89   MCH 30.3 29.5 30.5   MCHC 34.0 34.5 34.4     BNP:    Recent Labs  Lab 02/28/18  0536   BNP 1,036*     CMP:    Recent Labs  Lab 02/27/18 0527 02/28/18  0536 03/01/18  0519   * 113* 112*   CALCIUM 9.3 9.1 8.9   ALBUMIN 3.6 3.3* 3.3*   PROT 6.4 6.0 5.7*   * 130* 132*   K 3.5 4.2 4.1   CO2 33* 32* 31*   CL 85* 84* 89*   BUN 50* 48* 44*   CREATININE 1.3 1.2 1.2   ALKPHOS 72 68 61   ALT 49* 37 28   AST 21 20 16   BILITOT 0.8 0.8 0.6      Coagulation:     Recent Labs  Lab 02/27/18 0527 02/28/18 0536 03/01/18  0519   INR 1.1 1.2 1.1     LDH:  No results for input(s): LDH in the last 72 hours.  Microbiology:  Microbiology Results (last 7 days)     Procedure Component Value Units Date/Time    Blood culture [[id number]]  Collected:  02/28/18 1154    Order Status:  Completed Specimen:  Blood Updated:  02/28/18 2145     Blood Culture, Routine No Growth to date    Blood culture [[id number]] Collected:  02/28/18 1252    Order Status:  Completed Specimen:  Blood Updated:  02/28/18 2145     Blood Culture, Routine No Growth to date    Narrative:       Collection has been rescheduled by KM at 2/28/2018 11:54 Reason:   patient with PT   Collection has been rescheduled by KM at 2/28/2018 11:54 Reason:   patient with PT     Blood culture [[id number]] Collected:  02/25/18 1256    Order Status:  Completed Specimen:  Blood Updated:  02/28/18 1612     Blood Culture, Routine No Growth to date     Blood Culture, Routine No Growth to date     Blood Culture, Routine No Growth to date     Blood Culture, Routine No Growth to date    Blood culture [[id number]]     Order Status:  Canceled Specimen:  Blood           I have reviewed all pertinent labs within the past 24 hours.    Estimated Creatinine Clearance: 85.7 mL/min (based on SCr of 1.2 mg/dL).    Diagnostic Results:  I have reviewed all pertinent imaging results/findings within the past 24 hours.    Assessment and Plan:     27 year old WM with DCM (familial, both brothers with heart transplants) with stage D CHFrEF underwent Heartware placement 02/01/17 had post-op atrial flutter 02/11/17 treated with amiodarone presents for OHTx.  0.  VAD implanted on 2/1/17 HVAD RPM 2700.         * Heart transplanted    -Transplant Date 2/18/18. S/P washout 2/19.  HTS primary.   -CMV Status:D+/R-   -Rejection status: Moderate Risk  -Rejection episodes: none to date  -Induction: No   -Current immunosuppression: Pred 30 BID, Prograf 4/4 mg BID, Cellcept 1500 mg BID.  -Opportunistic PPx with:Nystatin, valcyte, bactrim.  -CTs and AV wires in place removed yesterday  -Echo 2/21 EF 60%, low nl RV fxn.  -S/P EMBx #1 2/27. PAMR0, ISHLT 0.  -Likely DC home tomorrow if WBC/Tacro level acceptable.           Leukocytosis     - Continue to monitor. Trending down today.  - No signs of infection. Old driveline site, sternal incision, and Groin CDI.  - No n/v/d/fevers/dysuria. UA clean. CT and wires removed.         VTE (venous thromboembolism)    -U/S 2/22 revealed Nonocclusive deep vein thrombosis of the right internal jugular vein with slight extension into the proximal subclavian vein.  -Continue SC Heparin.         Paroxysmal atrial fibrillation    -~30mn episode of afib 2/21 terminated with amio. Likely exacerbated by pain, n/v, volume.   - Currently ST ~100.  - Amio stopped.         Acute hyperglycemia    -insulin per endocrine            Augusto Goldberg, NP  Heart Transplant  [facility]

## 2018-03-01 NOTE — SUBJECTIVE & OBJECTIVE
Interval History: Feeling great today. Walking the unit often.       Scheduled Meds:   aspirin  81 mg Oral Daily    atorvastatin  20 mg Oral Daily    ergocalciferol  50,000 Units Oral Daily    famotidine  20 mg Oral BID    ferrous sulfate  325 mg Oral BID    heparin (porcine)  5,000 Units Subcutaneous Q8H    magnesium oxide  400 mg Oral BID    mycophenolate  1,500 mg Oral BID    nystatin  500,000 Units Mouth/Throat QID (WM & HS)    polyethylene glycol  17 g Oral BID    predniSONE  30 mg Oral BID    senna-docusate 8.6-50 mg  2 tablet Oral BID    sodium chloride 0.9%  3 mL Intravenous Q8H    sulfamethoxazole-trimethoprim 400-80mg  1 tablet Oral Daily    tacrolimus  4 mg Oral BID    valGANciclovir  900 mg Oral Daily     PRN Meds:sodium chloride, albuterol sulfate, [COMPLETED] calcium gluconate IVPB **AND** calcium gluconate IVPB, dextrose 50%, dextrose 50%, glucagon (human recombinant), glucose, glucose, glycerin adult, insulin aspart U-100, lactulose, magnesium sulfate IVPB, meclizine, ondansetron, oxyCODONE, oxyCODONE-acetaminophen, potassium chloride **AND** potassium chloride **AND** potassium chloride, promethazine (PHENERGAN) IVPB    Review of patient's allergies indicates:  No Known Allergies  Objective:     Vital Signs (Most Recent):  Temp: 98 °F (36.7 °C) (03/01/18 0342)  Pulse: 88 (03/01/18 0746)  Resp: 20 (03/01/18 0342)  BP: 115/66 (03/01/18 0342)  SpO2: 97 % (03/01/18 0342) Vital Signs (24h Range):  Temp:  [98 °F (36.7 °C)-99.1 °F (37.3 °C)] 98 °F (36.7 °C)  Pulse:  [] 88  Resp:  [17-20] 20  SpO2:  [96 %-98 %] 97 %  BP: (103-138)/(58-76) 115/66     No data found.    Body mass index is 26.76 kg/m².      Intake/Output Summary (Last 24 hours) at 03/01/18 0843  Last data filed at 03/01/18 0800   Gross per 24 hour   Intake             1200 ml   Output             2100 ml   Net             -900 ml     Physical Exam   Constitutional: He is oriented to person, place, and time. He appears  well-developed and well-nourished.   HENT:   Head: Normocephalic.   Eyes: Pupils are equal, round, and reactive to light.   Neck: Normal range of motion. Neck supple. No JVD present.   Cardiovascular: Regular rhythm.  Tachycardia present.  Exam reveals friction rub.    Pulmonary/Chest: Effort normal and breath sounds normal.   Abdominal: Soft. Bowel sounds are normal. He exhibits no distension. There is no tenderness.   Musculoskeletal: Normal range of motion.   Neurological: He is alert and oriented to person, place, and time.   Skin: Skin is warm and dry.   Psychiatric: He has a normal mood and affect. His behavior is normal.   Nursing note and vitals reviewed.    Significant Labs:  CBC:    Recent Labs  Lab 02/27/18 0528 02/28/18 0536 03/01/18 0519   WBC 31.09* 32.80* 27.64*   RBC 3.20* 3.05* 2.75*   HGB 9.7* 9.0* 8.4*   HCT 28.5* 26.1* 24.4*   * 357* 332   MCV 89 86 89   MCH 30.3 29.5 30.5   MCHC 34.0 34.5 34.4     BNP:    Recent Labs  Lab 02/28/18 0536   BNP 1,036*     CMP:    Recent Labs  Lab 02/27/18 0527 02/28/18 0536 03/01/18 0519   * 113* 112*   CALCIUM 9.3 9.1 8.9   ALBUMIN 3.6 3.3* 3.3*   PROT 6.4 6.0 5.7*   * 130* 132*   K 3.5 4.2 4.1   CO2 33* 32* 31*   CL 85* 84* 89*   BUN 50* 48* 44*   CREATININE 1.3 1.2 1.2   ALKPHOS 72 68 61   ALT 49* 37 28   AST 21 20 16   BILITOT 0.8 0.8 0.6      Coagulation:     Recent Labs  Lab 02/27/18 0527 02/28/18 0536 03/01/18 0519   INR 1.1 1.2 1.1     LDH:  No results for input(s): LDH in the last 72 hours.  Microbiology:  Microbiology Results (last 7 days)     Procedure Component Value Units Date/Time    Blood culture [[id number]] Collected:  02/28/18 1154    Order Status:  Completed Specimen:  Blood Updated:  02/28/18 2145     Blood Culture, Routine No Growth to date    Blood culture [[id number]] Collected:  02/28/18 1252    Order Status:  Completed Specimen:  Blood Updated:  02/28/18 2145     Blood Culture, Routine No Growth to date     Narrative:       Collection has been rescheduled by [email or web address] at 2/28/2018 11:54 Reason:   patient with PT   Collection has been rescheduled by [email or web address] at 2/28/2018 11:54 Reason:   patient with PT     Blood culture [[id number]] Collected:  02/25/18 1256    Order Status:  Completed Specimen:  Blood Updated:  02/28/18 1612     Blood Culture, Routine No Growth to date     Blood Culture, Routine No Growth to date     Blood Culture, Routine No Growth to date     Blood Culture, Routine No Growth to date    Blood culture [[id number]]     Order Status:  Canceled Specimen:  Blood           I have reviewed all pertinent labs within the past 24 hours.    Estimated Creatinine Clearance: 85.7 mL/min (based on SCr of 1.2 mg/dL).    Diagnostic Results:  I have reviewed all pertinent imaging results/findings within the past 24 hours.

## 2018-03-01 NOTE — NURSING
Transplant Coordinator Rounds Report: Attended interdisciplinary rounds this morning with the transplant team including SW, physicians, fellows,  mid-level providers, and transplant coordinators.  Discussed plan of care, including DC date of possibly tomorrow or Monday.     Met with pt in his hospital room. Introduced myself and said that I had cared for his two brothers and had known about him through Pre-transplant and VAD. We discussed the differences after VAD vs. Transplant Surgery. He was in a good frame of mind. I asked about his driveline site and he said it was sutured at present. Precious Spicer RN will be his coordinator.

## 2018-03-01 NOTE — PT/OT/SLP PROGRESS
Physical Therapy Treatment/Discharge    Patient Name:  Mert Samayoa   MRN:  [id number]    Recommendations:     Discharge Recommendations:  home   Discharge Equipment Recommendations: none   Barriers to discharge: None    Assessment:     Mert Samayoa is a 28 y.o. male admitted with a medical diagnosis of Heart transplanted.  He presents with the following impairments/functional limitations:  impaired endurance and demonstrates independence with bed mobility, transfers, and ambulation without AD with ability to maintain sternal precautions with all functional mobility. Pt no longer requires acute PT services with recommendations for OPPT. PT encouraged pt to ambulate in hallway 3x/day.     Rehab Prognosis:  good     Recent Surgery: Procedure(s) (LRB):  EXPLORATION-BLEEDING (RE-EXPLORATION) (Bilateral) 10 Days Post-Op    Plan:     During this hospitalization, patient to be seen 3 x/week to address the above listed problems via gait training, therapeutic activities, therapeutic exercises, neuromuscular re-education  · Plan of Care Expires:  03/19/18   Plan of Care Reviewed with: patient    Subjective     Communicated with pt and nurse prior to session.  Patient found supine in bed upon PT entry to room, agreeable to treatment.      Chief Complaint: none  Patient comments/goals: to improve mobility to return home  Pain/Comfort:  · Pain Rating 1: 0/10  · Pain Rating Post-Intervention 1: 0/10    Patients cultural, spiritual, [employer] conflicts given the current situation: none    Objective:     Patient found with: telemetry     General Precautions: Standard, fall, sternal   Orthopedic Precautions:N/A   Braces: N/A     Functional Mobility:  · Bed Mobility:     · Supine to Sit: independence  · Sit to Supine: independence  · Transfers:     · Sit to Stand:  independence with no AD  · 10x sit to  25 sec  · Gait: approx 700 feet without AD including making turns, changing directions, making head turns,  and avoiding foot traffic and obstacles without LOB  · Balance: pt demonstrates ability to accept mod challenges to standing balance without LOB  · Stairs:  Pt ascended/descended 3 stair(s) with No Assistive Device with right handrail with Independent.       AM-PAC 6 CLICK MOBILITY  Turning over in bed (including adjusting bedclothes, sheets and blankets)?: 4  Sitting down on and standing up from a chair with arms (e.g., wheelchair, bedside commode, etc.): 4  Moving from lying on back to sitting on the side of the bed?: 4  Moving to and from a bed to a chair (including a wheelchair)?: 4  Need to walk in hospital room?: 4  Climbing 3-5 steps with a railing?: 4  Total Score: 24       Therapeutic Activities and Exercises:   PT provided pt education on:   -role of PT and POC/DC   -importance of OOB activity   -LE exercises to perform independently   -encourage amb in hallway 3x/day      Patient left supine with call button in reach..    GOALS:    Physical Therapy Goals     Not on file          Multidisciplinary Problems (Resolved)        Problem: Physical Therapy Goal    Goal Priority Disciplines Outcome Goal Variances Interventions   Physical Therapy Goal   (Resolved)     PT/OT, PT Outcome(s) achieved     Description:  Goals to be met by: 3/6/18    Patient will increase functional independence with mobility by performin. Supine to sit with Stand-by Assistance. - GOAL MET  2. Sit to stand transfer with Supervision. - GOAL MET  3. Gait  x 220 feet with Supervision. - GOAL MET    4. Ascend/descend 3 stair with right Handrails Supervision. GOAL MET  5. Pt to perf 10x sit<>stand: 28 sec, to demonstrate improvements in B LE mm strength. GOAL MET  6. Pt to perf 6MWT: amb 626', to demonstrate a clinically significant improvement in aerobic capacity.  GOAL MET                        Time Tracking:     PT Received On: 18  PT Start Time: 1104     PT Stop Time: 1116  PT Total Time (min): 12 min     Billable Minutes:  Gait Training 12    Treatment Type: Treatment  PT/PTA: PT     PTA Visit Number: 0     Sofia Valiente, PT  03/01/2018

## 2018-03-01 NOTE — PLAN OF CARE
Problem: Patient Care Overview  Goal: Plan of Care Review  Outcome: Ongoing (interventions implemented as appropriate)  28 y.o POD 10 heart transplant. CT and pacer wires removed this afternoon. Patient tolerated well. Pt up ad yesenia and independent throughout shift. Self Meds 100%. WBC still elevated. HTS monitoring. Possible D/C Friday.

## 2018-03-02 LAB
ALBUMIN SERPL BCP-MCNC: 3.4 G/DL
ALP SERPL-CCNC: 58 U/L
ALT SERPL W/O P-5'-P-CCNC: 23 U/L
ANION GAP SERPL CALC-SCNC: 11 MMOL/L
ANISOCYTOSIS BLD QL SMEAR: SLIGHT
AST SERPL-CCNC: 18 U/L
BACTERIA BLD CULT: NORMAL
BASO STIPL BLD QL SMEAR: ABNORMAL
BASOPHILS NFR BLD: 0 %
BILIRUB SERPL-MCNC: 0.5 MG/DL
BUN SERPL-MCNC: 38 MG/DL
CALCIUM SERPL-MCNC: 8.9 MG/DL
CHLORIDE SERPL-SCNC: 94 MMOL/L
CO2 SERPL-SCNC: 28 MMOL/L
CREAT SERPL-MCNC: 1.1 MG/DL
DIFFERENTIAL METHOD: ABNORMAL
EOSINOPHIL NFR BLD: 0 %
ERYTHROCYTE [DISTWIDTH] IN BLOOD BY AUTOMATED COUNT: 14.2 %
EST. GFR  (AFRICAN AMERICAN): >60 ML/MIN/1.73 M^2
EST. GFR  (NON AFRICAN AMERICAN): >60 ML/MIN/1.73 M^2
GLUCOSE SERPL-MCNC: 102 MG/DL
HCT VFR BLD AUTO: 23.7 %
HGB BLD-MCNC: 8 G/DL
HYPOCHROMIA BLD QL SMEAR: ABNORMAL
IMM GRANULOCYTES # BLD AUTO: ABNORMAL K/UL
IMM GRANULOCYTES NFR BLD AUTO: ABNORMAL %
INR PPP: 1.1
LYMPHOCYTES NFR BLD: 7 %
MAGNESIUM SERPL-MCNC: 2.1 MG/DL
MCH RBC QN AUTO: 30 PG
MCHC RBC AUTO-ENTMCNC: 33.8 G/DL
MCV RBC AUTO: 89 FL
MONOCYTES NFR BLD: 2 %
NEUTROPHILS NFR BLD: 91 %
NRBC BLD-RTO: 0 /100 WBC
OVALOCYTES BLD QL SMEAR: ABNORMAL
PLATELET # BLD AUTO: 329 K/UL
PLATELET BLD QL SMEAR: ABNORMAL
PMV BLD AUTO: 10.1 FL
POIKILOCYTOSIS BLD QL SMEAR: SLIGHT
POLYCHROMASIA BLD QL SMEAR: ABNORMAL
POTASSIUM SERPL-SCNC: 4.1 MMOL/L
PROT SERPL-MCNC: 5.7 G/DL
PROTHROMBIN TIME: 11.1 SEC
RBC # BLD AUTO: 2.67 M/UL
SODIUM SERPL-SCNC: 133 MMOL/L
TACROLIMUS BLD-MCNC: 11.6 NG/ML
WBC # BLD AUTO: 25.13 K/UL

## 2018-03-02 PROCEDURE — 85610 PROTHROMBIN TIME: CPT

## 2018-03-02 PROCEDURE — 63600175 PHARM REV CODE 636 W HCPCS: Performed by: NURSE PRACTITIONER

## 2018-03-02 PROCEDURE — 25000003 PHARM REV CODE 250: Performed by: PHYSICIAN ASSISTANT

## 2018-03-02 PROCEDURE — 25000003 PHARM REV CODE 250: Performed by: INTERNAL MEDICINE

## 2018-03-02 PROCEDURE — A4216 STERILE WATER/SALINE, 10 ML: HCPCS | Performed by: THORACIC SURGERY (CARDIOTHORACIC VASCULAR SURGERY)

## 2018-03-02 PROCEDURE — 99232 SBSQ HOSP IP/OBS MODERATE 35: CPT | Mod: ,,, | Performed by: INTERNAL MEDICINE

## 2018-03-02 PROCEDURE — 25000003 PHARM REV CODE 250: Performed by: STUDENT IN AN ORGANIZED HEALTH CARE EDUCATION/TRAINING PROGRAM

## 2018-03-02 PROCEDURE — 63600175 PHARM REV CODE 636 W HCPCS: Performed by: PHYSICIAN ASSISTANT

## 2018-03-02 PROCEDURE — 20600001 HC STEP DOWN PRIVATE ROOM

## 2018-03-02 PROCEDURE — 63600175 PHARM REV CODE 636 W HCPCS: Performed by: INTERNAL MEDICINE

## 2018-03-02 PROCEDURE — 83735 ASSAY OF MAGNESIUM: CPT

## 2018-03-02 PROCEDURE — 80197 ASSAY OF TACROLIMUS: CPT

## 2018-03-02 PROCEDURE — 25000003 PHARM REV CODE 250: Performed by: NURSE PRACTITIONER

## 2018-03-02 PROCEDURE — 80053 COMPREHEN METABOLIC PANEL: CPT

## 2018-03-02 PROCEDURE — 25000003 PHARM REV CODE 250: Performed by: THORACIC SURGERY (CARDIOTHORACIC VASCULAR SURGERY)

## 2018-03-02 RX ORDER — DOXYCYCLINE HYCLATE 100 MG
100 TABLET ORAL EVERY 12 HOURS
Status: DISCONTINUED | OUTPATIENT
Start: 2018-03-02 | End: 2018-03-05 | Stop reason: HOSPADM

## 2018-03-02 RX ORDER — FUROSEMIDE 20 MG/1
20 TABLET ORAL ONCE
Status: COMPLETED | OUTPATIENT
Start: 2018-03-02 | End: 2018-03-02

## 2018-03-02 RX ORDER — TACROLIMUS 1 MG/1
5 CAPSULE ORAL EVERY 12 HOURS
Qty: 300 CAPSULE | Refills: 11
Start: 2018-03-02 | End: 2018-03-05

## 2018-03-02 RX ADMIN — HEPARIN SODIUM 5000 UNITS: 5000 INJECTION, SOLUTION INTRAVENOUS; SUBCUTANEOUS at 03:03

## 2018-03-02 RX ADMIN — MAGNESIUM OXIDE TAB 400 MG (241.3 MG ELEMENTAL MG) 400 MG: 400 (241.3 MG) TAB at 08:03

## 2018-03-02 RX ADMIN — FAMOTIDINE 20 MG: 20 TABLET, FILM COATED ORAL at 08:03

## 2018-03-02 RX ADMIN — HEPARIN SODIUM 5000 UNITS: 5000 INJECTION, SOLUTION INTRAVENOUS; SUBCUTANEOUS at 10:03

## 2018-03-02 RX ADMIN — PREDNISONE 25 MG: 5 TABLET ORAL at 08:03

## 2018-03-02 RX ADMIN — FAMOTIDINE 20 MG: 20 TABLET, FILM COATED ORAL at 09:03

## 2018-03-02 RX ADMIN — PREDNISONE 25 MG: 5 TABLET ORAL at 09:03

## 2018-03-02 RX ADMIN — ERGOCALCIFEROL 50000 UNITS: 1.25 CAPSULE ORAL at 09:03

## 2018-03-02 RX ADMIN — NYSTATIN 500000 UNITS: 500000 SUSPENSION ORAL at 06:03

## 2018-03-02 RX ADMIN — NYSTATIN 500000 UNITS: 500000 SUSPENSION ORAL at 12:03

## 2018-03-02 RX ADMIN — ATORVASTATIN CALCIUM 20 MG: 20 TABLET, FILM COATED ORAL at 09:03

## 2018-03-02 RX ADMIN — POLYETHYLENE GLYCOL 3350 17 G: 17 POWDER, FOR SOLUTION ORAL at 09:03

## 2018-03-02 RX ADMIN — FUROSEMIDE 20 MG: 20 TABLET ORAL at 04:03

## 2018-03-02 RX ADMIN — Medication 3 ML: at 06:03

## 2018-03-02 RX ADMIN — SULFAMETHOXAZOLE AND TRIMETHOPRIM 1 TABLET: 400; 80 TABLET ORAL at 09:03

## 2018-03-02 RX ADMIN — HEPARIN SODIUM 5000 UNITS: 5000 INJECTION, SOLUTION INTRAVENOUS; SUBCUTANEOUS at 06:03

## 2018-03-02 RX ADMIN — TACROLIMUS 5 MG: 1 CAPSULE ORAL at 09:03

## 2018-03-02 RX ADMIN — TACROLIMUS 5 MG: 1 CAPSULE ORAL at 06:03

## 2018-03-02 RX ADMIN — FERROUS SULFATE TAB EC 325 MG (65 MG FE EQUIVALENT) 325 MG: 325 (65 FE) TABLET DELAYED RESPONSE at 09:03

## 2018-03-02 RX ADMIN — NYSTATIN 500000 UNITS: 500000 SUSPENSION ORAL at 09:03

## 2018-03-02 RX ADMIN — VALGANCICLOVIR 900 MG: 450 TABLET, FILM COATED ORAL at 09:03

## 2018-03-02 RX ADMIN — Medication 3 ML: at 02:03

## 2018-03-02 RX ADMIN — MYCOPHENOLATE MOFETIL 1500 MG: 250 CAPSULE ORAL at 08:03

## 2018-03-02 RX ADMIN — MYCOPHENOLATE MOFETIL 1500 MG: 250 CAPSULE ORAL at 09:03

## 2018-03-02 RX ADMIN — MAGNESIUM OXIDE TAB 400 MG (241.3 MG ELEMENTAL MG) 400 MG: 400 (241.3 MG) TAB at 09:03

## 2018-03-02 RX ADMIN — ASPIRIN 81 MG: 81 TABLET, COATED ORAL at 09:03

## 2018-03-02 RX ADMIN — DOXYCYCLINE HYCLATE 100 MG: 100 TABLET, COATED ORAL at 04:03

## 2018-03-02 RX ADMIN — FERROUS SULFATE TAB EC 325 MG (65 MG FE EQUIVALENT) 325 MG: 325 (65 FE) TABLET DELAYED RESPONSE at 08:03

## 2018-03-02 NOTE — PLAN OF CARE
Problem: Patient Care Overview  Goal: Plan of Care Review  Outcome: Ongoing (interventions implemented as appropriate)  Pt tolerating all treatments and therapies well, pt is AAOx4 and VSS, pt did not report any pain overnight, pt bed is low and locked and call bell is in reach, pt instructed to call if any assistance is needed, pt remained afebrile and showed no new signs of infection, no acute events noted or reported, will continue to monitor.

## 2018-03-02 NOTE — NURSING
Transplant Coordinator Rounds Report: Attended interdisciplinary rounds this morning with the transplant team including SW, physicians, fellows,  mid-level providers, and transplant coordinators.  Discussed plan of care, including DC date which has been moved to at least Monday. He has new serous fluid leaking from sternal wound. CTS to examine and advise.

## 2018-03-02 NOTE — PROGRESS NOTES
[facility]  Heart Transplant  Progress Note    Patient Name: Mert Samayoa  MRN: [id number]  Admission Date: 2/17/2018  Hospital Length of Stay: 13 days  Attending Physician: Marisel Lundberg MD  Primary Care Provider: Primary Doctor No  Principal Problem:Heart transplanted    Subjective:     Interval History: overnight started having leaking from sternum, clear fluid    Continuous Infusions:  Scheduled Meds:   aspirin  81 mg Oral Daily    atorvastatin  20 mg Oral Daily    ergocalciferol  50,000 Units Oral Daily    famotidine  20 mg Oral BID    ferrous sulfate  325 mg Oral BID    heparin (porcine)  5,000 Units Subcutaneous Q8H    magnesium oxide  400 mg Oral BID    mycophenolate  1,500 mg Oral BID    nystatin  500,000 Units Mouth/Throat QID (WM & HS)    polyethylene glycol  17 g Oral BID    predniSONE  25 mg Oral BID    senna-docusate 8.6-50 mg  2 tablet Oral BID    sodium chloride 0.9%  3 mL Intravenous Q8H    sulfamethoxazole-trimethoprim 400-80mg  1 tablet Oral Daily    tacrolimus  5 mg Oral BID    valGANciclovir  900 mg Oral Daily     PRN Meds:sodium chloride, albuterol sulfate, [COMPLETED] calcium gluconate IVPB **AND** calcium gluconate IVPB, dextrose 50%, dextrose 50%, glucagon (human recombinant), glucose, glucose, glycerin adult, insulin aspart U-100, lactulose, magnesium sulfate IVPB, meclizine, ondansetron, oxyCODONE, oxyCODONE-acetaminophen, potassium chloride **AND** potassium chloride **AND** potassium chloride, promethazine (PHENERGAN) IVPB    Review of patient's allergies indicates:  No Known Allergies  Objective:     Vital Signs (Most Recent):  Temp: 98 °F (36.7 °C) (03/02/18 0336)  Pulse: 96 (03/02/18 0830)  Resp: 18 (03/02/18 0830)  BP: 136/77 (03/02/18 0830)  SpO2: 100 % (03/02/18 0830) Vital Signs (24h Range):  Temp:  [98 °F (36.7 °C)-98.9 °F (37.2 °C)] 98 °F (36.7 °C)  Pulse:  [] 96  Resp:  [16-20] 18  SpO2:  [95 %-100 %] 100 %  BP:  (118-143)/(61-77) 136/77     No data found.    Body mass index is 26.76 kg/m².      Intake/Output Summary (Last 24 hours) at 03/02/18 1103  Last data filed at 03/02/18 0900   Gross per 24 hour   Intake             1440 ml   Output             1500 ml   Net              -60 ml       Hemodynamic Parameters:           Physical Exam   Constitutional: He is oriented to person, place, and time. He appears well-developed and well-nourished.   HENT:   Head: Normocephalic.   Eyes: Pupils are equal, round, and reactive to light.   Neck: Normal range of motion. Neck supple. No JVD present.   Cardiovascular: Regular rhythm.  Tachycardia present.  Exam reveals friction rub.    Pulmonary/Chest: Effort normal and breath sounds normal.   Abdominal: Soft. Bowel sounds are normal. He exhibits no distension. There is no tenderness.   Musculoskeletal: Normal range of motion.   Neurological: He is alert and oriented to person, place, and time.   Skin: Skin is warm and dry.   Psychiatric: He has a normal mood and affect. His behavior is normal.   Nursing note and vitals reviewed.      Significant Labs:  CBC:    Recent Labs  Lab 02/28/18  0536 03/01/18  0519 03/02/18  0529   WBC 32.80* 27.64* 25.13*   RBC 3.05* 2.75* 2.67*   HGB 9.0* 8.4* 8.0*   HCT 26.1* 24.4* 23.7*   * 332 329   MCV 86 89 89   MCH 29.5 30.5 30.0   MCHC 34.5 34.4 33.8     BNP:    Recent Labs  Lab 02/28/18  0536   BNP 1,036*     CMP:    Recent Labs  Lab 02/28/18  0536 03/01/18  0519 03/02/18  0529   * 112* 102   CALCIUM 9.1 8.9 8.9   ALBUMIN 3.3* 3.3* 3.4*   PROT 6.0 5.7* 5.7*   * 132* 133*   K 4.2 4.1 4.1   CO2 32* 31* 28   CL 84* 89* 94*   BUN 48* 44* 38*   CREATININE 1.2 1.2 1.1   ALKPHOS 68 61 58   ALT 37 28 23   AST 20 16 18   BILITOT 0.8 0.6 0.5      Coagulation:     Recent Labs  Lab 02/28/18  0536 03/01/18  0519 03/02/18  0529   INR 1.2 1.1 1.1     LDH:  No results for input(s): LDH in the last 72 hours.  Microbiology:  Microbiology Results  (last 7 days)     Procedure Component Value Units Date/Time    Blood culture [[id number]] Collected:  02/28/18 1154    Order Status:  Completed Specimen:  Blood Updated:  03/01/18 1612     Blood Culture, Routine No Growth to date     Blood Culture, Routine No Growth to date    Blood culture [[id number]] Collected:  02/28/18 1252    Order Status:  Completed Specimen:  Blood Updated:  03/01/18 1612     Blood Culture, Routine No Growth to date     Blood Culture, Routine No Growth to date    Narrative:       Collection has been rescheduled by [email or web address] at 2/28/2018 11:54 Reason:   patient with PT   Collection has been rescheduled by [email or web address] at 2/28/2018 11:54 Reason:   patient with PT     Blood culture [[id number]] Collected:  02/25/18 1256    Order Status:  Completed Specimen:  Blood Updated:  03/01/18 1612     Blood Culture, Routine No Growth to date     Blood Culture, Routine No Growth to date     Blood Culture, Routine No Growth to date     Blood Culture, Routine No Growth to date     Blood Culture, Routine No Growth to date    Blood culture [[id number]]     Order Status:  Canceled Specimen:  Blood           I have reviewed all pertinent labs within the past 24 hours.    Estimated Creatinine Clearance: 93.5 mL/min (based on SCr of 1.1 mg/dL).    Diagnostic Results:  I have reviewed and interpreted all pertinent imaging results/findings within the past 24 hours.    Assessment and Plan:     27 year old WM with DCM (familial, both brothers with heart transplants) with stage D CHFrEF underwent Heartware placement 02/01/17 had post-op atrial flutter 02/11/17 treated with amiodarone presents for OHTx.  0.  VAD implanted on 2/1/17 HVAD RPM 2700.         * Heart transplanted    -Transplant Date 2/18/18. S/P washout 2/19.  HTS primary.   -CMV Status:D+/R-   -Rejection status: Moderate Risk  -Rejection episodes: none to date  -Induction: No   -Current immunosuppression: Pred 30 BID, Prograf 5/5 mg BID, Cellcept 1500 mg  BID.  -Opportunistic PPx with:Nystatin, valcyte, bactrim.  -CTs and AV wires in place removed   -Echo 2/21 EF 60%, low nl RV fxn.  -S/P EMBx #1 2/27. PAMR0, ISHLT 0.  -ask CTS to evaluate chest leaking           VTE (venous thromboembolism)    -U/S 2/22 revealed Nonocclusive deep vein thrombosis of the right internal jugular vein with slight extension into the proximal subclavian vein.  -Continue SC Heparin.         Paroxysmal atrial fibrillation    -~30mn episode of afib 2/21 terminated with amio. Likely exacerbated by pain, n/v, volume.   - Currently ST ~100.  - Amio stopped.         Leukocytosis    - Continue to monitor. Trending down today.  - No signs of infection. Old driveline site, sternal incision, and Groin CDI.  - No n/v/d/fevers/dysuria. UA clean. CT and wires removed.         Acute hyperglycemia    -insulin per endocrine            HAKEEM Alvarez  Heart Transplant  [facility]

## 2018-03-02 NOTE — NURSING
Dr Lundberg and team to visit, patient returning from bathroom and draining mod amount serous fluid from lower end of sternal incision, redressed with dry gauze and CTS notified by Dr. Brown to come assess.

## 2018-03-02 NOTE — PROGRESS NOTES
EDUCATION NOTE:    Met with Mert Samayoa and his caregivers to provide teaching re: immunosuppressant medications.  Reviewed medication section of the Heart Transplant Education book that was provided.  Emphasized the importance of compliance, role of the blue medication card, concerns for drug interactions, and process of obtaining refills.  Counseled regarding Prograf, Cellcept , prednisone, including directions for use, monitoring, how to handle missed doses, and side effects.  He verbalized understanding and had the opportunity to ask questions.

## 2018-03-02 NOTE — ASSESSMENT & PLAN NOTE
-Transplant Date 2/18/18. S/P washout 2/19.  HTS primary.   -CMV Status:D+/R-   -Rejection status: Moderate Risk  -Rejection episodes: none to date  -Induction: No   -Current immunosuppression: Pred 30 BID, Prograf 5/5 mg BID, Cellcept 1500 mg BID.  -Opportunistic PPx with:Nystatin, valcyte, bactrim.  -CTs and AV wires in place removed   -Echo 2/21 EF 60%, low nl RV fxn.  -S/P EMBx #1 2/27. PAMR0, ISHLT 0.  -ask CTS to evaluate chest leaking

## 2018-03-02 NOTE — PROGRESS NOTES
Update:    SW to pt's room for update. Pt aaox4, calm, and pleasant. Pt's wife's grandparents present as well. Pt reports wife will be driving to hospital soon. Pt reports coping adequately at this time. Pt reports no needs from SW and none identified at this time. Pt hoping for D/C on Monday. SW providing ongoing psychosocial and counseling support, education, assistance, resources, and discharge planning as indicated. SW continuing to follow and remains available.

## 2018-03-02 NOTE — PLAN OF CARE
Called to bedside for drainage from sternal incision. No fluid was draining at the time of inspection; however, there were several serous soaked gauze. Possibly a seroma which drained and will improve or pleural fluid leaking through path of least resistance. Sternum is stable and there are no signs of infection at this time. Fluid on gauze appears serous. Recommend keeping the area clean and dry with gauze bandage and discharging on 10 day course of doxycycline for prophylaxis. If patient has worsening drainage, purulent drainage, or redness of the area he should call or come to our clinic.    Fina Tolliver MD, PGY-2  General Surgery  [phone]

## 2018-03-02 NOTE — SUBJECTIVE & OBJECTIVE
Interval History: overnight started having leaking from sternum, clear fluid    Continuous Infusions:  Scheduled Meds:   aspirin  81 mg Oral Daily    atorvastatin  20 mg Oral Daily    ergocalciferol  50,000 Units Oral Daily    famotidine  20 mg Oral BID    ferrous sulfate  325 mg Oral BID    heparin (porcine)  5,000 Units Subcutaneous Q8H    magnesium oxide  400 mg Oral BID    mycophenolate  1,500 mg Oral BID    nystatin  500,000 Units Mouth/Throat QID (WM & HS)    polyethylene glycol  17 g Oral BID    predniSONE  25 mg Oral BID    senna-docusate 8.6-50 mg  2 tablet Oral BID    sodium chloride 0.9%  3 mL Intravenous Q8H    sulfamethoxazole-trimethoprim 400-80mg  1 tablet Oral Daily    tacrolimus  5 mg Oral BID    valGANciclovir  900 mg Oral Daily     PRN Meds:sodium chloride, albuterol sulfate, [COMPLETED] calcium gluconate IVPB **AND** calcium gluconate IVPB, dextrose 50%, dextrose 50%, glucagon (human recombinant), glucose, glucose, glycerin adult, insulin aspart U-100, lactulose, magnesium sulfate IVPB, meclizine, ondansetron, oxyCODONE, oxyCODONE-acetaminophen, potassium chloride **AND** potassium chloride **AND** potassium chloride, promethazine (PHENERGAN) IVPB    Review of patient's allergies indicates:  No Known Allergies  Objective:     Vital Signs (Most Recent):  Temp: 98 °F (36.7 °C) (03/02/18 0336)  Pulse: 96 (03/02/18 0830)  Resp: 18 (03/02/18 0830)  BP: 136/77 (03/02/18 0830)  SpO2: 100 % (03/02/18 0830) Vital Signs (24h Range):  Temp:  [98 °F (36.7 °C)-98.9 °F (37.2 °C)] 98 °F (36.7 °C)  Pulse:  [] 96  Resp:  [16-20] 18  SpO2:  [95 %-100 %] 100 %  BP: (118-143)/(61-77) 136/77     No data found.    Body mass index is 26.76 kg/m².      Intake/Output Summary (Last 24 hours) at 03/02/18 1103  Last data filed at 03/02/18 0900   Gross per 24 hour   Intake             1440 ml   Output             1500 ml   Net              -60 ml       Hemodynamic Parameters:           Physical Exam    Constitutional: He is oriented to person, place, and time. He appears well-developed and well-nourished.   HENT:   Head: Normocephalic.   Eyes: Pupils are equal, round, and reactive to light.   Neck: Normal range of motion. Neck supple. No JVD present.   Cardiovascular: Regular rhythm.  Tachycardia present.  Exam reveals friction rub.    Pulmonary/Chest: Effort normal and breath sounds normal.   Abdominal: Soft. Bowel sounds are normal. He exhibits no distension. There is no tenderness.   Musculoskeletal: Normal range of motion.   Neurological: He is alert and oriented to person, place, and time.   Skin: Skin is warm and dry.   Psychiatric: He has a normal mood and affect. His behavior is normal.   Nursing note and vitals reviewed.      Significant Labs:  CBC:    Recent Labs  Lab 02/28/18 0536 03/01/18 0519 03/02/18 0529   WBC 32.80* 27.64* 25.13*   RBC 3.05* 2.75* 2.67*   HGB 9.0* 8.4* 8.0*   HCT 26.1* 24.4* 23.7*   * 332 329   MCV 86 89 89   MCH 29.5 30.5 30.0   MCHC 34.5 34.4 33.8     BNP:    Recent Labs  Lab 02/28/18 0536   BNP 1,036*     CMP:    Recent Labs  Lab 02/28/18 0536 03/01/18 0519 03/02/18 0529   * 112* 102   CALCIUM 9.1 8.9 8.9   ALBUMIN 3.3* 3.3* 3.4*   PROT 6.0 5.7* 5.7*   * 132* 133*   K 4.2 4.1 4.1   CO2 32* 31* 28   CL 84* 89* 94*   BUN 48* 44* 38*   CREATININE 1.2 1.2 1.1   ALKPHOS 68 61 58   ALT 37 28 23   AST 20 16 18   BILITOT 0.8 0.6 0.5      Coagulation:     Recent Labs  Lab 02/28/18 0536 03/01/18 0519 03/02/18 0529   INR 1.2 1.1 1.1     LDH:  No results for input(s): LDH in the last 72 hours.  Microbiology:  Microbiology Results (last 7 days)     Procedure Component Value Units Date/Time    Blood culture [[id number]] Collected:  02/28/18 1154    Order Status:  Completed Specimen:  Blood Updated:  03/01/18 1612     Blood Culture, Routine No Growth to date     Blood Culture, Routine No Growth to date    Blood culture [[id number]] Collected:  02/28/18 1252     Order Status:  Completed Specimen:  Blood Updated:  03/01/18 1612     Blood Culture, Routine No Growth to date     Blood Culture, Routine No Growth to date    Narrative:       Collection has been rescheduled by [email or web address] at 2/28/2018 11:54 Reason:   patient with PT   Collection has been rescheduled by KM at 2/28/2018 11:54 Reason:   patient with PT     Blood culture [[id number]] Collected:  02/25/18 1256    Order Status:  Completed Specimen:  Blood Updated:  03/01/18 1612     Blood Culture, Routine No Growth to date     Blood Culture, Routine No Growth to date     Blood Culture, Routine No Growth to date     Blood Culture, Routine No Growth to date     Blood Culture, Routine No Growth to date    Blood culture [[id number]]     Order Status:  Canceled Specimen:  Blood           I have reviewed all pertinent labs within the past 24 hours.    Estimated Creatinine Clearance: 93.5 mL/min (based on SCr of 1.1 mg/dL).    Diagnostic Results:  I have reviewed and interpreted all pertinent imaging results/findings within the past 24 hours.

## 2018-03-03 LAB
ALBUMIN SERPL BCP-MCNC: 3.1 G/DL
ALP SERPL-CCNC: 59 U/L
ALT SERPL W/O P-5'-P-CCNC: 28 U/L
ANION GAP SERPL CALC-SCNC: 10 MMOL/L
AST SERPL-CCNC: 18 U/L
BASOPHILS # BLD AUTO: 0.03 K/UL
BASOPHILS NFR BLD: 0.1 %
BILIRUB SERPL-MCNC: 0.5 MG/DL
BUN SERPL-MCNC: 37 MG/DL
CALCIUM SERPL-MCNC: 8.7 MG/DL
CHLORIDE SERPL-SCNC: 97 MMOL/L
CO2 SERPL-SCNC: 28 MMOL/L
CREAT SERPL-MCNC: 1.1 MG/DL
DIFFERENTIAL METHOD: ABNORMAL
EOSINOPHIL # BLD AUTO: 0 K/UL
EOSINOPHIL NFR BLD: 0 %
ERYTHROCYTE [DISTWIDTH] IN BLOOD BY AUTOMATED COUNT: 14.2 %
EST. GFR  (AFRICAN AMERICAN): >60 ML/MIN/1.73 M^2
EST. GFR  (NON AFRICAN AMERICAN): >60 ML/MIN/1.73 M^2
GLUCOSE SERPL-MCNC: 109 MG/DL
HCT VFR BLD AUTO: 23.2 %
HGB BLD-MCNC: 7.6 G/DL
IMM GRANULOCYTES # BLD AUTO: 1.03 K/UL
IMM GRANULOCYTES NFR BLD AUTO: 4.7 %
INR PPP: 1.1
LYMPHOCYTES # BLD AUTO: 1.2 K/UL
LYMPHOCYTES NFR BLD: 5.5 %
MAGNESIUM SERPL-MCNC: 1.8 MG/DL
MCH RBC QN AUTO: 29.3 PG
MCHC RBC AUTO-ENTMCNC: 32.8 G/DL
MCV RBC AUTO: 90 FL
MONOCYTES # BLD AUTO: 1 K/UL
MONOCYTES NFR BLD: 4.6 %
NEUTROPHILS # BLD AUTO: 18.7 K/UL
NEUTROPHILS NFR BLD: 85.1 %
NRBC BLD-RTO: 0 /100 WBC
PLATELET # BLD AUTO: 341 K/UL
PMV BLD AUTO: 9.8 FL
POTASSIUM SERPL-SCNC: 4.7 MMOL/L
PROT SERPL-MCNC: 5.5 G/DL
PROTHROMBIN TIME: 11.6 SEC
RBC # BLD AUTO: 2.59 M/UL
SODIUM SERPL-SCNC: 135 MMOL/L
TACROLIMUS BLD-MCNC: 9.5 NG/ML
WBC # BLD AUTO: 21.97 K/UL

## 2018-03-03 PROCEDURE — 63600175 PHARM REV CODE 636 W HCPCS: Performed by: PHYSICIAN ASSISTANT

## 2018-03-03 PROCEDURE — 25000003 PHARM REV CODE 250: Performed by: INTERNAL MEDICINE

## 2018-03-03 PROCEDURE — 80053 COMPREHEN METABOLIC PANEL: CPT

## 2018-03-03 PROCEDURE — 83735 ASSAY OF MAGNESIUM: CPT

## 2018-03-03 PROCEDURE — 63600175 PHARM REV CODE 636 W HCPCS: Performed by: NURSE PRACTITIONER

## 2018-03-03 PROCEDURE — 80197 ASSAY OF TACROLIMUS: CPT

## 2018-03-03 PROCEDURE — 20600001 HC STEP DOWN PRIVATE ROOM

## 2018-03-03 PROCEDURE — 99232 SBSQ HOSP IP/OBS MODERATE 35: CPT | Mod: ,,, | Performed by: INTERNAL MEDICINE

## 2018-03-03 PROCEDURE — 85610 PROTHROMBIN TIME: CPT

## 2018-03-03 PROCEDURE — 25000003 PHARM REV CODE 250: Performed by: NURSE PRACTITIONER

## 2018-03-03 PROCEDURE — A4216 STERILE WATER/SALINE, 10 ML: HCPCS | Performed by: THORACIC SURGERY (CARDIOTHORACIC VASCULAR SURGERY)

## 2018-03-03 PROCEDURE — 85025 COMPLETE CBC W/AUTO DIFF WBC: CPT

## 2018-03-03 PROCEDURE — 25000003 PHARM REV CODE 250: Performed by: PHYSICIAN ASSISTANT

## 2018-03-03 PROCEDURE — 25000003 PHARM REV CODE 250: Performed by: THORACIC SURGERY (CARDIOTHORACIC VASCULAR SURGERY)

## 2018-03-03 PROCEDURE — 63600175 PHARM REV CODE 636 W HCPCS: Performed by: INTERNAL MEDICINE

## 2018-03-03 RX ADMIN — TACROLIMUS 5 MG: 1 CAPSULE ORAL at 08:03

## 2018-03-03 RX ADMIN — ERGOCALCIFEROL 50000 UNITS: 1.25 CAPSULE ORAL at 08:03

## 2018-03-03 RX ADMIN — FERROUS SULFATE TAB EC 325 MG (65 MG FE EQUIVALENT) 325 MG: 325 (65 FE) TABLET DELAYED RESPONSE at 08:03

## 2018-03-03 RX ADMIN — MYCOPHENOLATE MOFETIL 1500 MG: 250 CAPSULE ORAL at 08:03

## 2018-03-03 RX ADMIN — NYSTATIN 500000 UNITS: 500000 SUSPENSION ORAL at 08:03

## 2018-03-03 RX ADMIN — NYSTATIN 500000 UNITS: 500000 SUSPENSION ORAL at 09:03

## 2018-03-03 RX ADMIN — FAMOTIDINE 20 MG: 20 TABLET, FILM COATED ORAL at 09:03

## 2018-03-03 RX ADMIN — ASPIRIN 81 MG: 81 TABLET, COATED ORAL at 08:03

## 2018-03-03 RX ADMIN — VALGANCICLOVIR 900 MG: 450 TABLET, FILM COATED ORAL at 08:03

## 2018-03-03 RX ADMIN — FERROUS SULFATE TAB EC 325 MG (65 MG FE EQUIVALENT) 325 MG: 325 (65 FE) TABLET DELAYED RESPONSE at 09:03

## 2018-03-03 RX ADMIN — SULFAMETHOXAZOLE AND TRIMETHOPRIM 1 TABLET: 400; 80 TABLET ORAL at 08:03

## 2018-03-03 RX ADMIN — Medication 3 ML: at 02:03

## 2018-03-03 RX ADMIN — NYSTATIN 500000 UNITS: 500000 SUSPENSION ORAL at 12:03

## 2018-03-03 RX ADMIN — MYCOPHENOLATE MOFETIL 1500 MG: 250 CAPSULE ORAL at 09:03

## 2018-03-03 RX ADMIN — OXYCODONE HYDROCHLORIDE 5 MG: 5 TABLET ORAL at 10:03

## 2018-03-03 RX ADMIN — PREDNISONE 25 MG: 5 TABLET ORAL at 09:03

## 2018-03-03 RX ADMIN — FAMOTIDINE 20 MG: 20 TABLET, FILM COATED ORAL at 08:03

## 2018-03-03 RX ADMIN — OXYCODONE HYDROCHLORIDE AND ACETAMINOPHEN 1 TABLET: 7.5; 325 TABLET ORAL at 08:03

## 2018-03-03 RX ADMIN — MAGNESIUM OXIDE TAB 400 MG (241.3 MG ELEMENTAL MG) 400 MG: 400 (241.3 MG) TAB at 08:03

## 2018-03-03 RX ADMIN — DOXYCYCLINE HYCLATE 100 MG: 100 TABLET, COATED ORAL at 09:03

## 2018-03-03 RX ADMIN — PREDNISONE 25 MG: 5 TABLET ORAL at 08:03

## 2018-03-03 RX ADMIN — TACROLIMUS 5 MG: 1 CAPSULE ORAL at 05:03

## 2018-03-03 RX ADMIN — OXYCODONE HYDROCHLORIDE 5 MG: 5 TABLET ORAL at 12:03

## 2018-03-03 RX ADMIN — NYSTATIN 500000 UNITS: 500000 SUSPENSION ORAL at 06:03

## 2018-03-03 RX ADMIN — DOXYCYCLINE HYCLATE 100 MG: 100 TABLET, COATED ORAL at 08:03

## 2018-03-03 RX ADMIN — ATORVASTATIN CALCIUM 20 MG: 20 TABLET, FILM COATED ORAL at 08:03

## 2018-03-03 RX ADMIN — MAGNESIUM OXIDE TAB 400 MG (241.3 MG ELEMENTAL MG) 400 MG: 400 (241.3 MG) TAB at 09:03

## 2018-03-03 RX ADMIN — HEPARIN SODIUM 5000 UNITS: 5000 INJECTION, SOLUTION INTRAVENOUS; SUBCUTANEOUS at 05:03

## 2018-03-03 NOTE — SUBJECTIVE & OBJECTIVE
Interval History: less leakage from chest but has not been sitting up yet    Continuous Infusions:  Scheduled Meds:   aspirin  81 mg Oral Daily    atorvastatin  20 mg Oral Daily    doxycycline  100 mg Oral Q12H    ergocalciferol  50,000 Units Oral Daily    famotidine  20 mg Oral BID    ferrous sulfate  325 mg Oral BID    magnesium oxide  400 mg Oral BID    mycophenolate  1,500 mg Oral BID    nystatin  500,000 Units Mouth/Throat QID (WM & HS)    polyethylene glycol  17 g Oral BID    predniSONE  25 mg Oral BID    senna-docusate 8.6-50 mg  2 tablet Oral BID    sodium chloride 0.9%  3 mL Intravenous Q8H    sulfamethoxazole-trimethoprim 400-80mg  1 tablet Oral Daily    tacrolimus  5 mg Oral BID    valGANciclovir  900 mg Oral Daily     PRN Meds:sodium chloride, albuterol sulfate, [COMPLETED] calcium gluconate IVPB **AND** calcium gluconate IVPB, dextrose 50%, dextrose 50%, glucagon (human recombinant), glucose, glucose, glycerin adult, insulin aspart U-100, lactulose, magnesium sulfate IVPB, meclizine, ondansetron, oxyCODONE, oxyCODONE-acetaminophen, potassium chloride **AND** potassium chloride **AND** potassium chloride, promethazine (PHENERGAN) IVPB    Review of patient's allergies indicates:  No Known Allergies  Objective:     Vital Signs (Most Recent):  Temp: 98.2 °F (36.8 °C) (03/03/18 1224)  Pulse: 89 (03/03/18 1224)  Resp: 18 (03/03/18 1224)  BP: (!) 115/58 (03/03/18 1224)  SpO2: 97 % (03/03/18 1224) Vital Signs (24h Range):  Temp:  [97.6 °F (36.4 °C)-98.6 °F (37 °C)] 98.2 °F (36.8 °C)  Pulse:  [84-99] 89  Resp:  [16-18] 18  SpO2:  [97 %-100 %] 97 %  BP: (115-140)/(57-76) 115/58     Patient Vitals for the past 72 hrs (Last 3 readings):   Weight   03/03/18 0500 67.7 kg (149 lb 4 oz)     Body mass index is 23.38 kg/m².      Intake/Output Summary (Last 24 hours) at 03/03/18 1224  Last data filed at 03/03/18 0500   Gross per 24 hour   Intake              240 ml   Output             1300 ml   Net             -1060 ml       Hemodynamic Parameters:         Physical Exam   Constitutional: He is oriented to person, place, and time. He appears well-developed and well-nourished.   HENT:   Head: Normocephalic.   Eyes: Pupils are equal, round, and reactive to light.   Neck: Normal range of motion. Neck supple. No JVD present.   Cardiovascular: Regular rhythm.  Tachycardia present.  Exam reveals friction rub.    Pulmonary/Chest: Effort normal and breath sounds normal.   Abdominal: Soft. Bowel sounds are normal. He exhibits no distension. There is no tenderness.   Musculoskeletal: Normal range of motion.   Neurological: He is alert and oriented to person, place, and time.   Skin: Skin is warm and dry.   Psychiatric: He has a normal mood and affect. His behavior is normal.   Nursing note and vitals reviewed.      Significant Labs:  CBC:    Recent Labs  Lab 03/01/18 0519 03/02/18 0529 03/03/18 0412   WBC 27.64* 25.13* 21.97*   RBC 2.75* 2.67* 2.59*   HGB 8.4* 8.0* 7.6*   HCT 24.4* 23.7* 23.2*    329 341   MCV 89 89 90   MCH 30.5 30.0 29.3   MCHC 34.4 33.8 32.8     BNP:    Recent Labs  Lab 02/28/18  0536   BNP 1,036*     CMP:    Recent Labs  Lab 03/01/18 0519 03/02/18  0529 03/03/18  0412   * 102 109   CALCIUM 8.9 8.9 8.7   ALBUMIN 3.3* 3.4* 3.1*   PROT 5.7* 5.7* 5.5*   * 133* 135*   K 4.1 4.1 4.7   CO2 31* 28 28   CL 89* 94* 97   BUN 44* 38* 37*   CREATININE 1.2 1.1 1.1   ALKPHOS 61 58 59   ALT 28 23 28   AST 16 18 18   BILITOT 0.6 0.5 0.5      Coagulation:     Recent Labs  Lab 03/01/18 0519 03/02/18  0529 03/03/18  0412   INR 1.1 1.1 1.1     LDH:  No results for input(s): LDH in the last 72 hours.  Microbiology:  Microbiology Results (last 7 days)     Procedure Component Value Units Date/Time    Blood culture [[id number]] Collected:  02/28/18 1154    Order Status:  Completed Specimen:  Blood Updated:  03/02/18 1612     Blood Culture, Routine No Growth to date     Blood Culture, Routine No Growth to date      Blood Culture, Routine No Growth to date    Blood culture [[id number]] Collected:  02/28/18 1252    Order Status:  Completed Specimen:  Blood Updated:  03/02/18 1612     Blood Culture, Routine No Growth to date     Blood Culture, Routine No Growth to date     Blood Culture, Routine No Growth to date    Narrative:       Collection has been rescheduled by [email or web address] at 2/28/2018 11:54 Reason:   patient with PT   Collection has been rescheduled by KM at 2/28/2018 11:54 Reason:   patient with PT     Blood culture [[id number]] Collected:  02/25/18 1256    Order Status:  Completed Specimen:  Blood Updated:  03/02/18 1612     Blood Culture, Routine No growth after 5 days.    Blood culture [[id number]]     Order Status:  Canceled Specimen:  Blood           I have reviewed all pertinent labs within the past 24 hours.    Estimated Creatinine Clearance: 93.5 mL/min (based on SCr of 1.1 mg/dL).    Diagnostic Results:  I have reviewed and interpreted all pertinent imaging results/findings within the past 24 hours.

## 2018-03-03 NOTE — PLAN OF CARE
Problem: Patient Care Overview  Goal: Plan of Care Review  Outcome: Ongoing (interventions implemented as appropriate)  Plan of care reviewed with patient. Pt verbalized understanding. Pt AAOX4. Pt free from falls, injuries and trauma.  Pt free from skin breakdown.  Pt VSS and in NAD.  Pt afebrile.  Pt had no complaints of SOB, N/V or CP.  Pt had no complaints of pain this shift.  Adequate UOP this shift.1 BM this shift. Abdominal dsg CDI. Sternal precautions maintained. 100% self meds. Pt had uneventful evening. Pt in low locked bed with personal items and call light within reach. Fall precautions maintained.

## 2018-03-03 NOTE — PROGRESS NOTES
[facility]  Heart Transplant  Progress Note    Patient Name: Mert Samayoa  MRN: [id number]  Admission Date: 2/17/2018  Hospital Length of Stay: 14 days  Attending Physician: Marisel Lundberg MD  Primary Care Provider: Primary Doctor No  Principal Problem:Heart transplanted    Subjective:     Interval History: less leakage from chest but has not been sitting up yet    Continuous Infusions:  Scheduled Meds:   aspirin  81 mg Oral Daily    atorvastatin  20 mg Oral Daily    doxycycline  100 mg Oral Q12H    ergocalciferol  50,000 Units Oral Daily    famotidine  20 mg Oral BID    ferrous sulfate  325 mg Oral BID    magnesium oxide  400 mg Oral BID    mycophenolate  1,500 mg Oral BID    nystatin  500,000 Units Mouth/Throat QID (WM & HS)    polyethylene glycol  17 g Oral BID    predniSONE  25 mg Oral BID    senna-docusate 8.6-50 mg  2 tablet Oral BID    sodium chloride 0.9%  3 mL Intravenous Q8H    sulfamethoxazole-trimethoprim 400-80mg  1 tablet Oral Daily    tacrolimus  5 mg Oral BID    valGANciclovir  900 mg Oral Daily     PRN Meds:sodium chloride, albuterol sulfate, [COMPLETED] calcium gluconate IVPB **AND** calcium gluconate IVPB, dextrose 50%, dextrose 50%, glucagon (human recombinant), glucose, glucose, glycerin adult, insulin aspart U-100, lactulose, magnesium sulfate IVPB, meclizine, ondansetron, oxyCODONE, oxyCODONE-acetaminophen, potassium chloride **AND** potassium chloride **AND** potassium chloride, promethazine (PHENERGAN) IVPB    Review of patient's allergies indicates:  No Known Allergies  Objective:     Vital Signs (Most Recent):  Temp: 98.2 °F (36.8 °C) (03/03/18 1224)  Pulse: 89 (03/03/18 1224)  Resp: 18 (03/03/18 1224)  BP: (!) 115/58 (03/03/18 1224)  SpO2: 97 % (03/03/18 1224) Vital Signs (24h Range):  Temp:  [97.6 °F (36.4 °C)-98.6 °F (37 °C)] 98.2 °F (36.8 °C)  Pulse:  [84-99] 89  Resp:  [16-18] 18  SpO2:  [97 %-100 %] 97 %  BP: (115-140)/(57-76) 115/58      Patient Vitals for the past 72 hrs (Last 3 readings):   Weight   03/03/18 0500 67.7 kg (149 lb 4 oz)     Body mass index is 23.38 kg/m².      Intake/Output Summary (Last 24 hours) at 03/03/18 1224  Last data filed at 03/03/18 0500   Gross per 24 hour   Intake              240 ml   Output             1300 ml   Net            -1060 ml       Hemodynamic Parameters:         Physical Exam   Constitutional: He is oriented to person, place, and time. He appears well-developed and well-nourished.   HENT:   Head: Normocephalic.   Eyes: Pupils are equal, round, and reactive to light.   Neck: Normal range of motion. Neck supple. No JVD present.   Cardiovascular: Regular rhythm.  Tachycardia present.  Exam reveals friction rub.    Pulmonary/Chest: Effort normal and breath sounds normal.   Abdominal: Soft. Bowel sounds are normal. He exhibits no distension. There is no tenderness.   Musculoskeletal: Normal range of motion.   Neurological: He is alert and oriented to person, place, and time.   Skin: Skin is warm and dry.   Psychiatric: He has a normal mood and affect. His behavior is normal.   Nursing note and vitals reviewed.      Significant Labs:  CBC:    Recent Labs  Lab 03/01/18 0519 03/02/18  0529 03/03/18  0412   WBC 27.64* 25.13* 21.97*   RBC 2.75* 2.67* 2.59*   HGB 8.4* 8.0* 7.6*   HCT 24.4* 23.7* 23.2*    329 341   MCV 89 89 90   MCH 30.5 30.0 29.3   MCHC 34.4 33.8 32.8     BNP:    Recent Labs  Lab 02/28/18  0536   BNP 1,036*     CMP:    Recent Labs  Lab 03/01/18  0519 03/02/18  0529 03/03/18  0412   * 102 109   CALCIUM 8.9 8.9 8.7   ALBUMIN 3.3* 3.4* 3.1*   PROT 5.7* 5.7* 5.5*   * 133* 135*   K 4.1 4.1 4.7   CO2 31* 28 28   CL 89* 94* 97   BUN 44* 38* 37*   CREATININE 1.2 1.1 1.1   ALKPHOS 61 58 59   ALT 28 23 28   AST 16 18 18   BILITOT 0.6 0.5 0.5      Coagulation:     Recent Labs  Lab 03/01/18  0519 03/02/18  0529 03/03/18  0412   INR 1.1 1.1 1.1     LDH:  No results for input(s): LDH in the  last 72 hours.  Microbiology:  Microbiology Results (last 7 days)     Procedure Component Value Units Date/Time    Blood culture [[id number]] Collected:  02/28/18 1154    Order Status:  Completed Specimen:  Blood Updated:  03/02/18 1612     Blood Culture, Routine No Growth to date     Blood Culture, Routine No Growth to date     Blood Culture, Routine No Growth to date    Blood culture [[id number]] Collected:  02/28/18 1252    Order Status:  Completed Specimen:  Blood Updated:  03/02/18 1612     Blood Culture, Routine No Growth to date     Blood Culture, Routine No Growth to date     Blood Culture, Routine No Growth to date    Narrative:       Collection has been rescheduled by [email or web address] at 2/28/2018 11:54 Reason:   patient with PT   Collection has been rescheduled by [email or web address] at 2/28/2018 11:54 Reason:   patient with PT     Blood culture [[id number]] Collected:  02/25/18 1256    Order Status:  Completed Specimen:  Blood Updated:  03/02/18 1612     Blood Culture, Routine No growth after 5 days.    Blood culture [[id number]]     Order Status:  Canceled Specimen:  Blood           I have reviewed all pertinent labs within the past 24 hours.    Estimated Creatinine Clearance: 93.5 mL/min (based on SCr of 1.1 mg/dL).    Diagnostic Results:  I have reviewed and interpreted all pertinent imaging results/findings within the past 24 hours.    Assessment and Plan:     27 year old WM with DCM (familial, both brothers with heart transplants) with stage D CHFrEF underwent Heartware placement 02/01/17 had post-op atrial flutter 02/11/17 treated with amiodarone presents for OHTx.  0.  VAD implanted on 2/1/17 HVAD RPM 2700.         * Heart transplanted    -Transplant Date 2/18/18. S/P washout 2/19.  HTS primary.   -CMV Status:D+/R-   -Rejection status: Moderate Risk  -Rejection episodes: none to date  -Induction: No   -Current immunosuppression: Pred 20 BID, Prograf 5/5 mg BID, Cellcept 1500 mg BID.  -Opportunistic PPx with:Nystatin, valcyte,  bactrim.  -CTs and AV wires in place removed   -Echo 2/21 EF 60%, low nl RV fxn.  -S/P EMBx #1 2/27. PAMR0, ISHLT 0.  -ask CTS to evaluate chest leaking, started on doxy for 10 days           VTE (venous thromboembolism)    -U/S 2/22 revealed Nonocclusive deep vein thrombosis of the right internal jugular vein with slight extension into the proximal subclavian vein.  -Continue SC Heparin.         Paroxysmal atrial fibrillation    -~30mn episode of afib 2/21 terminated with amio. Likely exacerbated by pain, n/v, volume.   - Currently ST ~100.  - Amio stopped.         Leukocytosis    - Continue to monitor. Trending down today.  - No signs of infection. Old driveline site, sternal incision, and Groin CDI.  - No n/v/d/fevers/dysuria. UA clean. CT and wires removed.         Acute hyperglycemia    -insulin per endocrine            HAKEEM Alvarez  Heart Transplant  [facility]

## 2018-03-04 LAB
ALBUMIN SERPL BCP-MCNC: 3 G/DL
ALP SERPL-CCNC: 57 U/L
ALT SERPL W/O P-5'-P-CCNC: 30 U/L
ANION GAP SERPL CALC-SCNC: 10 MMOL/L
AST SERPL-CCNC: 16 U/L
BASOPHILS # BLD AUTO: 0.01 K/UL
BASOPHILS NFR BLD: 0.1 %
BILIRUB SERPL-MCNC: 0.4 MG/DL
BUN SERPL-MCNC: 29 MG/DL
CALCIUM SERPL-MCNC: 8.5 MG/DL
CHLORIDE SERPL-SCNC: 102 MMOL/L
CO2 SERPL-SCNC: 23 MMOL/L
CREAT SERPL-MCNC: 0.9 MG/DL
DIFFERENTIAL METHOD: ABNORMAL
EOSINOPHIL # BLD AUTO: 0 K/UL
EOSINOPHIL NFR BLD: 0 %
ERYTHROCYTE [DISTWIDTH] IN BLOOD BY AUTOMATED COUNT: 14.5 %
EST. GFR  (AFRICAN AMERICAN): >60 ML/MIN/1.73 M^2
EST. GFR  (NON AFRICAN AMERICAN): >60 ML/MIN/1.73 M^2
GLUCOSE SERPL-MCNC: 103 MG/DL
HCT VFR BLD AUTO: 22.5 %
HGB BLD-MCNC: 7.4 G/DL
IMM GRANULOCYTES # BLD AUTO: 0.79 K/UL
IMM GRANULOCYTES NFR BLD AUTO: 4.8 %
LYMPHOCYTES # BLD AUTO: 0.9 K/UL
LYMPHOCYTES NFR BLD: 5.4 %
MAGNESIUM SERPL-MCNC: 1.4 MG/DL
MCH RBC QN AUTO: 30.1 PG
MCHC RBC AUTO-ENTMCNC: 32.9 G/DL
MCV RBC AUTO: 92 FL
MONOCYTES # BLD AUTO: 0.8 K/UL
MONOCYTES NFR BLD: 4.9 %
NEUTROPHILS # BLD AUTO: 14.1 K/UL
NEUTROPHILS NFR BLD: 84.8 %
NRBC BLD-RTO: 0 /100 WBC
PLATELET # BLD AUTO: 375 K/UL
PMV BLD AUTO: 9.9 FL
POTASSIUM SERPL-SCNC: 4.6 MMOL/L
PROT SERPL-MCNC: 5.1 G/DL
RBC # BLD AUTO: 2.46 M/UL
SODIUM SERPL-SCNC: 135 MMOL/L
TACROLIMUS BLD-MCNC: 10.5 NG/ML
WBC # BLD AUTO: 16.58 K/UL

## 2018-03-04 PROCEDURE — 83735 ASSAY OF MAGNESIUM: CPT

## 2018-03-04 PROCEDURE — 25000003 PHARM REV CODE 250: Performed by: NURSE PRACTITIONER

## 2018-03-04 PROCEDURE — 85025 COMPLETE CBC W/AUTO DIFF WBC: CPT

## 2018-03-04 PROCEDURE — 63600175 PHARM REV CODE 636 W HCPCS: Performed by: PHYSICIAN ASSISTANT

## 2018-03-04 PROCEDURE — 25000003 PHARM REV CODE 250: Performed by: PHYSICIAN ASSISTANT

## 2018-03-04 PROCEDURE — 25000003 PHARM REV CODE 250: Performed by: STUDENT IN AN ORGANIZED HEALTH CARE EDUCATION/TRAINING PROGRAM

## 2018-03-04 PROCEDURE — 20600001 HC STEP DOWN PRIVATE ROOM

## 2018-03-04 PROCEDURE — 25000003 PHARM REV CODE 250: Performed by: INTERNAL MEDICINE

## 2018-03-04 PROCEDURE — 99232 SBSQ HOSP IP/OBS MODERATE 35: CPT | Mod: ,,, | Performed by: INTERNAL MEDICINE

## 2018-03-04 PROCEDURE — 80197 ASSAY OF TACROLIMUS: CPT

## 2018-03-04 PROCEDURE — 80053 COMPREHEN METABOLIC PANEL: CPT

## 2018-03-04 PROCEDURE — 63600175 PHARM REV CODE 636 W HCPCS: Performed by: INTERNAL MEDICINE

## 2018-03-04 RX ADMIN — PREDNISONE 25 MG: 5 TABLET ORAL at 09:03

## 2018-03-04 RX ADMIN — MAGNESIUM OXIDE TAB 400 MG (241.3 MG ELEMENTAL MG) 400 MG: 400 (241.3 MG) TAB at 08:03

## 2018-03-04 RX ADMIN — POLYETHYLENE GLYCOL 3350 17 G: 17 POWDER, FOR SOLUTION ORAL at 08:03

## 2018-03-04 RX ADMIN — STANDARDIZED SENNA CONCENTRATE AND DOCUSATE SODIUM 2 TABLET: 8.6; 5 TABLET, FILM COATED ORAL at 08:03

## 2018-03-04 RX ADMIN — NYSTATIN 500000 UNITS: 500000 SUSPENSION ORAL at 09:03

## 2018-03-04 RX ADMIN — FERROUS SULFATE TAB EC 325 MG (65 MG FE EQUIVALENT) 325 MG: 325 (65 FE) TABLET DELAYED RESPONSE at 08:03

## 2018-03-04 RX ADMIN — ATORVASTATIN CALCIUM 20 MG: 20 TABLET, FILM COATED ORAL at 09:03

## 2018-03-04 RX ADMIN — PREDNISONE 25 MG: 5 TABLET ORAL at 08:03

## 2018-03-04 RX ADMIN — MYCOPHENOLATE MOFETIL 1500 MG: 250 CAPSULE ORAL at 08:03

## 2018-03-04 RX ADMIN — NYSTATIN 500000 UNITS: 500000 SUSPENSION ORAL at 12:03

## 2018-03-04 RX ADMIN — DOXYCYCLINE HYCLATE 100 MG: 100 TABLET, COATED ORAL at 08:03

## 2018-03-04 RX ADMIN — MAGNESIUM OXIDE TAB 400 MG (241.3 MG ELEMENTAL MG) 400 MG: 400 (241.3 MG) TAB at 09:03

## 2018-03-04 RX ADMIN — MYCOPHENOLATE MOFETIL 1500 MG: 250 CAPSULE ORAL at 09:03

## 2018-03-04 RX ADMIN — SULFAMETHOXAZOLE AND TRIMETHOPRIM 1 TABLET: 400; 80 TABLET ORAL at 09:03

## 2018-03-04 RX ADMIN — DOXYCYCLINE HYCLATE 100 MG: 100 TABLET, COATED ORAL at 09:03

## 2018-03-04 RX ADMIN — OXYCODONE HYDROCHLORIDE AND ACETAMINOPHEN 1 TABLET: 7.5; 325 TABLET ORAL at 12:03

## 2018-03-04 RX ADMIN — FAMOTIDINE 20 MG: 20 TABLET, FILM COATED ORAL at 08:03

## 2018-03-04 RX ADMIN — NYSTATIN 500000 UNITS: 500000 SUSPENSION ORAL at 08:03

## 2018-03-04 RX ADMIN — TACROLIMUS 5 MG: 1 CAPSULE ORAL at 06:03

## 2018-03-04 RX ADMIN — FERROUS SULFATE TAB EC 325 MG (65 MG FE EQUIVALENT) 325 MG: 325 (65 FE) TABLET DELAYED RESPONSE at 09:03

## 2018-03-04 RX ADMIN — OXYCODONE HYDROCHLORIDE 5 MG: 5 TABLET ORAL at 09:03

## 2018-03-04 RX ADMIN — TACROLIMUS 5 MG: 1 CAPSULE ORAL at 08:03

## 2018-03-04 RX ADMIN — ERGOCALCIFEROL 50000 UNITS: 1.25 CAPSULE ORAL at 09:03

## 2018-03-04 RX ADMIN — ASPIRIN 81 MG: 81 TABLET, COATED ORAL at 09:03

## 2018-03-04 RX ADMIN — VALGANCICLOVIR 900 MG: 450 TABLET, FILM COATED ORAL at 09:03

## 2018-03-04 RX ADMIN — FAMOTIDINE 20 MG: 20 TABLET, FILM COATED ORAL at 09:03

## 2018-03-04 RX ADMIN — NYSTATIN 500000 UNITS: 500000 SUSPENSION ORAL at 06:03

## 2018-03-04 RX ADMIN — OXYCODONE HYDROCHLORIDE AND ACETAMINOPHEN 1 TABLET: 7.5; 325 TABLET ORAL at 07:03

## 2018-03-04 NOTE — SUBJECTIVE & OBJECTIVE
Interval History: more drainage from chest when walking but color is clearing     Continuous Infusions:  Scheduled Meds:   aspirin  81 mg Oral Daily    atorvastatin  20 mg Oral Daily    doxycycline  100 mg Oral Q12H    famotidine  20 mg Oral BID    ferrous sulfate  325 mg Oral BID    magnesium oxide  400 mg Oral BID    mycophenolate  1,500 mg Oral BID    nystatin  500,000 Units Mouth/Throat QID (WM & HS)    polyethylene glycol  17 g Oral BID    predniSONE  25 mg Oral BID    senna-docusate 8.6-50 mg  2 tablet Oral BID    sodium chloride 0.9%  3 mL Intravenous Q8H    sulfamethoxazole-trimethoprim 400-80mg  1 tablet Oral Daily    tacrolimus  5 mg Oral BID    valGANciclovir  900 mg Oral Daily     PRN Meds:sodium chloride, albuterol sulfate, [COMPLETED] calcium gluconate IVPB **AND** calcium gluconate IVPB, dextrose 50%, dextrose 50%, glucagon (human recombinant), glucose, glucose, glycerin adult, insulin aspart U-100, lactulose, magnesium sulfate IVPB, meclizine, ondansetron, oxyCODONE, oxyCODONE-acetaminophen, potassium chloride **AND** potassium chloride **AND** potassium chloride, promethazine (PHENERGAN) IVPB    Review of patient's allergies indicates:  No Known Allergies  Objective:     Vital Signs (Most Recent):  Temp: 98.2 °F (36.8 °C) (03/04/18 0405)  Pulse: 80 (03/04/18 0600)  Resp: 15 (03/04/18 0405)  BP: 124/66 (03/04/18 0405)  SpO2: 98 % (03/04/18 0405) Vital Signs (24h Range):  Temp:  [98.1 °F (36.7 °C)-98.2 °F (36.8 °C)] 98.2 °F (36.8 °C)  Pulse:  [80-97] 80  Resp:  [15-18] 15  SpO2:  [97 %-100 %] 98 %  BP: (115-144)/(58-75) 124/66     Patient Vitals for the past 72 hrs (Last 3 readings):   Weight   03/03/18 0500 67.7 kg (149 lb 4 oz)     Body mass index is 23.38 kg/m².      Intake/Output Summary (Last 24 hours) at 03/04/18 1013  Last data filed at 03/03/18 1800   Gross per 24 hour   Intake             1200 ml   Output             1000 ml   Net              200 ml       Hemodynamic  Parameters:           Physical Exam   Constitutional: He is oriented to person, place, and time. He appears well-developed and well-nourished.   HENT:   Head: Normocephalic.   Eyes: Pupils are equal, round, and reactive to light.   Neck: Normal range of motion. Neck supple. No JVD present.   Cardiovascular: Regular rhythm.  Tachycardia present.  Exam reveals friction rub.    Pulmonary/Chest: Effort normal and breath sounds normal.   Abdominal: Soft. Bowel sounds are normal. He exhibits no distension. There is no tenderness.   Musculoskeletal: Normal range of motion.   Neurological: He is alert and oriented to person, place, and time.   Skin: Skin is warm and dry.   Psychiatric: He has a normal mood and affect. His behavior is normal.   Nursing note and vitals reviewed.      Significant Labs:  CBC:    Recent Labs  Lab 03/02/18 0529 03/03/18 0412 03/04/18 0433   WBC 25.13* 21.97* 16.58*   RBC 2.67* 2.59* 2.46*   HGB 8.0* 7.6* 7.4*   HCT 23.7* 23.2* 22.5*    341 375*   MCV 89 90 92   MCH 30.0 29.3 30.1   MCHC 33.8 32.8 32.9     BNP:    Recent Labs  Lab 02/28/18  0536   BNP 1,036*     CMP:    Recent Labs  Lab 03/02/18 0529 03/03/18 0412 03/04/18 0432    109 103   CALCIUM 8.9 8.7 8.5*   ALBUMIN 3.4* 3.1* 3.0*   PROT 5.7* 5.5* 5.1*   * 135* 135*   K 4.1 4.7 4.6   CO2 28 28 23   CL 94* 97 102   BUN 38* 37* 29*   CREATININE 1.1 1.1 0.9   ALKPHOS 58 59 57   ALT 23 28 30   AST 18 18 16   BILITOT 0.5 0.5 0.4      Coagulation:     Recent Labs  Lab 03/01/18 0519 03/02/18 0529 03/03/18 0412   INR 1.1 1.1 1.1     LDH:  No results for input(s): LDH in the last 72 hours.  Microbiology:  Microbiology Results (last 7 days)     Procedure Component Value Units Date/Time    Blood culture [[id number]] Collected:  02/28/18 1252    Order Status:  Completed Specimen:  Blood Updated:  03/03/18 1612     Blood Culture, Routine No Growth to date     Blood Culture, Routine No Growth to date     Blood Culture, Routine  No Growth to date     Blood Culture, Routine No Growth to date    Narrative:       Collection has been rescheduled by [email or web address] at 2/28/2018 11:54 Reason:   patient with PT   Collection has been rescheduled by [email or web address] at 2/28/2018 11:54 Reason:   patient with PT     Blood culture [[id number]] Collected:  02/28/18 1154    Order Status:  Completed Specimen:  Blood Updated:  03/03/18 1612     Blood Culture, Routine No Growth to date     Blood Culture, Routine No Growth to date     Blood Culture, Routine No Growth to date     Blood Culture, Routine No Growth to date    Blood culture [[id number]] Collected:  02/25/18 1256    Order Status:  Completed Specimen:  Blood Updated:  03/02/18 1612     Blood Culture, Routine No growth after 5 days.    Blood culture [[id number]]     Order Status:  Canceled Specimen:  Blood           I have reviewed all pertinent labs within the past 24 hours.    Estimated Creatinine Clearance: 114.2 mL/min (based on SCr of 0.9 mg/dL).    Diagnostic Results:  I have reviewed and interpreted all pertinent imaging results/findings within the past 24 hours.

## 2018-03-04 NOTE — ASSESSMENT & PLAN NOTE
-Transplant Date 2/18/18. S/P washout 2/19.  HTS primary.   -CMV Status:D+/R-   -Rejection status: Moderate Risk  -Rejection episodes: none to date  -Induction: No   -Current immunosuppression: Pred 30 BID, Prograf 5/5 mg BID, Cellcept 1500 mg BID.  -Opportunistic PPx with:Nystatin, valcyte, bactrim.  -CTs and AV wires in place removed   -Echo 2/21 EF 60%, low nl RV fxn.  -S/P EMBx #1 2/27. PAMR0, ISHLT 0.

## 2018-03-04 NOTE — PROGRESS NOTES
[facility]  Heart Transplant  Progress Note    Patient Name: Mert Samayoa  MRN: [id number]  Admission Date: 2/17/2018  Hospital Length of Stay: 15 days  Attending Physician: Marisel Lundberg MD  Primary Care Provider: Primary Doctor No  Principal Problem:Heart transplanted    Subjective:     Interval History: more drainage from chest when walking but color is clearing     Continuous Infusions:  Scheduled Meds:   aspirin  81 mg Oral Daily    atorvastatin  20 mg Oral Daily    doxycycline  100 mg Oral Q12H    famotidine  20 mg Oral BID    ferrous sulfate  325 mg Oral BID    magnesium oxide  400 mg Oral BID    mycophenolate  1,500 mg Oral BID    nystatin  500,000 Units Mouth/Throat QID (WM & HS)    polyethylene glycol  17 g Oral BID    predniSONE  25 mg Oral BID    senna-docusate 8.6-50 mg  2 tablet Oral BID    sodium chloride 0.9%  3 mL Intravenous Q8H    sulfamethoxazole-trimethoprim 400-80mg  1 tablet Oral Daily    tacrolimus  5 mg Oral BID    valGANciclovir  900 mg Oral Daily     PRN Meds:sodium chloride, albuterol sulfate, [COMPLETED] calcium gluconate IVPB **AND** calcium gluconate IVPB, dextrose 50%, dextrose 50%, glucagon (human recombinant), glucose, glucose, glycerin adult, insulin aspart U-100, lactulose, magnesium sulfate IVPB, meclizine, ondansetron, oxyCODONE, oxyCODONE-acetaminophen, potassium chloride **AND** potassium chloride **AND** potassium chloride, promethazine (PHENERGAN) IVPB    Review of patient's allergies indicates:  No Known Allergies  Objective:     Vital Signs (Most Recent):  Temp: 98.2 °F (36.8 °C) (03/04/18 0405)  Pulse: 80 (03/04/18 0600)  Resp: 15 (03/04/18 0405)  BP: 124/66 (03/04/18 0405)  SpO2: 98 % (03/04/18 0405) Vital Signs (24h Range):  Temp:  [98.1 °F (36.7 °C)-98.2 °F (36.8 °C)] 98.2 °F (36.8 °C)  Pulse:  [80-97] 80  Resp:  [15-18] 15  SpO2:  [97 %-100 %] 98 %  BP: (115-144)/(58-75) 124/66     Patient Vitals for the past 72 hrs (Last 3  readings):   Weight   03/03/18 0500 67.7 kg (149 lb 4 oz)     Body mass index is 23.38 kg/m².      Intake/Output Summary (Last 24 hours) at 03/04/18 1013  Last data filed at 03/03/18 1800   Gross per 24 hour   Intake             1200 ml   Output             1000 ml   Net              200 ml       Hemodynamic Parameters:           Physical Exam   Constitutional: He is oriented to person, place, and time. He appears well-developed and well-nourished.   HENT:   Head: Normocephalic.   Eyes: Pupils are equal, round, and reactive to light.   Neck: Normal range of motion. Neck supple. No JVD present.   Cardiovascular: Regular rhythm.  Tachycardia present.  Exam reveals friction rub.    Pulmonary/Chest: Effort normal and breath sounds normal.   Abdominal: Soft. Bowel sounds are normal. He exhibits no distension. There is no tenderness.   Musculoskeletal: Normal range of motion.   Neurological: He is alert and oriented to person, place, and time.   Skin: Skin is warm and dry.   Psychiatric: He has a normal mood and affect. His behavior is normal.   Nursing note and vitals reviewed.      Significant Labs:  CBC:    Recent Labs  Lab 03/02/18  0529 03/03/18  0412 03/04/18  0433   WBC 25.13* 21.97* 16.58*   RBC 2.67* 2.59* 2.46*   HGB 8.0* 7.6* 7.4*   HCT 23.7* 23.2* 22.5*    341 375*   MCV 89 90 92   MCH 30.0 29.3 30.1   MCHC 33.8 32.8 32.9     BNP:    Recent Labs  Lab 02/28/18  0536   BNP 1,036*     CMP:    Recent Labs  Lab 03/02/18  0529 03/03/18  0412 03/04/18  0432    109 103   CALCIUM 8.9 8.7 8.5*   ALBUMIN 3.4* 3.1* 3.0*   PROT 5.7* 5.5* 5.1*   * 135* 135*   K 4.1 4.7 4.6   CO2 28 28 23   CL 94* 97 102   BUN 38* 37* 29*   CREATININE 1.1 1.1 0.9   ALKPHOS 58 59 57   ALT 23 28 30   AST 18 18 16   BILITOT 0.5 0.5 0.4      Coagulation:     Recent Labs  Lab 03/01/18  0519 03/02/18  0529 03/03/18  0412   INR 1.1 1.1 1.1     LDH:  No results for input(s): LDH in the last 72 hours.  Microbiology:  Microbiology  Results (last 7 days)     Procedure Component Value Units Date/Time    Blood culture [[id number]] Collected:  02/28/18 1252    Order Status:  Completed Specimen:  Blood Updated:  03/03/18 1612     Blood Culture, Routine No Growth to date     Blood Culture, Routine No Growth to date     Blood Culture, Routine No Growth to date     Blood Culture, Routine No Growth to date    Narrative:       Collection has been rescheduled by [email or web address] at 2/28/2018 11:54 Reason:   patient with PT   Collection has been rescheduled by [email or web address] at 2/28/2018 11:54 Reason:   patient with PT     Blood culture [[id number]] Collected:  02/28/18 1154    Order Status:  Completed Specimen:  Blood Updated:  03/03/18 1612     Blood Culture, Routine No Growth to date     Blood Culture, Routine No Growth to date     Blood Culture, Routine No Growth to date     Blood Culture, Routine No Growth to date    Blood culture [[id number]] Collected:  02/25/18 1256    Order Status:  Completed Specimen:  Blood Updated:  03/02/18 1612     Blood Culture, Routine No growth after 5 days.    Blood culture [[id number]]     Order Status:  Canceled Specimen:  Blood           I have reviewed all pertinent labs within the past 24 hours.    Estimated Creatinine Clearance: 114.2 mL/min (based on SCr of 0.9 mg/dL).    Diagnostic Results:  I have reviewed and interpreted all pertinent imaging results/findings within the past 24 hours.    Assessment and Plan:     27 year old WM with DCM (familial, both brothers with heart transplants) with stage D CHFrEF underwent Heartware placement 02/01/17 had post-op atrial flutter 02/11/17 treated with amiodarone presents for OHTx.  0.  VAD implanted on 2/1/17 HVAD RPM 2700.         * Heart transplanted    -Transplant Date 2/18/18. S/P washout 2/19.  HTS primary.   -CMV Status:D+/R-   -Rejection status: Moderate Risk  -Rejection episodes: none to date  -Induction: No   -Current immunosuppression: Pred 30 BID, Prograf 5/5 mg BID, Cellcept 1500 mg  BID.  -Opportunistic PPx with:Nystatin, valcyte, bactrim.  -CTs and AV wires in place removed   -Echo 2/21 EF 60%, low nl RV fxn.  -S/P EMBx #1 2/27. PAMR0, ISHLT 0.             VTE (venous thromboembolism)    -U/S 2/22 revealed Nonocclusive deep vein thrombosis of the right internal jugular vein with slight extension into the proximal subclavian vein.  -Continue SC Heparin.         Paroxysmal atrial fibrillation    -~30mn episode of afib 2/21 terminated with amio. Likely exacerbated by pain, n/v, volume.   - Currently ST ~100.  - Amio stopped.         Leukocytosis    - Continue to monitor. Trending down today.  - No signs of infection. Old driveline site, sternal incision, and Groin CDI.  - No n/v/d/fevers/dysuria. UA clean. CT and wires removed.         Acute hyperglycemia    -insulin per endocrine            HAKEEM Alvarez  Heart Transplant  [facility]

## 2018-03-05 ENCOUNTER — TELEPHONE (OUTPATIENT)
Dept: TRANSPLANT | Facility: CLINIC | Age: 28
End: 2018-03-05

## 2018-03-05 VITALS
WEIGHT: 148.81 LBS | HEART RATE: 88 BPM | RESPIRATION RATE: 16 BRPM | BODY MASS INDEX: 23.36 KG/M2 | OXYGEN SATURATION: 98 % | TEMPERATURE: 98 F | HEIGHT: 67 IN | SYSTOLIC BLOOD PRESSURE: 129 MMHG | DIASTOLIC BLOOD PRESSURE: 68 MMHG

## 2018-03-05 DIAGNOSIS — Z94.1 S/P ORTHOTOPIC HEART TRANSPLANT: Primary | ICD-10-CM

## 2018-03-05 DIAGNOSIS — R73.9 HYPERGLYCEMIA: ICD-10-CM

## 2018-03-05 LAB
ALBUMIN SERPL BCP-MCNC: 3.2 G/DL
ALP SERPL-CCNC: 59 U/L
ALT SERPL W/O P-5'-P-CCNC: 33 U/L
ANION GAP SERPL CALC-SCNC: 8 MMOL/L
AST SERPL-CCNC: 13 U/L
BACTERIA BLD CULT: NORMAL
BACTERIA BLD CULT: NORMAL
BASOPHILS # BLD AUTO: 0 K/UL
BASOPHILS NFR BLD: 0 %
BILIRUB SERPL-MCNC: 0.4 MG/DL
BUN SERPL-MCNC: 32 MG/DL
CALCIUM SERPL-MCNC: 8.5 MG/DL
CHLORIDE SERPL-SCNC: 104 MMOL/L
CO2 SERPL-SCNC: 22 MMOL/L
CREAT SERPL-MCNC: 1 MG/DL
DIFFERENTIAL METHOD: ABNORMAL
EOSINOPHIL # BLD AUTO: 0 K/UL
EOSINOPHIL NFR BLD: 0 %
ERYTHROCYTE [DISTWIDTH] IN BLOOD BY AUTOMATED COUNT: 14.8 %
EST. GFR  (AFRICAN AMERICAN): >60 ML/MIN/1.73 M^2
EST. GFR  (NON AFRICAN AMERICAN): >60 ML/MIN/1.73 M^2
GLUCOSE SERPL-MCNC: 122 MG/DL
HCT VFR BLD AUTO: 22.5 %
HGB BLD-MCNC: 7.3 G/DL
IMM GRANULOCYTES # BLD AUTO: 0.51 K/UL
IMM GRANULOCYTES NFR BLD AUTO: 3.6 %
LYMPHOCYTES # BLD AUTO: 0.8 K/UL
LYMPHOCYTES NFR BLD: 5.8 %
MAGNESIUM SERPL-MCNC: 1.5 MG/DL
MCH RBC QN AUTO: 29.6 PG
MCHC RBC AUTO-ENTMCNC: 32.4 G/DL
MCV RBC AUTO: 91 FL
MONOCYTES # BLD AUTO: 0.7 K/UL
MONOCYTES NFR BLD: 4.7 %
NEUTROPHILS # BLD AUTO: 12.1 K/UL
NEUTROPHILS NFR BLD: 85.9 %
NRBC BLD-RTO: 0 /100 WBC
PLATELET # BLD AUTO: 372 K/UL
PMV BLD AUTO: 9.3 FL
POTASSIUM SERPL-SCNC: 4.8 MMOL/L
PROT SERPL-MCNC: 5.2 G/DL
RBC # BLD AUTO: 2.47 M/UL
SODIUM SERPL-SCNC: 134 MMOL/L
TACROLIMUS BLD-MCNC: 11.1 NG/ML
WBC # BLD AUTO: 14.07 K/UL

## 2018-03-05 PROCEDURE — 36415 COLL VENOUS BLD VENIPUNCTURE: CPT

## 2018-03-05 PROCEDURE — 99238 HOSP IP/OBS DSCHRG MGMT 30/<: CPT | Mod: ,,, | Performed by: INTERNAL MEDICINE

## 2018-03-05 PROCEDURE — 80053 COMPREHEN METABOLIC PANEL: CPT

## 2018-03-05 PROCEDURE — 25000003 PHARM REV CODE 250: Performed by: PHYSICIAN ASSISTANT

## 2018-03-05 PROCEDURE — 83735 ASSAY OF MAGNESIUM: CPT

## 2018-03-05 PROCEDURE — 25000003 PHARM REV CODE 250: Performed by: INTERNAL MEDICINE

## 2018-03-05 PROCEDURE — 85025 COMPLETE CBC W/AUTO DIFF WBC: CPT

## 2018-03-05 PROCEDURE — 63600175 PHARM REV CODE 636 W HCPCS: Performed by: INTERNAL MEDICINE

## 2018-03-05 PROCEDURE — 25000003 PHARM REV CODE 250: Performed by: NURSE PRACTITIONER

## 2018-03-05 PROCEDURE — 80197 ASSAY OF TACROLIMUS: CPT

## 2018-03-05 PROCEDURE — 63600175 PHARM REV CODE 636 W HCPCS: Performed by: PHYSICIAN ASSISTANT

## 2018-03-05 RX ORDER — PREDNISONE 10 MG/1
15 TABLET ORAL 2 TIMES DAILY
Qty: 90 TABLET | Refills: 3 | Status: SHIPPED | OUTPATIENT
Start: 2018-03-05 | End: 2018-03-15 | Stop reason: SDUPTHER

## 2018-03-05 RX ORDER — TACROLIMUS 1 MG/1
5 CAPSULE ORAL EVERY 12 HOURS
Qty: 300 CAPSULE | Refills: 11 | Status: SHIPPED | OUTPATIENT
Start: 2018-03-05 | End: 2018-03-06 | Stop reason: SDUPTHER

## 2018-03-05 RX ORDER — DOXYCYCLINE HYCLATE 100 MG
100 TABLET ORAL EVERY 12 HOURS
Qty: 14 TABLET | Refills: 0 | Status: SHIPPED | OUTPATIENT
Start: 2018-03-05 | End: 2018-03-12

## 2018-03-05 RX ADMIN — FAMOTIDINE 20 MG: 20 TABLET, FILM COATED ORAL at 08:03

## 2018-03-05 RX ADMIN — PREDNISONE 25 MG: 5 TABLET ORAL at 08:03

## 2018-03-05 RX ADMIN — ATORVASTATIN CALCIUM 20 MG: 20 TABLET, FILM COATED ORAL at 08:03

## 2018-03-05 RX ADMIN — TACROLIMUS 5 MG: 1 CAPSULE ORAL at 08:03

## 2018-03-05 RX ADMIN — MYCOPHENOLATE MOFETIL 1500 MG: 250 CAPSULE ORAL at 08:03

## 2018-03-05 RX ADMIN — ASPIRIN 81 MG: 81 TABLET, COATED ORAL at 08:03

## 2018-03-05 RX ADMIN — MAGNESIUM OXIDE TAB 400 MG (241.3 MG ELEMENTAL MG) 400 MG: 400 (241.3 MG) TAB at 08:03

## 2018-03-05 RX ADMIN — NYSTATIN 500000 UNITS: 500000 SUSPENSION ORAL at 08:03

## 2018-03-05 RX ADMIN — DOXYCYCLINE HYCLATE 100 MG: 100 TABLET, COATED ORAL at 08:03

## 2018-03-05 RX ADMIN — FERROUS SULFATE TAB EC 325 MG (65 MG FE EQUIVALENT) 325 MG: 325 (65 FE) TABLET DELAYED RESPONSE at 08:03

## 2018-03-05 RX ADMIN — SULFAMETHOXAZOLE AND TRIMETHOPRIM 1 TABLET: 400; 80 TABLET ORAL at 08:03

## 2018-03-05 RX ADMIN — VALGANCICLOVIR 900 MG: 450 TABLET, FILM COATED ORAL at 08:03

## 2018-03-05 NOTE — SUBJECTIVE & OBJECTIVE
Interval History: less drainage from sternal wound    Continuous Infusions:  Scheduled Meds:   aspirin  81 mg Oral Daily    atorvastatin  20 mg Oral Daily    doxycycline  100 mg Oral Q12H    famotidine  20 mg Oral BID    ferrous sulfate  325 mg Oral BID    magnesium oxide  400 mg Oral BID    mycophenolate  1,500 mg Oral BID    nystatin  500,000 Units Mouth/Throat QID (WM & HS)    polyethylene glycol  17 g Oral BID    predniSONE  25 mg Oral BID    senna-docusate 8.6-50 mg  2 tablet Oral BID    sodium chloride 0.9%  3 mL Intravenous Q8H    sulfamethoxazole-trimethoprim 400-80mg  1 tablet Oral Daily    tacrolimus  5 mg Oral BID    valGANciclovir  900 mg Oral Daily     PRN Meds:dextrose 50%, dextrose 50%, glucagon (human recombinant), glucose, glucose, insulin aspart U-100, oxyCODONE, oxyCODONE-acetaminophen    Review of patient's allergies indicates:  No Known Allergies  Objective:     Vital Signs (Most Recent):  Temp: 97.8 °F (36.6 °C) (03/05/18 0834)  Pulse: 90 (03/05/18 0834)  Resp: 18 (03/05/18 0834)  BP: 130/62 (03/05/18 0834)  SpO2: 99 % (03/05/18 0834) Vital Signs (24h Range):  Temp:  [97.6 °F (36.4 °C)-98.8 °F (37.1 °C)] 97.8 °F (36.6 °C)  Pulse:  [] 90  Resp:  [15-18] 18  SpO2:  [98 %-100 %] 99 %  BP: (128-146)/(61-79) 130/62     Patient Vitals for the past 72 hrs (Last 3 readings):   Weight   03/05/18 0615 67.5 kg (148 lb 13 oz)   03/03/18 0500 67.7 kg (149 lb 4 oz)     Body mass index is 23.31 kg/m².      Intake/Output Summary (Last 24 hours) at 03/05/18 0947  Last data filed at 03/05/18 0615   Gross per 24 hour   Intake             1470 ml   Output              800 ml   Net              670 ml       Hemodynamic Parameters:       T  Physical Exam   Constitutional: He is oriented to person, place, and time. He appears well-developed and well-nourished.   HENT:   Head: Normocephalic.   Eyes: Pupils are equal, round, and reactive to light.   Neck: Normal range of motion. Neck supple. No JVD  present.   Cardiovascular: Regular rhythm.  Tachycardia present.  Exam reveals friction rub.    Pulmonary/Chest: Effort normal and breath sounds normal.   Abdominal: Soft. Bowel sounds are normal. He exhibits no distension. There is no tenderness.   Musculoskeletal: Normal range of motion.   Neurological: He is alert and oriented to person, place, and time.   Skin: Skin is warm and dry.   Psychiatric: He has a normal mood and affect. His behavior is normal.   Nursing note and vitals reviewed.      Significant Labs:  CBC:    Recent Labs  Lab 03/03/18 0412 03/04/18 0433 03/05/18 0456   WBC 21.97* 16.58* 14.07*   RBC 2.59* 2.46* 2.47*   HGB 7.6* 7.4* 7.3*   HCT 23.2* 22.5* 22.5*    375* 372*   MCV 90 92 91   MCH 29.3 30.1 29.6   MCHC 32.8 32.9 32.4     BNP:    Recent Labs  Lab 02/28/18  0536   BNP 1,036*     CMP:    Recent Labs  Lab 03/03/18 0412 03/04/18 0432 03/05/18 0456    103 122*   CALCIUM 8.7 8.5* 8.5*   ALBUMIN 3.1* 3.0* 3.2*   PROT 5.5* 5.1* 5.2*   * 135* 134*   K 4.7 4.6 4.8   CO2 28 23 22*   CL 97 102 104   BUN 37* 29* 32*   CREATININE 1.1 0.9 1.0   ALKPHOS 59 57 59   ALT 28 30 33   AST 18 16 13   BILITOT 0.5 0.4 0.4      Coagulation:     Recent Labs  Lab 03/01/18 0519 03/02/18 0529 03/03/18 0412   INR 1.1 1.1 1.1     LDH:  No results for input(s): LDH in the last 72 hours.  Microbiology:  Microbiology Results (last 7 days)     Procedure Component Value Units Date/Time    Blood culture [[id number]] Collected:  02/28/18 1154    Order Status:  Completed Specimen:  Blood Updated:  03/04/18 1612     Blood Culture, Routine No Growth to date     Blood Culture, Routine No Growth to date     Blood Culture, Routine No Growth to date     Blood Culture, Routine No Growth to date     Blood Culture, Routine No Growth to date    Blood culture [[id number]] Collected:  02/28/18 1252    Order Status:  Completed Specimen:  Blood Updated:  03/04/18 1612     Blood Culture, Routine No Growth to date      Blood Culture, Routine No Growth to date     Blood Culture, Routine No Growth to date     Blood Culture, Routine No Growth to date     Blood Culture, Routine No Growth to date    Narrative:       Collection has been rescheduled by [email or web address] at 2/28/2018 11:54 Reason:   patient with PT   Collection has been rescheduled by [email or web address] at 2/28/2018 11:54 Reason:   patient with PT     Blood culture [[id number]] Collected:  02/25/18 1256    Order Status:  Completed Specimen:  Blood Updated:  03/02/18 1612     Blood Culture, Routine No growth after 5 days.          I have reviewed all pertinent labs within the past 24 hours.    Estimated Creatinine Clearance: 102.8 mL/min (based on SCr of 1 mg/dL).    Diagnostic Results:  I have reviewed and interpreted all pertinent imaging results/findings within the past 24 hours.

## 2018-03-05 NOTE — PROGRESS NOTES
D/C note:    SW to pt's room for d/c. Pt aaox4, calm, and pleasant. Pt reports in agreement with plan to d/c to [facility] today with no needs. Pt reports wife has already checked into [facility] and will be picking pt up from hospital shortly. Pt reports coping well with no questions or concerns for SW. Pt reports no other needs and none identified. SW providing psychosocial and counseling support, education, assistance, resources, and D/C planning as indicated. SW remains available.

## 2018-03-05 NOTE — PROGRESS NOTES
[facility]  Heart Transplant  Progress Note    Patient Name: Mert Samayoa  MRN: [id number]  Admission Date: 2/17/2018  Hospital Length of Stay: 16 days  Attending Physician: Marisel Lundberg MD  Primary Care Provider: Primary Doctor No  Principal Problem:Heart transplanted    Subjective:     Interval History: less drainage from sternal wound    Continuous Infusions:  Scheduled Meds:   aspirin  81 mg Oral Daily    atorvastatin  20 mg Oral Daily    doxycycline  100 mg Oral Q12H    famotidine  20 mg Oral BID    ferrous sulfate  325 mg Oral BID    magnesium oxide  400 mg Oral BID    mycophenolate  1,500 mg Oral BID    nystatin  500,000 Units Mouth/Throat QID (WM & HS)    polyethylene glycol  17 g Oral BID    predniSONE  25 mg Oral BID    senna-docusate 8.6-50 mg  2 tablet Oral BID    sodium chloride 0.9%  3 mL Intravenous Q8H    sulfamethoxazole-trimethoprim 400-80mg  1 tablet Oral Daily    tacrolimus  5 mg Oral BID    valGANciclovir  900 mg Oral Daily     PRN Meds:dextrose 50%, dextrose 50%, glucagon (human recombinant), glucose, glucose, insulin aspart U-100, oxyCODONE, oxyCODONE-acetaminophen    Review of patient's allergies indicates:  No Known Allergies  Objective:     Vital Signs (Most Recent):  Temp: 97.8 °F (36.6 °C) (03/05/18 0834)  Pulse: 90 (03/05/18 0834)  Resp: 18 (03/05/18 0834)  BP: 130/62 (03/05/18 0834)  SpO2: 99 % (03/05/18 0834) Vital Signs (24h Range):  Temp:  [97.6 °F (36.4 °C)-98.8 °F (37.1 °C)] 97.8 °F (36.6 °C)  Pulse:  [] 90  Resp:  [15-18] 18  SpO2:  [98 %-100 %] 99 %  BP: (128-146)/(61-79) 130/62     Patient Vitals for the past 72 hrs (Last 3 readings):   Weight   03/05/18 0615 67.5 kg (148 lb 13 oz)   03/03/18 0500 67.7 kg (149 lb 4 oz)     Body mass index is 23.31 kg/m².      Intake/Output Summary (Last 24 hours) at 03/05/18 0930  Last data filed at 03/05/18 0615   Gross per 24 hour   Intake             1470 ml   Output              800 ml   Net               670 ml       Hemodynamic Parameters:       T  Physical Exam   Constitutional: He is oriented to person, place, and time. He appears well-developed and well-nourished.   HENT:   Head: Normocephalic.   Eyes: Pupils are equal, round, and reactive to light.   Neck: Normal range of motion. Neck supple. No JVD present.   Cardiovascular: Regular rhythm.  Tachycardia present.  Exam reveals friction rub.    Pulmonary/Chest: Effort normal and breath sounds normal.   Abdominal: Soft. Bowel sounds are normal. He exhibits no distension. There is no tenderness.   Musculoskeletal: Normal range of motion.   Neurological: He is alert and oriented to person, place, and time.   Skin: Skin is warm and dry.   Psychiatric: He has a normal mood and affect. His behavior is normal.   Nursing note and vitals reviewed.      Significant Labs:  CBC:    Recent Labs  Lab 03/03/18 0412 03/04/18 0433 03/05/18 0456   WBC 21.97* 16.58* 14.07*   RBC 2.59* 2.46* 2.47*   HGB 7.6* 7.4* 7.3*   HCT 23.2* 22.5* 22.5*    375* 372*   MCV 90 92 91   MCH 29.3 30.1 29.6   MCHC 32.8 32.9 32.4     BNP:    Recent Labs  Lab 02/28/18  0536   BNP 1,036*     CMP:    Recent Labs  Lab 03/03/18 0412 03/04/18 0432 03/05/18 0456    103 122*   CALCIUM 8.7 8.5* 8.5*   ALBUMIN 3.1* 3.0* 3.2*   PROT 5.5* 5.1* 5.2*   * 135* 134*   K 4.7 4.6 4.8   CO2 28 23 22*   CL 97 102 104   BUN 37* 29* 32*   CREATININE 1.1 0.9 1.0   ALKPHOS 59 57 59   ALT 28 30 33   AST 18 16 13   BILITOT 0.5 0.4 0.4      Coagulation:     Recent Labs  Lab 03/01/18 0519 03/02/18 0529 03/03/18 0412   INR 1.1 1.1 1.1     LDH:  No results for input(s): LDH in the last 72 hours.  Microbiology:  Microbiology Results (last 7 days)     Procedure Component Value Units Date/Time    Blood culture [[id number]] Collected:  02/28/18 1154    Order Status:  Completed Specimen:  Blood Updated:  03/04/18 1612     Blood Culture, Routine No Growth to date     Blood Culture, Routine No  Growth to date     Blood Culture, Routine No Growth to date     Blood Culture, Routine No Growth to date     Blood Culture, Routine No Growth to date    Blood culture [[id number]] Collected:  02/28/18 1252    Order Status:  Completed Specimen:  Blood Updated:  03/04/18 1612     Blood Culture, Routine No Growth to date     Blood Culture, Routine No Growth to date     Blood Culture, Routine No Growth to date     Blood Culture, Routine No Growth to date     Blood Culture, Routine No Growth to date    Narrative:       Collection has been rescheduled by KM at 2/28/2018 11:54 Reason:   patient with PT   Collection has been rescheduled by KM at 2/28/2018 11:54 Reason:   patient with PT     Blood culture [[id number]] Collected:  02/25/18 1256    Order Status:  Completed Specimen:  Blood Updated:  03/02/18 1612     Blood Culture, Routine No growth after 5 days.          I have reviewed all pertinent labs within the past 24 hours.    Estimated Creatinine Clearance: 102.8 mL/min (based on SCr of 1 mg/dL).    Diagnostic Results:  I have reviewed and interpreted all pertinent imaging results/findings within the past 24 hours.    Assessment and Plan:     27 year old WM with DCM (familial, both brothers with heart transplants) with stage D CHFrEF underwent Heartware placement 02/01/17 had post-op atrial flutter 02/11/17 treated with amiodarone presents for OHTx.  0.  VAD implanted on 2/1/17 HVAD RPM 2700.         * Heart transplanted    -Transplant Date 2/18/18. S/P washout 2/19.  HTS primary.   -CMV Status:D+/R-   -Rejection status: Moderate Risk  -Rejection episodes: none to date  -Induction: No   -Current immunosuppression: Pred 20 BID, Prograf 5/5 mg BID, Cellcept 1500 mg BID.  -Opportunistic PPx with:Nystatin, valcyte, bactrim.  -CTs and AV wires in place removed   -Echo 2/21 EF 60%, low nl RV fxn.  -S/P EMBx #1 2/27. PAMR0, ISHLT 0.             VTE (venous thromboembolism)    -U/S 2/22 revealed Nonocclusive deep vein  thrombosis of the right internal jugular vein with slight extension into the proximal subclavian vein.  -Continue SC Heparin.         Paroxysmal atrial fibrillation    -~30mn episode of afib 2/21 terminated with amio. Likely exacerbated by pain, n/v, volume.   - Currently ST ~100.  - Amio stopped.         Leukocytosis    - Continue to monitor. Trending down today.  - No signs of infection. Old driveline site, sternal incision, and Groin CDI.  - No n/v/d/fevers/dysuria. UA clean. CT and wires removed.         Acute hyperglycemia    -insulin per endocrine            HAKEEM Alvarez  Heart Transplant  [facility]

## 2018-03-05 NOTE — TELEPHONE ENCOUNTER
Spoke to pt reminding him of appts tomorrow morning. Pt asked if he should hold all medications before lab. He is not taking antihypertensive medications, so advised him to hold all medications until after lab. Advised pt to bring a bottle of water and snack, if needed for his medications. Advised pt his EGBX should be fasting, except to take his medications. Pt takes his tacro at 8 pm, advised for him to have labs drawn as close to 8 am as possible. Advised pt to have labs drawn at 0730.

## 2018-03-05 NOTE — PROGRESS NOTES
Patient dcd per MD order. Patient educated on what medications were given today and when they are due tonight and tomorrow. Patient received medication card and prescriptions from transplant pharmacist.   Patient aware of future lab and clinic appointments. Heart cath and biopsy scheduled for tomorrow.   Patient educated on what signs and symptoms require MD notification.   PIV removed yesterday.   Patient verbalized full understanding of discharge instructions. Verbalized no further questions at this time.   Patient escorted off unit per wheelchair by RN. Patient's wife to provide transportation to [facility]   Patient discharged in stable condition.

## 2018-03-05 NOTE — DISCHARGE SUMMARY
[facility]  Heart Transplant  Discharge Summary      Patient Name: Mert Samayoa  MRN: [id number]  Admission Date: 2/17/2018  Hospital Length of Stay: 16 days  Discharge Date and Time: 03/05/2018 3:51 PM  Attending Physician: No att. providers found   Discharging Provider: HAKEEM Alvarez  Primary Care Provider: Primary Doctor Johanny     HPI: 27 year old WM with DCM (familial, both brothers with heart transplants) with stage D CHFrEF underwent Heartware placement 02/01/17 had post-op atrial flutter 02/11/17 treated with amiodarone presents for OHTx.  0.  VAD implanted on 2/1/17 HVAD RPM 2700.         Procedure(s) (LRB):  EXPLORATION-BLEEDING (RE-EXPLORATION) (Bilateral)     Hospital Course: Heart transplanted 2/18/18. S/P washout 2/19. CMV Status:D+/R- . Post op course complictaed by leukocytosis, down trending at time of discahrge with negative cultures. He was moderate risk for rejection and oral immunosuppressants increased to prednisone 15 mg BID, Prograf 5/5 mg BID, and Cellcept 1500 mg BID.    -Echo 2/21 EF 60%, low nl RV fxn.  -S/P EMBx #1 2/27. PAMR0, ISHLT 0.    He developed clear drianinage from bottom of sternotomy site day of discharge so patient was watched through the weekend and doxycycline 100 mg BID started for 10 day course. CTS was aware and did not want imaging  He will discharge to [facility] and return tomorrow for biopsy                   Consults         Status Ordering Provider     Consult to Dietary  Once     Provider:  (Not yet assigned)    Completed RITU PENNINGTON     Consult to Endocrinology  Once     Provider:  (Not yet assigned)    Completed RITU PENNINGTON     [facility]  Once     Provider:  (Not yet assigned)    Completed VALENTIN WILLAMS          Significant Diagnostic Studies: Labs:   BMP:   Recent Labs  Lab 03/04/18  0432 03/05/18  0456    122*   * 134*   K 4.6 4.8    104   CO2 23 22*   BUN  29* 32*   CREATININE 0.9 1.0   CALCIUM 8.5* 8.5*   MG 1.4* 1.5*       Pending Diagnostic Studies:     Procedure Component Value Units Date/Time    APTT [[id number]] Collected:  02/19/18 0556    Order Status:  Sent Lab Status:  In process Updated:  02/19/18 0556    Specimen:  Blood from Blood     Basic metabolic panel [[id number]] Collected:  02/19/18 0556    Order Status:  Sent Lab Status:  In process Updated:  02/19/18 0556    Specimen:  Blood from Blood     CBC auto differential [[id number]] Collected:  02/19/18 0816    Order Status:  Sent Lab Status:  In process Updated:  02/19/18 0817    Specimen:  Blood from Blood     CBC auto differential [[id number]] Collected:  02/19/18 0556    Order Status:  Sent Lab Status:  In process Updated:  02/19/18 0556    Specimen:  Blood from Blood     Lactic acid, plasma [[id number]] Collected:  02/19/18 1207    Order Status:  Sent Lab Status:  In process Updated:  02/19/18 1207    Specimen:  Blood from Blood     Lactic acid, plasma [[id number]] Collected:  02/19/18 0816    Order Status:  Sent Lab Status:  In process Updated:  02/19/18 0817    Specimen:  Blood from Blood     Magnesium [[id number]] Collected:  02/19/18 0850    Order Status:  Sent Lab Status:  In process Updated:  02/19/18 0851    Specimen:  Blood from Blood     Phosphorus [[id number]] Collected:  02/19/18 0850    Order Status:  Sent Lab Status:  In process Updated:  02/19/18 0851    Specimen:  Blood from Blood     Potassium [[id number]] Collected:  02/22/18 0610    Order Status:  Sent Lab Status:  In process Updated:  02/22/18 0610    Specimen:  Blood from Blood     Protime-INR [[id number]] Collected:  02/19/18 0556    Order Status:  Sent Lab Status:  In process Updated:  02/19/18 0556    Specimen:  Blood from Blood         Final Active Diagnoses:    Diagnosis Date Noted POA    PRINCIPAL PROBLEM:  Heart transplanted [Z94.1] 02/18/2018 Not Applicable    VTE (venous thromboembolism) [I82.90] 02/23/2018 Yes    Paroxysmal  atrial fibrillation [I48.0] 02/22/2018 Yes    Immunosuppression [D89.9] 02/20/2018 No    Adverse effect of glucocorticoids and synthetic analogues, initial encounter [T38.0X5A] 02/19/2018 No    Familial cardiomyopathy [I42.9] 03/01/2017 Yes    Leukocytosis [D72.829] 02/16/2017 Yes    Acute hyperglycemia [R73.9] 02/01/2017 Yes      Problems Resolved During this Admission:    Diagnosis Date Noted Date Resolved POA    Awaiting organ transplant [Z76.82] 02/25/2018 02/26/2018 Not Applicable    Hiccups [R06.6] 02/23/2018 02/25/2018 No    Awaiting organ transplant [Z76.82] 02/17/2018 02/18/2018 Not Applicable    Heart replaced by heart assist device [Z95.811]  02/18/2018 Not Applicable    Nausea [R11.0] 02/03/2017 02/25/2018 Yes      Discharged Condition: good    Disposition: Home or Self Care    Follow Up:  two weeks     Patient Instructions:   No discharge procedures on file.  Medications:  Reconciled Home Medications:   Discharge Medication List as of 3/5/2018 12:29 PM      START taking these medications    Details   atorvastatin (LIPITOR) 20 MG tablet Take 1 tablet (20 mg total) by mouth once daily., Starting Wed 2/28/2018, Until Thu 2/28/2019, Normal      calcium carbonate-vitamin D3 600 mg(1,500mg) -800 unit Tab Take 1 tablet by mouth once daily., Starting Wed 2/28/2018, Normal      doxycycline (VIBRA-TABS) 100 MG tablet Take 1 tablet (100 mg total) by mouth every 12 (twelve) hours., Starting Mon 3/5/2018, Until Mon 3/12/2018, Normal      ferrous sulfate 325 (65 FE) MG EC tablet Take 1 tablet (325 mg total) by mouth 2 (two) times daily., Starting Wed 2/28/2018, Normal      magnesium oxide (MAG-OX) 400 mg tablet Take 1 tablet (400 mg total) by mouth 2 (two) times daily., Starting Wed 2/28/2018, OTC      mycophenolate (CELLCEPT) 250 mg Cap Take 6 capsules (1,500 mg total) by mouth 2 (two) times daily., Starting Wed 2/28/2018, Until Thu 2/28/2019, Normal      nystatin (MYCOSTATIN) 100,000 unit/mL suspension  Take 5 mLs (500,000 Units total) by mouth 4 (four) times daily with meals and nightly., Starting Wed 2/28/2018, Normal      oxyCODONE (ROXICODONE) 5 MG immediate release tablet Take 1 tablet (5 mg total) by mouth every 6 (six) hours as needed., Starting Wed 2/28/2018, Print      senna-docusate 8.6-50 mg (PERICOLACE) 8.6-50 mg per tablet Take 2 tablets by mouth daily as needed for Constipation., Starting Wed 2/28/2018, Normal      sulfamethoxazole-trimethoprim 400-80mg (BACTRIM,SEPTRA) 400-80 mg per tablet Take 1 tablet by mouth once daily., Starting Thu 3/1/2018, Normal      valGANciclovir (VALCYTE) 450 mg Tab Take 2 tablets (900 mg total) by mouth once daily., Starting Thu 3/1/2018, Until Fri 3/1/2019, Normal         CONTINUE these medications which have CHANGED    Details   aspirin (ECOTRIN) 81 MG EC tablet Take 1 tablet (81 mg total) by mouth once daily., Starting Thu 3/1/2018, Until Fri 3/1/2019, Normal      famotidine (PEPCID) 40 MG tablet Take 1 tablet (40 mg total) by mouth every evening., Starting Wed 2/28/2018, Normal      predniSONE (DELTASONE) 10 MG tablet Take 1.5 tablets (15 mg total) by mouth 2 (two) times daily., Starting Mon 3/5/2018, Normal      tacrolimus (PROGRAF) 1 MG Cap Take 5 capsules (5 mg total) by mouth every 12 (twelve) hours., Starting Mon 3/5/2018, Normal         STOP taking these medications       lisinopril 10 MG tablet Comments:   Reason for Stopping:         warfarin (COUMADIN) 5 MG tablet Comments:   Reason for Stopping:         furosemide (LASIX) 40 MG tablet Comments:   Reason for Stopping:         ondansetron (ZOFRAN-ODT) 4 MG TbDL Comments:   Reason for Stopping:               HAKEEM Alvarez  Heart Transplant  [facility]

## 2018-03-05 NOTE — PROGRESS NOTES
DISCHARGE NOTE:  Moderate Rejection Risk  CMV D+ R-  Flow and virtual cross match negative      Mert Samayoa is a 28 y.o. male s/p LVAD explant and heart transplant on 2/18/2018.  Hospital course was mostly unremarkable, with the exception of transient AF thought to be exacerbated due to pain and nonocclusive UE DVT - team opted not to treat due to location.      Discharge Medications:   Mert Samayoa   Home Medication Instructions NATALIE:[id number]    Printed on:03/05/18 8615   Medication Information                      aspirin (ECOTRIN) 81 MG EC tablet  Take 1 tablet (81 mg total) by mouth once daily.             atorvastatin (LIPITOR) 20 MG tablet  Take 1 tablet (20 mg total) by mouth once daily.             calcium carbonate-vitamin D3 600 mg(1,500mg) -800 unit Tab  Take 1 tablet by mouth once daily.             doxycycline (VIBRA-TABS) 100 MG tablet  Take 1 tablet (100 mg total) by mouth every 12 (twelve) hours.             famotidine (PEPCID) 40 MG tablet  Take 1 tablet (40 mg total) by mouth every evening.             ferrous sulfate 325 (65 FE) MG EC tablet  Take 1 tablet (325 mg total) by mouth 2 (two) times daily.             magnesium oxide (MAG-OX) 400 mg tablet  Take 1 tablet (400 mg total) by mouth 2 (two) times daily.             mycophenolate (CELLCEPT) 250 mg Cap  Take 6 capsules (1,500 mg total) by mouth 2 (two) times daily.             nystatin (MYCOSTATIN) 100,000 unit/mL suspension  Take 5 mLs (500,000 Units total) by mouth 4 (four) times daily with meals and nightly.             oxyCODONE (ROXICODONE) 5 MG immediate release tablet  Take 1 tablet (5 mg total) by mouth every 6 (six) hours as needed.             predniSONE (DELTASONE) 10 MG tablet  Take 1.5 tablets (15 mg total) by mouth 2 (two) times daily.             senna-docusate 8.6-50 mg (PERICOLACE) 8.6-50 mg per tablet  Take 2 tablets by mouth daily as needed for Constipation.             sulfamethoxazole-trimethoprim  400-80mg (BACTRIM,SEPTRA) 400-80 mg per tablet  Take 1 tablet by mouth once daily.             tacrolimus (PROGRAF) 1 MG Cap  Take 5 capsules (5 mg total) by mouth every 12 (twelve) hours.             valGANciclovir (VALCYTE) 450 mg Tab  Take 2 tablets (900 mg total) by mouth once daily.                 Pharmacy Interventions/Recommendations:  1) Transplant Immunosuppression: Tacrolimus 5mg BID (goal 10-15ng/mL), MMF 1500mg BID, prednisone 15mg BID    2) Opportunistic Infection prophylaxis: Valcyte 900mg/day x 6 months, Bactrim SS daily x 1 year, and nystatin 5mL QID until prednisone is 10mg/day or less.      3) Patient will require outpatient zoledronic acid 5mg x 1.      Mert Cantu and his caregiver verbalized their understanding and had the opportunity to ask questions.

## 2018-03-06 ENCOUNTER — OFFICE VISIT (OUTPATIENT)
Dept: TRANSPLANT | Facility: CLINIC | Age: 28
End: 2018-03-06
Payer: COMMERCIAL

## 2018-03-06 ENCOUNTER — LAB VISIT (OUTPATIENT)
Dept: LAB | Facility: HOSPITAL | Age: 28
End: 2018-03-06
Attending: INTERNAL MEDICINE
Payer: COMMERCIAL

## 2018-03-06 ENCOUNTER — HOSPITAL ENCOUNTER (OUTPATIENT)
Facility: HOSPITAL | Age: 28
Discharge: HOME | End: 2018-03-06
Attending: INTERNAL MEDICINE | Admitting: INTERNAL MEDICINE
Payer: COMMERCIAL

## 2018-03-06 VITALS
BODY MASS INDEX: 24.47 KG/M2 | DIASTOLIC BLOOD PRESSURE: 85 MMHG | SYSTOLIC BLOOD PRESSURE: 143 MMHG | HEART RATE: 89 BPM | WEIGHT: 155.88 LBS | HEIGHT: 67 IN

## 2018-03-06 DIAGNOSIS — M25.561 ACUTE PAIN OF BOTH KNEES: ICD-10-CM

## 2018-03-06 DIAGNOSIS — Z94.1 STATUS POST TRANSPLANT, HEART: ICD-10-CM

## 2018-03-06 DIAGNOSIS — Z94.1 HEART TRANSPLANTED: Primary | ICD-10-CM

## 2018-03-06 DIAGNOSIS — Z95.811 HEART REPLACED BY HEART ASSIST DEVICE: ICD-10-CM

## 2018-03-06 DIAGNOSIS — R73.9 HYPERGLYCEMIA: ICD-10-CM

## 2018-03-06 DIAGNOSIS — M25.562 ACUTE PAIN OF BOTH KNEES: ICD-10-CM

## 2018-03-06 DIAGNOSIS — T38.0X5A ADVERSE EFFECT OF GLUCOCORTICOIDS AND SYNTHETIC ANALOGUES, INITIAL ENCOUNTER: ICD-10-CM

## 2018-03-06 DIAGNOSIS — D84.9 IMMUNOSUPPRESSION: ICD-10-CM

## 2018-03-06 DIAGNOSIS — I50.9 CONGESTIVE HEART FAILURE: ICD-10-CM

## 2018-03-06 DIAGNOSIS — Z94.1 STATUS POST HEART TRANSPLANT: ICD-10-CM

## 2018-03-06 LAB
25(OH)D3+25(OH)D2 SERPL-MCNC: 26 NG/ML
ALBUMIN SERPL BCP-MCNC: 3.5 G/DL
ALP SERPL-CCNC: 61 U/L
ALT SERPL W/O P-5'-P-CCNC: 39 U/L
ANION GAP SERPL CALC-SCNC: 8 MMOL/L
AST SERPL-CCNC: 17 U/L
BASOPHILS # BLD AUTO: ABNORMAL K/UL
BASOPHILS NFR BLD: 0 %
BILIRUB SERPL-MCNC: 0.5 MG/DL
BNP SERPL-MCNC: 1375 PG/ML
BUN SERPL-MCNC: 26 MG/DL
CALCIUM SERPL-MCNC: 9.1 MG/DL
CHLORIDE SERPL-SCNC: 106 MMOL/L
CHOLEST SERPL-MCNC: 94 MG/DL
CHOLEST/HDLC SERPL: 2.1 {RATIO}
CO2 SERPL-SCNC: 23 MMOL/L
CREAT SERPL-MCNC: 0.9 MG/DL
DIFFERENTIAL METHOD: ABNORMAL
EOSINOPHIL # BLD AUTO: ABNORMAL K/UL
EOSINOPHIL NFR BLD: 0 %
ERYTHROCYTE [DISTWIDTH] IN BLOOD BY AUTOMATED COUNT: 15.3 %
EST. GFR  (AFRICAN AMERICAN): >60 ML/MIN/1.73 M^2
EST. GFR  (NON AFRICAN AMERICAN): >60 ML/MIN/1.73 M^2
ESTIMATED PA SYSTOLIC PRESSURE: 31.14
FERRITIN SERPL-MCNC: 90 NG/ML
GLUCOSE SERPL-MCNC: 91 MG/DL
HCT VFR BLD AUTO: 26.3 %
HDLC SERPL-MCNC: 45 MG/DL
HDLC SERPL: 47.9 %
HGB BLD-MCNC: 8.4 G/DL
IMM GRANULOCYTES # BLD AUTO: ABNORMAL K/UL
IMM GRANULOCYTES NFR BLD AUTO: ABNORMAL %
IRON SERPL-MCNC: 27 UG/DL
LDLC SERPL CALC-MCNC: 26.4 MG/DL
LYMPHOCYTES # BLD AUTO: ABNORMAL K/UL
LYMPHOCYTES NFR BLD: 4 %
MAGNESIUM SERPL-MCNC: 1.5 MG/DL
MCH RBC QN AUTO: 30.3 PG
MCHC RBC AUTO-ENTMCNC: 31.9 G/DL
MCV RBC AUTO: 95 FL
MONOCYTES # BLD AUTO: ABNORMAL K/UL
MONOCYTES NFR BLD: 3 %
NEUTROPHILS NFR BLD: 93 %
NONHDLC SERPL-MCNC: 49 MG/DL
NRBC BLD-RTO: 0 /100 WBC
PLATELET # BLD AUTO: 331 K/UL
PMV BLD AUTO: 8.8 FL
POTASSIUM SERPL-SCNC: 5.1 MMOL/L
PROT SERPL-MCNC: 5.6 G/DL
RBC # BLD AUTO: 2.77 M/UL
RETIRED EF AND QEF - SEE NOTES: 60 (ref 55–65)
SATURATED IRON: 7 %
SODIUM SERPL-SCNC: 137 MMOL/L
TACROLIMUS BLD-MCNC: 7.7 NG/ML
TOTAL IRON BINDING CAPACITY: 386 UG/DL
TRANSFERRIN SERPL-MCNC: 261 MG/DL
TRANSFERRIN SERPL-MCNC: 261 MG/DL
TRICUSPID VALVE REGURGITATION: NORMAL
TRIGL SERPL-MCNC: 113 MG/DL
WBC # BLD AUTO: 16.07 K/UL

## 2018-03-06 PROCEDURE — 82728 ASSAY OF FERRITIN: CPT

## 2018-03-06 PROCEDURE — 80197 ASSAY OF TACROLIMUS: CPT

## 2018-03-06 PROCEDURE — C1894 INTRO/SHEATH, NON-LASER: HCPCS

## 2018-03-06 PROCEDURE — 25000003 PHARM REV CODE 250

## 2018-03-06 PROCEDURE — 80053 COMPREHEN METABOLIC PANEL: CPT

## 2018-03-06 PROCEDURE — 88342 IMHCHEM/IMCYTCHM 1ST ANTB: CPT | Performed by: PATHOLOGY

## 2018-03-06 PROCEDURE — 85027 COMPLETE CBC AUTOMATED: CPT

## 2018-03-06 PROCEDURE — 93505 ENDOMYOCARDIAL BIOPSY: CPT | Mod: 26,,, | Performed by: INTERNAL MEDICINE

## 2018-03-06 PROCEDURE — 83880 ASSAY OF NATRIURETIC PEPTIDE: CPT

## 2018-03-06 PROCEDURE — 83540 ASSAY OF IRON: CPT

## 2018-03-06 PROCEDURE — 82306 VITAMIN D 25 HYDROXY: CPT

## 2018-03-06 PROCEDURE — 93306 TTE W/DOPPLER COMPLETE: CPT

## 2018-03-06 PROCEDURE — 83735 ASSAY OF MAGNESIUM: CPT

## 2018-03-06 PROCEDURE — 85007 BL SMEAR W/DIFF WBC COUNT: CPT

## 2018-03-06 PROCEDURE — 99215 OFFICE O/P EST HI 40 MIN: CPT | Mod: 25,S$GLB,, | Performed by: INTERNAL MEDICINE

## 2018-03-06 PROCEDURE — 99999 PR PBB SHADOW E&M-EST. PATIENT-LVL III: CPT | Mod: PBBFAC,,, | Performed by: INTERNAL MEDICINE

## 2018-03-06 PROCEDURE — 80061 LIPID PANEL: CPT

## 2018-03-06 PROCEDURE — 88307 TISSUE EXAM BY PATHOLOGIST: CPT | Mod: 26,,, | Performed by: PATHOLOGY

## 2018-03-06 PROCEDURE — 93306 TTE W/DOPPLER COMPLETE: CPT | Mod: 26,,, | Performed by: INTERNAL MEDICINE

## 2018-03-06 NOTE — PROGRESS NOTES
"Subjective:   Mr. Samayoa is a 28 y.o. year old White male who received a  - brain death heart transplant on 18.      CMV status: high risk  Donor: +  Recipient: -    HPI  Heart transplanted 18. S/P washout . CMV Status:D+/R- . Post op course complictaed by leukocytosis, down trending at time of discahrge with negative cultures. He was moderate risk for rejection and oral immunosuppressants increased to prednisone 15 mg BID, Prograf 5/5 mg BID, and Cellcept 1500 mg BID.    Complaints of knee pain. Fullness after eating  Review of Systems   Constitution: Negative for chills, decreased appetite, diaphoresis, fever, weakness, malaise/fatigue, night sweats, weight gain and weight loss.   HENT: Negative.    Cardiovascular: Negative for chest pain, claudication, cyanosis, dyspnea on exertion, irregular heartbeat, leg swelling, near-syncope, orthopnea and palpitations.   Respiratory: Negative for cough, hemoptysis, shortness of breath, sleep disturbances due to breathing, snoring, sputum production and wheezing.    Endocrine: Negative.    Hematologic/Lymphatic: Negative.    Skin: Negative.    Musculoskeletal: Positive for joint pain.   Gastrointestinal: Negative.  Negative for abdominal pain and diarrhea.   Genitourinary: Negative.    Neurological: Negative.    Psychiatric/Behavioral: Negative.        Objective:   Blood pressure (!) 143/85, pulse 89, height 5' 7" (1.702 m), weight 70.7 kg (155 lb 13.8 oz).body mass index is 24.41 kg/m².    Physical Exam    Lab Results   Component Value Date    WBC 16.07 (H) 2018    HGB 8.4 (L) 2018    HCT 26.3 (L) 2018    MCV 95 2018     2018    CO2 23 2018    CREATININE 0.9 2018    CALCIUM 9.1 2018    ALBUMIN 3.5 2018    AST 17 2018    BNP 1,375 (H) 2018    ALT 39 2018       Lab Results   Component Value Date    INR 1.1 2018    INR 1.1 2018    INR 1.1 2018       BNP "   Date Value Ref Range Status   03/06/2018 1,375 (H) 0 - 99 pg/mL Final     Comment:     Values of less than 100 pg/ml are consistent with non-CHF populations.   02/28/2018 1,036 (H) 0 - 99 pg/mL Final     Comment:     Values of less than 100 pg/ml are consistent with non-CHF populations.   01/25/2018 67 0 - 99 pg/mL Final     Comment:     Values of less than 100 pg/ml are consistent with non-CHF populations.       LD   Date Value Ref Range Status   01/25/2018 218 110 - 260 U/L Final     Comment:     Results are increased in hemolyzed samples.   12/28/2017 215 110 - 260 U/L Final     Comment:     Results are increased in hemolyzed samples.   11/29/2017 207 110 - 260 U/L Final     Comment:     Results are increased in hemolyzed samples.       Tacrolimus Lvl   Date Value Ref Range Status   03/05/2018 11.1 5.0 - 15.0 ng/mL Final     Comment:     Testing performed by Liquid Chromatography-Tandem  Mass Spectrometry (LC-MS/MS).  This test was developed and its performance characteristics  determined by [facility], Department of Pathology  and Laboratory Medicine in a manner consistent with CLIA  requirements. It has not been cleared or approved by the [place]  Food and Drug Administration.  This test is used for clinical   purposes.  It should not be regarded as investigational or for  research.       No results found for: SIROLIMUS  No results found for: CYCLOSPORINE    No results found for this or any previous visit.  No results found for this or any previous visit.    Labs were reviewed with the patient.    Assessment:     1. Heart transplanted    2. Immunosuppression    3. Adverse effect of glucocorticoids and synthetic analogues, initial encounter    4. Acute pain of both knees        Plan:   Stable from cardiac transplant point of view    Biopsy today. Echo today    Return instructions as set forth by post transplant schedule or as needed:    Clinic: Return for labs and/or biopsy weekly the first month, every  two weeks during month 2 and then monthly for the first year at the provider or coordinator's discretion. During the second year, return to clinic every 3 months. Post transplant year 3-5 return every 6 months. There will be a comprehensive post transplant evaluation every year that may include LHC/RHC/biopsy, stress test, echo, CXR, and other health screening exams.    In addition to the clinical assessment, I have ordered Allomap testing for this patient to assist in identification of moderate/severe acute cellular rejection (ACR) in a pt with stable Allograft function instead of endomyocardial biopsy.     Patient is reminded to call with any health changes as these can be early signs of transplant complications. Patient is advised to make sure any new medications or changes of old medications are discussed with a pharmacist or physician knowledgeable with transplant to avoid rejection/drug toxicity related to significant drug interactions.    UNOS Patient Status  Functional Status: 90% - Able to carry on normal activity: minor symptoms of disease  Physical Capacity: Limited Mobility  Working for Income: No  If no, reason not working: Demands of Treatment    Roque Matos MD

## 2018-03-06 NOTE — DISCHARGE INSTRUCTIONS

## 2018-03-06 NOTE — H&P
HTS Pre-Procedure H&P   HTS Fellow         Subjective:     Mert Samayoa is 28  year old WM with DCM (familial, both brothers with heart transplants) with stage D CHFrEF underwent Heartware placement 02/01/17 had post-op atrial flutter 02/11/17 treated with amiodarone for routine rhc and biopsy #2            Tacro : 5mg.5mg   Pred : 15mg   MMF : 1500/1500      Biopsyt #1: AMR / CMR negative     Past Medical History:   Diagnosis Date    Cardiogenic shock 1/16/2017    Cardiomyopathy     Familial cardiomyopathy    CHF (congestive heart failure)     Essential hypertension 12/21/2016    Familial Cardiomyopathy     Familial cardiomyopathy      Past Surgical History:   Procedure Laterality Date    LEFT VENTRICULAR ASSIST DEVICE       Social History     Social History    Marital status:      Spouse name: N/A    Number of children: N/A    Years of education: N/A     Occupational History    Not on file.     Social History Main Topics    Smoking status: Former Smoker     Packs/day: 1.00     Quit date: 12/14/2016    Smokeless tobacco: Never Used    Alcohol use No    Drug use: No    Sexual activity: Not on file     Other Topics Concern    Not on file     Social History Narrative    Works      Family History   Problem Relation Age of Onset    Arthritis Mother     Heart disease Brother      cardiomyopathy and heart transplant    No Known Problems Father     Heart disease Brother      cardiomyopathy and heart transplanted twice    Birth defects Paternal Uncle            Other significant clinical information:  Review of patient's allergies indicates:  No Known Allergies  Current Facility-Administered Medications on File Prior to Encounter   Medication    [DISCONTINUED] 0.9%  NaCl infusion (for blood administration)    [DISCONTINUED] albuterol nebulizer solution 2.5 mg    [DISCONTINUED] aspirin EC tablet 81 mg    [DISCONTINUED] atorvastatin tablet 20 mg     [DISCONTINUED] calcium gluconate 1g in dextrose 5% 100mL (ready to mix system)    [DISCONTINUED] dextrose 50% injection 12.5 g    [DISCONTINUED] dextrose 50% injection 25 g    [DISCONTINUED] doxycycline tablet 100 mg    [DISCONTINUED] famotidine tablet 20 mg    [DISCONTINUED] ferrous sulfate EC tablet 325 mg    [DISCONTINUED] glucagon (human recombinant) injection 1 mg    [DISCONTINUED] glucose chewable tablet 16 g    [DISCONTINUED] glucose chewable tablet 24 g    [DISCONTINUED] glycerin adult suppository 1 suppository    [DISCONTINUED] insulin aspart U-100 pen 1-10 Units    [DISCONTINUED] lactulose 20 gram/30 mL solution Soln 20 g    [DISCONTINUED] magnesium oxide tablet 400 mg    [DISCONTINUED] magnesium sulfate 2 g in dextrose 5% 50 ml IVPB    [DISCONTINUED] meclizine tablet 25 mg    [DISCONTINUED] mycophenolate capsule 1,500 mg    [DISCONTINUED] nystatin 100,000 unit/mL suspension 500,000 Units    [DISCONTINUED] ondansetron injection 4 mg    [DISCONTINUED] oxyCODONE immediate release tablet 5 mg    [DISCONTINUED] oxyCODONE-acetaminophen 7.5-325 mg per tablet 1 tablet    [DISCONTINUED] polyethylene glycol packet 17 g    [DISCONTINUED] potassium chloride 20 mEq in 100 mL IVPB (FOR CENTRAL LINE ADMINISTRATION ONLY)    [DISCONTINUED] potassium chloride 20 mEq in 100 mL IVPB (FOR CENTRAL LINE ADMINISTRATION ONLY)    [DISCONTINUED] potassium chloride 40 mEq in 100 mL IVPB (FOR CENTRAL LINE ADMINISTRATION ONLY)    [DISCONTINUED] predniSONE tablet 25 mg    [DISCONTINUED] promethazine (PHENERGAN) 12.5 mg in dextrose 5 % 50 mL IVPB    [DISCONTINUED] senna-docusate 8.6-50 mg per tablet 2 tablet    [DISCONTINUED] sodium chloride 0.9% flush 3 mL    [DISCONTINUED] sulfamethoxazole-trimethoprim 400-80mg per tablet 1 tablet    [DISCONTINUED] tacrolimus capsule 5 mg    [DISCONTINUED] valGANciclovir tablet 900 mg     Current Outpatient Prescriptions on File Prior to Encounter   Medication Sig     aspirin (ECOTRIN) 81 MG EC tablet Take 1 tablet (81 mg total) by mouth once daily.    atorvastatin (LIPITOR) 20 MG tablet Take 1 tablet (20 mg total) by mouth once daily.    calcium carbonate-vitamin D3 600 mg(1,500mg) -800 unit Tab Take 1 tablet by mouth once daily.    doxycycline (VIBRA-TABS) 100 MG tablet Take 1 tablet (100 mg total) by mouth every 12 (twelve) hours.    famotidine (PEPCID) 40 MG tablet Take 1 tablet (40 mg total) by mouth every evening.    ferrous sulfate 325 (65 FE) MG EC tablet Take 1 tablet (325 mg total) by mouth 2 (two) times daily.    magnesium oxide (MAG-OX) 400 mg tablet Take 1 tablet (400 mg total) by mouth 2 (two) times daily.    mycophenolate (CELLCEPT) 250 mg Cap Take 6 capsules (1,500 mg total) by mouth 2 (two) times daily.    nystatin (MYCOSTATIN) 100,000 unit/mL suspension Take 5 mLs (500,000 Units total) by mouth 4 (four) times daily with meals and nightly.    oxyCODONE (ROXICODONE) 5 MG immediate release tablet Take 1 tablet (5 mg total) by mouth every 6 (six) hours as needed.    predniSONE (DELTASONE) 10 MG tablet Take 1.5 tablets (15 mg total) by mouth 2 (two) times daily.    senna-docusate 8.6-50 mg (PERICOLACE) 8.6-50 mg per tablet Take 2 tablets by mouth daily as needed for Constipation.    sulfamethoxazole-trimethoprim 400-80mg (BACTRIM,SEPTRA) 400-80 mg per tablet Take 1 tablet by mouth once daily.    tacrolimus (PROGRAF) 1 MG Cap Take 5 capsules (5 mg total) by mouth every 12 (twelve) hours.    valGANciclovir (VALCYTE) 450 mg Tab Take 2 tablets (900 mg total) by mouth once daily.            Objective:      Physical Exam  General : NAD   Chest : CTAB no w/r/r  Cardiac : RRR normal S1 and S2 no S3 or S4   Ext : warm and well perfused.       Lab Review   Lab Results   Component Value Date    WBC 16.07 (H) 03/06/2018    HGB 8.4 (L) 03/06/2018    HCT 26.3 (L) 03/06/2018    MCV 95 03/06/2018     03/06/2018     Lab Results   Component Value Date    INR 1.1  03/03/2018    INR 1.1 03/02/2018    INR 1.1 03/01/2018           Assessment:   Mert Samayoa is 28  year old WM with DCM (familial, both brothers with heart transplants) with stage D CHFrEF underwent Heartware placement 02/01/17 had post-op atrial flutter 02/11/17 treated with amiodarone for routine rhc and biopsy     Plan:       I have explained the risks, benefits, and alternatives of the procedure in detail.  The patient expresses understanding and all questions have been answered.  The patient agrees to the proceed as planned.  RHC / echo biopsy via RIJ   Micropuncture access needle will be used to minimize bleeding risk.      Aneudy Young DO   HTS Fellow

## 2018-03-06 NOTE — PHYSICIAN QUERY
PT Name: Mert Samayoa  MR #: [id number]     Physician Query Form - Documentation Clarification      CDS/: Kala Bee RN, CCDS             Contact information: [email or web address]    This form is a permanent document in the medical record.     Query Date: March 6, 2018    By submitting this query, we are merely seeking further clarification of documentation. Please utilize your independent clinical judgment when addressing the question(s) below.    The Medical record reflects the following:    Supporting Clinical Findings Location in Medical Record     Mg 1.4-->1.5       3/4, 3/5 lab     Magnesium Oxide 400 mg PO 2 x a day       2/26- 3/5 mar                                                                            Doctor, Please specify diagnosis or diagnoses associated with above clinical findings.    Provider Use Only          (   x )  Hypomagnesemia    (    )  Other___________                                                                                                                   [  ] Clinically undetermined

## 2018-03-06 NOTE — DISCHARGE SUMMARY
Date of admit to cath lab: 3/6/2018      Date of discharge from cath lab: 3/6/2018      Principal diagnosis: s/p OHT    Discharge attending physician: Marisel Lundberg MD    Hospital Course/Outcome of the treatment, procedures or surgery: Pt admitted for EGBX.   See CVIS/cath lab procedure report in EPIC  for full report of today's procedure.    Disposition of the case (d/c disposition): [place]    Discharge Medication List: see med card    Plan for follow up care, diet, activity level: F/U as scheduled. Resume low Na diet.  Activity as tolerated    Condition on discharge from Cath lab: Stable.

## 2018-03-06 NOTE — LETTER
March 6, 2018        Guillermo Alejo  [address]  [facility] [address]  Phone: [phone]  Fax: [phone]             [facility]  [address]  [facility] [address]  Phone: [phone]   Patient: Mert Samayoa   MR Number: [id number]   YOB: 1990   Date of Visit: 3/6/2018       Dear Dr. Guillermo Alejo    Thank you for referring Mert Samayoa to me for evaluation. Attached you will find relevant portions of my assessment and plan of care.    If you have questions, please do not hesitate to call me. I look forward to following Mert Samayoa along with you.    Sincerely,    Roque Matos MD    Enclosure    If you would like to receive this communication electronically, please contact [email or web address] or [phone] to request [employer] Link access.    [employer] Link is a tool which provides read-only access to select patient information with whom you have a relationship. Its easy to use and provides real time access to review your patients record including encounter summaries, notes, results, and demographic information.    If you feel you have received this communication in error or would no longer like to receive these types of communications, please e-mail [email or web address]

## 2018-03-06 NOTE — PROGRESS NOTES
Pt in clinic for 1st appt after transplant. Pt c/o bilateral knee pain, unable to sleep. Pt took an extra pain pill to help him to sleep for 2 hours. Pt also, c/o esophageal discomfort. Since surgery, pt feels full after eating a small amount of food. It gets worse when he lays down. Explained he is on multiple medications that can cause esophageal discomfort and fullness. Will notify Dr. Matos of these findings.

## 2018-03-06 NOTE — TELEPHONE ENCOUNTER
"After chart review with Holland Willams PharmD, Called pt to inform him to increase his prograf to 6 mg twice per day (was on 5 mg twice daily). Will get new levels on Friday. Pt also asked about ? Pain? Or ?weird feeling? Around the site of the LVAD wounds. No redness or drainage. He said he felt like "something was left in there". I told him to monitor it and we can check him tomorrow if it is a problem.   "

## 2018-03-07 NOTE — TELEPHONE ENCOUNTER
Spoke to pt regarding biopsy path negative for rejection. Will discuss in chart review whether to decrease prednisone. Pt states his knees are feeling better. Also, pt states he can feel what seems like his VAD driveline under the incision. I reassured him the driveline was taken out. He states there is still bloody discharge from the driveline incision. I advise him to come over to clinic after labs Friday for me to look at it. Advised pt if the bleeding gets worse to call and come to clinic tomorrow.

## 2018-03-08 RX ORDER — TACROLIMUS 1 MG/1
6 CAPSULE ORAL EVERY 12 HOURS
Qty: 360 CAPSULE | Refills: 11 | Status: SHIPPED | OUTPATIENT
Start: 2018-03-08 | End: 2018-03-21 | Stop reason: SDUPTHER

## 2018-03-09 ENCOUNTER — TELEPHONE (OUTPATIENT)
Dept: TRANSPLANT | Facility: CLINIC | Age: 28
End: 2018-03-09

## 2018-03-09 ENCOUNTER — LAB VISIT (OUTPATIENT)
Dept: LAB | Facility: HOSPITAL | Age: 28
End: 2018-03-09
Payer: COMMERCIAL

## 2018-03-09 DIAGNOSIS — Z94.1 STATUS POST HEART TRANSPLANT: Primary | ICD-10-CM

## 2018-03-09 DIAGNOSIS — Z94.1 STATUS POST HEART TRANSPLANT: ICD-10-CM

## 2018-03-09 LAB
ALBUMIN SERPL BCP-MCNC: 3.8 G/DL
ALP SERPL-CCNC: 67 U/L
ALT SERPL W/O P-5'-P-CCNC: 41 U/L
ANION GAP SERPL CALC-SCNC: 9 MMOL/L
AST SERPL-CCNC: 16 U/L
BILIRUB SERPL-MCNC: 0.8 MG/DL
BNP SERPL-MCNC: 1138 PG/ML
BUN SERPL-MCNC: 23 MG/DL
CALCIUM SERPL-MCNC: 9.2 MG/DL
CHLORIDE SERPL-SCNC: 104 MMOL/L
CO2 SERPL-SCNC: 26 MMOL/L
CREAT SERPL-MCNC: 0.9 MG/DL
EST. GFR  (AFRICAN AMERICAN): >60 ML/MIN/1.73 M^2
EST. GFR  (NON AFRICAN AMERICAN): >60 ML/MIN/1.73 M^2
GLUCOSE SERPL-MCNC: 85 MG/DL
POTASSIUM SERPL-SCNC: 5.1 MMOL/L
PROT SERPL-MCNC: 6 G/DL
SODIUM SERPL-SCNC: 139 MMOL/L
TACROLIMUS BLD-MCNC: 12.6 NG/ML

## 2018-03-09 PROCEDURE — 80197 ASSAY OF TACROLIMUS: CPT

## 2018-03-09 PROCEDURE — 83880 ASSAY OF NATRIURETIC PEPTIDE: CPT

## 2018-03-09 PROCEDURE — 80053 COMPREHEN METABOLIC PANEL: CPT

## 2018-03-09 PROCEDURE — 36415 COLL VENOUS BLD VENIPUNCTURE: CPT

## 2018-03-09 RX ORDER — AMLODIPINE BESYLATE 5 MG/1
5 TABLET ORAL DAILY
Qty: 30 TABLET | Refills: 11 | Status: SHIPPED | OUTPATIENT
Start: 2018-03-09 | End: 2018-08-09 | Stop reason: ALTCHOICE

## 2018-03-09 NOTE — TELEPHONE ENCOUNTER
Pt and wife seen in clinic as nurse visit, gave pt aquacel dressing to place over LVAD exit site per [facility] Wound Care Nurse. Instructed pt to go home, shower and apply aquacel and cover with Tegaderm dressing. Leave aquacel inplace until Tuesday, when he sees Dr. Klein. Informed pt of amlodipine prescription sent downstairs. Instructed pt to take his resting blood pressure TID and record. Instructed pt and wife to call on call number if BP continues to remain over 140's after taking medication or pt experiences headaches. Pt and wife stated their understanding.

## 2018-03-09 NOTE — TELEPHONE ENCOUNTER
Called to inform him tacro 12.6. Will see pt in clinic on Tuesday. Pt c/o of Bp elevated 140's over 80-90's. Consulted Dr. Lundberg, who is starting amlodipine 5 mg daily today. Also, pt c/o LVAD exit site appears to be opening with continued drainage. Will make appt for CTS on Tuesday.

## 2018-03-13 ENCOUNTER — HOSPITAL ENCOUNTER (OUTPATIENT)
Dept: RADIOLOGY | Facility: HOSPITAL | Age: 28
Discharge: HOME | End: 2018-03-13
Attending: THORACIC SURGERY (CARDIOTHORACIC VASCULAR SURGERY)
Payer: COMMERCIAL

## 2018-03-13 ENCOUNTER — OFFICE VISIT (OUTPATIENT)
Dept: TRANSPLANT | Facility: CLINIC | Age: 28
End: 2018-03-13
Payer: COMMERCIAL

## 2018-03-13 ENCOUNTER — OFFICE VISIT (OUTPATIENT)
Dept: CARDIOTHORACIC SURGERY | Facility: CLINIC | Age: 28
End: 2018-03-13
Payer: COMMERCIAL

## 2018-03-13 ENCOUNTER — HOSPITAL ENCOUNTER (OUTPATIENT)
Facility: HOSPITAL | Age: 28
Discharge: HOME | End: 2018-03-13
Attending: INTERNAL MEDICINE | Admitting: INTERNAL MEDICINE
Payer: COMMERCIAL

## 2018-03-13 VITALS
DIASTOLIC BLOOD PRESSURE: 78 MMHG | HEIGHT: 67 IN | SYSTOLIC BLOOD PRESSURE: 145 MMHG | WEIGHT: 150.56 LBS | BODY MASS INDEX: 23.63 KG/M2 | HEART RATE: 97 BPM

## 2018-03-13 VITALS
HEIGHT: 67 IN | BODY MASS INDEX: 23.56 KG/M2 | SYSTOLIC BLOOD PRESSURE: 139 MMHG | DIASTOLIC BLOOD PRESSURE: 88 MMHG | HEART RATE: 95 BPM | OXYGEN SATURATION: 100 % | TEMPERATURE: 98 F | WEIGHT: 150.13 LBS

## 2018-03-13 DIAGNOSIS — R10.84 GENERALIZED ABDOMINAL PAIN: ICD-10-CM

## 2018-03-13 DIAGNOSIS — T38.0X5A ADVERSE EFFECT OF GLUCOCORTICOIDS AND SYNTHETIC ANALOGUES, INITIAL ENCOUNTER: ICD-10-CM

## 2018-03-13 DIAGNOSIS — Z94.1 HEART TRANSPLANTED: Primary | ICD-10-CM

## 2018-03-13 DIAGNOSIS — E55.9 VITAMIN D DEFICIENCY: ICD-10-CM

## 2018-03-13 DIAGNOSIS — D84.9 IMMUNOSUPPRESSION: ICD-10-CM

## 2018-03-13 DIAGNOSIS — Z94.1 STATUS POST TRANSPLANT, HEART: ICD-10-CM

## 2018-03-13 DIAGNOSIS — R73.9 HYPERGLYCEMIA: ICD-10-CM

## 2018-03-13 DIAGNOSIS — I42.9 FAMILIAL CARDIOMYOPATHY: ICD-10-CM

## 2018-03-13 PROBLEM — I82.90 VTE (VENOUS THROMBOEMBOLISM): Status: RESOLVED | Noted: 2018-02-23 | Resolved: 2018-03-13

## 2018-03-13 PROBLEM — I48.0 PAROXYSMAL ATRIAL FIBRILLATION: Status: RESOLVED | Noted: 2018-02-22 | Resolved: 2018-03-13

## 2018-03-13 PROBLEM — D72.829 LEUKOCYTOSIS: Status: RESOLVED | Noted: 2017-02-16 | Resolved: 2018-03-13

## 2018-03-13 LAB
DIASTOLIC DYSFUNCTION: NO
ESTIMATED PA SYSTOLIC PRESSURE: 25.66
MITRAL VALVE MOBILITY: NORMAL
RETIRED EF AND QEF - SEE NOTES: 60 (ref 55–65)

## 2018-03-13 PROCEDURE — 88342 IMHCHEM/IMCYTCHM 1ST ANTB: CPT | Performed by: PATHOLOGY

## 2018-03-13 PROCEDURE — 74176 CT ABD & PELVIS W/O CONTRAST: CPT | Mod: 26,,, | Performed by: RADIOLOGY

## 2018-03-13 PROCEDURE — C1894 INTRO/SHEATH, NON-LASER: HCPCS

## 2018-03-13 PROCEDURE — 99999 PR PBB SHADOW E&M-EST. PATIENT-LVL III: CPT | Mod: PBBFAC,,, | Performed by: INTERNAL MEDICINE

## 2018-03-13 PROCEDURE — 93306 TTE W/DOPPLER COMPLETE: CPT

## 2018-03-13 PROCEDURE — 99024 POSTOP FOLLOW-UP VISIT: CPT | Mod: S$GLB,,, | Performed by: THORACIC SURGERY (CARDIOTHORACIC VASCULAR SURGERY)

## 2018-03-13 PROCEDURE — 93306 TTE W/DOPPLER COMPLETE: CPT | Mod: 26,,, | Performed by: INTERNAL MEDICINE

## 2018-03-13 PROCEDURE — 74176 CT ABD & PELVIS W/O CONTRAST: CPT | Mod: TC

## 2018-03-13 PROCEDURE — 88342 IMHCHEM/IMCYTCHM 1ST ANTB: CPT | Mod: 26,,, | Performed by: PATHOLOGY

## 2018-03-13 PROCEDURE — 99214 OFFICE O/P EST MOD 30 MIN: CPT | Mod: S$GLB,,, | Performed by: INTERNAL MEDICINE

## 2018-03-13 PROCEDURE — 88307 TISSUE EXAM BY PATHOLOGIST: CPT | Mod: 26,,, | Performed by: PATHOLOGY

## 2018-03-13 PROCEDURE — 99999 PR PBB SHADOW E&M-EST. PATIENT-LVL III: CPT | Mod: PBBFAC,,, | Performed by: THORACIC SURGERY (CARDIOTHORACIC VASCULAR SURGERY)

## 2018-03-13 PROCEDURE — 25000003 PHARM REV CODE 250

## 2018-03-13 PROCEDURE — 93505 ENDOMYOCARDIAL BIOPSY: CPT | Mod: 26,,, | Performed by: INTERNAL MEDICINE

## 2018-03-13 PROCEDURE — 88307 TISSUE EXAM BY PATHOLOGIST: CPT | Performed by: PATHOLOGY

## 2018-03-13 RX ORDER — ERGOCALCIFEROL 1.25 MG/1
50000 CAPSULE ORAL
Qty: 15 CAPSULE | Refills: 3 | Status: ON HOLD | OUTPATIENT
Start: 2018-03-13 | End: 2018-10-02 | Stop reason: HOSPADM

## 2018-03-13 NOTE — DISCHARGE SUMMARY
[facility]  Discharge Note  Short Stay    Admit Date: 3/13/2018    Discharge Date and Time: 3/13/2018    Attending Physician: Hernán Tidwell Jr.,*     Discharge Provider: Hernán Tidwell Jr    Diagnoses:  Active Hospital Problems    Diagnosis  POA    *Heart transplanted [Z94.1]  Not Applicable    Immunosuppression [D89.9]  Yes      Resolved Hospital Problems    Diagnosis Date Resolved POA   No resolved problems to display.       Discharged Condition: stable    Hospital Course:   Patient was admitted for an outpatient procedure  Patient tolerated the procedure well.   6 specimens (one spit on transfer to medium)  CVP by side arm very low perhaps 3 cm water  BNP persistently elevated but CVP clearly low  There were no complications.   Please see full report in CVIS for details.    Final Diagnoses: Same as principal problem.    Disposition: Home or Self Care    Follow up/Patient Instructions:    Medications:  Reconciled Home Medications:   Current Discharge Medication List      CONTINUE these medications which have NOT CHANGED    Details   amLODIPine (NORVASC) 5 MG tablet Take 1 tablet (5 mg total) by mouth once daily.  Qty: 30 tablet, Refills: 11    Associated Diagnoses: Status post heart transplant      aspirin (ECOTRIN) 81 MG EC tablet Take 1 tablet (81 mg total) by mouth once daily.  Qty: 30 tablet, Refills: 11      atorvastatin (LIPITOR) 20 MG tablet Take 1 tablet (20 mg total) by mouth once daily.  Qty: 90 tablet, Refills: 3      calcium carbonate-vitamin D3 600 mg(1,500mg) -800 unit Tab Take 1 tablet by mouth once daily.  Qty: 30 tablet, Refills: 11      famotidine (PEPCID) 40 MG tablet Take 1 tablet (40 mg total) by mouth every evening.  Qty: 30 tablet, Refills: 11    Comments: Please consider 90 day supplies to promote better adherence      ferrous sulfate 325 (65 FE) MG EC tablet Take 1 tablet (325 mg total) by mouth 2 (two) times daily.  Qty: 60 tablet, Refills: 11      magnesium oxide  (MAG-OX) 400 mg tablet Take 1 tablet (400 mg total) by mouth 2 (two) times daily.  Qty: 60 tablet, Refills: 3      mycophenolate (CELLCEPT) 250 mg Cap Take 6 capsules (1,500 mg total) by mouth 2 (two) times daily.  Qty: 360 capsule, Refills: 11      nystatin (MYCOSTATIN) 100,000 unit/mL suspension Take 5 mLs (500,000 Units total) by mouth 4 (four) times daily with meals and nightly.  Qty: 473 mL, Refills: 3      oxyCODONE (ROXICODONE) 5 MG immediate release tablet Take 1 tablet (5 mg total) by mouth every 6 (six) hours as needed.  Qty: 28 tablet, Refills: 0      predniSONE (DELTASONE) 10 MG tablet Take 1.5 tablets (15 mg total) by mouth 2 (two) times daily.  Qty: 90 tablet, Refills: 3      senna-docusate 8.6-50 mg (PERICOLACE) 8.6-50 mg per tablet Take 2 tablets by mouth daily as needed for Constipation.  Qty: 60 tablet, Refills: 3      sulfamethoxazole-trimethoprim 400-80mg (BACTRIM,SEPTRA) 400-80 mg per tablet Take 1 tablet by mouth once daily.  Qty: 30 tablet, Refills: 11      tacrolimus (PROGRAF) 1 MG Cap Take 6 capsules (6 mg total) by mouth every 12 (twelve) hours.  Qty: 360 capsule, Refills: 11      valGANciclovir (VALCYTE) 450 mg Tab Take 2 tablets (900 mg total) by mouth once daily.  Qty: 60 tablet, Refills: 11         STOP taking these medications       doxycycline (VIBRA-TABS) 100 MG tablet Comments:   Reason for Stopping:             Resume previous diet and activity; continue f/u with HTS today as directed prior to this procedure

## 2018-03-13 NOTE — PROGRESS NOTES
Patient seen and examined for DLES wound check . Patient is progressively increasing activity. No significant complaints.     Sternum: stable, mid sternal incision CDI  DLES incision- sutures intact. Yellow-brown drainage,          erythemas edges, unapproximated on lateral side.  Chest xray: Acceptable post op chest     Assessment:  1.  Orthotopic heart transplant using bicaval technique, ischemic time 3 hours   and 51 minutes.  2.  Removal of implantable intracorporeal left ventricular assist device.  3.  Removal of AICD and lead.  4.  Closure of patent foramen ovale in donor heart.  5.  Backbench preparation of donor heart for implant.     Plan:  --Sutures removed  --Appointment scheduled with wound care NP today for        1:00pm  --We will refer to cardiology to assume care- Dr. Daniels     No scheduled appointment, RTC prn    CTS Attending Note:    I have personally taken the history and examined this patient and agree with the NP's note as stated above.  28-year-old male status post orthotopic heart transplant.  Here for wound check.  His sternotomy appears well healed.  He has persistent drainage from his driveline exit site.  There are some nylon sutures present there.  We will remove the sutures today.  I examined his anterior abdominal wall.  There is a region of firmness along the path of the driveline.  There was no overlying erythema.  I re-reviewed abdominal films that were done in the hospital after his transplant and could not identify any retained driveline material.  However, retained velour may not show up on a plain film.  We will obtain a noncontrast CT of the abdomen to see if there is any foreign body still present in the abdominal wall, or if the region of firmness represents healing and scar along the former driveline tract.      Addendum:      CT reviewed and demonstrates what appears to be retained velour from driveline that avulsed from driveline when it was removed. Removal will  necessitate at lease one separate skin incision on the anterior abdominal wall with exploration of the driveline tract.

## 2018-03-13 NOTE — OP NOTE
RV biopsy Lab Post Procedure Note    Patient tolerated the procedure well.   6 specimens (one spit on transfer to medium)  CVP by side arm very low perhaps 3 cm water  BNP persistently elevated but CVP clearly low  There were no complications.   Please see full report in CVIS for details.

## 2018-03-13 NOTE — H&P
27 yo s/p OHT 2/18/18 referred for routine RV biopsy with last biopsy 3/6/17 demonstrating no rejection.  He reports since biopsy he has paresthesias in the distribution of the mandibular branch of the facial nerve and tenderness at the site.  Also aches involving the knees and ankles but no fever or other symptoms of sick serum.    Vital signs per nurse's notes  JVP normal--site of last biopsy healing nicely with no hematoma or evidence of infection  Carotid 2+  S1 and S2 normal.  No gallop. No murmur. No rub  Lungs: clear  Abdomen:Benign  Extr: No edema; joints not tender at this time--he says comes and goes    Lab Results   Component Value Date    BNP 1,244 (H) 03/13/2018     03/13/2018    K 4.6 03/13/2018    MG 1.6 03/13/2018     03/13/2018    CO2 27 03/13/2018    BUN 21 (H) 03/13/2018    CREATININE 0.9 03/13/2018    GLU 89 03/13/2018    HGBA1C 5.4 01/17/2017    AST 18 03/13/2018    ALT 43 03/13/2018    ALBUMIN 4.1 03/13/2018    PROT 6.5 03/13/2018    BILITOT 0.9 03/13/2018    WBC 12.09 03/13/2018    HGB 10.7 (L) 03/13/2018    HCT 35.6 (L) 03/13/2018    HCT 23 (L) 02/21/2018     03/13/2018    INR 1.1 03/03/2018    INR 2.9 02/14/2018     01/25/2018    TSH 1.025 09/13/2017    CHOL 94 (L) 03/06/2018    HDL 45 03/06/2018    LDLCALC 26.4 (L) 03/06/2018    TRIG 113 03/06/2018    FREET4 1.21 09/13/2017    TACROLIMUS 12.6 03/09/2018       ASSES;  S/P OHT    PLAN:  Proceed with RV biopsy

## 2018-03-13 NOTE — DISCHARGE INSTRUCTIONS
AFTER THE PROCEDURE:   -DO NOT DRINK OR EAT ANYTHING UNTIL NO LONGER HOARSE   -You may remove the bandage in 24 hours and wash with soap and water.   -You may shower, but do not soak in a tub for three days.   PRECAUTIONS FOR THE NEXT 24 HOURS:   -If you need to cough, sneeze, have a bowel movement, or bear down, hold pressure over your bandage.   -Do not  anything heavier than a gallon of milk(about 5 pounds)   -Avoid excessive bending over.   SYMPTOMS TO WATCH FOR AND REPORT TO YOUR DOCTOR:   -BLEEDING: hold pressure over the site until bleeding stops. Proceed to Emergency Room by ambulance (do not drive yourself) if unable to stop bleeding. Notify your doctor.   -HEMATOMA(hard bruise under the skin): Mehrdad around the bruise if one develops. Call your doctor if it increases in size or if you have difficulty talking, swallowing, breathing or anything unusual.   SIGNS OF INFECTION:Fever (temperature over 100.5 F), pus or redness   -RASH   -CHEST PAIN OR SHORTNESS OF BREATH   You may call you coordinator in the Heart Failure/Heart Transplant/Pulmonary Hypertension Clinic at [phone] during normal business hours(Monday through Friday from 8 A.M. to 5 P.M.) After hours, call the Heart Transplant Service doctor on call at [phone].

## 2018-03-13 NOTE — PROGRESS NOTES
Subjective:   Mr. Samayoa is a 28 y.o. year old White male who received a  - brain death heart transplant on 18.      CMV status: high risk  Donor: +  Recipient: -    HPI  28 y.o. WM w/familial NiCMP now s/p orthotopic Heart transplant 18. S/P washout . Post op course complictaed by leukocytosis, down trending at time of discahrge with negative cultures. He was moderate risk for rejection. Echo  EF 60% with low nl RV function, initial 2 biopsies 0/0. Just prior to d/c, he developed clear drianinage from bottom of sternotomy site so patient was watched through the weekend and doxycycline 100 mg BID started for 10 day course. CTS aware. He presents today for routine 1 week f/u.     Today, he reports that he has had continued drainage from his DLES site that seems to be worse over last week. The drainage is brown-evens yellow but non-bloody. He has been using silver patches for this. He has an appt to CTS today as well to assess. From a cardiopulmonary standpoint, he has no complaints and feels very well. Only other complaint is periodic BLE knee pain that occurs ~3-4am almost daily that improves with activity and pain medicine.      Current IS:  -Prograf 6/6 mg BID  -Cellcept 1500 mg BID  -prednisone 15 mg BID     Review of Systems   Constitution: Negative for chills, decreased appetite, diaphoresis, fever, weakness, malaise/fatigue, night sweats, weight gain and weight loss.   HENT: Negative.    Cardiovascular: Negative for chest pain, claudication, cyanosis, dyspnea on exertion, irregular heartbeat, leg swelling, near-syncope, orthopnea and palpitations.   Respiratory: Negative for cough, hemoptysis, shortness of breath, sleep disturbances due to breathing, snoring, sputum production and wheezing.    Endocrine: Negative.    Hematologic/Lymphatic: Negative.    Skin: Negative.    Musculoskeletal: Positive for joint pain.   Gastrointestinal: Negative.  Negative for abdominal pain and  "diarrhea.   Genitourinary: Negative.    Neurological: Negative.    Psychiatric/Behavioral: Negative.        Objective:   Blood pressure (!) 145/78, pulse 97, height 5' 7" (1.702 m), weight 68.3 kg (150 lb 9.2 oz).body mass index is 23.58 kg/m².--Manual Re-check 128/72    Physical Exam   Constitutional: He is oriented to person, place, and time. He appears well-developed and well-nourished. No distress.   HENT:   Head: Normocephalic.   Mouth/Throat: Oropharynx is clear and moist. No oropharyngeal exudate.   Eyes: Pupils are equal, round, and reactive to light. No scleral icterus.   Neck: Normal range of motion. No JVD present. No thyromegaly present.   Cardiovascular: Normal rate, regular rhythm and normal heart sounds.  Exam reveals no gallop and no friction rub.    No murmur heard.  Pulmonary/Chest: Effort normal. No respiratory distress. He has no wheezes. He has no rales.   Abdominal: Soft. He exhibits no distension. There is no tenderness. There is no rebound and no guarding.   Previous DLES wound with yellow-brown drainage (see image)   Musculoskeletal: Normal range of motion. He exhibits no edema, tenderness or deformity.   Neurological: He is alert and oriented to person, place, and time. No cranial nerve deficit. He exhibits normal muscle tone. Coordination normal.   Skin: Skin is warm and dry. No rash noted. No erythema.   Psychiatric: He has a normal mood and affect.           Lab Results   Component Value Date    WBC 12.09 03/13/2018    HGB 10.7 (L) 03/13/2018    HCT 35.6 (L) 03/13/2018    MCV 98 03/13/2018     03/13/2018    CO2 27 03/13/2018    CREATININE 0.9 03/13/2018    CALCIUM 9.7 03/13/2018    ALBUMIN 4.1 03/13/2018    AST 18 03/13/2018    BNP 1,244 (H) 03/13/2018    ALT 43 03/13/2018       Lab Results   Component Value Date    INR 1.1 03/03/2018    INR 1.1 03/02/2018    INR 1.1 03/01/2018       BNP   Date Value Ref Range Status   03/13/2018 1,244 (H) 0 - 99 pg/mL Final     Comment:     " Values of less than 100 pg/ml are consistent with non-CHF populations.   03/09/2018 1,138 (H) 0 - 99 pg/mL Final     Comment:     Values of less than 100 pg/ml are consistent with non-CHF populations.   03/06/2018 1,375 (H) 0 - 99 pg/mL Final     Comment:     Values of less than 100 pg/ml are consistent with non-CHF populations.       LD   Date Value Ref Range Status   01/25/2018 218 110 - 260 U/L Final     Comment:     Results are increased in hemolyzed samples.   12/28/2017 215 110 - 260 U/L Final     Comment:     Results are increased in hemolyzed samples.   11/29/2017 207 110 - 260 U/L Final     Comment:     Results are increased in hemolyzed samples.       Tacrolimus Lvl   Date Value Ref Range Status   03/09/2018 12.6 5.0 - 15.0 ng/mL Final     Comment:     Testing performed by Liquid Chromatography-Tandem  Mass Spectrometry (LC-MS/MS).  This test was developed and its performance characteristics  determined by [facility], Department of Pathology  and Laboratory Medicine in a manner consistent with CLIA  requirements. It has not been cleared or approved by the [place]  Food and Drug Administration.  This test is used for clinical   purposes.  It should not be regarded as investigational or for  research.       No results found for: SIROLIMUS  No results found for: CYCLOSPORINE    No results found for this or any previous visit.  No results found for this or any previous visit.    Labs were reviewed with the patient.    Assessment:     1. Heart transplanted    2. Familial cardiomyopathy    3. Immunosuppression    4. Adverse effect of glucocorticoids and synthetic analogues, initial encounter        Plan:   S/p OHT  - f/u bx; likely decrease pred  - f/u tac level, adjust dose prn  - cont MMF 1500mg BID  - for low Vit D, start Ergocalciferol 50,000u weekly; recheck 3 months  -Stable from cardiac transplant point of view  - Echo today normal    DLES wound  - f/u with CTS today; concerning for earlier  infection    Return instructions as set forth by post transplant schedule or as needed:    Clinic: Return for labs and/or biopsy weekly the first month, every two weeks during month 2 and then monthly for the first year at the provider or coordinator's discretion. During the second year, return to clinic every 3 months. Post transplant year 3-5 return every 6 months. There will be a comprehensive post transplant evaluation every year that may include LHC/RHC/biopsy, stress test, echo, CXR, and other health screening exams.    In addition to the clinical assessment, I have ordered Allomap testing for this patient to assist in identification of moderate/severe acute cellular rejection (ACR) in a pt with stable Allograft function instead of endomyocardial biopsy.     Patient is reminded to call with any health changes as these can be early signs of transplant complications. Patient is advised to make sure any new medications or changes of old medications are discussed with a pharmacist or physician knowledgeable with transplant to avoid rejection/drug toxicity related to significant drug interactions.    UNOS Patient Status  Functional Status: 90% - Able to carry on normal activity: minor symptoms of disease  Physical Capacity: Limited Mobility  Working for Income: No  If no, reason not working: Demands of Treatment    Guillermo Daniels MD

## 2018-03-13 NOTE — PATIENT INSTRUCTIONS
1.Start Ergocalciferol 50,000 units weekly  2. Will follow up biopsy and possibly decrease prednisone  3. Follow up with CT surgery today

## 2018-03-13 NOTE — LETTER
March 13, 2018        Guillermo Alejo  [address]  [facility] [address]  Phone: [phone]  Fax: [phone]             [facility]  [address]  [facility] [address]  Phone: [phone]   Patient: Mert Samayoa   MR Number: [id number]   YOB: 1990   Date of Visit: 3/13/2018       Dear Dr. Guillermo Alejo    Thank you for referring Mert Samayoa to me for evaluation. Attached you will find relevant portions of my assessment and plan of care.    If you have questions, please do not hesitate to call me. I look forward to following Mert Samayoa along with you.    Sincerely,    Guillermo Daniels MD    Enclosure    If you would like to receive this communication electronically, please contact [email or web address] or [phone] to request [employer] Link access.    [employer] is a tool which provides read-only access to select patient information with whom you have a relationship. Its easy to use and provides real time access to review your patients record including encounter summaries, notes, results, and demographic information.    If you feel you have received this communication in error or would no longer like to receive these types of communications, please e-mail [email or web address]

## 2018-03-14 ENCOUNTER — TELEPHONE (OUTPATIENT)
Dept: TRANSPLANT | Facility: CLINIC | Age: 28
End: 2018-03-14

## 2018-03-14 NOTE — TELEPHONE ENCOUNTER
Notified pt biopsy revealed no rejection. Will discuss in chart review regarding prednisone dose.

## 2018-03-15 ENCOUNTER — TELEPHONE (OUTPATIENT)
Dept: TRANSPLANT | Facility: HOSPITAL | Age: 28
End: 2018-03-15

## 2018-03-15 DIAGNOSIS — Z94.1 STATUS POST HEART TRANSPLANT: Primary | ICD-10-CM

## 2018-03-15 NOTE — TELEPHONE ENCOUNTER
Chart Review       Biopsy negative (barely ISHLT 1 )   Decrease pred   Pred 15mg BID   Tac 6/6 , Level 12.5   Cellcept 1500/1500      Plan   Decrease Pred 10mg/10mg

## 2018-03-15 NOTE — TELEPHONE ENCOUNTER
Called to inform pt of no rejection from biopsy and per chart review will decrease prednisone to 10 mg BID. Pt stated his understanding.

## 2018-03-16 RX ORDER — PREDNISONE 10 MG/1
10 TABLET ORAL 2 TIMES DAILY
Qty: 60 TABLET | Refills: 3 | Status: SHIPPED | OUTPATIENT
Start: 2018-03-16 | End: 2018-03-21 | Stop reason: SDUPTHER

## 2018-03-16 NOTE — TELEPHONE ENCOUNTER
Pt seen as a nurse visit to check wound. Old DLES is open with pale pink tissue, yellow/white around the edges and inside on inner wall. Seems to be tunneling in the upper part of the wound, however, there were no qtips in clinic to address the deepness of tunneling. Slight purulent drainage of old dressing with yellow/brown clear drainage. Advise pt to continue with wet to dry dressings for now. If the wound starts to smell foul, call me asap. Applied new wet dressing and covered with dry gauze and tegaderm.

## 2018-03-19 DIAGNOSIS — Z94.1 S/P ORTHOTOPIC HEART TRANSPLANT: Primary | ICD-10-CM

## 2018-03-19 DIAGNOSIS — R73.9 HYPERGLYCEMIA: ICD-10-CM

## 2018-03-20 ENCOUNTER — TELEPHONE (OUTPATIENT)
Dept: TRANSPLANT | Facility: CLINIC | Age: 28
End: 2018-03-20

## 2018-03-20 ENCOUNTER — HOSPITAL ENCOUNTER (OUTPATIENT)
Dept: RADIOLOGY | Facility: HOSPITAL | Age: 28
Discharge: HOME | End: 2018-03-20
Attending: INTERNAL MEDICINE
Payer: COMMERCIAL

## 2018-03-20 ENCOUNTER — HOSPITAL ENCOUNTER (OUTPATIENT)
Facility: HOSPITAL | Age: 28
Discharge: HOME | End: 2018-03-20
Attending: INTERNAL MEDICINE | Admitting: INTERNAL MEDICINE
Payer: COMMERCIAL

## 2018-03-20 ENCOUNTER — OFFICE VISIT (OUTPATIENT)
Dept: TRANSPLANT | Facility: CLINIC | Age: 28
End: 2018-03-20
Payer: COMMERCIAL

## 2018-03-20 ENCOUNTER — SURGERY (OUTPATIENT)
Age: 28
End: 2018-03-20

## 2018-03-20 ENCOUNTER — HOSPITAL ENCOUNTER (OUTPATIENT)
Dept: CARDIOLOGY | Facility: CLINIC | Age: 28
Discharge: HOME | End: 2018-03-20
Payer: COMMERCIAL

## 2018-03-20 VITALS
SYSTOLIC BLOOD PRESSURE: 133 MMHG | HEIGHT: 67 IN | DIASTOLIC BLOOD PRESSURE: 81 MMHG | BODY MASS INDEX: 23.98 KG/M2 | HEART RATE: 100 BPM | WEIGHT: 152.75 LBS

## 2018-03-20 DIAGNOSIS — T82.9XXA COMPLICATION INVOLVING LEFT VENTRICULAR ASSIST DEVICE (LVAD), INITIAL ENCOUNTER: Primary | ICD-10-CM

## 2018-03-20 DIAGNOSIS — I10 ESSENTIAL HYPERTENSION: ICD-10-CM

## 2018-03-20 DIAGNOSIS — Z94.1 HEART TRANSPLANTED: Primary | ICD-10-CM

## 2018-03-20 DIAGNOSIS — Z94.1 STATUS POST HEART TRANSPLANT: ICD-10-CM

## 2018-03-20 DIAGNOSIS — T38.0X5A ADVERSE EFFECT OF GLUCOCORTICOIDS AND SYNTHETIC ANALOGUES, INITIAL ENCOUNTER: ICD-10-CM

## 2018-03-20 DIAGNOSIS — Z94.1 S/P ORTHOTOPIC HEART TRANSPLANT: Primary | ICD-10-CM

## 2018-03-20 DIAGNOSIS — R10.9 ABDOMINAL PAIN, UNSPECIFIED ABDOMINAL LOCATION: ICD-10-CM

## 2018-03-20 LAB
DIASTOLIC DYSFUNCTION: NO
ESTIMATED PA SYSTOLIC PRESSURE: 24.34
RETIRED EF AND QEF - SEE NOTES: 60 (ref 55–65)
TRICUSPID VALVE REGURGITATION: NORMAL

## 2018-03-20 PROCEDURE — 93306 TTE W/DOPPLER COMPLETE: CPT | Mod: 26,,, | Performed by: INTERNAL MEDICINE

## 2018-03-20 PROCEDURE — 25000003 PHARM REV CODE 250

## 2018-03-20 PROCEDURE — 88307 TISSUE EXAM BY PATHOLOGIST: CPT | Mod: 26,,, | Performed by: PATHOLOGY

## 2018-03-20 PROCEDURE — 71046 X-RAY EXAM CHEST 2 VIEWS: CPT | Mod: 26,,, | Performed by: RADIOLOGY

## 2018-03-20 PROCEDURE — 99214 OFFICE O/P EST MOD 30 MIN: CPT | Mod: 25,S$GLB,, | Performed by: INTERNAL MEDICINE

## 2018-03-20 PROCEDURE — 93306 TTE W/DOPPLER COMPLETE: CPT

## 2018-03-20 PROCEDURE — 88342 IMHCHEM/IMCYTCHM 1ST ANTB: CPT | Performed by: PATHOLOGY

## 2018-03-20 PROCEDURE — 99999 PR PBB SHADOW E&M-EST. PATIENT-LVL III: CPT | Mod: PBBFAC,,, | Performed by: INTERNAL MEDICINE

## 2018-03-20 PROCEDURE — 71046 X-RAY EXAM CHEST 2 VIEWS: CPT | Mod: TC,FY

## 2018-03-20 PROCEDURE — 93000 ELECTROCARDIOGRAM COMPLETE: CPT | Mod: S$GLB,,, | Performed by: INTERNAL MEDICINE

## 2018-03-20 PROCEDURE — 93505 ENDOMYOCARDIAL BIOPSY: CPT

## 2018-03-20 PROCEDURE — 93505 ENDOMYOCARDIAL BIOPSY: CPT | Mod: 26,,, | Performed by: INTERNAL MEDICINE

## 2018-03-20 RX ORDER — OXYCODONE HYDROCHLORIDE 5 MG/1
5 TABLET ORAL
Qty: 10 TABLET | Refills: 0 | Status: SHIPPED | OUTPATIENT
Start: 2018-03-20 | End: 2018-05-23

## 2018-03-20 NOTE — INTERVAL H&P NOTE
Her for EGBx to R/O rejection     RHC R IJ, 7 Fr sheath with local lidocaine, micropuncture kit and US guidance.    I have explained the risks, benefits and alternatives of the procedure in detail. The patient voices understanding and all questions have been answered,. The patient agrees to proceed as planned.

## 2018-03-20 NOTE — PROGRESS NOTES
"Subjective:   Mr. Samayoa is a 28 y.o. year old White male who received a  - brain death heart transplant on 18.      CMV status:   Donor:   Recipient:    HPI  Mr Samayoa is a very pleasant 28 y.o. 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 S/P washout . Post op course complictaed by leukocytosis, down trending at time of discahrge with negative cultures. He was moderate risk for rejection. Echo done today EF 60% along with an RV biopsy, initial 2 biopsies 0/0. Just prior to d/c, he developed clear drianinage from bottom of sternotomy site so patient was watched through the weekend and doxycycline 100 mg BID started for 10 day course. CTS aware. He presents today for his 4 week follow-up. Had a CT of abdomen ordered by Dr. Klein which showed a calcified tubular structure within the anterior abdominal wall, along the course of previous ventricular drive line.      Current IS:  -Prograf 6/6 mg BID  -Cellcept 1500 mg BID  -prednisone 10 mg BID     Review of Systems   Constitution: Negative. Negative for chills, decreased appetite, diaphoresis, fever, weakness, malaise/fatigue, night sweats, weight gain and weight loss.   Eyes: Negative.    Cardiovascular: Negative for chest pain, claudication, cyanosis, dyspnea on exertion, irregular heartbeat, leg swelling, near-syncope, orthopnea, palpitations, paroxysmal nocturnal dyspnea and syncope.   Respiratory: Negative for cough, hemoptysis and shortness of breath.    Endocrine: Negative.    Hematologic/Lymphatic: Negative.    Skin: Negative for color change, dry skin and nail changes.   Musculoskeletal: Negative.    Gastrointestinal: Negative.    Genitourinary: Negative.        Objective:   Blood pressure 133/81, pulse 100, height 5' 7" (1.702 m), weight 69.3 kg (152 lb 12.5 oz).body mass index is 23.93 kg/m².    Physical Exam   Constitutional: He appears well-developed.   /81   Pulse 100   Ht 5' 7" (1.702 m) "   Wt 69.3 kg (152 lb 12.5 oz)   BMI 23.93 kg/m²      HENT:   Head: Normocephalic.   Neck: No JVD present. Carotid bruit is not present.   Cardiovascular: Regular rhythm and normal heart sounds.  Tachycardia present.  PMI is not displaced.    No murmur heard.  Pulmonary/Chest: Effort normal and breath sounds normal. No respiratory distress. He has no wheezes. He has no rales.   Abdominal: Soft. Bowel sounds are normal. He exhibits no distension. There is no tenderness. There is no rebound.   Musculoskeletal: He exhibits no edema.   Neurological: He is alert.   Skin: Skin is warm.   Vitals reviewed.      Lab Results   Component Value Date    WBC 6.75 03/20/2018    HGB 12.4 (L) 03/20/2018    HCT 40.9 03/20/2018    MCV 97 03/20/2018     03/20/2018    CO2 28 03/20/2018    CREATININE 1.0 03/20/2018    CALCIUM 9.8 03/20/2018    ALBUMIN 4.3 03/20/2018    AST 18 03/20/2018     (H) 03/20/2018    ALT 44 03/20/2018       Lab Results   Component Value Date    INR 1.1 03/03/2018    INR 1.1 03/02/2018    INR 1.1 03/01/2018       BNP   Date Value Ref Range Status   03/20/2018 694 (H) 0 - 99 pg/mL Final     Comment:     Values of less than 100 pg/ml are consistent with non-CHF populations.   03/13/2018 1,244 (H) 0 - 99 pg/mL Final     Comment:     Values of less than 100 pg/ml are consistent with non-CHF populations.   03/09/2018 1,138 (H) 0 - 99 pg/mL Final     Comment:     Values of less than 100 pg/ml are consistent with non-CHF populations.       LD   Date Value Ref Range Status   01/25/2018 218 110 - 260 U/L Final     Comment:     Results are increased in hemolyzed samples.   12/28/2017 215 110 - 260 U/L Final     Comment:     Results are increased in hemolyzed samples.   11/29/2017 207 110 - 260 U/L Final     Comment:     Results are increased in hemolyzed samples.       Tacrolimus Lvl   Date Value Ref Range Status   03/13/2018 12.5 5.0 - 15.0 ng/mL Final     Comment:     Testing performed by Liquid  Chromatography-Tandem  Mass Spectrometry (LC-MS/MS).  This test was developed and its performance characteristics  determined by [facility], Department of Pathology  and Laboratory Medicine in a manner consistent with CLIA  requirements. It has not been cleared or approved by the [place]  Food and Drug Administration.  This test is used for clinical   purposes.  It should not be regarded as investigational or for  research.         Assessment:     1. Heart transplanted    2. Adverse effect of glucocorticoids and synthetic analogues, initial encounter    3. Essential hypertension        Plan:   Doing well.   If biopsy is negative decrease Prednisone to 10/5   Continue current regimen  Follow Tacro levels  Follow with CTS to discuss results of abdominal CT  Given refill for 10 tabs Oxycodone for pain from surgery     Return instructions as set forth by post transplant schedule or as needed:    Clinic: Return for labs and/or biopsy weekly the first month, every two weeks during month 2 and then monthly for the first year at the provider or coordinator's discretion. During the second year, return to clinic every 3 months. Post transplant year 3-5 return every 6 months. There will be a comprehensive post transplant evaluation every year that may include LHC/RHC/biopsy, stress test, echo, CXR, and other health screening exams.    In addition to the clinical assessment, I have ordered Allomap testing for this patient to assist in identification of moderate/severe acute cellular rejection (ACR) in a pt with stable Allograft function instead of endomyocardial biopsy.     Patient is reminded to call with any health changes as these can be early signs of transplant complications. Patient is advised to make sure any new medications or changes of old medications are discussed with a pharmacist or physician knowledgeable with transplant to avoid rejection/drug toxicity related to significant drug interactions.    UNOS  Patient Status  Functional Status: 90% - Able to carry on normal activity: minor symptoms of disease  Physical Capacity: No Limitations  Working for Income: Unknown    Sammi Tapia MD

## 2018-03-20 NOTE — DISCHARGE SUMMARY
Date of admit to cath lab: 3/20/2018      Date of discharge from cath lab: 3/20/2018      Principal diagnosis: s/p OHT    Discharge attending physician: Marisel Lundberg MD    Hospital Course/Outcome of the treatment, procedures or surgery: Pt admitted for EGBx.   See CVIS/cath lab procedure report in EPIC  for full report of today's procedure.    Disposition of the case (d/c disposition): home    Discharge Medication List: see med card    Plan for follow up care, diet, activity level: F/U as scheduled. Resume low Na diet.  Activity as tolerated    Condition on discharge from Cath lab: Stable.

## 2018-03-20 NOTE — TELEPHONE ENCOUNTER
Returned pt's call. Pt was calling to inform me of CT scan results. He informed me Dr. Klein wants to remove it and scheduled surgery for April 4. Advised Dr. Hawkins of the need to remove remaining driveline per Dr. Klein. Pt is nervous that his progress will be hindered due to this finding. I reassured pt.

## 2018-03-20 NOTE — LETTER
March 20, 2018        Guillermo Alejo  [address]  [facility] [address]  Phone: [phone]  Fax: [phone]             [facility]  [address]  [facility] [address]  Phone: [phone]   Patient: Mert Samayoa   MR Number: [id number]   YOB: 1990   Date of Visit: 3/20/2018       Dear Dr. Guillermo Alejo    Thank you for referring Mert Samayoa to me for evaluation. Attached you will find relevant portions of my assessment and plan of care.    If you have questions, please do not hesitate to call me. I look forward to following Mert Samayoa along with you.    Sincerely,    Sammi Tapia MD    Enclosure    If you would like to receive this communication electronically, please contact [email or web address] or [phone] to request [employer] access.    [employer] is a tool which provides read-only access to select patient information with whom you have a relationship. Its easy to use and provides real time access to review your patients record including encounter summaries, notes, results, and demographic information.    If you feel you have received this communication in error or would no longer like to receive these types of communications, please e-mail [email or web address]

## 2018-03-20 NOTE — TELEPHONE ENCOUNTER
Reviewed pt's tacro level in chart review. Dr. Tidwell ordered to repeat lab tomorrow morning. Spoke to pt who understood to repeat labs in morning.

## 2018-03-20 NOTE — DISCHARGE INSTRUCTIONS

## 2018-03-20 NOTE — TELEPHONE ENCOUNTER
Called pt to see if he had taken his tacro before labs. The tacro level is 22.6 today. He stated he took it after lab. Will discuss in chart review.

## 2018-03-21 ENCOUNTER — LAB VISIT (OUTPATIENT)
Dept: LAB | Facility: HOSPITAL | Age: 28
End: 2018-03-21
Payer: COMMERCIAL

## 2018-03-21 DIAGNOSIS — Z94.1 STATUS POST HEART TRANSPLANT: ICD-10-CM

## 2018-03-21 DIAGNOSIS — Z94.1 STATUS POST HEART TRANSPLANT: Primary | ICD-10-CM

## 2018-03-21 LAB — TACROLIMUS BLD-MCNC: 26 NG/ML

## 2018-03-21 PROCEDURE — 80197 ASSAY OF TACROLIMUS: CPT

## 2018-03-21 PROCEDURE — 36415 COLL VENOUS BLD VENIPUNCTURE: CPT

## 2018-03-21 RX ORDER — TACROLIMUS 1 MG/1
5 CAPSULE ORAL EVERY 12 HOURS
Qty: 300 CAPSULE | Refills: 11 | Status: SHIPPED | OUTPATIENT
Start: 2018-03-21 | End: 2018-03-26 | Stop reason: SDUPTHER

## 2018-03-21 RX ORDER — PREDNISONE 10 MG/1
TABLET ORAL
Qty: 60 TABLET | Refills: 3 | Status: SHIPPED | OUTPATIENT
Start: 2018-03-21 | End: 2018-04-05 | Stop reason: SDUPTHER

## 2018-03-21 NOTE — TELEPHONE ENCOUNTER
Spoke to pt regarding repeat tacro level today. A discussed in chart review yesterday, If tac level is still high, will decreased tonight's dose to 2 mg, then decrease daily dose to 5/5. Pt read back my instructions, with repeat labs on Monday.

## 2018-03-21 NOTE — TELEPHONE ENCOUNTER
Consulted with Dr. Hawkins regarding biopsy results. She ordered to decrease pred to 10/5. Spoke to pt and relayed biopsy results of no rejection. Pt stated his understanding with prednisone change.   Pt asked if he could go home this weekend. Dr. Hawkins gave the ok for pt to go home this weekend with returning for labs on Monday.

## 2018-03-22 DIAGNOSIS — Z94.1 HEART REPLACED BY TRANSPLANT: Primary | ICD-10-CM

## 2018-03-26 ENCOUNTER — LAB VISIT (OUTPATIENT)
Dept: LAB | Facility: HOSPITAL | Age: 28
End: 2018-03-26
Payer: COMMERCIAL

## 2018-03-26 DIAGNOSIS — Z94.1 STATUS POST HEART TRANSPLANT: ICD-10-CM

## 2018-03-26 LAB
ALBUMIN SERPL BCP-MCNC: 4.3 G/DL
ALP SERPL-CCNC: 56 U/L
ALT SERPL W/O P-5'-P-CCNC: 43 U/L
ANION GAP SERPL CALC-SCNC: 9 MMOL/L
AST SERPL-CCNC: 21 U/L
BASOPHILS # BLD AUTO: 0.01 K/UL
BASOPHILS NFR BLD: 0.1 %
BILIRUB SERPL-MCNC: 0.7 MG/DL
BUN SERPL-MCNC: 20 MG/DL
CALCIUM SERPL-MCNC: 9.8 MG/DL
CHLORIDE SERPL-SCNC: 105 MMOL/L
CO2 SERPL-SCNC: 27 MMOL/L
CREAT SERPL-MCNC: 0.9 MG/DL
DIFFERENTIAL METHOD: ABNORMAL
EOSINOPHIL # BLD AUTO: 0 K/UL
EOSINOPHIL NFR BLD: 0.1 %
ERYTHROCYTE [DISTWIDTH] IN BLOOD BY AUTOMATED COUNT: 14.7 %
EST. GFR  (AFRICAN AMERICAN): >60 ML/MIN/1.73 M^2
EST. GFR  (NON AFRICAN AMERICAN): >60 ML/MIN/1.73 M^2
GLUCOSE SERPL-MCNC: 81 MG/DL
HCT VFR BLD AUTO: 42.3 %
HGB BLD-MCNC: 13.1 G/DL
IMM GRANULOCYTES # BLD AUTO: 0.18 K/UL
IMM GRANULOCYTES NFR BLD AUTO: 2.1 %
LYMPHOCYTES # BLD AUTO: 1.4 K/UL
LYMPHOCYTES NFR BLD: 16 %
MCH RBC QN AUTO: 30.3 PG
MCHC RBC AUTO-ENTMCNC: 31 G/DL
MCV RBC AUTO: 98 FL
MONOCYTES # BLD AUTO: 0.8 K/UL
MONOCYTES NFR BLD: 9.1 %
NEUTROPHILS # BLD AUTO: 6.3 K/UL
NEUTROPHILS NFR BLD: 72.6 %
NRBC BLD-RTO: 0 /100 WBC
PLATELET # BLD AUTO: 194 K/UL
PMV BLD AUTO: 8.7 FL
POTASSIUM SERPL-SCNC: 4.8 MMOL/L
PROT SERPL-MCNC: 6.7 G/DL
RBC # BLD AUTO: 4.33 M/UL
SODIUM SERPL-SCNC: 141 MMOL/L
TACROLIMUS BLD-MCNC: 20.8 NG/ML
WBC # BLD AUTO: 8.7 K/UL

## 2018-03-26 PROCEDURE — 80197 ASSAY OF TACROLIMUS: CPT

## 2018-03-26 PROCEDURE — 80053 COMPREHEN METABOLIC PANEL: CPT

## 2018-03-26 PROCEDURE — 36415 COLL VENOUS BLD VENIPUNCTURE: CPT

## 2018-03-26 PROCEDURE — 85025 COMPLETE CBC W/AUTO DIFF WBC: CPT

## 2018-03-26 RX ORDER — TACROLIMUS 1 MG/1
4 CAPSULE ORAL EVERY 12 HOURS
Qty: 240 CAPSULE | Refills: 11 | Status: SHIPPED | OUTPATIENT
Start: 2018-03-26 | End: 2018-04-05 | Stop reason: SDUPTHER

## 2018-03-26 NOTE — TELEPHONE ENCOUNTER
Called to verify tacro dose with pt. He stated he is taking 5/5 of tacro. Level is 20.8 today. Discussed with Dr. Hawkins. Ordered to decrease tacro to 4/4 and recheck labs Tuesday 4/3 when he comes to clinic.

## 2018-03-28 ENCOUNTER — TELEPHONE (OUTPATIENT)
Dept: TRANSPLANT | Facility: CLINIC | Age: 28
End: 2018-03-28

## 2018-03-28 NOTE — TELEPHONE ENCOUNTER
Spoke to pt regarding going home for the weekend, Dr Hawkins said that was fine for him to go home for the holiday weekend, but to tell the pt to be mindful of what he eats and to stay hydrated. Pt stated his understanding.

## 2018-04-02 ENCOUNTER — ANESTHESIA EVENT (OUTPATIENT)
Dept: SURGERY | Facility: HOSPITAL | Age: 28
End: 2018-04-02
Payer: COMMERCIAL

## 2018-04-02 DIAGNOSIS — R73.9 HYPERGLYCEMIA: ICD-10-CM

## 2018-04-02 DIAGNOSIS — Z94.1 S/P ORTHOTOPIC HEART TRANSPLANT: Primary | ICD-10-CM

## 2018-04-03 ENCOUNTER — LAB VISIT (OUTPATIENT)
Dept: LAB | Facility: HOSPITAL | Age: 28
End: 2018-04-03
Attending: INTERNAL MEDICINE
Payer: COMMERCIAL

## 2018-04-03 ENCOUNTER — OFFICE VISIT (OUTPATIENT)
Dept: CARDIOTHORACIC SURGERY | Facility: CLINIC | Age: 28
End: 2018-04-03
Payer: COMMERCIAL

## 2018-04-03 ENCOUNTER — OFFICE VISIT (OUTPATIENT)
Dept: TRANSPLANT | Facility: CLINIC | Age: 28
End: 2018-04-03
Attending: INTERNAL MEDICINE
Payer: COMMERCIAL

## 2018-04-03 ENCOUNTER — HOSPITAL ENCOUNTER (OUTPATIENT)
Facility: HOSPITAL | Age: 28
Discharge: HOME | End: 2018-04-03
Attending: INTERNAL MEDICINE | Admitting: INTERNAL MEDICINE
Payer: COMMERCIAL

## 2018-04-03 ENCOUNTER — SURGERY (OUTPATIENT)
Age: 28
End: 2018-04-03

## 2018-04-03 VITALS
HEIGHT: 67 IN | SYSTOLIC BLOOD PRESSURE: 144 MMHG | BODY MASS INDEX: 24.56 KG/M2 | HEART RATE: 102 BPM | DIASTOLIC BLOOD PRESSURE: 84 MMHG | WEIGHT: 156.5 LBS

## 2018-04-03 VITALS
SYSTOLIC BLOOD PRESSURE: 131 MMHG | HEART RATE: 96 BPM | OXYGEN SATURATION: 99 % | WEIGHT: 154.63 LBS | DIASTOLIC BLOOD PRESSURE: 81 MMHG | BODY MASS INDEX: 24.27 KG/M2 | TEMPERATURE: 98 F | HEIGHT: 67 IN

## 2018-04-03 DIAGNOSIS — Z94.1 STATUS POST HEART TRANSPLANT: ICD-10-CM

## 2018-04-03 DIAGNOSIS — Z95.811 HEART REPLACED BY HEART ASSIST DEVICE: ICD-10-CM

## 2018-04-03 DIAGNOSIS — T38.0X5A ADVERSE EFFECT OF GLUCOCORTICOIDS AND SYNTHETIC ANALOGUES, INITIAL ENCOUNTER: ICD-10-CM

## 2018-04-03 DIAGNOSIS — Z94.1 HEART TRANSPLANTED: Primary | ICD-10-CM

## 2018-04-03 DIAGNOSIS — Z94.1 S/P ORTHOTOPIC HEART TRANSPLANT: ICD-10-CM

## 2018-04-03 DIAGNOSIS — D84.9 IMMUNOSUPPRESSION: ICD-10-CM

## 2018-04-03 DIAGNOSIS — I10 ESSENTIAL HYPERTENSION: ICD-10-CM

## 2018-04-03 LAB
ABO + RH BLD: NORMAL
ALBUMIN SERPL BCP-MCNC: 4.6 G/DL
ALP SERPL-CCNC: 61 U/L
ALT SERPL W/O P-5'-P-CCNC: 50 U/L
ANION GAP SERPL CALC-SCNC: 11 MMOL/L
APTT BLDCRRT: 21.7 SEC
AST SERPL-CCNC: 22 U/L
BASOPHILS # BLD AUTO: 0.03 K/UL
BASOPHILS NFR BLD: 0.3 %
BILIRUB SERPL-MCNC: 0.7 MG/DL
BLD GP AB SCN CELLS X3 SERPL QL: NORMAL
BNP SERPL-MCNC: 328 PG/ML
BUN SERPL-MCNC: 18 MG/DL
CALCIUM SERPL-MCNC: 9.9 MG/DL
CHLORIDE SERPL-SCNC: 107 MMOL/L
CO2 SERPL-SCNC: 24 MMOL/L
CREAT SERPL-MCNC: 1.1 MG/DL
DIASTOLIC DYSFUNCTION: NO
DIFFERENTIAL METHOD: ABNORMAL
EOSINOPHIL # BLD AUTO: 0 K/UL
EOSINOPHIL NFR BLD: 0.1 %
ERYTHROCYTE [DISTWIDTH] IN BLOOD BY AUTOMATED COUNT: 14.2 %
EST. GFR  (AFRICAN AMERICAN): >60 ML/MIN/1.73 M^2
EST. GFR  (NON AFRICAN AMERICAN): >60 ML/MIN/1.73 M^2
ESTIMATED AVG GLUCOSE: 82 MG/DL
GLUCOSE SERPL-MCNC: 82 MG/DL
HBA1C MFR BLD HPLC: 4.5 %
HCT VFR BLD AUTO: 42.5 %
HGB BLD-MCNC: 13.8 G/DL
IMM GRANULOCYTES # BLD AUTO: 0.44 K/UL
IMM GRANULOCYTES NFR BLD AUTO: 4 %
INR PPP: 1.1
LYMPHOCYTES # BLD AUTO: 1.6 K/UL
LYMPHOCYTES NFR BLD: 14.1 %
MAGNESIUM SERPL-MCNC: 1.6 MG/DL
MCH RBC QN AUTO: 30.5 PG
MCHC RBC AUTO-ENTMCNC: 32.5 G/DL
MCV RBC AUTO: 94 FL
MONOCYTES # BLD AUTO: 0.7 K/UL
MONOCYTES NFR BLD: 5.9 %
NEUTROPHILS # BLD AUTO: 8.4 K/UL
NEUTROPHILS NFR BLD: 75.6 %
NRBC BLD-RTO: 0 /100 WBC
PLATELET # BLD AUTO: 198 K/UL
PMV BLD AUTO: 9.3 FL
POTASSIUM SERPL-SCNC: 4.2 MMOL/L
PROT SERPL-MCNC: 7 G/DL
PROTHROMBIN TIME: 11.3 SEC
RBC # BLD AUTO: 4.53 M/UL
RETIRED EF AND QEF - SEE NOTES: 65 (ref 55–65)
SODIUM SERPL-SCNC: 142 MMOL/L
TACROLIMUS BLD-MCNC: 20.1 NG/ML
TRICUSPID VALVE REGURGITATION: NORMAL
WBC # BLD AUTO: 11.03 K/UL

## 2018-04-03 PROCEDURE — 85730 THROMBOPLASTIN TIME PARTIAL: CPT

## 2018-04-03 PROCEDURE — 88307 TISSUE EXAM BY PATHOLOGIST: CPT | Mod: 26,,, | Performed by: PATHOLOGY

## 2018-04-03 PROCEDURE — 99499 UNLISTED E&M SERVICE: CPT | Mod: S$GLB,,, | Performed by: THORACIC SURGERY (CARDIOTHORACIC VASCULAR SURGERY)

## 2018-04-03 PROCEDURE — 86901 BLOOD TYPING SEROLOGIC RH(D): CPT

## 2018-04-03 PROCEDURE — 25000003 PHARM REV CODE 250

## 2018-04-03 PROCEDURE — 83735 ASSAY OF MAGNESIUM: CPT

## 2018-04-03 PROCEDURE — 99999 PR PBB SHADOW E&M-EST. PATIENT-LVL III: CPT | Mod: PBBFAC,,, | Performed by: PHYSICIAN ASSISTANT

## 2018-04-03 PROCEDURE — 80197 ASSAY OF TACROLIMUS: CPT

## 2018-04-03 PROCEDURE — 99999 PR PBB SHADOW E&M-EST. PATIENT-LVL III: CPT | Mod: PBBFAC,,, | Performed by: THORACIC SURGERY (CARDIOTHORACIC VASCULAR SURGERY)

## 2018-04-03 PROCEDURE — 99214 OFFICE O/P EST MOD 30 MIN: CPT | Mod: 25,S$GLB,, | Performed by: PHYSICIAN ASSISTANT

## 2018-04-03 PROCEDURE — 85610 PROTHROMBIN TIME: CPT

## 2018-04-03 PROCEDURE — 83880 ASSAY OF NATRIURETIC PEPTIDE: CPT

## 2018-04-03 PROCEDURE — 36415 COLL VENOUS BLD VENIPUNCTURE: CPT

## 2018-04-03 PROCEDURE — 93505 ENDOMYOCARDIAL BIOPSY: CPT | Mod: 26,,, | Performed by: INTERNAL MEDICINE

## 2018-04-03 PROCEDURE — 83036 HEMOGLOBIN GLYCOSYLATED A1C: CPT

## 2018-04-03 PROCEDURE — 88342 IMHCHEM/IMCYTCHM 1ST ANTB: CPT | Performed by: PATHOLOGY

## 2018-04-03 PROCEDURE — 88307 TISSUE EXAM BY PATHOLOGIST: CPT | Performed by: PATHOLOGY

## 2018-04-03 PROCEDURE — 85025 COMPLETE CBC W/AUTO DIFF WBC: CPT

## 2018-04-03 PROCEDURE — 93306 TTE W/DOPPLER COMPLETE: CPT | Mod: 26,,, | Performed by: INTERNAL MEDICINE

## 2018-04-03 PROCEDURE — 80053 COMPREHEN METABOLIC PANEL: CPT

## 2018-04-03 PROCEDURE — 93306 TTE W/DOPPLER COMPLETE: CPT

## 2018-04-03 PROCEDURE — 93505 ENDOMYOCARDIAL BIOPSY: CPT

## 2018-04-03 PROCEDURE — 88342 IMHCHEM/IMCYTCHM 1ST ANTB: CPT | Mod: 26,,, | Performed by: PATHOLOGY

## 2018-04-03 NOTE — H&P
HTS Pre-Procedure H&P   HTS Fellow         Subjective:      Mert Samayoa is a 28 y.o. male with a past medical history of familial NICM now s/p OHTx 2/18/18, whose post op course was complicated by leukocytosis, here for biopsy #2.        Current Immunosupression  MMF 1500/1500  Tacro 4/4  Pred 10mg     Past Medical History:   Diagnosis Date    Cardiogenic shock 1/16/2017    Cardiomyopathy     Familial cardiomyopathy    CHF (congestive heart failure)     Essential hypertension 12/21/2016    Essential hypertension 3/20/2018    Familial Cardiomyopathy     Familial cardiomyopathy      Past Surgical History:   Procedure Laterality Date    LEFT VENTRICULAR ASSIST DEVICE       Social History     Social History    Marital status:      Spouse name: N/A    Number of children: N/A    Years of education: N/A     Occupational History    Not on file.     Social History Main Topics    Smoking status: Former Smoker     Packs/day: 1.00     Quit date: 12/14/2016    Smokeless tobacco: Never Used    Alcohol use No    Drug use: No    Sexual activity: Not on file     Other Topics Concern    Not on file     Social History Narrative    Works      Family History   Problem Relation Age of Onset    Arthritis Mother     Heart disease Brother      cardiomyopathy and heart transplant    No Known Problems Father     Heart disease Brother      cardiomyopathy and heart transplanted twice    Birth defects Paternal Uncle            Other significant clinical information:  Review of patient's allergies indicates:  No Known Allergies  No current facility-administered medications on file prior to encounter.      Current Outpatient Prescriptions on File Prior to Encounter   Medication Sig    aspirin (ECOTRIN) 81 MG EC tablet Take 1 tablet (81 mg total) by mouth once daily.    atorvastatin (LIPITOR) 20 MG tablet Take 1 tablet (20 mg total) by mouth once daily.    calcium carbonate-vitamin  D3 600 mg(1,500mg) -800 unit Tab Take 1 tablet by mouth once daily.    famotidine (PEPCID) 40 MG tablet Take 1 tablet (40 mg total) by mouth every evening.    ferrous sulfate 325 (65 FE) MG EC tablet Take 1 tablet (325 mg total) by mouth 2 (two) times daily.    magnesium oxide (MAG-OX) 400 mg tablet Take 1 tablet (400 mg total) by mouth 2 (two) times daily.    mycophenolate (CELLCEPT) 250 mg Cap Take 6 capsules (1,500 mg total) by mouth 2 (two) times daily.    nystatin (MYCOSTATIN) 100,000 unit/mL suspension Take 5 mLs (500,000 Units total) by mouth 4 (four) times daily with meals and nightly.    senna-docusate 8.6-50 mg (PERICOLACE) 8.6-50 mg per tablet Take 2 tablets by mouth daily as needed for Constipation.    sulfamethoxazole-trimethoprim 400-80mg (BACTRIM,SEPTRA) 400-80 mg per tablet Take 1 tablet by mouth once daily.    valGANciclovir (VALCYTE) 450 mg Tab Take 2 tablets (900 mg total) by mouth once daily.            Objective:      Physical Exam  General : NAD   Chest : CTAB no w/r/r  Cardiac : RRR normal S1 and S2 no S3 or S4   Ext : warm and well perfused.       Lab Review   Lab Results   Component Value Date    WBC 11.03 04/03/2018    HGB 13.8 (L) 04/03/2018    HCT 42.5 04/03/2018    MCV 94 04/03/2018     04/03/2018     Lab Results   Component Value Date    INR 1.1 04/03/2018    INR 1.1 03/03/2018    INR 1.1 03/02/2018           Assessment:      Mert Samayoa is a 28 y.o. male with a past medical history of familial NICM now s/p OHTx 2/18/18, whose post op course was complicated by leukocytosis, here for biopsy #2.  Plan:       I have explained the risks, benefits, and alternatives of the procedure in detail.  The patient expresses understanding and all questions have been answered.  The patient agrees to the proceed as planned.  RHC / echo biopsy via RIJ   Micropuncture access needle will be used to minimize bleeding risk.      Aneudy Young DO   HTS Fellow

## 2018-04-03 NOTE — DISCHARGE INSTRUCTIONS

## 2018-04-03 NOTE — PROGRESS NOTES
"Subjective:   Patient ID:  Mert Samayoa is a 28 y.o. male who presents for heart transplant follow-up.    CMV Donor:   CMV Recipient:     HPI  Mr Samayoa is a very pleasant 28 y.o. White male with w/familial NiCMP now s/p orthotopic Heart transplant 2/18/18. S/P washout 2/19. Post op course complictaed by leukocytosis He was moderate risk for rejection. Echo done today EF 60% along with an RV biopsy, initial biopsies 0/0. Just prior to d/c, he developed clear drianinage from bottom of sternotomy site so patient was watched through the weekend and doxycycline 100 mg BID started for 10 day course. CT scan done after discharge reviewed by CTS who wish to re explore region tomorrow for retained velour in abdomen. His prednisone has beed weaned to 10/5 and prograf was 6/6 now down to 4/4 with level of 20 still.   Cellcept is 1500 mg BID          ROS  Constitution: Negative. Negative for chills, decreased appetite, diaphoresis, fever, weakness, malaise/fatigue, night sweats, weight gain and weight loss.   Eyes: Negative.    Cardiovascular: Negative for chest pain, claudication, cyanosis, dyspnea on exertion, irregular heartbeat, leg swelling, near-syncope, orthopnea, palpitations, paroxysmal nocturnal dyspnea and syncope.   Respiratory: Negative for cough, hemoptysis and shortness of breath.    Endocrine: Negative.    Hematologic/Lymphatic: Negative.    Skin: Negative for color change, dry skin and nail changes.   Musculoskeletal: Negative.    Gastrointestinal: Negative.    Genitourinary: Negative.            Objective:   BP (!) 144/84 (BP Location: Left arm, Patient Position: Sitting, BP Method: Medium (Automatic))   Pulse 102   Ht 5' 7" (1.702 m)   Wt 71 kg (156 lb 8.4 oz)   BMI 24.52 kg/m²     Physical Exam  HENT:   Head: Normocephalic.   Neck: No JVD present. Carotid bruit is not present.   Cardiovascular: Regular rhythm and normal heart sounds.  Tachycardia present.  PMI is not displaced.    No murmur " heard.  Pulmonary/Chest: Effort normal and breath sounds normal. No respiratory distress. He has no wheezes. He has no rales.   Abdominal: Soft. Bowel sounds are normal. He exhibits no distension. There is no tenderness. There is no rebound.   Musculoskeletal: He exhibits no edema.   Neurological: He is alert.   Skin: Skin is warm.   Vitals reviewed.          Chemistry        Component Value Date/Time     04/03/2018 0805    K 4.2 04/03/2018 0805     04/03/2018 0805    CO2 24 04/03/2018 0805    BUN 18 04/03/2018 0805    CREATININE 1.1 04/03/2018 0805    GLU 82 04/03/2018 0805        Component Value Date/Time    CALCIUM 9.9 04/03/2018 0805    ALKPHOS 61 04/03/2018 0805    AST 22 04/03/2018 0805    ALT 50 (H) 04/03/2018 0805    BILITOT 0.7 04/03/2018 0805    ESTGFRAFRICA >60.0 04/03/2018 0805    EGFRNONAA >60.0 04/03/2018 0805            Magnesium   Date Value Ref Range Status   04/03/2018 1.6 1.6 - 2.6 mg/dL Final       Lab Results   Component Value Date    WBC 11.03 04/03/2018    HGB 13.8 (L) 04/03/2018    HCT 42.5 04/03/2018    MCV 94 04/03/2018     04/03/2018       Lab Results   Component Value Date    INR 1.1 04/03/2018    INR 1.1 03/03/2018    INR 1.1 03/02/2018       BNP   Date Value Ref Range Status   04/03/2018 328 (H) 0 - 99 pg/mL Final     Comment:     Values of less than 100 pg/ml are consistent with non-CHF populations.   03/20/2018 694 (H) 0 - 99 pg/mL Final     Comment:     Values of less than 100 pg/ml are consistent with non-CHF populations.   03/13/2018 1,244 (H) 0 - 99 pg/mL Final     Comment:     Values of less than 100 pg/ml are consistent with non-CHF populations.       LD   Date Value Ref Range Status   01/25/2018 218 110 - 260 U/L Final     Comment:     Results are increased in hemolyzed samples.   12/28/2017 215 110 - 260 U/L Final     Comment:     Results are increased in hemolyzed samples.   11/29/2017 207 110 - 260 U/L Final     Comment:     Results are increased in  hemolyzed samples.       Tacrolimus Lvl   Date Value Ref Range Status   04/03/2018 20.1 (H) 5.0 - 15.0 ng/mL Final     Comment:     Testing performed by Liquid Chromatography-Tandem  Mass Spectrometry (LC-MS/MS).  This test was developed and its performance characteristics  determined by [facility], Department of Pathology  and Laboratory Medicine in a manner consistent with CLIA  requirements. It has not been cleared or approved by the [place]  Food and Drug Administration.  This test is used for clinical   purposes.  It should not be regarded as investigational or for  research.       No results found for: SIROLIMUS  No results found for: CYCLOSPORINE    Assessment:     1. Heart transplanted    2. Adverse effect of glucocorticoids and synthetic analogues, initial encounter    3. Immunosuppression    4. Essential hypertension        Plan:   Patient will return tomorrow for operation with Dr. Klein  Will have him hold prograf tonight and started 2 mg BID tomorrow.  Decrease prednisone to 10 mg daily if biopsy negative.   OK to discharge home after this week    Return instructions as set forth by post transplant schedule or as needed:    Clinic: Return for labs and/or biopsy weekly the first month, every two weeks during month 2 and then monthly for the first year at the provider or coordinator's discretion. During the second year, return to clinic every 3 months. Post transplant year 3-5 return every 6 months. There will be a comprehensive post transplant evaluation every year that may include LHC/RHC/biopsy, stress test, echo, CXR, and other health screening exams.    In addition to the clinical assessment, I have ordered Allomap testing for this patient to assist in identification of moderate/severe acute cellular rejection (ACR) in a pt with stable Allograft function instead of endomyocardial biopsy.     Patient is reminded to call with any health changes as these can be early signs of transplant  complications. Patient is advised to make sure any new medications or changes of old medications are discussed with a pharmacist or physician knowledgeable with transplant to avoid rejection/drug toxicity related to significant drug interactions.    UNOS Patient Status  Functional Status: 80% - Normal activity with effort: some symptoms of disease  Physical Capacity: No Limitations  Working for Income: Unknown

## 2018-04-03 NOTE — ANESTHESIA PREPROCEDURE EVALUATION
[facility]  Anesthesia Pre-Operative Evaluation         Patient Name: Mert Samayoa  YOB: 1990  MRN: [id number]    SUBJECTIVE:     Pre-operative evaluation for Procedure(s) (LRB):  REMOVAL-Abdominal tubular structure (N/A)  Scheduled for 4/4/2018    HPI 04/03/2018:  Mert Samayoa is a 28 y.o. male with hx of familial NICM now s/p OHTx 2/18/2018.  Previous to his transplant he had an LVAD which was removed, his driveline exit site has persistent drainage.  CT shows a calcified tubular structure within the anterior abdominal wall, along the course of previous ventricular drive line.    Post op course complicated by leukocytosis. He was moderate risk for rejection. Echo done today EF 60% along with an RV biopsy, initial biopsies 0/0. Just prior to d/c, he developed clear drianinage from bottom of sternotomy site so patient was watched through the weekend and doxycycline 100 mg BID started for 10 day course. His prednisone has beed weaned to 10/5 and prograf was 6/6 now down to 4/4 with level of 20 still.     Patient presents for the above procedure(s).    Prev airway:   Heart Transplant 2/18/2018  Present Prior to Hospital Arrival?: No; Placement Date: 02/18/18; Placement Time: 1006; Method of Intubation: Direct laryngoscopy; Inserted by: Anesthesia MD; Airway Device: Endotracheal Tube; Mask Ventilation: Easy; Intubated: Postinduction; Blade: Urena #2; Airway Device Size: 8.0; Style: Cuffed; Cuff Inflation: Minimal occlusive pressure; Inflation Amount: 8; Placement Verified By: Auscultation, Capnometry, ETT Condensation; Grade:  (with cricoid); Complicating Factors: Anterior larynx; Intubation Findings: Positive EtCO2, Bilateral breath sounds, Atraumatic/Condition of teeth unchanged;  Depth of Insertion: 22; Securment: Lips; Complications: None; Breath Sounds: Equal Bilateral; Insertion Attempts: 1; Removal Date: 02/19/18;  Removal Time: 1529; Removal Indication &  Assessment: removed per order; Name of Person who Removed: Antonia Gregorio    Oxygen/Ventilation Requirements:  On room air       Patient Active Problem List   Diagnosis    Acute hyperglycemia    Familial cardiomyopathy    Heart transplanted    Adverse effect of glucocorticoids and synthetic analogues, initial encounter    Immunosuppression    Acute pain of both knees    Essential hypertension       Review of patient's allergies indicates:  No Known Allergies    Outpatient Medications:  No current facility-administered medications on file prior to encounter.      Current Outpatient Prescriptions on File Prior to Encounter   Medication Sig Dispense Refill    amLODIPine (NORVASC) 5 MG tablet Take 1 tablet (5 mg total) by mouth once daily. 30 tablet 11    aspirin (ECOTRIN) 81 MG EC tablet Take 1 tablet (81 mg total) by mouth once daily. 30 tablet 11    atorvastatin (LIPITOR) 20 MG tablet Take 1 tablet (20 mg total) by mouth once daily. 90 tablet 3    calcium carbonate-vitamin D3 600 mg(1,500mg) -800 unit Tab Take 1 tablet by mouth once daily. 30 tablet 11    ergocalciferol (ERGOCALCIFEROL) 50,000 unit Cap Take 1 capsule (50,000 Units total) by mouth every 7 days. 15 capsule 3    famotidine (PEPCID) 40 MG tablet Take 1 tablet (40 mg total) by mouth every evening. 30 tablet 11    ferrous sulfate 325 (65 FE) MG EC tablet Take 1 tablet (325 mg total) by mouth 2 (two) times daily. 60 tablet 11    magnesium oxide (MAG-OX) 400 mg tablet Take 1 tablet (400 mg total) by mouth 2 (two) times daily. 60 tablet 3    mycophenolate (CELLCEPT) 250 mg Cap Take 6 capsules (1,500 mg total) by mouth 2 (two) times daily. 360 capsule 11    nystatin (MYCOSTATIN) 100,000 unit/mL suspension Take 5 mLs (500,000 Units total) by mouth 4 (four) times daily with meals and nightly. 473 mL 3    oxyCODONE (ROXICODONE) 5 MG immediate release tablet Take 1 tablet (5 mg total) by mouth every 24 hours as needed. 10 tablet 0     senna-docusate 8.6-50 mg (PERICOLACE) 8.6-50 mg per tablet Take 2 tablets by mouth daily as needed for Constipation. 60 tablet 3    sulfamethoxazole-trimethoprim 400-80mg (BACTRIM,SEPTRA) 400-80 mg per tablet Take 1 tablet by mouth once daily. 30 tablet 11    valGANciclovir (VALCYTE) 450 mg Tab Take 2 tablets (900 mg total) by mouth once daily. 60 tablet 11        Current Inpatient Medications:      Past Surgical History:   Procedure Laterality Date    LEFT VENTRICULAR ASSIST DEVICE         Social History     Social History    Marital status:      Spouse name: N/A    Number of children: N/A    Years of education: N/A     Occupational History    Not on file.     Social History Main Topics    Smoking status: Former Smoker     Packs/day: 1.00     Quit date: 12/14/2016    Smokeless tobacco: Never Used    Alcohol use No    Drug use: No    Sexual activity: Not on file     Other Topics Concern    Not on file     Social History Narrative    Works        OBJECTIVE:     Weight:  Wt Readings from Last 4 Encounters:   04/03/18 70.1 kg (154 lb 10.4 oz)   04/03/18 71 kg (156 lb 8.4 oz)   03/20/18 69.3 kg (152 lb 12.5 oz)   03/13/18 68.1 kg (150 lb 2.1 oz)       Vital Signs Range (Last 24H):  Temp:  [36.7 °C (98 °F)]   Pulse:  []   BP: (131-144)/(81-84)   SpO2:  [99 %]       CBC:   Recent Labs      04/03/18   0805   WBC  11.03   RBC  4.53*   HGB  13.8*   HCT  42.5   PLT  198   MCV  94   MCH  30.5   MCHC  32.5       CMP:   Recent Labs      04/03/18   0805   NA  142   K  4.2   CL  107   CO2  24   BUN  18   CREATININE  1.1   GLU  82   MG  1.6   CALCIUM  9.9   ALBUMIN  4.6   PROT  7.0   ALKPHOS  61   ALT  50*   AST  22   BILITOT  0.7       INR:  Recent Labs      04/03/18   0805   INR  1.1   APTT  21.7       Diagnostic Studies:  CT Abd/Pelvis 3/13/2018  1.  Mild wall thickening of proximal jejunal bowel loops, could reflect early changes of infectious or inflammatory enteritis,  clinical correlation for any history of the same recommended.  There are a few and numerous although nonenlarged adjacent lymph nodes.    2.  Radiopaque structure at the terminal ileum, may reflect ingested tablet or other ingested focus without olga obstruction or inflammation.  Correlation recommended.    3.  Bilateral pleural effusions, trace on the right, minimal on the left, improved since the previous exam.    4.  Mild hepatomegaly, correlation with LFTs as warranted.    5.  Calcified tubular structure within the anterior abdominal wall, along the course of previous ventricular drive line, clinical correlation recommended.    EKG:  3/20/2018  Vent. Rate : 102 BPM     Atrial Rate : 102 BPM     P-R Int : 120 ms          QRS Dur : 078 ms      QT Int : 354 ms       P-R-T Axes : 062 065 019 degrees     QTc Int : 461 ms    Sinus tachycardia  Cannot rule out Inferior infarct ,age undetermined  Abnormal ECG  When compared with ECG of 22-FEB-2018 00:34,  Nonspecific T wave abnormality now evident in Lateral leads     2D Echo:  4/3/2018    1 - Post-cardiac transplantation  biopsy study.     2 - Normal left ventricular systolic function (EF 60-65%).     3 - Concentric remodeling.     4 - No wall motion abnormalities.     5 - Low normal right ventricular systolic function .     6 - Trivial tricuspid regurgitation.     Results for orders placed or performed during the hospital encounter of 04/03/18   2D echo with color flow doppler   Result Value Ref Range    EF 65 55 - 65    Diastolic Dysfunction No     Tricuspid Valve Regurgitation TRIVIAL      Cath Lab Procedure 4/3/2018    4 pieces sent for H&E and 1 for CD68/c4d.    Successful RVBX x 5 under echo guidance.    Normal right-sided filling pressures with CVP <5cm H2O .    ASSESSMENT/PLAN:         Anesthesia Evaluation    I have reviewed the Patient Summary Reports.     I have reviewed the Medications.     Review of Systems  Anesthesia Hx:  No problems with previous  Anesthesia Denies Hx of Anesthetic complications  History of prior surgery of interest to airway management or planning:  Denies Personal Hx of Anesthesia complications.   Social:  Former Smoker    Cardiovascular:   Pacemaker Hypertension S/p OHTx 2/18/2018   Pulmonary:  Pulmonary Normal    Renal/:  Renal/ Normal     Hepatic/GI:  Hepatic/GI Normal    Neurological:  Neurology Normal    Endocrine:   Denies Diabetes. Denies Hypothyroidism.        Physical Exam  General:  Well nourished    Airway/Jaw/Neck:  Airway Findings: Mouth Opening: Small, but > 3cm Tongue: Normal  General Airway Assessment: Adult  Mallampati: II  TM Distance: Normal, at least 6 cm  Jaw/Neck Findings:  Neck ROM: Normal ROM       Chest/Lungs:  Chest/Lungs Findings: Normal Respiratory Rate, Clear to auscultation     Heart/Vascular:  Heart Findings: Rate: Normal  Rhythm: Regular Rhythm  Sounds: Normal        Mental Status:  Mental Status Findings:  Cooperative, Alert and Oriented         Anesthesia Plan  Type of Anesthesia, risks & benefits discussed:  Anesthesia Type:  general  Patient's Preference: General/LMA  Intra-op Monitoring Plan: standard ASA monitors  Intra-op Monitoring Plan Comments:   Post Op Pain Control Plan: multimodal analgesia, IV/PO Opioids PRN and per primary service following discharge from PACU  Post Op Pain Control Plan Comments: IV pain medications with transition to po as tolerated, discussed other pain modalities including regional anesthesia as appropriate  Induction:   IV  Beta Blocker:  Patient is not currently on a Beta-Blocker (No further documentation required).       Informed Consent: Patient understands risks and agrees with Anesthesia plan.  Questions answered. Anesthesia consent signed with patient.  ASA Score: 3     Day of Surgery Review of History & Physical: I have interviewed and examined the patient. I have reviewed the patient's H&P dated:  There are no significant changes.  H&P update referred to the  surgeon.     Anesthesia Plan Notes: Discussed plan for General anesthesia with Laryngeal Mask Airway    Pt understands risks and agrees with plan        Ready For Surgery From Anesthesia Perspective.

## 2018-04-03 NOTE — DISCHARGE SUMMARY
Date of admit to cath lab: 4/3/2018      Date of discharge from cath lab: 4/3/2018      Principal diagnosis: s/p OHT    Discharge attending physician: Marisel Lundberg MD    Hospital Course/Outcome of the treatment, procedures or surgery: Pt admitted for EGBx.   See CVIS/cath lab procedure report in EPIC  for full report of today's procedure.    Disposition of the case (d/c disposition): home    Discharge Medication List: see med card    Plan for follow up care, diet, activity level: F/U as scheduled. Resume low Na diet.  Activity as tolerated    Condition on discharge from Cath lab: Stable.

## 2018-04-03 NOTE — PROGRESS NOTES
Patient seen and examined.  I reviewed his CAT scan with him.  We discussed the operative plan of making an anterior abdominal wall skin incision and attempting to remove the residual the velour.  We also discussed the fact that it would be difficult to tell, intraoperatively, that all the velour had been removed.  His questions have been answered, and he wishes to proceed.

## 2018-04-03 NOTE — LETTER
April 3, 2018        Guillermo Alejo  [address]  [facility] [address]  Phone: [phone]  Fax: [phone]             [facility]  [address]  [facility] [address]  Phone: [phone]   Patient: Mert Samayoa   MR Number: [id number]   YOB: 1990   Date of Visit: 4/3/2018       Dear Dr. Guillermo Alejo    Thank you for referring Mert Samayoa to me for evaluation. Attached you will find relevant portions of my assessment and plan of care.    If you have questions, please do not hesitate to call me. I look forward to following Mert Samayoa along with you.    Sincerely,    HAKEEM Alvarez    Enclosure    If you would like to receive this communication electronically, please contact [email or web address] or [phone] to request Digital Message Display Link access.    Digital Message Display Link is a tool which provides read-only access to select patient information with whom you have a relationship. Its easy to use and provides real time access to review your patients record including encounter summaries, notes, results, and demographic information.    If you feel you have received this communication in error or would no longer like to receive these types of communications, please e-mail [email or web address]

## 2018-04-04 ENCOUNTER — TELEPHONE (OUTPATIENT)
Dept: CARDIAC SURGERY | Facility: CLINIC | Age: 28
End: 2018-04-04

## 2018-04-04 ENCOUNTER — HOSPITAL ENCOUNTER (OUTPATIENT)
Facility: HOSPITAL | Age: 28
LOS: 1 days | Discharge: HOME | End: 2018-04-04
Attending: THORACIC SURGERY (CARDIOTHORACIC VASCULAR SURGERY) | Admitting: THORACIC SURGERY (CARDIOTHORACIC VASCULAR SURGERY)
Payer: COMMERCIAL

## 2018-04-04 ENCOUNTER — ANESTHESIA (OUTPATIENT)
Dept: SURGERY | Facility: HOSPITAL | Age: 28
End: 2018-04-04
Payer: COMMERCIAL

## 2018-04-04 DIAGNOSIS — T82.9XXS COMPLICATION INVOLVING LEFT VENTRICULAR ASSIST DEVICE (LVAD), SEQUELA: ICD-10-CM

## 2018-04-04 DIAGNOSIS — T82.9XXS LEFT VENTRICULAR ASSIST DEVICE (LVAD) COMPLICATION, SEQUELA: Primary | ICD-10-CM

## 2018-04-04 PROBLEM — I42.9 FAMILIAL CARDIOMYOPATHY: Status: RESOLVED | Noted: 2017-03-01 | Resolved: 2018-04-04

## 2018-04-04 PROBLEM — S31.109A OPEN WOUND OF ABDOMEN: Status: ACTIVE | Noted: 2018-04-04

## 2018-04-04 PROBLEM — T82.9XXA LEFT VENTRICULAR ASSIST DEVICE COMPLICATION: Status: ACTIVE | Noted: 2018-04-04

## 2018-04-04 PROCEDURE — 71000033 HC RECOVERY, INTIAL HOUR: Performed by: THORACIC SURGERY (CARDIOTHORACIC VASCULAR SURGERY)

## 2018-04-04 PROCEDURE — 71000039 HC RECOVERY, EACH ADD'L HOUR: Performed by: THORACIC SURGERY (CARDIOTHORACIC VASCULAR SURGERY)

## 2018-04-04 PROCEDURE — 94761 N-INVAS EAR/PLS OXIMETRY MLT: CPT

## 2018-04-04 PROCEDURE — 71000015 HC POSTOP RECOV 1ST HR: Performed by: THORACIC SURGERY (CARDIOTHORACIC VASCULAR SURGERY)

## 2018-04-04 PROCEDURE — 20520 RMVL FB MUSC/TDN SIMPLE: CPT | Mod: 78,,, | Performed by: THORACIC SURGERY (CARDIOTHORACIC VASCULAR SURGERY)

## 2018-04-04 PROCEDURE — 88300 SURGICAL PATH GROSS: CPT | Performed by: PATHOLOGY

## 2018-04-04 PROCEDURE — 63600175 PHARM REV CODE 636 W HCPCS: Performed by: THORACIC SURGERY (CARDIOTHORACIC VASCULAR SURGERY)

## 2018-04-04 PROCEDURE — 37000008 HC ANESTHESIA 1ST 15 MINUTES: Performed by: THORACIC SURGERY (CARDIOTHORACIC VASCULAR SURGERY)

## 2018-04-04 PROCEDURE — 63600175 PHARM REV CODE 636 W HCPCS: Performed by: ANESTHESIOLOGY

## 2018-04-04 PROCEDURE — 88300 SURGICAL PATH GROSS: CPT | Mod: 26,,, | Performed by: PATHOLOGY

## 2018-04-04 PROCEDURE — D9220A PRA ANESTHESIA: Mod: ,,, | Performed by: ANESTHESIOLOGY

## 2018-04-04 PROCEDURE — C1729 CATH, DRAINAGE: HCPCS | Performed by: THORACIC SURGERY (CARDIOTHORACIC VASCULAR SURGERY)

## 2018-04-04 PROCEDURE — 63600175 PHARM REV CODE 636 W HCPCS: Performed by: STUDENT IN AN ORGANIZED HEALTH CARE EDUCATION/TRAINING PROGRAM

## 2018-04-04 PROCEDURE — 27000221 HC OXYGEN, UP TO 24 HOURS

## 2018-04-04 PROCEDURE — 36000710: Performed by: THORACIC SURGERY (CARDIOTHORACIC VASCULAR SURGERY)

## 2018-04-04 PROCEDURE — C9290 INJ, BUPIVACAINE LIPOSOME: HCPCS | Performed by: THORACIC SURGERY (CARDIOTHORACIC VASCULAR SURGERY)

## 2018-04-04 PROCEDURE — 25000003 PHARM REV CODE 250: Performed by: THORACIC SURGERY (CARDIOTHORACIC VASCULAR SURGERY)

## 2018-04-04 PROCEDURE — 37000009 HC ANESTHESIA EA ADD 15 MINS: Performed by: THORACIC SURGERY (CARDIOTHORACIC VASCULAR SURGERY)

## 2018-04-04 PROCEDURE — 27201423 OPTIME MED/SURG SUP & DEVICES STERILE SUPPLY: Performed by: THORACIC SURGERY (CARDIOTHORACIC VASCULAR SURGERY)

## 2018-04-04 PROCEDURE — 36000711: Performed by: THORACIC SURGERY (CARDIOTHORACIC VASCULAR SURGERY)

## 2018-04-04 RX ORDER — CEFAZOLIN SODIUM 1 G/3ML
2 INJECTION, POWDER, FOR SOLUTION INTRAMUSCULAR; INTRAVENOUS
Status: COMPLETED | OUTPATIENT
Start: 2018-04-04 | End: 2018-04-04

## 2018-04-04 RX ORDER — ONDANSETRON 2 MG/ML
4 INJECTION INTRAMUSCULAR; INTRAVENOUS ONCE AS NEEDED
Status: DISCONTINUED | OUTPATIENT
Start: 2018-04-04 | End: 2018-04-04 | Stop reason: HOSPADM

## 2018-04-04 RX ORDER — ONDANSETRON 8 MG/1
8 TABLET, ORALLY DISINTEGRATING ORAL EVERY 8 HOURS PRN
Status: DISCONTINUED | OUTPATIENT
Start: 2018-04-04 | End: 2018-04-04 | Stop reason: HOSPADM

## 2018-04-04 RX ORDER — METOCLOPRAMIDE HYDROCHLORIDE 5 MG/ML
5 INJECTION INTRAMUSCULAR; INTRAVENOUS EVERY 6 HOURS PRN
Status: DISCONTINUED | OUTPATIENT
Start: 2018-04-04 | End: 2018-04-04 | Stop reason: HOSPADM

## 2018-04-04 RX ORDER — HYDROCODONE BITARTRATE AND ACETAMINOPHEN 10; 325 MG/1; MG/1
1 TABLET ORAL EVERY 4 HOURS PRN
Status: DISCONTINUED | OUTPATIENT
Start: 2018-04-04 | End: 2018-04-04 | Stop reason: HOSPADM

## 2018-04-04 RX ORDER — FENTANYL CITRATE 50 UG/ML
INJECTION, SOLUTION INTRAMUSCULAR; INTRAVENOUS
Status: DISCONTINUED | OUTPATIENT
Start: 2018-04-04 | End: 2018-04-04

## 2018-04-04 RX ORDER — FENTANYL CITRATE 50 UG/ML
25 INJECTION, SOLUTION INTRAMUSCULAR; INTRAVENOUS EVERY 5 MIN PRN
Status: DISCONTINUED | OUTPATIENT
Start: 2018-04-04 | End: 2018-04-04

## 2018-04-04 RX ORDER — SODIUM CHLORIDE 0.9 % (FLUSH) 0.9 %
3 SYRINGE (ML) INJECTION
Status: DISCONTINUED | OUTPATIENT
Start: 2018-04-04 | End: 2018-04-04

## 2018-04-04 RX ORDER — MIDAZOLAM HYDROCHLORIDE 5 MG/ML
INJECTION INTRAMUSCULAR; INTRAVENOUS
Status: DISCONTINUED | OUTPATIENT
Start: 2018-04-04 | End: 2018-04-04

## 2018-04-04 RX ORDER — LIDOCAINE HCL/PF 100 MG/5ML
SYRINGE (ML) INTRAVENOUS
Status: DISCONTINUED | OUTPATIENT
Start: 2018-04-04 | End: 2018-04-04

## 2018-04-04 RX ORDER — LIDOCAINE HYDROCHLORIDE 10 MG/ML
1 INJECTION, SOLUTION EPIDURAL; INFILTRATION; INTRACAUDAL; PERINEURAL ONCE
Status: DISCONTINUED | OUTPATIENT
Start: 2018-04-04 | End: 2018-04-04 | Stop reason: HOSPADM

## 2018-04-04 RX ORDER — LIDOCAINE HYDROCHLORIDE 10 MG/ML
1 INJECTION, SOLUTION EPIDURAL; INFILTRATION; INTRACAUDAL; PERINEURAL ONCE
Status: COMPLETED | OUTPATIENT
Start: 2018-04-04 | End: 2018-04-04

## 2018-04-04 RX ORDER — ACETAMINOPHEN 325 MG/1
650 TABLET ORAL EVERY 4 HOURS PRN
Status: DISCONTINUED | OUTPATIENT
Start: 2018-04-04 | End: 2018-04-04 | Stop reason: HOSPADM

## 2018-04-04 RX ORDER — HYDROCODONE BITARTRATE AND ACETAMINOPHEN 5; 325 MG/1; MG/1
TABLET ORAL
Status: DISCONTINUED
Start: 2018-04-04 | End: 2018-04-04 | Stop reason: WASHOUT

## 2018-04-04 RX ORDER — ONDANSETRON 2 MG/ML
INJECTION INTRAMUSCULAR; INTRAVENOUS
Status: DISCONTINUED | OUTPATIENT
Start: 2018-04-04 | End: 2018-04-04

## 2018-04-04 RX ORDER — SODIUM CHLORIDE 9 MG/ML
INJECTION, SOLUTION INTRAVENOUS CONTINUOUS
Status: DISCONTINUED | OUTPATIENT
Start: 2018-04-04 | End: 2018-04-04 | Stop reason: HOSPADM

## 2018-04-04 RX ORDER — SODIUM CHLORIDE 0.9 % (FLUSH) 0.9 %
3 SYRINGE (ML) INJECTION
Status: DISCONTINUED | OUTPATIENT
Start: 2018-04-04 | End: 2018-04-04 | Stop reason: HOSPADM

## 2018-04-04 RX ORDER — ACETAMINOPHEN 10 MG/ML
INJECTION, SOLUTION INTRAVENOUS
Status: DISCONTINUED | OUTPATIENT
Start: 2018-04-04 | End: 2018-04-04

## 2018-04-04 RX ORDER — DIPHENHYDRAMINE HYDROCHLORIDE 50 MG/ML
25 INJECTION INTRAMUSCULAR; INTRAVENOUS EVERY 6 HOURS PRN
Status: DISCONTINUED | OUTPATIENT
Start: 2018-04-04 | End: 2018-04-04 | Stop reason: HOSPADM

## 2018-04-04 RX ORDER — BUPIVACAINE HYDROCHLORIDE 2.5 MG/ML
INJECTION, SOLUTION EPIDURAL; INFILTRATION; INTRACAUDAL
Status: DISCONTINUED | OUTPATIENT
Start: 2018-04-04 | End: 2018-04-04 | Stop reason: HOSPADM

## 2018-04-04 RX ORDER — PROPOFOL 10 MG/ML
VIAL (ML) INTRAVENOUS
Status: DISCONTINUED | OUTPATIENT
Start: 2018-04-04 | End: 2018-04-04

## 2018-04-04 RX ORDER — OXYCODONE AND ACETAMINOPHEN 5; 325 MG/1; MG/1
1-2 TABLET ORAL EVERY 6 HOURS PRN
Qty: 15 TABLET | Refills: 0 | Status: SHIPPED | OUTPATIENT
Start: 2018-04-04 | End: 2018-05-23

## 2018-04-04 RX ORDER — FENTANYL CITRATE 50 UG/ML
25 INJECTION, SOLUTION INTRAMUSCULAR; INTRAVENOUS EVERY 5 MIN PRN
Status: COMPLETED | OUTPATIENT
Start: 2018-04-04 | End: 2018-04-04

## 2018-04-04 RX ORDER — SODIUM CHLORIDE 0.9 % (FLUSH) 0.9 %
3 SYRINGE (ML) INJECTION EVERY 8 HOURS
Status: DISCONTINUED | OUTPATIENT
Start: 2018-04-04 | End: 2018-04-04 | Stop reason: HOSPADM

## 2018-04-04 RX ORDER — HYDROCODONE BITARTRATE AND ACETAMINOPHEN 5; 325 MG/1; MG/1
1 TABLET ORAL EVERY 4 HOURS PRN
Status: DISCONTINUED | OUTPATIENT
Start: 2018-04-04 | End: 2018-04-04 | Stop reason: HOSPADM

## 2018-04-04 RX ADMIN — FENTANYL CITRATE 25 MCG: 50 INJECTION, SOLUTION INTRAMUSCULAR; INTRAVENOUS at 10:04

## 2018-04-04 RX ADMIN — MIDAZOLAM 2 MG: 5 INJECTION INTRAMUSCULAR; INTRAVENOUS at 08:04

## 2018-04-04 RX ADMIN — ACETAMINOPHEN 1000 MG: 10 INJECTION, SOLUTION INTRAVENOUS at 08:04

## 2018-04-04 RX ADMIN — FENTANYL CITRATE 100 MCG: 50 INJECTION, SOLUTION INTRAMUSCULAR; INTRAVENOUS at 08:04

## 2018-04-04 RX ADMIN — LIDOCAINE HYDROCHLORIDE 1 MG: 10 INJECTION, SOLUTION EPIDURAL; INFILTRATION; INTRACAUDAL; PERINEURAL at 07:04

## 2018-04-04 RX ADMIN — SODIUM CHLORIDE: 0.9 INJECTION, SOLUTION INTRAVENOUS at 07:04

## 2018-04-04 RX ADMIN — FENTANYL CITRATE 25 MCG: 50 INJECTION, SOLUTION INTRAMUSCULAR; INTRAVENOUS at 08:04

## 2018-04-04 RX ADMIN — FENTANYL CITRATE 25 MCG: 50 INJECTION, SOLUTION INTRAMUSCULAR; INTRAVENOUS at 09:04

## 2018-04-04 RX ADMIN — LIDOCAINE HYDROCHLORIDE 100 MG: 20 INJECTION, SOLUTION INTRAVENOUS at 08:04

## 2018-04-04 RX ADMIN — FENTANYL CITRATE 50 MCG: 50 INJECTION, SOLUTION INTRAMUSCULAR; INTRAVENOUS at 08:04

## 2018-04-04 RX ADMIN — PROPOFOL 180 MG: 10 INJECTION, EMULSION INTRAVENOUS at 08:04

## 2018-04-04 RX ADMIN — HYDROCODONE BITARTRATE AND ACETAMINOPHEN 1 TABLET: 10; 325 TABLET ORAL at 10:04

## 2018-04-04 RX ADMIN — ONDANSETRON 4 MG: 2 INJECTION INTRAMUSCULAR; INTRAVENOUS at 09:04

## 2018-04-04 RX ADMIN — CEFAZOLIN 2 G: 330 INJECTION, POWDER, FOR SOLUTION INTRAMUSCULAR; INTRAVENOUS at 08:04

## 2018-04-04 NOTE — PROGRESS NOTES
Heron RN, Dr. Klein's nurse, at bedside to see pt, able to feel line in abd to be removed.  Notified need orders from surgery team, h&p update and pt took aspirin yesterday, sees rash to chest and face that pt states occurred after transplant, also had rash after lvad placement over a year ago.  Heron sanchez will let surgery team know of above.

## 2018-04-04 NOTE — ANESTHESIA POSTPROCEDURE EVALUATION
"Anesthesia Post Evaluation    Patient: Mert Samayoa    Procedure(s) Performed: Procedure(s) (LRB):  REMOVAL-Abdominal tubular structure (N/A)    Final Anesthesia Type: general  Patient location during evaluation: PACU  Patient participation: Yes- Able to Participate  Level of consciousness: awake and alert  Post-procedure vital signs: reviewed and stable  Pain management: adequate  Airway patency: patent  PONV status at discharge: No PONV  Anesthetic complications: no      Cardiovascular status: blood pressure returned to baseline  Respiratory status: unassisted  Hydration status: euvolemic  Follow-up not needed.        Visit Vitals  /85   Pulse 95   Temp 36.8 °C (98.3 °F) (Skin)   Resp 16   Ht 5' 7" (1.702 m)   Wt 68.5 kg (151 lb)   SpO2 96%   BMI 23.65 kg/m²       Pain/Carlos A Score: Pain Assessment Performed: Yes (4/4/2018 11:45 AM)  Presence of Pain: denies (4/4/2018 11:45 AM)  Pain Rating Prior to Med Admin: 7 (4/4/2018 10:43 AM)  Pain Rating Post Med Admin: 2 (4/4/2018 10:40 AM)  Carlos A Score: 10 (4/4/2018 10:40 AM)      "

## 2018-04-04 NOTE — TRANSFER OF CARE
"Anesthesia Transfer of Care Note    Patient: Mert Samayoa    Procedure(s) Performed: Procedure(s) (LRB):  REMOVAL-Abdominal tubular structure (N/A)    Patient location: PACU    Anesthesia Type: general    Transport from OR: Transported from OR on 6-10 L/min O2 by face mask with adequate spontaneous ventilation    Post pain: adequate analgesia    Post assessment: no apparent anesthetic complications    Post vital signs: stable    Level of consciousness: awake, alert and oriented    Nausea/Vomiting: no nausea/vomiting    Complications: none    Transfer of care protocol was followed      Last vitals:   Visit Vitals  BP (!) 144/76 (BP Location: Right arm, Patient Position: Lying)   Pulse 93   Temp 36.7 °C (98.1 °F) (Oral)   Resp 20   Ht 5' 7" (1.702 m)   Wt 68.5 kg (151 lb)   SpO2 98%   BMI 23.65 kg/m²     "

## 2018-04-04 NOTE — PLAN OF CARE
Patient and spouse state they are ready to be discharged. Instructions and narcotic prescription given to patient and family. Both verbalize understanding. Patient tolerating po liquids with no difficulty. Patient states pain is at a tolerable level for them. Anesthesia consent and surgical consent in chart upon patient's discharge from [facility].

## 2018-04-04 NOTE — TELEPHONE ENCOUNTER
Contacted Mrs Samayoa regarding scheduling f/u appointment.  Explained Dr Klein's nurse out today, back tomorrow and will call to schedule.  Mrs Samayoa also had question regarding incision care--states was instructed to change gauze twice a day but has packing in the incision should this be changed.  Instructed to only change gauze at this time and will speak with nurse tomorrow.

## 2018-04-04 NOTE — BRIEF OP NOTE
[facility]  Brief Operative Note    SUMMARY     Surgery Date: 4/4/2018     Surgeon(s) and Role:     * Ernesto King MD - Fellow     * Raj Klein MD - Primary    Assisting Surgeon: None    Pre-op Diagnosis:  Complication involving left ventricular assist device (LVAD), initial encounter [T82.9XXA]    Post-op Diagnosis:  Post-Op Diagnosis Codes:     * Complication involving left ventricular assist device (LVAD), initial encounter [T82.9XXA]    Procedure(s) (LRB):  REMOVAL-Retained LVAD driveline    Anesthesia: General        Description of the findings of the procedure: Retained driveline. No indication of infection.     Estimated Blood Loss: 20 mL         Specimens:   Specimen (12h ago through future)    Start     Ordered    04/04/18 0910  Specimen to Pathology - Surgery  Once     Comments:  1. LVAD driveline velour - Gross ID only      04/04/18 8078          Attestation:  I was present and scrubbed for the procedure.

## 2018-04-04 NOTE — DISCHARGE INSTRUCTIONS
Dressing change: 2 times per day using dry gauze pack to open portion of drive line exit site (call clinic if you need visiting nurse or additional supplies ordered).     Remove surgical dressing in 48 hours. Keep clean, dry and intact until then. May shower after dressing is removed. No need to recover with bandage. Steristrips underneath top dressing to stay on for approximately 10 days. You may trim them as they come up on sides. Do not pull off.     Call clinic to schedule follow-up.         Recovery After Procedural Sedation (Adult)  You have been given medicine by vein to make you sleep during your surgery. This may have included both a pain medicine and sleeping medicine. Most of the effects have worn off. But you may still have some drowsiness for the next 6 to 8 hours.  Home care  Follow these guidelines when you get home:  · For the next 8 hours, you should be watched by a responsible adult. This person should make sure your condition is not getting worse.  · Don't drink any alcohol for the next 24 hours.  · Don't drive, operate dangerous machinery, or make important business or personal decisions during the next 24 hours.  Note: Your healthcare provider may tell you not to take any medicine by mouth for pain or sleep in the next 4 hours. These medicines may react with the medicines you were given in the hospital. This could cause a much stronger response than usual.  Follow-up care  Follow up with your healthcare provider if you are not alert and back to your usual level of activity within 12 hours.  When to seek medical advice  Call your healthcare provider right away if any of these occur:  · Drowsiness gets worse  · Weakness or dizziness gets worse  · Repeated vomiting  · You can't be awakened   Date Last Reviewed: 10/18/2016  © 2049-5991 [employer]. [address] [place] [address], [place] [address]. All rights reserved. This information is not intended as a substitute for professional medical  care. Always follow your healthcare professional's instructions.      PLEASE CALL CARDIAC SURGERY CLINIC  DURING BUSINESS HOURS WITH ANY QUESTIONS. AFTER HOURS CALL THE [facility] MAIN NUMBER  AND HAVE THE  CONNECT YOU TO THE RESIDENT ON CALL FOR CARDIAC SURGERY.

## 2018-04-04 NOTE — OP NOTE
DATE OF PROCEDURE:  04/04/2018.    ATTENDING SURGEON:  Raj Klein M.D.    ASSISTANT:  Ernesto King M.D.(RES), cardiothoracic resident.    PREOPERATIVE DIAGNOSIS:  Retained driveline, status post left ventricular assist   device removal and heart transplantation in February 2018.    POSTOPERATIVE DIAGNOSIS:  Retained driveline, status post left ventricular   assist device removal and heart transplantation in February 2018.    OPERATION PERFORMED:  Removal of retained driveline through counterincision.    ANESTHESIA:  General endotracheal.    ESTIMATED BLOOD LOSS:  20 mL.    BRIEF HISTORY:  Mr. Samayoa is a 28-year-old gentleman with familial   cardiomyopathy, who had a left ventricular assist device placed.  He recovered   well from that and was listed for transplant.  In February of this year, he   underwent LVAD removal along with removal of his defibrillator and heart   transplantation.  His driveline from his LVAD was incredibly well incorporated,   and at the time of left ventricular assist device removal, apparently, a portion   of the velour sheared off the driveline and was retained in the anterior   abdominal wall.  The patient made an uneventful recovery from his heart   transplant and has done very well.  He complained of tenderness in the anterior   abdominal wall.  A CT scan was done, which demonstrated the retained velour.    Although people have clinically reported purposely leaving drivelines behind,   given how well the patient was doing, we felt the risk of driveline removal was   low.  He now presents for removal of retained driveline velour.    PROCEDURE IN DETAIL:  After obtaining informed and written consent, the patient   was brought to the Operating Room, and placed on the operating table in supine   position.  After induction of adequate general endotracheal anesthesia, the   patient's abdomen and chest were prepped and draped in usual sterile fashion.    Ultrasonography was  used to identify the driveline site.  An incision was made   over the anterior abdominal wall and the driveline and velour were identified.    Dissection was carried cephalad to mobilize the material and the dissection was   carried laterally to complete the mobilization.  The foreign body was removed.    Local anesthetic was placed.  The skin was closed in multiple layers of   absorbable suture material.  The driveline exit site, which had been undergoing   wound care since the time of the heart transplant, was packed as had been done   previously.  There was no evidence for infection surrounding the velour, which   remained extremely well incorporated.  The patient tolerated the procedure well.    There were no complications.  At the conclusion of the case, sponge and   instrument counts were correct.      CARMELITA/BLANCA  dd: 04/04/2018 09:40:53 (CDT)  td: 04/04/2018 10:14:05 (CDT)  Doc ID   #[id number]  Job ID #[id number]    CC:

## 2018-04-05 ENCOUNTER — TELEPHONE (OUTPATIENT)
Dept: CARDIOTHORACIC SURGERY | Facility: CLINIC | Age: 28
End: 2018-04-05

## 2018-04-05 ENCOUNTER — CONFERENCE (OUTPATIENT)
Dept: TRANSPLANT | Facility: CLINIC | Age: 28
End: 2018-04-05

## 2018-04-05 ENCOUNTER — TELEPHONE (OUTPATIENT)
Dept: TRANSPLANT | Facility: CLINIC | Age: 28
End: 2018-04-05

## 2018-04-05 VITALS
SYSTOLIC BLOOD PRESSURE: 125 MMHG | OXYGEN SATURATION: 96 % | TEMPERATURE: 98 F | WEIGHT: 151 LBS | DIASTOLIC BLOOD PRESSURE: 85 MMHG | RESPIRATION RATE: 16 BRPM | BODY MASS INDEX: 23.7 KG/M2 | HEIGHT: 67 IN | HEART RATE: 95 BPM

## 2018-04-05 DIAGNOSIS — Z94.1 STATUS POST HEART TRANSPLANT: ICD-10-CM

## 2018-04-05 RX ORDER — TACROLIMUS 1 MG/1
2 CAPSULE ORAL EVERY 12 HOURS
Qty: 240 CAPSULE | Refills: 11 | Status: SHIPPED | OUTPATIENT
Start: 2018-04-05 | End: 2018-04-10 | Stop reason: SDUPTHER

## 2018-04-05 RX ORDER — PREDNISONE 10 MG/1
TABLET ORAL
Qty: 60 TABLET | Refills: 3 | Status: SHIPPED | OUTPATIENT
Start: 2018-04-05 | End: 2018-04-20 | Stop reason: SDUPTHER

## 2018-04-05 NOTE — TELEPHONE ENCOUNTER
Spoke to pt regarding chart review today. Biopsy negative, decrease prednisone to 10 mg daily. Pt is discharged to home from [facility]. Pt asked if they can go home now with some of their belongings and then come back tomorrow for the rest. I informed them that is ok, tomorrow they can turn in their keys. Reminded pt to call with any questions and problems.

## 2018-04-05 NOTE — TELEPHONE ENCOUNTER
"Called to check on pt after having surgery yesterday. He is very sore today. Still bleeding, but hasn't changed gauze since last night. He stated "there is blood pooled under the dressing". He is waiting for Ernie's nurse to call back today with follow up appt.   "

## 2018-04-05 NOTE — TELEPHONE ENCOUNTER
Returned call to pt's wife regarding pt's dressing and follow-up appt.  Informed pt's wife that Dr. Klein advised me to instruct them to remove the packing from the site and just dress the surgical wound like they had been doing before.  Upon informing her of this, I inquired whether or not she had any questions pertaining to this, which she denied.  Pt's post-op appt was made for 4-17 at 10:15.  Pt's wife notified and verbalized understanding.  Pt's wife instructed to contact the clinic with any questions or concerns, which she verbalized understanding.    ----- Message from Sofia Kilgore sent at 4/5/2018 10:32 AM CDT -----  Contact: wife/Abigail   Abigail States that they need a call returned by a nurse in reference to his surgery yesterday and she wants care instruction on the packing site and also he needs a follow up appointment.  Please call Abigail @ [phone] . Thanks :)

## 2018-04-05 NOTE — TELEPHONE ENCOUNTER
Biopsy reviewed: 0/0  Tolerated surgery well yesterday.    A/P:  - decrease Pred to 10mg daily  - f/u repeat Tacro level after adjusting to 2/2  - okay to go home    JANUSZ Daniels MD  Transplant Cardiology

## 2018-04-09 ENCOUNTER — LAB VISIT (OUTPATIENT)
Dept: LAB | Facility: HOSPITAL | Age: 28
End: 2018-04-09
Attending: INTERNAL MEDICINE
Payer: COMMERCIAL

## 2018-04-09 DIAGNOSIS — Z94.1 STATUS POST HEART TRANSPLANT: ICD-10-CM

## 2018-04-09 LAB
ALBUMIN SERPL BCP-MCNC: 4.2 G/DL
ALP SERPL-CCNC: 57 U/L
ALT SERPL W/O P-5'-P-CCNC: 40 U/L
ANION GAP SERPL CALC-SCNC: 11 MMOL/L
AST SERPL-CCNC: 22 U/L
BASOPHILS # BLD AUTO: 0.01 K/UL
BASOPHILS NFR BLD: 0.1 %
BILIRUB SERPL-MCNC: 0.6 MG/DL
BUN SERPL-MCNC: 10 MG/DL
CALCIUM SERPL-MCNC: 9.5 MG/DL
CHLORIDE SERPL-SCNC: 106 MMOL/L
CO2 SERPL-SCNC: 25 MMOL/L
CREAT SERPL-MCNC: 0.9 MG/DL
DIFFERENTIAL METHOD: ABNORMAL
EOSINOPHIL # BLD AUTO: 0.1 K/UL
EOSINOPHIL NFR BLD: 0.7 %
ERYTHROCYTE [DISTWIDTH] IN BLOOD BY AUTOMATED COUNT: 14.3 %
EST. GFR  (AFRICAN AMERICAN): >60 ML/MIN/1.73 M^2
EST. GFR  (NON AFRICAN AMERICAN): >60 ML/MIN/1.73 M^2
GLUCOSE SERPL-MCNC: 85 MG/DL
HCT VFR BLD AUTO: 40 %
HGB BLD-MCNC: 12.9 G/DL
LYMPHOCYTES # BLD AUTO: 1.7 K/UL
LYMPHOCYTES NFR BLD: 19.5 %
MCH RBC QN AUTO: 30.1 PG
MCHC RBC AUTO-ENTMCNC: 32.3 G/DL
MCV RBC AUTO: 93 FL
MONOCYTES # BLD AUTO: 0.6 K/UL
MONOCYTES NFR BLD: 6.9 %
NEUTROPHILS # BLD AUTO: 6.3 K/UL
NEUTROPHILS NFR BLD: 72.8 %
PLATELET # BLD AUTO: 175 K/UL
PMV BLD AUTO: 9.1 FL
POTASSIUM SERPL-SCNC: 3.6 MMOL/L
PROT SERPL-MCNC: 6.5 G/DL
RBC # BLD AUTO: 4.29 M/UL
SODIUM SERPL-SCNC: 142 MMOL/L
TACROLIMUS BLD-MCNC: 9.7 NG/ML
WBC # BLD AUTO: 8.61 K/UL

## 2018-04-09 PROCEDURE — 80053 COMPREHEN METABOLIC PANEL: CPT

## 2018-04-09 PROCEDURE — 36415 COLL VENOUS BLD VENIPUNCTURE: CPT

## 2018-04-09 PROCEDURE — 80197 ASSAY OF TACROLIMUS: CPT

## 2018-04-09 PROCEDURE — 85025 COMPLETE CBC W/AUTO DIFF WBC: CPT

## 2018-04-10 DIAGNOSIS — Z94.1 STATUS POST HEART TRANSPLANT: ICD-10-CM

## 2018-04-10 NOTE — TELEPHONE ENCOUNTER
Discussed tacro level with Dr. Tidwell, increased pt's dose to 3/3 and repeat labs Friday. Pt's stated his understanding with changes and will have labs drawn Friday at [facility]

## 2018-04-12 RX ORDER — TACROLIMUS 1 MG/1
3 CAPSULE ORAL EVERY 12 HOURS
Qty: 180 CAPSULE | Refills: 11 | Status: SHIPPED | OUTPATIENT
Start: 2018-04-12 | End: 2018-04-17 | Stop reason: SDUPTHER

## 2018-04-13 ENCOUNTER — LAB VISIT (OUTPATIENT)
Dept: LAB | Facility: HOSPITAL | Age: 28
End: 2018-04-13
Attending: INTERNAL MEDICINE
Payer: COMMERCIAL

## 2018-04-13 ENCOUNTER — TELEPHONE (OUTPATIENT)
Dept: TRANSPLANT | Facility: CLINIC | Age: 28
End: 2018-04-13

## 2018-04-13 DIAGNOSIS — Z94.1 STATUS POST HEART TRANSPLANT: ICD-10-CM

## 2018-04-13 LAB
ALBUMIN SERPL BCP-MCNC: 4.5 G/DL
ALP SERPL-CCNC: 57 U/L
ALT SERPL W/O P-5'-P-CCNC: 41 U/L
ANION GAP SERPL CALC-SCNC: 10 MMOL/L
AST SERPL-CCNC: 22 U/L
BILIRUB SERPL-MCNC: 0.6 MG/DL
BUN SERPL-MCNC: 17 MG/DL
CALCIUM SERPL-MCNC: 10 MG/DL
CHLORIDE SERPL-SCNC: 106 MMOL/L
CO2 SERPL-SCNC: 27 MMOL/L
CREAT SERPL-MCNC: 0.9 MG/DL
EST. GFR  (AFRICAN AMERICAN): >60 ML/MIN/1.73 M^2
EST. GFR  (NON AFRICAN AMERICAN): >60 ML/MIN/1.73 M^2
GLUCOSE SERPL-MCNC: 103 MG/DL
POTASSIUM SERPL-SCNC: 4.6 MMOL/L
PROT SERPL-MCNC: 6.8 G/DL
SODIUM SERPL-SCNC: 143 MMOL/L
TACROLIMUS BLD-MCNC: 14.2 NG/ML

## 2018-04-13 PROCEDURE — 80053 COMPREHEN METABOLIC PANEL: CPT

## 2018-04-13 PROCEDURE — 80197 ASSAY OF TACROLIMUS: CPT

## 2018-04-13 PROCEDURE — 36415 COLL VENOUS BLD VENIPUNCTURE: CPT

## 2018-04-13 NOTE — TELEPHONE ENCOUNTER
Called to inform pt of his lab results being with in goal. No changes needed. Will see him in clinic on Tuesday.

## 2018-04-16 DIAGNOSIS — Z94.1 S/P ORTHOTOPIC HEART TRANSPLANT: Primary | ICD-10-CM

## 2018-04-17 ENCOUNTER — HOSPITAL ENCOUNTER (OUTPATIENT)
Facility: HOSPITAL | Age: 28
Discharge: HOME | End: 2018-04-17
Attending: INTERNAL MEDICINE | Admitting: INTERNAL MEDICINE
Payer: COMMERCIAL

## 2018-04-17 ENCOUNTER — DOCUMENTATION ONLY (OUTPATIENT)
Dept: CARDIOLOGY | Facility: CLINIC | Age: 28
End: 2018-04-17

## 2018-04-17 ENCOUNTER — OFFICE VISIT (OUTPATIENT)
Dept: CARDIOTHORACIC SURGERY | Facility: CLINIC | Age: 28
End: 2018-04-17
Payer: COMMERCIAL

## 2018-04-17 ENCOUNTER — OFFICE VISIT (OUTPATIENT)
Dept: TRANSPLANT | Facility: CLINIC | Age: 28
End: 2018-04-17
Payer: COMMERCIAL

## 2018-04-17 ENCOUNTER — INITIAL CONSULT (OUTPATIENT)
Dept: CARDIOLOGY | Facility: CLINIC | Age: 28
End: 2018-04-17
Payer: COMMERCIAL

## 2018-04-17 ENCOUNTER — LAB VISIT (OUTPATIENT)
Dept: LAB | Facility: HOSPITAL | Age: 28
End: 2018-04-17
Attending: INTERNAL MEDICINE
Payer: COMMERCIAL

## 2018-04-17 ENCOUNTER — SURGERY (OUTPATIENT)
Age: 28
End: 2018-04-17

## 2018-04-17 ENCOUNTER — TELEPHONE (OUTPATIENT)
Dept: TRANSPLANT | Facility: CLINIC | Age: 28
End: 2018-04-17

## 2018-04-17 VITALS
OXYGEN SATURATION: 99 % | DIASTOLIC BLOOD PRESSURE: 86 MMHG | SYSTOLIC BLOOD PRESSURE: 144 MMHG | HEIGHT: 67 IN | BODY MASS INDEX: 24.88 KG/M2 | HEART RATE: 97 BPM | WEIGHT: 158.5 LBS

## 2018-04-17 VITALS
WEIGHT: 157.88 LBS | OXYGEN SATURATION: 100 % | HEART RATE: 95 BPM | DIASTOLIC BLOOD PRESSURE: 85 MMHG | TEMPERATURE: 98 F | BODY MASS INDEX: 24.78 KG/M2 | HEIGHT: 67 IN | SYSTOLIC BLOOD PRESSURE: 126 MMHG

## 2018-04-17 VITALS
DIASTOLIC BLOOD PRESSURE: 90 MMHG | HEIGHT: 67 IN | WEIGHT: 158.5 LBS | HEART RATE: 100 BPM | BODY MASS INDEX: 24.88 KG/M2 | SYSTOLIC BLOOD PRESSURE: 146 MMHG

## 2018-04-17 DIAGNOSIS — Z95.811 HEART REPLACED BY HEART ASSIST DEVICE: ICD-10-CM

## 2018-04-17 DIAGNOSIS — I10 ESSENTIAL HYPERTENSION: Primary | ICD-10-CM

## 2018-04-17 DIAGNOSIS — Z94.1 HEART TRANSPLANTED: Primary | ICD-10-CM

## 2018-04-17 DIAGNOSIS — I10 ESSENTIAL HYPERTENSION: ICD-10-CM

## 2018-04-17 DIAGNOSIS — Z94.1 STATUS POST HEART TRANSPLANT: ICD-10-CM

## 2018-04-17 DIAGNOSIS — Z94.1 HEART TRANSPLANTED: ICD-10-CM

## 2018-04-17 DIAGNOSIS — Z94.1 STATUS POST HEART TRANSPLANT: Primary | ICD-10-CM

## 2018-04-17 DIAGNOSIS — D84.9 IMMUNOSUPPRESSION: ICD-10-CM

## 2018-04-17 DIAGNOSIS — T82.9XXD COMPLICATION INVOLVING LEFT VENTRICULAR ASSIST DEVICE (LVAD), SUBSEQUENT ENCOUNTER: Primary | ICD-10-CM

## 2018-04-17 LAB
ALBUMIN SERPL BCP-MCNC: 4.5 G/DL
ALP SERPL-CCNC: 64 U/L
ALT SERPL W/O P-5'-P-CCNC: 38 U/L
ANION GAP SERPL CALC-SCNC: 12 MMOL/L
ANISOCYTOSIS BLD QL SMEAR: SLIGHT
AST SERPL-CCNC: 22 U/L
BASOPHILS # BLD AUTO: ABNORMAL K/UL
BASOPHILS NFR BLD: 1 %
BILIRUB SERPL-MCNC: 0.5 MG/DL
BNP SERPL-MCNC: 201 PG/ML
BUN SERPL-MCNC: 18 MG/DL
CALCIUM SERPL-MCNC: 9.7 MG/DL
CHLORIDE SERPL-SCNC: 106 MMOL/L
CO2 SERPL-SCNC: 26 MMOL/L
CREAT SERPL-MCNC: 0.9 MG/DL
DIASTOLIC DYSFUNCTION: NO
DIFFERENTIAL METHOD: ABNORMAL
EOSINOPHIL # BLD AUTO: ABNORMAL K/UL
EOSINOPHIL NFR BLD: 1 %
ERYTHROCYTE [DISTWIDTH] IN BLOOD BY AUTOMATED COUNT: 14.1 %
EST. GFR  (AFRICAN AMERICAN): >60 ML/MIN/1.73 M^2
EST. GFR  (NON AFRICAN AMERICAN): >60 ML/MIN/1.73 M^2
ESTIMATED PA SYSTOLIC PRESSURE: 19.18
GLUCOSE SERPL-MCNC: 100 MG/DL
HCT VFR BLD AUTO: 43.4 %
HGB BLD-MCNC: 13.6 G/DL
IMM GRANULOCYTES # BLD AUTO: ABNORMAL K/UL
IMM GRANULOCYTES NFR BLD AUTO: ABNORMAL %
LYMPHOCYTES # BLD AUTO: ABNORMAL K/UL
LYMPHOCYTES NFR BLD: 6 %
MAGNESIUM SERPL-MCNC: 1.8 MG/DL
MCH RBC QN AUTO: 29.4 PG
MCHC RBC AUTO-ENTMCNC: 31.3 G/DL
MCV RBC AUTO: 94 FL
METAMYELOCYTES NFR BLD MANUAL: 1 %
MITRAL VALVE MOBILITY: NORMAL
MONOCYTES # BLD AUTO: ABNORMAL K/UL
MONOCYTES NFR BLD: 7 %
NEUTROPHILS NFR BLD: 83 %
NEUTS BAND NFR BLD MANUAL: 1 %
NRBC BLD-RTO: 0 /100 WBC
PLATELET # BLD AUTO: 218 K/UL
PLATELET BLD QL SMEAR: ABNORMAL
PMV BLD AUTO: 9.3 FL
POTASSIUM SERPL-SCNC: 4.4 MMOL/L
PROT SERPL-MCNC: 6.8 G/DL
RBC # BLD AUTO: 4.62 M/UL
RETIRED EF AND QEF - SEE NOTES: 55 (ref 55–65)
SODIUM SERPL-SCNC: 144 MMOL/L
TACROLIMUS BLD-MCNC: 15.6 NG/ML
TRICUSPID VALVE REGURGITATION: NORMAL
WBC # BLD AUTO: 9.57 K/UL

## 2018-04-17 PROCEDURE — 93505 ENDOMYOCARDIAL BIOPSY: CPT | Mod: 26,,, | Performed by: INTERNAL MEDICINE

## 2018-04-17 PROCEDURE — 99244 OFF/OP CNSLTJ NEW/EST MOD 40: CPT | Mod: S$GLB,,, | Performed by: INTERNAL MEDICINE

## 2018-04-17 PROCEDURE — 83880 ASSAY OF NATRIURETIC PEPTIDE: CPT

## 2018-04-17 PROCEDURE — 99213 OFFICE O/P EST LOW 20 MIN: CPT | Mod: 25,S$GLB,, | Performed by: INTERNAL MEDICINE

## 2018-04-17 PROCEDURE — 99999 PR PBB SHADOW E&M-EST. PATIENT-LVL IV: CPT | Mod: PBBFAC,,, | Performed by: INTERNAL MEDICINE

## 2018-04-17 PROCEDURE — 80053 COMPREHEN METABOLIC PANEL: CPT

## 2018-04-17 PROCEDURE — 85027 COMPLETE CBC AUTOMATED: CPT

## 2018-04-17 PROCEDURE — 93306 TTE W/DOPPLER COMPLETE: CPT

## 2018-04-17 PROCEDURE — 88307 TISSUE EXAM BY PATHOLOGIST: CPT | Mod: 26,,, | Performed by: PATHOLOGY

## 2018-04-17 PROCEDURE — 88342 IMHCHEM/IMCYTCHM 1ST ANTB: CPT | Performed by: PATHOLOGY

## 2018-04-17 PROCEDURE — 99999 PR PBB SHADOW E&M-EST. PATIENT-LVL III: CPT | Mod: PBBFAC,,, | Performed by: INTERNAL MEDICINE

## 2018-04-17 PROCEDURE — 85007 BL SMEAR W/DIFF WBC COUNT: CPT

## 2018-04-17 PROCEDURE — 99024 POSTOP FOLLOW-UP VISIT: CPT | Mod: S$GLB,,, | Performed by: THORACIC SURGERY (CARDIOTHORACIC VASCULAR SURGERY)

## 2018-04-17 PROCEDURE — 36415 COLL VENOUS BLD VENIPUNCTURE: CPT

## 2018-04-17 PROCEDURE — 93505 ENDOMYOCARDIAL BIOPSY: CPT

## 2018-04-17 PROCEDURE — 83735 ASSAY OF MAGNESIUM: CPT

## 2018-04-17 PROCEDURE — 25000003 PHARM REV CODE 250

## 2018-04-17 PROCEDURE — 99999 PR PBB SHADOW E&M-EST. PATIENT-LVL III: CPT | Mod: PBBFAC,,, | Performed by: THORACIC SURGERY (CARDIOTHORACIC VASCULAR SURGERY)

## 2018-04-17 PROCEDURE — 80197 ASSAY OF TACROLIMUS: CPT

## 2018-04-17 PROCEDURE — 88342 IMHCHEM/IMCYTCHM 1ST ANTB: CPT | Mod: 26,,, | Performed by: PATHOLOGY

## 2018-04-17 PROCEDURE — 93306 TTE W/DOPPLER COMPLETE: CPT | Mod: 26,,, | Performed by: INTERNAL MEDICINE

## 2018-04-17 PROCEDURE — 88307 TISSUE EXAM BY PATHOLOGIST: CPT | Performed by: PATHOLOGY

## 2018-04-17 RX ORDER — SODIUM CHLORIDE 9 MG/ML
3 INJECTION, SOLUTION INTRAVENOUS CONTINUOUS
Status: CANCELLED | OUTPATIENT
Start: 2018-04-17 | End: 2018-04-17

## 2018-04-17 RX ORDER — TACROLIMUS 1 MG/1
CAPSULE ORAL
Qty: 150 CAPSULE | Refills: 11 | Status: SHIPPED | OUTPATIENT
Start: 2018-04-17 | End: 2018-04-23 | Stop reason: SDUPTHER

## 2018-04-17 RX ORDER — DIPHENHYDRAMINE HCL 25 MG
50 CAPSULE ORAL ONCE
Status: CANCELLED | OUTPATIENT
Start: 2018-04-17 | End: 2018-04-17

## 2018-04-17 NOTE — PROGRESS NOTES
OUTPATIENT CATHETERIZATION INSTRUCTIONS    You have been scheduled for a procedure in the catheterization lab on Friday, May 11,  2018.     Please report to the Cardiology Waiting Area on the Third floor of the hospital and check in at 7:30 AM.   You will then be taken to the SSCU (Short Stay Cardiac Unit) and prepared for your procedure. Please be aware that this is not the time of your procedure but the time you are to arrive. The procedures are scheduled on an hourly basis; however, emergency cases take precedence over all other cases.       You may not have anything to eat or drink after midnight the night before your test. You may take your regular morning medications with water. If there are any medications that you should not take you will be instructed to hold them that morning. If you are diabetic and on Metformin (Glucophage) do not take it the day before, the day of, and for 2 days after your procedure.      The procedure will take 1-2 hours to perform. After the procedure, you will return to SSCU on the third floor of the hospital. You will need to lie still (or keep your arm still) for the next 4 to 6 hours to minimize bleeding from the puncture site. Your family may remain in the room with you during this time.       You may be able to be discharged home that same afternoon if there is someone to drive you home and there were no complications. If you have one of the balloon, stent, or device procedures you may spend the night in the hospital. Your doctor will determine, based on your progress, the date and time of your discharge. The results of your procedure will be discussed with you before you are discharged. Any further testing or procedures will be scheduled for you either before you leave or you will be called with these appointments.       If you should have any questions, concerns, or need to change the date of your procedure, please call  SUE Jade @ [phone]    Special  Instructions:  Drink plenty of water the day before and after the procedure        THE ABOVE INSTRUCTIONS WERE GIVEN TO THE PATIENT VERBALLY AND THEY VERBALIZED UNDERSTANDING.  THEY DO NOT REQUIRE ANY SPECIAL NEEDS AND DO NOT HAVE ANY LEARNING BARRIERS.          Directions for Reporting to Cardiology Waiting Area in the Hospital  If you park in the Parking Garage:  Take elevators to the1st floor of the parking garage.  Continue past the gift shop, coffee shop, and piano.  Take a right and go to the gold elevators. (Elevator B)  Take the elevator to the 3rd floor.  Follow the arrow on the sign on the wall that says Cath Lab Registration/EP Lab Registration.  Follow the long hallway all the way around until you come to a big open area.  This is the registration area.  Check in at Reception Desk.    OR    If family is dropping you off:  Have them drop you off at the front of the Hospital under the green overhang.  Enter through the doors and take a right.  Take the E elevators to the 3rd floor Cardiology Waiting Area.  Check in at the Reception Desk in the waiting room.

## 2018-04-17 NOTE — LETTER
April 17, 2018      Roque Matos MD  [address]  [facility] [address]           [facility]  [address]  [facility] [address]  Phone: [phone]          Patient: Mert Samayoa   MR Number: [id number]   YOB: 1990   Date of Visit: 4/17/2018       Dear Dr. Roque Matos:    Thank you for referring Mert Samayoa to me for evaluation. Attached you will find relevant portions of my assessment and plan of care.    If you have questions, please do not hesitate to call me. I look forward to following Mert Samayoa along with you.    Sincerely,    Guillermo Andrew MD    Enclosure  CC:  No Recipients    If you would like to receive this communication electronically, please contact [email or web address] or [phone] to request more information on [employer] access.    For providers and/or their staff who would like to refer a patient to [facility], please contact us through our one-stop-shop provider referral line, [facility], at [phone].    If you feel you have received this communication in error or would no longer like to receive these types of communications, please e-mail [email or web address]

## 2018-04-17 NOTE — INTERVAL H&P NOTE
Patient seen and examined. No interval change since last H&P. Here for an echo guided RV biopsy.  Access via the right IJ  7 Fr venous sheath  Risks and benefits of the procedure were explained to the patient. All questions were answered.  Consents were signed and in the chart.    Sammi Tapia MD

## 2018-04-17 NOTE — PROGRESS NOTES
"Subjective:   Mr. Samayoa is a 28 y.o. year old White male who received a  - brain death heart transplant on 18.      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 S/P washout  who had his Removal of retained driveline through counterincision in early 2018.   Since discharge, he has done well.  Stays busy cooking / cleaning.    Immunosuppression: tac 3 BID, MMF 1500 BID, pred 10 qd   Immuno goal levels:  tacro 10-15     Review of Systems   Constitution: Negative for decreased appetite, weight gain and weight loss.   Cardiovascular: Negative for chest pain, dyspnea on exertion, leg swelling, near-syncope, orthopnea and palpitations.   Respiratory: Negative for cough and shortness of breath.    Musculoskeletal: Negative for myalgias.   Gastrointestinal: Negative for jaundice.       Objective:   Blood pressure (!) 146/90, pulse 100, height 5' 7" (1.702 m), weight 71.9 kg (158 lb 8.2 oz).body mass index is 24.83 kg/m².    Physical Exam   Constitutional: He is oriented to person, place, and time. He appears well-developed and well-nourished. He is active. He is not intubated.   BP (!) 146/90 (BP Location: Right arm, Patient Position: Sitting, BP Method: Large (Automatic))   Pulse 100   Ht 5' 7" (1.702 m)   Wt 71.9 kg (158 lb 8.2 oz)   BMI 24.83 kg/m²      HENT:   Head: Normocephalic and atraumatic. Hair is normal.   Right Ear: External ear normal.   Left Ear: External ear normal.   Nose: Nose normal. No nasal deformity. No epistaxis.  No foreign bodies.   Mouth/Throat: Mucous membranes are normal. Mucous membranes are not cyanotic. No oropharyngeal exudate.   Eyes: Conjunctivae and EOM are normal. Pupils are equal, round, and reactive to light.   Neck: Neck supple. No hepatojugular reflux and no JVD present.   Cardiovascular: Normal rate, regular rhythm, normal heart sounds and normal pulses.  Exam " reveals no gallop.    Pulmonary/Chest: Effort normal and breath sounds normal. No apnea and no tachypnea. He is not intubated. No respiratory distress. He exhibits no tenderness.   Abdominal: Soft. Normal appearance and bowel sounds are normal. There is no tenderness. No hernia.   Musculoskeletal: Normal range of motion.   Neurological: He is alert and oriented to person, place, and time. He displays no seizure activity.   Skin: Skin is warm, dry and intact. No rash noted. No pallor.   Psychiatric: He has a normal mood and affect. His speech is normal and behavior is normal. Thought content normal. Cognition and memory are normal.       Lab Results   Component Value Date    WBC 9.57 04/17/2018    HGB 13.6 (L) 04/17/2018    HCT 43.4 04/17/2018    MCV 94 04/17/2018     04/17/2018    CO2 26 04/17/2018    CREATININE 0.9 04/17/2018    CALCIUM 9.7 04/17/2018    ALBUMIN 4.5 04/17/2018    AST 22 04/17/2018     (H) 04/17/2018    ALT 38 04/17/2018       Lab Results   Component Value Date    INR 1.1 04/03/2018    INR 1.1 03/03/2018    INR 1.1 03/02/2018       BNP   Date Value Ref Range Status   04/17/2018 201 (H) 0 - 99 pg/mL Final     Comment:     Values of less than 100 pg/ml are consistent with non-CHF populations.   04/03/2018 328 (H) 0 - 99 pg/mL Final     Comment:     Values of less than 100 pg/ml are consistent with non-CHF populations.   03/20/2018 694 (H) 0 - 99 pg/mL Final     Comment:     Values of less than 100 pg/ml are consistent with non-CHF populations.       LD   Date Value Ref Range Status   01/25/2018 218 110 - 260 U/L Final     Comment:     Results are increased in hemolyzed samples.   12/28/2017 215 110 - 260 U/L Final     Comment:     Results are increased in hemolyzed samples.   11/29/2017 207 110 - 260 U/L Final     Comment:     Results are increased in hemolyzed samples.       Tacrolimus Lvl   Date Value Ref Range Status   04/17/2018 15.6 (H) 5.0 - 15.0 ng/mL Final     Comment:      Testing performed by Liquid Chromatography-Tandem  Mass Spectrometry (LC-MS/MS).  This test was developed and its performance characteristics  determined by [facility], Department of Pathology  and Laboratory Medicine in a manner consistent with CLIA  requirements. It has not been cleared or approved by the [place]  Food and Drug Administration.  This test is used for clinical   purposes.  It should not be regarded as investigational or for  research.           Assessment:     1. Essential hypertension    2. Heart transplanted        Plan:   Heart transplanted  Ok to drive but not to go boating  Would like to see him to back to work around six month visit to maintain increase     Essential hypertension  Should enroll in cardiac rehab as it will help with BP       Return instructions as set forth by post transplant schedule or as needed:    Clinic: Return for labs and/or biopsy weekly the first month, every two weeks during month 2 and then monthly for the first year at the provider or coordinator's discretion. During the second year, return to clinic every 3 months. Post transplant year 3-5 return every 6 months. There will be a comprehensive post transplant evaluation every year that may include LHC/RHC/biopsy, stress test, echo, CXR, and other health screening exams.    In addition to the clinical assessment, I have ordered Allomap testing for this patient to assist in identification of moderate/severe acute cellular rejection (ACR) in a pt with stable Allograft function instead of endomyocardial biopsy.     Patient is reminded to call with any health changes as these can be early signs of transplant complications. Patient is advised to make sure any new medications or changes of old medications are discussed with a pharmacist or physician knowledgeable with transplant to avoid rejection/drug toxicity related to significant drug interactions.    UNOS Patient Status  Functional Status: 80% - Normal activity  with effort: some symptoms of disease  Physical Capacity: No Limitations  Working for Income: No  If no, reason not working: Demands of Treatment  Would like to go back to work as noted above     Mario Conner MD

## 2018-04-17 NOTE — PROGRESS NOTES
Subjective:    Patient ID:  Mert Samayoa is a 28 y.o. male who presents for evaluation of post transplant LHC with IVUS.      HPI   28 y.o. male with a past medical history of familial NICM now s/p OHTx 2/18/18 and donor OHT PFO closure with post op course complicated by retained LVAD drive line (being managed by Dr. Klein) and leukocytosis here for 1st post OHT surveillance angiogram.  He got his recent RHC/biopsy on 4/17/18.    Patient denies any anginal or CHF symptoms. Functional status > 4 METS.     FHx: 2 Brothers with CMP s/p OHTs. Elder brother had OHT x 2 due to CAV    Review of Systems   Constitution: Negative for chills, decreased appetite, fever, weakness, night sweats, weight gain and weight loss.   HENT: Negative for congestion, hoarse voice, stridor and tinnitus.    Eyes: Negative for blurred vision, pain and visual disturbance.   Cardiovascular: Negative for chest pain, claudication, dyspnea on exertion, irregular heartbeat, leg swelling, near-syncope, orthopnea, palpitations, paroxysmal nocturnal dyspnea and syncope.   Respiratory: Negative for cough, hemoptysis, shortness of breath, snoring and wheezing.    Endocrine: Negative for cold intolerance, heat intolerance and polyuria.   Hematologic/Lymphatic: Negative for bleeding problem. Does not bruise/bleed easily.   Skin: Negative for color change and rash.   Musculoskeletal: Negative for arthritis, back pain, joint pain, muscle cramps, myalgias and stiffness.   Gastrointestinal: Negative for abdominal pain, anorexia, constipation, diarrhea, dysphagia, heartburn, hemorrhoids, melena, nausea and vomiting.   Genitourinary: Negative for frequency, hematuria, hesitancy, nocturia and urgency.   Neurological: Negative for difficulty with concentration, dizziness, focal weakness, headaches, light-headedness, numbness, seizures, tremors and vertigo.   Psychiatric/Behavioral: Negative for altered mental status and depression. The patient does not  "have insomnia.    Allergic/Immunologic: Negative for hives.       BP (!) 144/86 (BP Location: Right arm, Patient Position: Sitting, BP Method: Large (Automatic))   Pulse 97   Ht 5' 7" (1.702 m)   Wt 71.9 kg (158 lb 8.2 oz)   SpO2 99%   BMI 24.83 kg/m²     Objective:    Physical Exam   Constitutional: He is oriented to person, place, and time. He appears well-developed and well-nourished.   HENT:   Head: Normocephalic and atraumatic.   Eyes: Conjunctivae are normal. Pupils are equal, round, and reactive to light.   Neck: Normal range of motion. No JVD present. No tracheal deviation present. No thyromegaly present.   Cardiovascular: Regular rhythm, S1 normal, S2 normal, normal heart sounds, intact distal pulses and normal pulses.   No extrasystoles are present. Tachycardia present.  Exam reveals no S3.    No murmur heard.  Pulses:       Carotid pulses are 2+ on the right side, and 2+ on the left side.       Radial pulses are 2+ on the right side, and 2+ on the left side.        Femoral pulses are 2+ on the right side, and 2+ on the left side.       Dorsalis pedis pulses are 2+ on the right side, and 2+ on the left side.        Posterior tibial pulses are 2+ on the right side, and 2+ on the left side.   Allens WNL both radial   Pulmonary/Chest: Effort normal and breath sounds normal. No stridor. No respiratory distress. He has no wheezes. He has no rales.   Abdominal: Soft. Bowel sounds are normal. He exhibits no distension. There is no tenderness.   Musculoskeletal: Normal range of motion. He exhibits no edema or tenderness.   Neurological: He is alert and oriented to person, place, and time.   Skin: Skin is warm and dry. He is not diaphoretic. No erythema.   Psychiatric: He has a normal mood and affect.         Assessment/Plan:     Heart transplanted  Will proceed with 1st post OHT surveillance coronary angiogram with IVUS.  - Plan for LHC (Diagnostic) with IVUS ([facility]). Patient already on lipitor 20 " mg po daily.   -  R radial 5 Fr approach  - Keep NPO   - Pre-cath IVF ordered  -The risks, benefits and alternatives of the procedure were explained to the patient.   -The risks of coronary angiography include but are not limited to: bleeding, infection, heart rhythm abnormalities, allergic reactions, kidney injury, stroke and death.   -The risks of moderate sedation include hypotension, respiratory depression, arrhythmias, bronchospasm, and death.   - Informed consent was obtained and the patient is agreeable to proceed with the procedure.      Immunosuppression  Patient on triple therapy prograf/cellcept and prednisone. Continue management per HTS team    Essential hypertension  Continue with norvasc 5 mg po daily per home dose.       Pawel Morocho MD  PGY-7  Interventional Cardiology Fellow    I have personally seen, taken the history and examined the patient.  I agree with the housestaff whose note reflects my findings and plan as above.

## 2018-04-17 NOTE — TELEPHONE ENCOUNTER
Notified pt of tacro level 15.6. Consulted with Dr. Conner who decreased tac to 2/3. Will repeat level on [place] at [facility]

## 2018-04-17 NOTE — ASSESSMENT & PLAN NOTE
Ok to drive but not to go boating  Would like to see him to back to work around six month visit to maintain increase

## 2018-04-17 NOTE — BRIEF OP NOTE
[facility]  Brief Operative Note  Cardiology    SUMMARY     Surgery Date: 4/17/2018     Surgeon(s) and Role:     * Sammi Tapia MD - Primary    Assisting Surgeon: None    Pre-op Diagnosis:  S/p OHTx    Post-op Diagnosis: s/p OHTx    Procedure Performed: Echo guided RV biopsy    Description of the findings of the procedure: Echo guided RV biopsy performed via the right IJ. Patient tolerated the procedure well. 5 myocardial pieces obtained. 4 sent for H&E and 1 sent for Immuno.           Specimens:   Specimen (12h ago through future)    Start     Ordered    04/17/18 0934  Specimen to Pathology - Surgery  Once     Comments:  S/p OHTX to rule out rejection5 pieces from RV4 pieces for H&E1 piece for immunoflourescence      04/17/18 0944        Sammi Tapia MD

## 2018-04-17 NOTE — PROGRESS NOTES
Patient seen and examined. Patient is progressively increasing activity. No significant complaints.     Abdomen: stable, incision not yet approximated. Pink granulation tissue seen. Wet to dry dsg in place.   Chest xray: Acceptable post op chest  EKG: NSR     Assessment:  Removal of retained driveline through counterincision     Plan:  -We will refer to cardiology to assume care   -continue wet to dry dressing changes until edges approximate.    -No scheduled appointment, RTC prn    CTS Attending Note:    I have personally taken the history and examined this patient and agree with the NP's note as stated above. Healing well.

## 2018-04-17 NOTE — ASSESSMENT & PLAN NOTE
Will proceed with 1st post OHT surveillance coronary angiogram with IVUS.  - Plan for LHC (Diagnostic) with IVUS ([facility]). Patient already on lipitor 20 mg po daily.   -  R radial 5 Fr approach  - Keep NPO   - Pre-cath IVF ordered  -The risks, benefits and alternatives of the procedure were explained to the patient.   -The risks of coronary angiography include but are not limited to: bleeding, infection, heart rhythm abnormalities, allergic reactions, kidney injury, stroke and death.   -The risks of moderate sedation include hypotension, respiratory depression, arrhythmias, bronchospasm, and death.   - Informed consent was obtained and the patient is agreeable to proceed with the procedure.

## 2018-04-17 NOTE — DISCHARGE INSTRUCTIONS

## 2018-04-17 NOTE — LETTER
April 17, 2018        Guillermo Alejo  [address]  [facility] [address]  Phone: [phone]  Fax: [phone]             [facility]  [address]  [facility] [address]  Phone: [phone]   Patient: Mert Samayoa   MR Number: [id number]   YOB: 1990   Date of Visit: 4/17/2018       Dear Dr. Guillermo Alejo    Thank you for referring Mert Samayoa to me for evaluation. Attached you will find relevant portions of my assessment and plan of care.    If you have questions, please do not hesitate to call me. I look forward to following Mert Samayoa along with you.    Sincerely,    Mario Conner MD    Enclosure    If you would like to receive this communication electronically, please contact [email or web address] or [phone] to request [employer] Link access.    [employer] Link is a tool which provides read-only access to select patient information with whom you have a relationship. Its easy to use and provides real time access to review your patients record including encounter summaries, notes, results, and demographic information.    If you feel you have received this communication in error or would no longer like to receive these types of communications, please e-mail [email or web address]

## 2018-04-17 NOTE — DISCHARGE SUMMARY
[facility]  Discharge Note  Short Stay    Admit Date: 4/17/2018    Discharge Date and Time: 4/17/2018    Attending Physician: Sammi Tapia MD     Discharge Provider: Sammi Tapia MD    Diagnoses: s/p OHTx    Discharged Condition: good    Hospital Course: Patient was admitted for an outpatient procedure and tolerated the procedure well with no complications.    Final Diagnoses: Same as principal problem.    Disposition: Home or Self Care    Follow up/Patient Instructions:    Medications:  Reconciled Home Medications:      Medication List      ASK your doctor about these medications    amLODIPine 5 MG tablet  Commonly known as:  NORVASC  Take 1 tablet (5 mg total) by mouth once daily.     aspirin 81 MG EC tablet  Commonly known as:  ECOTRIN  Take 1 tablet (81 mg total) by mouth once daily.     atorvastatin 20 MG tablet  Commonly known as:  LIPITOR  Take 1 tablet (20 mg total) by mouth once daily.     calcium carbonate-vitamin D3 600 mg(1,500mg) -800 unit Tab  Take 1 tablet by mouth once daily.     ergocalciferol 50,000 unit Cap  Commonly known as:  ERGOCALCIFEROL  Take 1 capsule (50,000 Units total) by mouth every 7 days.     famotidine 40 MG tablet  Commonly known as:  PEPCID  Take 1 tablet (40 mg total) by mouth every evening.     ferrous sulfate 325 (65 FE) MG EC tablet  Take 1 tablet (325 mg total) by mouth 2 (two) times daily.     magnesium oxide 400 mg tablet  Commonly known as:  MAG-OX  Take 1 tablet (400 mg total) by mouth 2 (two) times daily.     mycophenolate 250 mg Cap  Commonly known as:  CELLCEPT  Take 6 capsules (1,500 mg total) by mouth 2 (two) times daily.     nystatin 100,000 unit/mL suspension  Commonly known as:  MYCOSTATIN  Take 5 mLs (500,000 Units total) by mouth 4 (four) times daily with meals and nightly.     oxyCODONE 5 MG immediate release tablet  Commonly known as:  ROXICODONE  Take 1 tablet (5 mg total) by mouth every 24 hours as needed.     oxyCODONE-acetaminophen 5-325 mg  per tablet  Commonly known as:  PERCOCET  Take 1-2 tablets by mouth every 6 (six) hours as needed for Pain.     predniSONE 10 MG tablet  Commonly known as:  DELTASONE  Take one tablet (10 mg) by mouth daily     senna-docusate 8.6-50 mg 8.6-50 mg per tablet  Commonly known as:  PERICOLACE  Take 2 tablets by mouth daily as needed for Constipation.     sulfamethoxazole-trimethoprim 400-80mg 400-80 mg per tablet  Commonly known as:  BACTRIM,SEPTRA  Take 1 tablet by mouth once daily.     tacrolimus 1 MG Cap  Commonly known as:  PROGRAF  Take 3 capsules (3 mg total) by mouth every 12 (twelve) hours.     valGANciclovir 450 mg Tab  Commonly known as:  VALCYTE  Take 2 tablets (900 mg total) by mouth once daily.              Discharge Procedure Orders (must include Diet, Follow-up, Activity):    Heart healthy diet    Follow-up with Tx clinic    Activity as tolerated.    Sammi Tapia MD

## 2018-04-19 LAB — ALLOMAP TEST SCORE: NORMAL

## 2018-04-20 DIAGNOSIS — Z94.1 STATUS POST HEART TRANSPLANT: ICD-10-CM

## 2018-04-20 RX ORDER — PREDNISONE 5 MG/1
TABLET ORAL
Qty: 45 TABLET | Refills: 3 | Status: SHIPPED | OUTPATIENT
Start: 2018-04-20 | End: 2018-05-21 | Stop reason: SDUPTHER

## 2018-04-23 ENCOUNTER — LAB VISIT (OUTPATIENT)
Dept: LAB | Facility: HOSPITAL | Age: 28
End: 2018-04-23
Attending: INTERNAL MEDICINE
Payer: COMMERCIAL

## 2018-04-23 DIAGNOSIS — Z94.1 STATUS POST HEART TRANSPLANT: ICD-10-CM

## 2018-04-23 LAB
ALBUMIN SERPL BCP-MCNC: 4.4 G/DL
ALP SERPL-CCNC: 53 U/L
ALT SERPL W/O P-5'-P-CCNC: 69 U/L
ANION GAP SERPL CALC-SCNC: 11 MMOL/L
AST SERPL-CCNC: 38 U/L
BILIRUB SERPL-MCNC: 0.6 MG/DL
BUN SERPL-MCNC: 12 MG/DL
CALCIUM SERPL-MCNC: 9.6 MG/DL
CHLORIDE SERPL-SCNC: 107 MMOL/L
CO2 SERPL-SCNC: 26 MMOL/L
CREAT SERPL-MCNC: 0.9 MG/DL
EST. GFR  (AFRICAN AMERICAN): >60 ML/MIN/1.73 M^2
EST. GFR  (NON AFRICAN AMERICAN): >60 ML/MIN/1.73 M^2
GLUCOSE SERPL-MCNC: 82 MG/DL
POTASSIUM SERPL-SCNC: 4.6 MMOL/L
PROT SERPL-MCNC: 6.3 G/DL
SODIUM SERPL-SCNC: 144 MMOL/L
TACROLIMUS BLD-MCNC: 18.3 NG/ML

## 2018-04-23 PROCEDURE — 80053 COMPREHEN METABOLIC PANEL: CPT

## 2018-04-23 PROCEDURE — 80197 ASSAY OF TACROLIMUS: CPT

## 2018-04-23 PROCEDURE — 36415 COLL VENOUS BLD VENIPUNCTURE: CPT

## 2018-04-23 RX ORDER — TACROLIMUS 1 MG/1
CAPSULE ORAL
Qty: 120 CAPSULE | Refills: 11 | Status: SHIPPED | OUTPATIENT
Start: 2018-04-23 | End: 2018-07-27 | Stop reason: SDUPTHER

## 2018-04-23 NOTE — TELEPHONE ENCOUNTER
Called to confirm pt's tacro dose with his tacro level resulting at 18.4 today. Pt stated he is taking 2 mg at 8 am and 3 mg at 8 pm. Pt confirmed he didn't take medication prior to having labs drawn this morning. Reviewed with Dr. Hawkins, ordered to decrease tacro dose to 2/2 and repeat level Wednesday or Thursday. Pt will go Thursday for recheck of labs.

## 2018-04-24 NOTE — DISCHARGE SUMMARY
[facility]  Cardiothoracic Surgery  Discharge Summary      Patient Name: Mert Samayoa  MRN: [id number]  Admission Date: 4/4/2018  Hospital Length of Stay: 1 days  Discharge Date and Time: 4/4/2018 12:00 PM  Attending Physician: Dr. Klein  Discharging Provider: Rama Waite MD  Primary Care Provider: Primary Doctor No     HPI: Patient seen and examined for DLES wound check . Patient is progressively increasing activity. No significant complaints.     Sternum: stable, mid sternal incision CDI  DLES incision- sutures intact. Yellow-brown drainage,          erythemas edges, unapproximated on lateral side.  Chest xray: Acceptable post op chest     Assessment:  1.  Orthotopic heart transplant using bicaval technique, ischemic time 3 hours   and 51 minutes.  2.  Removal of implantable intracorporeal left ventricular assist device.  3.  Removal of AICD and lead.  4.  Closure of patent foramen ovale in donor heart.  5.  Backbench preparation of donor heart for implant.     Plan:  --Sutures removed  --Appointment scheduled with wound care NP today for        1:00pm  --We will refer to cardiology to assume care- Dr. Daniels      No scheduled appointment, RTC prn     CTS Attending Note:     I have personally taken the history and examined this patient and agree with the NP's note as stated above.  28-year-old male status post orthotopic heart transplant.  Here for wound check.  His sternotomy appears well healed.  He has persistent drainage from his driveline exit site.  There are some nylon sutures present there.  We will remove the sutures today.  I examined his anterior abdominal wall.  There is a region of firmness along the path of the driveline.  There was no overlying erythema.  I re-reviewed abdominal films that were done in the hospital after his transplant and could not identify any retained driveline material.  However, retained velour may not show up on a plain film.  We will obtain  a noncontrast CT of the abdomen to see if there is any foreign body still present in the abdominal wall, or if the region of firmness represents healing and scar along the former driveline tract.     Addendum:     CT reviewed and demonstrates what appears to be retained velour from driveline that avulsed from driveline when it was removed. Removal will necessitate at lease one separate skin incision on the anterior abdominal wall with exploration of the driveline tract.    Procedure(s) (LRB):  REMOVAL-Abdominal tubular structure (N/A)     Hospital Course: Pt admitted for outpatient procedure, tolerated procedure well, no complications.  Pt discharged home in stable condition.    Consults: none    Significant Diagnostic Studies: Labs:   CMP   Recent Labs  Lab 04/23/18  0825      K 4.6      CO2 26   GLU 82   BUN 12   CREATININE 0.9   CALCIUM 9.6   PROT 6.3   ALBUMIN 4.4   BILITOT 0.6   ALKPHOS 53*   AST 38   ALT 69*   ANIONGAP 11   ESTGFRAFRICA >60   EGFRNONAA >60    and CBC No results for input(s): WBC, HGB, HCT, PLT in the last 48 hours.    Pending Diagnostic Studies:     None        Final Active Diagnoses:    Diagnosis Date Noted POA    Left ventricular assist device (LVAD) complication, sequela [T82.9XXS] 04/04/2018 Not Applicable    Left ventricular assist device complication [T82.9XXA] 04/04/2018 Yes    Open wound of abdomen [S31.109A] 04/04/2018 Yes    Essential hypertension [I10] 03/20/2018 Yes    Immunosuppression [D89.9] 02/20/2018 Yes    Adverse effect of glucocorticoids and synthetic analogues, initial encounter [T38.0X5A] 02/19/2018 Yes    Heart transplanted [Z94.1] 02/18/2018 Not Applicable      Problems Resolved During this Admission:    Diagnosis Date Noted Date Resolved POA      Discharged Condition: good    Disposition: Home or Self Care    Follow Up:  Follow-up Information     Raj Klein MD. Call in 1 day.    Specialties:  Cardiothoracic Surgery, Transplant  Why:  To  schedule your follow up appointment  Contact information:  [address]  [facility] [address]  [phone]                 Patient Instructions:     Diet general     Diet Cardiac     Activity as tolerated     Call MD for:  temperature >100.4     Call MD for:  persistent nausea and vomiting     Call MD for:  severe uncontrolled pain     Call MD for:  difficulty breathing, headache or visual disturbances     Call MD for:  redness, tenderness, or signs of infection (pain, swelling, redness, odor or green/yellow discharge around incision site)     Call MD for:  hives     Call MD for:  persistent dizziness or light-headedness     Call MD for:  extreme fatigue     Shower on day dressing removed (No bath)     Lifting restrictions   Order Comments: As per heart surgery     Remove dressing in 24 hours     Change dressing (specify)   Order Comments: Dressing change: 2 times per day using dry gauze pack to open portion of drive line exit site (call clinic if you need visiting nurse or additional supplies ordered).       Medications:  Reconciled Home Medications:      Medication List      START taking these medications    oxyCODONE-acetaminophen 5-325 mg per tablet  Commonly known as:  PERCOCET  Take 1-2 tablets by mouth every 6 (six) hours as needed for Pain.        ASK your doctor about these medications    amLODIPine 5 MG tablet  Commonly known as:  NORVASC  Take 1 tablet (5 mg total) by mouth once daily.     aspirin 81 MG EC tablet  Commonly known as:  ECOTRIN  Take 1 tablet (81 mg total) by mouth once daily.     atorvastatin 20 MG tablet  Commonly known as:  LIPITOR  Take 1 tablet (20 mg total) by mouth once daily.     calcium carbonate-vitamin D3 600 mg(1,500mg) -800 unit Tab  Take 1 tablet by mouth once daily.     ergocalciferol 50,000 unit Cap  Commonly known as:  ERGOCALCIFEROL  Take 1 capsule (50,000 Units total) by mouth every 7 days.     famotidine 40 MG tablet  Commonly known as:  PEPCID  Take 1 tablet (40  mg total) by mouth every evening.     ferrous sulfate 325 (65 FE) MG EC tablet  Take 1 tablet (325 mg total) by mouth 2 (two) times daily.     magnesium oxide 400 mg tablet  Commonly known as:  MAG-OX  Take 1 tablet (400 mg total) by mouth 2 (two) times daily.     mycophenolate 250 mg Cap  Commonly known as:  CELLCEPT  Take 6 capsules (1,500 mg total) by mouth 2 (two) times daily.     nystatin 100,000 unit/mL suspension  Commonly known as:  MYCOSTATIN  Take 5 mLs (500,000 Units total) by mouth 4 (four) times daily with meals and nightly.     oxyCODONE 5 MG immediate release tablet  Commonly known as:  ROXICODONE  Take 1 tablet (5 mg total) by mouth every 24 hours as needed.     senna-docusate 8.6-50 mg 8.6-50 mg per tablet  Commonly known as:  PERICOLACE  Take 2 tablets by mouth daily as needed for Constipation.     sulfamethoxazole-trimethoprim 400-80mg 400-80 mg per tablet  Commonly known as:  BACTRIM,SEPTRA  Take 1 tablet by mouth once daily.     valGANciclovir 450 mg Tab  Commonly known as:  VALCYTE  Take 2 tablets (900 mg total) by mouth once daily.            Rama Waite MD  General Surgery  [facility]

## 2018-04-26 ENCOUNTER — LAB VISIT (OUTPATIENT)
Dept: LAB | Facility: HOSPITAL | Age: 28
End: 2018-04-26
Attending: INTERNAL MEDICINE
Payer: COMMERCIAL

## 2018-04-26 ENCOUNTER — TELEPHONE (OUTPATIENT)
Dept: TRANSPLANT | Facility: CLINIC | Age: 28
End: 2018-04-26

## 2018-04-26 DIAGNOSIS — Z94.1 STATUS POST HEART TRANSPLANT: ICD-10-CM

## 2018-04-26 LAB
ALBUMIN SERPL BCP-MCNC: 4.5 G/DL
ALP SERPL-CCNC: 51 U/L
ALT SERPL W/O P-5'-P-CCNC: 79 U/L
ANION GAP SERPL CALC-SCNC: 10 MMOL/L
AST SERPL-CCNC: 35 U/L
BILIRUB SERPL-MCNC: 1 MG/DL
BUN SERPL-MCNC: 20 MG/DL
CALCIUM SERPL-MCNC: 9.9 MG/DL
CHLORIDE SERPL-SCNC: 105 MMOL/L
CO2 SERPL-SCNC: 27 MMOL/L
CREAT SERPL-MCNC: 1.1 MG/DL
EST. GFR  (AFRICAN AMERICAN): >60 ML/MIN/1.73 M^2
EST. GFR  (NON AFRICAN AMERICAN): >60 ML/MIN/1.73 M^2
GLUCOSE SERPL-MCNC: 83 MG/DL
POTASSIUM SERPL-SCNC: 4.6 MMOL/L
PROT SERPL-MCNC: 6.6 G/DL
SODIUM SERPL-SCNC: 142 MMOL/L
TACROLIMUS BLD-MCNC: 13.4 NG/ML

## 2018-04-26 PROCEDURE — 80053 COMPREHEN METABOLIC PANEL: CPT

## 2018-04-26 PROCEDURE — 36415 COLL VENOUS BLD VENIPUNCTURE: CPT

## 2018-04-26 PROCEDURE — 80197 ASSAY OF TACROLIMUS: CPT

## 2018-04-26 NOTE — TELEPHONE ENCOUNTER
Called to inform pt his tacro level is 13.2. Continue taking tacro 2/2. Will see him in clinic 5/11. Pt stated his understanding. No new questions at this time.

## 2018-05-08 ENCOUNTER — DOCUMENTATION ONLY (OUTPATIENT)
Dept: CARDIOLOGY | Facility: CLINIC | Age: 28
End: 2018-05-08

## 2018-05-08 ENCOUNTER — PATIENT MESSAGE (OUTPATIENT)
Dept: CARDIOLOGY | Facility: CLINIC | Age: 28
End: 2018-05-08

## 2018-05-08 NOTE — PROGRESS NOTES
OUTPATIENT CATHETERIZATION INSTRUCTIONS    You have been scheduled for a procedure in the catheterization lab on Thursday, May 17,  2018.     Please report to the Cardiology Waiting Area on the Third floor of the hospital and check in at 9 AM.   You will then be taken to the SSCU (Short Stay Cardiac Unit) and prepared for your procedure. Please be aware that this is not the time of your procedure but the time you are to arrive. The procedures are scheduled on an hourly basis; however, emergency cases take precedence over all other cases.       You may not have anything to eat or drink after midnight the night before your test. You may take your regular morning medications with water. If there are any medications that you should not take you will be instructed to hold them that morning. If you are diabetic and on Metformin (Glucophage) do not take it the day before, the day of, and for 2 days after your procedure.      The procedure will take 1-2 hours to perform. After the procedure, you will return to SSCU on the third floor of the hospital. You will need to lie still (or keep your arm still) for the next 4 to 6 hours to minimize bleeding from the puncture site. Your family may remain in the room with you during this time.       You may be able to be discharged home that same afternoon if there is someone to drive you home and there were no complications. If you have one of the balloon, stent, or device procedures you may spend the night in the hospital. Your doctor will determine, based on your progress, the date and time of your discharge. The results of your procedure will be discussed with you before you are discharged. Any further testing or procedures will be scheduled for you either before you leave or you will be called with these appointments.       If you should have any questions, concerns, or need to change the date of your procedure, please call SUE Pena @ [phone]    Special  Instructions:    Drink plenty of water the day before and after the procedure.        THE ABOVE INSTRUCTIONS WERE GIVEN TO THE PATIENT VERBALLY AND THEY VERBALIZED UNDERSTANDING.  THEY DO NOT REQUIRE ANY SPECIAL NEEDS AND DO NOT HAVE ANY LEARNING BARRIERS.          Directions for Reporting to Cardiology Waiting Area in the Hospital  If you park in the Parking Garage:  Take elevators to the1st floor of the parking garage.  Continue past the gift shop, coffee shop, and piano.  Take a right and go to the gold elevators. (Elevator B)  Take the elevator to the 3rd floor.  Follow the arrow on the sign on the wall that says Cath Lab Registration/EP Lab Registration.  Follow the long hallway all the way around until you come to a big open area.  This is the registration area.  Check in at Reception Desk.    OR    If family is dropping you off:  Have them drop you off at the front of the Hospital under the green overhang.  Enter through the doors and take a right.  Take the E elevators to the 3rd floor Cardiology Waiting Area.  Check in at the Reception Desk in the waiting room.

## 2018-05-14 ENCOUNTER — TELEPHONE (OUTPATIENT)
Dept: TRANSPLANT | Facility: CLINIC | Age: 28
End: 2018-05-14

## 2018-05-14 NOTE — TELEPHONE ENCOUNTER
Called to inform pt of no EGBX tomorrow, will get biopsy with [facility] on Thursday. Message sent to Kalie for Dr. Andrew. Will order ECHO for tomorrow.

## 2018-05-17 ENCOUNTER — HOSPITAL ENCOUNTER (OUTPATIENT)
Facility: HOSPITAL | Age: 28
Discharge: HOME | End: 2018-05-17
Attending: INTERNAL MEDICINE | Admitting: INTERNAL MEDICINE
Payer: COMMERCIAL

## 2018-05-17 VITALS
DIASTOLIC BLOOD PRESSURE: 78 MMHG | HEART RATE: 92 BPM | SYSTOLIC BLOOD PRESSURE: 112 MMHG | RESPIRATION RATE: 18 BRPM | TEMPERATURE: 98 F | BODY MASS INDEX: 24.48 KG/M2 | HEIGHT: 67 IN | WEIGHT: 156 LBS | OXYGEN SATURATION: 99 %

## 2018-05-17 DIAGNOSIS — R73.9 HYPERGLYCEMIA: ICD-10-CM

## 2018-05-17 DIAGNOSIS — Z94.1 HEART TRANSPLANTED: ICD-10-CM

## 2018-05-17 LAB
ABO + RH BLD: NORMAL
ANION GAP SERPL CALC-SCNC: 13 MMOL/L
ANISOCYTOSIS BLD QL SMEAR: SLIGHT
BASOPHILS # BLD AUTO: ABNORMAL K/UL
BASOPHILS NFR BLD: 0 %
BLD GP AB SCN CELLS X3 SERPL QL: NORMAL
BUN SERPL-MCNC: 13 MG/DL
CALCIUM SERPL-MCNC: 9.6 MG/DL
CHLORIDE SERPL-SCNC: 106 MMOL/L
CO2 SERPL-SCNC: 22 MMOL/L
CREAT SERPL-MCNC: 1 MG/DL
DIFFERENTIAL METHOD: ABNORMAL
EOSINOPHIL # BLD AUTO: ABNORMAL K/UL
EOSINOPHIL NFR BLD: 1 %
ERYTHROCYTE [DISTWIDTH] IN BLOOD BY AUTOMATED COUNT: 13.9 %
EST. GFR  (AFRICAN AMERICAN): >60 ML/MIN/1.73 M^2
EST. GFR  (NON AFRICAN AMERICAN): >60 ML/MIN/1.73 M^2
GLUCOSE SERPL-MCNC: 81 MG/DL
HCT VFR BLD AUTO: 43.9 %
HGB BLD-MCNC: 14.3 G/DL
IMM GRANULOCYTES # BLD AUTO: ABNORMAL K/UL
IMM GRANULOCYTES NFR BLD AUTO: ABNORMAL %
LYMPHOCYTES # BLD AUTO: ABNORMAL K/UL
LYMPHOCYTES NFR BLD: 15 %
MCH RBC QN AUTO: 29.9 PG
MCHC RBC AUTO-ENTMCNC: 32.6 G/DL
MCV RBC AUTO: 92 FL
MONOCYTES # BLD AUTO: ABNORMAL K/UL
MONOCYTES NFR BLD: 6 %
NEUTROPHILS NFR BLD: 78 %
NRBC BLD-RTO: 0 /100 WBC
PLATELET # BLD AUTO: 213 K/UL
PLATELET BLD QL SMEAR: ABNORMAL
PMV BLD AUTO: 9.8 FL
POC ACTIVATED CLOTTING TIME K: 186 SEC (ref 74–137)
POTASSIUM SERPL-SCNC: 3.6 MMOL/L
RBC # BLD AUTO: 4.79 M/UL
SAMPLE: ABNORMAL
SODIUM SERPL-SCNC: 141 MMOL/L
WBC # BLD AUTO: 5.7 K/UL

## 2018-05-17 PROCEDURE — 88342 IMHCHEM/IMCYTCHM 1ST ANTB: CPT | Mod: 26,,, | Performed by: PATHOLOGY

## 2018-05-17 PROCEDURE — 92978 ENDOLUMINL IVUS OCT C 1ST: CPT | Mod: 26,,, | Performed by: INTERNAL MEDICINE

## 2018-05-17 PROCEDURE — 80048 BASIC METABOLIC PNL TOTAL CA: CPT

## 2018-05-17 PROCEDURE — 63600175 PHARM REV CODE 636 W HCPCS

## 2018-05-17 PROCEDURE — 25000003 PHARM REV CODE 250: Performed by: INTERNAL MEDICINE

## 2018-05-17 PROCEDURE — 86850 RBC ANTIBODY SCREEN: CPT

## 2018-05-17 PROCEDURE — 99152 MOD SED SAME PHYS/QHP 5/>YRS: CPT | Mod: ,,, | Performed by: INTERNAL MEDICINE

## 2018-05-17 PROCEDURE — 25000003 PHARM REV CODE 250

## 2018-05-17 PROCEDURE — 99152 MOD SED SAME PHYS/QHP 5/>YRS: CPT

## 2018-05-17 PROCEDURE — 88342 IMHCHEM/IMCYTCHM 1ST ANTB: CPT | Performed by: PATHOLOGY

## 2018-05-17 PROCEDURE — 93005 ELECTROCARDIOGRAM TRACING: CPT

## 2018-05-17 PROCEDURE — 85027 COMPLETE CBC AUTOMATED: CPT

## 2018-05-17 PROCEDURE — 36415 COLL VENOUS BLD VENIPUNCTURE: CPT

## 2018-05-17 PROCEDURE — 93505 ENDOMYOCARDIAL BIOPSY: CPT | Mod: 26,59,, | Performed by: INTERNAL MEDICINE

## 2018-05-17 PROCEDURE — 92978 ENDOLUMINL IVUS OCT C 1ST: CPT

## 2018-05-17 PROCEDURE — 88307 TISSUE EXAM BY PATHOLOGIST: CPT | Mod: 26,,, | Performed by: PATHOLOGY

## 2018-05-17 PROCEDURE — 93460 R&L HRT ART/VENTRICLE ANGIO: CPT | Mod: 26,,, | Performed by: INTERNAL MEDICINE

## 2018-05-17 PROCEDURE — 85007 BL SMEAR W/DIFF WBC COUNT: CPT

## 2018-05-17 PROCEDURE — 93010 ELECTROCARDIOGRAM REPORT: CPT | Mod: ,,, | Performed by: INTERNAL MEDICINE

## 2018-05-17 PROCEDURE — 88307 TISSUE EXAM BY PATHOLOGIST: CPT | Performed by: PATHOLOGY

## 2018-05-17 RX ORDER — ACETAMINOPHEN 325 MG/1
650 TABLET ORAL EVERY 4 HOURS PRN
Status: DISCONTINUED | OUTPATIENT
Start: 2018-05-17 | End: 2018-05-17 | Stop reason: HOSPADM

## 2018-05-17 RX ORDER — SODIUM CHLORIDE 9 MG/ML
3 INJECTION, SOLUTION INTRAVENOUS CONTINUOUS
Status: ACTIVE | OUTPATIENT
Start: 2018-05-17 | End: 2018-05-17

## 2018-05-17 RX ORDER — DIPHENHYDRAMINE HCL 50 MG
50 CAPSULE ORAL ONCE
Status: COMPLETED | OUTPATIENT
Start: 2018-05-17 | End: 2018-05-17

## 2018-05-17 RX ADMIN — SODIUM CHLORIDE 3 ML/KG/HR: 0.9 INJECTION, SOLUTION INTRAVENOUS at 09:05

## 2018-05-17 RX ADMIN — DIPHENHYDRAMINE HYDROCHLORIDE 50 MG: 50 CAPSULE ORAL at 10:05

## 2018-05-17 NOTE — DISCHARGE SUMMARY
Discharge Summary  Interventional Cardiology      Admit Date: 5/17/2018    Discharge Date:  5/17/2018    Attending Physician: Guillermo Andrew MD    Discharge Physician: Jourdan Narayan MD    Principal Diagnoses: Heart transplanted  Indication for Admission: Left heart cath (Left), BIOPSY-ECHO GUIDED (N/A)    Discharged Condition: Good    Hospital Course:   Patient presented for outpatient [facility]+IVUS and RHC with RV biopsy which went without complication. Patient had normal coronaries per fluoroscopy and IVUS, and RHC hemodynamics were done with biopsy samples sent for pathology. Please see full cath report in EPIC for details. Post-procedure patient was monitored per our post-cath protocol. His R radial and R CFV access sites were c/d/i, and he was able to ambulate without difficulty. Patient was feeling well and anticipating discharge home later tonight.    Outpatient Plan:  - Continue medical management  - Risk factor reduction  - Follow-up with outpatient cardiologist/transplant service  - There were no medication changes    Diet: Cardiac diet    Activity: Ad yesenia, wound care instructions provided    Disposition: Home or Self Care    Discharge Medications:      Medication List      CONTINUE taking these medications    amLODIPine 5 MG tablet  Commonly known as:  NORVASC  Take 1 tablet (5 mg total) by mouth once daily.     ASPIR-LOW 81 MG EC tablet  Generic drug:  aspirin  Take 1 tablet (81 mg total) by mouth once daily.     atorvastatin 20 MG tablet  Commonly known as:  LIPITOR  Take 1 tablet (20 mg total) by mouth once daily.     calcium carbonate-vitamin D3 600 mg(1,500mg) -800 unit Tab  Take 1 tablet by mouth once daily.     * famotidine 40 MG tablet  Commonly known as:  PEPCID  Take 1 tablet (40 mg total) by mouth every evening.     * famotidine 40 MG tablet  Commonly known as:  PEPCID  Take 1 tablet (40 mg total) by mouth every evening.     ferrous sulfate 325 (65 FE) MG EC tablet  Take 1 tablet (325 mg total)  by mouth 2 (two) times daily.     magnesium oxide 400 mg tablet  Commonly known as:  MAG-OX  Take 1 tablet (400 mg total) by mouth 2 (two) times daily.     * mycophenolate 250 mg Cap  Commonly known as:  CELLCEPT  Take 6 capsules (1,500 mg total) by mouth 2 (two) times daily.     * mycophenolate 250 mg Cap  Commonly known as:  CELLCEPT  TAKE 6 CAPSULES BY MOUTH TWO TIMES A DAY     nystatin 100,000 unit/mL suspension  Commonly known as:  MYCOSTATIN  Take 5 mLs (500,000 Units total) by mouth 4 (four) times daily with meals and nightly.     oxyCODONE 5 MG immediate release tablet  Commonly known as:  ROXICODONE  Take 1 tablet (5 mg total) by mouth every 24 hours as needed.     oxyCODONE-acetaminophen 5-325 mg per tablet  Commonly known as:  PERCOCET  Take 1-2 tablets by mouth every 6 (six) hours as needed for Pain.     predniSONE 5 MG tablet  Commonly known as:  DELTASONE  Take 1.5 tablets daily (7.5 mg) by mouth     senna-docusate 8.6-50 mg 8.6-50 mg per tablet  Commonly known as:  PERICOLACE  Take 2 tablets by mouth daily as needed for Constipation.     * sulfamethoxazole-trimethoprim 400-80mg 400-80 mg per tablet  Commonly known as:  BACTRIM,SEPTRA  Take 1 tablet by mouth once daily.     * sulfamethoxazole-trimethoprim 400-80mg 400-80 mg per tablet  Commonly known as:  BACTRIM,SEPTRA  TAKE ONE TABLET BY MOUTH DAILY     tacrolimus 1 MG Cap  Commonly known as:  PROGRAF  TAKE 2 CAPSULES (2MG TOTAL) BY MOUTH TWO TIMES A DAY     valGANciclovir 450 mg Tab  Commonly known as:  VALCYTE  Take 2 tablets (900 mg total) by mouth once daily.     VITAMIN D2 50,000 unit Cap  Generic drug:  ergocalciferol  Take 1 capsule (50,000 Units total) by mouth every 7 days.        * This list has 6 medication(s) that are the same as other medications prescribed for you. Read the directions carefully, and ask your doctor or other care provider to review them with you.              Follow Up:  Follow-up Information     Sammi Tapia MD.     Specialties:  Transplant, Cardiology  Contact information:  [address]  [facility] [address]  [phone]

## 2018-05-17 NOTE — NURSING
Pt ambulated around unit without distress or discomfort.  r groin site remains cdi.  0 bleed, 0 hematoma.  Will continue to monitor.

## 2018-05-17 NOTE — BRIEF OP NOTE
"    Post Cath Note  Referring Physician: Guillermo Andrew MD  Procedure: Left heart cath (Left), BIOPSY-ECHO GUIDED (N/A)       Access: Right radial, Right CFV    Normal coronaries per fluoroscopy and IVUS.    RHC hemodynamics  RA Mean 8  RV 38/6  PA 35/8 Mean 23  PCWP 15    LVEDP 5 mmHg    See full report for further details    Intervention:     Closure device: Radial band, Manual pressure of R CFV access site    Post Cath Exam:   /78 (BP Location: Right arm, Patient Position: Lying)   Pulse 93   Temp 98.4 °F (36.9 °C) (Tympanic)   Resp 18   Ht 5' 7" (1.702 m)   Wt 70.8 kg (156 lb)   SpO2 96%   BMI 24.43 kg/m²   No unusual pain, hematoma, thrill or bruit at vascular access site.  Distal pulse present without signs of ischemia.    Recommendations:   - Routine post-cath care  - IVF at 5 cc/kg/hr for 4 hrs  - Continue medical management  - Risk factor reduction  - Follow-up with outpatient cardiologist    Signed:  Jourdan Narayan MD  Cardiology Fellow, PGY-5  5/17/2018 2:34 PM  "

## 2018-05-17 NOTE — PLAN OF CARE
Right wrist vasc band removed per protocol w/o difficulty. Pt tolerated well. Gauze/transparent dressing applied. Vss. resp even and unlabored. Wife at bedside. Nurse call bell within reach. Pt denies any complaints at this time. Will continue to monitor per protocol

## 2018-05-17 NOTE — NURSING
Pt transported from cath lab via stretcher.  Vss. r radial site vascband & r groin site  remains cdi.  0 bleed, 0 hematoma.  Post op orders and assessment initiated.   Spouse called to bs.  Pt in no acute distress and verbalizes no complaints.  Will continue to monitor.

## 2018-05-17 NOTE — INTERVAL H&P NOTE
The patient has been examined and the H&P has been reviewed:    I concur with the findings and changes have been noted since the H&P was written: patient also needs a RVBx - will use R CFV access 9F sheath    Anesthesia/Surgery risks, benefits and alternative options discussed and understood by patient/family.          Active Hospital Problems    Diagnosis  POA    Heart transplanted [Z94.1]  Not Applicable      Resolved Hospital Problems    Diagnosis Date Resolved POA   No resolved problems to display.

## 2018-05-17 NOTE — PLAN OF CARE
Problem: Patient Care Overview  Goal: Plan of Care Review  Outcome: Ongoing (interventions implemented as appropriate)  Pt arrived to unit accompanied by spouse. Vss.  Oriented pt and spouse to rm and unit.  Pre op orders and assessment initiated.  Pt remains npo.  Pt in no acute distress and verbalizes no complaints. Will continue to monitor.

## 2018-05-17 NOTE — NURSING
Pt d/c'd to home per md orders.  Vss.  Telemetry monitor removed and pivs d/c'd intact and without difficulty.  r radial and groin site remains cdi.  0 bleed, 0 hematoma.  Instructed pt and spouse on home medications, post procedure precautions and follow up visits.  Pt and spouse verbalizes understanding.  Pt in no acute distress and verbalizes no complaints.

## 2018-05-18 ENCOUNTER — TELEPHONE (OUTPATIENT)
Dept: TRANSPLANT | Facility: CLINIC | Age: 28
End: 2018-05-18

## 2018-05-21 ENCOUNTER — TELEPHONE (OUTPATIENT)
Dept: ELECTROPHYSIOLOGY | Facility: CLINIC | Age: 28
End: 2018-05-21

## 2018-05-21 DIAGNOSIS — Z94.1 STATUS POST HEART TRANSPLANT: Primary | ICD-10-CM

## 2018-05-21 DIAGNOSIS — Z94.1 STATUS POST HEART TRANSPLANT: ICD-10-CM

## 2018-05-21 RX ORDER — PREDNISONE 5 MG/1
5 TABLET ORAL DAILY
Qty: 30 TABLET | Refills: 3 | Status: SHIPPED | OUTPATIENT
Start: 2018-05-21 | End: 2018-06-26 | Stop reason: SDUPTHER

## 2018-05-21 NOTE — TELEPHONE ENCOUNTER
----- Message from Brenna Ta sent at 5/21/2018  3:49 PM CDT -----  Regarding: FW: need more  Good afternoon,                Please see message below. Is their anyway for the patient to have a 48 hour Holter for 5/23. He is a post heart patient, who is experiencing arrythmias.  Thank you  ----- Message -----  From: Precious Spicer RN  Sent: 5/21/2018   3:42 PM  To: [facility] Heart Transplant Schedulers  Subject: need more                                        Hi there,  Would you please schedule pt for EKG with rhythm strip and holter monitor placement for Wednesday, when he comes to clinic? Pt is experiencing arrythmias.    Thanks,  T

## 2018-05-21 NOTE — TELEPHONE ENCOUNTER
Contacted the patient on this afternoon in relation to getting a 48 hour holter on 5/23/18.  Informed the patient he would need to check in for his 3:15 Echo and following the Echo he is to go back to the desk to check in for the holter.  Patient also instructed he will need to get the holter monitor placed before he goes to his 4:00 appointment with Dr. Hawkins as there will not be anyone to place the holter after 4:30.  Patient verbalized understanding to all of the above.

## 2018-05-21 NOTE — TELEPHONE ENCOUNTER
Called to inform pt of biopsy results, negative for rejection. Reviewed with Dr. Hawkins, decreased prednisone to 5 mg daily. Pt may stop nystatin, since now on 5 mg of prednisone.   Pt c/o of a flutter feeling at times in his chest since the [facility]. Pt feeling tired. No chest pain, no n/v/d, no fever or chills.. IF feeling persists may need to have EKG. Will see pt in clinic on Wednesday.

## 2018-05-22 ENCOUNTER — DOCUMENTATION ONLY (OUTPATIENT)
Dept: TRANSPLANT | Facility: CLINIC | Age: 28
End: 2018-05-22

## 2018-05-22 NOTE — PROGRESS NOTES
Pt returned some equipment after transplant. Controller: [id number], and Batteries :[id number], [id number], [id number], and [id number]. Batteries have been given to biomed to be properly disposed of. Pt states he wants to keep remaining equipment as a memoir.

## 2018-05-23 ENCOUNTER — OFFICE VISIT (OUTPATIENT)
Dept: TRANSPLANT | Facility: CLINIC | Age: 28
End: 2018-05-23
Payer: COMMERCIAL

## 2018-05-23 ENCOUNTER — CLINICAL SUPPORT (OUTPATIENT)
Dept: ELECTROPHYSIOLOGY | Facility: CLINIC | Age: 28
End: 2018-05-23
Attending: INTERNAL MEDICINE
Payer: COMMERCIAL

## 2018-05-23 ENCOUNTER — LAB VISIT (OUTPATIENT)
Dept: LAB | Facility: HOSPITAL | Age: 28
End: 2018-05-23
Attending: INTERNAL MEDICINE
Payer: COMMERCIAL

## 2018-05-23 ENCOUNTER — HOSPITAL ENCOUNTER (OUTPATIENT)
Dept: CARDIOLOGY | Facility: CLINIC | Age: 28
Discharge: HOME | End: 2018-05-23
Attending: INTERNAL MEDICINE
Payer: COMMERCIAL

## 2018-05-23 ENCOUNTER — HOSPITAL ENCOUNTER (OUTPATIENT)
Dept: CARDIOLOGY | Facility: CLINIC | Age: 28
Discharge: HOME | End: 2018-05-23
Payer: COMMERCIAL

## 2018-05-23 VITALS
SYSTOLIC BLOOD PRESSURE: 124 MMHG | DIASTOLIC BLOOD PRESSURE: 78 MMHG | HEIGHT: 67 IN | HEART RATE: 103 BPM | BODY MASS INDEX: 25.6 KG/M2 | WEIGHT: 163.13 LBS

## 2018-05-23 DIAGNOSIS — Z95.811 HEART REPLACED BY HEART ASSIST DEVICE: ICD-10-CM

## 2018-05-23 DIAGNOSIS — Z94.1 STATUS POST HEART TRANSPLANT: ICD-10-CM

## 2018-05-23 DIAGNOSIS — T38.0X5D ADVERSE EFFECT OF GLUCOCORTICOIDS AND SYNTHETIC ANALOGUES, SUBSEQUENT ENCOUNTER: ICD-10-CM

## 2018-05-23 DIAGNOSIS — Z94.1 STATUS POST HEART TRANSPLANT: Primary | ICD-10-CM

## 2018-05-23 DIAGNOSIS — Z94.1 HEART TRANSPLANTED: Primary | ICD-10-CM

## 2018-05-23 DIAGNOSIS — I50.9 CONGESTIVE HEART FAILURE: ICD-10-CM

## 2018-05-23 DIAGNOSIS — I10 ESSENTIAL HYPERTENSION: ICD-10-CM

## 2018-05-23 LAB
25(OH)D3+25(OH)D2 SERPL-MCNC: 35 NG/ML
ALBUMIN SERPL BCP-MCNC: 4.5 G/DL
ALP SERPL-CCNC: 56 U/L
ALT SERPL W/O P-5'-P-CCNC: 49 U/L
ANION GAP SERPL CALC-SCNC: 11 MMOL/L
AST SERPL-CCNC: 29 U/L
BASOPHILS # BLD AUTO: 0.02 K/UL
BASOPHILS NFR BLD: 0.3 %
BILIRUB SERPL-MCNC: 0.9 MG/DL
BNP SERPL-MCNC: 96 PG/ML
BUN SERPL-MCNC: 13 MG/DL
CALCIUM SERPL-MCNC: 9.8 MG/DL
CHLORIDE SERPL-SCNC: 107 MMOL/L
CHOLEST SERPL-MCNC: 102 MG/DL
CHOLEST/HDLC SERPL: 2.7 {RATIO}
CO2 SERPL-SCNC: 25 MMOL/L
CREAT SERPL-MCNC: 1 MG/DL
DIFFERENTIAL METHOD: NORMAL
EOSINOPHIL # BLD AUTO: 0.1 K/UL
EOSINOPHIL NFR BLD: 1.5 %
ERYTHROCYTE [DISTWIDTH] IN BLOOD BY AUTOMATED COUNT: 14.5 %
EST. GFR  (AFRICAN AMERICAN): >60 ML/MIN/1.73 M^2
EST. GFR  (NON AFRICAN AMERICAN): >60 ML/MIN/1.73 M^2
ESTIMATED PA SYSTOLIC PRESSURE: 27.21
FERRITIN SERPL-MCNC: 105 NG/ML
GLUCOSE SERPL-MCNC: 81 MG/DL
HCT VFR BLD AUTO: 43.4 %
HDLC SERPL-MCNC: 38 MG/DL
HDLC SERPL: 37.3 %
HGB BLD-MCNC: 14.4 G/DL
IRON SERPL-MCNC: 75 UG/DL
LDLC SERPL CALC-MCNC: 39.6 MG/DL
LYMPHOCYTES # BLD AUTO: 1.6 K/UL
LYMPHOCYTES NFR BLD: 23.4 %
MAGNESIUM SERPL-MCNC: 1.8 MG/DL
MCH RBC QN AUTO: 30.1 PG
MCHC RBC AUTO-ENTMCNC: 33.2 G/DL
MCV RBC AUTO: 91 FL
MONOCYTES # BLD AUTO: 0.5 K/UL
MONOCYTES NFR BLD: 7.8 %
NEUTROPHILS # BLD AUTO: 4.5 K/UL
NEUTROPHILS NFR BLD: 67 %
NONHDLC SERPL-MCNC: 64 MG/DL
PLATELET # BLD AUTO: 225 K/UL
PMV BLD AUTO: 9.8 FL
POTASSIUM SERPL-SCNC: 3.9 MMOL/L
PROT SERPL-MCNC: 6.8 G/DL
RBC # BLD AUTO: 4.79 M/UL
RETIRED EF AND QEF - SEE NOTES: 65 (ref 55–65)
SATURATED IRON: 23 %
SODIUM SERPL-SCNC: 143 MMOL/L
TOTAL IRON BINDING CAPACITY: 327 UG/DL
TRANSFERRIN SERPL-MCNC: 221 MG/DL
TRANSFERRIN SERPL-MCNC: 221 MG/DL
TRICUSPID VALVE REGURGITATION: NORMAL
TRIGL SERPL-MCNC: 122 MG/DL
WBC # BLD AUTO: 6.66 K/UL

## 2018-05-23 PROCEDURE — 82728 ASSAY OF FERRITIN: CPT

## 2018-05-23 PROCEDURE — 80061 LIPID PANEL: CPT

## 2018-05-23 PROCEDURE — 83735 ASSAY OF MAGNESIUM: CPT

## 2018-05-23 PROCEDURE — 80197 ASSAY OF TACROLIMUS: CPT

## 2018-05-23 PROCEDURE — 93224 XTRNL ECG REC UP TO 48 HRS: CPT | Mod: S$GLB,,, | Performed by: INTERNAL MEDICINE

## 2018-05-23 PROCEDURE — 99214 OFFICE O/P EST MOD 30 MIN: CPT | Mod: S$GLB,,, | Performed by: INTERNAL MEDICINE

## 2018-05-23 PROCEDURE — 85025 COMPLETE CBC W/AUTO DIFF WBC: CPT

## 2018-05-23 PROCEDURE — 83540 ASSAY OF IRON: CPT

## 2018-05-23 PROCEDURE — 83880 ASSAY OF NATRIURETIC PEPTIDE: CPT

## 2018-05-23 PROCEDURE — 93000 ELECTROCARDIOGRAM COMPLETE: CPT | Mod: S$GLB,,, | Performed by: INTERNAL MEDICINE

## 2018-05-23 PROCEDURE — 93306 TTE W/DOPPLER COMPLETE: CPT | Mod: S$GLB,,, | Performed by: INTERNAL MEDICINE

## 2018-05-23 PROCEDURE — 80053 COMPREHEN METABOLIC PANEL: CPT

## 2018-05-23 PROCEDURE — 82306 VITAMIN D 25 HYDROXY: CPT

## 2018-05-23 PROCEDURE — 36415 COLL VENOUS BLD VENIPUNCTURE: CPT

## 2018-05-23 PROCEDURE — 99999 PR PBB SHADOW E&M-EST. PATIENT-LVL III: CPT | Mod: PBBFAC,,, | Performed by: INTERNAL MEDICINE

## 2018-05-23 NOTE — PROGRESS NOTES
Pt seen in clinic today. Pt c/o SOB briefly while walking from one test to another. Pt is in cardiac rehab. Pt is asking to go fishing. States he has had watery diarrhea, will discuss with Dr. Hawkins. Pt showed me his abdominal wound, recommended letting wound dry, stop doing wet to dry dressings and drainage should stop. Pt c/o not sleeping, asked about CBD oil for sleep. Will discuss with Dr. Hawkins.

## 2018-05-23 NOTE — LETTER
May 27, 2018        Guillermo Alejo  [address]  [facility] [address]  Phone: [phone]  Fax: [phone]             [facility]  [address]  [facility] [address]  Phone: [phone]   Patient: Mert Samayoa   MR Number: [id number]   YOB: 1990   Date of Visit: 5/23/2018       Dear Dr. Guillermo Alejo    Thank you for referring Mert Samayoa to me for evaluation. Attached you will find relevant portions of my assessment and plan of care.    If you have questions, please do not hesitate to call me. I look forward to following Mert Samayoa along with you.    Sincerely,    Lashon Hawkins MD    Enclosure    If you would like to receive this communication electronically, please contact [email or web address] or [phone] to request [employer] access.    [employer] is a tool which provides read-only access to select patient information with whom you have a relationship. Its easy to use and provides real time access to review your patients record including encounter summaries, notes, results, and demographic information.    If you feel you have received this communication in error or would no longer like to receive these types of communications, please e-mail [email or web address]

## 2018-05-24 ENCOUNTER — TELEPHONE (OUTPATIENT)
Dept: TRANSPLANT | Facility: CLINIC | Age: 28
End: 2018-05-24

## 2018-05-24 LAB — TACROLIMUS BLD-MCNC: 11 NG/ML

## 2018-05-24 RX ORDER — MYCOPHENOLATE MOFETIL 250 MG/1
1000 CAPSULE ORAL 2 TIMES DAILY
Qty: 240 CAPSULE | Refills: 11 | Status: SHIPPED | OUTPATIENT
Start: 2018-05-24 | End: 2018-06-22 | Stop reason: SDUPTHER

## 2018-05-24 RX ORDER — LANOLIN ALCOHOL/MO/W.PET/CERES
400 CREAM (GRAM) TOPICAL DAILY
Qty: 30 TABLET | Refills: 3 | Status: ON HOLD | COMMUNITY
Start: 2018-05-24 | End: 2018-10-02 | Stop reason: HOSPADM

## 2018-05-24 NOTE — TELEPHONE ENCOUNTER
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 Pt sis worried that come August, he will lose his job and insurance. Will see pt in clinic monthly and decide when he may return to work safely.

## 2018-05-24 NOTE — TELEPHONE ENCOUNTER
Called to inform pt of tacro level 11.0 which is within goal. Pt asked for a copy of the Insurance sheet for contact information. Copy sent to patient.

## 2018-05-27 PROBLEM — M25.562 ACUTE PAIN OF BOTH KNEES: Status: RESOLVED | Noted: 2018-03-06 | Resolved: 2018-05-27

## 2018-05-27 PROBLEM — T82.9XXS: Status: RESOLVED | Noted: 2018-04-04 | Resolved: 2018-05-27

## 2018-05-27 PROBLEM — T82.9XXA LEFT VENTRICULAR ASSIST DEVICE COMPLICATION: Status: RESOLVED | Noted: 2018-04-04 | Resolved: 2018-05-27

## 2018-05-27 PROBLEM — T38.0X5D ADVERSE EFFECT OF GLUCOCORTICOIDS AND SYNTHETIC ANALOGUES, SUBSEQUENT ENCOUNTER: Status: ACTIVE | Noted: 2018-02-19

## 2018-05-27 PROBLEM — M25.561 ACUTE PAIN OF BOTH KNEES: Status: RESOLVED | Noted: 2018-03-06 | Resolved: 2018-05-27

## 2018-05-29 ENCOUNTER — TELEPHONE (OUTPATIENT)
Dept: TRANSPLANT | Facility: CLINIC | Age: 28
End: 2018-05-29

## 2018-05-29 NOTE — TELEPHONE ENCOUNTER
Mert paged to report His controller was making beeps, but he doesn't know what it was.  He doesn't have any alarms currently as it cleared.  Encouraged Mert to call back if it continues or if he is able to see what the alarm is.  Pt verbalized understanding and agreement.      12/23/17 0900:  Mert paged back to report that the batteries are beeping from one port to the other.  He reports feeling well, just hasn't seen this before.  Called our clinical specialist, Dominic, to ask if I should have him change his controller.  Dominic does not think this is needed at the time.  Called Mert back to ask him to keep track of which batteries are in use at the time of the alarms.  Mert verbalized understanding and agreement.     12/23/17 1230: Mert called again.  He thinks he didn't have the connections all the way tight.  He will keep an eye on it and page me back if it continues.

## 2018-05-29 NOTE — TELEPHONE ENCOUNTER
Called to check on pt's diarrhea. Pt is not having diarrhea with decreasing cellcept. Abdominal wound is not draining and drying up.

## 2018-05-31 ENCOUNTER — TELEPHONE (OUTPATIENT)
Dept: TRANSPLANT | Facility: CLINIC | Age: 28
End: 2018-05-31

## 2018-05-31 NOTE — TELEPHONE ENCOUNTER
----- Message from Raj Klein MD sent at 5/30/2018  7:15 PM CDT -----  Regarding: RE: abdominal wound  No. Thanks for seeing him.     gene  ----- Message -----  From: Precious Spicer RN  Sent: 5/30/2018  11:44 AM  To: Raj Klein MD  Subject: abdominal wound                                  Hi Dr. Klein,  I saw Mert in clinic the other day. His abdominal wound looked healed. He has scant clear drainage on his dressing that was about to be changed. The wound was pink and granulated. He was still doing wet to dry dressings. I told him he could stop doing the wet to dry dressings, since it was granulated and healed. Any objections to him stopping with the wet to dry dressings?    Thanks,  Precious

## 2018-05-31 NOTE — TELEPHONE ENCOUNTER
Pt called stating his short term disability has not been paying him due to a note from Dr. Klein to which the pt could return to work at month 3 after transplant. A disability form was filled out by 's office which stated the pt could return to work on or after May 21, 2018. Dr Hawkins saw pt in clinic this past week and did not ok for pt to go back to work. Pt is in cardiac rehab at this time gaining strength and increasing his activities. Paperwork was sent to [employer] with Dr. Hawkins's clinic note, ECHO report and labs. [employer] is requiring [facility]TS to complete capacity paperwork and return back to [employer]. Paperwork sent to disability office.

## 2018-06-12 DIAGNOSIS — Z94.1 STATUS POST HEART TRANSPLANT: Primary | ICD-10-CM

## 2018-06-22 ENCOUNTER — TELEPHONE (OUTPATIENT)
Dept: TRANSPLANT | Facility: CLINIC | Age: 28
End: 2018-06-22

## 2018-06-22 ENCOUNTER — OFFICE VISIT (OUTPATIENT)
Dept: TRANSPLANT | Facility: CLINIC | Age: 28
End: 2018-06-22
Payer: COMMERCIAL

## 2018-06-22 ENCOUNTER — HOSPITAL ENCOUNTER (OUTPATIENT)
Dept: CARDIOLOGY | Facility: CLINIC | Age: 28
Discharge: HOME | End: 2018-06-22
Attending: INTERNAL MEDICINE
Payer: COMMERCIAL

## 2018-06-22 VITALS
HEIGHT: 67 IN | WEIGHT: 164 LBS | DIASTOLIC BLOOD PRESSURE: 89 MMHG | SYSTOLIC BLOOD PRESSURE: 135 MMHG | BODY MASS INDEX: 25.74 KG/M2 | HEART RATE: 101 BPM

## 2018-06-22 DIAGNOSIS — I10 ESSENTIAL HYPERTENSION: ICD-10-CM

## 2018-06-22 DIAGNOSIS — Z94.1 HEART TRANSPLANTED: Primary | ICD-10-CM

## 2018-06-22 DIAGNOSIS — Z94.1 STATUS POST HEART TRANSPLANT: ICD-10-CM

## 2018-06-22 DIAGNOSIS — T38.0X5D ADVERSE EFFECT OF GLUCOCORTICOIDS AND SYNTHETIC ANALOGUES, SUBSEQUENT ENCOUNTER: ICD-10-CM

## 2018-06-22 DIAGNOSIS — Z79.899 LONG TERM CURRENT USE OF IMMUNOSUPPRESSIVE DRUG: ICD-10-CM

## 2018-06-22 PROBLEM — Z79.60 LONG TERM CURRENT USE OF IMMUNOSUPPRESSIVE DRUG: Status: ACTIVE | Noted: 2018-06-22

## 2018-06-22 LAB
DIASTOLIC DYSFUNCTION: NO
MITRAL VALVE REGURGITATION: NORMAL
RETIRED EF AND QEF - SEE NOTES: 65 (ref 55–65)
TRICUSPID VALVE REGURGITATION: NORMAL

## 2018-06-22 PROCEDURE — 93306 TTE W/DOPPLER COMPLETE: CPT | Mod: S$GLB,,, | Performed by: INTERNAL MEDICINE

## 2018-06-22 PROCEDURE — 99999 PR PBB SHADOW E&M-EST. PATIENT-LVL III: CPT | Mod: PBBFAC,,, | Performed by: INTERNAL MEDICINE

## 2018-06-22 PROCEDURE — 99214 OFFICE O/P EST MOD 30 MIN: CPT | Mod: S$GLB,,, | Performed by: INTERNAL MEDICINE

## 2018-06-22 RX ORDER — MYCOPHENOLATE MOFETIL 250 MG/1
1250 CAPSULE ORAL 2 TIMES DAILY
Qty: 300 CAPSULE | Refills: 11 | Status: SHIPPED | OUTPATIENT
Start: 2018-06-22 | End: 2018-07-31 | Stop reason: SDUPTHER

## 2018-06-22 NOTE — PROGRESS NOTES
"Subjective:   Mr. Samayoa is a 28 y.o. year old White male who received a  - brain death heart transplant on 18.      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 S/P washout  who had his Removal of retained driveline through counterincision in early 2018.   He continues to do well, has acne but is improving with reduction of steroids. Denies N/V/f/C, lightheadedness, dizziness, PND, orthopnea, LE edema, abdominal pain, abdominal pressure, chest pain, chest pressure. DLES is completely healed. He has steadily put on wt (diet and prednisone). Overall he seems to be doing well overall. No complaints today.     Immunosuppression: tac 2 BID, MMF 1000 BID, pred 5 mg qd   Immuno goal levels:  tacro 10-15     Review of Systems   Constitution: Negative for decreased appetite, weight gain and weight loss.   Cardiovascular: Negative for chest pain, dyspnea on exertion, leg swelling, near-syncope, orthopnea and palpitations.   Respiratory: Negative for cough and shortness of breath.    Musculoskeletal: Negative for myalgias.   Gastrointestinal: Negative for jaundice.       Objective:   Blood pressure 135/89, pulse 101, height 5' 7" (1.702 m), weight 74.4 kg (164 lb 0.4 oz).body mass index is 25.69 kg/m².    Physical Exam   Constitutional: He is oriented to person, place, and time. He appears well-developed and well-nourished. He is active. He is not intubated.   HENT:   Head: Normocephalic and atraumatic. Hair is normal.   Right Ear: External ear normal.   Left Ear: External ear normal.   Nose: Nose normal. No nasal deformity. No epistaxis.  No foreign bodies.   Mouth/Throat: Mucous membranes are normal. Mucous membranes are not cyanotic. No oropharyngeal exudate.   Eyes: Conjunctivae and EOM are normal. Pupils are equal, round, and reactive to light.   Neck: Neck supple. No hepatojugular " reflux and no JVD present.   Cardiovascular: Normal rate, regular rhythm, normal heart sounds and normal pulses.  Exam reveals no gallop.    Pulmonary/Chest: Effort normal and breath sounds normal. No apnea and no tachypnea. He is not intubated. No respiratory distress. He exhibits no tenderness.   Abdominal: Soft. Normal appearance and bowel sounds are normal. There is no tenderness. No hernia.   Musculoskeletal: Normal range of motion.   Neurological: He is alert and oriented to person, place, and time. He displays no seizure activity.   Skin: Skin is warm, dry and intact. Rash (acne vulgaris) noted. No pallor.   Psychiatric: He has a normal mood and affect. His speech is normal and behavior is normal. Thought content normal. Cognition and memory are normal.   Nursing note and vitals reviewed.      Lab Results   Component Value Date    WBC 8.32 06/22/2018    HGB 14.7 06/22/2018    HCT 45.5 06/22/2018    MCV 94 06/22/2018     06/22/2018    CO2 25 05/23/2018    CREATININE 1.0 05/23/2018    CALCIUM 9.8 05/23/2018    ALBUMIN 4.5 05/23/2018    AST 29 05/23/2018    BNP 96 05/23/2018    ALT 49 (H) 05/23/2018    ALLOMAP See result image under hyperlink 05/23/2018       Lab Results   Component Value Date    INR 1.1 04/03/2018    INR 1.1 03/03/2018    INR 1.1 03/02/2018       BNP   Date Value Ref Range Status   05/23/2018 96 0 - 99 pg/mL Final     Comment:     Values of less than 100 pg/ml are consistent with non-CHF populations.   04/17/2018 201 (H) 0 - 99 pg/mL Final     Comment:     Values of less than 100 pg/ml are consistent with non-CHF populations.   04/03/2018 328 (H) 0 - 99 pg/mL Final     Comment:     Values of less than 100 pg/ml are consistent with non-CHF populations.         Tacrolimus Lvl   Date Value Ref Range Status   05/23/2018 11.0 5.0 - 15.0 ng/mL Final     Comment:     Testing performed by Liquid Chromatography-Tandem  Mass Spectrometry (LC-MS/MS).  This test was developed and its performance  characteristics  determined by [facility], Department of Pathology  and Laboratory Medicine in a manner consistent with CLIA  requirements. It has not been cleared or approved by the [place]  Food and Drug Administration.  This test is used for clinical   purposes.  It should not be regarded as investigational or for  research.           Assessment:     1. Heart transplanted    2. Essential hypertension    3. Adverse effect of glucocorticoids and synthetic analogues, subsequent encounter    4. Long term current use of immunosuppressive drug        Plan:   S/p OHT  - doing very well, will follow up on immunosuppression levels and adjust dosing as indicated.   - will increase MMF to 1250 BID today and if tolerates will go to 1500mg BID  - Will plan on reducing prednisone to 2.5mg po daily and see how he does after Echo today and Allomap next week  - WIll f/u with dermatology if acne gets worse. Reminded about sun exposure.   - Increase physical activity, see how he does. Still not ready to resume work at this time. Will re-evaluate next month after continue rehab and physical activity.     Return instructions as set forth by post transplant schedule or as needed:    Clinic: Return for labs and/or biopsy weekly the first month, every two weeks during month 2 and then monthly for the first year at the provider or coordinator's discretion. During the second year, return to clinic every 3 months. Post transplant year 3-5 return every 6 months. There will be a comprehensive post transplant evaluation every year that may include LHC/RHC/biopsy, stress test, echo, CXR, and other health screening exams.    In addition to the clinical assessment, I have ordered Allomap testing for this patient to assist in identification of moderate/severe acute cellular rejection (ACR) in a pt with stable Allograft function instead of endomyocardial biopsy.     Patient is reminded to call with any health changes as these can be early  signs of transplant complications. Patient is advised to make sure any new medications or changes of old medications are discussed with a pharmacist or physician knowledgeable with transplant to avoid rejection/drug toxicity related to significant drug interactions.    UNOS Patient Status  Functional Status: 80% - Normal activity with effort: some symptoms of disease  Physical Capacity: No Limitations  Working for Income: No  If no, reason not working: Demands of Treatment      Guillermo Daniels MD

## 2018-06-22 NOTE — TELEPHONE ENCOUNTER
Called pt regarding high tacro level. Asked if he took tacro before lab, pt stated he did not take tacro before ab and took tacro at 8 pm the night before. Consulted with Dr. Daniels who asked to repeat level at [place] tomorrow. Will adjust on Monday, if needed.

## 2018-06-22 NOTE — LETTER
June 22, 2018        Guillermo Alejo  [address]  [facility] [address]  Phone: [phone]  Fax: [phone]             [facility]  [address]  [facility] [address]  Phone: [phone]   Patient: Mert Samayoa   MR Number: [id number]   YOB: 1990   Date of Visit: 6/22/2018       Dear Dr. Guillermo Alejo    Thank you for referring Mert Samayoa to me for evaluation. Attached you will find relevant portions of my assessment and plan of care.    If you have questions, please do not hesitate to call me. I look forward to following Mert Samayoa along with you.    Sincerely,    Guillermo Daniels MD    Enclosure    If you would like to receive this communication electronically, please contact [email or web address] or [phone] to request [employer] Link access.    [employer] Link is a tool which provides read-only access to select patient information with whom you have a relationship. Its easy to use and provides real time access to review your patients record including encounter summaries, notes, results, and demographic information.    If you feel you have received this communication in error or would no longer like to receive these types of communications, please e-mail [email or web address]

## 2018-06-23 ENCOUNTER — LAB VISIT (OUTPATIENT)
Dept: LAB | Facility: HOSPITAL | Age: 28
End: 2018-06-23
Attending: INTERNAL MEDICINE
Payer: COMMERCIAL

## 2018-06-23 DIAGNOSIS — Z95.811 HEART REPLACED BY HEART ASSIST DEVICE: ICD-10-CM

## 2018-06-23 DIAGNOSIS — Z94.1 STATUS POST HEART TRANSPLANT: ICD-10-CM

## 2018-06-23 LAB
ANION GAP SERPL CALC-SCNC: 8 MMOL/L
BUN SERPL-MCNC: 14 MG/DL
CALCIUM SERPL-MCNC: 9.7 MG/DL
CHLORIDE SERPL-SCNC: 107 MMOL/L
CO2 SERPL-SCNC: 28 MMOL/L
CREAT SERPL-MCNC: 1.1 MG/DL
EST. GFR  (AFRICAN AMERICAN): >60 ML/MIN/1.73 M^2
EST. GFR  (NON AFRICAN AMERICAN): >60 ML/MIN/1.73 M^2
GLUCOSE SERPL-MCNC: 89 MG/DL
POTASSIUM SERPL-SCNC: 4.7 MMOL/L
SODIUM SERPL-SCNC: 143 MMOL/L

## 2018-06-23 PROCEDURE — 36415 COLL VENOUS BLD VENIPUNCTURE: CPT

## 2018-06-23 PROCEDURE — 80048 BASIC METABOLIC PNL TOTAL CA: CPT

## 2018-06-23 PROCEDURE — 80197 ASSAY OF TACROLIMUS: CPT

## 2018-06-24 LAB — TACROLIMUS BLD-MCNC: 13.3 NG/ML

## 2018-06-29 ENCOUNTER — TELEPHONE (OUTPATIENT)
Dept: TRANSPLANT | Facility: CLINIC | Age: 28
End: 2018-06-29

## 2018-06-29 NOTE — TELEPHONE ENCOUNTER
Labs from   6 / 22-23 /18    Cr:1.1   WBC:  8.3     tacro level 18.6-> 13.3, explained to pt no change in Prograf at this time     Allomap 26   BNP 86     Echocardiogram: EF 60-65%  Plan going forward:  Next visit, if tolerating MMF increase to 1500 mg, pt stated that he went to 1250 mg bid at last visit and is doing ok with increase.     Reduce pred to 2.5mg, pt stated understanding regarding decrease in prednisone.     Check urine next visit for urine prot/creat ratio (CMV mismatch) and check CMV IgG, Reviewed with pt that he will be getting a urine test on 7/26/18, cmv igg linked to other labs.

## 2018-06-29 NOTE — TELEPHONE ENCOUNTER
----- Message from Francisca Davis MA sent at 6/29/2018  4:37 PM CDT -----  Contact: self      ----- Message -----  From: Monica Goetz MA  Sent: 6/29/2018   4:33 PM  To: [facility]    Pt. is calling to get results of labs done 6/23. Please call  pt.

## 2018-07-26 ENCOUNTER — OFFICE VISIT (OUTPATIENT)
Dept: TRANSPLANT | Facility: CLINIC | Age: 28
End: 2018-07-26
Payer: COMMERCIAL

## 2018-07-26 ENCOUNTER — HOSPITAL ENCOUNTER (OUTPATIENT)
Dept: CARDIOLOGY | Facility: CLINIC | Age: 28
Discharge: HOME | End: 2018-07-26
Attending: INTERNAL MEDICINE
Payer: COMMERCIAL

## 2018-07-26 VITALS
SYSTOLIC BLOOD PRESSURE: 124 MMHG | HEART RATE: 96 BPM | HEIGHT: 67 IN | DIASTOLIC BLOOD PRESSURE: 68 MMHG | BODY MASS INDEX: 25.53 KG/M2 | WEIGHT: 162.69 LBS

## 2018-07-26 DIAGNOSIS — T38.0X5D ADVERSE EFFECT OF GLUCOCORTICOIDS AND SYNTHETIC ANALOGUES, SUBSEQUENT ENCOUNTER: ICD-10-CM

## 2018-07-26 DIAGNOSIS — D84.9 IMMUNOSUPPRESSION: ICD-10-CM

## 2018-07-26 DIAGNOSIS — I10 ESSENTIAL HYPERTENSION: ICD-10-CM

## 2018-07-26 DIAGNOSIS — Z94.1 STATUS POST HEART TRANSPLANT: ICD-10-CM

## 2018-07-26 DIAGNOSIS — Z94.1 HEART TRANSPLANTED: Primary | ICD-10-CM

## 2018-07-26 DIAGNOSIS — Z79.899 LONG TERM CURRENT USE OF IMMUNOSUPPRESSIVE DRUG: ICD-10-CM

## 2018-07-26 LAB
DIASTOLIC DYSFUNCTION: NO
ESTIMATED PA SYSTOLIC PRESSURE: 19.97
RETIRED EF AND QEF - SEE NOTES: 55 (ref 55–65)

## 2018-07-26 PROCEDURE — 99999 PR PBB SHADOW E&M-EST. PATIENT-LVL III: CPT | Mod: PBBFAC,,, | Performed by: INTERNAL MEDICINE

## 2018-07-26 PROCEDURE — 93306 TTE W/DOPPLER COMPLETE: CPT | Mod: S$GLB,,, | Performed by: INTERNAL MEDICINE

## 2018-07-26 PROCEDURE — 99215 OFFICE O/P EST HI 40 MIN: CPT | Mod: S$GLB,,, | Performed by: INTERNAL MEDICINE

## 2018-07-26 NOTE — PROGRESS NOTES
PT seen in clinic with no c/o at this time. Pt no c/o N/V/D/F/C. Will talk about increasing cellcept to 1500 mg BID. PT feels well enough o go back to work. Discussed if ECHO, allomap and HLA are normal, will be cleared to go back to work.

## 2018-07-26 NOTE — LETTER
July 26, 2018        Guillermo Alejo  [address]  [facility] [address]  Phone: [phone]  Fax: [phone]             [facility]  [address]  [facility] [address]  Phone: [phone]   Patient: Mert Samayoa   MR Number: [id number]   YOB: 1990   Date of Visit: 7/26/2018       Dear Dr. Guillermo Alejo    Thank you for referring Mert Samayoa to me for evaluation. Attached you will find relevant portions of my assessment and plan of care.    If you have questions, please do not hesitate to call me. I look forward to following Mert Samayoa along with you.    Sincerely,    Roque Matos MD    Enclosure    If you would like to receive this communication electronically, please contact [email or web address] or [phone] to request [employer] Link access.    [employer] Link is a tool which provides read-only access to select patient information with whom you have a relationship. Its easy to use and provides real time access to review your patients record including encounter summaries, notes, results, and demographic information.    If you feel you have received this communication in error or would no longer like to receive these types of communications, please e-mail [email or web address]

## 2018-07-26 NOTE — PROGRESS NOTES
"Subjective:   Mr. Samayoa is a 28 y.o. year old White male who received a  - brain death heart transplant on 18.      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 S/P washout  who had his Removal of retained driveline through counterincision in early 2018.   He continues to do well, has acne but is improving with reduction of steroids. Denies N/V/f/C, lightheadedness, dizziness, PND, orthopnea, LE edema, abdominal pain, abdominal pressure, chest pain, chest pressure. DLES is completely healed. He has steadily put on wt (diet and prednisone). Overall he seems to be doing well overall. No complaints today.     Immunosuppression: tac 2 BID, MMF 1250 BID, pred 2.5 mg qd   Immuno goal levels:  tacro 10-15     Review of Systems   Constitution: Negative for decreased appetite, weight gain and weight loss.   Cardiovascular: Negative for chest pain, dyspnea on exertion, leg swelling, near-syncope, orthopnea and palpitations.   Respiratory: Negative for cough and shortness of breath.    Musculoskeletal: Negative for myalgias.   Gastrointestinal: Negative for jaundice.       Objective:   Blood pressure 124/68, pulse 96, height 5' 7" (1.702 m), weight 73.8 kg (162 lb 11.2 oz).body mass index is 25.48 kg/m².    Physical Exam   Constitutional: He is oriented to person, place, and time. He appears well-developed and well-nourished. He is active. He is not intubated.   HENT:   Head: Normocephalic and atraumatic. Hair is normal.   Right Ear: External ear normal.   Left Ear: External ear normal.   Nose: Nose normal. No nasal deformity. No epistaxis.  No foreign bodies.   Mouth/Throat: Mucous membranes are normal. Mucous membranes are not cyanotic. No oropharyngeal exudate.   Eyes: Conjunctivae and EOM are normal. Pupils are equal, round, and reactive to light.   Neck: Neck supple. No hepatojugular " reflux and no JVD present.   Cardiovascular: Normal rate, regular rhythm, normal heart sounds and normal pulses.  Exam reveals no gallop.    Pulmonary/Chest: Effort normal and breath sounds normal. No apnea and no tachypnea. He is not intubated. No respiratory distress. He exhibits no tenderness.   Abdominal: Soft. Normal appearance and bowel sounds are normal. There is no tenderness. No hernia.   Musculoskeletal: Normal range of motion.   Neurological: He is alert and oriented to person, place, and time. He displays no seizure activity.   Skin: Skin is warm, dry and intact. Rash (acne vulgaris) noted. No pallor.   Psychiatric: He has a normal mood and affect. His speech is normal and behavior is normal. Thought content normal. Cognition and memory are normal.   Nursing note and vitals reviewed.      Lab Results   Component Value Date    WBC 8.43 07/26/2018    HGB 15.2 07/26/2018    HCT 45.6 07/26/2018    MCV 91 07/26/2018     07/26/2018    CO2 26 07/26/2018    CREATININE 1.1 07/26/2018    CALCIUM 9.6 07/26/2018    ALBUMIN 4.8 07/26/2018    AST 31 07/26/2018     (H) 07/26/2018    ALT 28 07/26/2018    ALLOMAP See result image under hyperlink 06/22/2018       Lab Results   Component Value Date    INR 1.1 04/03/2018    INR 1.1 03/03/2018    INR 1.1 03/02/2018       BNP   Date Value Ref Range Status   07/26/2018 105 (H) 0 - 99 pg/mL Final     Comment:     Values of less than 100 pg/ml are consistent with non-CHF populations.   06/22/2018 86 0 - 99 pg/mL Final     Comment:     Values of less than 100 pg/ml are consistent with non-CHF populations.   05/23/2018 96 0 - 99 pg/mL Final     Comment:     Values of less than 100 pg/ml are consistent with non-CHF populations.         Tacrolimus Lvl   Date Value Ref Range Status   06/23/2018 13.3 5.0 - 15.0 ng/mL Final     Comment:     Testing performed by Liquid Chromatography-Tandem  Mass Spectrometry (LC-MS/MS).  This test was developed and its performance  characteristics  determined by [facility], Department of Pathology  and Laboratory Medicine in a manner consistent with CLIA  requirements. It has not been cleared or approved by the [place]  Food and Drug Administration.  This test is used for clinical   purposes.  It should not be regarded as investigational or for  research.           Assessment:     1. Heart transplanted    2. Essential hypertension    3. Immunosuppression    4. Adverse effect of glucocorticoids and synthetic analogues, subsequent encounter    5. Long term current use of immunosuppressive drug        Plan:   S/p OHT  - doing very well, will follow up on immunosuppression levels and adjust dosing as indicated.  -Increase MMF to 1500 mg bid     He can go back to work after echo allomap and HLA    Return instructions as set forth by post transplant schedule or as needed:    Clinic: Return for labs and/or biopsy weekly the first month, every two weeks during month 2 and then monthly for the first year at the provider or coordinator's discretion. During the second year, return to clinic every 3 months. Post transplant year 3-5 return every 6 months. There will be a comprehensive post transplant evaluation every year that may include LHC/RHC/biopsy, stress test, echo, CXR, and other health screening exams.    In addition to the clinical assessment, I have ordered Allomap testing for this patient to assist in identification of moderate/severe acute cellular rejection (ACR) in a pt with stable Allograft function instead of endomyocardial biopsy.     Patient is reminded to call with any health changes as these can be early signs of transplant complications. Patient is advised to make sure any new medications or changes of old medications are discussed with a pharmacist or physician knowledgeable with transplant to avoid rejection/drug toxicity related to significant drug interactions.    UNOS Patient Status  Functional Status: 80% - Normal  activity with effort: some symptoms of disease  Physical Capacity: No Limitations  Working for Income: No  If no, reason not working: Demands of Treatment      Roque Matos MD

## 2018-07-27 DIAGNOSIS — Z94.1 STATUS POST HEART TRANSPLANT: ICD-10-CM

## 2018-07-27 RX ORDER — TACROLIMUS 1 MG/1
CAPSULE ORAL
Qty: 90 CAPSULE | Refills: 11 | Status: SHIPPED | OUTPATIENT
Start: 2018-07-27 | End: 2018-08-09 | Stop reason: SDUPTHER

## 2018-07-27 NOTE — TELEPHONE ENCOUNTER
Reviewed chart with Dr. Hawkins, tacro level 15.1, pt on 2/2. Ordered to decrease tacro to 1/2 and repeat labs next week. Pt stated his understanding. Appt for labs made at [place] for Thursday, August 2.

## 2018-07-31 ENCOUNTER — CONFERENCE (OUTPATIENT)
Dept: TRANSPLANT | Facility: CLINIC | Age: 28
End: 2018-07-31

## 2018-07-31 DIAGNOSIS — Z94.1 STATUS POST HEART TRANSPLANT: ICD-10-CM

## 2018-07-31 RX ORDER — MYCOPHENOLATE MOFETIL 250 MG/1
1500 CAPSULE ORAL 2 TIMES DAILY
Qty: 300 CAPSULE | Refills: 11 | Status: SHIPPED | OUTPATIENT
Start: 2018-07-31 | End: 2018-09-12 | Stop reason: ALTCHOICE

## 2018-07-31 RX ORDER — PREDNISONE 1 MG/1
1 TABLET ORAL DAILY
Qty: 30 TABLET | Refills: 6 | Status: SHIPPED | OUTPATIENT
Start: 2018-07-31 | End: 2018-08-10

## 2018-07-31 NOTE — TELEPHONE ENCOUNTER
Spoke to pt and informed him of chart review with Dr. Hawkins. Decreased prednisone to 1 mg. Rechecking tacro level Thursday. Informed pt will stop pred at 6 months with EGBX 1 month after stopping pred. Pt asked if he can start working, I informed him, yes. He will have paper work sent to me to fill out.

## 2018-07-31 NOTE — TELEPHONE ENCOUNTER
Increase MMF to 1500mg po BID at last visit with Dr. Matos 18. Is additionally on 2.5mg prednisone and tacrolimus. tacro level was elevated at 15, asked to decrease prograf to . TTE normal LVEF.   Allomap 25. 5 month visit this weel.     To do the followin) Decrease prednisone to 1mg po daily.  2) follow up on repeat prograf level after dose change.   3) at 6 months, we will plan on moving to tac/rapa/mmf, once off prednisone.

## 2018-08-02 ENCOUNTER — LAB VISIT (OUTPATIENT)
Dept: LAB | Facility: HOSPITAL | Age: 28
End: 2018-08-02
Attending: INTERNAL MEDICINE
Payer: COMMERCIAL

## 2018-08-02 ENCOUNTER — TELEPHONE (OUTPATIENT)
Dept: TRANSPLANT | Facility: CLINIC | Age: 28
End: 2018-08-02

## 2018-08-02 DIAGNOSIS — Z94.1 STATUS POST HEART TRANSPLANT: ICD-10-CM

## 2018-08-02 LAB
ALBUMIN SERPL BCP-MCNC: 4.7 G/DL
ALP SERPL-CCNC: 71 U/L
ALT SERPL W/O P-5'-P-CCNC: 26 U/L
ANION GAP SERPL CALC-SCNC: 10 MMOL/L
AST SERPL-CCNC: 28 U/L
BASOPHILS # BLD AUTO: 0.02 K/UL
BASOPHILS NFR BLD: 0.3 %
BILIRUB SERPL-MCNC: 0.7 MG/DL
BUN SERPL-MCNC: 18 MG/DL
CALCIUM SERPL-MCNC: 9.6 MG/DL
CHLORIDE SERPL-SCNC: 106 MMOL/L
CO2 SERPL-SCNC: 26 MMOL/L
CREAT SERPL-MCNC: 1.2 MG/DL
DIFFERENTIAL METHOD: NORMAL
EOSINOPHIL # BLD AUTO: 0.1 K/UL
EOSINOPHIL NFR BLD: 1.2 %
ERYTHROCYTE [DISTWIDTH] IN BLOOD BY AUTOMATED COUNT: 13.5 %
EST. GFR  (AFRICAN AMERICAN): >60 ML/MIN/1.73 M^2
EST. GFR  (NON AFRICAN AMERICAN): >60 ML/MIN/1.73 M^2
GLUCOSE SERPL-MCNC: 89 MG/DL
HCT VFR BLD AUTO: 44.3 %
HGB BLD-MCNC: 14.9 G/DL
LYMPHOCYTES # BLD AUTO: 1.3 K/UL
LYMPHOCYTES NFR BLD: 19.6 %
MCH RBC QN AUTO: 30.3 PG
MCHC RBC AUTO-ENTMCNC: 33.6 G/DL
MCV RBC AUTO: 90 FL
MONOCYTES # BLD AUTO: 0.7 K/UL
MONOCYTES NFR BLD: 10.3 %
NEUTROPHILS # BLD AUTO: 4.6 K/UL
NEUTROPHILS NFR BLD: 68.6 %
PLATELET # BLD AUTO: 210 K/UL
PMV BLD AUTO: 9.5 FL
POTASSIUM SERPL-SCNC: 5 MMOL/L
PROT SERPL-MCNC: 6.9 G/DL
RBC # BLD AUTO: 4.91 M/UL
SODIUM SERPL-SCNC: 142 MMOL/L
TACROLIMUS BLD-MCNC: 9.8 NG/ML
WBC # BLD AUTO: 6.63 K/UL

## 2018-08-02 PROCEDURE — 36415 COLL VENOUS BLD VENIPUNCTURE: CPT

## 2018-08-02 PROCEDURE — 80053 COMPREHEN METABOLIC PANEL: CPT

## 2018-08-02 PROCEDURE — 80197 ASSAY OF TACROLIMUS: CPT

## 2018-08-02 PROCEDURE — 85025 COMPLETE CBC W/AUTO DIFF WBC: CPT

## 2018-08-02 NOTE — LETTER
August 2, 2018      [facility] [address] [place] [address]  Phone: [phone]       Patient: Mert Samayoa   YOB: 1990  Date of Visit: 7/26/2018    To Whom It May Concern:    Micheal Samayoa  was at [facility] on 7/26/2018. He may return to work on 8/3/2018 with no restrictions. If you have any questions or concerns, or if I can be of further assistance, please do not hesitate to contact Precious Spicer, Post Heart Transplant Coordinator at [phone].    Sincerely,        Lashon Hawkins MD  Co- Advanced Heart Failure/Heart Transplant  [facility]  [address] [place] [id number] [phone]

## 2018-08-03 ENCOUNTER — TELEPHONE (OUTPATIENT)
Dept: TRANSPLANT | Facility: CLINIC | Age: 28
End: 2018-08-03

## 2018-08-03 NOTE — TELEPHONE ENCOUNTER
Notified pt of chart review with Dr. Hawkins regarding tacro level of 9.8, goal 10-15. Dr Hawkins ordered to repeat labs next week. PT to get back to me which day he can go to [facility] for repeat labs.

## 2018-08-09 ENCOUNTER — LAB VISIT (OUTPATIENT)
Dept: LAB | Facility: HOSPITAL | Age: 28
End: 2018-08-09
Attending: INTERNAL MEDICINE
Payer: COMMERCIAL

## 2018-08-09 ENCOUNTER — PATIENT MESSAGE (OUTPATIENT)
Dept: TRANSPLANT | Facility: CLINIC | Age: 28
End: 2018-08-09

## 2018-08-09 DIAGNOSIS — Z94.1 STATUS POST HEART TRANSPLANT: ICD-10-CM

## 2018-08-09 LAB
ALBUMIN SERPL BCP-MCNC: 4.8 G/DL
ALP SERPL-CCNC: 76 U/L
ALT SERPL W/O P-5'-P-CCNC: 54 U/L
ANION GAP SERPL CALC-SCNC: 10 MMOL/L
AST SERPL-CCNC: 46 U/L
BASOPHILS # BLD AUTO: 0.02 K/UL
BASOPHILS NFR BLD: 0.3 %
BILIRUB SERPL-MCNC: 1.1 MG/DL
BUN SERPL-MCNC: 12 MG/DL
CALCIUM SERPL-MCNC: 9.5 MG/DL
CHLORIDE SERPL-SCNC: 108 MMOL/L
CO2 SERPL-SCNC: 24 MMOL/L
CREAT SERPL-MCNC: 1.1 MG/DL
DIFFERENTIAL METHOD: ABNORMAL
EOSINOPHIL # BLD AUTO: 0.1 K/UL
EOSINOPHIL NFR BLD: 1.2 %
ERYTHROCYTE [DISTWIDTH] IN BLOOD BY AUTOMATED COUNT: 13.6 %
EST. GFR  (AFRICAN AMERICAN): >60 ML/MIN/1.73 M^2
EST. GFR  (NON AFRICAN AMERICAN): >60 ML/MIN/1.73 M^2
GLUCOSE SERPL-MCNC: 92 MG/DL
HCT VFR BLD AUTO: 46.4 %
HGB BLD-MCNC: 15.3 G/DL
LYMPHOCYTES # BLD AUTO: 1.1 K/UL
LYMPHOCYTES NFR BLD: 14.5 %
MCH RBC QN AUTO: 29.8 PG
MCHC RBC AUTO-ENTMCNC: 33 G/DL
MCV RBC AUTO: 90 FL
MONOCYTES # BLD AUTO: 0.6 K/UL
MONOCYTES NFR BLD: 8.2 %
NEUTROPHILS # BLD AUTO: 5.6 K/UL
NEUTROPHILS NFR BLD: 75.8 %
PLATELET # BLD AUTO: 222 K/UL
PMV BLD AUTO: 10.1 FL
POTASSIUM SERPL-SCNC: 4 MMOL/L
PROT SERPL-MCNC: 6.9 G/DL
RBC # BLD AUTO: 5.14 M/UL
SODIUM SERPL-SCNC: 142 MMOL/L
TACROLIMUS BLD-MCNC: 9.6 NG/ML
WBC # BLD AUTO: 7.32 K/UL

## 2018-08-09 PROCEDURE — 80053 COMPREHEN METABOLIC PANEL: CPT

## 2018-08-09 PROCEDURE — 80197 ASSAY OF TACROLIMUS: CPT

## 2018-08-09 PROCEDURE — 36415 COLL VENOUS BLD VENIPUNCTURE: CPT

## 2018-08-09 PROCEDURE — 85025 COMPLETE CBC W/AUTO DIFF WBC: CPT

## 2018-08-09 NOTE — TELEPHONE ENCOUNTER
Reviewed plan to start rapamune with Holland Willams, pharm D and Dr. Hawkins. The plan is as follows:    1. Give 3 mg sirolimus x 1, followed by 1 mg daily thereafter.  2. Decrease tacrolimus to 1/1, on the day pt starts sirolimus.  3. Obtain weekly sirolimus/tac levels to obtain goal sirolimus 5-10. Once sirolimus goal obtained, aim for tac goal of 3-6.   4. Once 2 consecutive sirolimus/tacrolimus level within goal, reduce lab monitoring and stop MMF.  5. Discontinue amlodipine and replace with lisinopril 2.5 mg daily, to start. Titrate for SBP >130.     Discussed plan with pt and will send detailed message of changes in [facility] for pt and wife to review.

## 2018-08-10 RX ORDER — LISINOPRIL 2.5 MG/1
2.5 TABLET ORAL DAILY
Qty: 30 TABLET | Refills: 6 | Status: ON HOLD | OUTPATIENT
Start: 2018-08-10 | End: 2018-10-02 | Stop reason: HOSPADM

## 2018-08-10 RX ORDER — SIROLIMUS 1 MG/1
TABLET, FILM COATED ORAL
Qty: 33 TABLET | Refills: 11 | Status: SHIPPED | OUTPATIENT
Start: 2018-08-10 | End: 2018-08-15

## 2018-08-10 RX ORDER — TACROLIMUS 1 MG/1
CAPSULE ORAL
Qty: 60 CAPSULE | Refills: 11 | Status: SHIPPED | OUTPATIENT
Start: 2018-08-10 | End: 2018-08-27

## 2018-08-15 DIAGNOSIS — Z94.1 STATUS POST HEART TRANSPLANT: ICD-10-CM

## 2018-08-15 RX ORDER — SIROLIMUS 1 MG/1
TABLET, FILM COATED ORAL
Qty: 33 TABLET | Refills: 11 | Status: SHIPPED | OUTPATIENT
Start: 2018-08-15 | End: 2018-09-10 | Stop reason: SDUPTHER

## 2018-08-15 NOTE — TELEPHONE ENCOUNTER
Pt called regarding his pharmacy not filling rapamune due to expense. Will send a new script to [facility].

## 2018-08-17 ENCOUNTER — LAB VISIT (OUTPATIENT)
Dept: LAB | Facility: HOSPITAL | Age: 28
End: 2018-08-17
Attending: INTERNAL MEDICINE
Payer: COMMERCIAL

## 2018-08-17 ENCOUNTER — HOSPITAL ENCOUNTER (OUTPATIENT)
Dept: CARDIOLOGY | Facility: CLINIC | Age: 28
Discharge: HOME | End: 2018-08-17
Attending: INTERNAL MEDICINE
Payer: COMMERCIAL

## 2018-08-17 ENCOUNTER — SOCIAL WORK (OUTPATIENT)
Dept: TRANSPLANT | Facility: CLINIC | Age: 28
End: 2018-08-17
Payer: COMMERCIAL

## 2018-08-17 ENCOUNTER — OFFICE VISIT (OUTPATIENT)
Dept: TRANSPLANT | Facility: CLINIC | Age: 28
End: 2018-08-17
Payer: COMMERCIAL

## 2018-08-17 VITALS
SYSTOLIC BLOOD PRESSURE: 123 MMHG | DIASTOLIC BLOOD PRESSURE: 75 MMHG | WEIGHT: 166.69 LBS | HEIGHT: 67 IN | BODY MASS INDEX: 26.16 KG/M2 | HEART RATE: 97 BPM

## 2018-08-17 DIAGNOSIS — Z95.811 HEART REPLACED BY HEART ASSIST DEVICE: ICD-10-CM

## 2018-08-17 DIAGNOSIS — Z94.1 STATUS POST HEART TRANSPLANT: ICD-10-CM

## 2018-08-17 DIAGNOSIS — Z94.1 HEART TRANSPLANTED: Primary | ICD-10-CM

## 2018-08-17 DIAGNOSIS — I10 ESSENTIAL HYPERTENSION: ICD-10-CM

## 2018-08-17 DIAGNOSIS — D84.9 IMMUNOSUPPRESSION: ICD-10-CM

## 2018-08-17 LAB
ALBUMIN SERPL BCP-MCNC: 4.6 G/DL
ALP SERPL-CCNC: 82 U/L
ALT SERPL W/O P-5'-P-CCNC: 56 U/L
ANION GAP SERPL CALC-SCNC: 10 MMOL/L
AST SERPL-CCNC: 39 U/L
BASOPHILS # BLD AUTO: 0.01 K/UL
BASOPHILS NFR BLD: 0.2 %
BILIRUB SERPL-MCNC: 0.8 MG/DL
BNP SERPL-MCNC: 59 PG/ML
BUN SERPL-MCNC: 12 MG/DL
CALCIUM SERPL-MCNC: 9.1 MG/DL
CHLORIDE SERPL-SCNC: 107 MMOL/L
CHOLEST SERPL-MCNC: 99 MG/DL
CHOLEST/HDLC SERPL: 3 {RATIO}
CLASS I ANTIBODY COMMENTS - LUMINEX: NORMAL
CLASS II ANTIBODY COMMENTS - LUMINEX: NORMAL
CO2 SERPL-SCNC: 24 MMOL/L
CREAT SERPL-MCNC: 1 MG/DL
DIASTOLIC DYSFUNCTION: NO
DIFFERENTIAL METHOD: ABNORMAL
DSA1 TESTING DATE: NORMAL
DSA12 TESTING DATE: NORMAL
DSA2 TESTING DATE: NORMAL
EOSINOPHIL # BLD AUTO: 0.1 K/UL
EOSINOPHIL NFR BLD: 1.5 %
ERYTHROCYTE [DISTWIDTH] IN BLOOD BY AUTOMATED COUNT: 13 %
EST. GFR  (AFRICAN AMERICAN): >60 ML/MIN/1.73 M^2
EST. GFR  (NON AFRICAN AMERICAN): >60 ML/MIN/1.73 M^2
GLUCOSE SERPL-MCNC: 95 MG/DL
HCT VFR BLD AUTO: 45.5 %
HDLC SERPL-MCNC: 33 MG/DL
HDLC SERPL: 33.3 %
HGB BLD-MCNC: 14.7 G/DL
IMM GRANULOCYTES # BLD AUTO: 0.04 K/UL
IMM GRANULOCYTES NFR BLD AUTO: 0.7 %
LDLC SERPL CALC-MCNC: 48.6 MG/DL
LYMPHOCYTES # BLD AUTO: 0.9 K/UL
LYMPHOCYTES NFR BLD: 16.4 %
MAGNESIUM SERPL-MCNC: 1.7 MG/DL
MCH RBC QN AUTO: 29.2 PG
MCHC RBC AUTO-ENTMCNC: 32.3 G/DL
MCV RBC AUTO: 90 FL
MITRAL VALVE MOBILITY: NORMAL
MONOCYTES # BLD AUTO: 0.4 K/UL
MONOCYTES NFR BLD: 6.9 %
NEUTROPHILS # BLD AUTO: 4.1 K/UL
NEUTROPHILS NFR BLD: 74.3 %
NONHDLC SERPL-MCNC: 66 MG/DL
NRBC BLD-RTO: 0 /100 WBC
PLATELET # BLD AUTO: 210 K/UL
PMV BLD AUTO: 9.8 FL
POTASSIUM SERPL-SCNC: 4 MMOL/L
PROT SERPL-MCNC: 7 G/DL
RBC # BLD AUTO: 5.04 M/UL
RETIRED EF AND QEF - SEE NOTES: 58 (ref 55–65)
SERUM COLLECTION DT - LUMINEX CLASS I: NORMAL
SERUM COLLECTION DT - LUMINEX CLASS II: NORMAL
SODIUM SERPL-SCNC: 141 MMOL/L
TACROLIMUS BLD-MCNC: 10.4 NG/ML
TRICUSPID VALVE REGURGITATION: NORMAL
TRIGL SERPL-MCNC: 87 MG/DL
WBC # BLD AUTO: 5.5 K/UL

## 2018-08-17 PROCEDURE — 86832 HLA CLASS I HIGH DEFIN QUAL: CPT | Mod: 91,PO,NTX

## 2018-08-17 PROCEDURE — 85025 COMPLETE CBC W/AUTO DIFF WBC: CPT | Mod: NTX

## 2018-08-17 PROCEDURE — 99214 OFFICE O/P EST MOD 30 MIN: CPT | Mod: S$GLB,,, | Performed by: INTERNAL MEDICINE

## 2018-08-17 PROCEDURE — 86832 HLA CLASS I HIGH DEFIN QUAL: CPT | Mod: PO,NTX

## 2018-08-17 PROCEDURE — 80061 LIPID PANEL: CPT

## 2018-08-17 PROCEDURE — 99999 PR PBB SHADOW E&M-EST. PATIENT-LVL III: CPT | Mod: PBBFAC,,, | Performed by: INTERNAL MEDICINE

## 2018-08-17 PROCEDURE — 83880 ASSAY OF NATRIURETIC PEPTIDE: CPT | Mod: NTX

## 2018-08-17 PROCEDURE — 93306 TTE W/DOPPLER COMPLETE: CPT | Mod: ,,, | Performed by: INTERNAL MEDICINE

## 2018-08-17 PROCEDURE — 83735 ASSAY OF MAGNESIUM: CPT | Mod: NTX

## 2018-08-17 PROCEDURE — 86833 HLA CLASS II HIGH DEFIN QUAL: CPT | Mod: 91,PO,NTX

## 2018-08-17 PROCEDURE — 86977 RBC SERUM PRETX INCUBJ/INHIB: CPT | Mod: 91,PO,NTX

## 2018-08-17 PROCEDURE — 86833 HLA CLASS II HIGH DEFIN QUAL: CPT | Mod: PO,NTX

## 2018-08-17 PROCEDURE — 80053 COMPREHEN METABOLIC PANEL: CPT

## 2018-08-17 PROCEDURE — 80197 ASSAY OF TACROLIMUS: CPT | Mod: NTX

## 2018-08-17 NOTE — Clinical Note
August 17, 2018        Guillermo Alejo  [address]  [facility] [address]  Phone: [phone]  Fax: [phone]             [facility]  [address]  [facility] [address]  Phone: [phone]   Patient: Mert Samayoa   MR Number: [id number]   YOB: 1990   Date of Visit: 8/17/2018       Dear Dr. Guillermo Alejo    Thank you for referring Mert Samayoa to me for evaluation. Attached you will find relevant portions of my assessment and plan of care.    If you have questions, please do not hesitate to call me. I look forward to following Mert Samayoa along with you.    Sincerely,    Sammi Tapia MD    Enclosure    If you would like to receive this communication electronically, please contact [email or web address] or [phone] to request [employer] Link access.    [facility] Link is a tool which provides read-only access to select patient information with whom you have a relationship. Its easy to use and provides real time access to review your patients record including encounter summaries, notes, results, and demographic information.    If you feel you have received this communication in error or would no longer like to receive these types of communications, please e-mail [email or web address]

## 2018-08-17 NOTE — PROGRESS NOTES
"Subjective:   Mr. Samayoa is a 28 y.o. year old White male who received a  - brain death heart transplant on 18.      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 S/P washout  who had his Removal of retained driveline through counterincision in early 2018. Patient is been seen today on his 6 mo post Shanae follow up. Patient has been doing ok.Patient has denied fever,chills, nausea or vomiting. Patient has denied palpitations or dizziness. Patient has denied any major limitations on his ADLs and has been compliant with medical therapy and has denied any major side effect. Patient currently on TAC /, Cellcept 1500 mg PO BID and prednisone 1 mg and prophylaxis with valgancyclovir. Cell counts are adequate. Patient with stable renal function and no major lytes disturbances. Pending Allomap and TAC levels. BNP is normal. 2D echo reviewed and showed adequate graft function with no evidence of pericardial effusion of significant valvulopathies.          Review of Systems   Constitution: Negative for chills, diaphoresis, fever, weakness, night sweats, weight gain and weight loss.   Cardiovascular: Negative for chest pain, cyanosis, dyspnea on exertion, irregular heartbeat, leg swelling, orthopnea and palpitations.   Respiratory: Negative for cough, hemoptysis and shortness of breath.    Hematologic/Lymphatic: Negative for adenopathy.   Musculoskeletal: Negative for arthritis and back pain.   Gastrointestinal: Negative for abdominal pain, anorexia, hematemesis, hematochezia, hemorrhoids, nausea and vomiting.   Neurological: Negative for dizziness, focal weakness and headaches.       Objective:   Blood pressure 123/75, pulse 97, height 5' 7" (1.702 m), weight 75.6 kg (166 lb 10.7 oz).body mass index is 26.1 kg/m².    Physical Exam   Constitutional: He is oriented to person, place, and time. He " appears well-developed.   HENT:   Head: Normocephalic.   Eyes: Pupils are equal, round, and reactive to light.   Neck: Normal range of motion. No JVD present.   Cardiovascular: Normal rate and normal heart sounds.   Pulmonary/Chest: Effort normal. No respiratory distress. He has no wheezes. He has no rales.   Abdominal: Soft.   Musculoskeletal: Normal range of motion. He exhibits no edema.   Neurological: He is alert and oriented to person, place, and time. No cranial nerve deficit.   Skin: Skin is warm. No erythema.       Lab Results   Component Value Date    WBC 5.50 08/17/2018    HGB 14.7 08/17/2018    HCT 45.5 08/17/2018    MCV 90 08/17/2018     08/17/2018    CO2 24 08/17/2018    CREATININE 1.0 08/17/2018    CALCIUM 9.1 08/17/2018    ALBUMIN 4.6 08/17/2018    AST 39 08/17/2018    BNP 59 08/17/2018    ALT 56 (H) 08/17/2018    ALLOMAP See result image under hyperlink 07/26/2018       Lab Results   Component Value Date    INR 1.1 04/03/2018    INR 1.1 03/03/2018    INR 1.1 03/02/2018       BNP   Date Value Ref Range Status   08/17/2018 59 0 - 99 pg/mL Final     Comment:     Values of less than 100 pg/ml are consistent with non-CHF populations.   07/26/2018 105 (H) 0 - 99 pg/mL Final     Comment:     Values of less than 100 pg/ml are consistent with non-CHF populations.   06/22/2018 86 0 - 99 pg/mL Final     Comment:     Values of less than 100 pg/ml are consistent with non-CHF populations.       LD   Date Value Ref Range Status   01/25/2018 218 110 - 260 U/L Final     Comment:     Results are increased in hemolyzed samples.   12/28/2017 215 110 - 260 U/L Final     Comment:     Results are increased in hemolyzed samples.   11/29/2017 207 110 - 260 U/L Final     Comment:     Results are increased in hemolyzed samples.       Tacrolimus Lvl   Date Value Ref Range Status   08/09/2018 9.6 5.0 - 15.0 ng/mL Final     Comment:     Testing performed by Liquid Chromatography-Tandem  Mass Spectrometry (LC-MS/MS).  This  test was developed and its performance characteristics  determined by [facility], Department of Pathology  and Laboratory Medicine in a manner consistent with CLIA  requirements. It has not been cleared or approved by the [place]  Food and Drug Administration.  This test is used for clinical   purposes.  It should not be regarded as investigational or for  research.         Assessment:     1. Heart transplanted    2. Essential hypertension    3. Immunosuppression        Plan:   -Patient expected to start Rapamune tomorrow with plan to continue with TAC/Rap regimen once on therapeutic levels of both drugs, meanwhile while continue with Cellcept.  -Pending Allomap to decide if will d/c prednisone.  -Pending TAC levels.  -Continue with remaining medical therapy.  -Clinic: Return for labs and/or biopsy weekly the first month, every two weeks during month 2 and then monthly for the first year at the provider or coordinator's discretion. During the second year, return to clinic every 3 months. Post transplant year 3-5 return every 6 months. There will be a comprehensive post transplant evaluation every year that may include LHC/RHC/biopsy, stress test, echo, CXR, and other health screening exams.  -In addition to the clinical assessment, I have ordered Allomap testing for this patient to assist in identification of moderate/severe acute cellular rejection (ACR) in a pt with stable Allograft function instead of endomyocardial biopsy.   -Patient is reminded to call with any health changes as these can be early signs of transplant complications. Patient is advised to make sure any new medications or changes of old medications are discussed with a pharmacist or physician knowledgeable with transplant to avoid rejection/drug toxicity related to significant drug interactions.    UNOS Patient Status  Functional Status: 100% - Normal, no complaints, no evidence of disease  Physical Capacity: No Limitations  Working for  Income: Unknown    Celestino Eldridge MD PGY-7    Patient seen and examined with Dr. Collins. Agree with his findings, assessment and plan. Doing really well. 6 months post Tx. As mentioned by Dr. Collins plan is to do Tacro/Rapamune. Start rapamune tomorrow. If all labs including Allomap are WNL will DC Prednisone.     Sammi Tapia MD

## 2018-08-17 NOTE — PROGRESS NOTES
"SW met with pt during clinic visit today.  Pt is now 6 months post-transplant and was recently cleared to return to work.  Pt presents in clinic alone, aao x4, pleasant, calm, cooperative, and asking and answering questions appropriately.  Pt reports transition back to work has gone well.  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  Pt reports work is "a lot of driving" and states it has been "hot," but is otherwise going well.  Pt reports feeling well physically and mentally with return to work.  Pt states he is back at work full-time and will be resuming part-time classes (one face-to-face and one online) in a few weeks.  Pt states he is glad to be "getting back to normal life."    Pt reports he has been coping well since transplant with support from wife, brothers (both of whom have had heart transplants), and online support from fellow transplant recipients.  Pt does report feelings of "guilt" at times from receiving organ.  Pt reports this happens occasionally and he nikita with support from above mentioned social support system.  SW providing active listening and validation to pt today.  Pt reports coping well overall at this time, and denies any needs or concerns to SW.  RUFINO updated post-tx nurse no Spicer.  SW remains available.  "

## 2018-08-20 LAB
ALLOMAP TEST SCORE: NORMAL
CMV DNA SERPL NAA+PROBE-ACNC: NORMAL IU/ML

## 2018-08-22 ENCOUNTER — TELEPHONE (OUTPATIENT)
Dept: TRANSPLANT | Facility: CLINIC | Age: 28
End: 2018-08-22

## 2018-08-22 DIAGNOSIS — Z94.1 STATUS POST HEART TRANSPLANT: Primary | ICD-10-CM

## 2018-08-23 ENCOUNTER — TELEPHONE (OUTPATIENT)
Dept: TRANSPLANT | Facility: CLINIC | Age: 28
End: 2018-08-23

## 2018-08-23 LAB
C1Q1 TESTING DATE: NORMAL
C1Q1 TESTING DATE: NORMAL
C1Q2 TESTING DATE: NORMAL
CLASS I ANTIBODY COMMENTS - LUMINEX: NORMAL
CLASS II ANTIBODY COMMENTS - LUMINEX: NORMAL
SERUM COLLECTION DT - LUMINEX CLASS I: NORMAL
SERUM COLLECTION DT - LUMINEX CLASS II: NORMAL

## 2018-08-23 NOTE — TELEPHONE ENCOUNTER
Chart reviewed with Dr. Hawkins. 6 months post.   Stop prednisone. Sirolimus and tac check tomorrow at [facility].  Pt on tac 1/2, cellcept 1500 mg BID, sirolimus 1 mg, bactrim daily.    Pt stated his understanding to stopping prednisone.

## 2018-08-24 ENCOUNTER — LAB VISIT (OUTPATIENT)
Dept: LAB | Facility: HOSPITAL | Age: 28
End: 2018-08-24
Attending: INTERNAL MEDICINE
Payer: COMMERCIAL

## 2018-08-24 DIAGNOSIS — Z94.1 STATUS POST HEART TRANSPLANT: ICD-10-CM

## 2018-08-24 LAB
ALBUMIN SERPL BCP-MCNC: 4.6 G/DL
ALP SERPL-CCNC: 81 U/L
ALT SERPL W/O P-5'-P-CCNC: 36 U/L
ANION GAP SERPL CALC-SCNC: 10 MMOL/L
AST SERPL-CCNC: 28 U/L
BASOPHILS # BLD AUTO: 0.02 K/UL
BASOPHILS NFR BLD: 0.3 %
BILIRUB SERPL-MCNC: 0.7 MG/DL
BUN SERPL-MCNC: 15 MG/DL
CALCIUM SERPL-MCNC: 9.6 MG/DL
CHLORIDE SERPL-SCNC: 107 MMOL/L
CO2 SERPL-SCNC: 25 MMOL/L
CREAT SERPL-MCNC: 1.1 MG/DL
DIFFERENTIAL METHOD: ABNORMAL
EOSINOPHIL # BLD AUTO: 0.1 K/UL
EOSINOPHIL NFR BLD: 1.4 %
ERYTHROCYTE [DISTWIDTH] IN BLOOD BY AUTOMATED COUNT: 13.1 %
EST. GFR  (AFRICAN AMERICAN): >60 ML/MIN/1.73 M^2
EST. GFR  (NON AFRICAN AMERICAN): >60 ML/MIN/1.73 M^2
GLUCOSE SERPL-MCNC: 96 MG/DL
HCT VFR BLD AUTO: 44.4 %
HGB BLD-MCNC: 14.7 G/DL
LYMPHOCYTES # BLD AUTO: 1.1 K/UL
LYMPHOCYTES NFR BLD: 16.2 %
MCH RBC QN AUTO: 29.7 PG
MCHC RBC AUTO-ENTMCNC: 33.1 G/DL
MCV RBC AUTO: 90 FL
MONOCYTES # BLD AUTO: 0.6 K/UL
MONOCYTES NFR BLD: 9.1 %
NEUTROPHILS # BLD AUTO: 4.8 K/UL
NEUTROPHILS NFR BLD: 73 %
PLATELET # BLD AUTO: 214 K/UL
PMV BLD AUTO: 9.8 FL
POTASSIUM SERPL-SCNC: 4.6 MMOL/L
PROT SERPL-MCNC: 6.9 G/DL
RBC # BLD AUTO: 4.95 M/UL
SODIUM SERPL-SCNC: 142 MMOL/L
TACROLIMUS BLD-MCNC: 8.2 NG/ML
WBC # BLD AUTO: 6.6 K/UL

## 2018-08-24 PROCEDURE — 85025 COMPLETE CBC W/AUTO DIFF WBC: CPT

## 2018-08-24 PROCEDURE — 80195 ASSAY OF SIROLIMUS: CPT

## 2018-08-24 PROCEDURE — 36415 COLL VENOUS BLD VENIPUNCTURE: CPT

## 2018-08-24 PROCEDURE — 80053 COMPREHEN METABOLIC PANEL: CPT

## 2018-08-24 PROCEDURE — 80197 ASSAY OF TACROLIMUS: CPT

## 2018-08-25 LAB — SIROLIMUS BLD-MCNC: 8.2 NG/ML

## 2018-08-27 DIAGNOSIS — Z94.1 STATUS POST HEART TRANSPLANT: ICD-10-CM

## 2018-08-27 RX ORDER — TACROLIMUS 0.5 MG/1
CAPSULE ORAL
Qty: 90 CAPSULE | Refills: 11 | Status: ON HOLD | OUTPATIENT
Start: 2018-08-27 | End: 2018-10-02 | Stop reason: HOSPADM

## 2018-08-27 NOTE — TELEPHONE ENCOUNTER
Reviewed tacro level with Dr. Hawkins, tacro is 8.2, goal is 3-6. Ordered to decrease tac to 0.5/1. Repeat on Saturday at [facility]  Pt stated his understanding.

## 2018-09-01 ENCOUNTER — LAB VISIT (OUTPATIENT)
Dept: LAB | Facility: HOSPITAL | Age: 28
End: 2018-09-01
Attending: INTERNAL MEDICINE
Payer: COMMERCIAL

## 2018-09-01 DIAGNOSIS — Z94.1 STATUS POST HEART TRANSPLANT: ICD-10-CM

## 2018-09-01 PROCEDURE — 80195 ASSAY OF SIROLIMUS: CPT

## 2018-09-01 PROCEDURE — 80197 ASSAY OF TACROLIMUS: CPT

## 2018-09-01 PROCEDURE — 36415 COLL VENOUS BLD VENIPUNCTURE: CPT

## 2018-09-02 LAB
SIROLIMUS BLD-MCNC: 10 NG/ML
TACROLIMUS BLD-MCNC: 3.7 NG/ML

## 2018-09-07 ENCOUNTER — LAB VISIT (OUTPATIENT)
Dept: LAB | Facility: HOSPITAL | Age: 28
End: 2018-09-07
Attending: SURGERY
Payer: COMMERCIAL

## 2018-09-07 DIAGNOSIS — Z94.1 STATUS POST HEART TRANSPLANT: ICD-10-CM

## 2018-09-07 LAB
ALBUMIN SERPL BCP-MCNC: 4.4 G/DL
ALP SERPL-CCNC: 88 U/L
ALT SERPL W/O P-5'-P-CCNC: 28 U/L
ANION GAP SERPL CALC-SCNC: 9 MMOL/L
AST SERPL-CCNC: 27 U/L
BASOPHILS # BLD AUTO: 0.02 K/UL
BASOPHILS NFR BLD: 0.3 %
BILIRUB SERPL-MCNC: 0.8 MG/DL
BUN SERPL-MCNC: 12 MG/DL
CALCIUM SERPL-MCNC: 9.2 MG/DL
CHLORIDE SERPL-SCNC: 106 MMOL/L
CO2 SERPL-SCNC: 26 MMOL/L
CREAT SERPL-MCNC: 1.2 MG/DL
DIFFERENTIAL METHOD: ABNORMAL
EOSINOPHIL # BLD AUTO: 0.1 K/UL
EOSINOPHIL NFR BLD: 1.2 %
ERYTHROCYTE [DISTWIDTH] IN BLOOD BY AUTOMATED COUNT: 12 %
EST. GFR  (AFRICAN AMERICAN): >60 ML/MIN/1.73 M^2
EST. GFR  (NON AFRICAN AMERICAN): >60 ML/MIN/1.73 M^2
GLUCOSE SERPL-MCNC: 97 MG/DL
HCT VFR BLD AUTO: 43.9 %
HGB BLD-MCNC: 14.6 G/DL
LYMPHOCYTES # BLD AUTO: 1 K/UL
LYMPHOCYTES NFR BLD: 13.6 %
MCH RBC QN AUTO: 29.3 PG
MCHC RBC AUTO-ENTMCNC: 33.3 G/DL
MCV RBC AUTO: 88 FL
MONOCYTES # BLD AUTO: 1.1 K/UL
MONOCYTES NFR BLD: 14.6 %
NEUTROPHILS # BLD AUTO: 5.2 K/UL
NEUTROPHILS NFR BLD: 70.3 %
PLATELET # BLD AUTO: 206 K/UL
PMV BLD AUTO: 9.7 FL
POTASSIUM SERPL-SCNC: 4 MMOL/L
PROT SERPL-MCNC: 6.9 G/DL
RBC # BLD AUTO: 4.99 M/UL
SODIUM SERPL-SCNC: 141 MMOL/L
WBC # BLD AUTO: 7.38 K/UL

## 2018-09-07 PROCEDURE — 80197 ASSAY OF TACROLIMUS: CPT

## 2018-09-07 PROCEDURE — 36415 COLL VENOUS BLD VENIPUNCTURE: CPT

## 2018-09-07 PROCEDURE — 80195 ASSAY OF SIROLIMUS: CPT

## 2018-09-07 PROCEDURE — 85025 COMPLETE CBC W/AUTO DIFF WBC: CPT

## 2018-09-07 PROCEDURE — 80053 COMPREHEN METABOLIC PANEL: CPT

## 2018-09-08 LAB
SIROLIMUS BLD-MCNC: 9.9 NG/ML
TACROLIMUS BLD-MCNC: 4.1 NG/ML

## 2018-09-10 DIAGNOSIS — Z94.1 STATUS POST HEART TRANSPLANT: ICD-10-CM

## 2018-09-10 RX ORDER — SIROLIMUS 1 MG/1
TABLET, FILM COATED ORAL
Qty: 30 TABLET | Refills: 11 | Status: ON HOLD | OUTPATIENT
Start: 2018-09-10 | End: 2018-10-02 | Stop reason: HOSPADM

## 2018-09-12 ENCOUNTER — TELEPHONE (OUTPATIENT)
Dept: TRANSPLANT | Facility: CLINIC | Age: 28
End: 2018-09-12

## 2018-09-12 NOTE — TELEPHONE ENCOUNTER
Called pt to inform him to stop cellcept since his sirolimus and tacro levels have been therapeutic. Advised him not to throw away, just incase he needs to restart at a later date.. Pt stated his understanding.

## 2018-09-18 ENCOUNTER — LAB VISIT (OUTPATIENT)
Dept: LAB | Facility: HOSPITAL | Age: 28
End: 2018-09-18
Attending: INTERNAL MEDICINE
Payer: COMMERCIAL

## 2018-09-18 ENCOUNTER — HOSPITAL ENCOUNTER (OUTPATIENT)
Dept: CARDIOLOGY | Facility: CLINIC | Age: 28
Discharge: HOME | End: 2018-09-18
Attending: INTERNAL MEDICINE
Payer: COMMERCIAL

## 2018-09-18 ENCOUNTER — OFFICE VISIT (OUTPATIENT)
Dept: TRANSPLANT | Facility: CLINIC | Age: 28
End: 2018-09-18
Payer: COMMERCIAL

## 2018-09-18 VITALS
BODY MASS INDEX: 25.67 KG/M2 | HEART RATE: 95 BPM | DIASTOLIC BLOOD PRESSURE: 63 MMHG | HEIGHT: 67 IN | SYSTOLIC BLOOD PRESSURE: 112 MMHG | WEIGHT: 163.56 LBS

## 2018-09-18 DIAGNOSIS — T38.0X5D ADVERSE EFFECT OF GLUCOCORTICOIDS AND SYNTHETIC ANALOGUES, SUBSEQUENT ENCOUNTER: ICD-10-CM

## 2018-09-18 DIAGNOSIS — Z94.1 HEART TRANSPLANTED: Primary | ICD-10-CM

## 2018-09-18 DIAGNOSIS — Z95.811 HEART REPLACED BY HEART ASSIST DEVICE: ICD-10-CM

## 2018-09-18 DIAGNOSIS — Z94.1 STATUS POST HEART TRANSPLANT: ICD-10-CM

## 2018-09-18 DIAGNOSIS — Z94.1 STATUS POST HEART TRANSPLANT: Primary | ICD-10-CM

## 2018-09-18 LAB
ALBUMIN SERPL BCP-MCNC: 4.4 G/DL
ALP SERPL-CCNC: 104 U/L
ALT SERPL W/O P-5'-P-CCNC: 35 U/L
ANION GAP SERPL CALC-SCNC: 8 MMOL/L
ANISOCYTOSIS BLD QL SMEAR: SLIGHT
AST SERPL-CCNC: 26 U/L
BASOPHILS # BLD AUTO: 0.02 K/UL
BASOPHILS NFR BLD: 0.9 %
BILIRUB SERPL-MCNC: 1 MG/DL
BNP SERPL-MCNC: 58 PG/ML
BUN SERPL-MCNC: 10 MG/DL
CALCIUM SERPL-MCNC: 9.8 MG/DL
CHLORIDE SERPL-SCNC: 106 MMOL/L
CO2 SERPL-SCNC: 28 MMOL/L
CREAT SERPL-MCNC: 1.1 MG/DL
DIASTOLIC DYSFUNCTION: NO
DIFFERENTIAL METHOD: ABNORMAL
EOSINOPHIL # BLD AUTO: 0.1 K/UL
EOSINOPHIL NFR BLD: 2.8 %
ERYTHROCYTE [DISTWIDTH] IN BLOOD BY AUTOMATED COUNT: 11.4 %
EST. GFR  (AFRICAN AMERICAN): >60 ML/MIN/1.73 M^2
EST. GFR  (NON AFRICAN AMERICAN): >60 ML/MIN/1.73 M^2
ESTIMATED PA SYSTOLIC PRESSURE: 19.81
GLUCOSE SERPL-MCNC: 97 MG/DL
HCT VFR BLD AUTO: 45.5 %
HGB BLD-MCNC: 14.6 G/DL
IMM GRANULOCYTES # BLD AUTO: 0.03 K/UL
IMM GRANULOCYTES NFR BLD AUTO: 1.4 %
LYMPHOCYTES # BLD AUTO: 0.4 K/UL
LYMPHOCYTES NFR BLD: 20.3 %
MAGNESIUM SERPL-MCNC: 2.2 MG/DL
MCH RBC QN AUTO: 28.5 PG
MCHC RBC AUTO-ENTMCNC: 32.1 G/DL
MCV RBC AUTO: 89 FL
MONOCYTES # BLD AUTO: 0.7 K/UL
MONOCYTES NFR BLD: 30 %
NEUTROPHILS # BLD AUTO: 1 K/UL
NEUTROPHILS NFR BLD: 44.6 %
NRBC BLD-RTO: 0 /100 WBC
PHOSPHATE SERPL-MCNC: 3.1 MG/DL
PLATELET # BLD AUTO: 184 K/UL
PLATELET BLD QL SMEAR: ABNORMAL
PMV BLD AUTO: 10 FL
POTASSIUM SERPL-SCNC: 4.3 MMOL/L
PROT SERPL-MCNC: 7.2 G/DL
RBC # BLD AUTO: 5.12 M/UL
RETIRED EF AND QEF - SEE NOTES: 65 (ref 55–65)
SIROLIMUS BLD-MCNC: 5.7 NG/ML
SODIUM SERPL-SCNC: 142 MMOL/L
TACROLIMUS BLD-MCNC: 3 NG/ML
TRICUSPID VALVE REGURGITATION: NORMAL
WBC # BLD AUTO: 2.17 K/UL

## 2018-09-18 PROCEDURE — 85025 COMPLETE CBC W/AUTO DIFF WBC: CPT

## 2018-09-18 PROCEDURE — 36415 COLL VENOUS BLD VENIPUNCTURE: CPT

## 2018-09-18 PROCEDURE — 99214 OFFICE O/P EST MOD 30 MIN: CPT | Mod: S$GLB,,, | Performed by: INTERNAL MEDICINE

## 2018-09-18 PROCEDURE — 83735 ASSAY OF MAGNESIUM: CPT

## 2018-09-18 PROCEDURE — 80053 COMPREHEN METABOLIC PANEL: CPT

## 2018-09-18 PROCEDURE — 84100 ASSAY OF PHOSPHORUS: CPT

## 2018-09-18 PROCEDURE — 80197 ASSAY OF TACROLIMUS: CPT

## 2018-09-18 PROCEDURE — 80195 ASSAY OF SIROLIMUS: CPT

## 2018-09-18 PROCEDURE — 93306 TTE W/DOPPLER COMPLETE: CPT | Mod: S$GLB,,, | Performed by: INTERNAL MEDICINE

## 2018-09-18 PROCEDURE — 83880 ASSAY OF NATRIURETIC PEPTIDE: CPT

## 2018-09-18 PROCEDURE — 99999 PR PBB SHADOW E&M-EST. PATIENT-LVL III: CPT | Mod: PBBFAC,,, | Performed by: INTERNAL MEDICINE

## 2018-09-18 NOTE — LETTER
September 18, 2018        Guillermo Alejo  [address]  [facility] [address]  Phone: [phone]  Fax: [phone]             [facility]  [address]  [facility] [address]  Phone: [phone]   Patient: Mert Samayoa   MR Number: [id number]   YOB: 1990   Date of Visit: 9/18/2018       Dear Dr. Guillermo Alejo    Thank you for referring Mert Samayoa to me for evaluation. Attached you will find relevant portions of my assessment and plan of care.    If you have questions, please do not hesitate to call me. I look forward to following Mert Samayoa along with you.    Sincerely,    Mario Conner MD    Enclosure    If you would like to receive this communication electronically, please contact [email or web address] or [phone] to request [employer] Link access.    [employer] Link is a tool which provides read-only access to select patient information with whom you have a relationship. Its easy to use and provides real time access to review your patients record including encounter summaries, notes, results, and demographic information.    If you feel you have received this communication in error or would no longer like to receive these types of communications, please e-mail [email or web address]

## 2018-09-18 NOTE — PROGRESS NOTES
"Subjective:   Mr. Samayoa is a 28 y.o. year old White male who received a  - brain death heart transplant on 18.      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 S/P washout  who had his Removal of retained driveline through counterincision in early 2018. Patient is been seen today on his 7 mo post Shanae follow u  At his last visit we decided to transition from a tacro / rapammune (replaces prednisone and cellcept) to help with CMV coverage as he was stopping valcyte.            Today reports that he has no side effects from above transition except for a low WBC   Current immuno Patient currently on TAC 05 BID (3 to 6), rapammune 1 qd (5 to 10)   ECHO     1 - Normal left ventricular systolic function (EF 60-65%).     2 - No wall motion abnormalities.     3 - Normal left ventricular diastolic function.     4 - Normal right ventricular systolic function .     5 - The estimated PA systolic pressure is 20 mmHg.     6 - Trivial to mild tricuspid regurgitation.     7 - Post-cardiac transplantation study.           Review of Systems   Constitution: Negative for chills, diaphoresis, fever, weakness, night sweats, weight gain and weight loss.   HENT: Positive for sore throat (has a small sore behing left lower molar).    Cardiovascular: Negative for chest pain, cyanosis, dyspnea on exertion, irregular heartbeat, leg swelling, orthopnea and palpitations.   Respiratory: Negative for cough, hemoptysis and shortness of breath.    Hematologic/Lymphatic: Negative for adenopathy.   Musculoskeletal: Negative for arthritis and back pain.   Gastrointestinal: Negative for abdominal pain, anorexia, hematemesis, hematochezia, hemorrhoids, nausea and vomiting.   Neurological: Negative for dizziness, focal weakness and headaches.       Objective:   Blood pressure 112/63, pulse 95, height 5' 7" (1.702 m), weight " 74.2 kg (163 lb 9.3 oz).body mass index is 25.62 kg/m².    Physical Exam   Constitutional: He is oriented to person, place, and time. He appears well-developed.   HENT:   Head: Normocephalic.   Eyes: Pupils are equal, round, and reactive to light.   Neck: Normal range of motion. No JVD present.   Cardiovascular: Normal rate and normal heart sounds.   Pulmonary/Chest: Effort normal. No respiratory distress. He has no wheezes. He has no rales.   Abdominal: Soft.   Musculoskeletal: Normal range of motion. He exhibits no edema.   Neurological: He is alert and oriented to person, place, and time. No cranial nerve deficit.   Skin: Skin is warm. No erythema.       Lab Results   Component Value Date    WBC 2.17 (L) 09/18/2018    HGB 14.6 09/18/2018    HCT 45.5 09/18/2018    MCV 89 09/18/2018     09/18/2018    CO2 28 09/18/2018    CREATININE 1.1 09/18/2018    CALCIUM 9.8 09/18/2018    ALBUMIN 4.4 09/18/2018    AST 26 09/18/2018    BNP 58 09/18/2018    ALT 35 09/18/2018    ALLOMAP See result image under hyperlink 08/17/2018       Lab Results   Component Value Date    INR 1.1 04/03/2018    INR 1.1 03/03/2018    INR 1.1 03/02/2018       BNP   Date Value Ref Range Status   09/18/2018 58 0 - 99 pg/mL Final     Comment:     Values of less than 100 pg/ml are consistent with non-CHF populations.   08/17/2018 59 0 - 99 pg/mL Final     Comment:     Values of less than 100 pg/ml are consistent with non-CHF populations.   07/26/2018 105 (H) 0 - 99 pg/mL Final     Comment:     Values of less than 100 pg/ml are consistent with non-CHF populations.       LD   Date Value Ref Range Status   01/25/2018 218 110 - 260 U/L Final     Comment:     Results are increased in hemolyzed samples.   12/28/2017 215 110 - 260 U/L Final     Comment:     Results are increased in hemolyzed samples.   11/29/2017 207 110 - 260 U/L Final     Comment:     Results are increased in hemolyzed samples.       Tacrolimus Lvl   Date Value Ref Range Status    09/07/2018 4.1 (L) 5.0 - 15.0 ng/mL Final     Comment:     Testing performed by Liquid Chromatography-Tandem  Mass Spectrometry (LC-MS/MS).  This test was developed and its performance characteristics  determined by [facility], Department of Pathology  and Laboratory Medicine in a manner consistent with CLIA  requirements. It has not been cleared or approved by the [place]  Food and Drug Administration.  This test is used for clinical   purposes.  It should not be regarded as investigational or for  research.         Assessment:     No diagnosis found.    Plan:   Will followup on Diff from CBC but probably not nuetropenia (ANC > 1500) Possibly rapammune high   Will check CMV PCR today   CBC later this week and rappamune next week   the first month, every two weeks during month 2 and then monthly for the first year at the provider or coordinator's discretion. During the second year, return to clinic every 3 months. Post transplant year 3-5 return every 6 months. There will be a comprehensive post transplant evaluation every year that may include LHC/RHC/biopsy, stress test, echo, CXR, and other health screening exams.  -In addition to the clinical assessment, I have ordered Allomap testing for this patient to assist in identification of moderate/severe acute cellular rejection (ACR) in a pt with stable Allograft function instead of endomyocardial biopsy.   -Patient is reminded to call with any health changes as these can be early signs of transplant complications. Patient is advised to make sure any new medications or changes of old medications are discussed with a pharmacist or physician knowledgeable with transplant to avoid rejection/drug toxicity related to significant drug interactions.    UNOS Patient Status  Functional Status: 100% - Normal, no complaints, no evidence of disease  Physical Capacity: No Limitations  Working for Income: Unknown

## 2018-09-20 ENCOUNTER — LAB VISIT (OUTPATIENT)
Dept: LAB | Facility: HOSPITAL | Age: 28
End: 2018-09-20
Attending: INTERNAL MEDICINE
Payer: COMMERCIAL

## 2018-09-20 DIAGNOSIS — Z94.1 STATUS POST HEART TRANSPLANT: ICD-10-CM

## 2018-09-20 LAB
ALLOMAP TEST SCORE: NORMAL
BASOPHILS # BLD AUTO: 0.02 K/UL
BASOPHILS NFR BLD: 1 %
DIFFERENTIAL METHOD: ABNORMAL
EOSINOPHIL # BLD AUTO: 0.1 K/UL
EOSINOPHIL NFR BLD: 2.5 %
ERYTHROCYTE [DISTWIDTH] IN BLOOD BY AUTOMATED COUNT: 11.9 %
HCT VFR BLD AUTO: 43.9 %
HGB BLD-MCNC: 14.5 G/DL
LYMPHOCYTES # BLD AUTO: 0.7 K/UL
LYMPHOCYTES NFR BLD: 33.8 %
MCH RBC QN AUTO: 28.5 PG
MCHC RBC AUTO-ENTMCNC: 33 G/DL
MCV RBC AUTO: 86 FL
MONOCYTES # BLD AUTO: 0.8 K/UL
MONOCYTES NFR BLD: 38.3 %
NEUTROPHILS # BLD AUTO: 0.5 K/UL
NEUTROPHILS NFR BLD: 24.9 %
PLATELET # BLD AUTO: 190 K/UL
PMV BLD AUTO: 9.8 FL
RBC # BLD AUTO: 5.09 M/UL
SIROLIMUS BLD-MCNC: 5 NG/ML
TACROLIMUS BLD-MCNC: 2 NG/ML
WBC # BLD AUTO: 2.01 K/UL

## 2018-09-20 PROCEDURE — 85025 COMPLETE CBC W/AUTO DIFF WBC: CPT

## 2018-09-20 PROCEDURE — 36415 COLL VENOUS BLD VENIPUNCTURE: CPT

## 2018-09-20 PROCEDURE — 80197 ASSAY OF TACROLIMUS: CPT

## 2018-09-20 PROCEDURE — 80195 ASSAY OF SIROLIMUS: CPT

## 2018-09-21 ENCOUNTER — TELEPHONE (OUTPATIENT)
Dept: TRANSPLANT | Facility: CLINIC | Age: 28
End: 2018-09-21

## 2018-09-21 ENCOUNTER — LAB VISIT (OUTPATIENT)
Dept: LAB | Facility: HOSPITAL | Age: 28
End: 2018-09-21
Attending: INTERNAL MEDICINE
Payer: COMMERCIAL

## 2018-09-21 DIAGNOSIS — Z94.1 STATUS POST HEART TRANSPLANT: ICD-10-CM

## 2018-09-21 LAB
ALBUMIN SERPL BCP-MCNC: 4.4 G/DL
ALP SERPL-CCNC: 96 U/L
ALT SERPL W/O P-5'-P-CCNC: 31 U/L
ANION GAP SERPL CALC-SCNC: 12 MMOL/L
ANISOCYTOSIS BLD QL SMEAR: SLIGHT
AST SERPL-CCNC: 25 U/L
BASOPHILS # BLD AUTO: ABNORMAL K/UL
BASOPHILS NFR BLD: 0 %
BILIRUB SERPL-MCNC: 0.8 MG/DL
BUN SERPL-MCNC: 11 MG/DL
CALCIUM SERPL-MCNC: 9.3 MG/DL
CHLORIDE SERPL-SCNC: 105 MMOL/L
CO2 SERPL-SCNC: 23 MMOL/L
CREAT SERPL-MCNC: 1.2 MG/DL
DIFFERENTIAL METHOD: ABNORMAL
EOSINOPHIL # BLD AUTO: ABNORMAL K/UL
EOSINOPHIL NFR BLD: 4 %
ERYTHROCYTE [DISTWIDTH] IN BLOOD BY AUTOMATED COUNT: 12 %
EST. GFR  (AFRICAN AMERICAN): >60 ML/MIN/1.73 M^2
EST. GFR  (NON AFRICAN AMERICAN): >60 ML/MIN/1.73 M^2
GLUCOSE SERPL-MCNC: 98 MG/DL
HCT VFR BLD AUTO: 43 %
HGB BLD-MCNC: 14 G/DL
LYMPHOCYTES # BLD AUTO: ABNORMAL K/UL
LYMPHOCYTES NFR BLD: 37 %
MCH RBC QN AUTO: 28.1 PG
MCHC RBC AUTO-ENTMCNC: 32.6 G/DL
MCV RBC AUTO: 86 FL
MONOCYTES # BLD AUTO: ABNORMAL K/UL
MONOCYTES NFR BLD: 34 %
NEUTROPHILS NFR BLD: 20 %
NEUTS BAND NFR BLD MANUAL: 5 %
PLATELET # BLD AUTO: 181 K/UL
PLATELET BLD QL SMEAR: ABNORMAL
PMV BLD AUTO: 10.7 FL
POTASSIUM SERPL-SCNC: 4 MMOL/L
PROT SERPL-MCNC: 7 G/DL
RBC # BLD AUTO: 4.99 M/UL
SODIUM SERPL-SCNC: 140 MMOL/L
WBC # BLD AUTO: 1.94 K/UL

## 2018-09-21 PROCEDURE — 36415 COLL VENOUS BLD VENIPUNCTURE: CPT

## 2018-09-21 PROCEDURE — 85007 BL SMEAR W/DIFF WBC COUNT: CPT

## 2018-09-21 PROCEDURE — 80197 ASSAY OF TACROLIMUS: CPT

## 2018-09-21 PROCEDURE — 85027 COMPLETE CBC AUTOMATED: CPT

## 2018-09-21 PROCEDURE — 80053 COMPREHEN METABOLIC PANEL: CPT

## 2018-09-21 PROCEDURE — 80195 ASSAY OF SIROLIMUS: CPT

## 2018-09-22 LAB
SIROLIMUS BLD-MCNC: 6.3 NG/ML
TACROLIMUS BLD-MCNC: 3.7 NG/ML

## 2018-09-24 ENCOUNTER — TELEPHONE (OUTPATIENT)
Dept: TRANSPLANT | Facility: CLINIC | Age: 28
End: 2018-09-24

## 2018-09-24 ENCOUNTER — LAB VISIT (OUTPATIENT)
Dept: LAB | Facility: HOSPITAL | Age: 28
End: 2018-09-24
Attending: INTERNAL MEDICINE
Payer: COMMERCIAL

## 2018-09-24 ENCOUNTER — HOSPITAL ENCOUNTER (INPATIENT)
Facility: HOSPITAL | Age: 28
LOS: 8 days | Discharge: HOME | DRG: 809 | End: 2018-10-02
Attending: INTERNAL MEDICINE | Admitting: INTERNAL MEDICINE
Payer: COMMERCIAL

## 2018-09-24 DIAGNOSIS — D70.9 NEUTROPENIA: ICD-10-CM

## 2018-09-24 DIAGNOSIS — Z94.1 HEART TRANSPLANTED: ICD-10-CM

## 2018-09-24 DIAGNOSIS — Z94.1 STATUS POST HEART TRANSPLANT: ICD-10-CM

## 2018-09-24 DIAGNOSIS — I10 ESSENTIAL HYPERTENSION: ICD-10-CM

## 2018-09-24 DIAGNOSIS — D70.8 OTHER NEUTROPENIA: Primary | ICD-10-CM

## 2018-09-24 DIAGNOSIS — D84.9 IMMUNOSUPPRESSION: ICD-10-CM

## 2018-09-24 DIAGNOSIS — L02.91 ABSCESS: ICD-10-CM

## 2018-09-24 DIAGNOSIS — D70.2 OTHER DRUG-INDUCED NEUTROPENIA: ICD-10-CM

## 2018-09-24 DIAGNOSIS — R73.9 HYPERGLYCEMIA: ICD-10-CM

## 2018-09-24 LAB
ALBUMIN SERPL BCP-MCNC: 4.2 G/DL
ALP SERPL-CCNC: 92 U/L
ALT SERPL W/O P-5'-P-CCNC: 36 U/L
ANION GAP SERPL CALC-SCNC: 11 MMOL/L
ANISOCYTOSIS BLD QL SMEAR: SLIGHT
AST SERPL-CCNC: 24 U/L
BASOPHILS # BLD AUTO: ABNORMAL K/UL
BASOPHILS NFR BLD: 0 %
BILIRUB SERPL-MCNC: 0.9 MG/DL
BNP SERPL-MCNC: 59 PG/ML
BUN SERPL-MCNC: 16 MG/DL
CALCIUM SERPL-MCNC: 9.5 MG/DL
CHLORIDE SERPL-SCNC: 104 MMOL/L
CMV DNA SERPL NAA+PROBE-ACNC: NORMAL IU/ML
CO2 SERPL-SCNC: 25 MMOL/L
CREAT SERPL-MCNC: 1.1 MG/DL
DEPRECATED S PYO AG THROAT QL EIA: NEGATIVE
DIFFERENTIAL METHOD: ABNORMAL
EOSINOPHIL # BLD AUTO: ABNORMAL K/UL
EOSINOPHIL NFR BLD: 3 %
ERYTHROCYTE [DISTWIDTH] IN BLOOD BY AUTOMATED COUNT: 11.9 %
EST. GFR  (AFRICAN AMERICAN): >60 ML/MIN/1.73 M^2
EST. GFR  (NON AFRICAN AMERICAN): >60 ML/MIN/1.73 M^2
GLUCOSE SERPL-MCNC: 94 MG/DL
HCT VFR BLD AUTO: 41.8 %
HGB BLD-MCNC: 13.6 G/DL
LYMPHOCYTES # BLD AUTO: ABNORMAL K/UL
LYMPHOCYTES NFR BLD: 68 %
MAGNESIUM SERPL-MCNC: 2 MG/DL
MCH RBC QN AUTO: 27.9 PG
MCHC RBC AUTO-ENTMCNC: 32.5 G/DL
MCV RBC AUTO: 86 FL
MONOCYTES # BLD AUTO: ABNORMAL K/UL
MONOCYTES NFR BLD: 22 %
NEUTROPHILS NFR BLD: 7 %
PLATELET # BLD AUTO: 185 K/UL
PLATELET BLD QL SMEAR: ABNORMAL
PMV BLD AUTO: 9.8 FL
POTASSIUM SERPL-SCNC: 4.3 MMOL/L
PROT SERPL-MCNC: 7 G/DL
RBC # BLD AUTO: 4.87 M/UL
SODIUM SERPL-SCNC: 140 MMOL/L
SPHEROCYTES BLD QL SMEAR: ABNORMAL
WBC # BLD AUTO: 1.6 K/UL

## 2018-09-24 PROCEDURE — 63600175 PHARM REV CODE 636 W HCPCS: Mod: JG | Performed by: INTERNAL MEDICINE

## 2018-09-24 PROCEDURE — 94761 N-INVAS EAR/PLS OXIMETRY MLT: CPT

## 2018-09-24 PROCEDURE — 80195 ASSAY OF SIROLIMUS: CPT

## 2018-09-24 PROCEDURE — 20600001 HC STEP DOWN PRIVATE ROOM

## 2018-09-24 PROCEDURE — 85007 BL SMEAR W/DIFF WBC COUNT: CPT

## 2018-09-24 PROCEDURE — 36415 COLL VENOUS BLD VENIPUNCTURE: CPT

## 2018-09-24 PROCEDURE — 25000003 PHARM REV CODE 250: Performed by: INTERNAL MEDICINE

## 2018-09-24 PROCEDURE — 87070 CULTURE OTHR SPECIMN AEROBIC: CPT

## 2018-09-24 PROCEDURE — 94640 AIRWAY INHALATION TREATMENT: CPT

## 2018-09-24 PROCEDURE — 87880 STREP A ASSAY W/OPTIC: CPT

## 2018-09-24 PROCEDURE — 80053 COMPREHEN METABOLIC PANEL: CPT

## 2018-09-24 PROCEDURE — 25000003 PHARM REV CODE 250: Performed by: PHYSICIAN ASSISTANT

## 2018-09-24 PROCEDURE — A4216 STERILE WATER/SALINE, 10 ML: HCPCS | Performed by: INTERNAL MEDICINE

## 2018-09-24 PROCEDURE — 83735 ASSAY OF MAGNESIUM: CPT

## 2018-09-24 PROCEDURE — 87081 CULTURE SCREEN ONLY: CPT

## 2018-09-24 PROCEDURE — 80197 ASSAY OF TACROLIMUS: CPT

## 2018-09-24 PROCEDURE — 85027 COMPLETE CBC AUTOMATED: CPT

## 2018-09-24 PROCEDURE — 83880 ASSAY OF NATRIURETIC PEPTIDE: CPT

## 2018-09-24 RX ORDER — TACROLIMUS 0.5 MG/1
0.5 CAPSULE ORAL EVERY MORNING
Status: DISCONTINUED | OUTPATIENT
Start: 2018-09-25 | End: 2018-09-24

## 2018-09-24 RX ORDER — LANOLIN ALCOHOL/MO/W.PET/CERES
400 CREAM (GRAM) TOPICAL DAILY
Status: DISCONTINUED | OUTPATIENT
Start: 2018-09-25 | End: 2018-09-25

## 2018-09-24 RX ORDER — TACROLIMUS 1 MG/1
1 CAPSULE ORAL EVERY MORNING
Status: DISCONTINUED | OUTPATIENT
Start: 2018-09-25 | End: 2018-09-28

## 2018-09-24 RX ORDER — LISINOPRIL 2.5 MG/1
2.5 TABLET ORAL DAILY
Status: DISCONTINUED | OUTPATIENT
Start: 2018-09-25 | End: 2018-09-29

## 2018-09-24 RX ORDER — ERGOCALCIFEROL 1.25 MG/1
50000 CAPSULE ORAL
Status: DISCONTINUED | OUTPATIENT
Start: 2018-09-25 | End: 2018-10-02 | Stop reason: HOSPADM

## 2018-09-24 RX ORDER — TACROLIMUS 1 MG/1
1 CAPSULE ORAL EVERY MORNING
Status: DISCONTINUED | OUTPATIENT
Start: 2018-09-24 | End: 2018-09-24

## 2018-09-24 RX ORDER — TACROLIMUS 0.5 MG/1
1.5 CAPSULE ORAL EVERY MORNING
Status: DISCONTINUED | OUTPATIENT
Start: 2018-09-24 | End: 2018-09-25

## 2018-09-24 RX ORDER — ASPIRIN 81 MG/1
81 TABLET ORAL DAILY
Status: DISCONTINUED | OUTPATIENT
Start: 2018-09-25 | End: 2018-10-02 | Stop reason: HOSPADM

## 2018-09-24 RX ORDER — FERROUS SULFATE, DRIED 160(50) MG
1 TABLET, EXTENDED RELEASE ORAL DAILY
Status: DISCONTINUED | OUTPATIENT
Start: 2018-09-24 | End: 2018-10-02 | Stop reason: HOSPADM

## 2018-09-24 RX ORDER — FAMOTIDINE 20 MG/1
40 TABLET, FILM COATED ORAL NIGHTLY
Status: DISCONTINUED | OUTPATIENT
Start: 2018-09-24 | End: 2018-10-02 | Stop reason: HOSPADM

## 2018-09-24 RX ORDER — ATORVASTATIN CALCIUM 20 MG/1
20 TABLET, FILM COATED ORAL NIGHTLY
Status: DISCONTINUED | OUTPATIENT
Start: 2018-09-24 | End: 2018-10-02 | Stop reason: HOSPADM

## 2018-09-24 RX ORDER — SIROLIMUS 1 MG/1
1 TABLET, FILM COATED ORAL DAILY
Status: DISCONTINUED | OUTPATIENT
Start: 2018-09-24 | End: 2018-09-24

## 2018-09-24 RX ADMIN — OYSTER SHELL CALCIUM WITH VITAMIN D 1 TABLET: 500; 200 TABLET, FILM COATED ORAL at 03:09

## 2018-09-24 RX ADMIN — PENTAMIDINE ISETHIONATE 300 MG: 300 INHALANT RESPIRATORY (INHALATION) at 03:09

## 2018-09-24 RX ADMIN — TACROLIMUS 1.5 MG: 0.5 CAPSULE ORAL at 06:09

## 2018-09-24 RX ADMIN — FAMOTIDINE 40 MG: 20 TABLET ORAL at 08:09

## 2018-09-24 RX ADMIN — ATORVASTATIN CALCIUM 20 MG: 20 TABLET, FILM COATED ORAL at 08:09

## 2018-09-24 RX ADMIN — TBO-FILGRASTIM 480 MCG: 480 INJECTION, SOLUTION SUBCUTANEOUS at 06:09

## 2018-09-24 NOTE — INTERVAL H&P NOTE
The patient has been examined and the H&P has been reviewed from clinic visit 9/18/18  Since visit, pt was told to hold Bactrim starting Fri 9/21/18 due to low WBCs. Today WBC count is lower so pt told to come in for admission. He is c/o sore throat x 2 days.     -Admit to [facility] for neutropenia  -Give Neupogen x 1 today  -CMV neg 9/21/18  -Check EBV, PCR, throat culture, rapid strep  -D/C Rapamune, as this could be contributing to neutropenia. Increase tacro dose. Has been off Cellcept (high risk for CMV and neutropenic). Consider adding back pred  -Cont to hold Bactrim for now and will cover with Pentamadine      Active Hospital Problems    Diagnosis  POA    *Heart transplanted [Z94.1]  Not Applicable    Neutropenia, unspecified [D70.9]  Unknown    Neutropenia [D70.9]  Yes    Immunosuppression [D89.9]  Yes      Resolved Hospital Problems   No resolved problems to display.

## 2018-09-24 NOTE — PROGRESS NOTES
Pt arrived to the floor from house. Pt vitals stable and no acute distress noted. IV placed. Admission questions complete. Weight and height taken. Pt placed on tele. Resident on call for Dr. Tapia was notified of pt arrival. No further questions at this time. Will continue to monitor.

## 2018-09-24 NOTE — TELEPHONE ENCOUNTER
Spoke to pt's wife and informed her that pt needs to be admitted for neutropenia. Pt's WBC is even lower today. He needs to pack a bag for a few days and head to admit office of the hospital. I informed her that I tried to contact pt but was unable. She will contact pt and have him come to hospital.

## 2018-09-25 LAB
ALBUMIN SERPL BCP-MCNC: 4 G/DL
ALP SERPL-CCNC: 92 U/L
ALT SERPL W/O P-5'-P-CCNC: 31 U/L
ANION GAP SERPL CALC-SCNC: 8 MMOL/L
ANISOCYTOSIS BLD QL SMEAR: SLIGHT
AST SERPL-CCNC: 21 U/L
BASOPHILS # BLD AUTO: 0.02 K/UL
BASOPHILS NFR BLD: 1.3 %
BILIRUB SERPL-MCNC: 1.4 MG/DL
BUN SERPL-MCNC: 11 MG/DL
CALCIUM SERPL-MCNC: 9.4 MG/DL
CHLORIDE SERPL-SCNC: 105 MMOL/L
CO2 SERPL-SCNC: 26 MMOL/L
CREAT SERPL-MCNC: 1 MG/DL
DIFFERENTIAL METHOD: ABNORMAL
EOSINOPHIL # BLD AUTO: 0 K/UL
EOSINOPHIL NFR BLD: 1.3 %
ERYTHROCYTE [DISTWIDTH] IN BLOOD BY AUTOMATED COUNT: 11.5 %
EST. GFR  (AFRICAN AMERICAN): >60 ML/MIN/1.73 M^2
EST. GFR  (NON AFRICAN AMERICAN): >60 ML/MIN/1.73 M^2
GLUCOSE SERPL-MCNC: 91 MG/DL
HCT VFR BLD AUTO: 45.3 %
HGB BLD-MCNC: 14.3 G/DL
HYPOCHROMIA BLD QL SMEAR: ABNORMAL
IMM GRANULOCYTES # BLD AUTO: 0 K/UL
IMM GRANULOCYTES NFR BLD AUTO: 0 %
LYMPHOCYTES # BLD AUTO: 0.8 K/UL
LYMPHOCYTES NFR BLD: 54.6 %
MAGNESIUM SERPL-MCNC: 1.7 MG/DL
MCH RBC QN AUTO: 27.2 PG
MCHC RBC AUTO-ENTMCNC: 31.6 G/DL
MCV RBC AUTO: 86 FL
MONOCYTES # BLD AUTO: 0.7 K/UL
MONOCYTES NFR BLD: 42.8 %
NEUTROPHILS # BLD AUTO: 0 K/UL
NEUTROPHILS NFR BLD: 0 %
NRBC BLD-RTO: 0 /100 WBC
OVALOCYTES BLD QL SMEAR: ABNORMAL
PLATELET # BLD AUTO: 176 K/UL
PMV BLD AUTO: 10.1 FL
POIKILOCYTOSIS BLD QL SMEAR: SLIGHT
POLYCHROMASIA BLD QL SMEAR: ABNORMAL
POTASSIUM SERPL-SCNC: 4.5 MMOL/L
PROT SERPL-MCNC: 6.7 G/DL
RBC # BLD AUTO: 5.26 M/UL
SIROLIMUS BLD-MCNC: 5.8 NG/ML
SIROLIMUS BLD-MCNC: 7.6 NG/ML
SODIUM SERPL-SCNC: 139 MMOL/L
TACROLIMUS BLD-MCNC: 4.1 NG/ML
TACROLIMUS BLD-MCNC: 4.4 NG/ML
WBC # BLD AUTO: 1.52 K/UL

## 2018-09-25 PROCEDURE — 86833 HLA CLASS II HIGH DEFIN QUAL: CPT | Mod: 91,TXP

## 2018-09-25 PROCEDURE — 85025 COMPLETE CBC W/AUTO DIFF WBC: CPT

## 2018-09-25 PROCEDURE — 63600175 PHARM REV CODE 636 W HCPCS: Mod: JG | Performed by: INTERNAL MEDICINE

## 2018-09-25 PROCEDURE — 36415 COLL VENOUS BLD VENIPUNCTURE: CPT

## 2018-09-25 PROCEDURE — 86977 RBC SERUM PRETX INCUBJ/INHIB: CPT | Mod: 91,TXP

## 2018-09-25 PROCEDURE — 86832 HLA CLASS I HIGH DEFIN QUAL: CPT | Mod: 91,TXP

## 2018-09-25 PROCEDURE — 86832 HLA CLASS I HIGH DEFIN QUAL: CPT | Mod: TXP

## 2018-09-25 PROCEDURE — 86833 HLA CLASS II HIGH DEFIN QUAL: CPT | Mod: TXP

## 2018-09-25 PROCEDURE — 86352 CELL FUNCTION ASSAY W/STIM: CPT

## 2018-09-25 PROCEDURE — 25000003 PHARM REV CODE 250: Performed by: INTERNAL MEDICINE

## 2018-09-25 PROCEDURE — 99223 1ST HOSP IP/OBS HIGH 75: CPT | Mod: ,,, | Performed by: INTERNAL MEDICINE

## 2018-09-25 PROCEDURE — 83735 ASSAY OF MAGNESIUM: CPT

## 2018-09-25 PROCEDURE — 80197 ASSAY OF TACROLIMUS: CPT

## 2018-09-25 PROCEDURE — 80053 COMPREHEN METABOLIC PANEL: CPT

## 2018-09-25 PROCEDURE — 80195 ASSAY OF SIROLIMUS: CPT

## 2018-09-25 PROCEDURE — 20600001 HC STEP DOWN PRIVATE ROOM

## 2018-09-25 PROCEDURE — 25000003 PHARM REV CODE 250: Performed by: PHYSICIAN ASSISTANT

## 2018-09-25 PROCEDURE — 87799 DETECT AGENT NOS DNA QUANT: CPT

## 2018-09-25 RX ORDER — LANOLIN ALCOHOL/MO/W.PET/CERES
400 CREAM (GRAM) TOPICAL ONCE
Status: COMPLETED | OUTPATIENT
Start: 2018-09-25 | End: 2018-09-25

## 2018-09-25 RX ORDER — LANOLIN ALCOHOL/MO/W.PET/CERES
400 CREAM (GRAM) TOPICAL 2 TIMES DAILY
Status: DISCONTINUED | OUTPATIENT
Start: 2018-09-25 | End: 2018-10-02 | Stop reason: HOSPADM

## 2018-09-25 RX ORDER — TACROLIMUS 1 MG/1
2 CAPSULE ORAL EVERY MORNING
Status: DISCONTINUED | OUTPATIENT
Start: 2018-09-25 | End: 2018-09-28

## 2018-09-25 RX ORDER — ACETAMINOPHEN 325 MG/1
650 TABLET ORAL EVERY 6 HOURS PRN
Status: DISCONTINUED | OUTPATIENT
Start: 2018-09-25 | End: 2018-09-28

## 2018-09-25 RX ADMIN — MAGNESIUM OXIDE TAB 400 MG (241.3 MG ELEMENTAL MG) 400 MG: 400 (241.3 MG) TAB at 10:09

## 2018-09-25 RX ADMIN — FAMOTIDINE 40 MG: 20 TABLET ORAL at 09:09

## 2018-09-25 RX ADMIN — MAGNESIUM OXIDE TAB 400 MG (241.3 MG ELEMENTAL MG) 400 MG: 400 (241.3 MG) TAB at 11:09

## 2018-09-25 RX ADMIN — ASPIRIN 81 MG: 81 TABLET, COATED ORAL at 10:09

## 2018-09-25 RX ADMIN — ATORVASTATIN CALCIUM 20 MG: 20 TABLET, FILM COATED ORAL at 09:09

## 2018-09-25 RX ADMIN — OYSTER SHELL CALCIUM WITH VITAMIN D 1 TABLET: 500; 200 TABLET, FILM COATED ORAL at 10:09

## 2018-09-25 RX ADMIN — MAGNESIUM OXIDE TAB 400 MG (241.3 MG ELEMENTAL MG) 400 MG: 400 (241.3 MG) TAB at 09:09

## 2018-09-25 RX ADMIN — TACROLIMUS 2 MG: 1 CAPSULE ORAL at 05:09

## 2018-09-25 RX ADMIN — ERGOCALCIFEROL 50000 UNITS: 1.25 CAPSULE ORAL at 10:09

## 2018-09-25 RX ADMIN — LISINOPRIL 2.5 MG: 2.5 TABLET ORAL at 10:09

## 2018-09-25 RX ADMIN — TBO-FILGRASTIM 480 MCG: 480 INJECTION, SOLUTION SUBCUTANEOUS at 12:09

## 2018-09-25 RX ADMIN — ACETAMINOPHEN 650 MG: 325 TABLET ORAL at 01:09

## 2018-09-25 RX ADMIN — TACROLIMUS 1 MG: 1 CAPSULE ORAL at 10:09

## 2018-09-25 NOTE — PROGRESS NOTES
"Pt complaint of "my throat is starting to hurt again, it just started back up." Called HAKEEM Antonio. Further orders are to give tylenol PRN. See MAR for dose. No further questions at this time. Will continue to monitor.   "

## 2018-09-25 NOTE — PLAN OF CARE
"Problem: Patient Care Overview  Goal: Plan of Care Review  Outcome: Ongoing (interventions implemented as appropriate)  Plan of care reviewed with pt. Pt has throat cultures sent off. Pt given one time dose of granix. Pt on tacrolimus. See mar for dose. Pt on neutropenic precautions. Pt does not complain of pain at this time. He states "throat is a little sore". No further questions at this time. Will continue to monitor.       "

## 2018-09-25 NOTE — ASSESSMENT & PLAN NOTE
-WBCs 1.5 today. Likely med induced.   - Will give another dose of Neupogen today  -Stopped sirolimus yesterday. No longer on Cellcept  -Holding Bactrim  -EBV pending. Neg for CMV on 9/21/18

## 2018-09-25 NOTE — PROGRESS NOTES
Critical lab value received. WBC 1.52 this am. HAKEEM Antonio notified. See critical lab value in flow sheet for more details. Tacrolimus 1mg and 1.5 mg ordered this am. Called HAKEEM Antonio to clarify which one to give. Further orders are to give the 1mg this am and to give the 1.5 mg at night. She will let pharmacy know. No further orders at this time. Will continue to monitor.

## 2018-09-25 NOTE — PROGRESS NOTES
[facility]  Heart Transplant  Progress Note    Patient Name: Mert Samayoa  MRN: [id number]  Admission Date: 9/24/2018  Hospital Length of Stay: 1 days  Attending Physician: Sammi Tapia MD  Primary Care Provider: Primary Doctor No  Principal Problem:Neutropenia    Subjective:     Interval History: Still c/o sore throat this am    Continuous Infusions:  Scheduled Meds:   aspirin  81 mg Oral Daily    atorvastatin  20 mg Oral QHS    calcium-vitamin D3  1 tablet Oral Daily    ergocalciferol  50,000 Units Oral Q7 Days    famotidine  40 mg Oral QHS    lisinopril  2.5 mg Oral Daily    magnesium oxide  400 mg Oral BID    pentamadine (PENTAM) 60 mg/mL inhalation solution  300 mg Nebulization Q28 Days    tacrolimus  1 mg Oral Daily    tacrolimus  2 mg Oral Daily     PRN Meds:acetaminophen    Review of patient's allergies indicates:  No Known Allergies  Objective:     Vital Signs (Most Recent):  Temp: 98.4 °F (36.9 °C) (09/25/18 1145)  Pulse: (!) 112 (09/25/18 1200)  Resp: 16 (09/25/18 1145)  BP: 104/65 (09/25/18 1145)  SpO2: 96 % (09/25/18 1145) Vital Signs (24h Range):  Temp:  [98.4 °F (36.9 °C)-99.8 °F (37.7 °C)] 98.4 °F (36.9 °C)  Pulse:  [100-118] 112  Resp:  [16-18] 16  SpO2:  [94 %-98 %] 96 %  BP: (104-123)/(57-71) 104/65     Patient Vitals for the past 72 hrs (Last 3 readings):   Weight   09/24/18 1328 73.3 kg (161 lb 9.6 oz)     Body mass index is 25.31 kg/m².      Intake/Output Summary (Last 24 hours) at 9/25/2018 1625  Last data filed at 9/24/2018 2300  Gross per 24 hour   Intake 180 ml   Output 1175 ml   Net -995 ml       Hemodynamic Parameters:       Telemetry: Reviewed    Physical Exam   Constitutional: He is oriented to person, place, and time. He appears well-developed and well-nourished.   Neck: Normal range of motion. Neck supple. No JVD present.   Cardiovascular: Normal rate and regular rhythm. Exam reveals no gallop and no friction rub.   No murmur  heard.  Pulmonary/Chest: Effort normal and breath sounds normal. He has no wheezes. He has no rales.   Abdominal: Soft. Bowel sounds are normal. There is no tenderness.   Musculoskeletal: He exhibits no edema.   Neurological: He is alert and oriented to person, place, and time.   Skin: Skin is warm and dry.       Significant Labs:  CBC:  Recent Labs   Lab  09/21/18   0612  09/24/18   0610  09/25/18   0850   WBC  1.94*  1.60*  1.52*   RBC  4.99  4.87  5.26   HGB  14.0  13.6*  14.3   HCT  43.0  41.8  45.3   PLT  181  185  176   MCV  86  86  86   MCH  28.1  27.9  27.2   MCHC  32.6  32.5  31.6*     BNP:  Recent Labs   Lab  09/24/18   1330   BNP  59     CMP:  Recent Labs   Lab  09/21/18   0612  09/24/18   0610  09/25/18   0850   GLU  98  94  91   CALCIUM  9.3  9.5  9.4   ALBUMIN  4.4  4.2  4.0   PROT  7.0  7.0  6.7   NA  140  140  139   K  4.0  4.3  4.5   CO2  23  25  26   CL  105  104  105   BUN  11  16  11   CREATININE  1.2  1.1  1.0   ALKPHOS  96  92  92   ALT  31  36  31   AST  25  24  21   BILITOT  0.8  0.9  1.4*      Coagulation:   No results for input(s): PT, INR, APTT in the last 168 hours.  LDH:  No results for input(s): LDH in the last 72 hours.  Microbiology:  Microbiology Results (last 7 days)     Procedure Component Value Units Date/Time    Throat culture [[id number]] Collected:  09/24/18 2000    Order Status:  Completed Specimen:  Throat Updated:  09/25/18 1349     RESPIRATORY CULTURE - THROAT Insufficient incubation, culture in progress    Strep A culture, throat [[id number]] Collected:  09/24/18 2000    Order Status:  Completed Specimen:  Throat Updated:  09/25/18 1129     Strep A Culture No significant growth    Strep A culture, throat [[id number]] Collected:  09/24/18 2000    Order Status:  Canceled Specimen:  Throat Updated:  09/24/18 2307    Throat Screen, Rapid [[id number]] Collected:  09/24/18 2000    Order Status:  Completed Specimen:  Throat Updated:  09/24/18 1981     Rapid Strep A Screen Negative      Comment: See Micro for reflexed Strep culture.             I have reviewed all pertinent labs within the past 24 hours.    Estimated Creatinine Clearance: 102.8 mL/min (based on SCr of 1 mg/dL).    Diagnostic Results:  I have reviewed all pertinent imaging results/findings within the past 24 hours.    Assessment and Plan:     No notes on file    * Neutropenia    -WBCs 1.5 today. Likely med induced.   - Will give another dose of Neupogen today  -Stopped sirolimus yesterday. No longer on Cellcept  -Holding Bactrim  -EBV pending. Neg for CMV on 9/21/18        Heart transplanted    -Transplant Date 2/18/18.   -CMV Status:D+/R-   -Rejection status: Moderate Risk  -Rejection episodes: none to date  -Induction: No   -Current immunosuppression: tacro- increased to 1/2 today. Stopped sirolimus due to low WBCs. Check Immuknowcylex  -Opportunistic PPx with: pentamadine (holding Bactrim due to low WBCs)  -Echo 9/18/28: LVEF 60/65%  -Check DSA. Allomap up last week (31 from 23) and planned for BX today but held off due to neutropenia. Will plan to get once resolved        Essential hypertension    -Cont lisinopril            Brenda Antonio PA-C  Heart Transplant  [facility]

## 2018-09-25 NOTE — ASSESSMENT & PLAN NOTE
-Transplant Date 2/18/18.   -CMV Status:D+/R-   -Rejection status: Moderate Risk  -Rejection episodes: none to date  -Induction: No   -Current immunosuppression: tacro- increased to 1/2 today. Stopped sirolimus due to low WBCs. Check Immuknowcylex  -Opportunistic PPx with: pentamadine (holding Bactrim due to low WBCs)  -Echo 9/18/28: LVEF 60/65%  -Check DSA. Allomap up last week (31 from 23) and planned for BX today but held off due to neutropenia. Will plan to get once resolved

## 2018-09-25 NOTE — SUBJECTIVE & OBJECTIVE
Interval History: Still c/o sore throat this am    Continuous Infusions:  Scheduled Meds:   aspirin  81 mg Oral Daily    atorvastatin  20 mg Oral QHS    calcium-vitamin D3  1 tablet Oral Daily    ergocalciferol  50,000 Units Oral Q7 Days    famotidine  40 mg Oral QHS    lisinopril  2.5 mg Oral Daily    magnesium oxide  400 mg Oral BID    pentamadine (PENTAM) 60 mg/mL inhalation solution  300 mg Nebulization Q28 Days    tacrolimus  1 mg Oral Daily    tacrolimus  2 mg Oral Daily     PRN Meds:acetaminophen    Review of patient's allergies indicates:  No Known Allergies  Objective:     Vital Signs (Most Recent):  Temp: 98.4 °F (36.9 °C) (09/25/18 1145)  Pulse: (!) 112 (09/25/18 1200)  Resp: 16 (09/25/18 1145)  BP: 104/65 (09/25/18 1145)  SpO2: 96 % (09/25/18 1145) Vital Signs (24h Range):  Temp:  [98.4 °F (36.9 °C)-99.8 °F (37.7 °C)] 98.4 °F (36.9 °C)  Pulse:  [100-118] 112  Resp:  [16-18] 16  SpO2:  [94 %-98 %] 96 %  BP: (104-123)/(57-71) 104/65     Patient Vitals for the past 72 hrs (Last 3 readings):   Weight   09/24/18 1328 73.3 kg (161 lb 9.6 oz)     Body mass index is 25.31 kg/m².      Intake/Output Summary (Last 24 hours) at 9/25/2018 1625  Last data filed at 9/24/2018 2300  Gross per 24 hour   Intake 180 ml   Output 1175 ml   Net -995 ml       Hemodynamic Parameters:       Telemetry: Reviewed    Physical Exam   Constitutional: He is oriented to person, place, and time. He appears well-developed and well-nourished.   Neck: Normal range of motion. Neck supple. No JVD present.   Cardiovascular: Normal rate and regular rhythm. Exam reveals no gallop and no friction rub.   No murmur heard.  Pulmonary/Chest: Effort normal and breath sounds normal. He has no wheezes. He has no rales.   Abdominal: Soft. Bowel sounds are normal. There is no tenderness.   Musculoskeletal: He exhibits no edema.   Neurological: He is alert and oriented to person, place, and time.   Skin: Skin is warm and dry.       Significant  Labs:  CBC:  Recent Labs   Lab  09/21/18   0612  09/24/18   0610  09/25/18   0850   WBC  1.94*  1.60*  1.52*   RBC  4.99  4.87  5.26   HGB  14.0  13.6*  14.3   HCT  43.0  41.8  45.3   PLT  181  185  176   MCV  86  86  86   MCH  28.1  27.9  27.2   MCHC  32.6  32.5  31.6*     BNP:  Recent Labs   Lab  09/24/18   1330   BNP  59     CMP:  Recent Labs   Lab  09/21/18   0612  09/24/18   0610  09/25/18   0850   GLU  98  94  91   CALCIUM  9.3  9.5  9.4   ALBUMIN  4.4  4.2  4.0   PROT  7.0  7.0  6.7   NA  140  140  139   K  4.0  4.3  4.5   CO2  23  25  26   CL  105  104  105   BUN  11  16  11   CREATININE  1.2  1.1  1.0   ALKPHOS  96  92  92   ALT  31  36  31   AST  25  24  21   BILITOT  0.8  0.9  1.4*      Coagulation:   No results for input(s): PT, INR, APTT in the last 168 hours.  LDH:  No results for input(s): LDH in the last 72 hours.  Microbiology:  Microbiology Results (last 7 days)     Procedure Component Value Units Date/Time    Throat culture [[id number]] Collected:  09/24/18 2000    Order Status:  Completed Specimen:  Throat Updated:  09/25/18 1349     RESPIRATORY CULTURE - THROAT Insufficient incubation, culture in progress    Strep A culture, throat [[id number]] Collected:  09/24/18 2000    Order Status:  Completed Specimen:  Throat Updated:  09/25/18 1129     Strep A Culture No significant growth    Strep A culture, throat [[id number]] Collected:  09/24/18 2000    Order Status:  Canceled Specimen:  Throat Updated:  09/24/18 2307    Throat Screen, Rapid [[id number]] Collected:  09/24/18 2000    Order Status:  Completed Specimen:  Throat Updated:  09/24/18 2307     Rapid Strep A Screen Negative     Comment: See Micro for reflexed Strep culture.             I have reviewed all pertinent labs within the past 24 hours.    Estimated Creatinine Clearance: 102.8 mL/min (based on SCr of 1 mg/dL).    Diagnostic Results:  I have reviewed all pertinent imaging results/findings within the past 24 hours.

## 2018-09-25 NOTE — PROGRESS NOTES
Admit Note     Met with patient to assess needs. Patient is a 28 y.o.  male, admitted for neutropenia and a sore throat. Pt is s/p heart tranplant from Feb of 2018.    Patient admitted from home on 9/24/2018 .  At this time, patient presents as alert and oriented x 4, pleasant and communicative.  At this time, patients caregiver presents as not present is working.    Household/Family Systems     Patient resides with patient's spouse, at:     [address] [address].      Abigail, pt wife, c: [phone]  Pt cell: [phone]    Support system includes wife, parents and brothers.  Patient does not have dependents that are need of being cared for.     Patients primary caregiver is self and wife if needed.  Confirmed patients contact information is [phone] (home);   Telephone Information:   Mobile [phone]       During admission, patient's caregiver plans to stay at home.  Confirmed patient and patients caregivers do have access to reliable transportation.    Cognitive Status/Learning     Patient reports reading ability as 12th grade and states patient does not have difficulty with N/A.  Patient reports patient learns best by hands on.   Needed: No.   Highest education level: High School (9-12) or GED    Vocation/Disability     Working for Income: yes  If yes, working activity level: Working Full Time  Patient is working for [email or web address].    Adherence     Patient reports a high level of adherence to patients health care regimen.  Adherence counseling and education provided. Patient verbalizes understanding.    Substance Use    Patient reports the following substance usage.    Tobacco: Pt last smoked in Dec of 2016, at most he smoked 1PPD.  Alcohol: none, patient denies any use.  Illicit Drugs/Non-prescribed Medications: none, patient denies any use.  Patient states clear understanding of the potential impact of substance use.  Substance abstinence/cessation counseling,  education and resources provided and reviewed.     Services Utilizing/ADLS    Infusion Service: Prior to admission, patient utilizing? no  Home Health: Prior to admission, patient utilizing? no  DME: Prior to admission, no  Pulmonary/Cardiac Rehab: Prior to admission, no  Dialysis:  Prior to admission, no  Transplant Specialty Pharmacy:  Prior to admission, [facility] in [place]    Prior to admission, patient reports patient was independent with ADLS and was driving.  Patient reports patient is not able to care for self at this time due to compromised medical condition (as documented in medical record) and physical weakness..  Patient indicates a willingness to care for self once medically cleared to do so.    Insurance/Medications    Insured by   Payor/Plan Subscr  Sex Relation Sub. Ins. ID Effective Group Num   1. [employer]* MAMIE [place] * 1990 Male  [id number]                                    [address] [address]   2. [employer] MAMIE [place] * 1990 Male  [id number]                                   [address] [address]      Primary Insurance (for UNOS reporting): Private Insurance  Secondary Insurance (for UNOS reporting): None    Patient reports patient is able to obtain and afford medications at this time and at time of discharge.    Living Will/Healthcare Power of     Patient states patient does not have a LW and/or HCPA.   provided education regarding LW and HCPA and the completion of forms.    Coping/Mental Health    Patient is coping adequately with the aid of  family members.   Patient denies mental health difficulties.     Discharge Planning    At time of discharge, patient plans to return to patient's home under the care of self and spouse.  Patients wife will transport patient.  Per rounds today, expected discharge date has not been medically determined at this time. Patient and patients caregiver  verbalize understanding and are involved  in treatment planning and discharge process.    Additional Concerns    Patient is being followed for needs, education, resources, information, emotional support, supportive counseling, and for supportive and skilled discharge plan of care.  providing ongoing psychosocial support, education, resources and d/c planning as needed.  SW remains available.  remains available.

## 2018-09-25 NOTE — PLAN OF CARE
Problem: Patient Care Overview  Goal: Plan of Care Review  Outcome: Ongoing (interventions implemented as appropriate)  Plan of care reviewed with pt. Pt on neutropenic precautions. Strep cx negative to date. Other throat cx pending. Pt vital signs stable. Pt on tacromilus. Pt in no pain. No further questions at this time. Will continue to monitor.

## 2018-09-25 NOTE — PLAN OF CARE
"Problem: Patient Care Overview  Goal: Plan of Care Review  Outcome: Ongoing (interventions implemented as appropriate)  Plan of care reviewed with pt. Pt has throat cultures sent off. Pt given one time dose of granix. Pt on tacrolimus. Pt on neutropenic precautions and educated on proper procedure. Pt does not complain of pain at this time. He states "throat is a little sore". No further questions at this time. Will continue to monitor.       "

## 2018-09-26 LAB
ALBUMIN SERPL BCP-MCNC: 4 G/DL
ALP SERPL-CCNC: 91 U/L
ALT SERPL W/O P-5'-P-CCNC: 29 U/L
ANION GAP SERPL CALC-SCNC: 11 MMOL/L
AST SERPL-CCNC: 22 U/L
BACTERIA THROAT CULT: NORMAL
BACTERIA THROAT CULT: NORMAL
BASOPHILS # BLD AUTO: 0.02 K/UL
BASOPHILS NFR BLD: 0.8 %
BILIRUB SERPL-MCNC: 1.9 MG/DL
BUN SERPL-MCNC: 12 MG/DL
CALCIUM SERPL-MCNC: 9.7 MG/DL
CHLORIDE SERPL-SCNC: 102 MMOL/L
CLASS I ANTIBODY COMMENTS - LUMINEX: NORMAL
CLASS II ANTIBODY COMMENTS - LUMINEX: NORMAL
CO2 SERPL-SCNC: 25 MMOL/L
CREAT SERPL-MCNC: 1 MG/DL
DIFFERENTIAL METHOD: ABNORMAL
DSA1 TESTING DATE: NORMAL
DSA12 TESTING DATE: NORMAL
DSA2 TESTING DATE: NORMAL
EBV DNA BY PCR: <100 IU/ML
EOSINOPHIL # BLD AUTO: 0.1 K/UL
EOSINOPHIL NFR BLD: 2.5 %
ERYTHROCYTE [DISTWIDTH] IN BLOOD BY AUTOMATED COUNT: 11.6 %
EST. GFR  (AFRICAN AMERICAN): >60 ML/MIN/1.73 M^2
EST. GFR  (NON AFRICAN AMERICAN): >60 ML/MIN/1.73 M^2
GLUCOSE SERPL-MCNC: 84 MG/DL
HCT VFR BLD AUTO: 42.6 %
HGB BLD-MCNC: 13.8 G/DL
IMM GRANULOCYTES # BLD AUTO: 0 K/UL
IMM GRANULOCYTES NFR BLD AUTO: 0 %
LYMPHOCYTES # BLD AUTO: 1.1 K/UL
LYMPHOCYTES NFR BLD: 47.5 %
MAGNESIUM SERPL-MCNC: 1.9 MG/DL
MCH RBC QN AUTO: 27.9 PG
MCHC RBC AUTO-ENTMCNC: 32.4 G/DL
MCV RBC AUTO: 86 FL
MONOCYTES # BLD AUTO: 1.2 K/UL
MONOCYTES NFR BLD: 47.9 %
NEUTROPHILS # BLD AUTO: 0 K/UL
NEUTROPHILS NFR BLD: 1.3 %
NRBC BLD-RTO: 0 /100 WBC
PLATELET # BLD AUTO: 185 K/UL
PMV BLD AUTO: 11 FL
POTASSIUM SERPL-SCNC: 4.5 MMOL/L
PROT SERPL-MCNC: 6.9 G/DL
RBC # BLD AUTO: 4.95 M/UL
SERUM COLLECTION DT - LUMINEX CLASS I: NORMAL
SERUM COLLECTION DT - LUMINEX CLASS II: NORMAL
SIROLIMUS BLD-MCNC: 3.5 NG/ML
SODIUM SERPL-SCNC: 138 MMOL/L
TACROLIMUS BLD-MCNC: 5.8 NG/ML
WBC # BLD AUTO: 2.4 K/UL

## 2018-09-26 PROCEDURE — 99223 1ST HOSP IP/OBS HIGH 75: CPT | Mod: ,,, | Performed by: INTERNAL MEDICINE

## 2018-09-26 PROCEDURE — 87529 HSV DNA AMP PROBE: CPT

## 2018-09-26 PROCEDURE — 20600001 HC STEP DOWN PRIVATE ROOM

## 2018-09-26 PROCEDURE — 99233 SBSQ HOSP IP/OBS HIGH 50: CPT | Mod: ,,, | Performed by: INTERNAL MEDICINE

## 2018-09-26 PROCEDURE — 87632 RESP VIRUS 6-11 TARGETS: CPT

## 2018-09-26 PROCEDURE — 25000003 PHARM REV CODE 250: Performed by: PHYSICIAN ASSISTANT

## 2018-09-26 PROCEDURE — 63600175 PHARM REV CODE 636 W HCPCS: Performed by: INTERNAL MEDICINE

## 2018-09-26 PROCEDURE — 87040 BLOOD CULTURE FOR BACTERIA: CPT | Mod: 59

## 2018-09-26 PROCEDURE — 25000003 PHARM REV CODE 250: Performed by: INTERNAL MEDICINE

## 2018-09-26 PROCEDURE — 87799 DETECT AGENT NOS DNA QUANT: CPT

## 2018-09-26 PROCEDURE — 87086 URINE CULTURE/COLONY COUNT: CPT

## 2018-09-26 PROCEDURE — 80195 ASSAY OF SIROLIMUS: CPT

## 2018-09-26 PROCEDURE — 86663 EPSTEIN-BARR ANTIBODY: CPT

## 2018-09-26 PROCEDURE — 83735 ASSAY OF MAGNESIUM: CPT

## 2018-09-26 PROCEDURE — 36415 COLL VENOUS BLD VENIPUNCTURE: CPT

## 2018-09-26 PROCEDURE — 80053 COMPREHEN METABOLIC PANEL: CPT

## 2018-09-26 PROCEDURE — 80197 ASSAY OF TACROLIMUS: CPT

## 2018-09-26 PROCEDURE — 85025 COMPLETE CBC W/AUTO DIFF WBC: CPT

## 2018-09-26 RX ORDER — HYDROCODONE BITARTRATE AND ACETAMINOPHEN 10; 325 MG/1; MG/1
1 TABLET ORAL ONCE
Status: COMPLETED | OUTPATIENT
Start: 2018-09-26 | End: 2018-09-26

## 2018-09-26 RX ADMIN — ACETAMINOPHEN 650 MG: 325 TABLET ORAL at 05:09

## 2018-09-26 RX ADMIN — ASPIRIN 81 MG: 81 TABLET, COATED ORAL at 08:09

## 2018-09-26 RX ADMIN — OYSTER SHELL CALCIUM WITH VITAMIN D 1 TABLET: 500; 200 TABLET, FILM COATED ORAL at 08:09

## 2018-09-26 RX ADMIN — FAMOTIDINE 40 MG: 20 TABLET ORAL at 10:09

## 2018-09-26 RX ADMIN — MAGNESIUM OXIDE TAB 400 MG (241.3 MG ELEMENTAL MG) 400 MG: 400 (241.3 MG) TAB at 10:09

## 2018-09-26 RX ADMIN — LISINOPRIL 2.5 MG: 2.5 TABLET ORAL at 08:09

## 2018-09-26 RX ADMIN — TACROLIMUS 2 MG: 1 CAPSULE ORAL at 05:09

## 2018-09-26 RX ADMIN — ACETAMINOPHEN 650 MG: 325 TABLET ORAL at 11:09

## 2018-09-26 RX ADMIN — TACROLIMUS 1 MG: 1 CAPSULE ORAL at 08:09

## 2018-09-26 RX ADMIN — MAGNESIUM OXIDE TAB 400 MG (241.3 MG ELEMENTAL MG) 400 MG: 400 (241.3 MG) TAB at 08:09

## 2018-09-26 RX ADMIN — HYDROCODONE BITARTRATE AND ACETAMINOPHEN 1 TABLET: 10; 325 TABLET ORAL at 10:09

## 2018-09-26 RX ADMIN — ATORVASTATIN CALCIUM 20 MG: 20 TABLET, FILM COATED ORAL at 10:09

## 2018-09-26 NOTE — ASSESSMENT & PLAN NOTE
Clinical picture is most consistent with medication induced vs viral infection.    - Agree with EBV, HSV, holding Bactrim and Sirolimus  - Continue Neupogen daily     - Recommend checking Parvo, blood and urine cultures

## 2018-09-26 NOTE — NURSING TRANSFER
Nursing Transfer Note      9/25/2018     [place] [id number] from [facility]    Transfer via ambulated    Transfer with cardiac monitoring and personal belongings    Transported by RN    Medicines sent: none    Chart send with patient: Yes     Notified: SUE Alvarez    Patient reassessed at: 9/25/18 @8450

## 2018-09-26 NOTE — PROGRESS NOTES
"   09/25/18 2045 09/25/18 2050 09/25/18 2057   Vital Signs   BP (!) 100/51 96/68 92/64   MAP (mmHg) 71 --  --    BP Location Left arm Right arm Right arm   BP Method Automatic Manual Manual   Patient Position Lying Lying Lying     Notified MD Zachery of patient decrease in BP. Patient stated that his BP is usually in the "110's". No new orders received. Will cont to monitor patient.   "

## 2018-09-26 NOTE — CONSULTS
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  Patient was recently seen 7 mouth post transplant for follow up and was found to be Neutropenic.  He was recently transition to a Tacro/Sirolimus in place of prednisone/cellcept.  Prior to admission he reported a sore throat the past 2-3 weeks with associated tender lymph nodes  He visited an urgent care and received 10 days of Augmentin with minimal improvement.  He otherwise reports he was in his normals felix of health.  He does report a chronic issue with acne on his face and chest for the past 3-6 months.  He currently denies fever, chills, SOB, abdominal pain, change in stool, change in urination or cough.    In the hospital he switched from bactrim to pentamadine.  His Sirolimus was stopped and he has received 2 doses of Neupogen.    Hematology was consulted for management of severe neutropenia.           Oncology Treatment Plan:   [No treatment plan]    Medications:  Continuous Infusions:  Scheduled Meds:   aspirin  81 mg Oral Daily    atorvastatin  20 mg Oral QHS    calcium-vitamin D3  1 tablet Oral Daily    ergocalciferol  50,000 Units Oral Q7 Days    famotidine  40 mg Oral QHS    lisinopril  2.5 mg Oral Daily    magnesium oxide  400 mg Oral BID    pentamadine (PENTAM) 60 mg/mL inhalation solution  300 mg Nebulization Q28 Days    tacrolimus  1 mg Oral Daily    tacrolimus  2 mg  Oral Daily     PRN Meds:acetaminophen     Review of patient's allergies indicates:  No Known Allergies     Past Medical History:   Diagnosis Date    Cardiogenic shock 1/16/2017    Cardiomyopathy     Familial cardiomyopathy    CHF (congestive heart failure)     Encounter for blood transfusion     Essential hypertension 12/21/2016    Essential hypertension 3/20/2018    Familial Cardiomyopathy     Familial cardiomyopathy      Past Surgical History:   Procedure Laterality Date    BIOPSY-ECHO GUIDED N/A 4/17/2018    [facility] CATH LAB    BIOPSY-ECHO GUIDED N/A 4/3/2018    Performed by Marisel Lundberg [facility] at [facility] CATH LAB    BIOPSY-ECHO GUIDED N/A 3/20/2018    Performed by [facility]berg [facility] CATH LAB    BIOPSY-ECHO GUIDED N/A 3/13/2018    Performed by Hernán Tidwell Jr., MD at [facility] CATH LAB    BIOPSY-ECHO GUIDED N/A 3/6/2018    [facility]formed by Guillermo Daniels [facility] at [facility] CATH LAB    CLOSURE-STERNAL WOUND N/A 2/2/2017    Performed by Lex Macedo [facility] at [facility] OR 2ND FLR    EXPLORATION-BLEEDING (RE-EXPLORATION) Bilateral 2/19/2018    Performed by Raj Klein [facility] at [facility] OR 2ND FLR    HEART TRANSPLANT      INSERTION-ICD-SINGLE N/A 3/29/2017    Per[facility] by Jeremiah Trujillo [facility] at [facility] CATH LAB    INSERTION-INTRAAORTIC BALLOON PUMP (IABP) Left 1/30/2017    Performed by Carlos Conner [facility] at [facility] CATH LAB    INSERTION-LEFT VENTRICULAR ASSIST DEVICE N/A 2/1/2017    Performed by Lex [facility]o [facility] OR 2ND FLR    LEFT VENTRICULAR ASSIST DEVICE      PLACEMENT-NEOPERICARDIUM N/A 2/2/2017    Performed by Lex [facility]o [facility] OR 2ND FLR    REMOVAL-Abdominal tubular structure N/A 4/4/2018    Performed by Raj Klein [facility] OR 2ND [facility]LR    REMOVAL-AICD Right 2/18/2018    Performed by Raj [facility] at [facility] OR 2ND FLR    Remove LVAD N/A 2/18/2018    Performed by Raj [facility] OR 2ND [facility]LR    TRANSPLANT-HEART-with  standard backbench preparation. N/A 2018    Per[facility] at [facility] OR [address] [place] [address]     Family History     Problem Relation (Age of Onset)    Arthritis Mother    Birth defects Paternal Uncle    Heart disease Brother, Brother    No Known Problems Father        Tobacco Use    Smoking status: Former Smoker     Packs/day: 1.00     Last attempt to quit: 2016     Years since quittin.7    Smokeless tobacco: Never Used   Substance and Sexual Activity    Alcohol use: No    Drug use: No    Sexual activity: Not on file       Review of Systems   Constitutional: Negative for fatigue and fever.   HENT: Positive for sore throat. Negative for congestion, hearing loss and sinus pressure.    Eyes: Negative for pain and visual disturbance.   Respiratory: Negative for chest tightness and shortness of breath.    Cardiovascular: Negative for chest pain and palpitations.   Gastrointestinal: Negative for abdominal pain, blood in stool, constipation, diarrhea, nausea and vomiting.   Genitourinary: Negative for dysuria, frequency and urgency.   Musculoskeletal: Negative for arthralgias, back pain and myalgias.   Skin: Positive for rash (Acne ). Negative for color change.   Neurological: Negative for dizziness, seizures, syncope and headaches.   Hematological: Negative for adenopathy. Does not bruise/bleed easily.   Psychiatric/Behavioral: Negative for confusion, self-injury and sleep disturbance. The patient is not nervous/anxious.      Objective:     Vital Signs (Most Recent):  Temp: 99.5 °F (37.5 °C) (18 1124)  Pulse: (!) 115 (18 1119)  Resp: 18 (18 0746)  BP: 113/62 (18 0746)  SpO2: 96 % (18 0746) Vital Signs (24h Range):  Temp:  [98 °F (36.7 °C)-99.5 °F (37.5 °C)] 99.5 °F (37.5 °C)  Pulse:  [100-115] 115  Resp:  [16-23] 18  SpO2:  [94 %-98 %] 96 %  BP: ()/(51-70) 113/62     Weight: 73.1 kg (161 lb 2.5 oz)  Body mass index is 25.24 kg/m².  Body surface area is 1.86  meters squared.      Intake/Output Summary (Last 24 hours) at 9/26/2018 1358  Last data filed at 9/26/2018 1142  Gross per 24 hour   Intake 540 ml   Output 225 ml   Net 315 ml       Physical Exam   Constitutional: He is oriented to person, place, and time. He appears well-developed and well-nourished.   HENT:   Head: Normocephalic and atraumatic.   Right Ear: External ear normal.   Left Ear: External ear normal.   Eyes: Conjunctivae and EOM are normal. Pupils are equal, round, and reactive to light.   Neck: Normal range of motion. Neck supple.   Cardiovascular: Normal rate, regular rhythm, normal heart sounds and intact distal pulses.   Midline sternal surgical scar well healed.      Pulmonary/Chest: Effort normal and breath sounds normal.   Abdominal: Soft. Bowel sounds are normal.   Musculoskeletal: Normal range of motion.   Neurological: He is alert and oriented to person, place, and time.   Skin: Skin is warm and dry. There is erythema.   small tender erythemous papule < 5mm on left shoulder, described as chronic acne    Psychiatric: He has a normal mood and affect. His behavior is normal. Judgment and thought content normal.   Vitals reviewed.      Significant Labs:   All pertinent labs from the last 24 hours have been reviewed.  WBC 2.4  ANC 0.0    Diagnostic Results:  I have reviewed all pertinent imaging results/findings within the past 24 hours.    Assessment/Plan:       * Neutropenia    Clinical picture is most consistent with medication induced, infection possible but less likely.  - Agree with EBV, HSV, holding Bactrim and Sirolimus  - Continue Neupogen daily until ANC > 500, will reassess daily      - Recommend checking Parvo DNA PCR, blood and urine cultures  - If count does not recover in 5 days will consider bone marrow biopsy.  - Avoid Tylenol for pain management, can masks fevers.               Case discussed with fellow.  Staff attestation to follow     Thank you for your consult. I will follow-up  with patient. Please contact us if you have any additional questions.    Rafal Robledo MD  Hematology/Oncology  [facility]      I have reviewed the notes, assessments, and/or procedures performed by the housestaff, as above.  I have personally interviewed and examined the patient at the beside, and rounded with the housestaff. I concur with her/his assessment and plan and the documentation of Mert Samayoa.  I, Dr. Brando Brice, personally spent more than 70 mins during this encounter, greater than 50% was spent in direct counseling and/or coordination of care.     Brando Brice M.D., M.S., F.A.C.P.  Hematology/Oncology Attending  [facility]

## 2018-09-26 NOTE — PROGRESS NOTES
Pt arrived on foot transfer from ctsu. Pt AAO X 4, VSS including ( nurse reported pt has been [facility] ( awaiting tele box) . Pt ambulates independently without difficulty. Pt with no apparent skin breakdown, denies pain and requesting to take shower. Oriented pt to room, wrapped piv for shower. Will continue to monitor pt. In the meantime pt refusing VISI but will apply tele once pt is out of shower.

## 2018-09-26 NOTE — ASSESSMENT & PLAN NOTE
-WBCs 2.4 today (ANC 0 yesterday, 31 today). Likely med induced.   - S/P 2 doses of neupogen  - Stopped sirolimus. No longer on Cellcept  -Holding Bactrim  - EBV pending. Neg for CMV on 9/21/18  - recent augmentin use (could be cause of leukopenia?). Will consult heme onc for assistance.

## 2018-09-26 NOTE — PROGRESS NOTES
[facility]  Heart Transplant  Progress Note    Patient Name: Mert Samayoa  MRN: [id number]  Admission Date: 9/24/2018  Hospital Length of Stay: 2 days  Attending Physician: Sammi Tapia MD  Primary Care Provider: Primary Doctor No  Principal Problem:Neutropenia    Subjective:     Interval History: Still c/o sore throat this am though some improvement.     Scheduled Meds:   aspirin  81 mg Oral Daily    atorvastatin  20 mg Oral QHS    calcium-vitamin D3  1 tablet Oral Daily    ergocalciferol  50,000 Units Oral Q7 Days    famotidine  40 mg Oral QHS    lisinopril  2.5 mg Oral Daily    magnesium oxide  400 mg Oral BID    pentamadine (PENTAM) 60 mg/mL inhalation solution  300 mg Nebulization Q28 Days    tacrolimus  1 mg Oral Daily    tacrolimus  2 mg Oral Daily     PRN Meds:acetaminophen    Review of patient's allergies indicates:  No Known Allergies  Objective:     Vital Signs (Most Recent):  Temp: 99.5 °F (37.5 °C) (09/26/18 1124)  Pulse: (!) 115 (09/26/18 1119)  Resp: 18 (09/26/18 0746)  BP: 113/62 (09/26/18 0746)  SpO2: 96 % (09/26/18 0746) Vital Signs (24h Range):  Temp:  [98 °F (36.7 °C)-99.5 °F (37.5 °C)] 99.5 °F (37.5 °C)  Pulse:  [100-115] 115  Resp:  [16-23] 18  SpO2:  [94 %-98 %] 96 %  BP: ()/(51-70) 113/62     Patient Vitals for the past 72 hrs (Last 3 readings):   Weight   09/26/18 0404 73.1 kg (161 lb 2.5 oz)   09/24/18 1328 73.3 kg (161 lb 9.6 oz)     Body mass index is 25.24 kg/m².      Intake/Output Summary (Last 24 hours) at 9/26/2018 1451  Last data filed at 9/26/2018 1142  Gross per 24 hour   Intake 540 ml   Output 225 ml   Net 315 ml     Hemodynamic Parameters:    Telemetry: Reviewed    Physical Exam   Constitutional: He is oriented to person, place, and time. He appears well-developed and well-nourished.   HENT:   Head: Normocephalic and atraumatic.   Neck: Normal range of motion. Neck supple. No JVD present.   Cardiovascular: Normal rate and regular rhythm.  Exam reveals no gallop and no friction rub.   No murmur heard.  Pulmonary/Chest: Effort normal and breath sounds normal. He has no wheezes. He has no rales.   Abdominal: Soft. Bowel sounds are normal. There is no tenderness.   Musculoskeletal: He exhibits no edema.   Neurological: He is alert and oriented to person, place, and time.   Skin: Skin is warm and dry.   Psychiatric: He has a normal mood and affect. His behavior is normal. Judgment and thought content normal.   Nursing note and vitals reviewed.      Significant Labs:  CBC:  Recent Labs   Lab  09/24/18   0610  09/25/18   0850  09/26/18   0401   WBC  1.60*  1.52*  2.40*   RBC  4.87  5.26  4.95   HGB  13.6*  14.3  13.8*   HCT  41.8  45.3  42.6   PLT  185  176  185   MCV  86  86  86   MCH  27.9  27.2  27.9   MCHC  32.5  31.6*  32.4     BNP:  Recent Labs   Lab  09/24/18   1330   BNP  59     CMP:  Recent Labs   Lab  09/24/18   0610  09/25/18   0850  09/26/18   0401   GLU  94  91  84   CALCIUM  9.5  9.4  9.7   ALBUMIN  4.2  4.0  4.0   PROT  7.0  6.7  6.9   NA  140  139  138   K  4.3  4.5  4.5   CO2  25  26  25   CL  104  105  102   BUN  16  11  12   CREATININE  1.1  1.0  1.0   ALKPHOS  92  92  91   ALT  36  31  29   AST  24  21  22   BILITOT  0.9  1.4*  1.9*      Coagulation:   No results for input(s): PT, INR, APTT in the last 168 hours.  LDH:  No results for input(s): LDH in the last 72 hours.  Microbiology:  Microbiology Results (last 7 days)     Procedure Component Value Units Date/Time    Respiratory Viral Panel by PCR [facility]; Nasal Swab [[id number]] Collected:  09/26/18 1403    Order Status:  Sent Specimen:  Respiratory Updated:  09/26/18 1435    Strep A culture, throat [[id number]] Collected:  09/24/18 2000    Order Status:  Completed Specimen:  Throat Updated:  09/26/18 1105     Strep A Culture No  Group A  Streptococcus isolated    Throat culture [[id number]] Collected:  09/24/18 2000    Order Status:  Completed Specimen:  Throat Updated:  09/26/18 1041      RESPIRATORY CULTURE - THROAT Normal respiratory jose alfredo    Strep A culture, throat [[id number]] Collected:  09/24/18 2000    Order Status:  Canceled Specimen:  Throat Updated:  09/24/18 2307    Throat Screen, Rapid [[id number]] Collected:  09/24/18 2000    Order Status:  Completed Specimen:  Throat Updated:  09/24/18 2307     Rapid Strep A Screen Negative     Comment: See Micro for reflexed Strep culture.             I have reviewed all pertinent labs within the past 24 hours.    Estimated Creatinine Clearance: 102.8 mL/min (based on SCr of 1 mg/dL).    Diagnostic Results:  I have reviewed all pertinent imaging results/findings within the past 24 hours.    Assessment and Plan:     No notes on file    * Neutropenia    -WBCs 2.4 today (ANC 0 yesterday, 31 today). Likely med induced.   - S/P 2 doses of neupogen  - Stopped sirolimus. No longer on Cellcept  -Holding Bactrim  - EBV pending. Neg for CMV on 9/21/18  - recent augmentin use (could be cause of leukopenia?). Will consult heme onc for assistance.         Essential hypertension    -Cont lisinopril        Heart transplanted    -Transplant Date 2/18/18.   -CMV Status:D+/R-   -Rejection status: Moderate Risk  -Rejection episodes: none to date  -Induction: No   -Current immunosuppression: tacro. Stopped sirolimus due to low WBCs. Check Immuknowcylex (pending)  -Opportunistic PPx with: pentamadine (holding Bactrim due to low WBCs)  -Echo 9/18/28: LVEF 60/65%  -Check DSA. Allomap up last week (31 from 23) and planned for BX today but held off due to neutropenia. Will plan to get once resolved            Kimberlyn Hartley PA-C  Heart Transplant  [facility]

## 2018-09-26 NOTE — SUBJECTIVE & OBJECTIVE
Interval History: Still c/o sore throat this am though some improvement.     Scheduled Meds:   aspirin  81 mg Oral Daily    atorvastatin  20 mg Oral QHS    calcium-vitamin D3  1 tablet Oral Daily    ergocalciferol  50,000 Units Oral Q7 Days    famotidine  40 mg Oral QHS    lisinopril  2.5 mg Oral Daily    magnesium oxide  400 mg Oral BID    pentamadine (PENTAM) 60 mg/mL inhalation solution  300 mg Nebulization Q28 Days    tacrolimus  1 mg Oral Daily    tacrolimus  2 mg Oral Daily     PRN Meds:acetaminophen    Review of patient's allergies indicates:  No Known Allergies  Objective:     Vital Signs (Most Recent):  Temp: 99.5 °F (37.5 °C) (09/26/18 1124)  Pulse: (!) 115 (09/26/18 1119)  Resp: 18 (09/26/18 0746)  BP: 113/62 (09/26/18 0746)  SpO2: 96 % (09/26/18 0746) Vital Signs (24h Range):  Temp:  [98 °F (36.7 °C)-99.5 °F (37.5 °C)] 99.5 °F (37.5 °C)  Pulse:  [100-115] 115  Resp:  [16-23] 18  SpO2:  [94 %-98 %] 96 %  BP: ()/(51-70) 113/62     Patient Vitals for the past 72 hrs (Last 3 readings):   Weight   09/26/18 0404 73.1 kg (161 lb 2.5 oz)   09/24/18 1328 73.3 kg (161 lb 9.6 oz)     Body mass index is 25.24 kg/m².      Intake/Output Summary (Last 24 hours) at 9/26/2018 1451  Last data filed at 9/26/2018 1142  Gross per 24 hour   Intake 540 ml   Output 225 ml   Net 315 ml     Hemodynamic Parameters:    Telemetry: Reviewed    Physical Exam   Constitutional: He is oriented to person, place, and time. He appears well-developed and well-nourished.   HENT:   Head: Normocephalic and atraumatic.   Neck: Normal range of motion. Neck supple. No JVD present.   Cardiovascular: Normal rate and regular rhythm. Exam reveals no gallop and no friction rub.   No murmur heard.  Pulmonary/Chest: Effort normal and breath sounds normal. He has no wheezes. He has no rales.   Abdominal: Soft. Bowel sounds are normal. There is no tenderness.   Musculoskeletal: He exhibits no edema.   Neurological: He is alert and oriented  to person, place, and time.   Skin: Skin is warm and dry.   Psychiatric: He has a normal mood and affect. His behavior is normal. Judgment and thought content normal.   Nursing note and vitals reviewed.      Significant Labs:  CBC:  Recent Labs   Lab  09/24/18   0610  09/25/18   0850  09/26/18   0401   WBC  1.60*  1.52*  2.40*   RBC  4.87  5.26  4.95   HGB  13.6*  14.3  13.8*   HCT  41.8  45.3  42.6   PLT  185  176  185   MCV  86  86  86   MCH  27.9  27.2  27.9   MCHC  32.5  31.6*  32.4     BNP:  Recent Labs   Lab  09/24/18   1330   BNP  59     CMP:  Recent Labs   Lab  09/24/18   0610  09/25/18   0850  09/26/18   0401   GLU  94  91  84   CALCIUM  9.5  9.4  9.7   ALBUMIN  4.2  4.0  4.0   PROT  7.0  6.7  6.9   NA  140  139  138   K  4.3  4.5  4.5   CO2  25  26  25   CL  104  105  102   BUN  16  11  12   CREATININE  1.1  1.0  1.0   ALKPHOS  92  92  91   ALT  36  31  29   AST  24  21  22   BILITOT  0.9  1.4*  1.9*      Coagulation:   No results for input(s): PT, INR, APTT in the last 168 hours.  LDH:  No results for input(s): LDH in the last 72 hours.  Microbiology:  Microbiology Results (last 7 days)     Procedure Component Value Units Date/Time    Respiratory Viral Panel by PCR [facility]; Nasal Swab [[id number]] Collected:  09/26/18 1403    Order Status:  Sent Specimen:  Respiratory Updated:  09/26/18 1435    Strep A culture, throat [[id number]] Collected:  09/24/18 2000    Order Status:  Completed Specimen:  Throat Updated:  09/26/18 1105     Strep A Culture No  Group A  Streptococcus isolated    Throat culture [[id number]] Collected:  09/24/18 2000    Order Status:  Completed Specimen:  Throat Updated:  09/26/18 1041     RESPIRATORY CULTURE - THROAT Normal respiratory jose alfredo    Strep A culture, throat [[id number]] Collected:  09/24/18 2000    Order Status:  Canceled Specimen:  Throat Updated:  09/24/18 2307    Throat Screen, Rapid [[id number]] Collected:  09/24/18 2000    Order Status:  Completed Specimen:  Throat Updated:   09/24/18 2307     Rapid Strep A Screen Negative     Comment: See Micro for reflexed Strep culture.             I have reviewed all pertinent labs within the past 24 hours.    Estimated Creatinine Clearance: 102.8 mL/min (based on SCr of 1 mg/dL).    Diagnostic Results:  I have reviewed all pertinent imaging results/findings within the past 24 hours.

## 2018-09-26 NOTE — SUBJECTIVE & OBJECTIVE
Oncology Treatment Plan:   [No treatment plan]    Medications:  Continuous Infusions:  Scheduled Meds:   aspirin  81 mg Oral Daily    atorvastatin  20 mg Oral QHS    calcium-vitamin D3  1 tablet Oral Daily    ergocalciferol  50,000 Units Oral Q7 Days    famotidine  40 mg Oral QHS    lisinopril  2.5 mg Oral Daily    magnesium oxide  400 mg Oral BID    pentamadine (PENTAM) 60 mg/mL inhalation solution  300 mg Nebulization Q28 Days    tacrolimus  1 mg Oral Daily    tacrolimus  2 mg Oral Daily     PRN Meds:acetaminophen     Review of patient's allergies indicates:  No Known Allergies     Past Medical History:   Diagnosis Date    Cardiogenic shock 1/16/2017    Cardiomyopathy     Familial cardiomyopathy    CHF (congestive heart failure)     Encounter for blood transfusion     Essential hypertension 12/21/2016    Essential hypertension 3/20/2018    Familial Cardiomyopathy     Familial cardiomyopathy      Past Surgical History:   Procedure Laterality Date    BIOPSY-ECHO GUIDED N/A 4/17/2018    Performed by [facility] CATH LAB    BIOPSY-ECHO GUIDED N/A 4/3/2018    Performed by Marisel Lundberg MD at [facility] CATH LAB    BIOPSY-ECHO GUIDED N/A 3/20/2018    Performed by Marisel Lundberg [facility] at [facility] CATH LAB    BIOPSY-ECHO GUIDED N/A 3/13/2018    Performed by Hernán Tidwell Jr., MD at [facility] CATH LAB    BIOPSY-ECHO GUIDED N/A 3/6/2018    Performed by Guillermo Daniels MD at [facility] CATH LAB    CLOSURE-STERNAL WOUND N/A 2/2/2017    Performed by Lex Macedo [facility] at [facility] OR 2ND FLR    EXPLORATION-BLEEDING (RE-EXPLORATION) Bilateral 2/19/2018    Performed by Raj Klein MD at [facility] OR 2ND FLR    HEART TRANSPLANT      INSERTION-ICD-SINGLE N/A 3/29/2017    Performed by Jeremiah Trujillo MD at [facility] CATH LAB    INSERTION-INTRAAORTIC BALLOON PUMP (IABP) Left 1/30/2017    Performed by Carlos Conner MD at [facility] CATH LAB    INSERTION-LEFT VENTRICULAR ASSIST DEVICE N/A 2/1/2017     [facility] at [facility] OR 2ND F[facility]    LEFT VENTRICULAR ASSIST DEVICE      PLACEMENT-NEOPERICARDIUM N/A 2017    Performed by [facility]o [facility] at [facility] OR 2ND FLR    REMOVAL-Abdominal tubular structure N/A 2018    Performed by Raj Klein [facility] at [facility] OR 2ND FLR    REMOVAL-AICD Right 2018    Performed by [facility] at [facility]    Remove LVAD N/A 2018    Performed by Raj [facility] at [facility]    TRANSPLANT-HEART-with standard backbench preparation. N/A 2018    [facility]     Family History     Problem Relation (Age of Onset)    Arthritis Mother    Birth defects Paternal Uncle    Heart disease Brother, Brother    No Known Problems Father        Tobacco Use    Smoking status: Former Smoker     Packs/day: 1.00     Last attempt to quit: 2016     Years since quittin.7    Smokeless tobacco: Never Used   Substance and Sexual Activity    Alcohol use: No    Drug use: No    Sexual activity: Not on file       Review of Systems   Constitutional: Negative for fatigue and fever.   HENT: Positive for sore throat. Negative for congestion, hearing loss and sinus pressure.    Eyes: Negative for pain and visual disturbance.   Respiratory: Negative for chest tightness and shortness of breath.    Cardiovascular: Negative for chest pain and palpitations.   Gastrointestinal: Negative for abdominal pain, blood in stool, constipation, diarrhea, nausea and vomiting.   Genitourinary: Negative for dysuria, frequency and urgency.   Musculoskeletal: Negative for arthralgias, back pain and myalgias.   Skin: Positive for rash (Acne ). Negative for color change.   Neurological: Negative for dizziness, seizures, syncope and headaches.   Hematological: Negative for adenopathy. Does not bruise/bleed easily.   Psychiatric/Behavioral: Negative for confusion, self-injury and sleep disturbance. The patient is not  nervous/anxious.      Objective:     Vital Signs (Most Recent):  Temp: 99.5 °F (37.5 °C) (09/26/18 1124)  Pulse: (!) 115 (09/26/18 1119)  Resp: 18 (09/26/18 0746)  BP: 113/62 (09/26/18 0746)  SpO2: 96 % (09/26/18 0746) Vital Signs (24h Range):  Temp:  [98 °F (36.7 °C)-99.5 °F (37.5 °C)] 99.5 °F (37.5 °C)  Pulse:  [100-115] 115  Resp:  [16-23] 18  SpO2:  [94 %-98 %] 96 %  BP: ()/(51-70) 113/62     Weight: 73.1 kg (161 lb 2.5 oz)  Body mass index is 25.24 kg/m².  Body surface area is 1.86 meters squared.      Intake/Output Summary (Last 24 hours) at 9/26/2018 1358  Last data filed at 9/26/2018 1142  Gross per 24 hour   Intake 540 ml   Output 225 ml   Net 315 ml       Physical Exam   Constitutional: He is oriented to person, place, and time. He appears well-developed and well-nourished.   HENT:   Head: Normocephalic and atraumatic.   Right Ear: External ear normal.   Left Ear: External ear normal.   Eyes: Conjunctivae and EOM are normal. Pupils are equal, round, and reactive to light.   Neck: Normal range of motion. Neck supple.   Cardiovascular: Normal rate, regular rhythm, normal heart sounds and intact distal pulses.   Midline sternal surgical scar well healed.      Pulmonary/Chest: Effort normal and breath sounds normal.   Abdominal: Soft. Bowel sounds are normal.   Musculoskeletal: Normal range of motion.   Neurological: He is alert and oriented to person, place, and time.   Skin: Skin is warm and dry. There is erythema.   small tender erythemous papule < 5mm on left shoulder, described as chronic acne    Psychiatric: He has a normal mood and affect. His behavior is normal. Judgment and thought content normal.   Vitals reviewed.      Significant Labs:   All pertinent labs from the last 24 hours have been reviewed.  WBC 2.4  ANC 0.0    Diagnostic Results:  I have reviewed all pertinent imaging results/findings within the past 24 hours.

## 2018-09-26 NOTE — HPI
28 y.o.WM  familial NiCMP now s/p orthotopic Heart transplant 2/18/18 who was admitted for Neutropenia.  Patient was recently seen 7 mouth post transplant for follow up and was found to be Neutropenic.  He was recently transition to a Tacro/Sirolimus in place of prednisone/cellcept.  Prior to admission he reported a sore throat the past 2-3 weeks with associated tender lymph nodes  He visited an urgent care and received 10 days of Amoxicillin with minimal improvement.  He otherwise reports he was in his normals felix of health.  He does report a chronic issue with acne on his face and chest for the past 3-6 months.  He currently denies fever, chills, SOB, abdominal pain, change in stool, change in urination or cough.    In the hospital he switched from bactrim to pentamadine.  His Sirolimus was stopped and he has received 2 doses of Neupogen.    Hematology was consulted for management of severe neutropenia.

## 2018-09-26 NOTE — ASSESSMENT & PLAN NOTE
-Transplant Date 2/18/18.   -CMV Status:D+/R-   -Rejection status: Moderate Risk  -Rejection episodes: none to date  -Induction: No   -Current immunosuppression: tacro. Stopped sirolimus due to low WBCs. Check Immuknowcylex (pending)  -Opportunistic PPx with: pentamadine (holding Bactrim due to low WBCs)  -Echo 9/18/28: LVEF 60/65%  -Check DSA. Allomap up last week (31 from 23) and planned for BX today but held off due to neutropenia. Will plan to get once resolved

## 2018-09-26 NOTE — PLAN OF CARE
Problem: Patient Care Overview  Goal: Plan of Care Review  Outcome: Ongoing (interventions implemented as appropriate)  Awake alert oriented x3, neutropenic precautions in place. This RN instructed the pt on the importance of hand hygiene, pt verbalized understanding. Respiratory viral panel pending. Pt turns self in bed independently no signs of skin breakdown noted. Pt reports generalized back pain 3/10 PRN Tylenol administered which provided moderate relief. Fall precautions in place, side rails upx3, non slip footwear applied, pt instructed to call for assistance before attempting to ambulate, pt verbalized understanding. Call bell within reach.

## 2018-09-27 LAB
ALBUMIN SERPL BCP-MCNC: 3.9 G/DL
ALP SERPL-CCNC: 90 U/L
ALT SERPL W/O P-5'-P-CCNC: 26 U/L
ANION GAP SERPL CALC-SCNC: 11 MMOL/L
ANISOCYTOSIS BLD QL SMEAR: SLIGHT
AST SERPL-CCNC: 22 U/L
BASOPHILS # BLD AUTO: 0.03 K/UL
BASOPHILS NFR BLD: 1 %
BILIRUB SERPL-MCNC: 1.1 MG/DL
BUN SERPL-MCNC: 15 MG/DL
CALCIUM SERPL-MCNC: 9.3 MG/DL
CHLORIDE SERPL-SCNC: 102 MMOL/L
CO2 SERPL-SCNC: 26 MMOL/L
CREAT SERPL-MCNC: 1 MG/DL
DIFFERENTIAL METHOD: ABNORMAL
EBV EA AB TITR SER: <5 U/ML
EOSINOPHIL # BLD AUTO: 0.1 K/UL
EOSINOPHIL NFR BLD: 2.9 %
ERYTHROCYTE [DISTWIDTH] IN BLOOD BY AUTOMATED COUNT: 11.7 %
EST. GFR  (AFRICAN AMERICAN): >60 ML/MIN/1.73 M^2
EST. GFR  (NON AFRICAN AMERICAN): >60 ML/MIN/1.73 M^2
GLUCOSE SERPL-MCNC: 86 MG/DL
HCT VFR BLD AUTO: 42.1 %
HGB BLD-MCNC: 13.5 G/DL
HSV1 DNA SPEC QL NAA+PROBE: NEGATIVE
HSV2 DNA SPEC QL NAA+PROBE: NEGATIVE
IMM GRANULOCYTES # BLD AUTO: 0.05 K/UL
IMM GRANULOCYTES NFR BLD AUTO: 1.6 %
IMMUNKNOW (STIMULATED): 179 NG/ML
LYMPHOCYTES # BLD AUTO: 1.8 K/UL
LYMPHOCYTES NFR BLD: 57.5 %
MAGNESIUM SERPL-MCNC: 1.8 MG/DL
MCH RBC QN AUTO: 27.2 PG
MCHC RBC AUTO-ENTMCNC: 32.1 G/DL
MCV RBC AUTO: 85 FL
MONOCYTES # BLD AUTO: 1.1 K/UL
MONOCYTES NFR BLD: 36.7 %
NEUTROPHILS # BLD AUTO: 0 K/UL
NEUTROPHILS NFR BLD: 0.3 %
NRBC BLD-RTO: 1 /100 WBC
PLATELET # BLD AUTO: 221 K/UL
PMV BLD AUTO: 10.8 FL
POIKILOCYTOSIS BLD QL SMEAR: SLIGHT
POTASSIUM SERPL-SCNC: 4.4 MMOL/L
PROT SERPL-MCNC: 6.7 G/DL
RBC # BLD AUTO: 4.97 M/UL
SIROLIMUS BLD-MCNC: 3.6 NG/ML
SODIUM SERPL-SCNC: 139 MMOL/L
SPHEROCYTES BLD QL SMEAR: ABNORMAL
TACROLIMUS BLD-MCNC: 7.5 NG/ML
WBC # BLD AUTO: 3.08 K/UL

## 2018-09-27 PROCEDURE — 20600001 HC STEP DOWN PRIVATE ROOM

## 2018-09-27 PROCEDURE — 25000003 PHARM REV CODE 250: Performed by: PHYSICIAN ASSISTANT

## 2018-09-27 PROCEDURE — 63600175 PHARM REV CODE 636 W HCPCS: Performed by: INTERNAL MEDICINE

## 2018-09-27 PROCEDURE — 80053 COMPREHEN METABOLIC PANEL: CPT

## 2018-09-27 PROCEDURE — 80195 ASSAY OF SIROLIMUS: CPT

## 2018-09-27 PROCEDURE — 83735 ASSAY OF MAGNESIUM: CPT

## 2018-09-27 PROCEDURE — 36415 COLL VENOUS BLD VENIPUNCTURE: CPT

## 2018-09-27 PROCEDURE — 99233 SBSQ HOSP IP/OBS HIGH 50: CPT | Mod: ,,, | Performed by: INTERNAL MEDICINE

## 2018-09-27 PROCEDURE — 80197 ASSAY OF TACROLIMUS: CPT

## 2018-09-27 PROCEDURE — 85025 COMPLETE CBC W/AUTO DIFF WBC: CPT

## 2018-09-27 RX ORDER — HYDROCODONE BITARTRATE AND ACETAMINOPHEN 10; 325 MG/1; MG/1
1 TABLET ORAL ONCE
Status: COMPLETED | OUTPATIENT
Start: 2018-09-27 | End: 2018-09-27

## 2018-09-27 RX ADMIN — OYSTER SHELL CALCIUM WITH VITAMIN D 1 TABLET: 500; 200 TABLET, FILM COATED ORAL at 08:09

## 2018-09-27 RX ADMIN — ASPIRIN 81 MG: 81 TABLET, COATED ORAL at 08:09

## 2018-09-27 RX ADMIN — FAMOTIDINE 40 MG: 20 TABLET ORAL at 08:09

## 2018-09-27 RX ADMIN — MAGNESIUM OXIDE TAB 400 MG (241.3 MG ELEMENTAL MG) 400 MG: 400 (241.3 MG) TAB at 08:09

## 2018-09-27 RX ADMIN — HYDROCODONE BITARTRATE AND ACETAMINOPHEN 1 TABLET: 10; 325 TABLET ORAL at 05:09

## 2018-09-27 RX ADMIN — TACROLIMUS 1 MG: 1 CAPSULE ORAL at 08:09

## 2018-09-27 RX ADMIN — LISINOPRIL 2.5 MG: 2.5 TABLET ORAL at 08:09

## 2018-09-27 RX ADMIN — ACETAMINOPHEN 650 MG: 325 TABLET ORAL at 04:09

## 2018-09-27 RX ADMIN — FILGRASTIM 480 MCG: 480 INJECTION, SOLUTION INTRAVENOUS; SUBCUTANEOUS at 11:09

## 2018-09-27 RX ADMIN — TACROLIMUS 2 MG: 1 CAPSULE ORAL at 05:09

## 2018-09-27 RX ADMIN — ATORVASTATIN CALCIUM 20 MG: 20 TABLET, FILM COATED ORAL at 08:09

## 2018-09-27 NOTE — ASSESSMENT & PLAN NOTE
- WBCs 3.08 today (ANC today = 9.24)  - S/P 2 doses of neupogen. Will dose for 5 days and then consider BM biopsy per heme onc.  - Stopped sirolimus. No longer on Cellcept  - Holding Bactrim  - EBV negative. Neg for CMV on 9/21/18. HSV, parvo, and Resp Viral panel pending.  - recent augmentin use (could be cause of leukopenia?). Will consult heme onc for assistance.

## 2018-09-27 NOTE — ASSESSMENT & PLAN NOTE
- Transplant Date 2/18/18.   - CMV Status:D+/R-   - Rejection status: Moderate Risk  - Rejection episodes: none to date  - Induction: No   - Current immunosuppression: tacro. Stopped sirolimus due to low WBCs. Check Immuknowcylex (pending)  - Opportunistic PPx with: pentamadine (holding Bactrim due to low WBCs)  - Echo 9/18/28: LVEF 60/65%  - Check DSA. Allomap up last week (31 from 23) and planned for BX today but held off due to neutropenia. Will plan to get once resolved

## 2018-09-27 NOTE — PLAN OF CARE
Problem: Patient Care Overview  Goal: Plan of Care Review  Outcome: Ongoing (interventions implemented as appropriate)  AAOx4. VS stable, afebrile. Denies pain but c/o sore throat. Telemetry monitor on, sinus tachycardia. L AC 20 g, CDI. No redness, no swelling. Neutropenic precautions maintained. Goal ANC > 500. WBC count today increased to 3.08. Neupogen injection administered. Independent and ambulatory. Bed low and locked, call bell within reach, side rails x2. In NAD. Will continue to monitor.

## 2018-09-27 NOTE — SUBJECTIVE & OBJECTIVE
Interval History: Still c/o sore throat this AM. About the same    Scheduled Meds:   aspirin  81 mg Oral Daily    atorvastatin  20 mg Oral QHS    calcium-vitamin D3  1 tablet Oral Daily    ergocalciferol  50,000 Units Oral Q7 Days    famotidine  40 mg Oral QHS    filgrastim  480 mcg Subcutaneous Daily    lisinopril  2.5 mg Oral Daily    magnesium oxide  400 mg Oral BID    pentamadine (PENTAM) 60 mg/mL inhalation solution  300 mg Nebulization Q28 Days    tacrolimus  1 mg Oral Daily    tacrolimus  2 mg Oral Daily     PRN Meds:acetaminophen    Review of patient's allergies indicates:  No Known Allergies  Objective:     Vital Signs (Most Recent):  Temp: 99.9 °F (37.7 °C) (09/27/18 1228)  Pulse: 106 (09/27/18 1117)  Resp: 18 (09/27/18 1108)  BP: 113/79 (09/27/18 1108)  SpO2: 95 % (09/27/18 1108) Vital Signs (24h Range):  Temp:  [98.4 °F (36.9 °C)-100.3 °F (37.9 °C)] 99.9 °F (37.7 °C)  Pulse:  [101-116] 106  Resp:  [14-18] 18  SpO2:  [94 %-98 %] 95 %  BP: ()/(53-79) 113/79     Patient Vitals for the past 72 hrs (Last 3 readings):   Weight   09/27/18 0650 73.3 kg (161 lb 9.6 oz)   09/26/18 0404 73.1 kg (161 lb 2.5 oz)   09/24/18 1328 73.3 kg (161 lb 9.6 oz)     Body mass index is 25.31 kg/m².      Intake/Output Summary (Last 24 hours) at 9/27/2018 1259  Last data filed at 9/27/2018 0900  Gross per 24 hour   Intake 600 ml   Output 450 ml   Net 150 ml     Hemodynamic Parameters:    Telemetry: Reviewed    Physical Exam   Constitutional: He is oriented to person, place, and time. He appears well-developed and well-nourished.   HENT:   Head: Normocephalic and atraumatic.   Mouth/Throat: Posterior oropharyngeal edema (mild) present. No oropharyngeal exudate.   Neck: Normal range of motion. Neck supple. No JVD present.   Cardiovascular: Normal rate and regular rhythm. Exam reveals no gallop and no friction rub.   No murmur heard.  Pulmonary/Chest: Effort normal and breath sounds normal. He has no wheezes. He has  no rales.   Abdominal: Soft. Bowel sounds are normal. There is no tenderness.   Musculoskeletal: He exhibits no edema.   Neurological: He is alert and oriented to person, place, and time.   Skin: Skin is warm and dry.   Psychiatric: He has a normal mood and affect. His behavior is normal. Judgment and thought content normal.   Nursing note and vitals reviewed.    Significant Labs:  CBC:  Recent Labs   Lab  09/25/18   0850  09/26/18   0401  09/27/18   0344   WBC  1.52*  2.40*  3.08*   RBC  5.26  4.95  4.97   HGB  14.3  13.8*  13.5*   HCT  45.3  42.6  42.1   PLT  176  185  221   MCV  86  86  85   MCH  27.2  27.9  27.2   MCHC  31.6*  32.4  32.1     BNP:  Recent Labs   Lab  09/24/18   1330   BNP  59     CMP:  Recent Labs   Lab  09/25/18   0850  09/26/18   0401  09/27/18   0344   GLU  91  84  86   CALCIUM  9.4  9.7  9.3   ALBUMIN  4.0  4.0  3.9   PROT  6.7  6.9  6.7   NA  139  138  139   K  4.5  4.5  4.4   CO2  26  25  26   CL  105  102  102   BUN  11  12  15   CREATININE  1.0  1.0  1.0   ALKPHOS  92  91  90   ALT  31  29  26   AST  21  22  22   BILITOT  1.4*  1.9*  1.1*      Coagulation:   No results for input(s): PT, INR, APTT in the last 168 hours.  LDH:  No results for input(s): LDH in the last 72 hours.  Microbiology:  Microbiology Results (last 7 days)     Procedure Component Value Units Date/Time    Blood culture [[id number]] Collected:  09/26/18 1617    Order Status:  Completed Specimen:  Blood Updated:  09/27/18 0115     Blood Culture, Routine No Growth to date    Blood culture [[id number]] Collected:  09/26/18 1617    Order Status:  Completed Specimen:  Blood Updated:  09/27/18 0115     Blood Culture, Routine No Growth to date    Urine culture [[id number]] Collected:  09/26/18 2121    Order Status:  Sent Specimen:  Urine, Clean Catch Updated:  09/26/18 2244    Respiratory Viral Panel by PCR [facility]; Nasal Swab [[id number]] Collected:  09/26/18 1403    Order Status:  Sent Specimen:  Respiratory Updated:  09/26/18  1435    Strep A culture, throat [[id number]] Collected:  09/24/18 2000    Order Status:  Completed Specimen:  Throat Updated:  09/26/18 1105     Strep A Culture No  Group A  Streptococcus isolated    Throat culture [[id number]] Collected:  09/24/18 2000    Order Status:  Completed Specimen:  Throat Updated:  09/26/18 1041     RESPIRATORY CULTURE - THROAT Normal respiratory jose alfredo    Strep A culture, throat [[id number]] Collected:  09/24/18 2000    Order Status:  Canceled Specimen:  Throat Updated:  09/24/18 2307    Throat Screen, Rapid [[id number]] Collected:  09/24/18 2000    Order Status:  Completed Specimen:  Throat Updated:  09/24/18 2307     Rapid Strep A Screen Negative     Comment: See Micro for reflexed Strep culture.             I have reviewed all pertinent labs within the past 24 hours.    Estimated Creatinine Clearance: 102.8 mL/min (based on SCr of 1 mg/dL).    Diagnostic Results:  I have reviewed all pertinent imaging results/findings within the past 24 hours.

## 2018-09-27 NOTE — PROGRESS NOTES
[facility]  Heart Transplant  Progress Note    Patient Name: Mert Samayoa  MRN: [id number]  Admission Date: 9/24/2018  Hospital Length of Stay: 3 days  Attending Physician: Sammi Tapia MD  Primary Care Provider: Primary Doctor No  Principal Problem:Neutropenia    Subjective:     Interval History: Still c/o sore throat this AM. About the same    Scheduled Meds:   aspirin  81 mg Oral Daily    atorvastatin  20 mg Oral QHS    calcium-vitamin D3  1 tablet Oral Daily    ergocalciferol  50,000 Units Oral Q7 Days    famotidine  40 mg Oral QHS    filgrastim  480 mcg Subcutaneous Daily    lisinopril  2.5 mg Oral Daily    magnesium oxide  400 mg Oral BID    pentamadine (PENTAM) 60 mg/mL inhalation solution  300 mg Nebulization Q28 Days    tacrolimus  1 mg Oral Daily    tacrolimus  2 mg Oral Daily     PRN Meds:acetaminophen    Review of patient's allergies indicates:  No Known Allergies  Objective:     Vital Signs (Most Recent):  Temp: 99.9 °F (37.7 °C) (09/27/18 1228)  Pulse: 106 (09/27/18 1117)  Resp: 18 (09/27/18 1108)  BP: 113/79 (09/27/18 1108)  SpO2: 95 % (09/27/18 1108) Vital Signs (24h Range):  Temp:  [98.4 °F (36.9 °C)-100.3 °F (37.9 °C)] 99.9 °F (37.7 °C)  Pulse:  [101-116] 106  Resp:  [14-18] 18  SpO2:  [94 %-98 %] 95 %  BP: ()/(53-79) 113/79     Patient Vitals for the past 72 hrs (Last 3 readings):   Weight   09/27/18 0650 73.3 kg (161 lb 9.6 oz)   09/26/18 0404 73.1 kg (161 lb 2.5 oz)   09/24/18 1328 73.3 kg (161 lb 9.6 oz)     Body mass index is 25.31 kg/m².      Intake/Output Summary (Last 24 hours) at 9/27/2018 1259  Last data filed at 9/27/2018 0900  Gross per 24 hour   Intake 600 ml   Output 450 ml   Net 150 ml     Hemodynamic Parameters:    Telemetry: Reviewed    Physical Exam   Constitutional: He is oriented to person, place, and time. He appears well-developed and well-nourished.   HENT:   Head: Normocephalic and atraumatic.   Mouth/Throat: Posterior  oropharyngeal edema (mild) present. No oropharyngeal exudate.   Neck: Normal range of motion. Neck supple. No JVD present.   Cardiovascular: Normal rate and regular rhythm. Exam reveals no gallop and no friction rub.   No murmur heard.  Pulmonary/Chest: Effort normal and breath sounds normal. He has no wheezes. He has no rales.   Abdominal: Soft. Bowel sounds are normal. There is no tenderness.   Musculoskeletal: He exhibits no edema.   Neurological: He is alert and oriented to person, place, and time.   Skin: Skin is warm and dry.   Psychiatric: He has a normal mood and affect. His behavior is normal. Judgment and thought content normal.   Nursing note and vitals reviewed.    Significant Labs:  CBC:  Recent Labs   Lab  09/25/18   0850  09/26/18   0401  09/27/18   0344   WBC  1.52*  2.40*  3.08*   RBC  5.26  4.95  4.97   HGB  14.3  13.8*  13.5*   HCT  45.3  42.6  42.1   PLT  176  185  221   MCV  86  86  85   MCH  27.2  27.9  27.2   MCHC  31.6*  32.4  32.1     BNP:  Recent Labs   Lab  09/24/18   1330   BNP  59     CMP:  Recent Labs   Lab  09/25/18   0850  09/26/18   0401  09/27/18   0344   GLU  91  84  86   CALCIUM  9.4  9.7  9.3   ALBUMIN  4.0  4.0  3.9   PROT  6.7  6.9  6.7   NA  139  138  139   K  4.5  4.5  4.4   CO2  26  25  26   CL  105  102  102   BUN  11  12  15   CREATININE  1.0  1.0  1.0   ALKPHOS  92  91  90   ALT  31  29  26   AST  21  22  22   BILITOT  1.4*  1.9*  1.1*      Coagulation:   No results for input(s): PT, INR, APTT in the last 168 hours.  LDH:  No results for input(s): LDH in the last 72 hours.  Microbiology:  Microbiology Results (last 7 days)     Procedure Component Value Units Date/Time    Blood culture [[id number]] Collected:  09/26/18 1617    Order Status:  Completed Specimen:  Blood Updated:  09/27/18 0115     Blood Culture, Routine No Growth to date    Blood culture [[id number]] Collected:  09/26/18 1617    Order Status:  Completed Specimen:  Blood Updated:  09/27/18 0115     Blood  Culture, Routine No Growth to date    Urine culture [[id number]] Collected:  09/26/18 2121    Order Status:  Sent Specimen:  Urine, Clean Catch Updated:  09/26/18 2244    Respiratory Viral Panel by PCR [facility]; Nasal Swab [[id number]] Collected:  09/26/18 1403    Order Status:  Sent Specimen:  Respiratory Updated:  09/26/18 1435    Strep A culture, throat [[id number]] Collected:  09/24/18 2000    Order Status:  Completed Specimen:  Throat Updated:  09/26/18 1105     Strep A Culture No  Group A  Streptococcus isolated    Throat culture [[id number]] Collected:  09/24/18 2000    Order Status:  Completed Specimen:  Throat Updated:  09/26/18 1041     RESPIRATORY CULTURE - THROAT Normal respiratory jose alfredo    Strep A culture, throat [[id number]] Collected:  09/24/18 2000    Order Status:  Canceled Specimen:  Throat Updated:  09/24/18 2307    Throat Screen, Rapid [[id number]] Collected:  09/24/18 2000    Order Status:  Completed Specimen:  Throat Updated:  09/24/18 2307     Rapid Strep A Screen Negative     Comment: See Micro for reflexed Strep culture.             I have reviewed all pertinent labs within the past 24 hours.    Estimated Creatinine Clearance: 102.8 mL/min (based on SCr of 1 mg/dL).    Diagnostic Results:  I have reviewed all pertinent imaging results/findings within the past 24 hours.    Assessment and Plan:     No notes on file    * Neutropenia    - WBCs 3.08 today (ANC today = 9.24)  - S/P 2 doses of neupogen. Will dose for 5 days and then consider BM biopsy per heme onc.  - Stopped sirolimus. No longer on Cellcept  - Holding Bactrim  - EBV negative. Neg for CMV on 9/21/18. HSV, parvo, and Resp Viral panel pending.  - recent augmentin use (could be cause of leukopenia?). Will consult heme onc for assistance.         Essential hypertension    -Cont lisinopril        Heart transplanted    - Transplant Date 2/18/18.   - CMV Status:D+/R-   - Rejection status: Moderate Risk  - Rejection episodes: none to date  -  Induction: No   - Current immunosuppression: tacro. Stopped sirolimus due to low WBCs. Check Immuknowcylex (pending)  - Opportunistic PPx with: pentamadine (holding Bactrim due to low WBCs)  - Echo 9/18/28: LVEF 60/65%  - Check DSA. Allomap up last week (31 from 23) and planned for BX today but held off due to neutropenia. Will plan to get once resolved            Kimberlyn Hartley PA-C  Heart Transplant  [facility]

## 2018-09-28 LAB
ALBUMIN SERPL BCP-MCNC: 3.7 G/DL
ALP SERPL-CCNC: 89 U/L
ALT SERPL W/O P-5'-P-CCNC: 24 U/L
ANION GAP SERPL CALC-SCNC: 7 MMOL/L
ANISOCYTOSIS BLD QL SMEAR: SLIGHT
AST SERPL-CCNC: 24 U/L
BACTERIA UR CULT: NO GROWTH
BASOPHILS # BLD AUTO: ABNORMAL K/UL
BASOPHILS NFR BLD: 2 %
BILIRUB SERPL-MCNC: 1 MG/DL
BUN SERPL-MCNC: 13 MG/DL
CALCIUM SERPL-MCNC: 9.3 MG/DL
CHLORIDE SERPL-SCNC: 103 MMOL/L
CO2 SERPL-SCNC: 28 MMOL/L
CREAT SERPL-MCNC: 0.9 MG/DL
DIFFERENTIAL METHOD: ABNORMAL
ENTEROVIRUS: NOT DETECTED
EOSINOPHIL # BLD AUTO: ABNORMAL K/UL
EOSINOPHIL NFR BLD: 4 %
ERYTHROCYTE [DISTWIDTH] IN BLOOD BY AUTOMATED COUNT: 11.9 %
EST. GFR  (AFRICAN AMERICAN): >60 ML/MIN/1.73 M^2
EST. GFR  (NON AFRICAN AMERICAN): >60 ML/MIN/1.73 M^2
GLUCOSE SERPL-MCNC: 86 MG/DL
HCT VFR BLD AUTO: 40.6 %
HGB BLD-MCNC: 12.7 G/DL
HUMAN BOCAVIRUS: NOT DETECTED
HUMAN CORONAVIRUS, COMMON COLD VIRUS: NOT DETECTED
IMM GRANULOCYTES # BLD AUTO: ABNORMAL K/UL
IMM GRANULOCYTES NFR BLD AUTO: ABNORMAL %
INFLUENZA A - H1N1-09: NOT DETECTED
LYMPHOCYTES # BLD AUTO: ABNORMAL K/UL
LYMPHOCYTES NFR BLD: 60 %
MAGNESIUM SERPL-MCNC: 1.6 MG/DL
MCH RBC QN AUTO: 26.8 PG
MCHC RBC AUTO-ENTMCNC: 31.3 G/DL
MCV RBC AUTO: 86 FL
METAMYELOCYTES NFR BLD MANUAL: 3 %
MONOCYTES # BLD AUTO: ABNORMAL K/UL
MONOCYTES NFR BLD: 22 %
MYELOCYTES NFR BLD MANUAL: 2 %
NEUTROPHILS NFR BLD: 6 %
NEUTS BAND NFR BLD MANUAL: 1 %
NRBC BLD-RTO: 1 /100 WBC
OVALOCYTES BLD QL SMEAR: ABNORMAL
PARAINFLUENZA: NOT DETECTED
PLATELET # BLD AUTO: 245 K/UL
PMV BLD AUTO: 10.7 FL
POIKILOCYTOSIS BLD QL SMEAR: SLIGHT
POLYCHROMASIA BLD QL SMEAR: ABNORMAL
POTASSIUM SERPL-SCNC: 4.2 MMOL/L
PROT SERPL-MCNC: 6.4 G/DL
RBC # BLD AUTO: 4.73 M/UL
RVP - ADENOVIRUS: NOT DETECTED
RVP - HUMAN METAPNEUMOVIRUS (HMPV): NOT DETECTED
RVP - INFLUENZA A: NOT DETECTED
RVP - INFLUENZA B: NOT DETECTED
RVP - RESPIRATORY SYNCTIAL VIRUS (RSV) A: NOT DETECTED
RVP - RESPIRATORY VIRAL PANEL, SOURCE: NORMAL
RVP - RHINOVIRUS: NOT DETECTED
SIROLIMUS BLD-MCNC: <2 NG/ML
SODIUM SERPL-SCNC: 138 MMOL/L
TACROLIMUS BLD-MCNC: 5.4 NG/ML
WBC # BLD AUTO: 4.35 K/UL

## 2018-09-28 PROCEDURE — 85007 BL SMEAR W/DIFF WBC COUNT: CPT

## 2018-09-28 PROCEDURE — 99233 SBSQ HOSP IP/OBS HIGH 50: CPT | Mod: ,,, | Performed by: INTERNAL MEDICINE

## 2018-09-28 PROCEDURE — 25000003 PHARM REV CODE 250: Performed by: INTERNAL MEDICINE

## 2018-09-28 PROCEDURE — 20600001 HC STEP DOWN PRIVATE ROOM

## 2018-09-28 PROCEDURE — 25000003 PHARM REV CODE 250: Performed by: PHYSICIAN ASSISTANT

## 2018-09-28 PROCEDURE — 63600175 PHARM REV CODE 636 W HCPCS: Mod: JG | Performed by: INTERNAL MEDICINE

## 2018-09-28 PROCEDURE — 85027 COMPLETE CBC AUTOMATED: CPT

## 2018-09-28 PROCEDURE — 80197 ASSAY OF TACROLIMUS: CPT

## 2018-09-28 PROCEDURE — 83735 ASSAY OF MAGNESIUM: CPT

## 2018-09-28 PROCEDURE — 80195 ASSAY OF SIROLIMUS: CPT

## 2018-09-28 PROCEDURE — 80053 COMPREHEN METABOLIC PANEL: CPT

## 2018-09-28 PROCEDURE — 36415 COLL VENOUS BLD VENIPUNCTURE: CPT

## 2018-09-28 RX ORDER — HYDROCODONE BITARTRATE AND ACETAMINOPHEN 10; 325 MG/1; MG/1
1 TABLET ORAL ONCE
Status: COMPLETED | OUTPATIENT
Start: 2018-09-29 | End: 2018-09-28

## 2018-09-28 RX ORDER — HYDROCODONE BITARTRATE AND ACETAMINOPHEN 10; 325 MG/1; MG/1
1 TABLET ORAL ONCE
Status: COMPLETED | OUTPATIENT
Start: 2018-09-28 | End: 2018-09-28

## 2018-09-28 RX ORDER — TRAMADOL HYDROCHLORIDE 50 MG/1
50 TABLET ORAL EVERY 8 HOURS PRN
Status: DISCONTINUED | OUTPATIENT
Start: 2018-09-28 | End: 2018-10-02 | Stop reason: HOSPADM

## 2018-09-28 RX ORDER — TACROLIMUS 1 MG/1
1 CAPSULE ORAL ONCE
Status: COMPLETED | OUTPATIENT
Start: 2018-09-28 | End: 2018-09-28

## 2018-09-28 RX ORDER — CYCLOBENZAPRINE HCL 5 MG
5 TABLET ORAL 3 TIMES DAILY PRN
Status: DISCONTINUED | OUTPATIENT
Start: 2018-09-28 | End: 2018-10-02 | Stop reason: HOSPADM

## 2018-09-28 RX ORDER — TACROLIMUS 1 MG/1
2 CAPSULE ORAL 2 TIMES DAILY
Status: DISCONTINUED | OUTPATIENT
Start: 2018-09-28 | End: 2018-10-02 | Stop reason: HOSPADM

## 2018-09-28 RX ADMIN — TACROLIMUS 2 MG: 1 CAPSULE ORAL at 05:09

## 2018-09-28 RX ADMIN — ASPIRIN 81 MG: 81 TABLET, COATED ORAL at 08:09

## 2018-09-28 RX ADMIN — ATORVASTATIN CALCIUM 20 MG: 20 TABLET, FILM COATED ORAL at 10:09

## 2018-09-28 RX ADMIN — OYSTER SHELL CALCIUM WITH VITAMIN D 1 TABLET: 500; 200 TABLET, FILM COATED ORAL at 08:09

## 2018-09-28 RX ADMIN — ACETAMINOPHEN 650 MG: 325 TABLET ORAL at 04:09

## 2018-09-28 RX ADMIN — CYCLOBENZAPRINE HYDROCHLORIDE 5 MG: 5 TABLET, FILM COATED ORAL at 10:09

## 2018-09-28 RX ADMIN — TACROLIMUS 1 MG: 1 CAPSULE ORAL at 12:09

## 2018-09-28 RX ADMIN — TACROLIMUS 1 MG: 1 CAPSULE ORAL at 08:09

## 2018-09-28 RX ADMIN — MAGNESIUM OXIDE TAB 400 MG (241.3 MG ELEMENTAL MG) 400 MG: 400 (241.3 MG) TAB at 10:09

## 2018-09-28 RX ADMIN — HYDROCODONE BITARTRATE AND ACETAMINOPHEN 1 TABLET: 10; 325 TABLET ORAL at 05:09

## 2018-09-28 RX ADMIN — MAGNESIUM OXIDE TAB 400 MG (241.3 MG ELEMENTAL MG) 400 MG: 400 (241.3 MG) TAB at 08:09

## 2018-09-28 RX ADMIN — LISINOPRIL 2.5 MG: 2.5 TABLET ORAL at 08:09

## 2018-09-28 RX ADMIN — TRAMADOL HYDROCHLORIDE 50 MG: 50 TABLET, FILM COATED ORAL at 10:09

## 2018-09-28 RX ADMIN — FAMOTIDINE 40 MG: 20 TABLET ORAL at 10:09

## 2018-09-28 RX ADMIN — FILGRASTIM 480 MCG: 480 INJECTION, SOLUTION INTRAVENOUS; SUBCUTANEOUS at 09:09

## 2018-09-28 RX ADMIN — HYDROCODONE BITARTRATE AND ACETAMINOPHEN 1 TABLET: 10; 325 TABLET ORAL at 11:09

## 2018-09-28 NOTE — PLAN OF CARE
Problem: Patient Care Overview  Goal: Plan of Care Review  Outcome: Ongoing (interventions implemented as appropriate)  AAOX4. VSS. Afebrile. Not showing any new signs of infection.  Pt tolerating all treatments and therapies well at this time.  Pt reported pain overnight.  Fall precautions in place.  Bed in low, locked postion, non skid socks on while out of bed.  Pt remain free of falls during shift.  Call bell within reach.  Pt instructed to push call bell when assistance needed.  Pt verbalized understanding.  Will continue to monitor.

## 2018-09-28 NOTE — SUBJECTIVE & OBJECTIVE
Interval History: Some mild low back soreness today (worsening since admission he thinks due to hospital bed).     Scheduled Meds:   aspirin  81 mg Oral Daily    atorvastatin  20 mg Oral QHS    calcium-vitamin D3  1 tablet Oral Daily    ergocalciferol  50,000 Units Oral Q7 Days    famotidine  40 mg Oral QHS    filgrastim  480 mcg Subcutaneous Daily    lisinopril  2.5 mg Oral Daily    magnesium oxide  400 mg Oral BID    pentamadine (PENTAM) 60 mg/mL inhalation solution  300 mg Nebulization Q28 Days    tacrolimus  2 mg Oral BID     PRN Meds:cyclobenzaprine, traMADol    Review of patient's allergies indicates:  No Known Allergies  Objective:     Vital Signs (Most Recent):  Temp: 97.8 °F (36.6 °C) (09/28/18 1206)  Pulse: 105 (09/28/18 0830)  Resp: 10 (09/28/18 0830)  BP: 115/72 (09/28/18 0830)  SpO2: 95 % (09/28/18 0830) Vital Signs (24h Range):  Temp:  [97.8 °F (36.6 °C)-98.8 °F (37.1 °C)] 97.8 °F (36.6 °C)  Pulse:  [103-126] 105  Resp:  [10-21] 10  SpO2:  [94 %-97 %] 95 %  BP: (115-128)/(72-86) 115/72     Patient Vitals for the past 72 hrs (Last 3 readings):   Weight   09/27/18 0650 73.3 kg (161 lb 9.6 oz)   09/26/18 0404 73.1 kg (161 lb 2.5 oz)     Body mass index is 25.31 kg/m².      Intake/Output Summary (Last 24 hours) at 9/28/2018 1252  Last data filed at 9/28/2018 0600  Gross per 24 hour   Intake 720 ml   Output 500 ml   Net 220 ml     Hemodynamic Parameters:    Telemetry: Reviewed    Physical Exam   Constitutional: He is oriented to person, place, and time. He appears well-developed and well-nourished.   HENT:   Head: Normocephalic and atraumatic.   Mouth/Throat: Posterior oropharyngeal edema (mild) present. No oropharyngeal exudate.   Neck: Normal range of motion. Neck supple. No JVD present.   Cardiovascular: Normal rate and regular rhythm. Exam reveals no gallop and no friction rub.   No murmur heard.  Pulmonary/Chest: Effort normal and breath sounds normal. He has no wheezes. He has no rales.    Abdominal: Soft. Bowel sounds are normal. There is no tenderness.   Musculoskeletal: He exhibits no edema.   Neurological: He is alert and oriented to person, place, and time.   Skin: Skin is warm and dry.   Psychiatric: He has a normal mood and affect. His behavior is normal. Judgment and thought content normal.   Nursing note and vitals reviewed.    Significant Labs:  CBC:  Recent Labs   Lab  09/26/18   0401  09/27/18   0344  09/28/18   0643   WBC  2.40*  3.08*  4.35   RBC  4.95  4.97  4.73   HGB  13.8*  13.5*  12.7*   HCT  42.6  42.1  40.6   PLT  185  221  245   MCV  86  85  86   MCH  27.9  27.2  26.8*   MCHC  32.4  32.1  31.3*     BNP:  Recent Labs   Lab  09/24/18   1330   BNP  59     CMP:  Recent Labs   Lab  09/26/18   0401  09/27/18   0344  09/28/18   0643   GLU  84  86  86   CALCIUM  9.7  9.3  9.3   ALBUMIN  4.0  3.9  3.7   PROT  6.9  6.7  6.4   NA  138  139  138   K  4.5  4.4  4.2   CO2  25  26  28   CL  102  102  103   BUN  12  15  13   CREATININE  1.0  1.0  0.9   ALKPHOS  91  90  89   ALT  29  26  24   AST  22  22  24   BILITOT  1.9*  1.1*  1.0      Coagulation:   No results for input(s): PT, INR, APTT in the last 168 hours.  LDH:  No results for input(s): LDH in the last 72 hours.  Microbiology:  Microbiology Results (last 7 days)     Procedure Component Value Units Date/Time    Respiratory Viral Panel by PCR [facility]; Nasal Swab [[id number]] Collected:  09/26/18 1403    Order Status:  Completed Specimen:  Respiratory Updated:  09/28/18 1216     Respiratory Virus Panel, source Nasal Swab     RVP - Adenovirus Not Detected     Comment: Detects Serotypes B and E. Detection of Serotype C may   be limited. If Adenovirus infection is suspected and a   Not Detected result is returned the sample should be   re-tested for Adenovirus using an independent method  (e.g. [employer] Adenovirus Quantitative Real-Time  PCR test.          Enterovirus Not Detected     Comment: Cross-reactivity has been observed  between certain Rhinovirus  strains and the Enterovirus assay.          Human Bocavirus Not Detected     Human Coronavirus Not Detected     Comment: The Human Coronavirus assay detects Human coronavirus types  229E, OC43,NL63 and HKU1.          RVP - Human Metapneumovirus (hMPV) Not Detected     RVP - Influenza A Not Detected     Influenza A - W8D8-25 Not Detected     RVP - Influenza B Not Detected     Parainfluenza Not Detected     Respiratory Syncytial VirusVirus (RSV) A Not Detected     Comment: The Respiratory Syncytial Viral assay detects types A and B,  however it does not distinguish between the two.          RVP - Rhinovirus Not Detected     Comment: Cross-Reactivity has been observed between certain   Rhinovirus strains and the Enterovirus assay.  Target Enriched Mulitplex Polymerase Chain Reaction (TEM-PCR)  allows for the detection of multiple pathogens out of a single  reaction.  This test was developed and its performance   characteristics determined by [employer].  It has not   been cleared or approved by the U.[place].Food and Drug Administration.  Results should be used in conjunction with clinical findings,   and should not form the sole basis for a diagnosis or treatment  decision.  TEM-PCR is a licensed technology of [employer].         Narrative:       Receiving Lab:->[facility]    Urine culture [[id number]] Collected:  09/26/18 2121    Order Status:  Completed Specimen:  Urine, Clean Catch Updated:  09/28/18 0235     Urine Culture, Routine No growth    Blood culture [[id number]] Collected:  09/26/18 1617    Order Status:  Completed Specimen:  Blood Updated:  09/27/18 1822     Blood Culture, Routine No Growth to date     Blood Culture, Routine No Growth to date    Blood culture [[id number]] Collected:  09/26/18 1617    Order Status:  Completed Specimen:  Blood Updated:  09/27/18 1822     Blood Culture, Routine No Growth to date     Blood Culture, Routine No Growth to date    Strep A  culture, throat [[id number]] Collected:  09/24/18 2000    Order Status:  Completed Specimen:  Throat Updated:  09/26/18 1105     Strep A Culture No  Group A  Streptococcus isolated    Throat culture [[id number]] Collected:  09/24/18 2000    Order Status:  Completed Specimen:  Throat Updated:  09/26/18 1041     RESPIRATORY CULTURE - THROAT Normal respiratory jose alfredo    Strep A culture, throat [[id number]] Collected:  09/24/18 2000    Order Status:  Canceled Specimen:  Throat Updated:  09/24/18 2307    Throat Screen, Rapid [[id number]] Collected:  09/24/18 2000    Order Status:  Completed Specimen:  Throat Updated:  09/24/18 2307     Rapid Strep A Screen Negative     Comment: See Micro for reflexed Strep culture.             I have reviewed all pertinent labs within the past 24 hours.    Estimated Creatinine Clearance: 114.2 mL/min (based on SCr of 0.9 mg/dL).    Diagnostic Results:  I have reviewed all pertinent imaging results/findings within the past 24 hours.

## 2018-09-28 NOTE — PLAN OF CARE
Problem: Patient Care Overview  Goal: Plan of Care Review  Outcome: Ongoing (interventions implemented as appropriate)  Pt AOOx4, afebrile, continues to run sinus tach. WBC up to 3.08 today from 1.52 yesterday, 3rd dose of Neupogen administered. Tramadol & cyclobenzapine ordered PRN for back pn, Tylenol d/c, still holding Bactrim. Boil to L shoulder, put in for wound care consult. Wife at bedside. Bed in low/locked position, call light/personal belongings within reach, non-slip socks on when OOB, will continue to monitor.

## 2018-09-28 NOTE — PROGRESS NOTES
[facility]  Heart Transplant  Progress Note    Patient Name: Mert Samayoa  MRN: [id number]  Admission Date: 9/24/2018  Hospital Length of Stay: 4 days  Attending Physician: Sammi Tapia MD  Primary Care Provider: Primary Doctor No  Principal Problem:Neutropenia    Subjective:     Interval History: Some mild low back soreness today (worsening since admission he thinks due to hospital bed). Will consult wound care today for right shoulder boil (may need I & D). Concerned due to neutropenia.     Scheduled Meds:   aspirin  81 mg Oral Daily    atorvastatin  20 mg Oral QHS    calcium-vitamin D3  1 tablet Oral Daily    ergocalciferol  50,000 Units Oral Q7 Days    famotidine  40 mg Oral QHS    filgrastim  480 mcg Subcutaneous Daily    lisinopril  2.5 mg Oral Daily    magnesium oxide  400 mg Oral BID    pentamadine (PENTAM) 60 mg/mL inhalation solution  300 mg Nebulization Q28 Days    tacrolimus  2 mg Oral BID     PRN Meds:cyclobenzaprine, traMADol    Review of patient's allergies indicates:  No Known Allergies  Objective:     Vital Signs (Most Recent):  Temp: 97.8 °F (36.6 °C) (09/28/18 1206)  Pulse: 105 (09/28/18 0830)  Resp: 10 (09/28/18 0830)  BP: 115/72 (09/28/18 0830)  SpO2: 95 % (09/28/18 0830) Vital Signs (24h Range):  Temp:  [97.8 °F (36.6 °C)-98.8 °F (37.1 °C)] 97.8 °F (36.6 °C)  Pulse:  [103-126] 105  Resp:  [10-21] 10  SpO2:  [94 %-97 %] 95 %  BP: (115-128)/(72-86) 115/72     Patient Vitals for the past 72 hrs (Last 3 readings):   Weight   09/27/18 0650 73.3 kg (161 lb 9.6 oz)   09/26/18 0404 73.1 kg (161 lb 2.5 oz)     Body mass index is 25.31 kg/m².      Intake/Output Summary (Last 24 hours) at 9/28/2018 1252  Last data filed at 9/28/2018 0600  Gross per 24 hour   Intake 720 ml   Output 500 ml   Net 220 ml     Hemodynamic Parameters:    Telemetry: Reviewed    Physical Exam   Constitutional: He is oriented to person, place, and time. He appears well-developed and  well-nourished.   HENT:   Head: Normocephalic and atraumatic.   Mouth/Throat: Posterior oropharyngeal edema (mild) present. No oropharyngeal exudate.   Neck: Normal range of motion. Neck supple. No JVD present.   Cardiovascular: Normal rate and regular rhythm. Exam reveals no gallop and no friction rub.   No murmur heard.  Pulmonary/Chest: Effort normal and breath sounds normal. He has no wheezes. He has no rales.   Abdominal: Soft. Bowel sounds are normal. There is no tenderness.   Musculoskeletal: He exhibits no edema.   Neurological: He is alert and oriented to person, place, and time.   Skin: Skin is warm and dry.   Psychiatric: He has a normal mood and affect. His behavior is normal. Judgment and thought content normal.   Nursing note and vitals reviewed.    Significant Labs:  CBC:  Recent Labs   Lab  09/26/18   0401  09/27/18   0344  09/28/18   0643   WBC  2.40*  3.08*  4.35   RBC  4.95  4.97  4.73   HGB  13.8*  13.5*  12.7*   HCT  42.6  42.1  40.6   PLT  185  221  245   MCV  86  85  86   MCH  27.9  27.2  26.8*   MCHC  32.4  32.1  31.3*     BNP:  Recent Labs   Lab  09/24/18   1330   BNP  59     CMP:  Recent Labs   Lab  09/26/18   0401  09/27/18   0344  09/28/18   0643   GLU  84  86  86   CALCIUM  9.7  9.3  9.3   ALBUMIN  4.0  3.9  3.7   PROT  6.9  6.7  6.4   NA  138  139  138   K  4.5  4.4  4.2   CO2  25  26  28   CL  102  102  103   BUN  12  15  13   CREATININE  1.0  1.0  0.9   ALKPHOS  91  90  89   ALT  29  26  24   AST  22  22  24   BILITOT  1.9*  1.1*  1.0      Coagulation:   No results for input(s): PT, INR, APTT in the last 168 hours.  LDH:  No results for input(s): LDH in the last 72 hours.  Microbiology:  Microbiology Results (last 7 days)     Procedure Component Value Units Date/Time    Respiratory Viral Panel by PCR [facility]; Nasal Swab [[id number]] Collected:  09/26/18 1403    Order Status:  Completed Specimen:  Respiratory Updated:  09/28/18 1216     Respiratory Virus Panel, source Nasal Swab     RVP  - Adenovirus Not Detected     Comment: Detects Serotypes B and E. Detection of Serotype C may   be limited. If Adenovirus infection is suspected and a   Not Detected result is returned the sample should be   re-tested for Adenovirus using an independent method  (e.g. [employer] Adenovirus Quantitative Real-Time  PCR test.          Enterovirus Not Detected     Comment: Cross-reactivity has been observed between certain Rhinovirus  strains and the Enterovirus assay.          Human Bocavirus Not Detected     Human Coronavirus Not Detected     Comment: The Human Coronavirus assay detects Human coronavirus types  229E, OC43,NL63 and HKU1.          RVP - Human Metapneumovirus (hMPV) Not Detected     RVP - Influenza A Not Detected     Influenza A - [id number] Not Detected     RVP - Influenza B Not Detected     Parainfluenza Not Detected     Respiratory Syncytial VirusVirus (RSV) A Not Detected     Comment: The Respiratory Syncytial Viral assay detects types A and B,  however it does not distinguish between the two.          RVP - Rhinovirus Not Detected     Comment: Cross-Reactivity has been observed between certain   Rhinovirus strains and the Enterovirus assay.  Target Enriched Mulitplex Polymerase Chain Reaction (TEM-PCR)  allows for the detection of multiple pathogens out of a single  reaction.  This test was developed and its performance   characteristics determined by [facility].  It has not   been cleared or approved by the U.S.Food and Drug Administration.  Results should be used in conjunction with clinical findings,   and should not form the sole basis for a diagnosis or treatment  decision.  TEM-PCR is a licensed technology of [employer].         Narrative:       Receiving Lab:->[facility]    Urine culture [[id number]] Collected:  09/26/18 2121    Order Status:  Completed Specimen:  Urine, Clean Catch Updated:  09/28/18 0235     Urine Culture, Routine No growth    Blood culture [[id number]]  Collected:  09/26/18 1617    Order Status:  Completed Specimen:  Blood Updated:  09/27/18 1822     Blood Culture, Routine No Growth to date     Blood Culture, Routine No Growth to date    Blood culture [[id number]] Collected:  09/26/18 1617    Order Status:  Completed Specimen:  Blood Updated:  09/27/18 1822     Blood Culture, Routine No Growth to date     Blood Culture, Routine No Growth to date    Strep A culture, throat [[id number]] Collected:  09/24/18 2000    Order Status:  Completed Specimen:  Throat Updated:  09/26/18 1105     Strep A Culture No  Group A  Streptococcus isolated    Throat culture [[id number]] Collected:  09/24/18 2000    Order Status:  Completed Specimen:  Throat Updated:  09/26/18 1041     RESPIRATORY CULTURE - THROAT Normal respiratory jose alfredo    Strep A culture, throat [[id number]] Collected:  09/24/18 2000    Order Status:  Canceled Specimen:  Throat Updated:  09/24/18 2307    Throat Screen, Rapid [[id number]] Collected:  09/24/18 2000    Order Status:  Completed Specimen:  Throat Updated:  09/24/18 2307     Rapid Strep A Screen Negative     Comment: See Micro for reflexed Strep culture.             I have reviewed all pertinent labs within the past 24 hours.    Estimated Creatinine Clearance: 114.2 mL/min (based on SCr of 0.9 mg/dL).    Diagnostic Results:  I have reviewed all pertinent imaging results/findings within the past 24 hours.    Assessment and Plan:     No notes on file    * Neutropenia    - WBCs 3.08 today (ANC yesterday = 9.24, diff pending today)  - S/P 3 doses of neupogen. Will dose for 5 days and then consider BM biopsy per heme onc.  - Stopped sirolimus. No longer on Cellcept  - Holding Bactrim  - EBV negative. Neg for CMV on 9/21/18. HSV, parvo, and Resp Viral panel pending.  - recent augmentin use (could be cause of leukopenia?). Will consult heme onc for assistance.         Essential hypertension    -Cont lisinopril        Heart transplanted    - Transplant Date 2/18/18.   -  CMV Status:D+/R-   - Rejection status: Moderate Risk  - Rejection episodes: none to date  - Induction: No   - Current immunosuppression: tacro. Stopped sirolimus due to low WBCs. Check Immuknowcylex (pending)  - Opportunistic PPx with: pentamadine (holding Bactrim due to low WBCs)  - Echo 9/18/28: LVEF 60/65%  - Check DSA. Allomap up last week (31 from 23) and planned for BX today but held off due to neutropenia. Will plan to get once resolved            Kimberlyn Hartley PA-C  Heart Transplant  [facility]

## 2018-09-28 NOTE — ASSESSMENT & PLAN NOTE
- WBCs 3.08 today (ANC yesterday = 9.24, diff pending today)  - S/P 3 doses of neupogen. Will dose for 5 days and then consider BM biopsy per heme onc.  - Stopped sirolimus. No longer on Cellcept  - Holding Bactrim  - EBV negative. Neg for CMV on 9/21/18. HSV, parvo, and Resp Viral panel pending.  - recent augmentin use (could be cause of leukopenia?). Will consult heme onc for assistance.

## 2018-09-29 LAB
ALBUMIN SERPL BCP-MCNC: 3.8 G/DL
ALP SERPL-CCNC: 109 U/L
ALT SERPL W/O P-5'-P-CCNC: 25 U/L
ANION GAP SERPL CALC-SCNC: 12 MMOL/L
ANION GAP SERPL CALC-SCNC: 9 MMOL/L
ANISOCYTOSIS BLD QL SMEAR: SLIGHT
AST SERPL-CCNC: 40 U/L
BASOPHILS NFR BLD: 0 %
BILIRUB SERPL-MCNC: 0.8 MG/DL
BUN SERPL-MCNC: 9 MG/DL
BUN SERPL-MCNC: 9 MG/DL
CALCIUM SERPL-MCNC: 9.3 MG/DL
CALCIUM SERPL-MCNC: 9.5 MG/DL
CHLORIDE SERPL-SCNC: 101 MMOL/L
CHLORIDE SERPL-SCNC: 102 MMOL/L
CO2 SERPL-SCNC: 26 MMOL/L
CO2 SERPL-SCNC: 28 MMOL/L
CREAT SERPL-MCNC: 1 MG/DL
CREAT SERPL-MCNC: 1 MG/DL
DIFFERENTIAL METHOD: ABNORMAL
EOSINOPHIL NFR BLD: 3 %
ERYTHROCYTE [DISTWIDTH] IN BLOOD BY AUTOMATED COUNT: 12 %
EST. GFR  (AFRICAN AMERICAN): >60 ML/MIN/1.73 M^2
EST. GFR  (AFRICAN AMERICAN): >60 ML/MIN/1.73 M^2
EST. GFR  (NON AFRICAN AMERICAN): >60 ML/MIN/1.73 M^2
EST. GFR  (NON AFRICAN AMERICAN): >60 ML/MIN/1.73 M^2
GLUCOSE SERPL-MCNC: 103 MG/DL
GLUCOSE SERPL-MCNC: 86 MG/DL
HCT VFR BLD AUTO: 41.8 %
HGB BLD-MCNC: 13.5 G/DL
IMM GRANULOCYTES # BLD AUTO: ABNORMAL K/UL
IMM GRANULOCYTES NFR BLD AUTO: ABNORMAL %
LYMPHOCYTES NFR BLD: 32 %
MAGNESIUM SERPL-MCNC: 1.9 MG/DL
MCH RBC QN AUTO: 26.9 PG
MCHC RBC AUTO-ENTMCNC: 32.3 G/DL
MCV RBC AUTO: 83 FL
METAMYELOCYTES NFR BLD MANUAL: 3 %
MONOCYTES NFR BLD: 27 %
MYELOCYTES NFR BLD MANUAL: 3 %
NEUTROPHILS NFR BLD: 21 %
NEUTS BAND NFR BLD MANUAL: 11 %
NRBC BLD-RTO: 1 /100 WBC
PLATELET # BLD AUTO: 257 K/UL
PMV BLD AUTO: 10.6 FL
POLYCHROMASIA BLD QL SMEAR: ABNORMAL
POTASSIUM SERPL-SCNC: 3.9 MMOL/L
POTASSIUM SERPL-SCNC: 5.2 MMOL/L
PROT SERPL-MCNC: 6.7 G/DL
RBC # BLD AUTO: 5.02 M/UL
SIROLIMUS BLD-MCNC: <2 NG/ML
SODIUM SERPL-SCNC: 139 MMOL/L
SODIUM SERPL-SCNC: 139 MMOL/L
TACROLIMUS BLD-MCNC: 10.1 NG/ML
WBC # BLD AUTO: 13.69 K/UL

## 2018-09-29 PROCEDURE — 25000003 PHARM REV CODE 250: Performed by: PHYSICIAN ASSISTANT

## 2018-09-29 PROCEDURE — 80048 BASIC METABOLIC PNL TOTAL CA: CPT

## 2018-09-29 PROCEDURE — 36415 COLL VENOUS BLD VENIPUNCTURE: CPT

## 2018-09-29 PROCEDURE — 80053 COMPREHEN METABOLIC PANEL: CPT

## 2018-09-29 PROCEDURE — 85007 BL SMEAR W/DIFF WBC COUNT: CPT

## 2018-09-29 PROCEDURE — 85027 COMPLETE CBC AUTOMATED: CPT

## 2018-09-29 PROCEDURE — 83735 ASSAY OF MAGNESIUM: CPT

## 2018-09-29 PROCEDURE — 80197 ASSAY OF TACROLIMUS: CPT

## 2018-09-29 PROCEDURE — 80195 ASSAY OF SIROLIMUS: CPT

## 2018-09-29 PROCEDURE — 99233 SBSQ HOSP IP/OBS HIGH 50: CPT | Mod: ,,, | Performed by: INTERNAL MEDICINE

## 2018-09-29 PROCEDURE — 63600175 PHARM REV CODE 636 W HCPCS: Performed by: INTERNAL MEDICINE

## 2018-09-29 PROCEDURE — 20600001 HC STEP DOWN PRIVATE ROOM

## 2018-09-29 PROCEDURE — 25000003 PHARM REV CODE 250: Performed by: INTERNAL MEDICINE

## 2018-09-29 RX ADMIN — MAGNESIUM OXIDE TAB 400 MG (241.3 MG ELEMENTAL MG) 400 MG: 400 (241.3 MG) TAB at 08:09

## 2018-09-29 RX ADMIN — ATORVASTATIN CALCIUM 20 MG: 20 TABLET, FILM COATED ORAL at 08:09

## 2018-09-29 RX ADMIN — TACROLIMUS 2 MG: 1 CAPSULE ORAL at 08:09

## 2018-09-29 RX ADMIN — FAMOTIDINE 40 MG: 20 TABLET ORAL at 08:09

## 2018-09-29 RX ADMIN — OYSTER SHELL CALCIUM WITH VITAMIN D 1 TABLET: 500; 200 TABLET, FILM COATED ORAL at 08:09

## 2018-09-29 RX ADMIN — TACROLIMUS 2 MG: 1 CAPSULE ORAL at 05:09

## 2018-09-29 RX ADMIN — LISINOPRIL 2.5 MG: 2.5 TABLET ORAL at 08:09

## 2018-09-29 RX ADMIN — ASPIRIN 81 MG: 81 TABLET, COATED ORAL at 08:09

## 2018-09-29 NOTE — ASSESSMENT & PLAN NOTE
- WBCs 13.69 today  - S/P 4 doses of neupogen. D/C today/ Heme Onc was considering BM Bx  - Stopped sirolimus. No longer on Cellcept  - Holding Bactrim  - EBV negative. Neg for CMV on 9/21/18. HSV, parvo, and Resp Viral panel pending.  - recent augmentin use (could be cause of leukopenia?).

## 2018-09-29 NOTE — PROGRESS NOTES
[facility]  Heart Transplant  Progress Note    Patient Name: Mert Samayoa  MRN: [id number]  Admission Date: 9/24/2018  Hospital Length of Stay: 5 days  Attending Physician: Sammi Taipa MD  Primary Care Provider: Primary Doctor No  Principal Problem:Neutropenia    Subjective:     Interval History: Still c/o back pain this am. Otherwise doing well. WBCs high this am    Scheduled Meds:   aspirin  81 mg Oral Daily    atorvastatin  20 mg Oral QHS    calcium-vitamin D3  1 tablet Oral Daily    ergocalciferol  50,000 Units Oral Q7 Days    famotidine  40 mg Oral QHS    magnesium oxide  400 mg Oral BID    pentamadine (PENTAM) 60 mg/mL inhalation solution  300 mg Nebulization Q28 Days    tacrolimus  2 mg Oral BID     PRN Meds:cyclobenzaprine, traMADol    Review of patient's allergies indicates:  No Known Allergies  Objective:     Vital Signs (Most Recent):  Temp: 97.6 °F (36.4 °C) (09/29/18 1137)  Pulse: (!) 114 (09/29/18 1137)  Resp: 17 (09/29/18 1137)  BP: 116/74 (09/29/18 1137)  SpO2: 96 % (09/29/18 1137) Vital Signs (24h Range):  Temp:  [97.6 °F (36.4 °C)-98.8 °F (37.1 °C)] 97.6 °F (36.4 °C)  Pulse:  [] 114  Resp:  [12-18] 17  SpO2:  [95 %-99 %] 96 %  BP: (116-129)/(63-79) 116/74     Patient Vitals for the past 72 hrs (Last 3 readings):   Weight   09/27/18 0650 73.3 kg (161 lb 9.6 oz)     Body mass index is 25.31 kg/m².      Intake/Output Summary (Last 24 hours) at 9/29/2018 1239  Last data filed at 9/29/2018 0600  Gross per 24 hour   Intake 1860 ml   Output 1725 ml   Net 135 ml     Hemodynamic Parameters:    Telemetry: Reviewed    Physical Exam   Constitutional: He is oriented to person, place, and time. He appears well-developed and well-nourished.   HENT:   Head: Normocephalic and atraumatic.   Mouth/Throat: No oropharyngeal exudate.   Neck: Normal range of motion. Neck supple. No JVD present.   Cardiovascular: Normal rate and regular rhythm. Exam reveals no gallop and no  friction rub.   No murmur heard.  Pulmonary/Chest: Effort normal and breath sounds normal. He has no wheezes. He has no rales.   Abdominal: Soft. Bowel sounds are normal. There is no tenderness.   Musculoskeletal: He exhibits no edema.   Neurological: He is alert and oriented to person, place, and time.   Skin: Skin is warm and dry.   Psychiatric: He has a normal mood and affect. His behavior is normal. Judgment and thought content normal.   Nursing note and vitals reviewed.    Significant Labs:  CBC:  Recent Labs   Lab  09/27/18   0344  09/28/18   0643  09/29/18   0418   WBC  3.08*  4.35  13.69*   RBC  4.97  4.73  5.02   HGB  13.5*  12.7*  13.5*   HCT  42.1  40.6  41.8   PLT  221  245  257   MCV  85  86  83   MCH  27.2  26.8*  26.9*   MCHC  32.1  31.3*  32.3     BNP:  Recent Labs   Lab  09/24/18   1330   BNP  59     CMP:  Recent Labs   Lab  09/27/18   0344  09/28/18   0643  09/29/18   0418   GLU  86  86  86   CALCIUM  9.3  9.3  9.5   ALBUMIN  3.9  3.7  3.8   PROT  6.7  6.4  6.7   NA  139  138  139   K  4.4  4.2  5.2*   CO2  26  28  28   CL  102  103  102   BUN  15  13  9   CREATININE  1.0  0.9  1.0   ALKPHOS  90  89  109   ALT  26  24  25   AST  22  24  40   BILITOT  1.1*  1.0  0.8      Coagulation:   No results for input(s): PT, INR, APTT in the last 168 hours.  LDH:  No results for input(s): LDH in the last 72 hours.  Microbiology:  Microbiology Results (last 7 days)     Procedure Component Value Units Date/Time    Blood culture [[id number]] Collected:  09/26/18 1617    Order Status:  Completed Specimen:  Blood Updated:  09/28/18 1822     Blood Culture, Routine No Growth to date     Blood Culture, Routine No Growth to date     Blood Culture, Routine No Growth to date    Blood culture [[id number]] Collected:  09/26/18 1617    Order Status:  Completed Specimen:  Blood Updated:  09/28/18 1822     Blood Culture, Routine No Growth to date     Blood Culture, Routine No Growth to date     Blood Culture, Routine No Growth  to date    Respiratory Viral Panel by PCR [facility]; Nasal Swab [[id number]] Collected:  09/26/18 1403    Order Status:  Completed Specimen:  Respiratory Updated:  09/28/18 1216     Respiratory Virus Panel, source Nasal Swab     RVP - Adenovirus Not Detected     Comment: Detects Serotypes B and E. Detection of Serotype C may   be limited. If Adenovirus infection is suspected and a   Not Detected result is returned the sample should be   re-tested for Adenovirus using an independent method  (e.g. [employer] Adenovirus Quantitative Real-Time  PCR test.          Enterovirus Not Detected     Comment: Cross-reactivity has been observed between certain Rhinovirus  strains and the Enterovirus assay.          Human Bocavirus Not Detected     Human Coronavirus Not Detected     Comment: The Human Coronavirus assay detects Human coronavirus types  229E, OC43,NL63 and HKU1.          RVP - Human Metapneumovirus (hMPV) Not Detected     RVP - Influenza A Not Detected     Influenza A - [id number] Not Detected     RVP - Influenza B Not Detected     Parainfluenza Not Detected     Respiratory Syncytial VirusVirus (RSV) A Not Detected     Comment: The Respiratory Syncytial Viral assay detects types A and B,  however it does not distinguish between the two.          RVP - Rhinovirus Not Detected     Comment: Cross-Reactivity has been observed between certain   Rhinovirus strains and the Enterovirus assay.  Target Enriched Mulitplex Polymerase Chain Reaction (TEM-PCR)  allows for the detection of multiple pathogens out of a single  reaction.  This test was developed and its performance   characteristics determined by [employer].  It has not   been cleared or approved by the U.S.Food and Drug Administration.  Results should be used in conjunction with clinical findings,   and should not form the sole basis for a diagnosis or treatment  decision.  TEM-PCR is a licensed technology of [employer].         Narrative:        Receiving Lab:->[facility]    Urine culture [[id number]] Collected:  09/26/18 2121    Order Status:  Completed Specimen:  Urine, Clean Catch Updated:  09/28/18 0235     Urine Culture, Routine No growth    Strep A culture, throat [[id number]] Collected:  09/24/18 2000    Order Status:  Completed Specimen:  Throat Updated:  09/26/18 1105     Strep A Culture No  Group A  Streptococcus isolated    Throat culture [[id number]] Collected:  09/24/18 2000    Order Status:  Completed Specimen:  Throat Updated:  09/26/18 1041     RESPIRATORY CULTURE - THROAT Normal respiratory jose alfredo    Strep A culture, throat [[id number]] Collected:  09/24/18 2000    Order Status:  Canceled Specimen:  Throat Updated:  09/24/18 2307    Throat Screen, Rapid [[id number]] Collected:  09/24/18 2000    Order Status:  Completed Specimen:  Throat Updated:  09/24/18 2307     Rapid Strep A Screen Negative     Comment: See Micro for reflexed Strep culture.             I have reviewed all pertinent labs within the past 24 hours.    Estimated Creatinine Clearance: 102.8 mL/min (based on SCr of 1 mg/dL).    Diagnostic Results:  I have reviewed all pertinent imaging results/findings within the past 24 hours.    Assessment and Plan:     No notes on file    * Neutropenia    - WBCs 13.69 today  - S/P 4 doses of neupogen. D/C today/ Heme Onc was considering BM Bx  - Stopped sirolimus. No longer on Cellcept  - Holding Bactrim  - EBV negative. Neg for CMV on 9/21/18. HSV, parvo, and Resp Viral panel pending.  - recent augmentin use (could be cause of leukopenia?).        Heart transplanted    - Transplant Date 2/18/18.   - CMV Status:D+/R-   - Rejection status: Moderate Risk  - Rejection episodes: none to date  - Induction: No   - Current immunosuppression: tacro, level 10 today. Stopped sirolimus due to low WBCs.  Immuknowcylex 179 on 9/25/18   - Opportunistic PPx with: pentamadine (holding Bactrim due to low WBCs)  - Echo 9/18/28: LVEF 60/65%  - DSA neg, C1q pending.  Allomap up last week (31 from 23) and planned for BX today but held off due to neutropenia. Will plan to get once resolved        Essential hypertension    -D/C lisinopril due to hyperkalemia.   -SBP 110s-120s over last 24 hours on lisinopril 2.5. Will start another BP med PRN            Brenda Antonio PA-C  Heart Transplant  [facility]

## 2018-09-29 NOTE — SUBJECTIVE & OBJECTIVE
Interval History: Still c/o back pain this am. Otherwise doing well. WBCs high this am    Scheduled Meds:   aspirin  81 mg Oral Daily    atorvastatin  20 mg Oral QHS    calcium-vitamin D3  1 tablet Oral Daily    ergocalciferol  50,000 Units Oral Q7 Days    famotidine  40 mg Oral QHS    magnesium oxide  400 mg Oral BID    pentamadine (PENTAM) 60 mg/mL inhalation solution  300 mg Nebulization Q28 Days    tacrolimus  2 mg Oral BID     PRN Meds:cyclobenzaprine, traMADol    Review of patient's allergies indicates:  No Known Allergies  Objective:     Vital Signs (Most Recent):  Temp: 97.6 °F (36.4 °C) (09/29/18 1137)  Pulse: (!) 114 (09/29/18 1137)  Resp: 17 (09/29/18 1137)  BP: 116/74 (09/29/18 1137)  SpO2: 96 % (09/29/18 1137) Vital Signs (24h Range):  Temp:  [97.6 °F (36.4 °C)-98.8 °F (37.1 °C)] 97.6 °F (36.4 °C)  Pulse:  [] 114  Resp:  [12-18] 17  SpO2:  [95 %-99 %] 96 %  BP: (116-129)/(63-79) 116/74     Patient Vitals for the past 72 hrs (Last 3 readings):   Weight   09/27/18 0650 73.3 kg (161 lb 9.6 oz)     Body mass index is 25.31 kg/m².      Intake/Output Summary (Last 24 hours) at 9/29/2018 1239  Last data filed at 9/29/2018 0600  Gross per 24 hour   Intake 1860 ml   Output 1725 ml   Net 135 ml     Hemodynamic Parameters:    Telemetry: Reviewed    Physical Exam   Constitutional: He is oriented to person, place, and time. He appears well-developed and well-nourished.   HENT:   Head: Normocephalic and atraumatic.   Mouth/Throat: No oropharyngeal exudate.   Neck: Normal range of motion. Neck supple. No JVD present.   Cardiovascular: Normal rate and regular rhythm. Exam reveals no gallop and no friction rub.   No murmur heard.  Pulmonary/Chest: Effort normal and breath sounds normal. He has no wheezes. He has no rales.   Abdominal: Soft. Bowel sounds are normal. There is no tenderness.   Musculoskeletal: He exhibits no edema.   Neurological: He is alert and oriented to person, place, and time.   Skin:  Skin is warm and dry.   Psychiatric: He has a normal mood and affect. His behavior is normal. Judgment and thought content normal.   Nursing note and vitals reviewed.    Significant Labs:  CBC:  Recent Labs   Lab  09/27/18   0344  09/28/18   0643  09/29/18   0418   WBC  3.08*  4.35  13.69*   RBC  4.97  4.73  5.02   HGB  13.5*  12.7*  13.5*   HCT  42.1  40.6  41.8   PLT  221  245  257   MCV  85  86  83   MCH  27.2  26.8*  26.9*   MCHC  32.1  31.3*  32.3     BNP:  Recent Labs   Lab  09/24/18   1330   BNP  59     CMP:  Recent Labs   Lab  09/27/18   0344  09/28/18   0643  09/29/18   0418   GLU  86  86  86   CALCIUM  9.3  9.3  9.5   ALBUMIN  3.9  3.7  3.8   PROT  6.7  6.4  6.7   NA  139  138  139   K  4.4  4.2  5.2*   CO2  26  28  28   CL  102  103  102   BUN  15  13  9   CREATININE  1.0  0.9  1.0   ALKPHOS  90  89  109   ALT  26  24  25   AST  22  24  40   BILITOT  1.1*  1.0  0.8      Coagulation:   No results for input(s): PT, INR, APTT in the last 168 hours.  LDH:  No results for input(s): LDH in the last 72 hours.  Microbiology:  Microbiology Results (last 7 days)     Procedure Component Value Units Date/Time    Blood culture [[id number]] Collected:  09/26/18 1617    Order Status:  Completed Specimen:  Blood Updated:  09/28/18 1822     Blood Culture, Routine No Growth to date     Blood Culture, Routine No Growth to date     Blood Culture, Routine No Growth to date    Blood culture [[id number]] Collected:  09/26/18 1617    Order Status:  Completed Specimen:  Blood Updated:  09/28/18 1822     Blood Culture, Routine No Growth to date     Blood Culture, Routine No Growth to date     Blood Culture, Routine No Growth to date    Respiratory Viral Panel by PCR [facility]; Nasal Swab [[id number]] Collected:  09/26/18 1403    Order Status:  Completed Specimen:  Respiratory Updated:  09/28/18 1216     Respiratory Virus Panel, source Nasal Swab     RVP - Adenovirus Not Detected     Comment: Detects Serotypes B and E. Detection of  Serotype C may   be limited. If Adenovirus infection is suspected and a   Not Detected result is returned the sample should be   re-tested for Adenovirus using an independent method  (e.g. [employer] Adenovirus Quantitative Real-Time  PCR test.          Enterovirus Not Detected     Comment: Cross-reactivity has been observed between certain Rhinovirus  strains and the Enterovirus assay.          Human Bocavirus Not Detected     Human Coronavirus Not Detected     Comment: The Human Coronavirus assay detects Human coronavirus types  229E, OC43,NL63 and HKU1.          RVP - Human Metapneumovirus (hMPV) Not Detected     RVP - Influenza A Not Detected     Influenza A - Q3J2-51 Not Detected     RVP - Influenza B Not Detected     Parainfluenza Not Detected     Respiratory Syncytial VirusVirus (RSV) A Not Detected     Comment: The Respiratory Syncytial Viral assay detects types A and B,  however it does not distinguish between the two.          RVP - Rhinovirus Not Detected     Comment: Cross-Reactivity has been observed between certain   Rhinovirus strains and the Enterovirus assay.  Target Enriched Mulitplex Polymerase Chain Reaction (TEM-PCR)  allows for the detection of multiple pathogens out of a single  reaction.  This test was developed and its performance   characteristics determined by [employer].  It has not   been cleared or approved by the U.S.Food and Drug Administration.  Results should be used in conjunction with clinical findings,   and should not form the sole basis for a diagnosis or treatment  decision.  TEM-PCR is a licensed technology of [employer].         Narrative:       Receiving Lab:->[facility]    Urine culture [[id number]] Collected:  09/26/18 2121    Order Status:  Completed Specimen:  Urine, Clean Catch Updated:  09/28/18 0235     Urine Culture, Routine No growth    Strep A culture, throat [[id number]] Collected:  09/24/18 2000    Order Status:  Completed Specimen:   Throat Updated:  09/26/18 1105     Strep A Culture No  Group A  Streptococcus isolated    Throat culture [[id number]] Collected:  09/24/18 2000    Order Status:  Completed Specimen:  Throat Updated:  09/26/18 1041     RESPIRATORY CULTURE - THROAT Normal respiratory jose alfredo    Strep A culture, throat [[id number]] Collected:  09/24/18 2000    Order Status:  Canceled Specimen:  Throat Updated:  09/24/18 2307    Throat Screen, Rapid [[id number]] Collected:  09/24/18 2000    Order Status:  Completed Specimen:  Throat Updated:  09/24/18 2307     Rapid Strep A Screen Negative     Comment: See Micro for reflexed Strep culture.             I have reviewed all pertinent labs within the past 24 hours.    Estimated Creatinine Clearance: 102.8 mL/min (based on SCr of 1 mg/dL).    Diagnostic Results:  I have reviewed all pertinent imaging results/findings within the past 24 hours.

## 2018-09-29 NOTE — PLAN OF CARE
Problem: Patient Care Overview  Goal: Plan of Care Review  Outcome: Ongoing (interventions implemented as appropriate)  AAOX4. VSS. Afebrile. Not showing any new signs of infection.  Pt tolerating all treatments and therapies well at this time. Pt IV removed during right before shift change and pt refused for reinsertion overnight, would like to wait until the morning.  Pt reports back pain not controlled with prn medication, additional dose of narco given.  Fall precautions in place.  Bed in low, locked postion, non skid socks on while out of bed.  Pt remain free of falls during shift.  Call bell within reach.  Pt instructed to push call bell when assistance needed.  Pt verbalized understanding. Pt family to remain at bedside and is involved with pt care.  Will continue to monitor.

## 2018-09-29 NOTE — ASSESSMENT & PLAN NOTE
-D/C lisinopril due to hyperkalemia.   -SBP 110s-120s over last 24 hours on lisinopril 2.5. Will start another BP med PRN

## 2018-09-29 NOTE — PLAN OF CARE
Problem: Patient Care Overview  Goal: Plan of Care Review  Outcome: Ongoing (interventions implemented as appropriate)  Pt aao x3. Vss. No acute distress. Pt no longer on neutropenic precautions. WBC=13.69. Pt has a boil to his left shoulder. Warm compresses applied as needed. Pt unable to tolerate Visi monitor. Pt off daily neupogen shots. Pt complaining of lower back and hip pain. Pt told that was due to the neupogen and denied offer of pain med. Pt steady on his feet. See assessment for full chart details. Will continue to monitor, assess and adjust care as needed.

## 2018-09-29 NOTE — ASSESSMENT & PLAN NOTE
- Transplant Date 2/18/18.   - CMV Status:D+/R-   - Rejection status: Moderate Risk  - Rejection episodes: none to date  - Induction: No   - Current immunosuppression: tacro, level 10 today. Stopped sirolimus due to low WBCs.  Immuknowcylex 179 on 9/25/18   - Opportunistic PPx with: pentamadine (holding Bactrim due to low WBCs)  - Echo 9/18/28: LVEF 60/65%  - DSA neg, C1q pending. Allomap up last week (31 from 23) and planned for BX today but held off due to neutropenia. Will plan to get once resolved

## 2018-09-30 LAB
ALBUMIN SERPL BCP-MCNC: 3.7 G/DL
ALP SERPL-CCNC: 119 U/L
ALT SERPL W/O P-5'-P-CCNC: 34 U/L
ANION GAP SERPL CALC-SCNC: 13 MMOL/L
ANISOCYTOSIS BLD QL SMEAR: SLIGHT
AST SERPL-CCNC: 42 U/L
BASOPHILS NFR BLD: 0 %
BILIRUB SERPL-MCNC: 0.4 MG/DL
BUN SERPL-MCNC: 9 MG/DL
CALCIUM SERPL-MCNC: 9.2 MG/DL
CHLORIDE SERPL-SCNC: 104 MMOL/L
CO2 SERPL-SCNC: 24 MMOL/L
CREAT SERPL-MCNC: 1.1 MG/DL
DIFFERENTIAL METHOD: ABNORMAL
EOSINOPHIL NFR BLD: 4 %
ERYTHROCYTE [DISTWIDTH] IN BLOOD BY AUTOMATED COUNT: 12.1 %
EST. GFR  (AFRICAN AMERICAN): >60 ML/MIN/1.73 M^2
EST. GFR  (NON AFRICAN AMERICAN): >60 ML/MIN/1.73 M^2
GLUCOSE SERPL-MCNC: 75 MG/DL
HCT VFR BLD AUTO: 40.3 %
HGB BLD-MCNC: 12.9 G/DL
IMM GRANULOCYTES # BLD AUTO: ABNORMAL K/UL
IMM GRANULOCYTES NFR BLD AUTO: ABNORMAL %
LYMPHOCYTES NFR BLD: 18 %
MAGNESIUM SERPL-MCNC: 1.9 MG/DL
MCH RBC QN AUTO: 27.2 PG
MCHC RBC AUTO-ENTMCNC: 32 G/DL
MCV RBC AUTO: 85 FL
METAMYELOCYTES NFR BLD MANUAL: 3 %
MONOCYTES NFR BLD: 14 %
MYELOCYTES NFR BLD MANUAL: 9 %
NEUTROPHILS NFR BLD: 42 %
NEUTS BAND NFR BLD MANUAL: 10 %
NRBC BLD-RTO: 1 /100 WBC
PLATELET # BLD AUTO: 253 K/UL
PMV BLD AUTO: 10.5 FL
POLYCHROMASIA BLD QL SMEAR: ABNORMAL
POTASSIUM SERPL-SCNC: 4.4 MMOL/L
PROT SERPL-MCNC: 6.3 G/DL
RBC # BLD AUTO: 4.74 M/UL
SIROLIMUS BLD-MCNC: <2 NG/ML
SODIUM SERPL-SCNC: 141 MMOL/L
TACROLIMUS BLD-MCNC: 8.2 NG/ML
WBC # BLD AUTO: 23.1 K/UL

## 2018-09-30 PROCEDURE — 85027 COMPLETE CBC AUTOMATED: CPT

## 2018-09-30 PROCEDURE — 80197 ASSAY OF TACROLIMUS: CPT

## 2018-09-30 PROCEDURE — 25000003 PHARM REV CODE 250: Performed by: PHYSICIAN ASSISTANT

## 2018-09-30 PROCEDURE — 20600001 HC STEP DOWN PRIVATE ROOM

## 2018-09-30 PROCEDURE — 83735 ASSAY OF MAGNESIUM: CPT

## 2018-09-30 PROCEDURE — 80053 COMPREHEN METABOLIC PANEL: CPT

## 2018-09-30 PROCEDURE — 63600175 PHARM REV CODE 636 W HCPCS: Performed by: INTERNAL MEDICINE

## 2018-09-30 PROCEDURE — 80195 ASSAY OF SIROLIMUS: CPT

## 2018-09-30 PROCEDURE — 99233 SBSQ HOSP IP/OBS HIGH 50: CPT | Mod: ,,, | Performed by: INTERNAL MEDICINE

## 2018-09-30 PROCEDURE — 85007 BL SMEAR W/DIFF WBC COUNT: CPT

## 2018-09-30 PROCEDURE — 36415 COLL VENOUS BLD VENIPUNCTURE: CPT

## 2018-09-30 RX ADMIN — OYSTER SHELL CALCIUM WITH VITAMIN D 1 TABLET: 500; 200 TABLET, FILM COATED ORAL at 08:09

## 2018-09-30 RX ADMIN — MAGNESIUM OXIDE TAB 400 MG (241.3 MG ELEMENTAL MG) 400 MG: 400 (241.3 MG) TAB at 08:09

## 2018-09-30 RX ADMIN — FAMOTIDINE 40 MG: 20 TABLET ORAL at 08:09

## 2018-09-30 RX ADMIN — TACROLIMUS 2 MG: 1 CAPSULE ORAL at 08:09

## 2018-09-30 RX ADMIN — ATORVASTATIN CALCIUM 20 MG: 20 TABLET, FILM COATED ORAL at 08:09

## 2018-09-30 RX ADMIN — TACROLIMUS 2 MG: 1 CAPSULE ORAL at 05:09

## 2018-09-30 RX ADMIN — ASPIRIN 81 MG: 81 TABLET, COATED ORAL at 08:09

## 2018-09-30 NOTE — PROGRESS NOTES
[facility]  Heart Transplant  Progress Note    Patient Name: Mert Samayoa  MRN: [id number]  Admission Date: 9/24/2018  Hospital Length of Stay: 6 days  Attending Physician: Sammi Tapia MD  Primary Care Provider: Primary Doctor No  Principal Problem:Neutropenia    Subjective:     Interval History: Back pain now resolved. Feeling well this am    Scheduled Meds:   aspirin  81 mg Oral Daily    atorvastatin  20 mg Oral QHS    calcium-vitamin D3  1 tablet Oral Daily    ergocalciferol  50,000 Units Oral Q7 Days    famotidine  40 mg Oral QHS    magnesium oxide  400 mg Oral BID    pentamadine (PENTAM) 60 mg/mL inhalation solution  300 mg Nebulization Q28 Days    tacrolimus  2 mg Oral BID     PRN Meds:cyclobenzaprine, traMADol    Review of patient's allergies indicates:  No Known Allergies  Objective:     Vital Signs (Most Recent):  Temp: 98.6 °F (37 °C) (09/30/18 1130)  Pulse: 93 (09/30/18 1146)  Resp: 16 (09/30/18 1130)  BP: 124/67 (09/30/18 1130)  SpO2: 97 % (09/30/18 1130) Vital Signs (24h Range):  Temp:  [97.2 °F (36.2 °C)-98.6 °F (37 °C)] 98.6 °F (37 °C)  Pulse:  [] 93  Resp:  [16-18] 16  SpO2:  [96 %-99 %] 97 %  BP: (112-124)/(60-73) 124/67     No data found.  Body mass index is 25.31 kg/m².      Intake/Output Summary (Last 24 hours) at 9/30/2018 1258  Last data filed at 9/30/2018 0600  Gross per 24 hour   Intake 920 ml   Output 750 ml   Net 170 ml     Hemodynamic Parameters:    Telemetry: Reviewed    Physical Exam   Constitutional: He is oriented to person, place, and time. He appears well-developed and well-nourished.   HENT:   Head: Normocephalic and atraumatic.   Mouth/Throat: No oropharyngeal exudate.   Neck: Normal range of motion. Neck supple. No JVD present.   Cardiovascular: Normal rate and regular rhythm. Exam reveals no gallop and no friction rub.   No murmur heard.  Pulmonary/Chest: Effort normal and breath sounds normal. He has no wheezes. He has no rales.    Abdominal: Soft. Bowel sounds are normal. There is no tenderness.   Musculoskeletal: He exhibits no edema.   Neurological: He is alert and oriented to person, place, and time.   Skin: Skin is warm and dry.   Psychiatric: He has a normal mood and affect. His behavior is normal. Judgment and thought content normal.   Nursing note and vitals reviewed.    Significant Labs:  CBC:  Recent Labs   Lab  09/28/18   0643  09/29/18   0418  09/30/18   0418   WBC  4.35  13.69*  23.10*   RBC  4.73  5.02  4.74   HGB  12.7*  13.5*  12.9*   HCT  40.6  41.8  40.3   PLT  245  257  253   MCV  86  83  85   MCH  26.8*  26.9*  27.2   MCHC  31.3*  32.3  32.0     BNP:  Recent Labs   Lab  09/24/18   1330   BNP  59     CMP:  Recent Labs   Lab  09/28/18   0643  09/29/18   0418  09/29/18   1314  09/30/18   0418   GLU  86  86  103  75   CALCIUM  9.3  9.5  9.3  9.2   ALBUMIN  3.7  3.8   --   3.7   PROT  6.4  6.7   --   6.3   NA  138  139  139  141   K  4.2  5.2*  3.9  4.4   CO2  28  28  26  24   CL  103  102  101  104   BUN  13  9  9  9   CREATININE  0.9  1.0  1.0  1.1   ALKPHOS  89  109   --   119   ALT  24  25   --   34   AST  24  40   --   42*   BILITOT  1.0  0.8   --   0.4      Coagulation:   No results for input(s): PT, INR, APTT in the last 168 hours.  LDH:  No results for input(s): LDH in the last 72 hours.  Microbiology:  Microbiology Results (last 7 days)     Procedure Component Value Units Date/Time    Blood culture [[id number]] Collected:  09/26/18 1617    Order Status:  Completed Specimen:  Blood Updated:  09/29/18 1822     Blood Culture, Routine No Growth to date     Blood Culture, Routine No Growth to date     Blood Culture, Routine No Growth to date     Blood Culture, Routine No Growth to date    Blood culture [[id number]] Collected:  09/26/18 1617    Order Status:  Completed Specimen:  Blood Updated:  09/29/18 1822     Blood Culture, Routine No Growth to date     Blood Culture, Routine No Growth to date     Blood Culture, Routine  No Growth to date     Blood Culture, Routine No Growth to date    Respiratory Viral Panel by PCR [facility]; Nasal Swab [[id number]] Collected:  09/26/18 1403    Order Status:  Completed Specimen:  Respiratory Updated:  09/28/18 1216     Respiratory Virus Panel, source Nasal Swab     RVP - Adenovirus Not Detected     Comment: Detects Serotypes B and E. Detection of Serotype C may   be limited. If Adenovirus infection is suspected and a   Not Detected result is returned the sample should be   re-tested for Adenovirus using an independent method  (e.g. [employer] Adenovirus Quantitative Real-Time  PCR test.          Enterovirus Not Detected     Comment: Cross-reactivity has been observed between certain Rhinovirus  strains and the Enterovirus assay.          Human Bocavirus Not Detected     Human Coronavirus Not Detected     Comment: The Human Coronavirus assay detects Human coronavirus types  229E, OC43,NL63 and HKU1.          RVP - Human Metapneumovirus (hMPV) Not Detected     RVP - Influenza A Not Detected     Influenza A - F1Z8-31 Not Detected     RVP - Influenza B Not Detected     Parainfluenza Not Detected     Respiratory Syncytial VirusVirus (RSV) A Not Detected     Comment: The Respiratory Syncytial Viral assay detects types A and B,  however it does not distinguish between the two.          RVP - Rhinovirus Not Detected     Comment: Cross-Reactivity has been observed between certain   Rhinovirus strains and the Enterovirus assay.  Target Enriched Mulitplex Polymerase Chain Reaction (TEM-PCR)  allows for the detection of multiple pathogens out of a single  reaction.  This test was developed and its performance   characteristics determined by [employer].  It has not   been cleared or approved by the U.S.Food and Drug Administration.  Results should be used in conjunction with clinical findings,   and should not form the sole basis for a diagnosis or treatment  decision.  TEM-PCR is a licensed technology  of [employer].         Narrative:       Receiving Lab:->[facility]    Urine culture [[id number]] Collected:  09/26/18 2121    Order Status:  Completed Specimen:  Urine, Clean Catch Updated:  09/28/18 0235     Urine Culture, Routine No growth    Strep A culture, throat [[id number]] Collected:  09/24/18 2000    Order Status:  Completed Specimen:  Throat Updated:  09/26/18 1105     Strep A Culture No  Group A  Streptococcus isolated    Throat culture [[id number]] Collected:  09/24/18 2000    Order Status:  Completed Specimen:  Throat Updated:  09/26/18 1041     RESPIRATORY CULTURE - THROAT Normal respiratory jose alfredo    Strep A culture, throat [[id number]] Collected:  09/24/18 2000    Order Status:  Canceled Specimen:  Throat Updated:  09/24/18 2307    Throat Screen, Rapid [[id number]] Collected:  09/24/18 2000    Order Status:  Completed Specimen:  Throat Updated:  09/24/18 2307     Rapid Strep A Screen Negative     Comment: See Micro for reflexed Strep culture.             I have reviewed all pertinent labs within the past 24 hours.    Estimated Creatinine Clearance: 93.5 mL/min (based on SCr of 1.1 mg/dL).    Diagnostic Results:  I have reviewed all pertinent imaging results/findings within the past 24 hours.    Assessment and Plan:     No notes on file    * Neutropenia    -Now resolved, WBCs 23 today. S/P 4 doses of neupogen.    -Heme Onc was considering BM Bx  - Stopped sirolimus. No longer on Cellcept. Holding Bactrim  - EBV negative. Neg for CMV on 9/21/18. HSV, parvo, and Resp Viral panel pending.  - recent augmentin use (could be cause of leukopenia?).        Heart transplanted    - Transplant Date 2/18/18.   - CMV Status:D+/R-   - Rejection status: Moderate Risk  - Rejection episodes: none to date  - Induction: No   - Current immunosuppression: tacro 2/2, level 8.2 today. Stopped sirolimus due to low WBCs.  Immuknowcylex 179 on 9/25/18. No cellcept due to high risk for CMV and neutropenia. Consider  adding low dose pred  - Opportunistic PPx with: pentamadine (holding Bactrim due to low WBCs)  - Echo 9/18/28: LVEF 60/65%  - DSA neg, C1q pending. Allomap up last week (31 from 23) and planned for BX today but held off due to neutropenia. Will plan to get once resolved        Essential hypertension    -D/C'ed lisinopril yesterday due to hyperkalemia.   -SBP 110s-120s over last 24 hours. Will start another BP med PRN            Brenda Antonio PA-C  Heart Transplant  [facility]

## 2018-09-30 NOTE — ASSESSMENT & PLAN NOTE
-D/C'ed lisinopril yesterday due to hyperkalemia.   -SBP 110s-120s over last 24 hours. Will start another BP med PRN

## 2018-09-30 NOTE — ASSESSMENT & PLAN NOTE
-Now resolved, WBCs 23 today. S/P 4 doses of neupogen.    -Heme Onc was considering BM Bx  - Stopped sirolimus. No longer on Cellcept. Holding Bactrim  - EBV negative. Neg for CMV on 9/21/18. HSV, parvo, and Resp Viral panel pending.  - recent augmentin use (could be cause of leukopenia?).

## 2018-09-30 NOTE — ASSESSMENT & PLAN NOTE
- Transplant Date 2/18/18.   - CMV Status:D+/R-   - Rejection status: Moderate Risk  - Rejection episodes: none to date  - Induction: No   - Current immunosuppression: tacro 2/2, level 8.2 today. Stopped sirolimus due to low WBCs.  Immuknowcylex 179 on 9/25/18. No cellcept due to high risk for CMV and neutropenia. Consider adding low dose pred  - Opportunistic PPx with: pentamadine (holding Bactrim due to low WBCs)  - Echo 9/18/28: LVEF 60/65%  - DSA neg, C1q pending. Allomap up last week (31 from 23) and planned for BX today but held off due to neutropenia. Will plan to get once resolved

## 2018-09-30 NOTE — PLAN OF CARE
Problem: Patient Care Overview  Goal: Plan of Care Review  Outcome: Ongoing (interventions implemented as appropriate)  AAOX4. VSS. Afebrile. Not showing any new signs of infection.  Pt tolerating all treatments and therapies well at this time.  Pt denies c/o pain.  Fall precautions in place.  Bed in low, locked postion, non skid socks on while out of bed.  Pt remain free of falls during shift.  Call bell within reach.  Pt instructed to push call bell when assistance needed.  Pt verbalized understanding.  Will continue to monitor.

## 2018-09-30 NOTE — SUBJECTIVE & OBJECTIVE
Interval History: Back pain now resolved. Feeling well this am    Scheduled Meds:   aspirin  81 mg Oral Daily    atorvastatin  20 mg Oral QHS    calcium-vitamin D3  1 tablet Oral Daily    ergocalciferol  50,000 Units Oral Q7 Days    famotidine  40 mg Oral QHS    magnesium oxide  400 mg Oral BID    pentamadine (PENTAM) 60 mg/mL inhalation solution  300 mg Nebulization Q28 Days    tacrolimus  2 mg Oral BID     PRN Meds:cyclobenzaprine, traMADol    Review of patient's allergies indicates:  No Known Allergies  Objective:     Vital Signs (Most Recent):  Temp: 98.6 °F (37 °C) (09/30/18 1130)  Pulse: 93 (09/30/18 1146)  Resp: 16 (09/30/18 1130)  BP: 124/67 (09/30/18 1130)  SpO2: 97 % (09/30/18 1130) Vital Signs (24h Range):  Temp:  [97.2 °F (36.2 °C)-98.6 °F (37 °C)] 98.6 °F (37 °C)  Pulse:  [] 93  Resp:  [16-18] 16  SpO2:  [96 %-99 %] 97 %  BP: (112-124)/(60-73) 124/67     No data found.  Body mass index is 25.31 kg/m².      Intake/Output Summary (Last 24 hours) at 9/30/2018 1258  Last data filed at 9/30/2018 0600  Gross per 24 hour   Intake 920 ml   Output 750 ml   Net 170 ml     Hemodynamic Parameters:    Telemetry: Reviewed    Physical Exam   Constitutional: He is oriented to person, place, and time. He appears well-developed and well-nourished.   HENT:   Head: Normocephalic and atraumatic.   Mouth/Throat: No oropharyngeal exudate.   Neck: Normal range of motion. Neck supple. No JVD present.   Cardiovascular: Normal rate and regular rhythm. Exam reveals no gallop and no friction rub.   No murmur heard.  Pulmonary/Chest: Effort normal and breath sounds normal. He has no wheezes. He has no rales.   Abdominal: Soft. Bowel sounds are normal. There is no tenderness.   Musculoskeletal: He exhibits no edema.   Neurological: He is alert and oriented to person, place, and time.   Skin: Skin is warm and dry.   Psychiatric: He has a normal mood and affect. His behavior is normal. Judgment and thought content normal.    Nursing note and vitals reviewed.    Significant Labs:  CBC:  Recent Labs   Lab  09/28/18   0643  09/29/18   0418  09/30/18   0418   WBC  4.35  13.69*  23.10*   RBC  4.73  5.02  4.74   HGB  12.7*  13.5*  12.9*   HCT  40.6  41.8  40.3   PLT  245  257  253   MCV  86  83  85   MCH  26.8*  26.9*  27.2   MCHC  31.3*  32.3  32.0     BNP:  Recent Labs   Lab  09/24/18   1330   BNP  59     CMP:  Recent Labs   Lab  09/28/18   0643  09/29/18   0418  09/29/18   1314  09/30/18   0418   GLU  86  86  103  75   CALCIUM  9.3  9.5  9.3  9.2   ALBUMIN  3.7  3.8   --   3.7   PROT  6.4  6.7   --   6.3   NA  138  139  139  141   K  4.2  5.2*  3.9  4.4   CO2  28  28  26  24   CL  103  102  101  104   BUN  13  9  9  9   CREATININE  0.9  1.0  1.0  1.1   ALKPHOS  89  109   --   119   ALT  24  25   --   34   AST  24  40   --   42*   BILITOT  1.0  0.8   --   0.4      Coagulation:   No results for input(s): PT, INR, APTT in the last 168 hours.  LDH:  No results for input(s): LDH in the last 72 hours.  Microbiology:  Microbiology Results (last 7 days)     Procedure Component Value Units Date/Time    Blood culture [[id number]] Collected:  09/26/18 1617    Order Status:  Completed Specimen:  Blood Updated:  09/29/18 1822     Blood Culture, Routine No Growth to date     Blood Culture, Routine No Growth to date     Blood Culture, Routine No Growth to date     Blood Culture, Routine No Growth to date    Blood culture [[id number]] Collected:  09/26/18 1617    Order Status:  Completed Specimen:  Blood Updated:  09/29/18 1822     Blood Culture, Routine No Growth to date     Blood Culture, Routine No Growth to date     Blood Culture, Routine No Growth to date     Blood Culture, Routine No Growth to date    Respiratory Viral Panel by PCR [facility]; Nasal Swab [[id number]] Collected:  09/26/18 1403    Order Status:  Completed Specimen:  Respiratory Updated:  09/28/18 1216     Respiratory Virus Panel, source Nasal Swab     RVP - Adenovirus Not Detected      Comment: Detects Serotypes B and E. Detection of Serotype C may   be limited. If Adenovirus infection is suspected and a   Not Detected result is returned the sample should be   re-tested for Adenovirus using an independent method  (e.g. [employer] Adenovirus Quantitative Real-Time  PCR test.          Enterovirus Not Detected     Comment: Cross-reactivity has been observed between certain Rhinovirus  strains and the Enterovirus assay.          Human Bocavirus Not Detected     Human Coronavirus Not Detected     Comment: The Human Coronavirus assay detects Human coronavirus types  229E, OC43,NL63 and HKU1.          RVP - Human Metapneumovirus (hMPV) Not Detected     RVP - Influenza A Not Detected     Influenza A - L8H0-41 Not Detected     RVP - Influenza B Not Detected     Parainfluenza Not Detected     Respiratory Syncytial VirusVirus (RSV) A Not Detected     Comment: The Respiratory Syncytial Viral assay detects types A and B,  however it does not distinguish between the two.          RVP - Rhinovirus Not Detected     Comment: Cross-Reactivity has been observed between certain   Rhinovirus strains and the Enterovirus assay.  Target Enriched Mulitplex Polymerase Chain Reaction (TEM-PCR)  allows for the detection of multiple pathogens out of a single  reaction.  This test was developed and its performance   characteristics determined by [employer].  It has not   been cleared or approved by the U.S.Food and Drug Administration.  Results should be used in conjunction with clinical findings,   and should not form the sole basis for a diagnosis or treatment  decision.  TEM-PCR is a licensed technology of [employer].         Narrative:       Receiving Lab:->[facility]    Urine culture [[id number]] Collected:  09/26/18 2121    Order Status:  Completed Specimen:  Urine, Clean Catch Updated:  09/28/18 0235     Urine Culture, Routine No growth    Strep A culture, throat [[id number]] Collected:  09/24/18  2000    Order Status:  Completed Specimen:  Throat Updated:  09/26/18 1105     Strep A Culture No  Group A  Streptococcus isolated    Throat culture [[id number]] Collected:  09/24/18 2000    Order Status:  Completed Specimen:  Throat Updated:  09/26/18 1041     RESPIRATORY CULTURE - THROAT Normal respiratory jose alfredo    Strep A culture, throat [[id number]] Collected:  09/24/18 2000    Order Status:  Canceled Specimen:  Throat Updated:  09/24/18 2307    Throat Screen, Rapid [[id number]] Collected:  09/24/18 2000    Order Status:  Completed Specimen:  Throat Updated:  09/24/18 2307     Rapid Strep A Screen Negative     Comment: See Micro for reflexed Strep culture.             I have reviewed all pertinent labs within the past 24 hours.    Estimated Creatinine Clearance: 93.5 mL/min (based on SCr of 1.1 mg/dL).    Diagnostic Results:  I have reviewed all pertinent imaging results/findings within the past 24 hours.

## 2018-10-01 ENCOUNTER — TELEPHONE (OUTPATIENT)
Dept: TRANSPLANT | Facility: CLINIC | Age: 28
End: 2018-10-01

## 2018-10-01 PROBLEM — L02.91 ABSCESS: Status: ACTIVE | Noted: 2018-10-01

## 2018-10-01 LAB
ALBUMIN SERPL BCP-MCNC: 3.7 G/DL
ALP SERPL-CCNC: 123 U/L
ALT SERPL W/O P-5'-P-CCNC: 58 U/L
ANION GAP SERPL CALC-SCNC: 8 MMOL/L
ANISOCYTOSIS BLD QL SMEAR: SLIGHT
AST SERPL-CCNC: 50 U/L
B19V DNA # SPEC NAA+PROBE: <100 COPIES/ML
BACTERIA BLD CULT: NORMAL
BACTERIA BLD CULT: NORMAL
BASOPHILS # BLD AUTO: ABNORMAL K/UL
BASOPHILS NFR BLD: 0 %
BILIRUB SERPL-MCNC: 0.3 MG/DL
BUN SERPL-MCNC: 11 MG/DL
CALCIUM SERPL-MCNC: 9 MG/DL
CHLORIDE SERPL-SCNC: 106 MMOL/L
CO2 SERPL-SCNC: 26 MMOL/L
CREAT SERPL-MCNC: 1.1 MG/DL
DIFFERENTIAL METHOD: ABNORMAL
EOSINOPHIL # BLD AUTO: ABNORMAL K/UL
EOSINOPHIL NFR BLD: 0 %
ERYTHROCYTE [DISTWIDTH] IN BLOOD BY AUTOMATED COUNT: 12.3 %
EST. GFR  (AFRICAN AMERICAN): >60 ML/MIN/1.73 M^2
EST. GFR  (NON AFRICAN AMERICAN): >60 ML/MIN/1.73 M^2
GLUCOSE SERPL-MCNC: 90 MG/DL
HCT VFR BLD AUTO: 40.3 %
HGB BLD-MCNC: 12.4 G/DL
HYPOCHROMIA BLD QL SMEAR: ABNORMAL
IMM GRANULOCYTES # BLD AUTO: ABNORMAL K/UL
IMM GRANULOCYTES NFR BLD AUTO: ABNORMAL %
LYMPHOCYTES # BLD AUTO: ABNORMAL K/UL
LYMPHOCYTES NFR BLD: 15 %
MAGNESIUM SERPL-MCNC: 2.1 MG/DL
MCH RBC QN AUTO: 26.3 PG
MCHC RBC AUTO-ENTMCNC: 30.8 G/DL
MCV RBC AUTO: 86 FL
METAMYELOCYTES NFR BLD MANUAL: 1 %
MONOCYTES # BLD AUTO: ABNORMAL K/UL
MONOCYTES NFR BLD: 4 %
MYELOCYTES NFR BLD MANUAL: 10 %
NEUTROPHILS NFR BLD: 66 %
NEUTS BAND NFR BLD MANUAL: 4 %
NRBC BLD-RTO: 0 /100 WBC
OVALOCYTES BLD QL SMEAR: ABNORMAL
PLATELET # BLD AUTO: 223 K/UL
PLATELET BLD QL SMEAR: ABNORMAL
PMV BLD AUTO: 10.4 FL
POIKILOCYTOSIS BLD QL SMEAR: SLIGHT
POLYCHROMASIA BLD QL SMEAR: ABNORMAL
POTASSIUM SERPL-SCNC: 4.1 MMOL/L
PROT SERPL-MCNC: 6.4 G/DL
RBC # BLD AUTO: 4.71 M/UL
SODIUM SERPL-SCNC: 140 MMOL/L
TACROLIMUS BLD-MCNC: 9.8 NG/ML
WBC # BLD AUTO: 23.65 K/UL

## 2018-10-01 PROCEDURE — 85027 COMPLETE CBC AUTOMATED: CPT

## 2018-10-01 PROCEDURE — 80197 ASSAY OF TACROLIMUS: CPT

## 2018-10-01 PROCEDURE — 25000003 PHARM REV CODE 250: Performed by: PHYSICIAN ASSISTANT

## 2018-10-01 PROCEDURE — 20600001 HC STEP DOWN PRIVATE ROOM

## 2018-10-01 PROCEDURE — 63600175 PHARM REV CODE 636 W HCPCS: Performed by: INTERNAL MEDICINE

## 2018-10-01 PROCEDURE — 99232 SBSQ HOSP IP/OBS MODERATE 35: CPT | Mod: ,,, | Performed by: PHYSICIAN ASSISTANT

## 2018-10-01 PROCEDURE — 85007 BL SMEAR W/DIFF WBC COUNT: CPT

## 2018-10-01 PROCEDURE — 36415 COLL VENOUS BLD VENIPUNCTURE: CPT

## 2018-10-01 PROCEDURE — 83735 ASSAY OF MAGNESIUM: CPT

## 2018-10-01 PROCEDURE — 80053 COMPREHEN METABOLIC PANEL: CPT

## 2018-10-01 RX ORDER — ALBUTEROL SULFATE 0.83 MG/ML
2.5 SOLUTION RESPIRATORY (INHALATION)
OUTPATIENT
Start: 2018-10-19

## 2018-10-01 RX ORDER — PREDNISONE 5 MG/1
5 TABLET ORAL DAILY
Status: DISCONTINUED | OUTPATIENT
Start: 2018-10-01 | End: 2018-10-02 | Stop reason: HOSPADM

## 2018-10-01 RX ORDER — PENTAMIDINE ISETHIONATE 300 MG/300MG
300 INHALANT RESPIRATORY (INHALATION)
OUTPATIENT
Start: 2018-10-19

## 2018-10-01 RX ADMIN — MAGNESIUM OXIDE TAB 400 MG (241.3 MG ELEMENTAL MG) 400 MG: 400 (241.3 MG) TAB at 08:10

## 2018-10-01 RX ADMIN — ATORVASTATIN CALCIUM 20 MG: 20 TABLET, FILM COATED ORAL at 09:10

## 2018-10-01 RX ADMIN — PREDNISONE 5 MG: 5 TABLET ORAL at 12:10

## 2018-10-01 RX ADMIN — FAMOTIDINE 40 MG: 20 TABLET ORAL at 09:10

## 2018-10-01 RX ADMIN — OYSTER SHELL CALCIUM WITH VITAMIN D 1 TABLET: 500; 200 TABLET, FILM COATED ORAL at 08:10

## 2018-10-01 RX ADMIN — TACROLIMUS 2 MG: 1 CAPSULE ORAL at 08:10

## 2018-10-01 RX ADMIN — ASPIRIN 81 MG: 81 TABLET, COATED ORAL at 08:10

## 2018-10-01 RX ADMIN — TACROLIMUS 2 MG: 1 CAPSULE ORAL at 05:10

## 2018-10-01 RX ADMIN — MAGNESIUM OXIDE TAB 400 MG (241.3 MG ELEMENTAL MG) 400 MG: 400 (241.3 MG) TAB at 09:10

## 2018-10-01 NOTE — PROGRESS NOTES
Attempted to place PIV. Will give patient a break after several unsuccessful attempts.     Patient also declining betadine application at this time.

## 2018-10-01 NOTE — ASSESSMENT & PLAN NOTE
- Transplant Date 2/18/18.   - CMV Status:D+/R-   - Rejection status: Moderate Risk  - Rejection episodes: none to date  - Induction: No   - Current immunosuppression: tacro 2/2, level 9.8 today, goal 10-15. Stopped sirolimus due to low WBCs.  Immuknowcylex 179 on 9/25/18. No cellcept due to high risk for CMV and neutropenia. Adding low dose pred and plan to d/c on Prograf and pred  - Opportunistic PPx with: pentamadine (holding Bactrim due to low WBCs)  - Echo 9/18/28: LVEF 60/65%  - DSA neg, C1q pending. Allomap up last week (31 from 23) and planned for Bx last week but held off due to neutropenia. Will plan to get Bx tomorrow

## 2018-10-01 NOTE — ASSESSMENT & PLAN NOTE
-D/C'ed lisinopril due to hyperkalemia.   -SBP 90s-120s over last 24 hours. Will start another BP med PRN

## 2018-10-01 NOTE — ASSESSMENT & PLAN NOTE
-Now resolved, WBCs 23 today. S/P 4 doses of neupogen.    -Heme Onc was considering BM Bx  - Stopped sirolimus. No longer on Cellcept. Holding Bactrim  - EBV negative. Neg for CMV on 9/21/18. HSV, parvo, and Resp Viral panel pending.  - recent augmentin use (could be cause of leukopenia?) but more likely sirolimus.

## 2018-10-01 NOTE — SUBJECTIVE & OBJECTIVE
Interval History: No complaints this am. Feeling better and hoping to go home tomorrow    Scheduled Meds:   aspirin  81 mg Oral Daily    atorvastatin  20 mg Oral QHS    calcium-vitamin D3  1 tablet Oral Daily    ergocalciferol  50,000 Units Oral Q7 Days    famotidine  40 mg Oral QHS    magnesium oxide  400 mg Oral BID    pentamadine (PENTAM) 60 mg/mL inhalation solution  300 mg Nebulization Q28 Days    predniSONE  5 mg Oral Daily    tacrolimus  2 mg Oral BID     PRN Meds:cyclobenzaprine, traMADol    Review of patient's allergies indicates:  No Known Allergies  Objective:     Vital Signs (Most Recent):  Temp: 97.9 °F (36.6 °C) (10/01/18 1215)  Pulse: 102 (10/01/18 1215)  Resp: 18 (10/01/18 1215)  BP: 99/67 (10/01/18 1215)  SpO2: 97 % (10/01/18 1215) Vital Signs (24h Range):  Temp:  [97.7 °F (36.5 °C)-98.1 °F (36.7 °C)] 97.9 °F (36.6 °C)  Pulse:  [] 102  Resp:  [16-18] 18  SpO2:  [97 %-99 %] 97 %  BP: ()/(55-77) 99/67     No data found.  Body mass index is 25.31 kg/m².    No intake or output data in the 24 hours ending 10/01/18 1544  Hemodynamic Parameters:    Telemetry: Reviewed    Physical Exam   Constitutional: He is oriented to person, place, and time. He appears well-developed and well-nourished.   HENT:   Head: Normocephalic and atraumatic.   Mouth/Throat: No oropharyngeal exudate.   Neck: Normal range of motion. Neck supple. No JVD present.   Cardiovascular: Normal rate and regular rhythm. Exam reveals no gallop and no friction rub.   No murmur heard.  Pulmonary/Chest: Effort normal and breath sounds normal. He has no wheezes. He has no rales.   Abdominal: Soft. Bowel sounds are normal. There is no tenderness.   Musculoskeletal: He exhibits no edema.   Neurological: He is alert and oriented to person, place, and time.   Skin: Skin is warm and dry.   Psychiatric: He has a normal mood and affect. His behavior is normal. Judgment and thought content normal.   Nursing note and vitals  reviewed.    Significant Labs:  CBC:  Recent Labs   Lab  09/29/18   0418  09/30/18 0418  10/01/18   0549   WBC  13.69*  23.10*  23.65*   RBC  5.02  4.74  4.71   HGB  13.5*  12.9*  12.4*   HCT  41.8  40.3  40.3   PLT  257  253  223   MCV  83  85  86   MCH  26.9*  27.2  26.3*   MCHC  32.3  32.0  30.8*     BNP:  No results for input(s): BNP in the last 168 hours.    Invalid input(s): BNPTRIAGELBLO  CMP:  Recent Labs   Lab  09/29/18   0418  09/29/18   1314  09/30/18 0418  10/01/18   0549   GLU  86  103  75  90   CALCIUM  9.5  9.3  9.2  9.0   ALBUMIN  3.8   --   3.7  3.7   PROT  6.7   --   6.3  6.4   NA  139  139  141  140   K  5.2*  3.9  4.4  4.1   CO2  28  26  24  26   CL  102  101  104  106   BUN  9  9  9  11   CREATININE  1.0  1.0  1.1  1.1   ALKPHOS  109   --   119  123   ALT  25   --   34  58*   AST  40   --   42*  50*   BILITOT  0.8   --   0.4  0.3      Coagulation:   No results for input(s): PT, INR, APTT in the last 168 hours.  LDH:  No results for input(s): LDH in the last 72 hours.  Microbiology:  Microbiology Results (last 7 days)     Procedure Component Value Units Date/Time    Blood culture [[id number]] Collected:  09/26/18 1617    Order Status:  Completed Specimen:  Blood Updated:  09/30/18 1822     Blood Culture, Routine No Growth to date     Blood Culture, Routine No Growth to date     Blood Culture, Routine No Growth to date     Blood Culture, Routine No Growth to date     Blood Culture, Routine No Growth to date    Blood culture [[id number]] Collected:  09/26/18 1617    Order Status:  Completed Specimen:  Blood Updated:  09/30/18 1822     Blood Culture, Routine No Growth to date     Blood Culture, Routine No Growth to date     Blood Culture, Routine No Growth to date     Blood Culture, Routine No Growth to date     Blood Culture, Routine No Growth to date    Respiratory Viral Panel by PCR [facility]; Nasal Swab [[id number]] Collected:  09/26/18 1403    Order Status:  Completed Specimen:  Respiratory  Updated:  09/28/18 1216     Respiratory Virus Panel, source Nasal Swab     RVP - Adenovirus Not Detected     Comment: Detects Serotypes B and E. Detection of Serotype C may   be limited. If Adenovirus infection is suspected and a   Not Detected result is returned the sample should be   re-tested for Adenovirus using an independent method  (e.g. [employer] Adenovirus Quantitative Real-Time  PCR test.          Enterovirus Not Detected     Comment: Cross-reactivity has been observed between certain Rhinovirus  strains and the Enterovirus assay.          Human Bocavirus Not Detected     Human Coronavirus Not Detected     Comment: The Human Coronavirus assay detects Human coronavirus types  229E, OC43,NL63 and HKU1.          RVP - Human Metapneumovirus (hMPV) Not Detected     RVP - Influenza A Not Detected     Influenza A - N9R3-03 Not Detected     RVP - Influenza B Not Detected     Parainfluenza Not Detected     Respiratory Syncytial VirusVirus (RSV) A Not Detected     Comment: The Respiratory Syncytial Viral assay detects types A and B,  however it does not distinguish between the two.          RVP - Rhinovirus Not Detected     Comment: Cross-Reactivity has been observed between certain   Rhinovirus strains and the Enterovirus assay.  Target Enriched Mulitplex Polymerase Chain Reaction (TEM-PCR)  allows for the detection of multiple pathogens out of a single  reaction.  This test was developed and its performance   characteristics determined by [employer].  It has not   been cleared or approved by the U.S.Food and Drug Administration.  Results should be used in conjunction with clinical findings,   and should not form the sole basis for a diagnosis or treatment  decision.  TEM-PCR is a licensed technology of [employer].         Narrative:       Receiving Lab:->[facility]    Urine culture [[id number]] Collected:  09/26/18 2121    Order Status:  Completed Specimen:  Urine, Clean Catch Updated:   09/28/18 0235     Urine Culture, Routine No growth    Strep A culture, throat [[id number]] Collected:  09/24/18 2000    Order Status:  Completed Specimen:  Throat Updated:  09/26/18 1105     Strep A Culture No  Group A  Streptococcus isolated    Throat culture [[id number]] Collected:  09/24/18 2000    Order Status:  Completed Specimen:  Throat Updated:  09/26/18 1041     RESPIRATORY CULTURE - THROAT Normal respiratory jose alfredo    Strep A culture, throat [[id number]] Collected:  09/24/18 2000    Order Status:  Canceled Specimen:  Throat Updated:  09/24/18 2307    Throat Screen, Rapid [[id number]] Collected:  09/24/18 2000    Order Status:  Completed Specimen:  Throat Updated:  09/24/18 2307     Rapid Strep A Screen Negative     Comment: See Micro for reflexed Strep culture.             I have reviewed all pertinent labs within the past 24 hours.    Estimated Creatinine Clearance: 93.5 mL/min (based on SCr of 1.1 mg/dL).    Diagnostic Results:  I have reviewed all pertinent imaging results/findings within the past 24 hours.

## 2018-10-01 NOTE — PROGRESS NOTES
[facility]  Heart Transplant  Progress Note    Patient Name: Mert Samayoa  MRN: [id number]  Admission Date: 9/24/2018  Hospital Length of Stay: 7 days  Attending Physician: Sammi Tapia MD  Primary Care Provider: Primary Doctor No  Principal Problem:Neutropenia    Subjective:     Interval History: No complaints this am. Feeling better and hoping to go home tomorrow    Scheduled Meds:   aspirin  81 mg Oral Daily    atorvastatin  20 mg Oral QHS    calcium-vitamin D3  1 tablet Oral Daily    ergocalciferol  50,000 Units Oral Q7 Days    famotidine  40 mg Oral QHS    magnesium oxide  400 mg Oral BID    pentamadine (PENTAM) 60 mg/mL inhalation solution  300 mg Nebulization Q28 Days    predniSONE  5 mg Oral Daily    tacrolimus  2 mg Oral BID     PRN Meds:cyclobenzaprine, traMADol    Review of patient's allergies indicates:  No Known Allergies  Objective:     Vital Signs (Most Recent):  Temp: 97.9 °F (36.6 °C) (10/01/18 1215)  Pulse: 102 (10/01/18 1215)  Resp: 18 (10/01/18 1215)  BP: 99/67 (10/01/18 1215)  SpO2: 97 % (10/01/18 1215) Vital Signs (24h Range):  Temp:  [97.7 °F (36.5 °C)-98.1 °F (36.7 °C)] 97.9 °F (36.6 °C)  Pulse:  [] 102  Resp:  [16-18] 18  SpO2:  [97 %-99 %] 97 %  BP: ()/(55-77) 99/67     No data found.  Body mass index is 25.31 kg/m².    No intake or output data in the 24 hours ending 10/01/18 1544  Hemodynamic Parameters:    Telemetry: Reviewed    Physical Exam   Constitutional: He is oriented to person, place, and time. He appears well-developed and well-nourished.   HENT:   Head: Normocephalic and atraumatic.   Mouth/Throat: No oropharyngeal exudate.   Neck: Normal range of motion. Neck supple. No JVD present.   Cardiovascular: Normal rate and regular rhythm. Exam reveals no gallop and no friction rub.   No murmur heard.  Pulmonary/Chest: Effort normal and breath sounds normal. He has no wheezes. He has no rales.   Abdominal: Soft. Bowel sounds are normal.  There is no tenderness.   Musculoskeletal: He exhibits no edema.   Neurological: He is alert and oriented to person, place, and time.   Skin: Skin is warm and dry.   Psychiatric: He has a normal mood and affect. His behavior is normal. Judgment and thought content normal.   Nursing note and vitals reviewed.    Significant Labs:  CBC:  Recent Labs   Lab  09/29/18 0418 09/30/18 0418  10/01/18   0549   WBC  13.69*  23.10*  23.65*   RBC  5.02  4.74  4.71   HGB  13.5*  12.9*  12.4*   HCT  41.8  40.3  40.3   PLT  257  253  223   MCV  83  85  86   MCH  26.9*  27.2  26.3*   MCHC  32.3  32.0  30.8*     BNP:  No results for input(s): BNP in the last 168 hours.    Invalid input(s): BNPTRIAGELBLO  CMP:  Recent Labs   Lab  09/29/18   0418  09/29/18   1314  09/30/18   0418  10/01/18   0549   GLU  86  103  75  90   CALCIUM  9.5  9.3  9.2  9.0   ALBUMIN  3.8   --   3.7  3.7   PROT  6.7   --   6.3  6.4   NA  139  139  141  140   K  5.2*  3.9  4.4  4.1   CO2  28  26  24  26   CL  102  101  104  106   BUN  9  9  9  11   CREATININE  1.0  1.0  1.1  1.1   ALKPHOS  109   --   119  123   ALT  25   --   34  58*   AST  40   --   42*  50*   BILITOT  0.8   --   0.4  0.3      Coagulation:   No results for input(s): PT, INR, APTT in the last 168 hours.  LDH:  No results for input(s): LDH in the last 72 hours.  Microbiology:  Microbiology Results (last 7 days)     Procedure Component Value Units Date/Time    Blood culture [[id number]] Collected:  09/26/18 1617    Order Status:  Completed Specimen:  Blood Updated:  09/30/18 1822     Blood Culture, Routine No Growth to date     Blood Culture, Routine No Growth to date     Blood Culture, Routine No Growth to date     Blood Culture, Routine No Growth to date     Blood Culture, Routine No Growth to date    Blood culture [[id number]] Collected:  09/26/18 1617    Order Status:  Completed Specimen:  Blood Updated:  09/30/18 1822     Blood Culture, Routine No Growth to date     Blood Culture, Routine  No Growth to date     Blood Culture, Routine No Growth to date     Blood Culture, Routine No Growth to date     Blood Culture, Routine No Growth to date    Respiratory Viral Panel by PCR [facility]; Nasal Swab [[id number]] Collected:  09/26/18 1403    Order Status:  Completed Specimen:  Respiratory Updated:  09/28/18 1216     Respiratory Virus Panel, source Nasal Swab     RVP - Adenovirus Not Detected     Comment: Detects Serotypes B and E. Detection of Serotype C may   be limited. If Adenovirus infection is suspected and a   Not Detected result is returned the sample should be   re-tested for Adenovirus using an independent method  (e.g. [employer] Adenovirus Quantitative Real-Time  PCR test.          Enterovirus Not Detected     Comment: Cross-reactivity has been observed between certain Rhinovirus  strains and the Enterovirus assay.          Human Bocavirus Not Detected     Human Coronavirus Not Detected     Comment: The Human Coronavirus assay detects Human coronavirus types  229E, OC43,NL63 and HKU1.          RVP - Human Metapneumovirus (hMPV) Not Detected     RVP - Influenza A Not Detected     Influenza A - R3R3-77 Not Detected     RVP - Influenza B Not Detected     Parainfluenza Not Detected     Respiratory Syncytial VirusVirus (RSV) A Not Detected     Comment: The Respiratory Syncytial Viral assay detects types A and B,  however it does not distinguish between the two.          RVP - Rhinovirus Not Detected     Comment: Cross-Reactivity has been observed between certain   Rhinovirus strains and the Enterovirus assay.  Target Enriched Mulitplex Polymerase Chain Reaction (TEM-PCR)  allows for the detection of multiple pathogens out of a single  reaction.  This test was developed and its performance   characteristics determined by [employer].  It has not   been cleared or approved by the U.S.Food and Drug Administration.  Results should be used in conjunction with clinical findings,   and should not  form the sole basis for a diagnosis or treatment  decision.  TEM-PCR is a licensed technology of [employer].         Narrative:       Receiving Lab:->[facility]    Urine culture [[id number]] Collected:  09/26/18 2121    Order Status:  Completed Specimen:  Urine, Clean Catch Updated:  09/28/18 0235     Urine Culture, Routine No growth    Strep A culture, throat [[id number]] Collected:  09/24/18 2000    Order Status:  Completed Specimen:  Throat Updated:  09/26/18 1105     Strep A Culture No  Group A  Streptococcus isolated    Throat culture [[id number]] Collected:  09/24/18 2000    Order Status:  Completed Specimen:  Throat Updated:  09/26/18 1041     RESPIRATORY CULTURE - THROAT Normal respiratory jose alfredo    Strep A culture, throat [[id number]] Collected:  09/24/18 2000    Order Status:  Canceled Specimen:  Throat Updated:  09/24/18 2307    Throat Screen, Rapid [[id number]] Collected:  09/24/18 2000    Order Status:  Completed Specimen:  Throat Updated:  09/24/18 2307     Rapid Strep A Screen Negative     Comment: See Micro for reflexed Strep culture.             I have reviewed all pertinent labs within the past 24 hours.    Estimated Creatinine Clearance: 93.5 mL/min (based on SCr of 1.1 mg/dL).    Diagnostic Results:  I have reviewed all pertinent imaging results/findings within the past 24 hours.    Assessment and Plan:     No notes on file    * Neutropenia    -Now resolved, WBCs 23 today. S/P 4 doses of neupogen.    -Heme Onc was considering BM Bx  - Stopped sirolimus. No longer on Cellcept. Holding Bactrim  - EBV negative. Neg for CMV on 9/21/18. HSV, parvo, and Resp Viral panel pending.  - recent augmentin use (could be cause of leukopenia?) but more likely sirolimus.        Heart transplanted    - Transplant Date 2/18/18.   - CMV Status:D+/R-   - Rejection status: Moderate Risk  - Rejection episodes: none to date  - Induction: No   - Current immunosuppression: tacro 2/2, level 9.8 today, goal 10-15.  Stopped sirolimus due to low WBCs.  Immuknowcylex 179 on 9/25/18. No cellcept due to high risk for CMV and neutropenia. Adding low dose pred and plan to d/c on Prograf and pred  - Opportunistic PPx with: pentamadine (holding Bactrim due to low WBCs)  - Echo 9/18/28: LVEF 60/65%  - DSA neg, C1q pending. Allomap up last week (31 from 23) and planned for Bx last week but held off due to neutropenia. Will plan to get Bx tomorrow        Essential hypertension    -D/C'ed lisinopril due to hyperkalemia.   -SBP 90s-120s over last 24 hours. Will start another BP med PRN            Brenda Antonio PA-C  Heart Transplant  [facility]

## 2018-10-01 NOTE — NURSING
Post Heart Transplant Coordinator note    Attempted to visit pt in [facility], pt was in shower. Will visit pt tomorrow before discharge.

## 2018-10-01 NOTE — PLAN OF CARE
Problem: Patient Care Overview  Goal: Plan of Care Review  Outcome: Ongoing (interventions implemented as appropriate)  Patient's WBC remains 23 today. Wound care consult done for left shoulder - betadine to be applied daily. Scheduled for RHC tomorrow, will likely d/c tomorrow as well. Patient aware of plan of care.

## 2018-10-01 NOTE — NURSING
Rounds Report: Attended interdisciplinary rounds this morning with the transplant team including SW, physicians, fellows,  mid-level providers, and transplant coordinators.  Discussed plan of care, including DC date- 10/2/18 after biopsy , current medications including Prograf and prednisone  and current plan to proceed with EGBX tomorrow and then discharge after.

## 2018-10-01 NOTE — CONSULTS
"Wound care consult received for "boil" to left shoulder.  Upon assessment, noted a raised red papule, .2x.2cm, with no visible head or drainage, scabbed over.  Minimal induration under papule. Pt reports it feels smaller than 2 days ago and no c/o pain. Discussed recommendation for Betadine application daily to provide antibacterial properties and promote drying of papule. No need for I&D at this time. Brenda Antonio to put in orders accordingly for nursing to carry out care.  Wound care to follow prn  [id number]          "

## 2018-10-02 ENCOUNTER — TELEPHONE (OUTPATIENT)
Dept: TRANSPLANT | Facility: CLINIC | Age: 28
End: 2018-10-02

## 2018-10-02 VITALS
HEART RATE: 104 BPM | HEIGHT: 67 IN | RESPIRATION RATE: 18 BRPM | BODY MASS INDEX: 25.53 KG/M2 | SYSTOLIC BLOOD PRESSURE: 129 MMHG | DIASTOLIC BLOOD PRESSURE: 83 MMHG | OXYGEN SATURATION: 99 % | TEMPERATURE: 98 F | WEIGHT: 162.69 LBS

## 2018-10-02 LAB
ALBUMIN SERPL BCP-MCNC: 3.7 G/DL
ALP SERPL-CCNC: 119 U/L
ALT SERPL W/O P-5'-P-CCNC: 75 U/L
ANION GAP SERPL CALC-SCNC: 11 MMOL/L
ANISOCYTOSIS BLD QL SMEAR: SLIGHT
AST SERPL-CCNC: 45 U/L
BASOPHILS NFR BLD: 0 %
BILIRUB SERPL-MCNC: 0.3 MG/DL
BUN SERPL-MCNC: 12 MG/DL
CALCIUM SERPL-MCNC: 9.4 MG/DL
CHLORIDE SERPL-SCNC: 107 MMOL/L
CO2 SERPL-SCNC: 24 MMOL/L
CREAT SERPL-MCNC: 1.1 MG/DL
DIASTOLIC DYSFUNCTION: NO
DIFFERENTIAL METHOD: ABNORMAL
EOSINOPHIL NFR BLD: 1 %
ERYTHROCYTE [DISTWIDTH] IN BLOOD BY AUTOMATED COUNT: 12.4 %
EST. GFR  (AFRICAN AMERICAN): >60 ML/MIN/1.73 M^2
EST. GFR  (NON AFRICAN AMERICAN): >60 ML/MIN/1.73 M^2
ESTIMATED PA SYSTOLIC PRESSURE: 19
GLUCOSE SERPL-MCNC: 90 MG/DL
HCT VFR BLD AUTO: 40.5 %
HGB BLD-MCNC: 12.7 G/DL
HYPOCHROMIA BLD QL SMEAR: ABNORMAL
IMM GRANULOCYTES # BLD AUTO: ABNORMAL K/UL
IMM GRANULOCYTES NFR BLD AUTO: ABNORMAL %
LYMPHOCYTES NFR BLD: 20 %
MAGNESIUM SERPL-MCNC: 2.1 MG/DL
MCH RBC QN AUTO: 26.6 PG
MCHC RBC AUTO-ENTMCNC: 31.4 G/DL
MCV RBC AUTO: 85 FL
METAMYELOCYTES NFR BLD MANUAL: 8 %
MONOCYTES NFR BLD: 7 %
MYELOCYTES NFR BLD MANUAL: 6 %
NEUTROPHILS NFR BLD: 56 %
NEUTS BAND NFR BLD MANUAL: 2 %
NRBC BLD-RTO: 0 /100 WBC
OVALOCYTES BLD QL SMEAR: ABNORMAL
PLATELET # BLD AUTO: 219 K/UL
PLATELET BLD QL SMEAR: ABNORMAL
PMV BLD AUTO: 10.2 FL
POIKILOCYTOSIS BLD QL SMEAR: SLIGHT
POLYCHROMASIA BLD QL SMEAR: ABNORMAL
POTASSIUM SERPL-SCNC: 4.1 MMOL/L
PROT SERPL-MCNC: 6.6 G/DL
RBC # BLD AUTO: 4.77 M/UL
RETIRED EF AND QEF - SEE NOTES: 60 (ref 55–65)
SODIUM SERPL-SCNC: 142 MMOL/L
TACROLIMUS BLD-MCNC: 11.9 NG/ML
WBC # BLD AUTO: 21.52 K/UL

## 2018-10-02 PROCEDURE — 93306 TTE W/DOPPLER COMPLETE: CPT | Mod: 26,,, | Performed by: INTERNAL MEDICINE

## 2018-10-02 PROCEDURE — 36415 COLL VENOUS BLD VENIPUNCTURE: CPT

## 2018-10-02 PROCEDURE — 63600175 PHARM REV CODE 636 W HCPCS: Performed by: INTERNAL MEDICINE

## 2018-10-02 PROCEDURE — 27200044 CATH LAB PROCEDURE

## 2018-10-02 PROCEDURE — 99238 HOSP IP/OBS DSCHRG MGMT 30/<: CPT | Mod: ,,, | Performed by: INTERNAL MEDICINE

## 2018-10-02 PROCEDURE — 88307 TISSUE EXAM BY PATHOLOGIST: CPT | Performed by: PATHOLOGY

## 2018-10-02 PROCEDURE — 25000003 PHARM REV CODE 250: Performed by: PHYSICIAN ASSISTANT

## 2018-10-02 PROCEDURE — 85027 COMPLETE CBC AUTOMATED: CPT

## 2018-10-02 PROCEDURE — 80053 COMPREHEN METABOLIC PANEL: CPT

## 2018-10-02 PROCEDURE — 02BK3ZX EXCISION OF RIGHT VENTRICLE, PERCUTANEOUS APPROACH, DIAGNOSTIC: ICD-10-PCS | Performed by: INTERNAL MEDICINE

## 2018-10-02 PROCEDURE — 25000003 PHARM REV CODE 250

## 2018-10-02 PROCEDURE — 88342 IMHCHEM/IMCYTCHM 1ST ANTB: CPT | Mod: 26,,, | Performed by: PATHOLOGY

## 2018-10-02 PROCEDURE — C1894 INTRO/SHEATH, NON-LASER: HCPCS

## 2018-10-02 PROCEDURE — 83735 ASSAY OF MAGNESIUM: CPT

## 2018-10-02 PROCEDURE — 85007 BL SMEAR W/DIFF WBC COUNT: CPT

## 2018-10-02 PROCEDURE — 88342 IMHCHEM/IMCYTCHM 1ST ANTB: CPT | Performed by: PATHOLOGY

## 2018-10-02 PROCEDURE — 93306 TTE W/DOPPLER COMPLETE: CPT

## 2018-10-02 PROCEDURE — 93505 ENDOMYOCARDIAL BIOPSY: CPT | Mod: 26,,, | Performed by: INTERNAL MEDICINE

## 2018-10-02 PROCEDURE — 88307 TISSUE EXAM BY PATHOLOGIST: CPT | Mod: 26,,, | Performed by: PATHOLOGY

## 2018-10-02 PROCEDURE — 80197 ASSAY OF TACROLIMUS: CPT

## 2018-10-02 RX ORDER — TACROLIMUS 1 MG/1
CAPSULE ORAL
Qty: 90 CAPSULE | Refills: 3 | Status: SHIPPED | OUTPATIENT
Start: 2018-10-02 | End: 2018-10-12 | Stop reason: SDUPTHER

## 2018-10-02 RX ORDER — LANOLIN ALCOHOL/MO/W.PET/CERES
400 CREAM (GRAM) TOPICAL 2 TIMES DAILY
Qty: 60 TABLET | Refills: 11 | Status: SHIPPED | OUTPATIENT
Start: 2018-10-02 | End: 2022-08-29 | Stop reason: SINTOL

## 2018-10-02 RX ORDER — PREDNISONE 5 MG/1
5 TABLET ORAL DAILY
Qty: 30 TABLET | Refills: 3 | Status: SHIPPED | OUTPATIENT
Start: 2018-10-03 | End: 2018-10-12 | Stop reason: SDUPTHER

## 2018-10-02 RX ADMIN — TACROLIMUS 2 MG: 1 CAPSULE ORAL at 07:10

## 2018-10-02 RX ADMIN — OYSTER SHELL CALCIUM WITH VITAMIN D 1 TABLET: 500; 200 TABLET, FILM COATED ORAL at 09:10

## 2018-10-02 RX ADMIN — PREDNISONE 5 MG: 5 TABLET ORAL at 09:10

## 2018-10-02 RX ADMIN — ASPIRIN 81 MG: 81 TABLET, COATED ORAL at 09:10

## 2018-10-02 RX ADMIN — MAGNESIUM OXIDE TAB 400 MG (241.3 MG ELEMENTAL MG) 400 MG: 400 (241.3 MG) TAB at 09:10

## 2018-10-02 RX ADMIN — ERGOCALCIFEROL 50000 UNITS: 1.25 CAPSULE ORAL at 09:10

## 2018-10-02 NOTE — LETTER
October 2, 2018      [facility] [address]  [facility] [address]  Phone: [phone]       Patient: Mert Samayoa   YOB: 1990  Date of Visit: 9/24/2018  To Whom It May Concern:    Micheal Samayoa  was admitted to [facility] on 9/24/2018 for neutropenia. While in the hospital, he received pain medicine for pain in his back and hip. He was discharge on 10/2/2018 and may return to work on 10/3/2018 with no restrictions. If you have any questions or concerns, or if I can be of further assistance, please do not hesitate to contact his Post Heart Transplant Coordinator, [phone]    Sincerely,      Sammi Tapia MD  Post Heart Transplant   [facility] [address] [place] [address]

## 2018-10-02 NOTE — INTERVAL H&P NOTE
Mert comes today for routine biopsy of heart post transplant.  He denies any CV symptoms.  No fever or S/S of infection.    JVP not visible sitting  No S3, rub  Lungs clear  No edema    Lab Results   Component Value Date    BNP 59 09/24/2018     10/02/2018    K 4.1 10/02/2018    MG 2.1 10/02/2018     10/02/2018    CO2 24 10/02/2018    BUN 12 10/02/2018    CREATININE 1.1 10/02/2018    GLU 90 10/02/2018    HGBA1C 4.5 04/03/2018    AST 45 (H) 10/02/2018    ALT 75 (H) 10/02/2018    ALBUMIN 3.7 10/02/2018    PROT 6.6 10/02/2018    BILITOT 0.3 10/02/2018    WBC 21.52 (H) 10/02/2018    HGB 12.7 (L) 10/02/2018    HCT 40.5 10/02/2018    HCT 23 (L) 02/21/2018     10/02/2018    INR 1.1 04/03/2018    INR 2.9 02/14/2018     01/25/2018    TSH 1.025 09/13/2017    CHOL 99 (L) 08/17/2018    HDL 33 (L) 08/17/2018    LDLCALC 48.6 (L) 08/17/2018    TRIG 87 08/17/2018    FREET4 1.21 09/13/2017    TACROLIMUS 9.8 10/01/2018    ALLOMAP See result image under hyperlink 09/18/2018       Available labs above reviewed    This procedure has been fully reviewed with the patient and written informed consent has been obtained.    Will proceed with echo guided biopsy

## 2018-10-02 NOTE — PLAN OF CARE
Problem: Patient Care Overview  Goal: Plan of Care Review  Outcome: Ongoing (interventions implemented as appropriate)  Patient remains free from falls at this time. This RN visualized patient ambulating in room/wright gait and balance WNL. Patient has no PIV at this time and is refusing to be stuck again, MD aware. Telemetry monitoring continued ST/SR . [facility] scheduled for AM will continue to monitor.

## 2018-10-02 NOTE — PROCEDURES
RV biopsyLab Post Procedure Note    Patient tolerated the procedure well.   5 specimens obtained with echo guidance  CVP by side arm low before and after case  No change on echo  There were no complications.   Please see full report in CVIS for details.

## 2018-10-02 NOTE — NURSING
Pt is AAOx4, independent, and VSS. Pt denies pain. Right heart cath and heart bx obtained today-Pressure dry and intact. Telemetry monitoring in place. Pt did not have PIV.  DC instructions explained and handout provided upcoming appointments, medication list-updated blue card provided by pharmacy, and instructed pt to keep pressure dressing dry and intact for 24hrs. Pt verbalized understanding and all questions were answered to satisfaction. Transport was offered and refused-pt independently ambulated to car.

## 2018-10-02 NOTE — DISCHARGE SUMMARY
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 S/P washout 2/19 who had his Removal of retained driveline through counterincision in early April 2018. Patient is been seen today on his 7 mo post Shanae follow u  At his last visit we decided to transition from a tacro / rapammune (replaces prednisone and cellcept) to help with CMV coverage as he was stopping valcyte. Today reports that he has no side effects from above transition except for a low WBC   Current immuno: TAC 05 BID (3 to 6), rapammune 1 qd (5 to 10)   Since visit, pt was told to hold Bactrim starting Fri 9/21/18 due to low WBCs. Today WBC count is lower so pt told to come in for admission. He is c/o sore throat x 2 days.   -Admit to [facility] for neutropenia  -Give Neupogen x 1 today  -CMV neg 9/21/18  -Check EBV, PCR, throat culture, rapid strep  -D/C Rapamune, as this could be contributing to neutropenia. Increase tacro dose. Has been off Cellcept (high risk for CMV and neutropenic). Consider adding back pred  -Cont to hold Bactrim for now and will cover with Pentamadine     Procedure(s) (LRB):  BIOPSY, WITH US GUIDANCE (N/A)     Hospital Course: Pt was admitted and cultured. Cultures remained negative throughout admission. His sirolimus was stopped. His bactrim was stopped as well and he received a dose of Pentamadine. Allomap was up so EMBx was performed without incident. In total, he received 4 doses of Neupogen with appropriate rise in WBC count. He was eventually discharged home in good condition. EMBx pending at time of DC. He will f/u in clinic in 1-2 weeks or prn. Repeat  lab check next week.       Consults (From admission, onward)        Status Ordering Provider     Inpatient consult to Hematology/Oncology  Once     Provider:  (Not yet assigned)    Completed LISSA MAY            Pending Diagnostic Studies:     Procedure Component Value Units Date/Time    2D echo with color flow doppler [[id number]] Collected:  10/02/18 0740    Order Status:  Sent Lab Status:  In process Updated:  10/02/18 0844    Narrative:       This study is in progress....    HLA Class I & II C1q PRA Specificity [[id number]] Collected:  09/25/18 1022    Order Status:  Sent Lab Status:  In process Updated:  09/25/18 1403    Specimen:  Blood         Final Active Diagnoses:    Diagnosis Date Noted POA    PRINCIPAL PROBLEM:  Neutropenia [D70.9] 09/24/2018 Yes    Heart transplanted [Z94.1] 02/18/2018 Not Applicable    Abscess [L02.91] 10/01/2018 Yes    Neutropenia, unspecified [D70.9] 09/24/2018 Yes    Essential hypertension [I10] 03/20/2018 Yes    Immunosuppression [D89.9] 02/20/2018 Yes      Problems Resolved During this Admission:      Discharged Condition: good    Patient Instructions:   No discharge procedures on file.  Medications:  Reconciled Home Medications:      Medication List      START taking these medications    predniSONE 5 MG tablet  Commonly known as:  DELTASONE  Take 1 tablet (5 mg total) by mouth once daily.        CHANGE how you take these medications    magnesium oxide 400 mg tablet  Commonly known as:  MAG-OX  Take 1 tablet (400 mg total) by mouth 2 (two) times daily.  What changed:  when to take this     tacrolimus 1 MG Cap  Commonly known as:  PROGRAF  Take 1mg (1 capsule) by mouth every morning, and 2mg (2 capsules) orally nightly  What changed:    · medication strength  · additional instructions        CONTINUE taking these medications    aspirin 81 MG EC tablet  Commonly known as:  ECOTRIN  Take 1 tablet (81 mg total) by mouth once daily.     atorvastatin 20 MG  tablet  Commonly known as:  LIPITOR  Take 1 tablet (20 mg total) by mouth once daily.     calcium carbonate-vitamin D3 600 mg(1,500mg) -800 unit Tab  Take 1 tablet by mouth once daily.     famotidine 40 MG tablet  Commonly known as:  PEPCID  Take 1 tablet (40 mg total) by mouth every evening.        STOP taking these medications    ergocalciferol 50,000 unit Cap  Commonly known as:  ERGOCALCIFEROL     lisinopril 2.5 MG tablet  Commonly known as:  PRINIVIL,ZESTRIL     sirolimus 1 MG Tab  Commonly known as:  RAPAMUNE     sulfamethoxazole-trimethoprim 400-80mg 400-80 mg per tablet  Commonly known as:  BACTRIM            Augusto Goldberg NP  Heart Transplant  [facility]

## 2018-10-02 NOTE — PROGRESS NOTES
DISCHARGE NOTE:    Mert Samayoa is a 28 y.o. male s/p heart transplant on 2/18/2018. Patient was admitted due to neutropenia.      Past Medical History:   Diagnosis Date    Cardiogenic shock 1/16/2017    Cardiomyopathy     Familial cardiomyopathy    CHF (congestive heart failure)     Encounter for blood transfusion     Essential hypertension 12/21/2016    Essential hypertension 3/20/2018    Familial Cardiomyopathy     Familial cardiomyopathy        Hospital Course: Sirolimus was discontinued and neupogen given.  Tacrolimus adjusted to 1mg AM, 2mg PM for a new goal of 8-12ng/mL, and prednisone 5mg/day started.      Monitoring & f/u: tacrolimus level, CBC, CMP in 1 week.   If WBC stable, may consider trial of EVL in the next few weeks.  Alternatively, may keep on current regimen for a year, then slowly wean prednisone. Restart Bactrim or alternative once appropriate (received pentam 9/24)    See list of discharge medication for dosing instructions.     Mert Samayoa and his caregiver verbalized their understanding and had the opportunity to ask questions.      Discharge Medications:   Mert Samayoa   Home Medication Instructions NATALIE:[id number]    Printed on:10/02/18 1450   Medication Information                      aspirin (ECOTRIN) 81 MG EC tablet  Take 1 tablet (81 mg total) by mouth once daily.             atorvastatin (LIPITOR) 20 MG tablet  Take 1 tablet (20 mg total) by mouth once daily.             calcium carbonate-vitamin D3 600 mg(1,500mg) -800 unit Tab  Take 1 tablet by mouth once daily.             famotidine (PEPCID) 40 MG tablet  Take 1 tablet (40 mg total) by mouth every evening.             magnesium oxide (MAG-OX) 400 mg tablet  Take 1 tablet (400 mg total) by mouth 2 (two) times daily.             predniSONE (DELTASONE) 5 MG tablet  Take 1 tablet (5 mg total) by mouth once daily.             tacrolimus (PROGRAF) 1 MG Cap  Take 1mg (1 capsule) by mouth every morning, and 2mg  (2 capsules) orally nightly

## 2018-10-04 ENCOUNTER — TELEPHONE (OUTPATIENT)
Dept: TRANSPLANT | Facility: CLINIC | Age: 28
End: 2018-10-04

## 2018-10-04 ENCOUNTER — PATIENT MESSAGE (OUTPATIENT)
Dept: TRANSPLANT | Facility: CLINIC | Age: 28
End: 2018-10-04

## 2018-10-04 LAB
C1Q1 TESTING DATE: NORMAL
C1Q1 TESTING DATE: NORMAL
C1Q1A TESTING DATE: NORMAL
C1Q2 TESTING DATE: NORMAL
CLASS I ANTIBODY COMMENTS - LUMINEX: NORMAL
CLASS I ANTIBODY COMMENTS - LUMINEX: NORMAL
CLASS II ANTIBODY COMMENTS - LUMINEX: NORMAL
SERUM COLLECTION DT - LUMINEX CLASS I: NORMAL
SERUM COLLECTION DT - LUMINEX CLASS I: NORMAL
SERUM COLLECTION DT - LUMINEX CLASS II: NORMAL

## 2018-10-04 NOTE — PROGRESS NOTES
D/C note:    SW to pt's room for D/C. Pt aaox4, calm, and pleasant. Pt reports feeling well and coping well. Pt reports happy to be returning home today. Pt reprots wife is on her way to hospital now and will provide transport home. Pt reports no needs from SW and no needs identified. SW providing psychosocial and counseling support, education, assistance, resources, and D/C planning as indicated. SW remains available.

## 2018-10-04 NOTE — TELEPHONE ENCOUNTER
Reviewed C1Q and biopsy with Dr. Hawkins. Called pt to review results and asked for pt to get labs tomorrow at [facility]. Will wait for labs to come back to formulate a plan for immunosuppression.  Informed pt that Libby will call him with a plan tomorrow after labs results and MD discuss with PharmD. Pt stated his understanding.

## 2018-10-05 ENCOUNTER — LAB VISIT (OUTPATIENT)
Dept: LAB | Facility: HOSPITAL | Age: 28
End: 2018-10-05
Attending: INTERNAL MEDICINE
Payer: COMMERCIAL

## 2018-10-05 DIAGNOSIS — Z94.1 STATUS POST HEART TRANSPLANT: ICD-10-CM

## 2018-10-05 LAB
ALBUMIN SERPL BCP-MCNC: 4.3 G/DL
ALP SERPL-CCNC: 95 U/L
ALT SERPL W/O P-5'-P-CCNC: 44 U/L
ANION GAP SERPL CALC-SCNC: 11 MMOL/L
ANISOCYTOSIS BLD QL SMEAR: SLIGHT
AST SERPL-CCNC: 22 U/L
BASOPHILS # BLD AUTO: ABNORMAL K/UL
BASOPHILS NFR BLD: 0 %
BILIRUB SERPL-MCNC: 0.4 MG/DL
BNP SERPL-MCNC: 74 PG/ML
BUN SERPL-MCNC: 18 MG/DL
CALCIUM SERPL-MCNC: 9.4 MG/DL
CHLORIDE SERPL-SCNC: 105 MMOL/L
CO2 SERPL-SCNC: 26 MMOL/L
CREAT SERPL-MCNC: 1.2 MG/DL
DIFFERENTIAL METHOD: ABNORMAL
EOSINOPHIL # BLD AUTO: ABNORMAL K/UL
EOSINOPHIL NFR BLD: 1 %
ERYTHROCYTE [DISTWIDTH] IN BLOOD BY AUTOMATED COUNT: 13.4 %
EST. GFR  (AFRICAN AMERICAN): >60 ML/MIN/1.73 M^2
EST. GFR  (NON AFRICAN AMERICAN): >60 ML/MIN/1.73 M^2
GLUCOSE SERPL-MCNC: 97 MG/DL
HCT VFR BLD AUTO: 41.7 %
HGB BLD-MCNC: 13.4 G/DL
LYMPHOCYTES # BLD AUTO: ABNORMAL K/UL
LYMPHOCYTES NFR BLD: 21 %
MCH RBC QN AUTO: 27 PG
MCHC RBC AUTO-ENTMCNC: 32.1 G/DL
MCV RBC AUTO: 84 FL
MONOCYTES # BLD AUTO: ABNORMAL K/UL
MONOCYTES NFR BLD: 6 %
MYELOCYTES NFR BLD MANUAL: 2 %
NEUTROPHILS NFR BLD: 64 %
NEUTS BAND NFR BLD MANUAL: 6 %
PLATELET # BLD AUTO: 143 K/UL
PLATELET BLD QL SMEAR: ABNORMAL
PMV BLD AUTO: 10.3 FL
POTASSIUM SERPL-SCNC: 4.3 MMOL/L
PROT SERPL-MCNC: 7.4 G/DL
RBC # BLD AUTO: 4.96 M/UL
SODIUM SERPL-SCNC: 142 MMOL/L
WBC # BLD AUTO: 10.87 K/UL

## 2018-10-05 PROCEDURE — 85007 BL SMEAR W/DIFF WBC COUNT: CPT

## 2018-10-05 PROCEDURE — 86833 HLA CLASS II HIGH DEFIN QUAL: CPT | Mod: 91

## 2018-10-05 PROCEDURE — 36415 COLL VENOUS BLD VENIPUNCTURE: CPT

## 2018-10-05 PROCEDURE — 86977 RBC SERUM PRETX INCUBJ/INHIB: CPT

## 2018-10-05 PROCEDURE — 86832 HLA CLASS I HIGH DEFIN QUAL: CPT | Mod: 91

## 2018-10-05 PROCEDURE — 83880 ASSAY OF NATRIURETIC PEPTIDE: CPT

## 2018-10-05 PROCEDURE — 85027 COMPLETE CBC AUTOMATED: CPT

## 2018-10-05 PROCEDURE — 80053 COMPREHEN METABOLIC PANEL: CPT

## 2018-10-05 PROCEDURE — 80197 ASSAY OF TACROLIMUS: CPT

## 2018-10-05 PROCEDURE — 86833 HLA CLASS II HIGH DEFIN QUAL: CPT

## 2018-10-05 NOTE — TELEPHONE ENCOUNTER
Spoke to pt regarding lab results.   1. WBC 10.8 (21.5) and H/H stable @13.4/41.7 with plts slightly reduced at 143.  2. CMP totally normal  3. Pending tac level and HLA DSA/ C1Q repeats.   4. BNP 74.     Told pt that we will wait until Monday for Tac level in order to decided on plan for immunosuppression. May need to repeat cbc Tues or soon thereafterwards.

## 2018-10-06 LAB — TACROLIMUS BLD-MCNC: 7.8 NG/ML

## 2018-10-08 LAB — CMV DNA SERPL NAA+PROBE-ACNC: NORMAL IU/ML

## 2018-10-09 LAB
CLASS I ANTIBODY COMMENTS - LUMINEX: NORMAL
CLASS II ANTIBODY COMMENTS - LUMINEX: NORMAL
DSA1 TESTING DATE: NORMAL
DSA12 TESTING DATE: NORMAL
DSA2 TESTING DATE: NORMAL
SERUM COLLECTION DT - LUMINEX CLASS I: NORMAL
SERUM COLLECTION DT - LUMINEX CLASS II: NORMAL

## 2018-10-10 NOTE — PHYSICIAN QUERY
PT Name: Mert Samayoa  MR #: [id number]    Physician Query Form - Pathology Findings Clarification     CDS/: Shawna Rossi RN, CDI              Contact information: [phone]  This form is a permanent document in the medical record.     Query Date: October 10, 2018      By submitting this query, we are merely seeking further clarification of documentation.  Please utilize your independent clinical judgment when addressing the question(s) below.      The medical record contains the following:     Findings Supporting Clinical Information Location in Medical Record     Mild acute cellular rejection   FINAL PATHOLOGIC DIAGNOSIS  1. Heart, right ventricle, biopsy, immunofluorescence:  -Negative for antibody mediated rejection, pAMR 0.  2. Heart, right ventricle, biopsy:  -Mild acute cellular rejection, ISHLT 1 R.     Pathology Report 10/3       Please document the clinical significance of the Pathologists findings of ___Mild acute cellular rejection___.          [  ] I agree with the Pathology Findings          [  ] I do not agree with the Pathology Findings          [  X] Clinically Undetermined          [  ] Other/Clarification of Findings: ______________________________________________    Please document in your progress notes daily for the duration of treatment until resolved and include in your discharge summary.

## 2018-10-11 LAB
C1Q1A TESTING DATE: NORMAL
C1Q2A TESTING DATE: NORMAL
CLASS I ANTIBODY COMMENTS - LUMINEX: NORMAL
CLASS II ANTIBODY COMMENTS - LUMINEX: NORMAL
HC1QA TESTING DATE: NORMAL
SERUM COLLECTION DT - LUMINEX CLASS I: NORMAL
SERUM COLLECTION DT - LUMINEX CLASS II: NORMAL

## 2018-10-12 DIAGNOSIS — Z94.1 STATUS POST HEART TRANSPLANT: Primary | ICD-10-CM

## 2018-10-14 RX ORDER — TACROLIMUS 1 MG/1
CAPSULE ORAL
Qty: 90 CAPSULE | Refills: 3 | Status: SHIPPED | OUTPATIENT
Start: 2018-10-14 | End: 2019-02-21

## 2018-10-14 RX ORDER — PREDNISONE 5 MG/1
5 TABLET ORAL DAILY
Qty: 30 TABLET | Refills: 3 | Status: SHIPPED | OUTPATIENT
Start: 2018-10-14 | End: 2019-01-17 | Stop reason: SDUPTHER

## 2018-10-15 DIAGNOSIS — Z94.1 STATUS POST HEART TRANSPLANT: Primary | ICD-10-CM

## 2018-10-19 ENCOUNTER — HOSPITAL ENCOUNTER (OUTPATIENT)
Dept: CARDIOLOGY | Facility: CLINIC | Age: 28
Discharge: HOME | End: 2018-10-19
Attending: INTERNAL MEDICINE
Payer: COMMERCIAL

## 2018-10-19 ENCOUNTER — OFFICE VISIT (OUTPATIENT)
Dept: TRANSPLANT | Facility: CLINIC | Age: 28
End: 2018-10-19
Payer: COMMERCIAL

## 2018-10-19 VITALS
HEIGHT: 67 IN | SYSTOLIC BLOOD PRESSURE: 128 MMHG | HEART RATE: 95 BPM | WEIGHT: 162.69 LBS | DIASTOLIC BLOOD PRESSURE: 88 MMHG | BODY MASS INDEX: 25.53 KG/M2

## 2018-10-19 DIAGNOSIS — Z79.899 LONG TERM CURRENT USE OF IMMUNOSUPPRESSIVE DRUG: ICD-10-CM

## 2018-10-19 DIAGNOSIS — Z94.1 STATUS POST HEART TRANSPLANT: ICD-10-CM

## 2018-10-19 DIAGNOSIS — Z94.1 STATUS POST HEART TRANSPLANT: Primary | ICD-10-CM

## 2018-10-19 DIAGNOSIS — I10 ESSENTIAL HYPERTENSION: ICD-10-CM

## 2018-10-19 DIAGNOSIS — Z94.1 S/P ORTHOTOPIC HEART TRANSPLANT: Primary | ICD-10-CM

## 2018-10-19 LAB
DIASTOLIC DYSFUNCTION: NO
RETIRED EF AND QEF - SEE NOTES: 50 (ref 55–65)
TRICUSPID VALVE REGURGITATION: NORMAL

## 2018-10-19 PROCEDURE — 99999 PR PBB SHADOW E&M-EST. PATIENT-LVL III: CPT | Mod: PBBFAC,,, | Performed by: INTERNAL MEDICINE

## 2018-10-19 PROCEDURE — 93306 TTE W/DOPPLER COMPLETE: CPT | Mod: S$GLB,,, | Performed by: INTERNAL MEDICINE

## 2018-10-19 PROCEDURE — 99214 OFFICE O/P EST MOD 30 MIN: CPT | Mod: S$GLB,,, | Performed by: INTERNAL MEDICINE

## 2018-10-19 RX ORDER — MYCOPHENOLATE MOFETIL 250 MG/1
500 CAPSULE ORAL 2 TIMES DAILY
Qty: 120 CAPSULE | Refills: 11 | Status: SHIPPED | OUTPATIENT
Start: 2018-10-19 | End: 2018-11-19 | Stop reason: SDUPTHER

## 2018-10-19 RX ORDER — SULFAMETHOXAZOLE AND TRIMETHOPRIM 400; 80 MG/1; MG/1
1 TABLET ORAL DAILY
Qty: 30 TABLET | Refills: 6 | Status: SHIPPED | OUTPATIENT
Start: 2018-10-19 | End: 2019-02-01 | Stop reason: ALTCHOICE

## 2018-10-19 NOTE — TELEPHONE ENCOUNTER
----- Message from Holland Willams PharmD sent at 10/19/2018  1:47 PM CDT -----  Regarding: RE: WBC  With C1Q+ only DSA with unknown significance (not sure if HLA ever got back to you about what this meant), would run his prograf at 5-10 for now and add MMF 500mg BID. Bactrim should be restarted until month 12.     -Holland    ----- Message -----  From: Precious Spicer RN  Sent: 10/19/2018   9:26 AM  To: Lashon Hawkins MD, Holland Willams, PharmD  Subject: WBC                                              Mert Contreras's WBC is 6.26, creatinine is 1.1, potassium 3.9.   1) Should we restart bactrim or keep on pentamadine?  2) Everolimus start vs restarting cellcept?    Thoughts???    Thanks,  Precious

## 2018-10-19 NOTE — LETTER
October 19, 2018        Guillermo Alejo  [address]  [facility] [address]  Phone: [phone]  Fax: [phone]             [facility]  [address]  [facility] [address]  Phone: [phone]   Patient: Mert Samayoa   MR Number: [id number]   YOB: 1990   Date of Visit: 10/19/2018       Dear Dr. Guillermo Alejo    Thank you for referring Mert Samayoa to me for evaluation. Attached you will find relevant portions of my assessment and plan of care.    If you have questions, please do not hesitate to call me. I look forward to following Mert Samayoa along with you.    Sincerely,    Sammi Tapia MD    Enclosure    If you would like to receive this communication electronically, please contact [email or web address] or [phone] to request [employer] access.    [employer] is a tool which provides read-only access to select patient information with whom you have a relationship. Its easy to use and provides real time access to review your patients record including encounter summaries, notes, results, and demographic information.    If you feel you have received this communication in error or would no longer like to receive these types of communications, please e-mail [email or web address]

## 2018-10-19 NOTE — PROGRESS NOTES
"Subjective:   Mr. Samayoa is a 28 y.o. year old White male who received a  - brain death heart transplant on 18.      CMV status:   Donor: +  Recipient: -    HPI  Mr. Samayoa is a very pleasant 28 y.o.WM  familial NiCMP now s/p orthotopic Heart transplant 18. S/P washout  who had his Removal of retained driveline through counterincision in early 2018. Comes for his 8th month post-Tx visit. Doing well. Was recently discharged from the hospital after being treated for neutropenia. Currently only on Tacro 1/2 and prednisone 5 mg daily. Clinically doing well. Has no complaints. Echo done today showed preserved graft function. WBC has improved to 6.26.      Review of Systems   Constitution: Negative. Negative for chills, decreased appetite, diaphoresis, fever, weakness, malaise/fatigue, night sweats, weight gain and weight loss.   Eyes: Negative.    Cardiovascular: Negative for chest pain, claudication, cyanosis, dyspnea on exertion, irregular heartbeat, leg swelling, near-syncope, orthopnea, palpitations, paroxysmal nocturnal dyspnea and syncope.   Respiratory: Negative for cough, hemoptysis and shortness of breath.    Endocrine: Negative.    Hematologic/Lymphatic: Negative.    Skin: Negative for color change, dry skin and nail changes.   Musculoskeletal: Negative.    Gastrointestinal: Negative.    Genitourinary: Negative.        Objective:   Blood pressure 128/88, pulse 95, height 5' 7" (1.702 m), weight 73.8 kg (162 lb 11.2 oz).body mass index is 25.48 kg/m².    Physical Exam   Constitutional: He appears well-developed.   /88 (BP Location: Right arm, Patient Position: Sitting, BP Method: Large (Automatic))   Pulse 95   Ht 5' 7" (1.702 m)   Wt 73.8 kg (162 lb 11.2 oz)   BMI 25.48 kg/m²      HENT:   Head: Normocephalic.   Neck: No JVD present. Carotid bruit is not present.   Cardiovascular: Regular rhythm, normal heart sounds and normal pulses. Tachycardia present.   No murmur " heard.  Pulmonary/Chest: Effort normal and breath sounds normal. No respiratory distress. He has no wheezes. He has no rales.   Abdominal: Soft. Bowel sounds are normal. He exhibits no distension. There is no tenderness. There is no rebound.   Musculoskeletal: He exhibits no edema.   Neurological: He is alert.   Skin: Skin is warm.   Vitals reviewed.      Lab Results   Component Value Date    WBC 6.26 10/19/2018    HGB 14.4 10/19/2018    HCT 45.5 10/19/2018    MCV 88 10/19/2018     10/19/2018    CO2 31 (H) 10/19/2018    CREATININE 1.1 10/19/2018    CALCIUM 9.8 10/19/2018    ALBUMIN 4.6 10/19/2018    AST 19 10/19/2018    BNP 73 10/19/2018    ALT 27 10/19/2018    ALLOMAP See result image under hyperlink 09/18/2018       Lab Results   Component Value Date    INR 1.1 04/03/2018    INR 1.1 03/03/2018    INR 1.1 03/02/2018       BNP   Date Value Ref Range Status   10/19/2018 73 0 - 99 pg/mL Final     Comment:     Values of less than 100 pg/ml are consistent with non-CHF populations.   10/05/2018 74 0 - 99 pg/mL Final     Comment:     Values of less than 100 pg/ml are consistent with non-CHF populations.   09/24/2018 59 0 - 99 pg/mL Final     Comment:     Values of less than 100 pg/ml are consistent with non-CHF populations.       LD   Date Value Ref Range Status   01/25/2018 218 110 - 260 U/L Final     Comment:     Results are increased in hemolyzed samples.   12/28/2017 215 110 - 260 U/L Final     Comment:     Results are increased in hemolyzed samples.   11/29/2017 207 110 - 260 U/L Final     Comment:     Results are increased in hemolyzed samples.       Tacrolimus Lvl   Date Value Ref Range Status   10/05/2018 7.8 5.0 - 15.0 ng/mL Final     Comment:     Testing performed by Liquid Chromatography-Tandem  Mass Spectrometry (LC-MS/MS).  This test was developed and its performance characteristics  determined by [facility], Department of Pathology  and Laboratory Medicine in a manner consistent with  CLIA  requirements. It has not been cleared or approved by the [place]  Food and Drug Administration.  This test is used for clinical   purposes.  It should not be regarded as investigational or for  research.         Assessment:     1. S/P orthotopic heart transplant    2. Long term current use of immunosuppressive drug    3. Essential hypertension        Plan:   Doing well.  In view of his improved WBC, we'll discuss starting Everolimus  DC pentamidine and start Bactrim  Labs next week  Recheck DSA    Return instructions as set forth by post transplant schedule or as needed:    Clinic: Return for labs and/or biopsy weekly the first month, every two weeks during month 2 and then monthly for the first year at the provider or coordinator's discretion. During the second year, return to clinic every 3 months. Post transplant year 3-5 return every 6 months. There will be a comprehensive post transplant evaluation every year that may include LHC/RHC/biopsy, stress test, echo, CXR, and other health screening exams.    In addition to the clinical assessment, I have ordered Allomap testing for this patient to assist in identification of moderate/severe acute cellular rejection (ACR) in a pt with stable Allograft function instead of endomyocardial biopsy.     Patient is reminded to call with any health changes as these can be early signs of transplant complications. Patient is advised to make sure any new medications or changes of old medications are discussed with a pharmacist or physician knowledgeable with transplant to avoid rejection/drug toxicity related to significant drug interactions.    UNOS Patient Status  Functional Status: 100% - Normal, no complaints, no evidence of disease  Physical Capacity: No Limitations  Working for Income: Unknown    Sammi Tapia MD

## 2018-10-26 ENCOUNTER — LAB VISIT (OUTPATIENT)
Dept: LAB | Facility: HOSPITAL | Age: 28
End: 2018-10-26
Attending: INTERNAL MEDICINE
Payer: COMMERCIAL

## 2018-10-26 DIAGNOSIS — Z94.1 STATUS POST HEART TRANSPLANT: ICD-10-CM

## 2018-10-26 LAB
ALBUMIN SERPL BCP-MCNC: 4.7 G/DL
ALP SERPL-CCNC: 83 U/L
ALT SERPL W/O P-5'-P-CCNC: 22 U/L
ANION GAP SERPL CALC-SCNC: 11 MMOL/L
AST SERPL-CCNC: 19 U/L
BILIRUB SERPL-MCNC: 0.7 MG/DL
BUN SERPL-MCNC: 14 MG/DL
CALCIUM SERPL-MCNC: 9.5 MG/DL
CHLORIDE SERPL-SCNC: 106 MMOL/L
CO2 SERPL-SCNC: 26 MMOL/L
CREAT SERPL-MCNC: 1.3 MG/DL
EST. GFR  (AFRICAN AMERICAN): >60 ML/MIN/1.73 M^2
EST. GFR  (NON AFRICAN AMERICAN): >60 ML/MIN/1.73 M^2
GLUCOSE SERPL-MCNC: 92 MG/DL
POTASSIUM SERPL-SCNC: 4.2 MMOL/L
PROT SERPL-MCNC: 7.4 G/DL
SODIUM SERPL-SCNC: 143 MMOL/L
TACROLIMUS BLD-MCNC: 11.5 NG/ML

## 2018-10-26 PROCEDURE — 80197 ASSAY OF TACROLIMUS: CPT

## 2018-10-26 PROCEDURE — 36415 COLL VENOUS BLD VENIPUNCTURE: CPT

## 2018-10-26 PROCEDURE — 80053 COMPREHEN METABOLIC PANEL: CPT

## 2018-10-30 ENCOUNTER — TELEPHONE (OUTPATIENT)
Dept: TRANSPLANT | Facility: CLINIC | Age: 28
End: 2018-10-30

## 2018-10-30 NOTE — TELEPHONE ENCOUNTER
Reviewed labs in chart review. Will increase cellcept to 750 mg BID and recheck CBC in one week. Continue prograf for goal 8-12

## 2018-10-31 ENCOUNTER — LAB VISIT (OUTPATIENT)
Dept: LAB | Facility: HOSPITAL | Age: 28
End: 2018-10-31
Attending: SURGERY
Payer: COMMERCIAL

## 2018-10-31 DIAGNOSIS — Z94.1 STATUS POST HEART TRANSPLANT: ICD-10-CM

## 2018-10-31 LAB
ALBUMIN SERPL BCP-MCNC: 4.7 G/DL
ALP SERPL-CCNC: 92 U/L
ALT SERPL W/O P-5'-P-CCNC: 23 U/L
ANION GAP SERPL CALC-SCNC: 13 MMOL/L
AST SERPL-CCNC: 19 U/L
BASOPHILS # BLD AUTO: 0.02 K/UL
BASOPHILS NFR BLD: 0.3 %
BILIRUB SERPL-MCNC: 0.5 MG/DL
BNP SERPL-MCNC: 45 PG/ML
BUN SERPL-MCNC: 14 MG/DL
CALCIUM SERPL-MCNC: 9.7 MG/DL
CHLORIDE SERPL-SCNC: 107 MMOL/L
CO2 SERPL-SCNC: 25 MMOL/L
CREAT SERPL-MCNC: 1.2 MG/DL
DIFFERENTIAL METHOD: ABNORMAL
EOSINOPHIL # BLD AUTO: 0.1 K/UL
EOSINOPHIL NFR BLD: 1.2 %
ERYTHROCYTE [DISTWIDTH] IN BLOOD BY AUTOMATED COUNT: 16.4 %
EST. GFR  (AFRICAN AMERICAN): >60 ML/MIN/1.73 M^2
EST. GFR  (NON AFRICAN AMERICAN): >60 ML/MIN/1.73 M^2
GLUCOSE SERPL-MCNC: 91 MG/DL
HCT VFR BLD AUTO: 46.9 %
HGB BLD-MCNC: 15.1 G/DL
LYMPHOCYTES # BLD AUTO: 2.1 K/UL
LYMPHOCYTES NFR BLD: 27.9 %
MCH RBC QN AUTO: 27.4 PG
MCHC RBC AUTO-ENTMCNC: 32.2 G/DL
MCV RBC AUTO: 85 FL
MONOCYTES # BLD AUTO: 0.8 K/UL
MONOCYTES NFR BLD: 10.4 %
NEUTROPHILS # BLD AUTO: 4.4 K/UL
NEUTROPHILS NFR BLD: 60.2 %
PLATELET # BLD AUTO: 192 K/UL
PMV BLD AUTO: 9.7 FL
POTASSIUM SERPL-SCNC: 4 MMOL/L
PROT SERPL-MCNC: 7.4 G/DL
RBC # BLD AUTO: 5.51 M/UL
SODIUM SERPL-SCNC: 145 MMOL/L
TACROLIMUS BLD-MCNC: 9.5 NG/ML
WBC # BLD AUTO: 7.38 K/UL

## 2018-10-31 PROCEDURE — 83880 ASSAY OF NATRIURETIC PEPTIDE: CPT

## 2018-10-31 PROCEDURE — 86832 HLA CLASS I HIGH DEFIN QUAL: CPT

## 2018-10-31 PROCEDURE — 86832 HLA CLASS I HIGH DEFIN QUAL: CPT | Mod: 91

## 2018-10-31 PROCEDURE — 86833 HLA CLASS II HIGH DEFIN QUAL: CPT | Mod: 91

## 2018-10-31 PROCEDURE — 80053 COMPREHEN METABOLIC PANEL: CPT

## 2018-10-31 PROCEDURE — 80197 ASSAY OF TACROLIMUS: CPT

## 2018-10-31 PROCEDURE — 85025 COMPLETE CBC W/AUTO DIFF WBC: CPT

## 2018-10-31 PROCEDURE — 86977 RBC SERUM PRETX INCUBJ/INHIB: CPT | Mod: 59

## 2018-10-31 PROCEDURE — 36415 COLL VENOUS BLD VENIPUNCTURE: CPT

## 2018-11-01 LAB — CMV DNA SERPL NAA+PROBE-ACNC: NORMAL IU/ML

## 2018-11-19 ENCOUNTER — TELEPHONE (OUTPATIENT)
Dept: PODIATRY | Facility: CLINIC | Age: 28
End: 2018-11-19

## 2018-11-19 ENCOUNTER — OFFICE VISIT (OUTPATIENT)
Dept: PODIATRY | Facility: CLINIC | Age: 28
End: 2018-11-19
Payer: COMMERCIAL

## 2018-11-19 ENCOUNTER — HOSPITAL ENCOUNTER (OUTPATIENT)
Dept: CARDIOLOGY | Facility: CLINIC | Age: 28
Discharge: HOME | End: 2018-11-19
Attending: INTERNAL MEDICINE
Payer: COMMERCIAL

## 2018-11-19 ENCOUNTER — OFFICE VISIT (OUTPATIENT)
Dept: TRANSPLANT | Facility: CLINIC | Age: 28
End: 2018-11-19
Attending: INTERNAL MEDICINE
Payer: COMMERCIAL

## 2018-11-19 ENCOUNTER — CLINICAL SUPPORT (OUTPATIENT)
Dept: CARDIOLOGY | Facility: HOSPITAL | Age: 28
End: 2018-11-19
Attending: INTERNAL MEDICINE
Payer: COMMERCIAL

## 2018-11-19 VITALS
SYSTOLIC BLOOD PRESSURE: 110 MMHG | DIASTOLIC BLOOD PRESSURE: 75 MMHG | BODY MASS INDEX: 26.61 KG/M2 | BODY MASS INDEX: 24.96 KG/M2 | HEIGHT: 67 IN | WEIGHT: 169.56 LBS | HEART RATE: 95 BPM | HEIGHT: 67 IN | WEIGHT: 159 LBS | SYSTOLIC BLOOD PRESSURE: 117 MMHG | DIASTOLIC BLOOD PRESSURE: 60 MMHG | HEART RATE: 80 BPM

## 2018-11-19 VITALS
HEART RATE: 111 BPM | BODY MASS INDEX: 25.61 KG/M2 | SYSTOLIC BLOOD PRESSURE: 148 MMHG | HEIGHT: 68 IN | WEIGHT: 169 LBS | DIASTOLIC BLOOD PRESSURE: 84 MMHG

## 2018-11-19 DIAGNOSIS — Z94.1 HEART TRANSPLANTED: Primary | ICD-10-CM

## 2018-11-19 DIAGNOSIS — Z94.1 STATUS POST HEART TRANSPLANT: ICD-10-CM

## 2018-11-19 DIAGNOSIS — Z79.899 LONG TERM CURRENT USE OF IMMUNOSUPPRESSIVE DRUG: ICD-10-CM

## 2018-11-19 DIAGNOSIS — R00.2 PALPITATIONS: ICD-10-CM

## 2018-11-19 DIAGNOSIS — E78.5 HYPERLIPIDEMIA LDL GOAL <70: ICD-10-CM

## 2018-11-19 DIAGNOSIS — I10 ESSENTIAL HYPERTENSION: ICD-10-CM

## 2018-11-19 DIAGNOSIS — D84.9 IMMUNOSUPPRESSION: ICD-10-CM

## 2018-11-19 DIAGNOSIS — L60.0 INGROWN TOENAIL OF LEFT FOOT WITH INFECTION: ICD-10-CM

## 2018-11-19 DIAGNOSIS — Z94.1 HEART TRANSPLANTED: ICD-10-CM

## 2018-11-19 DIAGNOSIS — L60.0 INGROWN NAIL: Primary | ICD-10-CM

## 2018-11-19 PROBLEM — L02.91 ABSCESS: Status: RESOLVED | Noted: 2018-10-01 | Resolved: 2018-11-19

## 2018-11-19 PROBLEM — D70.9 NEUTROPENIA, UNSPECIFIED: Status: RESOLVED | Noted: 2018-09-24 | Resolved: 2018-11-19

## 2018-11-19 PROBLEM — D70.9 NEUTROPENIA: Status: RESOLVED | Noted: 2018-09-24 | Resolved: 2018-11-19

## 2018-11-19 PROBLEM — S31.109A OPEN WOUND OF ABDOMEN: Status: RESOLVED | Noted: 2018-04-04 | Resolved: 2018-11-19

## 2018-11-19 PROBLEM — T38.0X5D ADVERSE EFFECT OF GLUCOCORTICOIDS AND SYNTHETIC ANALOGUES, SUBSEQUENT ENCOUNTER: Status: RESOLVED | Noted: 2018-02-19 | Resolved: 2018-11-19

## 2018-11-19 PROBLEM — R73.9 ACUTE HYPERGLYCEMIA: Status: RESOLVED | Noted: 2017-02-01 | Resolved: 2018-11-19

## 2018-11-19 LAB
ASCENDING AORTA: 3.04 CM
AV MEAN GRADIENT: 2.29 MMHG
AV PEAK GRADIENT: 3.53 MMHG
AV VALVE AREA: 3.41 CM2
BSA FOR ECHO PROCEDURE: 1.85 M2
CV ECHO LV RWT: 0.37 CM
DOP CALC AO PEAK VEL: 0.94 M/S
DOP CALC AO VTI: 15.52 CM
DOP CALC LVOT AREA: 4.52 CM2
DOP CALC LVOT DIAMETER: 2.4 CM
DOP CALC LVOT STROKE VOLUME: 52.95 CM3
DOP CALCLVOT PEAK VEL VTI: 11.71 CM
E WAVE DECELERATION TIME: 166.25 MSEC
E/A RATIO: 1.64
E/E' RATIO: 10.27
ECHO LV POSTERIOR WALL: 0.83 CM (ref 0.6–1.1)
FRACTIONAL SHORTENING: 28 % (ref 28–44)
INTERVENTRICULAR SEPTUM: 0.78 CM (ref 0.6–1.1)
LEFT INTERNAL DIMENSION IN SYSTOLE: 3.18 CM (ref 2.1–4)
LEFT VENTRICLE DIASTOLIC VOLUME INDEX: 48.48 ML/M2
LEFT VENTRICLE DIASTOLIC VOLUME: 89.69 ML
LEFT VENTRICLE MASS INDEX: 60.6 G/M2
LEFT VENTRICLE SYSTOLIC VOLUME INDEX: 21.9 ML/M2
LEFT VENTRICLE SYSTOLIC VOLUME: 40.45 ML
LEFT VENTRICULAR INTERNAL DIMENSION IN DIASTOLE: 4.44 CM (ref 3.5–6)
LEFT VENTRICULAR MASS: 112.02 G
LV LATERAL E/E' RATIO: 9.63
LV SEPTAL E/E' RATIO: 11
MV PEAK A VEL: 0.47 M/S
MV PEAK E VEL: 0.77 M/S
PV PEAK D VEL: 0.59 M/S
RA PRESSURE: 3 MMHG
RETIRED EF AND QEF - SEE NOTES: 51 %
RIGHT VENTRICULAR END-DIASTOLIC DIMENSION: 3.15 CM
RV TISSUE DOPPLER FREE WALL SYSTOLIC VELOCITY 1 (APICAL 4 CHAMBER VIEW): 4.51 M/S
SINUS: 3.15 CM
STJ: 3.14 CM
TDI LATERAL: 0.08
TDI SEPTAL: 0.07
TDI: 0.08
TRICUSPID ANNULAR PLANE SYSTOLIC EXCURSION: 0.82 CM

## 2018-11-19 PROCEDURE — 99999 PR PBB SHADOW E&M-EST. PATIENT-LVL III: CPT | Mod: PBBFAC,,, | Performed by: PODIATRIST

## 2018-11-19 PROCEDURE — 11750 EXCISION NAIL&NAIL MATRIX: CPT | Mod: TA,S$GLB,, | Performed by: PODIATRIST

## 2018-11-19 PROCEDURE — 93306 TTE W/DOPPLER COMPLETE: CPT | Mod: S$GLB,,, | Performed by: INTERNAL MEDICINE

## 2018-11-19 PROCEDURE — 99999 PR PBB SHADOW E&M-EST. PATIENT-LVL IV: CPT | Mod: PBBFAC,,, | Performed by: INTERNAL MEDICINE

## 2018-11-19 PROCEDURE — 99203 OFFICE O/P NEW LOW 30 MIN: CPT | Mod: 25,S$GLB,, | Performed by: PODIATRIST

## 2018-11-19 PROCEDURE — 99214 OFFICE O/P EST MOD 30 MIN: CPT | Mod: S$GLB,,, | Performed by: INTERNAL MEDICINE

## 2018-11-19 RX ORDER — MYCOPHENOLATE MOFETIL 250 MG/1
1000 CAPSULE ORAL 2 TIMES DAILY
Qty: 240 CAPSULE | Refills: 11 | Status: SHIPPED | OUTPATIENT
Start: 2018-11-19 | End: 2019-11-05 | Stop reason: SDUPTHER

## 2018-11-19 NOTE — LETTER
November 27, 2018      Hernán Tidwell Jr., MD  [address]  [facility] [address]           [facility]  [address]  [facility] [address]  Phone: [phone]          Patient: Mert Samayoa   MR Number: [id number]   YOB: 1990   Date of Visit: 11/19/2018       Dear Dr. Hernán Tidwell Jr.:    Thank you for referring Mert Samayoa to me for evaluation. Attached you will find relevant portions of my assessment and plan of care.    If you have questions, please do not hesitate to call me. I look forward to following Mert Samayoa along with you.    Sincerely,    Patricio Rutledge, MAIA    Enclosure  CC:  No Recipients    If you would like to receive this communication electronically, please contact [email or web address] or [phone] to request more information on [employer] Link access.    For providers and/or their staff who would like to refer a patient to [facility], please contact us through our one-stop-shop provider referral line, [facility], at [phone].    If you feel you have received this communication in error or would no longer like to receive these types of communications, please e-mail [email or web address]

## 2018-11-19 NOTE — LETTER
November 19, 2018        Guillermo Alejo  [address]  [facility] [address]  Phone: [phone]  Fax: [phone]             [facility]  [address]  [facility] [address]  Phone: [phone]   Patient: Mert Samayoa   MR Number: [id number]   YOB: 1990   Date of Visit: 11/19/2018       Dear Dr. Guillermo Alejo    Thank you for referring Mert Samayoa to me for evaluation. Attached you will find relevant portions of my assessment and plan of care.    If you have questions, please do not hesitate to call me. I look forward to following Mert Samayoa along with you.    Sincerely,    Hernán Tidwell Jr, MD    Enclosure    If you would like to receive this communication electronically, please contact [email or web address] or [phone] to request [employer] Link access.    [employer] Link is a tool which provides read-only access to select patient information with whom you have a relationship. Its easy to use and provides real time access to review your patients record including encounter summaries, notes, results, and demographic information.    If you feel you have received this communication in error or would no longer like to receive these types of communications, please e-mail [email or web address]

## 2018-11-19 NOTE — TELEPHONE ENCOUNTER
----- Message from Susan Carmen sent at 11/19/2018 10:09 AM CST -----  Contact: patient  Please call pt at [phone] immediately    Patient has a infected left toe that needs an appt today.  Referred by Dr Tidwell/transplant pt    Thank you

## 2018-11-19 NOTE — PROGRESS NOTES
"Subjective:   Mr. Samayoa is a 28 y.o. year old White male who received a  - brain death heart transplant on 18.      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  Most recently neutropenia that required medication adjustments.  Now on cellcept 750 mg BID, tacro 1/2, prednisone 5 mg qd.   He has no active CV symptoms except palpitations.-forceful beats, "like what I feel when I have heart biopsy" lasting 5 seconds without associated symptoms with avg 1 spell/day.      He is working full time.      He has problems with in grown toenail left foot chronically but now draining clear discharge.    Review of Systems   Constitution: Positive for weight gain (7# past month). Negative for chills, fever, weakness, malaise/fatigue, night sweats and weight loss.   Cardiovascular: Positive for palpitations. Negative for chest pain, dyspnea on exertion, irregular heartbeat, leg swelling, near-syncope, orthopnea, paroxysmal nocturnal dyspnea and syncope.   Respiratory: Negative for cough, sputum production and wheezing.    Hematologic/Lymphatic: Does not bruise/bleed easily.   Musculoskeletal: Negative for arthritis, joint pain and stiffness.        In grown toenail first digit right foot with clear drainage   Gastrointestinal: Negative for abdominal pain, change in bowel habit, constipation, diarrhea, hematochezia and melena.   Genitourinary: Negative for hematuria.   Neurological: Negative for brief paralysis, focal weakness and seizures.     Objective:   Blood pressure 117/75, pulse 95, height 5' 7" (1.702 m), weight 76.9 kg (169 lb 8.5 oz).body mass index is 26.55 kg/m².  Physical Exam   Constitutional: He is oriented to person, place, and time. He appears well-developed and well-nourished. No distress.   /75 (BP Location: Right arm, Patient Position: Sitting, BP Method: Medium " "(Automatic))   Pulse 95   Ht 5' 7" (1.702 m)   Wt 76.9 kg (169 lb 8.5 oz)   BMI 26.55 kg/m²      HENT:   Head: Normocephalic and atraumatic.   Mouth/Throat: Oropharynx is clear and moist. No oropharyngeal exudate.   Eyes: Conjunctivae are normal. Right eye exhibits no discharge. Left eye exhibits no discharge. No scleral icterus.   Neck: No JVD present. No thyromegaly present.   Cardiovascular: Normal rate, regular rhythm and normal heart sounds. Exam reveals no gallop.   No murmur heard.  Pulmonary/Chest: Effort normal and breath sounds normal.   Abdominal: Soft. Bowel sounds are normal. He exhibits no distension and no mass. There is no tenderness. There is no rebound and no guarding.   Musculoskeletal: He exhibits no edema or tenderness.   In grown toenail with clear drainage right great toe   Lymphadenopathy:     He has no cervical adenopathy (also no axillary or supraclavicular nodes).   Neurological: He is alert and oriented to person, place, and time.   Skin: Skin is warm and dry. He is not diaphoretic.   Psychiatric: He has a normal mood and affect. His behavior is normal. Judgment and thought content normal.     Lab Results   Component Value Date    BNP 72 11/19/2018     11/19/2018    K 3.9 11/19/2018    MG 1.9 11/19/2018     11/19/2018    CO2 22 (L) 11/19/2018    BUN 14 11/19/2018    CREATININE 0.9 11/19/2018    GLU 87 11/19/2018    AST 25 11/19/2018    ALT 33 11/19/2018    ALBUMIN 4.2 11/19/2018    PROT 7.0 11/19/2018    BILITOT 0.5 11/19/2018    WBC 7.62 11/19/2018    HGB 14.8 11/19/2018    HCT 47.2 11/19/2018     11/19/2018    CHOL 108 (L) 11/19/2018    HDL 41 11/19/2018    LDLCALC 43.6 (L) 11/19/2018    TRIG 117 11/19/2018    TACROLIMUS 8.1 11/19/2018     ECHO today with normal ejection fraction (abnormal septal motion consistent with prior open heart surgery) est LVEF 65%.  I disagree with read of Oct 2018 EF as it too 65%    Assessment:     1. Heart transplanted    2. Ingrown " toenail of left foot with infection    3. Long term current use of immunosuppressive drug    4. Palpitations    5. Immunosuppression    6. Essential hypertension    7. Hyperlipidemia LDL goal <70    8. Status post heart transplant        Plan:   Increase cellcept to 1000 mg BID  Podiatry consult today to remove in grown toenail  Looping event monitor  If Allomap OK and able to tolerate this dose of cellcept would try to wean prednisone by 1 mg/month monitoring Allomap    Return instructions as set forth by post transplant schedule or as needed:    Clinic: Return for labs and/or biopsy weekly the first month, every two weeks during month 2 and then monthly for the first year at the provider or coordinator's discretion. During the second year, return to clinic every 3 months. Post transplant year 3-5 return every 6 months. There will be a comprehensive post transplant evaluation every year that may include LHC/RHC/biopsy, stress test, echo, CXR, and other health screening exams.    In addition to the clinical assessment, I have ordered Allomap testing for this patient to assist in identification of moderate/severe acute cellular rejection (ACR) in a pt with stable Allograft function instead of endomyocardial biopsy.     Patient is reminded to call with any health changes as these can be early signs of transplant complications. Patient is advised to make sure any new medications or changes of old medications are discussed with a pharmacist or physician knowledgeable with transplant to avoid rejection/drug toxicity related to significant drug interactions.    UNOS Patient Status  Functional Status: 100% - Normal, no complaints, no evidence of disease  Physical Capacity: No Limitations  Working for Income: yes  If yes, working activity level: Working Full Time    Hernán Tidwell Jr, MD

## 2018-11-27 NOTE — PROGRESS NOTES
Subjective:      Patient ID: Mert Samayoa is a 28 y.o. male.    Chief Complaint: Foot Problem    Mert Cantu is a 28 y.o. male who presents to the clinic complaining of painful ingrown toenail on the left foot.    Review of Systems   Constitution: Negative for chills, fever and malaise/fatigue.   HENT: Negative for hearing loss.    Cardiovascular: Negative for claudication.   Respiratory: Negative for shortness of breath.    Skin: Positive for nail changes. Negative for flushing and rash.   Musculoskeletal: Negative for joint pain and myalgias.   Neurological: Negative for loss of balance, numbness, paresthesias and sensory change.   Psychiatric/Behavioral: Negative for altered mental status.           Objective:      Physical Exam   Constitutional: He is oriented to person, place, and time. He appears well-developed and well-nourished.   Cardiovascular:   Pulses:       Dorsalis pedis pulses are 2+ on the right side, and 2+ on the left side.        Posterior tibial pulses are 2+ on the right side, and 2+ on the left side.   no edema noted to b/L LEs   Musculoskeletal:        Right knee: He exhibits no swelling and no ecchymosis.        Left knee: He exhibits no swelling and no ecchymosis.        Right ankle: He exhibits normal range of motion, no swelling, no ecchymosis and normal pulse. No lateral malleolus, no medial malleolus and no head of 5th metatarsal tenderness found. Achilles tendon exhibits no pain, no defect and normal Sung's test results.        Left ankle: He exhibits normal range of motion, no swelling, no ecchymosis and normal pulse. No lateral malleolus, no medial malleolus and no head of 5th metatarsal tenderness found. Achilles tendon exhibits no pain and normal Sung's test results.        Right lower leg: He exhibits no tenderness, no bony tenderness, no swelling, no edema and no deformity.        Left lower leg: He exhibits no tenderness, no swelling and no edema.        Right foot:  There is normal range of motion and no deformity.        Left foot: There is normal range of motion and no deformity.   Adequate joint ROM noted to all lower extremity muscle groups with no pain or crepitation noted. Muscle strength is 5/5 in all groups bilaterally.     Feet:   Right Foot:   Protective Sensation: 5 sites tested. 5 sites sensed.   Left Foot:   Protective Sensation: 5 sites tested. 5 sites sensed.   Neurological: He is alert and oriented to person, place, and time.   Gross sensation intact to b/L lower extremities   Skin: Skin is warm. Capillary refill takes more than 3 seconds. No abrasion, no bruising, no burn and no ecchymosis noted.   Left hallux medial and lateral borders ingrown with erythema, increased temp and scant amount of drainage. Painful to palpaion.      Psychiatric: He has a normal mood and affect. His speech is normal and behavior is normal. He is attentive.             Assessment:       Encounter Diagnosis   Name Primary?    Ingrown nail - Left Foot Yes         Plan:       Mert Cantu was seen today for foot problem.    Diagnoses and all orders for this visit:    Ingrown nail - Left Foot      I counseled the patient on his conditions, their implications and medical management.      Nail Removal  Date/Time: 11/19/2018 7:10 AM  Performed by: Patricio Rutledge DPM  Authorized by: Patricio Rutledge DPM     Consent Done?:  Yes (Written)    Location:  Left foot  Location detail:  Left big toe  Anesthesia:  Digital block  Local anesthetic: lidocaine 1% without epinephrine  Preparation:  Skin prepped with Betadine    Amount removed:  Complete  Wedge excision of skin of nail fold: No    Nail bed sutured?: No    Nail matrix removed:  Complete  Removed nail replaced and anchored: No    Dressing applied:  Dressing applied     Home instructions given to pt on AVS  RTC in 2 weeks or sooner if any new pedal problems should arise or if condition worsens.           .

## 2018-11-28 ENCOUNTER — LAB VISIT (OUTPATIENT)
Dept: LAB | Facility: HOSPITAL | Age: 28
End: 2018-11-28
Attending: INTERNAL MEDICINE
Payer: COMMERCIAL

## 2018-11-28 DIAGNOSIS — Z94.1 STATUS POST HEART TRANSPLANT: ICD-10-CM

## 2018-11-28 LAB
ALBUMIN SERPL BCP-MCNC: 4.7 G/DL
ALP SERPL-CCNC: 104 U/L
ALT SERPL W/O P-5'-P-CCNC: 36 U/L
ANION GAP SERPL CALC-SCNC: 12 MMOL/L
AST SERPL-CCNC: 24 U/L
BASOPHILS # BLD AUTO: 0.02 K/UL
BASOPHILS NFR BLD: 0.2 %
BILIRUB SERPL-MCNC: 1.1 MG/DL
BUN SERPL-MCNC: 13 MG/DL
CALCIUM SERPL-MCNC: 9.5 MG/DL
CHLORIDE SERPL-SCNC: 107 MMOL/L
CO2 SERPL-SCNC: 23 MMOL/L
CREAT SERPL-MCNC: 1.2 MG/DL
DIFFERENTIAL METHOD: ABNORMAL
EOSINOPHIL # BLD AUTO: 0.1 K/UL
EOSINOPHIL NFR BLD: 1.5 %
ERYTHROCYTE [DISTWIDTH] IN BLOOD BY AUTOMATED COUNT: 16.6 %
EST. GFR  (AFRICAN AMERICAN): >60 ML/MIN/1.73 M^2
EST. GFR  (NON AFRICAN AMERICAN): >60 ML/MIN/1.73 M^2
GLUCOSE SERPL-MCNC: 84 MG/DL
HCT VFR BLD AUTO: 46.9 %
HGB BLD-MCNC: 15.4 G/DL
LYMPHOCYTES # BLD AUTO: 2.1 K/UL
LYMPHOCYTES NFR BLD: 21.8 %
MCH RBC QN AUTO: 27.2 PG
MCHC RBC AUTO-ENTMCNC: 32.8 G/DL
MCV RBC AUTO: 83 FL
MONOCYTES # BLD AUTO: 1 K/UL
MONOCYTES NFR BLD: 10.3 %
NEUTROPHILS # BLD AUTO: 6.3 K/UL
NEUTROPHILS NFR BLD: 66.2 %
PLATELET # BLD AUTO: 224 K/UL
PMV BLD AUTO: 9.7 FL
POTASSIUM SERPL-SCNC: 4.1 MMOL/L
PROT SERPL-MCNC: 7.4 G/DL
RBC # BLD AUTO: 5.67 M/UL
SODIUM SERPL-SCNC: 142 MMOL/L
TACROLIMUS BLD-MCNC: 9.6 NG/ML
WBC # BLD AUTO: 9.56 K/UL

## 2018-11-28 PROCEDURE — 36415 COLL VENOUS BLD VENIPUNCTURE: CPT

## 2018-11-28 PROCEDURE — 85025 COMPLETE CBC W/AUTO DIFF WBC: CPT

## 2018-11-28 PROCEDURE — 80197 ASSAY OF TACROLIMUS: CPT

## 2018-11-28 PROCEDURE — 80053 COMPREHEN METABOLIC PANEL: CPT

## 2018-12-10 ENCOUNTER — TELEPHONE (OUTPATIENT)
Dept: ELECTROPHYSIOLOGY | Facility: CLINIC | Age: 28
End: 2018-12-10

## 2018-12-10 NOTE — TELEPHONE ENCOUNTER
Called pt back, instructed [facility] to [phone] to have additional electrodes shipped overnight to mailing address. Pt verbalized understanding.

## 2018-12-10 NOTE — TELEPHONE ENCOUNTER
----- Message from Mandy Jean Baptiste sent at 12/10/2018 11:29 AM CST -----  Contact: Patient  The Pt is calling to get more pads for his Holter. Please call him back @ [phone]. Thanks, Mandy

## 2018-12-13 DIAGNOSIS — Z94.1 STATUS POST HEART TRANSPLANT: Primary | ICD-10-CM

## 2018-12-18 ENCOUNTER — OFFICE VISIT (OUTPATIENT)
Dept: TRANSPLANT | Facility: CLINIC | Age: 28
End: 2018-12-18
Payer: COMMERCIAL

## 2018-12-18 ENCOUNTER — HOSPITAL ENCOUNTER (OUTPATIENT)
Dept: CARDIOLOGY | Facility: CLINIC | Age: 28
Discharge: HOME | End: 2018-12-18
Attending: INTERNAL MEDICINE
Payer: COMMERCIAL

## 2018-12-18 VITALS
WEIGHT: 165 LBS | SYSTOLIC BLOOD PRESSURE: 120 MMHG | HEIGHT: 67 IN | BODY MASS INDEX: 25.9 KG/M2 | HEART RATE: 94 BPM | DIASTOLIC BLOOD PRESSURE: 86 MMHG

## 2018-12-18 VITALS
HEIGHT: 67 IN | BODY MASS INDEX: 26.96 KG/M2 | DIASTOLIC BLOOD PRESSURE: 72 MMHG | HEART RATE: 95 BPM | WEIGHT: 171.75 LBS | SYSTOLIC BLOOD PRESSURE: 116 MMHG

## 2018-12-18 DIAGNOSIS — Z94.1 STATUS POST HEART TRANSPLANT: ICD-10-CM

## 2018-12-18 DIAGNOSIS — I10 ESSENTIAL HYPERTENSION: ICD-10-CM

## 2018-12-18 DIAGNOSIS — Z79.899 LONG TERM CURRENT USE OF IMMUNOSUPPRESSIVE DRUG: ICD-10-CM

## 2018-12-18 DIAGNOSIS — Z94.1 HEART TRANSPLANTED: Primary | ICD-10-CM

## 2018-12-18 DIAGNOSIS — E78.5 HYPERLIPIDEMIA LDL GOAL <70: ICD-10-CM

## 2018-12-18 PROBLEM — D84.9 IMMUNOSUPPRESSION: Status: RESOLVED | Noted: 2018-02-20 | Resolved: 2018-12-18

## 2018-12-18 LAB
ASCENDING AORTA: 2.87 CM
AV INDEX (PROSTH): 0.77
AV MEAN GRADIENT: 1.77 MMHG
AV PEAK GRADIENT: 3.39 MMHG
AV VALVE AREA: 2.8 CM2
BSA FOR ECHO PROCEDURE: 1.88 M2
CV ECHO LV RWT: 0.47 CM
DOP CALC AO PEAK VEL: 0.92 M/S
DOP CALC AO VTI: 15.59 CM
DOP CALC LVOT AREA: 3.63 CM2
DOP CALC LVOT DIAMETER: 2.15 CM
DOP CALC LVOT STROKE VOLUME: 43.62 CM3
DOP CALCLVOT PEAK VEL VTI: 12.02 CM
E WAVE DECELERATION TIME: 168.45 MSEC
E/A RATIO: 1.38
E/E' RATIO: 6
ECHO LV POSTERIOR WALL: 0.96 CM (ref 0.6–1.1)
FRACTIONAL SHORTENING: 27 % (ref 28–44)
INTERVENTRICULAR SEPTUM: 0.96 CM (ref 0.6–1.1)
LEFT INTERNAL DIMENSION IN SYSTOLE: 2.97 CM (ref 2.1–4)
LEFT VENTRICLE DIASTOLIC VOLUME INDEX: 38.8 ML/M2
LEFT VENTRICLE DIASTOLIC VOLUME: 72.29 ML
LEFT VENTRICLE MASS INDEX: 65.7 G/M2
LEFT VENTRICLE SYSTOLIC VOLUME INDEX: 18.4 ML/M2
LEFT VENTRICLE SYSTOLIC VOLUME: 34.23 ML
LEFT VENTRICULAR INTERNAL DIMENSION IN DIASTOLE: 4.05 CM (ref 3.5–6)
LEFT VENTRICULAR MASS: 122.36 G
LV LATERAL E/E' RATIO: 6.38
LV SEPTAL E/E' RATIO: 5.67
MV PEAK A VEL: 0.37 M/S
MV PEAK E VEL: 0.51 M/S
PULM VEIN S/D RATIO: 0.46
PV PEAK D VEL: 0.61 M/S
PV PEAK S VEL: 0.28 M/S
RA PRESSURE: 3 MMHG
RETIRED EF AND QEF - SEE NOTES: 51 %
RIGHT VENTRICULAR END-DIASTOLIC DIMENSION: 2.91 CM
SINUS: 3.25 CM
STJ: 2.82 CM
TDI LATERAL: 0.08
TDI SEPTAL: 0.09
TDI: 0.09
TRICUSPID ANNULAR PLANE SYSTOLIC EXCURSION: 1.02 CM

## 2018-12-18 PROCEDURE — 93306 TTE W/DOPPLER COMPLETE: CPT | Mod: S$GLB,,, | Performed by: INTERNAL MEDICINE

## 2018-12-18 PROCEDURE — 99214 OFFICE O/P EST MOD 30 MIN: CPT | Mod: S$GLB,,, | Performed by: INTERNAL MEDICINE

## 2018-12-18 PROCEDURE — 99999 PR PBB SHADOW E&M-EST. PATIENT-LVL III: CPT | Mod: PBBFAC,,, | Performed by: INTERNAL MEDICINE

## 2018-12-18 NOTE — PROGRESS NOTES
Subjective:   Mr. Samayoa is a 28 y.o. year old White male who received a  - brain death heart transplant on 18.      CMV status:   Donor: +  Recipient: -    HPI   29 yo WM prior to heart transplant suffered with familial cardiomyopathy underwent orthotopic heart transplant 18.  Most recently neutropenia that required medication adjustments (rapa stopped).  Now on cellcept 1000 mg BID, tacro 1/2, prednisone 5 mg qd.   He has no active CV symptoms except palpitations and is currently wearing holter for this- had another episode this am but says it doesn't make him feel bad, he's just aware of it- and says he turns in the monitor tomorrow.    He is working full time and going to school. Feeling good-   No swelling- wt essentially stable since lat visit- he says on his scale he has not gained wt this past month where the previous month he did as he now has an office job. Had not been exercising  Toenail has healed.    TTE today  · Post cardiac transplantation study.  · Low normal left ventricular systolic function. The estimated ejection fraction is 50%  · Normal LV diastolic function.  · Normal right ventricular systolic function.  · Normal central venous pressure (3 mm Hg).  · Mild global hypokinetic wall motion.         Review of Systems   Constitution: Negative for chills, fever, weakness, malaise/fatigue, night sweats, weight gain and weight loss.   Cardiovascular: Positive for palpitations. Negative for chest pain, dyspnea on exertion, irregular heartbeat, leg swelling, near-syncope, orthopnea, paroxysmal nocturnal dyspnea and syncope.   Respiratory: Negative for cough, sputum production and wheezing.    Hematologic/Lymphatic: Does not bruise/bleed easily.   Musculoskeletal: Negative for arthritis, joint pain and stiffness.   Gastrointestinal: Negative for abdominal pain, change in bowel habit, constipation, diarrhea, hematochezia and melena.   Genitourinary: Negative for hematuria.  "  Neurological: Negative for brief paralysis, focal weakness and seizures.     Objective:   Blood pressure 116/72, pulse 95, height 5' 7" (1.702 m), weight 77.9 kg (171 lb 11.8 oz).body mass index is 26.9 kg/m².  Physical Exam   Constitutional: He is oriented to person, place, and time. He appears well-developed and well-nourished. No distress.         HENT:   Head: Normocephalic and atraumatic.   Mouth/Throat: Oropharynx is clear and moist. No oropharyngeal exudate.   Eyes: Conjunctivae are normal. Right eye exhibits no discharge. Left eye exhibits no discharge. No scleral icterus.   Neck: No JVD present. No thyromegaly present.   Cardiovascular: Normal rate, regular rhythm and normal heart sounds. Exam reveals no gallop.   No murmur heard.  Pulmonary/Chest: Effort normal and breath sounds normal.   Abdominal: Soft. Bowel sounds are normal. He exhibits no distension and no mass. There is no tenderness. There is no rebound and no guarding.   Musculoskeletal: He exhibits no edema or tenderness.   Lymphadenopathy:     He has no cervical adenopathy.   Neurological: He is alert and oriented to person, place, and time.   Skin: Skin is warm and dry. He is not diaphoretic.   Psychiatric: He has a normal mood and affect. His behavior is normal. Judgment and thought content normal.     Lab Results   Component Value Date    BNP 72 11/19/2018     11/19/2018    K 3.9 11/19/2018    MG 1.9 11/19/2018     11/19/2018    CO2 22 (L) 11/19/2018    BUN 14 11/19/2018    CREATININE 0.9 11/19/2018    GLU 87 11/19/2018    AST 25 11/19/2018    ALT 33 11/19/2018    ALBUMIN 4.2 11/19/2018    PROT 7.0 11/19/2018    BILITOT 0.5 11/19/2018    WBC 7.62 11/19/2018    HGB 14.8 11/19/2018    HCT 47.2 11/19/2018     11/19/2018    CHOL 108 (L) 11/19/2018    HDL 41 11/19/2018    LDLCALC 43.6 (L) 11/19/2018    TRIG 117 11/19/2018    TACROLIMUS 8.1 11/19/2018     ECHO today with normal ejection fraction (abnormal septal motion " consistent with prior open heart surgery) est LVEF 65%.  I disagree with read of Oct 2018 EF as it too 65%    Assessment:     1. Heart transplanted- I viewed echo images and compared to last month- they look about the same- EF truly is borderline low-normal around 50%  He is euvolemic on exam, BNP low, FC I   2. Essential hypertension    3. Long term current use of immunosuppressive drug    4. Hyperlipidemia LDL goal <70        Plan:   F/u Looping event monitor  F/u pending tacro level and allomap  Anticipate we will wean pred at his 1 year anniversary    Needs a flu shot (hesitant to get it for some reason but we talked about how sick he would get if he got the flu and would likely require admission)    Return instructions as set forth by post transplant schedule or as needed:    Clinic: Return for labs and/or biopsy weekly the first month, every two weeks during month 2 and then monthly for the first year at the provider or coordinator's discretion. During the second year, return to clinic every 3 months. Post transplant year 3-5 return every 6 months. There will be a comprehensive post transplant evaluation every year that may include LHC/RHC/biopsy, stress test, echo, CXR, and other health screening exams.    In addition to the clinical assessment, I have ordered Allomap testing for this patient to assist in identification of moderate/severe acute cellular rejection (ACR) in a pt with stable Allograft function instead of endomyocardial biopsy.     Patient is reminded to call with any health changes as these can be early signs of transplant complications. Patient is advised to make sure any new medications or changes of old medications are discussed with a pharmacist or physician knowledgeable with transplant to avoid rejection/drug toxicity related to significant drug interactions.    UNOS Patient Status  Functional Status: 100% - Normal, no complaints, no evidence of disease  Physical Capacity: No  Limitations  Working for Income: yes  If yes, working activity level: Working Full Time    Marisel Lundberg MD     F/u 1 mo

## 2018-12-18 NOTE — LETTER
December 18, 2018        Guillermo Alejo  [address]  [facility] [address]  Phone: [phone]  Fax: [phone]             [facility]  [address]  [facility] [address]  Phone: [phone]   Patient: Mert Samayoa   MR Number: [id number]   YOB: 1990   Date of Visit: 12/18/2018       Dear Dr. Guillermo Alejo    Thank you for referring Mert Samayoa to me for evaluation. Attached you will find relevant portions of my assessment and plan of care.    If you have questions, please do not hesitate to call me. I look forward to following Mert Samayoa along with you.    Sincerely,    Marisel Lundberg MD    Enclosure    If you would like to receive this communication electronically, please contact [email or web address] or [phone] to request [employer] access.    [employer] is a tool which provides read-only access to select patient information with whom you have a relationship. Its easy to use and provides real time access to review your patients record including encounter summaries, notes, results, and demographic information.    If you feel you have received this communication in error or would no longer like to receive these types of communications, please e-mail [email or web address]

## 2018-12-20 ENCOUNTER — CONFERENCE (OUTPATIENT)
Dept: TRANSPLANT | Facility: CLINIC | Age: 28
End: 2018-12-20

## 2018-12-20 NOTE — TELEPHONE ENCOUNTER
Patient presented for 10 month post-transplant visit.     Echo with stable LVEF.     Allomap 32 (usual range), BNP 48.     Patient is on prednisone 5mg po daily, keep pred at 5 for now.     Follow up on 30 day monitor for ectopy, may need biopsy if there is evidence of arrhythmias.     Please repeat HLA at 3 months (at time of his annual).

## 2019-01-17 ENCOUNTER — TELEPHONE (OUTPATIENT)
Dept: TRANSPLANT | Facility: CLINIC | Age: 29
End: 2019-01-17

## 2019-01-17 DIAGNOSIS — K21.9 GASTROESOPHAGEAL REFLUX DISEASE, ESOPHAGITIS PRESENCE NOT SPECIFIED: ICD-10-CM

## 2019-01-17 DIAGNOSIS — Z94.1 STATUS POST HEART TRANSPLANT: ICD-10-CM

## 2019-01-17 DIAGNOSIS — E78.5 HYPERLIPIDEMIA, UNSPECIFIED HYPERLIPIDEMIA TYPE: Primary | ICD-10-CM

## 2019-01-17 NOTE — TELEPHONE ENCOUNTER
Called pt and told him he did not need to come tomorrow for appts since his annual is Feb 1 (in two weeks). Dr. Hawkins had approved this. He asked if we could send in refills for Atorvastatin, Famotidine and Prednisone.

## 2019-01-18 RX ORDER — PREDNISONE 5 MG/1
5 TABLET ORAL DAILY
Qty: 30 TABLET | Refills: 3 | Status: SHIPPED | OUTPATIENT
Start: 2019-01-18 | End: 2019-03-21

## 2019-01-18 RX ORDER — FAMOTIDINE 40 MG/1
40 TABLET, FILM COATED ORAL NIGHTLY
Qty: 30 TABLET | Refills: 11 | Status: SHIPPED | OUTPATIENT
Start: 2019-01-18 | End: 2020-01-28

## 2019-01-18 RX ORDER — ATORVASTATIN CALCIUM 20 MG/1
20 TABLET, FILM COATED ORAL DAILY
Qty: 90 TABLET | Refills: 3 | Status: SHIPPED | OUTPATIENT
Start: 2019-01-18 | End: 2020-02-27

## 2019-02-01 ENCOUNTER — INITIAL CONSULT (OUTPATIENT)
Dept: CARDIOLOGY | Facility: CLINIC | Age: 29
End: 2019-02-01
Payer: COMMERCIAL

## 2019-02-01 ENCOUNTER — TELEPHONE (OUTPATIENT)
Dept: TRANSPLANT | Facility: CLINIC | Age: 29
End: 2019-02-01

## 2019-02-01 ENCOUNTER — HOSPITAL ENCOUNTER (OUTPATIENT)
Dept: CARDIOLOGY | Facility: CLINIC | Age: 29
Discharge: HOME | End: 2019-02-01
Payer: COMMERCIAL

## 2019-02-01 ENCOUNTER — HOSPITAL ENCOUNTER (OUTPATIENT)
Dept: CARDIOLOGY | Facility: CLINIC | Age: 29
Discharge: HOME | End: 2019-02-01
Attending: INTERNAL MEDICINE
Payer: COMMERCIAL

## 2019-02-01 ENCOUNTER — EDUCATION (OUTPATIENT)
Dept: CARDIOLOGY | Facility: CLINIC | Age: 29
End: 2019-02-01

## 2019-02-01 ENCOUNTER — HOSPITAL ENCOUNTER (OUTPATIENT)
Dept: RADIOLOGY | Facility: CLINIC | Age: 29
Discharge: HOME | End: 2019-02-01
Attending: INTERNAL MEDICINE
Payer: COMMERCIAL

## 2019-02-01 ENCOUNTER — OFFICE VISIT (OUTPATIENT)
Dept: TRANSPLANT | Facility: CLINIC | Age: 29
End: 2019-02-01
Payer: COMMERCIAL

## 2019-02-01 ENCOUNTER — HOSPITAL ENCOUNTER (OUTPATIENT)
Dept: RADIOLOGY | Facility: HOSPITAL | Age: 29
Discharge: HOME | End: 2019-02-01
Attending: INTERNAL MEDICINE
Payer: COMMERCIAL

## 2019-02-01 VITALS
BODY MASS INDEX: 26.96 KG/M2 | HEIGHT: 67 IN | WEIGHT: 171.75 LBS | HEART RATE: 97 BPM | DIASTOLIC BLOOD PRESSURE: 92 MMHG | SYSTOLIC BLOOD PRESSURE: 132 MMHG

## 2019-02-01 VITALS
HEIGHT: 67 IN | DIASTOLIC BLOOD PRESSURE: 80 MMHG | SYSTOLIC BLOOD PRESSURE: 130 MMHG | HEART RATE: 95 BPM | OXYGEN SATURATION: 99 % | BODY MASS INDEX: 27.2 KG/M2 | WEIGHT: 173.31 LBS

## 2019-02-01 VITALS
BODY MASS INDEX: 26.06 KG/M2 | HEART RATE: 88 BPM | HEIGHT: 67 IN | WEIGHT: 166 LBS | DIASTOLIC BLOOD PRESSURE: 80 MMHG | SYSTOLIC BLOOD PRESSURE: 122 MMHG

## 2019-02-01 DIAGNOSIS — Z94.1 HEART TRANSPLANTED: Primary | ICD-10-CM

## 2019-02-01 DIAGNOSIS — E78.5 HYPERLIPIDEMIA LDL GOAL <70: ICD-10-CM

## 2019-02-01 DIAGNOSIS — Z94.1 HEART TRANSPLANTED: ICD-10-CM

## 2019-02-01 DIAGNOSIS — Z94.1 STATUS POST HEART TRANSPLANT: ICD-10-CM

## 2019-02-01 DIAGNOSIS — I10 ESSENTIAL HYPERTENSION: ICD-10-CM

## 2019-02-01 DIAGNOSIS — I10 ESSENTIAL HYPERTENSION: Primary | ICD-10-CM

## 2019-02-01 DIAGNOSIS — Z79.52 LONG TERM CURRENT USE OF SYSTEMIC STEROIDS: ICD-10-CM

## 2019-02-01 DIAGNOSIS — R00.2 PALPITATIONS: ICD-10-CM

## 2019-02-01 DIAGNOSIS — Z79.899 LONG TERM CURRENT USE OF IMMUNOSUPPRESSIVE DRUG: ICD-10-CM

## 2019-02-01 LAB
ASCENDING AORTA: 2.71 CM
AV INDEX (PROSTH): 0.75
AV MEAN GRADIENT: 1.82 MMHG
AV PEAK GRADIENT: 3.69 MMHG
AV VALVE AREA: 3.18 CM2
AV VELOCITY RATIO: 0.73
BSA FOR ECHO PROCEDURE: 1.89 M2
CV ECHO LV RWT: 0.46 CM
DOP CALC AO PEAK VEL: 0.96 M/S
DOP CALC AO VTI: 15.38 CM
DOP CALC LVOT AREA: 4.26 CM2
DOP CALC LVOT DIAMETER: 2.33 CM
DOP CALC LVOT PEAK VEL: 0.7 M/S
DOP CALC LVOT STROKE VOLUME: 48.92 CM3
DOP CALCLVOT PEAK VEL VTI: 11.48 CM
E WAVE DECELERATION TIME: 218.35 MSEC
E/A RATIO: 1.49
E/E' RATIO: 8
ECHO LV POSTERIOR WALL: 0.95 CM (ref 0.6–1.1)
FRACTIONAL SHORTENING: 32 % (ref 28–44)
INTERVENTRICULAR SEPTUM: 1 CM (ref 0.6–1.1)
LEFT INTERNAL DIMENSION IN SYSTOLE: 2.8 CM (ref 2.1–4)
LEFT VENTRICLE DIASTOLIC VOLUME INDEX: 40.22 ML/M2
LEFT VENTRICLE DIASTOLIC VOLUME: 75.13 ML
LEFT VENTRICLE MASS INDEX: 68.8 G/M2
LEFT VENTRICLE SYSTOLIC VOLUME INDEX: 15.9 ML/M2
LEFT VENTRICLE SYSTOLIC VOLUME: 29.68 ML
LEFT VENTRICULAR INTERNAL DIMENSION IN DIASTOLE: 4.12 CM (ref 3.5–6)
LEFT VENTRICULAR MASS: 128.49 G
LV LATERAL E/E' RATIO: 6.5
LV SEPTAL E/E' RATIO: 10.4
MV PEAK A VEL: 0.35 M/S
MV PEAK E VEL: 0.52 M/S
PULM VEIN S/D RATIO: 0.44
PV PEAK D VEL: 0.57 M/S
PV PEAK S VEL: 0.25 M/S
RA PRESSURE: 3 MMHG
RIGHT VENTRICULAR END-DIASTOLIC DIMENSION: 1.65 CM
SINUS: 3.06 CM
STJ: 2.67 CM
TDI LATERAL: 0.08
TDI SEPTAL: 0.05
TDI: 0.07
TRICUSPID ANNULAR PLANE SYSTOLIC EXCURSION: 0.93 CM

## 2019-02-01 PROCEDURE — 93306 TTE W/DOPPLER COMPLETE: CPT | Mod: S$GLB,,, | Performed by: INTERNAL MEDICINE

## 2019-02-01 PROCEDURE — 77080 DXA BONE DENSITY AXIAL: CPT | Mod: 26,,, | Performed by: INTERNAL MEDICINE

## 2019-02-01 PROCEDURE — 99215 PR OFFICE/OUTPT VISIT, EST, LEVL V, 40-54 MIN: ICD-10-PCS | Mod: S$GLB,,, | Performed by: INTERNAL MEDICINE

## 2019-02-01 PROCEDURE — 99999 PR PBB SHADOW E&M-EST. PATIENT-LVL III: ICD-10-PCS | Mod: PBBFAC,,, | Performed by: INTERNAL MEDICINE

## 2019-02-01 PROCEDURE — 99205 OFFICE O/P NEW HI 60 MIN: CPT | Mod: S$GLB,,, | Performed by: INTERNAL MEDICINE

## 2019-02-01 PROCEDURE — 71046 XR CHEST PA AND LATERAL: ICD-10-PCS | Mod: 26,59,, | Performed by: RADIOLOGY

## 2019-02-01 PROCEDURE — 71046 X-RAY EXAM CHEST 2 VIEWS: CPT | Mod: 26,59,, | Performed by: RADIOLOGY

## 2019-02-01 PROCEDURE — 77080 DEXA BONE DENSITY SPINE HIP: ICD-10-PCS | Mod: 26,,, | Performed by: INTERNAL MEDICINE

## 2019-02-01 PROCEDURE — 99999 PR PBB SHADOW E&M-EST. PATIENT-LVL III: CPT | Mod: PBBFAC,,, | Performed by: INTERNAL MEDICINE

## 2019-02-01 PROCEDURE — 93000 EKG 12-LEAD: ICD-10-PCS | Mod: S$GLB,,, | Performed by: INTERNAL MEDICINE

## 2019-02-01 PROCEDURE — 93306 TRANSTHORACIC ECHO (TTE) COMPLETE: ICD-10-PCS | Mod: S$GLB,,, | Performed by: INTERNAL MEDICINE

## 2019-02-01 PROCEDURE — 99205 PR OFFICE/OUTPT VISIT, NEW, LEVL V, 60-74 MIN: ICD-10-PCS | Mod: S$GLB,,, | Performed by: INTERNAL MEDICINE

## 2019-02-01 PROCEDURE — 99215 OFFICE O/P EST HI 40 MIN: CPT | Mod: S$GLB,,, | Performed by: INTERNAL MEDICINE

## 2019-02-01 PROCEDURE — 77080 DXA BONE DENSITY AXIAL: CPT | Mod: TC

## 2019-02-01 PROCEDURE — 71046 X-RAY EXAM CHEST 2 VIEWS: CPT | Mod: TC,FY

## 2019-02-01 PROCEDURE — 93000 ELECTROCARDIOGRAM COMPLETE: CPT | Mod: S$GLB,,, | Performed by: INTERNAL MEDICINE

## 2019-02-01 RX ORDER — DIPHENHYDRAMINE HCL 25 MG
50 CAPSULE ORAL ONCE
Status: CANCELLED | OUTPATIENT
Start: 2019-02-01 | End: 2019-02-01

## 2019-02-01 RX ORDER — SODIUM CHLORIDE 9 MG/ML
3 INJECTION, SOLUTION INTRAVENOUS CONTINUOUS
Status: CANCELLED | OUTPATIENT
Start: 2019-02-01 | End: 2019-02-01

## 2019-02-01 NOTE — PROGRESS NOTES
Interventional Cardiology Clinic Note  Reason for Visit: Surveillance angiography  Referring: Dr. Hawkins    HPI:   29 y.o. male with a past medical history of familial NICM now s/p OHTx 2/18/18 and donor OHT PFO closure with post op course complicated by retained LVAD drive line (being managed by Dr. Klein) and leukocytosis who presents for his 2nd OHT surveillance angiogram. Last LHC was 5/17/18 which had normal coronary arteries by angiography and IVUS of LM and LAD was normal as well. He underwent RV Bx on 10/18(ISHLT 1, pAMR 0).     At this time he has no acute complaints and is feeling well. No issues with radial approach for LHC + IVUS last time.    ROS:    Constitution: Negative for fever, chills, weight loss or gain.   HENT: Negative for sore throat, rhinorrhea, or headache.  Eyes: Negative for blurred or double vision.   Cardiovascular: See above  Pulmonary: Negative for SOB   Gastrointestinal: Negative for abdominal pain, nausea, vomiting, or diarrhea.   : Negative for dysuria.   Neurological: Negative for focal weakness or sensory changes.  PMH:     Past Medical History:   Diagnosis Date    Cardiogenic shock 1/16/2017    Cardiomyopathy     Familial cardiomyopathy    CHF (congestive heart failure)     Encounter for blood transfusion     Essential hypertension 12/21/2016    Essential hypertension 3/20/2018    Familial Cardiomyopathy     Familial cardiomyopathy     Hyperlipidemia LDL goal <70 11/19/2018     Past Surgical History:   Procedure Laterality Date    BIOPSY-ECHO GUIDED N/A 4/17/2018    Performed by Sammi [facility] MD at [facility] CATH LAB    BIOPSY-ECHO GUIDED N/A 4/3/2018    Performed by Marisel Lundberg MD at [facility] CATH LAB    BIOPSY-ECHO GUIDED N/A 3/20/2018    Performed by Marisel Lundberg MD at [facility] CATH LAB    BIOPSY-ECHO GUIDED N/A 3/13/2018    Performed by Hernán Tidwell Jr., MD at [facility] CATH LAB    BIOPSY-ECHO GUIDED N/A 3/6/2018    Performed by Guillermo Daniels MD at  [facility] CATH LAB    CLOSURE-STERNAL WOUND N/A 2/2/2017    Performed by Lex Macedo MD at [facility] OR [address] [place] [address]    EXPLORATION-BLEEDING (RE-EXPLORATION) Bilateral 2/19/2018    Performed by Raj Klein MD at [facility] OR [address] [place] [address]    HEART TRANSPLANT      INSERTION-ICD-SINGLE N/A 3/29/2017    Performed by Jeremiah Trujillo MD at [facility] CATH LAB    INSERTION-INTRAAORTIC BALLOON PUMP (IABP) Left 1/30/2017    Performed by Carlos Conner MD at [facility] CATH LAB    INSERTION-LEFT VENTRICULAR ASSIST DEVICE N/A 2/1/2017    Performed by Lex Macedo MD at [facility] OR [address] [place] [address]    LEFT VENTRICULAR ASSIST DEVICE      PLACEMENT-NEOPERICARDIUM N/A 2/2/2017    Performed by Lex Macedo MD at [facility] OR [facility]    REMOVAL-Abdominal tubular structure N/A 4/4/2018    Performed by Raj Klein MD at [facility] OR [facility]    REMOVAL-AICD Right 2/18/2018    [facility]    Remove LVAD N/A 2/18/2018    Performed by Raj Klein MD at [facility] OR [address] [place] [address]    TRANSPLANT-HEART-with standard backbench preparation. N/A 2/18/2018    Performed by Raj Klein MD at [facility] OR [address] [place] [address]     Allergies:   Review of patient's allergies indicates:  No Known Allergies  Medications:     Current Outpatient Medications on File Prior to Visit   Medication Sig Dispense Refill    aspirin (ECOTRIN) 81 MG EC tablet Take 1 tablet (81 mg total) by mouth once daily. 30 tablet 11    atorvastatin (LIPITOR) 20 MG tablet Take 1 tablet (20 mg total) by mouth once daily. 90 tablet 3    calcium carbonate-vit D3-min 600 mg calcium- 200 unit Tab   5    famotidine (PEPCID) 40 MG tablet Take 1 tablet (40 mg total) by mouth every evening. 30 tablet 11    magnesium oxide (MAG-OX) 400 mg tablet Take 1 tablet (400 mg total) by mouth 2 (two) times daily. 60 tablet 11    mycophenolate (CELLCEPT) 250 mg Cap Take 4 capsules (1,000 mg total) by mouth 2 (two) times daily. 240 capsule 11    predniSONE (DELTASONE) 5 MG tablet  "Take 1 tablet (5 mg total) by mouth once daily. 30 tablet 3    sulfamethoxazole-trimethoprim 400-80mg (BACTRIM,SEPTRA) 400-80 mg per tablet Take 1 tablet by mouth once daily. 30 tablet 6    tacrolimus (PROGRAF) 1 MG Cap Take 1mg (1 capsule) by mouth every morning, and 2mg (2 capsules) orally nightly 90 capsule 3    [DISCONTINUED] calcium carbonate-vitamin D3 600 mg(1,500mg) -800 unit Tab Take 1 tablet by mouth once daily. 30 tablet 11     No current facility-administered medications on file prior to visit.      Social History:     Social History     Tobacco Use    Smoking status: Former Smoker     Packs/day: 1.00     Last attempt to quit: 2016     Years since quittin.1    Smokeless tobacco: Never Used   Substance Use Topics    Alcohol use: No     Family History:     Family History   Problem Relation Age of Onset    Arthritis Mother     Heart disease Brother         cardiomyopathy and heart transplant    No Known Problems Father     Heart disease Brother         cardiomyopathy and heart transplanted twice    Birth defects Paternal Uncle      Physical Exam:   /80 (BP Location: Right arm, Patient Position: Sitting, BP Method: Large (Automatic))   Pulse 95   Ht 5' 7" (1.702 m)   Wt 78.6 kg (173 lb 4.5 oz)   SpO2 99%   BMI 27.14 kg/m²      Constitutional: NAD, conversant  HEENT: Sclera anicteric, PERRLA, EOMI  Neck: No JVD, no carotid bruits  CV: RRR, no murmur, normal S1/S2  Pulm: CTAB, no wheezes, rales, or ronchi  GI: Abdomen soft, NTND, +BS  Extremities: No LE edema, warm and well perfused  Skin: No ecchymosis, erythema, or ulcers  Psych: AOx3, appropriate affect  Pulses: Radial BL 2+, Brachial BL 2+, Femoral BL 2+, DP/PT BL 2+.    Labs:     Lab Results   Component Value Date     2019    K 4.7 2019     2019    CO2 29 2019    BUN 16 2019    CREATININE 1.2 2019    ANIONGAP 10 2019     Lab Results   Component Value Date    HGBA1C 5.3 " 02/01/2019     Lab Results   Component Value Date    BNP 34 02/01/2019    BNP 48 12/18/2018    BNP 72 11/19/2018    Lab Results   Component Value Date    WBC 8.65 02/01/2019    HGB 15.9 02/01/2019    HCT 48.7 02/01/2019    HCT 23 (L) 02/21/2018     02/01/2019    GRAN 5.5 02/01/2019    GRAN 63.2 02/01/2019     Lab Results   Component Value Date    CHOL 108 (L) 11/19/2018    HDL 41 11/19/2018    LDLCALC 43.6 (L) 11/19/2018    TRIG 117 11/19/2018          Imaging:       No results found for: EF    Assessment/Plan   Heart transplanted  Presents for [address] [place] [address] with IVUS post OHT  R Radial access, Slender Sheath, Rigo catheter, IVUS     -The risks, benefits & alternatives of the procedure were explained to the patient.    -The risks of coronary angiography include but are not limited to:  Bleeding, infection, heart rhythm abnormalities, allergic reactions, kidney injury, stroke and death.    -Should stenting be indicated, the patient has agreed to dual anti-platelet therapy for 1-consecutive year with a drug-eluting stent and a minimum of 1-month with the use of a bare metal stent.    -The risks of moderate sedation include hypotension, respiratory depression, arrhythmias, bronchospasm, & death.    -Informed consent was obtained & the patient is agreeable to proceed with the procedure.  -This patient was discussed with the attending interventional cardiologist who agrees with the above assessment & plan.       Essential hypertension  Controlled today    Hyperlipidemia LDL goal <70  On moderate intensity statin Rx       Signed:  Zhang Spears DO  Cardiology Fellow  Pager - [phone]  2/1/2019 10:00 AM

## 2019-02-01 NOTE — LETTER
February 1, 2019      Lashon Hawkins MD  [address]  [facility] [address]           [facility]  [address]  [facility] [address]  Phone: [phone]          Patient: Mert Samayoa   MR Number: [id number]   YOB: 1990   Date of Visit: 2/1/2019       Dear Dr. Lashon Hawkins:    Thank you for referring Mert Samayoa to me for evaluation. Attached you will find relevant portions of my assessment and plan of care.    If you have questions, please do not hesitate to call me. I look forward to following Mert Samayoa along with you.    Sincerely,    Tello Correia MD    Enclosure  CC:  No Recipients    If you would like to receive this communication electronically, please contact [email or web address] or [phone] to request more information on [facility] access.    For providers and/or their staff who would like to refer a patient to [facility], please contact us through our one-stop-shop provider referral line, [facility], at [phone].    If you feel you have received this communication in error or would no longer like to receive these types of communications, please e-mail [email or web address]

## 2019-02-01 NOTE — PROGRESS NOTES
OUTPATIENT CATHETERIZATION INSTRUCTIONS    You have been scheduled for a procedure in the catheterization lab on Thursday, February 14, 2019.     Please report to the Cardiology Waiting Area on the Third floor of the hospital and check in at 7:30 AM.   You will then be taken to the SSCU (Short Stay Cardiac Unit) and prepared for your procedure. Please be aware that this is not the time of your procedure but the time you are to arrive. The procedures are scheduled on an hourly basis; however, emergency cases take precedence over all other cases.       You may not have anything to eat or drink after midnight the night before your test. You may take your regular morning medications with water. If there are any medications that you should not take you will be instructed to hold them that morning. If you are diabetic and on Metformin (Glucophage) do not take it the day before, the day of, and for 2 days after your procedure.      The procedure will take 1-2 hours to perform. After the procedure, you will return to SSCU on the third floor of the hospital. You will need to lie still (or keep your arm still) for the next 4 to 6 hours to minimize bleeding from the puncture site. Your family may remain in the room with you during this time.       You may be able to be discharged home that same afternoon if there is someone to drive you home and there were no complications. If you have one of the balloon, stent, or device procedures you may spend the night in the hospital. Your doctor will determine, based on your progress, the date and time of your discharge. The results of your procedure will be discussed with you before you are discharged. Any further testing or procedures will be scheduled for you either before you leave or you will be called with these appointments.       If you should have any questions, concerns, or need to change the date of your procedure, please call SUE Mendoza @ [phone]    Special  Instructions:          THE ABOVE INSTRUCTIONS WERE GIVEN TO THE PATIENT VERBALLY AND THEY VERBALIZED UNDERSTANDING.  THEY DO NOT REQUIRE ANY SPECIAL NEEDS AND DO NOT HAVE ANY LEARNING BARRIERS.          Directions for Reporting to Cardiology Waiting Area in the Hospital  If you park in the Parking Garage:  Take elevators to the1st floor of the parking garage.  Continue past the gift shop, coffee shop, and piano.  Take a right and go to the gold elevators. (Elevator B)  Take the elevator to the 3rd floor.  Follow the arrow on the sign on the wall that says Cath Lab Registration/EP Lab Registration.  Follow the long hallway all the way around until you come to a big open area.  This is the registration area.  Check in at Reception Desk.    OR    If family is dropping you off:  Have them drop you off at the front of the Hospital under the green overhang.  Enter through the doors and take a right.  Take the E elevators to the 3rd floor Cardiology Waiting Area.  Check in at the Reception Desk in the waiting room.

## 2019-02-01 NOTE — H&P (VIEW-ONLY)
Interventional Cardiology Clinic Note  Reason for Visit: Surveillance angiography  Referring: Dr. Hawkins    HPI:   29 y.o. male with a past medical history of familial NICM now s/p OHTx 2/18/18 and donor OHT PFO closure with post op course complicated by retained LVAD drive line (being managed by Dr. Klein) and leukocytosis who presents for his 2nd OHT surveillance angiogram. Last LHC was 5/17/18 which had normal coronary arteries by angiography and IVUS of LM and LAD was normal as well. He underwent RV Bx on 10/18(ISHLT 1, pAMR 0).     At this time he has no acute complaints and is feeling well. No issues with radial approach for LHC + IVUS last time.    ROS:    Constitution: Negative for fever, chills, weight loss or gain.   HENT: Negative for sore throat, rhinorrhea, or headache.  Eyes: Negative for blurred or double vision.   Cardiovascular: See above  Pulmonary: Negative for SOB   Gastrointestinal: Negative for abdominal pain, nausea, vomiting, or diarrhea.   : Negative for dysuria.   Neurological: Negative for focal weakness or sensory changes.  PMH:     Past Medical History:   Diagnosis Date    Cardiogenic shock 1/16/2017    Cardiomyopathy     Familial cardiomyopathy    CHF (congestive heart failure)     Encounter for blood transfusion     Essential hypertension 12/21/2016    Essential hypertension 3/20/2018    Familial Cardiomyopathy     Familial cardiomyopathy     Hyperlipidemia LDL goal <70 11/19/2018     Past Surgical History:   Procedure Laterality Date    BIOPSY-ECHO GUIDED N/A 4/17/2018    Performed by [facility] MD at [facility] CATH LAB    BIOPSY-ECHO GUIDED N/A 4/3/2018    Performed by Marisel Lundberg MD at [facility] CATH LAB    BIOPSY-ECHO GUIDED N/A 3/20/2018    Performed by [facility]isel [facility]berg MD at [facility] CATH LAB    BIOPSY-ECHO GUIDED N/A 3/13/2018    Performed by Hernán Tidwell Jr., MD at [facility] CATH LAB    BIOPSY-ECHO GUIDED N/A 3/6/2018    Performed by Guillermo Daniels MD at  [facility] CATH LAB    CLOSURE-STERNAL WOUND N/A 2/2/2017    Performed by Lex Macedo MD at [facility] OR [address] [place] [address]    EXPLORATION-BLEEDING (RE-EXPLORATION) Bilateral 2/19/2018    Performed by Raj Klein MD at [facility] OR [address] [place] [address]    HEART TRANSPLANT      INSERTION-ICD-SINGLE N/A 3/29/2017    Performed by Jeremiah Trujillo MD at [facility] CATH LAB    INSERTION-INTRAAORTIC BALLOON PUMP (IABP) Left 1/30/2017    Performed by Carlos Conner MD at [facility] CATH LAB    INSERTION-LEFT VENTRICULAR ASSIST DEVICE N/A 2/1/2017    Performed by Lex Macedo MD at [facility] OR [address] [place] [address]    LEFT VENTRICULAR ASSIST DEVICE      PLACEMENT-NEOPERICARDIUM N/A 2/2/2017    Performed by Lex Macedo MD at [facility] OR [facility]    REMOVAL-Abdominal tubular structure N/A 4/4/2018    Performed by Raj Klein MD at [facility] OR [facility]    REMOVAL-AICD Right 2/18/2018    [facility]    Remove LVAD N/A 2/18/2018    Performed by Raj Klein MD at [facility] OR [address] [place] [address]    TRANSPLANT-HEART-with standard backbench preparation. N/A 2/18/2018    Performed by Raj Klein MD at [facility] OR [address] [place] [address]     Allergies:   Review of patient's allergies indicates:  No Known Allergies  Medications:     Current Outpatient Medications on File Prior to Visit   Medication Sig Dispense Refill    aspirin (ECOTRIN) 81 MG EC tablet Take 1 tablet (81 mg total) by mouth once daily. 30 tablet 11    atorvastatin (LIPITOR) 20 MG tablet Take 1 tablet (20 mg total) by mouth once daily. 90 tablet 3    calcium carbonate-vit D3-min 600 mg calcium- 200 unit Tab   5    famotidine (PEPCID) 40 MG tablet Take 1 tablet (40 mg total) by mouth every evening. 30 tablet 11    magnesium oxide (MAG-OX) 400 mg tablet Take 1 tablet (400 mg total) by mouth 2 (two) times daily. 60 tablet 11    mycophenolate (CELLCEPT) 250 mg Cap Take 4 capsules (1,000 mg total) by mouth 2 (two) times daily. 240 capsule 11    predniSONE (DELTASONE) 5 MG tablet  "Take 1 tablet (5 mg total) by mouth once daily. 30 tablet 3    sulfamethoxazole-trimethoprim 400-80mg (BACTRIM,SEPTRA) 400-80 mg per tablet Take 1 tablet by mouth once daily. 30 tablet 6    tacrolimus (PROGRAF) 1 MG Cap Take 1mg (1 capsule) by mouth every morning, and 2mg (2 capsules) orally nightly 90 capsule 3    [DISCONTINUED] calcium carbonate-vitamin D3 600 mg(1,500mg) -800 unit Tab Take 1 tablet by mouth once daily. 30 tablet 11     No current facility-administered medications on file prior to visit.      Social History:     Social History     Tobacco Use    Smoking status: Former Smoker     Packs/day: 1.00     Last attempt to quit: 2016     Years since quittin.1    Smokeless tobacco: Never Used   Substance Use Topics    Alcohol use: No     Family History:     Family History   Problem Relation Age of Onset    Arthritis Mother     Heart disease Brother         cardiomyopathy and heart transplant    No Known Problems Father     Heart disease Brother         cardiomyopathy and heart transplanted twice    Birth defects Paternal Uncle      Physical Exam:   /80 (BP Location: Right arm, Patient Position: Sitting, BP Method: Large (Automatic))   Pulse 95   Ht 5' 7" (1.702 m)   Wt 78.6 kg (173 lb 4.5 oz)   SpO2 99%   BMI 27.14 kg/m²      Constitutional: NAD, conversant  HEENT: Sclera anicteric, PERRLA, EOMI  Neck: No JVD, no carotid bruits  CV: RRR, no murmur, normal S1/S2  Pulm: CTAB, no wheezes, rales, or ronchi  GI: Abdomen soft, NTND, +BS  Extremities: No LE edema, warm and well perfused  Skin: No ecchymosis, erythema, or ulcers  Psych: AOx3, appropriate affect  Pulses: Radial BL 2+, Brachial BL 2+, Femoral BL 2+, DP/PT BL 2+.    Labs:     Lab Results   Component Value Date     2019    K 4.7 2019     2019    CO2 29 2019    BUN 16 2019    CREATININE 1.2 2019    ANIONGAP 10 2019     Lab Results   Component Value Date    HGBA1C 5.3 " 02/01/2019     Lab Results   Component Value Date    BNP 34 02/01/2019    BNP 48 12/18/2018    BNP 72 11/19/2018    Lab Results   Component Value Date    WBC 8.65 02/01/2019    HGB 15.9 02/01/2019    HCT 48.7 02/01/2019    HCT 23 (L) 02/21/2018     02/01/2019    GRAN 5.5 02/01/2019    GRAN 63.2 02/01/2019     Lab Results   Component Value Date    CHOL 108 (L) 11/19/2018    HDL 41 11/19/2018    LDLCALC 43.6 (L) 11/19/2018    TRIG 117 11/19/2018          Imaging:       No results found for: EF    Assessment/Plan   Heart transplanted  Presents for [address] [place] [address] with IVUS post OHT  R Radial access, Slender Sheath, Rigo catheter, IVUS     -The risks, benefits & alternatives of the procedure were explained to the patient.    -The risks of coronary angiography include but are not limited to:  Bleeding, infection, heart rhythm abnormalities, allergic reactions, kidney injury, stroke and death.    -Should stenting be indicated, the patient has agreed to dual anti-platelet therapy for 1-consecutive year with a drug-eluting stent and a minimum of 1-month with the use of a bare metal stent.    -The risks of moderate sedation include hypotension, respiratory depression, arrhythmias, bronchospasm, & death.    -Informed consent was obtained & the patient is agreeable to proceed with the procedure.  -This patient was discussed with the attending interventional cardiologist who agrees with the above assessment & plan.       Essential hypertension  Controlled today    Hyperlipidemia LDL goal <70  On moderate intensity statin Rx       Signed:  Zhang Spears DO  Cardiology Fellow  Pager - [phone]  2/1/2019 10:00 AM

## 2019-02-01 NOTE — ASSESSMENT & PLAN NOTE
Presents for [address] [place] [address] with IVUS post OHT  R Radial access, Slender Sheath, Rigo catheter, IVUS     -The risks, benefits & alternatives of the procedure were explained to the patient.    -The risks of coronary angiography include but are not limited to:  Bleeding, infection, heart rhythm abnormalities, allergic reactions, kidney injury, stroke and death.    -Should stenting be indicated, the patient has agreed to dual anti-platelet therapy for 1-consecutive year with a drug-eluting stent and a minimum of 1-month with the use of a bare metal stent.    -The risks of moderate sedation include hypotension, respiratory depression, arrhythmias, bronchospasm, & death.    -Informed consent was obtained & the patient is agreeable to proceed with the procedure.  -This patient was discussed with the attending interventional cardiologist who agrees with the above assessment & plan.

## 2019-02-01 NOTE — PROGRESS NOTES
Subjective:   Mr. Samayoa is a 29 y.o. year old White male who received a  - brain death heart transplant on 18.      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  Most recently neutropenia that required medication adjustments (rapa stopped).  Now on cellcept 1000 mg BID, tacro 1/2, prednisone 5 mg qd.   He has no active CV symptoms except palpitations and an episode of redness and constricition in the chest and somewhat faint. Last Wednesday. Headaches are a problem        TTE today       Left Ventricle ejection fraction at 50%. Normal left ventricular cavity size. Concentric remodeling observed. Abnormal septal motion consistent with post-operative state. Left ventricular diastolic dysfunction.   Right Ventricle Normal cavity size. The ejection fraction is borderline low.   Left Atrium Enlarged due to cardiac transplantation.   Aortic Valve Mobility is normal   Mitral Valve Normal valve structure. Normal leaflet mobility.   Tricuspid Valve The tricuspid valve is normal. Mobility is normal.   Pulmonic Valve Normal valve structure. Mild regurgitation. Mobility is normal.   IVC/SVC Normal central venous pressure (3 mm Hg).           Review of Systems   Constitution: Negative for chills, fever, weakness, malaise/fatigue, night sweats, weight gain and weight loss.   Cardiovascular: Positive for palpitations. Negative for chest pain, dyspnea on exertion, irregular heartbeat, leg swelling, near-syncope, orthopnea, paroxysmal nocturnal dyspnea and syncope.   Respiratory: Negative for cough, sputum production and wheezing.    Hematologic/Lymphatic: Does not bruise/bleed easily.   Musculoskeletal: Negative for arthritis, joint pain and stiffness.   Gastrointestinal: Negative for abdominal pain, change in bowel habit, constipation, diarrhea, hematochezia and melena.   Genitourinary:  "Negative for hematuria.   Neurological: Negative for brief paralysis, focal weakness and seizures.     Objective:   Blood pressure (!) 132/92, pulse 97, height 5' 7" (1.702 m), weight 77.9 kg (171 lb 11.8 oz).body mass index is 26.9 kg/m².  Physical Exam   Constitutional: He is oriented to person, place, and time. He appears well-developed and well-nourished. No distress.         HENT:   Head: Normocephalic and atraumatic.   Mouth/Throat: Oropharynx is clear and moist. No oropharyngeal exudate.   Eyes: Conjunctivae are normal. Right eye exhibits no discharge. Left eye exhibits no discharge. No scleral icterus.   Neck: No JVD present. No thyromegaly present.   Cardiovascular: Normal rate, regular rhythm and normal heart sounds. Exam reveals no gallop.   No murmur heard.  Pulmonary/Chest: Effort normal and breath sounds normal.   Abdominal: Soft. Bowel sounds are normal. He exhibits no distension and no mass. There is no tenderness. There is no rebound and no guarding.   Musculoskeletal: He exhibits no edema or tenderness.   Lymphadenopathy:     He has no cervical adenopathy.   Neurological: He is alert and oriented to person, place, and time.   Skin: Skin is warm and dry. He is not diaphoretic.   Psychiatric: He has a normal mood and affect. His behavior is normal. Judgment and thought content normal.     Lab Results   Component Value Date    BNP 72 11/19/2018     11/19/2018    K 3.9 11/19/2018    MG 1.9 11/19/2018     11/19/2018    CO2 22 (L) 11/19/2018    BUN 14 11/19/2018    CREATININE 0.9 11/19/2018    GLU 87 11/19/2018    AST 25 11/19/2018    ALT 33 11/19/2018    ALBUMIN 4.2 11/19/2018    PROT 7.0 11/19/2018    BILITOT 0.5 11/19/2018    WBC 7.62 11/19/2018    HGB 14.8 11/19/2018    HCT 47.2 11/19/2018     11/19/2018    CHOL 108 (L) 11/19/2018    HDL 41 11/19/2018    LDLCALC 43.6 (L) 11/19/2018    TRIG 117 11/19/2018    TACROLIMUS 8.1 11/19/2018     ECHO today with normal ejection fraction " (abnormal septal motion consistent with prior open heart surgery) est LVEF 65%.  I disagree with read of Oct 2018 EF as it too 65%    Assessment:     1. Heart transplanted- I viewed echo images and compared to last month- they look about the same- EF truly is borderline low-normal around 50%  He is euvolemic on exam, BNP low, FC I   2. Essential hypertension    3. Long term current use of immunosuppressive drug    4. Hyperlipidemia LDL goal <70        Plan:   Awaiting loop recorder    Angiogram in 2 weeks Echo unchanged  Return instructions as set forth by post transplant schedule or as needed:    Clinic: Return for labs and/or biopsy weekly the first month, every two weeks during month 2 and then monthly for the first year at the provider or coordinator's discretion. During the second year, return to clinic every 3 months. Post transplant year 3-5 return every 6 months. There will be a comprehensive post transplant evaluation every year that may include LHC/RHC/biopsy, stress test, echo, CXR, and other health screening exams.    In addition to the clinical assessment, I have ordered Allomap testing for this patient to assist in identification of moderate/severe acute cellular rejection (ACR) in a pt with stable Allograft function instead of endomyocardial biopsy.     Patient is reminded to call with any health changes as these can be early signs of transplant complications. Patient is advised to make sure any new medications or changes of old medications are discussed with a pharmacist or physician knowledgeable with transplant to avoid rejection/drug toxicity related to significant drug interactions.    UNOS Patient Status  Functional Status: 100% - Normal, no complaints, no evidence of disease  Physical Capacity: No Limitations  Working for Income: yes  If yes, working activity level: Working Full Time    Roque Matos MD     F/u 1 mo

## 2019-02-01 NOTE — LETTER
February 1, 2019        Guillermo Alejo  [address]  [place] [facility] [address]  Phone: [phone]  Fax: [phone]             [facility]  [address]  [place] [address]  Phone: [phone]   Patient: Mert Samayoa   MR Number: [id number]   YOB: 1990   Date of Visit: 2/1/2019       Dear Dr. Guillermo Alejo    Thank you for referring Mert Samayoa to me for evaluation. Attached you will find relevant portions of my assessment and plan of care.    If you have questions, please do not hesitate to call me. I look forward to following Mert Samayoa along with you.    Sincerely,    Roque Matos MD    Enclosure    If you would like to receive this communication electronically, please contact [email or web address] or [phone] to request [employer] Link access.    [employer] Link is a tool which provides read-only access to select patient information with whom you have a relationship. Its easy to use and provides real time access to review your patients record including encounter summaries, notes, results, and demographic information.    If you feel you have received this communication in error or would no longer like to receive these types of communications, please e-mail [email or web address]

## 2019-02-05 ENCOUNTER — TELEPHONE (OUTPATIENT)
Dept: TRANSPLANT | Facility: CLINIC | Age: 29
End: 2019-02-05

## 2019-02-07 ENCOUNTER — CONFERENCE (OUTPATIENT)
Dept: TRANSPLANT | Facility: CLINIC | Age: 29
End: 2019-02-07

## 2019-02-07 ENCOUNTER — TELEPHONE (OUTPATIENT)
Dept: TRANSPLANT | Facility: CLINIC | Age: 29
End: 2019-02-07

## 2019-02-07 NOTE — TELEPHONE ENCOUNTER
PT returned call. Advised him of biopsy with [facility] next week. If biopsy negative will decrease prednisone. Pt stated his understanding. Message sent to Leon's nurse.

## 2019-02-07 NOTE — TELEPHONE ENCOUNTER
Chart reviewed for one year   (1) labs reviewed tacro  9.6  (2) studies echo 50%  Immunosuppression: tac 1/2. pred 5, MMF 1000 BID  Immuno goal levels: tac 8-12  (4) immunosuppression goals   (5) followup will get biopsy with left heart cath Goal of getting off prednisone first followed lower prograf goal 5 to 10 (he is not a rapamune candidate due to luekopenia)  followup in three month

## 2019-02-14 ENCOUNTER — HOSPITAL ENCOUNTER (OUTPATIENT)
Facility: HOSPITAL | Age: 29
Discharge: HOME | End: 2019-02-14
Attending: INTERNAL MEDICINE | Admitting: INTERNAL MEDICINE
Payer: COMMERCIAL

## 2019-02-14 VITALS
HEART RATE: 98 BPM | HEIGHT: 67 IN | TEMPERATURE: 98 F | DIASTOLIC BLOOD PRESSURE: 74 MMHG | SYSTOLIC BLOOD PRESSURE: 120 MMHG | WEIGHT: 169 LBS | BODY MASS INDEX: 26.53 KG/M2 | OXYGEN SATURATION: 98 % | RESPIRATION RATE: 18 BRPM

## 2019-02-14 DIAGNOSIS — Z94.1 HEART TRANSPLANTED: ICD-10-CM

## 2019-02-14 LAB
ABO + RH BLD: NORMAL
BLD GP AB SCN CELLS X3 SERPL QL: NORMAL

## 2019-02-14 PROCEDURE — 88307 TISSUE SPECIMEN TO PATHOLOGY - SURGERY: ICD-10-PCS | Mod: 26,,, | Performed by: PATHOLOGY

## 2019-02-14 PROCEDURE — 93458 L HRT ARTERY/VENTRICLE ANGIO: CPT | Mod: 26,51,, | Performed by: INTERNAL MEDICINE

## 2019-02-14 PROCEDURE — 99153 MOD SED SAME PHYS/QHP EA: CPT | Performed by: INTERNAL MEDICINE

## 2019-02-14 PROCEDURE — 93505 PR BIOPSY OF HEART LINING: ICD-10-PCS | Mod: 26,,, | Performed by: INTERNAL MEDICINE

## 2019-02-14 PROCEDURE — 99152 MOD SED SAME PHYS/QHP 5/>YRS: CPT | Performed by: INTERNAL MEDICINE

## 2019-02-14 PROCEDURE — 25000003 PHARM REV CODE 250: Performed by: STUDENT IN AN ORGANIZED HEALTH CARE EDUCATION/TRAINING PROGRAM

## 2019-02-14 PROCEDURE — C1769 GUIDE WIRE: HCPCS | Performed by: INTERNAL MEDICINE

## 2019-02-14 PROCEDURE — 99152 MOD SED SAME PHYS/QHP 5/>YRS: CPT | Mod: ,,, | Performed by: INTERNAL MEDICINE

## 2019-02-14 PROCEDURE — 93505 ENDOMYOCARDIAL BIOPSY: CPT | Performed by: INTERNAL MEDICINE

## 2019-02-14 PROCEDURE — 85347 COAGULATION TIME ACTIVATED: CPT | Performed by: INTERNAL MEDICINE

## 2019-02-14 PROCEDURE — 27201423 OPTIME MED/SURG SUP & DEVICES STERILE SUPPLY: Performed by: INTERNAL MEDICINE

## 2019-02-14 PROCEDURE — 88342 IMHCHEM/IMCYTCHM 1ST ANTB: CPT | Mod: 26,,, | Performed by: PATHOLOGY

## 2019-02-14 PROCEDURE — 92978 PR IVUS, CORONARY, 1ST VESSEL: ICD-10-PCS | Mod: 26,LD,, | Performed by: INTERNAL MEDICINE

## 2019-02-14 PROCEDURE — 93005 ELECTROCARDIOGRAM TRACING: CPT

## 2019-02-14 PROCEDURE — 88342 IMHCHEM/IMCYTCHM 1ST ANTB: CPT | Performed by: PATHOLOGY

## 2019-02-14 PROCEDURE — 25500020 PHARM REV CODE 255: Performed by: INTERNAL MEDICINE

## 2019-02-14 PROCEDURE — C1894 INTRO/SHEATH, NON-LASER: HCPCS | Performed by: INTERNAL MEDICINE

## 2019-02-14 PROCEDURE — C1887 CATHETER, GUIDING: HCPCS | Performed by: INTERNAL MEDICINE

## 2019-02-14 PROCEDURE — 63600175 PHARM REV CODE 636 W HCPCS: Performed by: INTERNAL MEDICINE

## 2019-02-14 PROCEDURE — 86901 BLOOD TYPING SEROLOGIC RH(D): CPT

## 2019-02-14 PROCEDURE — 99152 PR MOD CONSCIOUS SEDATION, SAME PHYS, 5+ YRS, FIRST 15 MIN: ICD-10-PCS | Mod: ,,, | Performed by: INTERNAL MEDICINE

## 2019-02-14 PROCEDURE — 93505 ENDOMYOCARDIAL BIOPSY: CPT | Mod: 26,,, | Performed by: INTERNAL MEDICINE

## 2019-02-14 PROCEDURE — 93458 L HRT ARTERY/VENTRICLE ANGIO: CPT | Performed by: INTERNAL MEDICINE

## 2019-02-14 PROCEDURE — 92978 ENDOLUMINL IVUS OCT C 1ST: CPT | Mod: 26,LD,, | Performed by: INTERNAL MEDICINE

## 2019-02-14 PROCEDURE — 92978 ENDOLUMINL IVUS OCT C 1ST: CPT | Mod: LD | Performed by: INTERNAL MEDICINE

## 2019-02-14 PROCEDURE — 93458 PR CATH PLACE/CORON ANGIO, IMG SUPER/INTERP,W LEFT HEART VENTRICULOGRAPHY: ICD-10-PCS | Mod: 26,51,, | Performed by: INTERNAL MEDICINE

## 2019-02-14 PROCEDURE — 93010 EKG 12-LEAD: ICD-10-PCS | Mod: ,,, | Performed by: INTERNAL MEDICINE

## 2019-02-14 PROCEDURE — C1753 CATH, INTRAVAS ULTRASOUND: HCPCS | Performed by: INTERNAL MEDICINE

## 2019-02-14 PROCEDURE — 93010 ELECTROCARDIOGRAM REPORT: CPT | Mod: ,,, | Performed by: INTERNAL MEDICINE

## 2019-02-14 PROCEDURE — 88342 TISSUE SPECIMEN TO PATHOLOGY - SURGERY: ICD-10-PCS | Mod: 26,,, | Performed by: PATHOLOGY

## 2019-02-14 PROCEDURE — 88307 TISSUE EXAM BY PATHOLOGIST: CPT | Mod: 26,,, | Performed by: PATHOLOGY

## 2019-02-14 PROCEDURE — 88307 TISSUE EXAM BY PATHOLOGIST: CPT | Performed by: PATHOLOGY

## 2019-02-14 PROCEDURE — 25000003 PHARM REV CODE 250: Performed by: INTERNAL MEDICINE

## 2019-02-14 RX ORDER — LIDOCAINE HYDROCHLORIDE 20 MG/ML
INJECTION, SOLUTION EPIDURAL; INFILTRATION; INTRACAUDAL; PERINEURAL
Status: DISCONTINUED | OUTPATIENT
Start: 2019-02-14 | End: 2019-02-14 | Stop reason: HOSPADM

## 2019-02-14 RX ORDER — SODIUM CHLORIDE 9 MG/ML
3 INJECTION, SOLUTION INTRAVENOUS CONTINUOUS
Status: ACTIVE | OUTPATIENT
Start: 2019-02-14 | End: 2019-02-14

## 2019-02-14 RX ORDER — VERAPAMIL HYDROCHLORIDE 2.5 MG/ML
INJECTION, SOLUTION INTRAVENOUS
Status: DISCONTINUED | OUTPATIENT
Start: 2019-02-14 | End: 2019-02-14 | Stop reason: HOSPADM

## 2019-02-14 RX ORDER — HEPARIN SODIUM 200 [USP'U]/100ML
INJECTION, SOLUTION INTRAVENOUS
Status: DISCONTINUED | OUTPATIENT
Start: 2019-02-14 | End: 2019-02-14 | Stop reason: HOSPADM

## 2019-02-14 RX ORDER — FENTANYL CITRATE 50 UG/ML
INJECTION, SOLUTION INTRAMUSCULAR; INTRAVENOUS
Status: DISCONTINUED | OUTPATIENT
Start: 2019-02-14 | End: 2019-02-14 | Stop reason: HOSPADM

## 2019-02-14 RX ORDER — HEPARIN SODIUM 1000 [USP'U]/ML
INJECTION, SOLUTION INTRAVENOUS; SUBCUTANEOUS
Status: DISCONTINUED | OUTPATIENT
Start: 2019-02-14 | End: 2019-02-14 | Stop reason: HOSPADM

## 2019-02-14 RX ORDER — NITROGLYCERIN 5 MG/ML
INJECTION, SOLUTION INTRAVENOUS
Status: DISCONTINUED | OUTPATIENT
Start: 2019-02-14 | End: 2019-02-14 | Stop reason: HOSPADM

## 2019-02-14 RX ORDER — MIDAZOLAM HYDROCHLORIDE 1 MG/ML
INJECTION, SOLUTION INTRAMUSCULAR; INTRAVENOUS
Status: DISCONTINUED | OUTPATIENT
Start: 2019-02-14 | End: 2019-02-14 | Stop reason: HOSPADM

## 2019-02-14 RX ORDER — DIPHENHYDRAMINE HCL 25 MG
50 CAPSULE ORAL ONCE
Status: COMPLETED | OUTPATIENT
Start: 2019-02-14 | End: 2019-02-14

## 2019-02-14 RX ADMIN — SODIUM CHLORIDE 3 ML/KG/HR: 0.9 INJECTION, SOLUTION INTRAVENOUS at 08:02

## 2019-02-14 RX ADMIN — DIPHENHYDRAMINE HYDROCHLORIDE 50 MG: 25 CAPSULE ORAL at 08:02

## 2019-02-14 NOTE — DISCHARGE SUMMARY
Discharge Summary  Interventional Cardiology      Admit Date: 2/14/2019    Discharge Date:  2/14/2019    Attending Physician: Tello Correia MD    Discharge Physician: Zhang Spears DO    Principal Diagnoses: Heart transplanted    Indication for Admission: C with IVUS and RV Bx    Discharged Condition: Good    Hospital Course:   Patient presented for outpatient [facility] with IVUS and RV Bx which went without complication. Patient to follow up with heart failure for further care.    Outpatient Plan:  - Follow-up appointment with [facility] as scheduled  - There were no medication changes    Diet: Cardiac diet    Activity: Activity instructions provided, Wound care instructions provided    Disposition: Home or Self Care    Discharge Medications:      Medication List      CONTINUE taking these medications    aspirin 81 MG EC tablet  Commonly known as:  ECOTRIN  Take 1 tablet (81 mg total) by mouth once daily.     atorvastatin 20 MG tablet  Commonly known as:  LIPITOR  Take 1 tablet (20 mg total) by mouth once daily.     calcium carbonate-vit D3-min 600 mg calcium- 200 unit Tab     famotidine 40 MG tablet  Commonly known as:  PEPCID  Take 1 tablet (40 mg total) by mouth every evening.     magnesium oxide 400 mg (241.3 mg magnesium) tablet  Commonly known as:  MAG-OX  Take 1 tablet (400 mg total) by mouth 2 (two) times daily.     mycophenolate 250 mg Cap  Commonly known as:  CELLCEPT  Take 4 capsules (1,000 mg total) by mouth 2 (two) times daily.     predniSONE 5 MG tablet  Commonly known as:  DELTASONE  Take 1 tablet (5 mg total) by mouth once daily.     tacrolimus 1 MG Cap  Commonly known as:  PROGRAF  Take 1mg (1 capsule) by mouth every morning, and 2mg (2 capsules) orally nightly          Follow Up:  Follow-up Information     [facility].    Specialty:  Transplant  [facility] information:  [facility] [address]  [facility] [address] [phone]  Additional information:  3rd floor

## 2019-02-14 NOTE — SIGNIFICANT EVENT
"    Post Cath Note  Referring Physician: Tello Correia MD  Procedure: ANGIOGRAM, CORONARY ARTERY (Right), IVUS, Coronary (Right), INSERTION, CATHETER, RIGHT HEART (Right), Biopsy, Cardiac       Access: R CFV, R Radial    LVEDP 10 mmHg    S/p Right heart biopsy and Left heart angiogram with IVUS    See full report for further details    Intervention:     Closure device: Radial band    Post Cath Exam:   /76 (BP Location: Right arm, Patient Position: Lying)   Pulse 90   Temp 97.2 °F (36.2 °C) (Oral)   Resp 18   Ht 5' 7" (1.702 m)   Wt 76.7 kg (169 lb)   SpO2 98%   BMI 26.47 kg/m²   No unusual pain, hematoma, thrill or bruit at vascular access site.  Distal pulse present without signs of ischemia.    Recommendations:   - Routine post-cath care  - IVF at 5ml/kg/hr for 4 hrs  - Follow-up with outpatient cardiologist    Signed:  Zhang Spears DO  Cardiology Fellow            "

## 2019-02-14 NOTE — Clinical Note
The site was marked. Prepped: groin and right radial. Prepped with: ChloraPrep. The patient was draped.

## 2019-02-14 NOTE — INTERVAL H&P NOTE
The patient has been examined and the H&P has been reviewed:    I concur with the findings and no changes have occurred since H&P was written.     LHC + LAD IVUS via r radial access  RHC via CFV  Consents in the chart     Anesthesia/Surgery risks, benefits and alternative options discussed and understood by patient/family.    Christopher Corcoran MD  Interventional Cardiovascular Fellow, PGY VII  Pager: [phone]  2/14/2019 7:37 AM        There are no hospital problems to display for this patient.

## 2019-02-14 NOTE — Clinical Note
120 ml injected throughout the case. 80 mL total wasted during the case. 200 mL total used in the case.

## 2019-02-14 NOTE — NURSING
Pt discharged to home with spouse via wheelchair. All D/C orders reviewed, pt verbalized understanding, all questions answered. VS stable, no signs of bleeding at puncture site, no c/o pain, no signs of distress.

## 2019-02-15 ENCOUNTER — TELEPHONE (OUTPATIENT)
Dept: TRANSPLANT | Facility: CLINIC | Age: 29
End: 2019-02-15

## 2019-02-15 LAB
POC ACTIVATED CLOTTING TIME K: 191 SEC (ref 74–137)
SAMPLE: ABNORMAL

## 2019-02-15 NOTE — TELEPHONE ENCOUNTER
Called to notify pt that his biopsy was negative for rejection. Will discuss with Dr. Hawkins next week about weaning prednisone if C is ok.

## 2019-02-21 ENCOUNTER — CONFERENCE (OUTPATIENT)
Dept: TRANSPLANT | Facility: CLINIC | Age: 29
End: 2019-02-21

## 2019-02-21 ENCOUNTER — PATIENT MESSAGE (OUTPATIENT)
Dept: TRANSPLANT | Facility: CLINIC | Age: 29
End: 2019-02-21

## 2019-02-21 DIAGNOSIS — Z94.1 STATUS POST HEART TRANSPLANT: Primary | ICD-10-CM

## 2019-02-21 RX ORDER — EVEROLIMUS 1 MG/1
1 TABLET ORAL 2 TIMES DAILY
Qty: 60 TABLET | Refills: 11 | Status: SHIPPED | OUTPATIENT
Start: 2019-02-21 | End: 2019-02-28

## 2019-02-21 RX ORDER — TACROLIMUS 1 MG/1
1 CAPSULE ORAL EVERY 12 HOURS
Qty: 60 CAPSULE | Refills: 11 | Status: SHIPPED | OUTPATIENT
Start: 2019-02-21 | End: 2019-08-29 | Stop reason: SDUPTHER

## 2019-02-21 RX ORDER — EVEROLIMUS 1 MG/1
1 TABLET ORAL 2 TIMES DAILY
Qty: 60 TABLET | Refills: 11 | Status: SHIPPED | OUTPATIENT
Start: 2019-02-21 | End: 2019-02-21

## 2019-02-21 NOTE — TELEPHONE ENCOUNTER
Patient discussed at post transplant chart review. Now 1 yr  post transplant    Current immunosuppression:  tacro 1/2  MMF 1000mg bid  [id number] daily      Cr: 1.2    WBC:    tacro level  8.2    Allomap 28    Bx:    Echocardiogram: EF 50%  No DSA    · LHC: Normal coronary arteries by angiography. IVUS of LM/LAD showed minimal spots of atherosclerosis. Maxima intimal thickness in pLAD 0.8 mm  5 successful RVBX, under fluoro guidance, perfomed.    Plan going forward:   Would like to add/switch to everolimus -  Transition plan will come from Dr Willams shortly.

## 2019-02-21 NOTE — TELEPHONE ENCOUNTER
Reviewed chart with Dr. Lundberg and Dr. Hawkins. Due to pt's LHC changes, will start Everolimus and keep pt on Everolimus/tac combo. Plan made with Greg CALDERON, GonzalesD. Called pt to inform him of plan with starting everolimus and decreasing tac. Pt asked for me to send in [facility] message the plan.

## 2019-02-25 ENCOUNTER — TELEPHONE (OUTPATIENT)
Dept: TRANSPLANT | Facility: CLINIC | Age: 29
End: 2019-02-25

## 2019-02-25 NOTE — TELEPHONE ENCOUNTER
PT called concerned with side effects of everolimus with sterility and birth defects. HTS started pt on everolimus for LHC findings. Pt wants to not start for awhile so they can get sperm samples stored for later use. Will discuss with pharm D and MD's.

## 2019-02-26 NOTE — TELEPHONE ENCOUNTER
Reviewed with Dr. Hawkins who stated it was ok for pt to freeze his sperm for future use. Pt stated his ok.

## 2019-02-28 DIAGNOSIS — Z94.1 STATUS POST HEART TRANSPLANT: ICD-10-CM

## 2019-02-28 RX ORDER — EVEROLIMUS 1 MG/1
1 TABLET ORAL 2 TIMES DAILY
Qty: 60 TABLET | Refills: 11 | Status: SHIPPED | OUTPATIENT
Start: 2019-02-28 | End: 2019-03-08

## 2019-03-07 DIAGNOSIS — Z94.1 STATUS POST HEART TRANSPLANT: Primary | ICD-10-CM

## 2019-03-08 DIAGNOSIS — Z94.1 STATUS POST HEART TRANSPLANT: ICD-10-CM

## 2019-03-08 RX ORDER — EVEROLIMUS 1 MG/1
1 TABLET ORAL 2 TIMES DAILY
Qty: 60 TABLET | Refills: 11 | Status: SHIPPED | OUTPATIENT
Start: 2019-03-08 | End: 2019-03-12 | Stop reason: SDUPTHER

## 2019-03-11 ENCOUNTER — LAB VISIT (OUTPATIENT)
Dept: LAB | Facility: HOSPITAL | Age: 29
End: 2019-03-11
Attending: INTERNAL MEDICINE
Payer: COMMERCIAL

## 2019-03-11 DIAGNOSIS — Z94.1 STATUS POST HEART TRANSPLANT: ICD-10-CM

## 2019-03-11 PROCEDURE — 80169 DRUG ASSAY EVEROLIMUS: CPT

## 2019-03-11 PROCEDURE — 36415 COLL VENOUS BLD VENIPUNCTURE: CPT

## 2019-03-12 ENCOUNTER — OFFICE VISIT (OUTPATIENT)
Dept: NEUROLOGY | Facility: CLINIC | Age: 29
End: 2019-03-12
Payer: COMMERCIAL

## 2019-03-12 ENCOUNTER — TELEPHONE (OUTPATIENT)
Dept: TRANSPLANT | Facility: CLINIC | Age: 29
End: 2019-03-12

## 2019-03-12 VITALS
HEART RATE: 94 BPM | RESPIRATION RATE: 16 BRPM | BODY MASS INDEX: 27.41 KG/M2 | HEIGHT: 67 IN | DIASTOLIC BLOOD PRESSURE: 88 MMHG | SYSTOLIC BLOOD PRESSURE: 126 MMHG | WEIGHT: 174.63 LBS

## 2019-03-12 DIAGNOSIS — G43.719 CHRONIC MIGRAINE WITHOUT AURA, INTRACTABLE, WITHOUT STATUS MIGRAINOSUS: Primary | ICD-10-CM

## 2019-03-12 PROCEDURE — 99999 PR PBB SHADOW E&M-EST. PATIENT-LVL III: ICD-10-PCS | Mod: PBBFAC,,, | Performed by: PSYCHIATRY & NEUROLOGY

## 2019-03-12 PROCEDURE — 99204 PR OFFICE/OUTPT VISIT, NEW, LEVL IV, 45-59 MIN: ICD-10-PCS | Mod: S$GLB,,, | Performed by: PSYCHIATRY & NEUROLOGY

## 2019-03-12 PROCEDURE — 99204 OFFICE O/P NEW MOD 45 MIN: CPT | Mod: S$GLB,,, | Performed by: PSYCHIATRY & NEUROLOGY

## 2019-03-12 PROCEDURE — 99999 PR PBB SHADOW E&M-EST. PATIENT-LVL III: CPT | Mod: PBBFAC,,, | Performed by: PSYCHIATRY & NEUROLOGY

## 2019-03-12 RX ORDER — EVEROLIMUS 1 MG/1
1 TABLET ORAL 2 TIMES DAILY
Qty: 60 TABLET | Refills: 11 | Status: SHIPPED | OUTPATIENT
Start: 2019-03-12 | End: 2019-04-12

## 2019-03-12 RX ORDER — NORTRIPTYLINE HYDROCHLORIDE 10 MG/1
CAPSULE ORAL
Qty: 60 CAPSULE | Refills: 11 | Status: SHIPPED | OUTPATIENT
Start: 2019-03-12 | End: 2019-12-16 | Stop reason: SDUPTHER

## 2019-03-12 NOTE — LETTER
March 12, 2019      [facility]  [address]  [place] [address]           [facility]  [address] [facility] [address]  Phone: [phone]  Fax: [phone]          Patient: Mert Samayoa   MR Number: [id number]   YOB: 1990   Date of Visit: 3/12/2019       Dear Precious Sp[facility]:    Thank you for referring Mert Samayoa to me for evaluation. Attached you will find relevant portions of my assessment and plan of care.    If you have questions, please do not hesitate to call me. I look forward to following Mert Samayoa along with you.    Sincerely,    Brown Simpson MD    Enclosure  CC:  No Recipients    If you would like to receive this communication electronically, please contact [email or web address] or [phone] to request more information on [employer] Link access.    For providers and/or their staff who would like to refer a patient to [facility], please contact us through our one-stop-shop provider referral line, [facility], at [phone].    If you feel you have received this communication in error or would no longer like to receive these types of communications, please e-mail [email or web address]

## 2019-03-12 NOTE — TELEPHONE ENCOUNTER
Confirmed with pt that he is getting Zortress thru his local pharmacy after getting a message from [facility], new prescription was coming to them.

## 2019-03-12 NOTE — PROGRESS NOTES
Patient is referred from the Transplant service    HPI: Mert Samayoa is a 29 y.o. male with headache which started in his childhood.   Prior to having heart transplant he had 1-2 headaches a month.  Post transplant he has had headaches 2-4 times per week.  Headache starts alone or has  aura  Consistent of siltation scotoma only of aura and with pain starts in the occiput on the left and right and radiates around the head.   He is aware prograf has headaches listed as a side effect and pain increased in intensity since he started everolimus.  Headache are severe, lasting hours and associated with light sensitivity and dizziness/nausea.  There are not associated neurological sx like weakness, numbness or autonomic symptoms. For pain, he uses Tylenol OTC about 2 days a week.   He has never been on a preventative.   No family history of brain aneursyms    He is questioning Fioricet as a possible agent.   He is s/p heart transplant 1 year ago (familiar cardiomyopathy)    He works as an  and is in school studying business.     Review of Systems   Constitutional: Negative for fever.   HENT: Negative for nosebleeds.    Eyes: Negative for double vision.   Respiratory: Negative for hemoptysis.    Cardiovascular: Negative for leg swelling.   Gastrointestinal: Negative for blood in stool.   Genitourinary: Negative for hematuria.   Musculoskeletal: Negative for falls.   Skin: Negative for rash.   Neurological: Negative for speech change and focal weakness.   Psychiatric/Behavioral: The patient does not have insomnia.          I have reviewed all of this patient's past medical and surgical histories as well as family and social histories and active allergies and medications as documented in the electronic medical record.      Exam:  Gen Appearance, well developed/nourished in no apparent distress  CV: 2+ distal pulses with no edema or swelling  Neuro:  MS: Awake, alert, oriented to place, person, time,  situation. Sustains attention. Recent/remote memory intact, Language is full to spontaneous speech/repetition/naming/comprehension. Fund of Knowledge is full  CN: Optic discs are flat with normal vasculature, PERRL, Extraoccular movements and visual fields are full. Normal facial sensation and strength, Hearing symmetric, Tongue and Palate are midline and strong. Shoulder Shrug symmetric and strong.  Motor: Normal bulk, tone, no abnormal movements. 5/5 strength bilateral upper/lower extremities with 2+ reflexes and no clonus  Sensory: symmetric to light touch, pain, temp, and vibration Romberg negative  Cerebellar: Finger-nose,Rapid alternating movements intact  Gait: Normal stance, no ataxia    Imaging: Unremarkable noncontrast CT head specifically without evidence for acute intracranial hemorrhage. Further evaluation as warranted clinically.  Labs: 2019 CMP, CBC reviewed      Assessment/Plan: Mert Samayoa is a 29 y.o. male with a history of heart transplant (due to familiar cardiomyopathy) and life long migraines with aura which is worse in frequency and severity since organ transplant/on immunosuppressants     I recommend:   1. MRI brain  to rule out structural lesion causing symptoms/findings.   2. Would benefit from a prevention medication: Pamelor per orders Unless sedation, mood changes or other side effects  3. He is currently uses Tylenol PRN. Cautioned about analgesic overuse. Fioricet not encouraged because it will cause rebound.   RTC 12 weeks.

## 2019-03-13 ENCOUNTER — TELEPHONE (OUTPATIENT)
Dept: TRANSPLANT | Facility: CLINIC | Age: 29
End: 2019-03-13

## 2019-03-13 LAB — EVEROLIMUS BLD-MCNC: 5.6 NG/ML

## 2019-03-19 ENCOUNTER — HOSPITAL ENCOUNTER (OUTPATIENT)
Dept: RADIOLOGY | Facility: HOSPITAL | Age: 29
Discharge: HOME | End: 2019-03-19
Attending: PSYCHIATRY & NEUROLOGY
Payer: COMMERCIAL

## 2019-03-19 DIAGNOSIS — G43.719 CHRONIC MIGRAINE WITHOUT AURA, INTRACTABLE, WITHOUT STATUS MIGRAINOSUS: ICD-10-CM

## 2019-03-19 PROCEDURE — 70551 MRI BRAIN STEM W/O DYE: CPT | Mod: 26,,, | Performed by: RADIOLOGY

## 2019-03-19 PROCEDURE — 70551 MRI BRAIN WITHOUT CONTRAST: ICD-10-PCS | Mod: 26,,, | Performed by: RADIOLOGY

## 2019-03-19 PROCEDURE — 70551 MRI BRAIN STEM W/O DYE: CPT | Mod: TC

## 2019-03-21 ENCOUNTER — TELEPHONE (OUTPATIENT)
Dept: TRANSPLANT | Facility: CLINIC | Age: 29
End: 2019-03-21

## 2019-03-21 NOTE — TELEPHONE ENCOUNTER
Called to inform pt of everolimus and tacro within goal x 2. Will stop cellcept. Pt asked about prednisone. Will discuss in chart review today.

## 2019-04-03 ENCOUNTER — PATIENT MESSAGE (OUTPATIENT)
Dept: TRANSPLANT | Facility: CLINIC | Age: 29
End: 2019-04-03

## 2019-04-08 ENCOUNTER — TELEPHONE (OUTPATIENT)
Dept: TRANSPLANT | Facility: CLINIC | Age: 29
End: 2019-04-08

## 2019-04-08 NOTE — TELEPHONE ENCOUNTER
Saw pt at a race on Saturday. Pt c/o large pimples all over his body since starting everolimus. Told pt he needs to communicate this with me. Scheduled him for labs at [facility] for Tuesday to check levels. Will bring up in chart review Thursday.

## 2019-04-09 ENCOUNTER — LAB VISIT (OUTPATIENT)
Dept: LAB | Facility: HOSPITAL | Age: 29
End: 2019-04-09
Attending: INTERNAL MEDICINE
Payer: COMMERCIAL

## 2019-04-09 DIAGNOSIS — Z94.1 STATUS POST HEART TRANSPLANT: ICD-10-CM

## 2019-04-09 LAB
ALBUMIN SERPL BCP-MCNC: 4.4 G/DL (ref 3.5–5.2)
ALP SERPL-CCNC: 102 U/L (ref 55–135)
ALT SERPL W/O P-5'-P-CCNC: 66 U/L (ref 10–44)
ANION GAP SERPL CALC-SCNC: 8 MMOL/L (ref 8–16)
AST SERPL-CCNC: 40 U/L (ref 10–40)
BASOPHILS # BLD AUTO: 0.01 K/UL (ref 0–0.2)
BASOPHILS NFR BLD: 0.1 % (ref 0–1.9)
BILIRUB SERPL-MCNC: 0.7 MG/DL (ref 0.1–1)
BUN SERPL-MCNC: 14 MG/DL (ref 6–20)
CALCIUM SERPL-MCNC: 9.6 MG/DL (ref 8.7–10.5)
CHLORIDE SERPL-SCNC: 106 MMOL/L (ref 95–110)
CO2 SERPL-SCNC: 27 MMOL/L (ref 23–29)
CREAT SERPL-MCNC: 1.2 MG/DL (ref 0.5–1.4)
DIFFERENTIAL METHOD: ABNORMAL
EOSINOPHIL # BLD AUTO: 0.1 K/UL (ref 0–0.5)
EOSINOPHIL NFR BLD: 1.1 % (ref 0–8)
ERYTHROCYTE [DISTWIDTH] IN BLOOD BY AUTOMATED COUNT: 12.5 % (ref 11.5–14.5)
EST. GFR  (AFRICAN AMERICAN): >60 ML/MIN/1.73 M^2
EST. GFR  (NON AFRICAN AMERICAN): >60 ML/MIN/1.73 M^2
GLUCOSE SERPL-MCNC: 93 MG/DL (ref 70–110)
HCT VFR BLD AUTO: 46 % (ref 40–54)
HGB BLD-MCNC: 15 G/DL (ref 14–18)
LYMPHOCYTES # BLD AUTO: 1.4 K/UL (ref 1–4.8)
LYMPHOCYTES NFR BLD: 17.8 % (ref 18–48)
MCH RBC QN AUTO: 28.1 PG (ref 27–31)
MCHC RBC AUTO-ENTMCNC: 32.6 G/DL (ref 32–36)
MCV RBC AUTO: 86 FL (ref 82–98)
MONOCYTES # BLD AUTO: 0.9 K/UL (ref 0.3–1)
MONOCYTES NFR BLD: 11.4 % (ref 4–15)
NEUTROPHILS # BLD AUTO: 5.5 K/UL (ref 1.8–7.7)
NEUTROPHILS NFR BLD: 69.6 % (ref 38–73)
PLATELET # BLD AUTO: 195 K/UL (ref 150–350)
PMV BLD AUTO: 9.9 FL (ref 9.2–12.9)
POTASSIUM SERPL-SCNC: 4.7 MMOL/L (ref 3.5–5.1)
PROT SERPL-MCNC: 7.1 G/DL (ref 6–8.4)
RBC # BLD AUTO: 5.34 M/UL (ref 4.6–6.2)
SODIUM SERPL-SCNC: 141 MMOL/L (ref 136–145)
TACROLIMUS BLD-MCNC: 8.7 NG/ML (ref 5–15)
WBC # BLD AUTO: 7.91 K/UL (ref 3.9–12.7)

## 2019-04-09 PROCEDURE — 80169 DRUG ASSAY EVEROLIMUS: CPT

## 2019-04-09 PROCEDURE — 85025 COMPLETE CBC W/AUTO DIFF WBC: CPT

## 2019-04-09 PROCEDURE — 80053 COMPREHEN METABOLIC PANEL: CPT

## 2019-04-09 PROCEDURE — 36415 COLL VENOUS BLD VENIPUNCTURE: CPT

## 2019-04-09 PROCEDURE — 80197 ASSAY OF TACROLIMUS: CPT

## 2019-04-11 LAB — EVEROLIMUS BLD-MCNC: 9.7 NG/ML

## 2019-04-12 ENCOUNTER — TELEPHONE (OUTPATIENT)
Dept: TRANSPLANT | Facility: CLINIC | Age: 29
End: 2019-04-12

## 2019-04-12 NOTE — TELEPHONE ENCOUNTER
Spoke with [place], Pharm D. Will stop everolimus given pt is having all over body cystic acne. Will restart cellcept and change is tac goal to 5-10. Pt made aware of changes and asked to let me know if the acne has not gone away in a week, or at least lessen in intensity.

## 2019-05-02 DIAGNOSIS — Z94.1 STATUS POST HEART TRANSPLANT: Primary | ICD-10-CM

## 2019-05-03 ENCOUNTER — HOSPITAL ENCOUNTER (OUTPATIENT)
Dept: RADIOLOGY | Facility: HOSPITAL | Age: 29
Discharge: HOME | End: 2019-05-03
Attending: INTERNAL MEDICINE
Payer: COMMERCIAL

## 2019-05-03 DIAGNOSIS — Z94.1 STATUS POST HEART TRANSPLANT: ICD-10-CM

## 2019-05-03 PROCEDURE — 71046 X-RAY EXAM CHEST 2 VIEWS: CPT | Mod: TC

## 2019-05-08 ENCOUNTER — LAB VISIT (OUTPATIENT)
Dept: LAB | Facility: HOSPITAL | Age: 29
End: 2019-05-08
Attending: INTERNAL MEDICINE
Payer: COMMERCIAL

## 2019-05-08 DIAGNOSIS — Z94.1 STATUS POST HEART TRANSPLANT: ICD-10-CM

## 2019-05-08 LAB
ALBUMIN SERPL BCP-MCNC: 4.4 G/DL (ref 3.5–5.2)
ALP SERPL-CCNC: 97 U/L (ref 55–135)
ALT SERPL W/O P-5'-P-CCNC: 36 U/L (ref 10–44)
ANION GAP SERPL CALC-SCNC: 9 MMOL/L (ref 8–16)
AST SERPL-CCNC: 20 U/L (ref 10–40)
BASOPHILS # BLD AUTO: 0.02 K/UL (ref 0–0.2)
BASOPHILS NFR BLD: 0.2 % (ref 0–1.9)
BILIRUB SERPL-MCNC: 0.4 MG/DL (ref 0.1–1)
BNP SERPL-MCNC: 34 PG/ML (ref 0–99)
BUN SERPL-MCNC: 17 MG/DL (ref 6–20)
CALCIUM SERPL-MCNC: 9.9 MG/DL (ref 8.7–10.5)
CHLORIDE SERPL-SCNC: 106 MMOL/L (ref 95–110)
CO2 SERPL-SCNC: 28 MMOL/L (ref 23–29)
CREAT SERPL-MCNC: 1.2 MG/DL (ref 0.5–1.4)
DIFFERENTIAL METHOD: NORMAL
EOSINOPHIL # BLD AUTO: 0.1 K/UL (ref 0–0.5)
EOSINOPHIL NFR BLD: 1.1 % (ref 0–8)
ERYTHROCYTE [DISTWIDTH] IN BLOOD BY AUTOMATED COUNT: 14.3 % (ref 11.5–14.5)
EST. GFR  (AFRICAN AMERICAN): >60 ML/MIN/1.73 M^2
EST. GFR  (NON AFRICAN AMERICAN): >60 ML/MIN/1.73 M^2
GLUCOSE SERPL-MCNC: 98 MG/DL (ref 70–110)
HCT VFR BLD AUTO: 46.6 % (ref 40–54)
HGB BLD-MCNC: 15.3 G/DL (ref 14–18)
LYMPHOCYTES # BLD AUTO: 1.6 K/UL (ref 1–4.8)
LYMPHOCYTES NFR BLD: 18.8 % (ref 18–48)
MCH RBC QN AUTO: 28.1 PG (ref 27–31)
MCHC RBC AUTO-ENTMCNC: 32.8 G/DL (ref 32–36)
MCV RBC AUTO: 86 FL (ref 82–98)
MONOCYTES # BLD AUTO: 0.9 K/UL (ref 0.3–1)
MONOCYTES NFR BLD: 10.4 % (ref 4–15)
NEUTROPHILS # BLD AUTO: 5.8 K/UL (ref 1.8–7.7)
NEUTROPHILS NFR BLD: 69.5 % (ref 38–73)
PLATELET # BLD AUTO: 273 K/UL (ref 150–350)
PMV BLD AUTO: 9.6 FL (ref 9.2–12.9)
POTASSIUM SERPL-SCNC: 4.6 MMOL/L (ref 3.5–5.1)
PROT SERPL-MCNC: 7.4 G/DL (ref 6–8.4)
RBC # BLD AUTO: 5.45 M/UL (ref 4.6–6.2)
SODIUM SERPL-SCNC: 143 MMOL/L (ref 136–145)
TACROLIMUS BLD-MCNC: 6.1 NG/ML (ref 5–15)
WBC # BLD AUTO: 8.36 K/UL (ref 3.9–12.7)

## 2019-05-08 PROCEDURE — 80053 COMPREHEN METABOLIC PANEL: CPT

## 2019-05-08 PROCEDURE — 36415 COLL VENOUS BLD VENIPUNCTURE: CPT

## 2019-05-08 PROCEDURE — 85025 COMPLETE CBC W/AUTO DIFF WBC: CPT

## 2019-05-08 PROCEDURE — 83880 ASSAY OF NATRIURETIC PEPTIDE: CPT

## 2019-05-08 PROCEDURE — 80197 ASSAY OF TACROLIMUS: CPT

## 2019-05-24 ENCOUNTER — OFFICE VISIT (OUTPATIENT)
Dept: TRANSPLANT | Facility: CLINIC | Age: 29
End: 2019-05-24
Payer: COMMERCIAL

## 2019-05-24 ENCOUNTER — HOSPITAL ENCOUNTER (OUTPATIENT)
Dept: CARDIOLOGY | Facility: CLINIC | Age: 29
Discharge: HOME | End: 2019-05-24
Attending: INTERNAL MEDICINE
Payer: COMMERCIAL

## 2019-05-24 VITALS
HEART RATE: 92 BPM | BODY MASS INDEX: 27.27 KG/M2 | HEIGHT: 67 IN | SYSTOLIC BLOOD PRESSURE: 110 MMHG | DIASTOLIC BLOOD PRESSURE: 80 MMHG | SYSTOLIC BLOOD PRESSURE: 140 MMHG | BODY MASS INDEX: 25.9 KG/M2 | WEIGHT: 173.75 LBS | WEIGHT: 165 LBS | DIASTOLIC BLOOD PRESSURE: 89 MMHG | HEIGHT: 67 IN | HEART RATE: 98 BPM

## 2019-05-24 DIAGNOSIS — R00.2 PALPITATIONS: ICD-10-CM

## 2019-05-24 DIAGNOSIS — I10 ESSENTIAL HYPERTENSION: Primary | ICD-10-CM

## 2019-05-24 DIAGNOSIS — E78.5 HYPERLIPIDEMIA LDL GOAL <70: ICD-10-CM

## 2019-05-24 DIAGNOSIS — Z94.1 HEART TRANSPLANTED: ICD-10-CM

## 2019-05-24 DIAGNOSIS — L60.0 INGROWN TOENAIL OF LEFT FOOT WITH INFECTION: ICD-10-CM

## 2019-05-24 DIAGNOSIS — Z94.1 STATUS POST HEART TRANSPLANT: ICD-10-CM

## 2019-05-24 DIAGNOSIS — Z79.899 LONG TERM CURRENT USE OF IMMUNOSUPPRESSIVE DRUG: ICD-10-CM

## 2019-05-24 LAB
ASCENDING AORTA: 2.68 CM
AV INDEX (PROSTH): 0.71
AV MEAN GRADIENT: 1.91 MMHG
AV PEAK GRADIENT: 4 MMHG
AV VALVE AREA: 2.97 CM2
AV VELOCITY RATIO: 0.74
BSA FOR ECHO PROCEDURE: 1.88 M2
CV ECHO LV RWT: 0.47 CM
DOP CALC AO PEAK VEL: 1 M/S
DOP CALC AO VTI: 16.59 CM
DOP CALC LVOT AREA: 4.15 CM2
DOP CALC LVOT DIAMETER: 2.3 CM
DOP CALC LVOT PEAK VEL: 0.74 M/S
DOP CALC LVOT STROKE VOLUME: 49.25 CM3
DOP CALCLVOT PEAK VEL VTI: 11.86 CM
E WAVE DECELERATION TIME: 116.69 MSEC
E/A RATIO: 1.4
E/E' RATIO: 8.11
ECHO LV POSTERIOR WALL: 0.99 CM (ref 0.6–1.1)
FRACTIONAL SHORTENING: 28 % (ref 28–44)
INTERVENTRICULAR SEPTUM: 0.98 CM (ref 0.6–1.1)
LEFT INTERNAL DIMENSION IN SYSTOLE: 3.01 CM (ref 2.1–4)
LEFT VENTRICLE DIASTOLIC VOLUME INDEX: 42.28 ML/M2
LEFT VENTRICLE DIASTOLIC VOLUME: 78.79 ML
LEFT VENTRICLE MASS INDEX: 72.1 G/M2
LEFT VENTRICLE SYSTOLIC VOLUME INDEX: 18.9 ML/M2
LEFT VENTRICLE SYSTOLIC VOLUME: 35.17 ML
LEFT VENTRICULAR INTERNAL DIMENSION IN DIASTOLE: 4.2 CM (ref 3.5–6)
LEFT VENTRICULAR MASS: 134.38 G
LV LATERAL E/E' RATIO: 9.13
LV SEPTAL E/E' RATIO: 7.3
MV PEAK A VEL: 0.52 M/S
MV PEAK E VEL: 0.73 M/S
RA PRESSURE: 3 MMHG
RIGHT VENTRICULAR END-DIASTOLIC DIMENSION: 2.15 CM
SINUS: 3.22 CM
STJ: 2.44 CM
TDI LATERAL: 0.08
TDI SEPTAL: 0.1
TDI: 0.09
TRICUSPID ANNULAR PLANE SYSTOLIC EXCURSION: 1.7 CM

## 2019-05-24 PROCEDURE — 99215 OFFICE O/P EST HI 40 MIN: CPT | Mod: S$GLB,,, | Performed by: INTERNAL MEDICINE

## 2019-05-24 PROCEDURE — 93306 TTE W/DOPPLER COMPLETE: CPT | Mod: S$GLB,,, | Performed by: INTERNAL MEDICINE

## 2019-05-24 PROCEDURE — 93306 TRANSTHORACIC ECHO (TTE) COMPLETE: ICD-10-PCS | Mod: S$GLB,,, | Performed by: INTERNAL MEDICINE

## 2019-05-24 PROCEDURE — 99999 PR PBB SHADOW E&M-EST. PATIENT-LVL III: ICD-10-PCS | Mod: PBBFAC,,, | Performed by: INTERNAL MEDICINE

## 2019-05-24 PROCEDURE — 99215 PR OFFICE/OUTPT VISIT, EST, LEVL V, 40-54 MIN: ICD-10-PCS | Mod: S$GLB,,, | Performed by: INTERNAL MEDICINE

## 2019-05-24 PROCEDURE — 99999 PR PBB SHADOW E&M-EST. PATIENT-LVL III: CPT | Mod: PBBFAC,,, | Performed by: INTERNAL MEDICINE

## 2019-05-24 NOTE — PROGRESS NOTES
Subjective:   Mr. Samayoa is a 29 y.o. year old White male who received a  - brain death heart transplant on 18.      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  Most recently neutropenia that required medication adjustments (rapa stopped).  Now on cellcept 1000 mg BID, tacro 1/2, prednisone 5 mg qd.   He has no active CV symptoms except palpitations and an episode of redness and constricition in the chest and somewhat faint. Last Wednesday. Headaches are improving on Pamelor      TTE today       Left Ventricle ejection fraction at 50%. Normal left ventricular cavity size. Concentric remodeling observed. Abnormal septal motion consistent with post-operative state. Left ventricular diastolic dysfunction.   Right Ventricle Normal cavity size. The ejection fraction is borderline low.   Left Atrium Enlarged due to cardiac transplantation.   Aortic Valve Mobility is normal   Mitral Valve Normal valve structure. Normal leaflet mobility.   Tricuspid Valve The tricuspid valve is normal. Mobility is normal.   Pulmonic Valve Normal valve structure. Mild regurgitation. Mobility is normal.   IVC/SVC Normal central venous pressure (3 mm Hg).       DOing well. Angiogram OK    · Normal coronary arteries by angiography. IVUS of LM/LAD showed minimal spots of atherosclerosis. Maxima intimal thickness in pLAD 0.8 mm  · 5 successful RVBX, under fluoro guidance, perfomed.  · Estimated blood loss: <50 mL  Review of Systems   Constitution: Negative for chills, fever, malaise/fatigue, night sweats, weight gain and weight loss.   Cardiovascular: Positive for palpitations. Negative for chest pain, dyspnea on exertion, irregular heartbeat, leg swelling, near-syncope, orthopnea, paroxysmal nocturnal dyspnea and syncope.   Respiratory: Negative for cough, sputum production and wheezing.   "  Hematologic/Lymphatic: Does not bruise/bleed easily.   Musculoskeletal: Negative for arthritis, joint pain and stiffness.   Gastrointestinal: Negative for abdominal pain, change in bowel habit, constipation, diarrhea, hematochezia and melena.   Genitourinary: Negative for hematuria.   Neurological: Negative for brief paralysis, focal weakness, seizures and weakness.     Objective:   Blood pressure (!) 140/89, pulse 98, height 5' 7" (1.702 m), weight 78.8 kg (173 lb 11.6 oz).body mass index is 27.21 kg/m².  Physical Exam   Constitutional: He is oriented to person, place, and time. He appears well-developed and well-nourished. No distress.         HENT:   Head: Normocephalic and atraumatic.   Mouth/Throat: Oropharynx is clear and moist. No oropharyngeal exudate.   Eyes: Conjunctivae are normal. Right eye exhibits no discharge. Left eye exhibits no discharge. No scleral icterus.   Neck: No JVD present. No thyromegaly present.   Cardiovascular: Normal rate, regular rhythm and normal heart sounds. Exam reveals no gallop.   No murmur heard.  Pulmonary/Chest: Effort normal and breath sounds normal.   Abdominal: Soft. Bowel sounds are normal. He exhibits no distension and no mass. There is no tenderness. There is no rebound and no guarding.   Musculoskeletal: He exhibits no edema or tenderness.   Lymphadenopathy:     He has no cervical adenopathy.   Neurological: He is alert and oriented to person, place, and time.   Skin: Skin is warm and dry. He is not diaphoretic.   Psychiatric: He has a normal mood and affect. His behavior is normal. Judgment and thought content normal.     Lab Results   Component Value Date    BNP 72 11/19/2018     11/19/2018    K 3.9 11/19/2018    MG 1.9 11/19/2018     11/19/2018    CO2 22 (L) 11/19/2018    BUN 14 11/19/2018    CREATININE 0.9 11/19/2018    GLU 87 11/19/2018    AST 25 11/19/2018    ALT 33 11/19/2018    ALBUMIN 4.2 11/19/2018    PROT 7.0 11/19/2018    BILITOT 0.5 " 11/19/2018    WBC 7.62 11/19/2018    HGB 14.8 11/19/2018    HCT 47.2 11/19/2018     11/19/2018    CHOL 108 (L) 11/19/2018    HDL 41 11/19/2018    LDLCALC 43.6 (L) 11/19/2018    TRIG 117 11/19/2018    TACROLIMUS 8.1 11/19/2018     ECHO today with normal ejection fraction (abnormal septal motion consistent with prior open heart surgery) est LVEF 65%.  I disagree with read of Oct 2018 EF as it too 65%    Assessment:     1. Heart transplanted- Doing well   2. Essential hypertension    3. Long term current use of immunosuppressive drug    4. Hyperlipidemia LDL goal <70        Plan:   Doing well Headaches are imporved. LIpids OK awaiting tacrolimus level    MAy need to be off prednisone    Return instructions as set forth by post transplant schedule or as needed:    Clinic: Return for labs and/or biopsy weekly the first month, every two weeks during month 2 and then monthly for the first year at the provider or coordinator's discretion. During the second year, return to clinic every 3 months. Post transplant year 3-5 return every 6 months. There will be a comprehensive post transplant evaluation every year that may include LHC/RHC/biopsy, stress test, echo, CXR, and other health screening exams.    In addition to the clinical assessment, I have ordered Allomap testing for this patient to assist in identification of moderate/severe acute cellular rejection (ACR) in a pt with stable Allograft function instead of endomyocardial biopsy.     Patient is reminded to call with any health changes as these can be early signs of transplant complications. Patient is advised to make sure any new medications or changes of old medications are discussed with a pharmacist or physician knowledgeable with transplant to avoid rejection/drug toxicity related to significant drug interactions.    UNOS Patient Status  Functional Status: 100% - Normal, no complaints, no evidence of disease  Physical Capacity: No Limitations  Working for  Income: yes  If yes, working activity level: Working Full Time    Roque Matos MD     F/u 1 mo

## 2019-05-24 NOTE — Clinical Note
May 24, 2019        Guillermo Alejo  [address]  [facility] [address]  Phone: [phone]  Fax: [phone]             [facility]  [address]  [facility] [address]  Phone: [phone]   Patient: Mert Samayoa   MR Number: [id number]   YOB: 1990   Date of Visit: 5/24/2019       Dear Dr. Guillermo Alejo    Thank you for referring Mert Samayoa to me for evaluation. Attached you will find relevant portions of my assessment and plan of care.    If you have questions, please do not hesitate to call me. I look forward to following Mert Samayoa along with you.    Sincerely,    Roque Matos MD    Enclosure    If you would like to receive this communication electronically, please contact [email or web address] or [phone] to request [employer] access.    [employer] is a tool which provides read-only access to select patient information with whom you have a relationship. Its easy to use and provides real time access to review your patients record including encounter summaries, notes, results, and demographic information.    If you feel you have received this communication in error or would no longer like to receive these types of communications, please e-mail [email or web address]

## 2019-05-27 ENCOUNTER — TELEPHONE (OUTPATIENT)
Dept: TRANSPLANT | Facility: CLINIC | Age: 29
End: 2019-05-27

## 2019-05-29 DIAGNOSIS — Z94.1 STATUS POST HEART TRANSPLANT: Primary | ICD-10-CM

## 2019-05-29 NOTE — TELEPHONE ENCOUNTER
Chart reviewed with Dr. Hawkins  Pt is 15 months post transplant.    Tac 1/1  MMF 1000 BID  Pred 2.5 mg    Labs: Tac 6.3 (goal 5-10)  Cr 1.1  Allomap 25  DSA none  ECHO 50-55%    Plan:  Decrease pred to 1 mg  Get labs in one month. If stable can stop prednisone with labs, clinic visit and ECHO one month after stopping prednisone.

## 2019-05-30 DIAGNOSIS — Z94.1 STATUS POST HEART TRANSPLANT: ICD-10-CM

## 2019-05-30 RX ORDER — PREDNISONE 1 MG/1
1 TABLET ORAL DAILY
Qty: 30 TABLET | Refills: 0 | Status: CANCELLED | OUTPATIENT
Start: 2019-05-30 | End: 2019-06-09

## 2019-05-30 RX ORDER — PREDNISONE 1 MG/1
1 TABLET ORAL DAILY
Qty: 10 TABLET | Refills: 0 | Status: SHIPPED | OUTPATIENT
Start: 2019-05-30 | End: 2019-05-30 | Stop reason: DRUGHIGH

## 2019-05-30 NOTE — TELEPHONE ENCOUNTER
Reviewed HLA labs  With Dr. Hawkins. C1Q back at 4000. Dr. Hawkins ordered to keep pt on pred at 2.5 mg and recheck HLA in 3 weeks. Called pt and instructed him to stay on 2.5 mg of pred.PT stated his understanding.

## 2019-05-31 RX ORDER — PREDNISONE 2.5 MG/1
2.5 TABLET ORAL DAILY
Qty: 10 TABLET | Refills: 0 | Status: SHIPPED | OUTPATIENT
Start: 2019-05-31 | End: 2019-06-10

## 2019-06-12 ENCOUNTER — OFFICE VISIT (OUTPATIENT)
Dept: NEUROLOGY | Facility: CLINIC | Age: 29
End: 2019-06-12
Payer: COMMERCIAL

## 2019-06-12 VITALS
HEIGHT: 67 IN | SYSTOLIC BLOOD PRESSURE: 112 MMHG | WEIGHT: 172.81 LBS | RESPIRATION RATE: 16 BRPM | BODY MASS INDEX: 27.12 KG/M2 | HEART RATE: 98 BPM | DIASTOLIC BLOOD PRESSURE: 78 MMHG

## 2019-06-12 DIAGNOSIS — Z94.1 HEART TRANSPLANTED: ICD-10-CM

## 2019-06-12 DIAGNOSIS — G43.109 MIGRAINE WITH AURA AND WITHOUT STATUS MIGRAINOSUS, NOT INTRACTABLE: Primary | ICD-10-CM

## 2019-06-12 DIAGNOSIS — Z79.899 LONG TERM CURRENT USE OF IMMUNOSUPPRESSIVE DRUG: ICD-10-CM

## 2019-06-12 PROBLEM — I10 ESSENTIAL HYPERTENSION: Status: RESOLVED | Noted: 2018-03-20 | Resolved: 2019-06-12

## 2019-06-12 PROCEDURE — 99999 PR PBB SHADOW E&M-EST. PATIENT-LVL IV: ICD-10-PCS | Mod: PBBFAC,,, | Performed by: NURSE PRACTITIONER

## 2019-06-12 PROCEDURE — 99214 PR OFFICE/OUTPT VISIT, EST, LEVL IV, 30-39 MIN: ICD-10-PCS | Mod: S$GLB,,, | Performed by: NURSE PRACTITIONER

## 2019-06-12 PROCEDURE — 99999 PR PBB SHADOW E&M-EST. PATIENT-LVL IV: CPT | Mod: PBBFAC,,, | Performed by: NURSE PRACTITIONER

## 2019-06-12 PROCEDURE — 99214 OFFICE O/P EST MOD 30 MIN: CPT | Mod: S$GLB,,, | Performed by: NURSE PRACTITIONER

## 2019-06-12 RX ORDER — BUTALBITAL, ACETAMINOPHEN AND CAFFEINE 50; 325; 40 MG/1; MG/1; MG/1
1 TABLET ORAL DAILY PRN
Qty: 9 TABLET | Refills: 5 | Status: SHIPPED | OUTPATIENT
Start: 2019-06-12 | End: 2019-07-12

## 2019-06-12 RX ORDER — PREDNISONE 2.5 MG/1
2.5 TABLET ORAL DAILY
COMMUNITY
End: 2019-06-20 | Stop reason: SDUPTHER

## 2019-06-12 NOTE — PROGRESS NOTES
HPI: Mert Samayoa is a 29 y.o. male with headache which started in his childhood. History of heart transplant in 2018 secondary to familial cardiomyopathy. He has HLD.   He works as an  and is in school studying business.      He presents today for a follow up visit. Pamelor was started at his last visit, which has reduced his headaches from 2-4x per week to 1-2x per month. Severity is unchanged. They are aborted mostly with Tylenol and by going to sleep. MRI Brain completed at last visit was unremarkable. He is tolerating Pamelor well.     Review of Systems   Constitutional: Negative for fever.   HENT: Negative for nosebleeds.    Eyes: Negative for double vision.   Respiratory: Negative for hemoptysis.    Cardiovascular: Negative for leg swelling.   Gastrointestinal: Negative for blood in stool.   Genitourinary: Negative for hematuria.   Musculoskeletal: Negative for falls.   Skin: Negative for rash.   Neurological: Negative for speech change and focal weakness.   Psychiatric/Behavioral: The patient does not have insomnia.        I have reviewed all of this patient's past medical and surgical histories as well as family and social histories and active allergies and medications as documented in the electronic medical record.    Exam:  Gen Appearance, well developed/nourished in no apparent distress  CV: 2+ distal pulses with no edema or swelling  Neuro:  MS: Awake, alert, oriented to place, person, time, situation. Sustains attention. Recent/remote memory intact, Language is full to spontaneous speech/repetition/naming/comprehension. Fund of Knowledge is full  CN: Optic discs are flat with normal vasculature, PERRL, Extraoccular movements and visual fields are full. Normal facial sensation and strength, Hearing symmetric, Tongue and Palate are midline and strong. Shoulder Shrug symmetric and strong.  Motor: Normal bulk, tone, no abnormal movements. 5/5 strength bilateral upper/lower extremities  with 2+ reflexes and no clonus  Sensory: symmetric to light touch, pain, temp, and vibration Romberg negative  Cerebellar: Finger-nose,Rapid alternating movements intact  Gait: Normal stance, no ataxia    Imaging:   MRI Brain 3/2019:   No acute abnormality    CT Head:  Unremarkable noncontrast CT head specifically without evidence for acute intracranial hemorrhage. Further evaluation as warranted clinically.    Labs: 2019 CMP, CBC reviewed    Assessment/Plan: Mert Samayoa is a 29 y.o. male with a history of heart transplant (due to familiar cardiomyopathy) and life long migraines with aura which is worse in frequency and severity since organ transplant/on immunosuppressants     I recommend:   1. MRI brain reassuring.   2. Continue Pamelor for headache prevention.   3. Start prn Fioricet for headaches which occur 1-2x per month. He is not a candidate for NSAIDS, given his history of heart transplant. I would avoid triptans, given his history and migraines with aura. Compazine would also be an option should he fail Fioricet. Tylenol has been effective prior.   4. Limit use of abortive agents to 2x per week to prevent medication rebound headaches.   5. He takes a statin for preventative measures but does not have HLD per patient.     FU 6 months        2. Would benefit from a prevention medication: Pamelor per orders Unless sedation, mood changes or other side effects  3. He is currently uses Tylenol PRN. Cautioned about analgesic overuse. Fioricet not encouraged because it will cause rebound.   RTC 12 weeks.

## 2019-06-17 ENCOUNTER — LAB VISIT (OUTPATIENT)
Dept: LAB | Facility: HOSPITAL | Age: 29
End: 2019-06-17
Attending: INTERNAL MEDICINE
Payer: COMMERCIAL

## 2019-06-17 DIAGNOSIS — Z94.1 STATUS POST HEART TRANSPLANT: ICD-10-CM

## 2019-06-17 LAB
ALBUMIN SERPL BCP-MCNC: 4.5 G/DL (ref 3.5–5.2)
ALP SERPL-CCNC: 91 U/L (ref 55–135)
ALT SERPL W/O P-5'-P-CCNC: 31 U/L (ref 10–44)
ANION GAP SERPL CALC-SCNC: 10 MMOL/L (ref 8–16)
AST SERPL-CCNC: 23 U/L (ref 10–40)
BASOPHILS # BLD AUTO: 0.01 K/UL (ref 0–0.2)
BASOPHILS NFR BLD: 0.1 % (ref 0–1.9)
BILIRUB SERPL-MCNC: 0.4 MG/DL (ref 0.1–1)
BNP SERPL-MCNC: 51 PG/ML (ref 0–99)
BUN SERPL-MCNC: 18 MG/DL (ref 6–20)
CALCIUM SERPL-MCNC: 9.1 MG/DL (ref 8.7–10.5)
CHLORIDE SERPL-SCNC: 107 MMOL/L (ref 95–110)
CO2 SERPL-SCNC: 25 MMOL/L (ref 23–29)
CREAT SERPL-MCNC: 1 MG/DL (ref 0.5–1.4)
DIFFERENTIAL METHOD: ABNORMAL
EOSINOPHIL # BLD AUTO: 0.1 K/UL (ref 0–0.5)
EOSINOPHIL NFR BLD: 1.3 % (ref 0–8)
ERYTHROCYTE [DISTWIDTH] IN BLOOD BY AUTOMATED COUNT: 15.4 % (ref 11.5–14.5)
EST. GFR  (AFRICAN AMERICAN): >60 ML/MIN/1.73 M^2
EST. GFR  (NON AFRICAN AMERICAN): >60 ML/MIN/1.73 M^2
GLUCOSE SERPL-MCNC: 100 MG/DL (ref 70–110)
HCT VFR BLD AUTO: 44.4 % (ref 40–54)
HGB BLD-MCNC: 14.5 G/DL (ref 14–18)
LYMPHOCYTES # BLD AUTO: 1.4 K/UL (ref 1–4.8)
LYMPHOCYTES NFR BLD: 20.5 % (ref 18–48)
MCH RBC QN AUTO: 28.3 PG (ref 27–31)
MCHC RBC AUTO-ENTMCNC: 32.7 G/DL (ref 32–36)
MCV RBC AUTO: 87 FL (ref 82–98)
MONOCYTES # BLD AUTO: 0.7 K/UL (ref 0.3–1)
MONOCYTES NFR BLD: 10.6 % (ref 4–15)
NEUTROPHILS # BLD AUTO: 4.6 K/UL (ref 1.8–7.7)
NEUTROPHILS NFR BLD: 67.5 % (ref 38–73)
PLATELET # BLD AUTO: 193 K/UL (ref 150–350)
PMV BLD AUTO: 9.9 FL (ref 9.2–12.9)
POTASSIUM SERPL-SCNC: 4.1 MMOL/L (ref 3.5–5.1)
PROT SERPL-MCNC: 7.1 G/DL (ref 6–8.4)
RBC # BLD AUTO: 5.12 M/UL (ref 4.6–6.2)
SODIUM SERPL-SCNC: 142 MMOL/L (ref 136–145)
TACROLIMUS BLD-MCNC: 5.4 NG/ML (ref 5–15)
WBC # BLD AUTO: 6.79 K/UL (ref 3.9–12.7)

## 2019-06-17 PROCEDURE — 85025 COMPLETE CBC W/AUTO DIFF WBC: CPT

## 2019-06-17 PROCEDURE — 86833 HLA CLASS II HIGH DEFIN QUAL: CPT | Mod: 59

## 2019-06-17 PROCEDURE — 86832 HLA CLASS I HIGH DEFIN QUAL: CPT | Mod: 59

## 2019-06-17 PROCEDURE — 80197 ASSAY OF TACROLIMUS: CPT

## 2019-06-17 PROCEDURE — 86833 HLA CLASS II HIGH DEFIN QUAL: CPT

## 2019-06-17 PROCEDURE — 83880 ASSAY OF NATRIURETIC PEPTIDE: CPT

## 2019-06-17 PROCEDURE — 36415 COLL VENOUS BLD VENIPUNCTURE: CPT

## 2019-06-17 PROCEDURE — 86832 HLA CLASS I HIGH DEFIN QUAL: CPT

## 2019-06-17 PROCEDURE — 80053 COMPREHEN METABOLIC PANEL: CPT

## 2019-06-17 PROCEDURE — 86977 RBC SERUM PRETX INCUBJ/INHIB: CPT

## 2019-06-20 DIAGNOSIS — Z94.1 STATUS POST HEART TRANSPLANT: Primary | ICD-10-CM

## 2019-06-20 RX ORDER — PREDNISONE 2.5 MG/1
2.5 TABLET ORAL DAILY
Qty: 30 TABLET | Refills: 11 | Status: SHIPPED | OUTPATIENT
Start: 2019-06-20 | End: 2019-08-29 | Stop reason: SDUPTHER

## 2019-06-20 NOTE — TELEPHONE ENCOUNTER
Reviewed labs and C1Q with Dr. Hawkins. Will continue 2.5 mg prednisone with C1Q present and re-evaluate in august for discontinuing prednisone.

## 2019-08-19 ENCOUNTER — OFFICE VISIT (OUTPATIENT)
Dept: TRANSPLANT | Facility: CLINIC | Age: 29
End: 2019-08-19
Attending: INTERNAL MEDICINE
Payer: COMMERCIAL

## 2019-08-19 ENCOUNTER — HOSPITAL ENCOUNTER (OUTPATIENT)
Dept: CARDIOLOGY | Facility: CLINIC | Age: 29
Discharge: HOME | End: 2019-08-19
Attending: INTERNAL MEDICINE
Payer: COMMERCIAL

## 2019-08-19 VITALS
HEART RATE: 88 BPM | HEIGHT: 67 IN | WEIGHT: 176 LBS | BODY MASS INDEX: 27.62 KG/M2 | SYSTOLIC BLOOD PRESSURE: 124 MMHG | DIASTOLIC BLOOD PRESSURE: 73 MMHG

## 2019-08-19 VITALS
WEIGHT: 176.38 LBS | SYSTOLIC BLOOD PRESSURE: 124 MMHG | DIASTOLIC BLOOD PRESSURE: 73 MMHG | HEIGHT: 67 IN | BODY MASS INDEX: 27.68 KG/M2 | HEART RATE: 91 BPM

## 2019-08-19 DIAGNOSIS — R00.2 PALPITATIONS: ICD-10-CM

## 2019-08-19 DIAGNOSIS — E78.5 HYPERLIPIDEMIA LDL GOAL <70: ICD-10-CM

## 2019-08-19 DIAGNOSIS — G43.109 MIGRAINE WITH AURA AND WITHOUT STATUS MIGRAINOSUS, NOT INTRACTABLE: ICD-10-CM

## 2019-08-19 DIAGNOSIS — Z79.899 LONG TERM CURRENT USE OF IMMUNOSUPPRESSIVE DRUG: ICD-10-CM

## 2019-08-19 DIAGNOSIS — Z94.1 STATUS POST HEART TRANSPLANT: ICD-10-CM

## 2019-08-19 DIAGNOSIS — Z94.1 HEART TRANSPLANTED: Primary | ICD-10-CM

## 2019-08-19 PROBLEM — L60.0 INGROWN TOENAIL OF LEFT FOOT WITH INFECTION: Status: RESOLVED | Noted: 2018-11-19 | Resolved: 2019-08-19

## 2019-08-19 LAB
ASCENDING AORTA: 2.84 CM
AV INDEX (PROSTH): 0.7
AV MEAN GRADIENT: 2 MMHG
AV PEAK GRADIENT: 4 MMHG
AV VALVE AREA: 3.03 CM2
AV VELOCITY RATIO: 0.68
BSA FOR ECHO PROCEDURE: 1.94 M2
CV ECHO LV RWT: 0.36 CM
DOP CALC AO PEAK VEL: 0.99 M/S
DOP CALC AO VTI: 17.68 CM
DOP CALC LVOT AREA: 4.3 CM2
DOP CALC LVOT DIAMETER: 2.35 CM
DOP CALC LVOT PEAK VEL: 0.67 M/S
DOP CALC LVOT STROKE VOLUME: 53.54 CM3
DOP CALCLVOT PEAK VEL VTI: 12.35 CM
E WAVE DECELERATION TIME: 103.93 MSEC
E/A RATIO: 1.57
E/E' RATIO: 8.25 M/S
ECHO LV POSTERIOR WALL: 0.81 CM (ref 0.6–1.1)
FRACTIONAL SHORTENING: 32 % (ref 28–44)
INTERVENTRICULAR SEPTUM: 1.04 CM (ref 0.6–1.1)
LEFT INTERNAL DIMENSION IN SYSTOLE: 3.04 CM (ref 2.1–4)
LEFT VENTRICLE DIASTOLIC VOLUME INDEX: 48.09 ML/M2
LEFT VENTRICLE DIASTOLIC VOLUME: 92.09 ML
LEFT VENTRICLE MASS INDEX: 72 G/M2
LEFT VENTRICLE SYSTOLIC VOLUME INDEX: 18.9 ML/M2
LEFT VENTRICLE SYSTOLIC VOLUME: 36.28 ML
LEFT VENTRICULAR INTERNAL DIMENSION IN DIASTOLE: 4.49 CM (ref 3.5–6)
LEFT VENTRICULAR MASS: 137.32 G
LV LATERAL E/E' RATIO: 8.25 M/S
LV SEPTAL E/E' RATIO: 8.25 M/S
MV PEAK A VEL: 0.42 M/S
MV PEAK E VEL: 0.66 M/S
RA PRESSURE: 3 MMHG
RIGHT VENTRICULAR END-DIASTOLIC DIMENSION: 2.24 CM
SINUS: 3.39 CM
STJ: 2.78 CM
TDI LATERAL: 0.08 M/S
TDI SEPTAL: 0.08 M/S
TDI: 0.08 M/S

## 2019-08-19 PROCEDURE — 99999 PR PBB SHADOW E&M-EST. PATIENT-LVL III: ICD-10-PCS | Mod: PBBFAC,,, | Performed by: INTERNAL MEDICINE

## 2019-08-19 PROCEDURE — 93306 TTE W/DOPPLER COMPLETE: CPT | Mod: S$GLB,,, | Performed by: INTERNAL MEDICINE

## 2019-08-19 PROCEDURE — 99999 PR PBB SHADOW E&M-EST. PATIENT-LVL III: CPT | Mod: PBBFAC,,, | Performed by: INTERNAL MEDICINE

## 2019-08-19 PROCEDURE — 93306 TRANSTHORACIC ECHO (TTE) COMPLETE: ICD-10-PCS | Mod: S$GLB,,, | Performed by: INTERNAL MEDICINE

## 2019-08-19 PROCEDURE — 99215 PR OFFICE/OUTPT VISIT, EST, LEVL V, 40-54 MIN: ICD-10-PCS | Mod: S$GLB,,, | Performed by: INTERNAL MEDICINE

## 2019-08-19 PROCEDURE — 99215 OFFICE O/P EST HI 40 MIN: CPT | Mod: S$GLB,,, | Performed by: INTERNAL MEDICINE

## 2019-08-19 NOTE — PROGRESS NOTES
"Subjective:   Mr. Samayoa is a 29 y.o. year old White male who received a  - brain death heart transplant on 18.      CMV status:   Donor: +  Recipient: -    HPI  30 yo WM prior to heart transplant suffered with familial cardiomyopathy underwent orthotopic heart transplant 18.  Now on cellcept 1000 mg BID, tacro 1 mg BID, prednisone 2.5 mg qd.  Left on prednisone for +C1q's, no rejection episodes.  He feels great without CV complaints.    Review of Systems   Constitution: Positive for weight gain (he does not note at home but slow trend up on our scale). Negative for chills, fever, malaise/fatigue, night sweats and weight loss.   HENT: Negative for congestion, hoarse voice, odynophagia and sore throat.    Cardiovascular: Positive for palpitations (Still says that he feels daily without change; w/u was negative). Negative for chest pain, claudication, dyspnea on exertion, irregular heartbeat, leg swelling, near-syncope, orthopnea and paroxysmal nocturnal dyspnea.   Respiratory: Negative for cough, hemoptysis, shortness of breath, sputum production and wheezing.    Hematologic/Lymphatic: Does not bruise/bleed easily.   Skin: Negative for suspicious lesions.   Musculoskeletal: Negative for arthritis, back pain, joint pain, joint swelling and myalgias.   Gastrointestinal: Negative for abdominal pain, anorexia, change in bowel habit, diarrhea, hematemesis, hematochezia and melena.   Genitourinary: Negative for dysuria, flank pain, frequency and hematuria.   Neurological: Positive for headaches (headaches resolved for most part on pamelor  ). Negative for brief paralysis, dizziness, focal weakness, loss of balance, numbness, paresthesias, seizures and weakness.   Psychiatric/Behavioral: Negative for altered mental status and substance abuse.     Objective:   Blood pressure 124/73, pulse 91, height 5' 7" (1.702 m), weight 80 kg (176 lb 5.9 oz).body mass index is 27.62 kg/m².  Physical Exam " "  Constitutional: He is oriented to person, place, and time. He appears well-developed and well-nourished. No distress.   /73 (BP Location: Right arm, Patient Position: Sitting, BP Method: Medium (Automatic))   Pulse 91   Ht 5' 7" (1.702 m)   Wt 80 kg (176 lb 5.9 oz)   BMI 27.62 kg/m²    HENT:   Head: Normocephalic and atraumatic.   Eyes: Conjunctivae are normal. Right eye exhibits no discharge. Left eye exhibits no discharge. No scleral icterus.   Neck: No JVD present. No tracheal deviation present. No thyromegaly present.   Cardiovascular: Normal rate, regular rhythm and normal heart sounds. Exam reveals no gallop.   No murmur heard.  Pulmonary/Chest: Effort normal and breath sounds normal.   Abdominal: Soft. Bowel sounds are normal. He exhibits no distension and no mass. There is no tenderness. There is no rebound and no guarding.   Musculoskeletal: He exhibits no edema or tenderness.   Lymphadenopathy:     He has no cervical adenopathy.   Neurological: He is alert and oriented to person, place, and time.   Skin: Skin is warm and dry. He is not diaphoretic.   Psychiatric: He has a normal mood and affect. His behavior is normal. Judgment and thought content normal.     8/19/19 ECHO Conclusion   · Post cardiac transplantation study  · Normal left ventricular systolic function. The estimated ejection fraction is 55%  · Normal right ventricular systolic function.  · Normal LV diastolic function.  · Normal central venous pressure (3 mm Hg).        Lab Results   Component Value Date    BNP 46 08/19/2019     08/19/2019    K 4.1 08/19/2019    MG 2.0 02/01/2019     08/19/2019    CO2 27 08/19/2019    BUN 13 08/19/2019    CREATININE 1.0 08/19/2019    GLU 85 08/19/2019    AST 22 08/19/2019    ALT 29 08/19/2019    ALBUMIN 4.6 08/19/2019    PROT 7.3 08/19/2019    BILITOT 0.6 08/19/2019    WBC 7.03 08/19/2019    HGB 15.6 08/19/2019    HCT 49.0 08/19/2019     08/19/2019    TSH 1.473 02/01/2019    " CHOL 108 (L) 08/19/2019    HDL 38 (L) 08/19/2019    LDLCALC 44.2 (L) 08/19/2019    TRIG 129 08/19/2019    P6ACJEQ 8.1 02/01/2019    TACROLIMUS 5.5 08/19/2019    ALLOMAP See result image under hyperlink 08/19/2019   Allomap 21 up from 25    CMV negative    DSA Class 1 and 2 both negative    Assessment:     1. Heart transplanted    2. Long term current use of immunosuppressive drug    3. Hyperlipidemia LDL goal <70    4. Palpitations    5. Migraine with aura and without status migrainosus, not intractable      Plan:   Will biopsy next week decided after Allomap returned    If bx is negative then will discuss at chart review whether to leave on low dose steroids for prior C1q or taper off and observe closely    Return instructions as set forth by post transplant schedule or as needed:    Clinic: Return for labs and/or biopsy weekly the first month, every two weeks during month 2 and then monthly for the first year at the provider or coordinator's discretion. During the second year, return to clinic every 3 months. Post transplant year 3-5 return every 6 months. There will be a comprehensive post transplant evaluation every year that may include LHC/RHC/biopsy, stress test, echo, CXR, and other health screening exams.    In addition to the clinical assessment, I have ordered Allomap testing for this patient to assist in identification of moderate/severe acute cellular rejection (ACR) in a pt with stable Allograft function instead of endomyocardial biopsy.     Patient is reminded to call with any health changes as these can be early signs of transplant complications. Patient is advised to make sure any new medications or changes of old medications are discussed with a pharmacist or physician knowledgeable with transplant to avoid rejection/drug toxicity related to significant drug interactions.    UNOS Patient Status  Functional Status: 100% - Normal, no complaints, no evidence of disease  Physical Capacity: No  Limitations  Working for Income: yes  If yes, working activity level: Working Full Time    Hernán Tidwell Jr, MD

## 2019-08-21 ENCOUNTER — TELEPHONE (OUTPATIENT)
Dept: TRANSPLANT | Facility: CLINIC | Age: 29
End: 2019-08-21

## 2019-08-21 DIAGNOSIS — Z94.1 STATUS POST HEART TRANSPLANT: Primary | ICD-10-CM

## 2019-08-21 NOTE — TELEPHONE ENCOUNTER
Reviewed chart with Dr. Hawkins. Allomap increased from 25 to 31. With last increase in Sept 2018, of allomap, pt's EGBX was ISHLT 1. Will biopsy next week with labs before.

## 2019-08-27 ENCOUNTER — HOSPITAL ENCOUNTER (OUTPATIENT)
Facility: HOSPITAL | Age: 29
Discharge: HOME | End: 2019-08-27
Attending: INTERNAL MEDICINE | Admitting: INTERNAL MEDICINE
Payer: COMMERCIAL

## 2019-08-27 VITALS
SYSTOLIC BLOOD PRESSURE: 141 MMHG | DIASTOLIC BLOOD PRESSURE: 85 MMHG | BODY MASS INDEX: 26.68 KG/M2 | HEIGHT: 67 IN | WEIGHT: 170 LBS | HEART RATE: 99 BPM

## 2019-08-27 DIAGNOSIS — Z94.1 S/P ORTHOTOPIC HEART TRANSPLANT: ICD-10-CM

## 2019-08-27 DIAGNOSIS — Z94.1 STATUS POST HEART TRANSPLANT: ICD-10-CM

## 2019-08-27 LAB — BSA FOR ECHO PROCEDURE: 1.91 M2

## 2019-08-27 PROCEDURE — 93505 ENDOMYOCARDIAL BIOPSY: CPT | Mod: 26,,, | Performed by: INTERNAL MEDICINE

## 2019-08-27 PROCEDURE — 25000003 PHARM REV CODE 250: Performed by: INTERNAL MEDICINE

## 2019-08-27 PROCEDURE — 88307 TISSUE SPECIMEN TO PATHOLOGY - SURGERY: ICD-10-PCS | Mod: 26,,, | Performed by: PATHOLOGY

## 2019-08-27 PROCEDURE — 88342 TISSUE SPECIMEN TO PATHOLOGY - SURGERY: ICD-10-PCS | Mod: 26,,, | Performed by: PATHOLOGY

## 2019-08-27 PROCEDURE — C1894 INTRO/SHEATH, NON-LASER: HCPCS | Performed by: INTERNAL MEDICINE

## 2019-08-27 PROCEDURE — 88307 TISSUE EXAM BY PATHOLOGIST: CPT | Performed by: PATHOLOGY

## 2019-08-27 PROCEDURE — 93505 ENDOMYOCARDIAL BIOPSY: CPT | Performed by: INTERNAL MEDICINE

## 2019-08-27 PROCEDURE — 88307 TISSUE EXAM BY PATHOLOGIST: CPT | Mod: 26,,, | Performed by: PATHOLOGY

## 2019-08-27 PROCEDURE — 27201423 OPTIME MED/SURG SUP & DEVICES STERILE SUPPLY: Performed by: INTERNAL MEDICINE

## 2019-08-27 PROCEDURE — 88342 IMHCHEM/IMCYTCHM 1ST ANTB: CPT | Mod: 26,,, | Performed by: PATHOLOGY

## 2019-08-27 PROCEDURE — 93505 PR BIOPSY OF HEART LINING: ICD-10-PCS | Mod: 26,,, | Performed by: INTERNAL MEDICINE

## 2019-08-27 PROCEDURE — 88342 IMHCHEM/IMCYTCHM 1ST ANTB: CPT | Mod: 59 | Performed by: PATHOLOGY

## 2019-08-27 RX ORDER — LIDOCAINE HYDROCHLORIDE 20 MG/ML
INJECTION, SOLUTION INFILTRATION; PERINEURAL
Status: DISCONTINUED | OUTPATIENT
Start: 2019-08-27 | End: 2019-08-27 | Stop reason: HOSPADM

## 2019-08-27 RX ORDER — SODIUM CHLORIDE 0.9 G/100ML
IRRIGANT IRRIGATION
Status: DISCONTINUED | OUTPATIENT
Start: 2019-08-27 | End: 2019-08-27 | Stop reason: HOSPADM

## 2019-08-27 NOTE — DISCHARGE INSTRUCTIONS

## 2019-08-27 NOTE — H&P
Subjective:      Mert Samayoa is a 29 y.o. male with a who needs echo biopsy for evaluation of cardiac rejection due to concerns that allomap increased from 25 to 31 recently. With last increase in Sept 2018, of allomap, pt's EGBX was ISHLT 1.  Currently able to lay flat.      Past Medical History:   Diagnosis Date    Cardiogenic shock 1/16/2017    Cardiomyopathy     Familial cardiomyopathy    CHF (congestive heart failure)     Encounter for blood transfusion     Essential hypertension 12/21/2016    Essential hypertension 3/20/2018    Familial Cardiomyopathy     Familial cardiomyopathy     Hyperlipidemia LDL goal <70 11/19/2018     Past Surgical History:   Procedure Laterality Date    ANGIOGRAM, CORONARY ARTERY Right 2/14/2019    Performed by Tello Correia MD at [facility] CATH LAB    Biopsy, Cardiac  2/14/2019    Performed by Tello Correia MD at [facility] CATH LAB    BIOPSY-ECHO GUIDED N/A 4/17/2018    Performed by Sammi Tapia MD at [facility] CATH LAB    BIOPSY-ECHO GUIDED N/A 4/3/2018    Performed by Marisel Lundberg MD at [facility] CATH LAB    BIOPSY-ECHO GUIDED N/A 3/20/2018    Performed by Marisel Lundberg MD at [facility] CATH LAB    BIOPSY-ECHO GUIDED N/A 3/13/2018    Performed by Hernán Tidwell Jr., MD at [facility] CATH LAB    BIOPSY-ECHO GUIDED N/A 3/6/2018    Performed by Guillermo Daniels MD at [facility] CATH LAB    CLOSURE-STERNAL WOUND N/A 2/2/2017    Performed by Lex Macedo MD at [facility] OR 2ND FLR    EXPLORATION-BLEEDING (RE-EXPLORATION) Bilateral 2/19/2018    Performed by Raj Klein MD at [facility] OR [facility]ND [facility]LR    HEART TRANSPLANT      INSERTION, CATHETER, RIGHT HEART Right 2/14/2019    Performed by Tello Correia MD at [facility] CATH LAB    INSERTION-ICD-SINGLE N/A 3/29/2017    Performed by Jeremiah Trujillo MD at [facility] CATH LAB    INSERTION-INTRAAORTIC BALLOON PUMP (IABP) Left 1/30/2017    Performed by Carlos Conner MD at [facility] CATH LAB    INSERTION-LEFT VENTRICULAR ASSIST  DEVICE N/A 2017    Performed by Lex [facility]o MD at [facility] OR 2ND [facility]    IVUS, Coronary Right 2019    Performed by Tello Correia MD at [facility] CATH LAB    LEFT VENTRICULAR ASSIST DEVICE      PLACEMENT-NEOPERICARDIUM N/A 2017    Performed by Lex Macedo MD at [facility] OR 2ND [facility]LR    REMOVAL-Abdominal tubular structure N/A 2018    Performed by Raj Klein MD at [facility] OR [facility]ND [facility]LR    REMOVAL-AICD Right 2018    Performed by Raj Klein MD at [facility] OR [facility]ND [facility]LR    Remove LVAD N/A 2018    Performed by Raj Klein MD at [facility] OR [facility]ND [facility]LR    TONSILLECTOMY      TRANSPLANT-HEART-with standard backbench preparation. N/A 2018    Performed by Raj Klein MD at [facility] OR 2ND [facility]LR     Social History     Socioeconomic History    Marital status:      Spouse name: Not on file    Number of children: Not on file    Years of education: Not on file    Highest education level: Not on file   Occupational History    Not on file   Social Needs    Financial resource strain: Not on file    Food insecurity:     Worry: Not on file     Inability: Not on file    Transportation needs:     Medical: Not on file     Non-medical: Not on file   Tobacco Use    Smoking status: Former Smoker     Packs/day: 1.00     Last attempt to quit: 2016     Years since quittin.7    Smokeless tobacco: Never Used   Substance and Sexual Activity    Alcohol use: No    Drug use: No    Sexual activity: Not on file   Lifestyle    Physical activity:     Days per week: Not on file     Minutes per session: Not on file    Stress: Not on file   Relationships    Social connections:     Talks on phone: Not on file     Gets together: Not on file     Attends Yazidi service: Not on file     Active member of club or organization: Not on file     Attends meetings of clubs or organizations: Not on file     Relationship status: Not on file   Other Topics Concern    Not on file   Social  History Narrative    Works      Family History   Problem Relation Age of Onset    Arthritis Mother     Heart disease Brother         cardiomyopathy and heart transplant    No Known Problems Father     Heart disease Brother         cardiomyopathy and heart transplanted twice    Birth defects Paternal Uncle            Other significant clinical information:  Review of patient's allergies indicates:  No Known Allergies  No current facility-administered medications on file prior to encounter.      Current Outpatient Medications on File Prior to Encounter   Medication Sig    aspirin (ECOTRIN) 81 MG EC tablet Take 1 tablet (81 mg total) by mouth once daily.    atorvastatin (LIPITOR) 20 MG tablet Take 1 tablet (20 mg total) by mouth once daily.    calcium carbonate-vit D3-min 600 mg calcium- 200 unit Tab Take 1 tablet by mouth once daily.     famotidine (PEPCID) 40 MG tablet Take 1 tablet (40 mg total) by mouth every evening.    magnesium oxide (MAG-OX) 400 mg tablet Take 1 tablet (400 mg total) by mouth 2 (two) times daily.    mycophenolate (CELLCEPT) 250 mg Cap Take 4 capsules (1,000 mg total) by mouth 2 (two) times daily.    nortriptyline (PAMELOR) 10 MG capsule Take one nightly for 3 weeks, then may increase to 2 nighlty (Patient taking differently: Take 10 mg by mouth every evening. )    predniSONE (DELTASONE) 2.5 MG tablet Take 1 tablet (2.5 mg total) by mouth once daily.    tacrolimus (PROGRAF) 1 MG Cap Take 1 capsule (1 mg total) by mouth every 12 (twelve) hours.            Objective:      Physical Exam      General Appearance:    Alert, cooperative, no distress, appears stated age   Head:    Normocephalic, without obvious abnormality, atraumatic   Eyes:    PERRL, conjunctiva/corneas clear, EOM's intact, fundi     benign, both eyes        Ears:    Normal TM's and external ear canals, both ears   Nose:   Nares normal, septum midline, mucosa normal, no drainage    or sinus  tenderness   Throat:   Lips, mucosa, and tongue normal; teeth and gums normal   Neck:   Supple, symmetrical, trachea midline, no adenopathy;        thyroid:  No enlargement/tenderness/nodules; no carotid    bruit or JVD   Back:     Symmetric, no curvature, ROM normal, no CVA tenderness   Lungs:     Clear to auscultation bilaterally, respirations unlabored   Chest wall:    No tenderness or deformity   Heart:    Regular rate and rhythm, S1 and S2 normal, no murmur, rub   or gallop   Abdomen:     Soft, non-tender, bowel sounds active all four quadrants,     no masses, no organomegaly   Genitalia:    Normal male without lesion, discharge or tenderness   Rectal:    Normal tone, normal prostate, no masses or tenderness;    guaiac negative stool   Extremities:   Extremities normal, atraumatic, no cyanosis or edema   Pulses:   2+ and symmetric all extremities   Skin:   Skin color, texture, turgor normal, no rashes or lesions   Lymph nodes:   Cervical, supraclavicular, and axillary nodes normal   Neurologic:   CNII-XII intact. Normal strength, sensation and reflexes       throughout         Lab Review   Lab Results   Component Value Date    WBC 7.03 08/19/2019    HGB 15.6 08/19/2019    HCT 49.0 08/19/2019    MCV 90 08/19/2019     08/19/2019     Lab Results   Component Value Date    INR 1.1 04/03/2018    INR 1.1 03/03/2018    INR 1.1 03/02/2018           Assessment:       Plan:     I have explained the risks, benefits, and alternatives of the procedure in detail.  The patient expresses understanding and all questions have been answered.  The patient agrees to the proceed as planned.  Echo biopsy via RIJ   Micropuncture access needle will be used to minimize bleeding risk.

## 2019-08-27 NOTE — Clinical Note
Biopsy device collected a biopsy of the RV under echocardiographic guidance. Biopsy sample count is 5.

## 2019-08-27 NOTE — DISCHARGE SUMMARY
Admit 08/27/2019  Discharge  08/27/2019  Discharge / Principle diagnosis heart transplant    Discharge attending Mario Conner MD  Hospital course.  Came in for echo biopsy. Tolerated procedure well (see full results in cardiac procedure section).  Results discussed with patient / caregiver.   Discharge condition / disposition Good / home   discharge activity activity as tolerated    Discharge diet diet as tolerated / unchanged from admission  Discharge medication   No current facility-administered medications on file prior to encounter.      Current Outpatient Medications on File Prior to Encounter   Medication Sig    aspirin (ECOTRIN) 81 MG EC tablet Take 1 tablet (81 mg total) by mouth once daily.    atorvastatin (LIPITOR) 20 MG tablet Take 1 tablet (20 mg total) by mouth once daily.    calcium carbonate-vit D3-min 600 mg calcium- 200 unit Tab Take 1 tablet by mouth once daily.     famotidine (PEPCID) 40 MG tablet Take 1 tablet (40 mg total) by mouth every evening.    magnesium oxide (MAG-OX) 400 mg tablet Take 1 tablet (400 mg total) by mouth 2 (two) times daily.    mycophenolate (CELLCEPT) 250 mg Cap Take 4 capsules (1,000 mg total) by mouth 2 (two) times daily.    nortriptyline (PAMELOR) 10 MG capsule Take one nightly for 3 weeks, then may increase to 2 nighlty (Patient taking differently: Take 10 mg by mouth every evening. )    predniSONE (DELTASONE) 2.5 MG tablet Take 1 tablet (2.5 mg total) by mouth once daily.    tacrolimus (PROGRAF) 1 MG Cap Take 1 capsule (1 mg total) by mouth every 12 (twelve) hours.     Followup in clinic as scheduled

## 2019-08-27 NOTE — H&P (VIEW-ONLY)
Subjective:      Mert Samayoa is a 29 y.o. male with a who needs echo biopsy for evaluation of cardiac rejection due to concerns that allomap increased from 25 to 31 recently. With last increase in Sept 2018, of allomap, pt's EGBX was ISHLT 1.  Currently able to lay flat.      Past Medical History:   Diagnosis Date    Cardiogenic shock 1/16/2017    Cardiomyopathy     Familial cardiomyopathy    CHF (congestive heart failure)     Encounter for blood transfusion     Essential hypertension 12/21/2016    Essential hypertension 3/20/2018    Familial Cardiomyopathy     Familial cardiomyopathy     Hyperlipidemia LDL goal <70 11/19/2018     Past Surgical History:   Procedure Laterality Date    ANGIOGRAM, CORONARY ARTERY Right 2/14/2019    Performed by Tello Correia MD at [facility] CATH LAB    Biopsy, Cardiac  2/14/2019    Performed by Tello Correia MD at [facility] CATH LAB    BIOPSY-ECHO GUIDED N/A 4/17/2018    Performed by Sammi Tapia MD at [facility] CATH LAB    BIOPSY-ECHO GUIDED N/A 4/3/2018    Performed by Marisel Lundberg MD at [facility] CATH LAB    BIOPSY-ECHO GUIDED N/A 3/20/2018    Performed by Marisel Lundberg MD at [facility] CATH LAB    BIOPSY-ECHO GUIDED N/A 3/13/2018    Performed by Hernán Tidwell Jr., MD at [facility] CATH LAB    BIOPSY-ECHO GUIDED N/A 3/6/2018    Performed by Guillermo Daniels MD at [facility] CATH LAB    CLOSURE-STERNAL WOUND N/A 2/2/2017    Performed by Lex Macedo MD at [facility] OR 2ND FLR    EXPLORATION-BLEEDING (RE-EXPLORATION) Bilateral 2/19/2018    Performed by Raj Klein MD at [facility] OR 2ND [facility]LR    HEART TRANSPLANT      INSERTION, CATHETER, RIGHT HEART Right 2/14/2019    Performed by Tello Correia MD at [facility] CATH LAB    INSERTION-ICD-SINGLE N/A 3/29/2017    Performed by Jeermiah Trujillo MD at [facility] CATH LAB    INSERTION-INTRAAORTIC BALLOON PUMP (IABP) Left 1/30/2017    Performed by Carlos Conner MD at [facility] CATH LAB    INSERTION-LEFT VENTRICULAR ASSIST  DEVICE N/A 2017    Performed by [facility]o MD at [facility] OR 2ND FLR    IVUS, Coronary Right 2019    Performed by Tello Correia MD at [facility] CATH LAB    LEFT VENTRICULAR ASSIST DEVICE      PLACEMENT-NEOPERICARDIUM N/A 2017    Performed by Lex Macedo MD at [facility] OR 2ND FLR    REMOVAL-Abdominal tubular structure N/A 2018    Performed by Raj Klein MD at [facility] OR 2ND [facility]LR    REMOVAL-AICD Right 2018    Performed by Raj Klein MD at [facility] OR 2ND FLR    Remove LVAD N/A 2018    Performed by Raj Klein MD at [facility] OR 2ND [facility]LR    TONSILLECTOMY      TRANSPLANT-HEART-with standard backbench preparation. N/A 2018    Performed by Raj Klein MD at [facility] OR 2ND FLR     Social History     Socioeconomic History    Marital status:      Spouse name: Not on file    Number of children: Not on file    Years of education: Not on file    Highest education level: Not on file   Occupational History    Not on file   Social Needs    Financial resource strain: Not on file    Food insecurity:     Worry: Not on file     Inability: Not on file    Transportation needs:     Medical: Not on file     Non-medical: Not on file   Tobacco Use    Smoking status: Former Smoker     Packs/day: 1.00     Last attempt to quit: 2016     Years since quittin.7    Smokeless tobacco: Never Used   Substance and Sexual Activity    Alcohol use: No    Drug use: No    Sexual activity: Not on file   Lifestyle    Physical activity:     Days per week: Not on file     Minutes per session: Not on file    Stress: Not on file   Relationships    Social connections:     Talks on phone: Not on file     Gets together: Not on file     Attends Adventism service: Not on file     Active member of club or organization: Not on file     Attends meetings of clubs or organizations: Not on file     Relationship status: Not on file   Other Topics Concern    Not on file   Social  History Narrative    Works      Family History   Problem Relation Age of Onset    Arthritis Mother     Heart disease Brother         cardiomyopathy and heart transplant    No Known Problems Father     Heart disease Brother         cardiomyopathy and heart transplanted twice    Birth defects Paternal Uncle            Other significant clinical information:  Review of patient's allergies indicates:  No Known Allergies  No current facility-administered medications on file prior to encounter.      Current Outpatient Medications on File Prior to Encounter   Medication Sig    aspirin (ECOTRIN) 81 MG EC tablet Take 1 tablet (81 mg total) by mouth once daily.    atorvastatin (LIPITOR) 20 MG tablet Take 1 tablet (20 mg total) by mouth once daily.    calcium carbonate-vit D3-min 600 mg calcium- 200 unit Tab Take 1 tablet by mouth once daily.     famotidine (PEPCID) 40 MG tablet Take 1 tablet (40 mg total) by mouth every evening.    magnesium oxide (MAG-OX) 400 mg tablet Take 1 tablet (400 mg total) by mouth 2 (two) times daily.    mycophenolate (CELLCEPT) 250 mg Cap Take 4 capsules (1,000 mg total) by mouth 2 (two) times daily.    nortriptyline (PAMELOR) 10 MG capsule Take one nightly for 3 weeks, then may increase to 2 nighlty (Patient taking differently: Take 10 mg by mouth every evening. )    predniSONE (DELTASONE) 2.5 MG tablet Take 1 tablet (2.5 mg total) by mouth once daily.    tacrolimus (PROGRAF) 1 MG Cap Take 1 capsule (1 mg total) by mouth every 12 (twelve) hours.            Objective:      Physical Exam      General Appearance:    Alert, cooperative, no distress, appears stated age   Head:    Normocephalic, without obvious abnormality, atraumatic   Eyes:    PERRL, conjunctiva/corneas clear, EOM's intact, fundi     benign, both eyes        Ears:    Normal TM's and external ear canals, both ears   Nose:   Nares normal, septum midline, mucosa normal, no drainage    or sinus  tenderness   Throat:   Lips, mucosa, and tongue normal; teeth and gums normal   Neck:   Supple, symmetrical, trachea midline, no adenopathy;        thyroid:  No enlargement/tenderness/nodules; no carotid    bruit or JVD   Back:     Symmetric, no curvature, ROM normal, no CVA tenderness   Lungs:     Clear to auscultation bilaterally, respirations unlabored   Chest wall:    No tenderness or deformity   Heart:    Regular rate and rhythm, S1 and S2 normal, no murmur, rub   or gallop   Abdomen:     Soft, non-tender, bowel sounds active all four quadrants,     no masses, no organomegaly   Genitalia:    Normal male without lesion, discharge or tenderness   Rectal:    Normal tone, normal prostate, no masses or tenderness;    guaiac negative stool   Extremities:   Extremities normal, atraumatic, no cyanosis or edema   Pulses:   2+ and symmetric all extremities   Skin:   Skin color, texture, turgor normal, no rashes or lesions   Lymph nodes:   Cervical, supraclavicular, and axillary nodes normal   Neurologic:   CNII-XII intact. Normal strength, sensation and reflexes       throughout         Lab Review   Lab Results   Component Value Date    WBC 7.03 08/19/2019    HGB 15.6 08/19/2019    HCT 49.0 08/19/2019    MCV 90 08/19/2019     08/19/2019     Lab Results   Component Value Date    INR 1.1 04/03/2018    INR 1.1 03/03/2018    INR 1.1 03/02/2018           Assessment:       Plan:     I have explained the risks, benefits, and alternatives of the procedure in detail.  The patient expresses understanding and all questions have been answered.  The patient agrees to the proceed as planned.  Echo biopsy via RIJ   Micropuncture access needle will be used to minimize bleeding risk.

## 2019-08-29 ENCOUNTER — PATIENT MESSAGE (OUTPATIENT)
Dept: TRANSPLANT | Facility: CLINIC | Age: 29
End: 2019-08-29

## 2019-08-29 DIAGNOSIS — Z94.1 STATUS POST HEART TRANSPLANT: ICD-10-CM

## 2019-08-29 RX ORDER — TACROLIMUS 1 MG/1
CAPSULE ORAL
Qty: 90 CAPSULE | Refills: 11 | Status: SHIPPED | OUTPATIENT
Start: 2019-08-29 | End: 2019-09-04

## 2019-08-29 RX ORDER — PREDNISONE 10 MG/1
TABLET ORAL
Qty: 20 TABLET | Refills: 0 | Status: SHIPPED | OUTPATIENT
Start: 2019-08-29 | End: 2019-08-30

## 2019-08-29 RX ORDER — PREDNISONE 5 MG/1
5 TABLET ORAL DAILY
Qty: 30 TABLET | Refills: 6 | Status: SHIPPED | OUTPATIENT
Start: 2019-08-29 | End: 2020-04-30

## 2019-08-29 RX ORDER — PREDNISONE 50 MG/1
50 TABLET ORAL 2 TIMES DAILY
Qty: 6 TABLET | Refills: 0 | Status: SHIPPED | OUTPATIENT
Start: 2019-08-29 | End: 2019-09-01

## 2019-08-29 NOTE — LETTER
August 30, 2019      [facility]  [address]  [facility] [address]  Phone: [phone]       Patient: Mert Samayoa   YOB: 1990  Date of Visit: 8/27/2019    To Whom It May Concern:    Micheal Samayoa  was at [facility] on 8/27/2019.He may return to work/school on 8/28/2019 with no restrictions. If you have any questions or concerns, or if I can be of further assistance, please do not hesitate to contact me. [phone]    Sincerely,      Precious Spicer RN

## 2019-08-29 NOTE — TELEPHONE ENCOUNTER
Received a call from Dr. Rankin regarding pt's biopsy. 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 Dr. Hawkins was present for the call from Dr. Rankin. Decided to treat pt with a steroid taper, leaving pt on prednisone 5 mg daily. Spoke to pt and relayed information to him. Pt [facility]. Increased tac to 1/2 and increased tac goal to 8-10. Pt will have labs drawn on Tuesday to recheck his tac level. Pt stated his understanding with the plan.

## 2019-08-30 RX ORDER — PREDNISONE 10 MG/1
TABLET ORAL
Qty: 20 TABLET | Refills: 0 | Status: SHIPPED | OUTPATIENT
Start: 2019-08-30 | End: 2019-09-25 | Stop reason: ALTCHOICE

## 2019-09-03 ENCOUNTER — LAB VISIT (OUTPATIENT)
Dept: LAB | Facility: HOSPITAL | Age: 29
End: 2019-09-03
Attending: INTERNAL MEDICINE
Payer: COMMERCIAL

## 2019-09-03 DIAGNOSIS — Z94.1 STATUS POST HEART TRANSPLANT: ICD-10-CM

## 2019-09-03 LAB
ALBUMIN SERPL BCP-MCNC: 4.4 G/DL (ref 3.5–5.2)
ALP SERPL-CCNC: 79 U/L (ref 55–135)
ALT SERPL W/O P-5'-P-CCNC: 22 U/L (ref 10–44)
ANION GAP SERPL CALC-SCNC: 11 MMOL/L (ref 8–16)
AST SERPL-CCNC: 13 U/L (ref 10–40)
BASOPHILS # BLD AUTO: 0.01 K/UL (ref 0–0.2)
BASOPHILS NFR BLD: 0.1 % (ref 0–1.9)
BILIRUB SERPL-MCNC: 0.4 MG/DL (ref 0.1–1)
BUN SERPL-MCNC: 22 MG/DL (ref 6–20)
CALCIUM SERPL-MCNC: 9.3 MG/DL (ref 8.7–10.5)
CHLORIDE SERPL-SCNC: 105 MMOL/L (ref 95–110)
CO2 SERPL-SCNC: 24 MMOL/L (ref 23–29)
CREAT SERPL-MCNC: 1.1 MG/DL (ref 0.5–1.4)
DIFFERENTIAL METHOD: ABNORMAL
EOSINOPHIL # BLD AUTO: 0 K/UL (ref 0–0.5)
EOSINOPHIL NFR BLD: 0 % (ref 0–8)
ERYTHROCYTE [DISTWIDTH] IN BLOOD BY AUTOMATED COUNT: 13.4 % (ref 11.5–14.5)
EST. GFR  (AFRICAN AMERICAN): >60 ML/MIN/1.73 M^2
EST. GFR  (NON AFRICAN AMERICAN): >60 ML/MIN/1.73 M^2
GLUCOSE SERPL-MCNC: 120 MG/DL (ref 70–110)
HCT VFR BLD AUTO: 46.9 % (ref 40–54)
HGB BLD-MCNC: 14.9 G/DL (ref 14–18)
IMM GRANULOCYTES # BLD AUTO: 0.3 K/UL (ref 0–0.04)
IMM GRANULOCYTES NFR BLD AUTO: 1.7 % (ref 0–0.5)
LYMPHOCYTES # BLD AUTO: 1 K/UL (ref 1–4.8)
LYMPHOCYTES NFR BLD: 5.7 % (ref 18–48)
MCH RBC QN AUTO: 28 PG (ref 27–31)
MCHC RBC AUTO-ENTMCNC: 31.8 G/DL (ref 32–36)
MCV RBC AUTO: 88 FL (ref 82–98)
MONOCYTES # BLD AUTO: 1.4 K/UL (ref 0.3–1)
MONOCYTES NFR BLD: 7.9 % (ref 4–15)
NEUTROPHILS # BLD AUTO: 14.6 K/UL (ref 1.8–7.7)
NEUTROPHILS NFR BLD: 84.6 % (ref 38–73)
NRBC BLD-RTO: 0 /100 WBC
PLATELET # BLD AUTO: 231 K/UL (ref 150–350)
PMV BLD AUTO: 9.5 FL (ref 9.2–12.9)
POTASSIUM SERPL-SCNC: 4.6 MMOL/L (ref 3.5–5.1)
PROT SERPL-MCNC: 6.7 G/DL (ref 6–8.4)
RBC # BLD AUTO: 5.32 M/UL (ref 4.6–6.2)
SODIUM SERPL-SCNC: 140 MMOL/L (ref 136–145)
TACROLIMUS BLD-MCNC: 7.6 NG/ML (ref 5–15)
WBC # BLD AUTO: 17.22 K/UL (ref 3.9–12.7)

## 2019-09-03 PROCEDURE — 80053 COMPREHEN METABOLIC PANEL: CPT

## 2019-09-03 PROCEDURE — 36415 COLL VENOUS BLD VENIPUNCTURE: CPT

## 2019-09-03 PROCEDURE — 85025 COMPLETE CBC W/AUTO DIFF WBC: CPT

## 2019-09-03 PROCEDURE — 80197 ASSAY OF TACROLIMUS: CPT

## 2019-09-04 DIAGNOSIS — Z94.1 STATUS POST HEART TRANSPLANT: ICD-10-CM

## 2019-09-04 RX ORDER — TACROLIMUS 1 MG/1
CAPSULE ORAL
Qty: 120 CAPSULE | Refills: 11 | Status: SHIPPED | OUTPATIENT
Start: 2019-09-04 | End: 2019-09-13 | Stop reason: DRUGHIGH

## 2019-09-04 NOTE — TELEPHONE ENCOUNTER
Reviewed labs with Dr. Hawkins. Will increase tacro to 2/2 and repeat labs next week. Pt stated his understanding.

## 2019-09-11 ENCOUNTER — LAB VISIT (OUTPATIENT)
Dept: LAB | Facility: HOSPITAL | Age: 29
End: 2019-09-11
Attending: INTERNAL MEDICINE
Payer: COMMERCIAL

## 2019-09-11 DIAGNOSIS — Z94.1 STATUS POST HEART TRANSPLANT: ICD-10-CM

## 2019-09-11 LAB
ALBUMIN SERPL BCP-MCNC: 4.1 G/DL (ref 3.5–5.2)
ALP SERPL-CCNC: 69 U/L (ref 55–135)
ALT SERPL W/O P-5'-P-CCNC: 42 U/L (ref 10–44)
ANION GAP SERPL CALC-SCNC: 10 MMOL/L (ref 8–16)
AST SERPL-CCNC: 22 U/L (ref 10–40)
BASOPHILS # BLD AUTO: 0.01 K/UL (ref 0–0.2)
BASOPHILS NFR BLD: 0.1 % (ref 0–1.9)
BILIRUB SERPL-MCNC: 0.9 MG/DL (ref 0.1–1)
BUN SERPL-MCNC: 19 MG/DL (ref 6–20)
CALCIUM SERPL-MCNC: 9.1 MG/DL (ref 8.7–10.5)
CHLORIDE SERPL-SCNC: 106 MMOL/L (ref 95–110)
CO2 SERPL-SCNC: 25 MMOL/L (ref 23–29)
CREAT SERPL-MCNC: 1.3 MG/DL (ref 0.5–1.4)
DIFFERENTIAL METHOD: ABNORMAL
EOSINOPHIL # BLD AUTO: 0.1 K/UL (ref 0–0.5)
EOSINOPHIL NFR BLD: 1 % (ref 0–8)
ERYTHROCYTE [DISTWIDTH] IN BLOOD BY AUTOMATED COUNT: 13.2 % (ref 11.5–14.5)
EST. GFR  (AFRICAN AMERICAN): >60 ML/MIN/1.73 M^2
EST. GFR  (NON AFRICAN AMERICAN): >60 ML/MIN/1.73 M^2
GLUCOSE SERPL-MCNC: 87 MG/DL (ref 70–110)
HCT VFR BLD AUTO: 48.5 % (ref 40–54)
HGB BLD-MCNC: 15.5 G/DL (ref 14–18)
IMM GRANULOCYTES # BLD AUTO: 0.1 K/UL (ref 0–0.04)
IMM GRANULOCYTES NFR BLD AUTO: 0.9 % (ref 0–0.5)
LYMPHOCYTES # BLD AUTO: 2.4 K/UL (ref 1–4.8)
LYMPHOCYTES NFR BLD: 20.8 % (ref 18–48)
MCH RBC QN AUTO: 27.8 PG (ref 27–31)
MCHC RBC AUTO-ENTMCNC: 32 G/DL (ref 32–36)
MCV RBC AUTO: 87 FL (ref 82–98)
MONOCYTES # BLD AUTO: 1.1 K/UL (ref 0.3–1)
MONOCYTES NFR BLD: 9.9 % (ref 4–15)
NEUTROPHILS # BLD AUTO: 7.8 K/UL (ref 1.8–7.7)
NEUTROPHILS NFR BLD: 67.3 % (ref 38–73)
NRBC BLD-RTO: 0 /100 WBC
PLATELET # BLD AUTO: 187 K/UL (ref 150–350)
PMV BLD AUTO: 10 FL (ref 9.2–12.9)
POTASSIUM SERPL-SCNC: 4.6 MMOL/L (ref 3.5–5.1)
PROT SERPL-MCNC: 6.5 G/DL (ref 6–8.4)
RBC # BLD AUTO: 5.57 M/UL (ref 4.6–6.2)
SODIUM SERPL-SCNC: 141 MMOL/L (ref 136–145)
TACROLIMUS BLD-MCNC: 12.3 NG/ML (ref 5–15)
WBC # BLD AUTO: 11.51 K/UL (ref 3.9–12.7)

## 2019-09-11 PROCEDURE — 80053 COMPREHEN METABOLIC PANEL: CPT

## 2019-09-11 PROCEDURE — 36415 COLL VENOUS BLD VENIPUNCTURE: CPT

## 2019-09-11 PROCEDURE — 85025 COMPLETE CBC W/AUTO DIFF WBC: CPT

## 2019-09-11 PROCEDURE — 80197 ASSAY OF TACROLIMUS: CPT

## 2019-09-13 ENCOUNTER — LAB VISIT (OUTPATIENT)
Dept: LAB | Facility: HOSPITAL | Age: 29
End: 2019-09-13
Attending: INTERNAL MEDICINE
Payer: COMMERCIAL

## 2019-09-13 DIAGNOSIS — Z94.1 STATUS POST HEART TRANSPLANT: ICD-10-CM

## 2019-09-13 LAB
ALBUMIN SERPL BCP-MCNC: 4.2 G/DL (ref 3.5–5.2)
ALP SERPL-CCNC: 71 U/L (ref 55–135)
ALT SERPL W/O P-5'-P-CCNC: 40 U/L (ref 10–44)
ANION GAP SERPL CALC-SCNC: 10 MMOL/L (ref 8–16)
AST SERPL-CCNC: 20 U/L (ref 10–40)
BASOPHILS # BLD AUTO: 0.02 K/UL (ref 0–0.2)
BASOPHILS NFR BLD: 0.2 % (ref 0–1.9)
BILIRUB SERPL-MCNC: 0.7 MG/DL (ref 0.1–1)
BUN SERPL-MCNC: 17 MG/DL (ref 6–20)
CALCIUM SERPL-MCNC: 9.1 MG/DL (ref 8.7–10.5)
CHLORIDE SERPL-SCNC: 105 MMOL/L (ref 95–110)
CO2 SERPL-SCNC: 27 MMOL/L (ref 23–29)
CREAT SERPL-MCNC: 1.2 MG/DL (ref 0.5–1.4)
DIFFERENTIAL METHOD: ABNORMAL
EOSINOPHIL # BLD AUTO: 0.1 K/UL (ref 0–0.5)
EOSINOPHIL NFR BLD: 0.6 % (ref 0–8)
ERYTHROCYTE [DISTWIDTH] IN BLOOD BY AUTOMATED COUNT: 13.2 % (ref 11.5–14.5)
EST. GFR  (AFRICAN AMERICAN): >60 ML/MIN/1.73 M^2
EST. GFR  (NON AFRICAN AMERICAN): >60 ML/MIN/1.73 M^2
GLUCOSE SERPL-MCNC: 88 MG/DL (ref 70–110)
HCT VFR BLD AUTO: 48.5 % (ref 40–54)
HGB BLD-MCNC: 15.5 G/DL (ref 14–18)
IMM GRANULOCYTES # BLD AUTO: 0.07 K/UL (ref 0–0.04)
IMM GRANULOCYTES NFR BLD AUTO: 0.6 % (ref 0–0.5)
LYMPHOCYTES # BLD AUTO: 2.2 K/UL (ref 1–4.8)
LYMPHOCYTES NFR BLD: 18.6 % (ref 18–48)
MCH RBC QN AUTO: 28 PG (ref 27–31)
MCHC RBC AUTO-ENTMCNC: 32 G/DL (ref 32–36)
MCV RBC AUTO: 88 FL (ref 82–98)
MONOCYTES # BLD AUTO: 0.9 K/UL (ref 0.3–1)
MONOCYTES NFR BLD: 7.6 % (ref 4–15)
NEUTROPHILS # BLD AUTO: 8.7 K/UL (ref 1.8–7.7)
NEUTROPHILS NFR BLD: 72.4 % (ref 38–73)
NRBC BLD-RTO: 0 /100 WBC
PLATELET # BLD AUTO: 182 K/UL (ref 150–350)
PMV BLD AUTO: 9.7 FL (ref 9.2–12.9)
POTASSIUM SERPL-SCNC: 4.5 MMOL/L (ref 3.5–5.1)
PROT SERPL-MCNC: 6.6 G/DL (ref 6–8.4)
RBC # BLD AUTO: 5.54 M/UL (ref 4.6–6.2)
SODIUM SERPL-SCNC: 142 MMOL/L (ref 136–145)
TACROLIMUS BLD-MCNC: 14.6 NG/ML (ref 5–15)
WBC # BLD AUTO: 12.03 K/UL (ref 3.9–12.7)

## 2019-09-13 PROCEDURE — 80197 ASSAY OF TACROLIMUS: CPT

## 2019-09-13 PROCEDURE — 36415 COLL VENOUS BLD VENIPUNCTURE: CPT

## 2019-09-13 PROCEDURE — 85025 COMPLETE CBC W/AUTO DIFF WBC: CPT

## 2019-09-13 PROCEDURE — 80053 COMPREHEN METABOLIC PANEL: CPT

## 2019-09-13 RX ORDER — TACROLIMUS 0.5 MG/1
CAPSULE ORAL
Qty: 210 CAPSULE | Refills: 6 | Status: SHIPPED | OUTPATIENT
Start: 2019-09-13 | End: 2019-11-06 | Stop reason: SDUPTHER

## 2019-09-13 NOTE — TELEPHONE ENCOUNTER
Reviewed labs with Dr. Hawkins. Will decrease tac to 1.5/2 and repeat labs next week. Pt stated his understanding.

## 2019-09-19 ENCOUNTER — LAB VISIT (OUTPATIENT)
Dept: LAB | Facility: HOSPITAL | Age: 29
End: 2019-09-19
Attending: INTERNAL MEDICINE
Payer: COMMERCIAL

## 2019-09-19 ENCOUNTER — TELEPHONE (OUTPATIENT)
Dept: TRANSPLANT | Facility: CLINIC | Age: 29
End: 2019-09-19

## 2019-09-19 DIAGNOSIS — Z94.1 STATUS POST HEART TRANSPLANT: ICD-10-CM

## 2019-09-19 LAB
ALBUMIN SERPL BCP-MCNC: 4.4 G/DL (ref 3.5–5.2)
ALP SERPL-CCNC: 82 U/L (ref 55–135)
ALT SERPL W/O P-5'-P-CCNC: 39 U/L (ref 10–44)
ANION GAP SERPL CALC-SCNC: 13 MMOL/L (ref 8–16)
AST SERPL-CCNC: 19 U/L (ref 10–40)
BASOPHILS # BLD AUTO: 0.01 K/UL (ref 0–0.2)
BASOPHILS NFR BLD: 0.1 % (ref 0–1.9)
BILIRUB SERPL-MCNC: 0.6 MG/DL (ref 0.1–1)
BUN SERPL-MCNC: 17 MG/DL (ref 6–20)
CALCIUM SERPL-MCNC: 9.3 MG/DL (ref 8.7–10.5)
CHLORIDE SERPL-SCNC: 106 MMOL/L (ref 95–110)
CO2 SERPL-SCNC: 25 MMOL/L (ref 23–29)
CREAT SERPL-MCNC: 1.2 MG/DL (ref 0.5–1.4)
DIFFERENTIAL METHOD: NORMAL
EOSINOPHIL # BLD AUTO: 0.1 K/UL (ref 0–0.5)
EOSINOPHIL NFR BLD: 1.2 % (ref 0–8)
ERYTHROCYTE [DISTWIDTH] IN BLOOD BY AUTOMATED COUNT: 13.2 % (ref 11.5–14.5)
EST. GFR  (AFRICAN AMERICAN): >60 ML/MIN/1.73 M^2
EST. GFR  (NON AFRICAN AMERICAN): >60 ML/MIN/1.73 M^2
GLUCOSE SERPL-MCNC: 90 MG/DL (ref 70–110)
HCT VFR BLD AUTO: 47.4 % (ref 40–54)
HGB BLD-MCNC: 15.3 G/DL (ref 14–18)
IMM GRANULOCYTES # BLD AUTO: 0.03 K/UL (ref 0–0.04)
IMM GRANULOCYTES NFR BLD AUTO: 0.3 % (ref 0–0.5)
LYMPHOCYTES # BLD AUTO: 2.1 K/UL (ref 1–4.8)
LYMPHOCYTES NFR BLD: 19.9 % (ref 18–48)
MCH RBC QN AUTO: 28.2 PG (ref 27–31)
MCHC RBC AUTO-ENTMCNC: 32.3 G/DL (ref 32–36)
MCV RBC AUTO: 87 FL (ref 82–98)
MONOCYTES # BLD AUTO: 0.9 K/UL (ref 0.3–1)
MONOCYTES NFR BLD: 8.6 % (ref 4–15)
NEUTROPHILS # BLD AUTO: 7.3 K/UL (ref 1.8–7.7)
NEUTROPHILS NFR BLD: 69.9 % (ref 38–73)
NRBC BLD-RTO: 0 /100 WBC
PLATELET # BLD AUTO: 180 K/UL (ref 150–350)
PMV BLD AUTO: 9.6 FL (ref 9.2–12.9)
POTASSIUM SERPL-SCNC: 4.3 MMOL/L (ref 3.5–5.1)
PROT SERPL-MCNC: 7 G/DL (ref 6–8.4)
RBC # BLD AUTO: 5.43 M/UL (ref 4.6–6.2)
SODIUM SERPL-SCNC: 144 MMOL/L (ref 136–145)
TACROLIMUS BLD-MCNC: 14.3 NG/ML (ref 5–15)
WBC # BLD AUTO: 10.45 K/UL (ref 3.9–12.7)

## 2019-09-19 PROCEDURE — 36415 COLL VENOUS BLD VENIPUNCTURE: CPT

## 2019-09-19 PROCEDURE — 85025 COMPLETE CBC W/AUTO DIFF WBC: CPT

## 2019-09-19 PROCEDURE — 80053 COMPREHEN METABOLIC PANEL: CPT

## 2019-09-19 PROCEDURE — 80197 ASSAY OF TACROLIMUS: CPT

## 2019-09-19 NOTE — TELEPHONE ENCOUNTER
Reviewed repeat labs with Dr. Hawkins. Tac level 14.3, goal 8-10, will recheck tac level next week when pt comes for biopsy. Pt stated his understanding.

## 2019-09-24 ENCOUNTER — HOSPITAL ENCOUNTER (OUTPATIENT)
Facility: HOSPITAL | Age: 29
Discharge: HOME | End: 2019-09-24
Attending: INTERNAL MEDICINE | Admitting: INTERNAL MEDICINE
Payer: COMMERCIAL

## 2019-09-24 DIAGNOSIS — Z94.1 S/P ORTHOTOPIC HEART TRANSPLANT: ICD-10-CM

## 2019-09-24 DIAGNOSIS — Z94.1 STATUS POST HEART TRANSPLANT: ICD-10-CM

## 2019-09-24 LAB
ASCENDING AORTA: 2.78 CM
AV INDEX (PROSTH): 0.99
AV MEAN GRADIENT: 2 MMHG
AV PEAK GRADIENT: 4 MMHG
AV VALVE AREA: 4.02 CM2
AV VELOCITY RATIO: 0.75
CV ECHO LV RWT: 0.46 CM
DOP CALC AO PEAK VEL: 1.01 M/S
DOP CALC AO VTI: 13.69 CM
DOP CALC LVOT AREA: 4 CM2
DOP CALC LVOT DIAMETER: 2.27 CM
DOP CALC LVOT PEAK VEL: 0.76 M/S
DOP CALC LVOT STROKE VOLUME: 55.09 CM3
DOP CALCLVOT PEAK VEL VTI: 13.62 CM
E/E' RATIO: 12.27 M/S
ECHO LV POSTERIOR WALL: 0.94 CM (ref 0.6–1.1)
FRACTIONAL SHORTENING: 31 % (ref 28–44)
INTERVENTRICULAR SEPTUM: 0.92 CM (ref 0.6–1.1)
LEFT INTERNAL DIMENSION IN SYSTOLE: 2.8 CM (ref 2.1–4)
LEFT VENTRICLE DIASTOLIC VOLUME: 73.24 ML
LEFT VENTRICLE SYSTOLIC VOLUME: 29.57 ML
LEFT VENTRICULAR INTERNAL DIMENSION IN DIASTOLE: 4.08 CM (ref 3.5–6)
LEFT VENTRICULAR MASS: 118.47 G
LV LATERAL E/E' RATIO: 23 M/S
LV SEPTAL E/E' RATIO: 8.36 M/S
MV PEAK E VEL: 0.92 M/S
PISA TR MAX VEL: 1.46 M/S
RA PRESSURE: 3 MMHG
RIGHT VENTRICULAR END-DIASTOLIC DIMENSION: 3.2 CM
SINUS: 3.03 CM
STJ: 2.67 CM
TDI LATERAL: 0.04 M/S
TDI SEPTAL: 0.11 M/S
TDI: 0.08 M/S
TR MAX PG: 9 MMHG
TV REST PULMONARY ARTERY PRESSURE: 12 MMHG

## 2019-09-24 PROCEDURE — 88307 TISSUE EXAM BY PATHOLOGIST: CPT | Performed by: PATHOLOGY

## 2019-09-24 PROCEDURE — 88342 IMHCHEM/IMCYTCHM 1ST ANTB: CPT | Mod: 26,,, | Performed by: PATHOLOGY

## 2019-09-24 PROCEDURE — 25000003 PHARM REV CODE 250: Performed by: INTERNAL MEDICINE

## 2019-09-24 PROCEDURE — 88307 TISSUE EXAM BY PATHOLOGIST: CPT | Mod: 26,,, | Performed by: PATHOLOGY

## 2019-09-24 PROCEDURE — 27201423 OPTIME MED/SURG SUP & DEVICES STERILE SUPPLY: Performed by: INTERNAL MEDICINE

## 2019-09-24 PROCEDURE — 93505 PR BIOPSY OF HEART LINING: ICD-10-PCS | Mod: 26,,, | Performed by: INTERNAL MEDICINE

## 2019-09-24 PROCEDURE — 88342 TISSUE SPECIMEN TO PATHOLOGY - SURGERY: ICD-10-PCS | Mod: 26,,, | Performed by: PATHOLOGY

## 2019-09-24 PROCEDURE — 93505 ENDOMYOCARDIAL BIOPSY: CPT | Mod: 26,,, | Performed by: INTERNAL MEDICINE

## 2019-09-24 PROCEDURE — 88307 TISSUE SPECIMEN TO PATHOLOGY - SURGERY: ICD-10-PCS | Mod: 26,,, | Performed by: PATHOLOGY

## 2019-09-24 PROCEDURE — 88342 IMHCHEM/IMCYTCHM 1ST ANTB: CPT | Performed by: PATHOLOGY

## 2019-09-24 PROCEDURE — 93505 ENDOMYOCARDIAL BIOPSY: CPT | Performed by: INTERNAL MEDICINE

## 2019-09-24 PROCEDURE — C1894 INTRO/SHEATH, NON-LASER: HCPCS | Performed by: INTERNAL MEDICINE

## 2019-09-24 RX ORDER — LIDOCAINE HYDROCHLORIDE 20 MG/ML
INJECTION, SOLUTION INFILTRATION; PERINEURAL
Status: DISCONTINUED | OUTPATIENT
Start: 2019-09-24 | End: 2019-09-24 | Stop reason: HOSPADM

## 2019-09-24 RX ORDER — SODIUM CHLORIDE 0.9 G/100ML
IRRIGANT IRRIGATION
Status: DISCONTINUED | OUTPATIENT
Start: 2019-09-24 | End: 2019-09-24 | Stop reason: HOSPADM

## 2019-09-24 NOTE — DISCHARGE SUMMARY
[facility]  Discharge Note  Short Stay    Admit Date: 9/24/2019    Discharge Date and Time: 9/24/2019    Attending Physician: Sammi Tapia MD     Discharge Provider: Sammi Tapia MD    Diagnoses: s/p OHTx      Discharged Condition: good    Hospital Course: Patient was admitted for an outpatient procedure and tolerated the procedure well with no complications.    Final Diagnoses: Same as principal problem.    Disposition: Home or Self Care    Follow up/Patient Instructions:    Medications:  Reconciled Home Medications:      Medication List      ASK your doctor about these medications    aspirin 81 MG EC tablet  Commonly known as:  ECOTRIN  Take 1 tablet (81 mg total) by mouth once daily.     atorvastatin 20 MG tablet  Commonly known as:  LIPITOR  Take 1 tablet (20 mg total) by mouth once daily.     calcium carbonate-vit D3-min 600 mg calcium- 200 unit Tab  Take 1 tablet by mouth once daily.     famotidine 40 MG tablet  Commonly known as:  PEPCID  Take 1 tablet (40 mg total) by mouth every evening.     magnesium oxide 400 mg (241.3 mg magnesium) tablet  Commonly known as:  MAG-OX  Take 1 tablet (400 mg total) by mouth 2 (two) times daily.     mycophenolate 250 mg Cap  Commonly known as:  CELLCEPT  Take 4 capsules (1,000 mg total) by mouth 2 (two) times daily.     nortriptyline 10 MG capsule  Commonly known as:  PAMELOR  Take one nightly for 3 weeks, then may increase to 2 nighlty     * predniSONE 5 MG tablet  Commonly known as:  DELTASONE  Take 1 tablet (5 mg total) by mouth once daily.     * predniSONE 10 MG tablet  Commonly known as:  DELTASONE  Take 4 capsules (40 mg) by mouth BID x 2 doses, then  Take 3 capsules (30 mg) by mouth BID x 2 doses, then  Take 2 capsules(20 mg) by mouth BID x 2 doses, then   Take 1 capsule (10 mg) by mouth BID x 2 doses     tacrolimus 0.5 MG Cap  Commonly known as:  PROGRAF  Take 3 capsules (1.5 mg) by mouth in the morning and 4 capsules (2 mg) in the evening.         *  This list has 2 medication(s) that are the same as other medications prescribed for you. Read the directions carefully, and ask your doctor or other care provider to review them with you.                Discharge Procedure Orders (must include Diet, Follow-up, Activity):    Cardiac diet    Follow-up with Tx clinic    Activity as tolerated.    Sammi Tapia MD

## 2019-09-24 NOTE — Clinical Note
Biopsy device collected a biopsy of the RV under echocardiographic guidance. Biopsy sample count is 7.

## 2019-09-24 NOTE — BRIEF OP NOTE
[facility]  Brief Operative Note  Cardiology    SUMMARY     Surgery Date: 9/24/2019     Surgeon(s) and Role:     * Sammi Tapia MD - Primary    Assisting Surgeon: None    Pre-op Diagnosis:  Status post heart transplant [Z94.1]    Post-op Diagnosis: Post-Op Diagnosis Codes:     * Status post heart transplant [Z94.1]      Procedure Performed: Echo guided RV biopsy    Description of the findings of the procedure: Echo guided RV biopsy performed via the right IJ. Patient tolerated the procedure well. 7 myocardial pieces obtained. 6 sent for H&E and 1 sent for Immuno.           Specimens:   Specimen (12h ago, onward)     Start     Ordered    09/24/19 0919  Specimen to Pathology - Surgery  Once     Comments:  Pre-op Diagnosis: Status post heart transplant [Z94.1]Procedure(s):BIOPSY, WITH US GUIDANCE Number of specimens: 6 pieces from right ventricle for H and E.1 piece from right ventricle for Immunofluorescence.Name of specimens: Heart Biopsy      09/24/19 0919              Sammi Tapia MD

## 2019-09-24 NOTE — DISCHARGE INSTRUCTIONS

## 2019-09-25 ENCOUNTER — TELEPHONE (OUTPATIENT)
Dept: TRANSPLANT | Facility: CLINIC | Age: 29
End: 2019-09-25

## 2019-09-25 NOTE — TELEPHONE ENCOUNTER
Called pt to inform him of negative biopsy. Will discuss with [facility] staff weaning prednisone. However, in the past when weaning prednisone, pt developed cellular rejection. Pt stated his understanding.

## 2019-10-31 NOTE — PLAN OF CARE
Problem: Physical Therapy Goal  Goal: Physical Therapy Goal  Outcome: Outcome(s) achieved Date Met:  01/23/17  Pt is to be discharged at this time.  No further physical therapy required at this time.  No goals set sec to d/c.  Pt ed on there ex with/without t-band to be performed prior to sx.  Will re-evaluate pt post-op sx day 1.         yes

## 2019-11-05 ENCOUNTER — OFFICE VISIT (OUTPATIENT)
Dept: TRANSPLANT | Facility: CLINIC | Age: 29
End: 2019-11-05
Payer: COMMERCIAL

## 2019-11-05 ENCOUNTER — HOSPITAL ENCOUNTER (OUTPATIENT)
Dept: CARDIOLOGY | Facility: CLINIC | Age: 29
Discharge: HOME | End: 2019-11-05
Attending: INTERNAL MEDICINE
Payer: COMMERCIAL

## 2019-11-05 VITALS
DIASTOLIC BLOOD PRESSURE: 90 MMHG | SYSTOLIC BLOOD PRESSURE: 136 MMHG | HEART RATE: 90 BPM | HEIGHT: 67 IN | BODY MASS INDEX: 27.94 KG/M2 | WEIGHT: 178 LBS

## 2019-11-05 VITALS
HEART RATE: 94 BPM | BODY MASS INDEX: 28 KG/M2 | WEIGHT: 178.38 LBS | HEIGHT: 67 IN | DIASTOLIC BLOOD PRESSURE: 91 MMHG | SYSTOLIC BLOOD PRESSURE: 136 MMHG

## 2019-11-05 DIAGNOSIS — Z94.1 STATUS POST HEART TRANSPLANT: ICD-10-CM

## 2019-11-05 DIAGNOSIS — Z79.899 LONG TERM CURRENT USE OF IMMUNOSUPPRESSIVE DRUG: ICD-10-CM

## 2019-11-05 DIAGNOSIS — E78.5 HYPERLIPIDEMIA LDL GOAL <70: ICD-10-CM

## 2019-11-05 DIAGNOSIS — R00.2 PALPITATIONS: Primary | ICD-10-CM

## 2019-11-05 DIAGNOSIS — Z94.1 HEART TRANSPLANTED: ICD-10-CM

## 2019-11-05 LAB
ASCENDING AORTA: 3.07 CM
AV INDEX (PROSTH): 0.71
AV MEAN GRADIENT: 2 MMHG
AV PEAK GRADIENT: 4 MMHG
AV VALVE AREA: 2.69 CM2
AV VELOCITY RATIO: 0.66
BSA FOR ECHO PROCEDURE: 1.95 M2
CV ECHO LV RWT: 0.5 CM
DOP CALC AO PEAK VEL: 0.99 M/S
DOP CALC AO VTI: 17.71 CM
DOP CALC LVOT AREA: 3.8 CM2
DOP CALC LVOT DIAMETER: 2.2 CM
DOP CALC LVOT PEAK VEL: 0.65 M/S
DOP CALC LVOT STROKE VOLUME: 47.61 CM3
DOP CALCLVOT PEAK VEL VTI: 12.53 CM
E WAVE DECELERATION TIME: 170.27 MSEC
E/A RATIO: 1.67
E/E' RATIO: 8.56 M/S
ECHO LV POSTERIOR WALL: 1.04 CM (ref 0.6–1.1)
FRACTIONAL SHORTENING: 28 % (ref 28–44)
INTERVENTRICULAR SEPTUM: 1 CM (ref 0.6–1.1)
LEFT INTERNAL DIMENSION IN SYSTOLE: 3.01 CM (ref 2.1–4)
LEFT VENTRICLE DIASTOLIC VOLUME INDEX: 40.53 ML/M2
LEFT VENTRICLE DIASTOLIC VOLUME: 77.99 ML
LEFT VENTRICLE MASS INDEX: 73 G/M2
LEFT VENTRICLE SYSTOLIC VOLUME INDEX: 18.4 ML/M2
LEFT VENTRICLE SYSTOLIC VOLUME: 35.32 ML
LEFT VENTRICULAR INTERNAL DIMENSION IN DIASTOLE: 4.19 CM (ref 3.5–6)
LEFT VENTRICULAR MASS: 140.58 G
LV LATERAL E/E' RATIO: 8.56 M/S
LV SEPTAL E/E' RATIO: 8.56 M/S
MV PEAK A VEL: 0.46 M/S
MV PEAK E VEL: 0.77 M/S
PULM VEIN S/D RATIO: 0.48
PV PEAK D VEL: 0.67 M/S
PV PEAK S VEL: 0.32 M/S
RA PRESSURE: 3 MMHG
RIGHT VENTRICULAR END-DIASTOLIC DIMENSION: 2.9 CM
SINUS: 3.21 CM
STJ: 2.76 CM
TDI LATERAL: 0.09 M/S
TDI SEPTAL: 0.09 M/S
TDI: 0.09 M/S
TRICUSPID ANNULAR PLANE SYSTOLIC EXCURSION: 1.1 CM

## 2019-11-05 PROCEDURE — 99999 PR PBB SHADOW E&M-EST. PATIENT-LVL III: ICD-10-PCS | Mod: PBBFAC,,, | Performed by: INTERNAL MEDICINE

## 2019-11-05 PROCEDURE — 99215 PR OFFICE/OUTPT VISIT, EST, LEVL V, 40-54 MIN: ICD-10-PCS | Mod: S$GLB,,, | Performed by: INTERNAL MEDICINE

## 2019-11-05 PROCEDURE — 99215 OFFICE O/P EST HI 40 MIN: CPT | Mod: S$GLB,,, | Performed by: INTERNAL MEDICINE

## 2019-11-05 PROCEDURE — 99999 PR PBB SHADOW E&M-EST. PATIENT-LVL III: CPT | Mod: PBBFAC,,, | Performed by: INTERNAL MEDICINE

## 2019-11-05 PROCEDURE — 93306 TRANSTHORACIC ECHO (TTE) COMPLETE: ICD-10-PCS | Mod: S$GLB,,, | Performed by: INTERNAL MEDICINE

## 2019-11-05 PROCEDURE — 93306 TTE W/DOPPLER COMPLETE: CPT | Mod: S$GLB,,, | Performed by: INTERNAL MEDICINE

## 2019-11-05 RX ORDER — MYCOPHENOLATE MOFETIL 250 MG/1
1000 CAPSULE ORAL 2 TIMES DAILY
Qty: 240 CAPSULE | Refills: 11 | Status: SHIPPED | OUTPATIENT
Start: 2019-11-05 | End: 2019-11-15

## 2019-11-05 RX ORDER — BUTALBITAL, ACETAMINOPHEN AND CAFFEINE 50; 325; 40 MG/1; MG/1; MG/1
TABLET ORAL
Refills: 5 | COMMUNITY
Start: 2019-10-01 | End: 2020-10-22

## 2019-11-05 NOTE — LETTER
November 5, 2019        Guillermo Alejo  [address]  [facility] [address]  Phone: [phone]  Fax: [phone]             [facility]  [address]  [facility] [address]  Phone: [phone]   Patient: Mert Samayoa   MR Number: [id number]   YOB: 1990   Date of Visit: 11/5/2019       Dear Dr. Guillermo Alejo    Thank you for referring Mert Samayoa to me for evaluation. Attached you will find relevant portions of my assessment and plan of care.    If you have questions, please do not hesitate to call me. I look forward to following Mert Samayoa along with you.    Sincerely,    Roque Matos MD    Enclosure    If you would like to receive this communication electronically, please contact [email or web address] or [phone] to request [employer] Link access.    [employer] Link is a tool which provides read-only access to select patient information with whom you have a relationship. Its easy to use and provides real time access to review your patients record including encounter summaries, notes, results, and demographic information.    If you feel you have received this communication in error or would no longer like to receive these types of communications, please e-mail [email or web address]

## 2019-11-05 NOTE — PROGRESS NOTES
Subjective:   Mr. Samayoa is a 29 y.o. year old White male who received a  - brain death heart transplant on 18.      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  Most recently neutropenia that required medication adjustments (rapa stopped).  Now on cellcept 1000 mg BID, tacro 1/2, prednisone 5 mg qd.   He has no active CV symptoms except palpitations and an episode of redness and constricition in the chest and somewhat faint. Last Wednesday. Headaches are improving on Pamelor      TTE today       Left Ventricle ejection fraction at 50%. Normal left ventricular cavity size. Concentric remodeling observed. Abnormal septal motion consistent with post-operative state. Left ventricular diastolic dysfunction.   Right Ventricle Normal cavity size. The ejection fraction is borderline low.   Left Atrium Enlarged due to cardiac transplantation.   Aortic Valve Mobility is normal   Mitral Valve Normal valve structure. Normal leaflet mobility.   Tricuspid Valve The tricuspid valve is normal. Mobility is normal.   Pulmonic Valve Normal valve structure. Mild regurgitation. Mobility is normal.   IVC/SVC Normal central venous pressure (3 mm Hg).       DOing well. Angiogram OK    · Normal coronary arteries by angiography. IVUS of LM/LAD showed minimal spots of atherosclerosis. Maxima intimal thickness in pLAD 0.8 mm  · 5 successful RVBX, under fluoro guidance, perfomed.  · Estimated blood loss: <50 mL  Review of Systems   Constitution: Negative for chills, fever, malaise/fatigue, night sweats, weight gain and weight loss.   Cardiovascular: Positive for palpitations. Negative for chest pain, dyspnea on exertion, irregular heartbeat, leg swelling, near-syncope, orthopnea, paroxysmal nocturnal dyspnea and syncope.   Respiratory: Negative for cough, sputum production and wheezing.   "  Hematologic/Lymphatic: Does not bruise/bleed easily.   Musculoskeletal: Negative for arthritis, joint pain and stiffness.   Gastrointestinal: Negative for abdominal pain, change in bowel habit, constipation, diarrhea, hematochezia and melena.   Genitourinary: Negative for hematuria.   Neurological: Negative for brief paralysis, focal weakness, seizures and weakness.     Objective:   Blood pressure (!) 136/91, pulse 94, height 5' 7" (1.702 m), weight 80.9 kg (178 lb 5.6 oz).body mass index is 27.93 kg/m².  Physical Exam   Constitutional: He is oriented to person, place, and time. He appears well-developed and well-nourished. No distress.         HENT:   Head: Normocephalic and atraumatic.   Mouth/Throat: Oropharynx is clear and moist. No oropharyngeal exudate.   Eyes: Conjunctivae are normal. Right eye exhibits no discharge. Left eye exhibits no discharge. No scleral icterus.   Neck: No JVD present. No thyromegaly present.   Cardiovascular: Normal rate, regular rhythm and normal heart sounds. Exam reveals no gallop.   No murmur heard.  Pulmonary/Chest: Effort normal and breath sounds normal.   Abdominal: Soft. Bowel sounds are normal. He exhibits no distension and no mass. There is no tenderness. There is no rebound and no guarding.   Musculoskeletal: He exhibits no edema or tenderness.   Lymphadenopathy:     He has no cervical adenopathy.   Neurological: He is alert and oriented to person, place, and time.   Skin: Skin is warm and dry. He is not diaphoretic.   Psychiatric: He has a normal mood and affect. His behavior is normal. Judgment and thought content normal.     Lab Results   Component Value Date    BNP 72 11/19/2018     11/19/2018    K 3.9 11/19/2018    MG 1.9 11/19/2018     11/19/2018    CO2 22 (L) 11/19/2018    BUN 14 11/19/2018    CREATININE 0.9 11/19/2018    GLU 87 11/19/2018    AST 25 11/19/2018    ALT 33 11/19/2018    ALBUMIN 4.2 11/19/2018    PROT 7.0 11/19/2018    BILITOT 0.5 11/19/2018 "    WBC 7.62 11/19/2018    HGB 14.8 11/19/2018    HCT 47.2 11/19/2018     11/19/2018    CHOL 108 (L) 11/19/2018    HDL 41 11/19/2018    LDLCALC 43.6 (L) 11/19/2018    TRIG 117 11/19/2018    TACROLIMUS 8.1 11/19/2018     ECHO today with normal ejection fraction (abnormal septal motion consistent with prior open heart surgery) est LVEF 65%.  I disagree with read of Oct 2018 EF as it too 65%    Assessment:     1. Heart transplanted- Doing well   2. Essential hypertension    3. Long term current use of immunosuppressive drug    4. Hyperlipidemia LDL goal <70        Plan:   Doing well Headaches are improved.     Routine follow up    Return instructions as set forth by post transplant schedule or as needed:    Clinic: Return for labs and/or biopsy weekly the first month, every two weeks during month 2 and then monthly for the first year at the provider or coordinator's discretion. During the second year, return to clinic every 3 months. Post transplant year 3-5 return every 6 months. There will be a comprehensive post transplant evaluation every year that may include LHC/RHC/biopsy, stress test, echo, CXR, and other health screening exams.    In addition to the clinical assessment, I have ordered Allomap testing for this patient to assist in identification of moderate/severe acute cellular rejection (ACR) in a pt with stable Allograft function instead of endomyocardial biopsy.     Patient is reminded to call with any health changes as these can be early signs of transplant complications. Patient is advised to make sure any new medications or changes of old medications are discussed with a pharmacist or physician knowledgeable with transplant to avoid rejection/drug toxicity related to significant drug interactions.    UNOS Patient Status  Functional Status: 100% - Normal, no complaints, no evidence of disease  Physical Capacity: No Limitations  Working for Income: yes  If yes, working activity level: Working Full  Time    Roque Matos MD     F/u 1 mo

## 2019-11-05 NOTE — TELEPHONE ENCOUNTER
Reviewed labs with tac level with Dr. Tidwell. Tac level 11.1, decreased tac to 1.5 BID, recheck labs on Monday. Pt stated his understanding.

## 2019-11-06 RX ORDER — TACROLIMUS 0.5 MG/1
CAPSULE ORAL
Qty: 210 CAPSULE | Refills: 6 | Status: SHIPPED | OUTPATIENT
Start: 2019-11-06 | End: 2020-09-25

## 2019-11-11 ENCOUNTER — LAB VISIT (OUTPATIENT)
Dept: LAB | Facility: HOSPITAL | Age: 29
End: 2019-11-11
Attending: INTERNAL MEDICINE
Payer: COMMERCIAL

## 2019-11-11 DIAGNOSIS — Z94.1 STATUS POST HEART TRANSPLANT: ICD-10-CM

## 2019-11-11 LAB
ALBUMIN SERPL BCP-MCNC: 4.4 G/DL (ref 3.5–5.2)
ALP SERPL-CCNC: 75 U/L (ref 55–135)
ALT SERPL W/O P-5'-P-CCNC: 30 U/L (ref 10–44)
ANION GAP SERPL CALC-SCNC: 12 MMOL/L (ref 8–16)
AST SERPL-CCNC: 21 U/L (ref 10–40)
BASOPHILS # BLD AUTO: 0.03 K/UL (ref 0–0.2)
BASOPHILS NFR BLD: 0.3 % (ref 0–1.9)
BILIRUB SERPL-MCNC: 0.8 MG/DL (ref 0.1–1)
BUN SERPL-MCNC: 13 MG/DL (ref 6–20)
CALCIUM SERPL-MCNC: 9.2 MG/DL (ref 8.7–10.5)
CHLORIDE SERPL-SCNC: 108 MMOL/L (ref 95–110)
CO2 SERPL-SCNC: 24 MMOL/L (ref 23–29)
CREAT SERPL-MCNC: 1 MG/DL (ref 0.5–1.4)
DIFFERENTIAL METHOD: NORMAL
EOSINOPHIL # BLD AUTO: 0.1 K/UL (ref 0–0.5)
EOSINOPHIL NFR BLD: 1.3 % (ref 0–8)
ERYTHROCYTE [DISTWIDTH] IN BLOOD BY AUTOMATED COUNT: 13.9 % (ref 11.5–14.5)
EST. GFR  (AFRICAN AMERICAN): >60 ML/MIN/1.73 M^2
EST. GFR  (NON AFRICAN AMERICAN): >60 ML/MIN/1.73 M^2
GLUCOSE SERPL-MCNC: 87 MG/DL (ref 70–110)
HCT VFR BLD AUTO: 43.5 % (ref 40–54)
HGB BLD-MCNC: 14.1 G/DL (ref 14–18)
IMM GRANULOCYTES # BLD AUTO: 0.03 K/UL (ref 0–0.04)
IMM GRANULOCYTES NFR BLD AUTO: 0.3 % (ref 0–0.5)
LYMPHOCYTES # BLD AUTO: 1.8 K/UL (ref 1–4.8)
LYMPHOCYTES NFR BLD: 21.1 % (ref 18–48)
MCH RBC QN AUTO: 28.8 PG (ref 27–31)
MCHC RBC AUTO-ENTMCNC: 32.4 G/DL (ref 32–36)
MCV RBC AUTO: 89 FL (ref 82–98)
MONOCYTES # BLD AUTO: 0.9 K/UL (ref 0.3–1)
MONOCYTES NFR BLD: 10.5 % (ref 4–15)
NEUTROPHILS # BLD AUTO: 5.8 K/UL (ref 1.8–7.7)
NEUTROPHILS NFR BLD: 66.5 % (ref 38–73)
NRBC BLD-RTO: 0 /100 WBC
PLATELET # BLD AUTO: 210 K/UL (ref 150–350)
PMV BLD AUTO: 9.6 FL (ref 9.2–12.9)
POTASSIUM SERPL-SCNC: 3.6 MMOL/L (ref 3.5–5.1)
PROT SERPL-MCNC: 6.7 G/DL (ref 6–8.4)
RBC # BLD AUTO: 4.89 M/UL (ref 4.6–6.2)
SODIUM SERPL-SCNC: 144 MMOL/L (ref 136–145)
TACROLIMUS BLD-MCNC: 10.5 NG/ML (ref 5–15)
WBC # BLD AUTO: 8.73 K/UL (ref 3.9–12.7)

## 2019-11-11 PROCEDURE — 85025 COMPLETE CBC W/AUTO DIFF WBC: CPT

## 2019-11-11 PROCEDURE — 36415 COLL VENOUS BLD VENIPUNCTURE: CPT

## 2019-11-11 PROCEDURE — 80053 COMPREHEN METABOLIC PANEL: CPT

## 2019-11-11 PROCEDURE — 80197 ASSAY OF TACROLIMUS: CPT

## 2019-11-12 ENCOUNTER — HOSPITAL ENCOUNTER (OUTPATIENT)
Facility: HOSPITAL | Age: 29
Discharge: HOME | End: 2019-11-12
Attending: INTERNAL MEDICINE | Admitting: INTERNAL MEDICINE
Payer: COMMERCIAL

## 2019-11-12 VITALS
DIASTOLIC BLOOD PRESSURE: 93 MMHG | WEIGHT: 176.94 LBS | BODY MASS INDEX: 27.77 KG/M2 | HEIGHT: 67 IN | HEART RATE: 98 BPM | SYSTOLIC BLOOD PRESSURE: 145 MMHG

## 2019-11-12 DIAGNOSIS — Z94.1 STATUS POST HEART TRANSPLANT: ICD-10-CM

## 2019-11-12 DIAGNOSIS — Z94.1 S/P ORTHOTOPIC HEART TRANSPLANT: ICD-10-CM

## 2019-11-12 LAB
BSA FOR ECHO PROCEDURE: 1.95 M2
PISA TR MAX VEL: 2.18 M/S
TR MAX PG: 19 MMHG

## 2019-11-12 PROCEDURE — 88342 IMHCHEM/IMCYTCHM 1ST ANTB: CPT | Mod: 26,,, | Performed by: PATHOLOGY

## 2019-11-12 PROCEDURE — 27201423 OPTIME MED/SURG SUP & DEVICES STERILE SUPPLY: Performed by: INTERNAL MEDICINE

## 2019-11-12 PROCEDURE — 93505 ENDOMYOCARDIAL BIOPSY: CPT | Performed by: INTERNAL MEDICINE

## 2019-11-12 PROCEDURE — 88307 PR  SURG PATH,LEVEL V: ICD-10-PCS | Mod: 26,,, | Performed by: PATHOLOGY

## 2019-11-12 PROCEDURE — 88342 CHG IMMUNOCYTOCHEMISTRY: ICD-10-PCS | Mod: 26,,, | Performed by: PATHOLOGY

## 2019-11-12 PROCEDURE — 93505 PR BIOPSY OF HEART LINING: ICD-10-PCS | Mod: 26,,, | Performed by: INTERNAL MEDICINE

## 2019-11-12 PROCEDURE — 93505 ENDOMYOCARDIAL BIOPSY: CPT | Mod: 26,,, | Performed by: INTERNAL MEDICINE

## 2019-11-12 PROCEDURE — C1894 INTRO/SHEATH, NON-LASER: HCPCS | Performed by: INTERNAL MEDICINE

## 2019-11-12 PROCEDURE — 88342 IMHCHEM/IMCYTCHM 1ST ANTB: CPT | Performed by: PATHOLOGY

## 2019-11-12 PROCEDURE — 88307 TISSUE EXAM BY PATHOLOGIST: CPT | Performed by: PATHOLOGY

## 2019-11-12 PROCEDURE — 25000003 PHARM REV CODE 250: Performed by: INTERNAL MEDICINE

## 2019-11-12 PROCEDURE — 88307 TISSUE EXAM BY PATHOLOGIST: CPT | Mod: 26,,, | Performed by: PATHOLOGY

## 2019-11-12 RX ORDER — SODIUM CHLORIDE 0.9 G/100ML
IRRIGANT IRRIGATION
Status: DISCONTINUED | OUTPATIENT
Start: 2019-11-12 | End: 2019-11-12 | Stop reason: HOSPADM

## 2019-11-12 RX ORDER — LIDOCAINE HYDROCHLORIDE 20 MG/ML
INJECTION, SOLUTION INFILTRATION; PERINEURAL
Status: DISCONTINUED | OUTPATIENT
Start: 2019-11-12 | End: 2019-11-12 | Stop reason: HOSPADM

## 2019-11-12 NOTE — DISCHARGE SUMMARY
Admit 11/12/2019  Discharge  11/12/2019  Discharge / Principle diagnosis heart transplant  Discharge attending Mario Conner MD  Hospital course.  Came in for echo biopsy. Tolerated procedure well (see full results in cardiac procedure section).  Results discussed with patient / caregiver.   Discharge condition / disposition Good / home   discharge activity activity as tolerated    Discharge diet diet as tolerated / unchanged from admission  Discharge medication   No current facility-administered medications on file prior to encounter.      Current Outpatient Medications on File Prior to Encounter   Medication Sig    aspirin (ECOTRIN) 81 MG EC tablet Take 1 tablet (81 mg total) by mouth once daily.    atorvastatin (LIPITOR) 20 MG tablet Take 1 tablet (20 mg total) by mouth once daily.    butalbital-acetaminophen-caffeine -40 mg (FIORICET, ESGIC) -40 mg per tablet     calcium carbonate-vit D3-min 600 mg calcium- 200 unit Tab Take 1 tablet by mouth once daily.     famotidine (PEPCID) 40 MG tablet Take 1 tablet (40 mg total) by mouth every evening.    magnesium oxide (MAG-OX) 400 mg tablet Take 1 tablet (400 mg total) by mouth 2 (two) times daily.    mycophenolate (CELLCEPT) 250 mg Cap Take 4 capsules (1,000 mg total) by mouth 2 (two) times daily.    nortriptyline (PAMELOR) 10 MG capsule Take one nightly for 3 weeks, then may increase to 2 nighlty (Patient taking differently: Take 10 mg by mouth every evening. )    predniSONE (DELTASONE) 5 MG tablet Take 1 tablet (5 mg total) by mouth once daily.    tacrolimus (PROGRAF) 0.5 MG Cap Take 3 capsules (1.5 mg) by mouth twice a day.     Followup in clinic as scheduled

## 2019-11-12 NOTE — H&P
Subjective:      Mert Samayoa is a 29 y.o. male with a who needs echo biopsy for evaluation of  cardiac rejection.  Currently able to lay flat.      Past Medical History:   Diagnosis Date    Cardiogenic shock 1/16/2017    Cardiomyopathy     Familial cardiomyopathy    CHF (congestive heart failure)     Encounter for blood transfusion     Essential hypertension 12/21/2016    Essential hypertension 3/20/2018    Familial Cardiomyopathy     Familial cardiomyopathy     Hyperlipidemia LDL goal <70 11/19/2018     Past Surgical History:   Procedure Laterality Date    BIOPSY WITH ULTRASOUND GUIDANCE N/A 8/27/2019    Procedure: BIOPSY, WITH US GUIDANCE;  Surgeon: Mario Conner MD;  Location: [facility] CATH LAB;  Service: Cardiology;  Laterality: N/A;    BIOPSY WITH ULTRASOUND GUIDANCE N/A 9/24/2019    Procedure: BIOPSY, WITH US GUIDANCE;  Surgeon: Sammi Tapia MD;  Location: [facility] CATH LAB;  Service: Cardiology;  Laterality: N/A;    CORONARY ANGIOGRAPHY Right 2/14/2019    Procedure: ANGIOGRAM, CORONARY ARTERY;  Surgeon: Tello Correia MD;  Location: [facility] CATH LAB;  Service: Cardiology;  Laterality: Right;    HEART TRANSPLANT      LEFT VENTRICULAR ASSIST DEVICE      RIGHT HEART CATHETERIZATION Right 2/14/2019    Procedure: INSERTION, CATHETER, RIGHT HEART;  Surgeon: Tello Correia MD;  Location: [facility] CATH LAB;  Service: Cardiology;  Laterality: Right;    TONSILLECTOMY       Social History     Socioeconomic History    Marital status:      Spouse name: Not on file    Number of children: Not on file    Years of education: Not on file    Highest education level: Not on file   Occupational History    Not on file   Social Needs    Financial resource strain: Not on file    Food insecurity:     Worry: Not on file     Inability: Not on file    Transportation needs:     Medical: Not on file     Non-medical: Not on file   Tobacco Use    Smoking status: Former Smoker     Packs/day: 1.00      Last attempt to quit: 2016     Years since quittin.9    Smokeless tobacco: Never Used   Substance and Sexual Activity    Alcohol use: No    Drug use: No    Sexual activity: Not on file   Lifestyle    Physical activity:     Days per week: Not on file     Minutes per session: Not on file    Stress: Not on file   Relationships    Social connections:     Talks on phone: Not on file     Gets together: Not on file     Attends Muslim service: Not on file     Active member of club or organization: Not on file     Attends meetings of clubs or organizations: Not on file     Relationship status: Not on file   Other Topics Concern    Not on file   Social History Narrative    Works      Family History   Problem Relation Age of Onset    Arthritis Mother     Heart disease Brother         cardiomyopathy and heart transplant    No Known Problems Father     Heart disease Brother         cardiomyopathy and heart transplanted twice    Birth defects Paternal Uncle            Other significant clinical information:  Review of patient's allergies indicates:  No Known Allergies  No current facility-administered medications on file prior to encounter.      Current Outpatient Medications on File Prior to Encounter   Medication Sig    aspirin (ECOTRIN) 81 MG EC tablet Take 1 tablet (81 mg total) by mouth once daily.    atorvastatin (LIPITOR) 20 MG tablet Take 1 tablet (20 mg total) by mouth once daily.    butalbital-acetaminophen-caffeine -40 mg (FIORICET, ESGIC) -40 mg per tablet     calcium carbonate-vit D3-min 600 mg calcium- 200 unit Tab Take 1 tablet by mouth once daily.     famotidine (PEPCID) 40 MG tablet Take 1 tablet (40 mg total) by mouth every evening.    magnesium oxide (MAG-OX) 400 mg tablet Take 1 tablet (400 mg total) by mouth 2 (two) times daily.    mycophenolate (CELLCEPT) 250 mg Cap Take 4 capsules (1,000 mg total) by mouth 2 (two) times daily.     nortriptyline (PAMELOR) 10 MG capsule Take one nightly for 3 weeks, then may increase to 2 nighlty (Patient taking differently: Take 10 mg by mouth every evening. )    predniSONE (DELTASONE) 5 MG tablet Take 1 tablet (5 mg total) by mouth once daily.    tacrolimus (PROGRAF) 0.5 MG Cap Take 3 capsules (1.5 mg) by mouth twice a day.            Objective:      Physical Exam  There were no vitals taken for this visit.    General Appearance:    Alert, cooperative, no distress, appears stated age   Head:    Normocephalic, without obvious abnormality, atraumatic   Eyes:    PERRL, conjunctiva/corneas clear, EOM's intact, fundi     benign, both eyes        Ears:    Normal TM's and external ear canals, both ears   Nose:   Nares normal, septum midline, mucosa normal, no drainage    or sinus tenderness   Throat:   Lips, mucosa, and tongue normal; teeth and gums normal   Neck:   Supple, symmetrical, trachea midline, no adenopathy;        thyroid:  No enlargement/tenderness/nodules; no carotid    bruit or JVD   Back:     Symmetric, no curvature, ROM normal, no CVA tenderness   Lungs:     Clear to auscultation bilaterally, respirations unlabored   Chest wall:    No tenderness or deformity   Heart:    Regular rate and rhythm, S1 and S2 normal, no murmur, rub   or gallop   Abdomen:     Soft, non-tender, bowel sounds active all four quadrants,     no masses, no organomegaly   Genitalia:    Normal male without lesion, discharge or tenderness   Rectal:    Normal tone, normal prostate, no masses or tenderness;    guaiac negative stool   Extremities:   Extremities normal, atraumatic, no cyanosis or edema   Pulses:   2+ and symmetric all extremities   Skin:   Skin color, texture, turgor normal, no rashes or lesions   Lymph nodes:   Cervical, supraclavicular, and axillary nodes normal   Neurologic:   CNII-XII intact. Normal strength, sensation and reflexes       throughout         Lab Review   Lab Results   Component Value Date    WBC  8.73 11/11/2019    HGB 14.1 11/11/2019    HCT 43.5 11/11/2019    MCV 89 11/11/2019     11/11/2019     Lab Results   Component Value Date    INR 1.1 04/03/2018    INR 1.1 03/03/2018    INR 1.1 03/02/2018           Assessment:       Plan:     I have explained the risks, benefits, and alternatives of the procedure in detail.  The patient expresses understanding and all questions have been answered.  The patient agrees to the proceed as planned.  Echo biopsy via RIJ   Micropuncture access needle will be used to minimize bleeding risk.

## 2019-11-12 NOTE — H&P
Subjective:      Mert Samayoa is a 29 y.o. male with a who needs echo biopsy for evaluation of  cardiac rejection.  Currently able to lay flat.     Past Medical History:   Diagnosis Date    Cardiogenic shock 1/16/2017    Cardiomyopathy     Familial cardiomyopathy    CHF (congestive heart failure)     Encounter for blood transfusion     Essential hypertension 12/21/2016    Essential hypertension 3/20/2018    Familial Cardiomyopathy     Familial cardiomyopathy     Hyperlipidemia LDL goal <70 11/19/2018     Past Surgical History:   Procedure Laterality Date    BIOPSY WITH ULTRASOUND GUIDANCE N/A 8/27/2019    Procedure: BIOPSY, WITH US GUIDANCE;  Surgeon: Mario Conner MD;  Location: [facility] CATH LAB;  Service: Cardiology;  Laterality: N/A;    BIOPSY WITH ULTRASOUND GUIDANCE N/A 9/24/2019    Procedure: BIOPSY, WITH US GUIDANCE;  Surgeon: Sammi Tapia MD;  Location: [facility] CATH LAB;  Service: Cardiology;  Laterality: N/A;    CORONARY ANGIOGRAPHY Right 2/14/2019    Procedure: ANGIOGRAM, CORONARY ARTERY;  Surgeon: Tello Correia MD;  Location: [facility] CATH LAB;  Service: Cardiology;  Laterality: Right;    HEART TRANSPLANT      LEFT VENTRICULAR ASSIST DEVICE      RIGHT HEART CATHETERIZATION Right 2/14/2019    Procedure: INSERTION, CATHETER, RIGHT HEART;  Surgeon: Tello Correia MD;  Location: [facility] CATH LAB;  Service: Cardiology;  Laterality: Right;    TONSILLECTOMY       Social History     Socioeconomic History    Marital status:      Spouse name: Not on file    Number of children: Not on file    Years of education: Not on file    Highest education level: Not on file   Occupational History    Not on file   Social Needs    Financial resource strain: Not on file    Food insecurity:     Worry: Not on file     Inability: Not on file    Transportation needs:     Medical: Not on file     Non-medical: Not on file   Tobacco Use    Smoking status: Former Smoker     Packs/day: 1.00      Last attempt to quit: 2016     Years since quittin.9    Smokeless tobacco: Never Used   Substance and Sexual Activity    Alcohol use: No    Drug use: No    Sexual activity: Not on file   Lifestyle    Physical activity:     Days per week: Not on file     Minutes per session: Not on file    Stress: Not on file   Relationships    Social connections:     Talks on phone: Not on file     Gets together: Not on file     Attends Gnosticism service: Not on file     Active member of club or organization: Not on file     Attends meetings of clubs or organizations: Not on file     Relationship status: Not on file   Other Topics Concern    Not on file   Social History Narrative    Works      Family History   Problem Relation Age of Onset    Arthritis Mother     Heart disease Brother         cardiomyopathy and heart transplant    No Known Problems Father     Heart disease Brother         cardiomyopathy and heart transplanted twice    Birth defects Paternal Uncle            Other significant clinical information:  Review of patient's allergies indicates:  No Known Allergies  No current facility-administered medications on file prior to encounter.      Current Outpatient Medications on File Prior to Encounter   Medication Sig    aspirin (ECOTRIN) 81 MG EC tablet Take 1 tablet (81 mg total) by mouth once daily.    atorvastatin (LIPITOR) 20 MG tablet Take 1 tablet (20 mg total) by mouth once daily.    butalbital-acetaminophen-caffeine -40 mg (FIORICET, ESGIC) -40 mg per tablet     calcium carbonate-vit D3-min 600 mg calcium- 200 unit Tab Take 1 tablet by mouth once daily.     famotidine (PEPCID) 40 MG tablet Take 1 tablet (40 mg total) by mouth every evening.    magnesium oxide (MAG-OX) 400 mg tablet Take 1 tablet (400 mg total) by mouth 2 (two) times daily.    mycophenolate (CELLCEPT) 250 mg Cap Take 4 capsules (1,000 mg total) by mouth 2 (two) times daily.     nortriptyline (PAMELOR) 10 MG capsule Take one nightly for 3 weeks, then may increase to 2 nighlty (Patient taking differently: Take 10 mg by mouth every evening. )    predniSONE (DELTASONE) 5 MG tablet Take 1 tablet (5 mg total) by mouth once daily.    tacrolimus (PROGRAF) 0.5 MG Cap Take 3 capsules (1.5 mg) by mouth twice a day.            Objective:      Physical Exam      General Appearance:    Alert, cooperative, no distress, appears stated age   Head:    Normocephalic, without obvious abnormality, atraumatic   Eyes:    PERRL, conjunctiva/corneas clear, EOM's intact, fundi     benign, both eyes        Ears:    Normal TM's and external ear canals, both ears   Nose:   Nares normal, septum midline, mucosa normal, no drainage    or sinus tenderness   Throat:   Lips, mucosa, and tongue normal; teeth and gums normal   Neck:   Supple, symmetrical, trachea midline, no adenopathy;        thyroid:  No enlargement/tenderness/nodules; no carotid    bruit or JVD   Back:     Symmetric, no curvature, ROM normal, no CVA tenderness   Lungs:     Clear to auscultation bilaterally, respirations unlabored   Chest wall:    No tenderness or deformity   Heart:    Regular rate and rhythm, S1 and S2 normal, no murmur, rub   or gallop   Abdomen:     Soft, non-tender, bowel sounds active all four quadrants,     no masses, no organomegaly   Genitalia:    Normal male without lesion, discharge or tenderness   Rectal:    Normal tone, normal prostate, no masses or tenderness;    guaiac negative stool   Extremities:   Extremities normal, atraumatic, no cyanosis or edema   Pulses:   2+ and symmetric all extremities   Skin:   Skin color, texture, turgor normal, no rashes or lesions   Lymph nodes:   Cervical, supraclavicular, and axillary nodes normal   Neurologic:   CNII-XII intact. Normal strength, sensation and reflexes       throughout         Lab Review   Lab Results   Component Value Date    WBC 9.24 11/12/2019    HGB 15.2 11/12/2019     HCT 46.0 11/12/2019    MCV 89 11/12/2019     11/12/2019     Lab Results   Component Value Date    INR 1.1 04/03/2018    INR 1.1 03/03/2018    INR 1.1 03/02/2018           Assessment:       Plan:     I have explained the risks, benefits, and alternatives of the procedure in detail.  The patient expresses understanding and all questions have been answered.  The patient agrees to the proceed as planned.   echo biopsy via RIJ   Micropuncture access needle will be used to minimize bleeding risk.

## 2019-11-12 NOTE — DISCHARGE INSTRUCTIONS

## 2019-11-15 ENCOUNTER — PATIENT MESSAGE (OUTPATIENT)
Dept: TRANSPLANT | Facility: CLINIC | Age: 29
End: 2019-11-15

## 2019-11-15 DIAGNOSIS — Z94.1 STATUS POST HEART TRANSPLANT: ICD-10-CM

## 2019-11-15 DIAGNOSIS — Z94.1 STATUS POST HEART TRANSPLANT: Primary | ICD-10-CM

## 2019-11-15 LAB
FINAL PATHOLOGIC DIAGNOSIS: NORMAL
GROSS: NORMAL
MICROSCOPIC EXAM: NORMAL

## 2019-11-15 RX ORDER — MYCOPHENOLATE MOFETIL 250 MG/1
1250 CAPSULE ORAL 2 TIMES DAILY
Qty: 300 CAPSULE | Refills: 11 | Status: SHIPPED | OUTPATIENT
Start: 2019-11-15 | End: 2020-11-17

## 2019-11-15 NOTE — TELEPHONE ENCOUNTER
Reviewed chart with Dr. Hawkins.   Pt 21 months post transplant, CMV high risk, Mod risk rejection.  Elevated allomap (34 from 27) with C1Q DRB3 3275, (no C1Q last visit).  EGBX ISHLT 0, pAMR 0  ECHO 60%  CMV IGG Non-reactive  CMV PCR Not detected    Tac 1.5/1.5  MMF 1000 BID  Pred 5 mg    Labs:  Tac 9.4 (goal 8-10)  Creatinine 1.2  BNP 48  WBC 9.24    Plan:  Increase cellcept to 1250 BID, check labs in 10-14 days, then 2 weeks after that to check WBC. If pt tolerates increased MMF, will increase to 1500 BID, checking labs. Will decrease pred to 2.5 mg if tolerates higher dose MMF, with hopes of coming off pred in next few months.

## 2019-11-21 DIAGNOSIS — Z94.1 STATUS POST HEART TRANSPLANT: Primary | ICD-10-CM

## 2019-11-23 ENCOUNTER — LAB VISIT (OUTPATIENT)
Dept: LAB | Facility: HOSPITAL | Age: 29
End: 2019-11-23
Attending: INTERNAL MEDICINE
Payer: COMMERCIAL

## 2019-11-23 DIAGNOSIS — Z94.1 STATUS POST HEART TRANSPLANT: ICD-10-CM

## 2019-11-23 LAB
ALBUMIN SERPL BCP-MCNC: 4.6 G/DL (ref 3.5–5.2)
ALP SERPL-CCNC: 83 U/L (ref 55–135)
ALT SERPL W/O P-5'-P-CCNC: 25 U/L (ref 10–44)
ANION GAP SERPL CALC-SCNC: 10 MMOL/L (ref 8–16)
AST SERPL-CCNC: 19 U/L (ref 10–40)
BASOPHILS # BLD AUTO: 0.03 K/UL (ref 0–0.2)
BASOPHILS NFR BLD: 0.3 % (ref 0–1.9)
BILIRUB SERPL-MCNC: 0.8 MG/DL (ref 0.1–1)
BUN SERPL-MCNC: 20 MG/DL (ref 6–20)
CALCIUM SERPL-MCNC: 9.2 MG/DL (ref 8.7–10.5)
CHLORIDE SERPL-SCNC: 104 MMOL/L (ref 95–110)
CO2 SERPL-SCNC: 27 MMOL/L (ref 23–29)
CREAT SERPL-MCNC: 1.1 MG/DL (ref 0.5–1.4)
DIFFERENTIAL METHOD: ABNORMAL
EOSINOPHIL # BLD AUTO: 0.3 K/UL (ref 0–0.5)
EOSINOPHIL NFR BLD: 2.8 % (ref 0–8)
ERYTHROCYTE [DISTWIDTH] IN BLOOD BY AUTOMATED COUNT: 13.8 % (ref 11.5–14.5)
EST. GFR  (AFRICAN AMERICAN): >60 ML/MIN/1.73 M^2
EST. GFR  (NON AFRICAN AMERICAN): >60 ML/MIN/1.73 M^2
GLUCOSE SERPL-MCNC: 92 MG/DL (ref 70–110)
HCT VFR BLD AUTO: 47.6 % (ref 40–54)
HGB BLD-MCNC: 15.4 G/DL (ref 14–18)
IMM GRANULOCYTES # BLD AUTO: 0.04 K/UL (ref 0–0.04)
IMM GRANULOCYTES NFR BLD AUTO: 0.4 % (ref 0–0.5)
LYMPHOCYTES # BLD AUTO: 1.2 K/UL (ref 1–4.8)
LYMPHOCYTES NFR BLD: 11.5 % (ref 18–48)
MCH RBC QN AUTO: 28.8 PG (ref 27–31)
MCHC RBC AUTO-ENTMCNC: 32.4 G/DL (ref 32–36)
MCV RBC AUTO: 89 FL (ref 82–98)
MONOCYTES # BLD AUTO: 1.4 K/UL (ref 0.3–1)
MONOCYTES NFR BLD: 13.4 % (ref 4–15)
NEUTROPHILS # BLD AUTO: 7.5 K/UL (ref 1.8–7.7)
NEUTROPHILS NFR BLD: 71.6 % (ref 38–73)
NRBC BLD-RTO: 0 /100 WBC
PLATELET # BLD AUTO: 214 K/UL (ref 150–350)
PMV BLD AUTO: 9.6 FL (ref 9.2–12.9)
POTASSIUM SERPL-SCNC: 3.9 MMOL/L (ref 3.5–5.1)
PROT SERPL-MCNC: 7.3 G/DL (ref 6–8.4)
RBC # BLD AUTO: 5.35 M/UL (ref 4.6–6.2)
SODIUM SERPL-SCNC: 141 MMOL/L (ref 136–145)
WBC # BLD AUTO: 10.45 K/UL (ref 3.9–12.7)

## 2019-11-23 PROCEDURE — 36415 COLL VENOUS BLD VENIPUNCTURE: CPT

## 2019-11-23 PROCEDURE — 85025 COMPLETE CBC W/AUTO DIFF WBC: CPT

## 2019-11-23 PROCEDURE — 80053 COMPREHEN METABOLIC PANEL: CPT

## 2019-12-09 ENCOUNTER — LAB VISIT (OUTPATIENT)
Dept: LAB | Facility: HOSPITAL | Age: 29
End: 2019-12-09
Attending: INTERNAL MEDICINE
Payer: COMMERCIAL

## 2019-12-09 DIAGNOSIS — Z94.1 STATUS POST HEART TRANSPLANT: ICD-10-CM

## 2019-12-09 LAB
ALBUMIN SERPL BCP-MCNC: 4.4 G/DL (ref 3.5–5.2)
ALP SERPL-CCNC: 84 U/L (ref 55–135)
ALT SERPL W/O P-5'-P-CCNC: 27 U/L (ref 10–44)
ANION GAP SERPL CALC-SCNC: 12 MMOL/L (ref 8–16)
AST SERPL-CCNC: 16 U/L (ref 10–40)
BASOPHILS # BLD AUTO: 0.05 K/UL (ref 0–0.2)
BASOPHILS NFR BLD: 0.4 % (ref 0–1.9)
BILIRUB SERPL-MCNC: 0.8 MG/DL (ref 0.1–1)
BUN SERPL-MCNC: 16 MG/DL (ref 6–20)
CALCIUM SERPL-MCNC: 9.8 MG/DL (ref 8.7–10.5)
CHLORIDE SERPL-SCNC: 105 MMOL/L (ref 95–110)
CLASS I ANTIBODY COMMENTS - LUMINEX: NORMAL
CLASS II ANTIBODY COMMENTS - LUMINEX: NORMAL
CO2 SERPL-SCNC: 27 MMOL/L (ref 23–29)
CREAT SERPL-MCNC: 1.2 MG/DL (ref 0.5–1.4)
DIFFERENTIAL METHOD: ABNORMAL
DSA1 TESTING DATE: NORMAL
DSA12 TESTING DATE: NORMAL
DSA2 TESTING DATE: NORMAL
EOSINOPHIL # BLD AUTO: 0.6 K/UL (ref 0–0.5)
EOSINOPHIL NFR BLD: 5.2 % (ref 0–8)
ERYTHROCYTE [DISTWIDTH] IN BLOOD BY AUTOMATED COUNT: 13.1 % (ref 11.5–14.5)
EST. GFR  (AFRICAN AMERICAN): >60 ML/MIN/1.73 M^2
EST. GFR  (NON AFRICAN AMERICAN): >60 ML/MIN/1.73 M^2
GLUCOSE SERPL-MCNC: 95 MG/DL (ref 70–110)
HCT VFR BLD AUTO: 46.2 % (ref 40–54)
HGB BLD-MCNC: 14.8 G/DL (ref 14–18)
IMM GRANULOCYTES # BLD AUTO: 0.05 K/UL (ref 0–0.04)
IMM GRANULOCYTES NFR BLD AUTO: 0.4 % (ref 0–0.5)
LYMPHOCYTES # BLD AUTO: 2.2 K/UL (ref 1–4.8)
LYMPHOCYTES NFR BLD: 18.1 % (ref 18–48)
MCH RBC QN AUTO: 28.2 PG (ref 27–31)
MCHC RBC AUTO-ENTMCNC: 32 G/DL (ref 32–36)
MCV RBC AUTO: 88 FL (ref 82–98)
MONOCYTES # BLD AUTO: 1.1 K/UL (ref 0.3–1)
MONOCYTES NFR BLD: 9.2 % (ref 4–15)
NEUTROPHILS # BLD AUTO: 8 K/UL (ref 1.8–7.7)
NEUTROPHILS NFR BLD: 66.7 % (ref 38–73)
NRBC BLD-RTO: 0 /100 WBC
PLATELET # BLD AUTO: 322 K/UL (ref 150–350)
PMV BLD AUTO: 9.4 FL (ref 9.2–12.9)
POTASSIUM SERPL-SCNC: 4.1 MMOL/L (ref 3.5–5.1)
PROT SERPL-MCNC: 7.3 G/DL (ref 6–8.4)
RBC # BLD AUTO: 5.25 M/UL (ref 4.6–6.2)
SERUM COLLECTION DT - LUMINEX CLASS I: NORMAL
SERUM COLLECTION DT - LUMINEX CLASS II: NORMAL
SODIUM SERPL-SCNC: 144 MMOL/L (ref 136–145)
TACROLIMUS BLD-MCNC: 9 NG/ML (ref 5–15)
WBC # BLD AUTO: 12.02 K/UL (ref 3.9–12.7)

## 2019-12-09 PROCEDURE — 86832 HLA CLASS I HIGH DEFIN QUAL: CPT | Mod: 59

## 2019-12-09 PROCEDURE — 86644 CMV ANTIBODY: CPT

## 2019-12-09 PROCEDURE — 85025 COMPLETE CBC W/AUTO DIFF WBC: CPT

## 2019-12-09 PROCEDURE — 80197 ASSAY OF TACROLIMUS: CPT

## 2019-12-09 PROCEDURE — 86833 HLA CLASS II HIGH DEFIN QUAL: CPT | Mod: 59

## 2019-12-09 PROCEDURE — 36415 COLL VENOUS BLD VENIPUNCTURE: CPT

## 2019-12-09 PROCEDURE — 86832 HLA CLASS I HIGH DEFIN QUAL: CPT

## 2019-12-09 PROCEDURE — 80053 COMPREHEN METABOLIC PANEL: CPT

## 2019-12-09 PROCEDURE — 86833 HLA CLASS II HIGH DEFIN QUAL: CPT

## 2019-12-09 PROCEDURE — 86977 RBC SERUM PRETX INCUBJ/INHIB: CPT | Mod: 59

## 2019-12-10 LAB — CMV IGG SERPL QL IA: NORMAL

## 2019-12-16 ENCOUNTER — OFFICE VISIT (OUTPATIENT)
Dept: NEUROLOGY | Facility: CLINIC | Age: 29
End: 2019-12-16
Payer: COMMERCIAL

## 2019-12-16 VITALS
HEIGHT: 67 IN | SYSTOLIC BLOOD PRESSURE: 120 MMHG | RESPIRATION RATE: 10 BRPM | HEART RATE: 88 BPM | WEIGHT: 172.63 LBS | DIASTOLIC BLOOD PRESSURE: 78 MMHG | BODY MASS INDEX: 27.09 KG/M2

## 2019-12-16 DIAGNOSIS — G43.109 MIGRAINE WITH AURA AND WITHOUT STATUS MIGRAINOSUS, NOT INTRACTABLE: Primary | ICD-10-CM

## 2019-12-16 DIAGNOSIS — Z94.1 HEART TRANSPLANTED: ICD-10-CM

## 2019-12-16 PROCEDURE — 99213 OFFICE O/P EST LOW 20 MIN: CPT | Mod: S$GLB,,, | Performed by: PSYCHIATRY & NEUROLOGY

## 2019-12-16 PROCEDURE — 99999 PR PBB SHADOW E&M-EST. PATIENT-LVL III: ICD-10-PCS | Mod: PBBFAC,,, | Performed by: PSYCHIATRY & NEUROLOGY

## 2019-12-16 PROCEDURE — 99213 PR OFFICE/OUTPT VISIT, EST, LEVL III, 20-29 MIN: ICD-10-PCS | Mod: S$GLB,,, | Performed by: PSYCHIATRY & NEUROLOGY

## 2019-12-16 PROCEDURE — 99999 PR PBB SHADOW E&M-EST. PATIENT-LVL III: CPT | Mod: PBBFAC,,, | Performed by: PSYCHIATRY & NEUROLOGY

## 2019-12-16 RX ORDER — TACROLIMUS 1 MG/1
CAPSULE ORAL
Refills: 11 | COMMUNITY
Start: 2019-12-02 | End: 2020-09-25

## 2019-12-16 RX ORDER — NORTRIPTYLINE HYDROCHLORIDE 10 MG/1
10 CAPSULE ORAL NIGHTLY
Qty: 90 CAPSULE | Refills: 3 | Status: SHIPPED | OUTPATIENT
Start: 2019-12-16 | End: 2020-10-16

## 2019-12-16 RX ORDER — ERGOCALCIFEROL 1.25 MG/1
CAPSULE ORAL
Status: ON HOLD | COMMUNITY
End: 2020-02-13 | Stop reason: CLARIF

## 2019-12-16 NOTE — PROGRESS NOTES
"  HPI: Mert Samayoa is a 29 y.o. male with headache   He is here for his 6 months follow up.  Since the last visit with me, the patient has been seeing NP, Eva Perry, in Neurology in this clinic  At that visit, the patient noted improved headache frequency with Pamelor      HA Frequency on Pamelor and he state his headaches are well controlled (1-2 month) with Pamelor 10mg  He is using Fioricet once or twice per month.  Feels mild "disconnect" with emotions on Pamelor rarely (Describes dull emotions) but this is rare and tolerable to him    The patient has a 3 week old daughter now, his first born    Review of Systems   Constitutional: Negative for fever.   HENT: Negative for nosebleeds.    Eyes: Negative for double vision.   Respiratory: Negative for hemoptysis.    Cardiovascular: Negative for leg swelling.   Gastrointestinal: Negative for blood in stool.   Genitourinary: Negative for hematuria.   Musculoskeletal: Negative for falls.   Skin: Negative for rash.   Neurological: Negative for loss of consciousness.   Psychiatric/Behavioral: The patient does not have insomnia.          I have reviewed all of this patient's past medical and surgical histories as well as family and social histories and active allergies and medications as documented in the electronic medical record.      Exam:  Gen Appearance, well developed/nourished in no apparent distress  CV: 2+ distal pulses with no edema or swelling  Neuro:  MS: Awake, alert, oriented to place, person, time, situation. Sustains attention. Recent/remote memory intact, Language is full to spontaneous speech/repetition/naming/comprehension. Fund of Knowledge is full  CN: Optic discs are flat with normal vasculature, PERRL, Extraoccular movements and visual fields are full. Normal facial sensation and strength, Hearing symmetric, Tongue and Palate are midline and strong. Shoulder Shrug symmetric and strong.  Motor: Normal bulk, tone, no abnormal movements. " 5/5 strength bilateral upper/lower extremities with 2+ reflexes and no clonus  Sensory: symmetric to light touch, pain, temp, and vibration Romberg negative  Cerebellar: Finger-nose,Rapid alternating movements intact  Gait: Normal stance, no ataxia    Imaging: 3/2019 MRI brain: No acute abnormality  Labs: 2019 CMP, CBC reviewed        Assessment/Plan: Mert Samayoa is a 29 y.o. male with a history of heart transplant (due to familiar cardiomyopathy) and life long migraines with aura which is worse in frequency and severity since organ transplant/on immunosuppressants     I recommend:   1. MRI brain 2019 reassuring  2. Benefiting from Pamelor for headache prevention- continue at 10mg nightly. Currently he feels benefits are greater than minor and infrequent emotional dulling side effects. Can taper to QOD for 3 weeks then stop if this worsens at any point/ if desired.   3.  Cautioned about analgesic overuse prior. Fioricet rare use ok as he is not a candidate for NSAIDS, given his history of heart transplant. I would avoid triptans, given his history as well.  Compazine would also be an option should he fail Fioricet    RTC 1 year

## 2020-01-27 DIAGNOSIS — K21.9 GASTROESOPHAGEAL REFLUX DISEASE, ESOPHAGITIS PRESENCE NOT SPECIFIED: ICD-10-CM

## 2020-01-28 RX ORDER — FAMOTIDINE 40 MG/1
TABLET, FILM COATED ORAL
Qty: 30 TABLET | Refills: 11 | Status: SHIPPED | OUTPATIENT
Start: 2020-01-28 | End: 2021-01-23

## 2020-02-04 ENCOUNTER — HOSPITAL ENCOUNTER (OUTPATIENT)
Dept: RADIOLOGY | Facility: HOSPITAL | Age: 30
Discharge: HOME | End: 2020-02-04
Attending: INTERNAL MEDICINE
Payer: COMMERCIAL

## 2020-02-04 ENCOUNTER — OFFICE VISIT (OUTPATIENT)
Dept: CARDIOLOGY | Facility: CLINIC | Age: 30
End: 2020-02-04
Payer: COMMERCIAL

## 2020-02-04 ENCOUNTER — OFFICE VISIT (OUTPATIENT)
Dept: TRANSPLANT | Facility: CLINIC | Age: 30
End: 2020-02-04
Payer: COMMERCIAL

## 2020-02-04 ENCOUNTER — HOSPITAL ENCOUNTER (OUTPATIENT)
Dept: CARDIOLOGY | Facility: CLINIC | Age: 30
Discharge: HOME | End: 2020-02-04
Attending: INTERNAL MEDICINE
Payer: COMMERCIAL

## 2020-02-04 ENCOUNTER — HOSPITAL ENCOUNTER (OUTPATIENT)
Dept: CARDIOLOGY | Facility: CLINIC | Age: 30
Discharge: HOME | End: 2020-02-04
Payer: COMMERCIAL

## 2020-02-04 ENCOUNTER — DOCUMENTATION ONLY (OUTPATIENT)
Dept: CARDIOLOGY | Facility: CLINIC | Age: 30
End: 2020-02-04

## 2020-02-04 VITALS
OXYGEN SATURATION: 99 % | HEIGHT: 67 IN | HEART RATE: 109 BPM | DIASTOLIC BLOOD PRESSURE: 86 MMHG | SYSTOLIC BLOOD PRESSURE: 136 MMHG | BODY MASS INDEX: 27.89 KG/M2 | WEIGHT: 177.69 LBS

## 2020-02-04 VITALS
BODY MASS INDEX: 27.89 KG/M2 | HEIGHT: 67 IN | DIASTOLIC BLOOD PRESSURE: 88 MMHG | HEART RATE: 111 BPM | WEIGHT: 177.69 LBS | SYSTOLIC BLOOD PRESSURE: 141 MMHG

## 2020-02-04 VITALS
WEIGHT: 173 LBS | DIASTOLIC BLOOD PRESSURE: 80 MMHG | BODY MASS INDEX: 27.15 KG/M2 | SYSTOLIC BLOOD PRESSURE: 126 MMHG | HEART RATE: 104 BPM | HEIGHT: 67 IN

## 2020-02-04 DIAGNOSIS — Z94.1 HEART TRANSPLANTED: Primary | ICD-10-CM

## 2020-02-04 DIAGNOSIS — Z94.1 STATUS POST HEART TRANSPLANT: ICD-10-CM

## 2020-02-04 DIAGNOSIS — Z79.899 LONG TERM CURRENT USE OF IMMUNOSUPPRESSIVE DRUG: ICD-10-CM

## 2020-02-04 DIAGNOSIS — Z94.1 HEART TRANSPLANTED: ICD-10-CM

## 2020-02-04 DIAGNOSIS — E78.5 HYPERLIPIDEMIA LDL GOAL <70: ICD-10-CM

## 2020-02-04 DIAGNOSIS — G43.111 INTRACTABLE MIGRAINE WITH AURA WITH STATUS MIGRAINOSUS: ICD-10-CM

## 2020-02-04 DIAGNOSIS — R00.2 PALPITATIONS: Primary | ICD-10-CM

## 2020-02-04 LAB
ASCENDING AORTA: 2.71 CM
AV INDEX (PROSTH): 0.91
AV MEAN GRADIENT: 3 MMHG
AV PEAK GRADIENT: 5 MMHG
AV VALVE AREA: 3.63 CM2
AV VELOCITY RATIO: 0.79
BSA FOR ECHO PROCEDURE: 1.93 M2
CV ECHO LV RWT: 0.35 CM
DOP CALC AO PEAK VEL: 1.11 M/S
DOP CALC AO VTI: 17.59 CM
DOP CALC LVOT AREA: 4 CM2
DOP CALC LVOT DIAMETER: 2.25 CM
DOP CALC LVOT PEAK VEL: 0.88 M/S
DOP CALC LVOT STROKE VOLUME: 63.82 CM3
DOP CALCLVOT PEAK VEL VTI: 16.06 CM
E WAVE DECELERATION TIME: 176.66 MSEC
E/A RATIO: 3.48
E/E' RATIO: 10.63 M/S
ECHO LV POSTERIOR WALL: 0.78 CM (ref 0.6–1.1)
FRACTIONAL SHORTENING: 29 % (ref 28–44)
INTERVENTRICULAR SEPTUM: 0.68 CM (ref 0.6–1.1)
IVRT: 0.1 MSEC
LEFT INTERNAL DIMENSION IN SYSTOLE: 3.12 CM (ref 2.1–4)
LEFT VENTRICLE DIASTOLIC VOLUME INDEX: 46.14 ML/M2
LEFT VENTRICLE DIASTOLIC VOLUME: 87.72 ML
LEFT VENTRICLE MASS INDEX: 51 G/M2
LEFT VENTRICLE SYSTOLIC VOLUME INDEX: 20.3 ML/M2
LEFT VENTRICLE SYSTOLIC VOLUME: 38.66 ML
LEFT VENTRICULAR INTERNAL DIMENSION IN DIASTOLE: 4.4 CM (ref 3.5–6)
LEFT VENTRICULAR MASS: 97.15 G
LV LATERAL E/E' RATIO: 10.1 M/S
LV SEPTAL E/E' RATIO: 11.22 M/S
MV PEAK A VEL: 0.29 M/S
MV PEAK E VEL: 1.01 M/S
PISA TR MAX VEL: 1.82 M/S
RA PRESSURE: 3 MMHG
RIGHT VENTRICULAR END-DIASTOLIC DIMENSION: 2.81 CM
SINUS: 3.01 CM
STJ: 2.58 CM
TDI LATERAL: 0.1 M/S
TDI SEPTAL: 0.09 M/S
TDI: 0.1 M/S
TR MAX PG: 13 MMHG
TV REST PULMONARY ARTERY PRESSURE: 16 MMHG

## 2020-02-04 PROCEDURE — 71046 X-RAY EXAM CHEST 2 VIEWS: CPT | Mod: 26,,, | Performed by: RADIOLOGY

## 2020-02-04 PROCEDURE — 93306 ECHO (CUPID ONLY): ICD-10-PCS | Mod: S$GLB,,, | Performed by: INTERNAL MEDICINE

## 2020-02-04 PROCEDURE — 99999 PR PBB SHADOW E&M-EST. PATIENT-LVL III: CPT | Mod: PBBFAC,,, | Performed by: INTERNAL MEDICINE

## 2020-02-04 PROCEDURE — 93005 ELECTROCARDIOGRAM TRACING: CPT | Mod: S$GLB,,, | Performed by: INTERNAL MEDICINE

## 2020-02-04 PROCEDURE — 99999 PR PBB SHADOW E&M-EST. PATIENT-LVL III: ICD-10-PCS | Mod: PBBFAC,,, | Performed by: INTERNAL MEDICINE

## 2020-02-04 PROCEDURE — 93306 TTE W/DOPPLER COMPLETE: CPT | Mod: S$GLB,,, | Performed by: INTERNAL MEDICINE

## 2020-02-04 PROCEDURE — 99215 PR OFFICE/OUTPT VISIT, EST, LEVL V, 40-54 MIN: ICD-10-PCS | Mod: S$GLB,,, | Performed by: INTERNAL MEDICINE

## 2020-02-04 PROCEDURE — 71046 X-RAY EXAM CHEST 2 VIEWS: CPT | Mod: TC,FY

## 2020-02-04 PROCEDURE — 99215 OFFICE O/P EST HI 40 MIN: CPT | Mod: S$GLB,,, | Performed by: INTERNAL MEDICINE

## 2020-02-04 PROCEDURE — 99205 OFFICE O/P NEW HI 60 MIN: CPT | Mod: S$GLB,,, | Performed by: INTERNAL MEDICINE

## 2020-02-04 PROCEDURE — 99205 PR OFFICE/OUTPT VISIT, NEW, LEVL V, 60-74 MIN: ICD-10-PCS | Mod: S$GLB,,, | Performed by: INTERNAL MEDICINE

## 2020-02-04 PROCEDURE — 93010 EKG 12-LEAD: ICD-10-PCS | Mod: S$GLB,,, | Performed by: INTERNAL MEDICINE

## 2020-02-04 PROCEDURE — 93005 EKG 12-LEAD: ICD-10-PCS | Mod: S$GLB,,, | Performed by: INTERNAL MEDICINE

## 2020-02-04 PROCEDURE — 93010 ELECTROCARDIOGRAM REPORT: CPT | Mod: S$GLB,,, | Performed by: INTERNAL MEDICINE

## 2020-02-04 PROCEDURE — 71046 XR CHEST PA AND LATERAL: ICD-10-PCS | Mod: 26,,, | Performed by: RADIOLOGY

## 2020-02-04 RX ORDER — SODIUM CHLORIDE 9 MG/ML
3 INJECTION, SOLUTION INTRAVENOUS CONTINUOUS
Status: CANCELLED | OUTPATIENT
Start: 2020-02-04 | End: 2020-02-04

## 2020-02-04 RX ORDER — DIPHENHYDRAMINE HCL 25 MG
50 CAPSULE ORAL ONCE
Status: CANCELLED | OUTPATIENT
Start: 2020-02-04 | End: 2020-02-04

## 2020-02-04 NOTE — PROGRESS NOTES
OUTPATIENT CATHETERIZATION INSTRUCTIONS    You have been scheduled for a procedure in the catheterization lab on Thursday, February 13, 2020.     Please report to the Cardiology Waiting Area on the Third floor of the hospital and check in at 7:30 AM.   You will then be taken to the SSCU (Short Stay Cardiac Unit) and prepared for your procedure. Please be aware that this is not the time of your procedure but the time you are to arrive. The procedures are scheduled on an hourly basis; however, emergency cases take precedence over all other cases.       You may not have anything to eat or drink after midnight the night before your test. You may take your regular morning medications with water. If there are any medications that you should not take you will be instructed to hold them that morning. If you are diabetic and on Metformin (Glucophage) do not take it the day before, the day of, and for 2 days after your procedure.      The procedure will take 1-2 hours to perform. After the procedure, you will return to SSCU on the third floor of the hospital. You will need to lie still (or keep your arm still) for the next 4 to 6 hours to minimize bleeding from the puncture site. Your family may remain in the room with you during this time.       You may be able to be discharged home that same afternoon if there is someone to drive you home and there were no complications. If you have one of the balloon, stent, or device procedures you may spend the night in the hospital. Your doctor will determine, based on your progress, the date and time of your discharge. The results of your procedure will be discussed with you before you are discharged. Any further testing or procedures will be scheduled for you either before you leave or you will be called with these appointments.       If you should have any questions, concerns, or need to change the date of your procedure, please call SUE Pena @ [phone]    Special  Instructions:    None        THE ABOVE INSTRUCTIONS WERE GIVEN TO THE PATIENT VERBALLY AND THEY VERBALIZED UNDERSTANDING.  THEY DO NOT REQUIRE ANY SPECIAL NEEDS AND DO NOT HAVE ANY LEARNING BARRIERS.          Directions for Reporting to Cardiology Waiting Area in the Hospital  If you park in the Parking Garage:  Take elevators to the1st floor of the parking garage.  Continue past the gift shop, coffee shop, and piano.  Take a right and go to the gold elevators. (Elevator B)  Take the elevator to the 3rd floor.  Follow the arrow on the sign on the wall that says Cath Lab Registration/EP Lab Registration.  Follow the long hallway all the way around until you come to a big open area.  This is the registration area.  Check in at Reception Desk.    OR    If family is dropping you off:  Have them drop you off at the front of the Hospital under the green overhang.  Enter through the doors and take a right.  Take the E elevators to the 3rd floor Cardiology Waiting Area.  Check in at the Reception Desk in the waiting room.

## 2020-02-04 NOTE — PROGRESS NOTES
Interventional Cardiology Clinic Note  Reason for Visit: Surveillance angiography  Referring: Dr. Hawkins    HPI:   29 y.o. male with a past medical history of familial NICM now s/p OHTx 2/18/18 and donor OHT PFO closure with post op course complicated by retained LVAD drive line (being managed by Dr. Klein) and leukocytosis who presents for his 3rd OHT surveillance angiogram. Last LHC was 2/14/19 which had normal coronary arteries by angiography and IVUS of LM and LAD showed minimal spots of atherosclerosis, Maxima interimal thicken in pLAD 0.8mm. At this time he has no acute complaints and is feeling well. No issues with radial approach for LHC + IVUS last time.    ROS:    Constitution: Negative for fever, chills, weight loss or gain.   HENT: Negative for sore throat, rhinorrhea, or headache.  Eyes: Negative for blurred or double vision.   Cardiovascular: See above  Pulmonary: Negative for SOB   Gastrointestinal: Negative for abdominal pain, nausea, vomiting, or diarrhea.   : Negative for dysuria.   Neurological: Negative for focal weakness or sensory changes.  PMH:     Past Medical History:   Diagnosis Date    Cardiogenic shock 1/16/2017    Cardiomyopathy     Familial cardiomyopathy    CHF (congestive heart failure)     Encounter for blood transfusion     Essential hypertension 12/21/2016    Essential hypertension 3/20/2018    Familial Cardiomyopathy     Familial cardiomyopathy     Hyperlipidemia LDL goal <70 11/19/2018     Past Surgical History:   Procedure Laterality Date    BIOPSY WITH ULTRASOUND GUIDANCE N/A 8/27/2019    Procedure: BIOPSY, WITH US GUIDANCE;  Surgeon: Mario Conner MD;  Location: [facility] CATH LAB;  Service: Cardiology;  Laterality: N/A;    BIOPSY WITH ULTRASOUND GUIDANCE N/A 9/24/2019    Procedure: BIOPSY, WITH US GUIDANCE;  Surgeon: Sammi Tapia MD;  Location: [facility] CATH LAB;  Service: Cardiology;  Laterality: N/A;    BIOPSY WITH ULTRASOUND GUIDANCE N/A 11/12/2019     Procedure: BIOPSY, WITH US GUIDANCE;  Surgeon: Mario Conner MD;  Location: [facility] CATH LAB;  Service: Cardiology;  Laterality: N/A;    CORONARY ANGIOGRAPHY Right 2/14/2019    Procedure: ANGIOGRAM, CORONARY ARTERY;  Surgeon: Tello Correia MD;  Location: [facility] CATH LAB;  Service: Cardiology;  Laterality: Right;    HEART TRANSPLANT      LEFT VENTRICULAR ASSIST DEVICE      RIGHT HEART CATHETERIZATION Right 2/14/2019    Procedure: INSERTION, CATHETER, RIGHT HEART;  Surgeon: Tello Correia MD;  Location: [facility] CATH LAB;  Service: Cardiology;  Laterality: Right;    TONSILLECTOMY       Allergies:   Review of patient's allergies indicates:  No Known Allergies  Medications:     Current Outpatient Medications on File Prior to Visit   Medication Sig Dispense Refill    butalbital-acetaminophen-caffeine -40 mg (FIORICET, ESGIC) -40 mg per tablet   5    calcium carbonate-vit D3-min 600 mg calcium- 200 unit Tab Take 1 tablet by mouth once daily.   5    famotidine (PEPCID) 40 MG tablet TAKE ONE TABLET BY MOUTH EVERY EVENING 30 tablet 11    magnesium oxide (MAG-OX) 400 mg tablet Take 1 tablet (400 mg total) by mouth 2 (two) times daily. 60 tablet 11    mycophenolate (CELLCEPT) 250 mg Cap Take 5 capsules (1,250 mg total) by mouth 2 (two) times daily. 300 capsule 11    nortriptyline (PAMELOR) 10 MG capsule Take 1 capsule (10 mg total) by mouth every evening. 90 capsule 3    predniSONE (DELTASONE) 5 MG tablet Take 1 tablet (5 mg total) by mouth once daily. 30 tablet 6    tacrolimus (PROGRAF) 0.5 MG Cap Take 3 capsules (1.5 mg) by mouth twice a day. 210 capsule 6    tacrolimus (PROGRAF) 1 MG Cap   11    aspirin (ECOTRIN) 81 MG EC tablet Take 1 tablet (81 mg total) by mouth once daily. 30 tablet 11    atorvastatin (LIPITOR) 20 MG tablet Take 1 tablet (20 mg total) by mouth once daily. 90 tablet 3    ergocalciferol (ERGOCALCIFEROL) 50,000 unit Cap Vitamin D2 1,250 mcg (50,000 unit) capsule   Take 1  "capsule every week by oral route.       No current facility-administered medications on file prior to visit.      Social History:     Social History     Tobacco Use    Smoking status: Former Smoker     Packs/day: 1.00     Last attempt to quit: 12/14/2016     Years since quitting: 3.1    Smokeless tobacco: Never Used   Substance Use Topics    Alcohol use: No     Family History:     Family History   Problem Relation Age of Onset    Arthritis Mother     Heart disease Brother         cardiomyopathy and heart transplant    No Known Problems Father     Heart disease Brother         cardiomyopathy and heart transplanted twice    Birth defects Paternal Uncle      Physical Exam:   /86 (BP Location: Right arm, Patient Position: Sitting, BP Method: Large (Automatic))   Pulse 109   Ht 5' 7" (1.702 m)   Wt 80.6 kg (177 lb 11.1 oz)   SpO2 99%   BMI 27.83 kg/m²      Constitutional: NAD, conversant  HEENT: Sclera anicteric, PERRLA, EOMI  Neck: No JVD, no carotid bruits  CV: RRR, no murmur, normal S1/S2  Pulm: CTAB, no wheezes, rales, or ronchi  GI: Abdomen soft, NTND, +BS  Extremities: No LE edema, warm and well perfused  Skin: No ecchymosis, erythema, or ulcers  Psych: AOx3, appropriate affect  Pulses: Radial BL 2+, Brachial BL 2+, Femoral BL 2+, DP/PT BL 2+.    Labs:     Lab Results   Component Value Date     02/04/2020    K 3.7 02/04/2020     02/04/2020    CO2 26 02/04/2020    BUN 14 02/04/2020    CREATININE 1.0 02/04/2020    ANIONGAP 12 02/04/2020     Lab Results   Component Value Date    HGBA1C 5.3 02/01/2019     Lab Results   Component Value Date    BNP 35 02/04/2020    BNP 48 11/05/2019    BNP 31 08/27/2019    Lab Results   Component Value Date    WBC 10.37 02/04/2020    HGB 15.1 02/04/2020    HCT 48.3 02/04/2020    HCT 23 (L) 02/21/2018     02/04/2020    GRAN 7.4 02/04/2020    GRAN 71.0 02/04/2020     Lab Results   Component Value Date    CHOL 110 (L) 02/04/2020    HDL 36 (L) 02/04/2020 "    LDLCALC 33.6 (L) 02/04/2020    TRIG 202 (H) 02/04/2020          Imaging:     No results found for: EF   ECHO 2/4/2020  EF 65%, no wall motion abnormalities      Assessment/Plan   Heart transplanted  -     Case Request-Cath Lab: Left heart cath; Standing  -     Clip and Prep Right Radial, Left Femoral, Right Femoral; Standing  -     Type & Screen; Future; Expected date: 02/11/2020        - [facility] with surveillance IVUS     - Access: Right Radial and Right groin as back up     Renal Risk Score/Predicts risk of contrast-induced nephropathy (JAVY) after PCI:    Prior Contrast Allergy: No    Pre Cath Routine Benadryl 50 mg and Routine IVF NaCl PreCath      Sedation   Type of sedation: RN IV sedation    Mallampati score: 2   ASA score: 2    -The risks, benefits & alternatives of the procedure were explained to the patient.    -The risks of coronary angiography include but are not limited to: Bleeding, infection, heart rhythm abnormalities, allergic reactions, kidney injury, stroke and death.    -Should stenting be indicated, the patient has agreed to dual anti-platelet therapy for 1-consecutive year with a drug-eluting stent and a minimum of 1-month with the use of a bare metal stent.    -The risks of moderate sedation include hypotension, respiratory depression, arrhythmias, bronchospasm, & death.    -Informed consent was obtained & the patient is agreeable to proceed with the procedure.    Signed:  Misael Callahan  10:48 AM    I have personally taken the history and examined this patient. I have discussed and agree with the resident's findings and plan as documented in the resident's note.  Two years post orthotopic heart transplant referred for coronary angiogram with intravascular ultrasound.  Guy Cartagena

## 2020-02-04 NOTE — PROGRESS NOTES
Subjective:   Mr. Samayoa is a 30 y.o. year old White male who received a  - brain death heart transplant on 18.      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  Most recently neutropenia that required medication adjustments (rapa stopped).  Now on cellcept 1000 mg BID, tacro 1/2, prednisone 5 mg qd.   He has no active CV symptoms except palpitations and an episode of redness and constricition in the chest and somewhat faint. Last Wednesday. Headaches are improving on Pamelor    Doing well      · Post cardiac transplantation study (OHTx 2018)  · Normal left ventricular systolic function. The estimated ejection fraction is 65%.  · Normal right ventricular systolic function.  · Normal LV diastolic function.  · The estimated PA systolic pressure is 16 mmHg.  · Normal central venous pressure (3 mmHg).      TTE today       Left Ventricle ejection fraction at 50%. Normal left ventricular cavity size. Concentric remodeling observed. Abnormal septal motion consistent with post-operative state. Left ventricular diastolic dysfunction.   Right Ventricle Normal cavity size. The ejection fraction is borderline low.   Left Atrium Enlarged due to cardiac transplantation.   Aortic Valve Mobility is normal   Mitral Valve Normal valve structure. Normal leaflet mobility.   Tricuspid Valve The tricuspid valve is normal. Mobility is normal.   Pulmonic Valve Normal valve structure. Mild regurgitation. Mobility is normal.   IVC/SVC Normal central venous pressure (3 mm Hg).       DOing well. Angiogram OK    · Normal coronary arteries by angiography. IVUS of LM/LAD showed minimal spots of atherosclerosis. Maxima intimal thickness in pLAD 0.8 mm  · 5 successful RVBX, under fluoro guidance, perfomed.  · Estimated blood loss: <50 mL  Review of Systems   Constitution: Negative for chills, fever,  "malaise/fatigue, night sweats, weight gain and weight loss.   Cardiovascular: Positive for palpitations. Negative for chest pain, dyspnea on exertion, irregular heartbeat, leg swelling, near-syncope, orthopnea, paroxysmal nocturnal dyspnea and syncope.   Respiratory: Negative for cough, sputum production and wheezing.    Hematologic/Lymphatic: Does not bruise/bleed easily.   Musculoskeletal: Negative for arthritis, joint pain and stiffness.   Gastrointestinal: Negative for abdominal pain, change in bowel habit, constipation, diarrhea, hematochezia and melena.   Genitourinary: Negative for hematuria.   Neurological: Negative for brief paralysis, focal weakness, seizures and weakness.     Objective:   Blood pressure (!) 141/88, pulse (!) 111, height 5' 7" (1.702 m), weight 80.6 kg (177 lb 11.1 oz).body mass index is 27.83 kg/m².  Physical Exam   Constitutional: He is oriented to person, place, and time. He appears well-developed and well-nourished. No distress.         HENT:   Head: Normocephalic and atraumatic.   Mouth/Throat: Oropharynx is clear and moist. No oropharyngeal exudate.   Eyes: Conjunctivae are normal. Right eye exhibits no discharge. Left eye exhibits no discharge. No scleral icterus.   Neck: No JVD present. No thyromegaly present.   Cardiovascular: Normal rate, regular rhythm and normal heart sounds. Exam reveals no gallop.   No murmur heard.  Pulmonary/Chest: Effort normal and breath sounds normal.   Abdominal: Soft. Bowel sounds are normal. He exhibits no distension and no mass. There is no tenderness. There is no rebound and no guarding.   Musculoskeletal: He exhibits no edema or tenderness.   Lymphadenopathy:     He has no cervical adenopathy.   Neurological: He is alert and oriented to person, place, and time.   Skin: Skin is warm and dry. He is not diaphoretic.   Psychiatric: He has a normal mood and affect. His behavior is normal. Judgment and thought content normal.     Lab Results   Component " Value Date    BNP 72 11/19/2018     11/19/2018    K 3.9 11/19/2018    MG 1.9 11/19/2018     11/19/2018    CO2 22 (L) 11/19/2018    BUN 14 11/19/2018    CREATININE 0.9 11/19/2018    GLU 87 11/19/2018    AST 25 11/19/2018    ALT 33 11/19/2018    ALBUMIN 4.2 11/19/2018    PROT 7.0 11/19/2018    BILITOT 0.5 11/19/2018    WBC 7.62 11/19/2018    HGB 14.8 11/19/2018    HCT 47.2 11/19/2018     11/19/2018    CHOL 108 (L) 11/19/2018    HDL 41 11/19/2018    LDLCALC 43.6 (L) 11/19/2018    TRIG 117 11/19/2018    TACROLIMUS 8.1 11/19/2018     ECHO today with normal ejection fraction (abnormal septal motion consistent with prior open heart surgery) est LVEF 65%.  I disagree with read of Oct 2018 EF as it too 65%    Assessment:     1. Heart transplanted- Doing well   2. Essential hypertension    3. Long term current use of immunosuppressive drug    4. Hyperlipidemia LDL goal <70        Plan:   Doing well Headaches are improved.     Routine follow up    Return instructions as set forth by post transplant schedule or as needed:    Clinic: Return for labs and/or biopsy weekly the first month, every two weeks during month 2 and then monthly for the first year at the provider or coordinator's discretion. During the second year, return to clinic every 3 months. Post transplant year 3-5 return every 6 months. There will be a comprehensive post transplant evaluation every year that may include LHC/RHC/biopsy, stress test, echo, CXR, and other health screening exams.    In addition to the clinical assessment, I have ordered Allomap testing for this patient to assist in identification of moderate/severe acute cellular rejection (ACR) in a pt with stable Allograft function instead of endomyocardial biopsy.     Patient is reminded to call with any health changes as these can be early signs of transplant complications. Patient is advised to make sure any new medications or changes of old medications are discussed with a  pharmacist or physician knowledgeable with transplant to avoid rejection/drug toxicity related to significant drug interactions.    UNOS Patient Status  Functional Status: 100% - Normal, no complaints, no evidence of disease  Physical Capacity: No Limitations  Working for Income: yes  If yes, working activity level: Working Full Time    Roque Matos MD     F/u 1 mo

## 2020-02-04 NOTE — H&P (VIEW-ONLY)
Interventional Cardiology Clinic Note  Reason for Visit: Surveillance angiography  Referring: Dr. Hawkins    HPI:   29 y.o. male with a past medical history of familial NICM now s/p OHTx 2/18/18 and donor OHT PFO closure with post op course complicated by retained LVAD drive line (being managed by Dr. Klein) and leukocytosis who presents for his 3rd OHT surveillance angiogram. Last LHC was 2/14/19 which had normal coronary arteries by angiography and IVUS of LM and LAD showed minimal spots of atherosclerosis, Maxima interimal thicken in pLAD 0.8mm. At this time he has no acute complaints and is feeling well. No issues with radial approach for LHC + IVUS last time.    ROS:    Constitution: Negative for fever, chills, weight loss or gain.   HENT: Negative for sore throat, rhinorrhea, or headache.  Eyes: Negative for blurred or double vision.   Cardiovascular: See above  Pulmonary: Negative for SOB   Gastrointestinal: Negative for abdominal pain, nausea, vomiting, or diarrhea.   : Negative for dysuria.   Neurological: Negative for focal weakness or sensory changes.  PMH:     Past Medical History:   Diagnosis Date    Cardiogenic shock 1/16/2017    Cardiomyopathy     Familial cardiomyopathy    CHF (congestive heart failure)     Encounter for blood transfusion     Essential hypertension 12/21/2016    Essential hypertension 3/20/2018    Familial Cardiomyopathy     Familial cardiomyopathy     Hyperlipidemia LDL goal <70 11/19/2018     Past Surgical History:   Procedure Laterality Date    BIOPSY WITH ULTRASOUND GUIDANCE N/A 8/27/2019    Procedure: BIOPSY, WITH US GUIDANCE;  Surgeon: Mario Conner MD;  Location: [facility] CATH LAB;  Service: Cardiology;  Laterality: N/A;    BIOPSY WITH ULTRASOUND GUIDANCE N/A 9/24/2019    Procedure: BIOPSY, WITH US GUIDANCE;  Surgeon: Sammi Tapia MD;  Location: [facility] CATH LAB;  Service: Cardiology;  Laterality: N/A;    BIOPSY WITH ULTRASOUND GUIDANCE N/A 11/12/2019     Procedure: BIOPSY, WITH US GUIDANCE;  Surgeon: Mario Conner MD;  Location: [facility] CATH LAB;  Service: Cardiology;  Laterality: N/A;    CORONARY ANGIOGRAPHY Right 2/14/2019    Procedure: ANGIOGRAM, CORONARY ARTERY;  Surgeon: Tello Correia MD;  Location: [facility] CATH LAB;  Service: Cardiology;  Laterality: Right;    HEART TRANSPLANT      LEFT VENTRICULAR ASSIST DEVICE      RIGHT HEART CATHETERIZATION Right 2/14/2019    Procedure: INSERTION, CATHETER, RIGHT HEART;  Surgeon: Tello Correia MD;  Location: [facility] CATH LAB;  Service: Cardiology;  Laterality: Right;    TONSILLECTOMY       Allergies:   Review of patient's allergies indicates:  No Known Allergies  Medications:     Current Outpatient Medications on File Prior to Visit   Medication Sig Dispense Refill    butalbital-acetaminophen-caffeine -40 mg (FIORICET, ESGIC) -40 mg per tablet   5    calcium carbonate-vit D3-min 600 mg calcium- 200 unit Tab Take 1 tablet by mouth once daily.   5    famotidine (PEPCID) 40 MG tablet TAKE ONE TABLET BY MOUTH EVERY EVENING 30 tablet 11    magnesium oxide (MAG-OX) 400 mg tablet Take 1 tablet (400 mg total) by mouth 2 (two) times daily. 60 tablet 11    mycophenolate (CELLCEPT) 250 mg Cap Take 5 capsules (1,250 mg total) by mouth 2 (two) times daily. 300 capsule 11    nortriptyline (PAMELOR) 10 MG capsule Take 1 capsule (10 mg total) by mouth every evening. 90 capsule 3    predniSONE (DELTASONE) 5 MG tablet Take 1 tablet (5 mg total) by mouth once daily. 30 tablet 6    tacrolimus (PROGRAF) 0.5 MG Cap Take 3 capsules (1.5 mg) by mouth twice a day. 210 capsule 6    tacrolimus (PROGRAF) 1 MG Cap   11    aspirin (ECOTRIN) 81 MG EC tablet Take 1 tablet (81 mg total) by mouth once daily. 30 tablet 11    atorvastatin (LIPITOR) 20 MG tablet Take 1 tablet (20 mg total) by mouth once daily. 90 tablet 3    ergocalciferol (ERGOCALCIFEROL) 50,000 unit Cap Vitamin D2 1,250 mcg (50,000 unit) capsule   Take 1  "capsule every week by oral route.       No current facility-administered medications on file prior to visit.      Social History:     Social History     Tobacco Use    Smoking status: Former Smoker     Packs/day: 1.00     Last attempt to quit: 12/14/2016     Years since quitting: 3.1    Smokeless tobacco: Never Used   Substance Use Topics    Alcohol use: No     Family History:     Family History   Problem Relation Age of Onset    Arthritis Mother     Heart disease Brother         cardiomyopathy and heart transplant    No Known Problems Father     Heart disease Brother         cardiomyopathy and heart transplanted twice    Birth defects Paternal Uncle      Physical Exam:   /86 (BP Location: Right arm, Patient Position: Sitting, BP Method: Large (Automatic))   Pulse 109   Ht 5' 7" (1.702 m)   Wt 80.6 kg (177 lb 11.1 oz)   SpO2 99%   BMI 27.83 kg/m²      Constitutional: NAD, conversant  HEENT: Sclera anicteric, PERRLA, EOMI  Neck: No JVD, no carotid bruits  CV: RRR, no murmur, normal S1/S2  Pulm: CTAB, no wheezes, rales, or ronchi  GI: Abdomen soft, NTND, +BS  Extremities: No LE edema, warm and well perfused  Skin: No ecchymosis, erythema, or ulcers  Psych: AOx3, appropriate affect  Pulses: Radial BL 2+, Brachial BL 2+, Femoral BL 2+, DP/PT BL 2+.    Labs:     Lab Results   Component Value Date     02/04/2020    K 3.7 02/04/2020     02/04/2020    CO2 26 02/04/2020    BUN 14 02/04/2020    CREATININE 1.0 02/04/2020    ANIONGAP 12 02/04/2020     Lab Results   Component Value Date    HGBA1C 5.3 02/01/2019     Lab Results   Component Value Date    BNP 35 02/04/2020    BNP 48 11/05/2019    BNP 31 08/27/2019    Lab Results   Component Value Date    WBC 10.37 02/04/2020    HGB 15.1 02/04/2020    HCT 48.3 02/04/2020    HCT 23 (L) 02/21/2018     02/04/2020    GRAN 7.4 02/04/2020    GRAN 71.0 02/04/2020     Lab Results   Component Value Date    CHOL 110 (L) 02/04/2020    HDL 36 (L) 02/04/2020 "    LDLCALC 33.6 (L) 02/04/2020    TRIG 202 (H) 02/04/2020          Imaging:     No results found for: EF   ECHO 2/4/2020  EF 65%, no wall motion abnormalities      Assessment/Plan   Heart transplanted  -     Case Request-Cath Lab: Left heart cath; Standing  -     Clip and Prep Right Radial, Left Femoral, Right Femoral; Standing  -     Type & Screen; Future; Expected date: 02/11/2020        - [facility] with surveillance IVUS     - Access: Right Radial and Right groin as back up     Renal Risk Score/Predicts risk of contrast-induced nephropathy (JAVY) after PCI:    Prior Contrast Allergy: No    Pre Cath Routine Benadryl 50 mg and Routine IVF NaCl PreCath      Sedation   Type of sedation: RN IV sedation    Mallampati score: 2   ASA score: 2    -The risks, benefits & alternatives of the procedure were explained to the patient.    -The risks of coronary angiography include but are not limited to: Bleeding, infection, heart rhythm abnormalities, allergic reactions, kidney injury, stroke and death.    -Should stenting be indicated, the patient has agreed to dual anti-platelet therapy for 1-consecutive year with a drug-eluting stent and a minimum of 1-month with the use of a bare metal stent.    -The risks of moderate sedation include hypotension, respiratory depression, arrhythmias, bronchospasm, & death.    -Informed consent was obtained & the patient is agreeable to proceed with the procedure.    Signed:  Misael Callahan  10:48 AM    I have personally taken the history and examined this patient. I have discussed and agree with the resident's findings and plan as documented in the resident's note.  Two years post orthotopic heart transplant referred for coronary angiogram with intravascular ultrasound.  Guy Cartagena

## 2020-02-04 NOTE — Clinical Note
February 4, 2020        Guillermo Alejo  [address]  [facility] [address]  Phone: [phone]  Fax: [phone]             [facility]  [address]  [facility] [address]  Phone: [phone]   Patient: Mert Samayoa   MR Number: [id number]   YOB: 1990   Date of Visit: 2/4/2020       Dear Dr. Guillermo Alejo    Thank you for referring Mert Samayoa to me for evaluation. Attached you will find relevant portions of my assessment and plan of care.    If you have questions, please do not hesitate to call me. I look forward to following Mert Samayoa along with you.    Sincerely,    Roque Matos MD    Enclosure    If you would like to receive this communication electronically, please contact [email or web address] or [phone] to request [employer] access.    [employer] is a tool which provides read-only access to select patient information with whom you have a relationship. Its easy to use and provides real time access to review your patients record including encounter summaries, notes, results, and demographic information.    If you feel you have received this communication in error or would no longer like to receive these types of communications, please e-mail [email or web address]

## 2020-02-13 ENCOUNTER — HOSPITAL ENCOUNTER (OUTPATIENT)
Facility: HOSPITAL | Age: 30
Discharge: HOME | End: 2020-02-13
Attending: INTERNAL MEDICINE | Admitting: INTERNAL MEDICINE
Payer: COMMERCIAL

## 2020-02-13 VITALS
WEIGHT: 170 LBS | TEMPERATURE: 97 F | DIASTOLIC BLOOD PRESSURE: 84 MMHG | HEIGHT: 67 IN | OXYGEN SATURATION: 97 % | SYSTOLIC BLOOD PRESSURE: 124 MMHG | HEART RATE: 93 BPM | RESPIRATION RATE: 18 BRPM | BODY MASS INDEX: 26.68 KG/M2

## 2020-02-13 DIAGNOSIS — Z79.899 LONG TERM CURRENT USE OF IMMUNOSUPPRESSIVE DRUG: ICD-10-CM

## 2020-02-13 DIAGNOSIS — Z94.1 HEART TRANSPLANTED: ICD-10-CM

## 2020-02-13 DIAGNOSIS — E78.5 HYPERLIPIDEMIA LDL GOAL <70: ICD-10-CM

## 2020-02-13 LAB
ABO + RH BLD: NORMAL
BLD GP AB SCN CELLS X3 SERPL QL: NORMAL

## 2020-02-13 PROCEDURE — C1887 CATHETER, GUIDING: HCPCS | Performed by: INTERNAL MEDICINE

## 2020-02-13 PROCEDURE — 93454 CORONARY ARTERY ANGIO S&I: CPT | Mod: 26,,, | Performed by: INTERNAL MEDICINE

## 2020-02-13 PROCEDURE — C1753 CATH, INTRAVAS ULTRASOUND: HCPCS | Performed by: INTERNAL MEDICINE

## 2020-02-13 PROCEDURE — 92978 ENDOLUMINL IVUS OCT C 1ST: CPT | Mod: 26,LD,, | Performed by: INTERNAL MEDICINE

## 2020-02-13 PROCEDURE — 99152 MOD SED SAME PHYS/QHP 5/>YRS: CPT | Performed by: INTERNAL MEDICINE

## 2020-02-13 PROCEDURE — 92978 PR IVUS, CORONARY, 1ST VESSEL: ICD-10-PCS | Mod: 26,LD,, | Performed by: INTERNAL MEDICINE

## 2020-02-13 PROCEDURE — 25000003 PHARM REV CODE 250: Performed by: INTERNAL MEDICINE

## 2020-02-13 PROCEDURE — 92978 ENDOLUMINL IVUS OCT C 1ST: CPT | Mod: LD | Performed by: INTERNAL MEDICINE

## 2020-02-13 PROCEDURE — 99217 PR OBSERVATION CARE DISCHARGE: ICD-10-PCS | Mod: ,,, | Performed by: PHYSICIAN ASSISTANT

## 2020-02-13 PROCEDURE — C1769 GUIDE WIRE: HCPCS | Performed by: INTERNAL MEDICINE

## 2020-02-13 PROCEDURE — C1894 INTRO/SHEATH, NON-LASER: HCPCS | Performed by: INTERNAL MEDICINE

## 2020-02-13 PROCEDURE — 99152 MOD SED SAME PHYS/QHP 5/>YRS: CPT | Mod: ,,, | Performed by: INTERNAL MEDICINE

## 2020-02-13 PROCEDURE — 86901 BLOOD TYPING SEROLOGIC RH(D): CPT

## 2020-02-13 PROCEDURE — 93454 CORONARY ARTERY ANGIO S&I: CPT | Performed by: INTERNAL MEDICINE

## 2020-02-13 PROCEDURE — 93454 PR CATH PLACE/CORONARY ANGIO, IMG SUPER/INTERP: ICD-10-PCS | Mod: 26,,, | Performed by: INTERNAL MEDICINE

## 2020-02-13 PROCEDURE — 99152 PR MOD CONSCIOUS SEDATION, SAME PHYS, 5+ YRS, FIRST 15 MIN: ICD-10-PCS | Mod: ,,, | Performed by: INTERNAL MEDICINE

## 2020-02-13 PROCEDURE — 99153 MOD SED SAME PHYS/QHP EA: CPT | Performed by: INTERNAL MEDICINE

## 2020-02-13 PROCEDURE — 63600175 PHARM REV CODE 636 W HCPCS: Performed by: INTERNAL MEDICINE

## 2020-02-13 PROCEDURE — 25500020 PHARM REV CODE 255: Performed by: INTERNAL MEDICINE

## 2020-02-13 PROCEDURE — 99217 PR OBSERVATION CARE DISCHARGE: CPT | Mod: ,,, | Performed by: PHYSICIAN ASSISTANT

## 2020-02-13 RX ORDER — SODIUM CHLORIDE 9 MG/ML
3 INJECTION, SOLUTION INTRAVENOUS CONTINUOUS
Status: ACTIVE | OUTPATIENT
Start: 2020-02-13 | End: 2020-02-13

## 2020-02-13 RX ORDER — NITROGLYCERIN 5 MG/ML
INJECTION, SOLUTION INTRAVENOUS
Status: DISCONTINUED | OUTPATIENT
Start: 2020-02-13 | End: 2020-02-13 | Stop reason: HOSPADM

## 2020-02-13 RX ORDER — LIDOCAINE HYDROCHLORIDE 20 MG/ML
INJECTION, SOLUTION INFILTRATION; PERINEURAL
Status: DISCONTINUED | OUTPATIENT
Start: 2020-02-13 | End: 2020-02-13 | Stop reason: HOSPADM

## 2020-02-13 RX ORDER — FENTANYL CITRATE 50 UG/ML
INJECTION, SOLUTION INTRAMUSCULAR; INTRAVENOUS
Status: DISCONTINUED | OUTPATIENT
Start: 2020-02-13 | End: 2020-02-13 | Stop reason: HOSPADM

## 2020-02-13 RX ORDER — HEPARIN SODIUM 200 [USP'U]/100ML
INJECTION, SOLUTION INTRAVENOUS
Status: DISCONTINUED | OUTPATIENT
Start: 2020-02-13 | End: 2020-02-13 | Stop reason: HOSPADM

## 2020-02-13 RX ORDER — MIDAZOLAM HYDROCHLORIDE 1 MG/ML
INJECTION, SOLUTION INTRAMUSCULAR; INTRAVENOUS
Status: DISCONTINUED | OUTPATIENT
Start: 2020-02-13 | End: 2020-02-13 | Stop reason: HOSPADM

## 2020-02-13 RX ORDER — DIPHENHYDRAMINE HCL 50 MG
50 CAPSULE ORAL ONCE
Status: COMPLETED | OUTPATIENT
Start: 2020-02-13 | End: 2020-02-13

## 2020-02-13 RX ORDER — HEPARIN SODIUM 1000 [USP'U]/ML
INJECTION, SOLUTION INTRAVENOUS; SUBCUTANEOUS
Status: DISCONTINUED | OUTPATIENT
Start: 2020-02-13 | End: 2020-02-13 | Stop reason: HOSPADM

## 2020-02-13 RX ADMIN — DIPHENHYDRAMINE HYDROCHLORIDE 50 MG: 50 CAPSULE ORAL at 07:02

## 2020-02-13 RX ADMIN — SODIUM CHLORIDE 3 ML/KG/HR: 0.9 INJECTION, SOLUTION INTRAVENOUS at 07:02

## 2020-02-13 NOTE — BRIEF OP NOTE
Post Cath Note  Preoperative Diagnosis: Heart transplanted [Z94.1]   Postop Diagnosis: Heart transplanted [Z94.1]  Referring Physician: Lashon Hawkins     Procedure: Left heart cath (Left), Ultrasound-coronary (N/A)       Access: Right radial  : Guy Cartagena MD   All Operators: Surgeon(s):  MD Pepe Marquez MD Justin L. Price, MD     See full report for further details    Intervention:   - S/P Diagnostic LHC with LAD IVUS    Treatments/Procedures: Procedure(s) (LRB):  Left heart cath (Left)  Ultrasound-coronary (N/A)     -Closure device: Radial band    Findings:No coronary disease is present. See catheterization report for full details.    Estimated Blood loss: 20 cc    Specimens removed: No  Post Cath Exam:     Vitals:    02/13/20 0800   BP: 131/82   Pulse:    Resp:    Temp:      No unusual pain, hematoma, thrill or bruit at vascular access site.  Distal pulse present without signs of ischemia.    Recommendations:   - Patient tolerated procedure well. No immediate complications.   - Routine post-cath care  - Routine post cath protocol.  - Maximize medical management.  - Further care by the primary team.  - IVF at 1.5 ml/kg/hr for LVEDP > 18 mmHg for 4 hours  - Continue medical management, Follow-up with outpatient cardiologist    Misael Callahan - Pager# [phone]  2/13/2020  9:30 AM  Cardiovascular Fellow  [facility]

## 2020-02-13 NOTE — PLAN OF CARE
Received report from SUE Little. Patient s/p [facility] with IVUS, AAOx4. VSS, no c/o pain or discomfort at this time, resp even and unlabored. Right wrist vasc band dressing is CDI. Pulses 2+.  No active bleeding. No hematoma noted. Post procedure protocol reviewed with patient and patient's wife. Understanding verbalized. Wife at bedside. Nurse call bell within reach. Will continue to monitor per post procedure protocol.

## 2020-02-13 NOTE — DISCHARGE INSTRUCTIONS
You underwent a coronary angiogram today which showed that your heart has normal coronary arteries. IVUS (intravascular ultrasound) was used to take measurements of the thickness of the artery walls, and this measurement was unchanged from last year. You can resume all of your regular home medications. Please follow up with your heart transplant doctor in the next 2-4 weeks. Read and follow the instructions below:    Discharge Instructions and Care of Your Wrist After a Cardiac Catheterization Procedure Performed via the Radial Artery    Wrist Care:    The dressing (band-aid) on the puncture site may be removed after 24 hours and left open to air.  If there is minor oozing, you may apply another Band-aid and remove after 12 hours. Keep the site clean and dry.   You may shower on the day following your procedure.  Do not take a tub bath or submerge the puncture site in water for 5 days following the procedure    If bleeding should occur following discharge:   Sit down and apply firm pressure to the puncture site with your fingers for 10 minutes    If the bleeding stops, continue to sit quietly, keeping your wrist straight for 2 hours. Notify your physician as soon as possible    If bleeding does not stop after 10 minutes or if there is a large amount of bleeding or spurting, call 911 immediately.  Do not drive yourself to the hospital.     Wrist site healing:   The healing wrist site should be soft and dry. A bruise may be present. You should expect mild tingling in your hand and tenderness at the puncture site for up to 3 days. Please contact your doctor or the Cardiac Cath Lab doctor if any of the following signs appear:  · Redness, heat, or pus at the puncture site.  · Change in color or temperature of the hand or arm.  ·       A lump at the puncture site that enlarges or is larger than marble size.  · Chills or fever greater than 101.0 F    Activities:  While the wrist is healing, bleeding or swelling can  occur as a result of stress or strain to the wrist. Carefully follow these guidelines:  · On the day of discharge, limit your activities. Do not subject your affected hand/arm to any forceful movements (i.e. supporting weight when rising from a chair or bed).  · Do not drive for 24 hours.  · For the next week:  · Do not lift more than 5 lbs. for the next week.   Do not operate a lawnmower, motorcycle, chainsaw, or all-terrain vehicle    Avoid excessive (extension/flexion) wrist movement (i.e. supporting weight when rising from a chair or bed, push-ups, lifting garage doors, etc.)   Do not engage in vigorous exercise (i.e. Tennis, Golf, Bowling) using the affected arm    Medications:  · Take all other medicines as directed by your doctor. Do not take any extra aspirin or anti-inflammatory medicines such as Motrin, Aleve or others. They can increase your risk of bleeding. Many over-the-counter drugs contain aspirin. If you are unsure about what the drug contains, check with your pharmacist or doctor before taking it. Tylenol may be used for minor pain or fever. Do not exceed 4,000 mg of acetaminophen (Tylenol) in 24 hours.     Go to the nearest Emergency Room if you have:  · Chest discomfort or pain  · Excessive bruising, blood in urine/stool or black tarry stools.

## 2020-02-13 NOTE — INTERVAL H&P NOTE
The patient has been examined and the H&P has been reviewed:    I concur with the findings and no changes have occurred since H&P was written.    Anesthesia/Surgery risks, benefits and alternative options discussed and understood by patient/family.    [facility] with IVUS. Heart Transplant.   Access Right radial      There are no hospital problems to display for this patient.

## 2020-02-13 NOTE — PLAN OF CARE
Pt arrived to floor ambulatory from home accompanied by his wife. Pt oriented to room. Iv inserted. Admit assessment completed. Nurse call bell within reach. Will continue to monitor

## 2020-02-13 NOTE — PLAN OF CARE
Patient discharged per MD orders. Instructions given on medications, wound care, activity, signs of infection, when to call MD, and follow up appointments. Pt verbalized understanding. Right wrist gauze/transparent dressing is c/d/I. No active bleeding. No no hematoma noted. Patient AAOx4, VSS, no c/o pain or discomfort at this time. Telemetry and PIV removed. Patient and family refused transport, ambulated off unit accompanied by his wife.

## 2020-02-13 NOTE — DISCHARGE SUMMARY
Discharge Summary  Interventional Cardiology      Admit Date: 2/13/2020    Discharge Date:  2/13/2020    Attending Physician: Guy Cartagena MD    Discharge Physician: Archana Connor PA-C    Principal Diagnoses: Heart transplanted    Hospital Problems:   Active Hospital Problems    Diagnosis  POA    Hyperlipidemia LDL goal <70 [E78.5]  Yes    Long term current use of immunosuppressive drug [Z79.899]  Not Applicable    Heart transplanted [Z94.1]  Not Applicable      Resolved Hospital Problems   No resolved problems to display.        Indication for Admission: Left heart cath (Left), Ultrasound-coronary (N/A)    Discharged Condition: Good    Hospital Course:   Patient presented for outpatient coronary angiogram which went without complication. Coronary angiogram revealed mid LAD intimal thickening < .7mm. No epicardial coronary artery disease detected on angiography. See full cath report in Epic for details. Hemostasis of patient's R Radial access site was achieved with VascBand. Patient was monitored per post-cath protocol, and his R radial access site was c/d/i with no hematoma. Patient was able to ambulate without difficulty. He was feeling well and anticipating discharge home today. He was given appropriate home care instructions and told to follow up with Dr. Hawkins in 2-4 weeks.    Outpatient Plan:  1. Familial NICMP s/p OHT 2/18/18  - There were no medication changes   - Continue Aspirin 81 mg daily   - Continue Lipitor 20 mg qhs.  - IVUS of mid LAD showed intimal thickening <0.7 mm, unchanged from last year.  - Follow heart healthy diet and maintain regular aerobic exercise  - Follow up with regular cardiologist in 2 weeks    2. Hyperlipidemia  - Continue statin.     Diet: Cardiac diet    Activity: Ad yesenia, wound care instructions provided   - No forceful arm movements for 24 hours  - No driving for 24 hours   - Remove dressing after 24 hours  - No lifting >3-5 lbs or vigorous exercise with affected arm  for one week  - Do not submerge puncture site in water for 3 days   - If bleeding occurs, apply pressure for 10 minutes. If it does not stop, or there is a large amount, call 911  - Watch for redness, heat, pus, or fever    Disposition: Home or Self Care    Discharge Medications:      Medication List      CONTINUE taking these medications    aspirin 81 MG EC tablet  Commonly known as:  ECOTRIN  Take 1 tablet (81 mg total) by mouth once daily.     atorvastatin 20 MG tablet  Commonly known as:  LIPITOR  Take 1 tablet (20 mg total) by mouth once daily.     butalbital-acetaminophen-caffeine -40 mg -40 mg per tablet  Commonly known as:  FIORICET, ESGIC     calcium carbonate-vit D3-min 600 mg calcium- 200 unit Tab     famotidine 40 MG tablet  Commonly known as:  PEPCID  TAKE ONE TABLET BY MOUTH EVERY EVENING     magnesium oxide 400 mg (241.3 mg magnesium) tablet  Commonly known as:  MAG-OX  Take 1 tablet (400 mg total) by mouth 2 (two) times daily.     mycophenolate 250 mg Cap  Commonly known as:  CELLCEPT  Take 5 capsules (1,250 mg total) by mouth 2 (two) times daily.     nortriptyline 10 MG capsule  Commonly known as:  PAMELOR  Take 1 capsule (10 mg total) by mouth every evening.     predniSONE 5 MG tablet  Commonly known as:  DELTASONE  Take 1 tablet (5 mg total) by mouth once daily.     * tacrolimus 0.5 MG Cap  Commonly known as:  PROGRAF  Take 3 capsules (1.5 mg) by mouth twice a day.     * tacrolimus 1 MG Cap  Commonly known as:  PROGRAF         * This list has 2 medication(s) that are the same as other medications prescribed for you. Read the directions carefully, and ask your doctor or other care provider to review them with you.              Follow Up:  Follow-up Information     Lashon Hawkins MD. Schedule an appointment as soon as possible for a visit in 2 weeks.    Specialties:  Transplant, Cardiology  Contact information:  [address]  [facility] [address] [phone]

## 2020-02-20 NOTE — TELEPHONE ENCOUNTER
Pt called to inform me of fever and not feeling well. Went to PCP and tested positive for Flu A. PCP started pt on tamiflu, however Dr. Hsu recommends 75 mg BID for 10 days for transplant pts. Pt did not know how many days the prescription was written for, he will call tomorrow if he needs for more days written.

## 2020-02-21 RX ORDER — OSELTAMIVIR PHOSPHATE 75 MG/1
75 CAPSULE ORAL 2 TIMES DAILY
Qty: 10 CAPSULE | Refills: 0 | Status: SHIPPED | OUTPATIENT
Start: 2020-02-21 | End: 2020-02-26

## 2020-02-21 RX ORDER — OSELTAMIVIR PHOSPHATE 75 MG/1
75 CAPSULE ORAL 2 TIMES DAILY
Qty: 10 CAPSULE | Refills: 0 | Status: SHIPPED | OUTPATIENT
Start: 2020-02-21 | End: 2020-02-21 | Stop reason: SDUPTHER

## 2020-02-26 DIAGNOSIS — E78.5 HYPERLIPIDEMIA, UNSPECIFIED HYPERLIPIDEMIA TYPE: ICD-10-CM

## 2020-02-27 RX ORDER — ATORVASTATIN CALCIUM 20 MG/1
TABLET, FILM COATED ORAL
Qty: 90 TABLET | Refills: 3 | Status: SHIPPED | OUTPATIENT
Start: 2020-02-27 | End: 2021-03-05 | Stop reason: SDUPTHER

## 2020-03-10 NOTE — HOSPITAL COURSE
Addended by: RACHNA QIU on: 3/10/2020 07:24 AM     Modules accepted: Orders     Heart transplanted 2/18/18. S/P washout 2/19. CMV Status:D+/R- . Post op course complictaed by leukocytosis, down trending at time of discahrge with negative cultures. He was moderate risk for rejection and oral immunosuppressants increased to prednisone 15 mg BID, Prograf 5/5 mg BID, and Cellcept 1500 mg BID.    -Echo 2/21 EF 60%, low nl RV fxn.  -S/P EMBx #1 2/27. PAMR0, ISHLT 0.    He developed clear drianinage from bottom of sternotomy site day of discharge so patient was watched through the weekend and doxycycline 100 mg BID started for 10 day course. CTS was aware and did not want imaging  He will discharge to [facility] and return tomorrow for biopsy

## 2020-04-29 DIAGNOSIS — Z94.1 STATUS POST HEART TRANSPLANT: ICD-10-CM

## 2020-04-30 RX ORDER — PREDNISONE 5 MG/1
TABLET ORAL
Qty: 30 TABLET | Refills: 6 | Status: SHIPPED | OUTPATIENT
Start: 2020-04-30 | End: 2020-11-17

## 2020-05-22 ENCOUNTER — LAB VISIT (OUTPATIENT)
Dept: LAB | Facility: HOSPITAL | Age: 30
End: 2020-05-22
Payer: COMMERCIAL

## 2020-05-22 DIAGNOSIS — Z94.1 STATUS POST HEART TRANSPLANT: ICD-10-CM

## 2020-05-22 LAB
ALBUMIN SERPL BCP-MCNC: 4.8 G/DL (ref 3.5–5.2)
ALP SERPL-CCNC: 86 U/L (ref 55–135)
ALT SERPL W/O P-5'-P-CCNC: 36 U/L (ref 10–44)
ANION GAP SERPL CALC-SCNC: 12 MMOL/L (ref 8–16)
AST SERPL-CCNC: 20 U/L (ref 10–40)
BASOPHILS # BLD AUTO: 0.03 K/UL (ref 0–0.2)
BASOPHILS NFR BLD: 0.3 % (ref 0–1.9)
BILIRUB SERPL-MCNC: 0.6 MG/DL (ref 0.1–1)
BNP SERPL-MCNC: 36 PG/ML (ref 0–99)
BUN SERPL-MCNC: 15 MG/DL (ref 6–20)
CALCIUM SERPL-MCNC: 9.7 MG/DL (ref 8.7–10.5)
CHLORIDE SERPL-SCNC: 105 MMOL/L (ref 95–110)
CHOLEST SERPL-MCNC: 118 MG/DL (ref 120–199)
CHOLEST/HDLC SERPL: 2.7 {RATIO} (ref 2–5)
CLASS I ANTIBODY COMMENTS - LUMINEX: NORMAL
CLASS II ANTIBODY COMMENTS - LUMINEX: NORMAL
CO2 SERPL-SCNC: 25 MMOL/L (ref 23–29)
CREAT SERPL-MCNC: 1.1 MG/DL (ref 0.5–1.4)
DIFFERENTIAL METHOD: ABNORMAL
DSA1 TESTING DATE: NORMAL
DSA12 TESTING DATE: NORMAL
DSA2 TESTING DATE: NORMAL
EOSINOPHIL # BLD AUTO: 0.1 K/UL (ref 0–0.5)
EOSINOPHIL NFR BLD: 1 % (ref 0–8)
ERYTHROCYTE [DISTWIDTH] IN BLOOD BY AUTOMATED COUNT: 13.6 % (ref 11.5–14.5)
EST. GFR  (AFRICAN AMERICAN): >60 ML/MIN/1.73 M^2
EST. GFR  (NON AFRICAN AMERICAN): >60 ML/MIN/1.73 M^2
GLUCOSE SERPL-MCNC: 93 MG/DL (ref 70–110)
HCT VFR BLD AUTO: 51.5 % (ref 40–54)
HDLC SERPL-MCNC: 44 MG/DL (ref 40–75)
HDLC SERPL: 37.3 % (ref 20–50)
HGB BLD-MCNC: 16.4 G/DL (ref 14–18)
IMM GRANULOCYTES # BLD AUTO: 0.04 K/UL (ref 0–0.04)
IMM GRANULOCYTES NFR BLD AUTO: 0.4 % (ref 0–0.5)
LDLC SERPL CALC-MCNC: 52 MG/DL (ref 63–159)
LYMPHOCYTES # BLD AUTO: 1.8 K/UL (ref 1–4.8)
LYMPHOCYTES NFR BLD: 18.7 % (ref 18–48)
MAGNESIUM SERPL-MCNC: 1.6 MG/DL (ref 1.6–2.6)
MCH RBC QN AUTO: 28.7 PG (ref 27–31)
MCHC RBC AUTO-ENTMCNC: 31.8 G/DL (ref 32–36)
MCV RBC AUTO: 90 FL (ref 82–98)
MONOCYTES # BLD AUTO: 1 K/UL (ref 0.3–1)
MONOCYTES NFR BLD: 10.2 % (ref 4–15)
NEUTROPHILS # BLD AUTO: 6.7 K/UL (ref 1.8–7.7)
NEUTROPHILS NFR BLD: 69.4 % (ref 38–73)
NONHDLC SERPL-MCNC: 74 MG/DL
NRBC BLD-RTO: 0 /100 WBC
PLATELET # BLD AUTO: 207 K/UL (ref 150–350)
PMV BLD AUTO: 10 FL (ref 9.2–12.9)
POTASSIUM SERPL-SCNC: 3.9 MMOL/L (ref 3.5–5.1)
PROT SERPL-MCNC: 7.9 G/DL (ref 6–8.4)
RBC # BLD AUTO: 5.72 M/UL (ref 4.6–6.2)
SERUM COLLECTION DT - LUMINEX CLASS I: NORMAL
SERUM COLLECTION DT - LUMINEX CLASS II: NORMAL
SODIUM SERPL-SCNC: 142 MMOL/L (ref 136–145)
TACROLIMUS BLD-MCNC: 13.5 NG/ML (ref 5–15)
TRIGL SERPL-MCNC: 110 MG/DL (ref 30–150)
WBC # BLD AUTO: 9.72 K/UL (ref 3.9–12.7)

## 2020-05-22 PROCEDURE — 86833 HLA CLASS II HIGH DEFIN QUAL: CPT | Mod: PO

## 2020-05-22 PROCEDURE — 86832 HLA CLASS I HIGH DEFIN QUAL: CPT | Mod: 59,PO

## 2020-05-22 PROCEDURE — 86832 HLA CLASS I HIGH DEFIN QUAL: CPT | Mod: PO

## 2020-05-22 PROCEDURE — 36415 COLL VENOUS BLD VENIPUNCTURE: CPT

## 2020-05-22 PROCEDURE — 80061 LIPID PANEL: CPT

## 2020-05-22 PROCEDURE — 80053 COMPREHEN METABOLIC PANEL: CPT

## 2020-05-22 PROCEDURE — 86977 RBC SERUM PRETX INCUBJ/INHIB: CPT | Mod: 59,PO

## 2020-05-22 PROCEDURE — 83880 ASSAY OF NATRIURETIC PEPTIDE: CPT

## 2020-05-22 PROCEDURE — 83735 ASSAY OF MAGNESIUM: CPT

## 2020-05-22 PROCEDURE — 86833 HLA CLASS II HIGH DEFIN QUAL: CPT | Mod: 59,PO

## 2020-05-22 PROCEDURE — 80197 ASSAY OF TACROLIMUS: CPT

## 2020-05-22 PROCEDURE — 85025 COMPLETE CBC W/AUTO DIFF WBC: CPT

## 2020-05-23 ENCOUNTER — LAB VISIT (OUTPATIENT)
Dept: LAB | Facility: HOSPITAL | Age: 30
End: 2020-05-23
Attending: INTERNAL MEDICINE
Payer: COMMERCIAL

## 2020-05-23 DIAGNOSIS — Z94.1 STATUS POST HEART TRANSPLANT: ICD-10-CM

## 2020-05-23 PROCEDURE — 36415 COLL VENOUS BLD VENIPUNCTURE: CPT

## 2020-05-23 PROCEDURE — 80197 ASSAY OF TACROLIMUS: CPT

## 2020-05-24 LAB — TACROLIMUS BLD-MCNC: 9.9 NG/ML (ref 5–15)

## 2020-05-27 LAB — ALLOMAP TEST SCORE: NORMAL

## 2020-05-28 LAB
C1Q1A TESTING DATE: NORMAL
C1Q2A TESTING DATE: NORMAL
CLASS I ANTIBODY COMMENTS - LUMINEX: NORMAL
CLASS II ANTIBODIES - LUMINEX: NORMAL
CLASS II ANTIBODY COMMENTS - LUMINEX: NORMAL
HC1QA TESTING DATE: NORMAL
SERUM COLLECTION DT - LUMINEX CLASS I: NORMAL
SERUM COLLECTION DT - LUMINEX CLASS II: NORMAL

## 2020-08-14 ENCOUNTER — LAB VISIT (OUTPATIENT)
Dept: LAB | Facility: HOSPITAL | Age: 30
End: 2020-08-14
Payer: COMMERCIAL

## 2020-08-14 ENCOUNTER — HOSPITAL ENCOUNTER (OUTPATIENT)
Dept: CARDIOLOGY | Facility: HOSPITAL | Age: 30
Discharge: HOME | End: 2020-08-14
Attending: INTERNAL MEDICINE
Payer: COMMERCIAL

## 2020-08-14 ENCOUNTER — OFFICE VISIT (OUTPATIENT)
Dept: TRANSPLANT | Facility: CLINIC | Age: 30
End: 2020-08-14
Attending: INTERNAL MEDICINE
Payer: COMMERCIAL

## 2020-08-14 VITALS
SYSTOLIC BLOOD PRESSURE: 120 MMHG | DIASTOLIC BLOOD PRESSURE: 80 MMHG | BODY MASS INDEX: 27.47 KG/M2 | HEART RATE: 92 BPM | WEIGHT: 175 LBS | HEIGHT: 67 IN

## 2020-08-14 VITALS
DIASTOLIC BLOOD PRESSURE: 86 MMHG | HEIGHT: 67 IN | WEIGHT: 178.81 LBS | SYSTOLIC BLOOD PRESSURE: 134 MMHG | HEART RATE: 101 BPM | BODY MASS INDEX: 28.07 KG/M2

## 2020-08-14 DIAGNOSIS — Z94.1 STATUS POST HEART TRANSPLANT: ICD-10-CM

## 2020-08-14 DIAGNOSIS — E78.1 HYPERTRIGLYCERIDEMIA: ICD-10-CM

## 2020-08-14 DIAGNOSIS — Z79.899 LONG TERM CURRENT USE OF IMMUNOSUPPRESSIVE DRUG: ICD-10-CM

## 2020-08-14 DIAGNOSIS — E78.5 HYPERLIPIDEMIA LDL GOAL <70: ICD-10-CM

## 2020-08-14 DIAGNOSIS — Z94.1 HEART TRANSPLANTED: Primary | ICD-10-CM

## 2020-08-14 LAB
ALBUMIN SERPL BCP-MCNC: 4.7 G/DL (ref 3.5–5.2)
ALP SERPL-CCNC: 88 U/L (ref 55–135)
ALT SERPL W/O P-5'-P-CCNC: 37 U/L (ref 10–44)
ANION GAP SERPL CALC-SCNC: 10 MMOL/L (ref 8–16)
ASCENDING AORTA: 2.93 CM
AST SERPL-CCNC: 23 U/L (ref 10–40)
AV INDEX (PROSTH): 0.82
AV MEAN GRADIENT: 2 MMHG
AV PEAK GRADIENT: 4 MMHG
AV VALVE AREA: 3.94 CM2
AV VELOCITY RATIO: 0.74
BASOPHILS # BLD AUTO: 0.06 K/UL (ref 0–0.2)
BASOPHILS NFR BLD: 0.6 % (ref 0–1.9)
BILIRUB SERPL-MCNC: 0.5 MG/DL (ref 0.1–1)
BNP SERPL-MCNC: 26 PG/ML (ref 0–99)
BSA FOR ECHO PROCEDURE: 1.94 M2
BUN SERPL-MCNC: 14 MG/DL (ref 6–20)
CALCIUM SERPL-MCNC: 9.4 MG/DL (ref 8.7–10.5)
CHLORIDE SERPL-SCNC: 105 MMOL/L (ref 95–110)
CHOLEST SERPL-MCNC: 120 MG/DL (ref 120–199)
CHOLEST/HDLC SERPL: 3.2 {RATIO} (ref 2–5)
CO2 SERPL-SCNC: 28 MMOL/L (ref 23–29)
CREAT SERPL-MCNC: 1.1 MG/DL (ref 0.5–1.4)
CV ECHO LV RWT: 0.41 CM
DIFFERENTIAL METHOD: ABNORMAL
DOP CALC AO PEAK VEL: 1.05 M/S
DOP CALC AO VTI: 16.58 CM
DOP CALC LVOT AREA: 4.8 CM2
DOP CALC LVOT DIAMETER: 2.47 CM
DOP CALC LVOT PEAK VEL: 0.78 M/S
DOP CALC LVOT STROKE VOLUME: 65.37 CM3
DOP CALCLVOT PEAK VEL VTI: 13.65 CM
E WAVE DECELERATION TIME: 86.09 MSEC
E/A RATIO: 2.08
E/E' RATIO: 12.83 M/S
ECHO LV POSTERIOR WALL: 0.92 CM (ref 0.6–1.1)
EOSINOPHIL # BLD AUTO: 0.1 K/UL (ref 0–0.5)
EOSINOPHIL NFR BLD: 1.1 % (ref 0–8)
ERYTHROCYTE [DISTWIDTH] IN BLOOD BY AUTOMATED COUNT: 14 % (ref 11.5–14.5)
EST. GFR  (AFRICAN AMERICAN): >60 ML/MIN/1.73 M^2
EST. GFR  (NON AFRICAN AMERICAN): >60 ML/MIN/1.73 M^2
FRACTIONAL SHORTENING: 36 % (ref 28–44)
GLUCOSE SERPL-MCNC: 78 MG/DL (ref 70–110)
HCT VFR BLD AUTO: 50.3 % (ref 40–54)
HDLC SERPL-MCNC: 38 MG/DL (ref 40–75)
HDLC SERPL: 31.7 % (ref 20–50)
HGB BLD-MCNC: 16 G/DL (ref 14–18)
IMM GRANULOCYTES # BLD AUTO: 0.08 K/UL (ref 0–0.04)
IMM GRANULOCYTES NFR BLD AUTO: 0.7 % (ref 0–0.5)
INTERVENTRICULAR SEPTUM: 0.89 CM (ref 0.6–1.1)
IVRT: 82.78 MSEC
LDLC SERPL CALC-MCNC: 36.8 MG/DL (ref 63–159)
LEFT INTERNAL DIMENSION IN SYSTOLE: 2.86 CM (ref 2.1–4)
LEFT VENTRICLE DIASTOLIC VOLUME INDEX: 48.22 ML/M2
LEFT VENTRICLE DIASTOLIC VOLUME: 92.12 ML
LEFT VENTRICLE MASS INDEX: 70 G/M2
LEFT VENTRICLE SYSTOLIC VOLUME INDEX: 16.2 ML/M2
LEFT VENTRICLE SYSTOLIC VOLUME: 31 ML
LEFT VENTRICULAR INTERNAL DIMENSION IN DIASTOLE: 4.49 CM (ref 3.5–6)
LEFT VENTRICULAR MASS: 133.33 G
LV LATERAL E/E' RATIO: 11 M/S
LV SEPTAL E/E' RATIO: 15.4 M/S
LYMPHOCYTES # BLD AUTO: 1.8 K/UL (ref 1–4.8)
LYMPHOCYTES NFR BLD: 16.9 % (ref 18–48)
MAGNESIUM SERPL-MCNC: 2.2 MG/DL (ref 1.6–2.6)
MCH RBC QN AUTO: 28.9 PG (ref 27–31)
MCHC RBC AUTO-ENTMCNC: 31.8 G/DL (ref 32–36)
MCV RBC AUTO: 91 FL (ref 82–98)
MONOCYTES # BLD AUTO: 1 K/UL (ref 0.3–1)
MONOCYTES NFR BLD: 9.1 % (ref 4–15)
MV PEAK A VEL: 0.37 M/S
MV PEAK E VEL: 0.77 M/S
MV STENOSIS PRESSURE HALF TIME: 24.97 MS
MV VALVE AREA P 1/2 METHOD: 8.81 CM2
NEUTROPHILS # BLD AUTO: 7.8 K/UL (ref 1.8–7.7)
NEUTROPHILS NFR BLD: 71.6 % (ref 38–73)
NONHDLC SERPL-MCNC: 82 MG/DL
NRBC BLD-RTO: 0 /100 WBC
PLATELET # BLD AUTO: 219 K/UL (ref 150–350)
PMV BLD AUTO: 11 FL (ref 9.2–12.9)
POTASSIUM SERPL-SCNC: 3.5 MMOL/L (ref 3.5–5.1)
PROT SERPL-MCNC: 7.3 G/DL (ref 6–8.4)
RA PRESSURE: 3 MMHG
RBC # BLD AUTO: 5.53 M/UL (ref 4.6–6.2)
RIGHT VENTRICULAR END-DIASTOLIC DIMENSION: 3.34 CM
RV TISSUE DOPPLER FREE WALL SYSTOLIC VELOCITY 1 (APICAL 4 CHAMBER VIEW): 5.38 CM/S
SINUS: 3.24 CM
SODIUM SERPL-SCNC: 143 MMOL/L (ref 136–145)
STJ: 2.74 CM
TACROLIMUS BLD-MCNC: 9.7 NG/ML (ref 5–15)
TDI LATERAL: 0.07 M/S
TDI SEPTAL: 0.05 M/S
TDI: 0.06 M/S
TRICUSPID ANNULAR PLANE SYSTOLIC EXCURSION: 0.94 CM
TRIGL SERPL-MCNC: 226 MG/DL (ref 30–150)
WBC # BLD AUTO: 10.83 K/UL (ref 3.9–12.7)

## 2020-08-14 PROCEDURE — 99999 PR PBB SHADOW E&M-EST. PATIENT-LVL III: CPT | Mod: PBBFAC,,, | Performed by: INTERNAL MEDICINE

## 2020-08-14 PROCEDURE — 93306 TTE W/DOPPLER COMPLETE: CPT | Mod: 26,,, | Performed by: INTERNAL MEDICINE

## 2020-08-14 PROCEDURE — 36415 COLL VENOUS BLD VENIPUNCTURE: CPT

## 2020-08-14 PROCEDURE — 99999 PR PBB SHADOW E&M-EST. PATIENT-LVL III: ICD-10-PCS | Mod: PBBFAC,,, | Performed by: INTERNAL MEDICINE

## 2020-08-14 PROCEDURE — 86832 HLA CLASS I HIGH DEFIN QUAL: CPT | Mod: 59,PO

## 2020-08-14 PROCEDURE — 80053 COMPREHEN METABOLIC PANEL: CPT

## 2020-08-14 PROCEDURE — 80061 LIPID PANEL: CPT

## 2020-08-14 PROCEDURE — 99214 OFFICE O/P EST MOD 30 MIN: CPT | Mod: S$GLB,,, | Performed by: INTERNAL MEDICINE

## 2020-08-14 PROCEDURE — 93306 TTE W/DOPPLER COMPLETE: CPT

## 2020-08-14 PROCEDURE — 85025 COMPLETE CBC W/AUTO DIFF WBC: CPT

## 2020-08-14 PROCEDURE — 99214 PR OFFICE/OUTPT VISIT, EST, LEVL IV, 30-39 MIN: ICD-10-PCS | Mod: S$GLB,,, | Performed by: INTERNAL MEDICINE

## 2020-08-14 PROCEDURE — 86977 RBC SERUM PRETX INCUBJ/INHIB: CPT | Mod: PO

## 2020-08-14 PROCEDURE — 83735 ASSAY OF MAGNESIUM: CPT

## 2020-08-14 PROCEDURE — 86833 HLA CLASS II HIGH DEFIN QUAL: CPT | Mod: PO

## 2020-08-14 PROCEDURE — 80197 ASSAY OF TACROLIMUS: CPT

## 2020-08-14 PROCEDURE — 83880 ASSAY OF NATRIURETIC PEPTIDE: CPT

## 2020-08-14 PROCEDURE — 93306 ECHO (CUPID ONLY): ICD-10-PCS | Mod: 26,,, | Performed by: INTERNAL MEDICINE

## 2020-08-14 PROCEDURE — 86832 HLA CLASS I HIGH DEFIN QUAL: CPT | Mod: PO

## 2020-08-14 NOTE — Clinical Note
August 14, 2020        Guillermo Alejo  [address]  [facility] [address]  Phone: [phone]  Fax: [phone]             [id number]  [address]  [facility] [address]  Phone: [phone]   Patient: Mert Samayoa   MR Number: [id number]   YOB: 1990   Date of Visit: 8/14/2020       Dear Dr. Guillermo Alejo    Thank you for referring Mert Samayoa to me for evaluation. Attached you will find relevant portions of my assessment and plan of care.    If you have questions, please do not hesitate to call me. I look forward to following Mert Samayoa along with you.    Sincerely,    Hernán Tidwell Jr, MD    Enclosure    If you would like to receive this communication electronically, please contact [email or web address] or [phone] to request [employer] Link access.    [employer] Link is a tool which provides read-only access to select patient information with whom you have a relationship. Its easy to use and provides real time access to review your patients record including encounter summaries, notes, results, and demographic information.    If you feel you have received this communication in error or would no longer like to receive these types of communications, please e-mail [email or web address]

## 2020-08-14 NOTE — PROGRESS NOTES
Subjective:   Mr. Samayoa is a 30 y.o. 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  Now on cellcept 1250 mg BID, tacro 1.5 mg BID, prednisone 5 mg mg qd.  Left on prednisone for +C1q's, no rejection episodes.  He feels great without CV complaints.    His echocardiogram today was read as perhaps a lower ejection fraction than the prior one.  I reviewed the images and he clearly has a normal ejection fraction.  The study before this ECHO ejection fraction I would estimated 70% while today's I would estimate LVEF 60-65%.     Review of Systems   Constitution: Negative for chills, fever, malaise/fatigue, night sweats, weight gain and weight loss.   HENT: Negative for congestion, hoarse voice, odynophagia and sore throat.         Was due to get cleaning of the teeth which was postponed due to the fever blister; otherwise he keeps up-to-date   Cardiovascular: Negative for chest pain, claudication, dyspnea on exertion, irregular heartbeat, leg swelling, near-syncope, orthopnea, palpitations and paroxysmal nocturnal dyspnea.   Respiratory: Negative for cough, hemoptysis, shortness of breath, sputum production and wheezing.    Hematologic/Lymphatic: Does not bruise/bleed easily.   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 Negative for skin cancer and suspicious lesions.   Musculoskeletal: Negative for arthritis, back pain, joint pain, joint swelling and myalgias.   Gastrointestinal: Negative for abdominal pain, anorexia, change in bowel habit, diarrhea, hematemesis, hematochezia and melena.   Genitourinary: Negative for dysuria, flank pain, frequency and hematuria.   Neurological: Negative for brief paralysis, dizziness, focal weakness, headaches, loss of balance, numbness,  "paresthesias, seizures and weakness.   Psychiatric/Behavioral: Negative for altered mental status and substance abuse.     Objective:   Blood pressure 134/86, pulse 101, height 5' 7" (1.702 m), weight 81.1 kg (178 lb 12.7 oz).body mass index is 28 kg/m².  Physical Exam   Constitutional: He is oriented to person, place, and time. He appears well-developed and well-nourished. No distress.   /86 (BP Location: Right arm, Patient Position: Sitting, BP Method: Large (Automatic))   Pulse 101   Ht 5' 7" (1.702 m)   Wt 81.1 kg (178 lb 12.7 oz)   BMI 28.00 kg/m²    HENT:   Head: Normocephalic and atraumatic.   Eyes: Conjunctivae are normal. Right eye exhibits no discharge. Left eye exhibits no discharge. No scleral icterus.   Neck: No JVD present. No thyromegaly present.   Cardiovascular: Normal rate, regular rhythm and normal heart sounds. Exam reveals no gallop.   No murmur heard.  Pulmonary/Chest: Effort normal and breath sounds normal.   Abdominal: Soft. Bowel sounds are normal. He exhibits no distension and no mass. There is no abdominal tenderness. There is no rebound and no guarding.   Musculoskeletal:         General: No tenderness or edema.   Neurological: He is alert and oriented to person, place, and time.   Skin: Skin is warm and dry. He is not diaphoretic.   Psychiatric: He has a normal mood and affect. His behavior is normal. Judgment and thought content normal.     Lab Results   Component Value Date    BNP 26 08/14/2020     08/14/2020    K 3.5 08/14/2020    MG 2.2 08/14/2020     08/14/2020    CO2 28 08/14/2020    BUN 14 08/14/2020    CREATININE 1.1 08/14/2020    GLU 78 08/14/2020    AST 23 08/14/2020    ALT 37 08/14/2020    ALBUMIN 4.7 08/14/2020    PROT 7.3 08/14/2020    BILITOT 0.5 08/14/2020    WBC 10.83 08/14/2020    HGB 16.0 08/14/2020    HCT 50.3 08/14/2020     08/14/2020    TSH 1.063 02/04/2020    CHOL 120 08/14/2020    HDL 38 (L) 08/14/2020    LDLCALC 36.8 (L) 08/14/2020    " TRIG 226 (H) 08/14/2020    W4WTOAZ 7.4 02/04/2020    TACROLIMUS 9.7 08/14/2020    ALLOMAP Pending 08/14/2020    DSA Pending 08/14/2020     ECHO 08/14/2020 Conclusion  · Post cardiac transplantation study (OHTx 2/18/2018)  · Normal left ventricular systolic function. The estimated ejection fraction is 55%.  · Normal right ventricular systolic function.  · Normal LV diastolic function.  · Normal central venous pressure (3 mmHg).  · Compared to the prior study from February 2020, there has been a minimal/questionable decrease in LV systolic function.   I reviewed the images and he clearly has a normal ejection fraction.  The study before this ECHO ejection fraction I would estimated 70% while today's I would estimate LVEF 60-65%.     Assessment:     1. Heart transplanted    2. Long term current use of immunosuppressive drug    3. Hyperlipidemia LDL goal <70    4. Hypertriglyceridemia      Plan:   The triglycerides did not require specific treatment.  He should cut down carbohydrates intake.  It is noted that this has been an intermittent issue and I suspect carbohydrate intake is fueling this.    If all OK then increase cellcept to 1500 mg BID with plan to reduce prednisone to 2.5 mg qd tracking C1q/DSA    Return instructions as set forth by post transplant schedule or as needed:    Clinic: Return for labs and/or biopsy weekly the first month, every two weeks during month 2 and then monthly for the first year at the provider or coordinator's discretion. During the second year, return to clinic every 3 months. Post transplant year 3-5 return every 6 months. There will be a comprehensive post transplant evaluation every year that may include LHC/RHC/biopsy, stress test, echo, CXR, and other health screening exams.    In addition to the clinical assessment, I have ordered Allomap testing for this patient to assist in identification of moderate/severe acute cellular rejection (ACR) in a pt with stable Allograft function  instead of endomyocardial biopsy.     Patient is reminded to call with any health changes as these can be early signs of transplant complications. Patient is advised to make sure any new medications or changes of old medications are discussed with a pharmacist or physician knowledgeable with transplant to avoid rejection/drug toxicity related to significant drug interactions.    UNOS Patient Status  Functional Status: 100% - Normal, no complaints, no evidence of disease  Physical Capacity: No Limitations  Working for Income: yes  If yes, working activity level: Working Full Time    Hernán Tidwell Jr, MD

## 2020-08-14 NOTE — Clinical Note
Please let me know the correct prednisone dose I can correct my note--I thought he was taking half of a tablet from my old notes but that is not with the chart says and I did not see that to specifically ask him earlier today thanks

## 2020-08-17 LAB
ALLOMAP TEST SCORE: NORMAL
CLASS I ANTIBODY COMMENTS - LUMINEX: NORMAL
CLASS II ANTIBODY COMMENTS - LUMINEX: NORMAL
DSA1 TESTING DATE: NORMAL
DSA12 TESTING DATE: NORMAL
DSA2 TESTING DATE: NORMAL
SERUM COLLECTION DT - LUMINEX CLASS I: NORMAL
SERUM COLLECTION DT - LUMINEX CLASS II: NORMAL

## 2020-09-24 NOTE — PROGRESS NOTES
"Subjective:   Mr. Samayoa is a 28 y.o. year old White male who received a  - brain death heart transplant on 18.      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 S/P washout  who had his Removal of retained driveline through counterincision in early 2018.   He continues to do well, has acne but is improving with reduction of steroids. Denies N/V/f/C, lightheadedness, dizziness, PND, orthopnea, LE edema, abdominal pain, abdominal pressure, chest pain, chest pressure. DLES is nearly completely healed, minimal drainage, still doing wet to dry but seen by transplant coordinator Precious Spicer, told to leave open now so it dries and completely heals up. Seems to be doing well overall.     Immunosuppression: tac 3 BID, MMF 1500 BID, pred 7.4 mg qd   Immuno goal levels:  tacro 10-15     Review of Systems   Constitution: Negative for decreased appetite, weight gain and weight loss.   Cardiovascular: Negative for chest pain, dyspnea on exertion, leg swelling, near-syncope, orthopnea and palpitations.   Respiratory: Negative for cough and shortness of breath.    Musculoskeletal: Negative for myalgias.   Gastrointestinal: Negative for jaundice.       Objective:   Blood pressure 124/78, pulse 103, height 5' 7" (1.702 m), weight 74 kg (163 lb 2.3 oz).body mass index is 25.55 kg/m².    Physical Exam   Constitutional: He is oriented to person, place, and time. He appears well-developed and well-nourished. He is active. He is not intubated.        HENT:   Head: Normocephalic and atraumatic. Hair is normal.   Right Ear: External ear normal.   Left Ear: External ear normal.   Nose: Nose normal. No nasal deformity. No epistaxis.  No foreign bodies.   Mouth/Throat: Mucous membranes are normal. Mucous membranes are not cyanotic. No oropharyngeal exudate.   Eyes: Conjunctivae and EOM are normal. Pupils are equal, " round, and reactive to light.   Neck: Neck supple. No hepatojugular reflux and no JVD present.   Cardiovascular: Normal rate, regular rhythm, normal heart sounds and normal pulses.  Exam reveals no gallop.    Pulmonary/Chest: Effort normal and breath sounds normal. No apnea and no tachypnea. He is not intubated. No respiratory distress. He exhibits no tenderness.   Abdominal: Soft. Normal appearance and bowel sounds are normal. There is no tenderness. No hernia.   Musculoskeletal: Normal range of motion.   Neurological: He is alert and oriented to person, place, and time. He displays no seizure activity.   Skin: Skin is warm, dry and intact. No rash noted. No pallor.   Psychiatric: He has a normal mood and affect. His speech is normal and behavior is normal. Thought content normal. Cognition and memory are normal.   Nursing note and vitals reviewed.      Lab Results   Component Value Date    WBC 6.66 05/23/2018    HGB 14.4 05/23/2018    HCT 43.4 05/23/2018    MCV 91 05/23/2018     05/23/2018    CO2 25 05/23/2018    CREATININE 1.0 05/23/2018    CALCIUM 9.8 05/23/2018    ALBUMIN 4.5 05/23/2018    AST 29 05/23/2018    BNP 96 05/23/2018    ALT 49 (H) 05/23/2018    ALLOMAP See result image under hyperlink 04/17/2018       Lab Results   Component Value Date    INR 1.1 04/03/2018    INR 1.1 03/03/2018    INR 1.1 03/02/2018       BNP   Date Value Ref Range Status   05/23/2018 96 0 - 99 pg/mL Final     Comment:     Values of less than 100 pg/ml are consistent with non-CHF populations.   04/17/2018 201 (H) 0 - 99 pg/mL Final     Comment:     Values of less than 100 pg/ml are consistent with non-CHF populations.   04/03/2018 328 (H) 0 - 99 pg/mL Final     Comment:     Values of less than 100 pg/ml are consistent with non-CHF populations.         Tacrolimus Lvl   Date Value Ref Range Status   05/23/2018 11.0 5.0 - 15.0 ng/mL Final     Comment:     Testing performed by Liquid Chromatography-Tandem  Mass Spectrometry  (LC-MS/MS).  This test was developed and its performance characteristics  determined by [facility], Department of Pathology  and Laboratory Medicine in a manner consistent with CLIA  requirements. It has not been cleared or approved by the [place]  Food and Drug Administration.  This test is used for clinical   purposes.  It should not be regarded as investigational or for  research.           Assessment:     1. Heart transplanted    2. Essential hypertension    3. Adverse effect of glucocorticoids and synthetic analogues, subsequent encounter        Plan:      Patient is doing very well, will follow up on immunosuppression levels and adjust dosing as indicated.   Will plan on reducing prednisone to 5mg po daily and see how he does.   Will follow up with dermatology if acne gets worse. Reminded about sun exposure.   Increase physical activity, see how he does.    Return instructions as set forth by post transplant schedule or as needed:    Clinic: Return for labs and/or biopsy weekly the first month, every two weeks during month 2 and then monthly for the first year at the provider or coordinator's discretion. During the second year, return to clinic every 3 months. Post transplant year 3-5 return every 6 months. There will be a comprehensive post transplant evaluation every year that may include LHC/RHC/biopsy, stress test, echo, CXR, and other health screening exams.    In addition to the clinical assessment, I have ordered Allomap testing for this patient to assist in identification of moderate/severe acute cellular rejection (ACR) in a pt with stable Allograft function instead of endomyocardial biopsy.     Patient is reminded to call with any health changes as these can be early signs of transplant complications. Patient is advised to make sure any new medications or changes of old medications are discussed with a pharmacist or physician knowledgeable with transplant to avoid rejection/drug toxicity  related to significant drug interactions.    UNOS Patient Status  Functional Status: 80% - Normal activity with effort: some symptoms of disease  Physical Capacity: No Limitations  Working for Income: No  If no, reason not working: Demands of Treatment  Would like to go back to work as noted above     Lashon Hawkins MD     Full

## 2020-10-22 RX ORDER — BUTALBITAL, ACETAMINOPHEN AND CAFFEINE 50; 325; 40 MG/1; MG/1; MG/1
TABLET ORAL
Qty: 6 TABLET | Refills: 5 | Status: SHIPPED | OUTPATIENT
Start: 2020-10-22 | End: 2022-03-14

## 2020-11-02 ENCOUNTER — PATIENT MESSAGE (OUTPATIENT)
Dept: TRANSPLANT | Facility: CLINIC | Age: 30
End: 2020-11-02

## 2020-12-14 ENCOUNTER — LAB VISIT (OUTPATIENT)
Dept: LAB | Facility: HOSPITAL | Age: 30
End: 2020-12-14
Attending: INTERNAL MEDICINE
Payer: COMMERCIAL

## 2020-12-14 DIAGNOSIS — Z94.1 STATUS POST HEART TRANSPLANT: ICD-10-CM

## 2020-12-14 LAB
ALBUMIN SERPL BCP-MCNC: 4.5 G/DL (ref 3.5–5.2)
ALP SERPL-CCNC: 77 U/L (ref 55–135)
ALT SERPL W/O P-5'-P-CCNC: 79 U/L (ref 10–44)
ANION GAP SERPL CALC-SCNC: 12 MMOL/L (ref 8–16)
AST SERPL-CCNC: 31 U/L (ref 10–40)
BASOPHILS # BLD AUTO: 0.03 K/UL (ref 0–0.2)
BASOPHILS NFR BLD: 0.4 % (ref 0–1.9)
BILIRUB SERPL-MCNC: 0.6 MG/DL (ref 0.1–1)
BNP SERPL-MCNC: 60 PG/ML (ref 0–99)
BUN SERPL-MCNC: 21 MG/DL (ref 6–20)
CALCIUM SERPL-MCNC: 9.2 MG/DL (ref 8.7–10.5)
CHLORIDE SERPL-SCNC: 106 MMOL/L (ref 95–110)
CHOLEST SERPL-MCNC: 89 MG/DL (ref 120–199)
CHOLEST/HDLC SERPL: 3.2 {RATIO} (ref 2–5)
CO2 SERPL-SCNC: 26 MMOL/L (ref 23–29)
CREAT SERPL-MCNC: 1 MG/DL (ref 0.5–1.4)
DIFFERENTIAL METHOD: ABNORMAL
EOSINOPHIL # BLD AUTO: 0.1 K/UL (ref 0–0.5)
EOSINOPHIL NFR BLD: 0.8 % (ref 0–8)
ERYTHROCYTE [DISTWIDTH] IN BLOOD BY AUTOMATED COUNT: 13.5 % (ref 11.5–14.5)
EST. GFR  (AFRICAN AMERICAN): >60 ML/MIN/1.73 M^2
EST. GFR  (NON AFRICAN AMERICAN): >60 ML/MIN/1.73 M^2
GLUCOSE SERPL-MCNC: 85 MG/DL (ref 70–110)
HCT VFR BLD AUTO: 47.9 % (ref 40–54)
HDLC SERPL-MCNC: 28 MG/DL (ref 40–75)
HDLC SERPL: 31.5 % (ref 20–50)
HGB BLD-MCNC: 15.2 G/DL (ref 14–18)
IMM GRANULOCYTES # BLD AUTO: 0.05 K/UL (ref 0–0.04)
IMM GRANULOCYTES NFR BLD AUTO: 0.6 % (ref 0–0.5)
LDLC SERPL CALC-MCNC: 30.2 MG/DL (ref 63–159)
LYMPHOCYTES # BLD AUTO: 1.8 K/UL (ref 1–4.8)
LYMPHOCYTES NFR BLD: 21.8 % (ref 18–48)
MCH RBC QN AUTO: 27.6 PG (ref 27–31)
MCHC RBC AUTO-ENTMCNC: 31.7 G/DL (ref 32–36)
MCV RBC AUTO: 87 FL (ref 82–98)
MONOCYTES # BLD AUTO: 0.8 K/UL (ref 0.3–1)
MONOCYTES NFR BLD: 9.8 % (ref 4–15)
NEUTROPHILS # BLD AUTO: 5.6 K/UL (ref 1.8–7.7)
NEUTROPHILS NFR BLD: 66.6 % (ref 38–73)
NONHDLC SERPL-MCNC: 61 MG/DL
NRBC BLD-RTO: 0 /100 WBC
PLATELET # BLD AUTO: 306 K/UL (ref 150–350)
PMV BLD AUTO: 9.9 FL (ref 9.2–12.9)
POTASSIUM SERPL-SCNC: 4.2 MMOL/L (ref 3.5–5.1)
PROT SERPL-MCNC: 6.9 G/DL (ref 6–8.4)
RBC # BLD AUTO: 5.5 M/UL (ref 4.6–6.2)
SODIUM SERPL-SCNC: 144 MMOL/L (ref 136–145)
TACROLIMUS BLD-MCNC: 12.8 NG/ML (ref 5–15)
TRIGL SERPL-MCNC: 154 MG/DL (ref 30–150)
WBC # BLD AUTO: 8.41 K/UL (ref 3.9–12.7)

## 2020-12-14 PROCEDURE — 83880 ASSAY OF NATRIURETIC PEPTIDE: CPT

## 2020-12-14 PROCEDURE — 80197 ASSAY OF TACROLIMUS: CPT

## 2020-12-14 PROCEDURE — 85025 COMPLETE CBC W/AUTO DIFF WBC: CPT

## 2020-12-14 PROCEDURE — 80061 LIPID PANEL: CPT

## 2020-12-14 PROCEDURE — 80053 COMPREHEN METABOLIC PANEL: CPT

## 2020-12-14 PROCEDURE — 36415 COLL VENOUS BLD VENIPUNCTURE: CPT

## 2020-12-14 RX ORDER — TACROLIMUS 0.5 MG/1
CAPSULE ORAL
Qty: 150 CAPSULE | Refills: 11 | Status: SHIPPED | OUTPATIENT
Start: 2020-12-14 | End: 2021-12-20

## 2020-12-14 NOTE — TELEPHONE ENCOUNTER
Reviewed labs with Dr. Hawkins. Tac level 12.8, goal 8-10. Will decrease to 1/1.5 and repeat Friday at [facility]. Pt stated his understanding.

## 2020-12-15 ENCOUNTER — DOCUMENTATION ONLY (OUTPATIENT)
Dept: CARDIOLOGY | Facility: CLINIC | Age: 30
End: 2020-12-15

## 2020-12-15 ENCOUNTER — TELEPHONE (OUTPATIENT)
Dept: DERMATOLOGY | Facility: CLINIC | Age: 30
End: 2020-12-15

## 2020-12-15 DIAGNOSIS — Z94.1 STATUS POST HEART TRANSPLANT: Primary | ICD-10-CM

## 2020-12-15 DIAGNOSIS — Z01.812 PRE-PROCEDURE LAB EXAM: ICD-10-CM

## 2020-12-15 RX ORDER — DIPHENHYDRAMINE HCL 25 MG
50 CAPSULE ORAL ONCE
Status: CANCELLED | OUTPATIENT
Start: 2020-12-15 | End: 2020-12-15

## 2020-12-15 RX ORDER — SODIUM CHLORIDE 9 MG/ML
INJECTION, SOLUTION INTRAVENOUS CONTINUOUS
Status: CANCELLED | OUTPATIENT
Start: 2020-12-15 | End: 2020-12-15

## 2020-12-15 NOTE — PROGRESS NOTES
"OUTPATIENT CATHETERIZATION INSTRUCTIONS    You have been scheduled for a procedure in the catheterization lab on Wednesday, February 17, 2021.     Please report to the Cardiology Waiting Area on the Third floor of the hospital and check in at 6 AM.   You will then be taken to the SSCU (Short Stay Cardiac Unit) and prepared for your procedure. Please be aware that this is not the time of your procedure but the time you are to arrive. The procedures are scheduled on an hourly basis; however, emergency cases take precedence over all other cases.       You may not have anything to eat or drink after midnight the night before your test. You may take your regular morning medications with water. If there are any medications that you should not take you will be instructed to hold them that morning. If you are diabetic and on Metformin (Glucophage) do not take it the day before, the day of, and for 2 days after your procedure.      The procedure will take 1-2 hours to perform. After the procedure, you will return to SSCU on the third floor of the hospital. You will need to lie still (or keep your arm still) for the next 4 to 6 hours to minimize bleeding from the puncture site. Your family may remain in the room with you during this time.       You may be able to be discharged home that same afternoon if there is someone to drive you home and there were no complications. If you have one of the balloon, stent, or device procedures you may spend the night in the hospital. Your doctor will determine, based on your progress, the date and time of your discharge. The results of your procedure will be discussed with you before you are discharged. Any further testing or procedures will be scheduled for you either before you leave or you will be called with these appointments.       If you should have any questions, concerns, or need to change the date of your procedure, please call "[facility] @ [phone]    Special " Instructions:    None        THE ABOVE INSTRUCTIONS WERE GIVEN TO THE PATIENT VERBALLY AND THEY VERBALIZED UNDERSTANDING.  THEY DO NOT REQUIRE ANY SPECIAL NEEDS AND DO NOT HAVE ANY LEARNING BARRIERS.          Directions for Reporting to Cardiology Waiting Area in the Hospital  If you park in the Parking Garage:  Take elevators to the1st floor of the parking garage.  Continue past the gift shop, coffee shop, and piano.  Take a right and go to the gold elevators. (Elevator B)  Take the elevator to the 3rd floor.  Follow the arrow on the sign on the wall that says Cath Lab Registration/EP Lab Registration.  Follow the long hallway all the way around until you come to a big open area.  This is the registration area.  Check in at Reception Desk.    OR    If family is dropping you off:  Have them drop you off at the front of the Hospital under the green overhang.  Enter through the doors and take a right.  Take the E elevators to the 3rd floor Cardiology Waiting Area.  Check in at the Reception Desk in the waiting room.

## 2020-12-21 ENCOUNTER — LAB VISIT (OUTPATIENT)
Dept: LAB | Facility: HOSPITAL | Age: 30
End: 2020-12-21
Attending: INTERNAL MEDICINE
Payer: COMMERCIAL

## 2020-12-21 DIAGNOSIS — Z94.1 STATUS POST HEART TRANSPLANT: ICD-10-CM

## 2020-12-21 LAB
ALBUMIN SERPL BCP-MCNC: 4.7 G/DL (ref 3.5–5.2)
ALP SERPL-CCNC: 76 U/L (ref 55–135)
ALT SERPL W/O P-5'-P-CCNC: 37 U/L (ref 10–44)
ANION GAP SERPL CALC-SCNC: 14 MMOL/L (ref 8–16)
AST SERPL-CCNC: 19 U/L (ref 10–40)
BILIRUB SERPL-MCNC: 0.8 MG/DL (ref 0.1–1)
BUN SERPL-MCNC: 19 MG/DL (ref 6–20)
CALCIUM SERPL-MCNC: 9.5 MG/DL (ref 8.7–10.5)
CHLORIDE SERPL-SCNC: 106 MMOL/L (ref 95–110)
CO2 SERPL-SCNC: 24 MMOL/L (ref 23–29)
CREAT SERPL-MCNC: 1.1 MG/DL (ref 0.5–1.4)
EST. GFR  (AFRICAN AMERICAN): >60 ML/MIN/1.73 M^2
EST. GFR  (NON AFRICAN AMERICAN): >60 ML/MIN/1.73 M^2
GLUCOSE SERPL-MCNC: 84 MG/DL (ref 70–110)
POTASSIUM SERPL-SCNC: 4.2 MMOL/L (ref 3.5–5.1)
PROT SERPL-MCNC: 7.2 G/DL (ref 6–8.4)
SODIUM SERPL-SCNC: 144 MMOL/L (ref 136–145)

## 2020-12-21 PROCEDURE — 86833 HLA CLASS II HIGH DEFIN QUAL: CPT | Mod: 59

## 2020-12-21 PROCEDURE — 86977 RBC SERUM PRETX INCUBJ/INHIB: CPT | Mod: 59

## 2020-12-21 PROCEDURE — 86832 HLA CLASS I HIGH DEFIN QUAL: CPT | Mod: 59

## 2020-12-21 PROCEDURE — 36415 COLL VENOUS BLD VENIPUNCTURE: CPT

## 2020-12-21 PROCEDURE — 86832 HLA CLASS I HIGH DEFIN QUAL: CPT

## 2020-12-21 PROCEDURE — 80053 COMPREHEN METABOLIC PANEL: CPT

## 2020-12-21 PROCEDURE — 86833 HLA CLASS II HIGH DEFIN QUAL: CPT

## 2020-12-21 PROCEDURE — 80197 ASSAY OF TACROLIMUS: CPT

## 2020-12-22 LAB — TACROLIMUS BLD-MCNC: 9 NG/ML (ref 5–15)

## 2020-12-23 DIAGNOSIS — M10.00 ACUTE IDIOPATHIC GOUT, UNSPECIFIED SITE: Primary | ICD-10-CM

## 2020-12-30 ENCOUNTER — LAB VISIT (OUTPATIENT)
Dept: LAB | Facility: HOSPITAL | Age: 30
End: 2020-12-30
Attending: INTERNAL MEDICINE
Payer: COMMERCIAL

## 2020-12-30 ENCOUNTER — OFFICE VISIT (OUTPATIENT)
Dept: RHEUMATOLOGY | Facility: CLINIC | Age: 30
End: 2020-12-30
Payer: COMMERCIAL

## 2020-12-30 VITALS
HEIGHT: 67 IN | SYSTOLIC BLOOD PRESSURE: 130 MMHG | DIASTOLIC BLOOD PRESSURE: 84 MMHG | WEIGHT: 176.13 LBS | BODY MASS INDEX: 27.64 KG/M2 | HEART RATE: 110 BPM

## 2020-12-30 DIAGNOSIS — M79.671 RIGHT FOOT PAIN: Primary | ICD-10-CM

## 2020-12-30 DIAGNOSIS — M10.00 ACUTE IDIOPATHIC GOUT, UNSPECIFIED SITE: ICD-10-CM

## 2020-12-30 DIAGNOSIS — M79.671 RIGHT FOOT PAIN: ICD-10-CM

## 2020-12-30 LAB — URATE SERPL-MCNC: 7.8 MG/DL (ref 3.4–7)

## 2020-12-30 PROCEDURE — 99205 PR OFFICE/OUTPT VISIT, NEW, LEVL V, 60-74 MIN: ICD-10-PCS | Mod: S$GLB,,, | Performed by: INTERNAL MEDICINE

## 2020-12-30 PROCEDURE — 84550 ASSAY OF BLOOD/URIC ACID: CPT

## 2020-12-30 PROCEDURE — 36415 COLL VENOUS BLD VENIPUNCTURE: CPT

## 2020-12-30 PROCEDURE — 99999 PR PBB SHADOW E&M-EST. PATIENT-LVL IV: ICD-10-PCS | Mod: PBBFAC,,, | Performed by: INTERNAL MEDICINE

## 2020-12-30 PROCEDURE — 99205 OFFICE O/P NEW HI 60 MIN: CPT | Mod: S$GLB,,, | Performed by: INTERNAL MEDICINE

## 2020-12-30 PROCEDURE — 99999 PR PBB SHADOW E&M-EST. PATIENT-LVL IV: CPT | Mod: PBBFAC,,, | Performed by: INTERNAL MEDICINE

## 2020-12-30 RX ORDER — DICLOFENAC SODIUM 10 MG/G
2 GEL TOPICAL 3 TIMES DAILY
Qty: 2 TUBE | Refills: 3 | Status: SHIPPED | OUTPATIENT
Start: 2020-12-30 | End: 2021-02-01

## 2020-12-30 ASSESSMENT — ROUTINE ASSESSMENT OF PATIENT INDEX DATA (RAPID3)
MDHAQ FUNCTION SCORE: 0.6
PAIN SCORE: 3
PSYCHOLOGICAL DISTRESS SCORE: 1.1
FATIGUE SCORE: 0
TOTAL RAPID3 SCORE: 2
PATIENT GLOBAL ASSESSMENT SCORE: 1
AM STIFFNESS SCORE: 0, NO

## 2020-12-30 NOTE — PROGRESS NOTES
30 year old white male comes in with    Familial cardiomyopathy s/p cardiac transplant 2018  On tacrolimus,cellcept and prednisone     Right MTP pain+  Started many months ago  Noticed it in the shower  Started in the first MTP,radiated to the ankle    Episodic+  Triggers : walking upstairs/pressure/twisting motions/injury or hitting it against something  Sharp stabbing pain+  Worsened by activity  Taking pressure off helps/rest helps    No swelling,redness or warmth    Family h/o gout +    Exam    First MTP tenderness  No swelling     Impression     First MTP OA  Less likely gouty arthritis     Plan     Uric acid   Foot xray     Podiatry referral

## 2020-12-30 NOTE — PROGRESS NOTES
Subjective:       Patient ID: Mert Samayoa is a 30 y.o. male.    Chief Complaint: Right toe pain     HPI     Mert Samayoa is a 30 y.o. male with PMH of familial cardiomyopathy s/p open heart transplant (02/18/2018). He presents to this clinic for initial evaluation of right great toe pain. Pt was sent to this clinic for consultation by Dr. Lashon Hawkins (PCP).     Pt reports several month history of sudden onset right toe pain. Pt first noticed pain when he was in shower, washing foot, initially had sharp pain in joint of right great toe with radiation of pain proximally toward the ankle. Pt initially thought it was related to tendon. Since onset, pain has gotten slightly worse and is now more localized to right 1st MTP joint. He reports intermittent episodes associated with twisting movements, overuse, prolonged standing. Episodes resolve after trigger is removed, sitting down/resting. He denies any symptoms lasting days. Pain is described as a sharp stabbing pain. Worsened by prolonged standing, walking up stairs at work, pressure, and twisting motion in feet. He gets relief with rest/sitting/removing pressure. Has not taken any OTC medications for current issue. He denies any swelling, redness, warmth.     Of note, pt is on tacrolimus, mycophenolate, and prednisone as anti-rejection regimen for heart transplant. Followed by cardiology.      Family History:  Father with history of gout (much more severe than pt's current symptoms)     Review of Systems   Constitutional: Negative for fatigue, fever and unexpected weight change.   HENT: Negative for mouth sores and trouble swallowing.    Eyes: Negative for pain, redness and itching.   Respiratory: Negative for cough, chest tightness, shortness of breath and wheezing.    Cardiovascular: Negative for chest pain, palpitations and leg swelling.   Gastrointestinal: Negative for abdominal distention, constipation, diarrhea, nausea and vomiting.  "  Genitourinary: Negative for difficulty urinating, dysuria and genital sores.   Skin: Negative for color change and rash.   Neurological: Positive for headaches (Migraines, managed by Neurology. ).   Hematological: Negative for adenopathy. Does not bruise/bleed easily.   Psychiatric/Behavioral: Negative for sleep disturbance.         Rheum Review of Systems     No skin rashes,malar rash,photosensitivity   No telangiectasias   No calcinosis   No psoriasis   No rash  No patchy alopecia   No oral and nasal ulcers   No dry eyes and dry mouth   No pleurisy or any cardiopulmonary complaints   No diplopia and dysphonia and muscle weakness   No n/v/d/c   No esophageal dysmotility/dysphagia  No raynaud's+   No digital ulcers   Endorses Hx of leukopenia associated with post-transplant medication (Everolimus)   No renal issues   No blood clots   No fever,chills,night sweats,weight loss and loss of appetite   No pregnancy complications  No new onset headaches   Endorses Migraines  No recurrent conjunctivitis or uveitis or scleritis or episcleritis   No chronic or bloody diarrhea with no u colitis or crohn's /inflammatory bowel disease   No vaginal or urethral  d/c/STDs/no ulcers   No unexplained lymphadenopathy and parotitis  No history of blood clots, strokes, TIA      Objective:   /84 (BP Location: Right arm, Patient Position: Sitting, BP Method: Medium (Automatic))   Pulse 110   Ht 5' 7" (1.702 m)   Wt 79.9 kg (176 lb 2.4 oz)   BMI 27.59 kg/m²      Physical Exam   Constitutional: He is oriented to person, place, and time and well-developed, well-nourished, and in no distress. No distress.   HENT:   Head: Normocephalic and atraumatic.   Eyes: Conjunctivae and EOM are normal. Pupils are equal, round, and reactive to light. Right eye exhibits no discharge. Left eye exhibits no discharge.   Neck: Normal range of motion.   Cardiovascular: Normal rate, regular rhythm, normal heart sounds and intact distal pulses.  "   Pulmonary/Chest: Effort normal and breath sounds normal.   Neurological: He is alert and oriented to person, place, and time.   Skin: Skin is warm and dry. He is not diaphoretic.     Musculoskeletal: Normal range of motion. Tenderness (mild TTP over the MTP joint on the right. ) present. No deformity or edema.      Comments:   RIGHT FOOT EXAM  No swelling, no redness, no warmth. No tophi.   Normal ROM  Mild TTP over the 1st MTP.           No data to display     Assessment:       1. Right foot pain    2. Acute idiopathic gout, unspecified site            Plan:       Problem List Items Addressed This Visit     None      Visit Diagnoses     Right foot pain    -  Primary    Relevant Orders    Uric Acid    X-Ray Foot Complete Bilateral    Acute idiopathic gout, unspecified site              Right Toe Pain  Low clinical suspicion of gout, given the lack of severe symptom exacerbation, persistence of symptoms, swelling, redness/warmth. Will still get uric acid level to assess for possible hyperuricemia, which we will treat even in absence of acute gout flare according to recent ACR guidelines. Pain more likely related to MTP OA, given symptom nature, exacerbation with activity, relief with rest.    - Ordered XR Right Foot   - Ordered Uric Acid.   - Rx written for Diclofenac gel 1%. Pt can apply 1-2g up to four times daily to affected joint.  - We can consider referral to podiatry if symptoms worsen.       RTC PRN.        I performed a history, review of systems, and physical exam with the patient. The assessment and plan were discussed and agreed upon with Dr. Stanford, the attending of record, before sharing with the patient. The face to face encounter time, including answering all patient questions, was 30 minutes.     Brayden Caputo M.D.  PGY-1  [employer] [facility]

## 2020-12-30 NOTE — PATIENT INSTRUCTIONS
Diclofenac gel 1%. Apply 1-2 g up to 4 times daily to affected area.     XRAYS Right Foot    We can consider referral to podiatry if your symptoms worsen. Just contact the clinic for referral.

## 2020-12-30 NOTE — LETTER
December 30, 2020      Lashon Hawkins MD  [address]  [facility] [address]           [id number]  [address] [address]  Phone: [phone]  Fax: [phone]          Patient: Mert Samayoa   MR Number: [id number]   YOB: 1990   Date of Visit: 12/30/2020       Dear Dr. Lashon Hawkins:    Thank you for referring Mert Samayoa to me for evaluation. Attached you will find relevant portions of my assessment and plan of care.    If you have questions, please do not hesitate to call me. I look forward to following Mert Samayoa along with you.    Sincerely,    Rey Stanford MD    Enclosure  CC:  No Recipients    If you would like to receive this communication electronically, please contact [email or web address] or [phone] to request more information on [facility] Link access.    For providers and/or their staff who would like to refer a patient to [facility], please contact us through our one-stop-shop provider referral line, [facility], at [phone].    If you feel you have received this communication in error or would no longer like to receive these types of communications, please e-mail [email or web address]

## 2021-01-05 ENCOUNTER — PATIENT MESSAGE (OUTPATIENT)
Dept: CARDIOLOGY | Facility: CLINIC | Age: 31
End: 2021-01-05

## 2021-01-08 ENCOUNTER — PATIENT MESSAGE (OUTPATIENT)
Dept: TRANSPLANT | Facility: CLINIC | Age: 31
End: 2021-01-08

## 2021-01-14 ENCOUNTER — PATIENT MESSAGE (OUTPATIENT)
Dept: CARDIOLOGY | Facility: CLINIC | Age: 31
End: 2021-01-14

## 2021-02-01 ENCOUNTER — OFFICE VISIT (OUTPATIENT)
Dept: NEUROLOGY | Facility: CLINIC | Age: 31
End: 2021-02-01
Payer: COMMERCIAL

## 2021-02-01 VITALS
RESPIRATION RATE: 16 BRPM | SYSTOLIC BLOOD PRESSURE: 108 MMHG | HEIGHT: 67 IN | HEART RATE: 104 BPM | DIASTOLIC BLOOD PRESSURE: 68 MMHG | WEIGHT: 176.38 LBS | TEMPERATURE: 98 F | BODY MASS INDEX: 27.68 KG/M2

## 2021-02-01 DIAGNOSIS — G43.109 MIGRAINE WITH AURA AND WITHOUT STATUS MIGRAINOSUS, NOT INTRACTABLE: Primary | ICD-10-CM

## 2021-02-01 DIAGNOSIS — Z94.1 HEART TRANSPLANTED: ICD-10-CM

## 2021-02-01 PROCEDURE — 3008F BODY MASS INDEX DOCD: CPT | Mod: CPTII,S$GLB,, | Performed by: PSYCHIATRY & NEUROLOGY

## 2021-02-01 PROCEDURE — 3078F PR MOST RECENT DIASTOLIC BLOOD PRESSURE < 80 MM HG: ICD-10-PCS | Mod: CPTII,S$GLB,, | Performed by: PSYCHIATRY & NEUROLOGY

## 2021-02-01 PROCEDURE — 1126F AMNT PAIN NOTED NONE PRSNT: CPT | Mod: S$GLB,,, | Performed by: PSYCHIATRY & NEUROLOGY

## 2021-02-01 PROCEDURE — 3008F PR BODY MASS INDEX (BMI) DOCUMENTED: ICD-10-PCS | Mod: CPTII,S$GLB,, | Performed by: PSYCHIATRY & NEUROLOGY

## 2021-02-01 PROCEDURE — 3074F SYST BP LT 130 MM HG: CPT | Mod: CPTII,S$GLB,, | Performed by: PSYCHIATRY & NEUROLOGY

## 2021-02-01 PROCEDURE — 99214 PR OFFICE/OUTPT VISIT, EST, LEVL IV, 30-39 MIN: ICD-10-PCS | Mod: S$GLB,,, | Performed by: PSYCHIATRY & NEUROLOGY

## 2021-02-01 PROCEDURE — 99214 OFFICE O/P EST MOD 30 MIN: CPT | Mod: S$GLB,,, | Performed by: PSYCHIATRY & NEUROLOGY

## 2021-02-01 PROCEDURE — 99999 PR PBB SHADOW E&M-EST. PATIENT-LVL III: ICD-10-PCS | Mod: PBBFAC,,, | Performed by: PSYCHIATRY & NEUROLOGY

## 2021-02-01 PROCEDURE — 1126F PR PAIN SEVERITY QUANTIFIED, NO PAIN PRESENT: ICD-10-PCS | Mod: S$GLB,,, | Performed by: PSYCHIATRY & NEUROLOGY

## 2021-02-01 PROCEDURE — 3074F PR MOST RECENT SYSTOLIC BLOOD PRESSURE < 130 MM HG: ICD-10-PCS | Mod: CPTII,S$GLB,, | Performed by: PSYCHIATRY & NEUROLOGY

## 2021-02-01 PROCEDURE — 99999 PR PBB SHADOW E&M-EST. PATIENT-LVL III: CPT | Mod: PBBFAC,,, | Performed by: PSYCHIATRY & NEUROLOGY

## 2021-02-01 PROCEDURE — 3078F DIAST BP <80 MM HG: CPT | Mod: CPTII,S$GLB,, | Performed by: PSYCHIATRY & NEUROLOGY

## 2021-02-01 RX ORDER — RIMEGEPANT SULFATE 75 MG/75MG
75 TABLET, ORALLY DISINTEGRATING ORAL DAILY PRN
Qty: 9 TABLET | Refills: 11 | Status: SHIPPED | OUTPATIENT
Start: 2021-02-01 | End: 2022-03-14 | Stop reason: SDUPTHER

## 2021-02-01 RX ORDER — RIMEGEPANT SULFATE 75 MG/75MG
75 TABLET, ORALLY DISINTEGRATING ORAL DAILY PRN
Qty: 9 TABLET | Refills: 11 | Status: SHIPPED | OUTPATIENT
Start: 2021-02-01 | End: 2021-02-01 | Stop reason: SDUPTHER

## 2021-02-15 ENCOUNTER — HOSPITAL ENCOUNTER (OUTPATIENT)
Dept: PREADMISSION TESTING | Facility: HOSPITAL | Age: 31
Discharge: HOME | End: 2021-02-15
Attending: INTERNAL MEDICINE
Payer: COMMERCIAL

## 2021-02-15 DIAGNOSIS — Z01.812 PRE-PROCEDURE LAB EXAM: ICD-10-CM

## 2021-02-15 PROCEDURE — U0003 INFECTIOUS AGENT DETECTION BY NUCLEIC ACID (DNA OR RNA); SEVERE ACUTE RESPIRATORY SYNDROME CORONAVIRUS 2 (SARS-COV-2) (CORONAVIRUS DISEASE [COVID-19]), AMPLIFIED PROBE TECHNIQUE, MAKING USE OF HIGH THROUGHPUT TECHNOLOGIES AS DESCRIBED BY CMS-2020-01-R: HCPCS

## 2021-02-15 PROCEDURE — U0005 INFEC AGEN DETEC AMPLI PROBE: HCPCS

## 2021-02-16 LAB — SARS-COV-2 RNA RESP QL NAA+PROBE: NOT DETECTED

## 2021-02-17 ENCOUNTER — HOSPITAL ENCOUNTER (OUTPATIENT)
Facility: HOSPITAL | Age: 31
Discharge: HOME | End: 2021-02-17
Attending: INTERNAL MEDICINE | Admitting: INTERNAL MEDICINE
Payer: COMMERCIAL

## 2021-02-17 VITALS
HEART RATE: 102 BPM | BODY MASS INDEX: 26.68 KG/M2 | HEIGHT: 67 IN | TEMPERATURE: 98 F | RESPIRATION RATE: 16 BRPM | OXYGEN SATURATION: 96 % | WEIGHT: 170 LBS | DIASTOLIC BLOOD PRESSURE: 86 MMHG | SYSTOLIC BLOOD PRESSURE: 126 MMHG

## 2021-02-17 DIAGNOSIS — Z94.1 STATUS POST HEART TRANSPLANT: ICD-10-CM

## 2021-02-17 LAB
ABO + RH BLD: NORMAL
BLD GP AB SCN CELLS X3 SERPL QL: NORMAL

## 2021-02-17 PROCEDURE — 25000003 PHARM REV CODE 250: Performed by: INTERNAL MEDICINE

## 2021-02-17 PROCEDURE — 92978 PR IVUS, CORONARY, 1ST VESSEL: ICD-10-PCS | Mod: 26,LD,, | Performed by: INTERNAL MEDICINE

## 2021-02-17 PROCEDURE — C1887 CATHETER, GUIDING: HCPCS | Performed by: INTERNAL MEDICINE

## 2021-02-17 PROCEDURE — 93010 EKG 12-LEAD: ICD-10-PCS | Mod: ,,, | Performed by: INTERNAL MEDICINE

## 2021-02-17 PROCEDURE — 93454 CORONARY ARTERY ANGIO S&I: CPT | Mod: 26,,, | Performed by: INTERNAL MEDICINE

## 2021-02-17 PROCEDURE — 63600175 PHARM REV CODE 636 W HCPCS: Performed by: INTERNAL MEDICINE

## 2021-02-17 PROCEDURE — C1753 CATH, INTRAVAS ULTRASOUND: HCPCS | Performed by: INTERNAL MEDICINE

## 2021-02-17 PROCEDURE — 93010 ELECTROCARDIOGRAM REPORT: CPT | Mod: ,,, | Performed by: INTERNAL MEDICINE

## 2021-02-17 PROCEDURE — C1894 INTRO/SHEATH, NON-LASER: HCPCS | Performed by: INTERNAL MEDICINE

## 2021-02-17 PROCEDURE — 92978 ENDOLUMINL IVUS OCT C 1ST: CPT | Mod: LD | Performed by: INTERNAL MEDICINE

## 2021-02-17 PROCEDURE — 93005 ELECTROCARDIOGRAM TRACING: CPT

## 2021-02-17 PROCEDURE — 99152 PR MOD CONSCIOUS SEDATION, SAME PHYS, 5+ YRS, FIRST 15 MIN: ICD-10-PCS | Mod: ,,, | Performed by: INTERNAL MEDICINE

## 2021-02-17 PROCEDURE — 99152 MOD SED SAME PHYS/QHP 5/>YRS: CPT | Mod: ,,, | Performed by: INTERNAL MEDICINE

## 2021-02-17 PROCEDURE — 86900 BLOOD TYPING SEROLOGIC ABO: CPT

## 2021-02-17 PROCEDURE — 25500020 PHARM REV CODE 255: Performed by: INTERNAL MEDICINE

## 2021-02-17 PROCEDURE — 99152 MOD SED SAME PHYS/QHP 5/>YRS: CPT | Performed by: INTERNAL MEDICINE

## 2021-02-17 PROCEDURE — 93454 CORONARY ARTERY ANGIO S&I: CPT | Performed by: INTERNAL MEDICINE

## 2021-02-17 PROCEDURE — 92978 ENDOLUMINL IVUS OCT C 1ST: CPT | Mod: 26,LD,, | Performed by: INTERNAL MEDICINE

## 2021-02-17 PROCEDURE — C1769 GUIDE WIRE: HCPCS | Performed by: INTERNAL MEDICINE

## 2021-02-17 PROCEDURE — 93571 IV DOP VEL&/PRESS C FLO 1ST: CPT | Mod: LD | Performed by: INTERNAL MEDICINE

## 2021-02-17 PROCEDURE — 93454 PR CATH PLACE/CORONARY ANGIO, IMG SUPER/INTERP: ICD-10-PCS | Mod: 26,,, | Performed by: INTERNAL MEDICINE

## 2021-02-17 RX ORDER — HEPARIN SOD,PORCINE/0.9 % NACL 1000/500ML
INTRAVENOUS SOLUTION INTRAVENOUS
Status: DISCONTINUED | OUTPATIENT
Start: 2021-02-17 | End: 2021-02-17 | Stop reason: HOSPADM

## 2021-02-17 RX ORDER — LIDOCAINE HYDROCHLORIDE 20 MG/ML
INJECTION, SOLUTION INFILTRATION; PERINEURAL
Status: DISCONTINUED | OUTPATIENT
Start: 2021-02-17 | End: 2021-02-17 | Stop reason: HOSPADM

## 2021-02-17 RX ORDER — ACETAMINOPHEN 325 MG/1
650 TABLET ORAL EVERY 4 HOURS PRN
Status: DISCONTINUED | OUTPATIENT
Start: 2021-02-17 | End: 2021-02-17 | Stop reason: HOSPADM

## 2021-02-17 RX ORDER — FENTANYL CITRATE 50 UG/ML
INJECTION, SOLUTION INTRAMUSCULAR; INTRAVENOUS
Status: DISCONTINUED | OUTPATIENT
Start: 2021-02-17 | End: 2021-02-17 | Stop reason: HOSPADM

## 2021-02-17 RX ORDER — SODIUM CHLORIDE 9 MG/ML
INJECTION, SOLUTION INTRAVENOUS CONTINUOUS
Status: DISCONTINUED | OUTPATIENT
Start: 2021-02-17 | End: 2021-02-17

## 2021-02-17 RX ORDER — DIPHENHYDRAMINE HCL 50 MG
50 CAPSULE ORAL ONCE
Status: COMPLETED | OUTPATIENT
Start: 2021-02-17 | End: 2021-02-17

## 2021-02-17 RX ORDER — MIDAZOLAM HYDROCHLORIDE 1 MG/ML
INJECTION, SOLUTION INTRAMUSCULAR; INTRAVENOUS
Status: DISCONTINUED | OUTPATIENT
Start: 2021-02-17 | End: 2021-02-17 | Stop reason: HOSPADM

## 2021-02-17 RX ORDER — HEPARIN SODIUM 1000 [USP'U]/ML
INJECTION, SOLUTION INTRAVENOUS; SUBCUTANEOUS
Status: DISCONTINUED | OUTPATIENT
Start: 2021-02-17 | End: 2021-02-17 | Stop reason: HOSPADM

## 2021-02-17 RX ORDER — NITROGLYCERIN 5 MG/ML
INJECTION, SOLUTION INTRAVENOUS
Status: DISCONTINUED | OUTPATIENT
Start: 2021-02-17 | End: 2021-02-17 | Stop reason: HOSPADM

## 2021-02-17 RX ORDER — SODIUM CHLORIDE 9 MG/ML
INJECTION, SOLUTION INTRAVENOUS CONTINUOUS
Status: ACTIVE | OUTPATIENT
Start: 2021-02-17 | End: 2021-02-17

## 2021-02-17 RX ORDER — SODIUM CHLORIDE 9 MG/ML
INJECTION, SOLUTION INTRAVENOUS
Status: DISCONTINUED | OUTPATIENT
Start: 2021-02-17 | End: 2021-02-17 | Stop reason: HOSPADM

## 2021-02-17 RX ADMIN — DIPHENHYDRAMINE HYDROCHLORIDE 50 MG: 50 CAPSULE ORAL at 06:02

## 2021-02-17 RX ADMIN — SODIUM CHLORIDE: 0.9 INJECTION, SOLUTION INTRAVENOUS at 06:02

## 2021-02-18 ENCOUNTER — TELEPHONE (OUTPATIENT)
Dept: PHARMACY | Facility: CLINIC | Age: 31
End: 2021-02-18

## 2021-02-19 ENCOUNTER — HOSPITAL ENCOUNTER (OUTPATIENT)
Dept: RADIOLOGY | Facility: HOSPITAL | Age: 31
Discharge: HOME | End: 2021-02-19
Attending: INTERNAL MEDICINE
Payer: COMMERCIAL

## 2021-02-19 ENCOUNTER — TELEPHONE (OUTPATIENT)
Dept: TRANSPLANT | Facility: CLINIC | Age: 31
End: 2021-02-19

## 2021-02-19 ENCOUNTER — OFFICE VISIT (OUTPATIENT)
Dept: DERMATOLOGY | Facility: CLINIC | Age: 31
End: 2021-02-19
Payer: COMMERCIAL

## 2021-02-19 ENCOUNTER — HOSPITAL ENCOUNTER (OUTPATIENT)
Dept: CARDIOLOGY | Facility: HOSPITAL | Age: 31
Discharge: HOME | End: 2021-02-19
Attending: INTERNAL MEDICINE
Payer: COMMERCIAL

## 2021-02-19 ENCOUNTER — OFFICE VISIT (OUTPATIENT)
Dept: TRANSPLANT | Facility: CLINIC | Age: 31
End: 2021-02-19
Payer: COMMERCIAL

## 2021-02-19 VITALS
HEART RATE: 89 BPM | OXYGEN SATURATION: 98 % | WEIGHT: 178.56 LBS | DIASTOLIC BLOOD PRESSURE: 86 MMHG | HEIGHT: 67 IN | SYSTOLIC BLOOD PRESSURE: 147 MMHG | BODY MASS INDEX: 28.02 KG/M2

## 2021-02-19 VITALS
BODY MASS INDEX: 26.68 KG/M2 | HEIGHT: 67 IN | HEART RATE: 75 BPM | WEIGHT: 170 LBS | DIASTOLIC BLOOD PRESSURE: 80 MMHG | SYSTOLIC BLOOD PRESSURE: 130 MMHG

## 2021-02-19 DIAGNOSIS — Z94.1 STATUS POST HEART TRANSPLANT: ICD-10-CM

## 2021-02-19 DIAGNOSIS — D18.01 CHERRY ANGIOMA: ICD-10-CM

## 2021-02-19 DIAGNOSIS — Z94.1 STATUS POST HEART TRANSPLANT: Primary | ICD-10-CM

## 2021-02-19 DIAGNOSIS — L91.8 ST (SKIN TAG): ICD-10-CM

## 2021-02-19 DIAGNOSIS — Z12.83 SKIN CANCER SCREENING: ICD-10-CM

## 2021-02-19 DIAGNOSIS — D22.9 MULTIPLE BENIGN NEVI: Primary | ICD-10-CM

## 2021-02-19 DIAGNOSIS — L81.4 LENTIGINES: ICD-10-CM

## 2021-02-19 DIAGNOSIS — L82.1 SK (SEBORRHEIC KERATOSIS): ICD-10-CM

## 2021-02-19 LAB
ASCENDING AORTA: 3.34 CM
AV INDEX (PROSTH): 0.91
AV MEAN GRADIENT: 2 MMHG
AV PEAK GRADIENT: 4 MMHG
AV VALVE AREA: 3.29 CM2
AV VELOCITY RATIO: 0.78
BSA FOR ECHO PROCEDURE: 1.91 M2
CV ECHO LV RWT: 0.43 CM
DOP CALC AO PEAK VEL: 0.98 M/S
DOP CALC AO VTI: 14.2 CM
DOP CALC LVOT AREA: 3.6 CM2
DOP CALC LVOT DIAMETER: 2.14 CM
DOP CALC LVOT PEAK VEL: 0.76 M/S
DOP CALC LVOT STROKE VOLUME: 46.7 CM3
DOP CALCLVOT PEAK VEL VTI: 12.99 CM
E/E' RATIO: 8.42 M/S
ECHO LV POSTERIOR WALL: 0.92 CM (ref 0.6–1.1)
FRACTIONAL SHORTENING: 32 % (ref 28–44)
INTERVENTRICULAR SEPTUM: 0.9 CM (ref 0.6–1.1)
IVRT: 74.22 MSEC
LEFT INTERNAL DIMENSION IN SYSTOLE: 2.94 CM (ref 2.1–4)
LEFT VENTRICLE DIASTOLIC VOLUME INDEX: 43.57 ML/M2
LEFT VENTRICLE DIASTOLIC VOLUME: 82.34 ML
LEFT VENTRICLE MASS INDEX: 66 G/M2
LEFT VENTRICLE SYSTOLIC VOLUME INDEX: 17.6 ML/M2
LEFT VENTRICLE SYSTOLIC VOLUME: 33.32 ML
LEFT VENTRICULAR INTERNAL DIMENSION IN DIASTOLE: 4.3 CM (ref 3.5–6)
LEFT VENTRICULAR MASS: 125.16 G
LV LATERAL E/E' RATIO: 8 M/S
LV SEPTAL E/E' RATIO: 8.89 M/S
MV A" WAVE DURATION": 17.41 MSEC
MV PEAK E VEL: 0.8 M/S
PISA TR MAX VEL: 2.04 M/S
PULM VEIN S/D RATIO: 0.47
PV PEAK D VEL: 0.57 M/S
PV PEAK S VEL: 0.27 M/S
RA PRESSURE: 3 MMHG
RETIRED EF AND QEF - SEE NOTES: 55 %
RIGHT VENTRICULAR END-DIASTOLIC DIMENSION: 2.77 CM
RV TISSUE DOPPLER FREE WALL SYSTOLIC VELOCITY 1 (APICAL 4 CHAMBER VIEW): 8.66 CM/S
SINUS: 3.27 CM
STJ: 3.1 CM
TDI LATERAL: 0.1 M/S
TDI SEPTAL: 0.09 M/S
TDI: 0.1 M/S
TR MAX PG: 17 MMHG
TRICUSPID ANNULAR PLANE SYSTOLIC EXCURSION: 1.54 CM
TV REST PULMONARY ARTERY PRESSURE: 20 MMHG

## 2021-02-19 PROCEDURE — 99203 OFFICE O/P NEW LOW 30 MIN: CPT | Mod: S$GLB,,, | Performed by: DERMATOLOGY

## 2021-02-19 PROCEDURE — 3074F PR MOST RECENT SYSTOLIC BLOOD PRESSURE < 130 MM HG: ICD-10-PCS | Mod: CPTII,S$GLB,, | Performed by: DERMATOLOGY

## 2021-02-19 PROCEDURE — 3074F SYST BP LT 130 MM HG: CPT | Mod: CPTII,S$GLB,, | Performed by: INTERNAL MEDICINE

## 2021-02-19 PROCEDURE — 3078F DIAST BP <80 MM HG: CPT | Mod: CPTII,S$GLB,, | Performed by: DERMATOLOGY

## 2021-02-19 PROCEDURE — 3008F PR BODY MASS INDEX (BMI) DOCUMENTED: ICD-10-PCS | Mod: CPTII,S$GLB,, | Performed by: INTERNAL MEDICINE

## 2021-02-19 PROCEDURE — 99999 PR PBB SHADOW E&M-EST. PATIENT-LVL III: CPT | Mod: PBBFAC,,, | Performed by: INTERNAL MEDICINE

## 2021-02-19 PROCEDURE — 3078F PR MOST RECENT DIASTOLIC BLOOD PRESSURE < 80 MM HG: ICD-10-PCS | Mod: CPTII,S$GLB,, | Performed by: DERMATOLOGY

## 2021-02-19 PROCEDURE — 99999 PR PBB SHADOW E&M-EST. PATIENT-LVL III: CPT | Mod: PBBFAC,,, | Performed by: DERMATOLOGY

## 2021-02-19 PROCEDURE — 71046 X-RAY EXAM CHEST 2 VIEWS: CPT | Mod: TC,FY

## 2021-02-19 PROCEDURE — 1126F AMNT PAIN NOTED NONE PRSNT: CPT | Mod: S$GLB,,, | Performed by: INTERNAL MEDICINE

## 2021-02-19 PROCEDURE — 99214 PR OFFICE/OUTPT VISIT, EST, LEVL IV, 30-39 MIN: ICD-10-PCS | Mod: S$GLB,,, | Performed by: INTERNAL MEDICINE

## 2021-02-19 PROCEDURE — 3074F PR MOST RECENT SYSTOLIC BLOOD PRESSURE < 130 MM HG: ICD-10-PCS | Mod: CPTII,S$GLB,, | Performed by: INTERNAL MEDICINE

## 2021-02-19 PROCEDURE — 93306 TTE W/DOPPLER COMPLETE: CPT

## 2021-02-19 PROCEDURE — 3074F SYST BP LT 130 MM HG: CPT | Mod: CPTII,S$GLB,, | Performed by: DERMATOLOGY

## 2021-02-19 PROCEDURE — 99214 OFFICE O/P EST MOD 30 MIN: CPT | Mod: S$GLB,,, | Performed by: INTERNAL MEDICINE

## 2021-02-19 PROCEDURE — 1126F PR PAIN SEVERITY QUANTIFIED, NO PAIN PRESENT: ICD-10-PCS | Mod: S$GLB,,, | Performed by: INTERNAL MEDICINE

## 2021-02-19 PROCEDURE — 99999 PR PBB SHADOW E&M-EST. PATIENT-LVL III: ICD-10-PCS | Mod: PBBFAC,,, | Performed by: DERMATOLOGY

## 2021-02-19 PROCEDURE — 1126F AMNT PAIN NOTED NONE PRSNT: CPT | Mod: S$GLB,,, | Performed by: DERMATOLOGY

## 2021-02-19 PROCEDURE — 99999 PR PBB SHADOW E&M-EST. PATIENT-LVL III: ICD-10-PCS | Mod: PBBFAC,,, | Performed by: INTERNAL MEDICINE

## 2021-02-19 PROCEDURE — 93306 TTE W/DOPPLER COMPLETE: CPT | Mod: 26,,, | Performed by: INTERNAL MEDICINE

## 2021-02-19 PROCEDURE — 93306 ECHO (CUPID ONLY): ICD-10-PCS | Mod: 26,,, | Performed by: INTERNAL MEDICINE

## 2021-02-19 PROCEDURE — 3079F PR MOST RECENT DIASTOLIC BLOOD PRESSURE 80-89 MM HG: ICD-10-PCS | Mod: CPTII,S$GLB,, | Performed by: INTERNAL MEDICINE

## 2021-02-19 PROCEDURE — 3008F BODY MASS INDEX DOCD: CPT | Mod: CPTII,S$GLB,, | Performed by: INTERNAL MEDICINE

## 2021-02-19 PROCEDURE — 71046 X-RAY EXAM CHEST 2 VIEWS: CPT | Mod: 26,,, | Performed by: RADIOLOGY

## 2021-02-19 PROCEDURE — 99203 PR OFFICE/OUTPT VISIT, NEW, LEVL III, 30-44 MIN: ICD-10-PCS | Mod: S$GLB,,, | Performed by: DERMATOLOGY

## 2021-02-19 PROCEDURE — 1126F PR PAIN SEVERITY QUANTIFIED, NO PAIN PRESENT: ICD-10-PCS | Mod: S$GLB,,, | Performed by: DERMATOLOGY

## 2021-02-19 PROCEDURE — 71046 XR CHEST PA AND LATERAL: ICD-10-PCS | Mod: 26,,, | Performed by: RADIOLOGY

## 2021-02-19 PROCEDURE — 3079F DIAST BP 80-89 MM HG: CPT | Mod: CPTII,S$GLB,, | Performed by: INTERNAL MEDICINE

## 2021-02-23 ENCOUNTER — PATIENT MESSAGE (OUTPATIENT)
Dept: RHEUMATOLOGY | Facility: CLINIC | Age: 31
End: 2021-02-23

## 2021-03-05 DIAGNOSIS — E78.5 HYPERLIPIDEMIA, UNSPECIFIED HYPERLIPIDEMIA TYPE: ICD-10-CM

## 2021-03-06 RX ORDER — ATORVASTATIN CALCIUM 20 MG/1
TABLET, FILM COATED ORAL
Qty: 90 TABLET | Refills: 3 | Status: SHIPPED | OUTPATIENT
Start: 2021-03-06 | End: 2021-08-06 | Stop reason: SDUPTHER

## 2021-04-19 ENCOUNTER — TELEPHONE (OUTPATIENT)
Dept: TRANSPLANT | Facility: CLINIC | Age: 31
End: 2021-04-19

## 2021-04-19 ENCOUNTER — LAB VISIT (OUTPATIENT)
Dept: LAB | Facility: HOSPITAL | Age: 31
End: 2021-04-19
Attending: INTERNAL MEDICINE
Payer: COMMERCIAL

## 2021-04-19 DIAGNOSIS — Z94.1 HEART REPLACED BY TRANSPLANT: Primary | ICD-10-CM

## 2021-04-19 DIAGNOSIS — Z94.1 HEART REPLACED BY TRANSPLANT: ICD-10-CM

## 2021-04-19 LAB
ALBUMIN SERPL BCP-MCNC: 4.4 G/DL (ref 3.5–5.2)
ALP SERPL-CCNC: 71 U/L (ref 55–135)
ALT SERPL W/O P-5'-P-CCNC: 32 U/L (ref 10–44)
ANION GAP SERPL CALC-SCNC: 9 MMOL/L (ref 8–16)
AST SERPL-CCNC: 19 U/L (ref 10–40)
BASOPHILS # BLD AUTO: 0.03 K/UL (ref 0–0.2)
BASOPHILS NFR BLD: 0.3 % (ref 0–1.9)
BILIRUB SERPL-MCNC: 1.4 MG/DL (ref 0.1–1)
BNP SERPL-MCNC: 54 PG/ML (ref 0–99)
BUN SERPL-MCNC: 14 MG/DL (ref 6–20)
CALCIUM SERPL-MCNC: 9.8 MG/DL (ref 8.7–10.5)
CHLORIDE SERPL-SCNC: 103 MMOL/L (ref 95–110)
CO2 SERPL-SCNC: 28 MMOL/L (ref 23–29)
CREAT SERPL-MCNC: 1 MG/DL (ref 0.5–1.4)
DIFFERENTIAL METHOD: ABNORMAL
EOSINOPHIL # BLD AUTO: 0 K/UL (ref 0–0.5)
EOSINOPHIL NFR BLD: 0.4 % (ref 0–8)
ERYTHROCYTE [DISTWIDTH] IN BLOOD BY AUTOMATED COUNT: 14 % (ref 11.5–14.5)
EST. GFR  (AFRICAN AMERICAN): >60 ML/MIN/1.73 M^2
EST. GFR  (NON AFRICAN AMERICAN): >60 ML/MIN/1.73 M^2
GLUCOSE SERPL-MCNC: 103 MG/DL (ref 70–110)
HCT VFR BLD AUTO: 46.8 % (ref 40–54)
HGB BLD-MCNC: 15.5 G/DL (ref 14–18)
IMM GRANULOCYTES # BLD AUTO: 0.06 K/UL (ref 0–0.04)
IMM GRANULOCYTES NFR BLD AUTO: 0.6 % (ref 0–0.5)
LYMPHOCYTES # BLD AUTO: 0.7 K/UL (ref 1–4.8)
LYMPHOCYTES NFR BLD: 8 % (ref 18–48)
MCH RBC QN AUTO: 29.5 PG (ref 27–31)
MCHC RBC AUTO-ENTMCNC: 33.1 G/DL (ref 32–36)
MCV RBC AUTO: 89 FL (ref 82–98)
MONOCYTES # BLD AUTO: 0.7 K/UL (ref 0.3–1)
MONOCYTES NFR BLD: 7.7 % (ref 4–15)
NEUTROPHILS # BLD AUTO: 7.7 K/UL (ref 1.8–7.7)
NEUTROPHILS NFR BLD: 83 % (ref 38–73)
NRBC BLD-RTO: 0 /100 WBC
PLATELET # BLD AUTO: 232 K/UL (ref 150–450)
PMV BLD AUTO: 9.6 FL (ref 9.2–12.9)
POTASSIUM SERPL-SCNC: 4.5 MMOL/L (ref 3.5–5.1)
PROT SERPL-MCNC: 7 G/DL (ref 6–8.4)
RBC # BLD AUTO: 5.26 M/UL (ref 4.6–6.2)
SODIUM SERPL-SCNC: 140 MMOL/L (ref 136–145)
WBC # BLD AUTO: 9.27 K/UL (ref 3.9–12.7)

## 2021-04-19 PROCEDURE — 36415 COLL VENOUS BLD VENIPUNCTURE: CPT | Performed by: INTERNAL MEDICINE

## 2021-04-19 PROCEDURE — 86644 CMV ANTIBODY: CPT | Performed by: INTERNAL MEDICINE

## 2021-04-19 PROCEDURE — 85025 COMPLETE CBC W/AUTO DIFF WBC: CPT | Performed by: INTERNAL MEDICINE

## 2021-04-19 PROCEDURE — 80053 COMPREHEN METABOLIC PANEL: CPT | Performed by: INTERNAL MEDICINE

## 2021-04-19 PROCEDURE — 83880 ASSAY OF NATRIURETIC PEPTIDE: CPT | Performed by: INTERNAL MEDICINE

## 2021-04-20 LAB — CMV IGG SERPL QL IA: NORMAL

## 2021-04-21 LAB — CMV DNA SERPL NAA+PROBE-ACNC: NORMAL IU/ML

## 2021-05-04 ENCOUNTER — PATIENT MESSAGE (OUTPATIENT)
Dept: RESEARCH | Facility: HOSPITAL | Age: 31
End: 2021-05-04

## 2021-06-24 DIAGNOSIS — Z94.1 STATUS POST HEART TRANSPLANT: Primary | ICD-10-CM

## 2021-07-16 ENCOUNTER — TELEPHONE (OUTPATIENT)
Dept: TRANSPLANT | Facility: CLINIC | Age: 31
End: 2021-07-16

## 2021-08-06 ENCOUNTER — OFFICE VISIT (OUTPATIENT)
Dept: TRANSPLANT | Facility: CLINIC | Age: 31
End: 2021-08-06
Payer: COMMERCIAL

## 2021-08-06 ENCOUNTER — LAB VISIT (OUTPATIENT)
Dept: LAB | Facility: HOSPITAL | Age: 31
End: 2021-08-06
Attending: INTERNAL MEDICINE
Payer: COMMERCIAL

## 2021-08-06 ENCOUNTER — HOSPITAL ENCOUNTER (OUTPATIENT)
Dept: CARDIOLOGY | Facility: HOSPITAL | Age: 31
Discharge: HOME | End: 2021-08-06
Attending: INTERNAL MEDICINE
Payer: COMMERCIAL

## 2021-08-06 VITALS
WEIGHT: 167 LBS | BODY MASS INDEX: 26.21 KG/M2 | HEART RATE: 88 BPM | HEIGHT: 67 IN | HEIGHT: 67 IN | WEIGHT: 169.56 LBS | DIASTOLIC BLOOD PRESSURE: 82 MMHG | HEART RATE: 91 BPM | DIASTOLIC BLOOD PRESSURE: 81 MMHG | SYSTOLIC BLOOD PRESSURE: 124 MMHG | SYSTOLIC BLOOD PRESSURE: 140 MMHG | BODY MASS INDEX: 26.61 KG/M2

## 2021-08-06 DIAGNOSIS — Z94.1 HEART TRANSPLANTED: ICD-10-CM

## 2021-08-06 DIAGNOSIS — Z94.1 STATUS POST HEART TRANSPLANT: Primary | ICD-10-CM

## 2021-08-06 DIAGNOSIS — Z94.1 STATUS POST HEART TRANSPLANT: ICD-10-CM

## 2021-08-06 DIAGNOSIS — Z79.899 LONG TERM CURRENT USE OF IMMUNOSUPPRESSIVE DRUG: ICD-10-CM

## 2021-08-06 LAB
ALBUMIN SERPL BCP-MCNC: 4.7 G/DL (ref 3.5–5.2)
ALP SERPL-CCNC: 74 U/L (ref 55–135)
ALT SERPL W/O P-5'-P-CCNC: 25 U/L (ref 10–44)
ANION GAP SERPL CALC-SCNC: 9 MMOL/L (ref 8–16)
ASCENDING AORTA: 3.24 CM
AST SERPL-CCNC: 18 U/L (ref 10–40)
AV INDEX (PROSTH): 0.76
AV MEAN GRADIENT: 3 MMHG
AV PEAK GRADIENT: 6 MMHG
AV VALVE AREA: 3.49 CM2
AV VELOCITY RATIO: 0.71
BASOPHILS # BLD AUTO: 0.04 K/UL (ref 0–0.2)
BASOPHILS NFR BLD: 0.4 % (ref 0–1.9)
BILIRUB SERPL-MCNC: 1.4 MG/DL (ref 0.1–1)
BNP SERPL-MCNC: 36 PG/ML (ref 0–99)
BSA FOR ECHO PROCEDURE: 1.89 M2
BUN SERPL-MCNC: 16 MG/DL (ref 6–20)
CALCIUM SERPL-MCNC: 10 MG/DL (ref 8.7–10.5)
CHLORIDE SERPL-SCNC: 104 MMOL/L (ref 95–110)
CHOLEST SERPL-MCNC: 96 MG/DL (ref 120–199)
CHOLEST/HDLC SERPL: 2.7 {RATIO} (ref 2–5)
CO2 SERPL-SCNC: 30 MMOL/L (ref 23–29)
CREAT SERPL-MCNC: 1 MG/DL (ref 0.5–1.4)
CV ECHO LV RWT: 0.37 CM
DIFFERENTIAL METHOD: ABNORMAL
DOP CALC AO PEAK VEL: 1.2 M/S
DOP CALC AO VTI: 21.29 CM
DOP CALC LVOT AREA: 4.6 CM2
DOP CALC LVOT DIAMETER: 2.42 CM
DOP CALC LVOT PEAK VEL: 0.85 M/S
DOP CALC LVOT STROKE VOLUME: 74.34 CM3
DOP CALCLVOT PEAK VEL VTI: 16.17 CM
E WAVE DECELERATION TIME: 192.23 MSEC
E/A RATIO: 2.26
E/E' RATIO: 13.57 M/S
ECHO LV POSTERIOR WALL: 0.77 CM (ref 0.6–1.1)
EJECTION FRACTION: 65 %
EOSINOPHIL # BLD AUTO: 0.1 K/UL (ref 0–0.5)
EOSINOPHIL NFR BLD: 0.9 % (ref 0–8)
ERYTHROCYTE [DISTWIDTH] IN BLOOD BY AUTOMATED COUNT: 13.6 % (ref 11.5–14.5)
EST. GFR  (AFRICAN AMERICAN): >60 ML/MIN/1.73 M^2
EST. GFR  (NON AFRICAN AMERICAN): >60 ML/MIN/1.73 M^2
FRACTIONAL SHORTENING: 33 % (ref 28–44)
GLUCOSE SERPL-MCNC: 83 MG/DL (ref 70–110)
HCT VFR BLD AUTO: 50 % (ref 40–54)
HDLC SERPL-MCNC: 35 MG/DL (ref 40–75)
HDLC SERPL: 36.5 % (ref 20–50)
HGB BLD-MCNC: 16.1 G/DL (ref 14–18)
IMM GRANULOCYTES # BLD AUTO: 0.04 K/UL (ref 0–0.04)
IMM GRANULOCYTES NFR BLD AUTO: 0.4 % (ref 0–0.5)
INTERVENTRICULAR SEPTUM: 1.04 CM (ref 0.6–1.1)
LDLC SERPL CALC-MCNC: 38.6 MG/DL (ref 63–159)
LEFT INTERNAL DIMENSION IN SYSTOLE: 2.77 CM (ref 2.1–4)
LEFT VENTRICLE DIASTOLIC VOLUME INDEX: 39.91 ML/M2
LEFT VENTRICLE DIASTOLIC VOLUME: 74.63 ML
LEFT VENTRICLE MASS INDEX: 62 G/M2
LEFT VENTRICLE SYSTOLIC VOLUME INDEX: 15.4 ML/M2
LEFT VENTRICLE SYSTOLIC VOLUME: 28.76 ML
LEFT VENTRICULAR INTERNAL DIMENSION IN DIASTOLE: 4.11 CM (ref 3.5–6)
LEFT VENTRICULAR MASS: 115.46 G
LV LATERAL E/E' RATIO: 13.57 M/S
LV SEPTAL E/E' RATIO: 13.57 M/S
LYMPHOCYTES # BLD AUTO: 1.5 K/UL (ref 1–4.8)
LYMPHOCYTES NFR BLD: 17 % (ref 18–48)
MAGNESIUM SERPL-MCNC: 1.7 MG/DL (ref 1.6–2.6)
MCH RBC QN AUTO: 28.9 PG (ref 27–31)
MCHC RBC AUTO-ENTMCNC: 32.2 G/DL (ref 32–36)
MCV RBC AUTO: 90 FL (ref 82–98)
MONOCYTES # BLD AUTO: 0.7 K/UL (ref 0.3–1)
MONOCYTES NFR BLD: 8.2 % (ref 4–15)
MV PEAK A VEL: 0.42 M/S
MV PEAK E VEL: 0.95 M/S
MV STENOSIS PRESSURE HALF TIME: 55.75 MS
MV VALVE AREA P 1/2 METHOD: 3.95 CM2
NEUTROPHILS # BLD AUTO: 6.5 K/UL (ref 1.8–7.7)
NEUTROPHILS NFR BLD: 73.1 % (ref 38–73)
NONHDLC SERPL-MCNC: 61 MG/DL
NRBC BLD-RTO: 0 /100 WBC
PISA TR MAX VEL: 2.03 M/S
PLATELET # BLD AUTO: 221 K/UL (ref 150–450)
PMV BLD AUTO: 10.3 FL (ref 9.2–12.9)
POTASSIUM SERPL-SCNC: 3.6 MMOL/L (ref 3.5–5.1)
PROT SERPL-MCNC: 7.3 G/DL (ref 6–8.4)
RA PRESSURE: 3 MMHG
RBC # BLD AUTO: 5.58 M/UL (ref 4.6–6.2)
RIGHT VENTRICULAR END-DIASTOLIC DIMENSION: 3.25 CM
SINUS: 3.24 CM
SODIUM SERPL-SCNC: 143 MMOL/L (ref 136–145)
STJ: 2.92 CM
TACROLIMUS BLD-MCNC: 10.7 NG/ML (ref 5–15)
TACROLIMUS, NORMALIZED: 10 NG/ML (ref 5–15)
TDI LATERAL: 0.07 M/S
TDI SEPTAL: 0.07 M/S
TDI: 0.07 M/S
TR MAX PG: 16 MMHG
TRICUSPID ANNULAR PLANE SYSTOLIC EXCURSION: 1.16 CM
TRIGL SERPL-MCNC: 112 MG/DL (ref 30–150)
TV REST PULMONARY ARTERY PRESSURE: 19 MMHG
WBC # BLD AUTO: 8.93 K/UL (ref 3.9–12.7)

## 2021-08-06 PROCEDURE — 1159F PR MEDICATION LIST DOCUMENTED IN MEDICAL RECORD: ICD-10-PCS | Mod: CPTII,S$GLB,, | Performed by: INTERNAL MEDICINE

## 2021-08-06 PROCEDURE — 83735 ASSAY OF MAGNESIUM: CPT | Performed by: INTERNAL MEDICINE

## 2021-08-06 PROCEDURE — 80061 LIPID PANEL: CPT | Performed by: INTERNAL MEDICINE

## 2021-08-06 PROCEDURE — 1126F AMNT PAIN NOTED NONE PRSNT: CPT | Mod: CPTII,S$GLB,, | Performed by: INTERNAL MEDICINE

## 2021-08-06 PROCEDURE — 3008F BODY MASS INDEX DOCD: CPT | Mod: CPTII,S$GLB,, | Performed by: INTERNAL MEDICINE

## 2021-08-06 PROCEDURE — 3079F DIAST BP 80-89 MM HG: CPT | Mod: CPTII,S$GLB,, | Performed by: INTERNAL MEDICINE

## 2021-08-06 PROCEDURE — 80197 ASSAY OF TACROLIMUS: CPT | Performed by: INTERNAL MEDICINE

## 2021-08-06 PROCEDURE — 83880 ASSAY OF NATRIURETIC PEPTIDE: CPT | Performed by: INTERNAL MEDICINE

## 2021-08-06 PROCEDURE — 85025 COMPLETE CBC W/AUTO DIFF WBC: CPT | Performed by: INTERNAL MEDICINE

## 2021-08-06 PROCEDURE — 93306 TTE W/DOPPLER COMPLETE: CPT

## 2021-08-06 PROCEDURE — 99215 PR OFFICE/OUTPT VISIT, EST, LEVL V, 40-54 MIN: ICD-10-PCS | Mod: S$GLB,,, | Performed by: INTERNAL MEDICINE

## 2021-08-06 PROCEDURE — 86833 HLA CLASS II HIGH DEFIN QUAL: CPT | Mod: 59,PO | Performed by: INTERNAL MEDICINE

## 2021-08-06 PROCEDURE — 1126F PR PAIN SEVERITY QUANTIFIED, NO PAIN PRESENT: ICD-10-PCS | Mod: CPTII,S$GLB,, | Performed by: INTERNAL MEDICINE

## 2021-08-06 PROCEDURE — 3044F PR MOST RECENT HEMOGLOBIN A1C LEVEL <7.0%: ICD-10-PCS | Mod: CPTII,S$GLB,, | Performed by: INTERNAL MEDICINE

## 2021-08-06 PROCEDURE — 80053 COMPREHEN METABOLIC PANEL: CPT | Performed by: INTERNAL MEDICINE

## 2021-08-06 PROCEDURE — 86832 HLA CLASS I HIGH DEFIN QUAL: CPT | Mod: PO | Performed by: INTERNAL MEDICINE

## 2021-08-06 PROCEDURE — 99215 OFFICE O/P EST HI 40 MIN: CPT | Mod: S$GLB,,, | Performed by: INTERNAL MEDICINE

## 2021-08-06 PROCEDURE — 3008F PR BODY MASS INDEX (BMI) DOCUMENTED: ICD-10-PCS | Mod: CPTII,S$GLB,, | Performed by: INTERNAL MEDICINE

## 2021-08-06 PROCEDURE — 93306 TTE W/DOPPLER COMPLETE: CPT | Mod: 26,,, | Performed by: INTERNAL MEDICINE

## 2021-08-06 PROCEDURE — 3079F PR MOST RECENT DIASTOLIC BLOOD PRESSURE 80-89 MM HG: ICD-10-PCS | Mod: CPTII,S$GLB,, | Performed by: INTERNAL MEDICINE

## 2021-08-06 PROCEDURE — 93306 ECHO (CUPID ONLY): ICD-10-PCS | Mod: 26,,, | Performed by: INTERNAL MEDICINE

## 2021-08-06 PROCEDURE — 99999 PR PBB SHADOW E&M-EST. PATIENT-LVL III: CPT | Mod: PBBFAC,,, | Performed by: INTERNAL MEDICINE

## 2021-08-06 PROCEDURE — 86833 HLA CLASS II HIGH DEFIN QUAL: CPT | Mod: PO | Performed by: INTERNAL MEDICINE

## 2021-08-06 PROCEDURE — 3077F PR MOST RECENT SYSTOLIC BLOOD PRESSURE >= 140 MM HG: ICD-10-PCS | Mod: CPTII,S$GLB,, | Performed by: INTERNAL MEDICINE

## 2021-08-06 PROCEDURE — 3077F SYST BP >= 140 MM HG: CPT | Mod: CPTII,S$GLB,, | Performed by: INTERNAL MEDICINE

## 2021-08-06 PROCEDURE — 36415 COLL VENOUS BLD VENIPUNCTURE: CPT | Performed by: INTERNAL MEDICINE

## 2021-08-06 PROCEDURE — 99999 PR PBB SHADOW E&M-EST. PATIENT-LVL III: ICD-10-PCS | Mod: PBBFAC,,, | Performed by: INTERNAL MEDICINE

## 2021-08-06 PROCEDURE — 86977 RBC SERUM PRETX INCUBJ/INHIB: CPT | Mod: 59,PO | Performed by: INTERNAL MEDICINE

## 2021-08-06 PROCEDURE — 1159F MED LIST DOCD IN RCRD: CPT | Mod: CPTII,S$GLB,, | Performed by: INTERNAL MEDICINE

## 2021-08-06 PROCEDURE — 3044F HG A1C LEVEL LT 7.0%: CPT | Mod: CPTII,S$GLB,, | Performed by: INTERNAL MEDICINE

## 2021-08-06 PROCEDURE — 86832 HLA CLASS I HIGH DEFIN QUAL: CPT | Mod: 59,PO | Performed by: INTERNAL MEDICINE

## 2021-08-13 LAB
C1Q1 TESTING DATE: NORMAL
C1Q2 TESTING DATE: NORMAL
CLASS I ANTIBODY COMMENTS - LUMINEX: NORMAL
CLASS II ANTIBODY COMMENTS - LUMINEX: NORMAL
HC1Q TESTING DATE: NORMAL
SERUM COLLECTION DT - LUMINEX CLASS I: NORMAL
SERUM COLLECTION DT - LUMINEX CLASS II: NORMAL

## 2021-11-04 ENCOUNTER — LAB VISIT (OUTPATIENT)
Dept: LAB | Facility: HOSPITAL | Age: 31
End: 2021-11-04
Attending: INTERNAL MEDICINE
Payer: COMMERCIAL

## 2021-11-04 ENCOUNTER — TELEPHONE (OUTPATIENT)
Dept: TRANSPLANT | Facility: CLINIC | Age: 31
End: 2021-11-04
Payer: COMMERCIAL

## 2021-11-04 DIAGNOSIS — Z94.1 STATUS POST HEART TRANSPLANT: ICD-10-CM

## 2021-11-04 LAB
ALBUMIN SERPL BCP-MCNC: 4.5 G/DL (ref 3.5–5.2)
ALP SERPL-CCNC: 72 U/L (ref 55–135)
ALT SERPL W/O P-5'-P-CCNC: 25 U/L (ref 10–44)
ANION GAP SERPL CALC-SCNC: 14 MMOL/L (ref 8–16)
AST SERPL-CCNC: 19 U/L (ref 10–40)
BASOPHILS # BLD AUTO: 0.03 K/UL (ref 0–0.2)
BASOPHILS NFR BLD: 0.3 % (ref 0–1.9)
BILIRUB SERPL-MCNC: 0.5 MG/DL (ref 0.1–1)
BNP SERPL-MCNC: 43 PG/ML (ref 0–99)
BUN SERPL-MCNC: 13 MG/DL (ref 6–20)
CALCIUM SERPL-MCNC: 9.9 MG/DL (ref 8.7–10.5)
CHLORIDE SERPL-SCNC: 105 MMOL/L (ref 95–110)
CHOLEST SERPL-MCNC: 104 MG/DL (ref 120–199)
CHOLEST/HDLC SERPL: 2.8 {RATIO} (ref 2–5)
CO2 SERPL-SCNC: 23 MMOL/L (ref 23–29)
CREAT SERPL-MCNC: 1 MG/DL (ref 0.5–1.4)
DIFFERENTIAL METHOD: ABNORMAL
EOSINOPHIL # BLD AUTO: 0.1 K/UL (ref 0–0.5)
EOSINOPHIL NFR BLD: 1 % (ref 0–8)
ERYTHROCYTE [DISTWIDTH] IN BLOOD BY AUTOMATED COUNT: 13.7 % (ref 11.5–14.5)
EST. GFR  (AFRICAN AMERICAN): >60 ML/MIN/1.73 M^2
EST. GFR  (NON AFRICAN AMERICAN): >60 ML/MIN/1.73 M^2
GLUCOSE SERPL-MCNC: 93 MG/DL (ref 70–110)
HCT VFR BLD AUTO: 48.1 % (ref 40–54)
HDLC SERPL-MCNC: 37 MG/DL (ref 40–75)
HDLC SERPL: 35.6 % (ref 20–50)
HGB BLD-MCNC: 16 G/DL (ref 14–18)
IMM GRANULOCYTES # BLD AUTO: 0.02 K/UL (ref 0–0.04)
IMM GRANULOCYTES NFR BLD AUTO: 0.2 % (ref 0–0.5)
LDLC SERPL CALC-MCNC: 45 MG/DL (ref 63–159)
LYMPHOCYTES # BLD AUTO: 1.5 K/UL (ref 1–4.8)
LYMPHOCYTES NFR BLD: 14.8 % (ref 18–48)
MAGNESIUM SERPL-MCNC: 1.6 MG/DL (ref 1.6–2.6)
MCH RBC QN AUTO: 29.3 PG (ref 27–31)
MCHC RBC AUTO-ENTMCNC: 33.3 G/DL (ref 32–36)
MCV RBC AUTO: 88 FL (ref 82–98)
MONOCYTES # BLD AUTO: 0.8 K/UL (ref 0.3–1)
MONOCYTES NFR BLD: 8.5 % (ref 4–15)
NEUTROPHILS # BLD AUTO: 7.5 K/UL (ref 1.8–7.7)
NEUTROPHILS NFR BLD: 75.2 % (ref 38–73)
NONHDLC SERPL-MCNC: 67 MG/DL
NRBC BLD-RTO: 0 /100 WBC
PLATELET # BLD AUTO: 209 K/UL (ref 150–450)
PMV BLD AUTO: 9.7 FL (ref 9.2–12.9)
POTASSIUM SERPL-SCNC: 4.2 MMOL/L (ref 3.5–5.1)
PROT SERPL-MCNC: 6.9 G/DL (ref 6–8.4)
RBC # BLD AUTO: 5.47 M/UL (ref 4.6–6.2)
SODIUM SERPL-SCNC: 142 MMOL/L (ref 136–145)
TACROLIMUS BLD-MCNC: 10.9 NG/ML (ref 5–15)
TRIGL SERPL-MCNC: 110 MG/DL (ref 30–150)
WBC # BLD AUTO: 9.93 K/UL (ref 3.9–12.7)

## 2021-11-04 PROCEDURE — 80197 ASSAY OF TACROLIMUS: CPT | Performed by: INTERNAL MEDICINE

## 2021-11-04 PROCEDURE — 36415 COLL VENOUS BLD VENIPUNCTURE: CPT | Performed by: INTERNAL MEDICINE

## 2021-11-04 PROCEDURE — 83880 ASSAY OF NATRIURETIC PEPTIDE: CPT | Performed by: INTERNAL MEDICINE

## 2021-11-04 PROCEDURE — 85025 COMPLETE CBC W/AUTO DIFF WBC: CPT | Performed by: INTERNAL MEDICINE

## 2021-11-04 PROCEDURE — 86832 HLA CLASS I HIGH DEFIN QUAL: CPT | Performed by: INTERNAL MEDICINE

## 2021-11-04 PROCEDURE — 86833 HLA CLASS II HIGH DEFIN QUAL: CPT | Performed by: INTERNAL MEDICINE

## 2021-11-04 PROCEDURE — 83735 ASSAY OF MAGNESIUM: CPT | Performed by: INTERNAL MEDICINE

## 2021-11-04 PROCEDURE — 80061 LIPID PANEL: CPT | Performed by: INTERNAL MEDICINE

## 2021-11-04 PROCEDURE — 86832 HLA CLASS I HIGH DEFIN QUAL: CPT | Mod: 59 | Performed by: INTERNAL MEDICINE

## 2021-11-04 PROCEDURE — 86833 HLA CLASS II HIGH DEFIN QUAL: CPT | Mod: 59 | Performed by: INTERNAL MEDICINE

## 2021-11-04 PROCEDURE — 86977 RBC SERUM PRETX INCUBJ/INHIB: CPT | Mod: 59 | Performed by: INTERNAL MEDICINE

## 2021-11-04 PROCEDURE — 80053 COMPREHEN METABOLIC PANEL: CPT | Performed by: INTERNAL MEDICINE

## 2021-12-08 NOTE — Clinical Note
August 25, 2019        Guillermo Alejo  [address]  [facility] [address]  Phone: [phone]  Fax: [phone]             [facility]  [address]  [facility] [address]  Phone: [phone]   Patient: Mert Samayoa   MR Number: [id number]   YOB: 1990   Date of Visit: 8/19/2019       Dear Dr. Guillermo Alejo    Thank you for referring Mert Samayoa to me for evaluation. Attached you will find relevant portions of my assessment and plan of care.    If you have questions, please do not hesitate to call me. I look forward to following Mert Samayoa along with you.    Sincerely,    Hernán Tidwell Jr, MD    Enclosure    If you would like to receive this communication electronically, please contact [email or web address] or [phone] to request [employer] access.    [employer] is a tool which provides read-only access to select patient information with whom you have a relationship. Its easy to use and provides real time access to review your patients record including encounter summaries, notes, results, and demographic information.    If you feel you have received this communication in error or would no longer like to receive these types of communications, please e-mail [email or web address]     
If biopsy negative then need to discuss tapering of steroids at chart review
0 = understands/communicates without difficulty

## 2022-01-21 NOTE — Clinical Note
----- Message from Yamilet Olivia sent at 1/21/2022  2:39 PM CST -----  Regarding: Call back  Contact: [phone]  Who Called: PT     Patient is calling to get a referral to see pain management and also orders for an MRI since pain management requires before the office visit for her back and fibromyalgia.        45 ml injected throughout the case. 55 mL total wasted during the case. 100 mL total used in the case.

## 2022-01-25 NOTE — PLAN OF CARE
Problem: Patient Care Overview  Goal: Plan of Care Review  Outcome: Ongoing (interventions implemented as appropriate)  Pt free of falls and injury this shift. POC reviewed with pt and wife at bedside, verbalized understanding.  infusing at 5 mcg x 63 kg @ 9.45 mL/hr. VSS, NAD noted at this time. Will continue to monitor.       Carac Counseling:  I discussed with the patient the risks of Carac including but not limited to erythema, scaling, itching, weeping, crusting, and pain.

## 2022-02-01 DIAGNOSIS — Z94.1 STATUS POST HEART TRANSPLANT: Primary | ICD-10-CM

## 2022-02-02 ENCOUNTER — TELEPHONE (OUTPATIENT)
Dept: NEUROLOGY | Facility: CLINIC | Age: 32
End: 2022-02-02
Payer: COMMERCIAL

## 2022-02-07 NOTE — PROGRESS NOTES
PCP - Lashon Hawkins MD  Subjective:   Patient ID:  Mert Samayoa is a 32 y.o. male who presents for  Surveillance LHC / IVUS.     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 Patient's last coronary angiogram was on 2/17/2021 (via right radial access) which demonstrated normal coronary arteries by angiography and pLAD intimal thicknening (ALAYNA =0.7mm) unchanged from year prior.  Patient is doing clinically well on today's visit and denies angina or heart failure symptoms. No chest pain, palpations, shortness of breath, orthopnea, PND, peripheral swelling. No near syncope/syncope. He is adherent to his immunosuppressive therapy.  Patient was diagnosed with COVID in November, 2020 and had no complications.  Has been doing well since then.      History:     Past Medical History:   Diagnosis Date    Cardiogenic shock 1/16/2017    Cardiomyopathy     Familial cardiomyopathy    CHF (congestive heart failure)     Encounter for blood transfusion     Essential hypertension 12/21/2016    Essential hypertension 3/20/2018    Familial Cardiomyopathy     Familial cardiomyopathy     Hyperlipidemia LDL goal <70 11/19/2018     Past Surgical History:   Procedure Laterality Date    BIOPSY WITH ULTRASOUND GUIDANCE N/A 8/27/2019    Procedure: BIOPSY, WITH US GUIDANCE;  Surgeon: Mario Conner MD;  Location: [facility] CATH LAB;  Service: Cardiology;  Laterality: N/A;    BIOPSY WITH ULTRASOUND GUIDANCE N/A 9/24/2019    Procedure: BIOPSY, WITH US GUIDANCE;  Surgeon: Sammi Tapia MD;  Location: [facility] CATH LAB;  Service: Cardiology;  Laterality: N/A;    BIOPSY WITH ULTRASOUND GUIDANCE N/A 11/12/2019    Procedure: BIOPSY, WITH US GUIDANCE;  Surgeon: Mario Conner MD;  Location: [facility] CATH LAB;  Service:  Cardiology;  Laterality: N/A;    CORONARY ANGIOGRAPHY Right 2019    Procedure: ANGIOGRAM, CORONARY ARTERY;  Surgeon: Tello Correia MD;  Location: [facility] CATH LAB;  Service: Cardiology;  Laterality: Right;    HEART TRANSPLANT      LEFT HEART CATHETERIZATION Left 2020    Procedure: Left heart cath;  Surgeon: Guy Cartagnea MD;  Location: [facility] CATH LAB;  Service: Cardiology;  Laterality: Left;    LEFT HEART CATHETERIZATION N/A 2021    Procedure: Left heart cath;  Surgeon: Guy Cartagena MD;  Location: [facility] CATH LAB;  Service: Cardiology;  Laterality: N/A;    LEFT VENTRICULAR ASSIST DEVICE      RIGHT HEART CATHETERIZATION Right 2019    Procedure: INSERTION, CATHETER, RIGHT HEART;  Surgeon: Tello Correia MD;  Location: [facility] CATH LAB;  Service: Cardiology;  Laterality: Right;    TONSILLECTOMY       Social History     Tobacco Use    Smoking status: Former Smoker     Packs/day: 1.00     Quit date: 2016     Years since quittin.1    Smokeless tobacco: Never Used   Substance Use Topics    Alcohol use: Yes     Comment: socially     Family History   Problem Relation Age of Onset    Arthritis Mother     Heart disease Brother         cardiomyopathy and heart transplant    No Known Problems Father     Heart disease Brother         cardiomyopathy and heart transplanted twice    Birth defects Paternal Uncle        Meds:   Review of patient's allergies indicates:  No Known Allergies    Current Outpatient Medications:     aspirin (ECOTRIN) 81 MG EC tablet, Take 1 tablet (81 mg total) by mouth once daily., Disp: 30 tablet, Rfl: 11    atorvastatin (LIPITOR) 20 MG tablet, TAKE ONE TABLET BY MOUTH EVERY DAY FOR CHOLESTEROL --CALL 3 DAYS EARLY TO REFILL--SPECIAL ORDER--, Disp: 90 tablet, Rfl: 3    butalbital-acetaminophen-caffeine -40 mg (FIORICET, ESGIC) -40 mg per tablet, TAKE ONE TABLET BY MOUTH DAILY AS NEEDED FOR HEADACHE (LIMIT 2 PER WEEK TO PREVENT REBOUND  FOR HEADACHE ), Disp: 6 tablet, Rfl: 5    calcium carbonate-vit D3-min 600 mg calcium- 200 unit Tab, Take 1 tablet by mouth once daily. , Disp: , Rfl: 5    famotidine (PEPCID) 40 MG tablet, TAKE ONE TABLET BY MOUTH EVERY EVENING, Disp: 30 tablet, Rfl: 11    magnesium oxide (MAG-OX) 400 mg tablet, Take 1 tablet (400 mg total) by mouth 2 (two) times daily., Disp: 60 tablet, Rfl: 11    mycophenolate (CELLCEPT) 250 mg Cap, TAKE 5 CAPSULES (1,250 MG TOTAL) BY MOUTH 2 (TWO) TIMES DAILY. --CALL 3 DAYS EARLY TO REFILL--SPECIAL ORDER--, Disp: 300 capsule, Rfl: 11    nortriptyline (PAMELOR) 10 MG capsule, TAKE 1 CAPSULE BY MOUTH EVERY EVENING. --CALL 3 DAYS EARLY TO REFILL--SPECIAL ORDER--, Disp: 90 capsule, Rfl: 3    predniSONE (DELTASONE) 5 MG tablet, TAKE 1 TABLET BY MOUTH ONCE DAILY WITH FOOD, Disp: 30 tablet, Rfl: 6    rimegepant (NURTEC) 75 mg odt, Take 1 tablet (75 mg total) by mouth daily as needed for Migraine. Place ODT tablet on the tongue; alternatively the ODT tablet may be placed under the tongue, Disp: 9 tablet, Rfl: 11    tacrolimus (PROGRAF) 0.5 MG Cap, TAKE TWO CAPSULES BY MOUTH IN THE MORNING AND TAKE THREE CAPSULES IN THE EVENING --CALL 3 DAYS EARLY TO REFILL--SPECIAL ORDER--, Disp: 150 capsule, Rfl: 11      Review of Systems   Constitutional: Negative for chills, diaphoresis, fever, malaise/fatigue and weight loss.   HENT: Negative.    Eyes: Negative for blurred vision, double vision, pain and redness.   Respiratory: Negative for cough, shortness of breath and wheezing.    Cardiovascular: Negative for chest pain, palpitations, orthopnea, claudication, leg swelling and PND.   Gastrointestinal: Negative for abdominal pain, constipation, diarrhea, nausea and vomiting.   Genitourinary: Negative.    Musculoskeletal: Negative.    Skin: Negative.    Neurological: Negative for dizziness, focal weakness, seizures, loss of consciousness, weakness and headaches.   Endo/Heme/Allergies: Negative.     Psychiatric/Behavioral: Negative for depression. The patient is not nervous/anxious.        Objective:   There were no vitals taken for this visit.  Physical Exam  Constitutional:       General: He is not in acute distress.  HENT:      Head: Normocephalic and atraumatic.      Mouth/Throat:      Mouth: Oropharynx is clear and moist.   Eyes:      Extraocular Movements: EOM normal.      Conjunctiva/sclera: Conjunctivae normal.      Pupils: Pupils are equal, round, and reactive to light.   Neck:      Vascular: No JVD.   Cardiovascular:      Rate and Rhythm: Normal rate and regular rhythm.      Heart sounds: Normal heart sounds. No murmur heard.  No friction rub. No gallop.    Pulmonary:      Effort: Pulmonary effort is normal. No respiratory distress.      Breath sounds: Normal breath sounds. No wheezing or rales.   Chest:      Chest wall: No tenderness.   Abdominal:      General: Bowel sounds are normal. There is no distension.      Palpations: Abdomen is soft.      Tenderness: There is no abdominal tenderness.   Musculoskeletal:         General: No edema. Normal range of motion.      Cervical back: Normal range of motion and neck supple.   Skin:     General: Skin is warm and dry.      Findings: No erythema.   Neurological:      Mental Status: He is alert and oriented to person, place, and time.   Psychiatric:         Mood and Affect: Mood and affect normal.         Cognition and Memory: Memory normal.         Judgment: Judgment normal.         Labs:     Lab Results   Component Value Date     11/04/2021    K 4.2 11/04/2021     11/04/2021    CO2 23 11/04/2021    BUN 13 11/04/2021    CREATININE 1.0 11/04/2021    ANIONGAP 14 11/04/2021     Lab Results   Component Value Date    HGBA1C 4.9 02/19/2021     Lab Results   Component Value Date    BNP 43 11/04/2021    BNP 36 08/06/2021    BNP 54 04/19/2021       Lab Results   Component Value Date    WBC 9.93 11/04/2021    HGB 16.0 11/04/2021    HCT 48.1 11/04/2021     HCT 23 (L) 02/21/2018     11/04/2021    GRAN 7.5 11/04/2021    GRAN 75.2 (H) 11/04/2021     Lab Results   Component Value Date    CHOL 104 (L) 11/04/2021    HDL 37 (L) 11/04/2021    LDLCALC 45.0 (L) 11/04/2021    TRIG 110 11/04/2021       Lab Results   Component Value Date     11/04/2021    K 4.2 11/04/2021     11/04/2021    CO2 23 11/04/2021    BUN 13 11/04/2021    CREATININE 1.0 11/04/2021    ANIONGAP 14 11/04/2021     Lab Results   Component Value Date    HGBA1C 4.9 02/19/2021     Lab Results   Component Value Date    BNP 43 11/04/2021    BNP 36 08/06/2021    BNP 54 04/19/2021    Lab Results   Component Value Date    WBC 9.93 11/04/2021    HGB 16.0 11/04/2021    HCT 48.1 11/04/2021    HCT 23 (L) 02/21/2018     11/04/2021    GRAN 7.5 11/04/2021    GRAN 75.2 (H) 11/04/2021     Lab Results   Component Value Date    CHOL 104 (L) 11/04/2021    HDL 37 (L) 11/04/2021    LDLCALC 45.0 (L) 11/04/2021    TRIG 110 11/04/2021                Cardiovascular Imaging:     Echo:   EF   Date Value Ref Range Status   08/06/2021 65 % Final         Assessment & Plan:       31-year-old male status post orthotropic heart transplant 02/2018 who presents today for left heart catheterization was IVs.     S/p Orthotropic heart transplant 2/18:  --Procedure:  Left heart catheterization with IVUS  - Anti-platelet Therapy:  Aspirin 81 mg  - Access:  Right radial  - Allergies:  No known drug allergies  - Pre-Op Med:  Benadryl 50 mg x 1  - All patient's questions were answered.  -The risks, benefits and alternatives of the procedure were explained to the patient.   -The risks of coronary angiography include but are not limited to: bleeding, infection, heart rhythm abnormalities, allergic reactions, kidney injury and potential need for dialysis, stroke and death.   - Should stenting be indicated, the patient has agreed to dual anti-platelet therapy for 1-consecutive year with a drug-eluting stent and a minimum of  1-month with the use of a bare metal stent  - Additionally, pt is aware that non-compliance is likely to result in stent clotting with heart attack, heart failure, and/or death  -The risks of moderate sedation include hypotension, respiratory depression, arrhythmias, bronchospasm, and death.   - Informed consent was obtained and the  patient is agreeable to proceed with the procedure.     Essential hypertension:  -will continue home meds     Hyperlipidemia:  -conintue atorvastatin        Signed:  Leoncio Cartagena M.D.  Page # [phone]  Cardiovascular Fellow  [facility]   \  Interventional Cardiology Staff  I have personally taken the history and examined this patient. I have discussed and agree with the resident's findings and plan as documented in the resident's note.  Heart transplant 2018 who returns for coronary angiography. IVUS images of intimal thickness from last years procedure were shared with the patient.   Guy Cartagena

## 2022-02-08 ENCOUNTER — OFFICE VISIT (OUTPATIENT)
Dept: CARDIOLOGY | Facility: CLINIC | Age: 32
End: 2022-02-08
Payer: COMMERCIAL

## 2022-02-08 ENCOUNTER — DOCUMENTATION ONLY (OUTPATIENT)
Dept: CARDIOLOGY | Facility: CLINIC | Age: 32
End: 2022-02-08
Payer: COMMERCIAL

## 2022-02-08 ENCOUNTER — OFFICE VISIT (OUTPATIENT)
Dept: DERMATOLOGY | Facility: CLINIC | Age: 32
End: 2022-02-08
Payer: COMMERCIAL

## 2022-02-08 VITALS
BODY MASS INDEX: 27.12 KG/M2 | HEIGHT: 67 IN | SYSTOLIC BLOOD PRESSURE: 121 MMHG | OXYGEN SATURATION: 98 % | DIASTOLIC BLOOD PRESSURE: 78 MMHG | HEART RATE: 102 BPM | WEIGHT: 172.81 LBS

## 2022-02-08 DIAGNOSIS — L91.8 ST (SKIN TAG): ICD-10-CM

## 2022-02-08 DIAGNOSIS — D22.9 MULTIPLE BENIGN NEVI: ICD-10-CM

## 2022-02-08 DIAGNOSIS — L57.8 DIFFUSE PHOTODAMAGE OF SKIN: ICD-10-CM

## 2022-02-08 DIAGNOSIS — Z94.1 STATUS POST HEART TRANSPLANT: Primary | ICD-10-CM

## 2022-02-08 DIAGNOSIS — E78.5 HYPERLIPIDEMIA LDL GOAL <70: Primary | ICD-10-CM

## 2022-02-08 DIAGNOSIS — L73.9 FOLLICULITIS: ICD-10-CM

## 2022-02-08 DIAGNOSIS — Z12.83 SKIN CANCER SCREENING: Primary | ICD-10-CM

## 2022-02-08 DIAGNOSIS — Z79.899 LONG TERM CURRENT USE OF IMMUNOSUPPRESSIVE DRUG: ICD-10-CM

## 2022-02-08 DIAGNOSIS — Z94.1 STATUS POST HEART TRANSPLANT: ICD-10-CM

## 2022-02-08 PROCEDURE — 1159F MED LIST DOCD IN RCRD: CPT | Mod: CPTII,S$GLB,, | Performed by: DERMATOLOGY

## 2022-02-08 PROCEDURE — 99999 PR PBB SHADOW E&M-EST. PATIENT-LVL III: ICD-10-PCS | Mod: PBBFAC,,, | Performed by: DERMATOLOGY

## 2022-02-08 PROCEDURE — 99999 PR PBB SHADOW E&M-EST. PATIENT-LVL III: CPT | Mod: PBBFAC,,, | Performed by: DERMATOLOGY

## 2022-02-08 PROCEDURE — 99999 PR PBB SHADOW E&M-EST. PATIENT-LVL IV: CPT | Mod: PBBFAC,,, | Performed by: INTERNAL MEDICINE

## 2022-02-08 PROCEDURE — 3074F PR MOST RECENT SYSTOLIC BLOOD PRESSURE < 130 MM HG: ICD-10-PCS | Mod: CPTII,S$GLB,, | Performed by: INTERNAL MEDICINE

## 2022-02-08 PROCEDURE — 3078F DIAST BP <80 MM HG: CPT | Mod: CPTII,S$GLB,, | Performed by: INTERNAL MEDICINE

## 2022-02-08 PROCEDURE — 1160F RVW MEDS BY RX/DR IN RCRD: CPT | Mod: CPTII,S$GLB,, | Performed by: INTERNAL MEDICINE

## 2022-02-08 PROCEDURE — 3074F SYST BP LT 130 MM HG: CPT | Mod: CPTII,S$GLB,, | Performed by: INTERNAL MEDICINE

## 2022-02-08 PROCEDURE — 1159F PR MEDICATION LIST DOCUMENTED IN MEDICAL RECORD: ICD-10-PCS | Mod: CPTII,S$GLB,, | Performed by: DERMATOLOGY

## 2022-02-08 PROCEDURE — 1159F MED LIST DOCD IN RCRD: CPT | Mod: CPTII,S$GLB,, | Performed by: INTERNAL MEDICINE

## 2022-02-08 PROCEDURE — 3008F BODY MASS INDEX DOCD: CPT | Mod: CPTII,S$GLB,, | Performed by: INTERNAL MEDICINE

## 2022-02-08 PROCEDURE — 1159F PR MEDICATION LIST DOCUMENTED IN MEDICAL RECORD: ICD-10-PCS | Mod: CPTII,S$GLB,, | Performed by: INTERNAL MEDICINE

## 2022-02-08 PROCEDURE — 99999 PR PBB SHADOW E&M-EST. PATIENT-LVL IV: ICD-10-PCS | Mod: PBBFAC,,, | Performed by: INTERNAL MEDICINE

## 2022-02-08 PROCEDURE — 3078F PR MOST RECENT DIASTOLIC BLOOD PRESSURE < 80 MM HG: ICD-10-PCS | Mod: CPTII,S$GLB,, | Performed by: INTERNAL MEDICINE

## 2022-02-08 PROCEDURE — 99215 PR OFFICE/OUTPT VISIT, EST, LEVL V, 40-54 MIN: ICD-10-PCS | Mod: S$GLB,,, | Performed by: INTERNAL MEDICINE

## 2022-02-08 PROCEDURE — 99214 PR OFFICE/OUTPT VISIT, EST, LEVL IV, 30-39 MIN: ICD-10-PCS | Mod: S$GLB,,, | Performed by: DERMATOLOGY

## 2022-02-08 PROCEDURE — 99215 OFFICE O/P EST HI 40 MIN: CPT | Mod: S$GLB,,, | Performed by: INTERNAL MEDICINE

## 2022-02-08 PROCEDURE — 3008F PR BODY MASS INDEX (BMI) DOCUMENTED: ICD-10-PCS | Mod: CPTII,S$GLB,, | Performed by: INTERNAL MEDICINE

## 2022-02-08 PROCEDURE — 99214 OFFICE O/P EST MOD 30 MIN: CPT | Mod: S$GLB,,, | Performed by: DERMATOLOGY

## 2022-02-08 PROCEDURE — 1160F PR REVIEW ALL MEDS BY PRESCRIBER/CLIN PHARMACIST DOCUMENTED: ICD-10-PCS | Mod: CPTII,S$GLB,, | Performed by: INTERNAL MEDICINE

## 2022-02-08 RX ORDER — CLINDAMYCIN PHOSPHATE 11.9 MG/ML
SOLUTION TOPICAL 2 TIMES DAILY
Qty: 60 ML | Refills: 3 | Status: SHIPPED | OUTPATIENT
Start: 2022-02-08 | End: 2022-02-08 | Stop reason: SDUPTHER

## 2022-02-08 RX ORDER — SODIUM CHLORIDE 9 MG/ML
INJECTION, SOLUTION INTRAVENOUS CONTINUOUS
Status: CANCELLED | OUTPATIENT
Start: 2022-02-08 | End: 2022-02-08

## 2022-02-08 RX ORDER — CLINDAMYCIN PHOSPHATE 11.9 MG/ML
SOLUTION TOPICAL 2 TIMES DAILY
Qty: 60 ML | Refills: 3 | Status: SHIPPED | OUTPATIENT
Start: 2022-02-08 | End: 2023-08-17

## 2022-02-08 RX ORDER — DIPHENHYDRAMINE HCL 50 MG
50 CAPSULE ORAL ONCE
Status: CANCELLED | OUTPATIENT
Start: 2022-02-08 | End: 2022-02-08

## 2022-02-08 NOTE — PROGRESS NOTES
Subjective:       Patient ID:  Mert Samayoa is a 32 y.o. male who presents for   Chief Complaint   Patient presents with    Skin Check     TBSE     History of Present Illness: The patient presents for follow up of skin check.    The patient was last seen on: 2/19/2021 for TBSE.     No hx of skin cancer, no concerns today.    Heart txp 2018, on cellcept/prograf/prednisone 5 mg - has chronic acne/pimples on face and scalp area. Using sensitive skin facewash acne for this and regular shampoo - (tea tree oil shampoo)    The patient denies any moles or growths of the skin that are rapidly growing, hurting, itching, bleeding, or changing colors.    Did not receive COVID vaccine.    Review of Systems   Skin: Positive for activity-related sunscreen use and wears hat. Negative for daily sunscreen use.   Hematologic/Lymphatic: Does not bruise/bleed easily.        Objective:    Physical Exam   Constitutional: He appears well-developed and well-nourished. No distress.   Neurological: He is alert and oriented to person, place, and time. He is not disoriented.   Psychiatric: He has a normal mood and affect.   Skin:   Areas Examined (abnormalities noted in diagram):   Scalp / Hair Palpated and Inspected  Head / Face Inspection Performed  Neck Inspection Performed  Chest / Axilla Inspection Performed  Abdomen Inspection Performed  Genitals / Buttocks / Groin Inspection Performed  Back Inspection Performed  RUE Inspected  LUE Inspection Performed  RLE Inspected  LLE Inspection Performed  Nails and Digits Inspection Performed                   Diagram Legend     Erythematous scaling macule/papule c/w actinic keratosis       Vascular papule c/w angioma      Pigmented verrucoid papule/plaque c/w seborrheic keratosis      Yellow umbilicated papule c/w sebaceous hyperplasia      Irregularly shaped tan macule c/w lentigo     1-2 mm smooth white papules consistent with Milia      Movable subcutaneous cyst with punctum c/w  epidermal inclusion cyst      Subcutaneous movable cyst c/w pilar cyst      Firm pink to brown papule c/w dermatofibroma      Pedunculated fleshy papule(s) c/w skin tag(s)      Evenly pigmented macule c/w junctional nevus     Mildly variegated pigmented, slightly irregular-bordered macule c/w mildly atypical nevus      Flesh colored to evenly pigmented papule c/w intradermal nevus       Pink pearly papule/plaque c/w basal cell carcinoma      Erythematous hyperkeratotic cursted plaque c/w SCC      Surgical scar with no sign of skin cancer recurrence      Open and closed comedones      Inflammatory papules and pustules      Verrucoid papule consistent consistent with wart     Erythematous eczematous patches and plaques     Dystrophic onycholytic nail with subungual debris c/w onychomycosis     Umbilicated papule    Erythematous-base heme-crusted tan verrucoid plaque consistent with inflamed seborrheic keratosis     Erythematous Silvery Scaling Plaque c/w Psoriasis     See annotation      Assessment / Plan:        Skin cancer screening  Total body skin examination performed today including at least 12 points as noted in physical examination. No lesions suspicious for malignancy noted.    Recommend daily sun protection/avoidance, use of at least SPF 30, broad spectrum sunscreen (OTC drug), skin self examinations, and routine physician surveillance to optimize early detection    Patients that have undergone transplants have a significantly higher rate of skin cancer development over time due to immunosuppression.  Total body skin cancer screenings should be performed at least once a year.    We reviewed the importance of proper sun protection, including wearing a hat, sunblock of at least SPF 30, and sun protective clothing.  It is especially important to reapply sunblock for any prolonged sun exposure.      Multiple benign nevi  Discussed ABCDE's of nevi.  Monitor for new mole or moles that are becoming bigger, darker,  irritated, or developing irregular borders. Brochure provided. Instructed patient to observe lesion(s) for changes and follow up in clinic if changes are noted. Patient to monitor skin at home for new or changing lesions.     ST (skin tag)  Benign    Diffuse photodamage of skin  Sun protection discussed at length    Folliculitis  Recommend daily use of over the counter benzoyl peroxide wash, such as Panoxyl or Clean and Clear, for this condition.  Advised that these washes can dry out the skin, and can bleach towels after washing off.    -     clindamycin (CLEOCIN T) 1 % external solution; Apply topically 2 (two) times daily. Prn pimples  Dispense: 60 mL; Refill: 3         encouraged pt to get covid and flu vaccines    Follow up in about 1 year (around 2/8/2023) for to transplant clinic.

## 2022-02-08 NOTE — PROGRESS NOTES
"OUTPATIENT CATHETERIZATION INSTRUCTIONS    You have been scheduled for a procedure in the catheterization lab on Wednesday, March 9, 2022.     Please report to the Cardiology Waiting Area on the Third floor of the hospital and check in at  9 AM.   You will then be taken to the SSCU (Short Stay Cardiac Unit) and prepared for your procedure. Please be aware that this is not the time of your procedure but the time you are to arrive. The procedures are scheduled on an hourly basis; however, emergency cases take precedence over all other cases.       You may not have anything to eat or drink after midnight the night before your test. You may take your regular morning medications with water. If there are any medications that you should not take you will be instructed to hold them that morning. If you are diabetic and on Metformin (Glucophage) do not take it the day before, the day of, and for 2 days after your procedure.      The procedure will take 1-2 hours to perform. After the procedure, you will return to SSCU on the third floor of the hospital. You will need to lie still (or keep your arm still) for the next 4 to 6 hours to minimize bleeding from the puncture site. Your family may remain in the room with you during this time.       You may be able to be discharged home that same afternoon if there is someone to drive you home and there were no complications. If you have one of the balloon, stent, or device procedures you may spend the night in the hospital. Your doctor will determine, based on your progress, the date and time of your discharge. The results of your procedure will be discussed with you before you are discharged. Any further testing or procedures will be scheduled for you either before you leave or you will be called with these appointments.       If you should have any questions, concerns, or need to change the date of your procedure, please call "[facility] @ [phone]    Special " Instructions:  Continue your current medications.        THE ABOVE INSTRUCTIONS WERE GIVEN TO THE PATIENT VERBALLY AND THEY VERBALIZED UNDERSTANDING.  THEY DO NOT REQUIRE ANY SPECIAL NEEDS AND DO NOT HAVE ANY LEARNING BARRIERS.          Directions for Reporting to Cardiology Waiting Area in the Hospital  If you park in the Parking Garage:  Take elevators to the1st floor of the parking garage.  Continue past the gift shop, coffee shop, and piano.  Take a right and go to the gold elevators. (Elevator B)  Take the elevator to the 3rd floor.  Follow the arrow on the sign on the wall that says Cath Lab Registration/EP Lab Registration.  Follow the long hallway all the way around until you come to a big open area.  This is the registration area.  Check in at Reception Desk.    OR    If family is dropping you off:  Have them drop you off at the front of the Hospital under the green overhang.  Enter through the doors and take a right.  Take the E elevators to the 3rd floor Cardiology Waiting Area.  Check in at the Reception Desk in the waiting room.

## 2022-02-23 ENCOUNTER — TELEPHONE (OUTPATIENT)
Dept: TRANSPLANT | Facility: CLINIC | Age: 32
End: 2022-02-23
Payer: COMMERCIAL

## 2022-02-23 DIAGNOSIS — Z94.1 STATUS POST HEART TRANSPLANT: Primary | ICD-10-CM

## 2022-02-23 NOTE — TELEPHONE ENCOUNTER
Called to review pt's meds and appts for Friday. Cancelled pt's LHC due to it being his 4 year annual, not 3. He is scheduled for exercise stress echo. Meds are current with correct doses. He doesn't need any refills at this time. Pt asked if he could take herbalife suppliments or drink the shakes. I advised we would need to look at the label of ingredients. He stated there is not a list of ingredients, advised against using it. Pt follows derm.

## 2022-02-25 ENCOUNTER — TELEPHONE (OUTPATIENT)
Dept: TRANSPLANT | Facility: CLINIC | Age: 32
End: 2022-02-25
Payer: COMMERCIAL

## 2022-02-25 ENCOUNTER — OFFICE VISIT (OUTPATIENT)
Dept: TRANSPLANT | Facility: CLINIC | Age: 32
End: 2022-02-25
Attending: INTERNAL MEDICINE
Payer: COMMERCIAL

## 2022-02-25 ENCOUNTER — HOSPITAL ENCOUNTER (OUTPATIENT)
Dept: RADIOLOGY | Facility: HOSPITAL | Age: 32
Discharge: HOME | End: 2022-02-25
Attending: INTERNAL MEDICINE
Payer: COMMERCIAL

## 2022-02-25 ENCOUNTER — HOSPITAL ENCOUNTER (OUTPATIENT)
Dept: CARDIOLOGY | Facility: HOSPITAL | Age: 32
Discharge: HOME | End: 2022-02-25
Attending: INTERNAL MEDICINE
Payer: COMMERCIAL

## 2022-02-25 VITALS
HEART RATE: 95 BPM | SYSTOLIC BLOOD PRESSURE: 135 MMHG | BODY MASS INDEX: 26.89 KG/M2 | DIASTOLIC BLOOD PRESSURE: 84 MMHG | HEIGHT: 67 IN | WEIGHT: 171.31 LBS

## 2022-02-25 DIAGNOSIS — Z94.1 STATUS POST HEART TRANSPLANT: ICD-10-CM

## 2022-02-25 DIAGNOSIS — Z94.1 HEART TRANSPLANTED: ICD-10-CM

## 2022-02-25 DIAGNOSIS — E78.5 HYPERLIPIDEMIA LDL GOAL <70: ICD-10-CM

## 2022-02-25 DIAGNOSIS — Z79.899 LONG TERM CURRENT USE OF IMMUNOSUPPRESSIVE DRUG: ICD-10-CM

## 2022-02-25 DIAGNOSIS — G43.111 INTRACTABLE MIGRAINE WITH AURA WITH STATUS MIGRAINOSUS: Primary | ICD-10-CM

## 2022-02-25 PROCEDURE — 3008F BODY MASS INDEX DOCD: CPT | Mod: CPTII,S$GLB,, | Performed by: INTERNAL MEDICINE

## 2022-02-25 PROCEDURE — 3075F SYST BP GE 130 - 139MM HG: CPT | Mod: CPTII,S$GLB,, | Performed by: INTERNAL MEDICINE

## 2022-02-25 PROCEDURE — 71046 XR CHEST PA AND LATERAL: ICD-10-PCS | Mod: 26,,, | Performed by: RADIOLOGY

## 2022-02-25 PROCEDURE — 3075F PR MOST RECENT SYSTOLIC BLOOD PRESS GE 130-139MM HG: ICD-10-PCS | Mod: CPTII,S$GLB,, | Performed by: INTERNAL MEDICINE

## 2022-02-25 PROCEDURE — 99999 PR PBB SHADOW E&M-EST. PATIENT-LVL IV: ICD-10-PCS | Mod: PBBFAC,,, | Performed by: INTERNAL MEDICINE

## 2022-02-25 PROCEDURE — 1159F MED LIST DOCD IN RCRD: CPT | Mod: CPTII,S$GLB,, | Performed by: INTERNAL MEDICINE

## 2022-02-25 PROCEDURE — 99999 PR PBB SHADOW E&M-EST. PATIENT-LVL IV: CPT | Mod: PBBFAC,,, | Performed by: INTERNAL MEDICINE

## 2022-02-25 PROCEDURE — 71046 X-RAY EXAM CHEST 2 VIEWS: CPT | Mod: 26,,, | Performed by: RADIOLOGY

## 2022-02-25 PROCEDURE — 1160F PR REVIEW ALL MEDS BY PRESCRIBER/CLIN PHARMACIST DOCUMENTED: ICD-10-PCS | Mod: CPTII,S$GLB,, | Performed by: INTERNAL MEDICINE

## 2022-02-25 PROCEDURE — 1159F PR MEDICATION LIST DOCUMENTED IN MEDICAL RECORD: ICD-10-PCS | Mod: CPTII,S$GLB,, | Performed by: INTERNAL MEDICINE

## 2022-02-25 PROCEDURE — 71046 X-RAY EXAM CHEST 2 VIEWS: CPT | Mod: TC,FY

## 2022-02-25 PROCEDURE — 3044F PR MOST RECENT HEMOGLOBIN A1C LEVEL <7.0%: ICD-10-PCS | Mod: CPTII,S$GLB,, | Performed by: INTERNAL MEDICINE

## 2022-02-25 PROCEDURE — 99214 PR OFFICE/OUTPT VISIT, EST, LEVL IV, 30-39 MIN: ICD-10-PCS | Mod: S$GLB,,, | Performed by: INTERNAL MEDICINE

## 2022-02-25 PROCEDURE — 1160F RVW MEDS BY RX/DR IN RCRD: CPT | Mod: CPTII,S$GLB,, | Performed by: INTERNAL MEDICINE

## 2022-02-25 PROCEDURE — 3079F PR MOST RECENT DIASTOLIC BLOOD PRESSURE 80-89 MM HG: ICD-10-PCS | Mod: CPTII,S$GLB,, | Performed by: INTERNAL MEDICINE

## 2022-02-25 PROCEDURE — 3079F DIAST BP 80-89 MM HG: CPT | Mod: CPTII,S$GLB,, | Performed by: INTERNAL MEDICINE

## 2022-02-25 PROCEDURE — 3044F HG A1C LEVEL LT 7.0%: CPT | Mod: CPTII,S$GLB,, | Performed by: INTERNAL MEDICINE

## 2022-02-25 PROCEDURE — 3008F PR BODY MASS INDEX (BMI) DOCUMENTED: ICD-10-PCS | Mod: CPTII,S$GLB,, | Performed by: INTERNAL MEDICINE

## 2022-02-25 PROCEDURE — 99214 OFFICE O/P EST MOD 30 MIN: CPT | Mod: S$GLB,,, | Performed by: INTERNAL MEDICINE

## 2022-02-25 RX ORDER — AMOXICILLIN 500 MG/1
CAPSULE ORAL
Status: ON HOLD | COMMUNITY
Start: 2021-11-29 | End: 2022-05-20 | Stop reason: HOSPADM

## 2022-02-25 NOTE — LETTER
February 25, 2022        Guillermo Alejo  [address]  [facility] [address]  Phone: [phone]  Fax: [phone]             [facility]  [address]  [facility] [address]  Phone: [phone]   Patient: Mert Samayoa   MR Number: [id number]   YOB: 1990   Date of Visit: 2/25/2022       Dear Dr. Guillermo Alejo    Thank you for referring Mert Samayoa to me for evaluation. Attached you will find relevant portions of my assessment and plan of care.    If you have questions, please do not hesitate to call me. I look forward to following Mert Samayoa along with you.    Sincerely,    Hernán Tidwell Jr, MD    Enclosure    If you would like to receive this communication electronically, please contact [email or web address] or [phone] to request [employer] Link access.    [employer] Link is a tool which provides read-only access to select patient information with whom you have a relationship. Its easy to use and provides real time access to review your patients record including encounter summaries, notes, results, and demographic information.    If you feel you have received this communication in error or would no longer like to receive these types of communications, please e-mail [email or web address]

## 2022-02-25 NOTE — TELEPHONE ENCOUNTER
"Met with pt and wife in clinic for his 4 yr annual visit. He stated he is very "well". No complaints voiced. He did ask that we cancel Dr. Cartagena since he is supposed to perform a stress echo in lieu of a LHC. I sent a message to Interventional Cardiology to cancel. He also wanted to reschedule his stress echo to another time since it is too late today. I asked that he stop by the window to get it scheduled. His CXR was clear. Labs have not been resulted.   "

## 2022-02-25 NOTE — PROGRESS NOTES
Pt is post transplant and here for regular follow up                                                                                         HPI.  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  Now on cellcept 1250 mg BID, tacro 1 mg am/ 1.5mg Pm, prednisone 5 mg qd.  Left on prednisone for +C1q's in October 2018, no rejection episodes.  He feels great without CV complaints. He is scheduled for a stress echo     Prograf 1/1.5    Pred 5 mg    1250 bid of cell cept    Since last visit he is doing well and has not complaints today. He is compliant with his medications    Echo: 08/06/21  · The left ventricle is normal in size with normal systolic function.  · The estimated ejection fraction is 65%.  · Normal left ventricular diastolic function.  · Normal right ventricular size with normal right ventricular systolic function.  · Normal central venous pressure (3 mmHg).  · The estimated PA systolic pressure is 19 mmHg.  · Trivial posterior pericardial effusion.      coronary angiogram 2/17/2021 (via right radial access) which demonstrated normal coronary arteries by angiography and pLAD intimal thicknening (ALAYNA =0.7mm) unchanged from year prior      FINAL PATHOLOGIC DIAGNOSIS 10/2018  1. Heart, right ventricle, biopsy, immunofluorescence:  -Negative for antibody mediated rejection, pAMR 0.  2. Heart, right ventricle, biopsy:  -Mild acute cellular rejection, ISHLT 1 R.    Review of Systems   Constitutional: Negative for chills, diaphoresis and fever.   HENT: Negative for nasal congestion, rhinorrhea and sore throat.    Eyes: Negative for visual disturbance.   Respiratory: Negative for cough, choking, shortness of breath, wheezing and stridor.    Cardiovascular: Negative for chest pain.   Gastrointestinal: Negative for abdominal pain, diarrhea, nausea and vomiting.   Genitourinary: Negative for difficulty urinating, dysuria and hematuria.    Integumentary:  Negative for rash.   Neurological: Negative for seizures, syncope and light-headedness.   Psychiatric/Behavioral: Negative for agitation and hallucinations.        Past Medical History:   Diagnosis Date    Cardiogenic shock 1/16/2017    Cardiomyopathy     Familial cardiomyopathy    CHF (congestive heart failure)     Encounter for blood transfusion     Essential hypertension 12/21/2016    Essential hypertension 3/20/2018    Familial Cardiomyopathy     Familial cardiomyopathy     Hyperlipidemia LDL goal <70 11/19/2018        Past Surgical History:   Procedure Laterality Date    BIOPSY WITH ULTRASOUND GUIDANCE N/A 8/27/2019    Procedure: BIOPSY, WITH US GUIDANCE;  Surgeon: Mario Conner MD;  Location: [facility] CATH LAB;  Service: Cardiology;  Laterality: N/A;    BIOPSY WITH ULTRASOUND GUIDANCE N/A 9/24/2019    Procedure: BIOPSY, WITH US GUIDANCE;  Surgeon: Sammi Tapia MD;  Location: [facility] CATH LAB;  Service: Cardiology;  Laterality: N/A;    BIOPSY WITH ULTRASOUND GUIDANCE N/A 11/12/2019    Procedure: BIOPSY, WITH US GUIDANCE;  Surgeon: Mario Conner MD;  Location: [facility] CATH LAB;  Service: Cardiology;  Laterality: N/A;    CORONARY ANGIOGRAPHY Right 2/14/2019    Procedure: ANGIOGRAM, CORONARY ARTERY;  Surgeon: Tello Correia MD;  Location: [facility] CATH LAB;  Service: Cardiology;  Laterality: Right;    HEART TRANSPLANT      LEFT HEART CATHETERIZATION Left 2/13/2020    Procedure: Left heart cath;  Surgeon: Guy Cartagena MD;  Location: [facility] CATH LAB;  Service: Cardiology;  Laterality: Left;    LEFT HEART CATHETERIZATION N/A 2/17/2021    Procedure: Left heart cath;  Surgeon: Guy Cartagena MD;  Location: [facility] CATH LAB;  Service: Cardiology;  Laterality: N/A;    LEFT VENTRICULAR ASSIST DEVICE      RIGHT HEART CATHETERIZATION Right 2/14/2019    Procedure: INSERTION, CATHETER, RIGHT HEART;  Surgeon: Tello Correia MD;  Location: [facility] CATH LAB;  Service: Cardiology;   Laterality: Right;    TONSILLECTOMY          Family History   Problem Relation Age of Onset    Arthritis Mother     Heart disease Brother         cardiomyopathy and heart transplant    No Known Problems Father     Heart disease Brother         cardiomyopathy and heart transplanted twice    Birth defects Paternal Uncle         Review of patient's allergies indicates:  No Known Allergies     Current Outpatient Medications   Medication Instructions    amoxicillin (AMOXIL) 500 MG capsule SMARTSI Capsule(s) By Mouth    aspirin (ECOTRIN) 81 mg, Oral, Daily    atorvastatin (LIPITOR) 20 MG tablet TAKE ONE TABLET BY MOUTH EVERY DAY FOR CHOLESTEROL --CALL 3 DAYS EARLY TO REFILL--SPECIAL ORDER--    butalbital-acetaminophen-caffeine -40 mg (FIORICET, ESGIC) -40 mg per tablet TAKE ONE TABLET BY MOUTH DAILY AS NEEDED FOR HEADACHE (LIMIT 2 PER WEEK TO PREVENT REBOUND FOR HEADACHE )    calcium carbonate-vit D3-min 600 mg calcium- 200 unit Tab 1 tablet, Oral, Daily    clindamycin (CLEOCIN T) 1 % external solution Topical (Top), 2 times daily, Prn pimples    famotidine (PEPCID) 40 MG tablet TAKE ONE TABLET BY MOUTH EVERY EVENING    magnesium oxide (MAG-OX) 400 mg, Oral, 2 times daily    mycophenolate (CELLCEPT) 250 mg Cap TAKE 5 CAPSULES (1,250 MG TOTAL) BY MOUTH 2 (TWO) TIMES DAILY. --CALL 3 DAYS EARLY TO REFILL--SPECIAL ORDER--    nortriptyline (PAMELOR) 10 MG capsule TAKE 1 CAPSULE BY MOUTH EVERY EVENING. --CALL 3 DAYS EARLY TO REFILL--SPECIAL ORDER--    NURTEC 75 mg, Oral, Daily PRN, Place ODT tablet on the tongue; alternatively the ODT tablet may be placed under the tongue    predniSONE (DELTASONE) 5 MG tablet TAKE 1 TABLET BY MOUTH ONCE DAILY WITH FOOD    tacrolimus (PROGRAF) 0.5 MG Cap TAKE TWO CAPSULES BY MOUTH IN THE MORNING AND TAKE THREE CAPSULES IN THE EVENING --CALL 3 DAYS EARLY TO REFILL--SPECIAL ORDER--        There were no vitals filed for this visit.     Wt Readings from Last 3  "Encounters:   02/08/22 78.4 kg (172 lb 13.5 oz)   08/06/21 75.8 kg (167 lb)   08/06/21 76.9 kg (169 lb 8.5 oz)     Temp Readings from Last 3 Encounters:   02/17/21 97.9 °F (36.6 °C) (Oral)   02/01/21 97.5 °F (36.4 °C) (Temporal)   02/13/20 97.4 °F (36.3 °C) (Oral)     BP Readings from Last 3 Encounters:   02/08/22 121/78   08/06/21 124/82   08/06/21 (!) 140/81     Pulse Readings from Last 3 Encounters:   02/08/22 102   08/06/21 88   08/06/21 91        There is no height or weight on file to calculate BMI. Estimated body surface area is 1.93 meters squared as calculated from the following:    Height as of 2/8/22: 5' 7" (1.702 m).    Weight as of 2/8/22: 78.4 kg (172 lb 13.5 oz).     Physical Exam  Constitutional:       Appearance: He is well-developed.      Comments: /84 (BP Location: Right arm, Patient Position: Sitting, BP Method: Medium (Automatic))   Pulse 95   Ht 5' 7" (1.702 m)   Wt 77.7 kg (171 lb 4.8 oz)   BMI 26.83 kg/m²      HENT:      Head: Normocephalic and atraumatic.      Right Ear: External ear normal.      Left Ear: External ear normal.   Eyes:      Conjunctiva/sclera: Conjunctivae normal.      Pupils: Pupils are equal, round, and reactive to light.   Neck:      Vascular: No hepatojugular reflux or JVD.   Cardiovascular:      Rate and Rhythm: Normal rate and regular rhythm.      Pulses: Intact distal pulses.      Heart sounds: S1 normal and S2 normal. No murmur heard.    No friction rub. No gallop.   Pulmonary:      Effort: Pulmonary effort is normal.      Breath sounds: Normal breath sounds.   Abdominal:      General: Bowel sounds are normal. There is no distension.      Palpations: Abdomen is soft.      Tenderness: There is no abdominal tenderness. There is no guarding or rebound.   Musculoskeletal:      Cervical back: Normal range of motion and neck supple.      Right lower leg: No edema.      Left lower leg: No edema.   Neurological:      Mental Status: He is alert and oriented to " person, place, and time.          Lab Results   Component Value Date    BNP 43 11/04/2021     11/04/2021    K 4.2 11/04/2021    MG 1.6 11/04/2021     11/04/2021    CO2 23 11/04/2021    BUN 13 11/04/2021    CREATININE 1.0 11/04/2021    GLU 93 11/04/2021    HGBA1C 4.9 02/19/2021    AST 19 11/04/2021    ALT 25 11/04/2021    ALBUMIN 4.5 11/04/2021    PROT 6.9 11/04/2021    BILITOT 0.5 11/04/2021    WBC 9.93 11/04/2021    HGB 16.0 11/04/2021    HCT 48.1 11/04/2021    HCT 23 (L) 02/21/2018     11/04/2021    INR 1.1 02/15/2021    INR 2.9 02/14/2018     01/25/2018    TSH 2.293 02/19/2021    CHOL 104 (L) 11/04/2021    HDL 37 (L) 11/04/2021    LDLCALC 45.0 (L) 11/04/2021    TRIG 110 11/04/2021    L2OONLG 7.5 02/19/2021    FREET4 1.21 09/13/2017    TACROLIMUS 10.9 11/04/2021    ALLOMAP See result image under hyperlink 08/06/2021       Assessment and Plan:     1. Intractable migraine with aura with status migrainosus    2. Heart transplanted    3. Long term current use of immunosuppressive drug    4. Hyperlipidemia LDL goal <70        1.  End stage HF, S/p OHT in February 2018.  Hemodynamic status: Warm and euvolemic. /84 here but claims normal BP at home. Continue to monitor at home and report BP to post transplant coordinator. Awaiting stress echo    Immunossuppression: On MMF 1250 mg BID, tacro 1 am and 1.5mg Pm, prednisone 5 mg. No documented episodes of rejection since transplant. Uncertain significance of +DSA C1q in the absence of rejection, graft dysfunction and negative DSA at the same time of c1q test. Consider wean off prednisone and monitor graft function and DSA closely. DSA pending today. Pending allomap.    Screening for CAV and hyperlipidemia.   [facility] last year showed 20% narrowing of proximal LAD. EUS  Showed intimal thickening of 0.7 mm. Patient tried PSI (sirolimus, everolimus) in the past and was unable to tolerate. On statins. He is now scheduled for a stress echo    [place]  Amaury Monge MD    Pt care discussed with Dr Tidwell who agrees with the above management and plan

## 2022-02-25 NOTE — Clinical Note
To have stress echo but since he cannot stay to be rescheduled Will ask at chart review tapering of steroids--if we are going to do this and want biopsy first arrange for stress echo and biopsy same day.

## 2022-03-11 ENCOUNTER — HOSPITAL ENCOUNTER (OUTPATIENT)
Dept: CARDIOLOGY | Facility: HOSPITAL | Age: 32
Discharge: HOME | End: 2022-03-11
Attending: INTERNAL MEDICINE
Payer: COMMERCIAL

## 2022-03-11 VITALS — BODY MASS INDEX: 25.9 KG/M2 | HEIGHT: 67 IN | WEIGHT: 165 LBS

## 2022-03-11 DIAGNOSIS — Z94.1 STATUS POST HEART TRANSPLANT: Primary | ICD-10-CM

## 2022-03-11 LAB
ASCENDING AORTA: 2.94 CM
BSA FOR ECHO PROCEDURE: 1.88 M2
CV ECHO LV RWT: 0.34 CM
CV STRESS BASE HR: 97 BPM
DIASTOLIC BLOOD PRESSURE: 57 MMHG
DOP CALC LVOT AREA: 4.5 CM2
DOP CALC LVOT DIAMETER: 2.39 CM
DOP CALC LVOT PEAK VEL: 0.81 M/S
DOP CALC LVOT STROKE VOLUME: 59.77 CM3
DOP CALCLVOT PEAK VEL VTI: 13.33 CM
E WAVE DECELERATION TIME: 118.28 MSEC
E/A RATIO: 3.21
E/E' RATIO: 10.1 M/S
ECHO LV POSTERIOR WALL: 0.78 CM (ref 0.6–1.1)
EJECTION FRACTION: 58 %
FRACTIONAL SHORTENING: 28 % (ref 28–44)
INTERVENTRICULAR SEPTUM: 0.81 CM (ref 0.6–1.1)
IVRT: 79.92 MSEC
LA WIDTH: 3.6 CM
LEFT ATRIUM SIZE: 3.6 CM
LEFT INTERNAL DIMENSION IN SYSTOLE: 3.24 CM (ref 2.1–4)
LEFT VENTRICLE DIASTOLIC VOLUME INDEX: 50.39 ML/M2
LEFT VENTRICLE DIASTOLIC VOLUME: 93.73 ML
LEFT VENTRICLE MASS INDEX: 61 G/M2
LEFT VENTRICLE SYSTOLIC VOLUME INDEX: 22.6 ML/M2
LEFT VENTRICLE SYSTOLIC VOLUME: 42.1 ML
LEFT VENTRICULAR INTERNAL DIMENSION IN DIASTOLE: 4.53 CM (ref 3.5–6)
LEFT VENTRICULAR MASS: 113.97 G
LV LATERAL E/E' RATIO: 8.15 M/S
LV SEPTAL E/E' RATIO: 13.25 M/S
MV PEAK A VEL: 0.33 M/S
MV PEAK E VEL: 1.06 M/S
MV STENOSIS PRESSURE HALF TIME: 34.3 MS
MV VALVE AREA P 1/2 METHOD: 6.41 CM2
OHS CV CPX 1 MINUTE RECOVERY HEART RATE: 141 BPM
OHS CV CPX 85 PERCENT MAX PREDICTED HEART RATE MALE: 160
OHS CV CPX ESTIMATED METS: 15
OHS CV CPX MAX PREDICTED HEART RATE: 188
OHS CV CPX PATIENT IS FEMALE: 0
OHS CV CPX PATIENT IS MALE: 1
OHS CV CPX PEAK DIASTOLIC BLOOD PRESSURE: 80 MMHG
OHS CV CPX PEAK HEAR RATE: 162 BPM
OHS CV CPX PEAK RATE PRESSURE PRODUCT: NORMAL
OHS CV CPX PEAK SYSTOLIC BLOOD PRESSURE: 180 MMHG
OHS CV CPX PERCENT MAX PREDICTED HEART RATE ACHIEVED: 86
OHS CV CPX RATE PRESSURE PRODUCT PRESENTING: NORMAL
PISA TR MAX VEL: 1.97 M/S
RA MAJOR: 4.8 CM
RA PRESSURE: 3 MMHG
RA WIDTH: 2.6 CM
RV TISSUE DOPPLER FREE WALL SYSTOLIC VELOCITY 1 (APICAL 4 CHAMBER VIEW): 6.49 CM/S
SINUS: 2.9 CM
STJ: 2.78 CM
STRESS ECHO POST EXERCISE DUR MIN: 8 MINUTES
STRESS ECHO POST EXERCISE DUR SEC: 1 SECONDS
SYSTOLIC BLOOD PRESSURE: 140 MMHG
TDI LATERAL: 0.13 M/S
TDI SEPTAL: 0.08 M/S
TDI: 0.11 M/S
TR MAX PG: 16 MMHG
TRICUSPID ANNULAR PLANE SYSTOLIC EXCURSION: 1.17 CM
TV REST PULMONARY ARTERY PRESSURE: 19 MMHG

## 2022-03-11 PROCEDURE — 93351 STRESS ECHO (CUPID ONLY): ICD-10-PCS | Mod: 26,,, | Performed by: INTERNAL MEDICINE

## 2022-03-11 PROCEDURE — 93351 STRESS TTE COMPLETE: CPT | Mod: 26,,, | Performed by: INTERNAL MEDICINE

## 2022-03-11 PROCEDURE — 93351 STRESS TTE COMPLETE: CPT

## 2022-03-14 ENCOUNTER — OFFICE VISIT (OUTPATIENT)
Dept: NEUROLOGY | Facility: CLINIC | Age: 32
End: 2022-03-14
Payer: COMMERCIAL

## 2022-03-14 VITALS
HEIGHT: 67 IN | HEART RATE: 98 BPM | WEIGHT: 168.88 LBS | DIASTOLIC BLOOD PRESSURE: 82 MMHG | RESPIRATION RATE: 16 BRPM | BODY MASS INDEX: 26.51 KG/M2 | SYSTOLIC BLOOD PRESSURE: 110 MMHG

## 2022-03-14 DIAGNOSIS — Z94.1 HEART TRANSPLANTED: ICD-10-CM

## 2022-03-14 DIAGNOSIS — F39 MOOD DISORDER: ICD-10-CM

## 2022-03-14 DIAGNOSIS — G43.109 MIGRAINE WITH AURA AND WITHOUT STATUS MIGRAINOSUS, NOT INTRACTABLE: Primary | ICD-10-CM

## 2022-03-14 PROCEDURE — 1159F PR MEDICATION LIST DOCUMENTED IN MEDICAL RECORD: ICD-10-PCS | Mod: CPTII,S$GLB,, | Performed by: PSYCHIATRY & NEUROLOGY

## 2022-03-14 PROCEDURE — 99999 PR PBB SHADOW E&M-EST. PATIENT-LVL III: CPT | Mod: PBBFAC,,, | Performed by: PSYCHIATRY & NEUROLOGY

## 2022-03-14 PROCEDURE — 3008F BODY MASS INDEX DOCD: CPT | Mod: CPTII,S$GLB,, | Performed by: PSYCHIATRY & NEUROLOGY

## 2022-03-14 PROCEDURE — 1160F PR REVIEW ALL MEDS BY PRESCRIBER/CLIN PHARMACIST DOCUMENTED: ICD-10-PCS | Mod: CPTII,S$GLB,, | Performed by: PSYCHIATRY & NEUROLOGY

## 2022-03-14 PROCEDURE — 3044F HG A1C LEVEL LT 7.0%: CPT | Mod: CPTII,S$GLB,, | Performed by: PSYCHIATRY & NEUROLOGY

## 2022-03-14 PROCEDURE — 3074F SYST BP LT 130 MM HG: CPT | Mod: CPTII,S$GLB,, | Performed by: PSYCHIATRY & NEUROLOGY

## 2022-03-14 PROCEDURE — 1159F MED LIST DOCD IN RCRD: CPT | Mod: CPTII,S$GLB,, | Performed by: PSYCHIATRY & NEUROLOGY

## 2022-03-14 PROCEDURE — 3074F PR MOST RECENT SYSTOLIC BLOOD PRESSURE < 130 MM HG: ICD-10-PCS | Mod: CPTII,S$GLB,, | Performed by: PSYCHIATRY & NEUROLOGY

## 2022-03-14 PROCEDURE — 3044F PR MOST RECENT HEMOGLOBIN A1C LEVEL <7.0%: ICD-10-PCS | Mod: CPTII,S$GLB,, | Performed by: PSYCHIATRY & NEUROLOGY

## 2022-03-14 PROCEDURE — 1160F RVW MEDS BY RX/DR IN RCRD: CPT | Mod: CPTII,S$GLB,, | Performed by: PSYCHIATRY & NEUROLOGY

## 2022-03-14 PROCEDURE — 3079F PR MOST RECENT DIASTOLIC BLOOD PRESSURE 80-89 MM HG: ICD-10-PCS | Mod: CPTII,S$GLB,, | Performed by: PSYCHIATRY & NEUROLOGY

## 2022-03-14 PROCEDURE — 3079F DIAST BP 80-89 MM HG: CPT | Mod: CPTII,S$GLB,, | Performed by: PSYCHIATRY & NEUROLOGY

## 2022-03-14 PROCEDURE — 3008F PR BODY MASS INDEX (BMI) DOCUMENTED: ICD-10-PCS | Mod: CPTII,S$GLB,, | Performed by: PSYCHIATRY & NEUROLOGY

## 2022-03-14 PROCEDURE — 99214 OFFICE O/P EST MOD 30 MIN: CPT | Mod: S$GLB,,, | Performed by: PSYCHIATRY & NEUROLOGY

## 2022-03-14 PROCEDURE — 99999 PR PBB SHADOW E&M-EST. PATIENT-LVL III: ICD-10-PCS | Mod: PBBFAC,,, | Performed by: PSYCHIATRY & NEUROLOGY

## 2022-03-14 PROCEDURE — 99214 PR OFFICE/OUTPT VISIT, EST, LEVL IV, 30-39 MIN: ICD-10-PCS | Mod: S$GLB,,, | Performed by: PSYCHIATRY & NEUROLOGY

## 2022-03-14 RX ORDER — RIMEGEPANT SULFATE 75 MG/75MG
75 TABLET, ORALLY DISINTEGRATING ORAL DAILY PRN
Qty: 9 TABLET | Refills: 11 | Status: SHIPPED | OUTPATIENT
Start: 2022-03-14 | End: 2023-05-25

## 2022-03-14 NOTE — PROGRESS NOTES
HPI: Mert Samayoa is a 32 y.o. male with headache     Here for yearly follow up    Headaches remain well controlled    Only having one per month    Last visit: 2 per month    Nurtec works great.  He no longer needs fioricet    Continues Pamelor for prevention    Someone on the transplant service questioned if he could be off of Pamelor to consider an SSRI. Patient feels this helps his mood and certainly reduces his migraine. He does feel mildly down at times but he feels this is tolerable and he is satisfied with level of control of mood  Use music and other techniques to get his mood better.     Mood was better with wean of steroids prior.       Review of Systems   Constitutional: Negative for fever.   HENT: Negative for nosebleeds.    Eyes: Negative for double vision.   Respiratory: Negative for hemoptysis.    Cardiovascular: Negative for leg swelling.   Gastrointestinal: Negative for blood in stool.   Genitourinary: Negative for hematuria.   Musculoskeletal: Negative for falls.   Skin: Negative for rash.   Neurological: Negative for loss of consciousness.   Psychiatric/Behavioral: Negative for suicidal ideas. The patient does not have insomnia.          I have reviewed all of this patient's past medical and surgical histories as well as family and social histories and active allergies and medications as documented in the electronic medical record.      Exam:  Gen Appearance, well developed/nourished in no apparent distress  CV: 2+ distal pulses with no edema or swelling  Neuro:  MS: Awake, alert, oriented to place, person, time, situation. Sustains attention. Recent/remote memory intact, Language is full to spontaneous speech/repetition/naming/comprehension. Fund of Knowledge is full  CN: Optic discs are flat with normal vasculature, PERRL, Extraoccular movements and visual fields are full. Normal facial sensation and strength, Hearing symmetric, Tongue and Palate are midline and strong. Shoulder  Shrug symmetric and strong.  Motor: Normal bulk, tone, no abnormal movements. 5/5 strength bilateral upper/lower extremities with 2+ reflexes and no clonus  Sensory: symmetric to light touch, pain, temp, and vibration Romberg negative  Cerebellar: Finger-nose,Rapid alternating movements intact  Gait: Normal stance, no ataxia    Imaging: 3/2019 MRI brain: No acute abnormality  Labs: 2019 CMP, CBC reviewed        Assessment/Plan: Mert Samayoa is a 32 y.o. male with a history of heart transplant (due to familiar cardiomyopathy) and lifelong migraines with aura which is worsened in frequency and severity after organ transplant/on immunosuppressants     I recommend:   1. MRI brain 2019 reassuring  2. Benefiting from Pamelor for headache prevention and mood- continue at 10mg nightly.  He does not desire switch to or addition of SSRI at this point   -Also improved with wean of steroids prior  3.  Cautioned about analgesic overuse prior. Fioricet is no longer needed, not a candidate for NSAIDS, given his history of heart transplant. I would avoid triptans, given his history as well.    -Nurtec PRN helps greatly.   RTC 2 years (can call for refills in the interim)

## 2022-04-26 ENCOUNTER — TELEPHONE (OUTPATIENT)
Dept: TRANSPLANT | Facility: CLINIC | Age: 32
End: 2022-04-26
Payer: COMMERCIAL

## 2022-04-26 NOTE — TELEPHONE ENCOUNTER
----- Message from Monica Goetz MA sent at 4/26/2022  9:16 AM CDT -----  [phone]  She is calling for a refill for Lipitor 20 mg to [facility]. pt.Last visit 2/25/22. Thelma call

## 2022-05-13 DIAGNOSIS — Z94.1 STATUS POST HEART TRANSPLANT: Primary | ICD-10-CM

## 2022-05-17 ENCOUNTER — LAB VISIT (OUTPATIENT)
Dept: LAB | Facility: HOSPITAL | Age: 32
End: 2022-05-17
Attending: INTERNAL MEDICINE
Payer: COMMERCIAL

## 2022-05-17 DIAGNOSIS — Z94.1 STATUS POST HEART TRANSPLANT: ICD-10-CM

## 2022-05-17 LAB
ALBUMIN SERPL BCP-MCNC: 4.4 G/DL (ref 3.5–5.2)
ALP SERPL-CCNC: 72 U/L (ref 55–135)
ALT SERPL W/O P-5'-P-CCNC: 19 U/L (ref 10–44)
ANION GAP SERPL CALC-SCNC: 12 MMOL/L (ref 8–16)
AST SERPL-CCNC: 13 U/L (ref 10–40)
BASOPHILS # BLD AUTO: 0.04 K/UL (ref 0–0.2)
BASOPHILS NFR BLD: 0.3 % (ref 0–1.9)
BILIRUB SERPL-MCNC: 0.8 MG/DL (ref 0.1–1)
BNP SERPL-MCNC: 47 PG/ML (ref 0–99)
BUN SERPL-MCNC: 16 MG/DL (ref 6–20)
CALCIUM SERPL-MCNC: 9.5 MG/DL (ref 8.7–10.5)
CHLORIDE SERPL-SCNC: 105 MMOL/L (ref 95–110)
CHOLEST SERPL-MCNC: 102 MG/DL (ref 120–199)
CHOLEST/HDLC SERPL: 3 {RATIO} (ref 2–5)
CO2 SERPL-SCNC: 28 MMOL/L (ref 23–29)
CREAT SERPL-MCNC: 1.1 MG/DL (ref 0.5–1.4)
DIFFERENTIAL METHOD: ABNORMAL
EOSINOPHIL # BLD AUTO: 0.2 K/UL (ref 0–0.5)
EOSINOPHIL NFR BLD: 1.2 % (ref 0–8)
ERYTHROCYTE [DISTWIDTH] IN BLOOD BY AUTOMATED COUNT: 13.8 % (ref 11.5–14.5)
EST. GFR  (AFRICAN AMERICAN): >60 ML/MIN/1.73 M^2
EST. GFR  (NON AFRICAN AMERICAN): >60 ML/MIN/1.73 M^2
GLUCOSE SERPL-MCNC: 94 MG/DL (ref 70–110)
HCT VFR BLD AUTO: 49.5 % (ref 40–54)
HDLC SERPL-MCNC: 34 MG/DL (ref 40–75)
HDLC SERPL: 33.3 % (ref 20–50)
HGB BLD-MCNC: 16.3 G/DL (ref 14–18)
IMM GRANULOCYTES # BLD AUTO: 0.07 K/UL (ref 0–0.04)
IMM GRANULOCYTES NFR BLD AUTO: 0.5 % (ref 0–0.5)
LDLC SERPL CALC-MCNC: 42.4 MG/DL (ref 63–159)
LYMPHOCYTES # BLD AUTO: 1.7 K/UL (ref 1–4.8)
LYMPHOCYTES NFR BLD: 11.8 % (ref 18–48)
MAGNESIUM SERPL-MCNC: 1.7 MG/DL (ref 1.6–2.6)
MCH RBC QN AUTO: 28.9 PG (ref 27–31)
MCHC RBC AUTO-ENTMCNC: 32.9 G/DL (ref 32–36)
MCV RBC AUTO: 88 FL (ref 82–98)
MONOCYTES # BLD AUTO: 1 K/UL (ref 0.3–1)
MONOCYTES NFR BLD: 7.2 % (ref 4–15)
NEUTROPHILS # BLD AUTO: 11.1 K/UL (ref 1.8–7.7)
NEUTROPHILS NFR BLD: 79 % (ref 38–73)
NONHDLC SERPL-MCNC: 68 MG/DL
NRBC BLD-RTO: 0 /100 WBC
PLATELET # BLD AUTO: 241 K/UL (ref 150–450)
PMV BLD AUTO: 9.4 FL (ref 9.2–12.9)
POTASSIUM SERPL-SCNC: 3.9 MMOL/L (ref 3.5–5.1)
PROT SERPL-MCNC: 7.2 G/DL (ref 6–8.4)
RBC # BLD AUTO: 5.64 M/UL (ref 4.6–6.2)
SODIUM SERPL-SCNC: 145 MMOL/L (ref 136–145)
TRIGL SERPL-MCNC: 128 MG/DL (ref 30–150)
WBC # BLD AUTO: 14.07 K/UL (ref 3.9–12.7)

## 2022-05-17 PROCEDURE — 86833 HLA CLASS II HIGH DEFIN QUAL: CPT | Performed by: INTERNAL MEDICINE

## 2022-05-17 PROCEDURE — 83735 ASSAY OF MAGNESIUM: CPT | Performed by: INTERNAL MEDICINE

## 2022-05-17 PROCEDURE — 36415 COLL VENOUS BLD VENIPUNCTURE: CPT | Performed by: INTERNAL MEDICINE

## 2022-05-17 PROCEDURE — 80197 ASSAY OF TACROLIMUS: CPT | Performed by: INTERNAL MEDICINE

## 2022-05-17 PROCEDURE — 86832 HLA CLASS I HIGH DEFIN QUAL: CPT | Mod: 59 | Performed by: INTERNAL MEDICINE

## 2022-05-17 PROCEDURE — 80061 LIPID PANEL: CPT | Performed by: INTERNAL MEDICINE

## 2022-05-17 PROCEDURE — 80053 COMPREHEN METABOLIC PANEL: CPT | Performed by: INTERNAL MEDICINE

## 2022-05-17 PROCEDURE — 86832 HLA CLASS I HIGH DEFIN QUAL: CPT | Performed by: INTERNAL MEDICINE

## 2022-05-17 PROCEDURE — 86977 RBC SERUM PRETX INCUBJ/INHIB: CPT | Performed by: INTERNAL MEDICINE

## 2022-05-17 PROCEDURE — 83880 ASSAY OF NATRIURETIC PEPTIDE: CPT | Performed by: INTERNAL MEDICINE

## 2022-05-17 PROCEDURE — 86833 HLA CLASS II HIGH DEFIN QUAL: CPT | Mod: 59 | Performed by: INTERNAL MEDICINE

## 2022-05-17 PROCEDURE — 85025 COMPLETE CBC W/AUTO DIFF WBC: CPT | Performed by: INTERNAL MEDICINE

## 2022-05-18 DIAGNOSIS — Z94.1 STATUS POST HEART TRANSPLANT: Primary | ICD-10-CM

## 2022-05-18 DIAGNOSIS — Z94.1 HEART TRANSPLANTED: Primary | ICD-10-CM

## 2022-05-18 LAB — TACROLIMUS BLD-MCNC: 29.8 NG/ML (ref 5–15)

## 2022-05-19 ENCOUNTER — HOSPITAL ENCOUNTER (EMERGENCY)
Facility: HOSPITAL | Age: 32
Discharge: ANOTHER HOSPITAL | End: 2022-05-19
Attending: STUDENT IN AN ORGANIZED HEALTH CARE EDUCATION/TRAINING PROGRAM
Payer: COMMERCIAL

## 2022-05-19 ENCOUNTER — HOSPITAL ENCOUNTER (OUTPATIENT)
Facility: HOSPITAL | Age: 32
Discharge: HOME | End: 2022-05-20
Attending: INTERNAL MEDICINE | Admitting: INTERNAL MEDICINE
Payer: COMMERCIAL

## 2022-05-19 VITALS
HEART RATE: 93 BPM | WEIGHT: 165.88 LBS | BODY MASS INDEX: 26.03 KG/M2 | SYSTOLIC BLOOD PRESSURE: 119 MMHG | TEMPERATURE: 100 F | RESPIRATION RATE: 21 BRPM | DIASTOLIC BLOOD PRESSURE: 83 MMHG | OXYGEN SATURATION: 98 % | HEIGHT: 67 IN

## 2022-05-19 DIAGNOSIS — A41.9 SEPSIS: ICD-10-CM

## 2022-05-19 DIAGNOSIS — R05.9 COUGH: Primary | ICD-10-CM

## 2022-05-19 DIAGNOSIS — J18.9 PNA (PNEUMONIA): ICD-10-CM

## 2022-05-19 DIAGNOSIS — Z94.1 HEART REPLACED BY TRANSPLANT: ICD-10-CM

## 2022-05-19 DIAGNOSIS — J18.9 COMMUNITY ACQUIRED PNEUMONIA OF RIGHT MIDDLE LOBE OF LUNG: Primary | ICD-10-CM

## 2022-05-19 LAB
ALBUMIN SERPL BCP-MCNC: 4.4 G/DL (ref 3.5–5.2)
ALP SERPL-CCNC: 71 U/L (ref 55–135)
ALT SERPL W/O P-5'-P-CCNC: 14 U/L (ref 10–44)
ANION GAP SERPL CALC-SCNC: 13 MMOL/L (ref 8–16)
APTT BLDCRRT: 29.1 SEC (ref 21–32)
AST SERPL-CCNC: 13 U/L (ref 10–40)
BASOPHILS # BLD AUTO: 0.06 K/UL (ref 0–0.2)
BASOPHILS NFR BLD: 0.2 % (ref 0–1.9)
BILIRUB SERPL-MCNC: 1.5 MG/DL (ref 0.1–1)
BILIRUB UR QL STRIP: NEGATIVE
BNP SERPL-MCNC: 64 PG/ML (ref 0–99)
BUN SERPL-MCNC: 14 MG/DL (ref 6–20)
CALCIUM SERPL-MCNC: 9.4 MG/DL (ref 8.7–10.5)
CHLORIDE SERPL-SCNC: 101 MMOL/L (ref 95–110)
CLARITY UR: CLEAR
CO2 SERPL-SCNC: 26 MMOL/L (ref 23–29)
COLOR UR: YELLOW
CREAT SERPL-MCNC: 1.2 MG/DL (ref 0.5–1.4)
DIFFERENTIAL METHOD: ABNORMAL
EOSINOPHIL # BLD AUTO: 0 K/UL (ref 0–0.5)
EOSINOPHIL NFR BLD: 0.1 % (ref 0–8)
ERYTHROCYTE [DISTWIDTH] IN BLOOD BY AUTOMATED COUNT: 13.8 % (ref 11.5–14.5)
EST. GFR  (AFRICAN AMERICAN): >60 ML/MIN/1.73 M^2
EST. GFR  (NON AFRICAN AMERICAN): >60 ML/MIN/1.73 M^2
GLUCOSE SERPL-MCNC: 112 MG/DL (ref 70–110)
GLUCOSE UR QL STRIP: NEGATIVE
GROUP A STREP, MOLECULAR: NEGATIVE
HCT VFR BLD AUTO: 49.5 % (ref 40–54)
HGB BLD-MCNC: 16.1 G/DL (ref 14–18)
HGB UR QL STRIP: ABNORMAL
IMM GRANULOCYTES # BLD AUTO: 0.2 K/UL (ref 0–0.04)
IMM GRANULOCYTES NFR BLD AUTO: 0.8 % (ref 0–0.5)
INFLUENZA A, MOLECULAR: NEGATIVE
INFLUENZA B, MOLECULAR: NEGATIVE
INR PPP: 1.2 (ref 0.8–1.2)
KETONES UR QL STRIP: NEGATIVE
LACTATE SERPL-SCNC: 1.1 MMOL/L (ref 0.5–2.2)
LACTATE SERPL-SCNC: 1.3 MMOL/L (ref 0.5–2.2)
LEUKOCYTE ESTERASE UR QL STRIP: NEGATIVE
LYMPHOCYTES # BLD AUTO: 1.1 K/UL (ref 1–4.8)
LYMPHOCYTES NFR BLD: 4.7 % (ref 18–48)
MAGNESIUM SERPL-MCNC: 1.3 MG/DL (ref 1.6–2.6)
MCH RBC QN AUTO: 28.6 PG (ref 27–31)
MCHC RBC AUTO-ENTMCNC: 32.5 G/DL (ref 32–36)
MCV RBC AUTO: 88 FL (ref 82–98)
MONOCYTES # BLD AUTO: 2 K/UL (ref 0.3–1)
MONOCYTES NFR BLD: 8.4 % (ref 4–15)
NEUTROPHILS # BLD AUTO: 20.6 K/UL (ref 1.8–7.7)
NEUTROPHILS NFR BLD: 85.8 % (ref 38–73)
NITRITE UR QL STRIP: NEGATIVE
NRBC BLD-RTO: 0 /100 WBC
PH UR STRIP: 7 [PH] (ref 5–8)
PHOSPHATE SERPL-MCNC: 3.1 MG/DL (ref 2.7–4.5)
PLATELET # BLD AUTO: 238 K/UL (ref 150–450)
PLATELET BLD QL SMEAR: ABNORMAL
PMV BLD AUTO: 9.7 FL (ref 9.2–12.9)
POTASSIUM SERPL-SCNC: 4.1 MMOL/L (ref 3.5–5.1)
PROCALCITONIN SERPL IA-MCNC: 0.05 NG/ML
PROT SERPL-MCNC: 7.5 G/DL (ref 6–8.4)
PROT UR QL STRIP: NEGATIVE
PROTHROMBIN TIME: 12.6 SEC (ref 9–12.5)
RBC # BLD AUTO: 5.63 M/UL (ref 4.6–6.2)
SARS-COV-2 RDRP RESP QL NAA+PROBE: NEGATIVE
SODIUM SERPL-SCNC: 140 MMOL/L (ref 136–145)
SP GR UR STRIP: 1.01 (ref 1–1.03)
SPECIMEN SOURCE: NORMAL
TROPONIN I SERPL DL<=0.01 NG/ML-MCNC: 0.01 NG/ML (ref 0–0.03)
URN SPEC COLLECT METH UR: ABNORMAL
UROBILINOGEN UR STRIP-ACNC: NEGATIVE EU/DL
WBC # BLD AUTO: 24.03 K/UL (ref 3.9–12.7)

## 2022-05-19 PROCEDURE — 83735 ASSAY OF MAGNESIUM: CPT | Performed by: STUDENT IN AN ORGANIZED HEALTH CARE EDUCATION/TRAINING PROGRAM

## 2022-05-19 PROCEDURE — 25000003 PHARM REV CODE 250: Performed by: STUDENT IN AN ORGANIZED HEALTH CARE EDUCATION/TRAINING PROGRAM

## 2022-05-19 PROCEDURE — 96366 THER/PROPH/DIAG IV INF ADDON: CPT

## 2022-05-19 PROCEDURE — 93010 EKG 12-LEAD: ICD-10-PCS | Mod: ,,, | Performed by: INTERNAL MEDICINE

## 2022-05-19 PROCEDURE — 25500020 PHARM REV CODE 255: Performed by: STUDENT IN AN ORGANIZED HEALTH CARE EDUCATION/TRAINING PROGRAM

## 2022-05-19 PROCEDURE — 83880 ASSAY OF NATRIURETIC PEPTIDE: CPT | Performed by: STUDENT IN AN ORGANIZED HEALTH CARE EDUCATION/TRAINING PROGRAM

## 2022-05-19 PROCEDURE — 80197 ASSAY OF TACROLIMUS: CPT | Performed by: STUDENT IN AN ORGANIZED HEALTH CARE EDUCATION/TRAINING PROGRAM

## 2022-05-19 PROCEDURE — 83605 ASSAY OF LACTIC ACID: CPT | Performed by: STUDENT IN AN ORGANIZED HEALTH CARE EDUCATION/TRAINING PROGRAM

## 2022-05-19 PROCEDURE — G0378 HOSPITAL OBSERVATION PER HR: HCPCS

## 2022-05-19 PROCEDURE — 85025 COMPLETE CBC W/AUTO DIFF WBC: CPT | Performed by: STUDENT IN AN ORGANIZED HEALTH CARE EDUCATION/TRAINING PROGRAM

## 2022-05-19 PROCEDURE — 96365 THER/PROPH/DIAG IV INF INIT: CPT

## 2022-05-19 PROCEDURE — 84145 PROCALCITONIN (PCT): CPT | Performed by: STUDENT IN AN ORGANIZED HEALTH CARE EDUCATION/TRAINING PROGRAM

## 2022-05-19 PROCEDURE — 85610 PROTHROMBIN TIME: CPT | Performed by: STUDENT IN AN ORGANIZED HEALTH CARE EDUCATION/TRAINING PROGRAM

## 2022-05-19 PROCEDURE — 99285 EMERGENCY DEPT VISIT HI MDM: CPT | Mod: 25

## 2022-05-19 PROCEDURE — G0379 DIRECT REFER HOSPITAL OBSERV: HCPCS

## 2022-05-19 PROCEDURE — 36415 COLL VENOUS BLD VENIPUNCTURE: CPT | Performed by: STUDENT IN AN ORGANIZED HEALTH CARE EDUCATION/TRAINING PROGRAM

## 2022-05-19 PROCEDURE — 85730 THROMBOPLASTIN TIME PARTIAL: CPT | Performed by: STUDENT IN AN ORGANIZED HEALTH CARE EDUCATION/TRAINING PROGRAM

## 2022-05-19 PROCEDURE — 81003 URINALYSIS AUTO W/O SCOPE: CPT | Performed by: STUDENT IN AN ORGANIZED HEALTH CARE EDUCATION/TRAINING PROGRAM

## 2022-05-19 PROCEDURE — 84484 ASSAY OF TROPONIN QUANT: CPT | Performed by: STUDENT IN AN ORGANIZED HEALTH CARE EDUCATION/TRAINING PROGRAM

## 2022-05-19 PROCEDURE — 20600001 HC STEP DOWN PRIVATE ROOM

## 2022-05-19 PROCEDURE — 93010 ELECTROCARDIOGRAM REPORT: CPT | Mod: ,,, | Performed by: INTERNAL MEDICINE

## 2022-05-19 PROCEDURE — 63600175 PHARM REV CODE 636 W HCPCS: Performed by: STUDENT IN AN ORGANIZED HEALTH CARE EDUCATION/TRAINING PROGRAM

## 2022-05-19 PROCEDURE — 93005 ELECTROCARDIOGRAM TRACING: CPT

## 2022-05-19 PROCEDURE — 63600175 PHARM REV CODE 636 W HCPCS: Performed by: PHYSICIAN ASSISTANT

## 2022-05-19 PROCEDURE — U0002 COVID-19 LAB TEST NON-CDC: HCPCS | Performed by: STUDENT IN AN ORGANIZED HEALTH CARE EDUCATION/TRAINING PROGRAM

## 2022-05-19 PROCEDURE — 25000003 PHARM REV CODE 250: Performed by: PHYSICIAN ASSISTANT

## 2022-05-19 PROCEDURE — 96367 TX/PROPH/DG ADDL SEQ IV INF: CPT

## 2022-05-19 PROCEDURE — 84100 ASSAY OF PHOSPHORUS: CPT | Performed by: STUDENT IN AN ORGANIZED HEALTH CARE EDUCATION/TRAINING PROGRAM

## 2022-05-19 PROCEDURE — 87040 BLOOD CULTURE FOR BACTERIA: CPT | Mod: 59 | Performed by: STUDENT IN AN ORGANIZED HEALTH CARE EDUCATION/TRAINING PROGRAM

## 2022-05-19 PROCEDURE — 80053 COMPREHEN METABOLIC PANEL: CPT | Performed by: STUDENT IN AN ORGANIZED HEALTH CARE EDUCATION/TRAINING PROGRAM

## 2022-05-19 PROCEDURE — 87502 INFLUENZA DNA AMP PROBE: CPT | Performed by: STUDENT IN AN ORGANIZED HEALTH CARE EDUCATION/TRAINING PROGRAM

## 2022-05-19 PROCEDURE — 87651 STREP A DNA AMP PROBE: CPT | Performed by: STUDENT IN AN ORGANIZED HEALTH CARE EDUCATION/TRAINING PROGRAM

## 2022-05-19 RX ORDER — FERROUS SULFATE, DRIED 160(50) MG
1 TABLET, EXTENDED RELEASE ORAL DAILY
Refills: 5 | Status: DISCONTINUED | OUTPATIENT
Start: 2022-05-19 | End: 2022-05-19

## 2022-05-19 RX ORDER — MYCOPHENOLATE MOFETIL 250 MG/1
1250 CAPSULE ORAL 2 TIMES DAILY
Status: DISCONTINUED | OUTPATIENT
Start: 2022-05-19 | End: 2022-05-20 | Stop reason: HOSPADM

## 2022-05-19 RX ORDER — VANCOMYCIN HCL IN 5 % DEXTROSE 1G/250ML
1000 PLASTIC BAG, INJECTION (ML) INTRAVENOUS
Status: DISCONTINUED | OUTPATIENT
Start: 2022-05-19 | End: 2022-05-19 | Stop reason: HOSPADM

## 2022-05-19 RX ORDER — ASPIRIN 81 MG/1
81 TABLET ORAL DAILY
Status: DISCONTINUED | OUTPATIENT
Start: 2022-05-20 | End: 2022-05-20 | Stop reason: HOSPADM

## 2022-05-19 RX ORDER — TACROLIMUS 1 MG/1
1 CAPSULE ORAL EVERY MORNING
Status: DISCONTINUED | OUTPATIENT
Start: 2022-05-19 | End: 2022-05-20

## 2022-05-19 RX ORDER — VANCOMYCIN 2 GRAM/500 ML IN 0.9 % SODIUM CHLORIDE INTRAVENOUS
2000 ONCE
Status: DISCONTINUED | OUTPATIENT
Start: 2022-05-19 | End: 2022-05-19

## 2022-05-19 RX ORDER — AZITHROMYCIN 250 MG/1
500 TABLET, FILM COATED ORAL DAILY
Status: DISCONTINUED | OUTPATIENT
Start: 2022-05-20 | End: 2022-05-20 | Stop reason: HOSPADM

## 2022-05-19 RX ORDER — FAMOTIDINE 20 MG/1
40 TABLET, FILM COATED ORAL NIGHTLY
Status: DISCONTINUED | OUTPATIENT
Start: 2022-05-19 | End: 2022-05-20 | Stop reason: HOSPADM

## 2022-05-19 RX ORDER — FERROUS SULFATE, DRIED 160(50) MG
1 TABLET, EXTENDED RELEASE ORAL DAILY
Status: DISCONTINUED | OUTPATIENT
Start: 2022-05-20 | End: 2022-05-20 | Stop reason: HOSPADM

## 2022-05-19 RX ORDER — ATORVASTATIN CALCIUM 20 MG/1
20 TABLET, FILM COATED ORAL NIGHTLY
Status: DISCONTINUED | OUTPATIENT
Start: 2022-05-19 | End: 2022-05-20 | Stop reason: HOSPADM

## 2022-05-19 RX ORDER — NORTRIPTYLINE HYDROCHLORIDE 10 MG/1
10 CAPSULE ORAL NIGHTLY
Status: DISCONTINUED | OUTPATIENT
Start: 2022-05-19 | End: 2022-05-20 | Stop reason: HOSPADM

## 2022-05-19 RX ORDER — PREDNISONE 5 MG/1
5 TABLET ORAL DAILY
Status: DISCONTINUED | OUTPATIENT
Start: 2022-05-20 | End: 2022-05-20 | Stop reason: HOSPADM

## 2022-05-19 RX ORDER — DIPHENHYDRAMINE HCL 25 MG
25 CAPSULE ORAL
Status: COMPLETED | OUTPATIENT
Start: 2022-05-19 | End: 2022-05-19

## 2022-05-19 RX ORDER — VANCOMYCIN 2 GRAM/500 ML IN 0.9 % SODIUM CHLORIDE INTRAVENOUS
2000 ONCE
Status: COMPLETED | OUTPATIENT
Start: 2022-05-19 | End: 2022-05-19

## 2022-05-19 RX ORDER — ACETAMINOPHEN 325 MG/1
650 TABLET ORAL
Status: COMPLETED | OUTPATIENT
Start: 2022-05-19 | End: 2022-05-19

## 2022-05-19 RX ORDER — LANOLIN ALCOHOL/MO/W.PET/CERES
400 CREAM (GRAM) TOPICAL 2 TIMES DAILY
Status: DISCONTINUED | OUTPATIENT
Start: 2022-05-19 | End: 2022-05-20 | Stop reason: HOSPADM

## 2022-05-19 RX ADMIN — DIPHENHYDRAMINE HYDROCHLORIDE 25 MG: 25 CAPSULE ORAL at 12:05

## 2022-05-19 RX ADMIN — ACETAMINOPHEN 650 MG: 325 TABLET ORAL at 09:05

## 2022-05-19 RX ADMIN — TACROLIMUS 1.5 MG: 1 CAPSULE ORAL at 09:05

## 2022-05-19 RX ADMIN — SODIUM CHLORIDE 1000 ML: 0.9 INJECTION, SOLUTION INTRAVENOUS at 07:05

## 2022-05-19 RX ADMIN — NORTRIPTYLINE HYDROCHLORIDE 10 MG: 10 CAPSULE ORAL at 09:05

## 2022-05-19 RX ADMIN — DEXTROSE 500 MG: 5 SOLUTION INTRAVENOUS at 08:05

## 2022-05-19 RX ADMIN — CEFTRIAXONE 2 G: 2 INJECTION, SOLUTION INTRAVENOUS at 07:05

## 2022-05-19 RX ADMIN — VANCOMYCIN 2 GRAM/500 ML IN 0.9 % SODIUM CHLORIDE INTRAVENOUS 2000 MG: at 10:05

## 2022-05-19 RX ADMIN — ATORVASTATIN CALCIUM 20 MG: 20 TABLET, FILM COATED ORAL at 09:05

## 2022-05-19 RX ADMIN — Medication 400 MG: at 09:05

## 2022-05-19 RX ADMIN — FAMOTIDINE 40 MG: 20 TABLET ORAL at 09:05

## 2022-05-19 RX ADMIN — MYCOPHENOLATE MOFETIL 1250 MG: 250 CAPSULE ORAL at 09:05

## 2022-05-19 RX ADMIN — IOHEXOL 75 ML: 350 INJECTION, SOLUTION INTRAVENOUS at 09:05

## 2022-05-19 NOTE — ED PROVIDER NOTES
Encounter Date: 5/19/2022       History     Chief Complaint   Patient presents with    General Illness     Cough, congestion, insomnia. Pt told to come to ER for workup by transplant md. Pt had heart transplant in 2018     32-year-old male with history of heart transplant in 2018 secondary to hereditary cardiomyopathy presenting with two weeks of cough, congestion.  Patient reports he developed fever for the last 2-3 days, was told to come to the ED for workup by his physician.  Patient denies shortness of breath or chest pain.  No abdominal pain, nausea, vomiting, diarrhea, no urinary symptoms.        Review of patient's allergies indicates:  No Known Allergies  Past Medical History:   Diagnosis Date    Cardiogenic shock 1/16/2017    Cardiomyopathy     Familial cardiomyopathy    CHF (congestive heart failure)     Encounter for blood transfusion     Essential hypertension 12/21/2016    Essential hypertension 3/20/2018    Familial Cardiomyopathy     Familial cardiomyopathy     Hyperlipidemia LDL goal <70 11/19/2018     Past Surgical History:   Procedure Laterality Date    BIOPSY WITH ULTRASOUND GUIDANCE N/A 8/27/2019    Procedure: BIOPSY, WITH US GUIDANCE;  Surgeon: Mario Conner MD;  Location: [facility] CATH LAB;  Service: Cardiology;  Laterality: N/A;    BIOPSY WITH ULTRASOUND GUIDANCE N/A 9/24/2019    Procedure: BIOPSY, WITH US GUIDANCE;  Surgeon: Sammi Tapia MD;  Location: [facility] CATH LAB;  Service: Cardiology;  Laterality: N/A;    BIOPSY WITH ULTRASOUND GUIDANCE N/A 11/12/2019    Procedure: BIOPSY, WITH US GUIDANCE;  Surgeon: Mario Conner MD;  Location: [facility] CATH LAB;  Service: Cardiology;  Laterality: N/A;    CORONARY ANGIOGRAPHY Right 2/14/2019    Procedure: ANGIOGRAM, CORONARY ARTERY;  Surgeon: Tello Correia MD;  Location: [facility] CATH LAB;  Service: Cardiology;  Laterality: Right;    HEART TRANSPLANT      LEFT HEART CATHETERIZATION Left 2/13/2020    Procedure: Left heart cath;  Surgeon:  Guy Cartagena MD;  Location: [facility] CATH LAB;  Service: Cardiology;  Laterality: Left;    LEFT HEART CATHETERIZATION N/A 2021    Procedure: Left heart cath;  Surgeon: Guy Cartagena MD;  Location: [facility] CATH LAB;  Service: Cardiology;  Laterality: N/A;    LEFT VENTRICULAR ASSIST DEVICE      RIGHT HEART CATHETERIZATION Right 2019    Procedure: INSERTION, CATHETER, RIGHT HEART;  Surgeon: Tello Correia MD;  Location: [facility] CATH LAB;  Service: Cardiology;  Laterality: Right;    TONSILLECTOMY       Family History   Problem Relation Age of Onset    Arthritis Mother     Heart disease Brother         cardiomyopathy and heart transplant    No Known Problems Father     Heart disease Brother         cardiomyopathy and heart transplanted twice    Birth defects Paternal Uncle      Social History     Tobacco Use    Smoking status: Former Smoker     Packs/day: 1.00     Quit date: 2016     Years since quittin.4    Smokeless tobacco: Never Used   Substance Use Topics    Alcohol use: Yes     Comment: socially    Drug use: No     Review of Systems   Constitutional: Positive for fever. Negative for chills.   HENT: Positive for congestion. Negative for sore throat.    Respiratory: Positive for cough. Negative for shortness of breath.    Cardiovascular: Negative for chest pain.   Gastrointestinal: Negative for abdominal pain, diarrhea, nausea and vomiting.   Genitourinary: Negative for dysuria.   Musculoskeletal: Negative for back pain.   Skin: Negative for rash.   Neurological: Negative for weakness.   Hematological: Does not bruise/bleed easily.       Physical Exam     Initial Vitals [22 0715]   BP Pulse Resp Temp SpO2   136/73 84 18 96.4 °F (35.8 °C) 97 %      MAP       --         Physical Exam    Nursing note and vitals reviewed.  Constitutional: He appears well-developed. He is not diaphoretic. No distress.   Eyes: EOM are normal.   Neck:   Normal range of motion.  Cardiovascular:   No  murmur heard.  Pulmonary/Chest: No respiratory distress.   Crackles at bilateral bases.  No wheezing.  No respiratory distress.  Good air movement.   Abdominal: He exhibits no distension. There is no abdominal tenderness.   Musculoskeletal:         General: Normal range of motion.      Cervical back: Normal range of motion.     Neurological: He is alert.   Skin: Skin is warm.   Psychiatric: He has a normal mood and affect.         ED Course   Procedures  Labs Reviewed   CBC W/ AUTO DIFFERENTIAL - Abnormal; Notable for the following components:       Result Value    WBC 24.03 (*)     Immature Granulocytes 0.8 (*)     Gran # (ANC) 20.6 (*)     Immature Grans (Abs) 0.20 (*)     Mono # 2.0 (*)     Gran % 85.8 (*)     Lymph % 4.7 (*)     All other components within normal limits   COMPREHENSIVE METABOLIC PANEL - Abnormal; Notable for the following components:    Glucose 112 (*)     Total Bilirubin 1.5 (*)     All other components within normal limits   URINALYSIS, REFLEX TO URINE CULTURE - Abnormal; Notable for the following components:    Occult Blood UA Trace (*)     All other components within normal limits    Narrative:     Specimen Source->Urine   MAGNESIUM - Abnormal; Notable for the following components:    Magnesium 1.3 (*)     All other components within normal limits   PROTIME-INR - Abnormal; Notable for the following components:    Prothrombin Time 12.6 (*)     All other components within normal limits   INFLUENZA A & B BY MOLECULAR   GROUP A STREP, MOLECULAR   CULTURE, BLOOD   CULTURE, BLOOD   LACTIC ACID, PLASMA   PHOSPHORUS   APTT   B-TYPE NATRIURETIC PEPTIDE   TROPONIN I   PROCALCITONIN   SARS-COV-2 RNA AMPLIFICATION, QUAL   TACROLIMUS LEVEL   LACTIC ACID, PLASMA   TACROLIMUS LEVEL        ECG Results          EKG 12-lead (Final result)  Result time 05/19/22 13:22:52    Final result by Interface, Lab In [facility] (05/19/22 13:22:52)                 Narrative:    Test Reason : A41.9    Vent. Rate : 100 BPM      Atrial Rate : 100 BPM     P-R Int : 138 ms          QRS Dur : 084 ms      QT Int : 324 ms       P-R-T Axes : 026 073 029 degrees     QTc Int : 417 ms    Sinus rhythm with occasional Premature ventricular complexes  Otherwise normal ECG  When compared with ECG of 11-MAR-2022 08:54,  PVC is now present  Confirmed by Jessie Her MD ([id number]) on 5/19/2022 1:22:43 PM    Referred By: [email or web address]   SELF           Confirmed By:Jessie Her MD                            Imaging Results          CT Chest With Contrast (Final result)  Result time 05/19/22 10:07:25    Final result by Denis Nichole MD (05/19/22 10:07:25)                 Impression:      1. Focal infiltrates within the medial aspect of the right middle lobe and within the medial aspect of the left lower lobe compatible with an infectious/inflammatory process.  No pleural effusion.  2. Prior heart transplant.      Electronically signed by: Denis Nichole MD  Date:    05/19/2022  Time:    10:07             Narrative:    EXAMINATION:  CT CHEST WITH CONTRAST    CLINICAL HISTORY:  Cough, persistent;    TECHNIQUE:  Low dose axial images, sagittal and coronal reformations were obtained from the thoracic inlet to the lung bases following the IV administration of contrast.  MIP images were performed.    COMPARISON:  None.    FINDINGS:  CT examination of the chest with contrast dated May 19, 2022.    History of prior heart transplant.    No focal abnormality of the aorta.  No pathologically enlarged hilar or mediastinal lymph nodes.  No pericardial effusion.    There are focal infiltrates within the medial aspect of the right middle lobe and within the medial aspect of the left lower lobe.  No pleural effusion or pneumothorax.    No acute osseous abnormality.    The visualized upper abdominal structures appear unremarkable.                               X-Ray Chest AP Portable (Final result)  Result time 05/19/22 07:49:01    Final result by Denis Nichole,  MD (05/19/22 07:49:01)                 Impression:      No active lung disease.      Electronically signed by: Denis Nichole MD  Date:    05/19/2022  Time:    07:49             Narrative:    EXAMINATION:  XR CHEST AP PORTABLE    CLINICAL HISTORY:  Sepsis;.    TECHNIQUE:  Portable chest dated 11/22/13.    COMPARISON:  No prior study for comparison.    FINDINGS:  Prior median sternotomy.  Stable cardiac silhouette.  No focal infiltrate or effusion.  No acute osseous abnormality.                                 Medications   sodium chloride 0.9% bolus 1,000 mL (0 mLs Intravenous Stopped 5/19/22 0845)   cefTRIAXone (ROCEPHIN) 2 g/50 mL D5W IVPB (0 g Intravenous Stopped 5/19/22 0845)   azithromycin 500 mg in dextrose 5 % 250 mL IVPB (ready to mix system) (0 mg Intravenous Stopped 5/19/22 1019)   acetaminophen tablet 650 mg (650 mg Oral Given 5/19/22 0908)   iohexoL (OMNIPAQUE 350) injection 75 mL (75 mLs Intravenous Given 5/19/22 0953)   vancomycin 2 g in 0.9% sodium chloride 500 mL IVPB (2,000 mg Intravenous Handoff 5/19/22 1317)   diphenhydrAMINE capsule 25 mg (25 mg Oral Given 5/19/22 1216)     Medical Decision Making:   Differential Diagnosis:   DDX: Concern for significant infection given history of immunocompromise/exam - evaluate for source (likely respiratory), ACS, arrhythmia, electrolyte abnormality, ADENIKE, metabolic abnormality.  DX: BMP, CBC, troponin. EKG. CXR, UA. UCX. BCX.  TX: Analgesia PRN. IVF. Early broad spectrum antibiotics. Treatment/consult as indicated by studies.  DISPO: Pending studies.               ED Course as of 05/19/22 1637   Thu May 19, 2022   0740 I have evaluated this EKG and found the following:    Rate: 100  Rhythm: NSR  Axis: Normal  Signs of ischemia: None    Conclusion: NSR    Patient has no concerning morphologies on their EKG for any concerning underlying cardiac pathology (including ACS, Brugada, Wellens, Prolonged QT, HOCM, WPW, ARVD)   [NB]   0827 Significantly elevated white  count, however no pneumonia noted on his chest x-ray. [NB]   0834 Troponin I: 0.007 [NB]   0834 BNP: 64 [NB]   0834 Lactate, James: 1.3 [NB]   0834 WBC(!): 24.03 [NB]   0918 Spoke with Transplant Team who recommending a CT chest with contrast.  Patient has normal kidney function. [NB]      ED Course User Index  [NB] Rom Ritchie MD             Clinical Impression:   Final diagnoses:  [A41.9] Sepsis  [R05.9] Cough (Primary)          ED Disposition Condition    Transfer to Another Facility Stable              Rom Ritchie MD  05/19/22 3068       Rom Ritchie MD  05/19/22 1793

## 2022-05-19 NOTE — ED NOTES
Patient reported itching to scalp, MD notified. Instructed to pause infusion until symptoms resolved and then restart at half rate.

## 2022-05-19 NOTE — ASSESSMENT & PLAN NOTE
Transplant Date 2/18/18. S/P washout 2/19.    -CMV Status:D+/R-   -Rejection status: Moderate Risk  -Rejection episodes: none to date. Last Biopsy : 2/14/2019 ISHLT 0, pAMR 0, 8/27/2019 ISHLT 1-2, pAMR 0, 9/24/2019 - ISHLT 0, pAMR 0, C4D -  -Last Echo: 3/11/22 Stress Echo EF 55% neg ischemia  - HLA / DSA:  3/11/22 None C1Q none  -Induction: No   -Current immunosuppression: Pred 5Qdaily, Prograf 1/1.55 mg BID, Cellcept 1250 mg BID.  Hx of intolerance to Rapamune (neutropenia) and Everolimus (cyctic acne over entire body)   -Opportunistic PPx with:N/A/  -repeat echo ordered

## 2022-05-19 NOTE — ED NOTES
Report given to [facility]. Patient VSS, NADN. Vanc infusing, cardiac monitor in place. Attempted to call report, will call back in 10-15 minutes

## 2022-05-19 NOTE — SUBJECTIVE & OBJECTIVE
Past Medical History:   Diagnosis Date    Cardiogenic shock 1/16/2017    Cardiomyopathy     Familial cardiomyopathy    CHF (congestive heart failure)     Encounter for blood transfusion     Essential hypertension 12/21/2016    Essential hypertension 3/20/2018    Familial Cardiomyopathy     Familial cardiomyopathy     Hyperlipidemia LDL goal <70 11/19/2018       Past Surgical History:   Procedure Laterality Date    BIOPSY WITH ULTRASOUND GUIDANCE N/A 8/27/2019    Procedure: BIOPSY, WITH US GUIDANCE;  Surgeon: Mario Conner MD;  Location: [facility] CATH LAB;  Service: Cardiology;  Laterality: N/A;    BIOPSY WITH ULTRASOUND GUIDANCE N/A 9/24/2019    Procedure: BIOPSY, WITH US GUIDANCE;  Surgeon: Sammi Tapia MD;  Location: [facility] CATH LAB;  Service: Cardiology;  Laterality: N/A;    BIOPSY WITH ULTRASOUND GUIDANCE N/A 11/12/2019    Procedure: BIOPSY, WITH US GUIDANCE;  Surgeon: Mario Conner MD;  Location: [facility] CATH LAB;  Service: Cardiology;  Laterality: N/A;    CORONARY ANGIOGRAPHY Right 2/14/2019    Procedure: ANGIOGRAM, CORONARY ARTERY;  Surgeon: Tello Correia MD;  Location: [facility] CATH LAB;  Service: Cardiology;  Laterality: Right;    HEART TRANSPLANT      LEFT HEART CATHETERIZATION Left 2/13/2020    Procedure: Left heart cath;  Surgeon: Guy Cartagena MD;  Location: [facility] CATH LAB;  Service: Cardiology;  Laterality: Left;    LEFT HEART CATHETERIZATION N/A 2/17/2021    Procedure: Left heart cath;  Surgeon: Guy Cartagena MD;  Location: [facility] CATH LAB;  Service: Cardiology;  Laterality: N/A;    LEFT VENTRICULAR ASSIST DEVICE      RIGHT HEART CATHETERIZATION Right 2/14/2019    Procedure: INSERTION, CATHETER, RIGHT HEART;  Surgeon: Tello Correia MD;  Location: [facility] CATH LAB;  Service: Cardiology;  Laterality: Right;    TONSILLECTOMY         Review of patient's allergies indicates:  No Known Allergies    Current Facility-Administered Medications   Medication    [START ON 5/20/2022] aspirin EC tablet 81  mg    atorvastatin tablet 20 mg    [START ON 2022] azithromycin tablet 500 mg    calcium-vitamin D3 500 mg-5 mcg (200 unit) per tablet 1 tablet    cefTRIAXone (ROCEPHIN) 2 g/50 mL D5W IVPB    famotidine tablet 40 mg    magnesium oxide tablet 400 mg    mycophenolate capsule 1,250 mg    nortriptyline capsule 10 mg    [START ON 2022] predniSONE tablet 5 mg    tacrolimus capsule 1 mg    tacrolimus capsule 1.5 mg     Family History       Problem Relation (Age of Onset)    Arthritis Mother    Birth defects Paternal Uncle    Heart disease Brother, Brother    No Known Problems Father          Tobacco Use    Smoking status: Former Smoker     Packs/day: 1.00     Quit date: 2016     Years since quittin.4    Smokeless tobacco: Never Used   Substance and Sexual Activity    Alcohol use: Yes     Comment: socially    Drug use: No    Sexual activity: Not on file     Review of Systems  Constitutional: Positive for fever. Negative for chills.   HENT: Positive for congestion. Negative for sore throat.    Respiratory: Positive for cough. Negative for shortness of breath.    Cardiovascular: Negative for chest pain.   Gastrointestinal: Negative for abdominal pain, diarrhea, nausea and vomiting.   Genitourinary: Negative for dysuria.   Musculoskeletal: Negative for back pain.   Skin: Negative for rash.   Neurological: Negative for weakness.   Hematological: Does not bruise/bleed easily.     Objective:     Vital Signs (Most Recent):  Temp: 98.4 °F (36.9 °C) (22 1440)  Pulse: 91 (22 1440)  Resp: 17 (22 1440)  BP: 131/79 (22 1440)  SpO2: 98 % (22 1440) Vital Signs (24h Range):  Temp:  [96.4 °F (35.8 °C)-99.8 °F (37.7 °C)] 98.4 °F (36.9 °C)  Pulse:  [] 91  Resp:  [15-30] 17  SpO2:  [97 %-99 %] 98 %  BP: (119-136)/(72-83) 131/79     No data found.  There is no height or weight on file to calculate BMI.    No intake or output data in the 24 hours ending 22 1501    Physical  Exam  Nursing note and vitals reviewed.  Constitutional: He appears well-developed. He is not diaphoretic. No distress.   Eyes: EOM are normal.   Neck:   Normal range of motion.  Cardiovascular:   No murmur heard.  Pulmonary/Chest: No respiratory distress.   Crackles at bilateral bases.  No wheezing.  No respiratory distress.  Good air movement.   Abdominal: He exhibits no distension. There is no abdominal tenderness.   Musculoskeletal:         General: Normal range of motion.      Cervical back: Normal range of motion.     Neurological: He is alert.   Skin: Skin is warm.   Psychiatric: He has a normal mood and affect.     Significant Labs:  CBC:  Recent Labs   Lab 05/17/22  0900 05/19/22  0734   WBC 14.07* 24.03*   RBC 5.64 5.63   HGB 16.3 16.1   HCT 49.5 49.5    238   MCV 88 88   MCH 28.9 28.6   MCHC 32.9 32.5     BNP:  Recent Labs   Lab 05/17/22  0900 05/19/22  0734   BNP 47 64     CMP:  Recent Labs   Lab 05/17/22  0900 05/19/22  0734   GLU 94 112*   CALCIUM 9.5 9.4   ALBUMIN 4.4 4.4   PROT 7.2 7.5    140   K 3.9 4.1   CO2 28 26    101   BUN 16 14   CREATININE 1.1 1.2   ALKPHOS 72 71   ALT 19 14   AST 13 13   BILITOT 0.8 1.5*      Coagulation:   Recent Labs   Lab 05/19/22  0734   INR 1.2   APTT 29.1     LDH:  No results for input(s): LDH in the last 72 hours.  Microbiology:  Microbiology Results (last 7 days)       ** No results found for the last 168 hours. **            I have reviewed all pertinent labs within the past 24 hours.    Diagnostic Results:  I have reviewed all pertinent imaging results/findings within the past 24 hours.

## 2022-05-19 NOTE — PROGRESS NOTES
Pharmacokinetic Initial Assessment: IV Vancomycin    Assessment/Plan:    Initiate intravenous vancomycin with loading dose of 2000 mg once followed by a maintenance dose of vancomycin 1000mg IV every 12 hours  Desired empiric serum trough concentration is 10 to 20 mcg/mL  Draw vancomycin trough level 60 min prior to fourth dose on 5/20/22 at approximately 2200  Pharmacy will continue to follow and monitor vancomycin.      Please contact pharmacy at extension [phone] with any questions regarding this assessment.     Thank you for the consult,   Henrietta Morrow, PharmD       Patient brief summary:  Mert Samayoa is a 32 y.o. male initiated on antimicrobial therapy with IV Vancomycin for treatment of suspected  pneumonia    Drug Allergies:   Review of patient's allergies indicates:  No Known Allergies    Actual Body Weight:   75.3 kg    Renal Function:   Estimated Creatinine Clearance: 82.6 mL/min (based on SCr of 1.2 mg/dL).,     Dialysis Method (if applicable):  N/A    CBC (last 72 hours):  Recent Labs   Lab Result Units 05/17/22  0900 05/19/22  0734   WBC K/uL 14.07* 24.03*   Hemoglobin g/dL 16.3 16.1   Hematocrit % 49.5 49.5   Platelets K/uL 241 238   Gran % % 79.0* 85.8*   Lymph % % 11.8* 4.7*   Mono % % 7.2 8.4   Eosinophil % % 1.2 0.1   Basophil % % 0.3 0.2   Differential Method  Automated Automated       Metabolic Panel (last 72 hours):  Recent Labs   Lab Result Units 05/17/22  0900 05/19/22  0734 05/19/22  0911   Sodium mmol/L 145 140  --    Potassium mmol/L 3.9 4.1  --    Chloride mmol/L 105 101  --    CO2 mmol/L 28 26  --    Glucose mg/dL 94 112*  --    Glucose, UA   --   --  Negative   BUN mg/dL 16 14  --    Creatinine mg/dL 1.1 1.2  --    Albumin g/dL 4.4 4.4  --    Total Bilirubin mg/dL 0.8 1.5*  --    Alkaline Phosphatase U/L 72 71  --    AST U/L 13 13  --    ALT U/L 19 14  --    Magnesium mg/dL 1.7 1.3*  --    Phosphorus mg/dL  --  3.1  --        Drug levels (last 3 results):  No results for input(s):  VANCOMYCINRA, VANCOMYCINPE, VANCOMYCINTR in the last 72 hours.    Microbiologic Results:  Microbiology Results (last 7 days)       Procedure Component Value Units Date/Time    Influenza A & B by Molecular [[id number]] Collected: 05/19/22 0732    Order Status: Completed Specimen: Nasopharyngeal Swab from Nasal Swab Updated: 05/19/22 0808     Influenza A, Molecular Negative     Influenza B, Molecular Negative     Flu A & B Source Nasal swab    Group A Strep, Molecular [[id number]] Collected: 05/19/22 0731    Order Status: Completed Specimen: Throat Updated: 05/19/22 0808     Group A Strep, Molecular Negative     Comment: Arcanobacterium haemolyticum and Beta Streptococcus group C   and G will not be detected by this test method.  Please order   Throat Culture ([id number]) if suspected.         Blood culture x two cultures. Draw prior to antibiotics. [[id number]] Collected: 05/19/22 0733    Order Status: Sent Specimen: Blood from Antecubital, Left Updated: 05/19/22 0734    Blood culture x two cultures. Draw prior to antibiotics. [[id number]] Collected: 05/19/22 0732    Order Status: Sent Specimen: Blood from Antecubital, Right Updated: 05/19/22 0734

## 2022-05-19 NOTE — HPI
29 yo WM prior to heart transplant suffered with familial cardiomyopathy underwent orthotopic heart transplant 2/18/18.  Now on cellcept 1250 mg BID, tacro 1.5 mg BID, prednisone 5 mg mg qd.  Left on prednisone for +C1q's, no rejection episodes.  He feels great without CV complaints.  He is being transferred from outside hospital for further management of possible community aquired pneumonia.  He presented to [facility] with complaints of three day history of fever, sinus congestion and drip with cough productive of yellow sputum.  He reported occational headache but in setting of coughing episodes.  He reports not being admitted or in any medical facility. His daughter had strep throat two weeks ago but reports he tested negative for strep.  No other sick contacts.  Denies No abdominal pain, nausea, vomiting, diarrhea, no urinary symptoms.      At outside ED; WBC: 24.03, COVID negative, Procalcitonin 0.05, Lactic acid 1.3.  CXR looked unremarkable but chest CT with contrast demonstrated focal infiltrates. He was started on Rocephin and Azithromycin

## 2022-05-19 NOTE — ASSESSMENT & PLAN NOTE
-abnormal chest CT at outside hospital  -Repeat CXR ordered here  -Continued Rocephin and Azithromycin  -ID consult

## 2022-05-19 NOTE — H&P
[facility] - Transplant Stepdown  Heart Transplant  H&P    Patient Name: Mert Samayoa  MRN: [id number]  Admission Date: 5/19/2022  Attending Physician: Kathi Ruffin DO  Primary Care Provider: Lashon Hawkins MD  Principal Problem:Community acquired pneumonia    Subjective:     History of Present Illness:  31 yo WM prior to heart transplant suffered with familial cardiomyopathy underwent orthotopic heart transplant 2/18/18.  Now on cellcept 1250 mg BID, tacro 1.5 mg BID, prednisone 5 mg mg qd.  Left on prednisone for +C1q's, no rejection episodes.  He feels great without CV complaints.  He is being transferred from outside hospital for further management of possible community aquired pneumonia.  He presented to [facility] with complaints of three day history of fever, sinus congestion and drip with cough productive of yellow sputum.  He reported occational headache but in setting of coughing episodes.  He reports not being admitted or in any medical facility. His daughter had strep throat two weeks ago but reports he tested negative for strep.  No other sick contacts.  Denies No abdominal pain, nausea, vomiting, diarrhea, no urinary symptoms.      At outside ED; WBC: 24.03, COVID negative, Procalcitonin 0.05, Lactic acid 1.3.  CXR looked unremarkable but chest CT with contrast demonstrated focal infiltrates. He was started on Rocephin and Azithromycin          Past Medical History:   Diagnosis Date    Cardiogenic shock 1/16/2017    Cardiomyopathy     Familial cardiomyopathy    CHF (congestive heart failure)     Encounter for blood transfusion     Essential hypertension 12/21/2016    Essential hypertension 3/20/2018    Familial Cardiomyopathy     Familial cardiomyopathy     Hyperlipidemia LDL goal <70 11/19/2018       Past Surgical History:   Procedure Laterality Date    BIOPSY WITH ULTRASOUND GUIDANCE N/A 8/27/2019    Procedure: BIOPSY, WITH US GUIDANCE;  Surgeon: Mario Conner MD;   Location: [facility] CATH LAB;  Service: Cardiology;  Laterality: N/A;    BIOPSY WITH ULTRASOUND GUIDANCE N/A 9/24/2019    Procedure: BIOPSY, WITH US GUIDANCE;  Surgeon: Sammi Tapia MD;  Location: [facility] CATH LAB;  Service: Cardiology;  Laterality: N/A;    BIOPSY WITH ULTRASOUND GUIDANCE N/A 11/12/2019    Procedure: BIOPSY, WITH US GUIDANCE;  Surgeon: Mario Conner MD;  Location: [facility] CATH LAB;  Service: Cardiology;  Laterality: N/A;    CORONARY ANGIOGRAPHY Right 2/14/2019    Procedure: ANGIOGRAM, CORONARY ARTERY;  Surgeon: Tello Correia MD;  Location: [facility] CATH LAB;  Service: Cardiology;  Laterality: Right;    HEART TRANSPLANT      LEFT HEART CATHETERIZATION Left 2/13/2020    Procedure: Left heart cath;  Surgeon: Guy Cartagena MD;  Location: [facility] CATH LAB;  Service: Cardiology;  Laterality: Left;    LEFT HEART CATHETERIZATION N/A 2/17/2021    Procedure: Left heart cath;  Surgeon: Guy Cartagena MD;  Location: [facility] CATH LAB;  Service: Cardiology;  Laterality: N/A;    LEFT VENTRICULAR ASSIST DEVICE      RIGHT HEART CATHETERIZATION Right 2/14/2019    Procedure: INSERTION, CATHETER, RIGHT HEART;  Surgeon: Tello Correia MD;  Location: [facility] CATH LAB;  Service: Cardiology;  Laterality: Right;    TONSILLECTOMY         Review of patient's allergies indicates:  No Known Allergies    Current Facility-Administered Medications   Medication    [START ON 5/20/2022] aspirin EC tablet 81 mg    atorvastatin tablet 20 mg    [START ON 5/20/2022] azithromycin tablet 500 mg    calcium-vitamin D3 500 mg-5 mcg (200 unit) per tablet 1 tablet    cefTRIAXone (ROCEPHIN) 2 g/50 mL D5W IVPB    famotidine tablet 40 mg    magnesium oxide tablet 400 mg    mycophenolate capsule 1,250 mg    nortriptyline capsule 10 mg    [START ON 5/20/2022] predniSONE tablet 5 mg    tacrolimus capsule 1 mg    tacrolimus capsule 1.5 mg     Family History       Problem Relation (Age of Onset)    Arthritis Mother    Birth defects  Paternal Uncle    Heart disease Brother, Brother    No Known Problems Father          Tobacco Use    Smoking status: Former Smoker     Packs/day: 1.00     Quit date: 2016     Years since quittin.4    Smokeless tobacco: Never Used   Substance and Sexual Activity    Alcohol use: Yes     Comment: socially    Drug use: No    Sexual activity: Not on file     Review of Systems  Constitutional: Positive for fever. Negative for chills.   HENT: Positive for congestion. Negative for sore throat.    Respiratory: Positive for cough. Negative for shortness of breath.    Cardiovascular: Negative for chest pain.   Gastrointestinal: Negative for abdominal pain, diarrhea, nausea and vomiting.   Genitourinary: Negative for dysuria.   Musculoskeletal: Negative for back pain.   Skin: Negative for rash.   Neurological: Negative for weakness.   Hematological: Does not bruise/bleed easily.     Objective:     Vital Signs (Most Recent):  Temp: 98.4 °F (36.9 °C) (22 1440)  Pulse: 91 (22 1440)  Resp: 17 (22 1440)  BP: 131/79 (22 1440)  SpO2: 98 % (22 1440) Vital Signs (24h Range):  Temp:  [96.4 °F (35.8 °C)-99.8 °F (37.7 °C)] 98.4 °F (36.9 °C)  Pulse:  [] 91  Resp:  [15-30] 17  SpO2:  [97 %-99 %] 98 %  BP: (119-136)/(72-83) 131/79     No data found.  There is no height or weight on file to calculate BMI.    No intake or output data in the 24 hours ending 22 1501    Physical Exam  Nursing note and vitals reviewed.  Constitutional: He appears well-developed. He is not diaphoretic. No distress.   Eyes: EOM are normal.   Neck:   Normal range of motion.  Cardiovascular:   No murmur heard.  Pulmonary/Chest: No respiratory distress.   Crackles at bilateral bases.  No wheezing.  No respiratory distress.  Good air movement.   Abdominal: He exhibits no distension. There is no abdominal tenderness.   Musculoskeletal:         General: Normal range of motion.      Cervical back: Normal range of  motion.     Neurological: He is alert.   Skin: Skin is warm.   Psychiatric: He has a normal mood and affect.     Significant Labs:  CBC:  Recent Labs   Lab 05/17/22  0900 05/19/22  0734   WBC 14.07* 24.03*   RBC 5.64 5.63   HGB 16.3 16.1   HCT 49.5 49.5    238   MCV 88 88   MCH 28.9 28.6   MCHC 32.9 32.5     BNP:  Recent Labs   Lab 05/17/22  0900 05/19/22  0734   BNP 47 64     CMP:  Recent Labs   Lab 05/17/22  0900 05/19/22  0734   GLU 94 112*   CALCIUM 9.5 9.4   ALBUMIN 4.4 4.4   PROT 7.2 7.5    140   K 3.9 4.1   CO2 28 26    101   BUN 16 14   CREATININE 1.1 1.2   ALKPHOS 72 71   ALT 19 14   AST 13 13   BILITOT 0.8 1.5*      Coagulation:   Recent Labs   Lab 05/19/22  0734   INR 1.2   APTT 29.1     LDH:  No results for input(s): LDH in the last 72 hours.  Microbiology:  Microbiology Results (last 7 days)       ** No results found for the last 168 hours. **            I have reviewed all pertinent labs within the past 24 hours.    Diagnostic Results:  I have reviewed all pertinent imaging results/findings within the past 24 hours.    Assessment/Plan:     * Community acquired pneumonia  -abnormal chest CT at outside hospital  -Repeat CXR ordered here  -Continued Rocephin and Azithromycin  -Blood cultures drawn at [facility] [place].  NGTD  -ID consult     Heart transplanted  Transplant Date 2/18/18. S/P washout 2/19.    -CMV Status:D+/R-   -Rejection status: Moderate Risk  -Rejection episodes: none to date. Last Biopsy : 2/14/2019 ISHLT 0, pAMR 0, 8/27/2019 ISHLT 1-2, pAMR 0, 9/24/2019 - ISHLT 0, pAMR 0, C4D -  -Last Echo: 3/11/22 Stress Echo EF 55% neg ischemia  - HLA / DSA:  3/11/22 None C1Q none  -Induction: No   -Current immunosuppression: Pred 5Qdaily, Prograf 1/1.55 mg BID, Cellcept 1250 mg BID.  Hx of intolerance to Rapamune (neutropenia) and Everolimus (cyctic acne over entire body)   -Opportunistic PPx with:N/A/  -repeat echo ordered     Hyperlipidemia LDL goal <70  -continue home dose of  Lipitor    Migraine with aura  -On rimegepant 75mg prn at home.  Not ordered here but can at patients request         HAKEEM Arriola  Heart Transplant  [facility] - [facility]

## 2022-05-20 VITALS
OXYGEN SATURATION: 95 % | HEART RATE: 95 BPM | BODY MASS INDEX: 25.9 KG/M2 | SYSTOLIC BLOOD PRESSURE: 134 MMHG | HEIGHT: 67 IN | WEIGHT: 165 LBS | TEMPERATURE: 98 F | RESPIRATION RATE: 25 BRPM | DIASTOLIC BLOOD PRESSURE: 82 MMHG

## 2022-05-20 LAB
ADENOVIRUS: NOT DETECTED
ANION GAP SERPL CALC-SCNC: 7 MMOL/L (ref 8–16)
ASCENDING AORTA: 3.05 CM
AV INDEX (PROSTH): 0.85
AV MEAN GRADIENT: 2 MMHG
AV PEAK GRADIENT: 5 MMHG
AV VALVE AREA: 4.29 CM2
AV VELOCITY RATIO: 0.75
BASOPHILS # BLD AUTO: 0.06 K/UL (ref 0–0.2)
BASOPHILS NFR BLD: 0.4 % (ref 0–1.9)
BORDETELLA PARAPERTUSSIS (IS1001): NOT DETECTED
BORDETELLA PERTUSSIS (PTXP): NOT DETECTED
BSA FOR ECHO PROCEDURE: 1.88 M2
BUN SERPL-MCNC: 13 MG/DL (ref 6–20)
C1Q1 TESTING DATE: NORMAL
C1Q2 TESTING DATE: NORMAL
CALCIUM SERPL-MCNC: 9.3 MG/DL (ref 8.7–10.5)
CHLAMYDIA PNEUMONIAE: NOT DETECTED
CHLORIDE SERPL-SCNC: 104 MMOL/L (ref 95–110)
CLASS I ANTIBODY COMMENTS - LUMINEX: NORMAL
CLASS I ANTIBODY COMMENTS - LUMINEX: NORMAL
CLASS II ANTIBODY COMMENTS - LUMINEX: NORMAL
CLASS II ANTIBODY COMMENTS - LUMINEX: NORMAL
CO2 SERPL-SCNC: 27 MMOL/L (ref 23–29)
CORONAVIRUS 229E, COMMON COLD VIRUS: NOT DETECTED
CORONAVIRUS HKU1, COMMON COLD VIRUS: NOT DETECTED
CORONAVIRUS NL63, COMMON COLD VIRUS: NOT DETECTED
CORONAVIRUS OC43, COMMON COLD VIRUS: NOT DETECTED
CREAT SERPL-MCNC: 0.9 MG/DL (ref 0.5–1.4)
CV ECHO LV RWT: 0.42 CM
DIFFERENTIAL METHOD: ABNORMAL
DOP CALC AO PEAK VEL: 1.16 M/S
DOP CALC AO VTI: 17.49 CM
DOP CALC LVOT AREA: 5 CM2
DOP CALC LVOT DIAMETER: 2.53 CM
DOP CALC LVOT PEAK VEL: 0.87 M/S
DOP CALC LVOT STROKE VOLUME: 75.07 CM3
DOP CALCLVOT PEAK VEL VTI: 14.94 CM
DSA1 TESTING DATE: NORMAL
DSA12 TESTING DATE: NORMAL
DSA2 TESTING DATE: NORMAL
E WAVE DECELERATION TIME: 173.15 MSEC
E/A RATIO: 2.46
E/E' RATIO: 13.23 M/S
ECHO LV POSTERIOR WALL: 0.82 CM (ref 0.6–1.1)
EJECTION FRACTION: 65 %
EOSINOPHIL # BLD AUTO: 0.2 K/UL (ref 0–0.5)
EOSINOPHIL NFR BLD: 1.1 % (ref 0–8)
ERYTHROCYTE [DISTWIDTH] IN BLOOD BY AUTOMATED COUNT: 13.7 % (ref 11.5–14.5)
EST. GFR  (AFRICAN AMERICAN): >60 ML/MIN/1.73 M^2
EST. GFR  (NON AFRICAN AMERICAN): >60 ML/MIN/1.73 M^2
FLUBV RNA NPH QL NAA+NON-PROBE: NOT DETECTED
FRACTIONAL SHORTENING: 32 % (ref 28–44)
GLUCOSE SERPL-MCNC: 106 MG/DL (ref 70–110)
HC1Q TESTING DATE: NORMAL
HCT VFR BLD AUTO: 45.8 % (ref 40–54)
HGB BLD-MCNC: 15 G/DL (ref 14–18)
HPIV1 RNA NPH QL NAA+NON-PROBE: NOT DETECTED
HPIV2 RNA NPH QL NAA+NON-PROBE: NOT DETECTED
HPIV3 RNA NPH QL NAA+NON-PROBE: NOT DETECTED
HPIV4 RNA NPH QL NAA+NON-PROBE: NOT DETECTED
HUMAN METAPNEUMOVIRUS: NOT DETECTED
IMM GRANULOCYTES # BLD AUTO: 0.11 K/UL (ref 0–0.04)
IMM GRANULOCYTES NFR BLD AUTO: 0.7 % (ref 0–0.5)
INFLUENZA A (SUBTYPES H1,H1-2009,H3): NOT DETECTED
INTERVENTRICULAR SEPTUM: 0.97 CM (ref 0.6–1.1)
LEFT INTERNAL DIMENSION IN SYSTOLE: 2.63 CM (ref 2.1–4)
LEFT VENTRICLE DIASTOLIC VOLUME INDEX: 35.09 ML/M2
LEFT VENTRICLE DIASTOLIC VOLUME: 65.27 ML
LEFT VENTRICLE MASS INDEX: 56 G/M2
LEFT VENTRICLE SYSTOLIC VOLUME INDEX: 13.6 ML/M2
LEFT VENTRICLE SYSTOLIC VOLUME: 25.28 ML
LEFT VENTRICULAR INTERNAL DIMENSION IN DIASTOLE: 3.88 CM (ref 3.5–6)
LEFT VENTRICULAR MASS: 103.66 G
LV LATERAL E/E' RATIO: 17.2 M/S
LV SEPTAL E/E' RATIO: 10.75 M/S
LYMPHOCYTES # BLD AUTO: 1.3 K/UL (ref 1–4.8)
LYMPHOCYTES NFR BLD: 8.6 % (ref 18–48)
MCH RBC QN AUTO: 28.6 PG (ref 27–31)
MCHC RBC AUTO-ENTMCNC: 32.8 G/DL (ref 32–36)
MCV RBC AUTO: 87 FL (ref 82–98)
MONOCYTES # BLD AUTO: 1.1 K/UL (ref 0.3–1)
MONOCYTES NFR BLD: 7 % (ref 4–15)
MV PEAK A VEL: 0.35 M/S
MV PEAK E VEL: 0.86 M/S
MV STENOSIS PRESSURE HALF TIME: 50.21 MS
MV VALVE AREA P 1/2 METHOD: 4.38 CM2
MYCOPLASMA PNEUMONIAE: NOT DETECTED
NEUTROPHILS # BLD AUTO: 12.4 K/UL (ref 1.8–7.7)
NEUTROPHILS NFR BLD: 82.2 % (ref 38–73)
NRBC BLD-RTO: 0 /100 WBC
PLATELET # BLD AUTO: 216 K/UL (ref 150–450)
PMV BLD AUTO: 10.2 FL (ref 9.2–12.9)
POTASSIUM SERPL-SCNC: 4.7 MMOL/L (ref 3.5–5.1)
RA PRESSURE: 3 MMHG
RBC # BLD AUTO: 5.25 M/UL (ref 4.6–6.2)
RESPIRATORY INFECTION PANEL SOURCE: NORMAL
RIGHT VENTRICULAR END-DIASTOLIC DIMENSION: 3.88 CM
RSV RNA NPH QL NAA+NON-PROBE: NOT DETECTED
RV+EV RNA NPH QL NAA+NON-PROBE: NOT DETECTED
SARS-COV-2 RNA RESP QL NAA+PROBE: NOT DETECTED
SERUM COLLECTION DT - LUMINEX CLASS I: NORMAL
SERUM COLLECTION DT - LUMINEX CLASS I: NORMAL
SERUM COLLECTION DT - LUMINEX CLASS II: NORMAL
SERUM COLLECTION DT - LUMINEX CLASS II: NORMAL
SINUS: 3.28 CM
SODIUM SERPL-SCNC: 138 MMOL/L (ref 136–145)
STJ: 2.88 CM
TACROLIMUS BLD-MCNC: 11.4 NG/ML (ref 5–15)
TACROLIMUS BLD-MCNC: 12.8 NG/ML (ref 5–15)
TDI LATERAL: 0.05 M/S
TDI SEPTAL: 0.08 M/S
TDI: 0.07 M/S
TRICUSPID ANNULAR PLANE SYSTOLIC EXCURSION: 0.98 CM
WBC # BLD AUTO: 15.08 K/UL (ref 3.9–12.7)

## 2022-05-20 PROCEDURE — 25000003 PHARM REV CODE 250: Performed by: PHYSICIAN ASSISTANT

## 2022-05-20 PROCEDURE — 99217 PR OBSERVATION CARE DISCHARGE: ICD-10-PCS | Mod: ,,, | Performed by: PHYSICIAN ASSISTANT

## 2022-05-20 PROCEDURE — 63700000 PHARM REV CODE 250 ALT 637 W/O HCPCS: Performed by: PHYSICIAN ASSISTANT

## 2022-05-20 PROCEDURE — 36415 COLL VENOUS BLD VENIPUNCTURE: CPT | Performed by: PHYSICIAN ASSISTANT

## 2022-05-20 PROCEDURE — 99217 PR OBSERVATION CARE DISCHARGE: CPT | Mod: ,,, | Performed by: PHYSICIAN ASSISTANT

## 2022-05-20 PROCEDURE — 25000003 PHARM REV CODE 250: Performed by: INTERNAL MEDICINE

## 2022-05-20 PROCEDURE — 80048 BASIC METABOLIC PNL TOTAL CA: CPT | Performed by: PHYSICIAN ASSISTANT

## 2022-05-20 PROCEDURE — 99204 OFFICE O/P NEW MOD 45 MIN: CPT | Mod: ,,, | Performed by: INTERNAL MEDICINE

## 2022-05-20 PROCEDURE — G0378 HOSPITAL OBSERVATION PER HR: HCPCS

## 2022-05-20 PROCEDURE — 93010 EKG 12-LEAD: ICD-10-PCS | Mod: ,,, | Performed by: INTERNAL MEDICINE

## 2022-05-20 PROCEDURE — 96365 THER/PROPH/DIAG IV INF INIT: CPT

## 2022-05-20 PROCEDURE — 80197 ASSAY OF TACROLIMUS: CPT | Performed by: PHYSICIAN ASSISTANT

## 2022-05-20 PROCEDURE — 63600175 PHARM REV CODE 636 W HCPCS: Performed by: PHYSICIAN ASSISTANT

## 2022-05-20 PROCEDURE — 99204 PR OFFICE/OUTPT VISIT, NEW, LEVL IV, 45-59 MIN: ICD-10-PCS | Mod: ,,, | Performed by: INTERNAL MEDICINE

## 2022-05-20 PROCEDURE — 93005 ELECTROCARDIOGRAM TRACING: CPT

## 2022-05-20 PROCEDURE — 85025 COMPLETE CBC W/AUTO DIFF WBC: CPT | Performed by: PHYSICIAN ASSISTANT

## 2022-05-20 PROCEDURE — 87798 DETECT AGENT NOS DNA AMP: CPT | Performed by: STUDENT IN AN ORGANIZED HEALTH CARE EDUCATION/TRAINING PROGRAM

## 2022-05-20 PROCEDURE — 93010 ELECTROCARDIOGRAM REPORT: CPT | Mod: ,,, | Performed by: INTERNAL MEDICINE

## 2022-05-20 RX ORDER — ACETAMINOPHEN 325 MG/1
650 TABLET ORAL EVERY 6 HOURS PRN
Status: DISCONTINUED | OUTPATIENT
Start: 2022-05-20 | End: 2022-05-20 | Stop reason: HOSPADM

## 2022-05-20 RX ORDER — TACROLIMUS 1 MG/1
1 CAPSULE ORAL EVERY MORNING
Status: DISCONTINUED | OUTPATIENT
Start: 2022-05-20 | End: 2022-05-20 | Stop reason: HOSPADM

## 2022-05-20 RX ORDER — LEVOFLOXACIN 750 MG/1
750 TABLET ORAL DAILY
Qty: 7 TABLET | Refills: 0 | Status: SHIPPED | OUTPATIENT
Start: 2022-05-20 | End: 2022-09-02 | Stop reason: ALTCHOICE

## 2022-05-20 RX ADMIN — CEFTRIAXONE 2 G: 2 INJECTION, SOLUTION INTRAVENOUS at 09:05

## 2022-05-20 RX ADMIN — PREDNISONE 5 MG: 5 TABLET ORAL at 09:05

## 2022-05-20 RX ADMIN — ACETAMINOPHEN 650 MG: 325 TABLET ORAL at 02:05

## 2022-05-20 RX ADMIN — Medication 1 TABLET: at 09:05

## 2022-05-20 RX ADMIN — Medication 400 MG: at 09:05

## 2022-05-20 RX ADMIN — AZITHROMYCIN MONOHYDRATE 500 MG: 250 TABLET ORAL at 09:05

## 2022-05-20 RX ADMIN — MYCOPHENOLATE MOFETIL 1250 MG: 250 CAPSULE ORAL at 09:05

## 2022-05-20 RX ADMIN — TACROLIMUS 1 MG: 1 CAPSULE ORAL at 09:05

## 2022-05-20 RX ADMIN — ASPIRIN 81 MG: 81 TABLET, COATED ORAL at 09:05

## 2022-05-20 NOTE — ASSESSMENT & PLAN NOTE
Transplant Date 2/18/18. S/P washout 2/19.    -CMV Status:D+/R-   -Rejection status: Moderate Risk  -Rejection episodes: none to date. Last Biopsy : 2/14/2019 ISHLT 0, pAMR 0, 8/27/2019 ISHLT 1-2, pAMR 0, 9/24/2019 - ISHLT 0, pAMR 0, C4D -  -Last Echo: 3/11/22 Stress Echo EF 55% neg ischemia  -Repeat echo 5/20/22: EF: 65%, normal RV size with normal function  - HLA / DSA:  3/11/22 None C1Q none  -Induction: No   -Current immunosuppression: Pred 5Qdaily, Prograf 1/1.55 mg BID, Cellcept 1250 mg BID.  Hx of intolerance to Rapamune (neutropenia) and Everolimus (cyctic acne over entire body)   -Tacro level pending this am   -Opportunistic PPx with:N/A/  -repeat echo ordered

## 2022-05-20 NOTE — PROGRESS NOTES
[facility] - [facility]plant Stepdown  Heart Transplant  Progress Note    Patient Name: Mert Samayoa  MRN: [id number]  Admission Date: 5/19/2022  Hospital Length of Stay: 1 days  Attending Physician: Kathi Ruffin DO  Primary Care Provider: Lashon Hawkins MD  Principal Problem:Community acquired pneumonia    Subjective:     Interval History: patient reports feeling better today.  He was continued on Rocephin and Azithro on admit and ID consulted.  Ok to discharge on Levofloxacin or Cefadroxil with Azithrombyin for total of 7 days of therapy.  Will discharge patient today.  Echo done during admission and looks good.     Continuous Infusions:  Scheduled Meds:   aspirin  81 mg Oral Daily    atorvastatin  20 mg Oral QHS    azithromycin  500 mg Oral Daily    calcium-vitamin D3  1 tablet Oral Daily    cefTRIAXone (ROCEPHIN) IVPB  2 g Intravenous Q24H    famotidine  40 mg Oral QHS    magnesium oxide  400 mg Oral BID    mycophenolate  1,250 mg Oral BID    nortriptyline  10 mg Oral QHS    predniSONE  5 mg Oral Daily    tacrolimus  1 mg Oral Daily AM    tacrolimus  1.5 mg Oral Daily PM     PRN Meds:acetaminophen    Review of patient's allergies indicates:  No Known Allergies  Objective:     Vital Signs (Most Recent):  Temp: 97.6 °F (36.4 °C) (05/20/22 1059)  Pulse: 87 (05/20/22 0900)  Resp: 16 (05/20/22 0900)  BP: (!) 140/75 (05/20/22 0900)  SpO2: 97 % (05/20/22 0900)   Vital Signs (24h Range):  Temp:  [97.6 °F (36.4 °C)-98.7 °F (37.1 °C)] 97.6 °F (36.4 °C)  Pulse:  [83-93] 87  Resp:  [16-30] 16  SpO2:  [96 %-98 %] 97 %  BP: (119-150)/(65-83) 140/75     Patient Vitals for the past 72 hrs (Last 3 readings):   Weight   05/20/22 0900 74.8 kg (165 lb)   05/20/22 0500 75.2 kg (165 lb 12.6 oz)   05/19/22 1500 75.3 kg (166 lb 0.1 oz)     Body mass index is 25.84 kg/m².      Intake/Output Summary (Last 24 hours) at 5/20/2022 1108  Last data filed at 5/20/2022 0500  Gross per 24 hour   Intake 600 ml   Output 3 ml    Net 597 ml       Hemodynamic Parameters:       Telemetry: reviewed  Physical Exam  Constitutional:       Appearance: Normal appearance.   HENT:      Head: Normocephalic and atraumatic.      Nose: Nose normal.      Mouth/Throat:      Mouth: Mucous membranes are moist.      Pharynx: Oropharynx is clear.   Eyes:      Extraocular Movements: Extraocular movements intact.      Conjunctiva/sclera: Conjunctivae normal.      Pupils: Pupils are equal, round, and reactive to light.   Cardiovascular:      Rate and Rhythm: Normal rate and regular rhythm.      Pulses: Normal pulses.      Heart sounds: Normal heart sounds.   Pulmonary:      Effort: Pulmonary effort is normal. No respiratory distress.      Comments: Crackles at bases  Abdominal:      General: Abdomen is flat. Bowel sounds are normal.      Palpations: Abdomen is soft.   Musculoskeletal:         General: Normal range of motion.      Cervical back: Normal range of motion and neck supple.   Skin:     General: Skin is warm and dry.   Neurological:      General: No focal deficit present.      Mental Status: He is alert and oriented to person, place, and time.   Psychiatric:         Mood and Affect: Mood normal.         Behavior: Behavior normal.     Significant Labs:  CBC:  Recent Labs   Lab 05/17/22  0900 05/19/22  0734 05/20/22  0704   WBC 14.07* 24.03* 15.08*   RBC 5.64 5.63 5.25   HGB 16.3 16.1 15.0   HCT 49.5 49.5 45.8    238 216   MCV 88 88 87   MCH 28.9 28.6 28.6   MCHC 32.9 32.5 32.8     BNP:  Recent Labs   Lab 05/17/22  0900 05/19/22  0734   BNP 47 64     CMP:  Recent Labs   Lab 05/17/22  0900 05/19/22  0734 05/20/22  0704   GLU 94 112* 106   CALCIUM 9.5 9.4 9.3   ALBUMIN 4.4 4.4  --    PROT 7.2 7.5  --     140 138   K 3.9 4.1 4.7   CO2 28 26 27    101 104   BUN 16 14 13   CREATININE 1.1 1.2 0.9   ALKPHOS 72 71  --    ALT 19 14  --    AST 13 13  --    BILITOT 0.8 1.5*  --       Coagulation:   Recent Labs   Lab 05/19/22  0734   INR 1.2   APTT  29.1     LDH:  No results for input(s): LDH in the last 72 hours.  Microbiology:  Microbiology Results (last 7 days)       Procedure Component Value Units Date/Time    Respiratory Infection Panel (PCR), Nasopharyngeal [[id number]]     Order Status: No result Specimen: Nasopharyngeal Swab             I have reviewed all pertinent labs within the past 24 hours.    Estimated Creatinine Clearance: 110.2 mL/min (based on SCr of 0.9 mg/dL).    Diagnostic Results:  I have reviewed all pertinent imaging results/findings within the past 24 hours.    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  Now on cellcept 1250 mg BID, tacro 1.5 mg BID, prednisone 5 mg mg qd.  Left on prednisone for +C1q's, no rejection episodes.  He feels great without CV complaints.  He is being transferred from outside hospital for further management of possible community aquired pneumonia.  He presented to [facility] with complaints of three day history of fever, sinus congestion and drip with cough productive of yellow sputum.  He reported occational headache but in setting of coughing episodes.  He reports not being admitted or in any medical facility. His daughter had strep throat two weeks ago but reports he tested negative for strep.  No other sick contacts.  Denies No abdominal pain, nausea, vomiting, diarrhea, no urinary symptoms.      At outside ED; WBC: 24.03, COVID negative, Procalcitonin 0.05, Lactic acid 1.3.  CXR looked unremarkable but chest CT with contrast demonstrated focal infiltrates. He was started on Rocephin and Azithromycin          * Community acquired pneumonia  -abnormal chest CT at outside hospital  -Repeat CXR ordered here and consistent with patchy consolidation in right middle and left lower lobes,  -Continued Rocephin and Azithromycin on admit  -ID consulted and ok to change Rocephin to either Levofloxacin or  Cefadroxil with Azithromycin for total of 7 days of therapy.   -Will discharge to home today on above regimen     Heart transplanted  Transplant Date 2/18/18. S/P washout 2/19.    -CMV Status:D+/R-   -Rejection status: Moderate Risk  -Rejection episodes: none to date. Last Biopsy : 2/14/2019 ISHLT 0, pAMR 0, 8/27/2019 ISHLT 1-2, pAMR 0, 9/24/2019 - ISHLT 0, pAMR 0, C4D -  -Last Echo: 3/11/22 Stress Echo EF 55% neg ischemia  -Repeat echo 5/20/22: EF: 65%, normal RV size with normal function  - HLA / DSA:  3/11/22 None C1Q none  -Induction: No   -Current immunosuppression: Pred 5Qdaily, Prograf 1/1.55 mg BID, Cellcept 1250 mg BID.  Hx of intolerance to Rapamune (neutropenia) and Everolimus (cyctic acne over entire body)   -Tacro level pending this am   -Opportunistic PPx with:N/A/  -repeat echo ordered     Hyperlipidemia LDL goal <70  -continue home dose of Lipitor    Migraine with aura  -On rimegepant 75mg prn at home.  Not ordered here but can at patients request         HAKEEM Arriola  Heart Transplant  [facility] - Transplant [facility]

## 2022-05-20 NOTE — PROGRESS NOTES
ECHO performed at bedside this am, EF 65%. IV rocephin and PO azithromycin given. AVS printout provided to pt and reviewed at bedside. Questions encouraged and answered accordingly. Pt verbalized understanding of AVS. Pt to  Levaquin prescription from pharmacy. VSS on ViSi monitor, afebrile. Tele and ViSi removed, PIV removed. Pt ambulated off unit with his spouse and belongings. No apparent signs of distress were noted.

## 2022-05-20 NOTE — DISCHARGE SUMMARY
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  Now on cellcept 1250 mg BID, tacro 1.5 mg BID, prednisone 5 mg mg qd.  Left on prednisone for +C1q's, no rejection episodes.  He feels great without CV complaints.  He is being transferred from outside hospital for further management of possible community aquired pneumonia.  He presented to [facility] with complaints of three day history of fever, sinus congestion and drip with cough productive of yellow sputum.  He reported occational headache but in setting of coughing episodes.  He reports not being admitted or in any medical facility. His daughter had strep throat two weeks ago but reports he tested negative for strep.  No other sick contacts.  Denies No abdominal pain, nausea, vomiting, diarrhea, no urinary symptoms.      At outside ED; WBC: 24.03, COVID negative, Procalcitonin 0.05, Lactic acid 1.3.  CXR looked unremarkable but chest CT with contrast demonstrated focal infiltrates. He was started on Rocephin and Azithromycin          * No surgery found *     Hospital Course: Patient transferred for further management of community acquired pneumonia after presenting to outside hospital with complaints of cough, fever and CT that showed focal infiltrates.  Upon admit; he was continued on Azithromycin and Rocephin and CXR confirmed patchy consolidation in the right middle and left lower lobes.  ID recommended transitioning to Levofloxacin if ECG was stable or Cefadroxil with Azithromycin for total of 7 days of therapy.  ECG  looked good so plan to discharge on Levofloxacin for total of 7 days for management of community acquired pneumonia.      His Tacro level was mildly elevated but dosing was not appropriate with lab timing so will follow it up as an outpatient.  Echo done showed normal EF: 65% with normal RV size and normal function    Patient is discharged in stable condition to home and will arrange follow up in transplant clinic as appropriate.       Goals of Care Treatment Preferences:  Code Status: Full Code      Consults (From admission, onward)        Status Ordering Provider     Inpatient consult to Infectious Diseases  Once        Provider:  (Not yet assigned)    Completed THAD HUSSEIN          Significant Diagnostic Studies: see chart for full details  CXR: 5/19/22  Echo: 5/20/22    Pending Diagnostic Studies:     None        Final Active Diagnoses:    Diagnosis Date Noted POA    PRINCIPAL PROBLEM:  Community acquired pneumonia [J18.9] 05/19/2022 Unknown    Heart transplanted [Z94.1] 02/18/2018 Not Applicable    Hyperlipidemia LDL goal <70 [E78.5] 11/19/2018 Yes    Migraine with aura [G43.109] 06/12/2019 Yes      Problems Resolved During this Admission:      Discharged Condition: stable    Disposition: Home or Self Care    Follow Up:    Patient Instructions:      Diet Adult Regular     Notify your health care provider if you experience any of the following:  temperature >100.4     Notify your health care provider if you experience any of the following:  difficulty breathing or increased cough     Notify your health care provider if you experience any of the following:  persistent dizziness, light-headedness, or visual disturbances     Activity as tolerated     Medications:  Reconciled Home Medications:      Medication List      START taking these medications    levoFLOXacin 750 MG tablet  Commonly known as: LEVAQUIN  Take 1 tablet (750 mg total) by mouth once daily.        CONTINUE taking these medications     aspirin 81 MG EC tablet  Commonly known as: ECOTRIN  Take 1 tablet (81 mg total) by mouth once daily.     atorvastatin 20 MG tablet  Commonly known as: LIPITOR  TAKE ONE TABLET BY MOUTH EVERY DAY FOR CHOLESTEROL --CALL 3 DAYS EARLY TO REFILL--SPECIAL ORDER--     calcium carbonate-vit D3-min 600 mg calcium- 200 unit Tab  Take 1 tablet by mouth once daily.     clindamycin 1 % external solution  Commonly known as: CLEOCIN T  Apply topically 2 (two) times daily. Prn pimples     famotidine 40 MG tablet  Commonly known as: PEPCID  TAKE ONE TABLET BY MOUTH EVERY EVENING     magnesium oxide 400 mg (241.3 mg magnesium) tablet  Commonly known as: MAG-OX  Take 1 tablet (400 mg total) by mouth 2 (two) times daily.     mycophenolate 250 mg Cap  Commonly known as: CELLCEPT  TAKE 5 CAPSULES (1,250 MG TOTAL) BY MOUTH 2 (TWO) TIMES DAILY. --CALL 3 DAYS EARLY TO REFILL--SPECIAL ORDER--     nortriptyline 10 MG capsule  Commonly known as: PAMELOR  TAKE 1 CAPSULE BY MOUTH EVERY EVENING. --CALL 3 DAYS EARLY TO REFILL--SPECIAL ORDER--     NURTEC 75 mg odt  Generic drug: rimegepant  Take 1 tablet (75 mg total) by mouth daily as needed for Migraine. Place ODT tablet on the tongue; alternatively the ODT tablet may be placed under the tongue     predniSONE 5 MG tablet  Commonly known as: DELTASONE  TAKE 1 TABLET BY MOUTH ONCE DAILY WITH FOOD     tacrolimus 0.5 MG Cap  Commonly known as: PROGRAF  TAKE TWO CAPSULES BY MOUTH IN THE MORNING AND TAKE THREE CAPSULES IN THE EVENING --CALL 3 DAYS EARLY TO REFILL--SPECIAL ORDER--        STOP taking these medications    amoxicillin 500 MG capsule  Commonly known as: AMOXIL            HAKEEM Arriola  Heart Transplant  [facility] - [facility] [place]

## 2022-05-20 NOTE — HOSPITAL COURSE
Patient transferred for further management of community acquired pneumonia after presenting to outside hospital with complaints of cough, fever and CT that showed focal infiltrates.  Upon admit; he was continued on Azithromycin and Rocephin and CXR confirmed patchy consolidation in the right middle and left lower lobes.  ID recommended transitioning to Levofloxacin if ECG was stable or Cefadroxil with Azithromycin for total of 7 days of therapy.  ECG looked good so plan to discharge on Levofloxacin for total of 7 days for management of community acquired pneumonia.      His Tacro level was mildly elevated but dosing was not appropriate with lab timing so will follow it up as an outpatient.  Echo done showed normal EF: 65% with normal RV size and normal function    Patient is discharged in stable condition to home and will arrange follow up in transplant clinic as appropriate.

## 2022-05-20 NOTE — ASSESSMENT & PLAN NOTE
-abnormal chest CT at outside hospital  -Repeat CXR ordered here and consistent with patchy consolidation in right middle and left lower lobes,  -Continued Rocephin and Azithromycin on admit  -ID consulted and ok to change Rocephin to either Levofloxacin or Cefadroxil with Azithromycin for total of 7 days of therapy.   -Will discharge to home today on above regimen

## 2022-05-20 NOTE — SUBJECTIVE & OBJECTIVE
Interval History: patient reports feeling better today.  He was continued on Rocephin and Azithro on admit and ID consulted.  Ok to discharge on Levofloxacin or Cefadroxil with Azithrombyin for total of 7 days of therapy.  Will discharge patient today.  Echo done during admission and looks good.     Continuous Infusions:  Scheduled Meds:   aspirin  81 mg Oral Daily    atorvastatin  20 mg Oral QHS    azithromycin  500 mg Oral Daily    calcium-vitamin D3  1 tablet Oral Daily    cefTRIAXone (ROCEPHIN) IVPB  2 g Intravenous Q24H    famotidine  40 mg Oral QHS    magnesium oxide  400 mg Oral BID    mycophenolate  1,250 mg Oral BID    nortriptyline  10 mg Oral QHS    predniSONE  5 mg Oral Daily    tacrolimus  1 mg Oral Daily AM    tacrolimus  1.5 mg Oral Daily PM     PRN Meds:acetaminophen    Review of patient's allergies indicates:  No Known Allergies  Objective:     Vital Signs (Most Recent):  Temp: 97.6 °F (36.4 °C) (05/20/22 1059)  Pulse: 87 (05/20/22 0900)  Resp: 16 (05/20/22 0900)  BP: (!) 140/75 (05/20/22 0900)  SpO2: 97 % (05/20/22 0900)   Vital Signs (24h Range):  Temp:  [97.6 °F (36.4 °C)-98.7 °F (37.1 °C)] 97.6 °F (36.4 °C)  Pulse:  [83-93] 87  Resp:  [16-30] 16  SpO2:  [96 %-98 %] 97 %  BP: (119-150)/(65-83) 140/75     Patient Vitals for the past 72 hrs (Last 3 readings):   Weight   05/20/22 0900 74.8 kg (165 lb)   05/20/22 0500 75.2 kg (165 lb 12.6 oz)   05/19/22 1500 75.3 kg (166 lb 0.1 oz)     Body mass index is 25.84 kg/m².      Intake/Output Summary (Last 24 hours) at 5/20/2022 1108  Last data filed at 5/20/2022 0500  Gross per 24 hour   Intake 600 ml   Output 3 ml   Net 597 ml       Hemodynamic Parameters:       Telemetry: reviewed  Physical Exam  Constitutional:       Appearance: Normal appearance.   HENT:      Head: Normocephalic and atraumatic.      Nose: Nose normal.      Mouth/Throat:      Mouth: Mucous membranes are moist.      Pharynx: Oropharynx is clear.   Eyes:      Extraocular Movements:  Extraocular movements intact.      Conjunctiva/sclera: Conjunctivae normal.      Pupils: Pupils are equal, round, and reactive to light.   Cardiovascular:      Rate and Rhythm: Normal rate and regular rhythm.      Pulses: Normal pulses.      Heart sounds: Normal heart sounds.   Pulmonary:      Effort: Pulmonary effort is normal. No respiratory distress.      Comments: Crackles at bases  Abdominal:      General: Abdomen is flat. Bowel sounds are normal.      Palpations: Abdomen is soft.   Musculoskeletal:         General: Normal range of motion.      Cervical back: Normal range of motion and neck supple.   Skin:     General: Skin is warm and dry.   Neurological:      General: No focal deficit present.      Mental Status: He is alert and oriented to person, place, and time.   Psychiatric:         Mood and Affect: Mood normal.         Behavior: Behavior normal.     Significant Labs:  CBC:  Recent Labs   Lab 05/17/22  0900 05/19/22  0734 05/20/22  0704   WBC 14.07* 24.03* 15.08*   RBC 5.64 5.63 5.25   HGB 16.3 16.1 15.0   HCT 49.5 49.5 45.8    238 216   MCV 88 88 87   MCH 28.9 28.6 28.6   MCHC 32.9 32.5 32.8     BNP:  Recent Labs   Lab 05/17/22  0900 05/19/22  0734   BNP 47 64     CMP:  Recent Labs   Lab 05/17/22  0900 05/19/22  0734 05/20/22  0704   GLU 94 112* 106   CALCIUM 9.5 9.4 9.3   ALBUMIN 4.4 4.4  --    PROT 7.2 7.5  --     140 138   K 3.9 4.1 4.7   CO2 28 26 27    101 104   BUN 16 14 13   CREATININE 1.1 1.2 0.9   ALKPHOS 72 71  --    ALT 19 14  --    AST 13 13  --    BILITOT 0.8 1.5*  --       Coagulation:   Recent Labs   Lab 05/19/22  0734   INR 1.2   APTT 29.1     LDH:  No results for input(s): LDH in the last 72 hours.  Microbiology:  Microbiology Results (last 7 days)       Procedure Component Value Units Date/Time    Respiratory Infection Panel (PCR), Nasopharyngeal [[id number]]     Order Status: No result Specimen: Nasopharyngeal Swab             I have reviewed all pertinent labs  within the past 24 hours.    Estimated Creatinine Clearance: 110.2 mL/min (based on SCr of 0.9 mg/dL).    Diagnostic Results:  I have reviewed all pertinent imaging results/findings within the past 24 hours.

## 2022-05-20 NOTE — CONSULTS
[facility] Diseases Consult Note    Primary Attending Physician: Kathi Ruffin DO  Consultant Attending: Elijah Hsu MD  Consultant Fellow: Ramone Cotter MD    Assessment/Plan:     CAP  -RIP PCR pending  -5/19 EKG QTc 417  -Ok for dc from ID standpoint  -Levofloxacin PO 750mg q24h OR  -Cefadroxil PO 1000mg q12h and azithromycin PO 500mg QD  -either above regimen is fine, duration for either regimen is 7 days    Thank you for allowing us to participate in the care of this patient. Please contact me if you have any questions regarding this consult.    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  Now on cellcept 1250 mg BID, tacro 1.5 mg BID, prednisone 5 mg mg qd.  Left on prednisone for +C1q's, no rejection episodes.   5/19 transferred from outside hospital for further management of community aquired pneumonia.  Was [facility] with complaints of three day history of fever, sinus congestion and drip with cough productive of yellow sputum.   Daughter had strep throat two weeks ago.  No other sick contacts.  CT chest showed LLL infiltrates. Flu negative, strep negative, sx and wbc improved sp vanc, ceftriaxone, azithro. Continued on ceftriaxone and azithro. ID consulted for CAP in pt w/ prev OHT on tacro/mmf/steroids.    Past Medical History:  Past Medical History:   Diagnosis Date    Cardiogenic shock 1/16/2017    Cardiomyopathy     Familial cardiomyopathy    CHF (congestive heart failure)     Encounter for blood transfusion     Essential hypertension 12/21/2016    Essential hypertension 3/20/2018    Familial Cardiomyopathy     Familial cardiomyopathy     Hyperlipidemia LDL goal <70 11/19/2018       Past Surgical History:  Past Surgical History:   Procedure  Laterality Date    BIOPSY WITH ULTRASOUND GUIDANCE N/A 8/27/2019    Procedure: BIOPSY, WITH US GUIDANCE;  Surgeon: Mario Conner MD;  Location: [facility] CATH LAB;  Service: Cardiology;  Laterality: N/A;    BIOPSY WITH ULTRASOUND GUIDANCE N/A 9/24/2019    Procedure: BIOPSY, WITH US GUIDANCE;  Surgeon: Sammi Tapia MD;  Location: [facility] CATH LAB;  Service: Cardiology;  Laterality: N/A;    BIOPSY WITH ULTRASOUND GUIDANCE N/A 11/12/2019    Procedure: BIOPSY, WITH US GUIDANCE;  Surgeon: Mario Conner MD;  Location: [facility] CATH LAB;  Service: Cardiology;  Laterality: N/A;    CORONARY ANGIOGRAPHY Right 2/14/2019    Procedure: ANGIOGRAM, CORONARY ARTERY;  Surgeon: Tello Correia MD;  Location: [facility] CATH LAB;  Service: Cardiology;  Laterality: Right;    HEART TRANSPLANT      LEFT HEART CATHETERIZATION Left 2/13/2020    Procedure: Left heart cath;  Surgeon: Guy Cartagena MD;  Location: [facility] CATH LAB;  Service: Cardiology;  Laterality: Left;    LEFT HEART CATHETERIZATION N/A 2/17/2021    Procedure: Left heart cath;  Surgeon: Guy Cartagena MD;  Location: [facility] CATH LAB;  Service: Cardiology;  Laterality: N/A;    LEFT VENTRICULAR ASSIST DEVICE      RIGHT HEART CATHETERIZATION Right 2/14/2019    Procedure: INSERTION, CATHETER, RIGHT HEART;  Surgeon: Tello Correia MD;  Location: [facility] CATH LAB;  Service: Cardiology;  Laterality: Right;    TONSILLECTOMY         Allergies:  Review of patient's allergies indicates:  No Known Allergies    Medications:   Home Medications:  Prior to Admission medications    Medication Sig Start Date End Date Taking? Authorizing Provider   atorvastatin (LIPITOR) 20 MG tablet TAKE ONE TABLET BY MOUTH EVERY DAY FOR CHOLESTEROL --CALL 3 DAYS EARLY TO REFILL--SPECIAL ORDER-- 5/1/22  Yes Lashon Hawkins MD   calcium carbonate-vit D3-min 600 mg calcium- 200 unit Tab Take 1 tablet by mouth once daily.  12/26/18  Yes Historical Provider   clindamycin (CLEOCIN T) 1 % external solution  Apply topically 2 (two) times daily. Prn pimples 22  Yes Radha Lemus MD   famotidine (PEPCID) 40 MG tablet TAKE ONE TABLET BY MOUTH EVERY EVENING 22  Yes Lashon Hawkins MD   magnesium oxide (MAG-OX) 400 mg tablet Take 1 tablet (400 mg total) by mouth 2 (two) times daily. 10/2/18  Yes Mario Conner MD   mycophenolate (CELLCEPT) 250 mg Cap TAKE 5 CAPSULES (1,250 MG TOTAL) BY MOUTH 2 (TWO) TIMES DAILY. --CALL 3 DAYS EARLY TO REFILL--SPECIAL ORDER-- 21  Yes Lashon Hawkins MD   nortriptyline (PAMELOR) 10 MG capsule TAKE 1 CAPSULE BY MOUTH EVERY EVENING. --CALL 3 DAYS EARLY TO REFILL--SPECIAL ORDER-- 10/14/21  Yes Brown Simpson MD   rimegepant (NURTEC) 75 mg odt Take 1 tablet (75 mg total) by mouth daily as needed for Migraine. Place ODT tablet on the tongue; alternatively the ODT tablet may be placed under the tongue 3/14/22  Yes Brown Simpson MD   tacrolimus (PROGRAF) 0.5 MG Cap TAKE TWO CAPSULES BY MOUTH IN THE MORNING AND TAKE THREE CAPSULES IN THE EVENING --CALL 3 DAYS EARLY TO REFILL--SPECIAL ORDER-- 21  Yes Lashon Hawkins MD   amoxicillin (AMOXIL) 500 MG capsule SMARTSI Capsule(s) By Mouth 21   Historical Provider   aspirin (ECOTRIN) 81 MG EC tablet Take 1 tablet (81 mg total) by mouth once daily. 3/1/18 8/6/21  Marisel Lundberg MD   predniSONE (DELTASONE) 5 MG tablet TAKE 1 TABLET BY MOUTH ONCE DAILY WITH FOOD 22   Lashon Hawkins MD       Family History:  Family History   Problem Relation Age of Onset    Arthritis Mother     Heart disease Brother         cardiomyopathy and heart transplant    No Known Problems Father     Heart disease Brother         cardiomyopathy and heart transplanted twice    Birth defects Paternal Uncle        Social History:  Social History     Tobacco Use    Smoking status: Former Smoker     Packs/day: 1.00     Quit date: 2016     Years since quittin.4    Smokeless tobacco: Never Used   Substance Use Topics     "Alcohol use: Yes     Comment: socially    Drug use: No       Review of Systems   Constitutional: Negative for chills and fever.   HENT: Negative for congestion.    Eyes: Negative for blurred vision and double vision.   Respiratory: Positive for cough. Negative for hemoptysis.    Cardiovascular: Negative for chest pain and palpitations.   Gastrointestinal: Negative for heartburn, nausea and vomiting.   Musculoskeletal: Negative for myalgias.   Skin: Negative for itching and rash.   Neurological: Negative for dizziness and headaches.          Objective:   Last 24 Hour Vital Signs:  BP  Min: 119/83  Max: 150/80  Temp  Av.3 °F (36.8 °C)  Min: 97.6 °F (36.4 °C)  Max: 98.7 °F (37.1 °C)  Pulse  Av.6  Min: 83  Max: 93  Resp  Av.4  Min: 16  Max: 30  SpO2  Av.4 %  Min: 96 %  Max: 98 %  Height  Av' 7" (170.2 cm)  Min: 5' 7" (170.2 cm)  Max: 5' 7" (170.2 cm)  Weight  Av.2 kg (165 lb 11.2 oz)  Min: 74.8 kg (165 lb)  Max: 75.3 kg (166 lb 0.1 oz)  I/O last 3 completed shifts:  In: 600 [P.O.:600]  Out: 3 [Urine:3]    Physical Exam  Constitutional:       Appearance: Normal appearance.   HENT:      Head: Normocephalic and atraumatic.      Nose: Nose normal.      Mouth/Throat:      Mouth: Mucous membranes are moist.      Pharynx: Oropharynx is clear.   Eyes:      Extraocular Movements: Extraocular movements intact.      Conjunctiva/sclera: Conjunctivae normal.      Pupils: Pupils are equal, round, and reactive to light.   Cardiovascular:      Rate and Rhythm: Normal rate and regular rhythm.      Pulses: Normal pulses.      Heart sounds: Normal heart sounds.   Pulmonary:      Effort: Pulmonary effort is normal. No respiratory distress.      Comments: Crackles at bases  Abdominal:      General: Abdomen is flat. Bowel sounds are normal.      Palpations: Abdomen is soft.   Musculoskeletal:         General: Normal range of motion.      Cervical back: Normal range of motion and neck supple.   Skin:     General: " Skin is warm and dry.   Neurological:      General: No focal deficit present.      Mental Status: He is alert and oriented to person, place, and time.   Psychiatric:         Mood and Affect: Mood normal.         Behavior: Behavior normal.         Laboratory Results:  Most Recent Data:  CBC:   Lab Results   Component Value Date    WBC 15.08 (H) 05/20/2022    HGB 15.0 05/20/2022    HCT 45.8 05/20/2022     05/20/2022    MCV 87 05/20/2022    RDW 13.7 05/20/2022     BMP:   Lab Results   Component Value Date     05/20/2022    K 4.7 05/20/2022     05/20/2022    CO2 27 05/20/2022    BUN 13 05/20/2022     05/20/2022    CALCIUM 9.3 05/20/2022    MG 1.3 (L) 05/19/2022    PHOS 3.1 05/19/2022     LFTs:   Lab Results   Component Value Date    PROT 7.5 05/19/2022    ALBUMIN 4.4 05/19/2022    BILITOT 1.5 (H) 05/19/2022    AST 13 05/19/2022    ALKPHOS 71 05/19/2022    ALT 14 05/19/2022     Coags:   Lab Results   Component Value Date    INR 1.2 05/19/2022     FLP:   Lab Results   Component Value Date    CHOL 102 (L) 05/17/2022    HDL 34 (L) 05/17/2022    LDLCALC 42.4 (L) 05/17/2022    TRIG 128 05/17/2022    CHOLHDL 33.3 05/17/2022     DM:   Lab Results   Component Value Date    HGBA1C 4.8 02/25/2022    HGBA1C 4.9 02/19/2021    HGBA1C 5.3 02/01/2019    LDLCALC 42.4 (L) 05/17/2022    CREATININE 0.9 05/20/2022     Thyroid:   Lab Results   Component Value Date    TSH 1.201 02/25/2022    FREET4 1.21 09/13/2017    P1YFYKM 7.5 02/19/2021     Anemia:   Lab Results   Component Value Date    IRON 75 05/23/2018    TIBC 327 05/23/2018    FERRITIN 105 05/23/2018     Cardiac:   Lab Results   Component Value Date    TROPONINI 0.007 05/19/2022    BNP 64 05/19/2022     Urinalysis:   Lab Results   Component Value Date    LABURIN No growth 09/26/2018    COLORU Yellow 05/19/2022    SPECGRAV 1.015 05/19/2022    NITRITE Negative 05/19/2022    KETONESU Negative 05/19/2022    UROBILINOGEN Negative 05/19/2022       Trended Lab  Data:  Recent Labs   Lab 05/17/22  0900 05/19/22  0734 05/20/22  0704   WBC 14.07* 24.03* 15.08*   HGB 16.3 16.1 15.0   HCT 49.5 49.5 45.8    238 216   MCV 88 88 87   RDW 13.8 13.8 13.7    140 138   K 3.9 4.1 4.7    101 104   CO2 28 26 27   BUN 16 14 13   GLU 94 112* 106   PROT 7.2 7.5  --    ALBUMIN 4.4 4.4  --    BILITOT 0.8 1.5*  --    AST 13 13  --    ALKPHOS 72 71  --    ALT 19 14  --          Microbiology Data:  Microbiology Results (last 7 days)     Procedure Component Value Units Date/Time    Respiratory Infection Panel (PCR), Nasopharyngeal [[id number]]     Order Status: No result Specimen: Nasopharyngeal Swab             Antimicrobials:  Antibiotics (From admission, onward)            Start     Stop Route Frequency Ordered    05/20/22 0900  azithromycin tablet 500 mg         05/25 0859 Oral Daily 05/19/22 1453    05/20/22 0800  cefTRIAXone (ROCEPHIN) 2 g/50 mL D5W IVPB         05/25 0759 IV Every 24 hours (non-standard times) 05/19/22 1516        Antivirals (From admission, onward)    None        Antifungals (From admission, onward)            None            Other Results:    Radiology:  X-Ray Chest PA And Lateral    Result Date: 5/19/2022  EXAMINATION: XR CHEST PA AND LATERAL CLINICAL HISTORY: cough r/o pneumonia; TECHNIQUE: PA and lateral views of the chest were performed. COMPARISON: 05/19/2022 FINDINGS: Postoperative changes from sternotomy are again seen.  Heart size is upper normal.  Mediastinal structures are midline.  Lungs are expanded.  Patchy opacification is again seen in the right middle lobe near the heart border and the posterior base of the left lower lobe.  With history of cough this could represent pneumonia or aspiration.  Elsewhere, lungs are clear.  No pleural fluid detected.  Skeletal structures show no acute finding; sternal wires are aligned.     Status post cardiovascular surgery. Patchy consolidation in right middle and left lower lobes, possible pneumonia or  aspiration.  Clinical correlation and follow-up recommended. Electronically signed by: Zeeshan Haas MD Date:    05/19/2022 Time:    16:23    CT Chest With Contrast    Result Date: 5/19/2022  EXAMINATION: CT CHEST WITH CONTRAST CLINICAL HISTORY: Cough, persistent; TECHNIQUE: Low dose axial images, sagittal and coronal reformations were obtained from the thoracic inlet to the lung bases following the IV administration of contrast.  MIP images were performed. COMPARISON: None. FINDINGS: CT examination of the chest with contrast dated May 19, 2022. History of prior heart transplant. No focal abnormality of the aorta.  No pathologically enlarged hilar or mediastinal lymph nodes.  No pericardial effusion. There are focal infiltrates within the medial aspect of the right middle lobe and within the medial aspect of the left lower lobe.  No pleural effusion or pneumothorax. No acute osseous abnormality. The visualized upper abdominal structures appear unremarkable.     1. Focal infiltrates within the medial aspect of the right middle lobe and within the medial aspect of the left lower lobe compatible with an infectious/inflammatory process.  No pleural effusion. 2. Prior heart transplant. Electronically signed by: Denis Nichole MD Date:    05/19/2022 Time:    10:07    X-Ray Chest AP Portable    Result Date: 5/19/2022  EXAMINATION: XR CHEST AP PORTABLE CLINICAL HISTORY: Sepsis;. TECHNIQUE: Portable chest dated 11/22/13. COMPARISON: No prior study for comparison. FINDINGS: Prior median sternotomy.  Stable cardiac silhouette.  No focal infiltrate or effusion.  No acute osseous abnormality.     No active lung disease. Electronically signed by: Denis Nichole MD Date:    05/19/2022 Time:    07:49    Echo    Result Date: 5/20/2022  · Post cardiac transplantation study (OHTx 2018). · The left ventricle is normal in size with normal systolic function. · The estimated ejection fraction is 65%. · Normal left ventricular diastolic  function. · Normal right ventricular size with normal right ventricular systolic function. · Normal central venous pressure (3 mmHg).

## 2022-05-24 ENCOUNTER — TELEPHONE (OUTPATIENT)
Dept: TRANSPLANT | Facility: CLINIC | Age: 32
End: 2022-05-24
Payer: COMMERCIAL

## 2022-05-24 LAB
BACTERIA BLD CULT: NORMAL
BACTERIA BLD CULT: NORMAL

## 2022-05-24 NOTE — LETTER
May 24, 2022      [id number]  [address]  [facility] [address]  Phone: [phone]       Patient: Mert Samayoa   YOB: 1990  Date of Visit: 5/19/2022    To Whom It May Concern:    Micheal Samayoa  was at [facility] on 5/19/2022 - 5/20/2022. The patient may return to work/school on 5/23/2022 with no restrictions. If you have any questions or concerns, or if I can be of further assistance, please do not hesitate to contact me.    Sincerely,    Lashon Hawkins MD  Medical Director Heart Transplant

## 2022-06-05 NOTE — PROVIDER PROGRESS NOTES - EMERGENCY DEPT.
Encounter Date: 5/19/2022    ED Physician Progress Notes        Physician Note:   Critical Care ED Physician Time (minutes):  -- Performed by: Rom Ritchie M.D.  -- Date/Time: 6:56 AM 6/5/2022   -- Direct Patient Care (Face Time): 5  -- Additional History from Records or Taking Additional History: 5  -- Ordering, Reviewing, and Interpreting Diagnostic Studies: 5  -- Total Time in Documentation: 20  -- Consultation with Other Physicians: 5  -- Consultation with Family Related to Condition: 0  -- Total Critical Care Time: 40  -- Critical care was necessary to treat the following conditions: Sepsis  -- Critical care was time spent personally by me on the following activities: IV abx  -- blood draw for specimens discussions with consultants,   -- development of treatment plan with patient or surrogate,   -- examination of patient, ordering and performing treatments   -- review of radiographic studies, re-evaluation of pt's condition  -- review of labs and evaluation of response to treatment

## 2022-06-15 ENCOUNTER — PATIENT MESSAGE (OUTPATIENT)
Dept: TRANSPLANT | Facility: CLINIC | Age: 32
End: 2022-06-15
Payer: COMMERCIAL

## 2022-08-25 ENCOUNTER — TELEPHONE (OUTPATIENT)
Dept: TRANSPLANT | Facility: CLINIC | Age: 32
End: 2022-08-25
Payer: COMMERCIAL

## 2022-08-25 ENCOUNTER — LAB VISIT (OUTPATIENT)
Dept: LAB | Facility: HOSPITAL | Age: 32
End: 2022-08-25
Attending: INTERNAL MEDICINE
Payer: COMMERCIAL

## 2022-08-25 DIAGNOSIS — Z94.1 STATUS POST HEART TRANSPLANT: ICD-10-CM

## 2022-08-25 DIAGNOSIS — Z94.1 STATUS POST HEART TRANSPLANT: Primary | ICD-10-CM

## 2022-08-25 LAB
ALBUMIN SERPL BCP-MCNC: 4.9 G/DL (ref 3.5–5.2)
ALP SERPL-CCNC: 87 U/L (ref 55–135)
ALT SERPL W/O P-5'-P-CCNC: 22 U/L (ref 10–44)
ANION GAP SERPL CALC-SCNC: 10 MMOL/L (ref 8–16)
AST SERPL-CCNC: 16 U/L (ref 10–40)
BASOPHILS # BLD AUTO: 0.02 K/UL (ref 0–0.2)
BASOPHILS NFR BLD: 0.2 % (ref 0–1.9)
BILIRUB SERPL-MCNC: 1 MG/DL (ref 0.1–1)
BUN SERPL-MCNC: 16 MG/DL (ref 6–20)
CALCIUM SERPL-MCNC: 9.2 MG/DL (ref 8.7–10.5)
CHLORIDE SERPL-SCNC: 106 MMOL/L (ref 95–110)
CO2 SERPL-SCNC: 23 MMOL/L (ref 23–29)
CREAT SERPL-MCNC: 1.1 MG/DL (ref 0.5–1.4)
DIFFERENTIAL METHOD: ABNORMAL
EOSINOPHIL # BLD AUTO: 0 K/UL (ref 0–0.5)
EOSINOPHIL NFR BLD: 0.3 % (ref 0–8)
ERYTHROCYTE [DISTWIDTH] IN BLOOD BY AUTOMATED COUNT: 13.6 % (ref 11.5–14.5)
EST. GFR  (NO RACE VARIABLE): >60 ML/MIN/1.73 M^2
GLUCOSE SERPL-MCNC: 108 MG/DL (ref 70–110)
HCT VFR BLD AUTO: 48.1 % (ref 40–54)
HGB BLD-MCNC: 16.1 G/DL (ref 14–18)
IMM GRANULOCYTES # BLD AUTO: 0.04 K/UL (ref 0–0.04)
IMM GRANULOCYTES NFR BLD AUTO: 0.4 % (ref 0–0.5)
LYMPHOCYTES # BLD AUTO: 0.8 K/UL (ref 1–4.8)
LYMPHOCYTES NFR BLD: 8 % (ref 18–48)
MCH RBC QN AUTO: 29 PG (ref 27–31)
MCHC RBC AUTO-ENTMCNC: 33.5 G/DL (ref 32–36)
MCV RBC AUTO: 87 FL (ref 82–98)
MONOCYTES # BLD AUTO: 0.7 K/UL (ref 0.3–1)
MONOCYTES NFR BLD: 7.2 % (ref 4–15)
NEUTROPHILS # BLD AUTO: 8.6 K/UL (ref 1.8–7.7)
NEUTROPHILS NFR BLD: 83.9 % (ref 38–73)
NRBC BLD-RTO: 0 /100 WBC
PLATELET # BLD AUTO: 251 K/UL (ref 150–450)
PMV BLD AUTO: 9.4 FL (ref 9.2–12.9)
POTASSIUM SERPL-SCNC: 4.3 MMOL/L (ref 3.5–5.1)
PROT SERPL-MCNC: 7.3 G/DL (ref 6–8.4)
RBC # BLD AUTO: 5.55 M/UL (ref 4.6–6.2)
SODIUM SERPL-SCNC: 139 MMOL/L (ref 136–145)
WBC # BLD AUTO: 10.18 K/UL (ref 3.9–12.7)

## 2022-08-25 PROCEDURE — 36415 COLL VENOUS BLD VENIPUNCTURE: CPT | Performed by: INTERNAL MEDICINE

## 2022-08-25 PROCEDURE — 80053 COMPREHEN METABOLIC PANEL: CPT | Performed by: INTERNAL MEDICINE

## 2022-08-25 PROCEDURE — 85025 COMPLETE CBC W/AUTO DIFF WBC: CPT | Performed by: INTERNAL MEDICINE

## 2022-08-25 RX ORDER — MYCOPHENOLATE MOFETIL 250 MG/1
750 CAPSULE ORAL 2 TIMES DAILY
Qty: 180 CAPSULE | Refills: 11 | Status: SHIPPED | OUTPATIENT
Start: 2022-08-25 | End: 2022-08-29 | Stop reason: SINTOL

## 2022-08-25 NOTE — TELEPHONE ENCOUNTER
Discussed symptoms with Dr. Hawkins. Will decrease MMF to 750 BID and get labs this afternoon with CMP, CBC, CMV PCR at [facility]. PT stated his understanding.

## 2022-08-25 NOTE — TELEPHONE ENCOUNTER
Pt called stating he has had diarrhea for 2 weeks, feels ok. Will order labs to check WBC and CMV. Will discuss with team about decreasing MMF.

## 2022-08-26 LAB
CMV DNA SPEC QL NAA+PROBE: NOT DETECTED
CYTOMEGALOVIRUS LOG (IU/ML): NOT DETECTED LOGIU/ML
CYTOMEGALOVIRUS PCR, QUANT: NOT DETECTED IU/ML

## 2022-08-26 NOTE — TELEPHONE ENCOUNTER
Called to inform pt labs look good so far. Waiting on CMV PCR, may be back today. However, WBC is normal, so doubt it's CMV.  Pt is also taking magnesium, asked pt to stop taking magnesium.

## 2022-08-28 ENCOUNTER — LAB VISIT (OUTPATIENT)
Dept: LAB | Facility: HOSPITAL | Age: 32
End: 2022-08-28
Attending: INTERNAL MEDICINE
Payer: COMMERCIAL

## 2022-08-28 DIAGNOSIS — R19.7 DIARRHEA, UNSPECIFIED TYPE: ICD-10-CM

## 2022-08-28 DIAGNOSIS — Z94.1 HEART TRANSPLANTED: ICD-10-CM

## 2022-08-28 DIAGNOSIS — Z94.1 HEART TRANSPLANTED: Primary | ICD-10-CM

## 2022-08-28 LAB
OB PNL STL: POSITIVE
RV AG STL QL IA.RAPID: NEGATIVE

## 2022-08-28 PROCEDURE — 87045 FECES CULTURE AEROBIC BACT: CPT | Performed by: INTERNAL MEDICINE

## 2022-08-28 PROCEDURE — 83993 ASSAY FOR CALPROTECTIN FECAL: CPT | Performed by: INTERNAL MEDICINE

## 2022-08-28 PROCEDURE — 82656 EL-1 FECAL QUAL/SEMIQ: CPT | Performed by: INTERNAL MEDICINE

## 2022-08-28 PROCEDURE — 87329 GIARDIA AG IA: CPT | Performed by: INTERNAL MEDICINE

## 2022-08-28 PROCEDURE — 87209 SMEAR COMPLEX STAIN: CPT | Performed by: INTERNAL MEDICINE

## 2022-08-28 PROCEDURE — 87425 ROTAVIRUS AG IA: CPT | Performed by: INTERNAL MEDICINE

## 2022-08-28 PROCEDURE — 87449 NOS EACH ORGANISM AG IA: CPT | Performed by: INTERNAL MEDICINE

## 2022-08-28 PROCEDURE — 82272 OCCULT BLD FECES 1-3 TESTS: CPT | Performed by: INTERNAL MEDICINE

## 2022-08-28 PROCEDURE — 87427 SHIGA-LIKE TOXIN AG IA: CPT | Mod: 59 | Performed by: INTERNAL MEDICINE

## 2022-08-28 PROCEDURE — 82705 FATS/LIPIDS FECES QUAL: CPT | Performed by: INTERNAL MEDICINE

## 2022-08-28 PROCEDURE — 89055 LEUKOCYTE ASSESSMENT FECAL: CPT | Performed by: INTERNAL MEDICINE

## 2022-08-28 PROCEDURE — 87046 STOOL CULTR AEROBIC BACT EA: CPT | Performed by: INTERNAL MEDICINE

## 2022-08-29 ENCOUNTER — TELEPHONE (OUTPATIENT)
Dept: TRANSPLANT | Facility: CLINIC | Age: 32
End: 2022-08-29
Payer: COMMERCIAL

## 2022-08-29 LAB
C DIFF GDH STL QL: NEGATIVE
C DIFF TOX A+B STL QL IA: NEGATIVE
CRYPTOSP AG STL QL IA: NEGATIVE
E COLI SXT1 STL QL IA: NEGATIVE
E COLI SXT2 STL QL IA: NEGATIVE
G LAMBLIA AG STL QL IA: NEGATIVE
WBC #/AREA STL HPF: NORMAL /[HPF]

## 2022-08-29 NOTE — TELEPHONE ENCOUNTER
Pt called c/o worsening diarrhea. Pt feels fine, but has multiple bouts of diarrhea, keeping him up at night. Lab results Thursday show no CMV, WBC undetected, Cr normal. Discussed with Dr. Tapia, ordered to stop cellcept and obtain stool studies. Appt made for [place] today. Will review with Dr. Tapia when results are in.

## 2022-08-31 LAB
BACTERIA STL CULT: NORMAL
ELASTASE 1, FECAL: 184 MCG/G
FAT STL QL: ABNORMAL
NEUTRAL FAT STL QL: NORMAL

## 2022-09-01 DIAGNOSIS — Z94.1 HEART TRANSPLANTED: Primary | ICD-10-CM

## 2022-09-01 DIAGNOSIS — R19.7 DIARRHEA, UNSPECIFIED TYPE: ICD-10-CM

## 2022-09-01 LAB — O+P STL MICRO: NORMAL

## 2022-09-02 ENCOUNTER — OFFICE VISIT (OUTPATIENT)
Dept: GASTROENTEROLOGY | Facility: CLINIC | Age: 32
End: 2022-09-02
Payer: COMMERCIAL

## 2022-09-02 VITALS
HEART RATE: 90 BPM | SYSTOLIC BLOOD PRESSURE: 129 MMHG | WEIGHT: 158.75 LBS | DIASTOLIC BLOOD PRESSURE: 79 MMHG | HEIGHT: 67 IN | BODY MASS INDEX: 24.92 KG/M2

## 2022-09-02 DIAGNOSIS — R19.5 LOW FECAL ELASTASE LEVEL: ICD-10-CM

## 2022-09-02 DIAGNOSIS — Z79.899 LONG TERM CURRENT USE OF IMMUNOSUPPRESSIVE DRUG: ICD-10-CM

## 2022-09-02 DIAGNOSIS — Z94.1 HEART TRANSPLANTED: Primary | ICD-10-CM

## 2022-09-02 DIAGNOSIS — Z94.1 STATUS POST HEART TRANSPLANT: ICD-10-CM

## 2022-09-02 DIAGNOSIS — R19.7 ACUTE DIARRHEA: Primary | ICD-10-CM

## 2022-09-02 LAB — CALPROTECTIN STL-MCNT: <27.1 MCG/G

## 2022-09-02 PROCEDURE — 1159F PR MEDICATION LIST DOCUMENTED IN MEDICAL RECORD: ICD-10-PCS | Mod: CPTII,S$GLB,, | Performed by: INTERNAL MEDICINE

## 2022-09-02 PROCEDURE — 1160F RVW MEDS BY RX/DR IN RCRD: CPT | Mod: CPTII,S$GLB,, | Performed by: INTERNAL MEDICINE

## 2022-09-02 PROCEDURE — 3044F HG A1C LEVEL LT 7.0%: CPT | Mod: CPTII,S$GLB,, | Performed by: INTERNAL MEDICINE

## 2022-09-02 PROCEDURE — 99203 PR OFFICE/OUTPT VISIT, NEW, LEVL III, 30-44 MIN: ICD-10-PCS | Mod: S$GLB,,, | Performed by: INTERNAL MEDICINE

## 2022-09-02 PROCEDURE — 3078F DIAST BP <80 MM HG: CPT | Mod: CPTII,S$GLB,, | Performed by: INTERNAL MEDICINE

## 2022-09-02 PROCEDURE — 99999 PR PBB SHADOW E&M-EST. PATIENT-LVL III: ICD-10-PCS | Mod: PBBFAC,,, | Performed by: INTERNAL MEDICINE

## 2022-09-02 PROCEDURE — 99203 OFFICE O/P NEW LOW 30 MIN: CPT | Mod: S$GLB,,, | Performed by: INTERNAL MEDICINE

## 2022-09-02 PROCEDURE — 1160F PR REVIEW ALL MEDS BY PRESCRIBER/CLIN PHARMACIST DOCUMENTED: ICD-10-PCS | Mod: CPTII,S$GLB,, | Performed by: INTERNAL MEDICINE

## 2022-09-02 PROCEDURE — 3074F PR MOST RECENT SYSTOLIC BLOOD PRESSURE < 130 MM HG: ICD-10-PCS | Mod: CPTII,S$GLB,, | Performed by: INTERNAL MEDICINE

## 2022-09-02 PROCEDURE — 3074F SYST BP LT 130 MM HG: CPT | Mod: CPTII,S$GLB,, | Performed by: INTERNAL MEDICINE

## 2022-09-02 PROCEDURE — 3044F PR MOST RECENT HEMOGLOBIN A1C LEVEL <7.0%: ICD-10-PCS | Mod: CPTII,S$GLB,, | Performed by: INTERNAL MEDICINE

## 2022-09-02 PROCEDURE — 3008F PR BODY MASS INDEX (BMI) DOCUMENTED: ICD-10-PCS | Mod: CPTII,S$GLB,, | Performed by: INTERNAL MEDICINE

## 2022-09-02 PROCEDURE — 3008F BODY MASS INDEX DOCD: CPT | Mod: CPTII,S$GLB,, | Performed by: INTERNAL MEDICINE

## 2022-09-02 PROCEDURE — 1159F MED LIST DOCD IN RCRD: CPT | Mod: CPTII,S$GLB,, | Performed by: INTERNAL MEDICINE

## 2022-09-02 PROCEDURE — 3078F PR MOST RECENT DIASTOLIC BLOOD PRESSURE < 80 MM HG: ICD-10-PCS | Mod: CPTII,S$GLB,, | Performed by: INTERNAL MEDICINE

## 2022-09-02 PROCEDURE — 99999 PR PBB SHADOW E&M-EST. PATIENT-LVL III: CPT | Mod: PBBFAC,,, | Performed by: INTERNAL MEDICINE

## 2022-09-02 RX ORDER — MYCOPHENOLATE MOFETIL 250 MG/1
250 CAPSULE ORAL 2 TIMES DAILY
Qty: 60 CAPSULE | Refills: 11 | Status: SHIPPED | OUTPATIENT
Start: 2022-09-02 | End: 2022-10-04

## 2022-09-02 NOTE — TELEPHONE ENCOUNTER
Pt saw GI today and was given ok to restart cellcept. Will start back at 250 mg twice a day and increase from there as long as symptoms (diarrhea) do not return. Pt to continue to hold magnesium. Will restart one medication at a time and watch for returning symptoms.

## 2022-09-08 NOTE — PROGRESS NOTES
[facility] Consultation Note    Reason for Consult:    Chief Complaint   Patient presents with    Diarrhea       PCP:   Lashon Hawkins    Referring MD:  Lashon Hawkins Md  [address] [place] [address]      HPI:  Mert Samayoa is a 32 y.o. male here for evaluation of diarrhea.  He is new to clinic.  Diarrhea has been present for about 2 and half weeks.  It was more severe during the 2nd week.  He was having up to 10 liquid bowel movements per day at its peak.  He was also having nocturnal stools.  He never particularly felt bad.  He has been doing better over the last few days.  His bowel movements are more semi loose now, once daily.  He started feeling better on Monday, which was the day after he submitted stool samples.  He was still having diarrhea on the day he submitted his stool samples.  He denies fevers, sick contacts, travel, consumption of raw foods, eating at restaurants, or changes in medications.  He had been on CellCept ever since his transplant.  This was gradually decreased and then subsequently stopped due to this diarrhea.  He has not taken any over-the-counter medications for diarrhea.  He denies over-the-counter supplements.          ROS:  Constitutional: No fevers, chills, No weight loss, normal appetite  ENT: No congestion, rhinorrhea, or chronic sinus problems  CV: No chest pain or palpitations  Pulm: No cough, No shortness of breath  Ophtho: No vision changes or pain  GI: see HPI  Derm: No rash or lesions  Heme: No lymphadenopathy, No bruising  MSK: No arthritis or joint swelling  : No dysuria, No frequent urination        Medical History:  has a past medical history of Cardiogenic shock (1/16/2017), Cardiomyopathy, CHF (congestive heart failure), Encounter for blood transfusion, Essential hypertension (12/21/2016), Essential hypertension (3/20/2018), Familial Cardiomyopathy, and Hyperlipidemia LDL goal <70 (11/19/2018).    Surgical History:   has a past surgical history that includes Left ventricular assist device; Heart transplant; Tonsillectomy; Coronary angiography (Right, 2/14/2019); Right heart catheterization (Right, 2/14/2019); Biopsy with ultrasound guidance (N/A, 8/27/2019); Biopsy with ultrasound guidance (N/A, 9/24/2019); Biopsy with ultrasound guidance (N/A, 11/12/2019); Left heart catheterization (Left, 2/13/2020); and Left heart catheterization (N/A, 2/17/2021).    Family History: family history includes Arthritis in his mother; Birth defects in his paternal uncle; Heart disease in his brother and brother; No Known Problems in his father.    Social History:  reports that he quit smoking about 5 years ago. He smoked an average of 1 pack per day. He has never used smokeless tobacco. He reports current alcohol use. He reports that he does not use drugs.    Review of patient's allergies indicates:  No Known Allergies    Prior to Admission medications    Medication Sig Start Date End Date Taking? Authorizing Provider   aspirin (ECOTRIN) 81 MG EC tablet Take 1 tablet (81 mg total) by mouth once daily. 3/1/18 9/2/22 Yes Marisel Lundberg MD   atorvastatin (LIPITOR) 20 MG tablet TAKE ONE TABLET BY MOUTH EVERY DAY FOR CHOLESTEROL --CALL 3 DAYS EARLY TO REFILL--SPECIAL ORDER-- 5/1/22  Yes Lashon Hawkins MD   calcium carbonate-vit D3-min 600 mg calcium- 200 unit Tab Take 1 tablet by mouth once daily.  12/26/18  Yes Historical Provider   famotidine (PEPCID) 40 MG tablet TAKE ONE TABLET BY MOUTH EVERY EVENING 2/18/22  Yes Lashon Hawkins MD   nortriptyline (PAMELOR) 10 MG capsule TAKE 1 CAPSULE BY MOUTH EVERY EVENING. --CALL 3 DAYS EARLY TO REFILL--SPECIAL ORDER-- 10/14/21  Yes Brown Simpson MD   predniSONE (DELTASONE) 5 MG tablet TAKE 1 TABLET BY MOUTH ONCE DAILY WITH FOOD 8/25/22  Yes Lashon Hawkins MD   rimegepant (NURTEC) 75 mg odt Take 1 tablet (75 mg total) by mouth daily as needed for Migraine. Place ODT tablet on the tongue;  "alternatively the ODT tablet may be placed under the tongue 3/14/22  Yes Brown Simpson MD   tacrolimus (PROGRAF) 0.5 MG Cap TAKE TWO CAPSULES BY MOUTH IN THE MORNING AND TAKE THREE CAPSULES IN THE EVENING --CALL 3 DAYS EARLY TO REFILL--SPECIAL ORDER-- 12/20/21  Yes Lashon Hawkins MD   clindamycin (CLEOCIN T) 1 % external solution Apply topically 2 (two) times daily. Prn pimples  Patient not taking: Reported on 9/2/2022 2/8/22   Radha Lemus MD   mycophenolate (CELLCEPT) 250 mg Cap Take 1 capsule (250 mg total) by mouth 2 (two) times daily. 9/2/22 9/2/23  Sammi Tapia MD       Objective Findings:  Vital Signs:  /79   Pulse 90   Ht 5' 7" (1.702 m)   Wt 72 kg (158 lb 11.7 oz)   BMI 24.86 kg/m²   Body mass index is 24.86 kg/m².      Physical Exam:  General Appearance:  Well appearing in no acute distress, appears stated age  Head:  Normocephalic, atraumatic  Eyes:  No scleral icterus or pallor, EOMI  Extremities:  No clubbing, cyanosis, or edema  Skin:  No rash  Neurologic:  AAO x 4; CN II-XII intact      Labs:  Lab Results   Component Value Date    WBC 10.18 08/25/2022    HGB 16.1 08/25/2022    HCT 48.1 08/25/2022    MCV 87 08/25/2022    RDW 13.6 08/25/2022     08/25/2022    GRAN 8.6 (H) 08/25/2022    GRAN 83.9 (H) 08/25/2022    LYMPH 0.8 (L) 08/25/2022    LYMPH 8.0 (L) 08/25/2022    MONO 0.7 08/25/2022    MONO 7.2 08/25/2022    EOS 0.0 08/25/2022    BASO 0.02 08/25/2022     Lab Results   Component Value Date     08/25/2022    K 4.3 08/25/2022     08/25/2022    CO2 23 08/25/2022     08/25/2022    BUN 16 08/25/2022    CREATININE 1.1 08/25/2022    CALCIUM 9.2 08/25/2022    PROT 7.3 08/25/2022    ALBUMIN 4.9 08/25/2022    BILITOT 1.0 08/25/2022    ALKPHOS 87 08/25/2022    AST 16 08/25/2022    ALT 22 08/25/2022     Serum CMV negative  Stool culture negative   C difficile antigen negative   E coli toxin negative   Stool ova and parasite negative   Stool Giardia/Cryptosporidium " antigen negative   Rotavirus negative   Fecal WBC negative   Fecal FOBT positive   Fecal elastase 184   Fecal fats increased   Fecal calprotectin pending                Imaging:  CT abdomen and pelvis 03/13/2018:   IMPRESSION:   1.  Mild wall thickening of proximal jejunal bowel loops, could reflect early changes of infectious or inflammatory enteritis, clinical correlation for any history of the same recommended.  There are a few and numerous although nonenlarged adjacent lymph nodes.  2.  Radiopaque structure at the terminal ileum, may reflect ingested tablet or other ingested focus without olga obstruction or inflammation.  Correlation recommended.  3.  Bilateral pleural effusions, trace on the right, minimal on the left, improved since the previous exam.  4.  Mild hepatomegaly, correlation with LFTs as warranted.  5.  Calcified tubular structure within the anterior abdominal wall, along the course of previous ventricular drive line, clinical correlation recommended.                  Assessment:  Mert Samayoa is a 32 y.o. male with:  1. Acute diarrhea    2. Low fecal elastase level    3. Long term current use of immunosuppressive drug    4. Status post heart transplant      Acute diarrhea starting 2 and half weeks ago with improvement over the last 5 days.  Now doing better.  Reduction of CellCept may have helped improve symptoms, but it is not clear to me that this was the cause of his symptoms since he had been on this for a long time already.  Infectious workup was negative for stool culture, E coli, parasites, C diff, and rotavirus.  Stool WBC was negative.  FOBT was positive, but this is a very nonspecific test that is highly sensitive.  This test has little clinical significance in this circumstance as it was primarily design for colon cancer screening purposes and not as a confirmation test for blood in the stool.  Slightly decreased pancreatic elastase and increased fecal fats are also of unclear  significance, and not necessarily an unexpected finding during acute diarrhea.  Pancreatic elastase can be falsely low when stools are liquid.  I do not suspect an underlying pancreatic disorder.  He is at risk for opportunistic infections; however, further evaluation for diarrhea will likely be of low yield given that he is improving.      Recommendations/Plan:  No further GI workup at this time due to improvement.  If diarrhea returns, would suggest performing colonoscopy to rule out opportunistic infection.  Consider restarting CellCept at low dose with gradual increase.      Follow-up with referring physician and with me as needed.            Thank you so much for allowing me to participate in the care of Mert Gale MD

## 2022-10-04 DIAGNOSIS — Z94.1 HEART TRANSPLANTED: ICD-10-CM

## 2022-10-04 RX ORDER — MYCOPHENOLATE MOFETIL 250 MG/1
1250 CAPSULE ORAL 2 TIMES DAILY
Qty: 300 CAPSULE | Refills: 11 | Status: SHIPPED | OUTPATIENT
Start: 2022-10-04 | End: 2023-11-03 | Stop reason: SDUPTHER

## 2022-10-04 NOTE — TELEPHONE ENCOUNTER
Pt requested to increase his MMF back to 1250 mg. He has been feeling fine, no diarrhea. He increased to 750 mg a few weeks ago with no diarrhea. Will increase to 1250 mg BID and check labs next week.

## 2022-10-07 DIAGNOSIS — Z94.1 STATUS POST HEART TRANSPLANT: Primary | ICD-10-CM

## 2022-10-14 ENCOUNTER — LAB VISIT (OUTPATIENT)
Dept: LAB | Facility: HOSPITAL | Age: 32
End: 2022-10-14
Attending: INTERNAL MEDICINE
Payer: COMMERCIAL

## 2022-10-14 DIAGNOSIS — Z94.1 STATUS POST HEART TRANSPLANT: ICD-10-CM

## 2022-10-14 DIAGNOSIS — Z94.1 HEART TRANSPLANTED: ICD-10-CM

## 2022-10-14 LAB
ALBUMIN SERPL BCP-MCNC: 4.5 G/DL (ref 3.5–5.2)
ALP SERPL-CCNC: 76 U/L (ref 55–135)
ALT SERPL W/O P-5'-P-CCNC: 21 U/L (ref 10–44)
ANION GAP SERPL CALC-SCNC: 12 MMOL/L (ref 8–16)
AST SERPL-CCNC: 19 U/L (ref 10–40)
BASOPHILS # BLD AUTO: 0.04 K/UL (ref 0–0.2)
BASOPHILS NFR BLD: 0.4 % (ref 0–1.9)
BILIRUB SERPL-MCNC: 1 MG/DL (ref 0.1–1)
BNP SERPL-MCNC: 33 PG/ML (ref 0–99)
BUN SERPL-MCNC: 16 MG/DL (ref 6–20)
CALCIUM SERPL-MCNC: 9.5 MG/DL (ref 8.7–10.5)
CHLORIDE SERPL-SCNC: 106 MMOL/L (ref 95–110)
CO2 SERPL-SCNC: 26 MMOL/L (ref 23–29)
CREAT SERPL-MCNC: 1.2 MG/DL (ref 0.5–1.4)
DIFFERENTIAL METHOD: NORMAL
EOSINOPHIL # BLD AUTO: 0.1 K/UL (ref 0–0.5)
EOSINOPHIL NFR BLD: 1.4 % (ref 0–8)
ERYTHROCYTE [DISTWIDTH] IN BLOOD BY AUTOMATED COUNT: 13.8 % (ref 11.5–14.5)
EST. GFR  (NO RACE VARIABLE): >60 ML/MIN/1.73 M^2
GLUCOSE SERPL-MCNC: 88 MG/DL (ref 70–110)
HCT VFR BLD AUTO: 47.9 % (ref 40–54)
HGB BLD-MCNC: 15.5 G/DL (ref 14–18)
IMM GRANULOCYTES # BLD AUTO: 0.04 K/UL (ref 0–0.04)
IMM GRANULOCYTES NFR BLD AUTO: 0.4 % (ref 0–0.5)
LYMPHOCYTES # BLD AUTO: 1.9 K/UL (ref 1–4.8)
LYMPHOCYTES NFR BLD: 19.5 % (ref 18–48)
MCH RBC QN AUTO: 28.3 PG (ref 27–31)
MCHC RBC AUTO-ENTMCNC: 32.4 G/DL (ref 32–36)
MCV RBC AUTO: 88 FL (ref 82–98)
MONOCYTES # BLD AUTO: 0.9 K/UL (ref 0.3–1)
MONOCYTES NFR BLD: 9.8 % (ref 4–15)
NEUTROPHILS # BLD AUTO: 6.5 K/UL (ref 1.8–7.7)
NEUTROPHILS NFR BLD: 68.5 % (ref 38–73)
NRBC BLD-RTO: 0 /100 WBC
PLATELET # BLD AUTO: 224 K/UL (ref 150–450)
PMV BLD AUTO: 9.7 FL (ref 9.2–12.9)
POTASSIUM SERPL-SCNC: 4.4 MMOL/L (ref 3.5–5.1)
PROT SERPL-MCNC: 6.8 G/DL (ref 6–8.4)
RBC # BLD AUTO: 5.47 M/UL (ref 4.6–6.2)
SODIUM SERPL-SCNC: 144 MMOL/L (ref 136–145)
TACROLIMUS BLD-MCNC: 9.7 NG/ML (ref 5–15)
WBC # BLD AUTO: 9.48 K/UL (ref 3.9–12.7)

## 2022-10-14 PROCEDURE — 80197 ASSAY OF TACROLIMUS: CPT | Performed by: INTERNAL MEDICINE

## 2022-10-14 PROCEDURE — 85025 COMPLETE CBC W/AUTO DIFF WBC: CPT | Performed by: INTERNAL MEDICINE

## 2022-10-14 PROCEDURE — 36415 COLL VENOUS BLD VENIPUNCTURE: CPT | Performed by: INTERNAL MEDICINE

## 2022-10-14 PROCEDURE — 80053 COMPREHEN METABOLIC PANEL: CPT | Performed by: INTERNAL MEDICINE

## 2022-10-14 PROCEDURE — 83880 ASSAY OF NATRIURETIC PEPTIDE: CPT | Performed by: INTERNAL MEDICINE

## 2022-10-29 NOTE — PHYSICIAN QUERY
PT Name: Mert Samayoa  MR #: [id number]     Physician Query Form - Documentation Clarification      CDS/: Kala Bee RN, CCDS             Contact information: [email or web address]    This form is a permanent document in the medical record.     Query Date: February 22, 2018    By submitting this query, we are merely seeking further clarification of documentation. Please utilize your independent clinical judgment when addressing the question(s) below.    The Medical record reflects the following:    Supporting Clinical Findings Location in Medical Record     Ca 7.9       2/19 lab     Calcium gluconate 1 gm IVPB x1         2/19 mar                                                                            Doctor, Please specify diagnosis or diagnoses associated with above clinical findings.    Provider Use Only        (  x  )  Hypocalcemia    (    )  Other____________                                                                                                                       [  ] Clinically undetermined             none

## 2022-11-23 NOTE — PROCEDURES
Patient to go to heart transplant this morning. Biotronik VVI with LR 40 BPM. Not PM-dependent. Tachytherapy disabled.    No

## 2022-12-06 DIAGNOSIS — Z94.1 STATUS POST HEART TRANSPLANT: Primary | ICD-10-CM

## 2022-12-15 DIAGNOSIS — Z94.1 STATUS POST HEART TRANSPLANT: Primary | ICD-10-CM

## 2023-02-07 ENCOUNTER — DOCUMENTATION ONLY (OUTPATIENT)
Dept: CARDIOLOGY | Facility: CLINIC | Age: 33
End: 2023-02-07
Payer: COMMERCIAL

## 2023-02-07 ENCOUNTER — OFFICE VISIT (OUTPATIENT)
Dept: DERMATOLOGY | Facility: CLINIC | Age: 33
End: 2023-02-07
Payer: COMMERCIAL

## 2023-02-07 ENCOUNTER — OFFICE VISIT (OUTPATIENT)
Dept: CARDIOLOGY | Facility: CLINIC | Age: 33
End: 2023-02-07
Payer: COMMERCIAL

## 2023-02-07 ENCOUNTER — OFFICE VISIT (OUTPATIENT)
Dept: TRANSPLANT | Facility: CLINIC | Age: 33
End: 2023-02-07
Payer: COMMERCIAL

## 2023-02-07 ENCOUNTER — HOSPITAL ENCOUNTER (OUTPATIENT)
Dept: RADIOLOGY | Facility: HOSPITAL | Age: 33
Discharge: HOME | End: 2023-02-07
Attending: INTERNAL MEDICINE
Payer: COMMERCIAL

## 2023-02-07 ENCOUNTER — HOSPITAL ENCOUNTER (OUTPATIENT)
Dept: CARDIOLOGY | Facility: HOSPITAL | Age: 33
Discharge: HOME | End: 2023-02-07
Attending: INTERNAL MEDICINE
Payer: COMMERCIAL

## 2023-02-07 ENCOUNTER — PATIENT MESSAGE (OUTPATIENT)
Dept: CARDIOLOGY | Facility: CLINIC | Age: 33
End: 2023-02-07

## 2023-02-07 VITALS
DIASTOLIC BLOOD PRESSURE: 80 MMHG | HEART RATE: 70 BPM | SYSTOLIC BLOOD PRESSURE: 133 MMHG | BODY MASS INDEX: 24.8 KG/M2 | WEIGHT: 158 LBS | HEIGHT: 67 IN

## 2023-02-07 VITALS
WEIGHT: 167.13 LBS | DIASTOLIC BLOOD PRESSURE: 83 MMHG | HEART RATE: 103 BPM | SYSTOLIC BLOOD PRESSURE: 136 MMHG | BODY MASS INDEX: 26.17 KG/M2

## 2023-02-07 VITALS
OXYGEN SATURATION: 100 % | HEART RATE: 91 BPM | WEIGHT: 165.56 LBS | SYSTOLIC BLOOD PRESSURE: 133 MMHG | DIASTOLIC BLOOD PRESSURE: 80 MMHG | HEIGHT: 67 IN | BODY MASS INDEX: 25.99 KG/M2

## 2023-02-07 DIAGNOSIS — Z94.1 HEART TRANSPLANTED: Primary | ICD-10-CM

## 2023-02-07 DIAGNOSIS — Z79.899 LONG TERM CURRENT USE OF IMMUNOSUPPRESSIVE DRUG: ICD-10-CM

## 2023-02-07 DIAGNOSIS — L73.9 FOLLICULITIS: ICD-10-CM

## 2023-02-07 DIAGNOSIS — D22.9 ATYPICAL NEVUS: ICD-10-CM

## 2023-02-07 DIAGNOSIS — D22.9 MULTIPLE BENIGN NEVI: ICD-10-CM

## 2023-02-07 DIAGNOSIS — Z94.1 STATUS POST HEART TRANSPLANT: ICD-10-CM

## 2023-02-07 DIAGNOSIS — Z12.83 SKIN CANCER SCREENING: ICD-10-CM

## 2023-02-07 DIAGNOSIS — D18.01 CHERRY ANGIOMA: ICD-10-CM

## 2023-02-07 DIAGNOSIS — E78.5 HYPERLIPIDEMIA LDL GOAL <70: ICD-10-CM

## 2023-02-07 DIAGNOSIS — Z94.1 STATUS POST HEART TRANSPLANT: Primary | ICD-10-CM

## 2023-02-07 DIAGNOSIS — Z94.1 HISTORY OF HEART TRANSPLANT: ICD-10-CM

## 2023-02-07 DIAGNOSIS — L81.4 LENTIGO: Primary | ICD-10-CM

## 2023-02-07 LAB
ASCENDING AORTA: 3.2 CM
AV INDEX (PROSTH): 0.9
AV MEAN GRADIENT: 3 MMHG
AV PEAK GRADIENT: 6 MMHG
AV VALVE AREA: 4.38 CM2
AV VELOCITY RATIO: 0.8
BSA FOR ECHO PROCEDURE: 1.84 M2
CV ECHO LV RWT: 0.44 CM
DOP CALC AO PEAK VEL: 1.27 M/S
DOP CALC AO VTI: 19.28 CM
DOP CALC LVOT AREA: 4.9 CM2
DOP CALC LVOT DIAMETER: 2.49 CM
DOP CALC LVOT PEAK VEL: 1.01 M/S
DOP CALC LVOT STROKE VOLUME: 84.4 CM3
DOP CALCLVOT PEAK VEL VTI: 17.34 CM
E/E' RATIO: 10.59 M/S
ECHO LV POSTERIOR WALL: 0.87 CM (ref 0.6–1.1)
EJECTION FRACTION: 60 %
FRACTIONAL SHORTENING: 38 % (ref 28–44)
INTERVENTRICULAR SEPTUM: 0.8 CM (ref 0.6–1.1)
IVRT: 71.36 MSEC
LEFT INTERNAL DIMENSION IN SYSTOLE: 2.47 CM (ref 2.1–4)
LEFT VENTRICLE DIASTOLIC VOLUME INDEX: 37.21 ML/M2
LEFT VENTRICLE DIASTOLIC VOLUME: 69.59 ML
LEFT VENTRICLE MASS INDEX: 53 G/M2
LEFT VENTRICLE SYSTOLIC VOLUME INDEX: 11.6 ML/M2
LEFT VENTRICLE SYSTOLIC VOLUME: 21.62 ML
LEFT VENTRICULAR INTERNAL DIMENSION IN DIASTOLE: 3.99 CM (ref 3.5–6)
LEFT VENTRICULAR MASS: 98.61 G
LV LATERAL E/E' RATIO: 9 M/S
LV SEPTAL E/E' RATIO: 12.86 M/S
MV A" WAVE DURATION": 14.56 MSEC
MV PEAK E VEL: 0.9 M/S
PULM VEIN S/D RATIO: 0.45
PV PEAK D VEL: 0.49 M/S
PV PEAK S VEL: 0.22 M/S
RA PRESSURE: 3 MMHG
RIGHT VENTRICULAR END-DIASTOLIC DIMENSION: 2.87 CM
RV TISSUE DOPPLER FREE WALL SYSTOLIC VELOCITY 1 (APICAL 4 CHAMBER VIEW): 10.05 CM/S
SINUS: 3.46 CM
STJ: 3.05 CM
TDI LATERAL: 0.1 M/S
TDI SEPTAL: 0.07 M/S
TDI: 0.09 M/S
TRICUSPID ANNULAR PLANE SYSTOLIC EXCURSION: 1.55 CM

## 2023-02-07 PROCEDURE — 3044F PR MOST RECENT HEMOGLOBIN A1C LEVEL <7.0%: ICD-10-PCS | Mod: CPTII,S$GLB,, | Performed by: DERMATOLOGY

## 2023-02-07 PROCEDURE — 3044F PR MOST RECENT HEMOGLOBIN A1C LEVEL <7.0%: ICD-10-PCS | Mod: CPTII,S$GLB,, | Performed by: INTERNAL MEDICINE

## 2023-02-07 PROCEDURE — 3008F BODY MASS INDEX DOCD: CPT | Mod: CPTII,S$GLB,, | Performed by: INTERNAL MEDICINE

## 2023-02-07 PROCEDURE — 99214 PR OFFICE/OUTPT VISIT, EST, LEVL IV, 30-39 MIN: ICD-10-PCS | Mod: S$GLB,,, | Performed by: INTERNAL MEDICINE

## 2023-02-07 PROCEDURE — 99999 PR PBB SHADOW E&M-EST. PATIENT-LVL IV: ICD-10-PCS | Mod: PBBFAC,,, | Performed by: INTERNAL MEDICINE

## 2023-02-07 PROCEDURE — 99214 OFFICE O/P EST MOD 30 MIN: CPT | Mod: S$GLB,,, | Performed by: DERMATOLOGY

## 2023-02-07 PROCEDURE — 3075F SYST BP GE 130 - 139MM HG: CPT | Mod: CPTII,S$GLB,, | Performed by: INTERNAL MEDICINE

## 2023-02-07 PROCEDURE — 3075F PR MOST RECENT SYSTOLIC BLOOD PRESS GE 130-139MM HG: ICD-10-PCS | Mod: CPTII,S$GLB,, | Performed by: INTERNAL MEDICINE

## 2023-02-07 PROCEDURE — 93306 TTE W/DOPPLER COMPLETE: CPT | Mod: 26,,, | Performed by: INTERNAL MEDICINE

## 2023-02-07 PROCEDURE — 1160F RVW MEDS BY RX/DR IN RCRD: CPT | Mod: CPTII,S$GLB,, | Performed by: INTERNAL MEDICINE

## 2023-02-07 PROCEDURE — 99214 OFFICE O/P EST MOD 30 MIN: CPT | Mod: S$GLB,,, | Performed by: INTERNAL MEDICINE

## 2023-02-07 PROCEDURE — 1159F MED LIST DOCD IN RCRD: CPT | Mod: CPTII,S$GLB,, | Performed by: DERMATOLOGY

## 2023-02-07 PROCEDURE — 3044F HG A1C LEVEL LT 7.0%: CPT | Mod: CPTII,S$GLB,, | Performed by: DERMATOLOGY

## 2023-02-07 PROCEDURE — 1160F PR REVIEW ALL MEDS BY PRESCRIBER/CLIN PHARMACIST DOCUMENTED: ICD-10-PCS | Mod: CPTII,S$GLB,, | Performed by: INTERNAL MEDICINE

## 2023-02-07 PROCEDURE — 99999 PR PBB SHADOW E&M-EST. PATIENT-LVL III: CPT | Mod: PBBFAC,,, | Performed by: INTERNAL MEDICINE

## 2023-02-07 PROCEDURE — 3008F PR BODY MASS INDEX (BMI) DOCUMENTED: ICD-10-PCS | Mod: CPTII,S$GLB,, | Performed by: INTERNAL MEDICINE

## 2023-02-07 PROCEDURE — 93306 TTE W/DOPPLER COMPLETE: CPT

## 2023-02-07 PROCEDURE — 99214 PR OFFICE/OUTPT VISIT, EST, LEVL IV, 30-39 MIN: ICD-10-PCS | Mod: S$GLB,,, | Performed by: DERMATOLOGY

## 2023-02-07 PROCEDURE — 71046 XR CHEST PA AND LATERAL: ICD-10-PCS | Mod: 26,,, | Performed by: RADIOLOGY

## 2023-02-07 PROCEDURE — 99999 PR PBB SHADOW E&M-EST. PATIENT-LVL III: CPT | Mod: PBBFAC,,, | Performed by: DERMATOLOGY

## 2023-02-07 PROCEDURE — 1159F PR MEDICATION LIST DOCUMENTED IN MEDICAL RECORD: ICD-10-PCS | Mod: CPTII,S$GLB,, | Performed by: DERMATOLOGY

## 2023-02-07 PROCEDURE — 3079F PR MOST RECENT DIASTOLIC BLOOD PRESSURE 80-89 MM HG: ICD-10-PCS | Mod: CPTII,S$GLB,, | Performed by: INTERNAL MEDICINE

## 2023-02-07 PROCEDURE — 93306 ECHO (CUPID ONLY): ICD-10-PCS | Mod: 26,,, | Performed by: INTERNAL MEDICINE

## 2023-02-07 PROCEDURE — 1159F MED LIST DOCD IN RCRD: CPT | Mod: CPTII,S$GLB,, | Performed by: INTERNAL MEDICINE

## 2023-02-07 PROCEDURE — 3079F DIAST BP 80-89 MM HG: CPT | Mod: CPTII,S$GLB,, | Performed by: INTERNAL MEDICINE

## 2023-02-07 PROCEDURE — 71046 X-RAY EXAM CHEST 2 VIEWS: CPT | Mod: TC,FY

## 2023-02-07 PROCEDURE — 99999 PR PBB SHADOW E&M-EST. PATIENT-LVL IV: CPT | Mod: PBBFAC,,, | Performed by: INTERNAL MEDICINE

## 2023-02-07 PROCEDURE — 1159F PR MEDICATION LIST DOCUMENTED IN MEDICAL RECORD: ICD-10-PCS | Mod: CPTII,S$GLB,, | Performed by: INTERNAL MEDICINE

## 2023-02-07 PROCEDURE — 99999 PR PBB SHADOW E&M-EST. PATIENT-LVL III: ICD-10-PCS | Mod: PBBFAC,,, | Performed by: INTERNAL MEDICINE

## 2023-02-07 PROCEDURE — 71046 X-RAY EXAM CHEST 2 VIEWS: CPT | Mod: 26,,, | Performed by: RADIOLOGY

## 2023-02-07 PROCEDURE — 3044F HG A1C LEVEL LT 7.0%: CPT | Mod: CPTII,S$GLB,, | Performed by: INTERNAL MEDICINE

## 2023-02-07 PROCEDURE — 99999 PR PBB SHADOW E&M-EST. PATIENT-LVL III: ICD-10-PCS | Mod: PBBFAC,,, | Performed by: DERMATOLOGY

## 2023-02-07 RX ORDER — ESOMEPRAZOLE MAGNESIUM 40 MG/1
40 CAPSULE, DELAYED RELEASE ORAL
Qty: 30 CAPSULE | Refills: 11 | Status: SHIPPED | OUTPATIENT
Start: 2023-02-07 | End: 2024-02-20

## 2023-02-07 RX ORDER — TACROLIMUS 0.5 MG/1
CAPSULE ORAL
Qty: 120 CAPSULE | Refills: 11 | Status: SHIPPED | OUTPATIENT
Start: 2023-02-07 | End: 2024-02-08

## 2023-02-07 RX ORDER — LORATADINE 10 MG/1
10 TABLET ORAL DAILY
Qty: 30 TABLET | Refills: 5 | Status: SHIPPED | OUTPATIENT
Start: 2023-02-07 | End: 2023-11-07

## 2023-02-07 NOTE — PROGRESS NOTES
OUTPATIENT CATHETERIZATION INSTRUCTIONS    You have been scheduled for a procedure in the catheterization lab on Thursday, March 2, 2023.     Please report to the Cardiology Waiting Area on the Third floor of the hospital and check in at 8:30 AM.   You will then be taken to the SSCU (Short Stay Cardiac Unit) and prepared for your procedure. Please be aware that this is not the time of your procedure but the time you are to arrive. The procedures are scheduled on an hourly basis; however, emergency cases take precedence over all other cases.       No solid foods 8 hours prior to your procedure.  You may have clear liquids until the time of your admission which should be 2 hours prior to your procedure.  You are encouraged to drink at least 8 ounces of clear liquids prior to your admission to SSCU.  Patients with gastric emptying issues should be fasting for 6- 8 hours prior to the procedure.  Clear liquids include water, black coffee, clear juices, and performance drinks - no pulp or milk.    Heart failure or dialysis patients will be limited to 8 ounces (1 cup) of clear liquids up until 2 hours of the procedure.    3.   You may take your regular morning medications with water. If there are any medications that you should not take you will be instructed to hold them that morning. If you         are diabetic and on Metformin (Glucophage) do not take it the day before, the day of, and for 2 days after your procedure.  4.   If you are on Pradaxa, Eliquis or Coumadin , you can hold them 3 days prior to your procedure.      The procedure will take 1-2 hours to perform. After the procedure, you will return to SSCU on the third floor of the hospital. You will need to lie still (or keep your arm still) for the next 2 to 4 hours to minimize bleeding from the puncture site.  This time is determined by your physician.  Your family may remain in the room with you during this time.       You may be able to be discharged home that  "same afternoon if there is someone to drive you home and there are no complications.  Your doctor will determine, based on your progress, the date and time of your discharge. The results of your procedure will be discussed with you before you are discharged. Any further testing or procedures will be scheduled for you either before you leave or after your discharge..       If you should have any questions, concerns, or need to change the date of your procedure, please call "S[facility] @ [phone]            Special Instructions:    Continue your current medications.          THE ABOVE INSTRUCTIONS WERE GIVEN TO THE PATIENT VERBALLY AND THEY VERBALIZED UNDERSTANDING.  THEY DO NOT REQUIRE ANY SPECIAL NEEDS AND DO NOT HAVE ANY LEARNING BARRIERS.          Directions for Reporting to Cardiology Waiting Area in the Hospital  If you park in the Parking Garage:  Take elevators to the1st floor of the parking garage.  Continue past the gift shop, coffee shop, and piano.  Take a right and go to the gold elevators. (Elevator B)  Take the elevator to the 3rd floor.  Follow the arrow on the sign on the wall that says Cath Lab Registration/EP Lab Registration.  Follow the long hallway all the way around until you come to a big open area.  This is the registration area.  Check in at Reception Desk.    OR    If family is dropping you off:  Have them drop you off at the front of the Hospital under the green overhang.  Enter through the doors and take a right.  Take the E elevators to the 3rd floor Cardiology Waiting Area.  Check in at the Reception Desk in the waiting room.              "

## 2023-02-07 NOTE — H&P (VIEW-ONLY)
PCP - Lashon Hawkins MD  Referring Physician:     Subjective:   Patient ID:  Mert Samayoa is a 33 y.o. male with past medical history of:   -Prior OHT in 2018  -HTN  -HLD    Presenting for follow up for CAV surveillance. Last LHC in 2021 which showed maximal intimal thickness of 0.7 mm in LAD (unchanged from prior). Patient has no new complaints from previous visit. Patient specifically denies chest pain, shortness of breath, palpitations, PND. No limitations on exercise. Taking all medications as prescribed.     History:     Social History     Tobacco Use    Smoking status: Former     Packs/day: 1.00     Types: Cigarettes     Quit date: 2016     Years since quittin.1    Smokeless tobacco: Never   Substance Use Topics    Alcohol use: Yes     Alcohol/week: 1.0 standard drink     Types: 1 Cans of beer per week     Comment: socially     Family History   Problem Relation Age of Onset    Arthritis Mother     Heart disease Brother         cardiomyopathy and heart transplant    No Known Problems Father     Heart disease Brother         cardiomyopathy and heart transplanted twice    Birth defects Paternal Uncle        Meds:   Review of patient's allergies indicates:  No Known Allergies    Current Outpatient Medications:     aspirin (ECOTRIN) 81 MG EC tablet, Take 1 tablet (81 mg total) by mouth once daily., Disp: 30 tablet, Rfl: 11    atorvastatin (LIPITOR) 20 MG tablet, TAKE ONE TABLET BY MOUTH EVERY DAY FOR CHOLESTEROL --CALL 3 DAYS EARLY TO REFILL--SPECIAL ORDER--, Disp: 90 tablet, Rfl: 3    calcium carbonate-vit D3-min 600 mg calcium- 200 unit Tab, Take 1 tablet by mouth once daily. , Disp: , Rfl: 5    famotidine (PEPCID) 40 MG tablet, TAKE ONE TABLET BY MOUTH EVERY EVENING, Disp: 30 tablet, Rfl: 11    mycophenolate (CELLCEPT) 250 mg Cap, Take 5 capsules (1,250 mg total) by mouth 2 (two) times daily., Disp: 300 capsule, Rfl: 11    nortriptyline (PAMELOR) 10 MG capsule, TAKE 1 CAPSULE BY  "MOUTH EVERY EVENING. --CALL 3 DAYS EARLY TO REFILL--SPECIAL ORDER--, Disp: 90 capsule, Rfl: 3    predniSONE (DELTASONE) 5 MG tablet, TAKE 1 TABLET BY MOUTH ONCE DAILY WITH FOOD, Disp: 30 tablet, Rfl: 6    rimegepant (NURTEC) 75 mg odt, Take 1 tablet (75 mg total) by mouth daily as needed for Migraine. Place ODT tablet on the tongue; alternatively the ODT tablet may be placed under the tongue, Disp: 9 tablet, Rfl: 11    tacrolimus (PROGRAF) 0.5 MG Cap, TAKE TWO CAPSULES BY MOUTH IN THE MORNING AND TAKE THREE CAPSULES IN THE EVENING --CALL 3 DAYS EARLY TO REFILL--SPECIAL ORDER--, Disp: 150 capsule, Rfl: 11    clindamycin (CLEOCIN T) 1 % external solution, Apply topically 2 (two) times daily. Prn pimples (Patient not taking: Reported on 9/2/2022), Disp: 60 mL, Rfl: 3    Objective:   /80 (BP Location: Right arm, Patient Position: Sitting, BP Method: Large (Automatic))   Pulse 91   Ht 5' 7" (1.702 m)   Wt 75.1 kg (165 lb 9.1 oz)   SpO2 100%   BMI 25.93 kg/m²     Physical Exam  Gen: No apparent distress, resting comfortably  HEENT: Pupils equal and reactive to light  Cardio: Regular rate, point of maximal impulse not displaced, no murmur noted, 2+ radial pulses bilaterally, 2+ DP pulses bilaterally  Resp: CTAB, no wheezing  Abd: Soft, non-tender, non-distended  Skin: Warm, dry,sternotomy scar  Neuro: Alert and oriented x3  Psych: Normal mood and affect      Labs:     Lab Results   Component Value Date     10/14/2022    K 4.4 10/14/2022     10/14/2022    CO2 26 10/14/2022    BUN 16 10/14/2022    CREATININE 1.2 10/14/2022    ANIONGAP 12 10/14/2022     Lab Results   Component Value Date    HGBA1C 4.8 02/25/2022     Lab Results   Component Value Date    BNP 33 10/14/2022    BNP 64 05/19/2022    BNP 47 05/17/2022       Lab Results   Component Value Date    WBC 9.48 10/14/2022    HGB 15.5 10/14/2022    HCT 47.9 10/14/2022    HCT 23 (L) 02/21/2018     10/14/2022    GRAN 6.5 10/14/2022    GRAN 68.5 " 10/14/2022     Lab Results   Component Value Date    CHOL 102 (L) 05/17/2022    HDL 34 (L) 05/17/2022    LDLCALC 42.4 (L) 05/17/2022    TRIG 128 05/17/2022       Lab Results   Component Value Date     10/14/2022    K 4.4 10/14/2022     10/14/2022    CO2 26 10/14/2022    BUN 16 10/14/2022    CREATININE 1.2 10/14/2022    ANIONGAP 12 10/14/2022     Lab Results   Component Value Date    HGBA1C 4.8 02/25/2022     Lab Results   Component Value Date    BNP 33 10/14/2022    BNP 64 05/19/2022    BNP 47 05/17/2022    Lab Results   Component Value Date    WBC 9.48 10/14/2022    HGB 15.5 10/14/2022    HCT 47.9 10/14/2022    HCT 23 (L) 02/21/2018     10/14/2022    GRAN 6.5 10/14/2022    GRAN 68.5 10/14/2022     Lab Results   Component Value Date    CHOL 102 (L) 05/17/2022    HDL 34 (L) 05/17/2022    LDLCALC 42.4 (L) 05/17/2022    TRIG 128 05/17/2022                Cardiovascular Imaging:     Echo 5/20/22:  Post cardiac transplantation study (OHTx 2018).  The left ventricle is normal in size with normal systolic function.  The estimated ejection fraction is 65%.  Normal left ventricular diastolic function.  Normal right ventricular size with normal right ventricular systolic function.  Normal central venous pressure (3 mmHg).       Stress test 3/11/22:  The test was stopped because the patient experienced fatigue.  Normal central venous pressure (3 mmHg).  The estimated PA systolic pressure is 19 mmHg.  The left ventricle is normal in size with normal systolic function.  The estimated ejection fraction is upper 50s.  There is abnormal septal wall motion consistent with post-operative status.  There are segmental left ventricular wall motion abnormalities.  Normal left ventricular diastolic function.  Normal right ventricular size with normal right ventricular systolic function.  During stress, the following significant arrhythmias were observed: occasional PVCs.  The patient's exercise capacity was  "average.  The ECG portion of this study is negative for myocardial ischemia.  The stress echo portion of this study is negative for myocardial ischemia.     [facility] 2/17/21:  Maximal intimal thickness of proximal LAD = .7mm which represents no change from prior year.  Widely patent Cors.  See scan below of IVUS image    Assessment & Plan:     Status post heart transplant  --[facility] + IVUS for CAV monitoring  - Anti-platelet Therapy: ASA  - Access: Right radial  - Catheters: Rigo  - Creatinine/CrCl: 1.2  - Allergies: No shellfish / Iodine allergy  - Pre-Hydration: NS  - Pre-Op Med: Bendaryl 50mg pO   - All patient's questions were answered.  -The risks, benefits and alternatives of the procedure were explained to the patient.   -The risks of coronary angiography include but are not limited to: bleeding, infection, heart rhythm abnormalities, allergic reactions, kidney injury and potential need for dialysis, stroke and death.   - Should stenting be indicated, the patient has agreed to dual anti-platelet therapy for 1-consecutive year with a drug-eluting stent and a minimum of 1-month with the use of a bare metal stent  - Additionally, pt is aware that non-compliance is likely to result in stent clotting with heart attack, heart failure, and/or death  -The risks of moderate sedation include hypotension, respiratory depression, arrhythmias, bronchospasm, and death.   - Informed consent was obtained and the  patient is agreeable to proceed with the procedure.      Hyperlipidemia LDL goal <70  -On statin  -Management per PCP/Gen Cards      Signed:  Heron Mckenzie MD  [facility] PGY-5    Staff attestation to follow.    I have seen the patient, reviewed the Fellow's history and physical, assessment and plan. I have personally interviewed and examined the patient and agree with the findings. Transplant coordinator confirmed "No Bx".    JANUSZ Cartagena MD    "

## 2023-02-07 NOTE — ASSESSMENT & PLAN NOTE
--[facility] + IVUS for CAV monitoring  - Anti-platelet Therapy: ASA  - Access: Right radial  - Catheters: Rigo  - Creatinine/CrCl: 1.2  - Allergies: No shellfish / Iodine allergy  - Pre-Hydration: NS  - Pre-Op Med: Bendaryl 50mg pO   - All patient's questions were answered.  -The risks, benefits and alternatives of the procedure were explained to the patient.   -The risks of coronary angiography include but are not limited to: bleeding, infection, heart rhythm abnormalities, allergic reactions, kidney injury and potential need for dialysis, stroke and death.   - Should stenting be indicated, the patient has agreed to dual anti-platelet therapy for 1-consecutive year with a drug-eluting stent and a minimum of 1-month with the use of a bare metal stent  - Additionally, pt is aware that non-compliance is likely to result in stent clotting with heart attack, heart failure, and/or death  -The risks of moderate sedation include hypotension, respiratory depression, arrhythmias, bronchospasm, and death.   - Informed consent was obtained and the  patient is agreeable to proceed with the procedure.

## 2023-02-07 NOTE — LETTER
February 9, 2023        Guillermo Alejo  [address]  [facility] [address]  Phone: [phone]  Fax: [phone]             [id number]  [address]  [facility] [address]  Phone: [phone]   Patient: Mert Samayoa   MR Number: [id number]   YOB: 1990   Date of Visit: 2/7/2023       Dear Dr. Guillermo Alejo    Thank you for referring Mert Samayoa to me for evaluation. Attached you will find relevant portions of my assessment and plan of care.    If you have questions, please do not hesitate to call me. I look forward to following Mert Samayoa along with you.    Sincerely,    Guy Stiles MD    Enclosure    If you would like to receive this communication electronically, please contact [email or web address] or [phone] to request [employer] access.    [employer] is a tool which provides read-only access to select patient information with whom you have a relationship. Its easy to use and provides real time access to review your patients record including encounter summaries, notes, results, and demographic information.    If you feel you have received this communication in error or would no longer like to receive these types of communications, please e-mail [email or web address]

## 2023-02-07 NOTE — PROGRESS NOTES
Subjective:       Patient ID:  Mert Samayoa is a 33 y.o. male who presents for   Chief Complaint   Patient presents with    Skin Check     UBSE     History of Present Illness: The patient presents for follow up of skin check.    The patient was last seen on: 2/8/2022 for TBSE.     No hx of skin cancer, no concerns today.    This is a high risk patient here to check for the development of new lesions. As hx of heart transplant    Review of Systems   Skin:  Positive for activity-related sunscreen use and wears hat. Negative for daily sunscreen use.   Hematologic/Lymphatic: Does not bruise/bleed easily.      Objective:    Physical Exam   Constitutional: He appears well-developed and well-nourished. No distress.   Neurological: He is alert and oriented to person, place, and time. He is not disoriented.   Psychiatric: He has a normal mood and affect.   Skin:   Areas Examined (abnormalities noted in diagram):   Scalp / Hair Palpated and Inspected  Head / Face Inspection Performed  Neck Inspection Performed  Chest / Axilla Inspection Performed  Abdomen Inspection Performed  Genitals / Buttocks / Groin Inspection Performed  Back Inspection Performed  RUE Inspected  LUE Inspection Performed  RLE Inspected  LLE Inspection Performed  Nails and Digits Inspection Performed                 Diagram Legend     Erythematous scaling macule/papule c/w actinic keratosis       Vascular papule c/w angioma      Pigmented verrucoid papule/plaque c/w seborrheic keratosis      Yellow umbilicated papule c/w sebaceous hyperplasia      Irregularly shaped tan macule c/w lentigo     1-2 mm smooth white papules consistent with Milia      Movable subcutaneous cyst with punctum c/w epidermal inclusion cyst      Subcutaneous movable cyst c/w pilar cyst      Firm pink to brown papule c/w dermatofibroma      Pedunculated fleshy papule(s) c/w skin tag(s)      Evenly pigmented macule c/w junctional nevus     Mildly variegated pigmented,  slightly irregular-bordered macule c/w mildly atypical nevus      Flesh colored to evenly pigmented papule c/w intradermal nevus       Pink pearly papule/plaque c/w basal cell carcinoma      Erythematous hyperkeratotic cursted plaque c/w SCC      Surgical scar with no sign of skin cancer recurrence      Open and closed comedones      Inflammatory papules and pustules      Verrucoid papule consistent consistent with wart     Erythematous eczematous patches and plaques     Dystrophic onycholytic nail with subungual debris c/w onychomycosis     Umbilicated papule    Erythematous-base heme-crusted tan verrucoid plaque consistent with inflamed seborrheic keratosis     Erythematous Silvery Scaling Plaque c/w Psoriasis     See annotation      Assessment / Plan:        Lentigo  This is a benign hyperpigmented sun induced lesion. Recommend daily sun protection/avoidance and use of at least SPF 30, broad spectrum sunscreen (OTC drug) will reduce the number of new lesions. Treatment of these benign lesions are considered cosmetic.    Multiple benign nevi  TBSE body skin examination performed today including at least 12 points as noted in physical examination. No lesions suspicious for malignancy noted.  Reassurance provided.  Instructed patient to observe lesion(s) for changes and follow up in clinic if changes are noted. Discussed ABCDE's of moles and brochure provided.    Cherry angioma  This is a benign vascular lesion. Reassurance given. No treatment required.     Skin cancer screening  Patient instructed in importance in daily sun protection of at least spf 30. Sun avoidance and topical protection discussed.     Recommend  for daily use on face and neck.    Patient encouraged to wear hat for all outdoor exposure.     Also discussed sun protective clothing. TASC or Zenter are good brands, UPF 50 or above. Recommend long sleeves when out in the sun.     Atypical nevus  Patient with mild atypical nevi. Instructed patient  to observe lesion(s) for changes and follow up in clinic if changes are noted. Discussed ABCDE's of moles and brochure provided.    Folliculitis  - - wash with hibclens or benzyl peroxide daily  while infection is present, then 3 x a week  - apply clindamycin solution after shaving or to affected area daily    F/u 1 year TBSE             No follow-ups on file.

## 2023-02-07 NOTE — PROGRESS NOTES
PCP - Lashon Hawkins MD  Referring Physician:     Subjective:   Patient ID:  Mert Samayoa is a 33 y.o. male with past medical history of:   -Prior OHT in 2018  -HTN  -HLD    Presenting for follow up for CAV surveillance. Last LHC in 2021 which showed maximal intimal thickness of 0.7 mm in LAD (unchanged from prior). Patient has no new complaints from previous visit. Patient specifically denies chest pain, shortness of breath, palpitations, PND. No limitations on exercise. Taking all medications as prescribed.     History:     Social History     Tobacco Use    Smoking status: Former     Packs/day: 1.00     Types: Cigarettes     Quit date: 2016     Years since quittin.1    Smokeless tobacco: Never   Substance Use Topics    Alcohol use: Yes     Alcohol/week: 1.0 standard drink     Types: 1 Cans of beer per week     Comment: socially     Family History   Problem Relation Age of Onset    Arthritis Mother     Heart disease Brother         cardiomyopathy and heart transplant    No Known Problems Father     Heart disease Brother         cardiomyopathy and heart transplanted twice    Birth defects Paternal Uncle        Meds:   Review of patient's allergies indicates:  No Known Allergies    Current Outpatient Medications:     aspirin (ECOTRIN) 81 MG EC tablet, Take 1 tablet (81 mg total) by mouth once daily., Disp: 30 tablet, Rfl: 11    atorvastatin (LIPITOR) 20 MG tablet, TAKE ONE TABLET BY MOUTH EVERY DAY FOR CHOLESTEROL --CALL 3 DAYS EARLY TO REFILL--SPECIAL ORDER--, Disp: 90 tablet, Rfl: 3    calcium carbonate-vit D3-min 600 mg calcium- 200 unit Tab, Take 1 tablet by mouth once daily. , Disp: , Rfl: 5    famotidine (PEPCID) 40 MG tablet, TAKE ONE TABLET BY MOUTH EVERY EVENING, Disp: 30 tablet, Rfl: 11    mycophenolate (CELLCEPT) 250 mg Cap, Take 5 capsules (1,250 mg total) by mouth 2 (two) times daily., Disp: 300 capsule, Rfl: 11    nortriptyline (PAMELOR) 10 MG capsule, TAKE 1 CAPSULE BY  "MOUTH EVERY EVENING. --CALL 3 DAYS EARLY TO REFILL--SPECIAL ORDER--, Disp: 90 capsule, Rfl: 3    predniSONE (DELTASONE) 5 MG tablet, TAKE 1 TABLET BY MOUTH ONCE DAILY WITH FOOD, Disp: 30 tablet, Rfl: 6    rimegepant (NURTEC) 75 mg odt, Take 1 tablet (75 mg total) by mouth daily as needed for Migraine. Place ODT tablet on the tongue; alternatively the ODT tablet may be placed under the tongue, Disp: 9 tablet, Rfl: 11    tacrolimus (PROGRAF) 0.5 MG Cap, TAKE TWO CAPSULES BY MOUTH IN THE MORNING AND TAKE THREE CAPSULES IN THE EVENING --CALL 3 DAYS EARLY TO REFILL--SPECIAL ORDER--, Disp: 150 capsule, Rfl: 11    clindamycin (CLEOCIN T) 1 % external solution, Apply topically 2 (two) times daily. Prn pimples (Patient not taking: Reported on 9/2/2022), Disp: 60 mL, Rfl: 3    Objective:   /80 (BP Location: Right arm, Patient Position: Sitting, BP Method: Large (Automatic))   Pulse 91   Ht 5' 7" (1.702 m)   Wt 75.1 kg (165 lb 9.1 oz)   SpO2 100%   BMI 25.93 kg/m²     Physical Exam  Gen: No apparent distress, resting comfortably  HEENT: Pupils equal and reactive to light  Cardio: Regular rate, point of maximal impulse not displaced, no murmur noted, 2+ radial pulses bilaterally, 2+ DP pulses bilaterally  Resp: CTAB, no wheezing  Abd: Soft, non-tender, non-distended  Skin: Warm, dry,sternotomy scar  Neuro: Alert and oriented x3  Psych: Normal mood and affect      Labs:     Lab Results   Component Value Date     10/14/2022    K 4.4 10/14/2022     10/14/2022    CO2 26 10/14/2022    BUN 16 10/14/2022    CREATININE 1.2 10/14/2022    ANIONGAP 12 10/14/2022     Lab Results   Component Value Date    HGBA1C 4.8 02/25/2022     Lab Results   Component Value Date    BNP 33 10/14/2022    BNP 64 05/19/2022    BNP 47 05/17/2022       Lab Results   Component Value Date    WBC 9.48 10/14/2022    HGB 15.5 10/14/2022    HCT 47.9 10/14/2022    HCT 23 (L) 02/21/2018     10/14/2022    GRAN 6.5 10/14/2022    GRAN 68.5 " 10/14/2022     Lab Results   Component Value Date    CHOL 102 (L) 05/17/2022    HDL 34 (L) 05/17/2022    LDLCALC 42.4 (L) 05/17/2022    TRIG 128 05/17/2022       Lab Results   Component Value Date     10/14/2022    K 4.4 10/14/2022     10/14/2022    CO2 26 10/14/2022    BUN 16 10/14/2022    CREATININE 1.2 10/14/2022    ANIONGAP 12 10/14/2022     Lab Results   Component Value Date    HGBA1C 4.8 02/25/2022     Lab Results   Component Value Date    BNP 33 10/14/2022    BNP 64 05/19/2022    BNP 47 05/17/2022    Lab Results   Component Value Date    WBC 9.48 10/14/2022    HGB 15.5 10/14/2022    HCT 47.9 10/14/2022    HCT 23 (L) 02/21/2018     10/14/2022    GRAN 6.5 10/14/2022    GRAN 68.5 10/14/2022     Lab Results   Component Value Date    CHOL 102 (L) 05/17/2022    HDL 34 (L) 05/17/2022    LDLCALC 42.4 (L) 05/17/2022    TRIG 128 05/17/2022                Cardiovascular Imaging:     Echo 5/20/22:  Post cardiac transplantation study (OHTx 2018).  The left ventricle is normal in size with normal systolic function.  The estimated ejection fraction is 65%.  Normal left ventricular diastolic function.  Normal right ventricular size with normal right ventricular systolic function.  Normal central venous pressure (3 mmHg).       Stress test 3/11/22:  The test was stopped because the patient experienced fatigue.  Normal central venous pressure (3 mmHg).  The estimated PA systolic pressure is 19 mmHg.  The left ventricle is normal in size with normal systolic function.  The estimated ejection fraction is upper 50s.  There is abnormal septal wall motion consistent with post-operative status.  There are segmental left ventricular wall motion abnormalities.  Normal left ventricular diastolic function.  Normal right ventricular size with normal right ventricular systolic function.  During stress, the following significant arrhythmias were observed: occasional PVCs.  The patient's exercise capacity was  "average.  The ECG portion of this study is negative for myocardial ischemia.  The stress echo portion of this study is negative for myocardial ischemia.     [facility] 2/17/21:  Maximal intimal thickness of proximal LAD = .7mm which represents no change from prior year.  Widely patent Cors.  See scan below of IVUS image    Assessment & Plan:     Status post heart transplant  --[facility] + IVUS for CAV monitoring  - Anti-platelet Therapy: ASA  - Access: Right radial  - Catheters: Rigo  - Creatinine/CrCl: 1.2  - Allergies: No shellfish / Iodine allergy  - Pre-Hydration: NS  - Pre-Op Med: Bendaryl 50mg pO   - All patient's questions were answered.  -The risks, benefits and alternatives of the procedure were explained to the patient.   -The risks of coronary angiography include but are not limited to: bleeding, infection, heart rhythm abnormalities, allergic reactions, kidney injury and potential need for dialysis, stroke and death.   - Should stenting be indicated, the patient has agreed to dual anti-platelet therapy for 1-consecutive year with a drug-eluting stent and a minimum of 1-month with the use of a bare metal stent  - Additionally, pt is aware that non-compliance is likely to result in stent clotting with heart attack, heart failure, and/or death  -The risks of moderate sedation include hypotension, respiratory depression, arrhythmias, bronchospasm, and death.   - Informed consent was obtained and the  patient is agreeable to proceed with the procedure.      Hyperlipidemia LDL goal <70  -On statin  -Management per PCP/Gen Cards      Signed:  Heron Mckenzie MD  [facility] PGY-5    Staff attestation to follow.    I have seen the patient, reviewed the Fellow's history and physical, assessment and plan. I have personally interviewed and examined the patient and agree with the findings. Transplant coordinator confirmed "No Bx".    JANUSZ Cartagena MD    "

## 2023-02-09 DIAGNOSIS — Z94.1 HEART TRANSPLANTED: Primary | ICD-10-CM

## 2023-02-09 NOTE — PROGRESS NOTES
Advanced Heart Failure and Transplantation Clinic Follow up.        Attending Physician: Guy Stiles MD.  The patient's last visit with me was on 2/19/2021.         HPI.    Review of Systems   Constitutional:  Negative for activity change, chills, diaphoresis, fatigue, fever and unexpected weight change.   HENT:  Positive for postnasal drip. Negative for nasal congestion, rhinorrhea and sore throat.    Eyes:  Negative for visual disturbance.   Respiratory:  Positive for cough. Negative for choking and shortness of breath.    Cardiovascular:  Negative for chest pain and leg swelling.   Gastrointestinal:  Negative for abdominal distention, abdominal pain, diarrhea, nausea and vomiting.   Genitourinary:  Negative for difficulty urinating, dysuria and hematuria.   Integumentary:  Negative for rash.   Neurological:  Negative for seizures, syncope and light-headedness.   Psychiatric/Behavioral:  Negative for agitation and hallucinations.       Past Medical History:   Diagnosis Date    Cardiogenic shock 1/16/2017    Cardiomyopathy     Familial cardiomyopathy    CHF (congestive heart failure)     Encounter for blood transfusion     Essential hypertension 12/21/2016    Essential hypertension 3/20/2018    Familial Cardiomyopathy     Familial cardiomyopathy     Hyperlipidemia LDL goal <70 11/19/2018        Past Surgical History:   Procedure Laterality Date    BIOPSY WITH ULTRASOUND GUIDANCE N/A 8/27/2019    Procedure: BIOPSY, WITH US GUIDANCE;  Surgeon: Mario Conner MD;  Location: [facility] CATH LAB;  Service: Cardiology;  Laterality: N/A;    BIOPSY WITH ULTRASOUND GUIDANCE N/A 9/24/2019    Procedure: BIOPSY, WITH US GUIDANCE;  Surgeon: Sammi Tapia MD;  Location: [facility] CATH LAB;  Service: Cardiology;  Laterality: N/A;    BIOPSY WITH ULTRASOUND GUIDANCE N/A 11/12/2019    Procedure: BIOPSY, WITH US GUIDANCE;  Surgeon: Mario Conner  MD;  Location: [facility] CATH LAB;  Service: Cardiology;  Laterality: N/A;    CORONARY ANGIOGRAPHY Right 2/14/2019    Procedure: ANGIOGRAM, CORONARY ARTERY;  Surgeon: Tello Correia MD;  Location: [facility] CATH LAB;  Service: Cardiology;  Laterality: Right;    HEART TRANSPLANT      LEFT HEART CATHETERIZATION Left 2/13/2020    Procedure: Left heart cath;  Surgeon: Guy Cartagena MD;  Location: [facility] CATH LAB;  Service: Cardiology;  Laterality: Left;    LEFT HEART CATHETERIZATION N/A 2/17/2021    Procedure: Left heart cath;  Surgeon: Guy Cartagena MD;  Location: [facility] CATH LAB;  Service: Cardiology;  Laterality: N/A;    LEFT VENTRICULAR ASSIST DEVICE      RIGHT HEART CATHETERIZATION Right 2/14/2019    Procedure: INSERTION, CATHETER, RIGHT HEART;  Surgeon: Tello Correia MD;  Location: [facility] CATH LAB;  Service: Cardiology;  Laterality: Right;    TONSILLECTOMY          Family History   Problem Relation Age of Onset    Arthritis Mother     Heart disease Brother         cardiomyopathy and heart transplant    No Known Problems Father     Heart disease Brother         cardiomyopathy and heart transplanted twice    Birth defects Paternal Uncle         Review of patient's allergies indicates:  No Known Allergies     Current Outpatient Medications   Medication Instructions    aspirin (ECOTRIN) 81 mg, Oral, Daily    atorvastatin (LIPITOR) 20 MG tablet TAKE ONE TABLET BY MOUTH EVERY DAY FOR CHOLESTEROL --CALL 3 DAYS EARLY TO REFILL--SPECIAL ORDER--    calcium carbonate-vit D3-min 600 mg calcium- 200 unit Tab 1 tablet, Oral, Daily    clindamycin (CLEOCIN T) 1 % external solution Topical (Top), 2 times daily, Prn pimples    esomeprazole (NEXIUM) 40 mg, Oral, Before breakfast    loratadine (CLARITIN) 10 mg, Oral, Daily    mycophenolate (CELLCEPT) 1,250 mg, Oral, 2 times daily    nortriptyline (PAMELOR) 10 MG capsule TAKE 1 CAPSULE BY MOUTH EVERY EVENING. --CALL 3 DAYS EARLY TO REFILL--SPECIAL ORDER--    NURTEC 75 mg, Oral,  "Daily PRN, Place ODT tablet on the tongue; alternatively the ODT tablet may be placed under the tongue    predniSONE (DELTASONE) 5 MG tablet TAKE 1 TABLET BY MOUTH ONCE DAILY WITH FOOD    tacrolimus (PROGRAF) 0.5 MG Cap Take 1 mg twice a day        Vitals:    02/07/23 1408   BP: 136/83   Pulse: 103        Wt Readings from Last 3 Encounters:   02/07/23 71.7 kg (158 lb)   02/07/23 75.8 kg (167 lb 1.7 oz)   02/07/23 75.1 kg (165 lb 9.1 oz)     Temp Readings from Last 3 Encounters:   05/20/22 98.2 °F (36.8 °C) (Oral)   05/19/22 99.8 °F (37.7 °C) (Oral)   02/17/21 97.9 °F (36.6 °C) (Oral)     BP Readings from Last 3 Encounters:   02/07/23 133/80   02/07/23 136/83   02/07/23 133/80     Pulse Readings from Last 3 Encounters:   02/07/23 70   02/07/23 103   02/07/23 91        Body mass index is 26.17 kg/m². Estimated body surface area is 1.89 meters squared as calculated from the following:    Height as of an earlier encounter on 2/7/23: 5' 7" (1.702 m).    Weight as of this encounter: 75.8 kg (167 lb 1.7 oz).     Physical Exam  Constitutional:       Appearance: He is well-developed.   HENT:      Head: Normocephalic and atraumatic.      Right Ear: External ear normal.      Left Ear: External ear normal.   Eyes:      Conjunctiva/sclera: Conjunctivae normal.      Pupils: Pupils are equal, round, and reactive to light.   Neck:      Vascular: No hepatojugular reflux or JVD.   Cardiovascular:      Rate and Rhythm: Normal rate and regular rhythm.      Pulses: Intact distal pulses.      Heart sounds: S1 normal and S2 normal. No murmur heard.    No friction rub. No gallop.   Pulmonary:      Effort: Pulmonary effort is normal.      Breath sounds: Normal breath sounds.   Abdominal:      General: Bowel sounds are normal. There is no distension.      Palpations: Abdomen is soft.      Tenderness: There is no abdominal tenderness. There is no guarding or rebound.   Musculoskeletal:      Cervical back: Normal range of motion and neck " supple.      Right lower leg: No edema.      Left lower leg: No edema.   Neurological:      Mental Status: He is alert and oriented to person, place, and time.        Lab Results   Component Value Date    BNP 33 02/07/2023     02/07/2023    K 3.4 (L) 02/07/2023    MG 2.0 02/07/2023     02/07/2023    CO2 25 02/07/2023    BUN 15 02/07/2023    CREATININE 1.2 02/07/2023    GLU 77 02/07/2023    HGBA1C 4.7 02/07/2023    AST 19 02/07/2023    ALT 21 02/07/2023    ALBUMIN 4.9 02/07/2023    PROT 7.5 02/07/2023    BILITOT 0.8 02/07/2023    WBC 10.96 02/07/2023    HGB 15.8 02/07/2023    HCT 50.0 02/07/2023    HCT 23 (L) 02/21/2018     02/07/2023    INR 1.2 05/19/2022    INR 2.9 02/14/2018     01/25/2018    TSH 1.499 02/07/2023    CHOL 115 (L) 02/07/2023    HDL 39 (L) 02/07/2023    LDLCALC 44.2 (L) 02/07/2023    TRIG 159 (H) 02/07/2023    W7IDMDT 7.5 02/19/2021    FREET4 0.98 02/07/2023    TACROLIMUS 11.2 02/07/2023    ALLOMAP See result image under hyperlink 08/06/2021         Results for orders placed during the hospital encounter of 02/07/23    Echo    Interpretation Summary  · Post cardiac transplantation study (OHTx 2018).  · The left ventricle is normal in size with concentric remodeling and normal systolic function. The estimated ejection fraction is 60%.  · Normal right ventricular size with normal right ventricular systolic function.  · Normal left ventricular diastolic function.  · Normal central venous pressure (3 mmHg).        Results for orders placed during the hospital encounter of 02/17/21    Cardiac catheterization    Conclusion  Images from the original result were not included.    · The estimated blood loss was none.  · Maximal intimal thickness of proximal LAD = .7mm which represents no change from prior year.  · Widely patent Cors.  · See scan below of IVUS image      Scan on 2/17/2021  8:18 AM by Jourdan Narayan MD: Prox LAD IVUS 2-            The procedure log was  documented by Documenter: RT Greg and verified by Guy Cartagena MD.    Date: 2/17/2021  Time: 8:21 AM         Assessment and Plan:  Heart transplanted    Status post heart transplant  -     tacrolimus (PROGRAF) 0.5 MG Cap; Take 1 mg twice a day  Dispense: 120 capsule; Refill: 11    Long term current use of immunosuppressive drug    Other orders  -     loratadine (CLARITIN) 10 mg tablet; Take 1 tablet (10 mg total) by mouth once daily.  Dispense: 30 tablet; Refill: 5  -     esomeprazole (NEXIUM) 40 MG capsule; Take 1 capsule (40 mg total) by mouth before breakfast.  Dispense: 30 capsule; Refill: 11        Patient doing well.  Normal graft function on echo today.  Having symptoms of sinusitis and nocturnal cough. Prescribed loratadine and change from H2 blocker to PPI.  Has scheduled follow up LHC.  Decreasing dose of tacrolimus due to elevated level.     Clinic: Return for labs and/or biopsy weekly the first month, every two weeks during month 2 and then monthly for the first year at the provider or coordinator's discretion. During the second year, return to clinic every 3 months. Post transplant year 3-5 return every 6 months. There will be a comprehensive post transplant evaluation every year that may include LHC/RHC/biopsy, stress test, echo, CXR, and other health screening exams.     In addition to the clinical assessment, I have ordered Allomap testing for this patient to assist in identification of moderate/severe acute cellular rejection (ACR) in a pt with stable Allograft function instead of endomyocardial biopsy.      Patient is reminded to call with any health changes as these can be early signs of transplant complications. Patient is advised to make sure any new medications or changes of old medications are discussed with a pharmacist or physician knowledgeable with transplant to avoid rejection/drug toxicity related to significant drug interactions.     UNOS Patient Status  Functional  Status: 100% - Normal, no complaints, no evidence of disease  Physical Capacity: No Limitations  Working for Income: yes  If yes, working activity level: Working Full Time

## 2023-02-13 NOTE — PROGRESS NOTES
History & Physical  Surgical Intensive Care    SUBJECTIVE:     Chief Complaint/Reason for Admission: LVAD    History of Present Illness:  Patient is a 27 y.o. male with DCM that presents to the SICU intubated and sedated s/p LVAD and IABP placement.    Interval: Balloon pump discontinued earlier this AM. Otherwise NAEON.     PTA Medications   Medication Sig    DOBUTAMINE HCL (DOBUTAMINE IV) Inject into the vein.    furosemide (LASIX) 20 MG tablet Take 1 tablet (20 mg total) by mouth once daily.    lisinopril 10 MG tablet TAKE ONE TABLET BY MOUTH ONCE DAILY IN THE EVENING       Review of patient's allergies indicates:  No Known Allergies    Past Medical History   Diagnosis Date    Cardiogenic shock 1/16/2017    Cardiomyopathy      Familial cardiomyopathy    CHF (congestive heart failure)     Essential hypertension 12/21/2016    Familial Cardiomyopathy      Familial cardiomyopathy      History reviewed. No pertinent past surgical history.  Family History   Problem Relation Age of Onset    Arthritis Mother     Heart disease Brother      cardiomyopathy and heart transplant    No Known Problems Father     Heart disease Brother      cardiomyopathy and heart transplanted twice    Birth defects Paternal Uncle      Social History   Substance Use Topics    Smoking status: Current Some Day Smoker     Packs/day: 1.00    Smokeless tobacco: None    Alcohol use 2.4 oz/week     4 Shots of liquor per week        Review of Systems:  Unable to obtain as patient is intubated and sedated    OBJECTIVE:     Vital Signs (Most Recent)  Temp: 99.7 °F (37.6 °C) (02/02/17 0600)  Pulse: 100 (02/02/17 0630)  Resp: (!) 25 (02/02/17 0317)  BP: (!) 84/0 (02/01/17 2100)  SpO2: 100 % (02/02/17 0630)  Ventilator Data (Last 24H):     Vent Mode: SIMV  Oxygen Concentration (%):  [] 50  Resp Rate Total:  [14 br/min-32 br/min] 28 br/min  Vt Set:  [350 mL-500 mL] 350 mL  PEEP/CPAP:  [5 cmH20] 5 cmH20  Pressure Support:  [10 cmH20] 10  cmH20  Mean Airway Pressure:  [8.9 cmH20-10 cmH20] 9.5 cmH20    Hemodynamic Parameters (Last 24H):  PAP: (24-38)/(15-25) 35/19  PAP (Mean):  [22 mmHg-30 mmHg] 27 mmHg    Physical Exam:  General: well developed, well nourished  Neurologic: Mental status: sedated, follows commands  Lungs: on vent  Cardiovascular: Heart: regular rate and rhythm. Chest Wall: chest open.   Abd: ND  Extremities: no cyanosis or edema, or clubbing.       Lines/Drains:       Introducer 02/02/17 0553 (Active)   Number of days:0            Pulmonary Artery Catheter Assessment  02/02/17 0553 (Active)   Number of days:0            Pulmonary Artery Catheter Assessment  02/01/17 0835 (Active)   Dressing biopatch in place;dressing dry and intact 2/2/2017  3:00 AM   Securement secured w/ sutures 2/2/2017  3:00 AM   Current Insertion Depth (cm) 45 cm 2/2/2017  3:00 AM   Phlebitis 0-->no symptoms 2/2/2017  3:00 AM   Infiltration 0-->no symptoms 2/2/2017  3:00 AM   Waveform normal 2/2/2017  3:00 AM   Pressure Catheter Interventions line leveled/zeroed 2/2/2017  3:00 AM   Prox Injectate Status Infusing 2/2/2017  3:00 AM   Prox Infusate Status Infusing 2/2/2017  3:00 AM   Distal Status Infusing 2/2/2017  3:00 AM   Pressure Catheter Tubing Change Due 02/05/17 2/1/2017  7:00 PM   Daily Line Review Performed 2/2/2017  3:00 AM   Number of days:0            Percutaneous Central Line Insertion/Assessment - triple lumen  02/02/17 0553 (Active)   Number of days:0            Peripheral IV - Single Lumen Right Hand (Active)   Site Assessment Clean;Intact;Dry 2/2/2017  3:00 AM   Line Status Infusing 2/2/2017  3:00 AM   Dressing Status Clean;Dry;Intact 2/2/2017  3:00 AM   Dressing Change Due 02/02/17 2/2/2017  3:00 AM   Site Change Due 02/02/17 2/1/2017  7:00 PM   Reason Not Rotated Not due 2/2/2017  3:00 AM   Number of days:            Peripheral IV - Single Lumen 02/01/17 0702 Right Antecubital (Active)   Site Assessment Clean;Dry;Intact 2/2/2017  3:00 AM   Line  Status Infusing 2/2/2017  3:00 AM   Dressing Status Clean;Dry;Intact 2/2/2017  3:00 AM   Dressing Intervention New dressing 2/1/2017  6:00 AM   Dressing Change Due 02/05/17 2/2/2017  3:00 AM   Site Change Due 02/05/17 2/1/2017  7:00 PM   Reason Not Rotated Not due 2/2/2017  3:00 AM   Number of days:1            Peripheral IV - Single Lumen 02/01/17 0830 Left Hand (Active)   Site Assessment Clean;Intact;Dry 2/2/2017  3:00 AM   Line Status Infusing 2/2/2017  3:00 AM   Dressing Status Clean;Dry;Intact 2/2/2017  3:00 AM   Dressing Intervention New dressing 2/2/2017  3:00 AM   Dressing Change Due 02/05/17 2/2/2017  3:00 AM   Site Change Due 02/05/17 2/1/2017  7:00 PM   Reason Not Rotated Not due 2/2/2017  3:00 AM   Number of days:0            Arterial Line 02/01/17 0815 Left Radial (Active)   Site Assessment Clean;Dry;Intact 2/2/2017  3:00 AM   Line Status Pulsatile blood flow 2/2/2017  3:00 AM   Art Line Waveform Appropriate 2/2/2017  3:00 AM   Arterial Line Interventions Zeroed and calibrated;Leveled;Flushed per protocol;Line pulled back;Connections checked and tightened 2/2/2017  3:00 AM   Color/Movement/Sensation Capillary refill less than 3 sec 2/2/2017  3:00 AM   Dressing Type Transparent 2/2/2017  3:00 AM   Dressing Status Clean;Dry;Intact 2/2/2017  3:00 AM   Dressing Intervention New dressing 2/2/2017  3:00 AM   Dressing Change Due 02/05/17 2/2/2017  3:00 AM   Number of days:0            VAD 02/01/17 1042 Heartware (Active)   Site Location Abdomen right 2/2/2017  3:00 AM   Site Assessment Clean;Dry;Intact 2/2/2017  3:00 AM   Dressing Status Clean;Dry;Intact 2/2/2017  3:00 AM   Dressing Intervention New dressing 2/2/2017  3:00 AM   Power Source External DC 2/2/2017  3:00 AM   Equipment inventory at  Admission 2/1/2017 10:00 AM   Pump type Heartware 2/1/2017  7:00 PM   Battery 1 [id number] 2/1/2017 10:00 AM   Battery 2 [id number] 2/1/2017 10:00 AM   Battery 3 [id number] 2/1/2017 10:00 AM   Battery 4 [id number] 2/1/2017  10:00 AM   Battery 5 [id number] 2/1/2017 10:00 AM   Battery 6 [id number] 2/1/2017 10:00 AM   AC Adapter 1 [id number] 2/1/2017 10:00 AM   AC Adapter 2 [id number] 2/1/2017 10:00 AM   Battery Charger [id number] 2/1/2017 10:00 AM   Primary Controller [id number] 2/1/2017 10:00 AM   Extra Controller [id number] 2/1/2017 10:00 AM   Number of days:0            Chest Tube 02/01/17 1129 1 Mediastinal 32 Fr. (Active)   Chest Tube WDL WDL 2/1/2017  7:00 PM   Function -20 cm H2O 2/2/2017  3:00 AM   Air Leak/Fluctuation air leak not present;fluctuation not present;connections tightened;dependent drainage cleared 2/2/2017  3:00 AM   Safety all tubing connections taped;all connections secured;suction checked 2/2/2017  3:00 AM   Securement tubing anchored to body distal to insertion site w/ tape 2/2/2017  3:00 AM   Patency Intervention Tip/tilt 2/2/2017  3:00 AM   Drainage Description Dark red 2/2/2017  3:00 AM   Dressing Appearance occlusive gauze dressing intact;clean and dry;w/ dried drainage 2/2/2017  3:00 AM   Left Subcutaneous Emphysema none present 2/2/2017  3:00 AM   Right Subcutaneous Emphysema none present 2/2/2017  3:00 AM   Surrounding Skin Unable to view 2/2/2017  3:00 AM   Output (mL) 20 mL 2/2/2017  4:00 AM   Number of days:0            Chest Tube 02/01/17 1130 2 Mediastinal 32 Fr. (Active)   Chest Tube WDL WDL 2/1/2017  7:00 PM   Function -20 cm H2O 2/2/2017  3:00 AM   Air Leak/Fluctuation air leak not present;fluctuation not present;connections tightened;dependent drainage cleared 2/2/2017  3:00 AM   Safety all tubing connections taped;all connections secured;suction checked 2/2/2017  3:00 AM   Securement tubing anchored to body distal to insertion site w/ tape 2/2/2017  3:00 AM   Patency Intervention Tip/tilt 2/2/2017  3:00 AM   Drainage Description Dark red 2/2/2017  3:00 AM   Dressing Appearance occlusive gauze dressing intact;clean and dry;w/ dried drainage 2/2/2017  3:00 AM   Left Subcutaneous Emphysema none present  2/2/2017  3:00 AM   Right Subcutaneous Emphysema none present 2/2/2017  3:00 AM   Surrounding Skin Unable to view 2/2/2017  3:00 AM   Output (mL) 10 mL 2/2/2017  4:00 AM   Number of days:0            NG/OG Tube 02/01/17 orogastric (Active)   Placement Check placement verified by x-ray 2/1/2017  7:00 PM   Tolerance no signs/symptoms of discomfort 2/1/2017  7:00 PM   Securement taped to commercial device 2/1/2017  7:00 PM   Clamp Status/Tolerance unclamped 2/1/2017  7:00 PM   Suction Setting/Drainage Method suction at;low;intermittent setting 2/1/2017  7:00 PM   Insertion Site Appearance no redness, warmth, tenderness, skin breakdown, drainage 2/1/2017  7:00 PM   Drainage Thin;Clear;Yellow 2/1/2017  7:00 PM   Flush/Irrigation flushed w/;water;no resistance met 2/1/2017  7:00 PM   Intake (mL) 45 mL 2/2/2017  5:30 AM   Tube Output(mL)(Include Discarded Residual) 130 mL 2/2/2017  5:50 AM   Number of days:1            Urethral Catheter 02/01/17 0840 Non-latex;Straight-tip;Temperature probe 16 Fr. (Active)   Site Assessment Clean;Intact 2/2/2017  3:00 AM   Collection Container Urimeter 2/2/2017  3:00 AM   Securement Method secured to upper leg w/ adhesive device 2/2/2017  3:00 AM   Catheter Care Performed yes 2/2/2017  3:00 AM   Reason for Continuing Urinary Catheterization Post operative;Critically ill in ICU requiring intensive monitoring 2/2/2017  3:00 AM   Output (mL) 45 mL 2/2/2017  6:00 AM   Number of days:0       Laboratory    Chest X-Ray 2/2/2017:Postoperative changes from an LVAD device placement remain without significant change.  Heart is at the upper limits of normal.  Lungs are clear.  The position of the endotracheal tube and the [place] catheter remain in satisfactory position. There is mediastinal drains in unchanged position. The aortic balloon pump catheter is no longer visualized on the current study.    Diagnostic Results:      ASSESSMENT/PLAN:     Patient is a 27 y.o. male with DCM s/p LVAD and IABP  placement 2/1/2017 and planned chest closure 2/2/2017.    Interval: NAEON. Balloon pump discontinued 2/2/2017.       Plan:  Neuro:    - propofol - Follows commands when sedation is weaned.   - pain meds    Pulmonary:   - intubated:  Vent Mode: SIMV  Oxygen Concentration (%):  [] 50  Resp Rate Total:  [14 br/min-32 br/min] 28 br/min  Vt Set:  [350 mL-500 mL] 350 mL  PEEP/CPAP:  [5 cmH20] 5 cmH20  Pressure Support:  [10 cmH20] 10 cmH20  Mean Airway Pressure:  [8.9 cmH20-10 cmH20] 9.5 cmH20  - cont vent   - daily CXR  - nitric oxide 15    Cardiac:  - dobutamine 5  - epi 0.07  - cardene 5  - anticoag per primary after chest is closed    Renal:   - BUN/Cr: 13/1.1  - trend BUN/Cr  - UOP: 3.4L  - cont kirk for strict I/O's    Heme/ Infectious Disease:   - cefepime, diflucan, vanc   - WBC 10  - H/H 10.5/30.5 (from 13.9/40.6)  - INR 1.5  - aPTT 27.1     Endocrine:  - monitor BGL, 156 this AM  - insult gtt per protocol    Fluids/Electrolytes/Nutrition/GI:   - MIVF  - CT1: 150 cc output/24 hr  - CT2: 340 cc output/24 hr  - replace lytes as needed  - NPO    Dispo:  - OR for chest closure today    Adrienne Calle MD  PGY-1  Surgical Intensive Care Unit     Bactrim Counseling:  I discussed with the patient the risks of sulfa antibiotics including but not limited to GI upset, allergic reaction, drug rash, diarrhea, dizziness, photosensitivity, and yeast infections.  Rarely, more serious reactions can occur including but not limited to aplastic anemia, agranulocytosis, methemoglobinemia, blood dyscrasias, liver or kidney failure, lung infiltrates or desquamative/blistering drug rashes.

## 2023-02-14 ENCOUNTER — LAB VISIT (OUTPATIENT)
Dept: LAB | Facility: HOSPITAL | Age: 33
End: 2023-02-14
Attending: INTERNAL MEDICINE
Payer: COMMERCIAL

## 2023-02-14 ENCOUNTER — TELEPHONE (OUTPATIENT)
Dept: TRANSPLANT | Facility: CLINIC | Age: 33
End: 2023-02-14
Payer: COMMERCIAL

## 2023-02-14 DIAGNOSIS — Z94.1 HEART TRANSPLANTED: ICD-10-CM

## 2023-02-14 DIAGNOSIS — Z94.1 STATUS POST HEART TRANSPLANT: ICD-10-CM

## 2023-02-14 LAB
ANION GAP SERPL CALC-SCNC: 9 MMOL/L (ref 8–16)
BUN SERPL-MCNC: 18 MG/DL (ref 6–20)
CALCIUM SERPL-MCNC: 9.3 MG/DL (ref 8.7–10.5)
CHLORIDE SERPL-SCNC: 106 MMOL/L (ref 95–110)
CO2 SERPL-SCNC: 27 MMOL/L (ref 23–29)
CREAT SERPL-MCNC: 1.2 MG/DL (ref 0.5–1.4)
EST. GFR  (NO RACE VARIABLE): >60 ML/MIN/1.73 M^2
GLUCOSE SERPL-MCNC: 91 MG/DL (ref 70–110)
POTASSIUM SERPL-SCNC: 3.8 MMOL/L (ref 3.5–5.1)
SODIUM SERPL-SCNC: 142 MMOL/L (ref 136–145)
TACROLIMUS BLD-MCNC: 6.7 NG/ML (ref 5–15)

## 2023-02-14 PROCEDURE — 36415 COLL VENOUS BLD VENIPUNCTURE: CPT | Performed by: INTERNAL MEDICINE

## 2023-02-14 PROCEDURE — 80048 BASIC METABOLIC PNL TOTAL CA: CPT | Performed by: INTERNAL MEDICINE

## 2023-02-14 PROCEDURE — 80197 ASSAY OF TACROLIMUS: CPT | Performed by: INTERNAL MEDICINE

## 2023-03-02 ENCOUNTER — HOSPITAL ENCOUNTER (OUTPATIENT)
Facility: HOSPITAL | Age: 33
Discharge: HOME | End: 2023-03-02
Attending: INTERNAL MEDICINE | Admitting: INTERNAL MEDICINE
Payer: COMMERCIAL

## 2023-03-02 ENCOUNTER — TELEPHONE (OUTPATIENT)
Dept: TRANSPLANT | Facility: CLINIC | Age: 33
End: 2023-03-02
Payer: COMMERCIAL

## 2023-03-02 VITALS
RESPIRATION RATE: 16 BRPM | HEIGHT: 67 IN | SYSTOLIC BLOOD PRESSURE: 124 MMHG | OXYGEN SATURATION: 99 % | HEART RATE: 86 BPM | WEIGHT: 165 LBS | TEMPERATURE: 98 F | BODY MASS INDEX: 25.9 KG/M2 | DIASTOLIC BLOOD PRESSURE: 60 MMHG

## 2023-03-02 DIAGNOSIS — Z98.890 STATUS POST LEFT HEART CATHETERIZATION: ICD-10-CM

## 2023-03-02 DIAGNOSIS — Z94.1 STATUS POST HEART TRANSPLANTATION: ICD-10-CM

## 2023-03-02 LAB
ABO + RH BLD: NORMAL
BLD GP AB SCN CELLS X3 SERPL QL: NORMAL

## 2023-03-02 PROCEDURE — 92978 ENDOLUMINL IVUS OCT C 1ST: CPT

## 2023-03-02 PROCEDURE — 99152 PR MOD CONSCIOUS SEDATION, SAME PHYS, 5+ YRS, FIRST 15 MIN: ICD-10-PCS | Mod: GC,,, | Performed by: INTERNAL MEDICINE

## 2023-03-02 PROCEDURE — 93454 CORONARY ARTERY ANGIO S&I: CPT | Performed by: INTERNAL MEDICINE

## 2023-03-02 PROCEDURE — 93005 ELECTROCARDIOGRAM TRACING: CPT | Mod: 59

## 2023-03-02 PROCEDURE — C1887 CATHETER, GUIDING: HCPCS | Performed by: INTERNAL MEDICINE

## 2023-03-02 PROCEDURE — 63600175 PHARM REV CODE 636 W HCPCS: Performed by: INTERNAL MEDICINE

## 2023-03-02 PROCEDURE — 93010 EKG 12-LEAD: ICD-10-PCS | Mod: ,,, | Performed by: INTERNAL MEDICINE

## 2023-03-02 PROCEDURE — 93010 ELECTROCARDIOGRAM REPORT: CPT | Mod: ,,, | Performed by: INTERNAL MEDICINE

## 2023-03-02 PROCEDURE — 99152 MOD SED SAME PHYS/QHP 5/>YRS: CPT | Performed by: INTERNAL MEDICINE

## 2023-03-02 PROCEDURE — C1753 CATH, INTRAVAS ULTRASOUND: HCPCS | Performed by: INTERNAL MEDICINE

## 2023-03-02 PROCEDURE — 93454 PR CATH PLACE/CORONARY ANGIO, IMG SUPER/INTERP: ICD-10-PCS | Mod: 26,GC,, | Performed by: INTERNAL MEDICINE

## 2023-03-02 PROCEDURE — 25000003 PHARM REV CODE 250: Performed by: INTERNAL MEDICINE

## 2023-03-02 PROCEDURE — 93454 CORONARY ARTERY ANGIO S&I: CPT | Mod: 26,GC,, | Performed by: INTERNAL MEDICINE

## 2023-03-02 PROCEDURE — 86900 BLOOD TYPING SEROLOGIC ABO: CPT | Performed by: INTERNAL MEDICINE

## 2023-03-02 PROCEDURE — 92978 PR IVUS, CORONARY, 1ST VESSEL: ICD-10-PCS | Mod: 26,GC,, | Performed by: INTERNAL MEDICINE

## 2023-03-02 PROCEDURE — C1894 INTRO/SHEATH, NON-LASER: HCPCS | Performed by: INTERNAL MEDICINE

## 2023-03-02 PROCEDURE — 25500020 PHARM REV CODE 255: Performed by: INTERNAL MEDICINE

## 2023-03-02 PROCEDURE — C1769 GUIDE WIRE: HCPCS | Performed by: INTERNAL MEDICINE

## 2023-03-02 PROCEDURE — 99152 MOD SED SAME PHYS/QHP 5/>YRS: CPT | Mod: GC,,, | Performed by: INTERNAL MEDICINE

## 2023-03-02 PROCEDURE — 92978 ENDOLUMINL IVUS OCT C 1ST: CPT | Mod: 26,GC,, | Performed by: INTERNAL MEDICINE

## 2023-03-02 RX ORDER — LIDOCAINE HYDROCHLORIDE 10 MG/ML
INJECTION INFILTRATION; PERINEURAL
Status: DISCONTINUED | OUTPATIENT
Start: 2023-03-02 | End: 2023-03-02 | Stop reason: HOSPADM

## 2023-03-02 RX ORDER — LANOLIN ALCOHOL/MO/W.PET/CERES
400 CREAM (GRAM) TOPICAL 2 TIMES DAILY
COMMUNITY

## 2023-03-02 RX ORDER — SODIUM CHLORIDE 9 MG/ML
INJECTION, SOLUTION INTRAVENOUS CONTINUOUS
Status: ACTIVE | OUTPATIENT
Start: 2023-03-02

## 2023-03-02 RX ORDER — SODIUM CHLORIDE 9 MG/ML
INJECTION, SOLUTION INTRAVENOUS CONTINUOUS
Status: ACTIVE | OUTPATIENT
Start: 2023-03-02 | End: 2023-03-02

## 2023-03-02 RX ORDER — FENTANYL CITRATE 50 UG/ML
INJECTION, SOLUTION INTRAMUSCULAR; INTRAVENOUS
Status: DISCONTINUED | OUTPATIENT
Start: 2023-03-02 | End: 2023-03-02 | Stop reason: HOSPADM

## 2023-03-02 RX ORDER — HEPARIN SOD,PORCINE/0.9 % NACL 1000/500ML
INTRAVENOUS SOLUTION INTRAVENOUS
Status: DISCONTINUED | OUTPATIENT
Start: 2023-03-02 | End: 2023-03-02 | Stop reason: HOSPADM

## 2023-03-02 RX ORDER — DIPHENHYDRAMINE HCL 50 MG
50 CAPSULE ORAL ONCE
Status: COMPLETED | OUTPATIENT
Start: 2023-03-02 | End: 2023-03-02

## 2023-03-02 RX ORDER — MIDAZOLAM HYDROCHLORIDE 1 MG/ML
INJECTION INTRAMUSCULAR; INTRAVENOUS
Status: DISCONTINUED | OUTPATIENT
Start: 2023-03-02 | End: 2023-03-02 | Stop reason: HOSPADM

## 2023-03-02 RX ADMIN — DIPHENHYDRAMINE HYDROCHLORIDE 50 MG: 50 CAPSULE ORAL at 09:03

## 2023-03-02 RX ADMIN — SODIUM CHLORIDE: 9 INJECTION, SOLUTION INTRAVENOUS at 09:03

## 2023-03-02 NOTE — Clinical Note
The radial band was applied to the right radial artery. 10 cc's of air were inserted into the closure device.

## 2023-03-02 NOTE — BRIEF OP NOTE
Brief Operative Note:    : Guy Cartagena MD     Referring Physician: Sammi Tapia     All Operators: Surgeon(s):  MD Matt Marquez MD     Preoperative Diagnosis: Status post heart transplantation [Z94.1]     Postop Diagnosis: Status post heart transplantation [Z94.1]    Treatments/Procedures: Procedure(s) (LRB):  Angiogram, Coronary (N/A)  IVUS, Coronary (N/A)    Access: Right radial artery    Findings: normal   coronary artery disease is present.     See catheterization report for full details.    Intervention: none     See catheterization report for full details.    Closure device:  TR band         Plan:  - Post cath protocol   - IVF @ 200 cc/kg/hr x 2 hours  - Bed rest x 2 hours   - Continue current medication regiment per Transplant Team   Estimated Blood loss: 20 cc    Specimens removed: No    Matt Meza MD

## 2023-03-02 NOTE — NURSING
IV d/c'd with cath tip intact.  Telemetry monitor removed.  Refused wheelchair and ambulated off unit for discharge home. Wife at pt side.

## 2023-03-02 NOTE — PLAN OF CARE
Vasc band removed without difficulty.  Gauze/tegaderm applied to right wrist vasc band site.  No bleeding or hematoma noted.  Pt and pt wife verbalized an understanding of d/c instructions.

## 2023-03-02 NOTE — NURSING
Deflating vasc band.  No c/o pain or SOB.  Pt tolerating food/lunch tray delivered.  Wife at bedside.  Pt and pt wife verbalized an understanding of d/c instructions.

## 2023-03-02 NOTE — PLAN OF CARE
Received via stretcher from cath lab.  Report from cath Lab, RN.  Awake and alert.  Pt family called to bedside.  VSS.  Vasc band in place to right wrist intact. No bleeding or hematoma noted.  Food and drinks provided.  Oriented to room and call bell provided.  Will continue to monitor.

## 2023-03-02 NOTE — INTERVAL H&P NOTE
The patient has been examined and the H&P has been reviewed:    I concur with the findings and no changes have occurred since H&P was written.    Procedure risks, benefits and alternative options discussed and understood by patient/family.       Assessment & Plan:      Status post heart transplant  --[facility] + IVUS for CAV monitoring  - Anti-platelet Therapy: ASA  - Access: Right radial  - Catheters: Rigo  - Creatinine/CrCl: 1.2  - Allergies: No shellfish / Iodine allergy  - Pre-Hydration: NS  - Pre-Op Med: Bendaryl 50mg pO   - All patient's questions were answered.  -The risks, benefits and alternatives of the procedure were explained to the patient.     Signed:  Leoncio Cartagena MD  Cardiovascular Fellow  [facility]

## 2023-03-02 NOTE — DISCHARGE SUMMARY
Discharge Summary  Interventional Cardiology      Admit Date: 3/2/2023    Discharge Date:  3/2/2023    Attending Physician: Guy Cartagena MD    Discharge Physician: Tamika Bose MD    Principal Diagnoses: <principal problem not specified>  Indication for Admission: Angiogram, Coronary (N/A), IVUS, Coronary (N/A)    Discharged Condition: Good    Hospital Course:   Patient presented for outpatient coronary angiogram which went without complication.  Normal coronary arteries  . See full cath report in Epic for details. Hemostasis of patient's R Radial access site was achieved with VascBand. Patient was monitored per post-cath protocol, and his R radial access site was c/d/i with no hematoma. Patient was able to ambulate without difficulty. He was feeling well and anticipating discharge home today.     Outpatient Plan:  - There were no medication changes    Diet: Cardiac diet    Activity: Ad yesenia, wound care instructions provided    Disposition: Home or Self Care    Follow Up: with transplant team       Discharge Medications:      Medication List        CONTINUE taking these medications      aspirin 81 MG EC tablet  Commonly known as: ECOTRIN  Take 1 tablet (81 mg total) by mouth once daily.     atorvastatin 20 MG tablet  Commonly known as: LIPITOR  TAKE ONE TABLET BY MOUTH EVERY DAY FOR CHOLESTEROL --CALL 3 DAYS EARLY TO REFILL--SPECIAL ORDER--     calcium carbonate-vit D3-min 600 mg calcium- 200 unit Tab     esomeprazole 40 MG capsule  Commonly known as: NEXIUM  Take 1 capsule (40 mg total) by mouth before breakfast.     loratadine 10 mg tablet  Commonly known as: CLARITIN  Take 1 tablet (10 mg total) by mouth once daily.     magnesium oxide 400 mg (241.3 mg magnesium) tablet  Commonly known as: MAG-OX     mycophenolate 250 mg Cap  Commonly known as: CELLCEPT  Take 5 capsules (1,250 mg total) by mouth 2 (two) times daily.     nortriptyline 10 MG capsule  Commonly known as: PAMELOR  TAKE 1 CAPSULE BY MOUTH  EVERY EVENING. --CALL 3 DAYS EARLY TO REFILL--SPECIAL ORDER--     NURTEC 75 mg odt  Generic drug: rimegepant  Take 1 tablet (75 mg total) by mouth daily as needed for Migraine. Place ODT tablet on the tongue; alternatively the ODT tablet may be placed under the tongue     predniSONE 5 MG tablet  Commonly known as: DELTASONE  TAKE 1 TABLET BY MOUTH ONCE DAILY WITH FOOD     tacrolimus 0.5 MG Cap  Commonly known as: PROGRAF  Take 1 mg twice a day            ASK your doctor about these medications      clindamycin 1 % external solution  Commonly known as: CLEOCIN T  Apply topically 2 (two) times daily. Prn pimples

## 2023-03-03 ENCOUNTER — TELEPHONE (OUTPATIENT)
Dept: TRANSPLANT | Facility: CLINIC | Age: 33
End: 2023-03-03
Payer: COMMERCIAL

## 2023-03-03 NOTE — LETTER
March 3, 2023      [facility]  [address]  [facility] [address]  Phone: [phone]       Patient: Mert Samayoa   YOB: 1990  Date of Visit: 3/2/2023    To Whom It May Concern:    Micheal Samayoa  was at [facility] on 3/2/2023 for left heart catheterization. The patient may return to work on 3/3/2023 with restrictions. He cannot lift more than 5 lbs for 5 days.  If you have any questions or concerns, or if I can be of further assistance, please do not hesitate to contact me.    Sincerely,    Precious Spicer RN   Post Heart Transplant Coordinator  [phone]

## 2023-03-03 NOTE — TELEPHONE ENCOUNTER
Called to discuss LHC with IVUS results. His CAV is improved. Advised he continue doing what he is doing. Will get labs in 3 months.  Pt needs a work excuse for missing yesterday. Will send one in [employer].

## 2023-03-09 NOTE — ASSESSMENT & PLAN NOTE
-Transplant Date 2/18/18. S/P washout 2/19.  HTS primary.   -CMV Status:D+/R-   -Rejection status: Moderate Risk  -Rejection episodes: none to date  -Induction: No   -Current immunosuppression: Mehtylpred 40IV BID, Prograf 1mg BID, Cellcept 1000 mg BID.  -Opportunistic PPx with:Nystatin   -Hemodynamically stable on  2.5.   -Continue Lasix 20mg/hr. Diuresing well. Received dose of diuril last night with good results.   -CTs in place and AV wires connected to pacer(backup rate 80).  -Echo 2/21 EF 60%, low nl RV fxn.   Patient admitted for atrial fibrillation. PMH DM, HTN, CHF. Patient admitted for atrial fibrillation. PMH DM, HTN, CHF. Patient admitted for atrial fibrillation. PMH DM, HTN, CHF. Patient admitted for atrial fibrillation. PMH DM, HTN, CHF.

## 2023-04-22 NOTE — NURSING
Rounds Report: Attended interdisciplinary rounds this afternoon with the transplant team including SW, physicians, fellows,  mid-level providers, and transplant coordinators.  Discussed plan of care, including DC date of no sooner than Monday 10/1. He will be receiving Neupogen x5 for low WBC. I visited with patient who was ambulating in the wright with his mask in place. He was in good spirits and understood the plan.   5

## 2023-05-12 NOTE — PROGRESS NOTES
"Referring Physician:Lashon Hawkins MD     Reason for visit:  Chief Complaint   Patient presents with    Congestive Heart Failure    Heart Transplant Pre-evaluation    heart replaced by heart assist device    Nutrition Counseling        :1990     Allergies Reviewed  Meds Reviewed    Anthropometrics  Weight:73.9 kg (163 lb)  Height:5' 6" (1.676 m)  BMI:Body mass index is 26.31 kg/m².   IBW:   64.5 kg    Meds:  Outpatient Medications Prior to Visit   Medication Sig Dispense Refill    aspirin (ECOTRIN) 325 MG EC tablet Take 1 tablet (325 mg total) by mouth once daily. 30 tablet 11    famotidine (PEPCID) 40 MG tablet Take 1 tablet (40 mg total) by mouth every evening. 30 tablet 11    furosemide (LASIX) 40 MG tablet Take 40 mg by mouth as needed. Pt reports taking 20-40 mg daily as needed for weight gain       lisinopril 10 MG tablet Take 1 tablet (10 mg total) by mouth 2 (two) times daily. 60 tablet 11    ondansetron (ZOFRAN-ODT) 4 MG TbDL Take 1 tablet (4 mg total) by mouth every 6 (six) hours as needed. 30 tablet 0    warfarin (COUMADIN) 5 MG tablet Take 1-1.5 tablets (5-7.5 mg total) by mouth Daily. 50 tablet 5     No facility-administered medications prior to visit.          Labs:  17  CMP wnl   Prealb  28   Mg  2.3   Phos  2.7     Estimated Nutrition Needs:   2217 Kcals/day( 30 kcal/kg),  74 g protein( 1.0 g/kg)     Diet Hx:   Pt states he & his wife both grocery shop and cook.  Pt is familiar with low sodium diet; is not presently on fluid restriction.  Weight is stable.  Pt does not add salt to food and generally follows a low sodium diet.      Assessment:   Pt attentive; we discussed foods recommended & to avoid on low sodium diet; Vitamin K-containing foods; general cardiac diet guidelines.  Pt verbalized understanding of information, and did not have any nutrition-related questions/concerns today.    Nutrition Diagnosis:   None at this time.    Recommendations:   2 gram sodium " diet.  Handouts provided & reviewed:  Heart Failure Nutrition Therapy; Heart Healthy Cooking Tips; Heart Healthy Label Reading Tips; Heart Healthy Shopping Tips; Sodium:  Making Sense of Salt; Acceptable Salt-Free Seasoning Blends; Fluid Restriction & You; Low Sodium Recipes; Diet and Warfarin (Coumadin) Therapy - [facility];  Vitamin K and Medications; Vitamin K Content of Foods; List of Foods High in Vitamin K         Consultation Time:15 minutes.    Follow Up:  Pt provided with dietitian contact number and advised to call with questions or make future appointment if further intervention needed.     Intake appointment at [facility] partial hospitalization program on Tuesday, 5/16/23

## 2023-05-15 DIAGNOSIS — Z94.1 STATUS POST HEART TRANSPLANT: Primary | ICD-10-CM

## 2023-05-17 ENCOUNTER — LAB VISIT (OUTPATIENT)
Dept: LAB | Facility: HOSPITAL | Age: 33
End: 2023-05-17
Attending: INTERNAL MEDICINE
Payer: COMMERCIAL

## 2023-05-17 DIAGNOSIS — Z94.1 STATUS POST HEART TRANSPLANT: ICD-10-CM

## 2023-05-17 LAB
ALBUMIN SERPL BCP-MCNC: 4.5 G/DL (ref 3.5–5.2)
ALP SERPL-CCNC: 69 U/L (ref 55–135)
ALT SERPL W/O P-5'-P-CCNC: 20 U/L (ref 10–44)
ANION GAP SERPL CALC-SCNC: 9 MMOL/L (ref 8–16)
AST SERPL-CCNC: 17 U/L (ref 10–40)
BASOPHILS # BLD AUTO: 0.04 K/UL (ref 0–0.2)
BASOPHILS NFR BLD: 0.4 % (ref 0–1.9)
BILIRUB SERPL-MCNC: 0.6 MG/DL (ref 0.1–1)
BNP SERPL-MCNC: 26 PG/ML (ref 0–99)
BUN SERPL-MCNC: 18 MG/DL (ref 6–20)
CALCIUM SERPL-MCNC: 9.6 MG/DL (ref 8.7–10.5)
CHLORIDE SERPL-SCNC: 104 MMOL/L (ref 95–110)
CHOLEST SERPL-MCNC: 115 MG/DL (ref 120–199)
CHOLEST/HDLC SERPL: 3 {RATIO} (ref 2–5)
CO2 SERPL-SCNC: 28 MMOL/L (ref 23–29)
CREAT SERPL-MCNC: 1.2 MG/DL (ref 0.5–1.4)
DIFFERENTIAL METHOD: NORMAL
EOSINOPHIL # BLD AUTO: 0.1 K/UL (ref 0–0.5)
EOSINOPHIL NFR BLD: 0.9 % (ref 0–8)
ERYTHROCYTE [DISTWIDTH] IN BLOOD BY AUTOMATED COUNT: 13.6 % (ref 11.5–14.5)
EST. GFR  (NO RACE VARIABLE): >60 ML/MIN/1.73 M^2
GLUCOSE SERPL-MCNC: 86 MG/DL (ref 70–110)
HCT VFR BLD AUTO: 46.9 % (ref 40–54)
HDLC SERPL-MCNC: 38 MG/DL (ref 40–75)
HDLC SERPL: 33 % (ref 20–50)
HGB BLD-MCNC: 15.2 G/DL (ref 14–18)
IMM GRANULOCYTES # BLD AUTO: 0.03 K/UL (ref 0–0.04)
IMM GRANULOCYTES NFR BLD AUTO: 0.3 % (ref 0–0.5)
LDLC SERPL CALC-MCNC: 41.6 MG/DL (ref 63–159)
LYMPHOCYTES # BLD AUTO: 1.8 K/UL (ref 1–4.8)
LYMPHOCYTES NFR BLD: 19.8 % (ref 18–48)
MCH RBC QN AUTO: 28.6 PG (ref 27–31)
MCHC RBC AUTO-ENTMCNC: 32.4 G/DL (ref 32–36)
MCV RBC AUTO: 88 FL (ref 82–98)
MONOCYTES # BLD AUTO: 0.8 K/UL (ref 0.3–1)
MONOCYTES NFR BLD: 8.5 % (ref 4–15)
NEUTROPHILS # BLD AUTO: 6.3 K/UL (ref 1.8–7.7)
NEUTROPHILS NFR BLD: 70.1 % (ref 38–73)
NONHDLC SERPL-MCNC: 77 MG/DL
NRBC BLD-RTO: 0 /100 WBC
PLATELET # BLD AUTO: 205 K/UL (ref 150–450)
PMV BLD AUTO: 9.6 FL (ref 9.2–12.9)
POTASSIUM SERPL-SCNC: 3.8 MMOL/L (ref 3.5–5.1)
PROT SERPL-MCNC: 7 G/DL (ref 6–8.4)
RBC # BLD AUTO: 5.31 M/UL (ref 4.6–6.2)
SODIUM SERPL-SCNC: 141 MMOL/L (ref 136–145)
TRIGL SERPL-MCNC: 177 MG/DL (ref 30–150)
WBC # BLD AUTO: 9.01 K/UL (ref 3.9–12.7)

## 2023-05-17 PROCEDURE — 86977 RBC SERUM PRETX INCUBJ/INHIB: CPT | Mod: 59 | Performed by: INTERNAL MEDICINE

## 2023-05-17 PROCEDURE — 83880 ASSAY OF NATRIURETIC PEPTIDE: CPT | Performed by: INTERNAL MEDICINE

## 2023-05-17 PROCEDURE — 86832 HLA CLASS I HIGH DEFIN QUAL: CPT | Performed by: INTERNAL MEDICINE

## 2023-05-17 PROCEDURE — 86832 HLA CLASS I HIGH DEFIN QUAL: CPT | Mod: 59 | Performed by: INTERNAL MEDICINE

## 2023-05-17 PROCEDURE — 80197 ASSAY OF TACROLIMUS: CPT | Performed by: INTERNAL MEDICINE

## 2023-05-17 PROCEDURE — 80061 LIPID PANEL: CPT | Performed by: INTERNAL MEDICINE

## 2023-05-17 PROCEDURE — 85025 COMPLETE CBC W/AUTO DIFF WBC: CPT | Performed by: INTERNAL MEDICINE

## 2023-05-17 PROCEDURE — 86833 HLA CLASS II HIGH DEFIN QUAL: CPT | Mod: 59 | Performed by: INTERNAL MEDICINE

## 2023-05-17 PROCEDURE — 80053 COMPREHEN METABOLIC PANEL: CPT | Performed by: INTERNAL MEDICINE

## 2023-05-17 PROCEDURE — 86833 HLA CLASS II HIGH DEFIN QUAL: CPT | Performed by: INTERNAL MEDICINE

## 2023-05-17 PROCEDURE — 36415 COLL VENOUS BLD VENIPUNCTURE: CPT | Performed by: INTERNAL MEDICINE

## 2023-05-18 LAB
CLASS I ANTIBODY COMMENTS - LUMINEX: NORMAL
CLASS II ANTIBODY COMMENTS - LUMINEX: NORMAL
DSA1 TESTING DATE: NORMAL
DSA12 TESTING DATE: NORMAL
DSA2 TESTING DATE: NORMAL
SERUM COLLECTION DT - LUMINEX CLASS I: NORMAL
SERUM COLLECTION DT - LUMINEX CLASS II: NORMAL
TACROLIMUS BLD-MCNC: 6.2 NG/ML (ref 5–15)

## 2023-05-25 ENCOUNTER — TELEPHONE (OUTPATIENT)
Dept: NEUROLOGY | Facility: CLINIC | Age: 33
End: 2023-05-25
Payer: COMMERCIAL

## 2023-05-25 DIAGNOSIS — G43.109 MIGRAINE WITH AURA AND WITHOUT STATUS MIGRAINOSUS, NOT INTRACTABLE: ICD-10-CM

## 2023-05-25 DIAGNOSIS — G43.109 MIGRAINE WITH AURA AND WITHOUT STATUS MIGRAINOSUS, NOT INTRACTABLE: Primary | ICD-10-CM

## 2023-05-25 RX ORDER — RIMEGEPANT SULFATE 75 MG/75MG
TABLET, ORALLY DISINTEGRATING ORAL
Qty: 8 TABLET | Refills: 11 | Status: SHIPPED | OUTPATIENT
Start: 2023-05-25

## 2023-05-29 DIAGNOSIS — Z94.1 STATUS POST HEART TRANSPLANT: Primary | ICD-10-CM

## 2023-06-07 DIAGNOSIS — E78.5 HYPERLIPIDEMIA, UNSPECIFIED HYPERLIPIDEMIA TYPE: ICD-10-CM

## 2023-06-08 RX ORDER — ATORVASTATIN CALCIUM 20 MG/1
TABLET, FILM COATED ORAL
Qty: 90 TABLET | Refills: 3 | Status: SHIPPED | OUTPATIENT
Start: 2023-06-08 | End: 2024-02-08

## 2023-08-17 ENCOUNTER — TELEPHONE (OUTPATIENT)
Dept: TRANSPLANT | Facility: CLINIC | Age: 33
End: 2023-08-17
Payer: COMMERCIAL

## 2023-08-17 NOTE — TELEPHONE ENCOUNTER
Called to review medications with pt and any c/o he may have. He has no c/o of any symptoms. Updated medications in Epic. Will see him at 6 years.

## 2023-08-18 ENCOUNTER — TELEPHONE (OUTPATIENT)
Dept: TRANSPLANT | Facility: CLINIC | Age: 33
End: 2023-08-18
Payer: COMMERCIAL

## 2023-08-18 ENCOUNTER — HOSPITAL ENCOUNTER (OUTPATIENT)
Dept: CARDIOLOGY | Facility: HOSPITAL | Age: 33
Discharge: HOME | End: 2023-08-18
Attending: INTERNAL MEDICINE
Payer: COMMERCIAL

## 2023-08-18 ENCOUNTER — OFFICE VISIT (OUTPATIENT)
Dept: TRANSPLANT | Facility: CLINIC | Age: 33
End: 2023-08-18
Attending: INTERNAL MEDICINE
Payer: COMMERCIAL

## 2023-08-18 VITALS
DIASTOLIC BLOOD PRESSURE: 80 MMHG | HEART RATE: 89 BPM | HEIGHT: 67 IN | WEIGHT: 170.88 LBS | BODY MASS INDEX: 26.82 KG/M2 | SYSTOLIC BLOOD PRESSURE: 120 MMHG

## 2023-08-18 VITALS
HEART RATE: 82 BPM | DIASTOLIC BLOOD PRESSURE: 84 MMHG | SYSTOLIC BLOOD PRESSURE: 130 MMHG | WEIGHT: 165 LBS | HEIGHT: 67 IN | BODY MASS INDEX: 25.9 KG/M2

## 2023-08-18 DIAGNOSIS — E78.5 HYPERLIPIDEMIA LDL GOAL <70: ICD-10-CM

## 2023-08-18 DIAGNOSIS — Z94.1 STATUS POST HEART TRANSPLANT: Primary | ICD-10-CM

## 2023-08-18 DIAGNOSIS — Z94.1 STATUS POST HEART TRANSPLANT: ICD-10-CM

## 2023-08-18 DIAGNOSIS — Z79.899 LONG TERM CURRENT USE OF IMMUNOSUPPRESSIVE DRUG: ICD-10-CM

## 2023-08-18 PROBLEM — J18.9 COMMUNITY ACQUIRED PNEUMONIA: Status: RESOLVED | Noted: 2022-05-19 | Resolved: 2023-08-18

## 2023-08-18 PROBLEM — R00.2 PALPITATIONS: Status: RESOLVED | Noted: 2019-02-01 | Resolved: 2023-08-18

## 2023-08-18 LAB
ASCENDING AORTA: 3.19 CM
AV INDEX (PROSTH): 0.74
AV MEAN GRADIENT: 2 MMHG
AV PEAK GRADIENT: 3 MMHG
AV VALVE AREA BY VELOCITY RATIO: 3.44 CM²
AV VALVE AREA: 3.43 CM²
AV VELOCITY RATIO: 0.74
BSA FOR ECHO PROCEDURE: 1.88 M2
CV ECHO LV RWT: 0.44 CM
DOP CALC AO PEAK VEL: 0.89 M/S
DOP CALC AO VTI: 16.01 CM
DOP CALC LVOT AREA: 4.6 CM2
DOP CALC LVOT DIAMETER: 2.43 CM
DOP CALC LVOT PEAK VEL: 0.66 M/S
DOP CALC LVOT STROKE VOLUME: 54.93 CM3
DOP CALCLVOT PEAK VEL VTI: 11.85 CM
E WAVE DECELERATION TIME: 154.36 MSEC
E/A RATIO: 1.59
E/E' RATIO: 7.75 M/S
ECHO LV POSTERIOR WALL: 0.99 CM (ref 0.6–1.1)
FRACTIONAL SHORTENING: 30 % (ref 28–44)
INTERVENTRICULAR SEPTUM: 0.99 CM (ref 0.6–1.1)
LEFT INTERNAL DIMENSION IN SYSTOLE: 3.15 CM (ref 2.1–4)
LEFT VENTRICLE DIASTOLIC VOLUME INDEX: 49.4 ML/M2
LEFT VENTRICLE DIASTOLIC VOLUME: 91.88 ML
LEFT VENTRICLE MASS INDEX: 81 G/M2
LEFT VENTRICLE SYSTOLIC VOLUME INDEX: 21.1 ML/M2
LEFT VENTRICLE SYSTOLIC VOLUME: 39.33 ML
LEFT VENTRICULAR INTERNAL DIMENSION IN DIASTOLE: 4.49 CM (ref 3.5–6)
LEFT VENTRICULAR MASS: 150.63 G
LV LATERAL E/E' RATIO: 7.75 M/S
LV SEPTAL E/E' RATIO: 7.75 M/S
MV PEAK A VEL: 0.39 M/S
MV PEAK E VEL: 0.62 M/S
MV STENOSIS PRESSURE HALF TIME: 44.76 MS
MV VALVE AREA P 1/2 METHOD: 4.92 CM2
RA PRESSURE ESTIMATED: 3 MMHG
RIGHT VENTRICULAR END-DIASTOLIC DIMENSION: 3.34 CM
SINUS: 3.49 CM
STJ: 3.04 CM
TDI LATERAL: 0.08 M/S
TDI SEPTAL: 0.08 M/S
TDI: 0.08 M/S
TRICUSPID ANNULAR PLANE SYSTOLIC EXCURSION: 1.2 CM
Z-SCORE OF LEFT VENTRICULAR DIMENSION IN END DIASTOLE: -1.42
Z-SCORE OF LEFT VENTRICULAR DIMENSION IN END SYSTOLE: -0.11

## 2023-08-18 PROCEDURE — 1160F RVW MEDS BY RX/DR IN RCRD: CPT | Mod: CPTII,S$GLB,, | Performed by: INTERNAL MEDICINE

## 2023-08-18 PROCEDURE — 93306 TTE W/DOPPLER COMPLETE: CPT

## 2023-08-18 PROCEDURE — 93306 ECHO (CUPID ONLY): ICD-10-PCS | Mod: 26,,, | Performed by: INTERNAL MEDICINE

## 2023-08-18 PROCEDURE — 1160F PR REVIEW ALL MEDS BY PRESCRIBER/CLIN PHARMACIST DOCUMENTED: ICD-10-PCS | Mod: CPTII,S$GLB,, | Performed by: INTERNAL MEDICINE

## 2023-08-18 PROCEDURE — 3079F PR MOST RECENT DIASTOLIC BLOOD PRESSURE 80-89 MM HG: ICD-10-PCS | Mod: CPTII,S$GLB,, | Performed by: INTERNAL MEDICINE

## 2023-08-18 PROCEDURE — 99999 PR PBB SHADOW E&M-EST. PATIENT-LVL III: ICD-10-PCS | Mod: PBBFAC,,, | Performed by: INTERNAL MEDICINE

## 2023-08-18 PROCEDURE — 1159F PR MEDICATION LIST DOCUMENTED IN MEDICAL RECORD: ICD-10-PCS | Mod: CPTII,S$GLB,, | Performed by: INTERNAL MEDICINE

## 2023-08-18 PROCEDURE — 3044F PR MOST RECENT HEMOGLOBIN A1C LEVEL <7.0%: ICD-10-PCS | Mod: CPTII,S$GLB,, | Performed by: INTERNAL MEDICINE

## 2023-08-18 PROCEDURE — 3079F DIAST BP 80-89 MM HG: CPT | Mod: CPTII,S$GLB,, | Performed by: INTERNAL MEDICINE

## 2023-08-18 PROCEDURE — 3074F PR MOST RECENT SYSTOLIC BLOOD PRESSURE < 130 MM HG: ICD-10-PCS | Mod: CPTII,S$GLB,, | Performed by: INTERNAL MEDICINE

## 2023-08-18 PROCEDURE — 93306 TTE W/DOPPLER COMPLETE: CPT | Mod: 26,,, | Performed by: INTERNAL MEDICINE

## 2023-08-18 PROCEDURE — 99999 PR PBB SHADOW E&M-EST. PATIENT-LVL III: CPT | Mod: PBBFAC,,, | Performed by: INTERNAL MEDICINE

## 2023-08-18 PROCEDURE — 3008F BODY MASS INDEX DOCD: CPT | Mod: CPTII,S$GLB,, | Performed by: INTERNAL MEDICINE

## 2023-08-18 PROCEDURE — 3074F SYST BP LT 130 MM HG: CPT | Mod: CPTII,S$GLB,, | Performed by: INTERNAL MEDICINE

## 2023-08-18 PROCEDURE — 99214 PR OFFICE/OUTPT VISIT, EST, LEVL IV, 30-39 MIN: ICD-10-PCS | Mod: S$GLB,,, | Performed by: INTERNAL MEDICINE

## 2023-08-18 PROCEDURE — 3044F HG A1C LEVEL LT 7.0%: CPT | Mod: CPTII,S$GLB,, | Performed by: INTERNAL MEDICINE

## 2023-08-18 PROCEDURE — 1159F MED LIST DOCD IN RCRD: CPT | Mod: CPTII,S$GLB,, | Performed by: INTERNAL MEDICINE

## 2023-08-18 PROCEDURE — 99214 OFFICE O/P EST MOD 30 MIN: CPT | Mod: S$GLB,,, | Performed by: INTERNAL MEDICINE

## 2023-08-18 PROCEDURE — 3008F PR BODY MASS INDEX (BMI) DOCUMENTED: ICD-10-PCS | Mod: CPTII,S$GLB,, | Performed by: INTERNAL MEDICINE

## 2023-08-18 NOTE — LETTER
August 20, 2023        [facility]  [address]  [facility] [address]  Phone: [phone]  Fax: [phone]             [facility]  [address]  [facility] [address]  Phone: [phone]   Patient: Mert Samayoa   MR Number: [id number]   YOB: 1990   Date of Visit: 8/18/2023       Dear Dr. Guillermo Alejo    Thank you for referring Mert Samayoa to me for evaluation. Attached you will find relevant portions of my assessment and plan of care.    If you have questions, please do not hesitate to call me. I look forward to following Mert Samayoa along with you.    Sincerely,    Hernán Tidwell Jr, MD    Enclosure    If you would like to receive this communication electronically, please contact [email or web address] or [phone] to request [employer] Link access.    [employer] [facility] is a tool which provides read-only access to select patient information with whom you have a relationship. Its easy to use and provides real time access to review your patients record including encounter summaries, notes, results, and demographic information.    If you feel you have received this communication in error or would no longer like to receive these types of communications, please e-mail [email or web address]

## 2023-08-18 NOTE — TELEPHONE ENCOUNTER
Patient presents to clinic for 5year 6month post heart txp visit. Patient looks well. No complaints of SOB, chest pain, swelling. Medications, lab and echo results reviewed with patient and Dr. Tidwell. Follows yearly with Dermatology. Follows every 6 months with dentist. Will return for 6 year annual 2/2024.

## 2023-08-18 NOTE — PROGRESS NOTES
"Subjective:   Mr. Samayoa is a 33 y.o. 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  Now on cellcept 1250 mg BID, tacro 1 mg BID, prednisone 5 mg mg qd.  Left on prednisone for +C1q's, no rejection episodes.  He feels great without CV complaints.  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    Review of Systems   Constitutional: Positive for weight gain (gained 5 lb with his wife's pregnancy). Negative for chills, fever, malaise/fatigue and night sweats.   Cardiovascular:  Negative for chest pain, dyspnea on exertion, irregular heartbeat, leg swelling, near-syncope, orthopnea, palpitations, paroxysmal nocturnal dyspnea and syncope.   Respiratory:  Negative for cough, sputum production and wheezing.    Hematologic/Lymphatic: Does not bruise/bleed easily.   Musculoskeletal:  Negative for arthritis, joint pain and stiffness.   Gastrointestinal:  Negative for hematochezia and melena.   Genitourinary:  Negative for hematuria.   Neurological:  Negative for brief paralysis, focal weakness, seizures and weakness.       Objective:   Blood pressure 120/80, pulse 89, height 5' 7" (1.702 m), weight 77.5 kg (170 lb 13.7 oz).body mass index is 26.76 kg/m².    Physical Exam  Constitutional:       General: He is not in acute distress.     Appearance: He is well-developed. He is not ill-appearing, toxic-appearing or diaphoretic.      Comments: BP " "120/80 (BP Location: Left arm, Patient Position: Sitting, BP Method: Medium (Automatic))   Pulse 89   Ht 5' 7" (1.702 m)   Wt 77.5 kg (170 lb 13.7 oz)   BMI 26.76 kg/m²      HENT:      Head: Normocephalic and atraumatic.      Nose: Nose normal. No congestion or rhinorrhea.      Mouth/Throat:      Mouth: Mucous membranes are moist.      Pharynx: Oropharynx is clear. No oropharyngeal exudate or posterior oropharyngeal erythema.   Eyes:      General: No scleral icterus.        Right eye: No discharge.         Left eye: No discharge.      Conjunctiva/sclera: Conjunctivae normal.   Neck:      Thyroid: No thyromegaly.      Vascular: No carotid bruit or JVD.      Trachea: No tracheal deviation.   Cardiovascular:      Rate and Rhythm: Normal rate and regular rhythm.      Heart sounds: Normal heart sounds. No murmur heard.     No gallop.   Pulmonary:      Effort: Pulmonary effort is normal.      Breath sounds: Normal breath sounds.   Abdominal:      General: Bowel sounds are normal. There is no distension.      Palpations: Abdomen is soft. There is no mass.      Tenderness: There is no abdominal tenderness. There is no guarding or rebound.   Musculoskeletal:         General: No tenderness.      Right lower leg: No edema.      Left lower leg: No edema.   Lymphadenopathy:      Cervical: No cervical adenopathy.   Skin:     General: Skin is warm and dry.   Neurological:      General: No focal deficit present.      Mental Status: He is alert and oriented to person, place, and time. Mental status is at baseline.   Psychiatric:         Mood and Affect: Mood normal.         Behavior: Behavior normal.         Thought Content: Thought content normal.         Judgment: Judgment normal.       Lab Results   Component Value Date    BNP 24 08/18/2023     08/18/2023    K 4.0 08/18/2023    MG 2.0 02/07/2023     08/18/2023    CO2 30 (H) 08/18/2023    BUN 13 08/18/2023    CREATININE 1.2 08/18/2023    GLU 90 08/18/2023    HGBA1C " 4.7 02/07/2023    AST 17 08/18/2023    ALT 29 08/18/2023    ALBUMIN 4.6 08/18/2023    PROT 7.3 08/18/2023    BILITOT 0.6 08/18/2023    WBC 8.60 08/18/2023    HGB 15.3 08/18/2023    HCT 47.1 08/18/2023     08/18/2023    TSH 1.499 02/07/2023    FREET4 0.98 02/07/2023    CHOL 107 (L) 08/18/2023    HDL 38 (L) 08/18/2023    LDLCALC 38.0 (L) 08/18/2023    TRIG 155 (H) 08/18/2023    TACROLIMUS 8.8 08/18/2023    ALLOMAP See result image under hyperlink 08/18/2023     Labs were reviewed with the patient.    08/18/2023 echocardiogram    Left Ventricle: The left ventricle is normal in size. Ventricular mass is normal. Normal wall thickness. There is concentric remodeling. regional wall motion abnormalities present. Septal bounce consistent with constrictive physiology. There is normal systolic function with a visually estimated ejection fraction of 55 - 60%. There is normal diastolic function.    Right Ventricle: Normal right ventricular cavity size. Wall thickness is normal. Right ventricle wall motion  is normal. Systolic function is normal.    Right Atrium: Normal right atrial size for AMALIA.    Pulmonary Artery: Pulmonary artery pressure could not be accurately determined.    IVC/SVC: Normal venous pressure at 3 mmHg.     03/02/2023 cardiac catheterization Dr. Cartagena  Left Anterior Descending   Non-stenotic Ost LAD to Prox LAD lesion. The lesion was a type A lesion. The stenosis was measured using IVUS . Maximal intimal thickness of proximal LAD = .2 mm which represents improvment from 2021.         Assessment:     1. Status post heart transplant    2. Long term current use of immunosuppressive drug    3. Hyperlipidemia LDL goal <70        Plan:   He is due for follow up chest x-ray  Dermatology checks every 6-12 months  Dental cleaning every 6 months  At this point he should only take a PPI or H2 blocker if required for reflux/indigestion to improve calcium absorption posttransplant    Return instructions as set  forth by post transplant schedule or as needed:    Clinic: Return for labs and/or biopsy weekly the first month, every two weeks during month 2 and then monthly for the first year at the provider or coordinator's discretion. During the second year, return to clinic every 3 months. Post transplant year 3-5 return every 6 months. There will be a comprehensive post transplant evaluation every year that may include LHC/RHC/biopsy, stress test, echo, CXR, and other health screening exams.    In addition to the clinical assessment, I have ordered Allomap testing for this patient to assist in identification of moderate/severe acute cellular rejection (ACR) in a pt with stable Allograft function instead of endomyocardial biopsy.     Patient is reminded to call with any health changes as these can be early signs of transplant complications. Patient is advised to make sure any new medications or changes of old medications are discussed with a pharmacist or physician knowledgeable with transplant to avoid rejection/drug toxicity related to significant drug interactions.    Patient advised that it is recommended that all transplanted patients, and their close contacts and household members receive Covid vaccination.    UNOS Patient Status  Functional Status: 100% - Normal, no complaints, no evidence of disease  Physical Capacity: No Limitations      Note to patient August 20, 2023  Pleasure seeing you Friday hope all goes well with the wife's pregnancy and that you have another healthy baby girl!    You did not have a chest x-ray and should have one soon  Skin check should be done every 6-12 months (yearly as long as you have no spots of concern and have not required any removal of skin lesions)  Dental cleaning should be performed at 6 month intervals  If you do not require Nexium (or any other PPI or H2 blocker such as Pepcid) I would recommend you stop.  This will improve calcium absorption    It was great seeing you now  last posttransplant coordinators to get in touch with you next week to make arrangements for the chest x-ray.    Dr. АЛЕКСАНДР Tidwell Jr, MD

## 2023-08-20 ENCOUNTER — PATIENT MESSAGE (OUTPATIENT)
Dept: TRANSPLANT | Facility: CLINIC | Age: 33
End: 2023-08-20
Payer: COMMERCIAL

## 2023-09-11 ENCOUNTER — TELEPHONE (OUTPATIENT)
Dept: TRANSPLANT | Facility: CLINIC | Age: 33
End: 2023-09-11
Payer: COMMERCIAL

## 2023-09-11 NOTE — TELEPHONE ENCOUNTER
Pt called to inform me of new job. He wanted to know if he needed labs or appts soon as he will not be able to take off of work easily. He will be due for labs in November.

## 2023-09-13 ENCOUNTER — TELEPHONE (OUTPATIENT)
Dept: TRANSPLANT | Facility: CLINIC | Age: 33
End: 2023-09-13
Payer: COMMERCIAL

## 2023-11-02 ENCOUNTER — TELEPHONE (OUTPATIENT)
Dept: NEUROLOGY | Facility: CLINIC | Age: 33
End: 2023-11-02
Payer: COMMERCIAL

## 2023-11-02 DIAGNOSIS — G43.109 MIGRAINE WITH AURA AND WITHOUT STATUS MIGRAINOSUS, NOT INTRACTABLE: ICD-10-CM

## 2023-11-02 NOTE — TELEPHONE ENCOUNTER
MAMIE US (Key: [id number]) - [id number]  Nurtec 75MG dispersible tablets  Status: PA Response - ApprovedCreated: November 2nd, 2023 [id number]: November 2nd, 2023

## 2023-11-03 DIAGNOSIS — Z94.1 HEART TRANSPLANTED: ICD-10-CM

## 2023-11-03 RX ORDER — MYCOPHENOLATE MOFETIL 250 MG/1
1250 CAPSULE ORAL 2 TIMES DAILY
Qty: 300 CAPSULE | Refills: 11 | Status: SHIPPED | OUTPATIENT
Start: 2023-11-03 | End: 2024-01-19

## 2023-11-06 RX ORDER — NORTRIPTYLINE HYDROCHLORIDE 10 MG/1
10 CAPSULE ORAL NIGHTLY
Qty: 90 CAPSULE | Refills: 3 | Status: SHIPPED | OUTPATIENT
Start: 2023-11-06

## 2023-11-07 DIAGNOSIS — Z94.1 STATUS POST HEART TRANSPLANT: ICD-10-CM

## 2023-11-08 RX ORDER — PREDNISONE 5 MG/1
5 TABLET ORAL DAILY
Qty: 30 TABLET | Refills: 11 | Status: SHIPPED | OUTPATIENT
Start: 2023-11-08

## 2023-11-08 RX ORDER — LORATADINE 10 MG/1
10 TABLET ORAL DAILY
Qty: 30 TABLET | Refills: 11 | Status: SHIPPED | OUTPATIENT
Start: 2023-11-08 | End: 2024-11-07

## 2023-11-09 DIAGNOSIS — T86.290 VASCULOPATHY OF CARDIAC ALLOGRAFT: ICD-10-CM

## 2023-11-09 DIAGNOSIS — Z94.1 STATUS POST HEART TRANSPLANT: Primary | ICD-10-CM

## 2023-11-21 ENCOUNTER — PATIENT MESSAGE (OUTPATIENT)
Dept: TRANSPLANT | Facility: CLINIC | Age: 33
End: 2023-11-21
Payer: COMMERCIAL

## 2023-12-06 ENCOUNTER — TELEPHONE (OUTPATIENT)
Dept: DERMATOLOGY | Facility: CLINIC | Age: 33
End: 2023-12-06
Payer: COMMERCIAL

## 2023-12-06 NOTE — TELEPHONE ENCOUNTER
At this time this nurse writer calls pt per pt call back request message to schedule for annual skin check appointment; nurse informs pt that in requested February that Dr. Randolph will no longer be with the [facility] system however can get him scheduled with another provider in clinic; pt states needs to check schedule along with other appointments he is trying to schedule all on the same day as he lives in [facility]; pt states will call back once ready to schedule and thanks nurse; call completed.

## 2023-12-08 ENCOUNTER — TELEPHONE (OUTPATIENT)
Dept: DERMATOLOGY | Facility: CLINIC | Age: 33
End: 2023-12-08
Payer: COMMERCIAL

## 2023-12-08 NOTE — TELEPHONE ENCOUNTER
Due to Dr. Lemus being out of the office that day, scheduled skin check appt w/ Dr. Felix. Pt thanked me for call.     ----- Message from Becky Pantoja LPN sent at 12/7/2023  2:29 PM CST -----  Regarding: FW: Appointment    ----- Message -----  From: Emilee Neely  Sent: 12/7/2023  10:25 AM CST  To: [facility] Derm Clinical Support Triage  Subject: Appointment                                      Good morning    Will patient be able to see someone in Derm on 02/20.    Thank you    Emilee

## 2024-01-02 NOTE — ASSESSMENT & PLAN NOTE
BG goal 140-180; Continue IV insulin infusion protocol, requiring intensive BG monitoring q1hr.     ADDENDUM: nausea resolved, would like to advance diet to cardiac diet. Convert to IV transition infusion rate at 1.2u/hr with BG ac/hs/0200. Will assess for prandial elevations given steroid dosing.      Female

## 2024-01-05 DIAGNOSIS — K21.9 CHRONIC GERD: Primary | ICD-10-CM

## 2024-01-05 RX ORDER — ESOMEPRAZOLE MAGNESIUM 40 MG/1
CAPSULE, DELAYED RELEASE ORAL
Qty: 30 CAPSULE | Refills: 11 | OUTPATIENT
Start: 2024-01-05

## 2024-01-05 NOTE — TELEPHONE ENCOUNTER
Sent Rx to [facility].  Dr. [facility] Stiles did not want to refill due to side effects. Of note, Dr. Tidwell wrote in his August notes that he had suggested pt stop Nexium also.   I sent a note to pt through the portal.

## 2024-01-11 NOTE — TELEPHONE ENCOUNTER
Sent a note through portal answering pt's question about Tajin on pineapple and concurrent gi issues (gerd, diarrhea). I told him he needed to stop both of them as pineapple is acidic and Tajin is a mixture of chili peppers, salt and lime.

## 2024-01-16 ENCOUNTER — TELEPHONE (OUTPATIENT)
Dept: TRANSPLANT | Facility: CLINIC | Age: 34
End: 2024-01-16
Payer: COMMERCIAL

## 2024-01-16 DIAGNOSIS — Z94.1 STATUS POST HEART TRANSPLANT: ICD-10-CM

## 2024-01-16 DIAGNOSIS — E78.2 MIXED HYPERLIPIDEMIA: Primary | ICD-10-CM

## 2024-01-16 NOTE — TELEPHONE ENCOUNTER
Reviewed his GI upset and diarrhea from this past week with Dr. Hawkins. She said to reduce his MMF to 750 mg bid and see if that makes a difference. We may consider Myfortic and to schedule an appt with GI. I sent a note to pt in portal and asked that he keep me posted regarding the change in MMF and if diarrhea resolves.

## 2024-01-19 ENCOUNTER — LAB VISIT (OUTPATIENT)
Dept: LAB | Facility: HOSPITAL | Age: 34
End: 2024-01-19
Attending: INTERNAL MEDICINE
Payer: COMMERCIAL

## 2024-01-19 DIAGNOSIS — Z94.1 STATUS POST HEART TRANSPLANT: ICD-10-CM

## 2024-01-19 DIAGNOSIS — Z94.1 HEART TRANSPLANTED: ICD-10-CM

## 2024-01-19 DIAGNOSIS — T86.290 VASCULOPATHY OF CARDIAC ALLOGRAFT: ICD-10-CM

## 2024-01-19 LAB
ALBUMIN SERPL BCP-MCNC: 4.5 G/DL (ref 3.5–5.2)
ALP SERPL-CCNC: 68 U/L (ref 55–135)
ALT SERPL W/O P-5'-P-CCNC: 32 U/L (ref 10–44)
ANION GAP SERPL CALC-SCNC: 10 MMOL/L (ref 8–16)
AST SERPL-CCNC: 21 U/L (ref 10–40)
BASOPHILS # BLD AUTO: 0.06 K/UL (ref 0–0.2)
BASOPHILS NFR BLD: 0.6 % (ref 0–1.9)
BILIRUB SERPL-MCNC: 0.8 MG/DL (ref 0.1–1)
BNP SERPL-MCNC: 30 PG/ML (ref 0–99)
BUN SERPL-MCNC: 15 MG/DL (ref 6–20)
CALCIUM SERPL-MCNC: 9.3 MG/DL (ref 8.7–10.5)
CHLORIDE SERPL-SCNC: 105 MMOL/L (ref 95–110)
CO2 SERPL-SCNC: 27 MMOL/L (ref 23–29)
CREAT SERPL-MCNC: 1.2 MG/DL (ref 0.5–1.4)
DIFFERENTIAL METHOD BLD: ABNORMAL
EOSINOPHIL # BLD AUTO: 0.2 K/UL (ref 0–0.5)
EOSINOPHIL NFR BLD: 1.4 % (ref 0–8)
ERYTHROCYTE [DISTWIDTH] IN BLOOD BY AUTOMATED COUNT: 14 % (ref 11.5–14.5)
EST. GFR  (NO RACE VARIABLE): >60 ML/MIN/1.73 M^2
GLUCOSE SERPL-MCNC: 92 MG/DL (ref 70–110)
HCT VFR BLD AUTO: 48.5 % (ref 40–54)
HGB BLD-MCNC: 15.8 G/DL (ref 14–18)
IMM GRANULOCYTES # BLD AUTO: 0.06 K/UL (ref 0–0.04)
IMM GRANULOCYTES NFR BLD AUTO: 0.6 % (ref 0–0.5)
LYMPHOCYTES # BLD AUTO: 1.8 K/UL (ref 1–4.8)
LYMPHOCYTES NFR BLD: 16.7 % (ref 18–48)
MAGNESIUM SERPL-MCNC: 1.8 MG/DL (ref 1.6–2.6)
MAGNESIUM SERPL-MCNC: 1.8 MG/DL (ref 1.6–2.6)
MCH RBC QN AUTO: 28.3 PG (ref 27–31)
MCHC RBC AUTO-ENTMCNC: 32.6 G/DL (ref 32–36)
MCV RBC AUTO: 87 FL (ref 82–98)
MONOCYTES # BLD AUTO: 0.9 K/UL (ref 0.3–1)
MONOCYTES NFR BLD: 8.8 % (ref 4–15)
NEUTROPHILS # BLD AUTO: 7.6 K/UL (ref 1.8–7.7)
NEUTROPHILS NFR BLD: 71.9 % (ref 38–73)
NRBC BLD-RTO: 0 /100 WBC
PLATELET # BLD AUTO: 260 K/UL (ref 150–450)
PMV BLD AUTO: 9.5 FL (ref 9.2–12.9)
POTASSIUM SERPL-SCNC: 4 MMOL/L (ref 3.5–5.1)
PROT SERPL-MCNC: 7 G/DL (ref 6–8.4)
RBC # BLD AUTO: 5.58 M/UL (ref 4.6–6.2)
SODIUM SERPL-SCNC: 142 MMOL/L (ref 136–145)
TACROLIMUS BLD-MCNC: 6.7 NG/ML (ref 5–15)
WBC # BLD AUTO: 10.54 K/UL (ref 3.9–12.7)

## 2024-01-19 PROCEDURE — 36415 COLL VENOUS BLD VENIPUNCTURE: CPT | Performed by: INTERNAL MEDICINE

## 2024-01-19 PROCEDURE — 80197 ASSAY OF TACROLIMUS: CPT | Performed by: INTERNAL MEDICINE

## 2024-01-19 PROCEDURE — 80053 COMPREHEN METABOLIC PANEL: CPT | Performed by: INTERNAL MEDICINE

## 2024-01-19 PROCEDURE — 85025 COMPLETE CBC W/AUTO DIFF WBC: CPT | Performed by: INTERNAL MEDICINE

## 2024-01-19 PROCEDURE — 83735 ASSAY OF MAGNESIUM: CPT | Performed by: INTERNAL MEDICINE

## 2024-01-19 PROCEDURE — 83880 ASSAY OF NATRIURETIC PEPTIDE: CPT | Performed by: INTERNAL MEDICINE

## 2024-01-19 NOTE — TELEPHONE ENCOUNTER
Sent note through portal for patient to stay on reduced MMF @ 750 mg twice daily. Check in with team next week.

## 2024-01-22 RX ORDER — MYCOPHENOLATE MOFETIL 250 MG/1
CAPSULE ORAL
Qty: 180 CAPSULE | Refills: 11 | Status: SHIPPED | OUTPATIENT
Start: 2024-01-22

## 2024-02-08 DIAGNOSIS — E78.5 HYPERLIPIDEMIA, UNSPECIFIED HYPERLIPIDEMIA TYPE: ICD-10-CM

## 2024-02-08 DIAGNOSIS — Z94.1 STATUS POST HEART TRANSPLANT: ICD-10-CM

## 2024-02-08 RX ORDER — ATORVASTATIN CALCIUM 20 MG/1
20 TABLET, FILM COATED ORAL
Qty: 90 TABLET | Refills: 3 | Status: SHIPPED | OUTPATIENT
Start: 2024-02-08

## 2024-02-08 RX ORDER — TACROLIMUS 0.5 MG/1
CAPSULE ORAL
Qty: 120 CAPSULE | Refills: 3 | Status: SHIPPED | OUTPATIENT
Start: 2024-02-08 | End: 2024-05-24

## 2024-02-20 ENCOUNTER — OFFICE VISIT (OUTPATIENT)
Dept: TRANSPLANT | Facility: CLINIC | Age: 34
End: 2024-02-20
Payer: COMMERCIAL

## 2024-02-20 ENCOUNTER — OFFICE VISIT (OUTPATIENT)
Dept: DERMATOLOGY | Facility: CLINIC | Age: 34
End: 2024-02-20
Payer: COMMERCIAL

## 2024-02-20 ENCOUNTER — HOSPITAL ENCOUNTER (OUTPATIENT)
Dept: CARDIOLOGY | Facility: HOSPITAL | Age: 34
Discharge: HOME | End: 2024-02-20
Attending: INTERNAL MEDICINE
Payer: COMMERCIAL

## 2024-02-20 ENCOUNTER — HOSPITAL ENCOUNTER (OUTPATIENT)
Dept: RADIOLOGY | Facility: HOSPITAL | Age: 34
Discharge: HOME | End: 2024-02-20
Attending: INTERNAL MEDICINE
Payer: COMMERCIAL

## 2024-02-20 VITALS — WEIGHT: 170 LBS | BODY MASS INDEX: 26.68 KG/M2 | HEIGHT: 67 IN

## 2024-02-20 VITALS
HEART RATE: 101 BPM | DIASTOLIC BLOOD PRESSURE: 87 MMHG | SYSTOLIC BLOOD PRESSURE: 123 MMHG | HEIGHT: 67 IN | BODY MASS INDEX: 27.02 KG/M2 | WEIGHT: 172.19 LBS

## 2024-02-20 DIAGNOSIS — Z12.83 SCREENING EXAM FOR SKIN CANCER: ICD-10-CM

## 2024-02-20 DIAGNOSIS — D22.9 MULTIPLE BENIGN NEVI: ICD-10-CM

## 2024-02-20 DIAGNOSIS — Z94.1 STATUS POST HEART TRANSPLANT: ICD-10-CM

## 2024-02-20 DIAGNOSIS — L81.4 LENTIGO: ICD-10-CM

## 2024-02-20 DIAGNOSIS — E78.5 HYPERLIPIDEMIA LDL GOAL <70: ICD-10-CM

## 2024-02-20 DIAGNOSIS — T86.290 VASCULOPATHY OF CARDIAC ALLOGRAFT: ICD-10-CM

## 2024-02-20 DIAGNOSIS — D18.01 CHERRY ANGIOMA: ICD-10-CM

## 2024-02-20 DIAGNOSIS — Z94.1 HISTORY OF HEART TRANSPLANT: ICD-10-CM

## 2024-02-20 DIAGNOSIS — Z79.899 LONG TERM CURRENT USE OF IMMUNOSUPPRESSIVE DRUG: ICD-10-CM

## 2024-02-20 DIAGNOSIS — B07.9 VERRUCA VULGARIS: Primary | ICD-10-CM

## 2024-02-20 DIAGNOSIS — Z94.1 HEART TRANSPLANTED: ICD-10-CM

## 2024-02-20 DIAGNOSIS — L73.8 SEBACEOUS HYPERPLASIA: ICD-10-CM

## 2024-02-20 DIAGNOSIS — Z94.1 STATUS POST HEART TRANSPLANT: Primary | ICD-10-CM

## 2024-02-20 LAB
ASCENDING AORTA: 3.08 CM
BSA FOR ECHO PROCEDURE: 1.91 M2
CV ECHO LV RWT: 0.51 CM
CV STRESS BASE HR: 92 BPM
DIASTOLIC BLOOD PRESSURE: 86 MMHG
DOP CALC LVOT AREA: 4.2 CM2
DOP CALC LVOT DIAMETER: 2.31 CM
DOP CALC LVOT PEAK VEL: 0.7 M/S
DOP CALC LVOT STROKE VOLUME: 51.69 CM3
DOP CALCLVOT PEAK VEL VTI: 12.34 CM
E WAVE DECELERATION TIME: 120.84 MSEC
E/A RATIO: 3.14
E/E' RATIO: 9.26 M/S
ECHO LV POSTERIOR WALL: 1.13 CM (ref 0.6–1.1)
FRACTIONAL SHORTENING: 29 % (ref 28–44)
INTERVENTRICULAR SEPTUM: 1.15 CM (ref 0.6–1.1)
IVRT: 77.07 MSEC
LEFT INTERNAL DIMENSION IN SYSTOLE: 3.12 CM (ref 2.1–4)
LEFT VENTRICLE DIASTOLIC VOLUME INDEX: 46.5 ML/M2
LEFT VENTRICLE DIASTOLIC VOLUME: 87.89 ML
LEFT VENTRICLE MASS INDEX: 94 G/M2
LEFT VENTRICLE SYSTOLIC VOLUME INDEX: 20.4 ML/M2
LEFT VENTRICLE SYSTOLIC VOLUME: 38.47 ML
LEFT VENTRICULAR INTERNAL DIMENSION IN DIASTOLE: 4.4 CM (ref 3.5–6)
LEFT VENTRICULAR MASS: 177.73 G
LV LATERAL E/E' RATIO: 8 M/S
LV SEPTAL E/E' RATIO: 11 M/S
MV PEAK A VEL: 0.28 M/S
MV PEAK E VEL: 0.88 M/S
MV STENOSIS PRESSURE HALF TIME: 35.04 MS
MV VALVE AREA P 1/2 METHOD: 6.28 CM2
OHS CV CPX 1 MINUTE RECOVERY HEART RATE: 1363 BPM
OHS CV CPX 85 PERCENT MAX PREDICTED HEART RATE MALE: 158
OHS CV CPX ESTIMATED METS: 13
OHS CV CPX MAX PREDICTED HEART RATE: 186
OHS CV CPX PATIENT IS FEMALE: 0
OHS CV CPX PATIENT IS MALE: 1
OHS CV CPX PEAK DIASTOLIC BLOOD PRESSURE: 61 MMHG
OHS CV CPX PEAK HEAR RATE: 157 BPM
OHS CV CPX PEAK RATE PRESSURE PRODUCT: ABNORMAL
OHS CV CPX PEAK SYSTOLIC BLOOD PRESSURE: 170 MMHG
OHS CV CPX PERCENT MAX PREDICTED HEART RATE ACHIEVED: 84
OHS CV CPX RATE PRESSURE PRODUCT PRESENTING: ABNORMAL
PISA TR MAX VEL: 2.26 M/S
RA PRESSURE ESTIMATED: 3 MMHG
RIGHT VENTRICULAR END-DIASTOLIC DIMENSION: 3.39 CM
RV TB RVSP: 5 MMHG
SINUS: 3.22 CM
STJ: 2.92 CM
STRESS ECHO POST EXERCISE DUR MIN: 8 MINUTES
STRESS ECHO POST EXERCISE DUR SEC: 0 SECONDS
SYSTOLIC BLOOD PRESSURE: 131 MMHG
TDI LATERAL: 0.11 M/S
TDI SEPTAL: 0.08 M/S
TDI: 0.1 M/S
TR MAX PG: 20 MMHG
TRICUSPID ANNULAR PLANE SYSTOLIC EXCURSION: 1.85 CM
TV REST PULMONARY ARTERY PRESSURE: 23 MMHG
Z-SCORE OF LEFT VENTRICULAR DIMENSION IN END DIASTOLE: -1.84
Z-SCORE OF LEFT VENTRICULAR DIMENSION IN END SYSTOLE: -0.35

## 2024-02-20 PROCEDURE — 71046 X-RAY EXAM CHEST 2 VIEWS: CPT | Mod: 26,,, | Performed by: RADIOLOGY

## 2024-02-20 PROCEDURE — 99214 OFFICE O/P EST MOD 30 MIN: CPT | Mod: S$GLB,,, | Performed by: INTERNAL MEDICINE

## 2024-02-20 PROCEDURE — 93351 STRESS TTE COMPLETE: CPT | Mod: 26,,, | Performed by: INTERNAL MEDICINE

## 2024-02-20 PROCEDURE — 99213 OFFICE O/P EST LOW 20 MIN: CPT | Mod: 25,S$GLB,, | Performed by: STUDENT IN AN ORGANIZED HEALTH CARE EDUCATION/TRAINING PROGRAM

## 2024-02-20 PROCEDURE — 17110 DESTRUCTION B9 LES UP TO 14: CPT | Mod: S$GLB,,, | Performed by: STUDENT IN AN ORGANIZED HEALTH CARE EDUCATION/TRAINING PROGRAM

## 2024-02-20 PROCEDURE — 3074F SYST BP LT 130 MM HG: CPT | Mod: CPTII,S$GLB,, | Performed by: INTERNAL MEDICINE

## 2024-02-20 PROCEDURE — 3079F DIAST BP 80-89 MM HG: CPT | Mod: CPTII,S$GLB,, | Performed by: INTERNAL MEDICINE

## 2024-02-20 PROCEDURE — 1159F MED LIST DOCD IN RCRD: CPT | Mod: CPTII,S$GLB,, | Performed by: STUDENT IN AN ORGANIZED HEALTH CARE EDUCATION/TRAINING PROGRAM

## 2024-02-20 PROCEDURE — 1160F RVW MEDS BY RX/DR IN RCRD: CPT | Mod: CPTII,S$GLB,, | Performed by: STUDENT IN AN ORGANIZED HEALTH CARE EDUCATION/TRAINING PROGRAM

## 2024-02-20 PROCEDURE — 99999 PR PBB SHADOW E&M-EST. PATIENT-LVL III: CPT | Mod: PBBFAC,,, | Performed by: INTERNAL MEDICINE

## 2024-02-20 PROCEDURE — 3044F HG A1C LEVEL LT 7.0%: CPT | Mod: CPTII,S$GLB,, | Performed by: INTERNAL MEDICINE

## 2024-02-20 PROCEDURE — 3008F BODY MASS INDEX DOCD: CPT | Mod: CPTII,S$GLB,, | Performed by: INTERNAL MEDICINE

## 2024-02-20 PROCEDURE — 93351 STRESS TTE COMPLETE: CPT

## 2024-02-20 PROCEDURE — 99999 PR PBB SHADOW E&M-EST. PATIENT-LVL III: CPT | Mod: PBBFAC,,, | Performed by: STUDENT IN AN ORGANIZED HEALTH CARE EDUCATION/TRAINING PROGRAM

## 2024-02-20 PROCEDURE — 71046 X-RAY EXAM CHEST 2 VIEWS: CPT | Mod: TC,FY

## 2024-02-20 PROCEDURE — 3044F HG A1C LEVEL LT 7.0%: CPT | Mod: CPTII,S$GLB,, | Performed by: STUDENT IN AN ORGANIZED HEALTH CARE EDUCATION/TRAINING PROGRAM

## 2024-02-20 RX ORDER — SALICYLIC ACID
POWDER (GRAM) MISCELLANEOUS
Qty: 15 G | Refills: 2 | Status: SHIPPED | OUTPATIENT
Start: 2024-02-20

## 2024-02-20 NOTE — PATIENT INSTRUCTIONS
Warts        Nighttime regimen:   Soak finger for 5 minutes in lukewarm water    Either:  Apply salicylic acid 40% solution to affected area (Wart Stick or other prescribed medication). If on the hands or feet, cover with duct tape (3M)   Or:  Curad Mediplast 40% salicylic acid pads    Bandaids are not good because air still gets in   Covering warts helps soften them which helps the medication work better     Daytime regimen:   Remove duct tape   Pare down with a piece of sandpaper that can be thrown away.   DO NOT use stone/file/same paper twice or on any other area of body because this can spread the infection     Expectations:   Warts take a long time to treat    Some of the side effects of in office treatment, such as freezing, include redness and blistering; this is normal   Virus can live in the skin for long periods of time, even if no visible bump remains    Remember warts are contagious    MUST BE CONSISTENT WITH TREATMENT FOR BEST RESULTS       _________________    CRYOSURGERY      Your doctor has used a method called cryosurgery to treat your skin condition. Cryosurgery refers to the use of very cold substances to treat a variety of skin conditions such as warts, pre-skin cancers, molluscum contagiosum, sun spots, and several benign growths. The substance we use in cryosurgery is liquid nitrogen and is so cold (-195 degrees Celsius) that is burns when administered.     Following treatment in the office, the skin may immediately burn and become red. You may find the area around the lesion is affected as well. It is sometimes necessary to treat not only the lesion, but a small area of the surrounding normal skin to achieve a good response.     A blister, and even a blood filled blister, may form after treatment.   This is a normal response. If the blister is painful, it is acceptable to sterilize a needle and with rubbing alcohol and gently pop the blister. It is important that you gently wash the area with  soap and warm water as the blister fluid may contain wart virus if a wart was treated. Do no remove the roof of the blister.     The area treated can take anywhere from 1-3 weeks to heal. Healing time depends on the kind skin lesion treated, the location, and how aggressively the lesion was treated. It is recommended that the areas treated are covered with Vaseline or bacitracin ointment and a band-aid. If a band-aid is not practical, just ointment applied several times per day will do. Keeping these areas moist will speed the healing time.    Treatment with liquid nitrogen can leave a scar. In dark skin, it may be a light or dark scar, in light skin it may be a white or pink scar. These will generally fade with time, but may never go away completely.     If you have any concerns after your treatment, please feel free to call the office.       [address] [place] [address]/ [phone] FAX/ [email or web address]       Sunscreen Guidelines  Sunscreen protects your skin by absorbing and reflecting ultraviolet rays. All sunscreens have a sun protection factor (SPF) rating that indicates how long a sunscreen will remain effective on the skin.    Why protect your skin?  The sun's rays are composed of many different wavelengths, including UVA, UVB, and visible light that each affect the skin differently.    UVB: sunburn, photoaging, skin cancer (melanoma, basal cell, and squamous cell carcinomas) and modulation of the skin's immune system.    UVA: similar to above but thought to contribute more to aging; at the same dose of UVB it is less powerful however the sun has 10-20 times the levels of UVA as compared with UVB.  Visible light: implicated in causing unwanted darkening of skin, such as melasma and post-inflammatory hyperpigmentation in darker skin types     If I have dark skin, do I need to worry about the sun?    More darkly pigmented skin is more protected against UV-induced skin cancer,  sunburn, and photoaging, though may still suffer from sun-related conditions, including melasma, hyperpigmentation, and other dark spots.    Sun avoidance  As a general rule, stay out of the sun as much as possible between 10 a.m. - 4 p.m.    Download the EPA UV index rula to track the UV index by hour in your zip code.      Which sunscreen should I choose?  The best sunscreen to use varies by individual. The one that feels best on your skin and fits your lifestyle will be the one you will likely use most regularly.   Active ingredients of sunscreen vary by , and may be a chemical (such as avobenzone or oxybenzone) or physical agent (such as zinc oxide or titanium dioxide). I recommend a physical agent.  A water-resistant sunscreen is one that maintains the SPF level after 40 minutes of water exposure. A very water-resistant sunscreen maintains the SPF level after 80 minutes of water exposure.    Sunscreen: this is the last layer in sun protection   Be generous: 1 shot glass of sunscreen for your body, ½ teaspoon for your face/neck  Reapply every 2 hours  Broad spectrum (provides UVA/UVB protection), SPF 50 or above  Avoid spray sunscreens: less effective and have been found to contain benzene (carcinogen)    Sun protective clothing  Although sunscreen helps minimize sun damage, no sunscreen completely blocks all wavelengths of UV light. Wearing sun protective clothing such as hats, rashguards or swim shirts, and long sleeves and/or pants, as well as avoiding sun exposure from 10 a.m. to 4 p.m. will help protect your skin from overexposure and minimize sun damage. Seek shade.  Long sleeved clothing, hats, and sunglasses: makes sun protection easier, more effective, and can even be more affordable, since sunscreen needs to be reapplied frequently.    Solumbra (www.sunprectautions.com)  CipherOptics Life (www.Emprego Ligadoanalife.Glythera)  Coolibar (www.coolibar.Glythera)  Land's end (www.[employer])  Hats from Nichol Salazar  "(www.[employer])    My Favorite Sunscreens:  Physical blockers: Can have a "white case" but in general are more effective  - Face: CeraVe tinted mineral sunscreen, Bare Minerals complexion rescue (20 shades), Elta MD (UV elements, UV physical, UV restore, etc), Tizo ultra zinc tinted, Cetaphil Sheer Mineral Face Liquid Sunscreen  - Body: Blue Lizard, Neutrogena Sheer Zinc, Eucerin Daily Protection, Aveeno Baby   "

## 2024-02-20 NOTE — PROGRESS NOTES
"Subjective:   Mr. Samayoa is a 34 y.o. 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  He feels great without CV complaints.     Review of Systems   Constitutional: Negative for chills, decreased appetite, diaphoresis, fever, malaise/fatigue, weight gain and weight loss.   HENT:  Negative for ear discharge, hearing loss and sore throat.    Eyes:  Negative for blurred vision, double vision and visual disturbance.   Cardiovascular:  Negative for chest pain, orthopnea, palpitations, paroxysmal nocturnal dyspnea and syncope.   Respiratory:  Negative for cough, hemoptysis, shortness of breath and wheezing.    Endocrine: Negative for cold intolerance and heat intolerance.   Skin:  Negative for rash.   Gastrointestinal:  Negative for bloating, abdominal pain, constipation, diarrhea, hematemesis, hematochezia, jaundice, melena, nausea and vomiting.   Genitourinary:  Negative for dysuria, frequency, hematuria, nocturia and urgency.   Neurological:  Negative for dizziness, headaches and seizures.       Objective:   Blood pressure 123/87, pulse 101, height 5' 7" (1.702 m), weight 78.1 kg (172 lb 2.9 oz).body mass index is 26.97 kg/m².    Physical Exam  Constitutional:       Appearance: He is well-developed.   HENT:      Head: Normocephalic and atraumatic.      Right Ear: External ear normal.      Left Ear: External ear normal.   Eyes:      Conjunctiva/sclera: Conjunctivae normal.      Pupils: Pupils are equal, round, and reactive to light.   Neck:      Vascular: No hepatojugular reflux or JVD.   Cardiovascular:      Rate and Rhythm: Normal rate and regular rhythm.      Pulses: Intact distal pulses.      Heart sounds: S1 normal and S2 normal. No murmur heard.     No friction rub. No gallop.   Pulmonary:      Effort: Pulmonary effort is normal.      " Breath sounds: Normal breath sounds.   Abdominal:      General: Bowel sounds are normal. There is no distension.      Palpations: Abdomen is soft.      Tenderness: There is no abdominal tenderness. There is no guarding or rebound.   Musculoskeletal:      Cervical back: Normal range of motion and neck supple.      Right lower leg: No edema.      Left lower leg: No edema.   Neurological:      Mental Status: He is alert and oriented to person, place, and time.         Lab Results   Component Value Date    WBC 9.04 02/20/2024    HGB 16.0 02/20/2024    HCT 49.4 02/20/2024    MCV 92 02/20/2024     02/20/2024    CO2 28 02/20/2024    CREATININE 1.4 02/20/2024    CALCIUM 9.7 02/20/2024    ALBUMIN 4.6 02/20/2024    AST 20 02/20/2024    BNP 36 02/20/2024    ALT 26 02/20/2024    ALLOMAP See result image under hyperlink 08/06/2021       Lab Results   Component Value Date    INR 1.2 05/19/2022    INR 1.1 02/15/2021    INR 1.1 04/03/2018       BNP   Date Value Ref Range Status   02/20/2024 36 0 - 99 pg/mL Final     Comment:     Values of less than 100 pg/ml are consistent with non-CHF populations.   01/19/2024 30 0 - 99 pg/mL Final     Comment:     Values of less than 100 pg/ml are consistent with non-CHF populations.   08/18/2023 24 0 - 99 pg/mL Final     Comment:     Values of less than 100 pg/ml are consistent with non-CHF populations.       LD   Date Value Ref Range Status   01/25/2018 218 110 - 260 U/L Final     Comment:     Results are increased in hemolyzed samples.   12/28/2017 215 110 - 260 U/L Final     Comment:     Results are increased in hemolyzed samples.   11/29/2017 207 110 - 260 U/L Final     Comment:     Results are increased in hemolyzed samples.       Tacrolimus Lvl   Date Value Ref Range Status   01/19/2024 6.7 5.0 - 15.0 ng/mL Final     Comment:     Testing performed by a chemiluminescent microparticle   immunoassay on the [employer] System.    CAUTION: No firm therapeutic range exists for  "tacrolimus in whole   blood. The   complexity of the clinical state, individual differences in   sensitivity to   immunosuppressive and nephrotoxic effects of tacrolimus,   co-administration   of other immunosuppressants, type of transplant, time post-transplant   and a   number of other factors contribute to different requirements for   optimal   blood levels of tacrolimus. Therefore, individual tacrolimus values   cannot   be used as the sole indicator for making changes in treatment regimen   and   each patient should be thoroughly evaluated clinically before changes   in   treatment regimens are made. Each user must establish his or her own   ranges   based on clinical experience.  Therapeutic ranges vary according to the commercial test used, and   therefore   should be established for each commercial test. Values obtained with   different assay methods cannot be used interchangeably due to   differences in   assay methods and cross-reactivity with metabolites, nor should   correction   factors be applied. Therefore, consistent use of one assay for   individual   patients is recommended.       No results found for: "SIROLIMUS"  No results found for: "CYCLOSPORINE"    No results found for this or any previous visit.    No results found for this or any previous visit.      Labs were reviewed with the patient.    Assessment:     1. Status post heart transplant    2. Hyperlipidemia LDL goal <70    3. Heart transplanted    4. Long term current use of immunosuppressive drug        Plan:     Patient doing well.  Normal graft function and no ischemia on stress echo today.  Fully functional, works full time without limitations.  On stable immunosuppression regimen. Tolerating well current dose of cellcept. Also on tacrolimus and low dose prednisone 5 mg daily per team decision.    No changes today.     Return instructions as set forth by post transplant schedule or as needed:    Clinic: Return for labs and/or biopsy " weekly the first month, every two weeks during month 2 and then monthly for the first year at the provider or coordinator's discretion. During the second year, return to clinic every 3 months. Post transplant year 3-5 return every 6 months. There will be a comprehensive post transplant evaluation every year that may include LHC/RHC/biopsy, stress test, echo, CXR, and other health screening exams.    In addition to the clinical assessment, I have ordered Allomap testing for this patient to assist in identification of moderate/severe acute cellular rejection (ACR) in a pt with stable Allograft function instead of endomyocardial biopsy.     Patient is reminded to call with any health changes as these can be early signs of transplant complications. Patient is advised to make sure any new medications or changes of old medications are discussed with a pharmacist or physician knowledgeable with transplant to avoid rejection/drug toxicity related to significant drug interactions.    Patient advised that it is recommended that all transplanted patients, and their close contacts and household members receive Covid vaccination.    UNOS Patient Status  Functional Status: 100% - Normal, no complaints, no evidence of disease  Physical Capacity: No Limitations  Working for Income: Unknown    Advance Care Planning     Date: 02/20/2024         Guy Stiles MD

## 2024-02-20 NOTE — LETTER
February 20, 2024        Guillermo Alejo  [address]  [facility] [address]  Phone: [phone]  Fax: [phone]             [facility]  [address]  [facility] [address]  Phone: [phone]   Patient: Mert Samayoa   MR Number: [id number]   YOB: 1990   Date of Visit: 2/20/2024       Dear Dr. Guillermo Alejo    Thank you for referring Mert Samayoa to me for evaluation. Attached you will find relevant portions of my assessment and plan of care.    If you have questions, please do not hesitate to call me. I look forward to following Mert Samayoa along with you.    Sincerely,    Guy Stiles MD    Enclosure    If you would like to receive this communication electronically, please contact [email or web address] or [phone] to request [employer] Link access.    [employer] Link is a tool which provides read-only access to select patient information with whom you have a relationship. Its easy to use and provides real time access to review your patients record including encounter summaries, notes, results, and demographic information.    If you feel you have received this communication in error or would no longer like to receive these types of communications, please e-mail [email or web address]

## 2024-02-20 NOTE — PROGRESS NOTES
Subjective:      Patient ID:  Mert Samayoa is a 34 y.o. male who presents for   Chief Complaint   Patient presents with    Skin Check     Patient here for Total Body Skin Exam    This is a high risk patient here to check for the development of new lesions. As hx of heart transplant.    no - personal history of atypical moles removed  no - personal history of MM   no - family history of MM  yes - childhood blistering sunburns  no - tanning bed use  no - personal history of NMSC    Patient with specific complaint of lesion(s)  Location: L hand   Duration: 3 months  Symptoms: none  Relieving factors/Previous treatments: [facility] tried to remove wart in September 2023. Pt reports warts have grown back.              Review of Systems   Skin:  Positive for activity-related sunscreen use and wears hat (most of the time). Negative for daily sunscreen use.   Hematologic/Lymphatic: Does not bruise/bleed easily.       Objective:   Physical Exam   Constitutional: He appears well-developed and well-nourished. No distress.   Neurological: He is alert and oriented to person, place, and time. He is not disoriented.   Psychiatric: He has a normal mood and affect.   Skin:   Areas Examined (abnormalities noted in diagram):   Scalp / Hair Palpated and Inspected  Head / Face Inspection Performed  Neck Inspection Performed  Chest / Axilla Inspection Performed  Abdomen Inspection Performed  Genitals / Buttocks / Groin Inspection Performed  Back Inspection Performed  RUE Inspected  LUE Inspection Performed  RLE Inspected  LLE Inspection Performed  Nails and Digits Inspection Performed                     Diagram Legend     Erythematous scaling macule/papule c/w actinic keratosis       Vascular papule c/w angioma      Pigmented verrucoid papule/plaque c/w seborrheic keratosis      Yellow umbilicated papule c/w sebaceous hyperplasia      Irregularly shaped tan macule c/w lentigo     1-2 mm smooth white papules consistent with  Milia      Movable subcutaneous cyst with punctum c/w epidermal inclusion cyst      Subcutaneous movable cyst c/w pilar cyst      Firm pink to brown papule c/w dermatofibroma      Pedunculated fleshy papule(s) c/w skin tag(s)      Evenly pigmented macule c/w junctional nevus     Mildly variegated pigmented, slightly irregular-bordered macule c/w mildly atypical nevus      Flesh colored to evenly pigmented papule c/w intradermal nevus       Pink pearly papule/plaque c/w basal cell carcinoma      Erythematous hyperkeratotic cursted plaque c/w SCC      Surgical scar with no sign of skin cancer recurrence      Open and closed comedones      Inflammatory papules and pustules      Verrucoid papule consistent consistent with wart     Erythematous eczematous patches and plaques     Dystrophic onycholytic nail with subungual debris c/w onychomycosis     Umbilicated papule    Erythematous-base heme-crusted tan verrucoid plaque consistent with inflamed seborrheic keratosis     Erythematous Silvery Scaling Plaque c/w Psoriasis     See annotation      Assessment / Plan:        Verruca vulgaris  -     salicylic acid, bulk, Powd; Compound salicylic acid 70% + fluorouracil 5% paste. Apply a thin layer to warts qhs and occlude with tape.  Dispense: 15 g; Refill: 2    Cryosurgery procedure note:    Verbal consent from the patient is obtained including, but not limited to, risk of hypopigmentation/hyperpigmentation, scar, recurrence of lesion. Liquid nitrogen cryosurgery is applied to 2 verruca with prior paring, as detailed in the physical exam, to produce a freeze injury. 2 consecutive freeze thaw cycles of 15 seconds are applied to each verruca. The patient is aware that blisters (possibly blood blisters) may form.      Multiple benign nevi  Lentigo  Cherry angioma  Sebaceous hyperplasia  Reassurance given to patient. No treatment is necessary.   Treatment of benign, asymptomatic lesions may be considered cosmetic.    History of  heart transplant  Screening exam for skin cancer  Total body skin examination performed today including at least 12 points as noted in physical examination. No lesions suspicious for malignancy noted.    Recommend daily sun protection/avoidance, use of at least SPF 30, broad spectrum sunscreen (OTC drug), skin self examinations, and routine physician surveillance to optimize early detection         Follow up in about 1 year (around 2/20/2025) for TBSE.

## 2024-05-24 DIAGNOSIS — Z94.1 STATUS POST HEART TRANSPLANT: ICD-10-CM

## 2024-05-24 RX ORDER — TACROLIMUS 0.5 MG/1
CAPSULE ORAL
Qty: 120 CAPSULE | Refills: 3 | Status: SHIPPED | OUTPATIENT
Start: 2024-05-24

## 2024-06-25 ENCOUNTER — OFFICE VISIT (OUTPATIENT)
Dept: NEUROLOGY | Facility: CLINIC | Age: 34
End: 2024-06-25
Payer: COMMERCIAL

## 2024-06-25 VITALS
WEIGHT: 172.38 LBS | DIASTOLIC BLOOD PRESSURE: 84 MMHG | SYSTOLIC BLOOD PRESSURE: 106 MMHG | RESPIRATION RATE: 16 BRPM | HEART RATE: 110 BPM | HEIGHT: 67 IN | BODY MASS INDEX: 27.06 KG/M2

## 2024-06-25 DIAGNOSIS — F34.1 DYSTHYMIA: ICD-10-CM

## 2024-06-25 DIAGNOSIS — G43.109 MIGRAINE WITH AURA AND WITHOUT STATUS MIGRAINOSUS, NOT INTRACTABLE: Primary | ICD-10-CM

## 2024-06-25 PROCEDURE — 1160F RVW MEDS BY RX/DR IN RCRD: CPT | Mod: CPTII,S$GLB,, | Performed by: PSYCHIATRY & NEUROLOGY

## 2024-06-25 PROCEDURE — 99213 OFFICE O/P EST LOW 20 MIN: CPT | Mod: S$GLB,,, | Performed by: PSYCHIATRY & NEUROLOGY

## 2024-06-25 PROCEDURE — 99999 PR PBB SHADOW E&M-EST. PATIENT-LVL III: CPT | Mod: PBBFAC,,, | Performed by: PSYCHIATRY & NEUROLOGY

## 2024-06-25 PROCEDURE — 3044F HG A1C LEVEL LT 7.0%: CPT | Mod: CPTII,S$GLB,, | Performed by: PSYCHIATRY & NEUROLOGY

## 2024-06-25 PROCEDURE — 3079F DIAST BP 80-89 MM HG: CPT | Mod: CPTII,S$GLB,, | Performed by: PSYCHIATRY & NEUROLOGY

## 2024-06-25 PROCEDURE — 3008F BODY MASS INDEX DOCD: CPT | Mod: CPTII,S$GLB,, | Performed by: PSYCHIATRY & NEUROLOGY

## 2024-06-25 PROCEDURE — 3074F SYST BP LT 130 MM HG: CPT | Mod: CPTII,S$GLB,, | Performed by: PSYCHIATRY & NEUROLOGY

## 2024-06-25 PROCEDURE — 1159F MED LIST DOCD IN RCRD: CPT | Mod: CPTII,S$GLB,, | Performed by: PSYCHIATRY & NEUROLOGY

## 2024-06-25 NOTE — PROGRESS NOTES
HPI: Mert Samayoa is a 34 y.o. male with headache     Here for routine follow up    Last seen in 2022/ this is a 2 year follow up.       Headaches  are 2-3 per month and not severe as he takes the nurtec early in.      Last visit: 1 headache per month    Pamelor 10 mg continues    Sleep is good    Mood is overall stable/ some mild down symptoms episodically but not severe/ and well tolerated.       Review of Systems   Constitutional:  Negative for fever.   HENT:  Negative for nosebleeds.    Eyes:  Negative for double vision.   Respiratory:  Negative for hemoptysis.    Cardiovascular:  Negative for leg swelling.   Gastrointestinal:  Negative for blood in stool.   Genitourinary:  Negative for hematuria.   Musculoskeletal:  Negative for falls.   Skin:  Negative for rash.   Neurological:  Positive for headaches.   Psychiatric/Behavioral:  The patient does not have insomnia.          I have reviewed all of this patient's past medical and surgical histories as well as family and social histories and active allergies and medications as documented in the electronic medical record.      Exam:  Gen Appearance, well developed/nourished in no apparent distress  CV: 2+ distal pulses with no edema or swelling  Neuro:  MS: Awake, alert,  Sustains attention. Recent/remote memory intact, Language is full to spontaneous speech/comprehension. Fund of Knowledge is full  CN: Optic discs are flat with normal vasculature, PERRL, Extraoccular movements and visual fields are full. Normal facial sensation and strength, Hearing symmetric, Tongue and Palate are midline and strong. Shoulder Shrug symmetric and strong.  Motor: Normal bulk, tone, no abnormal movements. 5/5 strength bilateral upper/lower extremities with 2+ reflexes and no clonus  Sensory: symmetric to temp, and vibration Romberg negative  Cerebellar: Finger-nose,Rapid alternating movements intact  Gait: Normal stance, no ataxia    Imaging: 3/2019 MRI brain: No acute  abnormality  Labs: 2019 CMP, CBC reviewed        Assessment/Plan: Mert Samayoa is a 34 y.o. male with a history of heart transplant (due to familiar cardiomyopathy) and lifelong migraines with aura which is worsened in frequency and severity after organ transplant/on immunosuppressants     I recommend:   1. MRI brain 2019 reassuring  2. Benefiting from Pamelor for headache prevention and mood- continue at 10mg nightly.  He does not desire switch to or addition of SSRI at this point / feel mood is ok/ mild dysthymic symptoms at times  -Also improved with lower dose steroids   3.  Cautioned about analgesic overuse prior. Fioricet is no longer needed, not a candidate for NSAIDS, given his history of heart transplant. I would avoid triptans, given his history as well.    -[facility] helps greatly.   RTC 2 years (can call for refills in the interim)

## 2024-07-12 NOTE — TELEPHONE ENCOUNTER
Informed pt that he will not be admitted today and we will f/u on labs on Monday to see if improvement, cmv is still pending.  
Spoke with pt after getting CBC results back, ANC at 485.  Pt denies fever, chills, SOB, diarrhea, N/V.  Confirmed he is holding his Bactrim and not taking his cellcept.  He is scheduled for repeat labs on Monday at [facility].  EGBX for Tuesday. Discussed with Dr. Hawkins and the plan is to     
No

## 2024-07-25 RX ORDER — NORTRIPTYLINE HYDROCHLORIDE 10 MG/1
10 CAPSULE ORAL NIGHTLY
Qty: 90 CAPSULE | Refills: 3 | Status: SHIPPED | OUTPATIENT
Start: 2024-07-25

## 2024-08-09 ENCOUNTER — LAB VISIT (OUTPATIENT)
Dept: LAB | Facility: HOSPITAL | Age: 34
End: 2024-08-09
Attending: INTERNAL MEDICINE
Payer: COMMERCIAL

## 2024-08-09 DIAGNOSIS — Z94.1 STATUS POST HEART TRANSPLANT: ICD-10-CM

## 2024-08-09 DIAGNOSIS — E78.2 MIXED HYPERLIPIDEMIA: ICD-10-CM

## 2024-08-09 LAB
ALBUMIN SERPL BCP-MCNC: 4.6 G/DL (ref 3.5–5.2)
ALP SERPL-CCNC: 63 U/L (ref 55–135)
ALT SERPL W/O P-5'-P-CCNC: 27 U/L (ref 10–44)
ANION GAP SERPL CALC-SCNC: 12 MMOL/L (ref 8–16)
AST SERPL-CCNC: 18 U/L (ref 10–40)
BASOPHILS # BLD AUTO: 0.05 K/UL (ref 0–0.2)
BASOPHILS NFR BLD: 0.6 % (ref 0–1.9)
BILIRUB SERPL-MCNC: 1.1 MG/DL (ref 0.1–1)
BNP SERPL-MCNC: 33 PG/ML (ref 0–99)
BUN SERPL-MCNC: 14 MG/DL (ref 6–20)
CALCIUM SERPL-MCNC: 9.7 MG/DL (ref 8.7–10.5)
CHLORIDE SERPL-SCNC: 106 MMOL/L (ref 95–110)
CHOLEST SERPL-MCNC: 110 MG/DL (ref 120–199)
CHOLEST/HDLC SERPL: 2.7 {RATIO} (ref 2–5)
CO2 SERPL-SCNC: 26 MMOL/L (ref 23–29)
CREAT SERPL-MCNC: 1.2 MG/DL (ref 0.5–1.4)
DIFFERENTIAL METHOD BLD: NORMAL
EOSINOPHIL # BLD AUTO: 0.1 K/UL (ref 0–0.5)
EOSINOPHIL NFR BLD: 1.1 % (ref 0–8)
ERYTHROCYTE [DISTWIDTH] IN BLOOD BY AUTOMATED COUNT: 13.7 % (ref 11.5–14.5)
EST. GFR  (NO RACE VARIABLE): >60 ML/MIN/1.73 M^2
GLUCOSE SERPL-MCNC: 93 MG/DL (ref 70–110)
HCT VFR BLD AUTO: 45.6 % (ref 40–54)
HDLC SERPL-MCNC: 41 MG/DL (ref 40–75)
HDLC SERPL: 37.3 % (ref 20–50)
HGB BLD-MCNC: 15.6 G/DL (ref 14–18)
IMM GRANULOCYTES # BLD AUTO: 0.02 K/UL (ref 0–0.04)
IMM GRANULOCYTES NFR BLD AUTO: 0.2 % (ref 0–0.5)
LDLC SERPL CALC-MCNC: 47.4 MG/DL (ref 63–159)
LYMPHOCYTES # BLD AUTO: 1.9 K/UL (ref 1–4.8)
LYMPHOCYTES NFR BLD: 20.6 % (ref 18–48)
MAGNESIUM SERPL-MCNC: 1.7 MG/DL (ref 1.6–2.6)
MCH RBC QN AUTO: 30.2 PG (ref 27–31)
MCHC RBC AUTO-ENTMCNC: 34.2 G/DL (ref 32–36)
MCV RBC AUTO: 88 FL (ref 82–98)
MONOCYTES # BLD AUTO: 0.8 K/UL (ref 0.3–1)
MONOCYTES NFR BLD: 8.9 % (ref 4–15)
NEUTROPHILS # BLD AUTO: 6.2 K/UL (ref 1.8–7.7)
NEUTROPHILS NFR BLD: 68.6 % (ref 38–73)
NONHDLC SERPL-MCNC: 69 MG/DL
NRBC BLD-RTO: 0 /100 WBC
PLATELET # BLD AUTO: 214 K/UL (ref 150–450)
PMV BLD AUTO: 9.6 FL (ref 9.2–12.9)
POTASSIUM SERPL-SCNC: 3.7 MMOL/L (ref 3.5–5.1)
PROT SERPL-MCNC: 7.3 G/DL (ref 6–8.4)
RBC # BLD AUTO: 5.17 M/UL (ref 4.6–6.2)
SODIUM SERPL-SCNC: 144 MMOL/L (ref 136–145)
TACROLIMUS BLD-MCNC: 7.3 NG/ML (ref 5–15)
TRIGL SERPL-MCNC: 108 MG/DL (ref 30–150)
WBC # BLD AUTO: 8.97 K/UL (ref 3.9–12.7)

## 2024-08-09 PROCEDURE — 83880 ASSAY OF NATRIURETIC PEPTIDE: CPT | Performed by: INTERNAL MEDICINE

## 2024-08-09 PROCEDURE — 36415 COLL VENOUS BLD VENIPUNCTURE: CPT | Performed by: INTERNAL MEDICINE

## 2024-08-09 PROCEDURE — 85025 COMPLETE CBC W/AUTO DIFF WBC: CPT | Performed by: INTERNAL MEDICINE

## 2024-08-09 PROCEDURE — 80053 COMPREHEN METABOLIC PANEL: CPT | Performed by: INTERNAL MEDICINE

## 2024-08-09 PROCEDURE — 83735 ASSAY OF MAGNESIUM: CPT | Performed by: INTERNAL MEDICINE

## 2024-08-09 PROCEDURE — 86832 HLA CLASS I HIGH DEFIN QUAL: CPT | Performed by: INTERNAL MEDICINE

## 2024-08-09 PROCEDURE — 80197 ASSAY OF TACROLIMUS: CPT | Performed by: INTERNAL MEDICINE

## 2024-08-09 PROCEDURE — 86977 RBC SERUM PRETX INCUBJ/INHIB: CPT | Performed by: INTERNAL MEDICINE

## 2024-08-09 PROCEDURE — 86833 HLA CLASS II HIGH DEFIN QUAL: CPT | Performed by: INTERNAL MEDICINE

## 2024-08-09 PROCEDURE — 80061 LIPID PANEL: CPT | Performed by: INTERNAL MEDICINE

## 2024-09-24 DIAGNOSIS — Z94.1 STATUS POST HEART TRANSPLANT: ICD-10-CM

## 2024-09-24 RX ORDER — TACROLIMUS 0.5 MG/1
CAPSULE ORAL
Qty: 120 CAPSULE | Refills: 3 | Status: SHIPPED | OUTPATIENT
Start: 2024-09-24

## 2024-10-24 DIAGNOSIS — Z94.1 HEART TRANSPLANTED: ICD-10-CM

## 2024-10-24 DIAGNOSIS — Z94.1 STATUS POST HEART TRANSPLANT: ICD-10-CM

## 2024-10-25 RX ORDER — PREDNISONE 5 MG/1
5 TABLET ORAL DAILY
Qty: 30 TABLET | Refills: 11 | Status: SHIPPED | OUTPATIENT
Start: 2024-10-25

## 2024-10-25 RX ORDER — MYCOPHENOLATE MOFETIL 250 MG/1
CAPSULE ORAL
Qty: 300 CAPSULE | Refills: 11 | Status: SHIPPED | OUTPATIENT
Start: 2024-10-25

## 2024-11-06 ENCOUNTER — TELEPHONE (OUTPATIENT)
Dept: DERMATOLOGY | Facility: CLINIC | Age: 34
End: 2024-11-06
Payer: COMMERCIAL

## 2024-11-15 DIAGNOSIS — Z94.1 STATUS POST HEART TRANSPLANT: Primary | ICD-10-CM

## 2024-11-15 DIAGNOSIS — E03.2 HYPOTHYROIDISM DUE TO MEDICATION: ICD-10-CM

## 2024-11-15 DIAGNOSIS — E78.2 MIXED HYPERLIPIDEMIA: ICD-10-CM

## 2024-11-15 DIAGNOSIS — E13.9 POST-TRANSPLANT DIABETES MELLITUS: ICD-10-CM

## 2024-11-15 DIAGNOSIS — Z79.899 ENCOUNTER FOR LONG-TERM (CURRENT) USE OF MEDICATIONS: ICD-10-CM

## 2024-11-15 DIAGNOSIS — Z79.52 LONG TERM CURRENT USE OF SYSTEMIC STEROIDS: ICD-10-CM

## 2024-11-15 DIAGNOSIS — Z13.820 SCREENING FOR OSTEOPOROSIS: ICD-10-CM

## 2025-01-08 ENCOUNTER — TELEPHONE (OUTPATIENT)
Dept: TRANSPLANT | Facility: CLINIC | Age: 35
End: 2025-01-08
Payer: COMMERCIAL

## 2025-01-08 ENCOUNTER — PATIENT MESSAGE (OUTPATIENT)
Dept: TRANSPLANT | Facility: CLINIC | Age: 35
End: 2025-01-08
Payer: COMMERCIAL

## 2025-01-08 NOTE — TELEPHONE ENCOUNTER
Returning patient's phone call. Patient stated he had a fever 101.2 F this morning, took tylenol, has not re-checked temp yet. Patient reports chill, headache. Patient's daughter tested positive for the flu, patient thinks he also has the flu. Directed patient to go to urgent care or PCP today to be tested for flu and covid. If viral tests come back negative and urgent care has not explanation for symptoms, asked patient to call coordinator back. Patient verbalized understanding.      ----- Message from Yadira sent at 1/8/2025  8:43 AM CST -----  Regarding: Nurse  Contact: Pt  Type: Requesting to speak with nurse    Who Called: PT  Regarding: Fever and Chills  Would the patient rather a call back or a response via [facility]? Call back  Best Call Back Number: [phone]  Additional Information: Please call, Thank You

## 2025-01-17 DIAGNOSIS — E78.5 HYPERLIPIDEMIA, UNSPECIFIED HYPERLIPIDEMIA TYPE: ICD-10-CM

## 2025-01-17 DIAGNOSIS — Z94.1 STATUS POST HEART TRANSPLANT: ICD-10-CM

## 2025-01-17 DIAGNOSIS — G43.109 MIGRAINE WITH AURA AND WITHOUT STATUS MIGRAINOSUS, NOT INTRACTABLE: ICD-10-CM

## 2025-01-17 RX ORDER — ATORVASTATIN CALCIUM 20 MG/1
20 TABLET, FILM COATED ORAL
Qty: 90 TABLET | Refills: 3 | Status: SHIPPED | OUTPATIENT
Start: 2025-01-17

## 2025-01-17 RX ORDER — TACROLIMUS 0.5 MG/1
CAPSULE ORAL
Qty: 120 CAPSULE | Refills: 11 | Status: SHIPPED | OUTPATIENT
Start: 2025-01-17

## 2025-01-19 RX ORDER — RIMEGEPANT SULFATE 75 MG/75MG
TABLET, ORALLY DISINTEGRATING ORAL
Qty: 8 TABLET | Refills: 11 | Status: SHIPPED | OUTPATIENT
Start: 2025-01-19

## 2025-02-10 DIAGNOSIS — Z79.899 ENCOUNTER FOR LONG-TERM (CURRENT) USE OF MEDICATIONS: Primary | ICD-10-CM

## 2025-02-10 DIAGNOSIS — Z94.1 STATUS POST HEART TRANSPLANT: ICD-10-CM

## 2025-02-11 ENCOUNTER — OFFICE VISIT (OUTPATIENT)
Dept: TRANSPLANT | Facility: CLINIC | Age: 35
End: 2025-02-11
Attending: INTERNAL MEDICINE
Payer: COMMERCIAL

## 2025-02-11 ENCOUNTER — HOSPITAL ENCOUNTER (OUTPATIENT)
Dept: RADIOLOGY | Facility: HOSPITAL | Age: 35
Discharge: HOME | End: 2025-02-11
Attending: INTERNAL MEDICINE
Payer: COMMERCIAL

## 2025-02-11 ENCOUNTER — HOSPITAL ENCOUNTER (OUTPATIENT)
Dept: CARDIOLOGY | Facility: CLINIC | Age: 35
Discharge: HOME | End: 2025-02-11
Payer: COMMERCIAL

## 2025-02-11 ENCOUNTER — OFFICE VISIT (OUTPATIENT)
Dept: DERMATOLOGY | Facility: CLINIC | Age: 35
End: 2025-02-11
Payer: COMMERCIAL

## 2025-02-11 ENCOUNTER — HOSPITAL ENCOUNTER (OUTPATIENT)
Dept: RADIOLOGY | Facility: CLINIC | Age: 35
Discharge: HOME | End: 2025-02-11
Attending: INTERNAL MEDICINE
Payer: COMMERCIAL

## 2025-02-11 ENCOUNTER — TELEPHONE (OUTPATIENT)
Dept: TRANSPLANT | Facility: CLINIC | Age: 35
End: 2025-02-11

## 2025-02-11 ENCOUNTER — HOSPITAL ENCOUNTER (OUTPATIENT)
Dept: CARDIOLOGY | Facility: HOSPITAL | Age: 35
Discharge: HOME | End: 2025-02-11
Attending: INTERNAL MEDICINE
Payer: COMMERCIAL

## 2025-02-11 VITALS — WEIGHT: 165 LBS | HEIGHT: 67 IN | BODY MASS INDEX: 25.9 KG/M2

## 2025-02-11 VITALS
BODY MASS INDEX: 26.26 KG/M2 | SYSTOLIC BLOOD PRESSURE: 129 MMHG | WEIGHT: 167.31 LBS | DIASTOLIC BLOOD PRESSURE: 85 MMHG | HEART RATE: 99 BPM | HEIGHT: 67 IN

## 2025-02-11 DIAGNOSIS — Z12.83 SCREENING EXAM FOR SKIN CANCER: ICD-10-CM

## 2025-02-11 DIAGNOSIS — Z13.820 SCREENING FOR OSTEOPOROSIS: ICD-10-CM

## 2025-02-11 DIAGNOSIS — Z92.25 HISTORY OF IMMUNOSUPPRESSION: ICD-10-CM

## 2025-02-11 DIAGNOSIS — D18.01 CHERRY ANGIOMA: ICD-10-CM

## 2025-02-11 DIAGNOSIS — Z79.52 LONG TERM CURRENT USE OF SYSTEMIC STEROIDS: ICD-10-CM

## 2025-02-11 DIAGNOSIS — Z79.899 LONG TERM CURRENT USE OF IMMUNOSUPPRESSIVE DRUG: ICD-10-CM

## 2025-02-11 DIAGNOSIS — Z94.1 STATUS POST HEART TRANSPLANT: ICD-10-CM

## 2025-02-11 DIAGNOSIS — L81.4 LENTIGINES: ICD-10-CM

## 2025-02-11 DIAGNOSIS — E78.5 HYPERLIPIDEMIA LDL GOAL <70: ICD-10-CM

## 2025-02-11 DIAGNOSIS — Z94.1 STATUS POST HEART TRANSPLANT: Primary | ICD-10-CM

## 2025-02-11 DIAGNOSIS — L91.8 SKIN TAG: ICD-10-CM

## 2025-02-11 DIAGNOSIS — L73.8 SEBACEOUS HYPERPLASIA OF FACE: Primary | ICD-10-CM

## 2025-02-11 DIAGNOSIS — L82.1 SEBORRHEIC KERATOSES: ICD-10-CM

## 2025-02-11 LAB
ASCENDING AORTA: 2.92 CM
AV AREA BY CONTINUOUS VTI: 3.7 CM2
AV INDEX (PROSTH): 0.89
AV LVOT MEAN GRADIENT: 1 MMHG
AV LVOT PEAK GRADIENT: 2 MMHG
AV MEAN GRADIENT: 1 MMHG
AV PEAK GRADIENT: 3 MMHG
AV VALVE AREA BY VELOCITY RATIO: 3.2 CM²
AV VALVE AREA: 3.7 CM2
AV VELOCITY RATIO: 0.78
BSA FOR ECHO PROCEDURE: 1.88 M2
CV ECHO LV RWT: 0.49 CM
CV STRESS BASE HR: 83 BPM
DIASTOLIC BLOOD PRESSURE: 81 MMHG
DOP CALC AO PEAK VEL: 0.9 M/S
DOP CALC AO VTI: 13.4 CM
DOP CALC LVOT AREA: 4.2 CM2
DOP CALC LVOT DIAMETER: 2.3 CM
DOP CALC LVOT PEAK VEL: 0.7 M/S
DOP CALC LVOT STROKE VOLUME: 49.4 CM3
DOP CALCLVOT PEAK VEL VTI: 11.9 CM
E WAVE DECELERATION TIME: 132 MS
E/A RATIO: 3.73
E/E' RATIO: 9 M/S
ECHO EF ESTIMATED: 56 %
ECHO LV POSTERIOR WALL: 1 CM (ref 0.6–1.1)
FRACTIONAL SHORTENING: 29.3 % (ref 28–44)
INTERVENTRICULAR SEPTUM: 1 CM (ref 0.6–1.1)
IVC DIAMETER: 1.66 CM
IVRT: 71 MS
LEFT INTERNAL DIMENSION IN SYSTOLE: 2.9 CM (ref 2.1–4)
LEFT VENTRICLE DIASTOLIC VOLUME INDEX: 39.75 ML/M2
LEFT VENTRICLE DIASTOLIC VOLUME: 73.93 ML
LEFT VENTRICLE MASS INDEX: 71 G/M2
LEFT VENTRICLE SYSTOLIC VOLUME INDEX: 17.6 ML/M2
LEFT VENTRICLE SYSTOLIC VOLUME: 32.78 ML
LEFT VENTRICULAR INTERNAL DIMENSION IN DIASTOLE: 4.1 CM (ref 3.5–6)
LEFT VENTRICULAR MASS: 132.1 G
LV LATERAL E/E' RATIO: 8.8
LV SEPTAL E/E' RATIO: 9.7
MV PEAK A VEL: 0.26 M/S
MV PEAK E VEL: 0.97 M/S
OHS CV CPX 1 MINUTE RECOVERY HEART RATE: 137 BPM
OHS CV CPX 85 PERCENT MAX PREDICTED HEART RATE MALE: 157
OHS CV CPX ESTIMATED METS: 13
OHS CV CPX MAX PREDICTED HEART RATE: 185
OHS CV CPX PATIENT IS FEMALE: 0
OHS CV CPX PATIENT IS MALE: 1
OHS CV CPX PEAK DIASTOLIC BLOOD PRESSURE: 74 MMHG
OHS CV CPX PEAK HEAR RATE: 157 BPM
OHS CV CPX PEAK RATE PRESSURE PRODUCT: NORMAL
OHS CV CPX PEAK SYSTOLIC BLOOD PRESSURE: 201 MMHG
OHS CV CPX PERCENT MAX PREDICTED HEART RATE ACHIEVED: 85
OHS CV CPX RATE PRESSURE PRODUCT PRESENTING: NORMAL
OHS CV RV/LV RATIO: 0.76 CM
OHS QRS DURATION: 74 MS
OHS QTC CALCULATION: 427 MS
RA PRESSURE ESTIMATED: 3 MMHG
RIGHT VENTRICLE DIASTOLIC BASEL DIMENSION: 3.1 CM
RV TISSUE DOPPLER FREE WALL SYSTOLIC VELOCITY 1 (APICAL 4 CHAMBER VIEW): 6.79 CM/S
SINUS: 3 CM
STJ: 2.87 CM
STRESS ECHO POST EXERCISE DUR MIN: 7 MINUTES
STRESS ECHO POST EXERCISE DUR SEC: 50 SECONDS
SYSTOLIC BLOOD PRESSURE: 131 MMHG
TDI LATERAL: 0.11 M/S
TDI SEPTAL: 0.1 M/S
TDI: 0.11 M/S
TRICUSPID ANNULAR PLANE SYSTOLIC EXCURSION: 1.41 CM
TV PEAK GRADIENT: 12 MMHG
Z-SCORE OF LEFT VENTRICULAR DIMENSION IN END DIASTOLE: -2.33
Z-SCORE OF LEFT VENTRICULAR DIMENSION IN END SYSTOLE: -0.76

## 2025-02-11 PROCEDURE — 93351 STRESS TTE COMPLETE: CPT

## 2025-02-11 PROCEDURE — 71046 X-RAY EXAM CHEST 2 VIEWS: CPT | Mod: 26,,, | Performed by: RADIOLOGY

## 2025-02-11 PROCEDURE — 1159F MED LIST DOCD IN RCRD: CPT | Mod: CPTII,S$GLB,, | Performed by: INTERNAL MEDICINE

## 2025-02-11 PROCEDURE — 3079F DIAST BP 80-89 MM HG: CPT | Mod: CPTII,S$GLB,, | Performed by: INTERNAL MEDICINE

## 2025-02-11 PROCEDURE — 1160F RVW MEDS BY RX/DR IN RCRD: CPT | Mod: CPTII,S$GLB,, | Performed by: INTERNAL MEDICINE

## 2025-02-11 PROCEDURE — 99213 OFFICE O/P EST LOW 20 MIN: CPT | Mod: S$GLB,,, | Performed by: STUDENT IN AN ORGANIZED HEALTH CARE EDUCATION/TRAINING PROGRAM

## 2025-02-11 PROCEDURE — 93351 STRESS TTE COMPLETE: CPT | Mod: 26,,, | Performed by: INTERNAL MEDICINE

## 2025-02-11 PROCEDURE — 99999 PR PBB SHADOW E&M-EST. PATIENT-LVL III: CPT | Mod: PBBFAC,,, | Performed by: INTERNAL MEDICINE

## 2025-02-11 PROCEDURE — 1160F RVW MEDS BY RX/DR IN RCRD: CPT | Mod: CPTII,S$GLB,, | Performed by: STUDENT IN AN ORGANIZED HEALTH CARE EDUCATION/TRAINING PROGRAM

## 2025-02-11 PROCEDURE — 1159F MED LIST DOCD IN RCRD: CPT | Mod: CPTII,S$GLB,, | Performed by: STUDENT IN AN ORGANIZED HEALTH CARE EDUCATION/TRAINING PROGRAM

## 2025-02-11 PROCEDURE — 93010 ELECTROCARDIOGRAM REPORT: CPT | Mod: S$GLB,,, | Performed by: INTERNAL MEDICINE

## 2025-02-11 PROCEDURE — 99999 PR PBB SHADOW E&M-EST. PATIENT-LVL III: CPT | Mod: PBBFAC,,, | Performed by: STUDENT IN AN ORGANIZED HEALTH CARE EDUCATION/TRAINING PROGRAM

## 2025-02-11 PROCEDURE — 93005 ELECTROCARDIOGRAM TRACING: CPT | Mod: S$GLB,,, | Performed by: INTERNAL MEDICINE

## 2025-02-11 PROCEDURE — 3008F BODY MASS INDEX DOCD: CPT | Mod: CPTII,S$GLB,, | Performed by: INTERNAL MEDICINE

## 2025-02-11 PROCEDURE — 3074F SYST BP LT 130 MM HG: CPT | Mod: CPTII,S$GLB,, | Performed by: INTERNAL MEDICINE

## 2025-02-11 PROCEDURE — 99215 OFFICE O/P EST HI 40 MIN: CPT | Mod: S$GLB,,, | Performed by: INTERNAL MEDICINE

## 2025-02-11 PROCEDURE — 3044F HG A1C LEVEL LT 7.0%: CPT | Mod: CPTII,S$GLB,, | Performed by: INTERNAL MEDICINE

## 2025-02-11 PROCEDURE — 3044F HG A1C LEVEL LT 7.0%: CPT | Mod: CPTII,S$GLB,, | Performed by: STUDENT IN AN ORGANIZED HEALTH CARE EDUCATION/TRAINING PROGRAM

## 2025-02-11 PROCEDURE — 77080 DXA BONE DENSITY AXIAL: CPT | Mod: TC

## 2025-02-11 PROCEDURE — 77080 DXA BONE DENSITY AXIAL: CPT | Mod: 26,,, | Performed by: INTERNAL MEDICINE

## 2025-02-11 PROCEDURE — 71046 X-RAY EXAM CHEST 2 VIEWS: CPT | Mod: TC,FY

## 2025-02-11 RX ORDER — DIAPER,BRIEF,INFANT-TODD,DISP
1 EACH MISCELLANEOUS
COMMUNITY

## 2025-02-11 RX ORDER — AMOXICILLIN 500 MG/1
CAPSULE ORAL
COMMUNITY
Start: 2024-08-22

## 2025-02-11 NOTE — LETTER
February 11, 2025        Guillermo Alejo  [address], [address],  [facility] [address]  Phone: [phone]  Fax: [phone]             [facility]  [address]  [facility] [address]  Phone: [phone]   Patient: Mert Samayoa   MR Number: [id number]   YOB: 1990   Date of Visit: 2/11/2025       Dear Dr. Guillermo Alejo    Thank you for referring Mert Samayoa to me for evaluation. Attached you will find relevant portions of my assessment and plan of care.    If you have questions, please do not hesitate to call me. I look forward to following Mert Samayoa along with you.    Sincerely,    Hernán Tidwell Jr, MD    Enclosure    If you would like to receive this communication electronically, please contact [email or web address] or [phone] to request [employer] Link access.    [employer] Link is a tool which provides read-only access to select patient information with whom you have a relationship. Its easy to use and provides real time access to review your patients record including encounter summaries, notes, results, and demographic information.    If you feel you have received this communication in error or would no longer like to receive these types of communications, please e-mail [email or web address]

## 2025-02-11 NOTE — PROGRESS NOTES
"Subjective:   Mr. Samayoa is a 35 y.o. 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  Now on cellcept 750 mg BID, tacro 1 mg BID, prednisone 5 mg mg qd.  Left on prednisone for +C1q's, no rejection episodes.  He feels great without CV complaints.  Lower cellcept dose due to GI issues.  Active gastrocolic reflex but no diarrhea now.    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  He works full time .  2 children--2 and 5 yo doing well    He is off magnesium for past year    Review of Systems   Constitutional: Negative for chills, fever, malaise/fatigue, night sweats and weight gain.   Cardiovascular:  Negative for chest pain, dyspnea on exertion, irregular heartbeat, leg swelling, near-syncope, orthopnea, palpitations, paroxysmal nocturnal dyspnea and syncope.   Respiratory:  Negative for cough, sputum production and wheezing.    Hematologic/Lymphatic: Does not bruise/bleed easily.   Musculoskeletal:  Negative for arthritis, joint pain and stiffness.   Gastrointestinal:  Negative for hematochezia and melena.   Genitourinary:  Negative for hematuria.   Neurological:  Negative for brief paralysis, focal weakness, seizures and weakness.       Objective:   Blood pressure 129/85, pulse 99, height 5' 7" (1.702 m), weight 75.9 kg (167 lb 5.3 oz).body mass index is 26.21 kg/m².    Physical Exam  Constitutional:       " "General: He is not in acute distress.     Appearance: He is well-developed. He is not ill-appearing, toxic-appearing or diaphoretic.      Comments: /85   Pulse 99   Ht 5' 7" (1.702 m)   Wt 75.9 kg (167 lb 5.3 oz)   BMI 26.21 kg/m²   Friendly WM in NAD     HENT:      Head: Normocephalic and atraumatic.      Nose: Nose normal. No congestion or rhinorrhea.      Mouth/Throat:      Mouth: Mucous membranes are moist.      Pharynx: Oropharynx is clear. No oropharyngeal exudate or posterior oropharyngeal erythema.   Eyes:      General: No scleral icterus.        Right eye: No discharge.         Left eye: No discharge.      Conjunctiva/sclera: Conjunctivae normal.   Neck:      Thyroid: No thyromegaly.      Vascular: No carotid bruit or JVD.      Trachea: No tracheal deviation.   Cardiovascular:      Rate and Rhythm: Normal rate and regular rhythm.      Heart sounds: Normal heart sounds. No murmur heard.     No gallop.   Pulmonary:      Effort: Pulmonary effort is normal.      Breath sounds: Normal breath sounds.   Abdominal:      General: Bowel sounds are normal. There is no distension.      Palpations: Abdomen is soft. There is no mass.      Tenderness: There is no abdominal tenderness. There is no guarding or rebound.   Musculoskeletal:         General: No tenderness.      Right lower leg: No edema.      Left lower leg: No edema.   Lymphadenopathy:      Cervical: No cervical adenopathy.   Skin:     General: Skin is warm and dry.   Neurological:      General: No focal deficit present.      Mental Status: He is alert and oriented to person, place, and time. Mental status is at baseline.   Psychiatric:         Mood and Affect: Mood normal.         Behavior: Behavior normal.         Thought Content: Thought content normal.         Judgment: Judgment normal.       Labs reviewed as follows:  Lab Results   Component Value Date    BNP 54 02/11/2025     02/11/2025    K 3.7 02/11/2025    MG 1.7 02/11/2025     " 02/11/2025    CO2 25 02/11/2025    BUN 15 02/11/2025    CREATININE 0.9 02/11/2025    GLU 84 02/11/2025    HGBA1C 4.9 02/11/2025    AST 20 02/11/2025    ALT 29 02/11/2025    ALBUMIN 4.6 02/11/2025    PROT 7.5 02/11/2025    BILITOT 1.1 (H) 02/11/2025    WBC 8.82 02/11/2025    HGB 15.5 02/11/2025    HCT 47.9 02/11/2025     02/11/2025    TSH 0.875 02/11/2025    FREET4 0.96 02/11/2025    CHOL 113 (L) 02/11/2025    HDL 37 (L) 02/11/2025    LDLCALC 52.6 (L) 02/11/2025    TRIG 117 02/11/2025    TACROLIMUS 7.1 02/11/2025 2/11/2025 CXR normal post transplant    2/11/2025 EKG reviewed today Sinus tachy other normal    2/11/2025 Stress ECHO reviewed today    Post cardiac transplantation study.    Post-stress Conclusion: The study is negative with no echocardiographic evidence of stress induced ischemia.    ECG Conclusion: The ECG portion of the study is negative for ischemia.    Stress Protocol: The patient exercised for 7 minutes 50 seconds on a high ramp protocol, achieving a peak heart rate of 157 bpm, which is 85% of the age predicted maximum heart rate. Their exercise capacity was average. The patient reported no symptoms during the stress test. The test was stopped because the patient experienced leg fatigue.    Left Ventricle: The left ventricle is normal in size. Normal wall thickness. Normal wall motion. There is normal systolic function with a visually estimated ejection fraction of 60 - 65%. There is normal diastolic function.    Right Ventricle: Normal right ventricular cavity size. Wall thickness is normal. Systolic function is normal.    IVC/SVC: Normal venous pressure at 3 mmHg.    2/20/2024 Stress ECHO    Left Ventricle: The left ventricle is normal in size. Normal wall thickness. There is normal systolic function with a visually estimated ejection fraction of 55 - 60%. There is normal diastolic function.    Right Ventricle: Normal right ventricular cavity size. Wall thickness is normal. Right  ventricle wall motion  is normal. Systolic function is normal.    IVC/SVC: Normal venous pressure at 3 mmHg.    Stress Protocol: The patient exercised for 8 minutes 0 seconds on a high ramp protocol, corresponding to a functional capacity of 13 METS, achieving a peak heart rate of 157 bpm, which is 84 % of the age predicted maximum heart rate. Their exercise capacity was normal. The patient reported no symptoms during the stress test. The test was stopped because the patient experienced leg fatigue.    Baseline ECG: The Baseline ECG reveals sinus rhythm. The axis is normal. The ST segments are normal.    Stress ECG: There are no ST segment deviation identified during the protocol. There are no arrhythmias during stress. There is normal blood pressure response with stress.    ECG Conclusion: The ECG portion of the study is negative for ischemia.    Post-stress Impression: The study is negative with no echocardiographic evidence of stress induced ischemia.    Overall, this study is negative for myocardial ischemia.    08/18/2023 echocardiogram    Left Ventricle: The left ventricle is normal in size. Ventricular mass is normal. Normal wall thickness. There is concentric remodeling. regional wall motion abnormalities present. Septal bounce consistent with constrictive physiology. There is normal systolic function with a visually estimated ejection fraction of 55 - 60%. There is normal diastolic function.    Right Ventricle: Normal right ventricular cavity size. Wall thickness is normal. Right ventricle wall motion  is normal. Systolic function is normal.    Right Atrium: Normal right atrial size for AMALIA.    Pulmonary Artery: Pulmonary artery pressure could not be accurately determined.    IVC/SVC: Normal venous pressure at 3 mmHg.     03/02/2023 cardiac catheterization Dr. Cartagena  Left Anterior Descending   Non-stenotic Ost LAD to Prox LAD lesion. The lesion was a type A lesion. The stenosis was measured using IVUS .  Maximal intimal thickness of proximal LAD = .2 mm which represents improvment from 2021.     Assessment:     1. Status post heart transplant    2. Long term current use of immunosuppressive drug    3. Hyperlipidemia LDL goal <70        Plan:   Dermatology checks every 6-12 months---sees today  Dental cleaning every 6 months--up to date    Return instructions as set forth by post transplant schedule or as needed:    Clinic: Return for labs and/or biopsy weekly the first month, every two weeks during month 2 and then monthly for the first year at the provider or coordinator's discretion. During the second year, return to clinic every 3 months. Post transplant year 3-5 return every 6 months. There will be a comprehensive post transplant evaluation every year that may include LHC/RHC/biopsy, stress test, echo, CXR, and other health screening exams.    In addition to the clinical assessment, I have ordered Allomap testing for this patient to assist in identification of moderate/severe acute cellular rejection (ACR) in a pt with stable Allograft function instead of endomyocardial biopsy.     Patient is reminded to call with any health changes as these can be early signs of transplant complications. Patient is advised to make sure any new medications or changes of old medications are discussed with a pharmacist or physician knowledgeable with transplant to avoid rejection/drug toxicity related to significant drug interactions.    Patient advised that it is recommended that all transplanted patients, and their close contacts and household members receive Covid vaccination.    UNOS Patient Status  Functional Status: 100% - Normal, no complaints, no evidence of disease  Physical Capacity: No Limitations  Working:  yes, full time    Hernán Tidwell Jr, MD

## 2025-02-11 NOTE — PROGRESS NOTES
Subjective:      Patient ID:  Mert Samayoa is a 35 y.o. male who presents for   Chief Complaint   Patient presents with    Skin Check     TBSE      Patient here for Total Body Skin Exam    Pt was last seen on 02/20/2024 with Dr. Felix for TBSE with no significant findings     Patient with new area of concern:   Location: Left scalp   Duration: 3 months   Symptoms: pt notes the area was flaky at fist but resolved   Previous treatments: none     This is a high risk patient here to check for the development of new lesions. A hx of heart transplant 2018.    no - personal history of atypical moles removed  no - personal history of MM   no - family history of MM  yes - childhood blistering sunburns  no - tanning bed use  no - personal history of NMSC      Review of Systems   Skin:  Positive for activity-related sunscreen use, recent sunburn (07/2024) and wears hat. Negative for daily sunscreen use.   Hematologic/Lymphatic: Does not bruise/bleed easily.       Objective:   Physical Exam   Constitutional: He appears well-developed and well-nourished. No distress.   Neurological: He is alert and oriented to person, place, and time. He is not disoriented.   Psychiatric: He has a normal mood and affect.   Skin:   Areas Examined (abnormalities noted in diagram):   Scalp / Hair Palpated and Inspected  Head / Face Inspection Performed  Neck Inspection Performed  Chest / Axilla Inspection Performed  Abdomen Inspection Performed  Genitals / Buttocks / Groin Inspection Performed  Back Inspection Performed  RUE Inspected  LUE Inspection Performed  RLE Inspected  LLE Inspection Performed  Nails and Digits Inspection Performed                         Diagram Legend     Erythematous scaling macule/papule c/w actinic keratosis       Vascular papule c/w angioma      Pigmented verrucoid papule/plaque c/w seborrheic keratosis      Yellow umbilicated papule c/w sebaceous hyperplasia      Irregularly shaped tan macule c/w lentigo      1-2 mm smooth white papules consistent with Milia      Movable subcutaneous cyst with punctum c/w epidermal inclusion cyst      Subcutaneous movable cyst c/w pilar cyst      Firm pink to brown papule c/w dermatofibroma      Pedunculated fleshy papule(s) c/w skin tag(s)      Evenly pigmented macule c/w junctional nevus     Mildly variegated pigmented, slightly irregular-bordered macule c/w mildly atypical nevus      Flesh colored to evenly pigmented papule c/w intradermal nevus       Pink pearly papule/plaque c/w basal cell carcinoma      Erythematous hyperkeratotic cursted plaque c/w SCC      Surgical scar with no sign of skin cancer recurrence      Open and closed comedones      Inflammatory papules and pustules      Verrucoid papule consistent consistent with wart     Erythematous eczematous patches and plaques     Dystrophic onycholytic nail with subungual debris c/w onychomycosis     Umbilicated papule    Erythematous-base heme-crusted tan verrucoid plaque consistent with inflamed seborrheic keratosis     Erythematous Silvery Scaling Plaque c/w Psoriasis     See annotation      Assessment / Plan:        Sebaceous hyperplasia of face  This is a common condition representing benign enlargement of the sebaceous lobule. It typically occurs in adulthood. Reassurance given to patient.     Seborrheic keratoses  These are benign inherited growths without a malignant potential. Reassurance given to patient. No treatment is necessary.     Lentigines  This is a benign hyperpigmented sun induced lesion. Recommend daily sun protection/avoidance and use of at least SPF 30, broad spectrum sunscreen (OTC drug) will reduce the number of new lesions.     Skin tag  Reassurance given to patient. No treatment is necessary.   Treatment of benign, asymptomatic lesions may be considered cosmetic.    Cherry angioma  This is a benign vascular lesion. Reassurance given. No treatment required.     Screening exam for skin cancer  History  of immunosuppression  Annual skin exam recommended due to transplant history, immunosuppressing medications increase risk of skin cancer    Total body skin examination performed today including at least 12 points as noted in physical examination. No lesions suspicious for malignancy noted.    Recommend daily sun protection/avoidance, use of at least SPF 30, broad spectrum sunscreen (OTC drug), skin self examinations, and routine physician surveillance to optimize early detection    RTC 1 yr, sooner prn

## 2025-02-11 NOTE — TELEPHONE ENCOUNTER
Reason for visit:   7 months  post heart transplant clinic visit.     Immunosuppression:  Tacrolimus 1 mg in am, 1 mg in pm   mg BID (patient prescribed 250 mg capsules, 5 capsules BID. However, he states he was previously instructed about 1 year ago to take 3 capsules BID)  Prednisone 5 mg QD    Assessment:  Patient denies chest pain, SOB, or palpitations. No N/V/D. No S/S of infection. No edema noted.     Psychosocial Evaluation:  -physical activity:maintains regular activity, yard work  -work: full-time, works in [employer] for [employer]  -transportation:personal  -support: wife, family  -issues/concerns: none    Education:   Reviewed health maintenance activities. Patient sees dentist every 6 months. Dermatology appt this afternoon for routine skin check. Patient not due for screening  colonoscopy at this time. Advised patient to see PCP yearly for routine wellness exam.    Medications, lab and echo results reviewed with patient and Dr. Tidwell. See MD note for orders and recommendations    Next follow up in 6 month(s) for routine labs.

## 2025-05-12 NOTE — H&P
[facility]  Critical Care - Surgery  Progress Note    Patient Name: Mert Samayoa  MRN: [id number]  Admission Date: 2/17/2018  Hospital Length of Stay: 2 days  Code Status: Prior  Attending Provider: Guillermo Daniels MD  Primary Care Provider: Primary Doctor No   Principal Problem: Awaiting organ transplant    Subjective:     Hospital/ICU Course:  No notes on file    Interval History: Pt arrived from OR s/p OHT with ICD removal and VAD removal. Pt sedated, intubated. VSS and wnl.     Continuous Infusions:   epinephrine 0.06 mcg/kg/min (02/19/18 0008)    insulin (HUMAN R) infusion (adults) 16.6 Units/hr (02/19/18 0008)    nicardipine      norepinephrine bitartrate-D5W Stopped (02/18/18 2100)    propofol 20 mcg/kg/min (02/19/18 0008)     Scheduled Meds:   albuterol-ipratropium 2.5mg-0.5mg/3mL  3 mL Nebulization Q4H    aspirin  325 mg Oral Daily    ceFAZolin (ANCEF) IVPB  2 g Intravenous Q8H    famotidine (PF)  20 mg Intravenous BID    mupirocin  1 g Nasal BID    polyethylene glycol  17 g Oral Daily    potassium chloride  20 mEq Intravenous Once    sodium chloride 0.9%  3 mL Intravenous Q8H     PRN Meds:albumin human 5%, dextrose 50%, dextrose 50%, fentaNYL, fentaNYL, lactated ringers, lactulose, magnesium sulfate IVPB, metoclopramide HCl, morphine, ondansetron, oxyCODONE, oxyCODONE, potassium chloride **AND** potassium chloride **AND** potassium chloride    Review of patient's allergies indicates:  No Known Allergies  Objective:     Vital Signs (Most Recent):  Temp: 99.7 °F (37.6 °C) (02/19/18 0015)  Pulse: 89 (02/19/18 0015)  Resp: 19 (02/19/18 0015)  BP: (!) 102/52 (02/19/18 0000)  SpO2: 100 % (02/19/18 0015) Vital Signs (24h Range):  Temp:  [96.6 °F (35.9 °C)-100.8 °F (38.2 °C)] 99.7 °F (37.6 °C)  Pulse:  [62-97] 89  Resp:  [9-27] 19  SpO2:  [97 %-100 %] 100 %  BP: ()/(0-71) 102/52  Arterial Line BP: ()/(44-59) 111/47     Patient Vitals for the past 72 hrs (Last 3  readings):   Weight   02/18/18 0700 73.1 kg (161 lb 2.5 oz)   02/17/18 0300 75.8 kg (167 lb 1.7 oz)     Body mass index is 25.24 kg/m².      Intake/Output Summary (Last 24 hours) at 02/19/18 0023  Last data filed at 02/19/18 0008   Gross per 24 hour   Intake             4658 ml   Output             1940 ml   Net             2718 ml       Hemodynamic Parameters:         Physical Exam   Neck: No tracheal deviation present.   Cardiovascular: Normal rate and regular rhythm.  Exam reveals no gallop and no friction rub.    Pulmonary/Chest: Breath sounds normal. He has no wheezes. He has no rales.   Abdominal: Soft. Bowel sounds are normal.   Musculoskeletal: He exhibits no edema.   Neurological:   Intubated, sedated.   Skin: Skin is warm and dry.       Significant Labs:  CBC:    Recent Labs  Lab 02/17/18 0354 02/18/18 1959 02/18/18 2002 02/18/18  2312   WBC 7.52  --  16.00*  --  15.73*   RBC 4.42*  --  3.12*  --  2.49*   HGB 13.3*  --  9.5*  --  7.6*   HCT 38.7*  < > 27.4* 27* 22.3*     --  117*  --  140*   MCV 88  --  88  --  90   MCH 30.1  --  30.4  --  30.5   MCHC 34.4  --  34.7  --  34.1   < > = values in this interval not displayed.  BNP:  No results for input(s): BNP in the last 168 hours.    Invalid input(s): BNPTRIAGELBLO  CMP:    Recent Labs  Lab 02/17/18 0354 02/18/18 1959 02/18/18 2312   GLU 80 238* 271*   CALCIUM 9.6 10.2 9.2   ALBUMIN 4.2  --   --    PROT 7.4  --   --     143 145   K 3.7 3.5  3.5 3.6   CO2 25 15* 15*    104 110   BUN 14 16 17   CREATININE 1.0 1.7* 1.6*   ALKPHOS 76  --   --    ALT 19  --   --    AST 20  --   --    BILITOT 0.7  --   --       Coagulation:     Recent Labs  Lab 02/17/18  0354 02/18/18 1959 02/18/18  2348   INR 2.4* 1.2 1.5*   APTT 35.2* 27.6  --      LDH:  No results for input(s): LDH in the last 72 hours.  Microbiology:  Microbiology Results (last 7 days)     Procedure Component Value Units Date/Time    Urine culture [[id number]] Collected:  02/17/18  0425    Order Status:  Completed Specimen:  Urine from Urine, Clean Catch Updated:  02/18/18 1119     Urine Culture, Routine No growth    Narrative:       1 cup of urine          I have reviewed all pertinent labs within the past 24 hours.    Estimated Creatinine Clearance: 64.3 mL/min (A) (based on SCr of 1.6 mg/dL (H)).    Diagnostic Results:  I have reviewed all pertinent imaging results/findings within the past 24 hours.    Assessment/Plan:     Heart transplanted    29 y/o man w/ hx of DCM s/p OHT with ICD and VAD removal.    Neuro:  -Propofol gtt for sedation  -fentanyl for pain control    Cardiac: VSS stable and wnl  -Per CTS:  --cardene gtt for elevated BP  --epi gtt as needed for hypotenstion  -    Pulm: Intubated, sedated  -wean FiO2, minimal vent settings  -duonebs    Renal:  -kirk in place  -maintain adequate UOP    Heme/ Id: Hgb at 7.9, leukocytosis  -rec'd 2g Ancef  -trend cbc    Gi:  -miralax  -Pepcid ppx    Endo:  -insulin gtt for glucose control  -monitor accuchecks    FEN:  -NPO while intubated.  -replete lytes prn  -daily CMP        Aneudy Díaz MD  Critical Care - Surgery  [facility]     Is This Visit For Evaluation During Treatment Or Follow Up Post Treatment?: evaluation Radiation Therapy [facility] (Rtog) Score: 0 Assessment: Appropriate reaction Bill For Evaluation ([id number])?: Yes

## 2025-06-10 ENCOUNTER — PATIENT MESSAGE (OUTPATIENT)
Dept: TRANSPLANT | Facility: CLINIC | Age: 35
End: 2025-06-10
Payer: COMMERCIAL

## 2025-06-30 ENCOUNTER — PATIENT MESSAGE (OUTPATIENT)
Dept: TRANSPLANT | Facility: CLINIC | Age: 35
End: 2025-06-30
Payer: COMMERCIAL

## 2025-07-01 NOTE — TELEPHONE ENCOUNTER
Spoke with patient via phone. Patient stated he is out of town right now, but has the discharge paperwork from his recent ED visit to [facility]'s ED. Patient also stated he will try to obtain lab results from that ED visit for transplant team to review. Unable to pull in records from [facility]. Patient stated that ED doctor stated she noted a few PVCs on EKG, but overall did not seem concerned to EKG results. MD recommended patient notify transplant team. No PVCs noted on EKG performed at annual last February. Patient denies chest pain, SOB, palpitations.     Patient will be back in town next week, will plan for labs next Tuesday.

## 2025-07-08 ENCOUNTER — LAB VISIT (OUTPATIENT)
Dept: LAB | Facility: HOSPITAL | Age: 35
End: 2025-07-08
Attending: INTERNAL MEDICINE
Payer: COMMERCIAL

## 2025-07-08 DIAGNOSIS — E78.2 MIXED HYPERLIPIDEMIA: ICD-10-CM

## 2025-07-08 DIAGNOSIS — Z79.899 ENCOUNTER FOR LONG-TERM (CURRENT) USE OF MEDICATIONS: ICD-10-CM

## 2025-07-08 DIAGNOSIS — Z94.1 STATUS POST HEART TRANSPLANT: ICD-10-CM

## 2025-07-08 LAB
ABSOLUTE EOSINOPHIL (OHS): 0.1 K/UL
ABSOLUTE MONOCYTE (OHS): 0.95 K/UL (ref 0.3–1)
ABSOLUTE NEUTROPHIL COUNT (OHS): 9.11 K/UL (ref 1.8–7.7)
ALBUMIN SERPL BCP-MCNC: 4.6 G/DL (ref 3.5–5.2)
ALP SERPL-CCNC: 77 UNIT/L (ref 40–150)
ALT SERPL W/O P-5'-P-CCNC: 30 UNIT/L (ref 10–44)
ANION GAP (OHS): 9 MMOL/L (ref 8–16)
AST SERPL-CCNC: 20 UNIT/L (ref 11–45)
BASOPHILS # BLD AUTO: 0.04 K/UL
BASOPHILS NFR BLD AUTO: 0.3 %
BILIRUB SERPL-MCNC: 0.9 MG/DL (ref 0.1–1)
BNP SERPL-MCNC: 31 PG/ML (ref 0–99)
BUN SERPL-MCNC: 12 MG/DL (ref 6–20)
CALCIUM SERPL-MCNC: 9.4 MG/DL (ref 8.7–10.5)
CHLORIDE SERPL-SCNC: 105 MMOL/L (ref 95–110)
CHOLEST SERPL-MCNC: 115 MG/DL (ref 120–199)
CHOLEST/HDLC SERPL: 2.8 {RATIO} (ref 2–5)
CO2 SERPL-SCNC: 27 MMOL/L (ref 23–29)
CREAT SERPL-MCNC: 1 MG/DL (ref 0.5–1.4)
ERYTHROCYTE [DISTWIDTH] IN BLOOD BY AUTOMATED COUNT: 13.6 % (ref 11.5–14.5)
GFR SERPLBLD CREATININE-BSD FMLA CKD-EPI: >60 ML/MIN/1.73/M2
GLUCOSE SERPL-MCNC: 135 MG/DL (ref 70–110)
HCT VFR BLD AUTO: 47.1 % (ref 40–54)
HDLC SERPL-MCNC: 41 MG/DL (ref 40–75)
HDLC SERPL: 35.7 % (ref 20–50)
HGB BLD-MCNC: 15.9 GM/DL (ref 14–18)
IMM GRANULOCYTES # BLD AUTO: 0.05 K/UL (ref 0–0.04)
IMM GRANULOCYTES NFR BLD AUTO: 0.4 % (ref 0–0.5)
LDLC SERPL CALC-MCNC: 43.8 MG/DL (ref 63–159)
LYMPHOCYTES # BLD AUTO: 1.35 K/UL (ref 1–4.8)
MAGNESIUM SERPL-MCNC: 1.7 MG/DL (ref 1.6–2.6)
MCH RBC QN AUTO: 29.9 PG (ref 27–31)
MCHC RBC AUTO-ENTMCNC: 33.8 G/DL (ref 32–36)
MCV RBC AUTO: 89 FL (ref 82–98)
NONHDLC SERPL-MCNC: 74 MG/DL
NUCLEATED RBC (/100WBC) (OHS): 0 /100 WBC
PLATELET # BLD AUTO: 297 K/UL (ref 150–450)
PMV BLD AUTO: 9.4 FL (ref 9.2–12.9)
POTASSIUM SERPL-SCNC: 4.2 MMOL/L (ref 3.5–5.1)
PROT SERPL-MCNC: 7.8 GM/DL (ref 6–8.4)
RBC # BLD AUTO: 5.31 M/UL (ref 4.6–6.2)
RELATIVE EOSINOPHIL (OHS): 0.9 %
RELATIVE LYMPHOCYTE (OHS): 11.6 % (ref 18–48)
RELATIVE MONOCYTE (OHS): 8.2 % (ref 4–15)
RELATIVE NEUTROPHIL (OHS): 78.6 % (ref 38–73)
SODIUM SERPL-SCNC: 141 MMOL/L (ref 136–145)
TRIGL SERPL-MCNC: 151 MG/DL (ref 30–150)
WBC # BLD AUTO: 11.6 K/UL (ref 3.9–12.7)

## 2025-07-08 PROCEDURE — 86977 RBC SERUM PRETX INCUBJ/INHIB: CPT | Performed by: INTERNAL MEDICINE

## 2025-07-08 PROCEDURE — 80197 ASSAY OF TACROLIMUS: CPT

## 2025-07-08 PROCEDURE — 80061 LIPID PANEL: CPT

## 2025-07-08 PROCEDURE — 83735 ASSAY OF MAGNESIUM: CPT

## 2025-07-08 PROCEDURE — 85025 COMPLETE CBC W/AUTO DIFF WBC: CPT

## 2025-07-08 PROCEDURE — 83880 ASSAY OF NATRIURETIC PEPTIDE: CPT

## 2025-07-08 PROCEDURE — 80053 COMPREHEN METABOLIC PANEL: CPT

## 2025-07-08 PROCEDURE — 36415 COLL VENOUS BLD VENIPUNCTURE: CPT

## 2025-07-08 PROCEDURE — 86833 HLA CLASS II HIGH DEFIN QUAL: CPT | Performed by: INTERNAL MEDICINE

## 2025-07-08 PROCEDURE — 86832 HLA CLASS I HIGH DEFIN QUAL: CPT | Performed by: INTERNAL MEDICINE

## 2025-07-09 LAB
CLASS I ANTIBODY COMMENTS - LUMINEX: NORMAL
CLASS II ANTIBODY COMMENTS - LUMINEX: NORMAL
DSA1 TESTING DATE: NORMAL
DSA12 TESTING DATE: NORMAL
DSA2 TESTING DATE: NORMAL
SERUM COLLECTION DT - LUMINEX CLASS I: NORMAL
SERUM COLLECTION DT - LUMINEX CLASS II: NORMAL
TACROLIMUS BLD-MCNC: 9.6 NG/ML (ref 5–15)

## 2025-07-17 NOTE — PROVIDER TRANSFER
(Physician in Lead of Transfers)  Outside Transfer Acceptance Note / Regional Referral Center    Upon arrival to the floor, please send a SecureChat to [facility]. If there are emergent issues, or if there is no response to the SecureChat within 15 minutes, call extension [phone] (if no answer, do NOT leave a callback number after the beep, rather please call the  to connect you with the  Hospital Medicine admit line on-call physician), for Hospital Medicine admit team assignment and for additional admit orders for the patient. Do not page the attending physician associated with the patient on arrival (this physician may not be on duty at the time of arrival). Rather, always send a SecureChat to [facility] (subsequently call [phone] if needed) to reach the triage physician for orders and team assignment.    Referring facility: [facility]   Referring provider: SOY CLAYTON  Accepting facility: [facility]  Accepting provider: SARA FREEMAN  Reason for transfer: Higher Level of Care   Transfer diagnosis: concern for sepsis, ureterolithiasis  Transfer specialty requested: Urology  Transfer specialty notified: Yes  Transfer level: NUMBER 1-5: 2  Bed type requested: stepdown  Isolation status: No active isolations   Admission class or status: IP- Inpatient    Narrative     35-year-old male with a history of familial cardiomyopathy with heart transplant in 2018, hyperlipidemia, migraine headaches, and long-term immunosuppression use presented to [place] [facility] on July 17 with persistent right flank pain.  He was diagnosed with a 2 mm proximal ureteral stone on the right on June 30.  He was sent home from the emergency department with Norco and Flomax at that time.  He initially felt better, but he had recurrence of the right flank pain.  He had no fever and no gross hematuria.  In the emergency department he had heart rate in the 90s-100s,  but blood pressure was stable.  He was awake and alert with no alteration in mentation and no gross motor deficits.  He had no respiratory symptoms and no diarrhea.  He did have vomiting.  ED doctor noted patient appears stable for a floor bed at present.  Initial lactic acid was elevated but improved during his stay.  He had leukocytosis along with creatinine 1.4.  Urinalysis was negative for nitrite and leukocyte esterase.  CT renal stone protocol showed right-sided hydronephrosis with dilated right ureter and a 2 mm ureteral stone just above the UVJ.  In the emergency department he received IV fluid, meropenem, and pain medication. Transfer center contacted HTS service at [facility], and they are requesting transfer to Hospital Medicine for Urology evaluation with the persistent stone, leukocytosis, and elevated lactic acid.  Urinalysis did not show obvious evidence of infection.  ED doctor noted there was not another obvious source of infection on examination.    Initial lactic acid 2.5 (repeat 1.8), white blood cells 16.5, hemoglobin 16.1, hematocrit 47.9, platelets 205, sodium 139.6, potassium 4.8, chloride 109, CO2 22.8, BUN 16, creatinine 1.4, glucose 147, calcium 10.3, albumin 4.8, AST 21, ALT 4.2, magnesium 1.5,  -urinalysis with large blood, negative nitrite, negative leukocyte esterase, greater than 182 red blood cells, 2 WBCs, 0 squamous epithelial cells, 6 hyaline casts    Chest x-ray had no acute cardiopulmonary findings.    CT renal stone protocol noted bilateral nephrolithiasis with mild right-sided hydronephrosis.  Dilatation of the right ureter to the level just above the UVJ where there is a distal 2 mm ureteral stone identified.  Moderate stool throughout the colon.  Minimal dependent basilar atelectasis.  White inguinal fat containing hernia.      VS:  Temperature 98°, pulse 91, blood pressure 127/72, O2 sats 100%      Instructions    Admit to Hospital Medicine  Consult Urology  Consult  RAYSHAWN Friedman MD  Hospital Medicine Staff  Cell: [phone]

## 2025-07-18 ENCOUNTER — TELEPHONE (OUTPATIENT)
Dept: TRANSPLANT | Facility: CLINIC | Age: 35
End: 2025-07-18
Payer: COMMERCIAL

## 2025-07-18 ENCOUNTER — HOSPITAL ENCOUNTER (OUTPATIENT)
Facility: HOSPITAL | Age: 35
LOS: 1 days | Discharge: HOME | End: 2025-07-18
Attending: INTERNAL MEDICINE | Admitting: STUDENT IN AN ORGANIZED HEALTH CARE EDUCATION/TRAINING PROGRAM
Payer: COMMERCIAL

## 2025-07-18 VITALS
RESPIRATION RATE: 18 BRPM | WEIGHT: 165.81 LBS | SYSTOLIC BLOOD PRESSURE: 125 MMHG | HEART RATE: 97 BPM | TEMPERATURE: 98 F | BODY MASS INDEX: 26.02 KG/M2 | HEIGHT: 67 IN | DIASTOLIC BLOOD PRESSURE: 74 MMHG | OXYGEN SATURATION: 96 %

## 2025-07-18 DIAGNOSIS — R07.9 CHEST PAIN: ICD-10-CM

## 2025-07-18 DIAGNOSIS — N20.0 NEPHROLITHIASIS: Primary | ICD-10-CM

## 2025-07-18 DIAGNOSIS — N13.30 HYDRONEPHROSIS: ICD-10-CM

## 2025-07-18 DIAGNOSIS — A41.9 SEPSIS: ICD-10-CM

## 2025-07-18 PROBLEM — N20.1 URETERAL STONE: Status: ACTIVE | Noted: 2025-07-18

## 2025-07-18 LAB
ABSOLUTE EOSINOPHIL (OHS): 0.14 K/UL
ABSOLUTE MONOCYTE (OHS): 1 K/UL (ref 0.3–1)
ABSOLUTE NEUTROPHIL COUNT (OHS): 8.7 K/UL (ref 1.8–7.7)
ALBUMIN SERPL BCP-MCNC: 4.3 G/DL (ref 3.5–5.2)
ALP SERPL-CCNC: 72 UNIT/L (ref 40–150)
ALT SERPL W/O P-5'-P-CCNC: 26 UNIT/L (ref 10–44)
ANION GAP (OHS): 7 MMOL/L (ref 8–16)
AST SERPL-CCNC: 19 UNIT/L (ref 11–45)
BACTERIA #/AREA URNS AUTO: NORMAL /HPF
BASOPHILS # BLD AUTO: 0.05 K/UL
BASOPHILS NFR BLD AUTO: 0.4 %
BILIRUB SERPL-MCNC: 1.1 MG/DL (ref 0.1–1)
BILIRUB UR QL STRIP.AUTO: NEGATIVE
BUN SERPL-MCNC: 19 MG/DL (ref 6–20)
CALCIUM SERPL-MCNC: 8.8 MG/DL (ref 8.7–10.5)
CHLORIDE SERPL-SCNC: 107 MMOL/L (ref 95–110)
CLARITY UR: CLEAR
CO2 SERPL-SCNC: 27 MMOL/L (ref 23–29)
COLOR UR AUTO: YELLOW
CREAT SERPL-MCNC: 1.1 MG/DL (ref 0.5–1.4)
ERYTHROCYTE [DISTWIDTH] IN BLOOD BY AUTOMATED COUNT: 13.9 % (ref 11.5–14.5)
GFR SERPLBLD CREATININE-BSD FMLA CKD-EPI: >60 ML/MIN/1.73/M2
GLUCOSE SERPL-MCNC: 88 MG/DL (ref 70–110)
GLUCOSE UR QL STRIP: NEGATIVE
HCT VFR BLD AUTO: 44.6 % (ref 40–54)
HGB BLD-MCNC: 14.6 GM/DL (ref 14–18)
HGB UR QL STRIP: ABNORMAL
IMM GRANULOCYTES # BLD AUTO: 0.06 K/UL (ref 0–0.04)
IMM GRANULOCYTES NFR BLD AUTO: 0.5 % (ref 0–0.5)
KETONES UR QL STRIP: ABNORMAL
LACTATE SERPL-SCNC: 0.9 MMOL/L (ref 0.5–2.2)
LEUKOCYTE ESTERASE UR QL STRIP: NEGATIVE
LYMPHOCYTES # BLD AUTO: 2.14 K/UL (ref 1–4.8)
MCH RBC QN AUTO: 29.7 PG (ref 27–31)
MCHC RBC AUTO-ENTMCNC: 32.7 G/DL (ref 32–36)
MCV RBC AUTO: 91 FL (ref 82–98)
MICROSCOPIC COMMENT: NORMAL
NITRITE UR QL STRIP: NEGATIVE
NUCLEATED RBC (/100WBC) (OHS): 0 /100 WBC
PH UR STRIP: 6 [PH]
PLATELET # BLD AUTO: 216 K/UL (ref 150–450)
PMV BLD AUTO: 10 FL (ref 9.2–12.9)
POTASSIUM SERPL-SCNC: 4.3 MMOL/L (ref 3.5–5.1)
PROCALCITONIN SERPL-MCNC: 0.05 NG/ML
PROT SERPL-MCNC: 6.6 GM/DL (ref 6–8.4)
PROT UR QL STRIP: NEGATIVE
RBC # BLD AUTO: 4.91 M/UL (ref 4.6–6.2)
RBC #/AREA URNS AUTO: 4 /HPF (ref 0–4)
RELATIVE EOSINOPHIL (OHS): 1.2 %
RELATIVE LYMPHOCYTE (OHS): 17.7 % (ref 18–48)
RELATIVE MONOCYTE (OHS): 8.3 % (ref 4–15)
RELATIVE NEUTROPHIL (OHS): 71.9 % (ref 38–73)
SODIUM SERPL-SCNC: 141 MMOL/L (ref 136–145)
SP GR UR STRIP: 1.02
TACROLIMUS BLD-MCNC: 12.4 NG/ML (ref 5–15)
UROBILINOGEN UR STRIP-ACNC: NEGATIVE EU/DL
WBC # BLD AUTO: 12.09 K/UL (ref 3.9–12.7)
WBC #/AREA URNS AUTO: 3 /HPF (ref 0–5)

## 2025-07-18 PROCEDURE — 81001 URINALYSIS AUTO W/SCOPE: CPT

## 2025-07-18 PROCEDURE — 83605 ASSAY OF LACTIC ACID: CPT

## 2025-07-18 PROCEDURE — 80197 ASSAY OF TACROLIMUS: CPT | Performed by: HOSPITALIST

## 2025-07-18 PROCEDURE — 80053 COMPREHEN METABOLIC PANEL: CPT

## 2025-07-18 PROCEDURE — 85025 COMPLETE CBC W/AUTO DIFF WBC: CPT

## 2025-07-18 PROCEDURE — 36415 COLL VENOUS BLD VENIPUNCTURE: CPT

## 2025-07-18 PROCEDURE — 63600175 PHARM REV CODE 636 W HCPCS: Performed by: HOSPITALIST

## 2025-07-18 PROCEDURE — 84145 PROCALCITONIN (PCT): CPT | Performed by: HOSPITALIST

## 2025-07-18 PROCEDURE — 25000003 PHARM REV CODE 250: Performed by: HOSPITALIST

## 2025-07-18 PROCEDURE — G0378 HOSPITAL OBSERVATION PER HR: HCPCS

## 2025-07-18 PROCEDURE — 25000003 PHARM REV CODE 250

## 2025-07-18 RX ORDER — OXYCODONE HYDROCHLORIDE 10 MG/1
10 TABLET ORAL EVERY 6 HOURS PRN
Qty: 10 TABLET | Refills: 0 | Status: SHIPPED | OUTPATIENT
Start: 2025-07-18

## 2025-07-18 RX ORDER — HYDROMORPHONE HYDROCHLORIDE 1 MG/ML
0.5 INJECTION, SOLUTION INTRAMUSCULAR; INTRAVENOUS; SUBCUTANEOUS EVERY 4 HOURS PRN
Status: DISCONTINUED | OUTPATIENT
Start: 2025-07-18 | End: 2025-07-18

## 2025-07-18 RX ORDER — OXYCODONE HYDROCHLORIDE 5 MG/1
5 TABLET ORAL EVERY 4 HOURS PRN
Refills: 0 | Status: DISCONTINUED | OUTPATIENT
Start: 2025-07-18 | End: 2025-07-18

## 2025-07-18 RX ORDER — TACROLIMUS 0.5 MG/1
0.5 CAPSULE ORAL 2 TIMES DAILY
Status: DISCONTINUED | OUTPATIENT
Start: 2025-07-18 | End: 2025-07-18 | Stop reason: HOSPADM

## 2025-07-18 RX ORDER — SODIUM CHLORIDE 9 MG/ML
INJECTION, SOLUTION INTRAVENOUS CONTINUOUS
Status: DISCONTINUED | OUTPATIENT
Start: 2025-07-18 | End: 2025-07-18 | Stop reason: HOSPADM

## 2025-07-18 RX ORDER — TAMSULOSIN HYDROCHLORIDE 0.4 MG/1
0.4 CAPSULE ORAL DAILY
Qty: 30 CAPSULE | Refills: 11 | Status: SHIPPED | OUTPATIENT
Start: 2025-07-19 | End: 2026-07-19

## 2025-07-18 RX ORDER — IBUPROFEN 200 MG
24 TABLET ORAL
Status: DISCONTINUED | OUTPATIENT
Start: 2025-07-18 | End: 2025-07-18 | Stop reason: HOSPADM

## 2025-07-18 RX ORDER — ACETAMINOPHEN 500 MG
1000 TABLET ORAL EVERY 8 HOURS
Status: DISCONTINUED | OUTPATIENT
Start: 2025-07-18 | End: 2025-07-18 | Stop reason: HOSPADM

## 2025-07-18 RX ORDER — CETIRIZINE HYDROCHLORIDE 10 MG/1
10 TABLET ORAL DAILY
Status: DISCONTINUED | OUTPATIENT
Start: 2025-07-18 | End: 2025-07-18

## 2025-07-18 RX ORDER — OXYCODONE HYDROCHLORIDE 5 MG/1
5 TABLET ORAL EVERY 4 HOURS PRN
Refills: 0 | Status: DISCONTINUED | OUTPATIENT
Start: 2025-07-18 | End: 2025-07-18 | Stop reason: HOSPADM

## 2025-07-18 RX ORDER — MUPIROCIN 20 MG/G
OINTMENT TOPICAL 2 TIMES DAILY
Status: DISCONTINUED | OUTPATIENT
Start: 2025-07-18 | End: 2025-07-18 | Stop reason: HOSPADM

## 2025-07-18 RX ORDER — NORTRIPTYLINE HYDROCHLORIDE 10 MG/1
10 CAPSULE ORAL NIGHTLY
Status: DISCONTINUED | OUTPATIENT
Start: 2025-07-18 | End: 2025-07-18 | Stop reason: HOSPADM

## 2025-07-18 RX ORDER — TAMSULOSIN HYDROCHLORIDE 0.4 MG/1
0.4 CAPSULE ORAL DAILY
Status: DISCONTINUED | OUTPATIENT
Start: 2025-07-18 | End: 2025-07-18 | Stop reason: HOSPADM

## 2025-07-18 RX ORDER — IBUPROFEN 200 MG
16 TABLET ORAL
Status: DISCONTINUED | OUTPATIENT
Start: 2025-07-18 | End: 2025-07-18 | Stop reason: HOSPADM

## 2025-07-18 RX ORDER — HYDROMORPHONE HYDROCHLORIDE 1 MG/ML
0.5 INJECTION, SOLUTION INTRAMUSCULAR; INTRAVENOUS; SUBCUTANEOUS EVERY 4 HOURS PRN
Status: DISCONTINUED | OUTPATIENT
Start: 2025-07-18 | End: 2025-07-18 | Stop reason: HOSPADM

## 2025-07-18 RX ORDER — ASPIRIN 81 MG/1
81 TABLET ORAL DAILY
Status: DISCONTINUED | OUTPATIENT
Start: 2025-07-18 | End: 2025-07-18 | Stop reason: HOSPADM

## 2025-07-18 RX ORDER — CETIRIZINE HYDROCHLORIDE 10 MG/1
10 TABLET ORAL NIGHTLY
Status: DISCONTINUED | OUTPATIENT
Start: 2025-07-18 | End: 2025-07-18 | Stop reason: HOSPADM

## 2025-07-18 RX ORDER — PREDNISONE 5 MG/1
5 TABLET ORAL DAILY
Status: DISCONTINUED | OUTPATIENT
Start: 2025-07-18 | End: 2025-07-18 | Stop reason: HOSPADM

## 2025-07-18 RX ORDER — ACETAMINOPHEN 325 MG/1
650 TABLET ORAL EVERY 6 HOURS PRN
Status: DISCONTINUED | OUTPATIENT
Start: 2025-07-18 | End: 2025-07-18

## 2025-07-18 RX ORDER — NALOXONE HCL 0.4 MG/ML
0.02 VIAL (ML) INJECTION
Status: DISCONTINUED | OUTPATIENT
Start: 2025-07-18 | End: 2025-07-18 | Stop reason: HOSPADM

## 2025-07-18 RX ORDER — ONDANSETRON HYDROCHLORIDE 2 MG/ML
4 INJECTION, SOLUTION INTRAVENOUS EVERY 6 HOURS PRN
Status: DISCONTINUED | OUTPATIENT
Start: 2025-07-18 | End: 2025-07-18 | Stop reason: HOSPADM

## 2025-07-18 RX ORDER — AMOXICILLIN 250 MG
2 CAPSULE ORAL 2 TIMES DAILY PRN
Status: DISCONTINUED | OUTPATIENT
Start: 2025-07-18 | End: 2025-07-18 | Stop reason: HOSPADM

## 2025-07-18 RX ORDER — SODIUM CHLORIDE 0.9 % (FLUSH) 0.9 %
10 SYRINGE (ML) INJECTION EVERY 12 HOURS PRN
Status: DISCONTINUED | OUTPATIENT
Start: 2025-07-18 | End: 2025-07-18 | Stop reason: HOSPADM

## 2025-07-18 RX ORDER — GLUCAGON 1 MG
1 KIT INJECTION
Status: DISCONTINUED | OUTPATIENT
Start: 2025-07-18 | End: 2025-07-18 | Stop reason: HOSPADM

## 2025-07-18 RX ORDER — ATORVASTATIN CALCIUM 20 MG/1
20 TABLET, FILM COATED ORAL NIGHTLY
Status: DISCONTINUED | OUTPATIENT
Start: 2025-07-18 | End: 2025-07-18 | Stop reason: HOSPADM

## 2025-07-18 RX ADMIN — ACETAMINOPHEN 1000 MG: 500 TABLET ORAL at 09:07

## 2025-07-18 RX ADMIN — TAMSULOSIN HYDROCHLORIDE 0.4 MG: 0.4 CAPSULE ORAL at 04:07

## 2025-07-18 RX ADMIN — HYDROMORPHONE HYDROCHLORIDE 0.5 MG: 1 INJECTION, SOLUTION INTRAMUSCULAR; INTRAVENOUS; SUBCUTANEOUS at 02:07

## 2025-07-18 RX ADMIN — ASPIRIN 81 MG: 81 TABLET, COATED ORAL at 08:07

## 2025-07-18 RX ADMIN — PREDNISONE 5 MG: 5 TABLET ORAL at 08:07

## 2025-07-18 RX ADMIN — SODIUM CHLORIDE: 9 INJECTION, SOLUTION INTRAVENOUS at 09:07

## 2025-07-18 RX ADMIN — TACROLIMUS 0.5 MG: 0.5 CAPSULE ORAL at 08:07

## 2025-07-18 RX ADMIN — ATORVASTATIN CALCIUM 20 MG: 20 TABLET, FILM COATED ORAL at 04:07

## 2025-07-18 RX ADMIN — SODIUM CHLORIDE: 9 INJECTION, SOLUTION INTRAVENOUS at 03:07

## 2025-07-18 NOTE — PLAN OF CARE
Inpatient consult to Heart Transplant  Consult performed by: Bernabe Haiens NP  Consult ordered by: Nicole Mckeon MD  Reason for consult: History of Heart transplant.  Assessment/Recommendations:  History of OHT in 2018, no episodes of rejection to date, last biopsy 9/24/2019. Admitted with nephrolistiasis. Recommend holding Cellcept, continue Tacrolimus, na draw daily tacrolimus levels. Monitor for signs of rejection.     Immunosuppression: tac, MMF, pred (stay on pred due to C1Q+)  Immuno goal levels: tac 6-10,  Immuno history : Rapamune- neutropenia, Everolimus - cystic acne entire body    Last Echo:   2/11/25: Exercise Stress: 60 - 65% , negative for ishemia  2/20/24 Exercise Stress : EF 60-65%; negative for ischemia  8/18/23: LVEF 55-60%  2/7/2023 60%  3/11/22: Stress Echo EF 55% neg ischemia  8/6/2021 LVEF 63%

## 2025-07-18 NOTE — PROGRESS NOTES
Urology Progress Note    Pt was transferred from [place] [facility] for further urology eval of hydronephrosis with leukocytosis and elevated WBC.     -- Recommend Medical Expulsive Therapy (MET) for this stone. No indication for ureteral stent or ureteroscopy given no ADENIKE, pain is controlled, urine not concerning for infection.  -- Strain all urine; I have confirmed with patient that he has this at bedside and is aware of how to use it. If he passes the stone please have him bring this to his follow-up appointment  -- Upon discharge, recommend:  Flomax 0.4 mg nightly  Nausea control  -- Recommend hydrating to achieve 2.5-3 L of urine output per day  -- Discussed with the patient that if they develop fevers/chills, they would need to return to the ED immediately for evaluation as a stent may become urgent  -- Ok for regular diet from urologic perspective - no urologic intervention today   -- Will schedule follow-up in 1 month MOHAN with renal US     We will sign off at this time. Please reach out with any questions or concerns.    Domenic Ivy DO, PGY-1   [facility]

## 2025-07-18 NOTE — TELEPHONE ENCOUNTER
Returned patient's phone call. Patient stated he was discharged from the hospital this afternoon. Patient received meropenem x 1 dose in ED, was not discharged with further antibiotics. F/u with urology in one month including repeat renal US and clinic visit.   Patient asking for direction regarding his immunosuppression moving forward. Discussed with Dr. Tapia. Patient can continue tacrolimus and prednisone at previous doses. Continue to hold cellcept at this time due to infection concerns.    Updated patient on the above plan. Patient verbalized understanding to continue to continue tacro and pred, hold MMF for now. Will check on patient status in a couple of weeks.

## 2025-07-18 NOTE — ASSESSMENT & PLAN NOTE
Pt was transferred from [place] [facility] for further urology eval of hydronephrosis with leukocytosis and elevated WBC.    Plan  - Urology consulted. Follow recommendation  - Procalcitonin  - Keep NPO  - IV fluids and Pain control  - Nausea control  - Flomax  - Strain all urine - patient has urine strainer at bedside and instructed how to use strainer.   - Will monitor for stone passing. If patient decompensates, will need a ureteral stent placed. Already consented

## 2025-07-18 NOTE — PLAN OF CARE
[facility] - Cardiology Stepdown  Discharge Final Note    Primary Care Provider: No, Primary Doctor    Expected Discharge Date: 7/18/2025    Final Discharge Note (most recent)       Final Note - 07/18/25 1438          Final Note    Assessment Type Final Discharge Note (P)      Anticipated Discharge Disposition Home or Self Care (P)      What phone number can be called within the next 1-3 days to see how you are doing after discharge? [phone] (P)      Hospital Resources/Appts/Education Provided Provided patient/caregiver with written discharge plan information;Provided education on problems/symptoms using teachback;Appointments scheduled and added to AVS (P)         Post-Acute Status    Post-Acute Authorization Other (P)      Other Status No Post-Acute Service Needs (P)      Discharge Delays None known at this time (P)                      Important Message from Medicare             Pt. discharged home with Self-Care. Upon review of pt's medical record, no discharge needs identified at this time.     Leny Frazier LMSW

## 2025-07-18 NOTE — ASSESSMENT & PLAN NOTE
Pt was transferred from [place] [facility] for further urology eval of hydronephrosis with leukocytosis and elevated WBC.    Plan  - Urology consulted. Follow recommendation  - Procalcitonin  - Keep NPO  - IV fluids and Pain control  - Nausea control  - Flomax  - Strain all urine - patient has urine strainer at bedside and instructed how to use strainer.   - Will monitor for stone passing. If patient decompensates, will need a ureteral stent placed. Already consented    On Discharge:  - Patient discharged with short duration of PO pain medication and flomax. Told to monitor urine for passage of any stones and return to emergency room for any signs of infection.  - Condition is stable, pain well controlled. No outward signs of infection at discharge

## 2025-07-18 NOTE — ASSESSMENT & PLAN NOTE
Mert Samayoa is a 35 y.o. male with a right ureteral stone. Concern for acute kidney injury and possible infection.    - Pending AM labs and urine studies, will follow up  - NPO   - IV fluids - appreciate bolus and 150cc/hr as tolerated  - Consent  - Pain control  - Nausea control  - Flomax  - Strain all urine - patient has urine strainer at bedside and instructed how to use strainer. Please strain ALL urine.   - Will plan for cystoscopy with ureteral stent placement today, but hope for the patient to pass the stone on his own.   Physical Therapy    Visit Type: treatment and discharge    Relevant History/Co-morbidities: 2.21: patient is s/p bilateral re holes ([place], 2/20/24) for SDH after a fall ~4 weeks ago. Known h/o ETOH abuse and platelets 61 pre-op despite 3 units of platelets. OR without any complications. Transferred to [facility] for consideration of bilateral MMA embolization given thrombocytopenia.    SUBJECTIVE  Patient agreed to participate in therapy this date.  RN in agreement to work with patient for therapy session.  \"My foot is better today.\"  Patient / Family Goal: return home     OBJECTIVE     Cognitive Status   Level of Consciousness   - alert  Affect/Behavior    - calm and cooperative  Orientation    - Oriented to: person, place, time and situation  Functional Communication   - Overall Status: within functional limits   - Forms of Communication: verbal  Attention Span    - Attention: intact  Following Direction   - follows all commands and directions consistently  Transition Between Tasks   - transitions without difficulty  Memory   - intact  Safety Awareness/Insight   - intact  Awareness of Deficits   - fully aware of deficits      Sitting Balance  (BARTOLO = base of support)  Static      - Trial 1 details: independent       Bed Mobility  - Supine to sit: modified independent  - Sit to supine: modified independent  Transfers  Assistive devices: gait belt, 2-wheeled walker  - Sit to stand: supervision, modified independent, with verbal cues  - Stand to sit: supervision, modified independent, with verbal cues    Ambulation / Gait  - Assistive device: gait belt and 2-wheeled walker  - Distance (feet unless otherwise indicated): 40, 40  - Assist Level: supervision and with verbal cues  - Surface: even  - Description: decreased xaiv/pace, step through, heavy reliance on BUE and antalgic  Patient reporting some right knee and foot pain.    Stair Ambulation  - Number of steps: 14;   - Assist Level: supervision, with verbal  cues and with demonstration  Stair rails: facilitated with bilateral and single railing.  - Pattern: step to and reciprocal  - Devices Used: gait belt  Education provided to patient on using step-to pattern for stair climbing when having right foot/knee pain to decrease pain level with stair climbing.   Interventions     Training provided: activity tolerance, positioning, safety training, transfer training, gait training, use of assistive device and stair training    Skilled input: Verbal instruction/cues  Verbal Consent: Writer verbally educated and received verbal consent for hand placement, positioning of patient, and techniques to be performed today from patient for clothing adjustments for techniques as described above and how they are pertinent to the patient's plan of care.  Home Exercise Program/Education Materials: Education provided on safety with functional mobility and reducing risk of falling upon discharge.         Education:   - Present and ready to learn: patient  Education provided during session:  - Education provided on home safety and general safety.  - Results of above outlined education: Verbalizes understanding    ASSESSMENT   Progress: goals met    Discharge needs based on today's assessment:   - Current level of function: slightly below baseline level of function   - Therapy needs at discharge: therapy 1-3 times per week   - Impairments that require further therapy intervention: pain and activity tolerance    Patient progressed to performing mobility at supervision-modified independent level with us ef rolling walker. Patient was also able to tolerate stair climbing despite some right foot pain. Patient has no further inpatient physical therapy needs, safe for home with family support.    AM-PAC  - Generalized Prior Level of Function: IND/MOD I ([facility] 22-24)       Key: MOD A=moderate assistance, IND/MOD I=independent/modified independent  - Generalized Current Level of Function     - Current  Mobility Score: 22       AM-PAC Scoring Key= >21 Modified Independent; 20-21 Supervision; 18-19 Minimal assist; 13-18 Moderate assist; 9-12 Max assist; <9 Total assist    Therapy Diagnosis:  Other Abnormalities of Gait and Mobility       PLAN (while hospitalized)  Suggestions for next session as indicated:   PT Frequency: DC PT      PT/OT Mobility Equipment for Discharge: issued rolling walker 3.1  PT/OT ADL Equipment for Discharge: shower chair      Agreement to plan and goals: patient agrees with goals and treatment plan        GOALS  Review Date: 3/1/2024  Long Term Goals: (to be met by time of discharge from hospital)  Sit to supine: Patient will complete sit to supine independent.  Status: met   Supine to sit: Patient will complete supine to sit independent.  Status: met   Sit to stand: Patient will complete sit to stand transfer with least restrictive device, modified independent.   Status: met   Ambulation (even): Patient will ambulate on even surface for 150 feet with 2-wheeled walker, supervision.   Status: not met (distance limited <50 ft due to right foot pain)  Flight of stairs: Patient will ambulate a flight of stairs with using 1 rail, supervision.   Status: met   Documented in the chart in the following areas: Prior Level of Function. Assessment/Plan.      Patient at End of Session:   Location: in bed  Safety measures: alarm system in place/re-engaged and call light within reach  Handoff to: nurse (Brigette in person)      Therapy procedure time and total treatment time can be found documented on the Time Entry flowsheet

## 2025-07-18 NOTE — DISCHARGE SUMMARY
[facility] - Cardiology [facility] Medicine  Discharge Summary      Patient Name: Mert Samayoa  MRN: [id number]  NATALIE: [id number]  Patient Class: OP- Observation  Admission Date: 7/18/2025  Hospital Length of Stay: 1 days  Discharge Date and Time: 07/18/2025 2:39 PM  Attending Physician: Moiz Stuart MD   Discharging Provider: Андрей Zazueta MD  Primary Care Provider: Johanny Primary Doctor  [facility] Medicine Team: [facility] MED 6 Андрей Zazueta MD  Primary Care Team: [facility]OS[facility] MED 6    HPI:   34 y/o male, PMH familial cardiomyopathy s/p heart transplant in 2018 on long term immunosuppression, HLD, migraines. He presented to [place] [facility] on July 17 with persistent right flank pain. He went on June 30th for similar reasons and was shown to have a 2mm right proximal ureteral stone. He was sent home with Norco and Flomax. The R flank pain continued. Prior to going back to the ED, the pain was worst 10/10 and he had an episode of vomiting. He said he was afebrile. In the ED July 17, the CT showed hydronephrosis with a dilated R ureter. His HR ranged from , BP was stable. Initial lactic acid was 2.5 but down trended to 1.8. additionally, he had leukocytosis and Cr of 1.4 (baseline 1.0-1.4). UA was negative for LE and nitrites and no evidence of infection. He was given meropenem and pain meds at the ED. He was transferred to [facility] for further Urology evaluation.     On exam, he was laying comfortably in bed. He was given pain medications. He denied any back pain, fever, chills, recent N/V, abdominal pain. He did say he has dysuria and trouble peeing. Saying it feels like he doesn't finish peeing. He is compliant with his immunosuppressing meds.     * No surgery found *      Hospital Course:   Patient was admitted and started on maintenance fluids, flomax, and IV pain relief to aid in passing stone. Pain successfully mitigated with no stone passed on discharge. Low index of suspicion for  underlying infection in the setting of his heart transplant. Patient was discharged with oral pain medication, flomax, and instructions to strain or monitor urine for any stone or sediment passed.     Goals of Care Treatment Preferences:  Code Status: Full Code         Consults:   Consults (From admission, onward)          Status Ordering Provider     Inpatient consult to Heart Transplant  Once        Provider:  (Not yet assigned)    Completed DEEJAY JOEL     Inpatient consult to Urology  Once        Provider:  (Not yet assigned)    Completed DEEJAY JOEL            Assessment & Plan  Hydronephrosis  Nephrolithiasis  Pt was transferred from [place] [facility] for further urology eval of hydronephrosis with leukocytosis and elevated WBC.    Plan  - Urology consulted. Follow recommendation  - Procalcitonin  - Keep NPO  - IV fluids and Pain control  - Nausea control  - Flomax  - Strain all urine - patient has urine strainer at bedside and instructed how to use strainer.   - Will monitor for stone passing. If patient decompensates, will need a ureteral stent placed. Already consented    On Discharge:  - Patient discharged with short duration of PO pain medication and flomax. Told to monitor urine for passage of any stones and return to emergency room for any signs of infection.  - Condition is stable, pain well controlled. No outward signs of infection at discharge  Heart transplanted  HTS already consulted and aware of his arrival.   Long term current use of immunosuppressive drug  Plan  - All immunosuppression medications restarted except Cellcept  - Follow labs  - Follow Tacro levels     Migraine with aura  -Home meds restarted   -Stable  Final Active Diagnoses:    Diagnosis Date Noted POA    PRINCIPAL PROBLEM:  Hydronephrosis [N13.30] 07/18/2025 Unknown    Nephrolithiasis [N20.0] 07/18/2025 Yes    Migraine with aura [G43.109] 06/12/2019 Yes    Long term current use of immunosuppressive drug [Z79.60]  06/22/2018 Not Applicable    Heart transplanted [Z94.1] 02/18/2018 Not Applicable      Problems Resolved During this Admission:       Discharged Condition: stable    Disposition:     Follow Up:    Patient Instructions:   No discharge procedures on file.    Significant Diagnostic Studies: Labs: CBC   Recent Labs   Lab 07/18/25 0408   WBC 12.09   HGB 14.6   HCT 44.6          Pending Diagnostic Studies:       Procedure Component Value Units Date/Time    [place] URINE HOLD [[id number]] Collected: 07/18/25 0402    Order Status: Sent Lab Status: In process Updated: 07/18/25 0410    Specimen: Urine            Medications:  Reconciled Home Medications:      Medication List        START taking these medications      oxyCODONE 10 mg Tab immediate release tablet  Commonly known as: ROXICODONE  Take 1 tablet (10 mg total) by mouth every 6 (six) hours as needed for Pain.     tamsulosin 0.4 mg Cap  Commonly known as: FLOMAX  Take 1 capsule (0.4 mg total) by mouth once daily.  Start taking on: July 19, 2025            CHANGE how you take these medications      atorvastatin 20 MG tablet  Commonly known as: LIPITOR  TAKE ONE TABLET BY MOUTH EVERY DAY FOR CHOLESTEROL  What changed: when to take this            CONTINUE taking these medications      aspirin 81 MG EC tablet  Commonly known as: ECOTRIN  Take 1 tablet (81 mg total) by mouth once daily.     calcium citrate-vitamin D3 250 mg-5 mcg (200 unit) Tab  Take 1 tablet by mouth.     loratadine 10 mg tablet  Commonly known as: CLARITIN  Take 1 tablet (10 mg total) by mouth once daily.     mycophenolate 250 mg Cap  Commonly known as: CELLCEPT  Take 5 capsules (1,250 mg total) by mouth 2 (two) times daily.     nortriptyline 10 MG capsule  Commonly known as: PAMELOR  Take 1 capsule (10 mg total) by mouth every evening.     NURTEC 75 mg odt  Generic drug: rimegepant  PLACE ONE TABLET on the TONGUE DAILY if needed FOR HEADACHE     predniSONE 5 MG tablet  Commonly known as:  DELTASONE  Take 1 tablet (5 mg total) by mouth once daily.     tacrolimus 0.5 MG Cap  Commonly known as: PROGRAF  TAKE TWO CAPSULES BY MOUTH TWICE A DAY              Indwelling Lines/Drains at time of discharge:   Lines/Drains/Airways       None                       Time spent on the discharge of patient: 35 minutes         Андрей Zazueta MD  Department of Hospital Medicine  [facility] - Cardiology Stepdown

## 2025-07-18 NOTE — H&P
Flavio Dill - Cardiology [facility] Medicine  History & Physical    Patient Name: Mert Samayoa  MRN: [id number]  Patient Class: IP- Inpatient  Admission Date: 7/18/2025  Attending Physician: Moiz Stuart MD   Primary Care Provider: Johanny Primary Doctor         Patient information was obtained from patient and ER records.     Subjective:     Principal Problem:Hydronephrosis    Chief Complaint: No chief complaint on file.       HPI: 36 y/o male, PMH familial cardiomyopathy s/p heart transplant in 2018 on long term immunosuppression, HLD, migraines. He presented to [place] [facility] on July 17 with persistent right flank pain. He went on June 30th for similar reasons and was shown to have a 2mm right proximal ureteral stone. He was sent home with Norco and Flomax. The R flank pain continued. Prior to going back to the ED, the pain was worst 10/10 and he had an episode of vomiting. He said he was afebrile. In the ED July 17, the CT showed hydronephrosis with a dilated R ureter. His HR ranged from , BP was stable. Initial lactic acid was 2.5 but down trended to 1.8. additionally, he had leukocytosis and Cr of 1.4 (baseline 1.0-1.4). UA was negative for LE and nitrites and no evidence of infection. He was given meropenem and pain meds at the ED. He was transferred to [facility] [place] for further Urology evaluation.     On exam, he was laying comfortably in bed. He was given pain medications. He denied any back pain, fever, chills, recent N/V, abdominal pain. He did say he has dysuria and trouble peeing. Saying it feels like he doesn't finish peeing. He is compliant with his immunosuppressing meds.     Past Medical History:   Diagnosis Date    Cardiogenic shock 1/16/2017    Cardiomyopathy     Familial cardiomyopathy    CHF (congestive heart failure)     Encounter for blood transfusion     Essential hypertension 12/21/2016    Essential hypertension 3/20/2018    Familial Cardiomyopathy     Familial  cardiomyopathy     Hyperlipidemia LDL goal <70 11/19/2018       Past Surgical History:   Procedure Laterality Date    BIOPSY WITH ULTRASOUND GUIDANCE N/A 8/27/2019    Procedure: BIOPSY, WITH US GUIDANCE;  Surgeon: Mario Conner MD;  Location: [facility] CATH LAB;  Service: Cardiology;  Laterality: N/A;    BIOPSY WITH ULTRASOUND GUIDANCE N/A 9/24/2019    Procedure: BIOPSY, WITH US GUIDANCE;  Surgeon: Sammi Tapia MD;  Location: [facility] CATH LAB;  Service: Cardiology;  Laterality: N/A;    BIOPSY WITH ULTRASOUND GUIDANCE N/A 11/12/2019    Procedure: BIOPSY, WITH US GUIDANCE;  Surgeon: Mario Conner MD;  Location: [facility] CATH LAB;  Service: Cardiology;  Laterality: N/A;    CORONARY ANGIOGRAPHY Right 2/14/2019    Procedure: ANGIOGRAM, CORONARY ARTERY;  Surgeon: Tello Correia MD;  Location: [facility] CATH LAB;  Service: Cardiology;  Laterality: Right;    CORONARY ANGIOGRAPHY N/A 3/2/2023    Procedure: Angiogram, Coronary;  Surgeon: Guy Cartagena MD;  Location: [facility] CATH LAB;  Service: Cardiology;  Laterality: N/A;    HEART TRANSPLANT      IVUS, CORONARY N/A 3/2/2023    Procedure: IVUS, Coronary;  Surgeon: Guy Cartagena MD;  Location: [facility] CATH LAB;  Service: Cardiology;  Laterality: N/A;    LEFT HEART CATHETERIZATION Left 2/13/2020    Procedure: Left heart cath;  Surgeon: Guy Cartagena MD;  Location: [facility] CATH LAB;  Service: Cardiology;  Laterality: Left;    LEFT HEART CATHETERIZATION N/A 2/17/2021    Procedure: Left heart cath;  Surgeon: Guy Cartagena MD;  Location: [facility] CATH LAB;  Service: Cardiology;  Laterality: N/A;    LEFT VENTRICULAR ASSIST DEVICE      RIGHT HEART CATHETERIZATION Right 2/14/2019    Procedure: INSERTION, CATHETER, RIGHT HEART;  Surgeon: Tello Correia MD;  Location: [facility] CATH LAB;  Service: Cardiology;  Laterality: Right;    TONSILLECTOMY         Review of patient's allergies indicates:  No Known Allergies    No current facility-administered medications on file prior to encounter.      Current Outpatient Medications on File Prior to Encounter   Medication Sig    amoxicillin (AMOXIL) 500 MG capsule SMARTSI Capsule(s) By Mouth    aspirin (ECOTRIN) 81 MG EC tablet Take 1 tablet (81 mg total) by mouth once daily.    atorvastatin (LIPITOR) 20 MG tablet TAKE ONE TABLET BY MOUTH EVERY DAY FOR CHOLESTEROL (Patient taking differently: Take 20 mg by mouth every evening.)    calcium citrate-vitamin D3 250 mg-5 mcg (200 unit) Tab Take 1 tablet by mouth.    loratadine (CLARITIN) 10 mg tablet Take 1 tablet (10 mg total) by mouth once daily.    mycophenolate (CELLCEPT) 250 mg Cap Take 5 capsules (1,250 mg total) by mouth 2 (two) times daily.    nortriptyline (PAMELOR) 10 MG capsule Take 1 capsule (10 mg total) by mouth every evening.    NURTEC 75 mg odt PLACE ONE TABLET on the TONGUE DAILY if needed FOR HEADACHE    predniSONE (DELTASONE) 5 MG tablet Take 1 tablet (5 mg total) by mouth once daily.    tacrolimus (PROGRAF) 0.5 MG Cap TAKE TWO CAPSULES BY MOUTH TWICE A DAY     Family History       Problem Relation (Age of Onset)    Arthritis Mother    Birth defects Paternal Uncle    Heart disease Brother, Brother    No Known Problems Father          Tobacco Use    Smoking status: Former     Current packs/day: 0.00     Types: Cigarettes     Quit date: 2016     Years since quittin.5    Smokeless tobacco: Never   Substance and Sexual Activity    Alcohol use: Yes     Alcohol/week: 1.0 standard drink of alcohol     Types: 1 Cans of beer per week     Comment: socially    Drug use: No    Sexual activity: Yes     Partners: Female     Review of Systems   Constitutional: Negative.    HENT: Negative.     Eyes: Negative.    Respiratory: Negative.     Cardiovascular: Negative.    Gastrointestinal: Negative.    Endocrine: Negative.    Genitourinary:  Positive for difficulty urinating, dysuria and flank pain.   Musculoskeletal:  Negative for back pain.   Skin: Negative.    Allergic/Immunologic: Negative.     Neurological: Negative.    Hematological: Negative.    Psychiatric/Behavioral: Negative.       Objective:     Vital Signs (Most Recent):  Temp: 97.6 °F (36.4 °C) (07/18/25 0512)  Pulse: 66 (07/18/25 0512)  Resp: 19 (07/18/25 0218)  BP: 110/73 (07/18/25 0512)  SpO2: 98 % (07/18/25 0512) Vital Signs (24h Range):  Temp:  [97.6 °F (36.4 °C)-98 °F (36.7 °C)] 97.6 °F (36.4 °C)  Pulse:  [66-96] 66  Resp:  [17-20] 19  SpO2:  [98 %-100 %] 98 %  BP: (110-127)/(72-77) 110/73     Weight: 75.2 kg (165 lb 12.6 oz)  Body mass index is 25.97 kg/m².     Physical Exam  Constitutional:       Appearance: Normal appearance.   HENT:      Head: Normocephalic and atraumatic.      Mouth/Throat:      Mouth: Mucous membranes are moist.      Pharynx: Oropharynx is clear.   Cardiovascular:      Rate and Rhythm: Normal rate. Rhythm regularly irregular. Extrasystoles are present.     Heart sounds: Normal heart sounds.   Pulmonary:      Effort: Pulmonary effort is normal.      Breath sounds: Normal breath sounds.   Abdominal:      General: Abdomen is flat.      Palpations: Abdomen is soft.      Tenderness: There is no abdominal tenderness.   Musculoskeletal:         General: Normal range of motion.      Cervical back: Normal range of motion.   Skin:     General: Skin is warm.      Capillary Refill: Capillary refill takes less than 2 seconds.   Neurological:      General: No focal deficit present.      Mental Status: He is alert.   Psychiatric:         Mood and Affect: Mood normal.                Significant Labs: All pertinent labs within the past 24 hours have been reviewed.  Recent Lab Results         07/18/25  0408   07/18/25  0402        Procalcitonin 0.05  Comment: A concentration < 0.25 ng/mL represents a low risk of bacterial infection.  Procalcitonin may not be accurate among patients with localized   infection, recent trauma or major surgery, immunosuppressed state,   invasive fungal infection, renal dysfunction. Decisions regarding    initiation or continuation of antibiotic therapy should not be based   solely on procalcitonin levels.         Albumin 4.3         ALP 72         ALT 26         Anion Gap 7         Appearance, UA   Clear       AST 19         Bacteria, UA   Rare       Baso # 0.05         Basophil % 0.4         Bilirubin (UA)   Negative       BILIRUBIN TOTAL 1.1  Comment: For infants and newborns, interpretation of results should be based   on gestational age, weight and in agreement with clinical   observations.    Premature Infant recommended reference ranges:   0-24 hours:  <8.0 mg/dL   24-48 hours: <12.0 mg/dL   3-5 days:    <15.0 mg/dL   6-29 days:   <15.0 mg/dL         BUN 19         Calcium 8.8         Chloride 107         CO2 27         Color, UA   Yellow       Creatinine 1.1         eGFR >60  Comment: Estimated GFR calculated using the CKD-EPI creatinine (2021) equation.         Eos # 0.14         Eos % 1.2         Glucose 88         Glucose, UA   Negative       Gran # (ANC) 8.70         Hematocrit 44.6         Hemoglobin 14.6         Immature Grans (Abs) 0.06  Comment: Mild elevation in immature granulocytes is non specific and can be seen in a variety of conditions including stress response, acute inflammation, trauma and pregnancy. Correlation with other laboratory and clinical findings is essential.         Immature Granulocytes 0.5         Ketones, UA   Trace       Lactic Acid Level 0.9         Leukocyte Esterase, UA   Negative       Lymph # 2.14         Lymph % 17.7         MCH 29.7         MCHC 32.7         MCV 91         Microscopic Comment     Comment: Other formed elements not mentioned in the report are not present in the microscopic examination.       Mono # 1.00         Mono % 8.3         MPV 10.0         Neut % 71.9         NITRITE UA   Negative       nRBC 0         Blood, UA   1+       pH, UA   6.0       Platelet Count 216         Potassium 4.3         PROTEIN TOTAL 6.6         Protein, UA    Negative  Comment: Recommend a 24 hour urine protein or a urine protein/creatinine ratio if globulin induced proteinuria is clinically suspected.       RBC 4.91         RBC, UA   4       RDW 13.9         Sodium 141         Spec Grav UA   1.025       Urobilinogen, UA   Negative       WBC, UA   3       WBC 12.09                 Significant Imaging: I have reviewed all pertinent imaging results/findings within the past 24 hours.  Assessment/Plan:     Assessment & Plan  Hydronephrosis  Pt was transferred from [place] [facility] for further urology eval of hydronephrosis with leukocytosis and elevated WBC.    Plan  - Urology consulted. Follow recommendation  - Procalcitonin  - Keep NPO  - IV fluids and Pain control  - Nausea control  - Flomax  - Strain all urine - patient has urine strainer at bedside and instructed how to use strainer.   - Will monitor for stone passing. If patient decompensates, will need a ureteral stent placed. Already consented  Heart transplanted  HTS already consulted and aware of his arrival.   Long term current use of immunosuppressive drug  Plan  - All immunosuppression medications restarted except Cellcept  - Follow labs  - Follow Tacro levels     Migraine with aura  Home meds restarted     VTE Risk Mitigation (From admission, onward)           Ordered     IP VTE LOW RISK PATIENT  Once         07/18/25 0348     Place sequential compression device  Until discontinued         07/18/25 0348                    SDOH Screening:  The patient was screened for utility difficulties, food insecurity, transport difficulties, housing insecurity, and interpersonal safety and there were no concerns identified this admission.                              Nicole Mckeon MD  Department of Hospital Medicine  Flavio suzette - Cardiology Stepdown

## 2025-07-18 NOTE — SUBJECTIVE & OBJECTIVE
Past Medical History:   Diagnosis Date    Cardiogenic shock 1/16/2017    Cardiomyopathy     Familial cardiomyopathy    CHF (congestive heart failure)     Encounter for blood transfusion     Essential hypertension 12/21/2016    Essential hypertension 3/20/2018    Familial Cardiomyopathy     Familial cardiomyopathy     Hyperlipidemia LDL goal <70 11/19/2018       Past Surgical History:   Procedure Laterality Date    BIOPSY WITH ULTRASOUND GUIDANCE N/A 8/27/2019    Procedure: BIOPSY, WITH US GUIDANCE;  Surgeon: Mario Conner MD;  Location: [facility] CATH LAB;  Service: Cardiology;  Laterality: N/A;    BIOPSY WITH ULTRASOUND GUIDANCE N/A 9/24/2019    Procedure: BIOPSY, WITH US GUIDANCE;  Surgeon: Sammi Tapia MD;  Location: [facility] CATH LAB;  Service: Cardiology;  Laterality: N/A;    BIOPSY WITH ULTRASOUND GUIDANCE N/A 11/12/2019    Procedure: BIOPSY, WITH US GUIDANCE;  Surgeon: Mario Conner MD;  Location: [facility] CATH LAB;  Service: Cardiology;  Laterality: N/A;    CORONARY ANGIOGRAPHY Right 2/14/2019    Procedure: ANGIOGRAM, CORONARY ARTERY;  Surgeon: Tello Correia MD;  Location: [facility] CATH LAB;  Service: Cardiology;  Laterality: Right;    CORONARY ANGIOGRAPHY N/A 3/2/2023    Procedure: Angiogram, Coronary;  Surgeon: Guy Cartagena MD;  Location: [facility] CATH LAB;  Service: Cardiology;  Laterality: N/A;    HEART TRANSPLANT      IVUS, CORONARY N/A 3/2/2023    Procedure: IVUS, Coronary;  Surgeon: Guy Cartagena MD;  Location: [facility] CATH LAB;  Service: Cardiology;  Laterality: N/A;    LEFT HEART CATHETERIZATION Left 2/13/2020    Procedure: Left heart cath;  Surgeon: Guy Cartagena MD;  Location: [facility] CATH LAB;  Service: Cardiology;  Laterality: Left;    LEFT HEART CATHETERIZATION N/A 2/17/2021    Procedure: Left heart cath;  Surgeon: Guy Cartagena MD;  Location: [facility] CATH LAB;  Service: Cardiology;  Laterality: N/A;    LEFT VENTRICULAR ASSIST DEVICE      RIGHT HEART CATHETERIZATION Right 2/14/2019     Procedure: INSERTION, CATHETER, RIGHT HEART;  Surgeon: Tello Correia MD;  Location: [facility] CATH LAB;  Service: Cardiology;  Laterality: Right;    TONSILLECTOMY         Review of patient's allergies indicates:  No Known Allergies    No current facility-administered medications on file prior to encounter.     Current Outpatient Medications on File Prior to Encounter   Medication Sig    amoxicillin (AMOXIL) 500 MG capsule SMARTSI Capsule(s) By Mouth    aspirin (ECOTRIN) 81 MG EC tablet Take 1 tablet (81 mg total) by mouth once daily.    atorvastatin (LIPITOR) 20 MG tablet TAKE ONE TABLET BY MOUTH EVERY DAY FOR CHOLESTEROL (Patient taking differently: Take 20 mg by mouth every evening.)    calcium citrate-vitamin D3 250 mg-5 mcg (200 unit) Tab Take 1 tablet by mouth.    loratadine (CLARITIN) 10 mg tablet Take 1 tablet (10 mg total) by mouth once daily.    mycophenolate (CELLCEPT) 250 mg Cap Take 5 capsules (1,250 mg total) by mouth 2 (two) times daily.    nortriptyline (PAMELOR) 10 MG capsule Take 1 capsule (10 mg total) by mouth every evening.    NURTEC 75 mg odt PLACE ONE TABLET on the TONGUE DAILY if needed FOR HEADACHE    predniSONE (DELTASONE) 5 MG tablet Take 1 tablet (5 mg total) by mouth once daily.    tacrolimus (PROGRAF) 0.5 MG Cap TAKE TWO CAPSULES BY MOUTH TWICE A DAY     Family History       Problem Relation (Age of Onset)    Arthritis Mother    Birth defects Paternal Uncle    Heart disease Brother, Brother    No Known Problems Father          Tobacco Use    Smoking status: Former     Current packs/day: 0.00     Types: Cigarettes     Quit date: 2016     Years since quittin.5    Smokeless tobacco: Never   Substance and Sexual Activity    Alcohol use: Yes     Alcohol/week: 1.0 standard drink of alcohol     Types: 1 Cans of beer per week     Comment: socially    Drug use: No    Sexual activity: Yes     Partners: Female     Review of Systems   Constitutional: Negative.    HENT: Negative.      Eyes: Negative.    Respiratory: Negative.     Cardiovascular: Negative.    Gastrointestinal: Negative.    Endocrine: Negative.    Genitourinary:  Positive for difficulty urinating, dysuria and flank pain.   Musculoskeletal:  Negative for back pain.   Skin: Negative.    Allergic/Immunologic: Negative.    Neurological: Negative.    Hematological: Negative.    Psychiatric/Behavioral: Negative.       Objective:     Vital Signs (Most Recent):  Temp: 97.6 °F (36.4 °C) (07/18/25 0512)  Pulse: 66 (07/18/25 0512)  Resp: 19 (07/18/25 0218)  BP: 110/73 (07/18/25 0512)  SpO2: 98 % (07/18/25 0512) Vital Signs (24h Range):  Temp:  [97.6 °F (36.4 °C)-98 °F (36.7 °C)] 97.6 °F (36.4 °C)  Pulse:  [66-96] 66  Resp:  [17-20] 19  SpO2:  [98 %-100 %] 98 %  BP: (110-127)/(72-77) 110/73     Weight: 75.2 kg (165 lb 12.6 oz)  Body mass index is 25.97 kg/m².     Physical Exam  Constitutional:       Appearance: Normal appearance.   HENT:      Head: Normocephalic and atraumatic.      Mouth/Throat:      Mouth: Mucous membranes are moist.      Pharynx: Oropharynx is clear.   Cardiovascular:      Rate and Rhythm: Normal rate. Rhythm regularly irregular. Extrasystoles are present.     Heart sounds: Normal heart sounds.   Pulmonary:      Effort: Pulmonary effort is normal.      Breath sounds: Normal breath sounds.   Abdominal:      General: Abdomen is flat.      Palpations: Abdomen is soft.      Tenderness: There is no abdominal tenderness.   Musculoskeletal:         General: Normal range of motion.      Cervical back: Normal range of motion.   Skin:     General: Skin is warm.      Capillary Refill: Capillary refill takes less than 2 seconds.   Neurological:      General: No focal deficit present.      Mental Status: He is alert.   Psychiatric:         Mood and Affect: Mood normal.                Significant Labs: All pertinent labs within the past 24 hours have been reviewed.  Recent Lab Results         07/18/25  0408   07/18/25  0402         Procalcitonin 0.05  Comment: A concentration < 0.25 ng/mL represents a low risk of bacterial infection.  Procalcitonin may not be accurate among patients with localized   infection, recent trauma or major surgery, immunosuppressed state,   invasive fungal infection, renal dysfunction. Decisions regarding   initiation or continuation of antibiotic therapy should not be based   solely on procalcitonin levels.         Albumin 4.3         ALP 72         ALT 26         Anion Gap 7         Appearance, UA   Clear       AST 19         Bacteria, UA   Rare       Baso # 0.05         Basophil % 0.4         Bilirubin (UA)   Negative       BILIRUBIN TOTAL 1.1  Comment: For infants and newborns, interpretation of results should be based   on gestational age, weight and in agreement with clinical   observations.    Premature Infant recommended reference ranges:   0-24 hours:  <8.0 mg/dL   24-48 hours: <12.0 mg/dL   3-5 days:    <15.0 mg/dL   6-29 days:   <15.0 mg/dL         BUN 19         Calcium 8.8         Chloride 107         CO2 27         Color, UA   Yellow       Creatinine 1.1         eGFR >60  Comment: Estimated GFR calculated using the CKD-EPI creatinine (2021) equation.         Eos # 0.14         Eos % 1.2         Glucose 88         Glucose, UA   Negative       Gran # (ANC) 8.70         Hematocrit 44.6         Hemoglobin 14.6         Immature Grans (Abs) 0.06  Comment: Mild elevation in immature granulocytes is non specific and can be seen in a variety of conditions including stress response, acute inflammation, trauma and pregnancy. Correlation with other laboratory and clinical findings is essential.         Immature Granulocytes 0.5         Ketones, UA   Trace       Lactic Acid Level 0.9         Leukocyte Esterase, UA   Negative       Lymph # 2.14         Lymph % 17.7         MCH 29.7         MCHC 32.7         MCV 91         Microscopic Comment     Comment: Other formed elements not mentioned in the report are not  present in the microscopic examination.       Mono # 1.00         Mono % 8.3         MPV 10.0         Neut % 71.9         NITRITE UA   Negative       nRBC 0         Blood, UA   1+       pH, UA   6.0       Platelet Count 216         Potassium 4.3         PROTEIN TOTAL 6.6         Protein, UA   Negative  Comment: Recommend a 24 hour urine protein or a urine protein/creatinine ratio if globulin induced proteinuria is clinically suspected.       RBC 4.91         RBC, UA   4       RDW 13.9         Sodium 141         Spec Grav UA   1.025       Urobilinogen, UA   Negative       WBC, UA   3       WBC 12.09                 Significant Imaging: I have reviewed all pertinent imaging results/findings within the past 24 hours.

## 2025-07-18 NOTE — CONSULTS
[facility] - [facility] Stepdown  Urology  Consult Note    Patient Name: Mert Samayoa  MRN: [id number]  Admission Date: 7/18/2025  Hospital Length of Stay: 0   Code Status: Prior   Attending Provider: Moiz Stuart MD   Consulting Provider: Dianelys Barkley MD  Primary Care Physician: Johanny, Primary Doctor  Principal Problem:Ureteral stone    Inpatient consult to Urology  Consult performed by: Dianelys Barkley MD  Consult ordered by: Nicole Mckeon MD  Reason for consult: Ureteral stone          Subjective:     HPI:  35 year old male with history of familial cardiomyopathy s/p heart transplant 2018 on long term immunosuppression, who presented to an outside emergency department with persistent right flank pain. The patient was initially seen in the ED on June 30 and was found to have a 2 mm proximal ureteral stone, and was sent home on a trial of passage. He was lost to follow up. Urology consulted for ureteral stone.    On assessment, the patient is afebrile and HDS. Labs at outside facility include: WBC 16.5, Hgb 16.1, Cr 1.4 (baseline 0.9-1.2). UA negative nitrites and leukocytes, UA micro with no bacteria. According to chart review (CT not available in our system), CTRSS with evidence of 2 mm right distal ureteral stone with mild hydronephrosis.     The patient reports vomiting yesterday morning, but denies further nausea/vomiting. Denies fevers/chills. Reports significant flank tenderness that has been well controlled with oral medications. Reports some dysuria, denies hematuria.     Past Medical History:   Diagnosis Date    Cardiogenic shock 1/16/2017    Cardiomyopathy     Familial cardiomyopathy    CHF (congestive heart failure)     Encounter for blood transfusion     Essential hypertension 12/21/2016    Essential hypertension 3/20/2018    Familial Cardiomyopathy     Familial cardiomyopathy     Hyperlipidemia LDL goal <70 11/19/2018       Past Surgical History:   Procedure Laterality Date    BIOPSY WITH ULTRASOUND  GUIDANCE N/A 8/27/2019    Procedure: BIOPSY, WITH US GUIDANCE;  Surgeon: Mario Conner MD;  Location: [facility] CATH LAB;  Service: Cardiology;  Laterality: N/A;    BIOPSY WITH ULTRASOUND GUIDANCE N/A 9/24/2019    Procedure: BIOPSY, WITH US GUIDANCE;  Surgeon: Sammi Tapia MD;  Location: [facility] CATH LAB;  Service: Cardiology;  Laterality: N/A;    BIOPSY WITH ULTRASOUND GUIDANCE N/A 11/12/2019    Procedure: BIOPSY, WITH US GUIDANCE;  Surgeon: Mario Conner MD;  Location: [facility] CATH LAB;  Service: Cardiology;  Laterality: N/A;    CORONARY ANGIOGRAPHY Right 2/14/2019    Procedure: ANGIOGRAM, CORONARY ARTERY;  Surgeon: Tello Correia MD;  Location: [facility] CATH LAB;  Service: Cardiology;  Laterality: Right;    CORONARY ANGIOGRAPHY N/A 3/2/2023    Procedure: Angiogram, Coronary;  Surgeon: Guy Cartagena MD;  Location: [facility] CATH LAB;  Service: Cardiology;  Laterality: N/A;    HEART TRANSPLANT      IVUS, CORONARY N/A 3/2/2023    Procedure: IVUS, Coronary;  Surgeon: Guy Cartagena MD;  Location: [facility] CATH LAB;  Service: Cardiology;  Laterality: N/A;    LEFT HEART CATHETERIZATION Left 2/13/2020    Procedure: Left heart cath;  Surgeon: Guy Cartagena MD;  Location: [facility] CATH LAB;  Service: Cardiology;  Laterality: Left;    LEFT HEART CATHETERIZATION N/A 2/17/2021    Procedure: Left heart cath;  Surgeon: Guy Cartagena MD;  Location: [facility] CATH LAB;  Service: Cardiology;  Laterality: N/A;    LEFT VENTRICULAR ASSIST DEVICE      RIGHT HEART CATHETERIZATION Right 2/14/2019    Procedure: INSERTION, CATHETER, RIGHT HEART;  Surgeon: Tello Correia MD;  Location: [facility] CATH LAB;  Service: Cardiology;  Laterality: Right;    TONSILLECTOMY         Review of patient's allergies indicates:  No Known Allergies    Family History       Problem Relation (Age of Onset)    Arthritis Mother    Birth defects Paternal Uncle    Heart disease Brother, Brother    No Known Problems Father            Tobacco Use    Smoking status:  "Former     Current packs/day: 0.00     Types: Cigarettes     Quit date: 2016     Years since quittin.5    Smokeless tobacco: Never   Substance and Sexual Activity    Alcohol use: Yes     Alcohol/week: 1.0 standard drink of alcohol     Types: 1 Cans of beer per week     Comment: socially    Drug use: No    Sexual activity: Yes     Partners: Female       Review of Systems   Constitutional:  Negative for chills and fever.   Respiratory:  Negative for shortness of breath.    Cardiovascular:  Negative for chest pain and palpitations.   Gastrointestinal:  Negative for nausea and vomiting.   Genitourinary:  Positive for dysuria. Negative for hematuria.   Neurological:  Negative for dizziness and weakness.   Psychiatric/Behavioral:  Negative for confusion.        Objective:     Temp:  [97.7 °F (36.5 °C)-98 °F (36.7 °C)] 97.7 °F (36.5 °C)  Pulse:  [91-96] 96  Resp:  [17-20] 19  SpO2:  [100 %] 100 %  BP: (121-127)/(72-77) 121/77  Weight: 75.2 kg (165 lb 12.6 oz)  Body mass index is 25.97 kg/m².           Drains       None                    Physical Exam  Vitals reviewed.   Constitutional:       General: He is not in acute distress.     Appearance: He is not toxic-appearing.   HENT:      Head: Normocephalic and atraumatic.   Pulmonary:      Effort: Pulmonary effort is normal. No respiratory distress.      Breath sounds: No wheezing.   Abdominal:      General: There is no distension.      Comments: No bilateral flank tenderness appreciated   Skin:     General: Skin is warm and dry.   Neurological:      General: No focal deficit present.      Mental Status: He is alert and oriented to person, place, and time.   Psychiatric:         Mood and Affect: Mood normal.          Significant Labs:    BMP:  No results for input(s): "NA", "K", "CL", "CO2", "BUN", "CREATININE", "LABGLOM", "GLUCOSE", "CALCIUM" in the last 168 hours.    CBC:  No results for input(s): "WBC", "HGB", "HCT", "PLT" in the last 168 hours.    Blood " "Culture: No results for input(s): "LABBLOO" in the last 168 hours.  Urine Culture: No results for input(s): "LABURIN" in the last 168 hours.  Urine Studies: No results for input(s): "COLORU", "APPEARANCEUA", "PHUR", "SPECGRAV", "PROTEINUA", "GLUCUA", "KETONESU", "BILIRUBINUA", "OCCULTUA", "NITRITE", "UROBILINOGEN", "LEUKOCYTESUR", "RBCUA", "WBCUA", "BACTERIA", "SQUAMEPITHEL", "HYALINECASTS" in the last 168 hours.    Invalid input(s): "WRIGHTSUR"  All pertinent labs results from the past 24 hours have been reviewed.    Significant Imaging:  CT: I have reviewed all results within the past 24 hours and my personal findings are:  see Hpi above  All pertinent imaging results/findings from the past 24 hours have been reviewed.                    Assessment and Plan:     * Ureteral stone  Mert Samayoa is a 35 y.o. male with a right ureteral stone. Concern for acute kidney injury and possible infection.    - Pending AM labs and urine studies, will follow up  - NPO   - IV fluids - appreciate bolus and 150cc/hr as tolerated  - Consent  - Pain control  - Nausea control  - Flomax  - Strain all urine - patient has urine strainer at bedside and instructed how to use strainer. Please strain ALL urine.   - Will monitor for stone passing. If patient decompensates, will need a ureteral stent placed.        VTE Risk Mitigation (From admission, onward)      None            Thank you for your consult.     Dianelys Barkley MD  Urology  [facility] - [facility] Stepdown  "

## 2025-07-18 NOTE — HPI
34 y/o male, PMH familial cardiomyopathy s/p heart transplant in 2018 on long term immunosuppression, HLD, migraines. He presented to [facility] on July 17 with persistent right flank pain. He went on June 30th for similar reasons and was shown to have a 2mm right proximal ureteral stone. He was sent home with Norco and Flomax. The R flank pain continued. Prior to going back to the ED, the pain was worst 10/10 and he had an episode of vomiting. He said he was afebrile. In the ED July 17, the CT showed hydronephrosis with a dilated R ureter. His HR ranged from , BP was stable. Initial lactic acid was 2.5 but down trended to 1.8. additionally, he had leukocytosis and Cr of 1.4 (baseline 1.0-1.4). UA was negative for LE and nitrites and no evidence of infection. He was given meropenem and pain meds at the ED. [place] [facility] [place].     On exam, he was laying comfortably in bed. He was given pain medications. He denied any back pain, fever, chills, recent N/V, abdominal pain. He did say he has dysuria and trouble peeing. Saying it feels like he doesn't finish peeing. He is compliant with his immunosuppressing meds.

## 2025-07-18 NOTE — HOSPITAL COURSE
Patient was admitted and started on maintenance fluids, flomax, and IV pain relief to aid in passing stone. Pain successfully mitigated with no stone passed on discharge. Low index of suspicion for underlying infection in the setting of his heart transplant. Patient was discharged with oral pain medication, flomax, and instructions to strain or monitor urine for any stone or sediment passed.

## 2025-07-18 NOTE — ASSESSMENT & PLAN NOTE
Plan  - All immunosuppression medications restarted except Cellcept  - Follow labs  - Follow [place] levels

## 2025-07-18 NOTE — NURSING
Received report from ED Nurse Rosita. Nurse informed me patient was picked up by [facility] and should be arriving in 45-50 minutes.

## 2025-07-18 NOTE — NURSING
Patient arrived to the unit from ED in [place] via [facility].  Telemetry box applied and vital signs documented in flow sheet. Identification band placed on patient. Oriented to room, call bell with in reach, bed is in low lock position. Admit completed, plan of care initiated.

## 2025-07-18 NOTE — SUBJECTIVE & OBJECTIVE
Past Medical History:   Diagnosis Date    Cardiogenic shock 1/16/2017    Cardiomyopathy     Familial cardiomyopathy    CHF (congestive heart failure)     Encounter for blood transfusion     Essential hypertension 12/21/2016    Essential hypertension 3/20/2018    Familial Cardiomyopathy     Familial cardiomyopathy     Hyperlipidemia LDL goal <70 11/19/2018       Past Surgical History:   Procedure Laterality Date    BIOPSY WITH ULTRASOUND GUIDANCE N/A 8/27/2019    Procedure: BIOPSY, WITH US GUIDANCE;  Surgeon: Mario Conner MD;  Location: [facility] CATH LAB;  Service: Cardiology;  Laterality: N/A;    BIOPSY WITH ULTRASOUND GUIDANCE N/A 9/24/2019    Procedure: BIOPSY, WITH US GUIDANCE;  Surgeon: Sammi Tapia MD;  Location: [facility] CATH LAB;  Service: Cardiology;  Laterality: N/A;    BIOPSY WITH ULTRASOUND GUIDANCE N/A 11/12/2019    Procedure: BIOPSY, WITH US GUIDANCE;  Surgeon: Mario Conner MD;  Location: [facility] CATH LAB;  Service: Cardiology;  Laterality: N/A;    CORONARY ANGIOGRAPHY Right 2/14/2019    Procedure: ANGIOGRAM, CORONARY ARTERY;  Surgeon: Tello Correia MD;  Location: [facility] CATH LAB;  Service: Cardiology;  Laterality: Right;    CORONARY ANGIOGRAPHY N/A 3/2/2023    Procedure: Angiogram, Coronary;  Surgeon: Guy Cartagena MD;  Location: [facility] CATH LAB;  Service: Cardiology;  Laterality: N/A;    HEART TRANSPLANT      IVUS, CORONARY N/A 3/2/2023    Procedure: IVUS, Coronary;  Surgeon: Guy Cartagena MD;  Location: [facility] CATH LAB;  Service: Cardiology;  Laterality: N/A;    LEFT HEART CATHETERIZATION Left 2/13/2020    Procedure: Left heart cath;  Surgeon: Guy Cartagena MD;  Location: [facility] CATH LAB;  Service: Cardiology;  Laterality: Left;    LEFT HEART CATHETERIZATION N/A 2/17/2021    Procedure: Left heart cath;  Surgeon: Guy Cartagena MD;  Location: [facility] CATH LAB;  Service: Cardiology;  Laterality: N/A;    LEFT VENTRICULAR ASSIST DEVICE      RIGHT HEART CATHETERIZATION Right 2/14/2019     Procedure: INSERTION, CATHETER, RIGHT HEART;  Surgeon: Tello Correia MD;  Location: [facility] CATH LAB;  Service: Cardiology;  Laterality: Right;    TONSILLECTOMY         Review of patient's allergies indicates:  No Known Allergies    Family History       Problem Relation (Age of Onset)    Arthritis Mother    Birth defects Paternal Uncle    Heart disease Brother, Brother    No Known Problems Father            Tobacco Use    Smoking status: Former     Current packs/day: 0.00     Types: Cigarettes     Quit date: 2016     Years since quittin.5    Smokeless tobacco: Never   Substance and Sexual Activity    Alcohol use: Yes     Alcohol/week: 1.0 standard drink of alcohol     Types: 1 Cans of beer per week     Comment: socially    Drug use: No    Sexual activity: Yes     Partners: Female       Review of Systems   Constitutional:  Negative for chills and fever.   Respiratory:  Negative for shortness of breath.    Cardiovascular:  Negative for chest pain and palpitations.   Gastrointestinal:  Negative for nausea and vomiting.   Genitourinary:  Positive for dysuria. Negative for hematuria.   Neurological:  Negative for dizziness and weakness.   Psychiatric/Behavioral:  Negative for confusion.        Objective:     Temp:  [97.7 °F (36.5 °C)-98 °F (36.7 °C)] 97.7 °F (36.5 °C)  Pulse:  [91-96] 96  Resp:  [17-20] 19  SpO2:  [100 %] 100 %  BP: (121-127)/(72-77) 121/77  Weight: 75.2 kg (165 lb 12.6 oz)  Body mass index is 25.97 kg/m².           Drains       None                    Physical Exam  Vitals reviewed.   Constitutional:       General: He is not in acute distress.     Appearance: He is not toxic-appearing.   HENT:      Head: Normocephalic and atraumatic.   Pulmonary:      Effort: Pulmonary effort is normal. No respiratory distress.      Breath sounds: No wheezing.   Abdominal:      General: There is no distension.      Comments: No bilateral flank tenderness appreciated   Skin:     General: Skin is warm and  "dry.   Neurological:      General: No focal deficit present.      Mental Status: He is alert and oriented to person, place, and time.   Psychiatric:         Mood and Affect: Mood normal.          Significant Labs:    BMP:  No results for input(s): "NA", "K", "CL", "CO2", "BUN", "CREATININE", "LABGLOM", "GLUCOSE", "CALCIUM" in the last 168 hours.    CBC:  No results for input(s): "WBC", "HGB", "HCT", "PLT" in the last 168 hours.    Blood Culture: No results for input(s): "LABBLOO" in the last 168 hours.  Urine Culture: No results for input(s): "LABURIN" in the last 168 hours.  Urine Studies: No results for input(s): "COLORU", "APPEARANCEUA", "PHUR", "SPECGRAV", "PROTEINUA", "GLUCUA", "KETONESU", "BILIRUBINUA", "OCCULTUA", "NITRITE", "UROBILINOGEN", "LEUKOCYTESUR", "RBCUA", "WBCUA", "BACTERIA", "SQUAMEPITHEL", "HYALINECASTS" in the last 168 hours.    Invalid input(s): "WRIGHTSUR"  All pertinent labs results from the past 24 hours have been reviewed.    Significant Imaging:  CT: I have reviewed all results within the past 24 hours and my personal findings are:  see Hpi above  All pertinent imaging results/findings from the past 24 hours have been reviewed.                  "

## 2025-07-18 NOTE — CARE UPDATE
Unit [facility] Care Support Interaction      I have reviewed the chart of Mert Samayoa who is hospitalized for Hydronephrosis. The patient is currently located in the following unit: CSU        I have assisted the primary physician in management of the following:      MRSA Decolonization - Mupirocin ordered and CHG ordered  Enhanced respiratory precautions (s/p OHTX)       Parul Esteban PA-C  Unit Based [facility]

## 2025-07-18 NOTE — ASSESSMENT & PLAN NOTE
Pt was transferred from [facility] for further urology eval of hydronephrosis with leukocytosis and elevated WBC.    Plan  - Urology consulted. Follow recommendation  - Procalcitonin  - Keep NPO  - IV fluids and Pain control  - Nausea control  - Flomax  - Strain all urine - patient has urine strainer at bedside and instructed how to use strainer.   - Will monitor for stone passing. If patient decompensates, will need a ureteral stent placed. Already consented    On Discharge:  - Patient discharged with short duration of PO pain medication and flomax. Told to monitor urine for passage of any stones and return to emergency room for any signs of infection.  - Condition is stable, pain well controlled. No outward signs of infection at discharge

## 2025-07-18 NOTE — HPI
35 year old male with history of familial cardiomyopathy s/p heart transplant 2018 on long term immunosuppression, who presented to an outside emergency department with persistent right flank pain. The patient was initially seen in the ED on June 30 and was found to have a 2 mm proximal ureteral stone, and was sent home on a trial of passage. He was lost to follow up. Urology consulted for ureteral stone.    On assessment, the patient is afebrile and HDS. Labs at outside facility include: WBC 16.5, Hgb 16.1, Cr 1.4 (baseline 0.9-1.2). UA negative nitrites and leukocytes, UA micro with no bacteria. According to chart review (CT not available in our system), CTRSS with evidence of 2 mm right distal ureteral stone with mild hydronephrosis.     The patient reports vomiting yesterday morning, but denies further nausea/vomiting. Denies fevers/chills. Reports a sensation of hot/cold which he associates with pain. Reports significant flank tenderness that has been well controlled with oral medications. Reports some dysuria, denies hematuria.

## 2025-07-19 LAB — HOLD SPECIMEN: NORMAL

## 2025-07-24 ENCOUNTER — TELEPHONE (OUTPATIENT)
Dept: TRANSPLANT | Facility: CLINIC | Age: 35
End: 2025-07-24
Payer: COMMERCIAL

## 2025-07-24 NOTE — TELEPHONE ENCOUNTER
Spoke with patient via phone. Patient stated he has not passed kidney stone yet; however, he no longer has any abdominal pain. Patient stated over all his symptoms have much improved. Denies fever or s/s of infection.    Reviewed with Dr. Tapia. Per MD patient may resume cellcept at 500 mg BID. Will re-assess patient in a week, and continue to increase his cellcept back to previous dose.     Discussed the above with patient via phone. Patient verbalized understanding to resume cellcept at 500 mg BID.

## 2025-08-01 ENCOUNTER — TELEPHONE (OUTPATIENT)
Dept: TRANSPLANT | Facility: CLINIC | Age: 35
End: 2025-08-01
Payer: COMMERCIAL

## 2025-08-01 ENCOUNTER — HOSPITAL ENCOUNTER (EMERGENCY)
Facility: HOSPITAL | Age: 35
Discharge: HOME | End: 2025-08-01
Payer: COMMERCIAL

## 2025-08-01 VITALS
HEART RATE: 80 BPM | BODY MASS INDEX: 25.9 KG/M2 | TEMPERATURE: 98 F | SYSTOLIC BLOOD PRESSURE: 119 MMHG | WEIGHT: 165 LBS | OXYGEN SATURATION: 100 % | RESPIRATION RATE: 15 BRPM | HEIGHT: 67 IN | DIASTOLIC BLOOD PRESSURE: 68 MMHG

## 2025-08-01 DIAGNOSIS — R10.9 FLANK PAIN: Primary | ICD-10-CM

## 2025-08-01 LAB
ABSOLUTE EOSINOPHIL (OHS): 0.04 K/UL
ABSOLUTE MONOCYTE (OHS): 1.05 K/UL (ref 0.3–1)
ABSOLUTE NEUTROPHIL COUNT (OHS): 11.12 K/UL (ref 1.8–7.7)
ALBUMIN SERPL BCP-MCNC: 4.8 G/DL (ref 3.5–5.2)
ALP SERPL-CCNC: 70 UNIT/L (ref 40–150)
ALT SERPL W/O P-5'-P-CCNC: 32 UNIT/L (ref 10–44)
ANION GAP (OHS): 11 MMOL/L (ref 8–16)
ANION GAP (OHS): 7 MMOL/L (ref 8–16)
AST SERPL-CCNC: 26 UNIT/L (ref 11–45)
BACTERIA #/AREA URNS HPF: ABNORMAL /HPF
BASOPHILS # BLD AUTO: 0.02 K/UL
BASOPHILS NFR BLD AUTO: 0.2 %
BILIRUB SERPL-MCNC: 1.7 MG/DL (ref 0.1–1)
BILIRUB UR QL STRIP.AUTO: NEGATIVE
BUN SERPL-MCNC: 17 MG/DL (ref 6–20)
BUN SERPL-MCNC: 20 MG/DL (ref 6–20)
CALCIUM SERPL-MCNC: 8 MG/DL (ref 8.7–10.5)
CALCIUM SERPL-MCNC: 9.3 MG/DL (ref 8.7–10.5)
CHLORIDE SERPL-SCNC: 107 MMOL/L (ref 95–110)
CHLORIDE SERPL-SCNC: 109 MMOL/L (ref 95–110)
CLARITY UR: CLEAR
CO2 SERPL-SCNC: 24 MMOL/L (ref 23–29)
CO2 SERPL-SCNC: 25 MMOL/L (ref 23–29)
COLOR UR AUTO: YELLOW
CREAT SERPL-MCNC: 1.1 MG/DL (ref 0.5–1.4)
CREAT SERPL-MCNC: 1.4 MG/DL (ref 0.5–1.4)
ERYTHROCYTE [DISTWIDTH] IN BLOOD BY AUTOMATED COUNT: 13.7 % (ref 11.5–14.5)
GFR SERPLBLD CREATININE-BSD FMLA CKD-EPI: >60 ML/MIN/1.73/M2
GFR SERPLBLD CREATININE-BSD FMLA CKD-EPI: >60 ML/MIN/1.73/M2
GLUCOSE SERPL-MCNC: 105 MG/DL (ref 70–110)
GLUCOSE SERPL-MCNC: 97 MG/DL (ref 70–110)
GLUCOSE UR QL STRIP: NEGATIVE
HCT VFR BLD AUTO: 43.7 % (ref 40–54)
HGB BLD-MCNC: 15.2 GM/DL (ref 14–18)
HGB UR QL STRIP: ABNORMAL
HYALINE CASTS #/AREA URNS LPF: 1 /LPF (ref 0–1)
IMM GRANULOCYTES # BLD AUTO: 0.04 K/UL (ref 0–0.04)
IMM GRANULOCYTES NFR BLD AUTO: 0.3 % (ref 0–0.5)
KETONES UR QL STRIP: NEGATIVE
LACTATE SERPL-SCNC: 1.8 MMOL/L (ref 0.5–2.2)
LEUKOCYTE ESTERASE UR QL STRIP: NEGATIVE
LYMPHOCYTES # BLD AUTO: 0.96 K/UL (ref 1–4.8)
MCH RBC QN AUTO: 30.3 PG (ref 27–31)
MCHC RBC AUTO-ENTMCNC: 34.8 G/DL (ref 32–36)
MCV RBC AUTO: 87 FL (ref 82–98)
MICROSCOPIC COMMENT: ABNORMAL
NITRITE UR QL STRIP: NEGATIVE
NUCLEATED RBC (/100WBC) (OHS): 0 /100 WBC
PH UR STRIP: 7 [PH]
PLATELET # BLD AUTO: 171 K/UL (ref 150–450)
PMV BLD AUTO: 9.5 FL (ref 9.2–12.9)
POTASSIUM SERPL-SCNC: 4.1 MMOL/L (ref 3.5–5.1)
POTASSIUM SERPL-SCNC: 4.7 MMOL/L (ref 3.5–5.1)
PROT SERPL-MCNC: 7.1 GM/DL (ref 6–8.4)
PROT UR QL STRIP: ABNORMAL
RBC # BLD AUTO: 5.02 M/UL (ref 4.6–6.2)
RBC #/AREA URNS HPF: 15 /HPF (ref 0–4)
RELATIVE EOSINOPHIL (OHS): 0.3 %
RELATIVE LYMPHOCYTE (OHS): 7.3 % (ref 18–48)
RELATIVE MONOCYTE (OHS): 7.9 % (ref 4–15)
RELATIVE NEUTROPHIL (OHS): 84 % (ref 38–73)
SODIUM SERPL-SCNC: 141 MMOL/L (ref 136–145)
SODIUM SERPL-SCNC: 142 MMOL/L (ref 136–145)
SP GR UR STRIP: 1.01
UROBILINOGEN UR STRIP-ACNC: NEGATIVE EU/DL
WBC # BLD AUTO: 13.23 K/UL (ref 3.9–12.7)
WBC #/AREA URNS HPF: 0 /HPF (ref 0–5)

## 2025-08-01 PROCEDURE — 85025 COMPLETE CBC W/AUTO DIFF WBC: CPT

## 2025-08-01 PROCEDURE — 80053 COMPREHEN METABOLIC PANEL: CPT

## 2025-08-01 PROCEDURE — 83605 ASSAY OF LACTIC ACID: CPT

## 2025-08-01 PROCEDURE — 96361 HYDRATE IV INFUSION ADD-ON: CPT

## 2025-08-01 PROCEDURE — 81003 URINALYSIS AUTO W/O SCOPE: CPT

## 2025-08-01 PROCEDURE — 80197 ASSAY OF TACROLIMUS: CPT

## 2025-08-01 PROCEDURE — 96375 TX/PRO/DX INJ NEW DRUG ADDON: CPT

## 2025-08-01 PROCEDURE — 96374 THER/PROPH/DIAG INJ IV PUSH: CPT

## 2025-08-01 PROCEDURE — 63600175 PHARM REV CODE 636 W HCPCS

## 2025-08-01 PROCEDURE — 99285 EMERGENCY DEPT VISIT HI MDM: CPT | Mod: 25

## 2025-08-01 PROCEDURE — 25000003 PHARM REV CODE 250

## 2025-08-01 PROCEDURE — 96376 TX/PRO/DX INJ SAME DRUG ADON: CPT

## 2025-08-01 PROCEDURE — 80180 DRUG SCRN QUAN MYCOPHENOLATE: CPT

## 2025-08-01 RX ORDER — ONDANSETRON HYDROCHLORIDE 2 MG/ML
4 INJECTION, SOLUTION INTRAVENOUS
Status: COMPLETED | OUTPATIENT
Start: 2025-08-01 | End: 2025-08-01

## 2025-08-01 RX ORDER — HYDROMORPHONE HYDROCHLORIDE 1 MG/ML
0.5 INJECTION, SOLUTION INTRAMUSCULAR; INTRAVENOUS; SUBCUTANEOUS
Refills: 0 | Status: COMPLETED | OUTPATIENT
Start: 2025-08-01 | End: 2025-08-01

## 2025-08-01 RX ORDER — TAMSULOSIN HYDROCHLORIDE 0.4 MG/1
0.4 CAPSULE ORAL DAILY
Qty: 14 CAPSULE | Refills: 0 | Status: SHIPPED | OUTPATIENT
Start: 2025-08-01 | End: 2025-08-15

## 2025-08-01 RX ORDER — HYDROMORPHONE HYDROCHLORIDE 1 MG/ML
1 INJECTION, SOLUTION INTRAMUSCULAR; INTRAVENOUS; SUBCUTANEOUS
Refills: 0 | Status: COMPLETED | OUTPATIENT
Start: 2025-08-01 | End: 2025-08-01

## 2025-08-01 RX ORDER — OXYCODONE AND ACETAMINOPHEN 10; 325 MG/1; MG/1
1 TABLET ORAL EVERY 6 HOURS PRN
Qty: 17 TABLET | Refills: 0 | Status: SHIPPED | OUTPATIENT
Start: 2025-08-01

## 2025-08-01 RX ADMIN — ONDANSETRON 4 MG: 2 INJECTION INTRAMUSCULAR; INTRAVENOUS at 08:08

## 2025-08-01 RX ADMIN — HYDROMORPHONE HYDROCHLORIDE 1 MG: 1 INJECTION, SOLUTION INTRAMUSCULAR; INTRAVENOUS; SUBCUTANEOUS at 10:08

## 2025-08-01 RX ADMIN — SODIUM CHLORIDE 1000 ML: 9 INJECTION, SOLUTION INTRAVENOUS at 12:08

## 2025-08-01 RX ADMIN — SODIUM CHLORIDE 1000 ML: 9 INJECTION, SOLUTION INTRAVENOUS at 08:08

## 2025-08-01 RX ADMIN — HYDROMORPHONE HYDROCHLORIDE 0.5 MG: 1 INJECTION, SOLUTION INTRAMUSCULAR; INTRAVENOUS; SUBCUTANEOUS at 08:08

## 2025-08-01 NOTE — TELEPHONE ENCOUNTER
"Patient noted to have been discharged from ED. Called to check on patient. Patient stated last night he was in severe pain from kidney stone, also experienced nausea and vomiting last night. Patient stated the ER gave him "2 bags of fluid" and nausea medication. Patient stated he no longer has nausea, was able to take his morning medication without vomiting. Patient stated pain is now controlled, workup at ED reported to be stable by the patient.     Advised patient to call on-call number if further issues over the weekend and patient should come to [facility] over the weekend if flank pain, N/V occurs again since urology and transplant is available at [facility]. Discussed hydration. Informed patient that if he vomits again he should come to ED  since he may not be retaining and absorbing transplant meds if he is vomiting frequently. Encouraged patient to attend urology f/u appt on Aug 11. Patient verbalized acknowledgment.   "

## 2025-08-01 NOTE — PROGRESS NOTES
Patient called stating he has been having nausea, vomiting, and severe pain because he thinks he may be passing the kidney stone that he has undergone treatment for a couple of times now since June. He is passing small blood clots per him in his urine, has not been able to sleep due to the pain. Recommend recommended he go to the local emergency room, given the nausea and vomiting concerned that he may not be able to keep down his immunosuppression.    He is going to go to [facility].    Lashon Hawkins

## 2025-08-01 NOTE — ED PROVIDER NOTES
Encounter Date: 8/1/2025    Source of History:   Patient and medical record, without language barrier or      Chief complaint:  Flank Pain (TO ED FOR C/O RIGHT FLANK/ABD PAIN & NAUSEA x COUPLE WEEKS. KNOWN KIDNEY STONE ON FLOMAX. REPORTS CLOTS IN URINE. )    HPI:  Mert Samayoa is a 35 y.o. male with heart transplant status on immunosuppressive medication, known right-sided kidney stone currently on Flomax presenting with chief complaint of flank pain.  Patient has been suffering with a right-sided ureter stone for the past couple weeks.  Initially it was found to be in the upper ureter and most recently had a scan done showing it was at the UVJ.      This is the extent to the patients complaints today here in the emergency department.    ROS: A review of systems was conducted with pertinent positive and negative findings documented in HPI with all other systems reviewed and negative.  ROS    Review of patient's allergies indicates:  No Known Allergies    PMH:  As per HPI and below:  Past Medical History:   Diagnosis Date    Cardiogenic shock 1/16/2017    Cardiomyopathy     Familial cardiomyopathy    CHF (congestive heart failure)     Encounter for blood transfusion     Essential hypertension 12/21/2016    Essential hypertension 3/20/2018    Familial Cardiomyopathy     Familial cardiomyopathy     Hyperlipidemia LDL goal <70 11/19/2018     Past Surgical History:   Procedure Laterality Date    BIOPSY WITH ULTRASOUND GUIDANCE N/A 8/27/2019    Procedure: BIOPSY, WITH US GUIDANCE;  Surgeon: Mario Conner MD;  Location: [facility] CATH LAB;  Service: Cardiology;  Laterality: N/A;    BIOPSY WITH ULTRASOUND GUIDANCE N/A 9/24/2019    Procedure: BIOPSY, WITH US GUIDANCE;  Surgeon: Sammi Tapia MD;  Location: [facility] CATH LAB;  Service: Cardiology;  Laterality: N/A;    BIOPSY WITH ULTRASOUND GUIDANCE N/A 11/12/2019    Procedure: BIOPSY, WITH US GUIDANCE;  Surgeon: Mario Conner MD;  Location: [facility] CATH LAB;   Service: Cardiology;  Laterality: N/A;    CORONARY ANGIOGRAPHY Right 2019    Procedure: ANGIOGRAM, CORONARY ARTERY;  Surgeon: Tello Correia MD;  Location: [facility] CATH LAB;  Service: Cardiology;  Laterality: Right;    CORONARY ANGIOGRAPHY N/A 3/2/2023    Procedure: Angiogram, Coronary;  Surgeon: Guy Cartagena MD;  Location: [facility] CATH LAB;  Service: Cardiology;  Laterality: N/A;    HEART TRANSPLANT      IVUS, CORONARY N/A 3/2/2023    Procedure: IVUS, Coronary;  Surgeon: Guy Cartagena MD;  Location: [facility] CATH LAB;  Service: Cardiology;  Laterality: N/A;    LEFT HEART CATHETERIZATION Left 2020    Procedure: Left heart cath;  Surgeon: Guy Cartagena MD;  Location: [facility] CATH LAB;  Service: Cardiology;  Laterality: Left;    LEFT HEART CATHETERIZATION N/A 2021    Procedure: Left heart cath;  Surgeon: Guy Cartagena MD;  Location: [facility] CATH LAB;  Service: Cardiology;  Laterality: N/A;    LEFT VENTRICULAR ASSIST DEVICE      RIGHT HEART CATHETERIZATION Right 2019    Procedure: INSERTION, CATHETER, RIGHT HEART;  Surgeon: Tello Correia MD;  Location: [facility] CATH LAB;  Service: Cardiology;  Laterality: Right;    TONSILLECTOMY       Social History     Socioeconomic History    Marital status:    Tobacco Use    Smoking status: Former     Current packs/day: 0.00     Types: Cigarettes     Quit date: 2016     Years since quittin.6    Smokeless tobacco: Never   Substance and Sexual Activity    Alcohol use: Yes     Alcohol/week: 1.0 standard drink of alcohol     Types: 1 Cans of beer per week     Comment: socially    Drug use: No    Sexual activity: Yes     Partners: Female   Social History Narrative    Works      Social Drivers of Health     Financial Resource Strain: Low Risk  (2025)    Overall Financial Resource Strain (CARDIA)     Difficulty of Paying Living Expenses: Not very hard   Food Insecurity: No Food Insecurity (2025)    Hunger  "Vital Sign     Worried About Running Out of Food in the Last Year: Never true     Ran Out of Food in the Last Year: Never true   Transportation Needs: No Transportation Needs (7/18/2025)    PRAPARE - Transportation     Lack of Transportation (Medical): No     Lack of Transportation (Non-Medical): No   Stress: Stress Concern Present (7/18/2025)    Citizen of the [place] of Occupational Health - Occupational Stress Questionnaire     Feeling of Stress : To some extent   Housing Stability: Low Risk  (7/18/2025)    Housing Stability Vital Sign     Unable to Pay for Housing in the Last Year: No     Homeless in the Last Year: No     Vitals:    /68   Pulse 80   Temp 98.1 °F (36.7 °C) (Oral)   Resp 15   Ht 5' 7" (1.702 m)   Wt 74.8 kg (165 lb)   SpO2 100%   BMI 25.84 kg/m²     Physical Exam  Vitals and nursing note reviewed.   Constitutional:       General: He is not in acute distress.     Appearance: Normal appearance. He is not toxic-appearing or diaphoretic.   HENT:      Head: Normocephalic and atraumatic.   Eyes:      General: No scleral icterus.     Conjunctiva/sclera: Conjunctivae normal.   Cardiovascular:      Rate and Rhythm: Normal rate and regular rhythm.   Pulmonary:      Effort: Pulmonary effort is normal. No respiratory distress.      Breath sounds: No stridor.   Abdominal:      General: Abdomen is flat. There is no distension.   Musculoskeletal:         General: No swelling.      Cervical back: Normal range of motion and neck supple.   Skin:     General: Skin is dry.      Coloration: Skin is not jaundiced.   Neurological:      Mental Status: He is alert. Mental status is at baseline.   Psychiatric:         Mood and Affect: Mood normal.         Behavior: Behavior normal.       Procedures    Laboratory Studies:  Labs that have been ordered have been independently reviewed and interpreted by myself.  Labs Reviewed   COMPREHENSIVE METABOLIC PANEL - Abnormal       Result Value    Sodium 142      Potassium " 4.1      Chloride 107      CO2 24      Glucose 105      BUN 20      Creatinine 1.4      Calcium 9.3      Protein Total 7.1      Albumin 4.8      Bilirubin Total 1.7 (*)     ALP 70      AST 26      ALT 32      Anion Gap 11      eGFR >60     URINALYSIS, REFLEX TO URINE CULTURE - Abnormal    Color, UA Yellow      Appearance, UA Clear      pH, UA 7.0      Spec Grav UA 1.015      Protein, UA 1+ (*)     Glucose, UA Negative      Ketones, UA Negative      Bilirubin, UA Negative      Blood, UA 3+ (*)     Nitrites, UA Negative      Urobilinogen, UA Negative      Leukocyte Esterase, UA Negative     CBC WITH DIFFERENTIAL - Abnormal    WBC 13.23 (*)     RBC 5.02      HGB 15.2      HCT 43.7      MCV 87      MCH 30.3      MCHC 34.8      RDW 13.7      Platelet Count 171      MPV 9.5      Nucleated RBC 0      Neut % 84.0 (*)     Lymph % 7.3 (*)     Mono % 7.9      Eos % 0.3      Basophil % 0.2      Imm Grans % 0.3      Neut # 11.12 (*)     Lymph # 0.96 (*)     Mono # 1.05 (*)     Eos # 0.04      Baso # 0.02      Imm Grans # 0.04     MYCOPHENOLIC ACID - Abnormal    Mycophenolic Acid <0.5 (*)     MPA Glucuronide 22 (*)    URINALYSIS MICROSCOPIC - Abnormal    RBC, UA 15 (*)     WBC, UA 0      Bacteria, UA Rare      Hyaline Casts, UA 1      Microscopic Comment       BASIC METABOLIC PANEL - Abnormal    Sodium 141      Potassium 4.7      Chloride 109      CO2 25      Glucose 97      BUN 17      Creatinine 1.1      Calcium 8.0 (*)     Anion Gap 7 (*)     eGFR >60     TACROLIMUS LEVEL - Normal    Tacrolimus 7.2     LACTIC ACID, PLASMA - Normal    Lactic Acid Level 1.8      Narrative:     Falsely low lactic acid results can be found in samples containing >=13.0 mg/dL total bilirubin and/or >=3.5 mg/dL direct bilirubin.    CBC W/ AUTO DIFFERENTIAL    Narrative:     The following orders were created for panel order CBC auto differential.  Procedure                               Abnormality         Status                     ---------                                -----------         ------                     CBC with Differential[[id number]]       Abnormal            Final result                 Please view results for these tests on the individual orders.     Imaging Results              CT Renal Stone Study ABD Pelvis WO (Final result)  Result time 08/01/25 10:17:06      Final result by Anshul Fuentes MD (08/01/25 10:17:06)                   Impression:      Right UVJ stone resulting in mild hydronephrosis.    Bilateral nephrolithiasis.    Moderate splenomegaly with mild interval worsening.      Electronically signed by: Anshul Fuentes MD  Date:    08/01/2025  Time:    10:17               Narrative:    EXAMINATION:  CT RENAL STONE STUDY ABD PELVIS WO    CLINICAL HISTORY:  Flank pain, kidney stone suspected;    TECHNIQUE:  Low dose axial images, sagittal and coronal reformations were obtained from the lung bases to the pubic symphysis.  Contrast was not administered.    COMPARISON:  03/13/2018    FINDINGS:  The liver, gallbladder, pancreas, and adrenal glands are unremarkable.    The spleen is moderately enlarged at 16 cm, increased from 14.6 cm.    A 3 mm stone is present at the right ureterovesical junction, nearly completely within the urinary bladder.  There is mild upstream hydroureteronephrosis.  A punctate stone is present in the upper pole of the right kidney.  There is a 4 mm stone of the midpole of the left kidney.    A small hiatal hernia is present.  The bowel is normal caliber.  The appendix is normal.  There is a small fat containing right inguinal hernia.    The aorta is normal caliber.  No acute bony abnormality.                                    I decided to obtain the patient's medical records.  Summary of Medical Records:    Medications   ondansetron injection 4 mg (4 mg Intravenous Given 8/1/25 6717)   sodium chloride 0.9% bolus 1,000 mL 1,000 mL (0 mLs Intravenous Stopped 8/1/25 1044)   HYDROmorphone injection 0.5 mg  (0.5 mg Intravenous Given 8/1/25 6399)   HYDROmorphone injection 1 mg (1 mg Intravenous Given 8/1/25 1041)   sodium chloride 0.9% bolus 1,000 mL 1,000 mL (0 mLs Intravenous Stopped 8/1/25 1308)     MDM:    35 y.o. male with flank pain    Differential Dx:  Differential includes but is not limited to urolithiasis, less likely pyelonephritis, cystitis    ED Management:  Flank pain workup started for acute presentation of an emergent condition.  Dilaudid, IV fluids for symptomatic treatment.  Patient with mild worsening of creatinine from baseline up to 1.4 from 1 but still within normal limits.  Patient given 2 L of IV fluids with re evaluation of creatinine down to 1.1.  Patient feeling better on re-evaluation.  Stone still at the right UVJ almost completely in the bladder.  Patient requesting refills for Flomax.  He would like to go home as opposed to be transferred.  I discussed with transplant cardiology who stated that patient is feeling better he has fit for discharge from a cardiology standpoint and can follow up in clinic.  Patient given refills for pain medication and Flomax and instructed to follow up with Cardiology and Urology.  He has Urology appointment next week.  Discussed results, diagnosis, and treatment plan with patient; advised close follow-up with PCP. Reviewed strict return precautions. Patient confirms understanding and ability to comply. Patient was given the opportunity to ask questions prior to discharge and all questions answered.     Medical Decision Making  Amount and/or Complexity of Data Reviewed  Labs: ordered.  Radiology: ordered.    Risk  Prescription drug management.            Diagnostic Impression:    Final diagnoses:  [R10.9] Flank pain (Primary)     ED Disposition Condition    Discharge Stable          ED Prescriptions       Medication Sig Dispense Start Date End Date Auth. Provider    oxyCODONE-acetaminophen (PERCOCET)  mg per tablet Take 1 tablet by mouth every 6 (six)  hours as needed for Pain. 17 tablet 8/1/2025 -- Jimy Schmitt MD    tamsulosin (FLOMAX) 0.4 mg Cap Take 1 capsule (0.4 mg total) by mouth once daily. for 14 days 14 capsule 8/1/2025 8/15/2025 Jimy Schmitt MD          Follow-up Information    None             Jimy Schmitt MD  08/05/25 1945

## 2025-08-02 LAB — TACROLIMUS BLD-MCNC: 7.2 NG/ML (ref 5–15)

## 2025-08-04 LAB
MYCOPHENOLATE SERPL-MCNC: <0.5 MCG/ML (ref 1–3.5)
MYCOPHENOLATE-G SERPL-MCNC: 22 MCG/ML (ref 35–100)

## 2025-08-11 ENCOUNTER — TELEPHONE (OUTPATIENT)
Dept: UROLOGY | Facility: CLINIC | Age: 35
End: 2025-08-11
Payer: COMMERCIAL

## 2025-08-11 ENCOUNTER — HOSPITAL ENCOUNTER (OUTPATIENT)
Dept: RADIOLOGY | Facility: HOSPITAL | Age: 35
Discharge: HOME | End: 2025-08-11
Payer: COMMERCIAL

## 2025-08-11 DIAGNOSIS — N20.0 NEPHROLITHIASIS: ICD-10-CM

## 2025-08-11 PROCEDURE — 76775 US EXAM ABDO BACK WALL LIM: CPT | Mod: TC

## 2025-08-11 PROCEDURE — 76775 US EXAM ABDO BACK WALL LIM: CPT | Mod: 26,,, | Performed by: RADIOLOGY

## 2025-08-18 ENCOUNTER — OFFICE VISIT (OUTPATIENT)
Dept: UROLOGY | Facility: CLINIC | Age: 35
End: 2025-08-18
Payer: COMMERCIAL

## 2025-08-18 VITALS
HEART RATE: 103 BPM | BODY MASS INDEX: 25.9 KG/M2 | WEIGHT: 165 LBS | DIASTOLIC BLOOD PRESSURE: 80 MMHG | HEIGHT: 67 IN | SYSTOLIC BLOOD PRESSURE: 145 MMHG

## 2025-08-18 DIAGNOSIS — N20.1 RIGHT URETERAL STONE: ICD-10-CM

## 2025-08-18 DIAGNOSIS — N20.0 NEPHROLITHIASIS: Primary | ICD-10-CM

## 2025-08-18 DIAGNOSIS — N20.0 LEFT RENAL STONE: ICD-10-CM

## 2025-08-18 LAB
BILIRUBIN, UA POC OHS: NEGATIVE
BLOOD, UA POC OHS: NEGATIVE
CLARITY, UA POC OHS: CLEAR
COLOR, UA POC OHS: YELLOW
GLUCOSE, UA POC OHS: NEGATIVE
KETONES, UA POC OHS: NEGATIVE
LEUKOCYTES, UA POC OHS: NEGATIVE
NITRITE, UA POC OHS: NEGATIVE
PH, UA POC OHS: 7
PROTEIN, UA POC OHS: NEGATIVE
SPECIFIC GRAVITY, UA POC OHS: 1.02
UROBILINOGEN, UA POC OHS: 0.2

## 2025-08-18 PROCEDURE — 99204 OFFICE O/P NEW MOD 45 MIN: CPT | Mod: S$GLB,,, | Performed by: NURSE PRACTITIONER

## 2025-08-18 PROCEDURE — 3077F SYST BP >= 140 MM HG: CPT | Mod: CPTII,S$GLB,, | Performed by: NURSE PRACTITIONER

## 2025-08-18 PROCEDURE — 3044F HG A1C LEVEL LT 7.0%: CPT | Mod: CPTII,S$GLB,, | Performed by: NURSE PRACTITIONER

## 2025-08-18 PROCEDURE — 81003 URINALYSIS AUTO W/O SCOPE: CPT | Mod: QW,S$GLB,, | Performed by: NURSE PRACTITIONER

## 2025-08-18 PROCEDURE — 99999 PR PBB SHADOW E&M-EST. PATIENT-LVL IV: CPT | Mod: PBBFAC,,, | Performed by: NURSE PRACTITIONER

## 2025-08-18 PROCEDURE — 1160F RVW MEDS BY RX/DR IN RCRD: CPT | Mod: CPTII,S$GLB,, | Performed by: NURSE PRACTITIONER

## 2025-08-18 PROCEDURE — 3079F DIAST BP 80-89 MM HG: CPT | Mod: CPTII,S$GLB,, | Performed by: NURSE PRACTITIONER

## 2025-08-18 PROCEDURE — 3008F BODY MASS INDEX DOCD: CPT | Mod: CPTII,S$GLB,, | Performed by: NURSE PRACTITIONER

## 2025-08-18 PROCEDURE — 1159F MED LIST DOCD IN RCRD: CPT | Mod: CPTII,S$GLB,, | Performed by: NURSE PRACTITIONER

## 2025-08-18 RX ORDER — NORTRIPTYLINE HYDROCHLORIDE 10 MG/1
10 CAPSULE ORAL NIGHTLY
Qty: 90 CAPSULE | Refills: 3 | Status: SHIPPED | OUTPATIENT
Start: 2025-08-18

## 2025-08-25 ENCOUNTER — TELEPHONE (OUTPATIENT)
Dept: TRANSPLANT | Facility: CLINIC | Age: 35
End: 2025-08-25
Payer: COMMERCIAL

## 2025-08-25 ENCOUNTER — HOSPITAL ENCOUNTER (OUTPATIENT)
Dept: RADIOLOGY | Facility: HOSPITAL | Age: 35
Discharge: HOME | End: 2025-08-25
Attending: NURSE PRACTITIONER
Payer: COMMERCIAL

## 2025-08-25 DIAGNOSIS — Z94.1 HEART TRANSPLANTED: ICD-10-CM

## 2025-08-25 DIAGNOSIS — N20.0 NEPHROLITHIASIS: ICD-10-CM

## 2025-08-25 DIAGNOSIS — N20.1 RIGHT URETERAL STONE: ICD-10-CM

## 2025-08-25 PROCEDURE — 74176 CT ABD & PELVIS W/O CONTRAST: CPT | Mod: TC

## 2025-08-25 PROCEDURE — 74176 CT ABD & PELVIS W/O CONTRAST: CPT | Mod: 26,,, | Performed by: RADIOLOGY

## (undated) DEVICE — SPONGE GAUZE 16PLY 4X4

## (undated) DEVICE — GAUZE SPONGE 4'X4 12 PLY

## (undated) DEVICE — SOL NACL 0.9% INJ 500ML BG

## (undated) DEVICE — SUT 3-0 CTD VICRYL 27IN PS

## (undated) DEVICE — BANDAGE ESMARK 6X12

## (undated) DEVICE — KIT GLIDESHEATH SLEND 6FR 10CM

## (undated) DEVICE — DRESSING TRANS 4X4 TEGADERM

## (undated) DEVICE — BLADE STERN 65.8MM

## (undated) DEVICE — WIRE GUIDE SAFE-T-J .035 260CM

## (undated) DEVICE — SOL NS 1000CC

## (undated) DEVICE — DRESSING ABSRBNT ISLAND 3.6X8

## (undated) DEVICE — HEMOSTAT VASC BAND REG 24CM

## (undated) DEVICE — DRESSING ADHESIVE ISLAND 3 X 6

## (undated) DEVICE — CATH THORACIC ST AXIOM 32F

## (undated) DEVICE — BLADE SAW STERNAL 5/BX

## (undated) DEVICE — SUT PROLENE 4-0 SH BLU 36IN

## (undated) DEVICE — KIT MICROINTRODUCE MINI 5X10CM

## (undated) DEVICE — KIT CO-PILOT

## (undated) DEVICE — GAUZE SPONGE 4X4 12PLY

## (undated) DEVICE — DRAPE INCISE IOBAN 2 23X17IN

## (undated) DEVICE — SUT ETHIBOND EXCEL 2-0 SH-2

## (undated) DEVICE — CATH EAGLE EYE PLATINUM

## (undated) DEVICE — SUT 3/0 48IN PROLENE BL MO

## (undated) DEVICE — SWABSTICK BENZOIN 4 IN

## (undated) DEVICE — PROTECTION STATION PLUS

## (undated) DEVICE — DRAIN CHANNEL ROUND 19FR

## (undated) DEVICE — KIT CUSTOM MANIFOLD

## (undated) DEVICE — CATH MPA2 INFINITI 4FR 100CM

## (undated) DEVICE — GUIDE WIRE BMW 014 X190

## (undated) DEVICE — CANNULA VESSEL FREE FLOW

## (undated) DEVICE — LOOP VESSEL BLUE MAXI

## (undated) DEVICE — SPIKE CONTRAST CONTROLLER

## (undated) DEVICE — DRESSING AQUACEL SACRAL 9 X 9

## (undated) DEVICE — SEE MEDLINE ITEM 146417

## (undated) DEVICE — TRAY HEART

## (undated) DEVICE — GUIDEWIRE EMERALD 150CM PTFE

## (undated) DEVICE — CATH THORACIC ANGLE 32F

## (undated) DEVICE — CLOSURE SKIN STERI STRIP 1/8X3

## (undated) DEVICE — CONTAINER SPECIMEN STRL 4OZ

## (undated) DEVICE — SOL 9P NACL IRR PIC IL

## (undated) DEVICE — TRAY CATH UM FOLEY SIL W 16FR

## (undated) DEVICE — COVER SET UP STRL 54X54 20/BX

## (undated) DEVICE — FORCEP BIOPSY 50CM

## (undated) DEVICE — SUT 2/0 30IN ETHIBOND

## (undated) DEVICE — KIT SAHARA DRAPE DRAW/LIFT

## (undated) DEVICE — SHEATH INTRODUCER 7FR 11CM

## (undated) DEVICE — CATH THORACIC 32FR ST

## (undated) DEVICE — TAPE MEDIPORE 4IN X 2YDS

## (undated) DEVICE — WIRE BENTSON 035/180

## (undated) DEVICE — INSERTS STEALTH FIBRA SIZE 5

## (undated) DEVICE — SUT SILK 2-0 SH 18IN BLACK

## (undated) DEVICE — DRESSING TELFA PAD N ADH 2X3

## (undated) DEVICE — DRAIN INTRACARDIAC SUMP 1/8IN

## (undated) DEVICE — SUT ETHILON 2-0 PSLX 30IN

## (undated) DEVICE — SEE MEDLINE ITEM 156894

## (undated) DEVICE — STAPLER SKIN PROXIMATE WIDE

## (undated) DEVICE — ELECTRODE EXTENDED BLADE

## (undated) DEVICE — DRAPE ANGIO BRACH 38X44IN

## (undated) DEVICE — Device

## (undated) DEVICE — OMNIPAQUE 350 200ML

## (undated) DEVICE — SUT PROLENE 4-0 RB-1 BL MO

## (undated) DEVICE — SUT 2/0 36IN ETHIBOND EXCE

## (undated) DEVICE — DRAPE SLUSH WARMER WITH DISC

## (undated) DEVICE — GUIDE VISTA XB 3.5

## (undated) DEVICE — KIT PROBE COVER WITH GEL

## (undated) DEVICE — SUT PROLENE 5-0 BL C-1 4-24

## (undated) DEVICE — DRESSING TEGADERM IV 3.5 X 4.5

## (undated) DEVICE — SEE ITEM #153244

## (undated) DEVICE — GOWN SURGICAL X-LARGE

## (undated) DEVICE — SEE MEDLINE ITEM 146347

## (undated) DEVICE — ELECTRODE REM PLYHSV RETURN 9

## (undated) DEVICE — SUT BONE WAX 2.5 GRMS 12/BX

## (undated) DEVICE — TRAY MINOR GEN SURG

## (undated) DEVICE — CATH JACKY RADIAL 5FR 100CM

## (undated) DEVICE — PACK DRAPE UNIVERSAL CONVERTOR

## (undated) DEVICE — SUT SILK BLK BR. 2 2-60

## (undated) DEVICE — GUIDE LAUNCHER 6FR JL 4.0

## (undated) DEVICE — SUT VICRYL BR 1 GEN 27 CT-1

## (undated) DEVICE — SEE MEDLINE ITEM 146313

## (undated) DEVICE — SEE MEDLINE ITEM 157187

## (undated) DEVICE — SUT VICRYL 2-0 36 CT-1

## (undated) DEVICE — SHEATH INTRODUCER 6FR 11CM

## (undated) DEVICE — CATH INFINITI JUDKINS JR4

## (undated) DEVICE — SUT ETHILON 2LR DA 30IN BLK

## (undated) DEVICE — SUT 0 30IN ETHIBOND EXCEL G

## (undated) DEVICE — FORCEP ENDOMYOCARD BIOPSY7F AD

## (undated) DEVICE — DRESSING TRANS 8X12 TEGADERM

## (undated) DEVICE — KIT MICROINTRO 4F .018X40X7CM

## (undated) DEVICE — DRESSING XEROFORM STRL 4X3

## (undated) DEVICE — DRAIN CHEST DRY SUCTION

## (undated) DEVICE — DRAPE SPLIT STERILE

## (undated) DEVICE — CLOSURE SKIN STERI STRIP 1/2X4

## (undated) DEVICE — SUT 6 18IN STEEL MONO CCS

## (undated) DEVICE — APPLICATOR CHLORAPREP ORN 26ML

## (undated) DEVICE — KIT SHEATH 9FRX11CM

## (undated) DEVICE — DRESSING TELFA STRL 4X3 LF

## (undated) DEVICE — DRESSING ADH ISLAND 3.6 X 14

## (undated) DEVICE — PAD DEFIB CADENCE ADULT R2

## (undated) DEVICE — SET DECANTER MEDICHOICE

## (undated) DEVICE — SEE MEDLINE ITEM 146292

## (undated) DEVICE — CATH THOR STND RGHT ANG 32FR

## (undated) DEVICE — GUIDE LAUNCHER 6FR EBU 3.5

## (undated) DEVICE — FOGERTY SOFT JAW DISP 2/PK

## (undated) DEVICE — HEMOSTAT SURGICEL 2X3IN

## (undated) DEVICE — TRANSDUCER ADULT DISP

## (undated) DEVICE — PROBE CATH TEMP 16 FRFOLEY 400

## (undated) DEVICE — SPONGE LAP 18X18 PREWASHED

## (undated) DEVICE — TRAY CATH LAB OMC

## (undated) DEVICE — HOLDER TUBE

## (undated) DEVICE — SUT 2/0 36IN COATED VICRYL

## (undated) DEVICE — SUT PROLENE 5-0 36IN C-1

## (undated) DEVICE — DRESSING MEPORE ADH 3.5X12

## (undated) DEVICE — NDL BOX COUNTER

## (undated) DEVICE — CONNECTOR 1/4X3/16X3/16 Y

## (undated) DEVICE — RETRACTOR OCTOBASE INSERT HOLD

## (undated) DEVICE — KIT COPILOT VALVE HEMO TOOL

## (undated) DEVICE — PACK SET UP CONVERTORS

## (undated) DEVICE — ELECTRODE PAD DEFIB STERILE

## (undated) DEVICE — VALVE ULTRASITE MALE LUER

## (undated) DEVICE — PAD K-THERMIA 24IN X 60IN

## (undated) DEVICE — SEE MEDLINE ITEM 157117

## (undated) DEVICE — SHEATH 7FR 98CM

## (undated) DEVICE — SUT ETHIBOND XTRA 1 CTX

## (undated) DEVICE — GUIDE LAUNCHER 6FR EBU 3.0

## (undated) DEVICE — SEE MEDLINE ITEM 152487

## (undated) DEVICE — SUT PROLENE 5-0 24 C-1 BL

## (undated) DEVICE — DRAPE STERI INSTRUMENT 1018

## (undated) DEVICE — STRIP WOUND PK BIGUANIDE 36X.5

## (undated) DEVICE — SYR 50CC LL

## (undated) DEVICE — SEE MEDLINE ITEM 152622